# Patient Record
Sex: FEMALE | Race: WHITE | Employment: UNEMPLOYED | URBAN - METROPOLITAN AREA
[De-identification: names, ages, dates, MRNs, and addresses within clinical notes are randomized per-mention and may not be internally consistent; named-entity substitution may affect disease eponyms.]

---

## 2017-01-03 ENCOUNTER — OFFICE VISIT (OUTPATIENT)
Dept: INTERNAL MEDICINE CLINIC | Facility: CLINIC | Age: 57
End: 2017-01-03

## 2017-01-03 VITALS
DIASTOLIC BLOOD PRESSURE: 78 MMHG | SYSTOLIC BLOOD PRESSURE: 158 MMHG | BODY MASS INDEX: 29.84 KG/M2 | HEART RATE: 88 BPM | HEIGHT: 60 IN | WEIGHT: 152 LBS | RESPIRATION RATE: 22 BRPM | TEMPERATURE: 99 F

## 2017-01-03 DIAGNOSIS — R53.82 CHRONIC FATIGUE: ICD-10-CM

## 2017-01-03 DIAGNOSIS — G47.10 HYPERSOMNIA: ICD-10-CM

## 2017-01-03 DIAGNOSIS — J44.9 COPD, MILD (HCC): ICD-10-CM

## 2017-01-03 DIAGNOSIS — J06.9 UPPER RESPIRATORY TRACT INFECTION, UNSPECIFIED TYPE: Primary | ICD-10-CM

## 2017-01-03 DIAGNOSIS — G47.33 OSA (OBSTRUCTIVE SLEEP APNEA): ICD-10-CM

## 2017-01-03 DIAGNOSIS — R06.83 SNORING: ICD-10-CM

## 2017-01-03 DIAGNOSIS — R06.02 SHORTNESS OF BREATH: ICD-10-CM

## 2017-01-03 PROCEDURE — 99214 OFFICE O/P EST MOD 30 MIN: CPT | Performed by: INTERNAL MEDICINE

## 2017-01-03 RX ORDER — ALBUTEROL SULFATE 90 UG/1
2 AEROSOL, METERED RESPIRATORY (INHALATION) EVERY 4 HOURS PRN
Qty: 1 INHALER | Refills: 6 | Status: SHIPPED | OUTPATIENT
Start: 2017-01-03 | End: 2018-05-24

## 2017-01-03 NOTE — PROGRESS NOTES
Patient presents with: Other: Residual URI; chronic fatigue and hypersomnia; SOB and COPD      HPI: The pt presents today for eval of residual URI, chronic fatigue and hypersomnia, as well as SOB and COPD.     1. URI - Was treated recently with antibiotics Status: Never Used                        Comment: 1 1/2 per day    Alcohol Use: Yes           0.0 oz/week       0 Standard drinks or equivalent per week       Comment: social      PE:  /78 mmHg  Pulse 88  Temp(Src) 98.5 °F (36.9 °C) (Oral)  Resp 22 (CPT=15314)

## 2017-01-04 RX ORDER — MELOXICAM 15 MG/1
TABLET ORAL
Qty: 30 TABLET | Refills: 0 | Status: SHIPPED | OUTPATIENT
Start: 2017-01-04 | End: 2017-02-02

## 2017-01-04 NOTE — TELEPHONE ENCOUNTER
Future Appointments  Date Time Provider Lilly Purcell   9/0/6253 4:32 PM Lily Her MD [facility] Edwige Aguiar

## 2017-01-10 RX ORDER — RANITIDINE 150 MG/1
TABLET ORAL
Qty: 60 TABLET | Refills: 0 | Status: SHIPPED | OUTPATIENT
Start: 2017-01-10 | End: 2017-02-08

## 2017-01-10 NOTE — TELEPHONE ENCOUNTER
Medication: Sumatriptan 50mg     Date of last refill: 11/4/16  [place]     Last office visit: 11/10/16  Due back to clinic per last office note:  2-3 months  Date next office visit scheduled:  2-16-17    Last OV note rec

## 2017-01-11 RX ORDER — SUMATRIPTAN 50 MG/1
TABLET, FILM COATED ORAL
Qty: 9 TABLET | Refills: 0 | Status: SHIPPED | OUTPATIENT
Start: 2017-01-11 | End: 2017-02-08

## 2017-02-02 RX ORDER — MELOXICAM 15 MG/1
TABLET ORAL
Qty: 30 TABLET | Refills: 0 | Status: SHIPPED | OUTPATIENT
Start: 2017-02-02 | End: 2017-03-16

## 2017-02-02 NOTE — TELEPHONE ENCOUNTER
LOV 11/3/16  Future Appointments  Date Time Provider Lilly Purcell   4/8/9840 2:66 PM Martínez Arellano MD [facility] Adele Mathur

## 2017-02-08 DIAGNOSIS — G43.009 MIGRAINE WITHOUT AURA AND WITHOUT STATUS MIGRAINOSUS, NOT INTRACTABLE: Primary | ICD-10-CM

## 2017-02-09 RX ORDER — RANITIDINE 150 MG/1
TABLET ORAL
Qty: 60 TABLET | Refills: 0 | Status: SHIPPED | OUTPATIENT
Start: 2017-02-09 | End: 2017-03-10

## 2017-02-09 RX ORDER — SUMATRIPTAN 50 MG/1
TABLET, FILM COATED ORAL
Qty: 9 TABLET | Refills: 0 | Status: SHIPPED | OUTPATIENT
Start: 2017-02-09 | End: 2017-03-09

## 2017-02-09 NOTE — TELEPHONE ENCOUNTER
Medication: Sumatriptan    Date of last refill: 01/11/17  [place]    Last office visit: 02/23/17  Due back to clinic per last office note:  2-3 months  Date next office visit scheduled:  02/23/17    Last OV note hola

## 2017-02-10 ENCOUNTER — TELEPHONE (OUTPATIENT)
Dept: INTERNAL MEDICINE CLINIC | Facility: CLINIC | Age: 57
End: 2017-02-10

## 2017-02-10 NOTE — TELEPHONE ENCOUNTER
Yes.  Are you going to contact the company? Temo Randle. Kavin Ramos MD  Diplomate, American Board of Internal Medicine  [facility]  [address]  [facility],4Th Floor, [address], [facility], [address] [place] [address]  T: [phone]; F: [address]

## 2017-02-11 ENCOUNTER — OFFICE VISIT (OUTPATIENT)
Dept: SLEEP CENTER | Facility: HOSPITAL | Age: 57
End: 2017-02-11
Attending: INTERNAL MEDICINE
Payer: MEDICAID

## 2017-02-11 PROCEDURE — 95810 POLYSOM 6/> YRS 4/> PARAM: CPT

## 2017-02-13 ENCOUNTER — HOSPITAL ENCOUNTER (OUTPATIENT)
Dept: CV DIAGNOSTICS | Age: 57
Discharge: HOME | End: 2017-02-13
Attending: INTERNAL MEDICINE
Payer: MEDICAID

## 2017-02-13 DIAGNOSIS — J44.9 COPD, MILD (HCC): ICD-10-CM

## 2017-02-13 DIAGNOSIS — R06.02 SHORTNESS OF BREATH: ICD-10-CM

## 2017-02-13 PROCEDURE — 93306 TTE W/DOPPLER COMPLETE: CPT | Performed by: INTERNAL MEDICINE

## 2017-02-13 PROCEDURE — 93306 TTE W/DOPPLER COMPLETE: CPT

## 2017-02-14 NOTE — TELEPHONE ENCOUNTER
Pt is request Pristiq 100 mg, Had to renew with [employer] paperwork was faxed on 2-13-17 pt aware a awaiting a response.

## 2017-02-17 ENCOUNTER — TELEPHONE (OUTPATIENT)
Dept: INTERNAL MEDICINE CLINIC | Facility: CLINIC | Age: 57
End: 2017-02-17

## 2017-02-17 NOTE — TELEPHONE ENCOUNTER
Pt called to inform has enough tabs for a few more days for Pritstiq. Pt verbalized will call back on Monday w info of which pharmacy to sent a 7 day refill.

## 2017-02-20 ENCOUNTER — TELEPHONE (OUTPATIENT)
Dept: INTERNAL MEDICINE CLINIC | Facility: CLINIC | Age: 57
End: 2017-02-20

## 2017-02-20 DIAGNOSIS — G47.33 OSA (OBSTRUCTIVE SLEEP APNEA): Primary | ICD-10-CM

## 2017-02-20 NOTE — TELEPHONE ENCOUNTER
Tell her sleep study shows obstructive sleep apnea. She has been recommended for formal CPAP titration study. Test ordered. Ricardo Boggs. Jcarlos Tripathi MD  Diplomate, American Board of Internal Medicine  [facility]  [address] [facility], [address], [address] [facility]

## 2017-02-22 RX ORDER — DESVENLAFAXINE 100 MG/1
100 TABLET, EXTENDED RELEASE ORAL DAILY
Qty: 7 TABLET | Refills: 0 | Status: SHIPPED | OUTPATIENT
Start: 2017-02-22 | End: 2017-09-28

## 2017-02-22 NOTE — TELEPHONE ENCOUNTER
Pt requesting refill today on Pristiq 100 mg for 7 days to [facility] until approved for assistance through [employer].

## 2017-03-02 ENCOUNTER — TELEPHONE (OUTPATIENT)
Dept: INTERNAL MEDICINE CLINIC | Facility: CLINIC | Age: 57
End: 2017-03-02

## 2017-03-07 ENCOUNTER — TELEPHONE (OUTPATIENT)
Dept: INTERNAL MEDICINE CLINIC | Facility: CLINIC | Age: 57
End: 2017-03-07

## 2017-03-07 DIAGNOSIS — R53.82 CHRONIC FATIGUE: Primary | ICD-10-CM

## 2017-03-07 NOTE — TELEPHONE ENCOUNTER
Tell her labs ordered and I added a few things as well. Jony Escalona. Bibiana Starks MD  Diplomate, American Board of Internal Medicine  [facility]  [address]  [address] [facility],4Th Floor, [address], [address], [address] [place] [address]  T: [phone]; F: [address]

## 2017-03-09 DIAGNOSIS — G43.009 MIGRAINE WITHOUT AURA AND WITHOUT STATUS MIGRAINOSUS, NOT INTRACTABLE: Primary | ICD-10-CM

## 2017-03-09 RX ORDER — SUMATRIPTAN 50 MG/1
TABLET, FILM COATED ORAL
Qty: 9 TABLET | Refills: 0 | Status: SHIPPED | OUTPATIENT
Start: 2017-03-09 | End: 2017-09-28

## 2017-03-09 NOTE — TELEPHONE ENCOUNTER
Pt due in clinic for f/u appt in Feb.  Appts had been scheduled and canceled. Called patient who states that she has been very sick, and had to cancel those appts. She is willing to schedule. Pt scheduled for 3/30/17.   Informed we will refill this Rx wi

## 2017-03-10 RX ORDER — PRAVASTATIN SODIUM 20 MG
TABLET ORAL
Qty: 90 TABLET | Refills: 0 | Status: SHIPPED | OUTPATIENT
Start: 2017-03-10 | End: 2017-06-23

## 2017-03-10 RX ORDER — IBUPROFEN 800 MG/1
TABLET ORAL
Qty: 90 TABLET | Refills: 0 | Status: SHIPPED | OUTPATIENT
Start: 2017-03-10 | End: 2017-07-11

## 2017-03-13 RX ORDER — RANITIDINE 150 MG/1
TABLET ORAL
Qty: 60 TABLET | Refills: 0 | Status: SHIPPED | OUTPATIENT
Start: 2017-03-13 | End: 2017-09-28

## 2017-03-14 ENCOUNTER — LAB ENCOUNTER (OUTPATIENT)
Dept: LAB | Age: 57
End: 2017-03-14
Attending: INTERNAL MEDICINE
Payer: MEDICAID

## 2017-03-14 DIAGNOSIS — R53.82 CHRONIC FATIGUE: ICD-10-CM

## 2017-03-14 LAB
25-HYDROXYVITAMIN D (TOTAL): 13.2 NG/ML (ref 30–100)
ALBUMIN SERPL-MCNC: 3.9 G/DL (ref 3.5–4.8)
ALP LIVER SERPL-CCNC: 79 U/L (ref 41–108)
ALT SERPL-CCNC: 31 U/L (ref 14–54)
AST SERPL-CCNC: 17 U/L (ref 15–41)
BASOPHILS # BLD AUTO: 0.06 X10(3) UL (ref 0–0.1)
BASOPHILS NFR BLD AUTO: 0.5 %
BILIRUB SERPL-MCNC: 0.4 MG/DL (ref 0.1–2)
BUN BLD-MCNC: 13 MG/DL (ref 8–20)
CALCIUM BLD-MCNC: 9.4 MG/DL (ref 8.3–10.3)
CHLORIDE: 104 MMOL/L (ref 101–111)
CO2: 27 MMOL/L (ref 22–32)
CREAT BLD-MCNC: 0.78 MG/DL (ref 0.55–1.02)
DEPRECATED HBV CORE AB SER IA-ACNC: 97.6 NG/ML (ref 10–291)
EOSINOPHIL # BLD AUTO: 0.22 X10(3) UL (ref 0–0.3)
EOSINOPHIL NFR BLD AUTO: 2 %
ERYTHROCYTE [DISTWIDTH] IN BLOOD BY AUTOMATED COUNT: 12.5 % (ref 11.5–16)
EST. AVERAGE GLUCOSE BLD GHB EST-MCNC: 123 MG/DL (ref 68–126)
FOLATE (FOLIC ACID), SERUM: 45.4 NG/ML (ref 8.7–24)
GLUCOSE BLD-MCNC: 141 MG/DL (ref 70–99)
HAV AB SERPL IA-ACNC: 1671 PG/ML (ref 193–986)
HBA1C MFR BLD HPLC: 5.9 % (ref ?–5.7)
HCT VFR BLD AUTO: 40.7 % (ref 34–50)
HGB BLD-MCNC: 13.9 G/DL (ref 12–16)
IMMATURE GRANULOCYTE COUNT: 0.06 X10(3) UL (ref 0–1)
IMMATURE GRANULOCYTE RATIO %: 0.5 %
IRON: 115 UG/DL (ref 28–170)
LYMPHOCYTES # BLD AUTO: 3.69 X10(3) UL (ref 0.9–4)
LYMPHOCYTES NFR BLD AUTO: 33.5 %
M PROTEIN MFR SERPL ELPH: 7.4 G/DL (ref 6.1–8.3)
MCH RBC QN AUTO: 33.1 PG (ref 27–33.2)
MCHC RBC AUTO-ENTMCNC: 34.2 G/DL (ref 31–37)
MCV RBC AUTO: 96.9 FL (ref 81–100)
MONOCYTES # BLD AUTO: 0.75 X10(3) UL (ref 0.1–0.6)
MONOCYTES NFR BLD AUTO: 6.8 %
NEUTROPHIL ABS PRELIM: 6.23 X10 (3) UL (ref 1.3–6.7)
NEUTROPHILS # BLD AUTO: 6.23 X10(3) UL (ref 1.3–6.7)
NEUTROPHILS NFR BLD AUTO: 56.7 %
PLATELET # BLD AUTO: 288 10(3)UL (ref 150–450)
POTASSIUM SERPL-SCNC: 3.8 MMOL/L (ref 3.6–5.1)
RBC # BLD AUTO: 4.2 X10(6)UL (ref 3.8–5.1)
RED CELL DISTRIBUTION WIDTH-SD: 44 FL (ref 35.1–46.3)
SODIUM SERPL-SCNC: 139 MMOL/L (ref 136–144)
TSI SER-ACNC: 2.18 MIU/ML (ref 0.35–5.5)
WBC # BLD AUTO: 11 X10(3) UL (ref 4–13)

## 2017-03-14 PROCEDURE — 82607 VITAMIN B-12: CPT

## 2017-03-14 PROCEDURE — 80053 COMPREHEN METABOLIC PANEL: CPT

## 2017-03-14 PROCEDURE — 83036 HEMOGLOBIN GLYCOSYLATED A1C: CPT

## 2017-03-14 PROCEDURE — 82746 ASSAY OF FOLIC ACID SERUM: CPT

## 2017-03-14 PROCEDURE — 36415 COLL VENOUS BLD VENIPUNCTURE: CPT

## 2017-03-14 PROCEDURE — 83540 ASSAY OF IRON: CPT

## 2017-03-14 PROCEDURE — 82306 VITAMIN D 25 HYDROXY: CPT

## 2017-03-14 PROCEDURE — 82728 ASSAY OF FERRITIN: CPT

## 2017-03-14 PROCEDURE — 85025 COMPLETE CBC W/AUTO DIFF WBC: CPT

## 2017-03-14 PROCEDURE — 84443 ASSAY THYROID STIM HORMONE: CPT

## 2017-03-15 DIAGNOSIS — E55.9 VITAMIN D DEFICIENCY: Primary | ICD-10-CM

## 2017-03-15 RX ORDER — ERGOCALCIFEROL 1.25 MG/1
50000 CAPSULE ORAL
Qty: 12 CAPSULE | Refills: 0 | Status: SHIPPED | OUTPATIENT
Start: 2017-03-15 | End: 2017-06-23 | Stop reason: ALTCHOICE

## 2017-03-15 NOTE — PROGRESS NOTES
Script for Vitamin D sent to her pharmacy today. Sandoval Michel. Ed Justice MD  Diplomate, American Board of Internal Medicine  [facility]  [address]  [address] [facility],4Th Floor, [address], [facility], [address] [place] [address]  T: [phone]; F: [address]

## 2017-03-16 ENCOUNTER — TELEPHONE (OUTPATIENT)
Dept: RHEUMATOLOGY | Facility: CLINIC | Age: 57
End: 2017-03-16

## 2017-03-16 RX ORDER — MELOXICAM 15 MG/1
15 TABLET ORAL DAILY
Qty: 30 TABLET | Refills: 0 | Status: SHIPPED | OUTPATIENT
Start: 2017-03-16 | End: 2017-04-08

## 2017-03-16 NOTE — TELEPHONE ENCOUNTER
LOV 11/30/16  Future Appointments  Date Time Provider Lilly Purcell                 3/57/9396 2:16 PM Karen Levine MD [facility] Ting Flores

## 2017-03-30 ENCOUNTER — OFFICE VISIT (OUTPATIENT)
Dept: NEUROLOGY | Facility: CLINIC | Age: 57
End: 2017-03-30

## 2017-03-30 VITALS
DIASTOLIC BLOOD PRESSURE: 98 MMHG | WEIGHT: 159 LBS | BODY MASS INDEX: 31.22 KG/M2 | HEIGHT: 60 IN | TEMPERATURE: 99 F | HEART RATE: 94 BPM | OXYGEN SATURATION: 95 % | SYSTOLIC BLOOD PRESSURE: 152 MMHG | RESPIRATION RATE: 23 BRPM

## 2017-03-30 DIAGNOSIS — G43.709 CHRONIC MIGRAINE WITHOUT AURA WITHOUT STATUS MIGRAINOSUS, NOT INTRACTABLE: Primary | ICD-10-CM

## 2017-03-30 PROCEDURE — 99213 OFFICE O/P EST LOW 20 MIN: CPT | Performed by: OTHER

## 2017-03-31 ENCOUNTER — TELEPHONE (OUTPATIENT)
Dept: NEUROLOGY | Facility: CLINIC | Age: 57
End: 2017-03-31

## 2017-03-31 DIAGNOSIS — G43.009 MIGRAINE WITHOUT AURA AND WITHOUT STATUS MIGRAINOSUS, NOT INTRACTABLE: Primary | ICD-10-CM

## 2017-04-03 NOTE — PROGRESS NOTES
HPI:    Patient ID: Cherry Sender is a 49year old female. Migraine   Associated symptoms include photophobia.        Alycia Ortega is a 49year old female who presented for follow up for migraines and chronic daily headache She states that she continue to hav photophobia. Musculoskeletal: Positive for arthralgias. Neurological: Positive for headaches. All other systems reviewed and are negative.              Current Outpatient Prescriptions:  ergocalciferol 73419 units Oral Cap Take 1 capsule (50,000 Units Release Take 1 capsule (40 mg total) by mouth 2 (two) times daily.  Disp: 60 capsule Rfl: 3   ZOLPIDEM TARTRATE 10 MG Oral Tab TAKE ONE TABLET BY MOUTH NIGHTLY AS NEEDED FOR SLEEP Disp: 30 tablet Rfl: 5   SEROQUEL  MG Oral Tablet 24 Hr Take 1 tablet ( Cambia which I would not advised as she is on Ibuprofen and Meloxicam for OA (increase risk for PUD and GI bleeding). Patient understand the risk but insist I give her prescription to try. Continue Depakote 500 mg at bedtime.  Advised reduce cheese int

## 2017-04-04 ENCOUNTER — OFFICE VISIT (OUTPATIENT)
Dept: INTERNAL MEDICINE CLINIC | Facility: CLINIC | Age: 57
End: 2017-04-04

## 2017-04-04 VITALS
BODY MASS INDEX: 31.12 KG/M2 | OXYGEN SATURATION: 98 % | HEART RATE: 80 BPM | DIASTOLIC BLOOD PRESSURE: 92 MMHG | HEIGHT: 60 IN | TEMPERATURE: 98 F | WEIGHT: 158.5 LBS | SYSTOLIC BLOOD PRESSURE: 152 MMHG | RESPIRATION RATE: 20 BRPM

## 2017-04-04 DIAGNOSIS — E78.00 HYPERCHOLESTEROLEMIA: ICD-10-CM

## 2017-04-04 DIAGNOSIS — R53.82 CHRONIC FATIGUE: ICD-10-CM

## 2017-04-04 DIAGNOSIS — E55.9 VITAMIN D DEFICIENCY: ICD-10-CM

## 2017-04-04 DIAGNOSIS — E66.9 OBESITY (BMI 30-39.9): Primary | ICD-10-CM

## 2017-04-04 PROCEDURE — 99214 OFFICE O/P EST MOD 30 MIN: CPT | Performed by: INTERNAL MEDICINE

## 2017-04-04 RX ORDER — ATORVASTATIN CALCIUM 20 MG/1
20 TABLET, FILM COATED ORAL NIGHTLY
Qty: 90 TABLET | Refills: 1 | Status: SHIPPED | OUTPATIENT
Start: 2017-04-04 | End: 2017-09-28

## 2017-04-04 NOTE — PROGRESS NOTES
Patient presents with: Other: obesity and weight concerns; ongoing fatigue; vit d def; lipids      HPI: The pt presents today for eval of multiple medical complaints:    1. Obesity and elevated BMI - She's gained 18 pounds w/o intent over the last 1 year. Delayed Release, Take 1 capsule (40 mg total) by mouth 2 (two) times daily. , Disp: 60 capsule, Rfl: 3  •  divalproex Sodium  MG Oral Tablet 24 Hr, Take 2 tablets (500 mg total) by mouth nightly., Disp: 120 tablet, Rfl: 2  •  ZOLPIDEM TARTRATE 10 MG O 158 lb 8 oz  03/30/17 : 159 lb  01/03/17 : 152 lb  12/20/16 : 146 lb  11/10/16 : 146 lb  11/03/16 : 148 lb  Gen - A&Ox3, NAD, obese  HEENT - PERRL, EOMI, OP is clear  Neck - supple, no JVD, no thyromegaly  Lungs - CTAB  CV - RRR, nl s1, s2  Abd - soft, NAB

## 2017-04-05 NOTE — TELEPHONE ENCOUNTER
Rec'd incoming fax from [address] [place], [place] [address]way [facility] stating that due to patient's reported allergies to Naproxen, ASA and NSAIDs, they would not fill unless they had documentation that the patient had tried Cambia in the past without any adverse reactions.

## 2017-04-10 RX ORDER — MELOXICAM 15 MG/1
TABLET ORAL
Qty: 30 TABLET | Refills: 0 | Status: SHIPPED | OUTPATIENT
Start: 2017-04-10 | End: 2017-04-25

## 2017-04-10 RX ORDER — OMEPRAZOLE 40 MG/1
CAPSULE, DELAYED RELEASE ORAL
Qty: 60 CAPSULE | Refills: 2 | Status: SHIPPED | OUTPATIENT
Start: 2017-04-10 | End: 2017-07-10

## 2017-04-22 ENCOUNTER — OFFICE VISIT (OUTPATIENT)
Dept: SLEEP CENTER | Facility: HOSPITAL | Age: 57
End: 2017-04-22
Attending: INTERNAL MEDICINE
Payer: MEDICAID

## 2017-04-22 PROCEDURE — 95811 POLYSOM 6/>YRS CPAP 4/> PARM: CPT

## 2017-04-25 ENCOUNTER — OFFICE VISIT (OUTPATIENT)
Dept: RHEUMATOLOGY | Facility: CLINIC | Age: 57
End: 2017-04-25

## 2017-04-25 VITALS
HEART RATE: 84 BPM | WEIGHT: 154 LBS | DIASTOLIC BLOOD PRESSURE: 92 MMHG | BODY MASS INDEX: 30 KG/M2 | SYSTOLIC BLOOD PRESSURE: 146 MMHG | RESPIRATION RATE: 16 BRPM

## 2017-04-25 DIAGNOSIS — M15.9 PRIMARY OSTEOARTHRITIS INVOLVING MULTIPLE JOINTS: ICD-10-CM

## 2017-04-25 DIAGNOSIS — M18.11 PRIMARY OSTEOARTHRITIS OF FIRST CARPOMETACARPAL JOINT OF RIGHT HAND: Primary | ICD-10-CM

## 2017-04-25 PROCEDURE — 20600 DRAIN/INJ JOINT/BURSA W/O US: CPT | Performed by: INTERNAL MEDICINE

## 2017-04-25 RX ORDER — MELOXICAM 15 MG/1
TABLET ORAL
Qty: 90 TABLET | Refills: 1 | Status: SHIPPED | OUTPATIENT
Start: 2017-04-25 | End: 2017-07-28

## 2017-04-25 RX ORDER — METHYLPREDNISOLONE ACETATE 40 MG/ML
10 INJECTION, SUSPENSION INTRA-ARTICULAR; INTRALESIONAL; INTRAMUSCULAR; SOFT TISSUE ONCE
Status: COMPLETED | OUTPATIENT
Start: 2017-04-25 | End: 2017-04-25

## 2017-04-25 RX ADMIN — METHYLPREDNISOLONE ACETATE 10 MG: 40 INJECTION, SUSPENSION INTRA-ARTICULAR; INTRALESIONAL; INTRAMUSCULAR; SOFT TISSUE at 18:31:00

## 2017-04-25 NOTE — PROCEDURES
After obtaining consent from the patient, the right thumb was cleansed with a combination of Betadine and alcohol wipes.   In the right thumb was injected at the first carpometacarpal joint with 10 mg of methylprednisolone and a fourth of the cc of 1% lidoc

## 2017-04-25 NOTE — PROGRESS NOTES
[facility]  Dr. Kary Fan Progress Note     Subjective:   Anita Scott is a(n) 49year old female. Current complaints: Patient presents with:  Osteoarthritis: right thumb. LOV 11/3/16.  Pt requesting right thumb injection  Pain in right thumb at the

## 2017-04-25 NOTE — PROCEDURES
[address]       Accredited by the [facility] ([facility])    PATIENT'S NAME:        Deborah Ortiz  ATTENDING PHYSICIAN:   Lissett Gomez M.D. REFERRING PHYSICIAN:   JUNIOR Nance minutes. There is an additional channel for measurement of CPAP flow for the titration of positive airway pressure.     SLEEP ARCHITECTURE:  Total sleep period 362 minutes, total sleep time 346 minutes, with sleep efficiency 89% and sleep latency 27 minutes possible is beneficial for patients prescribed CPAP. 6.   The patient will follow with Dr. Estelita Barragan as per orders. Dictated By Payal Whitt M.D.  d: 04/25/2017 15:09:38  t: 04/25/2017 16:07:16  Job [id number]  OS/    cc: CIRO Machado

## 2017-04-25 NOTE — PATIENT INSTRUCTIONS
Today right thumb injected at the first metacarpal joint with 10 mg of methylprednisolone and a fourth of a cc of 1% lidocaine. Patient tolerated the injection well. Patient is to use meloxicam 15 mg at night. For breakthrough pain taken ibuprofen.   Juan Manuel

## 2017-05-04 ENCOUNTER — TELEPHONE (OUTPATIENT)
Dept: INTERNAL MEDICINE CLINIC | Facility: CLINIC | Age: 57
End: 2017-05-04

## 2017-05-04 NOTE — TELEPHONE ENCOUNTER
Pt received a call yesterday from someone telling her she needed to follow with a sleep specialist.  Pt does not know who called her or remember the name of the sleep specialist.  Did Dr Jcarlos Tripathi refer her?    Also pt received a letter stating Seroquel goes ge

## 2017-05-04 NOTE — TELEPHONE ENCOUNTER
No need to see a sleep specialist.  Just needs to make sure she has appropriate CPAP equipment. Lakia Paul. Zuleika Lynch MD  Diplomate, American Board of Internal Medicine  [facility]  [address]  [address] [facility],4Th Floor, [address], [facility], [address] [place] [address]  T: [phone]

## 2017-05-09 ENCOUNTER — TELEPHONE (OUTPATIENT)
Dept: INTERNAL MEDICINE CLINIC | Facility: CLINIC | Age: 57
End: 2017-05-09

## 2017-05-10 ENCOUNTER — TELEPHONE (OUTPATIENT)
Dept: INTERNAL MEDICINE CLINIC | Facility: CLINIC | Age: 57
End: 2017-05-10

## 2017-05-10 RX ORDER — RANITIDINE 150 MG/1
CAPSULE ORAL
Qty: 60 CAPSULE | Refills: 2 | Status: SHIPPED | OUTPATIENT
Start: 2017-05-10 | End: 2017-07-27

## 2017-05-10 RX ORDER — DESVENLAFAXINE 100 MG/1
100 TABLET, EXTENDED RELEASE ORAL DAILY
Refills: 0 | COMMUNITY
Start: 2017-05-10 | End: 2017-08-21

## 2017-05-10 NOTE — TELEPHONE ENCOUNTER
Pt requesting refill on Pristiq  mg daily through [facility]. Spoke with Karan Tate at [address] [place] [address]. Pt ID # [id number]  Medication reordered. Order # [id number] 7-10 business days. Pt informed.

## 2017-05-11 ENCOUNTER — TELEPHONE (OUTPATIENT)
Dept: INTERNAL MEDICINE CLINIC | Facility: CLINIC | Age: 57
End: 2017-05-11

## 2017-05-11 NOTE — TELEPHONE ENCOUNTER
Seroquel medication is ready for . Pt was informed and medication will be placed in the triage room.

## 2017-05-22 NOTE — TELEPHONE ENCOUNTER
Medication: Divalproex 250mg     Date of last refill: 11/10/16  [place]     Last office visit: 3/30/17  Due back to clinic per last office note:  2-3 months  Date next office visit scheduled:  6/29/17    Last OV note rec

## 2017-05-23 RX ORDER — DIVALPROEX SODIUM 250 MG/1
TABLET, EXTENDED RELEASE ORAL
Qty: 60 TABLET | Refills: 0 | Status: SHIPPED | OUTPATIENT
Start: 2017-05-23 | End: 2017-06-17

## 2017-06-01 ENCOUNTER — TELEPHONE (OUTPATIENT)
Dept: INTERNAL MEDICINE CLINIC | Facility: CLINIC | Age: 57
End: 2017-06-01

## 2017-06-01 NOTE — TELEPHONE ENCOUNTER
Brand Seroquel XR no longer available through [employer]. Pt needs Rx for generic XR to [facility] for processing to see if insurance will cover it. Letter from [employer] in Dr Ileana Ortega in basket for review.

## 2017-06-04 RX ORDER — QUETIAPINE 200 MG/1
200 TABLET, FILM COATED, EXTENDED RELEASE ORAL NIGHTLY
Qty: 30 TABLET | Refills: 5 | Status: SHIPPED | OUTPATIENT
Start: 2017-06-04 | End: 2017-09-28

## 2017-06-04 NOTE — TELEPHONE ENCOUNTER
Tell her that generic XR version (called Quetiapine Fumarate) only comes in 200, 300, and 400 mg. There is no 150 mg version. Therefore, I sent a script for the 200 mg version to her pharmacy. Kathleen Eisenberg.  Jolly Hinoojsa MD  Diplomate, [address] [place] [address] Board of Internal Clarisa CAy

## 2017-06-06 ENCOUNTER — TELEPHONE (OUTPATIENT)
Dept: INTERNAL MEDICINE CLINIC | Facility: CLINIC | Age: 57
End: 2017-06-06

## 2017-06-09 NOTE — TELEPHONE ENCOUNTER
Quetiapine fumarate 200mg extended release approved 30/30 approved for 12 months tracking ID [id number]

## 2017-06-19 NOTE — TELEPHONE ENCOUNTER
Medication: Divalproex    Date of last refill: 05/23/17  [place]    Last office visit: 03/30/17  Due back to clinic per last office note:  2-3 months  Date next office visit scheduled:  06/29/17    Last OV note recom

## 2017-06-20 RX ORDER — DIVALPROEX SODIUM 250 MG/1
TABLET, EXTENDED RELEASE ORAL
Qty: 60 TABLET | Refills: 1 | Status: SHIPPED | OUTPATIENT
Start: 2017-06-20 | End: 2017-08-25

## 2017-06-23 ENCOUNTER — OFFICE VISIT (OUTPATIENT)
Dept: INTERNAL MEDICINE CLINIC | Facility: CLINIC | Age: 57
End: 2017-06-23

## 2017-06-23 VITALS
BODY MASS INDEX: 31.41 KG/M2 | OXYGEN SATURATION: 97 % | SYSTOLIC BLOOD PRESSURE: 126 MMHG | DIASTOLIC BLOOD PRESSURE: 76 MMHG | TEMPERATURE: 99 F | HEIGHT: 60 IN | WEIGHT: 160 LBS | HEART RATE: 102 BPM | RESPIRATION RATE: 28 BRPM

## 2017-06-23 DIAGNOSIS — L98.9 SKIN LESION: ICD-10-CM

## 2017-06-23 DIAGNOSIS — R21 RASH: Primary | ICD-10-CM

## 2017-06-23 PROCEDURE — 99213 OFFICE O/P EST LOW 20 MIN: CPT | Performed by: PHYSICIAN ASSISTANT

## 2017-06-23 NOTE — PATIENT INSTRUCTIONS
Rash:  - start triamcinolone cream (thin layer twice a day x 7-10 days)  - use fragrance and dye free soaps, detergents, lotions  - follow up with dermatology for a full skin check

## 2017-06-23 NOTE — PROGRESS NOTES
Shadi Palomo is a 56year old female. HPI:   Patient presents for eval of a rash on her chest which she first noticed about two weeks ago. Has spread just slightly.   Has been using OTC topical acne cream, benadryl cream, neosporin, and taking PO be distress  SKIN: multiple pink to red papular lesions on chest.  There is 2x2 mm slightly raised light brown nevus. ASSESSMENT AND PLAN:   # Rash: start triamcinolone cream.  # Skin lesion: see derm for full skin check.     The patient indicates [place]

## 2017-06-24 RX ORDER — ZOLPIDEM TARTRATE 10 MG/1
TABLET ORAL
Qty: 30 TABLET | Refills: 0 | Status: SHIPPED | OUTPATIENT
Start: 2017-06-24 | End: 2017-07-27

## 2017-07-10 RX ORDER — OMEPRAZOLE 40 MG/1
CAPSULE, DELAYED RELEASE ORAL
Qty: 60 CAPSULE | Refills: 5 | Status: SHIPPED | OUTPATIENT
Start: 2017-07-10 | End: 2017-09-28

## 2017-07-13 RX ORDER — IBUPROFEN 800 MG/1
TABLET ORAL
Qty: 90 TABLET | Refills: 0 | Status: SHIPPED | OUTPATIENT
Start: 2017-07-13 | End: 2017-09-28

## 2017-07-26 RX ORDER — FLUTICASONE PROPIONATE 50 MCG
SPRAY, SUSPENSION (ML) NASAL
Qty: 16 G | Refills: 0 | Status: SHIPPED | OUTPATIENT
Start: 2017-07-26 | End: 2017-09-28

## 2017-07-27 ENCOUNTER — OFFICE VISIT (OUTPATIENT)
Dept: INTERNAL MEDICINE CLINIC | Facility: CLINIC | Age: 57
End: 2017-07-27

## 2017-07-27 VITALS
HEART RATE: 89 BPM | RESPIRATION RATE: 14 BRPM | SYSTOLIC BLOOD PRESSURE: 122 MMHG | WEIGHT: 159.75 LBS | DIASTOLIC BLOOD PRESSURE: 80 MMHG | BODY MASS INDEX: 31.36 KG/M2 | TEMPERATURE: 98 F | HEIGHT: 60 IN

## 2017-07-27 DIAGNOSIS — F51.01 PRIMARY INSOMNIA: ICD-10-CM

## 2017-07-27 DIAGNOSIS — K22.70 BARRETT'S ESOPHAGUS WITHOUT DYSPLASIA: ICD-10-CM

## 2017-07-27 DIAGNOSIS — G47.33 OSA ON CPAP: Primary | ICD-10-CM

## 2017-07-27 DIAGNOSIS — Z99.89 OSA ON CPAP: Primary | ICD-10-CM

## 2017-07-27 PROCEDURE — 99214 OFFICE O/P EST MOD 30 MIN: CPT | Performed by: INTERNAL MEDICINE

## 2017-07-27 RX ORDER — QUETIAPINE 200 MG/1
TABLET, FILM COATED ORAL
COMMUNITY
Start: 2017-06-12 | End: 2017-09-28

## 2017-07-27 RX ORDER — RANITIDINE 150 MG/1
CAPSULE ORAL
Qty: 60 CAPSULE | Refills: 5 | Status: SHIPPED | OUTPATIENT
Start: 2017-07-27 | End: 2017-09-28

## 2017-07-27 RX ORDER — ZOLPIDEM TARTRATE 10 MG/1
TABLET ORAL
Qty: 30 TABLET | Refills: 5 | Status: SHIPPED | OUTPATIENT
Start: 2017-07-27 | End: 2017-09-28

## 2017-07-27 RX ORDER — BUTALBITAL, ACETAMINOPHEN AND CAFFEINE 50; 325; 40 MG/1; MG/1; MG/1
TABLET ORAL
Qty: 90 TABLET | Refills: 0 | Status: SHIPPED | OUTPATIENT
Start: 2017-07-27 | End: 2017-09-28

## 2017-07-27 NOTE — PROGRESS NOTES
Patient presents with: Follow - Up: SUMMER on CPAP; Jamison's esophagus; Insomnia      HPI: The pt presents today for f/u 3 chronic medical issues:  1. SUMMER on CPAP - She is compliant w/ device.   She has less snoring and less witnessed apneas, however, she st MOUTH TWICE DAILY, Disp: 60 capsule, Rfl: 5  •  triamcinolone acetonide 0.1 % External Cream, Apply a thin layer to affected area twice a day x 7-10 days, Disp: 30 g, Rfl: 0  •  DIVALPROEX SODIUM  MG Oral Tablet 24 Hr, TAKE 2 TABLETS BY MOUTH NIGHTLY Cigarettes  Smokeless tobacco: Never Used                      Comment: 1.5-2 ppd  Alcohol use:  Yes              Comment: social      PE:  /80 (BP Location: Left arm, Patient Position: Sitting, Cuff Size: adult)   Pulse 89   Temp 98.1 °F (36.7 °C) (O

## 2017-07-28 ENCOUNTER — OFFICE VISIT (OUTPATIENT)
Dept: RHEUMATOLOGY | Facility: CLINIC | Age: 57
End: 2017-07-28

## 2017-07-28 VITALS
BODY MASS INDEX: 31 KG/M2 | DIASTOLIC BLOOD PRESSURE: 84 MMHG | RESPIRATION RATE: 16 BRPM | SYSTOLIC BLOOD PRESSURE: 132 MMHG | WEIGHT: 159 LBS | HEART RATE: 76 BPM

## 2017-07-28 DIAGNOSIS — M18.11 PRIMARY OSTEOARTHRITIS OF FIRST CARPOMETACARPAL JOINT OF RIGHT HAND: ICD-10-CM

## 2017-07-28 DIAGNOSIS — M15.9 PRIMARY OSTEOARTHRITIS INVOLVING MULTIPLE JOINTS: Primary | ICD-10-CM

## 2017-07-28 DIAGNOSIS — K22.70 BARRETT'S ESOPHAGUS WITHOUT DYSPLASIA: ICD-10-CM

## 2017-07-28 PROCEDURE — 20610 DRAIN/INJ JOINT/BURSA W/O US: CPT | Performed by: INTERNAL MEDICINE

## 2017-07-28 PROCEDURE — 20600 DRAIN/INJ JOINT/BURSA W/O US: CPT | Performed by: INTERNAL MEDICINE

## 2017-07-28 RX ORDER — MELOXICAM 15 MG/1
TABLET ORAL
Qty: 90 TABLET | Refills: 1 | Status: SHIPPED | OUTPATIENT
Start: 2017-07-28 | End: 2018-02-06

## 2017-07-28 RX ORDER — METHYLPREDNISOLONE ACETATE 40 MG/ML
10 INJECTION, SUSPENSION INTRA-ARTICULAR; INTRALESIONAL; INTRAMUSCULAR; SOFT TISSUE ONCE
Status: COMPLETED | OUTPATIENT
Start: 2017-07-28 | End: 2017-07-28

## 2017-07-28 RX ORDER — METHYLPREDNISOLONE ACETATE 40 MG/ML
40 INJECTION, SUSPENSION INTRA-ARTICULAR; INTRALESIONAL; INTRAMUSCULAR; SOFT TISSUE ONCE
Status: COMPLETED | OUTPATIENT
Start: 2017-07-28 | End: 2017-07-28

## 2017-07-28 RX ADMIN — METHYLPREDNISOLONE ACETATE 40 MG: 40 INJECTION, SUSPENSION INTRA-ARTICULAR; INTRALESIONAL; INTRAMUSCULAR; SOFT TISSUE at 15:29:00

## 2017-07-28 RX ADMIN — METHYLPREDNISOLONE ACETATE 40 MG: 40 INJECTION, SUSPENSION INTRA-ARTICULAR; INTRALESIONAL; INTRAMUSCULAR; SOFT TISSUE at 15:30:00

## 2017-07-28 RX ADMIN — METHYLPREDNISOLONE ACETATE 10 MG: 40 INJECTION, SUSPENSION INTRA-ARTICULAR; INTRALESIONAL; INTRAMUSCULAR; SOFT TISSUE at 15:29:00

## 2017-07-28 NOTE — PATIENT INSTRUCTIONS
Today is to inject the right thumb at the carpometacarpal joint with 10 mg of methylprednisolone and a fourth of a cc of 1% lidocaine. Also both knees will be injected with cortisone.   Arthritis pain will be dealt with by taking ibuprofen 800 mg in the mo

## 2017-07-28 NOTE — PROCEDURES
After obtaining consent from the patient, the right thumb, right knee, and left knee were cleansed with a combination of Betadine and alcohol wipes.   Then the right thumb at the carpometacarpal joint was injected with 10 mg of methylprednisolone and a half

## 2017-07-28 NOTE — PROGRESS NOTES
[facility]  Dr. Bird Whaley Progress Note     Subjective:   Desiree Cogan is a(n) 56year old female. Current complaints: Patient presents with:  Osteoarthritis: 3 month f/u. Pt states 'knees are hurting more, having back pain.  Right thumb is painful

## 2017-07-31 RX ORDER — BUTALBITAL, ACETAMINOPHEN AND CAFFEINE 50; 325; 40 MG/1; MG/1; MG/1
TABLET ORAL
Qty: 90 TABLET | Refills: 0 | OUTPATIENT
Start: 2017-07-31

## 2017-08-19 RX ORDER — DESVENLAFAXINE 100 MG/1
100 TABLET, EXTENDED RELEASE ORAL DAILY
Qty: 7 TABLET | Refills: 0 | Status: CANCELLED
Start: 2017-08-19

## 2017-08-21 ENCOUNTER — PATIENT MESSAGE (OUTPATIENT)
Dept: INTERNAL MEDICINE CLINIC | Facility: CLINIC | Age: 57
End: 2017-08-21

## 2017-08-21 ENCOUNTER — TELEPHONE (OUTPATIENT)
Dept: INTERNAL MEDICINE CLINIC | Facility: CLINIC | Age: 57
End: 2017-08-21

## 2017-08-21 RX ORDER — DESVENLAFAXINE 100 MG/1
100 TABLET, EXTENDED RELEASE ORAL DAILY
Refills: 0 | COMMUNITY
Start: 2017-08-21 | End: 2017-11-06

## 2017-08-21 NOTE — TELEPHONE ENCOUNTER
Please take care of patient's request thru [facility]. Hilario Daniels. Renny Venegas MD  Diplomate, American Board of Internal Medicine  [facility]  [address]  [address] [facility],4Th Floor, [address], [facility], [address]  T: [phone]; F: [address]

## 2017-08-21 NOTE — TELEPHONE ENCOUNTER
Lencho Pierson [employer] contacted at [phone] with pt ID # of [id number]. Pristiq 100 mg ER tablets ordered. Order # [id number] will take 7-10 business days.

## 2017-08-21 NOTE — TELEPHONE ENCOUNTER
From: Angelic Guerrero  Sent: 8/19/2017 12:43 PM CDT  Subject: Medication Renewal Request    Angelic Guerrero would like a refill of the following medications:  Desvenlafaxine Succinate ER (PRISTIQ) 100 MG Oral Tablet 24 Hr Maritza Tello MD]    Preferred

## 2017-08-25 DIAGNOSIS — G43.009 MIGRAINE WITHOUT AURA AND WITHOUT STATUS MIGRAINOSUS, NOT INTRACTABLE: Primary | ICD-10-CM

## 2017-08-25 RX ORDER — DIVALPROEX SODIUM 250 MG/1
TABLET, EXTENDED RELEASE ORAL
Qty: 60 TABLET | Refills: 1 | Status: SHIPPED | OUTPATIENT
Start: 2017-08-25 | End: 2017-09-14

## 2017-08-25 NOTE — TELEPHONE ENCOUNTER
Medication: Divalproex Sodium  mg     Date of last refill: 6/20/17  [place]    Last office visit: 3/30/2017  Due back to clinic per last office note: 2-3 months   Date next office visit scheduled:  Future Appoin

## 2017-08-28 ENCOUNTER — TELEPHONE (OUTPATIENT)
Dept: INTERNAL MEDICINE CLINIC | Facility: CLINIC | Age: 57
End: 2017-08-28

## 2017-08-28 DIAGNOSIS — G43.009 MIGRAINE WITHOUT AURA AND WITHOUT STATUS MIGRAINOSUS, NOT INTRACTABLE: Primary | ICD-10-CM

## 2017-08-28 NOTE — TELEPHONE ENCOUNTER
Spoke to Wagner and inform him that during the last ov Dr. Poli Lind advised patient that she would not refill cambia as she is on Ibuprofen and Meloxicam for OA .      Medication: Cambia    Date of last refill: historical  [place]

## 2017-08-30 NOTE — TELEPHONE ENCOUNTER
Patient calling to request refills for Gabriel. Relayed below. Patient states Roanna Pretty is the only medication that helps relieve her headaches at this time     Patient takes Ibuprofen 800 mg twice a week for Arthritis and also takes Meloxicam daily.      P

## 2017-08-30 NOTE — TELEPHONE ENCOUNTER
Patient states she is allergic to Naproxen, and sensitive to Aspirin. Patient noted Aspirin is more of an irritation than allergy.      Patient reports she has had no allergic reactions to [facility] and is well aware of the risks of taking Ibuprofen and  Melox

## 2017-08-30 NOTE — TELEPHONE ENCOUNTER
Patient is requesting cambia and is well aware of the risk given Ibuprofen and Meloxicam on board. States that is the only medications that's helps her headache and she will be taking this for headaches on as needed basis.     She is reportedly allergic to

## 2017-09-05 ENCOUNTER — TELEPHONE (OUTPATIENT)
Dept: INTERNAL MEDICINE CLINIC | Facility: CLINIC | Age: 57
End: 2017-09-05

## 2017-09-05 DIAGNOSIS — E55.9 VITAMIN D DEFICIENCY: Primary | ICD-10-CM

## 2017-09-05 DIAGNOSIS — J44.9 COPD, MILD (HCC): ICD-10-CM

## 2017-09-05 DIAGNOSIS — R53.82 CHRONIC FATIGUE: ICD-10-CM

## 2017-09-05 DIAGNOSIS — E78.00 HYPERCHOLESTEROLEMIA: ICD-10-CM

## 2017-09-05 DIAGNOSIS — R73.03 PREDIABETES: ICD-10-CM

## 2017-09-05 NOTE — TELEPHONE ENCOUNTER
Pt requesting order for blood work including hormone and iron levels prior to her visit with Dr Renny Venegas on  9/28. Pt [employer] and new insurance will not be active until 2/2018.

## 2017-09-08 NOTE — TELEPHONE ENCOUNTER
Labs ordered, notify pt. Sunny Rushing. Jose Luis Cabrera MD  Diplomate, American Board of Internal Medicine  [address] [address] [address]  [facility],4Th Floor, [address], [facility], [address]  T: [phone]; F: [address]

## 2017-09-14 ENCOUNTER — OFFICE VISIT (OUTPATIENT)
Dept: NEUROLOGY | Facility: CLINIC | Age: 57
End: 2017-09-14

## 2017-09-14 VITALS
TEMPERATURE: 98 F | DIASTOLIC BLOOD PRESSURE: 80 MMHG | SYSTOLIC BLOOD PRESSURE: 130 MMHG | HEIGHT: 60 IN | BODY MASS INDEX: 30.43 KG/M2 | WEIGHT: 155 LBS | HEART RATE: 104 BPM | RESPIRATION RATE: 20 BRPM | OXYGEN SATURATION: 95 %

## 2017-09-14 DIAGNOSIS — G43.009 MIGRAINE WITHOUT AURA AND WITHOUT STATUS MIGRAINOSUS, NOT INTRACTABLE: Primary | ICD-10-CM

## 2017-09-14 PROCEDURE — 99213 OFFICE O/P EST LOW 20 MIN: CPT | Performed by: OTHER

## 2017-09-14 RX ORDER — DIVALPROEX SODIUM 250 MG/1
500 TABLET, EXTENDED RELEASE ORAL NIGHTLY
Qty: 60 TABLET | Refills: 5 | Status: SHIPPED | OUTPATIENT
Start: 2017-09-14 | End: 2018-03-19

## 2017-09-14 RX ORDER — DICLOFENAC POTASSIUM 50 MG/1
50 POWDER, FOR SOLUTION ORAL AS NEEDED
Qty: 9 PACKET | Refills: 2 | Status: SHIPPED | OUTPATIENT
Start: 2017-09-14 | End: 2017-09-28

## 2017-09-14 NOTE — PROGRESS NOTES
HPI:    Patient ID: Chito Bell is a 56year old female. Migraine          Patient is a  56year old female who presented for follow up for migraines and chronic daily headache. She is getting less headaches since we started Depakote.  She states it Systems   Constitutional: Negative. HENT: Negative. Eyes: Negative. Respiratory: Negative. Musculoskeletal: Positive for arthralgias. Neurological: Positive for headaches. All other systems reviewed and are negative.              Current Out MIGRAINE. USE AT ONSET, REPEAT ONCE AFTER 2 HOURS. ONLY 2 TABLETS IN 24 HOURS MAX Disp: 9 tablet Rfl: 0   Desvenlafaxine Succinate ER (PRISTIQ) 100 MG Oral Tablet 24 Hr Take 1 tablet (100 mg total) by mouth daily.  Disp: 7 tablet Rfl: 0   Albuterol Sulfate (primary encounter diagnosis)  Chronic daily headache    Continue Depakote at same dose. Again advised  To reduce cheese intake and minimizing use of analgesic to reduce any medication overuse/rebound headache component.      She is on Cambia for acute reli

## 2017-09-16 ENCOUNTER — LAB ENCOUNTER (OUTPATIENT)
Dept: LAB | Age: 57
End: 2017-09-16
Attending: INTERNAL MEDICINE
Payer: COMMERCIAL

## 2017-09-16 DIAGNOSIS — R73.03 PREDIABETES: ICD-10-CM

## 2017-09-16 DIAGNOSIS — R53.82 CHRONIC FATIGUE: ICD-10-CM

## 2017-09-16 DIAGNOSIS — J44.9 COPD, MILD (HCC): ICD-10-CM

## 2017-09-16 DIAGNOSIS — E55.9 VITAMIN D DEFICIENCY: ICD-10-CM

## 2017-09-16 DIAGNOSIS — E78.00 HYPERCHOLESTEROLEMIA: ICD-10-CM

## 2017-09-16 LAB
25-HYDROXYVITAMIN D (TOTAL): 40.9 NG/ML (ref 30–100)
ALBUMIN SERPL-MCNC: 3.7 G/DL (ref 3.5–4.8)
ALP LIVER SERPL-CCNC: 82 U/L (ref 41–108)
ALT SERPL-CCNC: 35 U/L (ref 14–54)
AST SERPL-CCNC: 19 U/L (ref 15–41)
BASOPHILS # BLD AUTO: 0.05 X10(3) UL (ref 0–0.1)
BASOPHILS NFR BLD AUTO: 0.5 %
BILIRUB SERPL-MCNC: 0.4 MG/DL (ref 0.1–2)
BUN BLD-MCNC: 11 MG/DL (ref 8–20)
CALCIUM BLD-MCNC: 9 MG/DL (ref 8.3–10.3)
CHLORIDE: 106 MMOL/L (ref 101–111)
CHOLEST SMN-MCNC: 218 MG/DL (ref ?–200)
CO2: 24 MMOL/L (ref 22–32)
CREAT BLD-MCNC: 0.68 MG/DL (ref 0.55–1.02)
DEPRECATED HBV CORE AB SER IA-ACNC: 87.2 NG/ML (ref 10–291)
EOSINOPHIL # BLD AUTO: 0.17 X10(3) UL (ref 0–0.3)
EOSINOPHIL NFR BLD AUTO: 1.7 %
ERYTHROCYTE [DISTWIDTH] IN BLOOD BY AUTOMATED COUNT: 12.8 % (ref 11.5–16)
EST. AVERAGE GLUCOSE BLD GHB EST-MCNC: 146 MG/DL (ref 68–126)
FOLATE (FOLIC ACID), SERUM: 40.5 NG/ML (ref 8.7–24)
FSH: 65.3 MIU/ML
GLUCOSE BLD-MCNC: 132 MG/DL (ref 70–99)
HAV AB SERPL IA-ACNC: 1409 PG/ML (ref 193–986)
HBA1C MFR BLD HPLC: 6.7 % (ref ?–5.7)
HCT VFR BLD AUTO: 40.2 % (ref 34–50)
HDLC SERPL-MCNC: 36 MG/DL (ref 45–?)
HDLC SERPL: 6.06 {RATIO} (ref ?–4.44)
HGB BLD-MCNC: 13.5 G/DL (ref 12–16)
IMMATURE GRANULOCYTE COUNT: 0.04 X10(3) UL (ref 0–1)
IMMATURE GRANULOCYTE RATIO %: 0.4 %
IRON SATURATION: 26 % (ref 13–45)
IRON: 99 UG/DL (ref 28–170)
LDLC SERPL CALC-MCNC: 118 MG/DL (ref ?–130)
LDLC SERPL-MCNC: 64 MG/DL (ref 5–40)
LH: 12.8 MIU/ML
LYMPHOCYTES # BLD AUTO: 2.88 X10(3) UL (ref 0.9–4)
LYMPHOCYTES NFR BLD AUTO: 29 %
M PROTEIN MFR SERPL ELPH: 7.4 G/DL (ref 6.1–8.3)
MCH RBC QN AUTO: 33.2 PG (ref 27–33.2)
MCHC RBC AUTO-ENTMCNC: 33.6 G/DL (ref 31–37)
MCV RBC AUTO: 98.8 FL (ref 81–100)
MONOCYTES # BLD AUTO: 0.74 X10(3) UL (ref 0.1–0.6)
MONOCYTES NFR BLD AUTO: 7.5 %
NEUTROPHIL ABS PRELIM: 6.04 X10 (3) UL (ref 1.3–6.7)
NEUTROPHILS # BLD AUTO: 6.04 X10(3) UL (ref 1.3–6.7)
NEUTROPHILS NFR BLD AUTO: 60.9 %
NONHDLC SERPL-MCNC: 182 MG/DL (ref ?–130)
PLATELET # BLD AUTO: 267 10(3)UL (ref 150–450)
POTASSIUM SERPL-SCNC: 4 MMOL/L (ref 3.6–5.1)
RBC # BLD AUTO: 4.07 X10(6)UL (ref 3.8–5.1)
RED CELL DISTRIBUTION WIDTH-SD: 46.1 FL (ref 35.1–46.3)
SODIUM SERPL-SCNC: 142 MMOL/L (ref 136–144)
TOTAL IRON BINDING CAPACITY: 374 UG/DL (ref 298–536)
TRANSFERRIN: 251 MG/DL (ref 200–360)
TRIGLYCERIDES: 318 MG/DL (ref ?–150)
TSI SER-ACNC: 0.9 MIU/ML (ref 0.35–5.5)
WBC # BLD AUTO: 9.9 X10(3) UL (ref 4–13)

## 2017-09-16 PROCEDURE — 85025 COMPLETE CBC W/AUTO DIFF WBC: CPT

## 2017-09-16 PROCEDURE — 82746 ASSAY OF FOLIC ACID SERUM: CPT

## 2017-09-16 PROCEDURE — 36415 COLL VENOUS BLD VENIPUNCTURE: CPT

## 2017-09-16 PROCEDURE — 82728 ASSAY OF FERRITIN: CPT

## 2017-09-16 PROCEDURE — 82607 VITAMIN B-12: CPT

## 2017-09-16 PROCEDURE — 83550 IRON BINDING TEST: CPT

## 2017-09-16 PROCEDURE — 80053 COMPREHEN METABOLIC PANEL: CPT

## 2017-09-16 PROCEDURE — 83001 ASSAY OF GONADOTROPIN (FSH): CPT

## 2017-09-16 PROCEDURE — 80061 LIPID PANEL: CPT

## 2017-09-16 PROCEDURE — 83540 ASSAY OF IRON: CPT

## 2017-09-16 PROCEDURE — 82306 VITAMIN D 25 HYDROXY: CPT

## 2017-09-16 PROCEDURE — 84443 ASSAY THYROID STIM HORMONE: CPT

## 2017-09-16 PROCEDURE — 83036 HEMOGLOBIN GLYCOSYLATED A1C: CPT

## 2017-09-16 PROCEDURE — 83002 ASSAY OF GONADOTROPIN (LH): CPT

## 2017-09-19 ENCOUNTER — TELEPHONE (OUTPATIENT)
Dept: RHEUMATOLOGY | Facility: CLINIC | Age: 57
End: 2017-09-19

## 2017-09-19 NOTE — TELEPHONE ENCOUNTER
Spoke with patient this am and pt is waiting for her open enrollment and will be switching to [facility]- by Feb 1, 2018

## 2017-09-26 ENCOUNTER — TELEPHONE (OUTPATIENT)
Dept: RHEUMATOLOGY | Facility: CLINIC | Age: 57
End: 2017-09-26

## 2017-09-28 ENCOUNTER — OFFICE VISIT (OUTPATIENT)
Dept: INTERNAL MEDICINE CLINIC | Facility: CLINIC | Age: 57
End: 2017-09-28

## 2017-09-28 VITALS
OXYGEN SATURATION: 99 % | RESPIRATION RATE: 16 BRPM | SYSTOLIC BLOOD PRESSURE: 127 MMHG | HEIGHT: 60 IN | DIASTOLIC BLOOD PRESSURE: 76 MMHG | HEART RATE: 108 BPM | WEIGHT: 156 LBS | BODY MASS INDEX: 30.63 KG/M2

## 2017-09-28 DIAGNOSIS — E11.9 CONTROLLED TYPE 2 DIABETES MELLITUS WITHOUT COMPLICATION, WITHOUT LONG-TERM CURRENT USE OF INSULIN (HCC): Primary | ICD-10-CM

## 2017-09-28 PROCEDURE — 99214 OFFICE O/P EST MOD 30 MIN: CPT | Performed by: INTERNAL MEDICINE

## 2017-09-28 RX ORDER — RANITIDINE 150 MG/1
CAPSULE ORAL
Qty: 60 CAPSULE | Refills: 5 | Status: SHIPPED | OUTPATIENT
Start: 2017-09-28 | End: 2018-06-19 | Stop reason: ALTCHOICE

## 2017-09-28 RX ORDER — FLUTICASONE PROPIONATE 50 MCG
SPRAY, SUSPENSION (ML) NASAL
Qty: 16 G | Refills: 5 | Status: SHIPPED | OUTPATIENT
Start: 2017-09-28 | End: 2019-05-24

## 2017-09-28 RX ORDER — ATORVASTATIN CALCIUM 20 MG/1
20 TABLET, FILM COATED ORAL NIGHTLY
Qty: 90 TABLET | Refills: 1 | Status: SHIPPED | OUTPATIENT
Start: 2017-09-28 | End: 2018-04-03

## 2017-09-28 RX ORDER — IBUPROFEN 800 MG/1
TABLET ORAL
Qty: 90 TABLET | Refills: 5 | Status: ON HOLD | OUTPATIENT
Start: 2017-09-28 | End: 2018-04-16

## 2017-09-28 RX ORDER — OMEPRAZOLE 40 MG/1
40 CAPSULE, DELAYED RELEASE ORAL 2 TIMES DAILY
Qty: 60 CAPSULE | Refills: 5 | Status: SHIPPED | OUTPATIENT
Start: 2017-09-28 | End: 2018-05-15

## 2017-09-28 RX ORDER — DICLOFENAC POTASSIUM 50 MG/1
50 POWDER, FOR SOLUTION ORAL AS NEEDED
Qty: 9 PACKET | Refills: 5 | Status: SHIPPED | OUTPATIENT
Start: 2017-09-28 | End: 2019-02-20

## 2017-09-28 RX ORDER — ZOLPIDEM TARTRATE 10 MG/1
TABLET ORAL
Qty: 30 TABLET | Refills: 5 | Status: SHIPPED | OUTPATIENT
Start: 2017-09-28 | End: 2018-04-24

## 2017-09-28 RX ORDER — BUTALBITAL, ACETAMINOPHEN AND CAFFEINE 50; 325; 40 MG/1; MG/1; MG/1
TABLET ORAL
Qty: 90 TABLET | Refills: 1 | Status: SHIPPED | OUTPATIENT
Start: 2017-09-28 | End: 2018-06-15

## 2017-09-28 RX ORDER — QUETIAPINE 200 MG/1
200 TABLET, FILM COATED, EXTENDED RELEASE ORAL NIGHTLY
Qty: 30 TABLET | Refills: 5 | Status: SHIPPED | OUTPATIENT
Start: 2017-09-28 | End: 2018-02-20

## 2017-09-28 NOTE — PROGRESS NOTES
Patient presents with:  Lab Results: 9/16/17      HPI: The patient presents today for abnormal labs. Done in the fasting state b/c of sub-optimal vitality. Significant finding shows an A1c into the new-onset diabetes range.   She had been in the prediabet EACH NOSTRIL ONCE DAILY, Disp: 16 g, Rfl: 5  •  ibuprofen 800 MG Oral Tab, TAKE ONE TABLET BY MOUTH EVERY 8 HOURS AS NEEDED FOR PAIN, Disp: 90 tablet, Rfl: 5  •  Omeprazole 40 MG Oral Capsule Delayed Release, Take 1 capsule (40 mg total) by mouth 2 (two) t Encounters:  09/28/17 : 156 lb  09/14/17 : 155 lb  07/28/17 : 159 lb  07/27/17 : 159 lb 12 oz  06/23/17 : 160 lb  04/25/17 : 154 lb  Gen - A&Ox3, NAD, obese  HEENT - PERRL, EOMI, OP is clear  Lungs - CTAB  CV - RRR, nl s1, s2  Abd - soft, NABS, NT, ND  Ext Butalbital-APAP-Caffeine -40 MG Oral Tab 90 tablet 1      Sig: TAKE ONE TABLET BY MOUTH EVERY 6 HOURS AS NEEDED FOR MIGRAINE      CAMBIA 50 MG Oral Powd Pack 9 packet 5      Sig: Take 50 mg by mouth as needed.       Fluticasone Propionate 50 MCG/AC

## 2017-09-28 NOTE — PATIENT INSTRUCTIONS
Soumya,  1. See diabetes education center. 2. Get Pneumovax-23 vaccine at your pharmacy because of the new-onset diabetes. 3. See your eye doctor as soon as possible for a \"diabetic eye examination\" and have your eye doctor send me a consult report.   4.

## 2017-09-29 ENCOUNTER — NURSE ONLY (OUTPATIENT)
Dept: ENDOCRINOLOGY CLINIC | Facility: CLINIC | Age: 57
End: 2017-09-29

## 2017-09-29 VITALS
WEIGHT: 157 LBS | DIASTOLIC BLOOD PRESSURE: 76 MMHG | SYSTOLIC BLOOD PRESSURE: 128 MMHG | BODY MASS INDEX: 29.64 KG/M2 | HEART RATE: 88 BPM | HEIGHT: 61 IN

## 2017-09-29 DIAGNOSIS — E11.9 CONTROLLED TYPE 2 DIABETES MELLITUS WITHOUT COMPLICATION, WITHOUT LONG-TERM CURRENT USE OF INSULIN (HCC): Primary | ICD-10-CM

## 2017-09-29 PROCEDURE — G0108 DIAB MANAGE TRN  PER INDIV: HCPCS

## 2017-09-29 RX ORDER — CALCIUM CITRATE/VITAMIN D3 200MG-6.25
1 TABLET ORAL 3 TIMES DAILY
Qty: 150 STRIP | Refills: 3 | Status: SHIPPED | OUTPATIENT
Start: 2017-09-29 | End: 2018-04-24

## 2017-09-29 RX ORDER — LANCETS 26 GAUGE
1 EACH MISCELLANEOUS 3 TIMES DAILY
Qty: 2 BOX | Refills: 3 | Status: SHIPPED | OUTPATIENT
Start: 2017-09-29 | End: 2018-04-24

## 2017-09-29 NOTE — PROGRESS NOTES
Vicky Ling  : 6/3/1960 attended Step 1 Diabetic Education:    Date: 2017       /76   Pulse 88   Ht 61\"   Wt 157 lb   BMI 29.66 kg/m²       A1C (%)   Date Value   2006 5.6   ----------  HGBA1C (%)   Date Value   2012 5.8 (H)

## 2017-10-18 ENCOUNTER — TELEPHONE (OUTPATIENT)
Dept: INTERNAL MEDICINE CLINIC | Facility: CLINIC | Age: 57
End: 2017-10-18

## 2017-10-18 NOTE — TELEPHONE ENCOUNTER
Pt with [employer] insurance and unable to come in for visit. Pt requesting ABT and cough syrup for bronchitis. C/o cough, and head congestion x 2 weeks.

## 2017-10-19 RX ORDER — AMOXICILLIN 500 MG/1
500 CAPSULE ORAL 2 TIMES DAILY
Qty: 14 CAPSULE | Refills: 0 | Status: SHIPPED | OUTPATIENT
Start: 2017-10-19 | End: 2018-01-11 | Stop reason: ALTCHOICE

## 2017-10-19 RX ORDER — DEXTROMETHORPHAN HYDROBROMIDE AND PROMETHAZINE HYDROCHLORIDE 15; 6.25 MG/5ML; MG/5ML
5 SYRUP ORAL 4 TIMES DAILY PRN
Qty: 180 ML | Refills: 0 | Status: SHIPPED | OUTPATIENT
Start: 2017-10-19 | End: 2018-01-11

## 2017-10-19 NOTE — TELEPHONE ENCOUNTER
Amoxicillin and Promethazine-DM sent to the pharmacy. Notify pt. Jesse Kelley. Merary Portillo MD  Diplomate, American Board of Internal Medicine  [facility]  [address]  [facility],4Th Floor, [address], [address], [address] [place] [address]  T: [phone]; F: [address]

## 2017-11-06 ENCOUNTER — TELEPHONE (OUTPATIENT)
Dept: INTERNAL MEDICINE CLINIC | Facility: CLINIC | Age: 57
End: 2017-11-06

## 2017-11-06 RX ORDER — DESVENLAFAXINE 100 MG/1
100 TABLET, EXTENDED RELEASE ORAL DAILY
Refills: 0 | COMMUNITY
Start: 2017-11-06 | End: 2018-02-23

## 2017-11-06 NOTE — TELEPHONE ENCOUNTER
Pt requesting Pristiq 100 mg ER from [employer] at [phone]. ID # [id number]. Musa Dangelo contacted order # [id number] will take 7 - 10 business days. Pt informed.

## 2017-12-04 RX ORDER — BUTALBITAL, ACETAMINOPHEN AND CAFFEINE 50; 325; 40 MG/1; MG/1; MG/1
TABLET ORAL
Qty: 90 TABLET | Refills: 0 | OUTPATIENT
Start: 2017-12-04

## 2018-01-05 RX ORDER — OMEPRAZOLE 40 MG/1
CAPSULE, DELAYED RELEASE ORAL
Qty: 60 CAPSULE | Refills: 5 | OUTPATIENT
Start: 2018-01-05

## 2018-01-10 ENCOUNTER — TELEPHONE (OUTPATIENT)
Dept: INTERNAL MEDICINE CLINIC | Facility: CLINIC | Age: 58
End: 2018-01-10

## 2018-01-10 NOTE — TELEPHONE ENCOUNTER
Pt c/o chest congestion, productive cough with yellow sputum, ST and low grade fever x 1.5 weeks. Denies SOB/wheezing/body aches/chills/N/vomiting/diarrhea. Looking for ABX to be called into pharmacy.  Informed that she will need to be seen for eval

## 2018-01-11 ENCOUNTER — OFFICE VISIT (OUTPATIENT)
Dept: INTERNAL MEDICINE CLINIC | Facility: CLINIC | Age: 58
End: 2018-01-11

## 2018-01-11 VITALS
OXYGEN SATURATION: 97 % | HEART RATE: 99 BPM | RESPIRATION RATE: 22 BRPM | DIASTOLIC BLOOD PRESSURE: 84 MMHG | BODY MASS INDEX: 30.54 KG/M2 | SYSTOLIC BLOOD PRESSURE: 130 MMHG | WEIGHT: 161.75 LBS | TEMPERATURE: 98 F | HEIGHT: 61 IN

## 2018-01-11 DIAGNOSIS — J06.9 ACUTE URI: Primary | ICD-10-CM

## 2018-01-11 PROCEDURE — 99213 OFFICE O/P EST LOW 20 MIN: CPT | Performed by: INTERNAL MEDICINE

## 2018-01-11 RX ORDER — DEXTROMETHORPHAN HYDROBROMIDE AND PROMETHAZINE HYDROCHLORIDE 15; 6.25 MG/5ML; MG/5ML
5 SYRUP ORAL 4 TIMES DAILY PRN
Qty: 180 ML | Refills: 0 | Status: SHIPPED | OUTPATIENT
Start: 2018-01-11 | End: 2018-01-18

## 2018-01-11 RX ORDER — AMOXICILLIN AND CLAVULANATE POTASSIUM 875; 125 MG/1; MG/1
1 TABLET, FILM COATED ORAL 2 TIMES DAILY
Qty: 14 TABLET | Refills: 0 | Status: SHIPPED | OUTPATIENT
Start: 2018-01-11 | End: 2018-01-18

## 2018-01-11 NOTE — PROGRESS NOTES
Patient presents with:  Cough  Chest Congestion: stuffy nose      HPI: The pt presents today for > 1 week of cough, chest congestion, stuffy nose. Has tried various OTC remedies w/o significant relief.  + sick contact exposure.     Review of Systems   Cons mg total) by mouth nightly. Disp: 90 tablet Rfl: 1   Butalbital-APAP-Caffeine -40 MG Oral Tab TAKE ONE TABLET BY MOUTH EVERY 6 HOURS AS NEEDED FOR MIGRAINE Disp: 90 tablet Rfl: 1   CAMBIA 50 MG Oral Powd Pack Take 50 mg by mouth as needed.  Disp: 9 pa 161 lb 12 oz   SpO2 97%   BMI 30.56 kg/m²   Gen - A&Ox3, NAD  HEENT - PERRL, EOMI, OP is clear; TMs clear B; B nasal turbinates congested  Neck - supple, no JVD, no thyromegaly  Lymph - no palp LAD  Lungs - CTAB      A/P:  Acute uri  (primary encounter justine

## 2018-01-17 NOTE — PROGRESS NOTES
Call placed to [employer] and spoke with [employer]. List of approved meds will be faxed to triage fax machine for Dr Ed Justice to review.

## 2018-01-18 ENCOUNTER — TELEPHONE (OUTPATIENT)
Dept: INTERNAL MEDICINE CLINIC | Facility: CLINIC | Age: 58
End: 2018-01-18

## 2018-01-18 DIAGNOSIS — J06.9 ACUTE URI: ICD-10-CM

## 2018-01-18 RX ORDER — AMOXICILLIN AND CLAVULANATE POTASSIUM 875; 125 MG/1; MG/1
1 TABLET, FILM COATED ORAL 2 TIMES DAILY
Qty: 14 TABLET | Refills: 0 | Status: SHIPPED | OUTPATIENT
Start: 2018-01-18 | End: 2018-02-20

## 2018-01-18 RX ORDER — BENZONATATE 200 MG/1
200 CAPSULE ORAL 3 TIMES DAILY PRN
Qty: 15 CAPSULE | Refills: 0 | Status: SHIPPED | OUTPATIENT
Start: 2018-01-18 | End: 2018-01-19

## 2018-01-18 NOTE — TELEPHONE ENCOUNTER
Patient calling in stated she needed a refill on her antibiotic, Amoxicillin. Also stated she did not receive her cough medicine.   Pt stated the one prescribed, it was not covered through her insurance, & no one has reached out to her for an alternative

## 2018-01-18 NOTE — TELEPHONE ENCOUNTER
1. One more round of antibiotic sent to pharmacy. 2. Benzonatate capsules sent to pharmacy for cough. Temo Randle. Kavin Ramos MD  Diplomate, American Board of Internal Medicine  [facility]  [address], [address], [facility], [address]  T: [phone]

## 2018-01-18 NOTE — TELEPHONE ENCOUNTER
Pt remains with sinus congestion, cough and sore throat. Requesting another round of Augmentin. No c/o fever. Needs alternative cough med as Promethazine was not covered by insurance.

## 2018-01-19 ENCOUNTER — TELEPHONE (OUTPATIENT)
Dept: INTERNAL MEDICINE CLINIC | Facility: CLINIC | Age: 58
End: 2018-01-19

## 2018-01-19 RX ORDER — PROMETHAZINE HYDROCHLORIDE AND CODEINE PHOSPHATE 6.25; 1 MG/5ML; MG/5ML
2.5 SYRUP ORAL EVERY 6 HOURS PRN
Qty: 118 ML | Refills: 0 | Status: SHIPPED | OUTPATIENT
Start: 2018-01-19 | End: 2018-02-20

## 2018-01-19 NOTE — TELEPHONE ENCOUNTER
Pt stated insurance will not cover the benzonatate cough medication. Explained to pt that the benzonatate should be able to be purchased inexpensively - confirmed through [employer] that it could be done for less than $20.     Pt indicated per her insura

## 2018-01-19 NOTE — TELEPHONE ENCOUNTER
Prescription faxed to [employer] per pt's request.   Pt aware of new script. No other questions at this time.

## 2018-02-06 ENCOUNTER — TELEPHONE (OUTPATIENT)
Dept: RHEUMATOLOGY | Facility: CLINIC | Age: 58
End: 2018-02-06

## 2018-02-06 RX ORDER — MELOXICAM 15 MG/1
TABLET ORAL
Qty: 30 TABLET | Refills: 0 | Status: SHIPPED | OUTPATIENT
Start: 2018-02-06 | End: 2018-04-03

## 2018-02-06 NOTE — TELEPHONE ENCOUNTER
LOV 7/28/17  Future Appointments  Date Time Provider Lilly Purcell   4/15/7159 2:22 PM Pako Vences MD [facility] Luis Agrawal

## 2018-02-15 ENCOUNTER — TELEPHONE (OUTPATIENT)
Dept: INTERNAL MEDICINE CLINIC | Facility: CLINIC | Age: 58
End: 2018-02-15

## 2018-02-15 NOTE — TELEPHONE ENCOUNTER
[address] [place] [address] contacted for Pristiq refill at [phone]  ID# [id number]. Refill cannot be processed as pt's assistance  on 2017. Pt informed and will call Lencho Pierson for re-enrollment.

## 2018-02-16 NOTE — TELEPHONE ENCOUNTER
In my outbox. Yu Cohen. Lay Guthrie MD  Diplomate, American Board of Internal Medicine  [address] [address] [address]  [facility],4Th Floor, [address], [facility], [address]  T: [phone]; F: [address]

## 2018-02-19 NOTE — TELEPHONE ENCOUNTER
Seroquil is not covered by insurance. Please review substitution list on your desk and send to pharmacy.

## 2018-02-20 NOTE — TELEPHONE ENCOUNTER
We were originally doing this medication for major depression disorder as an adjunct to antidepressants. Only the extended release version of Quetiapine (Seroquel XR) is indicated for such use.   The immediate release Quetiapine is NOT indicated as an adju

## 2018-02-23 PROBLEM — F33.1 MDD (MAJOR DEPRESSIVE DISORDER), RECURRENT EPISODE, MODERATE (HCC): Status: ACTIVE | Noted: 2018-02-23

## 2018-02-23 NOTE — PROGRESS NOTES
Patient presents with:  Medication Follow-Up: Est Pt. medication follow up - Depression   Other: obesity and elevated BMI, would like to restart Phentermine  Other: due for mammogram      HPI: The patient presents today for the following medical issues: esophagus  Naprosyn [Naproxen]     Rash    Comment:TABS Anaphylaxis    Medications:  Reviewed and per the scanned EHR      Smoking status: Current Every Day Smoker                                                   Packs/day: 1.50      Years: 35.00        T ordered. 4. RTC 3 months for CPX. Total face to face time was 25, more than 50% of the time was spent in counseling and/or coordination of care related to care plan. Patient verbally agrees to and understands the plan as outlined above.   Patient was als

## 2018-03-08 ENCOUNTER — TELEPHONE (OUTPATIENT)
Dept: INTERNAL MEDICINE CLINIC | Facility: CLINIC | Age: 58
End: 2018-03-08

## 2018-03-08 NOTE — TELEPHONE ENCOUNTER
Patient calling in seeking a substitute for med QUEtiapine Fumarate  MG Oral Tablet 24 Hr    Patient stated it's not being covered through her insurance. Please call patient to discuss alternatives.

## 2018-03-12 NOTE — TELEPHONE ENCOUNTER
Call patient. Would she like to try the Immediate Release generic Seroquel? It may not be as effective as the Extended Release, but at least it's something.   Also, note that the Immediate Release version is not indicated for depression (only the Extended

## 2018-03-14 NOTE — TELEPHONE ENCOUNTER
Pt declines immediate release generic Seroquel as she has tried it in the past and it makes her tired. She contacted her insurance and requesting our office contact [address] at  [phone] ref # [id number] for coverage determination.   Pt also star

## 2018-03-19 ENCOUNTER — TELEPHONE (OUTPATIENT)
Dept: INTERNAL MEDICINE CLINIC | Facility: CLINIC | Age: 58
End: 2018-03-19

## 2018-03-19 DIAGNOSIS — G43.009 MIGRAINE WITHOUT AURA AND WITHOUT STATUS MIGRAINOSUS, NOT INTRACTABLE: ICD-10-CM

## 2018-03-19 RX ORDER — VENLAFAXINE HYDROCHLORIDE 150 MG/1
150 CAPSULE, EXTENDED RELEASE ORAL DAILY
Qty: 30 CAPSULE | Refills: 5 | Status: SHIPPED | OUTPATIENT
Start: 2018-03-19 | End: 2018-04-24 | Stop reason: ALTCHOICE

## 2018-03-19 NOTE — TELEPHONE ENCOUNTER
[facility] in [facility] called in request for Venlafaxine HCl  MG Oral Capsule SR 24 Hr

## 2018-03-19 NOTE — TELEPHONE ENCOUNTER
Spoke with patient, agreeable to making a follow up appt. Accepted 4/12/18 appt. [facility]        Medication: Divalproex    Date of last refill: 9/14/17 for #60/5 additional refills  [place]

## 2018-03-20 RX ORDER — DIVALPROEX SODIUM 250 MG/1
500 TABLET, EXTENDED RELEASE ORAL NIGHTLY
Qty: 60 TABLET | Refills: 0 | Status: SHIPPED | OUTPATIENT
Start: 2018-03-20 | End: 2018-04-21

## 2018-03-22 ENCOUNTER — TELEPHONE (OUTPATIENT)
Dept: RHEUMATOLOGY | Facility: CLINIC | Age: 58
End: 2018-03-22

## 2018-03-22 NOTE — TELEPHONE ENCOUNTER
Patient stated her pharmacy is faxing a reorder for Meloxicam. Patient cancelled LOV. Has scheduled for first available appointment 7/2/2018.

## 2018-03-26 NOTE — TELEPHONE ENCOUNTER
Quetiapine Fumarate  mg approved effective 3-24-18 for as long as you remain enrolled in the current benefit plan.       Request PO#[id number]

## 2018-03-27 RX ORDER — QUETIAPINE 200 MG/1
200 TABLET, FILM COATED, EXTENDED RELEASE ORAL NIGHTLY
Qty: 30 TABLET | Refills: 0 | Status: SHIPPED | OUTPATIENT
Start: 2018-03-27 | End: 2018-05-16

## 2018-03-27 NOTE — TELEPHONE ENCOUNTER
Quetiapine er 200 mg 1 tab daily filled 9-28-17 30 with 5 refills     LOV 2-23-18     MDD, moderate and recurrent - Her meds are Pristiq currently at the 100 mg dose and Seroquel  mg q HS dose.      RTC 3 months for CPX

## 2018-03-29 ENCOUNTER — TELEPHONE (OUTPATIENT)
Dept: INTERNAL MEDICINE CLINIC | Facility: CLINIC | Age: 58
End: 2018-03-29

## 2018-03-29 NOTE — TELEPHONE ENCOUNTER
Call placed to pt and she stated Dr Jayden Arboleda last filled Meloxicam on 2/6 and will not fill again until pt is seen. Pt cancelled last scheduled  appointment with him d/t illness. Pt declines to contact Dr Saul Lazo for refill.   Pt requesting Dr Iván Jackson refill

## 2018-03-29 NOTE — TELEPHONE ENCOUNTER
[facility] pharmacy in [facility] called for refill for Meloxicam 15 MG Oral Tab for this patient

## 2018-03-30 ENCOUNTER — HOSPITAL ENCOUNTER (OUTPATIENT)
Dept: MAMMOGRAPHY | Age: 58
Discharge: HOME | End: 2018-03-30
Attending: INTERNAL MEDICINE
Payer: MEDICAID

## 2018-03-30 DIAGNOSIS — Z12.31 SCREENING MAMMOGRAM, ENCOUNTER FOR: ICD-10-CM

## 2018-03-30 PROCEDURE — 77063 BREAST TOMOSYNTHESIS BI: CPT | Performed by: INTERNAL MEDICINE

## 2018-03-30 PROCEDURE — 77067 SCR MAMMO BI INCL CAD: CPT | Performed by: INTERNAL MEDICINE

## 2018-04-03 ENCOUNTER — TELEPHONE (OUTPATIENT)
Dept: INTERNAL MEDICINE CLINIC | Facility: CLINIC | Age: 58
End: 2018-04-03

## 2018-04-03 RX ORDER — MELOXICAM 15 MG/1
TABLET ORAL
Qty: 30 TABLET | Refills: 5 | Status: ON HOLD | OUTPATIENT
Start: 2018-04-03 | End: 2018-04-16

## 2018-04-03 RX ORDER — ATORVASTATIN CALCIUM 20 MG/1
TABLET, FILM COATED ORAL
Qty: 90 TABLET | Refills: 1 | Status: SHIPPED | OUTPATIENT
Start: 2018-04-03 | End: 2018-10-04

## 2018-04-03 NOTE — TELEPHONE ENCOUNTER
[facility] pharmacy called to discuss new medication prescribed - Meloxicam 15 MG Oral Tab

## 2018-04-03 NOTE — TELEPHONE ENCOUNTER
OK to dispense. Not a real allergy and she's taken Meloxicam for a long time thru her Rheumatologist.    Landen Cabrera MD  Diplomate, American Board of Internal Medicine  [facility]  [address], [address], [facility], [address]  T: [phone]

## 2018-04-03 NOTE — TELEPHONE ENCOUNTER
Pt's pharmacy called to inform pt has an allergy noted for Naproxen. Ok to dispense meloxicam 15mg? Please advise.

## 2018-04-03 NOTE — TELEPHONE ENCOUNTER
Script for Meloxicam sent to pharmacy, notify pt. Julio Phillips. Rell Malone MD  Diplomate, American Board of Internal Medicine  [facility]  [address]  [facility],[address], [address], [facility], [address]  T: [phone]; F: [address]

## 2018-04-13 ENCOUNTER — MED REC SCAN ONLY (OUTPATIENT)
Dept: INTERNAL MEDICINE CLINIC | Facility: CLINIC | Age: 58
End: 2018-04-13

## 2018-04-14 ENCOUNTER — HOSPITAL ENCOUNTER (OUTPATIENT)
Facility: HOSPITAL | Age: 58
Setting detail: OBSERVATION
LOS: 2 days | Discharge: HOME | End: 2018-04-16
Attending: EMERGENCY MEDICINE | Admitting: HOSPITALIST
Payer: MEDICAID

## 2018-04-14 ENCOUNTER — APPOINTMENT (OUTPATIENT)
Dept: GENERAL RADIOLOGY | Age: 58
End: 2018-04-14
Attending: EMERGENCY MEDICINE
Payer: MEDICAID

## 2018-04-14 DIAGNOSIS — E66.9 OBESITY (BMI 30-39.9): ICD-10-CM

## 2018-04-14 DIAGNOSIS — E11.9 CONTROLLED TYPE 2 DIABETES MELLITUS WITHOUT COMPLICATION, WITHOUT LONG-TERM CURRENT USE OF INSULIN (HCC): ICD-10-CM

## 2018-04-14 DIAGNOSIS — E78.00 HYPERCHOLESTEROLEMIA: ICD-10-CM

## 2018-04-14 DIAGNOSIS — R07.9 ACUTE CHEST PAIN: Primary | ICD-10-CM

## 2018-04-14 DIAGNOSIS — R77.8 ELEVATED TROPONIN: ICD-10-CM

## 2018-04-14 PROBLEM — R79.89 ELEVATED TROPONIN: Status: ACTIVE | Noted: 2018-04-14

## 2018-04-14 PROCEDURE — 99219 INITIAL OBSERVATION CARE,LEVL II: CPT | Performed by: HOSPITALIST

## 2018-04-14 PROCEDURE — 71045 X-RAY EXAM CHEST 1 VIEW: CPT | Performed by: EMERGENCY MEDICINE

## 2018-04-14 RX ORDER — PANTOPRAZOLE SODIUM 20 MG/1
20 TABLET, DELAYED RELEASE ORAL
Status: DISCONTINUED | OUTPATIENT
Start: 2018-04-15 | End: 2018-04-14

## 2018-04-14 RX ORDER — NICOTINE 21 MG/24HR
1 PATCH, TRANSDERMAL 24 HOURS TRANSDERMAL DAILY
Status: DISCONTINUED | OUTPATIENT
Start: 2018-04-14 | End: 2018-04-16

## 2018-04-14 RX ORDER — ASPIRIN 325 MG
325 TABLET ORAL DAILY
Status: DISCONTINUED | OUTPATIENT
Start: 2018-04-14 | End: 2018-04-16

## 2018-04-14 RX ORDER — ASPIRIN 81 MG/1
162 TABLET, CHEWABLE ORAL ONCE
Status: COMPLETED | OUTPATIENT
Start: 2018-04-14 | End: 2018-04-14

## 2018-04-14 RX ORDER — ZOLPIDEM TARTRATE 10 MG/1
10 TABLET ORAL NIGHTLY PRN
Status: DISCONTINUED | OUTPATIENT
Start: 2018-04-14 | End: 2018-04-16

## 2018-04-14 RX ORDER — NITROGLYCERIN 0.4 MG/1
0.4 TABLET SUBLINGUAL EVERY 5 MIN PRN
Status: DISCONTINUED | OUTPATIENT
Start: 2018-04-14 | End: 2018-04-14

## 2018-04-14 RX ORDER — DEXTROSE MONOHYDRATE 25 G/50ML
50 INJECTION, SOLUTION INTRAVENOUS
Status: DISCONTINUED | OUTPATIENT
Start: 2018-04-14 | End: 2018-04-16

## 2018-04-14 RX ORDER — ENOXAPARIN SODIUM 100 MG/ML
40 INJECTION SUBCUTANEOUS DAILY
Status: DISCONTINUED | OUTPATIENT
Start: 2018-04-14 | End: 2018-04-16

## 2018-04-14 RX ORDER — DIVALPROEX SODIUM 500 MG/1
500 TABLET, EXTENDED RELEASE ORAL NIGHTLY
Status: DISCONTINUED | OUTPATIENT
Start: 2018-04-14 | End: 2018-04-16

## 2018-04-14 RX ORDER — ALPRAZOLAM 0.5 MG/1
0.5 TABLET ORAL 2 TIMES DAILY PRN
Status: DISCONTINUED | OUTPATIENT
Start: 2018-04-14 | End: 2018-04-16

## 2018-04-14 RX ORDER — DESVENLAFAXINE 100 MG/1
150 TABLET, EXTENDED RELEASE ORAL DAILY
COMMUNITY
End: 2018-04-30 | Stop reason: ALTCHOICE

## 2018-04-14 RX ORDER — ATORVASTATIN CALCIUM 20 MG/1
20 TABLET, FILM COATED ORAL NIGHTLY
Status: DISCONTINUED | OUTPATIENT
Start: 2018-04-14 | End: 2018-04-15

## 2018-04-14 RX ORDER — FAMOTIDINE 20 MG/1
20 TABLET ORAL 2 TIMES DAILY
Status: DISCONTINUED | OUTPATIENT
Start: 2018-04-14 | End: 2018-04-14

## 2018-04-14 RX ORDER — GAUZE BANDAGE 4" X 4"
BANDAGE TOPICAL DAILY
Status: DISCONTINUED | OUTPATIENT
Start: 2018-04-14 | End: 2018-04-14

## 2018-04-14 RX ORDER — NITROGLYCERIN 0.4 MG/1
0.4 TABLET SUBLINGUAL EVERY 5 MIN PRN
Status: DISCONTINUED | OUTPATIENT
Start: 2018-04-14 | End: 2018-04-16

## 2018-04-14 RX ORDER — QUETIAPINE 100 MG/1
100 TABLET, FILM COATED ORAL 2 TIMES DAILY
Status: DISCONTINUED | OUTPATIENT
Start: 2018-04-14 | End: 2018-04-16

## 2018-04-14 RX ORDER — ACETAMINOPHEN 325 MG/1
650 TABLET ORAL EVERY 6 HOURS PRN
Status: DISCONTINUED | OUTPATIENT
Start: 2018-04-14 | End: 2018-04-16

## 2018-04-14 RX ORDER — PANTOPRAZOLE SODIUM 20 MG/1
20 TABLET, DELAYED RELEASE ORAL
Status: DISCONTINUED | OUTPATIENT
Start: 2018-04-14 | End: 2018-04-14

## 2018-04-14 RX ORDER — PANTOPRAZOLE SODIUM 20 MG/1
20 TABLET, DELAYED RELEASE ORAL
Status: DISCONTINUED | OUTPATIENT
Start: 2018-04-15 | End: 2018-04-16

## 2018-04-14 NOTE — ED PROVIDER NOTES
Patient Seen in: [facility] Emergency Department In [place]    History   Patient presents with:  Chest Pain Angina (cardiovascular)    Stated Complaint: chest pain     HPI    Patient has remarkable medical history of high cholesterol, diabetes, and high b COLONOSCOPY  2007: LAPAROSCOPIC      Comment: sling operation for stress incontinence  2-2010: ANGEL BIOPSY STEREOTACTIC NODULE 2 SITE BILAT Left      Comment: BENIGN 2 SITES  2010: ANGEL STEREO-CLIP W/CALC 2 SITE LEFT  No date: TUBAL LIGATION        Smoking s All other components within normal limits   CBC W/ DIFFERENTIAL - Abnormal; Notable for the following:     Lymphocyte Absolute 4.89 (*)     All other components within normal limits   COMP METABOLIC PANEL (14) - Normal   CBC WITH DIFFERENTIAL WITH PLAT this point  I spoke with Dr. Kristan Macedo, hospitalist.  She will admit    Admission disposition: 4/14/2018  3:39 PM           Disposition and Plan     Clinical Impression:  Acute chest pain  (primary encounter diagnosis)  Controlled type 2 diabetes mellitus with

## 2018-04-14 NOTE — H&P
[facility]  History and Physical     Ji Griffin Patient Status:  Observation    6/3/1960 MRN [id number]   [facility] 8NE-A Attending Maggi Hernández MD;Bashir,*   Hosp Day # 0 PCP Kita Garcia MD     Chief Complaint: Chest pain history. She has never used smokeless tobacco. She reports that she drinks alcohol. She reports that she does not use drugs.     Family History:   Family History   Problem Relation Age of Onset   • Arthritis Mother    • AMI [OTHER] Mother    • diabetes erma PAIN Disp: 90 tablet Rfl: 5   Omeprazole 40 MG Oral Capsule Delayed Release Take 1 capsule (40 mg total) by mouth 2 (two) times daily.  Disp: 60 capsule Rfl: 5   RaNITidine HCl 150 MG Oral Cap TAKE ONE TABLET BY MOUTH TWICE DAILY Disp: 60 capsule Rfl: 5   Z CREATSERUM  0.69   GFRAA  113   GFRNAA  98   CA  8.7   ALB  3.9   NA  136   K  3.9   CL  102   CO2  24.0   ALKPHO  89   AST  31   ALT  54   BILT  0.3   TP  7.8       Estimated Creatinine Clearance: 69.5 mL/min (based on SCr of 0.69 mg/dL).     No results

## 2018-04-14 NOTE — ED INITIAL ASSESSMENT (HPI)
Pt c/o intermittent left sided chest pain that started 3 days ago. Worse with exertion. +SOB with pain. Pain radiates to back and left arm.

## 2018-04-15 ENCOUNTER — APPOINTMENT (OUTPATIENT)
Dept: CV DIAGNOSTICS | Facility: HOSPITAL | Age: 58
End: 2018-04-15
Attending: HOSPITALIST
Payer: MEDICAID

## 2018-04-15 PROCEDURE — 93306 TTE W/DOPPLER COMPLETE: CPT | Performed by: HOSPITALIST

## 2018-04-15 PROCEDURE — 99225 SUBSEQUENT OBSERVATION CARE: CPT | Performed by: HOSPITALIST

## 2018-04-15 RX ORDER — ATORVASTATIN CALCIUM 40 MG/1
40 TABLET, FILM COATED ORAL NIGHTLY
Status: DISCONTINUED | OUTPATIENT
Start: 2018-04-15 | End: 2018-04-16

## 2018-04-15 RX ORDER — SODIUM CHLORIDE 9 MG/ML
INJECTION, SOLUTION INTRAVENOUS CONTINUOUS
Status: ACTIVE | OUTPATIENT
Start: 2018-04-15 | End: 2018-04-15

## 2018-04-15 NOTE — PROGRESS NOTES
Pharmacy note: Duplicate Proton Pump Inhibitor with Histamine 2 blocker. Maki Saba is a 56year old female who has been prescribed both Famotidine and Protonix.   Pepcid was discontinued per P&T approved protocol for duplicate therapy in adult jeannine

## 2018-04-15 NOTE — PROGRESS NOTES
[facility] Cardiology Progress Note    Subjective:  Just feels tired. Would like to go home later today if possible. Objective:  /72 (BP Location: Left arm)   Pulse 90   Temp 98.2 °F (36.8 °C) (Oral)   Resp 17   Ht 154.9 cm (5' 1\")   Wt 156 lb (70.

## 2018-04-15 NOTE — PROGRESS NOTES
[facility]  Progress Note     Neelam Tan Patient Status:  Observation    6/3/1960 MRN [id number]   [facility] 8NE-A Attending Mk Castellanos MD   Hosp Day # 0 PCP Raciel Becker MD     Chief Complaint: cp    S: Patient feels better mg Subcutaneous Daily   • Insulin Aspart Pen  1-5 Units Subcutaneous TID CC and HS   • nicotine  1 patch Transdermal Daily   • Pantoprazole Sodium  20 mg Oral BID AC   • Desvenlafaxine Succinate ER  150 mg Oral Daily       ASSESSMENT / PLAN:     1. 55 yo m

## 2018-04-15 NOTE — PLAN OF CARE
Diabetes/Glucose Control    • Glucose maintained within prescribed range Progressing        Patient/Family Goals    • Patient/Family Long Term Goal Progressing    • Patient/Family Short Term Goal Progressing          Pt AOx4, anxious at times, PRN xanax gi

## 2018-04-15 NOTE — CONSULTS
[facility]  Report of Consultation    Kinga Curtis Patient Status:  Observation    6/3/1960 MRN [id number]   [facility] 8NE-A Attending Janie Solomon MD   Hosp Day # 0 PCP Tasha Camilo MD     Reason for Consultation:  Chest pain, elev condition of skin 05/17/2011     Past Surgical History:  No date: COLONOSCOPY  2007: LAPAROSCOPIC      Comment: sling operation for stress incontinence  2-2010: ANGEL BIOPSY STEREOTACTIC NODULE 2 SITE BILAT Left      Comment: BENIGN 2 SITES  2010: [facility] STEREO mg, Oral, BID PRN  •  nicotine (NICODERM CQ) 21 MG/24HR 1 patch, 1 patch, Transdermal, Daily  •  [START ON 4/15/2018] Pantoprazole Sodium (PROTONIX) EC tab 20 mg, 20 mg, Oral, BID AC  •  [START ON 4/15/2018] Desvenlafaxine Succinate ER (PRISTIQ) 24 hr tab intrathoracic process is seen. Dictated by: Wiliam Xiong MD on 4/14/2018 at 15:15     Approved by: Wiliam Xiong MD              Labs:     No results found for: PT, INR       Lab Results  Component Value Date   WBC 12.5 04/14/2018   HGB 14. 4

## 2018-04-16 ENCOUNTER — APPOINTMENT (OUTPATIENT)
Dept: CV DIAGNOSTICS | Facility: HOSPITAL | Age: 58
End: 2018-04-16
Attending: INTERNAL MEDICINE
Payer: MEDICAID

## 2018-04-16 VITALS
DIASTOLIC BLOOD PRESSURE: 82 MMHG | RESPIRATION RATE: 20 BRPM | HEIGHT: 61 IN | WEIGHT: 156 LBS | HEART RATE: 93 BPM | SYSTOLIC BLOOD PRESSURE: 134 MMHG | TEMPERATURE: 98 F | OXYGEN SATURATION: 96 % | BODY MASS INDEX: 29.45 KG/M2

## 2018-04-16 PROCEDURE — 93017 CV STRESS TEST TRACING ONLY: CPT | Performed by: INTERNAL MEDICINE

## 2018-04-16 PROCEDURE — 93018 CV STRESS TEST I&R ONLY: CPT | Performed by: INTERNAL MEDICINE

## 2018-04-16 PROCEDURE — 78452 HT MUSCLE IMAGE SPECT MULT: CPT | Performed by: INTERNAL MEDICINE

## 2018-04-16 PROCEDURE — 99217 OBSERVATION CARE DISCHARGE: CPT | Performed by: HOSPITALIST

## 2018-04-16 RX ORDER — METOPROLOL SUCCINATE 25 MG/1
25 TABLET, EXTENDED RELEASE ORAL DAILY
Qty: 30 TABLET | Refills: 5 | Status: SHIPPED | OUTPATIENT
Start: 2018-04-16 | End: 2018-04-24

## 2018-04-16 NOTE — PROGRESS NOTES
[facility]  Cardiology Progress Note    Augustina Lemus Patient Status:  Observation    6/3/1960 MRN [id number]   [facility] 8NE-A Attending Murphy Phelps MD   Hosp Day # 0 PCP Arsalan Velazquez MD     Subjective:  c/o heartburn.  Denies chest 150 mg Oral Daily         Assessment:  · Chest pain, small troponin elevation . 06 to normal this am. No acute changes on ECG  · COPD/tobacco  · SUMMER  · DM  · Dyslipidemia, , HDl 29,   · Barretts esophagus    Plan:   1.  Await nuclear stress ayaz

## 2018-04-16 NOTE — PLAN OF CARE
Pt. Given discharge instructions. Discussed new medication toprol XL with her. Discussed follow up. Need for smoking cessation and diabetic center for diabetic education.

## 2018-04-16 NOTE — PLAN OF CARE
NURSING DISCHARGE NOTE    Discharged Home via Wheelchair. Accompanied by Support staff  Belongings Taken by patient/family. Pt. Discharged with follow up instructions. Explained potential side effects on new medication metoprolol.

## 2018-04-16 NOTE — PROGRESS NOTES
Preliminary Exercise Nuclear Stress Test Note    No ECG changes noted with exercise. Pt c/o mild chest tightness at 7:00 minutes into exercise, it did not radiate and resolved 5 minutes into recovery. No ectopy.   Nuclear pending

## 2018-04-16 NOTE — PAYOR COMM NOTE
--------------  ADMISSION REVIEW     Payor: Valdo Joseph #:  [id number]  Authorization Number: N/A    Admit date: 4/14/18     Admitting Physician: Benito Myers MD  Attending Physician:  Yesi Rodriguez MD  Primary Ca History:   Diagnosis Date   • Abdominal pain, other specified site 09/21/2011    groin   • Acute bronchitis 09/21/2011   • Fitting and adjustment of dental prosthetic device 04/26/2011   • Other ill-defined conditions(799.89)     Jamison's   • Pain in join nontender in the epigastrium  Extremities: Unremarkable. Calves nonswollen, symmetric, nontender. No pedal edema. Neurologic:  Mental status as above.   Patient moves all extremities with good strength     ED Course     Labs Reviewed   TROPONIN I - Abno unremarkable  Troponin 0.061    chest x-ray  CONCLUSION:  No acute intrathoracic process is seen. Patient had complete relief for pain with one sublingual nitroglycerin. She was started on nitroglycerin paste.   Oral beta-blocker administered    I spoke down her left arm that lasts a few hours at the time and it does not improve with rest.  Patient takes NSAIDs for migraines and arthritis she also tried to take NSAIDs for this kind of chest discomfort.   She also has history of coronary artery disease in t total) by mouth nightly. Disp: 60 tablet Rfl: 0   Venlafaxine HCl  MG Oral Capsule SR 24 Hr Take 1 capsule (150 mg total) by mouth daily.  Disp: 30 capsule Rfl: 5   Phentermine HCl 37.5 MG Oral Tab Take 1 tablet (37.5 mg total) by mouth every morning 1\")   Wt 156 lb (70.8 kg)   SpO2 98%   BMI 29.48 kg/m²    General: No acute distress. Alert and oriented x 3. HEENT: Normocephalic atraumatic. Moist mucous membranes. EOM-I. PERRLA. Anicteric. Neck: No lymphadenopathy. No JVD. No carotid bruits.   Respir 4:57 PM       MEDICATIONS ADMINISTERED IN LAST 1 DAY:  acetaminophen (TYLENOL) tab 650 mg     Date Action Dose Route User    4/15/2018 1311 Given 650 mg Oral Jailyn Velazquez RN            aspirin tab 325 mg     Date Action Dose Route User    4/16/2018

## 2018-04-16 NOTE — DISCHARGE SUMMARY
[facility]  DISCHARGE SUMMARY     Mckenna Grewal Patient Status:  Observation    6/3/1960 MRN [id number]   [facility] 8NE-A Attending Nathanael Carrasco MD   Hosp Day # 0 PCP Eliot Will MD     Date of Admission: 2018  Date of Disch during hospitalization:   •     Incidental or significant findings and recommendations (brief descriptions):   •     Lab/Test results pending at Discharge:   ·     Consultants:  • cardiology    Discharge Medication List:     Discharge Medications      ANGELA tablet (200 mg total) by mouth nightly.    Quantity:  30 tablet  Refills:  0     RaNITidine HCl 150 MG Caps  Commonly known as:  ZANTAC      TAKE ONE TABLET BY MOUTH TWICE DAILY   Quantity:  60 capsule  Refills:  5     TRUE METRIX BLOOD GLUCOSE TEST Strp

## 2018-04-16 NOTE — PLAN OF CARE
Diabetes/Glucose Control    • Glucose maintained within prescribed range Progressing        Patient/Family Goals    • Patient/Family Long Term Goal Progressing    • Patient/Family Short Term Goal Progressing        Patient is alert and oriented x4.  NSR on

## 2018-04-16 NOTE — PLAN OF CARE
Pt. Is alert and oriented times four. Lungs clear on auscultation. Pt. Is sinus rhythm on monitor (no further SVT today). She is free of chest pain. . Scheduled for GXT today.

## 2018-04-16 NOTE — CM/SW NOTE
Patient failed inpatient criteria. Second level of review completed and supports observation. UR committee in agreement. Discussed with Dr. Elodia Canada  who approves observation status. Observation letter given to the patient and order written.

## 2018-04-17 ENCOUNTER — TELEPHONE (OUTPATIENT)
Dept: RHEUMATOLOGY | Facility: CLINIC | Age: 58
End: 2018-04-17

## 2018-04-17 NOTE — TELEPHONE ENCOUNTER
LOV 7/28/17  Future Appointments  Date Time Provider Lilly Jazzy   5/3/2018 10:40 AM Ariana Waters MD [facility]   5/17/2018 3:30 PM Elizabeth Ramirez MD [facility]   9/5/0288 23:23 AM Jeff Lemus MD [facility] Son[facility] Hermano

## 2018-04-17 NOTE — TELEPHONE ENCOUNTER
Pt called. Pt seen in the ER for chest pain, Pt states 'was told to stop ibuprofen and meloxicam. Would like to know what to do next? Does not want to be without medication for arthritis.  Does have an appointment for July.' mp    Future Appointments  Date

## 2018-04-17 NOTE — TELEPHONE ENCOUNTER
Dr. Xavi Martinez notified pt is allergic to Naprosyn and Aspirin, and verbally stated pt could apply the diclofenac gel.

## 2018-04-18 ENCOUNTER — TELEPHONE (OUTPATIENT)
Dept: RHEUMATOLOGY | Facility: CLINIC | Age: 58
End: 2018-04-18

## 2018-04-18 ENCOUNTER — PATIENT OUTREACH (OUTPATIENT)
Dept: CASE MANAGEMENT | Age: 58
End: 2018-04-18

## 2018-04-18 NOTE — TELEPHONE ENCOUNTER
PA initiated for diclofenac gel 1%, faxed to [address] [place] [address],[address] @ [phone] mp

## 2018-04-20 NOTE — TELEPHONE ENCOUNTER
PA denied, will cover up 200 grams per 30 days. Per MD, pt can receive 200 grams. Called [facility], script cx. Needs new script sent to reflect this. Per pharmacist, can go through as 2 grams BID. Tubes come in 100 grams. I'm unsire how to pend this.  mp

## 2018-04-21 DIAGNOSIS — G43.009 MIGRAINE WITHOUT AURA AND WITHOUT STATUS MIGRAINOSUS, NOT INTRACTABLE: ICD-10-CM

## 2018-04-23 ENCOUNTER — TELEPHONE (OUTPATIENT)
Dept: INTERNAL MEDICINE CLINIC | Facility: CLINIC | Age: 58
End: 2018-04-23

## 2018-04-23 RX ORDER — DIVALPROEX SODIUM 250 MG/1
TABLET, EXTENDED RELEASE ORAL
Qty: 60 TABLET | Refills: 0 | Status: SHIPPED | OUTPATIENT
Start: 2018-04-23 | End: 2018-05-24

## 2018-04-23 NOTE — TELEPHONE ENCOUNTER
Pt informed. Will see Dr Milly Mendez tomorrow at 4:30. Pt states she does not need to follow with Cardiology.

## 2018-04-23 NOTE — TELEPHONE ENCOUNTER
Medication: Divalproex 250mg     Date of last refill: 3/20/18  [place]     Last office visit: 9/14/17  Due back to clinic per last office note:  3-4 months  Date next office visit scheduled:  5/3/18    Last OV note recom

## 2018-04-23 NOTE — TELEPHONE ENCOUNTER
Clive Lynch MD  Diplomate, American Board of Internal Medicine  [facility]  [address] [facility],[address], [address], [facility], [address] [place] [address]  T: [phone]; F: Tona 5

## 2018-04-23 NOTE — TELEPHONE ENCOUNTER
1. OK to hold the Metoprolol tonight. 2. OK to see me tomorrow. 3. When is next appointment w/ Cardiology? Kaitlynn Noel. Malachi Griffiths MD  Diplomate, American Board of Internal Medicine  [address] [address] [address]  [address] [place] [address],[address], [address], [facility], [address]  T:

## 2018-04-23 NOTE — TELEPHONE ENCOUNTER
Pt admitted into [facility] on 4/14 for chest pain and discharged 4/16. Was Rx 'd Metoprolol Succinate 25 mg ER. Pt c/o chest heaviness, chest tightness and sob with exertion and thinks it may be the Metoprolol.    Stopped Metoprolol last night and t

## 2018-04-23 NOTE — TELEPHONE ENCOUNTER
Pt called office back asking if Dr Arnoldo Salgado can see her tomorrow. Only 15 min triage available.   Ok?

## 2018-04-24 ENCOUNTER — OFFICE VISIT (OUTPATIENT)
Dept: INTERNAL MEDICINE CLINIC | Facility: CLINIC | Age: 58
End: 2018-04-24

## 2018-04-24 ENCOUNTER — TELEPHONE (OUTPATIENT)
Dept: INTERNAL MEDICINE CLINIC | Facility: CLINIC | Age: 58
End: 2018-04-24

## 2018-04-24 VITALS
HEIGHT: 61 IN | WEIGHT: 162.25 LBS | DIASTOLIC BLOOD PRESSURE: 88 MMHG | RESPIRATION RATE: 24 BRPM | TEMPERATURE: 98 F | BODY MASS INDEX: 30.63 KG/M2 | SYSTOLIC BLOOD PRESSURE: 130 MMHG | HEART RATE: 92 BPM | OXYGEN SATURATION: 96 %

## 2018-04-24 DIAGNOSIS — R07.89 ATYPICAL CHEST PAIN: Primary | ICD-10-CM

## 2018-04-24 DIAGNOSIS — K22.70 BARRETT'S ESOPHAGUS WITHOUT DYSPLASIA: ICD-10-CM

## 2018-04-24 DIAGNOSIS — K21.00 GASTROESOPHAGEAL REFLUX DISEASE WITH ESOPHAGITIS: ICD-10-CM

## 2018-04-24 DIAGNOSIS — I10 ESSENTIAL HYPERTENSION: ICD-10-CM

## 2018-04-24 DIAGNOSIS — F41.1 GAD (GENERALIZED ANXIETY DISORDER): ICD-10-CM

## 2018-04-24 DIAGNOSIS — F17.200 TOBACCO USE DISORDER: ICD-10-CM

## 2018-04-24 DIAGNOSIS — E11.9 CONTROLLED TYPE 2 DIABETES MELLITUS WITHOUT COMPLICATION, WITHOUT LONG-TERM CURRENT USE OF INSULIN (HCC): ICD-10-CM

## 2018-04-24 PROBLEM — R79.89 ELEVATED TROPONIN: Status: RESOLVED | Noted: 2018-04-14 | Resolved: 2018-04-24

## 2018-04-24 PROBLEM — R07.9 ACUTE CHEST PAIN: Status: RESOLVED | Noted: 2018-04-14 | Resolved: 2018-04-24

## 2018-04-24 PROBLEM — R77.8 ELEVATED TROPONIN: Status: RESOLVED | Noted: 2018-04-14 | Resolved: 2018-04-24

## 2018-04-24 PROCEDURE — 1111F DSCHRG MED/CURRENT MED MERGE: CPT | Performed by: INTERNAL MEDICINE

## 2018-04-24 PROCEDURE — 99495 TRANSJ CARE MGMT MOD F2F 14D: CPT | Performed by: INTERNAL MEDICINE

## 2018-04-24 RX ORDER — METOPROLOL SUCCINATE 25 MG/1
12.5 TABLET, EXTENDED RELEASE ORAL DAILY
COMMUNITY
End: 2018-11-07

## 2018-04-24 RX ORDER — NICOTINE 21 MG/24HR
1 PATCH, TRANSDERMAL 24 HOURS TRANSDERMAL EVERY 24 HOURS
Qty: 28 PATCH | Refills: 0 | Status: SHIPPED | OUTPATIENT
Start: 2018-04-24 | End: 2018-07-26 | Stop reason: ALTCHOICE

## 2018-04-24 RX ORDER — ZOLPIDEM TARTRATE 10 MG/1
TABLET ORAL
Qty: 30 TABLET | Refills: 5 | Status: SHIPPED | OUTPATIENT
Start: 2018-04-24 | End: 2019-05-24

## 2018-04-24 RX ORDER — ALPRAZOLAM 0.25 MG/1
0.25 TABLET ORAL 2 TIMES DAILY PRN
Qty: 60 TABLET | Refills: 5 | Status: SHIPPED | OUTPATIENT
Start: 2018-04-24 | End: 2019-05-24

## 2018-04-24 RX ORDER — MELOXICAM 15 MG/1
15 TABLET ORAL DAILY
Refills: 0 | COMMUNITY
Start: 2018-04-03 | End: 2018-07-02

## 2018-04-26 NOTE — PROGRESS NOTES
HPI:    Isatu Lee is a 56year old female here today for hospital follow up.    She was discharged from Inpatient hospital, [facility] to Home   Admit Date: 4/14/18  Discharge Date: 4/16/18  Hospital Discharge Diagnosis: Atypical chest pain, HTN, 6. DM - Trying to push thru w/ lifestyle measures. Allergies:  She is allergic to aspirin and naprosyn [naproxen].     Current Meds:    Current Outpatient Prescriptions on File Prior to Visit:  DIVALPROEX SODIUM  MG Oral Tablet 24 Hr TAKE 2 TABLE joint, pelvic region and thigh (10/07/2011); Personal history of urinary (tract) infection (12/09/2011); Unspecified hypertrophic and atrophic condition of skin (04/26/2011); and Unspecified hypertrophic and atrophic condition of skin (05/17/2011).     She ears and throat are clear  EYES: PERRLA, EOMI, conjunctiva are clear  NECK: supple, no adenopathy, no bruits  LUNGS: clear to auscultation  CARDIO: RRR without murmur  GI: good BS's, no masses, HSM or tenderness  EXTREMITIES: no cyanosis, clubbing or edema as outlined above. Patient was also afforded the time and opportunity to ask questions, which were then answered to the best of my ability.      Meds & Refills for this Visit:  Signed Prescriptions Disp Refills    ALPRAZolam 0.25 MG Oral Tab 60 tablet 5

## 2018-04-26 NOTE — PROGRESS NOTES
Multiple attempts to reach the pt and messages left with no returned phone call. Pt went to [place] with PCP's office on 4/24/18, Closing encounter.

## 2018-04-30 ENCOUNTER — OFFICE VISIT (OUTPATIENT)
Dept: SURGERY | Facility: CLINIC | Age: 58
End: 2018-04-30

## 2018-04-30 VITALS
DIASTOLIC BLOOD PRESSURE: 93 MMHG | WEIGHT: 160 LBS | SYSTOLIC BLOOD PRESSURE: 159 MMHG | HEART RATE: 89 BPM | BODY MASS INDEX: 30.21 KG/M2 | HEIGHT: 61 IN

## 2018-04-30 DIAGNOSIS — K22.70 BARRETT'S ESOPHAGUS WITHOUT DYSPLASIA: ICD-10-CM

## 2018-04-30 DIAGNOSIS — K21.9 GASTROESOPHAGEAL REFLUX DISEASE, ESOPHAGITIS PRESENCE NOT SPECIFIED: Primary | ICD-10-CM

## 2018-04-30 DIAGNOSIS — Z99.89 OSA ON CPAP: ICD-10-CM

## 2018-04-30 DIAGNOSIS — E66.9 OBESITY (BMI 30-39.9): ICD-10-CM

## 2018-04-30 DIAGNOSIS — G47.33 OSA ON CPAP: ICD-10-CM

## 2018-04-30 DIAGNOSIS — E11.9 CONTROLLED TYPE 2 DIABETES MELLITUS WITHOUT COMPLICATION, WITHOUT LONG-TERM CURRENT USE OF INSULIN (HCC): ICD-10-CM

## 2018-04-30 PROCEDURE — 99213 OFFICE O/P EST LOW 20 MIN: CPT | Performed by: SURGERY

## 2018-04-30 NOTE — H&P
New Patient Visit Note       Active Problems      1. Gastroesophageal reflux disease, esophagitis presence not specified    2. Jamison's esophagus without dysplasia    3. Obesity (BMI 30-39.9)    4. SUMMER on CPAP    5.  Controlled type 2 diabetes mellitus wit LIGATION    The family history and social history have been reviewed by me today.     Family History   Problem Relation Age of Onset   • Arthritis Mother    • AMI [OTHER] Mother    • diabetes mellitus [OTHER] Mother      Social History    Marital status: Si MG Oral Tablet 24 Hr Take 1 tablet (200 mg total) by mouth nightly.  Disp: 30 tablet Rfl: 0   Butalbital-APAP-Caffeine -40 MG Oral Tab TAKE ONE TABLET BY MOUTH EVERY 6 HOURS AS NEEDED FOR MIGRAINE Disp: 90 tablet Rfl: 1   CAMBIA 50 MG Oral Powd Pack T syncope and weakness. Hematological: Negative for adenopathy. Does not bruise/bleed easily. Psychiatric/Behavioral: Negative for behavioral problems and sleep disturbance.        Physical Findings   /93   Pulse 89   Ht 61\"   Wt 160 lb   BMI 30.23

## 2018-05-01 PROBLEM — K21.9 GERD (GASTROESOPHAGEAL REFLUX DISEASE): Status: ACTIVE | Noted: 2018-05-01

## 2018-05-09 NOTE — TELEPHONE ENCOUNTER
Patient called to request authorization from Dr. Jose Luis Cabrera. Patient states Dr. Jose Luis Cabrera needs to call [facility] to give prior authorization for medication quantity Omeprazole 40 MG.

## 2018-05-11 NOTE — TELEPHONE ENCOUNTER
Prior auth started with [employer] phone number [id number] with jose and turn around time for the omperpazole 40 mg bid is 72 hours

## 2018-05-14 NOTE — TELEPHONE ENCOUNTER
Please callback to discuss status of  Omperpazole with patient; advised that it was denied and she would like to discuss further with nurse for doctor

## 2018-05-15 RX ORDER — OMEPRAZOLE 40 MG/1
40 CAPSULE, DELAYED RELEASE ORAL 2 TIMES DAILY
Qty: 60 CAPSULE | Refills: 5 | Status: SHIPPED | OUTPATIENT
Start: 2018-05-15 | End: 2018-06-05

## 2018-05-15 NOTE — TELEPHONE ENCOUNTER
Omeprazole 40 mg 1 cap bid filled 9-28-17 60 with 5 refills    LOV 4-24-18     GERD w/ Jamison's esophagus - She's overdue for EGD as the last one was in Spring 2016 w/ Dr. Jane Wells. She wonders if her GERD/Jamison's could be contributing to sxs.   Send to [facility]

## 2018-05-15 NOTE — TELEPHONE ENCOUNTER
Called [facility] we are working on a re-determination for the omeprazole everything faxed to [employer] mail.   Paperwork placed in the completed patients paperwork

## 2018-05-17 RX ORDER — QUETIAPINE 200 MG/1
TABLET, FILM COATED, EXTENDED RELEASE ORAL
Qty: 30 TABLET | Refills: 5 | Status: SHIPPED | OUTPATIENT
Start: 2018-05-17 | End: 2018-11-01

## 2018-05-17 NOTE — TELEPHONE ENCOUNTER
Medication(s) to Refill:   Pending Prescriptions Disp Refills    QUETIAPINE FUMARATE  MG Oral Tablet 24 Hr [Pharmacy Med Name: QUETIAPINE FUM ER 200MG TAB] 30 tablet 0     Sig: TAKE 1 TABLET BY MOUTH NIGHTLY         Reason for Medication Refill being sent to Provider / Reason Protocol Failed: non-protocol medication   from when PCP last addressed condition)    Future Appointments:  Date Time Provider Lilly Purcell   5/24/2018 3:20 PM Yulisa Valerio MD [facility]   0/6/3663 54:87 AM Kyle Ricks MD [facility]   7/5/2018 1:00 PM Lisha Guevara MD [facility]     Last Time Medication was Filled: 3/27/18 30 tablet 0R     Last Office Visit with PCP: 4/24/18   When Patient was Due Back to the Office:  1 month   Last Blood Pressures:  BP Readings from Last 2 Encounters:  04/30/18 : 159/93  04/24/18 : 130/88    Recent Labs:  n/a

## 2018-05-24 ENCOUNTER — OFFICE VISIT (OUTPATIENT)
Dept: NEUROLOGY | Facility: CLINIC | Age: 58
End: 2018-05-24

## 2018-05-24 VITALS
TEMPERATURE: 99 F | SYSTOLIC BLOOD PRESSURE: 130 MMHG | HEIGHT: 61 IN | HEART RATE: 104 BPM | RESPIRATION RATE: 22 BRPM | DIASTOLIC BLOOD PRESSURE: 80 MMHG | BODY MASS INDEX: 30.21 KG/M2 | WEIGHT: 160 LBS | OXYGEN SATURATION: 94 %

## 2018-05-24 DIAGNOSIS — J44.9 COPD, MILD (HCC): ICD-10-CM

## 2018-05-24 DIAGNOSIS — G43.709 CHRONIC MIGRAINE W/O AURA W/O STATUS MIGRAINOSUS, NOT INTRACTABLE: Primary | ICD-10-CM

## 2018-05-24 DIAGNOSIS — R51.9 CHRONIC DAILY HEADACHE: ICD-10-CM

## 2018-05-24 PROCEDURE — 99213 OFFICE O/P EST LOW 20 MIN: CPT | Performed by: OTHER

## 2018-05-24 RX ORDER — DIVALPROEX SODIUM 250 MG/1
TABLET, EXTENDED RELEASE ORAL
Qty: 60 TABLET | Refills: 2 | Status: SHIPPED | OUTPATIENT
Start: 2018-05-24 | End: 2018-05-24

## 2018-05-24 RX ORDER — DIVALPROEX SODIUM 250 MG/1
TABLET, EXTENDED RELEASE ORAL
Qty: 90 TABLET | Refills: 2 | Status: SHIPPED | OUTPATIENT
Start: 2018-05-24 | End: 2018-08-28

## 2018-05-25 ENCOUNTER — TELEPHONE (OUTPATIENT)
Dept: NEUROLOGY | Facility: CLINIC | Age: 58
End: 2018-05-25

## 2018-05-25 PROCEDURE — 88305 TISSUE EXAM BY PATHOLOGIST: CPT | Performed by: SURGERY

## 2018-05-25 RX ORDER — ALBUTEROL SULFATE 90 UG/1
2 AEROSOL, METERED RESPIRATORY (INHALATION) EVERY 4 HOURS PRN
Qty: 1 INHALER | Refills: 6 | Status: SHIPPED | OUTPATIENT
Start: 2018-05-25 | End: 2019-05-24

## 2018-05-25 NOTE — TELEPHONE ENCOUNTER
Refill requested: Albuterol Inh     Failed protocol - No AAP      Last refill: 1/3/17 1 inh 6R  Relevant Labs: NA   Last AAP- Not done  Last OV / RTC advised: 4/24/18 RTC 1 month   Appt Scheduled: yes  Your appointments     Date & Time Appointment Departme

## 2018-05-26 ENCOUNTER — LAB REQUISITION (OUTPATIENT)
Dept: LAB | Facility: HOSPITAL | Age: 58
End: 2018-05-26
Attending: SURGERY
Payer: MEDICAID

## 2018-05-26 DIAGNOSIS — K21.00 GASTRO-ESOPHAGEAL REFLUX DISEASE WITH ESOPHAGITIS: ICD-10-CM

## 2018-05-29 NOTE — PROGRESS NOTES
HPI:    Patient ID: Christina Goddard is a 56year old female. Migraine          Patient is a  56year old female who presented with complaints of worsening migraine. States Depakote was effective but not anymore.  She is getting frequent migraines and mary ellen All other systems reviewed and are negative.              Current Outpatient Prescriptions:  divalproex Sodium  MG Oral Tablet 24 Hr TAKE 1 TAB IN AM AND 2 TABLETS BY MOUTH NIGHTLY Disp: 90 tablet Rfl: 2   Albuterol Sulfate HFA (PROAIR HFA) 108 (90 Disp:  Rfl:    multiple vitamin Oral Chew Tab Chew 1 tablet by mouth daily.  Disp:  Rfl:      Allergies:  Aspirin                     Comment:TABS contraindicated because of saavedra's             esophagus  Naprosyn [Naproxen]     RASH    Comment:TABS Anap month    See orders and medications filed with this encounter. The patient indicates understanding of these issues and agrees with the plan. No orders of the defined types were placed in this encounter.       Meds This Visit:  Signed Chiquita Munoz

## 2018-06-04 ENCOUNTER — OFFICE VISIT (OUTPATIENT)
Dept: SURGERY | Facility: CLINIC | Age: 58
End: 2018-06-04

## 2018-06-04 VITALS — HEIGHT: 61 IN | BODY MASS INDEX: 30.02 KG/M2 | WEIGHT: 159 LBS

## 2018-06-04 DIAGNOSIS — K21.9 GASTROESOPHAGEAL REFLUX DISEASE, ESOPHAGITIS PRESENCE NOT SPECIFIED: Primary | ICD-10-CM

## 2018-06-04 DIAGNOSIS — E66.9 OBESITY (BMI 30-39.9): ICD-10-CM

## 2018-06-04 PROCEDURE — 99213 OFFICE O/P EST LOW 20 MIN: CPT | Performed by: SURGERY

## 2018-06-04 RX ORDER — LANSOPRAZOLE 30 MG/1
30 CAPSULE, DELAYED RELEASE ORAL
Qty: 30 CAPSULE | Refills: 0 | Status: SHIPPED | OUTPATIENT
Start: 2018-06-04 | End: 2018-06-05

## 2018-06-04 NOTE — PROGRESS NOTES
Follow Up Visit Note       Active Problems      1. Gastroesophageal reflux disease, esophagitis presence not specified    2. Obesity (BMI 30-39. 9)          Chief Complaint   Patient presents with:   Follow - Up: Est pt in for further care and treatment of E Social History Main Topics    Smoking status: Current Every Day Smoker                                                     Packs/day: 1.50      Years: 35.00          Types: Cigarettes    Smokeless tobacco: Never Used                        Comment: 1.5 1 Tube Rfl: 2   ATORVASTATIN 20 MG Oral Tab TAKE ONE TABLET BY MOUTH NIGHTLY Disp: 90 tablet Rfl: 1   Phentermine HCl 37.5 MG Oral Tab Take 1 tablet (37.5 mg total) by mouth every morning before breakfast. Disp: 30 tablet Rfl: 2   Butalbital-APAP-Caffeine distress. Cardiovascular: Normal rate and regular rhythm. Pulmonary/Chest: Effort normal. No respiratory distress. Skin: She is not diaphoretic. Nursing note and vitals reviewed.            Assessment   Gastroesophageal reflux disease, esophagitis

## 2018-06-07 ENCOUNTER — TELEPHONE (OUTPATIENT)
Dept: INTERNAL MEDICINE CLINIC | Facility: CLINIC | Age: 58
End: 2018-06-07

## 2018-06-07 NOTE — TELEPHONE ENCOUNTER
Patient calling in asking to speak with the Nurse regarding an issue that just occurred from her insurance company pertaining to her medications.

## 2018-06-07 NOTE — TELEPHONE ENCOUNTER
Pt received a letter from insurance that she needs prior auth done for Quetiapine Fumarate  mg daily and Ranitidine 150 twice daily dosing by the end of July.

## 2018-06-08 ENCOUNTER — TELEPHONE (OUTPATIENT)
Dept: SURGERY | Facility: CLINIC | Age: 58
End: 2018-06-08

## 2018-06-08 NOTE — TELEPHONE ENCOUNTER
PT CALLED REGARDING PRESCRIPTION FROM DR. CHEN. STATES INSURANCE WILL NOT COVER, INFORMATION SENT TO [place] [employer] [facility] FOR PRE CERTICICATION, AND FAXED TODAY.  PT NOTIFIED OF ABOVE,

## 2018-06-11 ENCOUNTER — TELEPHONE (OUTPATIENT)
Dept: SURGERY | Facility: CLINIC | Age: 58
End: 2018-06-11

## 2018-06-13 ENCOUNTER — HOSPITAL ENCOUNTER (OUTPATIENT)
Dept: GENERAL RADIOLOGY | Age: 58
Discharge: HOME | End: 2018-06-13
Attending: SURGERY
Payer: MEDICAID

## 2018-06-13 DIAGNOSIS — K21.9 GASTROESOPHAGEAL REFLUX DISEASE, ESOPHAGITIS PRESENCE NOT SPECIFIED: ICD-10-CM

## 2018-06-13 PROCEDURE — 74240 X-RAY XM UPR GI TRC 1CNTRST: CPT | Performed by: SURGERY

## 2018-06-14 ENCOUNTER — TELEPHONE (OUTPATIENT)
Dept: INTERNAL MEDICINE CLINIC | Facility: CLINIC | Age: 58
End: 2018-06-14

## 2018-06-14 NOTE — TELEPHONE ENCOUNTER
Patient is calling regarding 2 medications that we are trying to get approved through her insurance for her.  She stated they are to hard to spell

## 2018-06-15 NOTE — TELEPHONE ENCOUNTER
Called the insurance company and Quetiapine  mg once nightly is covered and they have a paid claim on the medication so patient should not have any problems trying to fill medication. Ranitidine 150 mg does need a prior auth and form is being faxed today. Pt was advised off above and verbalized understanding.

## 2018-06-18 RX ORDER — BUTALBITAL, ACETAMINOPHEN AND CAFFEINE 50; 325; 40 MG/1; MG/1; MG/1
1 TABLET ORAL EVERY 6 HOURS PRN
Qty: 90 TABLET | Refills: 0 | Status: SHIPPED | OUTPATIENT
Start: 2018-06-18 | End: 2019-02-20

## 2018-06-18 NOTE — TELEPHONE ENCOUNTER
Refill requested: Butalbital APAP Caffeine     Failed protocol      Last refill: 9/28/17 #90 1R Dr Koki Allan: NA   BP Readings from Last 3 Encounters:  05/24/18 : 130/80  04/30/18 : 159/93  04/24/18 : 130/88      Last OV / RTC advised: 4/24/18

## 2018-06-19 ENCOUNTER — TELEPHONE (OUTPATIENT)
Dept: SURGERY | Facility: CLINIC | Age: 58
End: 2018-06-19

## 2018-06-19 RX ORDER — OMEPRAZOLE 40 MG/1
40 CAPSULE, DELAYED RELEASE ORAL DAILY
Qty: 30 CAPSULE | Refills: 3 | Status: SHIPPED | OUTPATIENT
Start: 2018-06-19 | End: 2018-07-02

## 2018-06-19 NOTE — TELEPHONE ENCOUNTER
Pt started on pantaprozole and states this is not helping, she is waking up at night and taking tums. Would like to go back on omeprazole. Order placed.  Instructed pt that if her symptoms do not improve she should make an appt to see Dr. Pilar William for [facility]

## 2018-06-29 ENCOUNTER — TELEPHONE (OUTPATIENT)
Dept: SURGERY | Facility: CLINIC | Age: 58
End: 2018-06-29

## 2018-06-29 NOTE — TELEPHONE ENCOUNTER
madeline club calling states an rx for omeprazole 40mg once a day was sent  Pharm states pt states medication is taken 40mg BID  Is requesting clarification or new rx  Will speak with susie

## 2018-07-02 ENCOUNTER — OFFICE VISIT (OUTPATIENT)
Dept: SURGERY | Facility: CLINIC | Age: 58
End: 2018-07-02

## 2018-07-02 ENCOUNTER — OFFICE VISIT (OUTPATIENT)
Dept: RHEUMATOLOGY | Facility: CLINIC | Age: 58
End: 2018-07-02

## 2018-07-02 VITALS
SYSTOLIC BLOOD PRESSURE: 155 MMHG | BODY MASS INDEX: 31 KG/M2 | HEART RATE: 99 BPM | DIASTOLIC BLOOD PRESSURE: 95 MMHG | HEIGHT: 61 IN | WEIGHT: 164.19 LBS | TEMPERATURE: 99 F

## 2018-07-02 VITALS
BODY MASS INDEX: 30.96 KG/M2 | HEART RATE: 82 BPM | HEIGHT: 61 IN | WEIGHT: 164 LBS | DIASTOLIC BLOOD PRESSURE: 96 MMHG | RESPIRATION RATE: 18 BRPM | SYSTOLIC BLOOD PRESSURE: 160 MMHG

## 2018-07-02 DIAGNOSIS — G89.29 CHRONIC PAIN OF BOTH KNEES: ICD-10-CM

## 2018-07-02 DIAGNOSIS — K44.9 HIATAL HERNIA WITHOUT GANGRENE AND OBSTRUCTION: ICD-10-CM

## 2018-07-02 DIAGNOSIS — M25.561 CHRONIC PAIN OF BOTH KNEES: ICD-10-CM

## 2018-07-02 DIAGNOSIS — E66.9 OBESITY (BMI 30-39.9): ICD-10-CM

## 2018-07-02 DIAGNOSIS — K22.70 BARRETT'S ESOPHAGUS WITHOUT DYSPLASIA: ICD-10-CM

## 2018-07-02 DIAGNOSIS — M18.11 ARTHRITIS OF CARPOMETACARPAL (CMC) JOINT OF RIGHT THUMB: ICD-10-CM

## 2018-07-02 DIAGNOSIS — M15.9 PRIMARY OSTEOARTHRITIS INVOLVING MULTIPLE JOINTS: Primary | ICD-10-CM

## 2018-07-02 DIAGNOSIS — M54.50 CHRONIC MIDLINE LOW BACK PAIN WITHOUT SCIATICA: ICD-10-CM

## 2018-07-02 DIAGNOSIS — M25.562 CHRONIC PAIN OF BOTH KNEES: ICD-10-CM

## 2018-07-02 DIAGNOSIS — M17.0 PRIMARY OSTEOARTHRITIS OF BOTH KNEES: ICD-10-CM

## 2018-07-02 DIAGNOSIS — Z99.89 OSA ON CPAP: ICD-10-CM

## 2018-07-02 DIAGNOSIS — G47.33 OSA ON CPAP: ICD-10-CM

## 2018-07-02 DIAGNOSIS — K21.9 GASTROESOPHAGEAL REFLUX DISEASE, ESOPHAGITIS PRESENCE NOT SPECIFIED: Primary | ICD-10-CM

## 2018-07-02 DIAGNOSIS — G89.29 CHRONIC MIDLINE LOW BACK PAIN WITHOUT SCIATICA: ICD-10-CM

## 2018-07-02 PROCEDURE — 99213 OFFICE O/P EST LOW 20 MIN: CPT | Performed by: SURGERY

## 2018-07-02 PROCEDURE — 20600 DRAIN/INJ JOINT/BURSA W/O US: CPT | Performed by: INTERNAL MEDICINE

## 2018-07-02 PROCEDURE — 20610 DRAIN/INJ JOINT/BURSA W/O US: CPT | Performed by: INTERNAL MEDICINE

## 2018-07-02 RX ORDER — METHYLPREDNISOLONE ACETATE 40 MG/ML
20 INJECTION, SUSPENSION INTRA-ARTICULAR; INTRALESIONAL; INTRAMUSCULAR; SOFT TISSUE ONCE
Status: COMPLETED | OUTPATIENT
Start: 2018-07-02 | End: 2018-07-02

## 2018-07-02 RX ORDER — METHYLPREDNISOLONE ACETATE 40 MG/ML
40 INJECTION, SUSPENSION INTRA-ARTICULAR; INTRALESIONAL; INTRAMUSCULAR; SOFT TISSUE ONCE
Status: COMPLETED | OUTPATIENT
Start: 2018-07-02 | End: 2018-07-02

## 2018-07-02 RX ORDER — ACETAMINOPHEN 325 MG/1
325 TABLET ORAL EVERY 6 HOURS PRN
COMMUNITY
End: 2018-10-16

## 2018-07-02 RX ORDER — MELOXICAM 15 MG/1
15 TABLET ORAL DAILY
Qty: 90 TABLET | Refills: 3 | Status: SHIPPED | OUTPATIENT
Start: 2018-07-02 | End: 2019-01-29

## 2018-07-02 RX ORDER — OMEPRAZOLE 40 MG/1
40 CAPSULE, DELAYED RELEASE ORAL 2 TIMES DAILY
Qty: 60 CAPSULE | Refills: 1 | Status: SHIPPED | OUTPATIENT
Start: 2018-07-02 | End: 2019-01-29

## 2018-07-02 RX ADMIN — METHYLPREDNISOLONE ACETATE 40 MG: 40 INJECTION, SUSPENSION INTRA-ARTICULAR; INTRALESIONAL; INTRAMUSCULAR; SOFT TISSUE at 12:59:00

## 2018-07-02 RX ADMIN — METHYLPREDNISOLONE ACETATE 20 MG: 40 INJECTION, SUSPENSION INTRA-ARTICULAR; INTRALESIONAL; INTRAMUSCULAR; SOFT TISSUE at 12:59:00

## 2018-07-02 RX ADMIN — METHYLPREDNISOLONE ACETATE 40 MG: 40 INJECTION, SUSPENSION INTRA-ARTICULAR; INTRALESIONAL; INTRAMUSCULAR; SOFT TISSUE at 13:00:00

## 2018-07-02 NOTE — PATIENT INSTRUCTIONS
Use Meloxicam 15 per day for the arthritis treatment. Use Ibuprofen 800 mg occasionally also to help relieve pain. Today the right thumb will be injected at the carpometacarpal joint with cortisone. Today both knees will be injected with cortisone also.

## 2018-07-02 NOTE — PROGRESS NOTES
Follow Up Visit Note       Active Problems      1. Gastroesophageal reflux disease, esophagitis presence not specified    2. Hiatal hernia without gangrene and obstruction    3.  SUMMER on CPAP          Chief Complaint   Patient presents with:  Test Results: p name:                       Years of education:                 Number of children:               Social History Main Topics    Smoking status: Current Every Day Smoker                                                     Packs/day: 1.50      Years: 35.00 Transdermal Gel Apply 2 g topically 2 (two) times daily as needed.  Disp: 1 Tube Rfl: 2   ATORVASTATIN 20 MG Oral Tab TAKE ONE TABLET BY MOUTH NIGHTLY Disp: 90 tablet Rfl: 1   Phentermine HCl 37.5 MG Oral Tab Take 1 tablet (37.5 mg total) by mouth every mor is no tenderness. There is no rebound. Skin: She is not diaphoretic. Nursing note and vitals reviewed.            Assessment   Gastroesophageal reflux disease, esophagitis presence not specified  (primary encounter diagnosis)  Hiatal hernia without gang

## 2018-07-02 NOTE — PROGRESS NOTES
[facility]  Dr. Kaveh Banks Progress Note     Subjective:   Dana Mclean is a(n) 66year old female. Current complaints: Patient presents with:  Osteoarthritis: LOV 7/28/17-missed last appts due to insurance issues.  Pt takes meloxicam is helpful, did will call you with Xray results.      Edgar Sneed MD 7/8/0296 18:96 PM

## 2018-07-02 NOTE — PROCEDURES
After obtaining consent from the patient, both knees were cleansed with Betadine and alcohol wipes, and also the right thumb at the carpometacarpal joint was cleansed with Betadine and alcohol wipes.   In these areas were injected each knee was injected wit

## 2018-07-06 ENCOUNTER — TELEPHONE (OUTPATIENT)
Dept: INTERNAL MEDICINE CLINIC | Facility: CLINIC | Age: 58
End: 2018-07-06

## 2018-07-06 NOTE — TELEPHONE ENCOUNTER
Pt would like call back from nurse states that nurse would know about would like nurse to call her pharmaceutical

## 2018-07-10 NOTE — TELEPHONE ENCOUNTER
[facility] [employer] contacted at [phone] with pt ID # of [id number]. Pristiq 100 mg ER tablets ordered. Order # [id number] will take 7-10 business days.

## 2018-07-13 RX ORDER — RANITIDINE 150 MG/1
TABLET ORAL
Qty: 180 TABLET | Refills: 0 | Status: SHIPPED | OUTPATIENT
Start: 2018-07-13 | End: 2018-10-04

## 2018-07-17 NOTE — TELEPHONE ENCOUNTER
Patient called per her conversation with Romero Mckeon for [address] she is sending her brother in to pick it up because she does not feel well

## 2018-07-26 ENCOUNTER — OFFICE VISIT (OUTPATIENT)
Dept: NEUROLOGY | Facility: CLINIC | Age: 58
End: 2018-07-26
Payer: MEDICAID

## 2018-07-26 VITALS
HEART RATE: 99 BPM | WEIGHT: 160.5 LBS | HEIGHT: 61 IN | BODY MASS INDEX: 30.3 KG/M2 | OXYGEN SATURATION: 99 % | DIASTOLIC BLOOD PRESSURE: 92 MMHG | SYSTOLIC BLOOD PRESSURE: 168 MMHG | RESPIRATION RATE: 16 BRPM

## 2018-07-26 DIAGNOSIS — G43.709 CHRONIC MIGRAINE W/O AURA W/O STATUS MIGRAINOSUS, NOT INTRACTABLE: Primary | ICD-10-CM

## 2018-07-26 DIAGNOSIS — R51.9 CHRONIC DAILY HEADACHE: ICD-10-CM

## 2018-07-26 PROCEDURE — 99213 OFFICE O/P EST LOW 20 MIN: CPT | Performed by: OTHER

## 2018-07-26 RX ORDER — RIZATRIPTAN BENZOATE 10 MG/1
10 TABLET ORAL AS NEEDED
Qty: 12 TABLET | Refills: 0 | Status: SHIPPED | OUTPATIENT
Start: 2018-07-26 | End: 2018-09-04

## 2018-07-26 NOTE — PROGRESS NOTES
HPI:    Patient ID: Josefina Phillips is a 66year old female. Migraine      Headache          Patient is a  56year old female who presented with complaints of worsening migraine. States Depakote not working and she is taking it regularly as prescribed. Review of Systems   Constitutional: Negative. HENT: Negative. Eyes: Negative. Respiratory: Negative. Musculoskeletal: Positive for arthralgias. Neurological: Positive for headaches. All other systems reviewed and are negative. 2 (two) times daily as needed. Disp: 1 Tube Rfl: 2   ATORVASTATIN 20 MG Oral Tab TAKE ONE TABLET BY MOUTH NIGHTLY Disp: 90 tablet Rfl: 1   CAMBIA 50 MG Oral Powd Pack Take 50 mg by mouth as needed.  Disp: 9 packet Rfl: 5   Fluticasone Propionate 50 MCG/ACT Depakote given potential side effects. Again advised minimizing use of analgesic to reduce any medication overuse/rebound headache component. We recommend Maxalt as first line agent and reserve Fioricet for intractable migraine.      RTC in 4 weeks pen

## 2018-08-02 ENCOUNTER — TELEPHONE (OUTPATIENT)
Dept: NEUROLOGY | Facility: CLINIC | Age: 58
End: 2018-08-02

## 2018-08-02 DIAGNOSIS — G43.009 MIGRAINE WITHOUT AURA AND WITHOUT STATUS MIGRAINOSUS, NOT INTRACTABLE: Primary | ICD-10-CM

## 2018-08-02 NOTE — TELEPHONE ENCOUNTER
Dr. Marie Dhaliwal has requested a Botox authorization be started for patient. Pts insurance is [facility], however, and they have very strict requirements that must be met regarding preventative medications.   According to our records, pt does not

## 2018-08-03 NOTE — TELEPHONE ENCOUNTER
Rec'd signed consent form via fax for Ronald Garsia. Placed in nursing bin. Per kayleigh Joyce to prescribe Aimovig.

## 2018-08-08 NOTE — TELEPHONE ENCOUNTER
Patient to start [address] [place] [address] for migraines. Service Request Form was completed and signed by patient and doctor. Rx benefits information confirmed with patient. Referral initiated to start PA process.

## 2018-08-10 ENCOUNTER — TELEPHONE (OUTPATIENT)
Dept: NEUROLOGY | Facility: CLINIC | Age: 58
End: 2018-08-10

## 2018-08-10 DIAGNOSIS — G43.009 MIGRAINE WITHOUT AURA AND WITHOUT STATUS MIGRAINOSUS, NOT INTRACTABLE: ICD-10-CM

## 2018-08-10 NOTE — TELEPHONE ENCOUNTER
AIMOVIG medication Denied by [facility]. Denial Reason:  Must have tried and failed five drugs covered by your plan for your condition. You have failed four. You must try and fail one more drug for your condition.  You may not have to try an

## 2018-08-13 NOTE — TELEPHONE ENCOUNTER
Patient called, stated she has not tried Amitriptyline . States she is currently on Pristiq and Quetiapine and cannot be off of those medications. Will check with Dr Karthik Sousa if appropriate to try Amitriptyline along with Pristiq and Seroquel?

## 2018-08-17 ENCOUNTER — TELEPHONE (OUTPATIENT)
Dept: NEUROLOGY | Facility: CLINIC | Age: 58
End: 2018-08-17

## 2018-08-17 NOTE — TELEPHONE ENCOUNTER
Per TE dated 8/10/18, PA has been denied. Completed Service Request Form faxed to Rosalba English along with face sheet and insurance card. Fax confirmation received. Original paperwork kept in nursing drawer. Form sent to scanning.   Aimovig added to pa

## 2018-08-17 NOTE — TELEPHONE ENCOUNTER
Deng Tapia was supposed to be entered as a \"historical\" med, but was mistakenly sent to pharmacy. [address] [place] [address] and left message asking them to disregard this Rx request.    Contacted patient and explained above.   Apologized for the con

## 2018-08-17 NOTE — TELEPHONE ENCOUNTER
patient received call from pharmacy regarding a prescription for migraines.   Patient wanting to know why a script was sent to pharmacy when she is to get a 2 month trial supply first?  Please advise

## 2018-08-29 RX ORDER — DIVALPROEX SODIUM 250 MG/1
TABLET, EXTENDED RELEASE ORAL
Qty: 270 TABLET | Refills: 0 | Status: SHIPPED | OUTPATIENT
Start: 2018-08-29 | End: 2018-11-29

## 2018-08-29 NOTE — TELEPHONE ENCOUNTER
Medication: DIVALPROEX SODIUM  MG     Date of last refill:5/24/18  (#90/2)  [place]    Last office visit: 7/26/2018  Due back to clinic per last office note:  RTC in 4 weeks pending Botox approval  Date next off

## 2018-08-31 NOTE — TELEPHONE ENCOUNTER
Started a new PA via cover my meds with additional information on medication.     Pending key - [id number]

## 2018-09-04 NOTE — TELEPHONE ENCOUNTER
Received a fax from [employer] [facility] [address] regarding [facility] now is approved from 8/17/18-11/17/18

## 2018-09-04 NOTE — TELEPHONE ENCOUNTER
Medication: RIZATRIPTAN BENZOATE 10 MG OR TABS    Date of last refill: 7.26.18 (#12/0)  [place]    Last office visit: 7/26/2018  Due back to clinic per last office note:  4 weeks  Date next office visit scheduled:  F

## 2018-09-05 RX ORDER — RIZATRIPTAN BENZOATE 10 MG/1
TABLET ORAL
Qty: 12 TABLET | Refills: 0 | Status: SHIPPED | OUTPATIENT
Start: 2018-09-05 | End: 2018-11-01

## 2018-09-05 NOTE — TELEPHONE ENCOUNTER
Patient calling to check on status of below. She stated she has not received the Aimovig free trials yet. Given Aimovig numbers to call to check on status. Patient verbalized understanding.      Saurav Noble pending Dr Yvette Melgar approval. Patient stated

## 2018-09-06 NOTE — TELEPHONE ENCOUNTER
Patient calling to check on Dawne Opitz. Patient informed as soon as it is approved by Dr Mary Ann Blount it will go directly to [facility].

## 2018-09-10 NOTE — TELEPHONE ENCOUNTER
Aimovig sent to [facility]. Receipt confirmed by pharmacy (9/6/2018  5:11 PM CDT)    Patient calling to check if she needs to discontinue Depakote before trying Aimovig. Patient reported no side effects.      Per [employer]:   Drugs in this analys

## 2018-09-14 ENCOUNTER — HOSPITAL ENCOUNTER (OUTPATIENT)
Dept: GENERAL RADIOLOGY | Age: 58
Discharge: HOME | End: 2018-09-14
Attending: INTERNAL MEDICINE
Payer: MEDICAID

## 2018-09-14 DIAGNOSIS — M25.562 CHRONIC PAIN OF BOTH KNEES: ICD-10-CM

## 2018-09-14 DIAGNOSIS — G89.29 CHRONIC PAIN OF BOTH KNEES: ICD-10-CM

## 2018-09-14 DIAGNOSIS — M25.561 CHRONIC PAIN OF BOTH KNEES: ICD-10-CM

## 2018-09-14 DIAGNOSIS — M15.9 PRIMARY OSTEOARTHRITIS INVOLVING MULTIPLE JOINTS: ICD-10-CM

## 2018-09-14 DIAGNOSIS — M54.50 CHRONIC MIDLINE LOW BACK PAIN WITHOUT SCIATICA: ICD-10-CM

## 2018-09-14 DIAGNOSIS — G89.29 CHRONIC MIDLINE LOW BACK PAIN WITHOUT SCIATICA: ICD-10-CM

## 2018-09-14 PROCEDURE — 73562 X-RAY EXAM OF KNEE 3: CPT | Performed by: INTERNAL MEDICINE

## 2018-09-14 PROCEDURE — 72110 X-RAY EXAM L-2 SPINE 4/>VWS: CPT | Performed by: INTERNAL MEDICINE

## 2018-09-20 ENCOUNTER — TELEPHONE (OUTPATIENT)
Dept: RHEUMATOLOGY | Facility: CLINIC | Age: 58
End: 2018-09-20

## 2018-09-20 NOTE — TELEPHONE ENCOUNTER
Notified pt of mild to moderate arthritis in lumbar spine and mild arthritis of knees. Pt verbalized understanding.

## 2018-09-26 ENCOUNTER — TELEPHONE (OUTPATIENT)
Dept: SURGERY | Facility: CLINIC | Age: 58
End: 2018-09-26

## 2018-09-26 NOTE — TELEPHONE ENCOUNTER
MEDICAL RECORDS REQUEST RECEIVED FROM [place] [employer]  [employer] REQUESTING RECORDS FROM 3/1/17-PRESENT. REQUEST PROCESSED AND SENT TO [email or web address] ON 09/26/18

## 2018-10-01 ENCOUNTER — TELEPHONE (OUTPATIENT)
Dept: INTERNAL MEDICINE CLINIC | Facility: CLINIC | Age: 58
End: 2018-10-01

## 2018-10-01 ENCOUNTER — OFFICE VISIT (OUTPATIENT)
Dept: INTERNAL MEDICINE CLINIC | Facility: CLINIC | Age: 58
End: 2018-10-01
Payer: MEDICAID

## 2018-10-01 VITALS
DIASTOLIC BLOOD PRESSURE: 74 MMHG | SYSTOLIC BLOOD PRESSURE: 136 MMHG | BODY MASS INDEX: 30.78 KG/M2 | WEIGHT: 163 LBS | RESPIRATION RATE: 20 BRPM | HEART RATE: 100 BPM | HEIGHT: 61 IN | OXYGEN SATURATION: 97 % | TEMPERATURE: 98 F

## 2018-10-01 DIAGNOSIS — F17.200 TOBACCO USE DISORDER: ICD-10-CM

## 2018-10-01 DIAGNOSIS — R53.82 CHRONIC FATIGUE SYNDROME: ICD-10-CM

## 2018-10-01 DIAGNOSIS — Z00.00 ROUTINE GENERAL MEDICAL EXAMINATION AT A HEALTH CARE FACILITY: Primary | ICD-10-CM

## 2018-10-01 DIAGNOSIS — Z23 FLU VACCINE NEED: ICD-10-CM

## 2018-10-01 DIAGNOSIS — Z12.4 SCREENING FOR CERVICAL CANCER: ICD-10-CM

## 2018-10-01 DIAGNOSIS — Z12.2 ENCOUNTER FOR SCREENING FOR LUNG CANCER: ICD-10-CM

## 2018-10-01 PROBLEM — G93.32 CHRONIC FATIGUE SYNDROME: Status: ACTIVE | Noted: 2018-10-01

## 2018-10-01 PROCEDURE — 90686 IIV4 VACC NO PRSV 0.5 ML IM: CPT | Performed by: INTERNAL MEDICINE

## 2018-10-01 PROCEDURE — 88175 CYTOPATH C/V AUTO FLUID REDO: CPT | Performed by: INTERNAL MEDICINE

## 2018-10-01 PROCEDURE — 90471 IMMUNIZATION ADMIN: CPT | Performed by: INTERNAL MEDICINE

## 2018-10-01 PROCEDURE — 99396 PREV VISIT EST AGE 40-64: CPT | Performed by: INTERNAL MEDICINE

## 2018-10-01 RX ORDER — DEXTROAMPHETAMINE SACCHARATE, AMPHETAMINE ASPARTATE, DEXTROAMPHETAMINE SULFATE AND AMPHETAMINE SULFATE 2.5; 2.5; 2.5; 2.5 MG/1; MG/1; MG/1; MG/1
10 TABLET ORAL DAILY
Qty: 30 TABLET | Refills: 0 | Status: SHIPPED | OUTPATIENT
Start: 2018-10-01 | End: 2018-10-25

## 2018-10-01 NOTE — TELEPHONE ENCOUNTER
Lencho Vargas contacted at [phone] with pt ID # of [id number].  Pristiq 100 mg ER tablets ordered.  Will take 7-10 business days

## 2018-10-01 NOTE — PROGRESS NOTES
Patient presents with:  CPX      HPI: Alycia Shoulder presents today for WWE + pap. Due for wellness labs. Review of Systems   Constitutional: Positive for malaise/fatigue (Chronic). HENT: Positive for congestion (Trying OTC products).     Eyes:        Trying TABLET BY MOUTH AS NEEDED FOR  MIGRAINE USE  AT  ONSET  **MAY  REPEAT  ONCE  AFTER  2  HOURS  ONLY  2  TABLETS IN  24  HOUR MAX **, Disp: 12 tablet, Rfl: 0  •  DIVALPROEX SODIUM  MG Oral Tablet 24 Hr, TAKE 1 TABLET BY MOUTH IN THE MORNING AND TAKE 2 Tab, Chew 1 tablet by mouth daily. , Disp: , Rfl:       Social History    Tobacco Use      Smoking status: Current Every Day Smoker        Packs/day: 1.50        Years: 35.00        Pack years: 52.5        Types: Cigarettes      Smokeless tobacco: Never Use years of smoking and current smoking - Test ordered. 4. CFS - Start trial of Adderall 10 mg daily x 1 month. 5. RTC 1 month for CFS follow up. Soumya verbally agrees to and understands the plan as outlined above.   She was also afforded the time and oppor

## 2018-10-04 RX ORDER — RANITIDINE 150 MG/1
150 TABLET ORAL 2 TIMES DAILY
Qty: 180 TABLET | Refills: 1 | Status: SHIPPED | OUTPATIENT
Start: 2018-10-04 | End: 2018-11-29

## 2018-10-04 RX ORDER — ATORVASTATIN CALCIUM 20 MG/1
20 TABLET, FILM COATED ORAL NIGHTLY
Qty: 90 TABLET | Refills: 1 | Status: SHIPPED | OUTPATIENT
Start: 2018-10-04 | End: 2019-03-27

## 2018-10-04 NOTE — TELEPHONE ENCOUNTER
Refill requested:   Requested Prescriptions     Pending Prescriptions Disp Refills   • RaNITidine HCl 150 MG Oral Tab [Pharmacy Med Name: RANITIDINE 150MG    TAB] 180 tablet 1     Sig: Take 1 tablet (150 mg total) by mouth 2 (two) times daily.    • atorvast

## 2018-10-05 ENCOUNTER — TELEPHONE (OUTPATIENT)
Dept: NEUROLOGY | Facility: CLINIC | Age: 58
End: 2018-10-05

## 2018-10-05 NOTE — TELEPHONE ENCOUNTER
[facility] from 3/1/17 to present. Sent to scan stat and scanning on 10/5/18.

## 2018-10-08 ENCOUNTER — TELEPHONE (OUTPATIENT)
Dept: INTERNAL MEDICINE CLINIC | Facility: CLINIC | Age: 58
End: 2018-10-08

## 2018-10-08 NOTE — TELEPHONE ENCOUNTER
Called Pt to advise medication - Pristiq was delivered from drug [facility] and she can pick it up. She will come 10-9-18 to get it.

## 2018-10-08 NOTE — TELEPHONE ENCOUNTER
[facility] pharmacy called to follow up on prior authorization that was faxed for:  amphetamine-dextroamphetamine 10 MG Oral Tab    [facility] [address] [place] [address] Str. [phone], [phone]

## 2018-10-16 ENCOUNTER — OFFICE VISIT (OUTPATIENT)
Dept: FAMILY MEDICINE CLINIC | Facility: CLINIC | Age: 58
End: 2018-10-16
Payer: MEDICAID

## 2018-10-16 VITALS
HEART RATE: 107 BPM | RESPIRATION RATE: 18 BRPM | BODY MASS INDEX: 30.78 KG/M2 | WEIGHT: 163 LBS | HEIGHT: 61 IN | TEMPERATURE: 98 F | OXYGEN SATURATION: 97 % | SYSTOLIC BLOOD PRESSURE: 128 MMHG | DIASTOLIC BLOOD PRESSURE: 82 MMHG

## 2018-10-16 DIAGNOSIS — J20.9 ACUTE BRONCHITIS WITH SYMPTOMS > 10 DAYS: Primary | ICD-10-CM

## 2018-10-16 DIAGNOSIS — J98.01 BRONCHOSPASM: ICD-10-CM

## 2018-10-16 PROCEDURE — 99213 OFFICE O/P EST LOW 20 MIN: CPT | Performed by: NURSE PRACTITIONER

## 2018-10-16 PROCEDURE — 94640 AIRWAY INHALATION TREATMENT: CPT | Performed by: NURSE PRACTITIONER

## 2018-10-16 RX ORDER — AZITHROMYCIN 250 MG/1
TABLET, FILM COATED ORAL
Qty: 6 TABLET | Refills: 0 | Status: SHIPPED | OUTPATIENT
Start: 2018-10-16 | End: 2018-12-27 | Stop reason: ALTCHOICE

## 2018-10-16 RX ORDER — IPRATROPIUM BROMIDE AND ALBUTEROL SULFATE 2.5; .5 MG/3ML; MG/3ML
3 SOLUTION RESPIRATORY (INHALATION) ONCE
Status: COMPLETED | OUTPATIENT
Start: 2018-10-16 | End: 2018-10-16

## 2018-10-16 RX ORDER — ALBUTEROL SULFATE 2.5 MG/3ML
2.5 SOLUTION RESPIRATORY (INHALATION) EVERY 4 HOURS PRN
Qty: 1 BOX | Refills: 0 | Status: SHIPPED | OUTPATIENT
Start: 2018-10-16 | End: 2018-10-23

## 2018-10-16 RX ADMIN — IPRATROPIUM BROMIDE AND ALBUTEROL SULFATE 3 ML: 2.5; .5 SOLUTION RESPIRATORY (INHALATION) at 18:07:00

## 2018-10-16 NOTE — PATIENT INSTRUCTIONS
Start nebulizer treatments at home every 4-6 hours as needed. Use either nebulizer treatment OR inhaler. Humidifier in room  Sleep propped  Push fluids  Limit dairy  Mucinex as directed  Benadryl at night, as directed.    Start antibiotics  Follow up · Over-the-counter cough, cold, and sore-throat medicines will not shorten the length of the illness, but they may be helpful to reduce symptoms. (Note: Do not use decongestants if you have high blood pressure.)  · Finish all antibiotic medicine.  Do this e

## 2018-10-16 NOTE — PROGRESS NOTES
CHIEF COMPLAINT:   Patient presents with:  Chest Congestion: with cough, x2 weeks        HPI:   Kaylyn Chau is a 66year old female who presents for cough for  2  weeks. Cough started gradually and is described as wet, non productive.  Patient reports Meloxicam 15 MG Oral Tab Take 1 tablet (15 mg total) by mouth daily. Disp: 90 tablet Rfl: 3   Butalbital-APAP-Caffeine -40 MG Oral Tab Take 1 tablet by mouth every 6 (six) hours as needed for Headaches.  Disp: 90 tablet Rfl: 0   Albuterol Sulfate HFA • Unspecified hypertrophic and atrophic condition of skin 05/17/2011      Social History:  Social History    Tobacco Use      Smoking status: Current Every Day Smoker        Packs/day: 1.50        Years: 35.00        Pack years: 52.5        Types: Cigarett Meds & Refills for this Visit:  Requested Prescriptions     Signed Prescriptions Disp Refills   • albuterol sulfate (2.5 MG/3ML) 0.083% Inhalation Nebu Soln 1 Box 0     Sig: Take 3 mL (2.5 mg total) by nebulization every 4 (four) hours as needed for [employer] · You may use over-the-counter medicines to control fever or pain, unless another medicine was prescribed.  If you have chronic liver or kidney disease or have ever had a stomach ulcer or gastrointestinal bleeding, talk with your healthcare provider before · Trouble breathing, wheezing, or pain with breathing  Date Last Reviewed: 9/13/2015  © 0261-6498 [employer] [address] [facility] [place] [facility], [address] [place] [address]. All rights reserved.  This information is not intended as a substitute for professional m

## 2018-10-17 NOTE — TELEPHONE ENCOUNTER
Called [employer] to check PA status:    PA denied due to ADD/ADHD criteria - pt must have condition approved from this drug being Narcolepsy, ADD or ADHD - notes needed documenting at least 5 symptoms in patients chart.     Letter will be sent docu

## 2018-10-24 ENCOUNTER — TELEPHONE (OUTPATIENT)
Dept: INTERNAL MEDICINE CLINIC | Facility: CLINIC | Age: 58
End: 2018-10-24

## 2018-10-24 NOTE — TELEPHONE ENCOUNTER
Patient calling in requesting a refill for the Generic amphetamine-dextroamphetamine 10 MG Oral Tab    Please call pt with refill status.

## 2018-10-25 RX ORDER — DEXTROAMPHETAMINE SACCHARATE, AMPHETAMINE ASPARTATE, DEXTROAMPHETAMINE SULFATE AND AMPHETAMINE SULFATE 2.5; 2.5; 2.5; 2.5 MG/1; MG/1; MG/1; MG/1
10 TABLET ORAL DAILY
Qty: 30 TABLET | Refills: 0 | Status: SHIPPED | OUTPATIENT
Start: 2018-10-25 | End: 2018-10-25

## 2018-10-25 NOTE — TELEPHONE ENCOUNTER
Pt not looking for a refill on the Adderall, adderall was denied and was asking what alternative medication should she be on. Please advise.

## 2018-10-25 NOTE — TELEPHONE ENCOUNTER
No other agent available at this time in the form of a stimulant for the diagnosis of chronic fatigue syndrome other than the Adderall. Clinton Frias. Kasia Grijalva MD  Diplomate, American Board of Internal Medicine  [address], [address]

## 2018-11-01 ENCOUNTER — TELEPHONE (OUTPATIENT)
Dept: NEUROLOGY | Facility: CLINIC | Age: 58
End: 2018-11-01

## 2018-11-01 DIAGNOSIS — G43.009 MIGRAINE WITHOUT AURA AND WITHOUT STATUS MIGRAINOSUS, NOT INTRACTABLE: Primary | ICD-10-CM

## 2018-11-01 RX ORDER — QUETIAPINE 200 MG/1
TABLET, FILM COATED, EXTENDED RELEASE ORAL
Qty: 30 TABLET | Refills: 5 | Status: SHIPPED | OUTPATIENT
Start: 2018-11-01 | End: 2019-05-07

## 2018-11-01 NOTE — TELEPHONE ENCOUNTER
Quetiapine er 200 mg 1 tab nightly filled 5-17-18 30 with 5 refills     LOV 10-1-18      RTC 1 month for CFS follow up    Apt 11-27-18     Labs 9-16-17     Has not gone for updated labs

## 2018-11-02 RX ORDER — RIZATRIPTAN BENZOATE 10 MG/1
TABLET ORAL
Qty: 12 TABLET | Refills: 0 | Status: SHIPPED | OUTPATIENT
Start: 2018-11-02 | End: 2018-11-15

## 2018-11-02 NOTE — TELEPHONE ENCOUNTER
Called the patient and left a voicemail stating that the office received a refill request, however pertaining to the patient last office note the doctor wanted to see her back in august. The patient had an appointment in September but canceled her appointm

## 2018-11-06 ENCOUNTER — OFFICE VISIT (OUTPATIENT)
Dept: SURGERY | Facility: CLINIC | Age: 58
End: 2018-11-06
Payer: MEDICAID

## 2018-11-06 VITALS
DIASTOLIC BLOOD PRESSURE: 103 MMHG | WEIGHT: 163 LBS | SYSTOLIC BLOOD PRESSURE: 160 MMHG | BODY MASS INDEX: 30.78 KG/M2 | HEIGHT: 61 IN | HEART RATE: 113 BPM

## 2018-11-06 DIAGNOSIS — K21.9 GASTROESOPHAGEAL REFLUX DISEASE, ESOPHAGITIS PRESENCE NOT SPECIFIED: ICD-10-CM

## 2018-11-06 DIAGNOSIS — K44.9 HIATAL HERNIA WITHOUT GANGRENE AND OBSTRUCTION: ICD-10-CM

## 2018-11-06 DIAGNOSIS — K22.70 BARRETT'S ESOPHAGUS WITHOUT DYSPLASIA: Primary | ICD-10-CM

## 2018-11-06 PROCEDURE — 99213 OFFICE O/P EST LOW 20 MIN: CPT | Performed by: SURGERY

## 2018-11-06 NOTE — H&P
New Patient Visit Note       Active Problems      1. Jamison's esophagus without dysplasia    2. Gastroesophageal reflux disease, esophagitis presence not specified    3.  Hiatal hernia without gangrene and obstruction        Chief Complaint   Patient prese and adjustment of dental prosthetic device 04/26/2011   • Other ill-defined conditions(799.89)     Jamison's   • Pain in joint, forearm 04/26/2011    wrist   • Pain in joint, pelvic region and thigh 10/07/2011    hip   • Personal history of urinary (tract) Occupational Exposure: Not Asked        Hobby Hazards: Not Asked        Sleep Concern: Not Asked        Stress Concern: Yes        Weight Concern: Yes        Special Diet: No        Back Care: Not Asked        Exercise: No        Bike Helmet: Not Asked ONE TABLET BY MOUTH NIGHTLY AS NEEDED FOR SLEEP Disp: 30 tablet Rfl: 5   Diclofenac Sodium 1 % Transdermal Gel Apply 2 g topically 2 (two) times daily as needed. Disp: 1 Tube Rfl: 2   CAMBIA 50 MG Oral Powd Pack Take 50 mg by mouth as needed.  Disp: 9 packe reflexes. Skin: Skin is warm and dry.            Assessment   Jamison's esophagus without dysplasia  (primary encounter diagnosis)  Gastroesophageal reflux disease, esophagitis presence not specified  Hiatal hernia without gangrene and obstruction      Pl

## 2018-11-07 ENCOUNTER — TELEPHONE (OUTPATIENT)
Dept: INTERNAL MEDICINE CLINIC | Facility: CLINIC | Age: 58
End: 2018-11-07

## 2018-11-07 RX ORDER — METOPROLOL SUCCINATE 25 MG/1
12.5 TABLET, EXTENDED RELEASE ORAL DAILY
Qty: 90 TABLET | Refills: 0 | Status: SHIPPED | OUTPATIENT
Start: 2018-11-07 | End: 2018-11-09

## 2018-11-07 NOTE — TELEPHONE ENCOUNTER
Pharmacy calling in requesting a refill for Metoprolol Succinate ER 25 MG Oral Tablet 24 Hr    To be sent to:  [facility], [address] [phone], [phone]

## 2018-11-09 RX ORDER — METOPROLOL SUCCINATE 25 MG/1
25 TABLET, EXTENDED RELEASE ORAL DAILY
Qty: 90 TABLET | Refills: 1 | Status: SHIPPED | OUTPATIENT
Start: 2018-11-09 | End: 2019-05-09

## 2018-11-09 NOTE — TELEPHONE ENCOUNTER
New script sent, notify pt. Opal Taylor. Juan Miguel Woodward MD  Diplomate, American Board of Internal Medicine  [address] [facility]  [address], [facility], [address]  T: [id number]; F: Deriketi 5

## 2018-11-13 ENCOUNTER — HOSPITAL ENCOUNTER (OUTPATIENT)
Dept: CT IMAGING | Age: 58
Discharge: HOME | End: 2018-11-13
Attending: INTERNAL MEDICINE
Payer: MEDICAID

## 2018-11-13 DIAGNOSIS — Z12.2 ENCOUNTER FOR SCREENING FOR LUNG CANCER: ICD-10-CM

## 2018-11-13 DIAGNOSIS — F17.200 TOBACCO USE DISORDER: ICD-10-CM

## 2018-11-15 ENCOUNTER — TELEPHONE (OUTPATIENT)
Dept: NEUROLOGY | Facility: CLINIC | Age: 58
End: 2018-11-15

## 2018-11-15 ENCOUNTER — OFFICE VISIT (OUTPATIENT)
Dept: NEUROLOGY | Facility: CLINIC | Age: 58
End: 2018-11-15
Payer: MEDICAID

## 2018-11-15 VITALS
HEART RATE: 99 BPM | SYSTOLIC BLOOD PRESSURE: 132 MMHG | DIASTOLIC BLOOD PRESSURE: 86 MMHG | HEIGHT: 61 IN | TEMPERATURE: 98 F | OXYGEN SATURATION: 98 % | BODY MASS INDEX: 31.34 KG/M2 | WEIGHT: 166 LBS

## 2018-11-15 DIAGNOSIS — G43.009 MIGRAINE WITHOUT AURA AND WITHOUT STATUS MIGRAINOSUS, NOT INTRACTABLE: ICD-10-CM

## 2018-11-15 DIAGNOSIS — R51.9 CHRONIC DAILY HEADACHE: Primary | ICD-10-CM

## 2018-11-15 DIAGNOSIS — G43.009 MIGRAINE WITHOUT AURA AND WITHOUT STATUS MIGRAINOSUS, NOT INTRACTABLE: Primary | ICD-10-CM

## 2018-11-15 PROCEDURE — 99213 OFFICE O/P EST LOW 20 MIN: CPT | Performed by: OTHER

## 2018-11-15 RX ORDER — RIZATRIPTAN BENZOATE 10 MG/1
TABLET ORAL
Qty: 12 TABLET | Refills: 2 | Status: SHIPPED | OUTPATIENT
Start: 2018-11-15 | End: 2019-03-29

## 2018-11-15 NOTE — TELEPHONE ENCOUNTER
Patient seen today and has used the 3 moths of Aimovig that was approved. Dr Monica Russell requesting increase to 140 mg monthly. Reauthorization Questions:  1. Has the patient experienced a reduction of headache days per month since starting Aimovig?   yes

## 2018-11-15 NOTE — PROGRESS NOTES
HPI:    Patient ID: Cherelle Spencer is a 66year old female. Medication Follow-Up   Associated symptoms include headaches. Headache      Migraine          Patient presented for follow up for migraines and chronic daily headaches.  Last visit was in Jul Review of Systems   Constitutional: Negative. HENT: Negative. Eyes: Negative. Respiratory: Negative. Neurological: Positive for headaches. All other systems reviewed and are negative.              Current Outpatient Medications:  Ere Zolpidem Tartrate 10 MG Oral Tab TAKE ONE TABLET BY MOUTH NIGHTLY AS NEEDED FOR SLEEP Disp: 30 tablet Rfl: 5   Diclofenac Sodium 1 % Transdermal Gel Apply 2 g topically 2 (two) times daily as needed.  Disp: 1 Tube Rfl: 2   CAMBIA 50 MG Oral Powd Pack Take Tab          Responded to Aimovig but still having frequent headaches. We will increase dose of Aimovig to 140 mg monthly  Once effective we can wean her off Depakote as it is not working.  Continue Maxalt as needed  Counseled on analgesic overuse and stres

## 2018-11-29 DIAGNOSIS — G43.009 MIGRAINE WITHOUT AURA AND WITHOUT STATUS MIGRAINOSUS, NOT INTRACTABLE: Primary | ICD-10-CM

## 2018-11-30 RX ORDER — DIVALPROEX SODIUM 250 MG/1
TABLET, EXTENDED RELEASE ORAL
Qty: 270 TABLET | Refills: 1 | Status: SHIPPED | OUTPATIENT
Start: 2018-11-30 | End: 2018-12-05

## 2018-12-05 ENCOUNTER — TELEPHONE (OUTPATIENT)
Dept: NEUROLOGY | Facility: CLINIC | Age: 58
End: 2018-12-05

## 2018-12-05 DIAGNOSIS — G43.009 MIGRAINE WITHOUT AURA AND WITHOUT STATUS MIGRAINOSUS, NOT INTRACTABLE: ICD-10-CM

## 2018-12-05 RX ORDER — DIVALPROEX SODIUM 250 MG/1
500 TABLET, EXTENDED RELEASE ORAL NIGHTLY
Qty: 180 TABLET | Refills: 0 | Status: SHIPPED | OUTPATIENT
Start: 2018-12-05 | End: 2019-02-28

## 2018-12-05 NOTE — TELEPHONE ENCOUNTER
Contacted pharmacy to notify of clarifications, she request a new RX with current sig be sent. New RX pended and routed to provider for review.

## 2018-12-27 NOTE — PROGRESS NOTES
Patient presents with: Follow - Up: MDD  Other: sinus symptoms      HPI: Eleanor Ennis presents today for 3-month f/u MDD which is stable on prescription medication. No new issues and has plenty of medication.   She is also here with 1 week of sinus congestion an TABLETS IN  24  HOUR MAX ** Disp: 12 tablet Rfl: 2   Metoprolol Succinate ER 25 MG Oral Tablet 24 Hr Take 1 tablet (25 mg total) by mouth daily.  Disp: 90 tablet Rfl: 1   QUETIAPINE FUMARATE  MG Oral Tablet 24 Hr TAKE 1 TABLET BY MOUTH NIGHTLY Disp: 3 Size: adult)   Pulse 100   Temp 98.6 °F (37 °C) (Oral)   Resp 24   Ht 61\"   Wt 165 lb 8 oz   BMI 31.27 kg/m²    Wt Readings from Last 6 Encounters:  12/27/18 : 165 lb 8 oz  11/29/18 : 166 lb  11/15/18 : 166 lb  11/06/18 : 163 lb  10/16/18 : 163 lb  10/01/

## 2019-01-03 RX ORDER — MULTIVITAMIN
1 CAPSULE ORAL DAILY
Qty: 90 CAPSULE | Refills: 3 | Status: SHIPPED | OUTPATIENT
Start: 2019-01-03 | End: 2021-09-21 | Stop reason: ALTCHOICE

## 2019-01-03 RX ORDER — OMEGA-3 FATTY ACIDS/FISH OIL 300-1000MG
2 CAPSULE ORAL 2 TIMES DAILY
Qty: 360 CAPSULE | Refills: 3 | Status: SHIPPED | OUTPATIENT
Start: 2019-01-03 | End: 2019-02-02

## 2019-01-03 NOTE — TELEPHONE ENCOUNTER
Omega 3 1000 mg 2 cap bid filled     Multivitamin 1 cap daily filled     On 12-27-18 was sent to the pharmacy     [facility] pharmacist called and said one of the technicians deleted the order since meds are otc meds, she is going to try and find a manufacture t

## 2019-01-04 NOTE — TELEPHONE ENCOUNTER
Patient called requesting to speak with the nurse regarding Pristiq mediation.  States that she normally does not get through a pharmacy and has to speak with a nurse first

## 2019-01-09 ENCOUNTER — OFFICE VISIT (OUTPATIENT)
Dept: FAMILY MEDICINE CLINIC | Facility: CLINIC | Age: 59
End: 2019-01-09
Payer: MEDICAID

## 2019-01-09 VITALS
HEIGHT: 61 IN | DIASTOLIC BLOOD PRESSURE: 90 MMHG | RESPIRATION RATE: 20 BRPM | TEMPERATURE: 98 F | OXYGEN SATURATION: 98 % | WEIGHT: 165 LBS | HEART RATE: 111 BPM | BODY MASS INDEX: 31.15 KG/M2 | SYSTOLIC BLOOD PRESSURE: 132 MMHG

## 2019-01-09 DIAGNOSIS — J06.9 URI WITH COUGH AND CONGESTION: Primary | ICD-10-CM

## 2019-01-09 DIAGNOSIS — J02.9 SORE THROAT: ICD-10-CM

## 2019-01-09 DIAGNOSIS — R06.2 WHEEZE: ICD-10-CM

## 2019-01-09 LAB
CONTROL LINE PRESENT WITH A CLEAR BACKGROUND (YES/NO): YES YES/NO
KIT LOT #: NORMAL NUMERIC
STREP GRP A CUL-SCR: NEGATIVE

## 2019-01-09 PROCEDURE — 87880 STREP A ASSAY W/OPTIC: CPT | Performed by: NURSE PRACTITIONER

## 2019-01-09 PROCEDURE — 99213 OFFICE O/P EST LOW 20 MIN: CPT | Performed by: NURSE PRACTITIONER

## 2019-01-09 RX ORDER — ALBUTEROL SULFATE 90 UG/1
2 AEROSOL, METERED RESPIRATORY (INHALATION) EVERY 4 HOURS PRN
Qty: 1 INHALER | Refills: 0 | Status: SHIPPED | OUTPATIENT
Start: 2019-01-09 | End: 2019-01-23

## 2019-01-09 RX ORDER — BENZONATATE 200 MG/1
200 CAPSULE ORAL 3 TIMES DAILY PRN
Qty: 20 CAPSULE | Refills: 0 | Status: SHIPPED | OUTPATIENT
Start: 2019-01-09 | End: 2019-01-16

## 2019-01-09 NOTE — PROGRESS NOTES
CHIEF COMPLAINT:   Patient presents with:  Nasal Congestion: post nasal drip, sinus pressure and coughing x 4 days      HPI:   Kaylyn Chau is a 66year old female who presents for upper respiratory symptoms for  4 days.  Patient reports sore throat, con Reviewed PT/INR, Refilled Citalopram per need.      atorvastatin 20 MG Oral Tab Take 1 tablet (20 mg total) by mouth nightly. Disp: 90 tablet Rfl: 1   Desvenlafaxine Succinate (PRISTIQ OR) Take by mouth. Disp:  Rfl:    Meloxicam 15 MG Oral Tab Take 1 tablet (15 mg total) by mouth daily.  Disp: 90 tablet Rfl: sling operation for stress incontinence   • ANGEL BIOPSY STEREOTACTIC NODULE 2 SITE BILAT Left 2-2010    BENIGN 2 SITES   • ANGEL STEREO-CLIP W/CALC 2 SITE LEFT  2010   • TUBAL LIGATION           Social History    Tobacco Use      Smoking status: Current Ever Requested Prescriptions     Signed Prescriptions Disp Refills   • Albuterol Sulfate  (90 Base) MCG/ACT Inhalation Aero Soln 1 Inhaler 0     Sig: Inhale 2 puffs into the lungs every 4 (four) hours as needed for Wheezing or Shortness of Breath.    • be · You may use acetaminophen or ibuprofen to control pain and fever, unless another medicine was prescribed. If you have chronic liver or kidney disease, have ever had a stomach ulcer or gastrointestinal bleeding, or are taking blood-thinning medicines, kathleen You have a viral upper respiratory illness (URI), which is another term for the common cold. When the infection causes a lot of irritation, the air passages can go into spasm. This causes wheezing and shortness of breath.     This illness is contagious duri When to seek medical advice  Call your healthcare provider right away if any of these occur:  · Cough with lots of colored sputum (mucus)  · Severe headache; face, neck, or ear pain  · Difficulty swallowing due to throat pain  · Fever of 100.4ºF (38ºC) or

## 2019-01-09 NOTE — PATIENT INSTRUCTIONS
Humidifier in room  Sleep propped  Push fluids  Limit dairy  Mucinex as directed  Follow up in 3-5 days for worsening symptoms or no improvement    Viral Upper Respiratory Illness (Adult)  You have a viral upper respiratory illness (URI), which is anothe Follow-up care  Follow up with your healthcare provider, or as advised.   When to seek medical advice  Call your healthcare provider right away if any of these occur:  · Cough with lots of colored sputum (mucus)  · Severe headache; face, neck, or ear pain · You may use acetaminophen or ibuprofen to control pain and fever, unless another medicine was prescribed. If you have chronic liver or kidney disease, have ever had a stomach ulcer or gastrointestinal bleeding, or are taking blood-thinning medicines, kathleen

## 2019-01-10 ENCOUNTER — TELEPHONE (OUTPATIENT)
Dept: INTERNAL MEDICINE CLINIC | Facility: CLINIC | Age: 59
End: 2019-01-10

## 2019-01-10 NOTE — TELEPHONE ENCOUNTER
States that she will either be dropping off or faxing in forms for prestiq. Also, she is in need of a refill to be sent to [facility] until she can receive free medication from insurance company. Medication pending.      On another note, pt states that

## 2019-01-11 RX ORDER — DESVENLAFAXINE 100 MG/1
100 TABLET, EXTENDED RELEASE ORAL DAILY
Qty: 90 TABLET | Refills: 0 | Status: SHIPPED | OUTPATIENT
Start: 2019-01-11 | End: 2019-07-09

## 2019-01-11 NOTE — TELEPHONE ENCOUNTER
pristiq 100 mg 1 tab daily filled     LOV 12-27-18      RTC PRN or at next regularly scheduled OV.     No upcoming apt on file    Labs has not gone for updated labs

## 2019-01-17 ENCOUNTER — TELEPHONE (OUTPATIENT)
Dept: INTERNAL MEDICINE CLINIC | Facility: CLINIC | Age: 59
End: 2019-01-17

## 2019-01-17 RX ORDER — DEXTROMETHORPHAN HYDROBROMIDE AND PROMETHAZINE HYDROCHLORIDE 15; 6.25 MG/5ML; MG/5ML
5 SYRUP ORAL 4 TIMES DAILY PRN
Qty: 118 ML | Refills: 0 | Status: SHIPPED | OUTPATIENT
Start: 2019-01-17 | End: 2019-01-29

## 2019-01-17 NOTE — TELEPHONE ENCOUNTER
Prescription sent on 1/9/19 for:  Tessalon pearls. Pt informed not covered under insurance. Pt stated has tried Robitussin otc w/o any improvement. Also stated inhaler helping. However at night dry cough continues. Any other advise.

## 2019-01-17 NOTE — TELEPHONE ENCOUNTER
Patient calling in. Symptoms:cough, congested/chest congestion. Stated she went to the [facility], however, the RX that was sent for her, is not covered through pt's insurance.     Please call pt to assist.

## 2019-01-18 ENCOUNTER — TELEPHONE (OUTPATIENT)
Dept: INTERNAL MEDICINE CLINIC | Facility: CLINIC | Age: 59
End: 2019-01-18

## 2019-01-18 RX ORDER — PROMETHAZINE HYDROCHLORIDE AND CODEINE PHOSPHATE 6.25; 1 MG/5ML; MG/5ML
2.5 SYRUP ORAL EVERY 4 HOURS PRN
Qty: 118 ML | Refills: 0 | Status: SHIPPED | OUTPATIENT
Start: 2019-01-18 | End: 2019-01-29 | Stop reason: ALTCHOICE

## 2019-01-18 NOTE — TELEPHONE ENCOUNTER
Patient called requesting to speak with MA. Stated that cough medicine not covered by insurance. Seeking alternative.  Please advise

## 2019-01-18 NOTE — TELEPHONE ENCOUNTER
We will have to either:  1. Find out what public aid covers. 2. Patient can ask pharmacist for a different OTC recommendation. Rima eMdina. Kris Perez MD  Diplomate, American Board of Internal Medicine  [facility]  [address] [facility], [address] [facility], [place] [address]

## 2019-01-18 NOTE — TELEPHONE ENCOUNTER
Pt would like a call from doctors MA pt states that medication is supposed to send medication through [employer] with paper work

## 2019-01-18 NOTE — TELEPHONE ENCOUNTER
Tell her script for Promethazine-DM, which is an oral suspension was sent to her pharmacy. I am hopeful that this helps with her cough. Ricardo Boggs. Jcarlos Tripathi MD  Diplomate, American Board of Internal Medicine  [address] [facility]  [address] [facility],[address], [address]

## 2019-01-24 RX ORDER — IBUPROFEN 800 MG/1
TABLET ORAL
Qty: 90 TABLET | Refills: 5 | Status: SHIPPED | OUTPATIENT
Start: 2019-01-24 | End: 2019-02-20

## 2019-01-24 NOTE — TELEPHONE ENCOUNTER
Nancy Green from [employer] is calling in a new prescription for ibuprofen 800 MG. Needs to be sent to [facility], [address] [phone], [phone]. Please advise.

## 2019-01-24 NOTE — TELEPHONE ENCOUNTER
Ibuprofen 800 mg 1 tab q 8 prn filled 7-13-17 90 with 0 refills    LOV 12-27-18      RTC PRN or at next regularly scheduled OV.

## 2019-01-25 ENCOUNTER — TELEPHONE (OUTPATIENT)
Dept: INTERNAL MEDICINE CLINIC | Facility: CLINIC | Age: 59
End: 2019-01-25

## 2019-01-29 ENCOUNTER — TELEPHONE (OUTPATIENT)
Dept: RHEUMATOLOGY | Facility: CLINIC | Age: 59
End: 2019-01-29

## 2019-01-29 ENCOUNTER — OFFICE VISIT (OUTPATIENT)
Dept: RHEUMATOLOGY | Facility: CLINIC | Age: 59
End: 2019-01-29
Payer: MEDICAID

## 2019-01-29 VITALS
HEIGHT: 61 IN | RESPIRATION RATE: 16 BRPM | BODY MASS INDEX: 31.72 KG/M2 | SYSTOLIC BLOOD PRESSURE: 156 MMHG | WEIGHT: 168 LBS | DIASTOLIC BLOOD PRESSURE: 92 MMHG | HEART RATE: 102 BPM

## 2019-01-29 DIAGNOSIS — M17.12 PRIMARY OSTEOARTHRITIS OF LEFT KNEE: ICD-10-CM

## 2019-01-29 DIAGNOSIS — M15.9 PRIMARY OSTEOARTHRITIS INVOLVING MULTIPLE JOINTS: Primary | ICD-10-CM

## 2019-01-29 DIAGNOSIS — M18.11 PRIMARY OSTEOARTHRITIS OF FIRST CARPOMETACARPAL JOINT OF RIGHT HAND: ICD-10-CM

## 2019-01-29 DIAGNOSIS — M51.36 DEGENERATIVE DISC DISEASE, LUMBAR: ICD-10-CM

## 2019-01-29 DIAGNOSIS — M85.852 OSTEOPENIA OF LEFT HIP: ICD-10-CM

## 2019-01-29 PROCEDURE — 20600 DRAIN/INJ JOINT/BURSA W/O US: CPT | Performed by: INTERNAL MEDICINE

## 2019-01-29 PROCEDURE — 99213 OFFICE O/P EST LOW 20 MIN: CPT | Performed by: INTERNAL MEDICINE

## 2019-01-29 RX ORDER — MELOXICAM 15 MG/1
15 TABLET ORAL DAILY
Qty: 90 TABLET | Refills: 3 | Status: SHIPPED | OUTPATIENT
Start: 2019-01-29 | End: 2019-09-30 | Stop reason: ALTCHOICE

## 2019-01-29 RX ORDER — METHYLPREDNISOLONE ACETATE 40 MG/ML
10 INJECTION, SUSPENSION INTRA-ARTICULAR; INTRALESIONAL; INTRAMUSCULAR; SOFT TISSUE ONCE
Status: COMPLETED | OUTPATIENT
Start: 2019-01-29 | End: 2019-01-29

## 2019-01-29 RX ORDER — HYDROCODONE BITARTRATE AND ACETAMINOPHEN 5; 325 MG/1; MG/1
1 TABLET ORAL EVERY 6 HOURS PRN
Qty: 30 TABLET | Refills: 0 | Status: ON HOLD | OUTPATIENT
Start: 2019-01-29 | End: 2019-07-01

## 2019-01-29 RX ADMIN — METHYLPREDNISOLONE ACETATE 10 MG: 40 INJECTION, SUSPENSION INTRA-ARTICULAR; INTRALESIONAL; INTRAMUSCULAR; SOFT TISSUE at 15:48:00

## 2019-01-29 NOTE — TELEPHONE ENCOUNTER
[facility]'s pharmacy called. Pt is prescribed meloxicam by Dr. Kaveh Banks and yesterday was prescribed ibuprofen by Dr. Estrellita Quinonez. Pt is also taking omeprazole and ranitidine. Pharmacist would like clarification on this.  Is pt supposed to stop meloxicam? Pharmacist is c

## 2019-01-29 NOTE — PATIENT INSTRUCTIONS
Use Meloxicam 15 mg per day. Use arthritis strength tylenol 625 mg as needed 3-4 times per day. Apply voltaren gel 3 times per day. For severe Norco 5 mg take 1-2 as needed for pain.   Right thumb arthritis - cortisone injection given,  MRI Lumbar Spine

## 2019-01-29 NOTE — PROCEDURES
After obtaining consent from the patient, the right thumb was cleansed with a combination of Betadine and alcohol wipes. Then the right thumb was injected at the carpometacarpal joint with 10 mg of methylprednisolone and one fourth of a cc 1% lidocaine.

## 2019-01-29 NOTE — PROGRESS NOTES
[facility]  Dr. Joan Quintero Progress Note     Subjective:   Erica Zelaya is a(n) 66year old female. Current complaints: Patient presents with:  Osteoarthritis: LOV 7/2/18. Pt contiues with lower back/right wrist/bilateral knee pain.   Pt stated left injection given,  MRI Lumbar Spine to rule out L2-L3 herniated disc. Obtain MRI Left Knee to rule out torn cartilage. Low fat low calorie diet. Do Dexa bone density scan. Return to office 3 months.        Also see procedure note, right thumb injected wi

## 2019-02-14 ENCOUNTER — TELEPHONE (OUTPATIENT)
Dept: INTERNAL MEDICINE CLINIC | Facility: CLINIC | Age: 59
End: 2019-02-14

## 2019-02-14 NOTE — TELEPHONE ENCOUNTER
Soumya enrolled in the [facility] patient assistance program until December 31.2019. For Pristiq #90     Office will be responsible for placing reorders on behalf of the patient.      Call [phone]

## 2019-02-28 ENCOUNTER — TELEPHONE (OUTPATIENT)
Dept: RHEUMATOLOGY | Facility: CLINIC | Age: 59
End: 2019-02-28

## 2019-02-28 DIAGNOSIS — G43.009 MIGRAINE WITHOUT AURA AND WITHOUT STATUS MIGRAINOSUS, NOT INTRACTABLE: ICD-10-CM

## 2019-02-28 NOTE — TELEPHONE ENCOUNTER
Pt needs PA's for:  MRI LUMBAR SPINE W/O CPT 73478  MRI KNEE, LEFT W/O CPT 12927    PRE CERT NOT REQUIRED FOR DEXA SCAN. MRI'S SENT TO CLINICAL REVIEW.     CASE #[id number] MRI LUMBAR SPINE W/O  CASE # [id number] MRI LEFT KNEE W/O    CLINICAL NOTES NEEDE

## 2019-03-02 RX ORDER — DIVALPROEX SODIUM 250 MG/1
TABLET, EXTENDED RELEASE ORAL
Qty: 180 TABLET | Refills: 0 | Status: SHIPPED | OUTPATIENT
Start: 2019-03-02 | End: 2019-05-20

## 2019-03-07 ENCOUNTER — ANESTHESIA EVENT (OUTPATIENT)
Dept: ENDOSCOPY | Facility: HOSPITAL | Age: 59
End: 2019-03-07

## 2019-03-07 ENCOUNTER — TELEPHONE (OUTPATIENT)
Dept: RHEUMATOLOGY | Facility: CLINIC | Age: 59
End: 2019-03-07

## 2019-03-08 ENCOUNTER — ANESTHESIA (OUTPATIENT)
Dept: ENDOSCOPY | Facility: HOSPITAL | Age: 59
End: 2019-03-08

## 2019-03-08 ENCOUNTER — HOSPITAL ENCOUNTER (OUTPATIENT)
Facility: HOSPITAL | Age: 59
Setting detail: HOSPITAL OUTPATIENT SURGERY
Discharge: HOME | End: 2019-03-08
Attending: INTERNAL MEDICINE | Admitting: INTERNAL MEDICINE
Payer: MEDICAID

## 2019-03-08 VITALS
TEMPERATURE: 98 F | SYSTOLIC BLOOD PRESSURE: 128 MMHG | BODY MASS INDEX: 31.72 KG/M2 | HEIGHT: 61 IN | WEIGHT: 168 LBS | HEART RATE: 90 BPM | RESPIRATION RATE: 20 BRPM | OXYGEN SATURATION: 98 % | DIASTOLIC BLOOD PRESSURE: 91 MMHG

## 2019-03-08 DIAGNOSIS — K21.9 GASTROESOPHAGEAL REFLUX DISEASE WITHOUT ESOPHAGITIS: ICD-10-CM

## 2019-03-08 DIAGNOSIS — K44.9 HIATAL HERNIA: ICD-10-CM

## 2019-03-08 DIAGNOSIS — R10.13 EPIGASTRIC ABDOMINAL PAIN: ICD-10-CM

## 2019-03-08 LAB — GLUCOSE BLD-MCNC: 174 MG/DL (ref 70–99)

## 2019-03-08 PROCEDURE — 88305 TISSUE EXAM BY PATHOLOGIST: CPT | Performed by: INTERNAL MEDICINE

## 2019-03-08 PROCEDURE — 0DB68ZX EXCISION OF STOMACH, VIA NATURAL OR ARTIFICIAL OPENING ENDOSCOPIC, DIAGNOSTIC: ICD-10-PCS | Performed by: INTERNAL MEDICINE

## 2019-03-08 PROCEDURE — 0DB98ZX EXCISION OF DUODENUM, VIA NATURAL OR ARTIFICIAL OPENING ENDOSCOPIC, DIAGNOSTIC: ICD-10-PCS | Performed by: INTERNAL MEDICINE

## 2019-03-08 PROCEDURE — 82962 GLUCOSE BLOOD TEST: CPT

## 2019-03-08 PROCEDURE — 0DB38ZX EXCISION OF LOWER ESOPHAGUS, VIA NATURAL OR ARTIFICIAL OPENING ENDOSCOPIC, DIAGNOSTIC: ICD-10-PCS | Performed by: INTERNAL MEDICINE

## 2019-03-08 RX ORDER — DEXTROSE MONOHYDRATE 25 G/50ML
50 INJECTION, SOLUTION INTRAVENOUS
Status: DISCONTINUED | OUTPATIENT
Start: 2019-03-08 | End: 2019-03-08

## 2019-03-08 RX ORDER — SODIUM CHLORIDE, SODIUM LACTATE, POTASSIUM CHLORIDE, CALCIUM CHLORIDE 600; 310; 30; 20 MG/100ML; MG/100ML; MG/100ML; MG/100ML
INJECTION, SOLUTION INTRAVENOUS CONTINUOUS
Status: DISCONTINUED | OUTPATIENT
Start: 2019-03-08 | End: 2019-03-08

## 2019-03-08 RX ORDER — ONDANSETRON 2 MG/ML
4 INJECTION INTRAMUSCULAR; INTRAVENOUS AS NEEDED
Status: DISCONTINUED | OUTPATIENT
Start: 2019-03-08 | End: 2019-03-08

## 2019-03-08 RX ORDER — NALOXONE HYDROCHLORIDE 0.4 MG/ML
80 INJECTION, SOLUTION INTRAMUSCULAR; INTRAVENOUS; SUBCUTANEOUS AS NEEDED
Status: DISCONTINUED | OUTPATIENT
Start: 2019-03-08 | End: 2019-03-08

## 2019-03-08 NOTE — OPERATIVE REPORT
OPERATIVE REPORT   PATIENT NAME: Ruthy Calderon  MRN: [id number]  DATE OF OPERATION: 3/8/2019  PREOPERATIVE DIAGNOSIS: GERD: hiatal hernia; unable to stop PPI for pH study; pH study still performed to look for refractory acid reflux; epigastric abdominal p were taken for celiac sprue and gastric biopsies were taken for Helicobacter pylori. There were no immediate complications. FINDINGS   1. GERD with moderate sized hiatal hernia- Hill type II   2. Possible Jamison's.   RECOMMENDATIONS: will review pH study

## 2019-03-08 NOTE — ANESTHESIA PREPROCEDURE EVALUATION
PRE-OP EVALUATION    Patient Name: Veronica Form    Pre-op Diagnosis: Gastroesophageal reflux disease without esophagitis [K21.9]  Hiatal hernia [K44.9]  Epigastric abdominal pain [R10.13]    Procedure(s):  [address] [place] [address] Rfl: 1   QUETIAPINE FUMARATE  MG Oral Tablet 24 Hr TAKE 1 TABLET BY MOUTH NIGHTLY Disp: 30 tablet Rfl: 5   atorvastatin 20 MG Oral Tab Take 1 tablet (20 mg total) by mouth nightly.  Disp: 90 tablet Rfl: 1   Albuterol Sulfate HFA (PROAIR HFA) 108 (90 B Alcohol use: No      Frequency: Never      Drug use: No     Available pre-op labs reviewed.                Airway      Mallampati: III      Neck ROM: full Cardiovascular    Cardiovascular exam normal.  Rhythm: regular  Rate: normal     Dental    No notable

## 2019-03-08 NOTE — H&P
History & Physical Examination    Patient Name: Ji Griffin  MRN: [id number]  [facility]: [id number]  YOB: 1960    Diagnosis: Gastroesophageal reflux disease without esophagitis [K21.9]  Hiatal hernia [K44.9]  Epigastric abdominal pain [R10.13] 2010   • TUBAL LIGATION       Family History   Problem Relation Age of Onset   • Arthritis Mother    • Other (AMI) Mother    • Other (diabetes mellitus) Mother      Social History    Tobacco Use      Smoking status: Current Every Day Smoker        Packs/da

## 2019-03-08 NOTE — ANESTHESIA POSTPROCEDURE EVALUATION
Deuce Huizar 55 Patient Status:  Hospital Outpatient Surgery   Age/Gender 66year old female MRN [id number]   Location [address] [place] [address]. Attending Gianni Zamudio MD   Hosp Day # 0 PCP Makenna Cabral MD       Anesthesia Post-op N

## 2019-03-08 NOTE — TELEPHONE ENCOUNTER
Called [facility] approved    MRI LUMBAR SPINE W/O    #[id number]    MRI LEFT KNEE W/O     #[id number]

## 2019-03-11 ENCOUNTER — TELEPHONE (OUTPATIENT)
Dept: RHEUMATOLOGY | Facility: CLINIC | Age: 59
End: 2019-03-11

## 2019-03-11 NOTE — TELEPHONE ENCOUNTER
Prior auth approved for MRI of hip.knee. Effective date 3/3/19-4/17/19. Phoned pt, left voice message for pt to call central scheduling.

## 2019-03-14 ENCOUNTER — TELEPHONE (OUTPATIENT)
Dept: INTERNAL MEDICINE CLINIC | Facility: CLINIC | Age: 59
End: 2019-03-14

## 2019-03-14 DIAGNOSIS — Z12.39 BREAST CANCER SCREENING: Primary | ICD-10-CM

## 2019-03-14 NOTE — TELEPHONE ENCOUNTER
Patient calling in, requesting a mammogram order.  Pt is aware she will need to wait until 03/30, but would like the order placed now at least.    Pt would like to speak with the Nurse as well regarding her being held back from any imaging scans, due to [facility]

## 2019-03-19 NOTE — TELEPHONE ENCOUNTER
Order pended.   Pt checked with specialist d/t chip in esophagus and was instructed ok to proceed with mammogram.

## 2019-03-20 NOTE — TELEPHONE ENCOUNTER
Mammo order signed, notify pt. Moiz Hollingsworth. Essie Zendejas MD  Diplomate, American Board of Internal Medicine  [facility]  [address]  [facility] [facility],[address]Th Floor, [address], [facility], [address] [place] [address]  T: [phone]; F: Tona 5

## 2019-03-28 RX ORDER — ATORVASTATIN CALCIUM 20 MG/1
TABLET, FILM COATED ORAL
Qty: 90 TABLET | Refills: 0 | Status: SHIPPED | OUTPATIENT
Start: 2019-03-28 | End: 2019-05-24

## 2019-03-28 NOTE — TELEPHONE ENCOUNTER
Atorvastatin 20 mg 1 tab nightly filled 10/4/18 90 with 1 refill     LOV 10/1/18     RTC 1 month for CFS follow up.     Next apt 4/2/19    Labs 4/15/18    Cholesterol, Total <200 mg/dL 266 Abnormally high     Triglycerides <150 mg/dL 365 Abnormally high

## 2019-03-29 DIAGNOSIS — G43.009 MIGRAINE WITHOUT AURA AND WITHOUT STATUS MIGRAINOSUS, NOT INTRACTABLE: ICD-10-CM

## 2019-03-29 RX ORDER — RIZATRIPTAN BENZOATE 10 MG/1
TABLET ORAL
Qty: 12 TABLET | Refills: 2 | Status: SHIPPED | OUTPATIENT
Start: 2019-03-29 | End: 2019-08-20

## 2019-03-29 NOTE — TELEPHONE ENCOUNTER
Medication: RIZATRIPTAN 10 MG TAB    Date of last refill: 11/15/18 (#12/2)  [place]    Last office visit: 11/15/2018  Due back to clinic per last office note:  Around 02/15/19  Date next office visit scheduled:     Vince

## 2019-04-02 ENCOUNTER — OFFICE VISIT (OUTPATIENT)
Dept: INTERNAL MEDICINE CLINIC | Facility: CLINIC | Age: 59
End: 2019-04-02
Payer: MEDICAID

## 2019-04-02 ENCOUNTER — TELEPHONE (OUTPATIENT)
Dept: INTERNAL MEDICINE CLINIC | Facility: CLINIC | Age: 59
End: 2019-04-02

## 2019-04-02 VITALS
BODY MASS INDEX: 31.81 KG/M2 | HEART RATE: 101 BPM | TEMPERATURE: 99 F | SYSTOLIC BLOOD PRESSURE: 146 MMHG | HEIGHT: 61 IN | OXYGEN SATURATION: 98 % | WEIGHT: 168.5 LBS | RESPIRATION RATE: 16 BRPM | DIASTOLIC BLOOD PRESSURE: 86 MMHG

## 2019-04-02 DIAGNOSIS — E66.09 CLASS 1 OBESITY DUE TO EXCESS CALORIES WITH SERIOUS COMORBIDITY AND BODY MASS INDEX (BMI) OF 31.0 TO 31.9 IN ADULT: ICD-10-CM

## 2019-04-02 DIAGNOSIS — J32.4 CHRONIC PANSINUSITIS: Primary | ICD-10-CM

## 2019-04-02 DIAGNOSIS — G47.33 OSA ON CPAP: ICD-10-CM

## 2019-04-02 DIAGNOSIS — I10 ESSENTIAL HYPERTENSION: ICD-10-CM

## 2019-04-02 DIAGNOSIS — Z99.89 OSA ON CPAP: ICD-10-CM

## 2019-04-02 PROBLEM — E66.811 CLASS 1 OBESITY DUE TO EXCESS CALORIES WITH SERIOUS COMORBIDITY AND BODY MASS INDEX (BMI) OF 31.0 TO 31.9 IN ADULT: Status: ACTIVE | Noted: 2017-04-04

## 2019-04-02 PROCEDURE — 96127 BRIEF EMOTIONAL/BEHAV ASSMT: CPT | Performed by: INTERNAL MEDICINE

## 2019-04-02 PROCEDURE — 99214 OFFICE O/P EST MOD 30 MIN: CPT | Performed by: INTERNAL MEDICINE

## 2019-04-02 RX ORDER — METOPROLOL SUCCINATE 50 MG/1
50 TABLET, EXTENDED RELEASE ORAL DAILY
Qty: 90 TABLET | Refills: 1 | Status: SHIPPED | OUTPATIENT
Start: 2019-04-02 | End: 2019-09-28

## 2019-04-02 RX ORDER — OMEPRAZOLE 40 MG/1
1 CAPSULE, DELAYED RELEASE ORAL 2 TIMES DAILY
Refills: 1 | COMMUNITY
Start: 2019-03-30 | End: 2019-05-24

## 2019-04-02 RX ORDER — AMOXICILLIN AND CLAVULANATE POTASSIUM 875; 125 MG/1; MG/1
1 TABLET, FILM COATED ORAL 2 TIMES DAILY
Qty: 14 TABLET | Refills: 0 | Status: SHIPPED | OUTPATIENT
Start: 2019-04-02 | End: 2019-04-12

## 2019-04-02 RX ORDER — METHYLPREDNISOLONE 4 MG/1
TABLET ORAL
Qty: 1 KIT | Refills: 0 | Status: SHIPPED | OUTPATIENT
Start: 2019-04-02 | End: 2019-05-09 | Stop reason: ALTCHOICE

## 2019-04-02 NOTE — TELEPHONE ENCOUNTER
Patient came in for OV and stated that she needs her Pristiq refilled.  I attempted to call the phone number that is listed in the TE dated on 2/14/19 - the first 2 times it wanted me to take a survey and the 3rd time it stated that the number was outside o

## 2019-04-02 NOTE — PROGRESS NOTES
Patient presents with: Other: multiple complaints      HPI: Kayce Stone presents today for eval of multiple concerns as follows:    1. Chronic pansinusitis - Longstanding hx for multiple years. Looking for definitive options.   Has tried prescription medication ONSET  **MAY  REPEAT  ONCE  AFTER  2  HOURS  ONLY  2  TABLETS IN  24  HOUR MAX **, Disp: 12 tablet, Rfl: 2  •  ATORVASTATIN 20 MG Oral Tab, TAKE 1 TABLET BY MOUTH NIGHTLY, Disp: 90 tablet, Rfl: 0  •  DIVALPROEX SODIUM  MG Oral Tablet 24 Hr, TAKE 2 TA MCG/ACT Nasal Suspension, USE TWO SPRAY(S) IN EACH NOSTRIL ONCE DAILY, Disp: 16 g, Rfl: 5    Social History    Tobacco Use      Smoking status: Current Every Day Smoker        Packs/day: 1.50        Years: 41.00        Pack years: 61.5        Types: [employer] next steps in mgmt. Send to Dr. Sonam Ugarte chiropractic practice for [employer]. 5. RTC 6 weeks for f/u, especially w/ formal repeat BP check. Soumya verbally agrees to and understands the plan as outlined above.   She was also afforded the ti

## 2019-04-02 NOTE — TELEPHONE ENCOUNTER
Per Dr Josue Peñaloza pt to be on abx for 7 days, not 10 days. Therefore, dispense 14 tabs. Pharmacist informed.

## 2019-04-04 NOTE — TELEPHONE ENCOUNTER
Soumya enrolled in the [employer] patient assistance program until December 31.2019.     For Pristiq #90      Office will be responsible for placing reorders on behalf of the patient.      Phone # [phone] or [phone]     ID # [id number]    Med should be parker

## 2019-04-08 ENCOUNTER — TELEPHONE (OUTPATIENT)
Dept: INTERNAL MEDICINE CLINIC | Facility: CLINIC | Age: 59
End: 2019-04-08

## 2019-04-08 DIAGNOSIS — J32.4 CHRONIC PANSINUSITIS: Primary | ICD-10-CM

## 2019-04-08 NOTE — TELEPHONE ENCOUNTER
Have her obtain X-rays. Testing ordered. Clinton Frias. Kasia Grijalva MD  Diplomate, American Board of Internal Medicine  [facility]  [address]  [facility],4Th Floor, [address], [facility], [address]  T: [phone]; F: Tona 5

## 2019-04-08 NOTE — TELEPHONE ENCOUNTER
Patient calling to request a nurse callback to discuss that her insurance has denied a CT scan for sinuses.   Please call to discuss further

## 2019-04-08 NOTE — TELEPHONE ENCOUNTER
CT scan denied authorization by insurance. Pt's next step? Pt inquiring if Dr Reta Lam received denial letter yet?

## 2019-04-10 DIAGNOSIS — R51.9 CHRONIC DAILY HEADACHE: Primary | ICD-10-CM

## 2019-04-12 ENCOUNTER — LAB ENCOUNTER (OUTPATIENT)
Dept: LAB | Age: 59
End: 2019-04-12
Attending: INTERNAL MEDICINE
Payer: MEDICAID

## 2019-04-12 ENCOUNTER — HOSPITAL ENCOUNTER (OUTPATIENT)
Dept: MRI IMAGING | Age: 59
Discharge: HOME | End: 2019-04-12
Attending: INTERNAL MEDICINE
Payer: MEDICAID

## 2019-04-12 ENCOUNTER — TELEPHONE (OUTPATIENT)
Dept: RHEUMATOLOGY | Facility: CLINIC | Age: 59
End: 2019-04-12

## 2019-04-12 ENCOUNTER — HOSPITAL ENCOUNTER (OUTPATIENT)
Dept: BONE DENSITY | Age: 59
Discharge: HOME | End: 2019-04-12
Attending: INTERNAL MEDICINE
Payer: MEDICAID

## 2019-04-12 ENCOUNTER — APPOINTMENT (OUTPATIENT)
Dept: CT IMAGING | Age: 59
End: 2019-04-12
Attending: INTERNAL MEDICINE
Payer: MEDICAID

## 2019-04-12 DIAGNOSIS — R53.82 CHRONIC FATIGUE SYNDROME: ICD-10-CM

## 2019-04-12 DIAGNOSIS — N83.209 CYST OF OVARY, UNSPECIFIED LATERALITY: Primary | ICD-10-CM

## 2019-04-12 DIAGNOSIS — Z00.00 ROUTINE GENERAL MEDICAL EXAMINATION AT A HEALTH CARE FACILITY: ICD-10-CM

## 2019-04-12 DIAGNOSIS — M17.12 PRIMARY OSTEOARTHRITIS OF LEFT KNEE: ICD-10-CM

## 2019-04-12 DIAGNOSIS — M51.36 DEGENERATIVE DISC DISEASE, LUMBAR: ICD-10-CM

## 2019-04-12 DIAGNOSIS — M85.852 OSTEOPENIA OF LEFT HIP: ICD-10-CM

## 2019-04-12 PROCEDURE — 80061 LIPID PANEL: CPT

## 2019-04-12 PROCEDURE — 36415 COLL VENOUS BLD VENIPUNCTURE: CPT

## 2019-04-12 PROCEDURE — 84443 ASSAY THYROID STIM HORMONE: CPT

## 2019-04-12 PROCEDURE — 82570 ASSAY OF URINE CREATININE: CPT

## 2019-04-12 PROCEDURE — 72148 MRI LUMBAR SPINE W/O DYE: CPT | Performed by: INTERNAL MEDICINE

## 2019-04-12 PROCEDURE — 81001 URINALYSIS AUTO W/SCOPE: CPT

## 2019-04-12 PROCEDURE — 83550 IRON BINDING TEST: CPT

## 2019-04-12 PROCEDURE — 84591 ASSAY OF NOS VITAMIN: CPT

## 2019-04-12 PROCEDURE — 84207 ASSAY OF VITAMIN B-6: CPT

## 2019-04-12 PROCEDURE — 85025 COMPLETE CBC W/AUTO DIFF WBC: CPT

## 2019-04-12 PROCEDURE — 83036 HEMOGLOBIN GLYCOSYLATED A1C: CPT

## 2019-04-12 PROCEDURE — 82043 UR ALBUMIN QUANTITATIVE: CPT

## 2019-04-12 PROCEDURE — 83540 ASSAY OF IRON: CPT

## 2019-04-12 PROCEDURE — 82607 VITAMIN B-12: CPT

## 2019-04-12 PROCEDURE — 80053 COMPREHEN METABOLIC PANEL: CPT

## 2019-04-12 PROCEDURE — 82728 ASSAY OF FERRITIN: CPT

## 2019-04-12 PROCEDURE — 73721 MRI JNT OF LWR EXTRE W/O DYE: CPT | Performed by: INTERNAL MEDICINE

## 2019-04-12 PROCEDURE — 77080 DXA BONE DENSITY AXIAL: CPT | Performed by: INTERNAL MEDICINE

## 2019-04-12 NOTE — TELEPHONE ENCOUNTER
Patient called. MRI of knee does show a little fluid in the nothing serious. MRI lumbar spine shows degenerative arthritis in a couple of levels and neuroforaminal narrowing.   However MRI of the lumbar spine also shows a 10 x 18 x 15 cm ovarian cyst that

## 2019-05-01 ENCOUNTER — TELEPHONE (OUTPATIENT)
Dept: RHEUMATOLOGY | Facility: CLINIC | Age: 59
End: 2019-05-01

## 2019-05-01 NOTE — TELEPHONE ENCOUNTER
[facility] Verification that patient has an US of the Pelvis ordered and scheduled and a PA is needed.

## 2019-05-02 NOTE — TELEPHONE ENCOUNTER
Called Eusebio and received authorization for US of Pelvis for CPT code 55752, authorization number is #[id number]  Valid from 5/2/19 through 6/16/19. Pt notified.

## 2019-05-04 ENCOUNTER — HOSPITAL ENCOUNTER (OUTPATIENT)
Dept: GENERAL RADIOLOGY | Age: 59
Discharge: HOME | End: 2019-05-04
Attending: INTERNAL MEDICINE
Payer: MEDICAID

## 2019-05-04 ENCOUNTER — HOSPITAL ENCOUNTER (OUTPATIENT)
Dept: ULTRASOUND IMAGING | Age: 59
Discharge: HOME | End: 2019-05-04
Attending: INTERNAL MEDICINE
Payer: MEDICAID

## 2019-05-04 DIAGNOSIS — N83.209 CYST OF OVARY, UNSPECIFIED LATERALITY: ICD-10-CM

## 2019-05-04 DIAGNOSIS — J32.4 CHRONIC PANSINUSITIS: ICD-10-CM

## 2019-05-04 PROCEDURE — 76856 US EXAM PELVIC COMPLETE: CPT | Performed by: INTERNAL MEDICINE

## 2019-05-04 PROCEDURE — 93975 VASCULAR STUDY: CPT | Performed by: INTERNAL MEDICINE

## 2019-05-04 PROCEDURE — 76830 TRANSVAGINAL US NON-OB: CPT | Performed by: INTERNAL MEDICINE

## 2019-05-04 PROCEDURE — 70220 X-RAY EXAM OF SINUSES: CPT | Performed by: INTERNAL MEDICINE

## 2019-05-06 ENCOUNTER — TELEPHONE (OUTPATIENT)
Dept: RHEUMATOLOGY | Facility: CLINIC | Age: 59
End: 2019-05-06

## 2019-05-07 PROBLEM — N83.8 OVARIAN MASS, LEFT: Status: ACTIVE | Noted: 2019-05-07

## 2019-05-07 NOTE — TELEPHONE ENCOUNTER
Ciarra [place] called today. Ultrasound done of pelvis reveals a large cystic mass in the pelvis possibly an ovarian tumor. Results forwarded to her primary Dr. Oscra Fuchs. Gynecologic consultation recommended as soon as possible.   Suggest seeing Dr. Des Rueda

## 2019-05-08 ENCOUNTER — TELEPHONE (OUTPATIENT)
Dept: INTERNAL MEDICINE CLINIC | Facility: CLINIC | Age: 59
End: 2019-05-08

## 2019-05-08 DIAGNOSIS — R51.9 CHRONIC DAILY HEADACHE: ICD-10-CM

## 2019-05-08 NOTE — PROGRESS NOTES
Sophie Nuno,   I can certainly see her and evaluate the mass. I do think she will need to see Isa Canada, but I can make the referral and hopefully they will see her faster. Meanwhile, could you order a \" SARA \" test on her.  It will help me distinguish whe

## 2019-05-08 NOTE — PROGRESS NOTES
I spoke with Estrella Mcdonald this evening about her large pelvic mass. I did tell her that I received communication from Dr. Kimmie Lane about this concerning finding. She tells me that she's set to see Dr. Jeramie Stephens from [facility] on 5/28/19 for evaluation.   I did tell he

## 2019-05-08 NOTE — PROGRESS NOTES
Pt given all instructions and contact information for Dr Shasha Georges. Pt verbalizes understanding.

## 2019-05-08 NOTE — PROGRESS NOTES
Please tell patient that I spoke with Dr. Sunil Robledo from OB/GYN. He will see her at her upcoming appt date. He is requesting a simple blood test be done. This test is called SARA and stands for Risk of Ovarian Malignancy Algorithm.   It is intended to a

## 2019-05-08 NOTE — TELEPHONE ENCOUNTER
Pt called to inform Dr Estrellita Quinonez her appointment with Dr Elza Eid is scheduled for 5/17 and Dr Stephen Hamlin on 5/28. Pt instructed to proceed with blood testing.

## 2019-05-09 ENCOUNTER — APPOINTMENT (OUTPATIENT)
Dept: LAB | Age: 59
End: 2019-05-09
Attending: INTERNAL MEDICINE
Payer: MEDICAID

## 2019-05-09 ENCOUNTER — OFFICE VISIT (OUTPATIENT)
Dept: NEUROLOGY | Facility: CLINIC | Age: 59
End: 2019-05-09
Payer: MEDICAID

## 2019-05-09 VITALS
RESPIRATION RATE: 20 BRPM | WEIGHT: 166 LBS | BODY MASS INDEX: 31.34 KG/M2 | HEIGHT: 61 IN | HEART RATE: 101 BPM | DIASTOLIC BLOOD PRESSURE: 90 MMHG | TEMPERATURE: 98 F | SYSTOLIC BLOOD PRESSURE: 140 MMHG | OXYGEN SATURATION: 95 %

## 2019-05-09 DIAGNOSIS — N83.8 OVARIAN MASS, LEFT: ICD-10-CM

## 2019-05-09 DIAGNOSIS — G43.709 CHRONIC MIGRAINE W/O AURA W/O STATUS MIGRAINOSUS, NOT INTRACTABLE: Primary | ICD-10-CM

## 2019-05-09 PROCEDURE — 36415 COLL VENOUS BLD VENIPUNCTURE: CPT

## 2019-05-09 PROCEDURE — 86304 IMMUNOASSAY TUMOR CA 125: CPT

## 2019-05-09 PROCEDURE — 86305 HUMAN EPIDIDYMIS PROTEIN 4: CPT

## 2019-05-09 PROCEDURE — 99213 OFFICE O/P EST LOW 20 MIN: CPT | Performed by: OTHER

## 2019-05-09 NOTE — TELEPHONE ENCOUNTER
Agreed. Carolynn Lopez. Belen Pan MD  Diplomate, American Board of Internal Medicine  [address] [place] [address] [address]  [address] [facility],4Th Floor, [address], [facility], [address] [place] [address]  T: [phone]; F: Deriketi 5

## 2019-05-09 NOTE — PROGRESS NOTES
HPI:    Patient ID: Isatu Lee is a 66year old female. Patient presented for follow up for migraines and chronic daily headaches. She is on Aimovig and doing well. She continues to have lot of stress but handling it well.  On Seroquel and Pristiq Negative. Respiratory: Negative. Neurological: Positive for headaches. All other systems reviewed and are negative.              Current Outpatient Medications:  AIMOVIG 140 DOSE 70 MG/ML Subcutaneous ADMINISTER 2 ML(140MG) UNDER THE SKIN EVERY 30 D Disp: 1 Inhaler Rfl: 6   ALPRAZolam 0.25 MG Oral Tab Take 1 tablet (0.25 mg total) by mouth 2 (two) times daily as needed for Anxiety.  Disp: 60 tablet Rfl: 5   Zolpidem Tartrate 10 MG Oral Tab TAKE ONE TABLET BY MOUTH NIGHTLY AS NEEDED FOR SLEEP Disp: 30 t Depakote . Continue Maxalt as needed  Counseled on analgesic overuse and stress management  RTC in about 6 months        See orders and medications filed with this encounter. The patient indicates understanding of these issues and agrees with the plan.

## 2019-05-09 NOTE — TELEPHONE ENCOUNTER
Medication: AIMOVIG 140 DOSE 70 MG/ML Subcutaneous    Date of last refill: 04/10/19 (#2 pen/0)  [place]    Last office visit: 11/15/2018  Due back to clinic per last office note:  Around 02/15/19  Date next office visit scheduled:    Future Appointments   Date Time Provider Lilly Jazzy   5/9/2019  2:45 PM [facility] [place]   5/9/2019  3:00 PM Bert Goodman MD [facility] [place]   5/17/2019 11:00 AM Husam Starkey MD [address] [facility] Surg[facility]c   2/92/2668  7:22 PM Aryan Briggs MD [facility] [place]   5/24/2019  2:00 PM Vamshi Darling MD [facility] 8 [facility] [place]   5/28/2019  3:30 PM Christiano Waller MD [facility] P [facility] [address]       Last OV note recommendation: Per Dr. Bert Goodman MD:      Responded to Rory Huerta but still having frequent headaches. We will increase dose of Aimovig to 140 mg monthly  Once effective we can wean her off Depakote as it is not working.  Continue Maxalt as needed  Counseled on analgesic overuse and stress management  RTC in about 3 months

## 2019-05-16 NOTE — TELEPHONE ENCOUNTER
MLTCB. Fax received from [address] [place] [address] requesting refill for Depakote 250mg #60, 2 tabs q hs. Had used a different pharmacy for last refill. Per LOV 5/9/19, patient instructed to wean off Depakote but there were no specific weaning instructions in chart.

## 2019-05-17 ENCOUNTER — OFFICE VISIT (OUTPATIENT)
Dept: SURGERY | Facility: CLINIC | Age: 59
End: 2019-05-17
Payer: MEDICAID

## 2019-05-17 ENCOUNTER — TELEPHONE (OUTPATIENT)
Dept: RHEUMATOLOGY | Facility: CLINIC | Age: 59
End: 2019-05-17

## 2019-05-17 VITALS
HEART RATE: 101 BPM | RESPIRATION RATE: 18 BRPM | SYSTOLIC BLOOD PRESSURE: 153 MMHG | WEIGHT: 166.38 LBS | HEIGHT: 61 IN | BODY MASS INDEX: 31.41 KG/M2 | OXYGEN SATURATION: 96 % | DIASTOLIC BLOOD PRESSURE: 91 MMHG

## 2019-05-17 DIAGNOSIS — R19.00 PELVIC MASS: Primary | ICD-10-CM

## 2019-05-17 PROCEDURE — 99243 OFF/OP CNSLTJ NEW/EST LOW 30: CPT | Performed by: SURGERY

## 2019-05-17 RX ORDER — HYDROCODONE BITARTRATE AND ACETAMINOPHEN 5; 325 MG/1; MG/1
1 TABLET ORAL EVERY 6 HOURS PRN
Qty: 100 TABLET | Refills: 0 | Status: SHIPPED | OUTPATIENT
Start: 2019-05-17 | End: 2019-05-24

## 2019-05-17 NOTE — TELEPHONE ENCOUNTER
Patient returned call and  is now taking divalproex sodium 250mg (1 tab) q hs. Patient  does not need any refills at this time but would like all refills to be sent to [facility] in [place].

## 2019-05-20 DIAGNOSIS — G43.009 MIGRAINE WITHOUT AURA AND WITHOUT STATUS MIGRAINOSUS, NOT INTRACTABLE: ICD-10-CM

## 2019-05-20 NOTE — CONSULTS
[facility]    Patient Name:  [facility]   YOB: 1960   Gender:  Female   Appt Date:  5/17/2019   Provider:  Barrera Pearson MD   Insurance:  Jayro Prajapati This is a very pleasant and healthy 68-year-old female who is being evaluated today for the above reason. History of this present illness dates back to 4/12/2019 with the patient underwent an MRI of the lumbar region to evaluate for lower back pain.   That •  Rizatriptan Benzoate 10 MG Oral Tab, TAKE 1 TABLET BY MOUTH AS NEEDED FOR  MIGRAINE USE  AT  ONSET  **MAY  REPEAT  ONCE  AFTER  2  HOURS  ONLY  2  TABLETS IN  24  HOUR MAX **, Disp: 12 tablet, Rfl: 2  •  ATORVASTATIN 20 MG Oral Tab, TAKE 1 TABLET BY JOHNNY Comment:TABS contraindicated because of saavedra's             esophagus     History:  Reviewed:  Past Medical History:   Diagnosis Date   • Abdominal pain, other specified site 09/21/2011    groin   • Acute bronchitis 09/21/2011   • Depression    • Diabe Exercise: No        Seat Belt: Yes     Reviewed:  Family History   Problem Relation Age of Onset   • Arthritis Mother    • Other (AMI) Mother    • Other (diabetes mellitus) Mother         Review of Systems:  Review of Systems   Constitutional: Naila Palma INDICATIONS:  N83.209 Unspecified ovarian cyst, unspecified side     TECHNIQUE:  Ultrasound of the pelvis was performed with a transvaginal and transabdominal probe. Doppler evaluation of the ovaries was performed of the ovarian arteries and veins.   B-mod FLOW:  There is normal arterial and venous Doppler wave forms in the right ovary. Normal left ovarian tissue is not identified. There is normal arterial venous waveform flow seen to the periphery of the large cyst complex cystic mass in the pelvis.     Faiza Jurado

## 2019-05-20 NOTE — TELEPHONE ENCOUNTER
Medication: Divalproe Sodium ER 250mg    Date of last refill: 3/2/19 (#180/0)  [place]     Last office visit: 5/9/2019  Due back to clinic per last office note:  6 months  Date next office visit scheduled:    Future Appo

## 2019-05-21 RX ORDER — DIVALPROEX SODIUM 250 MG/1
TABLET, EXTENDED RELEASE ORAL
Qty: 60 TABLET | Refills: 3 | Status: ON HOLD | OUTPATIENT
Start: 2019-05-21 | End: 2019-06-28

## 2019-05-23 ENCOUNTER — OFFICE VISIT (OUTPATIENT)
Dept: RHEUMATOLOGY | Facility: CLINIC | Age: 59
End: 2019-05-23
Payer: MEDICAID

## 2019-05-23 VITALS
DIASTOLIC BLOOD PRESSURE: 76 MMHG | RESPIRATION RATE: 20 BRPM | HEART RATE: 84 BPM | SYSTOLIC BLOOD PRESSURE: 142 MMHG | WEIGHT: 165 LBS | HEIGHT: 61 IN | BODY MASS INDEX: 31.15 KG/M2

## 2019-05-23 DIAGNOSIS — M47.816 SPONDYLOSIS OF LUMBAR REGION WITHOUT MYELOPATHY OR RADICULOPATHY: ICD-10-CM

## 2019-05-23 DIAGNOSIS — E66.09 CLASS 1 OBESITY DUE TO EXCESS CALORIES WITH SERIOUS COMORBIDITY AND BODY MASS INDEX (BMI) OF 31.0 TO 31.9 IN ADULT: ICD-10-CM

## 2019-05-23 DIAGNOSIS — M15.9 PRIMARY OSTEOARTHRITIS INVOLVING MULTIPLE JOINTS: ICD-10-CM

## 2019-05-23 DIAGNOSIS — N83.8 OVARIAN MASS, LEFT: ICD-10-CM

## 2019-05-23 DIAGNOSIS — M18.11 ARTHRITIS OF CARPOMETACARPAL (CMC) JOINT OF RIGHT THUMB: Primary | ICD-10-CM

## 2019-05-23 PROCEDURE — 20600 DRAIN/INJ JOINT/BURSA W/O US: CPT | Performed by: INTERNAL MEDICINE

## 2019-05-23 RX ORDER — METHYLPREDNISOLONE ACETATE 40 MG/ML
10 INJECTION, SUSPENSION INTRA-ARTICULAR; INTRALESIONAL; INTRAMUSCULAR; SOFT TISSUE ONCE
Status: COMPLETED | OUTPATIENT
Start: 2019-05-23 | End: 2019-05-23

## 2019-05-23 RX ADMIN — METHYLPREDNISOLONE ACETATE 10 MG: 40 INJECTION, SUSPENSION INTRA-ARTICULAR; INTRALESIONAL; INTRAMUSCULAR; SOFT TISSUE at 14:07:00

## 2019-05-23 NOTE — PROCEDURES
After obtaining consent for the patient, the right thumb was cleansed with Betadine and alcohol wipes. In the right thumb was injected with 10 mg of methylprednisolone and a half a cc of 1% lidocaine at the carpometacarpal joint.   Patient tolerated the in

## 2019-05-23 NOTE — PATIENT INSTRUCTIONS
Continue Meloxicam 15 mg per day. Use arthritis strength Tylenol 625 mg 3-4 times per day and no more than 5. Use Norco for breakthrough pain 5 mg 1 or 2/day as needed. Use Voltaren gel  3-4 times a day to sore joints.   Right thumb arthritis at the carp
+chest wall pain, +lip pain, +head pain

## 2019-05-23 NOTE — PROGRESS NOTES
[facility]  Dr. Rizwana Royal Progress Note     Subjective:   Chayo Romo is a(n) 66year old female. Current complaints: Patient presents with:  Osteoarthritis: 4 month f/u. Dexascan done 4/12/19, MRI left knee/MRI lumbar spine done.  Pt c/o increased pelvic abnormality. Return to office 3 months.      Also see procedure note  Jack Yu MD 1/27/9756 1:46 PM

## 2019-05-24 ENCOUNTER — HOSPITAL ENCOUNTER (OUTPATIENT)
Dept: CT IMAGING | Age: 59
End: 2019-05-24
Attending: SURGERY
Payer: MEDICAID

## 2019-05-24 ENCOUNTER — OFFICE VISIT (OUTPATIENT)
Dept: INTERNAL MEDICINE CLINIC | Facility: CLINIC | Age: 59
End: 2019-05-24
Payer: MEDICAID

## 2019-05-24 ENCOUNTER — HOSPITAL ENCOUNTER (OUTPATIENT)
Dept: MAMMOGRAPHY | Age: 59
Discharge: HOME | End: 2019-05-24
Attending: INTERNAL MEDICINE
Payer: MEDICAID

## 2019-05-24 VITALS
HEART RATE: 78 BPM | RESPIRATION RATE: 16 BRPM | WEIGHT: 167.25 LBS | DIASTOLIC BLOOD PRESSURE: 84 MMHG | SYSTOLIC BLOOD PRESSURE: 154 MMHG | HEIGHT: 61 IN | TEMPERATURE: 98 F | BODY MASS INDEX: 31.58 KG/M2

## 2019-05-24 DIAGNOSIS — Z86.59 HISTORY OF RECENT STRESSFUL LIFE EVENT: ICD-10-CM

## 2019-05-24 DIAGNOSIS — Z12.39 BREAST CANCER SCREENING: ICD-10-CM

## 2019-05-24 DIAGNOSIS — I10 ESSENTIAL HYPERTENSION: Primary | ICD-10-CM

## 2019-05-24 PROCEDURE — 77063 BREAST TOMOSYNTHESIS BI: CPT | Performed by: INTERNAL MEDICINE

## 2019-05-24 PROCEDURE — 77067 SCR MAMMO BI INCL CAD: CPT | Performed by: INTERNAL MEDICINE

## 2019-05-24 PROCEDURE — 99214 OFFICE O/P EST MOD 30 MIN: CPT | Performed by: INTERNAL MEDICINE

## 2019-05-24 RX ORDER — ATORVASTATIN CALCIUM 20 MG/1
TABLET, FILM COATED ORAL
Qty: 30 TABLET | Refills: 5 | Status: ON HOLD | OUTPATIENT
Start: 2019-05-24 | End: 2019-06-28

## 2019-05-24 RX ORDER — ZOLPIDEM TARTRATE 10 MG/1
TABLET ORAL
Qty: 30 TABLET | Refills: 5 | Status: ON HOLD | OUTPATIENT
Start: 2019-05-24 | End: 2019-06-28

## 2019-05-24 RX ORDER — OMEPRAZOLE 40 MG/1
40 CAPSULE, DELAYED RELEASE ORAL 2 TIMES DAILY
Qty: 60 CAPSULE | Refills: 2 | Status: SHIPPED | OUTPATIENT
Start: 2019-05-24 | End: 2019-08-28

## 2019-05-24 RX ORDER — ALPRAZOLAM 0.25 MG/1
0.25 TABLET ORAL 2 TIMES DAILY PRN
Qty: 60 TABLET | Refills: 5 | Status: SHIPPED | OUTPATIENT
Start: 2019-05-24 | End: 2019-11-23

## 2019-05-24 RX ORDER — FLUTICASONE PROPIONATE 50 MCG
SPRAY, SUSPENSION (ML) NASAL
Qty: 16 G | Refills: 5 | Status: SHIPPED | OUTPATIENT
Start: 2019-05-24 | End: 2020-11-04

## 2019-05-24 RX ORDER — ALBUTEROL SULFATE 90 UG/1
2 AEROSOL, METERED RESPIRATORY (INHALATION) EVERY 4 HOURS PRN
Qty: 1 INHALER | Refills: 6 | Status: SHIPPED | OUTPATIENT
Start: 2019-05-24 | End: 2020-11-04

## 2019-05-24 NOTE — PATIENT INSTRUCTIONS
Soumya,    1. Calcium should be at 1200 mg per day. 2. Vitamin D3 should be at least 1000 IUs per day, but if you can push this to at least 2000 IUs per day.     Kind regards,  Dr. Gera Mott

## 2019-05-25 NOTE — PROGRESS NOTES
Patient presents with:  Hypertension      HPI: Anaya Brandt presents for HTN. She's had some elevated #s as of late. She's on prescription medication w/ Metoprolol and is compliant.   She's been hit with some recent potentially devastating news in that she has a Aspirin      Current Outpatient Medications:   •  Calcium Carb-Cholecalciferol (CALCIUM 500 + D) 500-200 MG-UNIT Oral Tab, Take by mouth., Disp: , Rfl:   •  Omeprazole 40 MG Oral Capsule Delayed Release, Take 1 capsule (40 mg total) by mouth 2 (two) times Disp: 90 tablet, Rfl: 3  •  HYDROcodone-acetaminophen (NORCO) 5-325 MG Oral Tab, Take 1 tablet by mouth every 6 (six) hours as needed for Pain., Disp: 30 tablet, Rfl: 0  •  Desvenlafaxine Succinate ER (PRISTIQ) 100 MG Oral Tablet 24 Hr, Take 1 tablet (100 opportunity to ask questions, which were then answered to the best of my ability. Jony Escalona. Bibiana Starks MD  Diplomate, American Board of Internal Medicine  [facility]  [address] [facility],4Th Floor, [address], [facility], [address] [place] [address]  T: [phone]; F: [phone].

## 2019-05-29 ENCOUNTER — TELEPHONE (OUTPATIENT)
Dept: HEMATOLOGY/ONCOLOGY | Facility: HOSPITAL | Age: 59
End: 2019-05-29

## 2019-05-29 DIAGNOSIS — R19.00 PELVIC MASS: Primary | ICD-10-CM

## 2019-06-04 ENCOUNTER — TELEPHONE (OUTPATIENT)
Dept: INTERNAL MEDICINE CLINIC | Facility: CLINIC | Age: 59
End: 2019-06-04

## 2019-06-04 NOTE — TELEPHONE ENCOUNTER
Devin Fernandez (Key: [id number])    Your information has been submitted to [employer]. [employer] is reviewing the PA request and you will receive an electronic response.  You may check for the updated outcome later by reopening this request. The standard f

## 2019-06-07 ENCOUNTER — HOSPITAL ENCOUNTER (OUTPATIENT)
Dept: CT IMAGING | Age: 59
Discharge: HOME | End: 2019-06-07
Attending: SURGERY
Payer: MEDICAID

## 2019-06-07 ENCOUNTER — TELEPHONE (OUTPATIENT)
Dept: SURGERY | Facility: CLINIC | Age: 59
End: 2019-06-07

## 2019-06-07 DIAGNOSIS — R19.00 PELVIC MASS: ICD-10-CM

## 2019-06-07 PROCEDURE — 74178 CT ABD&PLV WO CNTR FLWD CNTR: CPT | Performed by: SURGERY

## 2019-06-07 PROCEDURE — 82565 ASSAY OF CREATININE: CPT

## 2019-06-11 ENCOUNTER — OFFICE VISIT (OUTPATIENT)
Dept: SURGERY | Facility: CLINIC | Age: 59
End: 2019-06-11
Payer: MEDICAID

## 2019-06-11 VITALS
OXYGEN SATURATION: 97 % | DIASTOLIC BLOOD PRESSURE: 107 MMHG | HEART RATE: 87 BPM | TEMPERATURE: 98 F | BODY MASS INDEX: 30 KG/M2 | SYSTOLIC BLOOD PRESSURE: 166 MMHG | RESPIRATION RATE: 16 BRPM | WEIGHT: 161 LBS

## 2019-06-11 DIAGNOSIS — R19.00 PELVIC MASS: Primary | ICD-10-CM

## 2019-06-11 DIAGNOSIS — Z01.818 PREOP TESTING: ICD-10-CM

## 2019-06-11 PROCEDURE — 99213 OFFICE O/P EST LOW 20 MIN: CPT | Performed by: SURGERY

## 2019-06-11 RX ORDER — TRAMADOL HYDROCHLORIDE 50 MG/1
TABLET ORAL EVERY 4 HOURS PRN
Qty: 50 TABLET | Refills: 0 | Status: SHIPPED | OUTPATIENT
Start: 2019-06-11 | End: 2019-09-16 | Stop reason: ALTCHOICE

## 2019-06-11 RX ORDER — CYCLOBENZAPRINE HCL 10 MG
10 TABLET ORAL 3 TIMES DAILY PRN
Qty: 30 TABLET | Refills: 0 | Status: SHIPPED | OUTPATIENT
Start: 2019-06-11 | End: 2019-07-11

## 2019-06-11 NOTE — PROGRESS NOTES
Patient here today for follow up. Patient state that she is in lots of lower abdominal pain. Patient state that she is needing to take more norco due to the pain. Md notified.

## 2019-06-11 NOTE — PATIENT INSTRUCTIONS
Surgery:  Exploratory laparotomy, resection of pelvic mass    Date of Surgery:  6-28-19    Hosptial:  [facility] [place] [address], [address]  [phone]    · This is an inpatient procedure.   · Use the provided Chlorhexadine surgi JUNIOR Lambert nurse line:  [phone]  Monday through Friday 8:00am to 4:30pm.     For Dr. Rosalind Lambert office: [phone]/ Fax: [phone]  After hours you will reach the answering se

## 2019-06-12 ENCOUNTER — TELEPHONE (OUTPATIENT)
Dept: INTERNAL MEDICINE CLINIC | Facility: CLINIC | Age: 59
End: 2019-06-12

## 2019-06-12 NOTE — TELEPHONE ENCOUNTER
lmtcb with Allison Oliver pre-admission testing at [phone]. Dr Isma Curran ordered cbc and cmp.

## 2019-06-12 NOTE — TELEPHONE ENCOUNTER
Patient stated that she is scheduled to have surgery in two weeks. She was just seen by Dr. Josue Peñaloza on 05/24/19 and had blood work done. She wants to know if she still needs to come in to have a pre op done. Please advise.

## 2019-06-12 NOTE — TELEPHONE ENCOUNTER
Spoke with Haider Avalos in [facility] PAT. Pt in need of cbc,cmp (already ordered) ekg. H&P would be determined by Dr Perez/Dr Yina Toscano.

## 2019-06-13 ENCOUNTER — TELEPHONE (OUTPATIENT)
Dept: SURGERY | Facility: CLINIC | Age: 59
End: 2019-06-13

## 2019-06-13 DIAGNOSIS — R19.00 PELVIC MASS: Primary | ICD-10-CM

## 2019-06-13 NOTE — TELEPHONE ENCOUNTER
Samir Rousseau on vm to cb re:  medical clearance for surgery on 6-28-19. I retrieved it after clinic yesterday. I returned her call this morning; talked to [facility] who was referred to my note with patient instructions in EPIC.   He verbalized understanding, states

## 2019-06-13 NOTE — TELEPHONE ENCOUNTER
Nurse Kali Baldwin from Dr Yusra Daley office called back to inform pt needs H&P, also noted under office visits from 6/11/19. Notes printed. Pt recently seen by Dr Ashley Lazaro on 5/24/19, if provider does not have the need to see pt back in the office.  It is ok

## 2019-06-13 NOTE — PROGRESS NOTES
[facility]    Patient Name:  Josefina Phillips   YOB: 1960   Gender:  Female   Appt Date:  5/17/2019   Provider:  Derrek Reyes MD   Insurance:  Jayro Prajapati The CT is reviewed below. Essentially does not reveal any signs of peritoneal metastasis/other concerning findings for epithelial malignancy. She continues to endorse lower abdominal/pelvic pain. She is otherwise in good health. Vital Signs:  BP Richrd [place] ) •  raNITIdine HCl 300 MG Oral Tab, Take 1 tablet (300 mg total) by mouth nightly., Disp: 30 tablet, Rfl: 5  •  AIMOVIG 140 DOSE 70 MG/ML Subcutaneous, ADMINISTER 2 ML(140MG) UNDER THE SKIN EVERY 30 DAYS, Disp: 2 pen, Rfl: 0  •  QUEtiapine Fumarate  M • Pain in joint, forearm 04/26/2011    wrist   • Pain in joint, pelvic region and thigh 10/07/2011    hip   • Personal history of urinary (tract) infection 12/09/2011   • Unspecified hypertrophic and atrophic condition of skin 04/26/2011    skin tag   • Un Eyes: Negative for discharge and redness. Respiratory: Negative for cough, chest tightness and shortness of breath. Cardiovascular: Negative for chest pain and leg swelling.    Gastrointestinal: Negative for nausea, abdominal pain and abdominal distent performed. Transvaginal ultrasound was used to better evaluate adnexal and endometrial detail. PATIENT STATED HISTORY: (As transcribed by Technologist)  Patient complains of left lower quadrant pain . Patient also states this is a follow up for cystic 1. Large complex cystic pelvic mass highly suspicious for cystic ovarian neoplasm. The lesion extends into both sides of the pelvis. As described above, the lesion is more likely left ovarian in origin. Recommend gynecologic consultation.   2. Small uter respectively. It is low-attenuation but higher than water density measuring about 12 Hounsfield units, without evidence for layering of differential components, fat, calcifications, or gas.   Appears mostly homogeneous, although along its inferior aspect 64year old postmenopausal female who is presenting with a newly recognized large pelvic/cystic mass, likely eminating from the left ovary/adnexa . I explained to the patient that we will proceed with surgical excision.   I explained that intraoperatively

## 2019-06-14 ENCOUNTER — APPOINTMENT (OUTPATIENT)
Dept: CT IMAGING | Age: 59
End: 2019-06-14
Attending: SURGERY
Payer: MEDICAID

## 2019-06-14 ENCOUNTER — APPOINTMENT (OUTPATIENT)
Dept: LAB | Age: 59
End: 2019-06-14
Attending: SURGERY
Payer: MEDICAID

## 2019-06-14 ENCOUNTER — LABORATORY ENCOUNTER (OUTPATIENT)
Dept: LAB | Age: 59
End: 2019-06-14
Payer: MEDICAID

## 2019-06-14 ENCOUNTER — HOSPITAL ENCOUNTER (OUTPATIENT)
Dept: ULTRASOUND IMAGING | Age: 59
Discharge: HOME | End: 2019-06-14
Attending: INTERNAL MEDICINE
Payer: MEDICAID

## 2019-06-14 ENCOUNTER — APPOINTMENT (OUTPATIENT)
Dept: LAB | Age: 59
End: 2019-06-14
Payer: MEDICAID

## 2019-06-14 ENCOUNTER — HOSPITAL ENCOUNTER (OUTPATIENT)
Dept: MAMMOGRAPHY | Age: 59
Discharge: HOME | End: 2019-06-14
Attending: INTERNAL MEDICINE
Payer: MEDICAID

## 2019-06-14 DIAGNOSIS — R92.2 INCONCLUSIVE MAMMOGRAM: ICD-10-CM

## 2019-06-14 DIAGNOSIS — R19.00 PELVIC MASS: ICD-10-CM

## 2019-06-14 PROCEDURE — 86901 BLOOD TYPING SEROLOGIC RH(D): CPT

## 2019-06-14 PROCEDURE — 36415 COLL VENOUS BLD VENIPUNCTURE: CPT

## 2019-06-14 PROCEDURE — 77062 BREAST TOMOSYNTHESIS BI: CPT | Performed by: INTERNAL MEDICINE

## 2019-06-14 PROCEDURE — 86850 RBC ANTIBODY SCREEN: CPT

## 2019-06-14 PROCEDURE — 76642 ULTRASOUND BREAST LIMITED: CPT | Performed by: INTERNAL MEDICINE

## 2019-06-14 PROCEDURE — 86900 BLOOD TYPING SEROLOGIC ABO: CPT

## 2019-06-14 PROCEDURE — 85027 COMPLETE CBC AUTOMATED: CPT

## 2019-06-14 PROCEDURE — 77066 DX MAMMO INCL CAD BI: CPT | Performed by: INTERNAL MEDICINE

## 2019-06-14 PROCEDURE — 93005 ELECTROCARDIOGRAM TRACING: CPT

## 2019-06-14 PROCEDURE — 80048 BASIC METABOLIC PNL TOTAL CA: CPT

## 2019-06-14 PROCEDURE — 93010 ELECTROCARDIOGRAM REPORT: CPT | Performed by: INTERNAL MEDICINE

## 2019-06-14 NOTE — PROGRESS NOTES
ADDENDUM:    I have reviewed the interim/updated clinical history since her last visit with me on 5/24/19. I have nothing else to add at this time and from my opinion, Keo Garcia is deemed medically cleared to proceed directly to the OR w/ Dr. Barrera Pearson.

## 2019-06-14 NOTE — TELEPHONE ENCOUNTER
Addendum to my 5/24/19 note done. Jasiel Barrera. Iván Jackson MD  Diplomate, American Board of Internal Medicine  [facility]  [address]  [facility] [facility],[address], [address], [facility], [address] [place] [address]  T: [id number]; F: Deriketi 5

## 2019-06-21 NOTE — TELEPHONE ENCOUNTER
In my outbox. Kalpana Lopez. Kirk James MD  Diplomate, American Board of Internal Medicine  [address] [address] [address]  [facility],4Th Floor, [address], [facility], [address] [place] [address]  T: [id number]; F: Tona 5

## 2019-06-24 DIAGNOSIS — R19.00 PELVIC MASS: Primary | ICD-10-CM

## 2019-06-25 ENCOUNTER — TELEPHONE (OUTPATIENT)
Dept: NEUROLOGY | Facility: CLINIC | Age: 59
End: 2019-06-25

## 2019-06-25 ENCOUNTER — TELEPHONE (OUTPATIENT)
Dept: INTERNAL MEDICINE CLINIC | Facility: CLINIC | Age: 59
End: 2019-06-25

## 2019-06-25 NOTE — TELEPHONE ENCOUNTER
Received fax from [facility] for a formulary exception    Completed paperwork for Aimovig 140 mg. Faxed completed paperwork to [facility] at [phone]. Fax confirmation received.     Reference number [id number]

## 2019-06-26 ENCOUNTER — ANESTHESIA EVENT (OUTPATIENT)
Dept: SURGERY | Facility: HOSPITAL | Age: 59
DRG: 743 | End: 2019-06-26
Payer: MEDICAID

## 2019-06-28 ENCOUNTER — ANESTHESIA (OUTPATIENT)
Dept: SURGERY | Facility: HOSPITAL | Age: 59
DRG: 743 | End: 2019-06-28
Payer: MEDICAID

## 2019-06-28 ENCOUNTER — HOSPITAL ENCOUNTER (INPATIENT)
Facility: HOSPITAL | Age: 59
LOS: 3 days | Discharge: HOME | DRG: 743 | End: 2019-07-01
Attending: SURGERY | Admitting: SURGERY
Payer: MEDICAID

## 2019-06-28 DIAGNOSIS — R19.00 PELVIC MASS: Primary | ICD-10-CM

## 2019-06-28 DIAGNOSIS — R51.9 CHRONIC DAILY HEADACHE: ICD-10-CM

## 2019-06-28 PROBLEM — I10 HYPERTENSION: Status: ACTIVE | Noted: 2019-04-02

## 2019-06-28 PROBLEM — E11.9 TYPE 2 DIABETES MELLITUS WITHOUT COMPLICATION (HCC): Status: ACTIVE | Noted: 2017-09-28

## 2019-06-28 LAB
ALLENS TEST: POSITIVE
ARTERIAL BLD GAS O2 SATURATION: 93 % (ref 92–100)
ARTERIAL BLOOD GAS BASE EXCESS: -0.1 MMOL/L (ref ?–2)
ARTERIAL BLOOD GAS HCO3: 27.5 MEQ/L (ref 22–26)
ARTERIAL BLOOD GAS PCO2: 58 MM HG (ref 35–45)
ARTERIAL BLOOD GAS PH: 7.3 (ref 7.35–7.45)
ARTERIAL BLOOD GAS PO2: 118 MM HG (ref 80–105)
CALCULATED O2 SATURATION: 98 % (ref 92–100)
CARBOXYHEMOGLOBIN: 4.3 % SAT (ref 0–3)
EST. AVERAGE GLUCOSE BLD GHB EST-MCNC: 166 MG/DL (ref 68–126)
GLUCOSE BLD-MCNC: 123 MG/DL (ref 70–99)
GLUCOSE BLD-MCNC: 202 MG/DL (ref 70–99)
GLUCOSE BLD-MCNC: 248 MG/DL (ref 70–99)
HBA1C MFR BLD HPLC: 7.4 % (ref ?–5.7)
L/M: 10 L/MIN
METHEMOGLOBIN: 0.5 % SAT (ref 0.4–1.5)
PATIENT TEMPERATURE: 98.4 F
TOTAL HEMOGLOBIN: 13 G/DL (ref 11.7–16)

## 2019-06-28 PROCEDURE — 0UT60ZZ RESECTION OF LEFT FALLOPIAN TUBE, OPEN APPROACH: ICD-10-PCS | Performed by: SURGERY

## 2019-06-28 PROCEDURE — 99252 IP/OBS CONSLTJ NEW/EST SF 35: CPT | Performed by: HOSPITALIST

## 2019-06-28 PROCEDURE — 5A09357 ASSISTANCE WITH RESPIRATORY VENTILATION, LESS THAN 24 CONSECUTIVE HOURS, CONTINUOUS POSITIVE AIRWAY PRESSURE: ICD-10-PCS | Performed by: SURGERY

## 2019-06-28 PROCEDURE — 0UT10ZZ RESECTION OF LEFT OVARY, OPEN APPROACH: ICD-10-PCS | Performed by: SURGERY

## 2019-06-28 RX ORDER — DIVALPROEX SODIUM 250 MG/1
250 TABLET, EXTENDED RELEASE ORAL NIGHTLY
COMMUNITY
End: 2020-05-08

## 2019-06-28 RX ORDER — HYDROMORPHONE HYDROCHLORIDE 1 MG/ML
0.4 INJECTION, SOLUTION INTRAMUSCULAR; INTRAVENOUS; SUBCUTANEOUS EVERY 2 HOUR PRN
Status: DISCONTINUED | OUTPATIENT
Start: 2019-06-28 | End: 2019-07-01

## 2019-06-28 RX ORDER — QUETIAPINE 200 MG/1
200 TABLET, FILM COATED, EXTENDED RELEASE ORAL NIGHTLY
COMMUNITY
End: 2019-10-25

## 2019-06-28 RX ORDER — ONDANSETRON 2 MG/ML
4 INJECTION INTRAMUSCULAR; INTRAVENOUS AS NEEDED
Status: DISCONTINUED | OUTPATIENT
Start: 2019-06-28 | End: 2019-06-28 | Stop reason: HOSPADM

## 2019-06-28 RX ORDER — DIVALPROEX SODIUM 250 MG/1
250 TABLET, EXTENDED RELEASE ORAL NIGHTLY
Status: DISCONTINUED | OUTPATIENT
Start: 2019-06-28 | End: 2019-07-01

## 2019-06-28 RX ORDER — QUETIAPINE 100 MG/1
100 TABLET, FILM COATED ORAL 2 TIMES DAILY
Status: DISCONTINUED | OUTPATIENT
Start: 2019-06-29 | End: 2019-07-01

## 2019-06-28 RX ORDER — DEXTROSE MONOHYDRATE 25 G/50ML
50 INJECTION, SOLUTION INTRAVENOUS
Status: DISCONTINUED | OUTPATIENT
Start: 2019-06-28 | End: 2019-07-01

## 2019-06-28 RX ORDER — SODIUM CHLORIDE, SODIUM LACTATE, POTASSIUM CHLORIDE, CALCIUM CHLORIDE 600; 310; 30; 20 MG/100ML; MG/100ML; MG/100ML; MG/100ML
INJECTION, SOLUTION INTRAVENOUS CONTINUOUS
Status: DISCONTINUED | OUTPATIENT
Start: 2019-06-28 | End: 2019-06-28 | Stop reason: HOSPADM

## 2019-06-28 RX ORDER — HYDROMORPHONE HYDROCHLORIDE 1 MG/ML
INJECTION, SOLUTION INTRAMUSCULAR; INTRAVENOUS; SUBCUTANEOUS
Status: COMPLETED
Start: 2019-06-28 | End: 2019-06-28

## 2019-06-28 RX ORDER — SODIUM CHLORIDE, SODIUM LACTATE, POTASSIUM CHLORIDE, CALCIUM CHLORIDE 600; 310; 30; 20 MG/100ML; MG/100ML; MG/100ML; MG/100ML
INJECTION, SOLUTION INTRAVENOUS CONTINUOUS
Status: DISCONTINUED | OUTPATIENT
Start: 2019-06-28 | End: 2019-06-29

## 2019-06-28 RX ORDER — HYDROMORPHONE HYDROCHLORIDE 1 MG/ML
0.8 INJECTION, SOLUTION INTRAMUSCULAR; INTRAVENOUS; SUBCUTANEOUS EVERY 2 HOUR PRN
Status: DISCONTINUED | OUTPATIENT
Start: 2019-06-28 | End: 2019-07-01

## 2019-06-28 RX ORDER — METOPROLOL SUCCINATE 50 MG/1
50 TABLET, EXTENDED RELEASE ORAL
Status: DISCONTINUED | OUTPATIENT
Start: 2019-06-29 | End: 2019-07-01

## 2019-06-28 RX ORDER — NICOTINE 21 MG/24HR
1 PATCH, TRANSDERMAL 24 HOURS TRANSDERMAL DAILY
Status: DISCONTINUED | OUTPATIENT
Start: 2019-06-28 | End: 2019-07-01

## 2019-06-28 RX ORDER — OXYCODONE HYDROCHLORIDE 5 MG/1
5 TABLET ORAL EVERY 4 HOURS PRN
Status: DISCONTINUED | OUTPATIENT
Start: 2019-06-28 | End: 2019-06-30

## 2019-06-28 RX ORDER — NALOXONE HYDROCHLORIDE 0.4 MG/ML
80 INJECTION, SOLUTION INTRAMUSCULAR; INTRAVENOUS; SUBCUTANEOUS AS NEEDED
Status: DISCONTINUED | OUTPATIENT
Start: 2019-06-28 | End: 2019-06-28 | Stop reason: HOSPADM

## 2019-06-28 RX ORDER — HEPARIN SODIUM 5000 [USP'U]/ML
5000 INJECTION, SOLUTION INTRAVENOUS; SUBCUTANEOUS ONCE
Status: COMPLETED | OUTPATIENT
Start: 2019-06-28 | End: 2019-06-28

## 2019-06-28 RX ORDER — ALBUTEROL SULFATE 90 UG/1
2 AEROSOL, METERED RESPIRATORY (INHALATION) EVERY 4 HOURS PRN
Status: DISCONTINUED | OUTPATIENT
Start: 2019-06-28 | End: 2019-07-01

## 2019-06-28 RX ORDER — DEXTROSE MONOHYDRATE 25 G/50ML
50 INJECTION, SOLUTION INTRAVENOUS
Status: DISCONTINUED | OUTPATIENT
Start: 2019-06-28 | End: 2019-06-28 | Stop reason: HOSPADM

## 2019-06-28 RX ORDER — ONDANSETRON 2 MG/ML
4 INJECTION INTRAMUSCULAR; INTRAVENOUS EVERY 6 HOURS PRN
Status: DISCONTINUED | OUTPATIENT
Start: 2019-06-28 | End: 2019-07-01

## 2019-06-28 RX ORDER — ENOXAPARIN SODIUM 100 MG/ML
40 INJECTION SUBCUTANEOUS DAILY
Status: DISCONTINUED | OUTPATIENT
Start: 2019-06-29 | End: 2019-07-01

## 2019-06-28 RX ORDER — HYDROMORPHONE HYDROCHLORIDE 1 MG/ML
0.2 INJECTION, SOLUTION INTRAMUSCULAR; INTRAVENOUS; SUBCUTANEOUS EVERY 2 HOUR PRN
Status: DISCONTINUED | OUTPATIENT
Start: 2019-06-28 | End: 2019-07-01

## 2019-06-28 RX ORDER — HYDROMORPHONE HYDROCHLORIDE 1 MG/ML
0.4 INJECTION, SOLUTION INTRAMUSCULAR; INTRAVENOUS; SUBCUTANEOUS EVERY 5 MIN PRN
Status: DISCONTINUED | OUTPATIENT
Start: 2019-06-28 | End: 2019-06-28 | Stop reason: HOSPADM

## 2019-06-28 RX ORDER — ZOLPIDEM TARTRATE 10 MG/1
10 TABLET ORAL NIGHTLY PRN
Status: DISCONTINUED | OUTPATIENT
Start: 2019-06-28 | End: 2019-07-01

## 2019-06-28 RX ORDER — ATORVASTATIN CALCIUM 20 MG/1
20 TABLET, FILM COATED ORAL NIGHTLY
COMMUNITY
End: 2019-12-27

## 2019-06-28 RX ORDER — ZOLPIDEM TARTRATE 10 MG/1
10 TABLET ORAL NIGHTLY PRN
COMMUNITY
End: 2019-11-23

## 2019-06-28 RX ORDER — SCOLOPAMINE TRANSDERMAL SYSTEM 1 MG/1
PATCH, EXTENDED RELEASE TRANSDERMAL
Status: DISPENSED
Start: 2019-06-28 | End: 2019-06-28

## 2019-06-28 RX ORDER — QUETIAPINE 100 MG/1
100 TABLET, FILM COATED ORAL ONCE
Status: COMPLETED | OUTPATIENT
Start: 2019-06-28 | End: 2019-06-28

## 2019-06-28 RX ORDER — ATORVASTATIN CALCIUM 20 MG/1
20 TABLET, FILM COATED ORAL NIGHTLY
Status: DISCONTINUED | OUTPATIENT
Start: 2019-06-28 | End: 2019-07-01

## 2019-06-28 RX ORDER — ALPRAZOLAM 0.5 MG/1
0.5 TABLET ORAL 4 TIMES DAILY PRN
Status: DISCONTINUED | OUTPATIENT
Start: 2019-06-28 | End: 2019-07-01

## 2019-06-28 RX ORDER — CEFAZOLIN SODIUM/WATER 2 G/20 ML
2 SYRINGE (ML) INTRAVENOUS ONCE
Status: DISCONTINUED | OUTPATIENT
Start: 2019-06-28 | End: 2019-06-28 | Stop reason: HOSPADM

## 2019-06-28 RX ORDER — DESVENLAFAXINE 100 MG/1
100 TABLET, EXTENDED RELEASE ORAL DAILY
Status: DISCONTINUED | OUTPATIENT
Start: 2019-06-28 | End: 2019-07-01

## 2019-06-28 RX ORDER — SCOLOPAMINE TRANSDERMAL SYSTEM 1 MG/1
1 PATCH, EXTENDED RELEASE TRANSDERMAL ONCE
Status: COMPLETED | OUTPATIENT
Start: 2019-06-28 | End: 2019-07-01

## 2019-06-28 RX ORDER — PANTOPRAZOLE SODIUM 40 MG/1
40 TABLET, DELAYED RELEASE ORAL
Status: DISCONTINUED | OUTPATIENT
Start: 2019-06-29 | End: 2019-06-28

## 2019-06-28 RX ORDER — ACETAMINOPHEN 500 MG
1000 TABLET ORAL ONCE
Status: DISCONTINUED | OUTPATIENT
Start: 2019-06-28 | End: 2019-06-28

## 2019-06-28 RX ORDER — PANTOPRAZOLE SODIUM 40 MG/1
40 TABLET, DELAYED RELEASE ORAL
Status: DISCONTINUED | OUTPATIENT
Start: 2019-06-28 | End: 2019-07-01

## 2019-06-28 RX ORDER — ACETAMINOPHEN 500 MG
1000 TABLET ORAL EVERY 6 HOURS
Status: DISCONTINUED | OUTPATIENT
Start: 2019-06-28 | End: 2019-07-01

## 2019-06-28 NOTE — CONSULTS
ABAD HOSPITALIST  CONSULT     Christina Goddard Patient Status:  Inpatient    6/3/1960 MRN [id number]   [facility] 7NE-A Attending Juanjose Wilkes MD   Hosp Day # 0 PCP Rosaline Willard MD     Reason for consult: med mgt    Requested by: dr. lima smokeless tobacco. She reports that she drinks alcohol. She reports that she does not use drugs.     Family History:   Family History   Problem Relation Age of Onset   • Arthritis Mother    • Other (AMI) Mother    • Other (diabetes mellitus) Mother total) by mouth nightly. Disp: 30 tablet Rfl: 5   AIMOVIG 140 DOSE 70 MG/ML Subcutaneous ADMINISTER 2 ML(140MG) UNDER THE SKIN EVERY 30 DAYS Disp: 2 pen Rfl: 0   Metoprolol Succinate ER 50 MG Oral Tablet 24 Hr Take 1 tablet (50 mg total) by mouth daily.  Viviane Bates Neurologic: No focal neurological deficits. Musculoskeletal: Moves all extremities. Extremities: No edema or cyanosis. Integument: No rashes or lesions. Psychiatric: Appropriate mood and affect.       Diagnostic Data:      Labs:  No results for inpu

## 2019-06-28 NOTE — PAYOR COMM NOTE
--------------  ADMISSION REVIEW     Payor: Valdo Joseph #:  [id number]  Authorization Number: [id number]     Admit date: 6/28/19  Admit time: 80       Admitting Physician: Lit Vazquez MD  Attending Physician: showed a 17.5 cm x 18.6 cm x 12.3 cm complex cystic mass in the pelvis suspicious for neoplasm     Arthritis of carpometacarpal [place]) joint of right thumb     Spondylosis of lumbar region without myelopathy or radiculopathy       History of Present Illness atorvastatin 20 MG Oral Tab Take 20 mg by mouth nightly. Disp:  Rfl:    divalproex Sodium  MG Oral Tablet 24 Hr Take 250 mg by mouth nightly. Disp:  Rfl:    QUEtiapine Fumarate  MG Oral Tablet 24 Hr Take 200 mg by mouth nightly.  Disp:  Rfl: Sodium 1 % Transdermal Gel Apply 2 g topically 2 (two) times daily as needed.  Disp: 1 Tube Rfl: 2   Albuterol Sulfate HFA (PROAIR HFA) 108 (90 Base) MCG/ACT Inhalation Aero Soln Inhale 2 puffs into the lungs every 4 (four) hours as needed for Wheezing or S 2010   • Tubal ligation     • Upper gi endoscopy,exam        Reviewed Social History:  Social History    Tobacco Use      Smoking status: Current Every Day Smoker        Packs/day: 0.75        Years: 41.00        Pack years: 30.75        Types: Cigarettes Document Review:  Reviewed and discussed as above       Assessment / Plan:  There has been no significant change since the patient was seen as documented in EPIC. Surgery revisted. To proceed with surgical excision.   Intraoperatively, we will check

## 2019-06-28 NOTE — BRIEF OP NOTE
Pre-Operative Diagnosis: Pelvic mass [R19.00]     Post-Operative Diagnosis: Pelvic mass [R19.00]      Procedure Performed:   Procedure(s):  Open resection of pelvic mass, left salpingo-oophorectomy     Surgeon(s) and Role:     * Marena Litten, MD - [place]

## 2019-06-28 NOTE — ANESTHESIA POSTPROCEDURE EVALUATION
Deuce Huizar 55 Patient Status:  Surgery Admit - Inpt   Age/Gender 64year old female MRN [id number]   [facility] SURGERY Attending Lindsey Murphy MD   Hosp Day # 0 PCP Negar Coelho MD       Anesthesia Post-op Note    Proced

## 2019-06-28 NOTE — PLAN OF CARE
NURSING ADMISSION NOTE      Patient admitted via bed  Oriented to room. Safety precautions initiated. Bed in low position. Call light in reach.     Admission navigator completed with pt and sister  hospitalist notified of new consult  Pt tolerating f

## 2019-06-28 NOTE — ANESTHESIA PREPROCEDURE EVALUATION
PRE-OP EVALUATION    Patient Name: Chayo Romo    Pre-op Diagnosis: Pelvic mass [R19.00]    Procedure(s):  Open resection of pelvic mass, possible total hysterectomy with bilateral oophorectomy, omentectomy and pelvic lymphadenectomy    Surgeon(s) and 1 tablet (0.25 mg total) by mouth 2 (two) times daily as needed for Anxiety. Disp: 60 tablet Rfl: 5   BIOTIN OR Take 1 tablet by mouth daily. Disp:  Rfl:    raNITIdine HCl 300 MG Oral Tab Take 1 tablet (300 mg total) by mouth nightly.  Disp: 30 tablet Rfl Left 2-2010    BENIGN 2 SITES   • ANGEL STEREO-CLIP W/CALC 2 SITE LEFT  2010   • TUBAL LIGATION     • UPPER GI ENDOSCOPY,EXAM       Social History    Tobacco Use      Smoking status: Current Every Day Smoker        Packs/day: 0.75        Years: 41.00

## 2019-06-28 NOTE — PAYOR COMM NOTE
--------------  CONTINUED STAY REVIEW    Payor: Valdo Joseph #:  [id number]  Authorization Number: [id number]     Admit date: 6/28/19  Admit time: 80    Admitting Physician: Poonam Merchant MD  Attending Physician

## 2019-06-28 NOTE — H&P
THE [facility] Surgical Oncology    Patient Name:  Sheila Costello   YOB: 1960   Gender:  Female   Appt Date:  6/28/2019   Provider:  Dr. Idania Kelly:  Kimberlyn Christensen  Primary Care Provide show a 10.5 x 18.5 x 15 cm cystic structure within the pelvis likely representative of a benign versus malignant ovarian cyst.  Following that, the patient underwent an ultrasound of the pelvis which showedA large complex cystic pelvic mass highly suspicio Carb-Cholecalciferol (CALCIUM 500 + D) 500-200 MG-UNIT Oral Tab Take 1 tablet by mouth daily. Disp:  Rfl:    Omeprazole 40 MG Oral Capsule Delayed Release Take 1 capsule (40 mg total) by mouth 2 (two) times daily.  Disp: 60 capsule Rfl: 2   Fluticasone Pr Allergies Reviewed:    Naprosyn [Naproxen]     ANAPHYLAXIS    Comment:TABS Anaphylaxis  Aspirin                     Comment:TABS contraindicated because of saavedra's             esophagus     History:  Reviewed:  Past Medical History:   Diagnosis Date Systems:  Review of Systems:  Review of Systems   Constitutional: Negative for activity change, appetite change, chills, fatigue, fever and unexpected weight change. HENT: Negative for congestion and trouble swallowing.     Eyes: Negative for discharge an

## 2019-06-29 LAB
ALBUMIN SERPL-MCNC: 3 G/DL (ref 3.4–5)
ALBUMIN/GLOB SERPL: 0.9 {RATIO} (ref 1–2)
ALP LIVER SERPL-CCNC: 69 U/L (ref 46–118)
ALT SERPL-CCNC: 34 U/L (ref 13–56)
ANION GAP SERPL CALC-SCNC: 7 MMOL/L (ref 0–18)
AST SERPL-CCNC: 28 U/L (ref 15–37)
BILIRUB SERPL-MCNC: 0.4 MG/DL (ref 0.1–2)
BUN BLD-MCNC: 9 MG/DL (ref 7–18)
BUN/CREAT SERPL: 15.8 (ref 10–20)
CALCIUM BLD-MCNC: 8.6 MG/DL (ref 8.5–10.1)
CHLORIDE SERPL-SCNC: 101 MMOL/L (ref 98–112)
CO2 SERPL-SCNC: 29 MMOL/L (ref 21–32)
CREAT BLD-MCNC: 0.57 MG/DL (ref 0.55–1.02)
DEPRECATED RDW RBC AUTO: 43.2 FL (ref 35.1–46.3)
ERYTHROCYTE [DISTWIDTH] IN BLOOD BY AUTOMATED COUNT: 11.9 % (ref 11–15)
GLOBULIN PLAS-MCNC: 3.3 G/DL (ref 2.8–4.4)
GLUCOSE BLD-MCNC: 154 MG/DL (ref 70–99)
GLUCOSE BLD-MCNC: 160 MG/DL (ref 70–99)
GLUCOSE BLD-MCNC: 175 MG/DL (ref 70–99)
GLUCOSE BLD-MCNC: 179 MG/DL (ref 70–99)
GLUCOSE BLD-MCNC: 211 MG/DL (ref 70–99)
HAV IGM SER QL: 2.1 MG/DL (ref 1.6–2.6)
HCT VFR BLD AUTO: 35.9 % (ref 35–48)
HGB BLD-MCNC: 12.1 G/DL (ref 12–16)
M PROTEIN MFR SERPL ELPH: 6.3 G/DL (ref 6.4–8.2)
MCH RBC QN AUTO: 33 PG (ref 26–34)
MCHC RBC AUTO-ENTMCNC: 33.7 G/DL (ref 31–37)
MCV RBC AUTO: 97.8 FL (ref 80–100)
OSMOLALITY SERPL CALC.SUM OF ELEC: 286 MOSM/KG (ref 275–295)
PHOSPHATE SERPL-MCNC: 3.1 MG/DL (ref 2.5–4.9)
PLATELET # BLD AUTO: 252 10(3)UL (ref 150–450)
POTASSIUM SERPL-SCNC: 4.2 MMOL/L (ref 3.5–5.1)
RBC # BLD AUTO: 3.67 X10(6)UL (ref 3.8–5.3)
SODIUM SERPL-SCNC: 137 MMOL/L (ref 136–145)
WBC # BLD AUTO: 20.7 X10(3) UL (ref 4–11)

## 2019-06-29 PROCEDURE — 99232 SBSQ HOSP IP/OBS MODERATE 35: CPT | Performed by: HOSPITALIST

## 2019-06-29 RX ORDER — OXYCODONE HYDROCHLORIDE 5 MG/1
5 TABLET ORAL EVERY 6 HOURS PRN
Qty: 40 TABLET | Refills: 0 | Status: SHIPPED | OUTPATIENT
Start: 2019-06-29 | End: 2019-07-01

## 2019-06-29 RX ORDER — ACETAMINOPHEN 500 MG
1000 TABLET ORAL EVERY 6 HOURS
Qty: 40 TABLET | Refills: 0 | Status: SHIPPED | OUTPATIENT
Start: 2019-06-29 | End: 2019-07-09

## 2019-06-29 NOTE — PHYSICAL THERAPY NOTE
PHYSICAL THERAPY QUICK EVALUATION - INPATIENT    Room Number: 1856/0330-N  Evaluation Date: 6/29/2019  Presenting Problem: S/p exp lap 6/28 for resection of pelvic mass  Physician Order: PT Eval and Treat     History Related to Current Admission:Pt is s/ 14  Railing: Yes    Lives With: Alone(But siblings will be staying w/ her to assist)  Drives: Yes  Patient Owned Equipment: None       Prior Level of [place]: Pt typically is independent w/ adl's, gait w/o device and drives.     SUBJECTIVE  \"It's the Supervision  Comment : Flight of stairs one step at a time    Skilled Therapy Provided: Pt educated on log rolling technique w/ bracing for supine to sit - pt practiced w/ therapist cues both directions w/ modified independence.   Pt completed toilet transf and independently

## 2019-06-29 NOTE — RESPIRATORY THERAPY NOTE
SUMMER - Equipment Use Daily Summary:                  . Set Mode:                . Usage in hours:                . 90% Pressure (EPAP) level:                . 90% Insp. Pressure (IPAP): Prasad Rodriguez AHI:                .  Supplemental Oxygen:    LPM

## 2019-06-29 NOTE — PROGRESS NOTES
ABAD HOSPITALIST  Progress note     Ji Gaby Patient Status:  Inpatient    6/3/1960 MRN [id number]   [facility] 7NE-A Attending Poonam Merchant MD   Hosp Day # 1 PCP Kita Garcia MD     Reason for consult: med mgt    Requested by: and sophie Bradley MD

## 2019-06-29 NOTE — PLAN OF CARE
Assumed care at [address] [facility]. Pain managed with PRN dilaudid & oxy IR  Midline abd incision dry, intact. Small amount of old SS drainage noted. Abd distended, active bowel sounds. No bm's, no flatus. Gotti catheter in place.  Adequate urine output- clear, yel

## 2019-06-29 NOTE — PROGRESS NOTES
POD#1  Feels well; required IV dilaudid for pain controlx1    Blood pressure 138/69, pulse 95, temperature 98.2 °F (36.8 °C), temperature source Oral, resp.  rate 15, height 1.549 m (5' 1\"), weight 74.9 kg (165 lb 2 oz), SpO2 98 %, not currently breastfeed

## 2019-06-29 NOTE — PLAN OF CARE
Pt is alert and oriented x4, NSR on Ra. Pt will stay tonight for pain controlled and PT eval. IV diluadid given x1. PT recommending home. Pt will dc tomorrow. Will continue to monitor.        Problem: Diabetes/Glucose Control  Goal: Glucose maintained withi injury  Description  INTERVENTIONS:  - Assess pt frequently for physical needs  - Identify cognitive and physical deficits and behaviors that affect risk of falls.   - [place] fall precautions as indicated by assessment.  - Educate pt/family on patient sa

## 2019-06-29 NOTE — CM/SW NOTE
SW spoke w/RN regarding HH order. Pt admitted for exp lap. Rn stated the pt did not have any HH needs. PT is recommending home. SW to follow up if any needs arise.

## 2019-06-30 PROBLEM — R19.00 PELVIC MASS: Status: RESOLVED | Noted: 2019-06-28 | Resolved: 2019-06-30

## 2019-06-30 PROBLEM — D27.1: Status: ACTIVE | Noted: 2019-05-07

## 2019-06-30 LAB
ANION GAP SERPL CALC-SCNC: 7 MMOL/L (ref 0–18)
BASOPHILS # BLD AUTO: 0.03 X10(3) UL (ref 0–0.2)
BASOPHILS NFR BLD AUTO: 0.2 %
BUN BLD-MCNC: 9 MG/DL (ref 7–18)
BUN/CREAT SERPL: 16.4 (ref 10–20)
CALCIUM BLD-MCNC: 8.6 MG/DL (ref 8.5–10.1)
CHLORIDE SERPL-SCNC: 100 MMOL/L (ref 98–112)
CO2 SERPL-SCNC: 29 MMOL/L (ref 21–32)
CREAT BLD-MCNC: 0.55 MG/DL (ref 0.55–1.02)
DEPRECATED RDW RBC AUTO: 43.5 FL (ref 35.1–46.3)
EOSINOPHIL # BLD AUTO: 0.05 X10(3) UL (ref 0–0.7)
EOSINOPHIL NFR BLD AUTO: 0.3 %
ERYTHROCYTE [DISTWIDTH] IN BLOOD BY AUTOMATED COUNT: 12.1 % (ref 11–15)
GLUCOSE BLD-MCNC: 127 MG/DL (ref 70–99)
GLUCOSE BLD-MCNC: 137 MG/DL (ref 70–99)
GLUCOSE BLD-MCNC: 143 MG/DL (ref 70–99)
GLUCOSE BLD-MCNC: 146 MG/DL (ref 70–99)
GLUCOSE BLD-MCNC: 148 MG/DL (ref 70–99)
HCT VFR BLD AUTO: 37.7 % (ref 35–48)
HGB BLD-MCNC: 12.7 G/DL (ref 12–16)
IMM GRANULOCYTES # BLD AUTO: 0.08 X10(3) UL (ref 0–1)
IMM GRANULOCYTES NFR BLD: 0.5 %
LYMPHOCYTES # BLD AUTO: 4.26 X10(3) UL (ref 1–4)
LYMPHOCYTES NFR BLD AUTO: 25.3 %
MCH RBC QN AUTO: 32.9 PG (ref 26–34)
MCHC RBC AUTO-ENTMCNC: 33.7 G/DL (ref 31–37)
MCV RBC AUTO: 97.7 FL (ref 80–100)
MONOCYTES # BLD AUTO: 1.17 X10(3) UL (ref 0.1–1)
MONOCYTES NFR BLD AUTO: 6.9 %
NEUTROPHILS # BLD AUTO: 11.27 X10 (3) UL (ref 1.5–7.7)
NEUTROPHILS # BLD AUTO: 11.27 X10(3) UL (ref 1.5–7.7)
NEUTROPHILS NFR BLD AUTO: 66.8 %
OSMOLALITY SERPL CALC.SUM OF ELEC: 283 MOSM/KG (ref 275–295)
PHOSPHATE SERPL-MCNC: 2.3 MG/DL (ref 2.5–4.9)
PLATELET # BLD AUTO: 257 10(3)UL (ref 150–450)
POTASSIUM SERPL-SCNC: 3.3 MMOL/L (ref 3.5–5.1)
RBC # BLD AUTO: 3.86 X10(6)UL (ref 3.8–5.3)
SODIUM SERPL-SCNC: 136 MMOL/L (ref 136–145)
WBC # BLD AUTO: 16.9 X10(3) UL (ref 4–11)

## 2019-06-30 PROCEDURE — 99232 SBSQ HOSP IP/OBS MODERATE 35: CPT | Performed by: HOSPITALIST

## 2019-06-30 RX ORDER — TRAMADOL HYDROCHLORIDE 50 MG/1
50 TABLET ORAL EVERY 6 HOURS PRN
Status: DISCONTINUED | OUTPATIENT
Start: 2019-06-30 | End: 2019-07-01

## 2019-06-30 RX ORDER — BISACODYL 10 MG
10 SUPPOSITORY, RECTAL RECTAL
Status: DISCONTINUED | OUTPATIENT
Start: 2019-06-30 | End: 2019-07-01

## 2019-06-30 RX ORDER — POTASSIUM CHLORIDE 20 MEQ/1
20 TABLET, EXTENDED RELEASE ORAL ONCE
Status: COMPLETED | OUTPATIENT
Start: 2019-06-30 | End: 2019-06-30

## 2019-06-30 RX ORDER — POTASSIUM CHLORIDE 14.9 MG/ML
20 INJECTION INTRAVENOUS ONCE
Status: DISCONTINUED | OUTPATIENT
Start: 2019-06-30 | End: 2019-07-01

## 2019-06-30 RX ORDER — METOCLOPRAMIDE HYDROCHLORIDE 5 MG/ML
10 INJECTION INTRAMUSCULAR; INTRAVENOUS EVERY 6 HOURS PRN
Status: DISCONTINUED | OUTPATIENT
Start: 2019-06-30 | End: 2019-07-01

## 2019-06-30 NOTE — PROGRESS NOTES
POD#2  Feels well; has some nausea    Blood pressure 134/78, pulse 94, temperature 98.6 °F (37 °C), temperature source Oral, resp. rate 16, height 1.549 m (5' 1\"), weight 71.5 kg (157 lb 11.2 oz), SpO2 90 %, not currently breastfeeding.       Intake/Output

## 2019-06-30 NOTE — RESPIRATORY THERAPY NOTE
SUMMER - Equipment Use Daily Summary:                  . Set Mode:                . Usage in hours:                . 90% Pressure (EPAP) level:                . 90% Insp. Pressure (IPAP): Adrian Costa AHI:                .  Supplemental Oxygen:    LPM

## 2019-06-30 NOTE — PLAN OF CARE
A/O x4. Anxious and tearful. On RA, productive cough with brown tinged sputum. NSR per tele. C/o pain up to 10/10. Tramadol and dilaudid given w/ some relief. C/o nausea, zofran given w/ some relief. Nauseated with movement.    Potassium and phosphoru and/or relaxation techniques  - Monitor for opioid side effects  - Notify MD/LIP if interventions unsuccessful or patient reports new pain  - Anticipate increased pain with activity and pre-medicate as appropriate  Outcome: Progressing     Problem: SAFETY

## 2019-06-30 NOTE — PLAN OF CARE
Assumed care at 1900  AOx4, RA-1L at night  SOB with exertion  SR/ST with ambulation  Encouraged IS  Abdomen distended, not passing gas  Midline incision with small old SS drainage  Minimal appetite  Nausea overnight x2, zofran given with relief  Oxy given

## 2019-06-30 NOTE — PROGRESS NOTES
ABAD HOSPITALIST  Progress note     Osito Echols Patient Status:  Inpatient    6/3/1960 MRN [id number]   [facility] 7NE-A Attending Larissa Pat MD   Hosp Day # 2 PCP Pam Velasquez MD     Reason for consult: med mgt    Requested by: xanax  7. Hypokalemia/hypophosphatemia-replace prn  Doesn't quite look ready for d/c today; perhaps tomorrow?   Quality:  · DVT Prophylaxis: per surgery  · CODE status: full  · Gotti: yes    Plan of care discussed with patient and rn    Christine Spence MD

## 2019-07-01 VITALS
SYSTOLIC BLOOD PRESSURE: 115 MMHG | RESPIRATION RATE: 21 BRPM | HEART RATE: 101 BPM | OXYGEN SATURATION: 93 % | BODY MASS INDEX: 29.07 KG/M2 | HEIGHT: 61 IN | WEIGHT: 154 LBS | DIASTOLIC BLOOD PRESSURE: 65 MMHG | TEMPERATURE: 99 F

## 2019-07-01 LAB
GLUCOSE BLD-MCNC: 150 MG/DL (ref 70–99)
GLUCOSE BLD-MCNC: 165 MG/DL (ref 70–99)

## 2019-07-01 PROCEDURE — 99232 SBSQ HOSP IP/OBS MODERATE 35: CPT | Performed by: HOSPITALIST

## 2019-07-01 RX ORDER — TRAMADOL HYDROCHLORIDE 50 MG/1
50 TABLET ORAL EVERY 6 HOURS PRN
Qty: 40 TABLET | Refills: 0 | Status: SHIPPED | OUTPATIENT
Start: 2019-07-01 | End: 2019-09-16 | Stop reason: ALTCHOICE

## 2019-07-01 RX ORDER — ENOXAPARIN SODIUM 100 MG/ML
40 INJECTION SUBCUTANEOUS DAILY
Qty: 6 ML | Refills: 0 | Status: SHIPPED | OUTPATIENT
Start: 2019-07-02 | End: 2019-07-11 | Stop reason: ALTCHOICE

## 2019-07-01 NOTE — PROGRESS NOTES
POD#3    No acute events. Low grade temp, 99.8F, Patient reports feeling better this AM. Pain and nausea better controlled. Blood pressure 139/81, pulse 110, temperature 98.6 °F (37 °C), temperature source Oral, resp.  rate 23, height 1.549 m (5' 1\"), w

## 2019-07-01 NOTE — OPERATIVE REPORT
Date of operation 6/20/2019    Preoperative diagnosis  1. Pelvic mass, likely left ovarian [10.5 times 18.5 x 15 cm]    Operations performed:  1. Exploratory laparotomy  2. Left salpingo-oophorectomy    Postoperative diagnosis:  1.   Benign cystic ovaria The patient was brought to the operating room and laid supine on the operating table. General endotracheal anesthesia was induced with no complication. The patient was then repositioned into low lithotomy.  A Gotti's catheter was inserted under sterile co

## 2019-07-01 NOTE — PAYOR COMM NOTE
--------------  CONTINUED STAY REVIEW    Payor: Valdo Joseph #:  [id number]  Authorization Number: [id number]     Admit date: 6/28/19  Admit time: 80    Admitting Physician: Dunia Frost MD  Attending Physician

## 2019-07-01 NOTE — PLAN OF CARE
Assumed patient care 0700 per day shift. Patient alert/orientated x4. VSS during shift. Oxygen RA, patient tolerating. Some SOB with activity. Ambulated hallway with standby assist. Midline incision with staples, PHILIP, C/D/I.  Abdomen soft distended, Bs (+), Goal  Description  Patient's Short Term Goal: pain management    Interventions:   - See additional Care Plan goals for specific interventions   Outcome: Adequate for Discharge     Problem: PAIN - ADULT  Goal: Verbalizes/displays adequate comfort level or p wound care, etc)  - Arrange for interpreters to assist at discharge as needed  - Consider post-discharge preferences of patient/family/discharge partner  - Complete POLST form as appropriate  - Assess patient's ability to be responsible for managing their

## 2019-07-01 NOTE — PROGRESS NOTES
ABAD HOSPITALIST  Progress note     Tia Jean-Baptistea Patient Status:  Inpatient    6/3/1960 MRN [id number]   [facility] 7NE-A Attending Ale Baptiste MD   Hosp Day # 3 PCP Melvin Edmondson MD     Reason for consult: med mgt    Requested by: xanax  7.  Hypokalemia/hypophosphatemia-replace prn  Possible d/c today if ok with surgery  Quality:  · DVT Prophylaxis: per surgery  · CODE status: full    Plan of care discussed with patient and rn    Kalyan Hook MD

## 2019-07-01 NOTE — PLAN OF CARE
Assumed care at 1900  AOx4, RA-1L when sleeping  SOB with exertion  SR/ST via tele  Patient with minimal appetite, worried about being nauseous  Xanax given for anxiety  Abdomen still distended, +BS, +gas, no BM  Up with standby to void  Tylenol given for

## 2019-07-01 NOTE — PAYOR COMM NOTE
--------------  CONTINUED STAY REVIEW    Payor: Valdo Joseph #:  [id number]  Authorization Number: [id number]     Admit date: 6/28/19  Admit time: 4737  Requesting authorization until current. Thank you.     Please Left arm)   Pulse 95   Temp 98.2 °F (36.8 °C) (Oral)   Resp 15   Ht 5' 1\" (1.549 m)   Wt 165 lb 2 oz (74.9 kg)   SpO2 98%   BMI 31.20 kg/m²   General: No acute distress.  Alert and oriented   Respiratory: Clear to auscultation bilaterally anterior fields

## 2019-07-01 NOTE — PAYOR COMM NOTE
--------------  CONTINUED STAY REVIEW    Payor: Valdo Joseph #:  [id number]  Authorization Number: [id number]     Admit date: 6/28/19  Admit time: 80    Admitting Physician: Eleazar Pimentel MD  Attending Physician sugars-controlled  4. Depression-resume home meds  5. Tobacco use disorder-nicotine patch ordered  6. Anxiety-prn xanax  7.  Hypokalemia/hypophosphatemia-replace prn  Possible d/c today if ok with surgery  Quality:  · DVT Prophylaxis: per surgery  · CODE st XL) 24 hr tab 50 mg     Date Action Dose Route User    7/1/2019 0500 Given 50 mg Oral Martine Parsons, RYNE      nicotine (NICODERM CQ) 21 MG/24HR 1 patch     Date Action Dose Route User    7/1/2019 0853 Patch Applied 1 patch Transdermal (Right Upper Arm) [place]

## 2019-07-02 ENCOUNTER — TELEPHONE (OUTPATIENT)
Dept: SURGERY | Facility: CLINIC | Age: 59
End: 2019-07-02

## 2019-07-02 ENCOUNTER — PATIENT OUTREACH (OUTPATIENT)
Dept: CASE MANAGEMENT | Age: 59
End: 2019-07-02

## 2019-07-02 LAB — BLOOD TYPE BARCODE: 9500

## 2019-07-02 NOTE — PAYOR COMM NOTE
--------------  DISCHARGE REVIEW    Payor: Valdo Joseph #:  [id number]  Authorization Number: [id number]     Admit date: 6/28/19  Admit time:  2667  Discharge Date: 7/1/2019  6:01 PM  Please confirm auth 6/28-7/1.

## 2019-07-02 NOTE — TELEPHONE ENCOUNTER
Post op call made to pt. Pt states she is doing well. Denies fevers, N/V/D. States pain is managed with Tylenol and Tramadol. Pt is tolerating activity without complaints. Pt is hydrating and taking nutrition without difficulty.   Pt reports BM yesterd

## 2019-07-02 NOTE — TELEPHONE ENCOUNTER
Post op call made to pt; states she is sleeping right now. Asked her to call back when she's up as I have things to discuss with her. She agreed to this.

## 2019-07-02 NOTE — PROGRESS NOTES
Condition Update Post Discharge   Discharge Date: 7/1/19  Contact Date: 7/2/2019    Consent Verification:  Assessment Completed With: Patient  HIPAA Verified? Yes    General:   • How have you been since your discharge from the hospital/facility?  Pt stat you have any questions about your discharge instructions?  o No      Medications:     Current Outpatient Medications:  Enoxaparin Sodium 40 MG/0.4ML Subcutaneous Solution Inject 0.4 mL (40 mg total) into the skin daily for 14 days.  Disp: 6 mL Rfl: 0   traM mouth nightly. Disp: 30 tablet Rfl: 5   AIMOVIG 140 DOSE 70 MG/ML Subcutaneous ADMINISTER 2 ML(140MG) UNDER THE SKIN EVERY 30 DAYS Disp: 2 pen Rfl: 0   Metoprolol Succinate ER 50 MG Oral Tablet 24 Hr Take 1 tablet (50 mg total) by mouth daily.  Disp: 90 tab Carlie)        Jul 11, 2019  2:00 PM CDT Hospital/SNF F/U with Yakelin Dobbins MD [address] [facility]    Please bring your medications bottles or an update list of all medications.        Aug 23, 2019

## 2019-07-03 NOTE — DISCHARGE SUMMARY
[facility]  Discharge Summary    Veronica Form Patient Status:  Inpatient    6/3/1960 MRN [id number]   [facility] 7NE-A Attending No att. providers found   Hosp Day # 3 PCP Germani Eli MD     Date of Admission: 2019    Date of D representative of a benign versus malignant ovarian cyst.  Following that, the patient underwent an ultrasound of the pelvis which showedA large complex cystic pelvic mass highly suspicious for an ovarian neoplasm.  It was noted that the mass extends to th Sodium  MG Oral Tablet 24 Hr  Take 250 mg by mouth nightly., Historical    QUEtiapine Fumarate  MG Oral Tablet 24 Hr  Take 200 mg by mouth nightly., Historical    Zolpidem Tartrate 10 MG Oral Tab  Take 10 mg by mouth nightly as needed for Sleep Print Script, Disp-90 tablet, R-0Order placed thru [facility]. Order #[id number]    Multiple Vitamin (MULTIVITAMINS) Oral Cap  Take 1 capsule by mouth daily. , Normal, Disp-90 capsule, R-3With minerals    Diclofenac Sodium 1 % Transdermal Gel  Apply 2 g topicall

## 2019-07-08 ENCOUNTER — TELEPHONE (OUTPATIENT)
Dept: SURGERY | Facility: CLINIC | Age: 59
End: 2019-07-08

## 2019-07-08 NOTE — TELEPHONE ENCOUNTER
Pt called stating her incision/staple area is getting red. Denies fevers, warmth, drainageor new pain to area. Asked to send photo for review. Photo forwarded to Dr Malika Madrigal. After reviewing, it looks like irritation from staples.   Pt agrees to monitor

## 2019-07-09 ENCOUNTER — TELEPHONE (OUTPATIENT)
Dept: INTERNAL MEDICINE CLINIC | Facility: CLINIC | Age: 59
End: 2019-07-09

## 2019-07-09 DIAGNOSIS — R19.00 PELVIC MASS: ICD-10-CM

## 2019-07-09 RX ORDER — DESVENLAFAXINE 100 MG/1
100 TABLET, EXTENDED RELEASE ORAL DAILY
Qty: 90 TABLET | Refills: 0 | COMMUNITY
Start: 2019-07-09 | End: 2019-10-07

## 2019-07-09 RX ORDER — PSEUDOEPHED/ACETAMINOPH/DIPHEN 30MG-500MG
TABLET ORAL
Qty: 40 TABLET | Refills: 0 | Status: SHIPPED | OUTPATIENT
Start: 2019-07-09 | End: 2019-09-16 | Stop reason: ALTCHOICE

## 2019-07-09 NOTE — TELEPHONE ENCOUNTER
Len[facility] [employer] contacted at [phone]. Pt ID # [id number]. Order ID # [id number] and will be shipped 7-10 business days.

## 2019-07-09 NOTE — TELEPHONE ENCOUNTER
Patient calling in, requesting a refill for Desvenlafaxine Succinate ER (PRISTIQ) 24 hr tab 100 mg    Please call pt when ready for

## 2019-07-10 ENCOUNTER — OFFICE VISIT (OUTPATIENT)
Dept: SURGERY | Facility: CLINIC | Age: 59
End: 2019-07-10
Payer: MEDICAID

## 2019-07-10 VITALS
DIASTOLIC BLOOD PRESSURE: 84 MMHG | OXYGEN SATURATION: 97 % | SYSTOLIC BLOOD PRESSURE: 148 MMHG | RESPIRATION RATE: 16 BRPM | TEMPERATURE: 98 F | BODY MASS INDEX: 29 KG/M2 | HEART RATE: 97 BPM | WEIGHT: 155 LBS

## 2019-07-10 DIAGNOSIS — D27.1: Primary | ICD-10-CM

## 2019-07-10 PROCEDURE — 99024 POSTOP FOLLOW-UP VISIT: CPT | Performed by: SURGERY

## 2019-07-10 RX ORDER — TRAMADOL HYDROCHLORIDE 50 MG/1
TABLET ORAL EVERY 4 HOURS PRN
Qty: 30 TABLET | Refills: 0 | Status: SHIPPED | OUTPATIENT
Start: 2019-07-10 | End: 2020-01-13

## 2019-07-11 ENCOUNTER — OFFICE VISIT (OUTPATIENT)
Dept: INTERNAL MEDICINE CLINIC | Facility: CLINIC | Age: 59
End: 2019-07-11
Payer: MEDICAID

## 2019-07-11 VITALS
WEIGHT: 153.25 LBS | HEIGHT: 61 IN | TEMPERATURE: 99 F | SYSTOLIC BLOOD PRESSURE: 138 MMHG | BODY MASS INDEX: 28.93 KG/M2 | HEART RATE: 84 BPM | RESPIRATION RATE: 16 BRPM | OXYGEN SATURATION: 97 % | DIASTOLIC BLOOD PRESSURE: 96 MMHG

## 2019-07-11 DIAGNOSIS — D27.1 MUCINOUS CYSTADENOMA OF OVARY, LEFT: Primary | ICD-10-CM

## 2019-07-11 DIAGNOSIS — E11.65 UNCONTROLLED TYPE 2 DIABETES MELLITUS WITH HYPERGLYCEMIA, WITHOUT LONG-TERM CURRENT USE OF INSULIN (HCC): ICD-10-CM

## 2019-07-11 DIAGNOSIS — B35.1 ONYCHOMYCOSIS: ICD-10-CM

## 2019-07-11 PROCEDURE — 99214 OFFICE O/P EST MOD 30 MIN: CPT | Performed by: INTERNAL MEDICINE

## 2019-07-11 PROCEDURE — 1111F DSCHRG MED/CURRENT MED MERGE: CPT | Performed by: INTERNAL MEDICINE

## 2019-07-11 RX ORDER — TERBINAFINE HYDROCHLORIDE 250 MG/1
250 TABLET ORAL DAILY
Qty: 30 TABLET | Refills: 2 | Status: SHIPPED | OUTPATIENT
Start: 2019-07-11 | End: 2019-10-17

## 2019-07-11 NOTE — PROGRESS NOTES
[facility]    Patient Name:  Ignacia Duverney   YOB: 1960   Gender:  Female   Appt Date:  5/17/2019   Provider:  Alycia Aase, MD   Insurance:  [facility] Don Prajapati Serena Oliver returns to clinic today for follow-up. She is a patient who is known to me. Briefly, she had originally presented with a large cystic ovarian mass which was discovered incidentally.   She was taken to the operating room on 6/28/2019 for nielsen •  Zolpidem Tartrate 10 MG Oral Tab, Take 10 mg by mouth nightly as needed for Sleep., Disp: , Rfl:   •  traMADol HCl 50 MG Oral Tab, Take 1-2 tablets ( mg total) by mouth every 4 (four) hours as needed for Pain., Disp: 50 tablet, Rfl: 0  •  Cycloben •  Multiple Vitamin (MULTIVITAMINS) Oral Cap, Take 1 capsule by mouth daily. , Disp: 90 capsule, Rfl: 3  •  Diclofenac Sodium 1 % Transdermal Gel, Apply 2 g topically 2 (two) times daily as needed. , Disp: 1 Tube, Rfl: 2     Allergies Reviewed:    Naprosyn [ Spouse name: Not on file      Number of children: Not on file      Years of education: Not on file      Highest education level: Not on file    Tobacco Use      Smoking status: Current Every Day Smoker        Packs/day: 0.75        Years: 41.00 Eyes: Pupils are equal, round, and reactive to light. EOM are normal.   Neck: Normal range of motion. Cardiovascular: Normal rate and regular rhythm. No murmur heard. Pulmonary/Chest: Effort normal and breath sounds normal. No respiratory distress.  Tomy Shine A- Labeled with the patient's name and medical record number, left ovarian mass, received fresh for frozen section:  Specimen consists of an intact ovarian cyst weighing 2351 grams and measuring 18.0 x 18.0 x 12.0 cm in greatest dimension.   The external nielsen

## 2019-07-11 NOTE — PROGRESS NOTES
HPI:    Josefina Phillips is a 64year old female here today for hospital follow up.    She was discharged from Inpatient hospital, [facility] to Home   Admission Date: 6/28/19   Discharge Date: 7/1/19  Hospital Discharge Diagnoses (since 6/11/2019)   Mu Desvenlafaxine Succinate ER (PRISTIQ) 100 MG Oral Tablet 24 Hr Take 1 tablet (100 mg total) by mouth daily. traMADol HCl 50 MG Oral Tab Take 1 tablet (50 mg total) by mouth every 6 (six) hours as needed.    atorvastatin 20 MG Oral Tab Take 20 mg by mout % Transdermal Gel Apply 2 g topically 2 (two) times daily as needed. No current facility-administered medications on file prior to visit.        HISTORY: reconciled and reviewed with patient  She  has a past medical history of Abdominal pain, other spec depression or anxiety  PHYSICAL EXAM:   No LMP recorded. Patient is postmenopausal.  Estimated body mass index is 28.96 kg/m² as calculated from the following:    Height as of this encounter: 61\". Weight as of this encounter: 153 lb 4 oz.    BP (!) 138/ meals.   • Terbinafine HCl 250 MG Oral Tab 30 tablet 2     Sig: Take 1 tablet (250 mg total) by mouth daily.        Imaging & Consults:  None       Transitional Care Management Certification:  I certify that the following are true:  Communication with the p

## 2019-07-16 NOTE — TELEPHONE ENCOUNTER
Notified patient~ Pristiq 100mg (1 bottle) arrived at our office from [employer] . Patient states she will send someone to  her medication at our office.   Advised patient to call us ahead of time to inform us who is picking up her med and to bring their

## 2019-07-23 NOTE — TELEPHONE ENCOUNTER
Omeprazole delayed release has been approved from 5-8-18 through 5-8-19     Pt aware and refill request placed     Request ID [id number] Progress Note    Patient: Moises Leo MRN: [id number]  SSN: [id number]    YOB: 1936  Age: 82 y.o. Sex: female      Admit Date: 2019    1 Day Post-Op    Procedure:  Procedure(s):  LAPAROSCOPIC PARAESOPHAGEAL HERNIA REPAIR WITH MESH    Subjective:     Patient denies pain; sipping clear liquids. Objective:     Visit Vitals  /64 (BP 1 Location: Left arm, BP Patient Position: At rest)   Pulse (!) 56   Temp 98.2 °F (36.8 °C)   Resp 14   Ht 5' 1.75\" (1.568 m)   Wt 190 lb 2 oz (86.2 kg)   SpO2 93%   BMI 35.06 kg/m²       Temp (24hrs), Av.7 °F (36.5 °C), Min:96.8 °F (36 °C), Max:98.2 °F (36.8 °C)      Physical Exam:    LUNG: clear to auscultation bilaterally, HEART: regular rate and rhythm, S1, S2 normal, no murmur. ABDOMEN: Non-distended, soft. Wounds dry and intact. Data Review: VS, I/O's, Labs    Lab Review:   Recent Results (from the past 12 hour(s))   METABOLIC PANEL, COMPREHENSIVE    Collection Time: 19  3:36 AM   Result Value Ref Range    Sodium 138 136 - 145 mmol/L    Potassium 4.5 3.5 - 5.1 mmol/L    Chloride 105 97 - 108 mmol/L    CO2 28 21 - 32 mmol/L    Anion gap 5 5 - 15 mmol/L    Glucose 119 (H) 65 - 100 mg/dL    BUN 15 6 - 20 MG/DL    Creatinine 1.23 (H) 0.55 - 1.02 MG/DL    BUN/Creatinine ratio 12 12 - 20      GFR est AA 51 (L) >60 ml/min/1.73m2    GFR est non-AA 42 (L) >60 ml/min/1.73m2    Calcium 9.5 8.5 - 10.1 MG/DL    Bilirubin, total 0.4 0.2 - 1.0 MG/DL    ALT (SGPT) 29 12 - 78 U/L    AST (SGOT) 35 15 - 37 U/L    Alk.  phosphatase 62 45 - 117 U/L    Protein, total 6.8 6.4 - 8.2 g/dL    Albumin 3.1 (L) 3.5 - 5.0 g/dL    Globulin 3.7 2.0 - 4.0 g/dL    A-G Ratio 0.8 (L) 1.1 - 2.2     CBC WITH AUTOMATED DIFF    Collection Time: 19  3:36 AM   Result Value Ref Range    WBC 10.9 3.6 - 11.0 K/uL    RBC 3.85 3.80 - 5.20 M/uL    HGB 11.9 11.5 - 16.0 g/dL    HCT 35.7 35.0 - 47.0 %    MCV 92.7 80.0 - 99.0 FL    MCH 30.9 26.0 - 34.0 PG    MCHC 33.3 30.0 - 36.5 g/dL RDW 13.4 11.5 - 14.5 %    PLATELET 264 (L) 295 - 400 K/uL    MPV 11.4 8.9 - 12.9 FL    NRBC 0.0 0  WBC    ABSOLUTE NRBC 0.00 0.00 - 0.01 K/uL    NEUTROPHILS 88 (H) 32 - 75 %    LYMPHOCYTES 6 (L) 12 - 49 %    MONOCYTES 6 5 - 13 %    EOSINOPHILS 0 0 - 7 %    BASOPHILS 0 0 - 1 %    IMMATURE GRANULOCYTES 0 %    ABS. NEUTROPHILS 9.5 (H) 1.8 - 8.0 K/UL    ABS. LYMPHOCYTES 0.7 (L) 0.8 - 3.5 K/UL    ABS. MONOCYTES 0.7 0.0 - 1.0 K/UL    ABS. EOSINOPHILS 0.0 0.0 - 0.4 K/UL    ABS. BASOPHILS 0.0 0.0 - 0.1 K/UL    ABS. IMM. GRANS. 0.0 K/UL    DF MANUAL      RBC COMMENTS NORMOCYTIC, NORMOCHROMIC         Assessment:     Hospital Problems  Date Reviewed: 2019          Codes Class Noted POA    Paraesophageal hernia ICD-10-CM: K44.9  ICD-9-CM: 553.3  2019 Unknown              Plan/Recommendations/Medical Decision Makin. UGI this AM; if negative advance to full liquids. 2. PT consult. 3. Scheduled Tylenol. 4. Out of bed in chair.

## 2019-08-19 DIAGNOSIS — G43.009 MIGRAINE WITHOUT AURA AND WITHOUT STATUS MIGRAINOSUS, NOT INTRACTABLE: ICD-10-CM

## 2019-08-19 NOTE — TELEPHONE ENCOUNTER
Medication: RIZATRIPTAN BENZOATE 10 MG Oral Tab    Date of last refill: 03/29/19 (#12/2)  [place]    Last office visit: 05/09/19  Due back to clinic per last office note:  Around 11/09/19  Date next office visit sche

## 2019-08-20 RX ORDER — RIZATRIPTAN BENZOATE 10 MG/1
TABLET ORAL
Qty: 12 TABLET | Refills: 2 | Status: SHIPPED | OUTPATIENT
Start: 2019-08-20 | End: 2020-08-12

## 2019-08-22 ENCOUNTER — TELEPHONE (OUTPATIENT)
Dept: NEUROLOGY | Facility: CLINIC | Age: 59
End: 2019-08-22

## 2019-08-22 DIAGNOSIS — G43.009 MIGRAINE WITHOUT AURA AND WITHOUT STATUS MIGRAINOSUS, NOT INTRACTABLE: Primary | ICD-10-CM

## 2019-08-22 NOTE — TELEPHONE ENCOUNTER
Patient states pharmacy is telling her that [address] won't go through insurance. Armen Piper was approved through 11/15/19. Spoke with Anca James at [facility] who states when they try to put through medication it comes up as non-formulary.  Spoke with Nano Magdaleno at [facility]

## 2019-08-23 NOTE — TELEPHONE ENCOUNTER
Called [facility] to follow up on formulary exception that was faxed to [facility] on 6/25/2019. Spoke with Jyoti Borrero regarding  reference number [id number]. She states that GERSON needs to speak to [employer] and she transferred RN to fax machine.     Called [employer]

## 2019-08-23 NOTE — TELEPHONE ENCOUNTER
Called patient and left detailed message informingher that new PA has been started for the Aimovig 140 mg /month dose. Also, Informed patient that new PA was needed to to formulary change on her insurance. As Roosevelt Sharpe was covered until 11/2019.     Per [address]

## 2019-08-23 NOTE — TELEPHONE ENCOUNTER
Aimovig 140mg/mL solution auto-injector     Effective 5/23/19-8/23/20    Request VT#[id number]  Case# [id number]    Spoke with Mahogany Lance at pharmacy, informed of above. Verbalized understanding, was able to process.      Informed pt of above, verbalized Susie

## 2019-08-29 RX ORDER — OMEPRAZOLE 40 MG/1
40 CAPSULE, DELAYED RELEASE ORAL 2 TIMES DAILY
Qty: 60 CAPSULE | Refills: 5 | Status: SHIPPED | OUTPATIENT
Start: 2019-08-29 | End: 2020-02-18

## 2019-08-29 NOTE — TELEPHONE ENCOUNTER
Last OV:  7/11/19 with Dr. Karie Butler  Last refill date: 5/24/19     #/refills: #60, 2 refills  When pt was asked to return for OV: RTC 6 weeks  Upcoming appt: 9/16/19 with Dr. Mg Gonzales April-July 2019

## 2019-09-16 ENCOUNTER — OFFICE VISIT (OUTPATIENT)
Dept: INTERNAL MEDICINE CLINIC | Facility: CLINIC | Age: 59
End: 2019-09-16
Payer: MEDICAID

## 2019-09-16 VITALS
HEART RATE: 80 BPM | DIASTOLIC BLOOD PRESSURE: 86 MMHG | WEIGHT: 159.25 LBS | TEMPERATURE: 98 F | BODY MASS INDEX: 30.06 KG/M2 | SYSTOLIC BLOOD PRESSURE: 130 MMHG | RESPIRATION RATE: 16 BRPM | HEIGHT: 61 IN

## 2019-09-16 DIAGNOSIS — J32.9 CHRONIC SINUSITIS, UNSPECIFIED LOCATION: ICD-10-CM

## 2019-09-16 DIAGNOSIS — I10 ESSENTIAL HYPERTENSION: ICD-10-CM

## 2019-09-16 DIAGNOSIS — M22.42 CHONDROMALACIA PATELLAE OF LEFT KNEE: ICD-10-CM

## 2019-09-16 DIAGNOSIS — G89.29 CHRONIC PAIN OF LEFT KNEE: ICD-10-CM

## 2019-09-16 DIAGNOSIS — E11.9 CONTROLLED TYPE 2 DIABETES MELLITUS WITHOUT COMPLICATION, WITHOUT LONG-TERM CURRENT USE OF INSULIN (HCC): Primary | ICD-10-CM

## 2019-09-16 DIAGNOSIS — M25.562 CHRONIC PAIN OF LEFT KNEE: ICD-10-CM

## 2019-09-16 LAB
CARTRIDGE LOT#: 227 NUMERIC
HEMOGLOBIN A1C: 6.9 % (ref 4.3–5.6)

## 2019-09-16 PROCEDURE — 83036 HEMOGLOBIN GLYCOSYLATED A1C: CPT | Performed by: INTERNAL MEDICINE

## 2019-09-16 PROCEDURE — 99214 OFFICE O/P EST MOD 30 MIN: CPT | Performed by: INTERNAL MEDICINE

## 2019-09-16 RX ORDER — METHYLPREDNISOLONE 4 MG/1
TABLET ORAL
Qty: 1 KIT | Refills: 0 | Status: SHIPPED | OUTPATIENT
Start: 2019-09-16 | End: 2019-10-29 | Stop reason: ALTCHOICE

## 2019-09-16 NOTE — PROGRESS NOTES
Patient presents with:   Follow - Up: 6 week follow up   Knee Pain: L knee pain   Sinus Problem      HPI: Soumya presents today for eval of select chronic issues of DM2 and HTN as well as ongoing issues of L knee pain and chronic sinus infections/inflammatio myelopathy or radiculopathy     Onychomycosis      Naprosyn [Naproxen]; Aspirin      Current Outpatient Medications on File Prior to Visit:  OMEPRAZOLE 40 MG Oral Capsule Delayed Release Take 1 capsule (40 mg total) by mouth 2 (two) times daily.   Disp: 60 Metoprolol Succinate ER 50 MG Oral Tablet 24 Hr Take 1 tablet (50 mg total) by mouth daily. Disp: 90 tablet Rfl: 1   Meloxicam 15 MG Oral Tab Take 1 tablet (15 mg total) by mouth daily.  Disp: 90 tablet Rfl: 3   Multiple Vitamin (MULTIVITAMINS) Oral Cap T Normal extensor mechanism. Normal medial and lateral meniscus. Normal ACL, PCL, MCL lateral ligament complex. No osseous injury or osteochondral abnormality.   Normal popliteus tendon and popliteal fossa.     =====  CONCLUSION:  Small suprapatellar ef MD  Diplomate, American Board of Internal Medicine  [address] [facility]  [address] [facility],4[address] Floor, [address], [facility], [address]  T: [phone]; F: Tona 5       Orders Placed This Encounter      Hgb A1C      Meds & Refills for this Visit:  Requested P

## 2019-09-16 NOTE — PATIENT INSTRUCTIONS
Soumya,    1. Start taking your Flonase 2 sprays in each nostril once per day. 2. Start taking the steroid pack for the left knee. 3. Obtain an X-ray of the left knee. 4. A1c is 6.9% today. Excellent job! 5.  Continue with all other medication as direct

## 2019-09-24 ENCOUNTER — HOSPITAL ENCOUNTER (OUTPATIENT)
Dept: GENERAL RADIOLOGY | Age: 59
Discharge: HOME | End: 2019-09-24
Attending: INTERNAL MEDICINE
Payer: MEDICAID

## 2019-09-24 DIAGNOSIS — G89.29 CHRONIC PAIN OF LEFT KNEE: ICD-10-CM

## 2019-09-24 DIAGNOSIS — M25.562 CHRONIC PAIN OF LEFT KNEE: ICD-10-CM

## 2019-09-24 DIAGNOSIS — M22.42 CHONDROMALACIA PATELLAE OF LEFT KNEE: ICD-10-CM

## 2019-09-24 PROCEDURE — 73560 X-RAY EXAM OF KNEE 1 OR 2: CPT | Performed by: INTERNAL MEDICINE

## 2019-09-28 DIAGNOSIS — I10 ESSENTIAL HYPERTENSION: ICD-10-CM

## 2019-09-30 ENCOUNTER — OFFICE VISIT (OUTPATIENT)
Dept: RHEUMATOLOGY | Facility: CLINIC | Age: 59
End: 2019-09-30
Payer: MEDICAID

## 2019-09-30 VITALS
BODY MASS INDEX: 30.4 KG/M2 | WEIGHT: 161 LBS | HEART RATE: 80 BPM | SYSTOLIC BLOOD PRESSURE: 140 MMHG | RESPIRATION RATE: 18 BRPM | HEIGHT: 61 IN | DIASTOLIC BLOOD PRESSURE: 88 MMHG

## 2019-09-30 DIAGNOSIS — M25.462 EFFUSION OF LEFT KNEE: ICD-10-CM

## 2019-09-30 DIAGNOSIS — M15.9 PRIMARY OSTEOARTHRITIS INVOLVING MULTIPLE JOINTS: ICD-10-CM

## 2019-09-30 DIAGNOSIS — M17.12 PRIMARY OSTEOARTHRITIS OF LEFT KNEE: Primary | ICD-10-CM

## 2019-09-30 DIAGNOSIS — M18.11 ARTHRITIS OF CARPOMETACARPAL (CMC) JOINT OF RIGHT THUMB: ICD-10-CM

## 2019-09-30 PROCEDURE — 20610 DRAIN/INJ JOINT/BURSA W/O US: CPT | Performed by: INTERNAL MEDICINE

## 2019-09-30 PROCEDURE — 20600 DRAIN/INJ JOINT/BURSA W/O US: CPT | Performed by: INTERNAL MEDICINE

## 2019-09-30 RX ORDER — HYDROCODONE BITARTRATE AND ACETAMINOPHEN 7.5; 325 MG/1; MG/1
1 TABLET ORAL EVERY 6 HOURS PRN
Qty: 120 TABLET | Refills: 0 | Status: SHIPPED | OUTPATIENT
Start: 2019-09-30 | End: 2019-10-30

## 2019-09-30 RX ORDER — METOPROLOL SUCCINATE 50 MG/1
TABLET, EXTENDED RELEASE ORAL
Qty: 30 TABLET | Refills: 2 | Status: SHIPPED | OUTPATIENT
Start: 2019-09-30 | End: 2019-12-23

## 2019-09-30 RX ORDER — NABUMETONE 750 MG/1
750 TABLET, FILM COATED ORAL 2 TIMES DAILY
Qty: 60 TABLET | Refills: 3 | Status: SHIPPED | OUTPATIENT
Start: 2019-09-30 | End: 2020-01-13

## 2019-09-30 RX ORDER — METHYLPREDNISOLONE ACETATE 40 MG/ML
10 INJECTION, SUSPENSION INTRA-ARTICULAR; INTRALESIONAL; INTRAMUSCULAR; SOFT TISSUE ONCE
Status: COMPLETED | OUTPATIENT
Start: 2019-09-30 | End: 2019-09-30

## 2019-09-30 RX ORDER — ETODOLAC 400 MG/1
400 TABLET, FILM COATED ORAL 2 TIMES DAILY
Qty: 60 TABLET | Refills: 3 | Status: SHIPPED | OUTPATIENT
Start: 2019-09-30 | End: 2019-09-30

## 2019-09-30 RX ORDER — METHYLPREDNISOLONE ACETATE 40 MG/ML
40 INJECTION, SUSPENSION INTRA-ARTICULAR; INTRALESIONAL; INTRAMUSCULAR; SOFT TISSUE ONCE
Status: COMPLETED | OUTPATIENT
Start: 2019-09-30 | End: 2019-09-30

## 2019-09-30 RX ORDER — MELOXICAM 15 MG/1
15 TABLET ORAL DAILY
Qty: 90 TABLET | Refills: 3 | Status: CANCELLED | OUTPATIENT
Start: 2019-09-30

## 2019-09-30 RX ADMIN — METHYLPREDNISOLONE ACETATE 40 MG: 40 INJECTION, SUSPENSION INTRA-ARTICULAR; INTRALESIONAL; INTRAMUSCULAR; SOFT TISSUE at 11:37:00

## 2019-09-30 RX ADMIN — METHYLPREDNISOLONE ACETATE 10 MG: 40 INJECTION, SUSPENSION INTRA-ARTICULAR; INTRALESIONAL; INTRAMUSCULAR; SOFT TISSUE at 11:38:00

## 2019-09-30 NOTE — PATIENT INSTRUCTIONS
Current advice is to discontinue meloxicam and try a different anti-inflammatory pain reliever. Therefore E etodolac was ordered 400 mg twice a day in place of meloxicam.  Use one or the other they cannot be used together.   For pain relief you could try e

## 2019-09-30 NOTE — TELEPHONE ENCOUNTER
Metoprolol er 50 mg 1 tab daily filled 4-2-19 90 with 1 refill     LOV 9-16-19    RTC 4 months  No upcoming apt on file   Labs  6-30-19

## 2019-09-30 NOTE — PROCEDURES
Procedure note 2 injections were done right knee injection for osteoarthritis of the left knee primary. Injection of the right thumb at the carpometacarpal joint that has arthritis.   After obtaining consent from the patient the left knee was cleansed medi

## 2019-09-30 NOTE — PROGRESS NOTES
[facility]  Dr. Rizwana Royal Progress Note     Subjective:   Chayo Romo is a(n) 64year old female. Current complaints: Patient presents with:  Osteoarthritis: fu 3mo-Pt co left knee pain, painful to walk upstairs. Lower back discomfort.    Left Knee over-the-counter salve like Biofreeze or capsaicin or, or the Voltaren gel. Return to see Dr. Baldev Man in 3 months. Try to baby the left knee now since it was swelling.         Amauri Brooks MD 8/20/4152 11:29 AM

## 2019-10-04 ENCOUNTER — TELEPHONE (OUTPATIENT)
Dept: INTERNAL MEDICINE CLINIC | Facility: CLINIC | Age: 59
End: 2019-10-04

## 2019-10-04 NOTE — TELEPHONE ENCOUNTER
Pt due for Pristiq.   Please order from [facility] and notify pt when available in office for pickup     Desvenlafaxine Succinate ER (PRISTIQ) 24 hr tab 100 mg    [place] [address] [phone]    Pt ID# [id number]

## 2019-10-07 ENCOUNTER — TELEPHONE (OUTPATIENT)
Dept: SURGERY | Facility: CLINIC | Age: 59
End: 2019-10-07

## 2019-10-07 RX ORDER — DESVENLAFAXINE 100 MG/1
100 TABLET, EXTENDED RELEASE ORAL
Qty: 90 TABLET | Refills: 0 | COMMUNITY
Start: 2019-10-07 | End: 2020-01-27

## 2019-10-07 NOTE — TELEPHONE ENCOUNTER
Debbie Schuler contacted. Pristiq ordered. order #[id number] and will take 7-10 business days to arrive in our office.

## 2019-10-09 DIAGNOSIS — B35.1 ONYCHOMYCOSIS: ICD-10-CM

## 2019-10-10 RX ORDER — TERBINAFINE HYDROCHLORIDE 250 MG/1
250 TABLET ORAL DAILY
Qty: 30 TABLET | Refills: 1 | OUTPATIENT
Start: 2019-10-10

## 2019-10-10 NOTE — TELEPHONE ENCOUNTER
How long should she continue treatment for? Failed protocol     Last refill:  7/11/2019 Terbinafine 250 mg #30 2R    LOV:   9/16/2019 Dr Arnoldo Salgado RTC 4 months     7/11/2019 Dr Arnoldo Salgado 3. Onychomycosis - Start Terbinafine 250 mg daily.       No FOV scheduled

## 2019-10-11 NOTE — TELEPHONE ENCOUNTER
Is her toenail fungus gone? Kathleen Eisenberg. Jolly Hinojosa MD  Diplomate, American Board of Internal Medicine  Member, [employer] of [address] [facility]  [address]  [facility],4Th Floor, [address], [facility], [address] [place] [address]  T: [phone]; F: Deriketi 5

## 2019-10-14 ENCOUNTER — TELEPHONE (OUTPATIENT)
Dept: RHEUMATOLOGY | Facility: CLINIC | Age: 59
End: 2019-10-14

## 2019-10-15 NOTE — TELEPHONE ENCOUNTER
[employer] office on patient's VM. Please inform patient 1 bottle (#90 tab) of PRISTIQ ER 100MG TAB ready for  at our office (located in triage).

## 2019-10-17 RX ORDER — TERBINAFINE HYDROCHLORIDE 250 MG/1
250 TABLET ORAL DAILY
Qty: 30 TABLET | Refills: 2 | Status: SHIPPED | OUTPATIENT
Start: 2019-10-17 | End: 2020-02-18

## 2019-10-26 RX ORDER — QUETIAPINE 200 MG/1
TABLET, FILM COATED, EXTENDED RELEASE ORAL
Qty: 90 TABLET | Refills: 0 | Status: SHIPPED | OUTPATIENT
Start: 2019-10-26 | End: 2020-01-23

## 2019-10-26 NOTE — TELEPHONE ENCOUNTER
Failed protocol     Last refill:  Entered historically Quetiapine  mg nightly    LOV:   9/16/2019 Dr Kirk James RTC 4 months     No FOV scheduled

## 2019-10-28 ENCOUNTER — TELEPHONE (OUTPATIENT)
Dept: INTERNAL MEDICINE CLINIC | Facility: CLINIC | Age: 59
End: 2019-10-28

## 2019-10-28 NOTE — TELEPHONE ENCOUNTER
Please order up Famotidine 20 mg, 1 tab PO BID. OK to give her #180 quant w/ #1 refill. This will cover her since we're stopping the Ranitidine. She should continue w/ the Omeprazole as written. Rima Medina.  Kris Perez MD  Diplomate, [address] [place] [address] Board of Inter

## 2019-10-28 NOTE — TELEPHONE ENCOUNTER
Patient called in and stated that she heard in the news that Ranitidine has been recalled.     Patient stated that she has been taking raNITIdine HCl 300 MG Oral Tab and would like recommendation from Dr. Ed Justice if there is a new RX she could take as a repla

## 2019-10-29 ENCOUNTER — OFFICE VISIT (OUTPATIENT)
Dept: INTERNAL MEDICINE CLINIC | Facility: CLINIC | Age: 59
End: 2019-10-29
Payer: MEDICAID

## 2019-10-29 ENCOUNTER — TELEPHONE (OUTPATIENT)
Dept: INTERNAL MEDICINE CLINIC | Facility: CLINIC | Age: 59
End: 2019-10-29

## 2019-10-29 VITALS
HEART RATE: 92 BPM | SYSTOLIC BLOOD PRESSURE: 140 MMHG | WEIGHT: 162 LBS | BODY MASS INDEX: 31 KG/M2 | DIASTOLIC BLOOD PRESSURE: 100 MMHG | TEMPERATURE: 98 F | OXYGEN SATURATION: 97 % | RESPIRATION RATE: 24 BRPM

## 2019-10-29 DIAGNOSIS — K21.9 GASTROESOPHAGEAL REFLUX DISEASE, ESOPHAGITIS PRESENCE NOT SPECIFIED: ICD-10-CM

## 2019-10-29 DIAGNOSIS — K22.70 BARRETT'S ESOPHAGUS WITHOUT DYSPLASIA: ICD-10-CM

## 2019-10-29 DIAGNOSIS — J01.90 ACUTE NON-RECURRENT SINUSITIS, UNSPECIFIED LOCATION: Primary | ICD-10-CM

## 2019-10-29 PROCEDURE — 99214 OFFICE O/P EST MOD 30 MIN: CPT | Performed by: NURSE PRACTITIONER

## 2019-10-29 RX ORDER — FAMOTIDINE 20 MG/1
20 TABLET ORAL 2 TIMES DAILY
Qty: 180 TABLET | Refills: 1 | Status: CANCELLED | OUTPATIENT
Start: 2019-10-29

## 2019-10-29 RX ORDER — DEXTROMETHORPHAN HYDROBROMIDE AND PROMETHAZINE HYDROCHLORIDE 15; 6.25 MG/5ML; MG/5ML
5 SYRUP ORAL 4 TIMES DAILY PRN
Qty: 180 ML | Refills: 0 | Status: SHIPPED | OUTPATIENT
Start: 2019-10-29 | End: 2020-02-18 | Stop reason: ALTCHOICE

## 2019-10-29 RX ORDER — AMOXICILLIN 875 MG/1
875 TABLET, COATED ORAL 2 TIMES DAILY
Qty: 14 TABLET | Refills: 0 | Status: SHIPPED | OUTPATIENT
Start: 2019-10-29 | End: 2019-11-05

## 2019-10-29 RX ORDER — FAMOTIDINE 20 MG/1
20 TABLET ORAL NIGHTLY PRN
Qty: 60 TABLET | Refills: 0 | Status: SHIPPED | OUTPATIENT
Start: 2019-10-29 | End: 2019-12-23

## 2019-10-29 NOTE — TELEPHONE ENCOUNTER
Patient calling in, stated the Pharmacy is charging her $18.50 for the RX Promethazine-DM 6.25-15 MG/5ML Oral Syrup. Pt stated she did not have the funds, so she did not  the medication.     Pt seeking an alternative prescription that will be cove

## 2019-10-29 NOTE — TELEPHONE ENCOUNTER
Pt notified of provider response. Pt stated she was in to see KR today and KR prescribed medication. Advised pt to contact office if not working. Pt verbalized understanding.

## 2019-10-29 NOTE — PROGRESS NOTES
CHIEF COMPLAINT:   Patient presents with:  Cough: pt c/o mucus, sore throat, headache, congestion x1 wk.    Other: pr would like to discuss Renitidine      HPI:   Chito Bell is a 64year old female who presents for upper respiratory symptoms for  10 da TABS/24HOURS**, Disp: 12 tablet, Rfl: 2  metFORMIN HCl 500 MG Oral Tab, Take 1 tablet (500 mg total) by mouth 2 (two) times daily with meals. , Disp: 60 tablet, Rfl: 5  atorvastatin 20 MG Oral Tab, Take 20 mg by mouth nightly., Disp: , Rfl:   divalproex Sod Diagnosis Date   • Abdominal pain, other specified site 09/21/2011    groin   • Acute bronchitis 09/21/2011   • Jamison esophagus    • Depression    • Diabetes [facility])    • Difficult intubation    • Esophageal reflux    • Fitting and adjustment of dental p palpitations   GI: denies V/C or abdominal pain, + nausea  NEURO: + headaches    EXAM:   BP (!) 140/100 (BP Location: Left arm, Patient Position: Sitting, Cuff Size: adult)   Pulse 92   Temp 98.3 °F (36.8 °C) (Oral)   Resp 24   Wt 162 lb (73.5 kg)   SpO2 9 fluids, rest, and vitamin C intake; May drink tea with honey for sore throat; I also instructed them to use a cool-mist vaporizer and prop themselves up at night to help with the coughing.  The patient indicates understanding of these issues and agrees to t

## 2019-10-30 ENCOUNTER — TELEPHONE (OUTPATIENT)
Dept: RHEUMATOLOGY | Facility: CLINIC | Age: 59
End: 2019-10-30

## 2019-10-30 RX ORDER — TRAMADOL HYDROCHLORIDE 50 MG/1
50 TABLET ORAL EVERY 6 HOURS PRN
Qty: 30 TABLET | Refills: 1 | Status: SHIPPED | OUTPATIENT
Start: 2019-10-30 | End: 2019-12-23

## 2019-10-30 NOTE — TELEPHONE ENCOUNTER
FW:   Received: Today   Message Contents   MARLENE Guevara  P [facility] Clinical Staff   Caller: Unspecified Mikki Cox,  3:13 PM)             Will need to use OTC cough medicine such as robitussin.  Can also try a tablespoon of honey and OTC cough lo

## 2019-10-30 NOTE — TELEPHONE ENCOUNTER
Patient can afford Norco to buy and public aid will not approve it. She is on Medicaid. We will try to see if they will approve tramadol for pain. Taking nabumetone but still having knee pain.   Knee was injected in September to early to inject knee agai

## 2019-10-30 NOTE — TELEPHONE ENCOUNTER
Spoke with patient to get more information. Patient stated the nausea has improved with Nabumetone, but her knee pain has gotten as bad as it was prior to her cortisone injection.

## 2019-11-07 ENCOUNTER — TELEPHONE (OUTPATIENT)
Dept: RHEUMATOLOGY | Facility: CLINIC | Age: 59
End: 2019-11-07

## 2019-11-08 ENCOUNTER — TELEPHONE (OUTPATIENT)
Dept: INTERNAL MEDICINE CLINIC | Facility: CLINIC | Age: 59
End: 2019-11-08

## 2019-11-08 RX ORDER — CEFDINIR 300 MG/1
300 CAPSULE ORAL 2 TIMES DAILY
Qty: 20 CAPSULE | Refills: 0 | Status: SHIPPED | OUTPATIENT
Start: 2019-11-08 | End: 2019-11-18

## 2019-11-08 NOTE — TELEPHONE ENCOUNTER
Patient called stating that she was in for OV on 10/29 for sinus infection. Patient finished her antibiotic, however is not feeling any better.  Please advise

## 2019-11-08 NOTE — TELEPHONE ENCOUNTER
Pt feels worse ,cough is worse prod yellow ,St is worse hurts to swallow feels like something stuck in throat. occas wheezing but that has improved. Asking for a stronger abx

## 2019-11-23 DIAGNOSIS — G43.009 MIGRAINE WITHOUT AURA AND WITHOUT STATUS MIGRAINOSUS, NOT INTRACTABLE: Primary | ICD-10-CM

## 2019-11-25 RX ORDER — ALPRAZOLAM 0.25 MG/1
TABLET ORAL
Qty: 60 TABLET | Refills: 4 | Status: SHIPPED | OUTPATIENT
Start: 2019-11-25 | End: 2020-02-18 | Stop reason: DRUGHIGH

## 2019-11-25 RX ORDER — ZOLPIDEM TARTRATE 10 MG/1
TABLET ORAL
Qty: 30 TABLET | Refills: 4 | Status: SHIPPED | OUTPATIENT
Start: 2019-11-25 | End: 2020-04-09

## 2019-11-25 NOTE — TELEPHONE ENCOUNTER
Alprazolam 0.25 mg 1 tab bid prn filled 5-24-19 60 with 5 refill   Zolpidem 10 mg 1 tab nightly filled 5-24-19 30 with 5 refills     LOV 9-16-19   RTC 4 months.     Next apt 12-9-19   Labs 4-12-19

## 2019-11-27 ENCOUNTER — TELEPHONE (OUTPATIENT)
Dept: NEUROLOGY | Facility: CLINIC | Age: 59
End: 2019-11-27

## 2019-11-27 DIAGNOSIS — G43.009 MIGRAINE WITHOUT AURA AND WITHOUT STATUS MIGRAINOSUS, NOT INTRACTABLE: Primary | ICD-10-CM

## 2019-11-27 NOTE — TELEPHONE ENCOUNTER
Received fax from pharmacy stating JUANA Higuera 16 has been started on CMM with Key : [id number]      Per Epic review, current PA valid until 08/2020, spoke with Robert Flores at pharmacy who did try to put medication through, she states the response she

## 2019-11-29 NOTE — TELEPHONE ENCOUNTER
Received approval for Aimovi19-2020. [place] pharmacy notified. MLTCB on VM (ok per HIPAA consent) to verify pharmacy Rx to be sent to.

## 2019-12-11 ENCOUNTER — TELEPHONE (OUTPATIENT)
Dept: RHEUMATOLOGY | Facility: CLINIC | Age: 59
End: 2019-12-11

## 2019-12-11 NOTE — TELEPHONE ENCOUNTER
Pt phoned office, states she continues to co left knee pain. Would like to see if Dr. Bird Whaley can call her or prescribe a muscle relaxer for her pain.    Future Appointments   Date Time Provider Lilly Purcell   7/3/8221 22:74 AM Meghann Amaro MD [facility]

## 2019-12-12 RX ORDER — CHLORZOXAZONE 500 MG/1
500 TABLET ORAL 3 TIMES DAILY PRN
Qty: 30 TABLET | Refills: 1 | Status: SHIPPED | OUTPATIENT
Start: 2019-12-12 | End: 2020-01-13

## 2019-12-13 NOTE — TELEPHONE ENCOUNTER
Parafon Forte a known as chlorzoxazone generically ordered for muscles pain. 500 mg use 1 up to 3 times a day as needed. #30 given. Dr. Haile Landon.

## 2019-12-22 DIAGNOSIS — K21.9 GASTROESOPHAGEAL REFLUX DISEASE, ESOPHAGITIS PRESENCE NOT SPECIFIED: ICD-10-CM

## 2019-12-22 DIAGNOSIS — K22.70 BARRETT'S ESOPHAGUS WITHOUT DYSPLASIA: ICD-10-CM

## 2019-12-22 DIAGNOSIS — I10 ESSENTIAL HYPERTENSION: ICD-10-CM

## 2019-12-23 DIAGNOSIS — K21.9 GASTROESOPHAGEAL REFLUX DISEASE, ESOPHAGITIS PRESENCE NOT SPECIFIED: ICD-10-CM

## 2019-12-23 DIAGNOSIS — K22.70 BARRETT'S ESOPHAGUS WITHOUT DYSPLASIA: ICD-10-CM

## 2019-12-23 DIAGNOSIS — I10 ESSENTIAL HYPERTENSION: ICD-10-CM

## 2019-12-23 RX ORDER — FAMOTIDINE 20 MG/1
TABLET ORAL
Qty: 60 TABLET | Refills: 0 | Status: SHIPPED | OUTPATIENT
Start: 2019-12-23 | End: 2020-01-24 | Stop reason: RX

## 2019-12-23 RX ORDER — METOPROLOL SUCCINATE 50 MG/1
TABLET, EXTENDED RELEASE ORAL
Qty: 30 TABLET | Refills: 0 | Status: SHIPPED | OUTPATIENT
Start: 2019-12-23 | End: 2020-01-22

## 2019-12-23 RX ORDER — TRAMADOL HYDROCHLORIDE 50 MG/1
TABLET ORAL
Qty: 30 TABLET | Refills: 0 | Status: SHIPPED | OUTPATIENT
Start: 2019-12-23 | End: 2020-01-13 | Stop reason: ALTCHOICE

## 2019-12-23 RX ORDER — ATORVASTATIN CALCIUM 20 MG/1
TABLET, FILM COATED ORAL
Qty: 30 TABLET | Refills: 0 | OUTPATIENT
Start: 2019-12-23

## 2019-12-23 NOTE — TELEPHONE ENCOUNTER
Passed protocol    Requesting famoTIDine 20 MG Oral Tab  LOV: 10/29/19  RTC: PRN  Last Relevant Labs: 4/12/19  Filled: 10/29/19 #60 with 0 refills    Future Appointments   Date Time Provider Lilly Purcell   8/1/8267 75:18 AM Tyrone Morrow MD [facility]

## 2019-12-23 NOTE — TELEPHONE ENCOUNTER
Patient called in because she received [employer] message that her RX atorvastatin (LIPITOR) tab 20 has been denied due to multiple request. Patient stated that she has not made multiple requests and still has enough medication to get her through the holiday.

## 2019-12-23 NOTE — TELEPHONE ENCOUNTER
Passed protocol    Requesting METOPROLOL SUCCINATE ER 50 MG Oral Tablet 24 Hr  LOV: 9/16/19  RTC: 4 months  Last Relevant Labs: 4/12/19  Filled: 9/30/19 #30 with 2 refills    Requesting atorvastatin (LIPITOR) tab 20 mg Refused - prescribed by another provi

## 2019-12-24 RX ORDER — METOPROLOL SUCCINATE 50 MG/1
TABLET, EXTENDED RELEASE ORAL
Qty: 30 TABLET | Refills: 0 | OUTPATIENT
Start: 2019-12-24

## 2019-12-26 ENCOUNTER — TELEPHONE (OUTPATIENT)
Dept: INTERNAL MEDICINE CLINIC | Facility: CLINIC | Age: 59
End: 2019-12-26

## 2019-12-26 RX ORDER — FAMOTIDINE 20 MG/1
TABLET ORAL
Qty: 60 TABLET | Refills: 0 | OUTPATIENT
Start: 2019-12-26

## 2019-12-26 NOTE — TELEPHONE ENCOUNTER
Pt just got medication filled on 12-23-19 qty 60.      Famotidine 20 MG  Last OV relevant to medication: 10-29-19  Last refill date: 12-23-19 #/refills: 0  When pt was asked to return for OV: 1 wk BP check  Upcoming appt/reason: 1-16-20  Recent labs: none

## 2019-12-26 NOTE — TELEPHONE ENCOUNTER
Patient called requesting refill for: atorvastatin 20 MG Oral Tab    Patient was advised to contact Dr. Dinah Prado for refill request, but patient states that she needs to get refill from her primary.      [facility] [phone]

## 2019-12-27 RX ORDER — ATORVASTATIN CALCIUM 20 MG/1
20 TABLET, FILM COATED ORAL NIGHTLY
Qty: 90 TABLET | Refills: 0 | Status: SHIPPED | OUTPATIENT
Start: 2019-12-27 | End: 2020-02-18 | Stop reason: DRUGHIGH

## 2020-01-09 ENCOUNTER — TELEPHONE (OUTPATIENT)
Dept: INTERNAL MEDICINE CLINIC | Facility: CLINIC | Age: 60
End: 2020-01-09

## 2020-01-09 NOTE — TELEPHONE ENCOUNTER
Patient calling in, stated she will be dropping off forms to be filled out by Dr Paty Erickson, on Monday, 01/13/2020. Title of form: [employer]- patient assistant program   RX: Bibiana     Pt stated she would like Dr Paty Erickson to add to the paperwork that \" to the best of my knowledge Soumya is not employed and has no income\".

## 2020-01-13 ENCOUNTER — OFFICE VISIT (OUTPATIENT)
Dept: RHEUMATOLOGY | Facility: CLINIC | Age: 60
End: 2020-01-13
Payer: MEDICAID

## 2020-01-13 ENCOUNTER — TELEPHONE (OUTPATIENT)
Dept: INTERNAL MEDICINE CLINIC | Facility: CLINIC | Age: 60
End: 2020-01-13

## 2020-01-13 VITALS
BODY MASS INDEX: 30.21 KG/M2 | HEART RATE: 76 BPM | HEIGHT: 61 IN | SYSTOLIC BLOOD PRESSURE: 168 MMHG | WEIGHT: 160 LBS | RESPIRATION RATE: 18 BRPM | DIASTOLIC BLOOD PRESSURE: 88 MMHG

## 2020-01-13 DIAGNOSIS — M17.12 PRIMARY OSTEOARTHRITIS OF LEFT KNEE: Primary | ICD-10-CM

## 2020-01-13 PROCEDURE — 20610 DRAIN/INJ JOINT/BURSA W/O US: CPT | Performed by: INTERNAL MEDICINE

## 2020-01-13 RX ORDER — CHLORZOXAZONE 500 MG/1
500 TABLET ORAL 3 TIMES DAILY PRN
Qty: 50 TABLET | Refills: 1 | Status: SHIPPED | OUTPATIENT
Start: 2020-01-13 | End: 2020-03-23

## 2020-01-13 RX ORDER — ETODOLAC 400 MG/1
400 TABLET, FILM COATED ORAL 2 TIMES DAILY
Qty: 60 TABLET | Refills: 3 | Status: CANCELLED | OUTPATIENT
Start: 2020-01-13

## 2020-01-13 RX ORDER — METHYLPREDNISOLONE ACETATE 40 MG/ML
40 INJECTION, SUSPENSION INTRA-ARTICULAR; INTRALESIONAL; INTRAMUSCULAR; SOFT TISSUE ONCE
Status: COMPLETED | OUTPATIENT
Start: 2020-01-13 | End: 2020-01-13

## 2020-01-13 RX ORDER — TRAMADOL HYDROCHLORIDE 50 MG/1
TABLET ORAL
Qty: 30 TABLET | Refills: 0 | Status: CANCELLED | OUTPATIENT
Start: 2020-01-22

## 2020-01-13 RX ORDER — HYDROCODONE BITARTRATE AND ACETAMINOPHEN 7.5; 325 MG/1; MG/1
1 TABLET ORAL EVERY 6 HOURS PRN
Qty: 120 TABLET | Refills: 0 | Status: CANCELLED | OUTPATIENT
Start: 2020-01-13

## 2020-01-13 RX ORDER — HYDROCODONE BITARTRATE AND ACETAMINOPHEN 7.5; 325 MG/1; MG/1
1 TABLET ORAL EVERY 6 HOURS PRN
Qty: 120 TABLET | Refills: 0 | Status: SHIPPED | OUTPATIENT
Start: 2020-02-13 | End: 2020-03-14

## 2020-01-13 RX ORDER — HYDROCODONE BITARTRATE AND ACETAMINOPHEN 7.5; 325 MG/1; MG/1
1 TABLET ORAL EVERY 6 HOURS PRN
Qty: 120 TABLET | Refills: 0 | Status: SHIPPED | OUTPATIENT
Start: 2020-03-15 | End: 2020-02-18 | Stop reason: ALTCHOICE

## 2020-01-13 RX ORDER — HYDROCODONE BITARTRATE AND ACETAMINOPHEN 7.5; 325 MG/1; MG/1
1 TABLET ORAL EVERY 6 HOURS PRN
Qty: 120 TABLET | Refills: 0 | Status: SHIPPED | OUTPATIENT
Start: 2020-01-13 | End: 2020-02-12

## 2020-01-13 RX ORDER — NABUMETONE 750 MG/1
750 TABLET, FILM COATED ORAL 2 TIMES DAILY
Qty: 60 TABLET | Refills: 3 | Status: SHIPPED | OUTPATIENT
Start: 2020-01-13 | End: 2020-05-19

## 2020-01-13 RX ADMIN — METHYLPREDNISOLONE ACETATE 40 MG: 40 INJECTION, SUSPENSION INTRA-ARTICULAR; INTRALESIONAL; INTRAMUSCULAR; SOFT TISSUE at 14:39:00

## 2020-01-13 NOTE — PROGRESS NOTES
[facility]  Dr. Kathe Levine Progress Note     Subjective:   Rupal Man is a(n) 64year old female.    Current complaints: Patient presents with:  Osteoarthritis: 3 month f/u, left knee has been very painful,  no relief from heat or cold,   Left knee i

## 2020-01-13 NOTE — PATIENT INSTRUCTIONS
Continue to use nabumetone 750 mg twice a day as anti-inflammatory pain reliever. Use Norco 7.5 mg 1 tablet every 6 hours up to 4 a day as a pain reliever. Norco does contain Tylenol.   You still could take an extra strength Tylenol twice a day with the N

## 2020-01-13 NOTE — TELEPHONE ENCOUNTER
Patient dropped of form off for her medication and was put in fax folder to go back to Dr Juan Miguel Woodward

## 2020-01-13 NOTE — PROCEDURES
Procedure  Left knee injection. Diagnosis left knee osteoarthritis. After obtaining consent from the patient, the left knee was cleansed with Betadine alcohol wipes. Then the left knee was aspirated no fluid obtained.   Left knee was then injected with 4

## 2020-01-15 ENCOUNTER — TELEPHONE (OUTPATIENT)
Dept: RHEUMATOLOGY | Facility: CLINIC | Age: 60
End: 2020-01-15

## 2020-01-15 NOTE — TELEPHONE ENCOUNTER
In my Outbox. Yue Burks. Paty Erickson MD  Diplomate, American Board of Internal Medicine  Member, American College of [address] [facility]  [address]  [address] [facility],4[address] Floor, [address], [address]  T: [phone]; F: Tona 5

## 2020-01-15 NOTE — TELEPHONE ENCOUNTER
Prior auth for hydrocodone acetaminophen 7.5 approved, effective 1/1/20-7/15/20. [place] notified.

## 2020-01-16 ENCOUNTER — TELEPHONE (OUTPATIENT)
Dept: RHEUMATOLOGY | Facility: CLINIC | Age: 60
End: 2020-01-16

## 2020-01-16 NOTE — TELEPHONE ENCOUNTER
Please write letter and I'll sign it. Janak Velarde. Griffin Fisher MD  Diplomate, American Board of Internal Medicine  Member, [employer] of [address]  [address], [address], [facility], [address]  T: [phone]; F: [phone]. [phone]

## 2020-01-16 NOTE — TELEPHONE ENCOUNTER
Patient would like a letter sent along with the Pristiq application saying     To the best of your knowledge that I don't work or have income.

## 2020-01-16 NOTE — TELEPHONE ENCOUNTER
Phoned pt to notify, MRI pending review. Office visit notes faxed-will notify pt when approved. Pt verbalizes understanding.

## 2020-01-17 ENCOUNTER — TELEPHONE (OUTPATIENT)
Dept: RHEUMATOLOGY | Facility: CLINIC | Age: 60
End: 2020-01-17

## 2020-01-17 NOTE — TELEPHONE ENCOUNTER
MRI left knee authorized, [id number]. effective 1/17/20-3/2/20. Phoned pt to notify, she may call central scheduling to schedule. Pt verbalizes understanding.

## 2020-01-20 NOTE — TELEPHONE ENCOUNTER
I sent it to her MyChart, but we can always print it if needed. Temo Randle. Kavin Ramos MD  Diplomate, American Board of Internal Medicine  Member, [employer] College of [address] [facility]  [address]  [address] [place] [address],[address] Floor, [address], [facility], [address] [place] [address]

## 2020-01-20 NOTE — TELEPHONE ENCOUNTER
Pt requesting letter be faxed to [facility] at [phone]. Must have ID # [id number] on face sheet and letter. Letter faxed. Confirmation received.

## 2020-01-21 DIAGNOSIS — E11.65 UNCONTROLLED TYPE 2 DIABETES MELLITUS WITH HYPERGLYCEMIA, WITHOUT LONG-TERM CURRENT USE OF INSULIN (HCC): ICD-10-CM

## 2020-01-21 DIAGNOSIS — I10 ESSENTIAL HYPERTENSION: ICD-10-CM

## 2020-01-22 RX ORDER — METOPROLOL SUCCINATE 50 MG/1
TABLET, EXTENDED RELEASE ORAL
Qty: 30 TABLET | Refills: 2 | Status: SHIPPED | OUTPATIENT
Start: 2020-01-22 | End: 2020-04-20

## 2020-01-22 NOTE — TELEPHONE ENCOUNTER
Quetiapine er 200 mg 1 tab daily filled 10-26-19 90 with 0 refills   Metformin 500 mg 1 tab bid filed 7-11-19 60 with 5 refills   Metoprolol er 50 mg 1 tab daily filled 12-23-19 30 with 0 refills     LOV 9-16-19   RTC 4 months  Next apt 2-18-20   Labs 9-16

## 2020-01-23 ENCOUNTER — HOSPITAL ENCOUNTER (OUTPATIENT)
Dept: MRI IMAGING | Age: 60
Discharge: HOME | End: 2020-01-23
Attending: INTERNAL MEDICINE
Payer: MEDICAID

## 2020-01-23 DIAGNOSIS — M17.12 PRIMARY OSTEOARTHRITIS OF LEFT KNEE: ICD-10-CM

## 2020-01-23 PROCEDURE — 73721 MRI JNT OF LWR EXTRE W/O DYE: CPT | Performed by: INTERNAL MEDICINE

## 2020-01-23 RX ORDER — QUETIAPINE 200 MG/1
TABLET, FILM COATED, EXTENDED RELEASE ORAL
Qty: 90 TABLET | Refills: 0 | Status: SHIPPED | OUTPATIENT
Start: 2020-01-23 | End: 2020-04-20

## 2020-01-27 ENCOUNTER — TELEPHONE (OUTPATIENT)
Dept: INTERNAL MEDICINE CLINIC | Facility: CLINIC | Age: 60
End: 2020-01-27

## 2020-01-27 RX ORDER — DESVENLAFAXINE 100 MG/1
100 TABLET, EXTENDED RELEASE ORAL DAILY
Qty: 90 TABLET | Refills: 0 | COMMUNITY
Start: 2020-01-27 | End: 2020-07-16

## 2020-01-27 NOTE — TELEPHONE ENCOUNTER
[employer] contacted and Pristiq 100 mg #90 placed via automated system. Order to be shipped in March 30th. Order # [id number]. Pt contacted and stated she is out of medication. Lydia contacted and spoke with Sarah Comes to inquire why shipment date in March.   We

## 2020-01-27 NOTE — TELEPHONE ENCOUNTER
Patient stated that she is on patient assistance for her prescriptions. Patient needs a refill on her prescription for Desvenlafaxine Succinate ER (PRISTIQ) 100 MG Oral Tablet 24 Hr. Patient stated that it needs to be sent to [facility].

## 2020-02-06 ENCOUNTER — TELEPHONE (OUTPATIENT)
Dept: INTERNAL MEDICINE CLINIC | Facility: CLINIC | Age: 60
End: 2020-02-06

## 2020-02-06 ENCOUNTER — PATIENT MESSAGE (OUTPATIENT)
Dept: INTERNAL MEDICINE CLINIC | Facility: CLINIC | Age: 60
End: 2020-02-06

## 2020-02-06 DIAGNOSIS — N60.02 BILATERAL BREAST CYSTS: Primary | ICD-10-CM

## 2020-02-06 DIAGNOSIS — N60.01 BILATERAL BREAST CYSTS: Primary | ICD-10-CM

## 2020-02-06 NOTE — TELEPHONE ENCOUNTER
Test ordered. Clinton Frias. Kasia Grijalva MD  Diplomate, American Board of Internal Medicine  Member, American College of [address] [facility]  [address]  [facility],4Th Floor, [address], [facility], [address] [place] [address]  T: [id number]; F: Tona 5

## 2020-02-06 NOTE — TELEPHONE ENCOUNTER
Per last mammo completed on 5/28/19:    \"CONCLUSION:     DIAGNOSTIC CATEGORY 3--PROBABLY BENIGN FINDING. RECOMMENDATIONS:    SHORT TERM FOLLOW-UP ULTRASOUND BILATERAL BREASTS IN 6 MONTHS. \"

## 2020-02-06 NOTE — TELEPHONE ENCOUNTER
From: Tyra Beltre  To: Marielos Delatorre MD  Sent: 2/6/2020 12:26 PM CST  Subject: Other    I had to cancel my ultrasound mammogram and I am going to need for you to send new order since my insurance company says it expires on the 6th.  I'm really wanting to

## 2020-02-15 ENCOUNTER — LAB ENCOUNTER (OUTPATIENT)
Dept: LAB | Age: 60
End: 2020-02-15
Attending: INTERNAL MEDICINE
Payer: MEDICAID

## 2020-02-15 DIAGNOSIS — E55.9 VITAMIN D DEFICIENCY: ICD-10-CM

## 2020-02-15 DIAGNOSIS — E11.65 UNCONTROLLED TYPE 2 DIABETES MELLITUS WITH HYPERGLYCEMIA, WITHOUT LONG-TERM CURRENT USE OF INSULIN (HCC): ICD-10-CM

## 2020-02-15 LAB
ALBUMIN SERPL-MCNC: 3.8 G/DL (ref 3.4–5)
ALBUMIN/GLOB SERPL: 1.1 {RATIO} (ref 1–2)
ALP LIVER SERPL-CCNC: 77 U/L (ref 46–118)
ALT SERPL-CCNC: 83 U/L (ref 13–56)
ANION GAP SERPL CALC-SCNC: 11 MMOL/L (ref 0–18)
AST SERPL-CCNC: 35 U/L (ref 15–37)
BASOPHILS # BLD AUTO: 0.04 X10(3) UL (ref 0–0.2)
BASOPHILS NFR BLD AUTO: 0.5 %
BILIRUB SERPL-MCNC: 0.5 MG/DL (ref 0.1–2)
BUN BLD-MCNC: 10 MG/DL (ref 7–18)
BUN/CREAT SERPL: 15.2 (ref 10–20)
CALCIUM BLD-MCNC: 8.9 MG/DL (ref 8.5–10.1)
CHLORIDE SERPL-SCNC: 104 MMOL/L (ref 98–112)
CHOLEST SMN-MCNC: 254 MG/DL (ref ?–200)
CO2 SERPL-SCNC: 27 MMOL/L (ref 21–32)
CREAT BLD-MCNC: 0.66 MG/DL (ref 0.55–1.02)
CREAT UR-SCNC: 100 MG/DL
DEPRECATED RDW RBC AUTO: 43 FL (ref 35.1–46.3)
EOSINOPHIL # BLD AUTO: 0.16 X10(3) UL (ref 0–0.7)
EOSINOPHIL NFR BLD AUTO: 2 %
ERYTHROCYTE [DISTWIDTH] IN BLOOD BY AUTOMATED COUNT: 11.9 % (ref 11–15)
EST. AVERAGE GLUCOSE BLD GHB EST-MCNC: 192 MG/DL (ref 68–126)
GLOBULIN PLAS-MCNC: 3.6 G/DL (ref 2.8–4.4)
GLUCOSE BLD-MCNC: 163 MG/DL (ref 70–99)
HBA1C MFR BLD HPLC: 8.3 % (ref ?–5.7)
HCT VFR BLD AUTO: 39.8 % (ref 35–48)
HDLC SERPL-MCNC: 36 MG/DL (ref 40–59)
HGB BLD-MCNC: 13.2 G/DL (ref 12–16)
IMM GRANULOCYTES # BLD AUTO: 0.03 X10(3) UL (ref 0–1)
IMM GRANULOCYTES NFR BLD: 0.4 %
LDLC SERPL CALC-MCNC: 164 MG/DL (ref ?–100)
LYMPHOCYTES # BLD AUTO: 3.62 X10(3) UL (ref 1–4)
LYMPHOCYTES NFR BLD AUTO: 45.2 %
M PROTEIN MFR SERPL ELPH: 7.4 G/DL (ref 6.4–8.2)
MCH RBC QN AUTO: 32.4 PG (ref 26–34)
MCHC RBC AUTO-ENTMCNC: 33.2 G/DL (ref 31–37)
MCV RBC AUTO: 97.8 FL (ref 80–100)
MICROALBUMIN UR-MCNC: 25.6 MG/DL
MICROALBUMIN/CREAT 24H UR-RTO: 256 UG/MG (ref ?–30)
MONOCYTES # BLD AUTO: 0.5 X10(3) UL (ref 0.1–1)
MONOCYTES NFR BLD AUTO: 6.2 %
NEUTROPHILS # BLD AUTO: 3.66 X10 (3) UL (ref 1.5–7.7)
NEUTROPHILS # BLD AUTO: 3.66 X10(3) UL (ref 1.5–7.7)
NEUTROPHILS NFR BLD AUTO: 45.7 %
NONHDLC SERPL-MCNC: 218 MG/DL (ref ?–130)
OSMOLALITY SERPL CALC.SUM OF ELEC: 297 MOSM/KG (ref 275–295)
PATIENT FASTING Y/N/NP: YES
PATIENT FASTING Y/N/NP: YES
PLATELET # BLD AUTO: 311 10(3)UL (ref 150–450)
POTASSIUM SERPL-SCNC: 3.7 MMOL/L (ref 3.5–5.1)
RBC # BLD AUTO: 4.07 X10(6)UL (ref 3.8–5.3)
SODIUM SERPL-SCNC: 142 MMOL/L (ref 136–145)
TRIGL SERPL-MCNC: 272 MG/DL (ref 30–149)
VIT D+METAB SERPL-MCNC: 32.8 NG/ML (ref 30–100)
VLDLC SERPL CALC-MCNC: 54 MG/DL (ref 0–30)
WBC # BLD AUTO: 8 X10(3) UL (ref 4–11)

## 2020-02-15 PROCEDURE — 82043 UR ALBUMIN QUANTITATIVE: CPT

## 2020-02-15 PROCEDURE — 83036 HEMOGLOBIN GLYCOSYLATED A1C: CPT

## 2020-02-15 PROCEDURE — 82306 VITAMIN D 25 HYDROXY: CPT

## 2020-02-15 PROCEDURE — 82570 ASSAY OF URINE CREATININE: CPT

## 2020-02-15 PROCEDURE — 80053 COMPREHEN METABOLIC PANEL: CPT

## 2020-02-15 PROCEDURE — 85025 COMPLETE CBC W/AUTO DIFF WBC: CPT

## 2020-02-15 PROCEDURE — 36415 COLL VENOUS BLD VENIPUNCTURE: CPT

## 2020-02-15 PROCEDURE — 80061 LIPID PANEL: CPT

## 2020-02-18 ENCOUNTER — OFFICE VISIT (OUTPATIENT)
Dept: INTERNAL MEDICINE CLINIC | Facility: CLINIC | Age: 60
End: 2020-02-18
Payer: MEDICAID

## 2020-02-18 VITALS
SYSTOLIC BLOOD PRESSURE: 140 MMHG | HEART RATE: 100 BPM | RESPIRATION RATE: 16 BRPM | WEIGHT: 163.75 LBS | HEIGHT: 61 IN | TEMPERATURE: 98 F | BODY MASS INDEX: 30.92 KG/M2 | DIASTOLIC BLOOD PRESSURE: 96 MMHG

## 2020-02-18 DIAGNOSIS — I10 ESSENTIAL HYPERTENSION: ICD-10-CM

## 2020-02-18 DIAGNOSIS — E11.65 UNCONTROLLED TYPE 2 DIABETES MELLITUS WITH HYPERGLYCEMIA, WITHOUT LONG-TERM CURRENT USE OF INSULIN (HCC): Primary | ICD-10-CM

## 2020-02-18 DIAGNOSIS — F41.1 GAD (GENERALIZED ANXIETY DISORDER): ICD-10-CM

## 2020-02-18 DIAGNOSIS — K21.9 GASTROESOPHAGEAL REFLUX DISEASE, ESOPHAGITIS PRESENCE NOT SPECIFIED: ICD-10-CM

## 2020-02-18 DIAGNOSIS — E78.00 HYPERCHOLESTEROLEMIA: ICD-10-CM

## 2020-02-18 PROCEDURE — 99214 OFFICE O/P EST MOD 30 MIN: CPT | Performed by: INTERNAL MEDICINE

## 2020-02-18 RX ORDER — OMEPRAZOLE 40 MG/1
40 CAPSULE, DELAYED RELEASE ORAL 2 TIMES DAILY
Qty: 60 CAPSULE | Refills: 5 | Status: SHIPPED | OUTPATIENT
Start: 2020-02-18 | End: 2020-08-12

## 2020-02-18 RX ORDER — ALPRAZOLAM 0.5 MG/1
0.5 TABLET ORAL 2 TIMES DAILY PRN
Qty: 60 TABLET | Refills: 2 | Status: SHIPPED | OUTPATIENT
Start: 2020-02-18 | End: 2020-05-19

## 2020-02-18 RX ORDER — EMPAGLIFLOZIN 10 MG/1
10 TABLET, FILM COATED ORAL DAILY
Qty: 90 TABLET | Refills: 1 | Status: SHIPPED | OUTPATIENT
Start: 2020-02-18 | End: 2020-03-19

## 2020-02-18 RX ORDER — ATORVASTATIN CALCIUM 40 MG/1
40 TABLET, FILM COATED ORAL NIGHTLY
Qty: 90 TABLET | Refills: 1 | Status: SHIPPED | OUTPATIENT
Start: 2020-02-18 | End: 2020-08-12

## 2020-02-18 NOTE — PROGRESS NOTES
Patient presents with: Follow - Up      HPI: Keo Garcia presents today for 3-month f/u select chronic medical conditions as follows:    1. Uncontrolled DM2 w/ hyperglycemia - A1c is worse than previous testing (see labs below).   2. HTN - Stable on prescription Alcohol use: Yes      Comment: HOLIDAYS    Drug use: No      PE:  BP (!) 140/96 (BP Location: Left arm, Patient Position: Sitting, Cuff Size: adult)   Pulse 100   Temp 98.2 °F (36.8 °C) (Oral)   Resp 16   Ht 61\"   Wt 163 lb 12 oz (74.3 kg)   BMI 30.94 kg mmol/L  27.0   ANION GAP      0 - 18 mmol/L  11   BUN      7 - 18 mg/dL  10   CREATININE      0.55 - 1.02 mg/dL  0.66   BUN/CREAT Ratio      10.0 - 20.0  15.2   CALCIUM      8.5 - 10.1 mg/dL  8.9   CALCULATED OSMOLALITY      275 - 295 mOsm/kg  297 (H)   eG above.  She was also afforded the time and opportunity to ask questions, which were then answered to the best of my ability. Sanford Hernandez.  Gera Mott MD  Diplomate, [address] [place] [address] Board of Internal Medicine  Member, [address] [place] [address]

## 2020-02-20 PROBLEM — E66.09 CLASS 1 OBESITY DUE TO EXCESS CALORIES WITH SERIOUS COMORBIDITY AND BODY MASS INDEX (BMI) OF 30.0 TO 30.9 IN ADULT: Status: ACTIVE | Noted: 2017-04-04

## 2020-02-20 PROBLEM — F41.1 GAD (GENERALIZED ANXIETY DISORDER): Status: ACTIVE | Noted: 2020-02-20

## 2020-02-20 PROBLEM — E66.811 CLASS 1 OBESITY DUE TO EXCESS CALORIES WITH SERIOUS COMORBIDITY AND BODY MASS INDEX (BMI) OF 30.0 TO 30.9 IN ADULT: Status: ACTIVE | Noted: 2017-04-04

## 2020-02-27 DIAGNOSIS — G43.009 MIGRAINE WITHOUT AURA AND WITHOUT STATUS MIGRAINOSUS, NOT INTRACTABLE: ICD-10-CM

## 2020-02-27 RX ORDER — ERENUMAB-AOOE 140 MG/ML
INJECTION, SOLUTION SUBCUTANEOUS
Qty: 1 ML | Refills: 2 | Status: SHIPPED | OUTPATIENT
Start: 2020-02-27 | End: 2020-06-02

## 2020-02-27 NOTE — TELEPHONE ENCOUNTER
Medication: AIMOVIG 140 MG/ML Subcutaneous Solution Auto-injector    Date of last refill: 11/25/19 (#1 pen/2)  [place]    Last office visit: 05/09/19  Due back to clinic per last office note:  Around 11/09/19  Date n

## 2020-03-06 ENCOUNTER — HOSPITAL ENCOUNTER (OUTPATIENT)
Dept: ULTRASOUND IMAGING | Age: 60
Discharge: HOME | End: 2020-03-06
Attending: INTERNAL MEDICINE
Payer: MEDICAID

## 2020-03-06 DIAGNOSIS — N60.01 BILATERAL BREAST CYSTS: ICD-10-CM

## 2020-03-06 DIAGNOSIS — N60.02 BILATERAL BREAST CYSTS: ICD-10-CM

## 2020-03-06 PROCEDURE — 76641 ULTRASOUND BREAST COMPLETE: CPT | Performed by: INTERNAL MEDICINE

## 2020-03-06 PROCEDURE — 76642 ULTRASOUND BREAST LIMITED: CPT | Performed by: INTERNAL MEDICINE

## 2020-03-17 ENCOUNTER — PATIENT MESSAGE (OUTPATIENT)
Dept: INTERNAL MEDICINE CLINIC | Facility: CLINIC | Age: 60
End: 2020-03-17

## 2020-03-17 NOTE — TELEPHONE ENCOUNTER
From: Chayo Romo  To: Oracio Taylor MD  Sent: 3/17/2020 10:47 AM CDT  Subject: Other    I've been having difficulties falling asleep at night even when I take my sleeping pills. I stay up all night and than what to sleep on all.  And now it's gotten wors

## 2020-03-23 RX ORDER — CHLORZOXAZONE 500 MG/1
500 TABLET ORAL 3 TIMES DAILY PRN
Qty: 50 TABLET | Refills: 1 | Status: SHIPPED | OUTPATIENT
Start: 2020-03-23 | End: 2020-11-04

## 2020-03-24 NOTE — TELEPHONE ENCOUNTER
Virtual/Telephone Check-In    Sheila Costello verbally consents to a Virtual/Telephone Check-In service on 03/24/20. Patient understands and accepts financial responsibility for any deductible, co-insurance and/or co-pays associated with this service.

## 2020-04-09 ENCOUNTER — PATIENT MESSAGE (OUTPATIENT)
Dept: INTERNAL MEDICINE CLINIC | Facility: CLINIC | Age: 60
End: 2020-04-09

## 2020-04-09 RX ORDER — ZOLPIDEM TARTRATE 12.5 MG/1
12.5 TABLET, FILM COATED, EXTENDED RELEASE ORAL NIGHTLY PRN
Qty: 20 TABLET | Refills: 0 | Status: SHIPPED | OUTPATIENT
Start: 2020-04-09 | End: 2020-05-19

## 2020-04-09 NOTE — TELEPHONE ENCOUNTER
From: Rupal Man  To: MARLENE Sevilla  Sent: 4/9/2020 12:14 PM CDT  Subject: Prescription Question    Inocente Ayala, I spoke to you over the phone a few weeks ago about my insomnia and I had decided to try and still take my regular medication and j

## 2020-04-10 ENCOUNTER — VIRTUAL PHONE E/M (OUTPATIENT)
Dept: RHEUMATOLOGY | Facility: CLINIC | Age: 60
End: 2020-04-10
Payer: MEDICAID

## 2020-04-10 DIAGNOSIS — M18.11 ARTHRITIS OF CARPOMETACARPAL (CMC) JOINT OF RIGHT THUMB: ICD-10-CM

## 2020-04-10 DIAGNOSIS — M47.816 SPONDYLOSIS OF LUMBAR REGION WITHOUT MYELOPATHY OR RADICULOPATHY: ICD-10-CM

## 2020-04-10 DIAGNOSIS — M17.12 PRIMARY OSTEOARTHRITIS OF LEFT KNEE: Primary | ICD-10-CM

## 2020-04-10 DIAGNOSIS — M15.9 PRIMARY OSTEOARTHRITIS INVOLVING MULTIPLE JOINTS: ICD-10-CM

## 2020-04-10 PROCEDURE — 99213 OFFICE O/P EST LOW 20 MIN: CPT | Performed by: INTERNAL MEDICINE

## 2020-04-10 RX ORDER — HYDROCODONE BITARTRATE AND ACETAMINOPHEN 10; 325 MG/1; MG/1
1 TABLET ORAL EVERY 6 HOURS PRN
Qty: 120 TABLET | Refills: 0 | Status: SHIPPED | OUTPATIENT
Start: 2020-05-14 | End: 2020-06-10

## 2020-04-10 RX ORDER — HYDROCODONE BITARTRATE AND ACETAMINOPHEN 10; 325 MG/1; MG/1
1 TABLET ORAL EVERY 6 HOURS PRN
Qty: 120 TABLET | Refills: 0 | Status: SHIPPED | OUTPATIENT
Start: 2020-04-14 | End: 2020-06-10

## 2020-04-10 NOTE — TELEPHONE ENCOUNTER
From: Shadi Palomo  To: MARLENE Giang  Sent: 4/9/2020 8:18 PM CDT  Subject: Prescription Question    Inocente Ayala, the Pharmacy called me and said that my insurance won't pay for that prescription you ordered.  They're sending you a request to get

## 2020-04-10 NOTE — TELEPHONE ENCOUNTER
Judit Harrison is not covered as well. I will try and do an PA on the ER [facility]. PA initiated , awaiting fax from [address], unable to complete over the phone due to medication class.

## 2020-04-10 NOTE — TELEPHONE ENCOUNTER
See note below from patient. Do you want to do PA or use alternative such as 2 of the Zolpidem 10mg?

## 2020-04-10 NOTE — TELEPHONE ENCOUNTER
She needs ER or an alternative, can we see what else insurance would cover? Eszopiclone (lunesta)? If so can send 1 mg q HS PRN insomnia #15 tabs to start.

## 2020-04-10 NOTE — PROGRESS NOTES
Virtual Telephone Check-In    Serena Oliver verbally consents to a Virtual/Telephone Check-In visit on 04/10/20. Patient understands and accepts financial responsibility for any deductible, co-insurance and/or co-pays associated with this service.

## 2020-04-13 ENCOUNTER — TELEPHONE (OUTPATIENT)
Dept: INTERNAL MEDICINE CLINIC | Facility: CLINIC | Age: 60
End: 2020-04-13

## 2020-04-13 NOTE — TELEPHONE ENCOUNTER
Spoke to patient to let her know. She is aware of PA that was started today and that we are waiting on response.

## 2020-04-13 NOTE — TELEPHONE ENCOUNTER
Received fax from insurance to start PA. Faxed over form filled out and faxed it over. Confirmation and paper work is in [employer] file on "[employer] [facility] (FP side).

## 2020-04-14 ENCOUNTER — PATIENT MESSAGE (OUTPATIENT)
Dept: INTERNAL MEDICINE CLINIC | Facility: CLINIC | Age: 60
End: 2020-04-14

## 2020-04-14 NOTE — TELEPHONE ENCOUNTER
Spoke to [place] from [place].   She re-ran medication and it did go through. Called pt. Let her know PA was approved and pharmacy ran medication to verify that medication is covered.  Let pt know that Issa Swain is asking for her to call her to call Issa Swain in 2-

## 2020-04-14 NOTE — TELEPHONE ENCOUNTER
PA  Approved for Zolpidem ER 12.5 mg   Effective from 1/13/2020 through 5/13/2020. Medication can only be filled for 30 days at a time and MUST be filled at 's.

## 2020-04-15 NOTE — TELEPHONE ENCOUNTER
From: Mckenna Grewal  To: Eliot Will MD  Sent: 4/14/2020 7:48 PM CDT  Subject: Prescription Question    Hi hope all is well with you krzysztof's. My Pristiq needs to be called into the drug company as usual. You should have all of the information in my file.  P

## 2020-04-19 DIAGNOSIS — G43.009 MIGRAINE WITHOUT AURA AND WITHOUT STATUS MIGRAINOSUS, NOT INTRACTABLE: Primary | ICD-10-CM

## 2020-04-19 DIAGNOSIS — E11.65 UNCONTROLLED TYPE 2 DIABETES MELLITUS WITH HYPERGLYCEMIA, WITHOUT LONG-TERM CURRENT USE OF INSULIN (HCC): ICD-10-CM

## 2020-04-19 DIAGNOSIS — I10 ESSENTIAL HYPERTENSION: ICD-10-CM

## 2020-04-20 RX ORDER — METOPROLOL SUCCINATE 50 MG/1
TABLET, EXTENDED RELEASE ORAL
Qty: 30 TABLET | Refills: 0 | Status: SHIPPED | OUTPATIENT
Start: 2020-04-20 | End: 2020-05-19

## 2020-04-20 RX ORDER — QUETIAPINE 200 MG/1
TABLET, FILM COATED, EXTENDED RELEASE ORAL
Qty: 90 TABLET | Refills: 0 | Status: SHIPPED | OUTPATIENT
Start: 2020-04-20 | End: 2020-05-19

## 2020-04-20 NOTE — TELEPHONE ENCOUNTER
Per LOV note, patient was to wean off depakote. Additionally, she is overdue for a f/u appt. Sent patient a [employer] message to contact office to schedule a virtual visit.     Medication: DIVALPROEX SODIUM  MG Oral Tablet 24 Hr    Date of last refill

## 2020-04-21 NOTE — TELEPHONE ENCOUNTER
Pt called office. States she would like to do phone visit. Accepted phone visit 4/23/2020 at 3:40PM.    States she did not wean off medication as planned. Does have enough to last until appt so can address at time of televisit.

## 2020-04-23 ENCOUNTER — VIRTUAL PHONE E/M (OUTPATIENT)
Dept: NEUROLOGY | Facility: CLINIC | Age: 60
End: 2020-04-23
Payer: MEDICAID

## 2020-04-23 DIAGNOSIS — G43.709 CHRONIC MIGRAINE W/O AURA W/O STATUS MIGRAINOSUS, NOT INTRACTABLE: Primary | ICD-10-CM

## 2020-04-23 PROCEDURE — 99212 OFFICE O/P EST SF 10 MIN: CPT | Performed by: OTHER

## 2020-04-23 NOTE — PROGRESS NOTES
Virtual Telephone Check-In    Ruthy Calderon verbally consents a Virtual/Telephone Check-In visit on 04/23/20. Patient understands and accepts financial responsibility for any deductible, co-insurance and/or co-pays associated with this service.

## 2020-05-08 RX ORDER — DIVALPROEX SODIUM 250 MG/1
TABLET, EXTENDED RELEASE ORAL
Qty: 30 TABLET | Refills: 5 | Status: SHIPPED | OUTPATIENT
Start: 2020-05-08 | End: 2020-11-04

## 2020-05-18 DIAGNOSIS — I10 ESSENTIAL HYPERTENSION: ICD-10-CM

## 2020-05-18 DIAGNOSIS — E11.65 UNCONTROLLED TYPE 2 DIABETES MELLITUS WITH HYPERGLYCEMIA, WITHOUT LONG-TERM CURRENT USE OF INSULIN (HCC): ICD-10-CM

## 2020-05-19 ENCOUNTER — PATIENT MESSAGE (OUTPATIENT)
Dept: INTERNAL MEDICINE CLINIC | Facility: CLINIC | Age: 60
End: 2020-05-19

## 2020-05-19 RX ORDER — ZOLPIDEM TARTRATE 12.5 MG/1
TABLET, FILM COATED, EXTENDED RELEASE ORAL
Qty: 20 TABLET | Refills: 0 | Status: SHIPPED | OUTPATIENT
Start: 2020-05-19 | End: 2020-06-16

## 2020-05-19 RX ORDER — QUETIAPINE 200 MG/1
TABLET, FILM COATED, EXTENDED RELEASE ORAL
Qty: 90 TABLET | Refills: 1 | Status: SHIPPED | OUTPATIENT
Start: 2020-05-19 | End: 2020-11-04

## 2020-05-19 RX ORDER — METOPROLOL SUCCINATE 50 MG/1
TABLET, EXTENDED RELEASE ORAL
Qty: 90 TABLET | Refills: 1 | Status: SHIPPED | OUTPATIENT
Start: 2020-05-19 | End: 2020-11-11

## 2020-05-19 RX ORDER — NABUMETONE 750 MG/1
750 TABLET, FILM COATED ORAL 2 TIMES DAILY
Qty: 60 TABLET | Refills: 0 | Status: SHIPPED | OUTPATIENT
Start: 2020-05-19 | End: 2020-06-15

## 2020-05-19 RX ORDER — ALPRAZOLAM 0.5 MG/1
TABLET ORAL
Qty: 60 TABLET | Refills: 0 | Status: SHIPPED | OUTPATIENT
Start: 2020-05-19 | End: 2020-06-16

## 2020-05-19 NOTE — TELEPHONE ENCOUNTER
From: Patti Grossman  To: Gemma Guallpa MD  Sent: 5/19/2020 8:28 AM CDT  Subject: Other    Hi Dr. Malachi Griffiths hope all is well with you. I keep getting a reminder that my physical is way overdue.  I'm just wondering when you'd like for me to schedule that appointm

## 2020-05-19 NOTE — TELEPHONE ENCOUNTER
Zolpidem Tartrate Er 12.5 Mg Tab Lupi    Patient message from 4.9.2020  Filled: 4/9/2020 #20, 0 refills

## 2020-05-19 NOTE — TELEPHONE ENCOUNTER
From: [facility] Mariah  To: MARLENE Castro  Sent: 5/19/2020 2:41 PM CDT  Subject: Prescription Question    Hello hope everything is well with you. I've been taking those new sleeping pills and they are a lot better than my old ones.  The Pharmacy sent

## 2020-05-19 NOTE — TELEPHONE ENCOUNTER
OK to change her June 11th appt to a physical from my end. Lakia Paul. Zuleika Lynch MD  Diplomate, American Board of Internal Medicine  Member, American College of [address] [facility]  [address]  [address] [place] [address],[address] Floor, [address], [facility], [address] [place] [address]  T: 6

## 2020-05-20 NOTE — TELEPHONE ENCOUNTER
Appointment changed    Future Appointments   Date Time Provider Lilly Purcell   6/22/2020  2:30 PM Rock Nogueira MD [facility] [place]

## 2020-05-21 NOTE — TELEPHONE ENCOUNTER
Per Khai Lord for Zolpidem Tartrate ER 12.5 MG   Faxed to [employer] confirmation received   Placed in accordion

## 2020-05-25 ENCOUNTER — PATIENT MESSAGE (OUTPATIENT)
Dept: INTERNAL MEDICINE CLINIC | Facility: CLINIC | Age: 60
End: 2020-05-25

## 2020-05-26 NOTE — TELEPHONE ENCOUNTER
Will need to do a video visit. If I don't have any openings then OK to schedule w/ [facility]. Jesse Kelley. Merary Portillo MD  Diplomate, American Board of Internal Medicine  Member, American College of [facility]  [address] [facility], [address]

## 2020-05-27 ENCOUNTER — TELEMEDICINE (OUTPATIENT)
Dept: INTERNAL MEDICINE CLINIC | Facility: CLINIC | Age: 60
End: 2020-05-27

## 2020-05-27 DIAGNOSIS — R50.9 FEVER, UNSPECIFIED FEVER CAUSE: Primary | ICD-10-CM

## 2020-05-27 DIAGNOSIS — R53.83 FATIGUE, UNSPECIFIED TYPE: ICD-10-CM

## 2020-05-27 DIAGNOSIS — M79.10 MYALGIA: ICD-10-CM

## 2020-05-27 PROCEDURE — 99214 OFFICE O/P EST MOD 30 MIN: CPT | Performed by: PHYSICIAN ASSISTANT

## 2020-05-27 RX ORDER — EMPAGLIFLOZIN 10 MG/1
10 TABLET, FILM COATED ORAL DAILY
COMMUNITY
Start: 2020-05-18 | End: 2020-08-12

## 2020-05-27 NOTE — PROGRESS NOTES
Virtual Video Check-In     This visit is conducted using Telemedicine with live, interactive video and audio. Tia Mueller, who has verified his/her identification by name and , verbally consents to a Virtual/Telephone Check-In visit on 20. tablet by mouth every 6 (six) hours as needed for Pain. 120 tablet 0   • HYDROcodone-acetaminophen (NORCO)  MG Oral Tab Take 1 tablet by mouth every 6 (six) hours as needed for Pain.  120 tablet 0   • CHLORZOXAZONE 500 MG Oral Tab Take 1 tablet (500 m Diabetes [facility])    • Difficult intubation    • Esophageal reflux    • Fitting and adjustment of dental prosthetic device 04/26/2011   • High blood pressure    • Migraines    • Osteoarthritis    • Other ill-defined conditions(269.89)     Jamison's   • Pain i atraumatic, conjunctiva clear  LUNGS: regular breathing rate and effort with no audible respiratory distress  NEURO: A&Ox3  PSYCH: pleasant mood and affect, appropriate judgement and insight    ASSESSMENT/PLAN:  # Fever, myalgias, fatigue: pt does not driv decision-making. Appropriate medical decision-making and tests are ordered as detailed in the plan of care above. Coding/billing information is submitted for this visit based on complexity of care and/or time spent for the visit.     Zeb Gomez PA-C

## 2020-05-27 NOTE — PATIENT INSTRUCTIONS
Symptom relief:  - you may take Tylenol (acetaminophen) 1,000 mg every 8 hours for fever, body aches, sore throat, headache  - avoid anti-inflammatory medications (advil, motrin, ibuprofen, aleve, naproxen) UNLESS you are unable to keep fever down with Tyl

## 2020-05-30 DIAGNOSIS — G43.009 MIGRAINE WITHOUT AURA AND WITHOUT STATUS MIGRAINOSUS, NOT INTRACTABLE: ICD-10-CM

## 2020-06-01 ENCOUNTER — TELEPHONE (OUTPATIENT)
Dept: INTERNAL MEDICINE CLINIC | Facility: CLINIC | Age: 60
End: 2020-06-01

## 2020-06-01 NOTE — TELEPHONE ENCOUNTER
Spoke with patient re: follow up of recent sick symptoms (possible COVID but did not test as pt does not have a car). Fever improved - 98.9 yesterday but she has been taking tylenol regularly still for body aches. Mild cough and SOB but improving.     R

## 2020-06-02 NOTE — TELEPHONE ENCOUNTER
Medication: AIMOVIG 140 MG/ML Subcutaneous Solution Auto-injector    Date of last refill: 2/27/20 (#1/2)  [place]    Last office visit: 4/23/20  Due back to clinic per last office note:  6 months  Date next office vis

## 2020-06-03 NOTE — TELEPHONE ENCOUNTER
Spoke with patient re: follow up of recent sick symptoms (possible COVID but did not test as pt does not have a car). Symptoms began 5/22/20. Pt states she's feeling much better. Denies fever, cough, SOB.   Did have a few days of diarrhea but this has no

## 2020-06-10 ENCOUNTER — OFFICE VISIT (OUTPATIENT)
Dept: RHEUMATOLOGY | Facility: CLINIC | Age: 60
End: 2020-06-10
Payer: MEDICAID

## 2020-06-10 VITALS
BODY MASS INDEX: 31.15 KG/M2 | HEIGHT: 61 IN | RESPIRATION RATE: 18 BRPM | TEMPERATURE: 98 F | HEART RATE: 84 BPM | DIASTOLIC BLOOD PRESSURE: 80 MMHG | SYSTOLIC BLOOD PRESSURE: 138 MMHG | WEIGHT: 165 LBS

## 2020-06-10 DIAGNOSIS — M47.816 SPONDYLOSIS OF LUMBAR REGION WITHOUT MYELOPATHY OR RADICULOPATHY: Primary | ICD-10-CM

## 2020-06-10 DIAGNOSIS — M19.031 CMC DJD(CARPOMETACARPAL DEGENERATIVE JOINT DISEASE), LOCALIZED PRIMARY, RIGHT: ICD-10-CM

## 2020-06-10 DIAGNOSIS — M15.9 PRIMARY OSTEOARTHRITIS INVOLVING MULTIPLE JOINTS: ICD-10-CM

## 2020-06-10 DIAGNOSIS — M18.11 ARTHRITIS OF CARPOMETACARPAL (CMC) JOINT OF RIGHT THUMB: ICD-10-CM

## 2020-06-10 DIAGNOSIS — M17.12 PRIMARY OSTEOARTHRITIS OF LEFT KNEE: ICD-10-CM

## 2020-06-10 PROCEDURE — 20610 DRAIN/INJ JOINT/BURSA W/O US: CPT | Performed by: INTERNAL MEDICINE

## 2020-06-10 PROCEDURE — 20600 DRAIN/INJ JOINT/BURSA W/O US: CPT | Performed by: INTERNAL MEDICINE

## 2020-06-10 RX ORDER — HYDROCODONE BITARTRATE AND ACETAMINOPHEN 10; 325 MG/1; MG/1
1 TABLET ORAL EVERY 6 HOURS PRN
Qty: 120 TABLET | Refills: 0 | Status: SHIPPED | OUTPATIENT
Start: 2020-07-13 | End: 2020-12-07

## 2020-06-10 RX ORDER — HYDROCODONE BITARTRATE AND ACETAMINOPHEN 10; 325 MG/1; MG/1
1 TABLET ORAL EVERY 6 HOURS PRN
Qty: 120 TABLET | Refills: 0 | Status: SHIPPED | OUTPATIENT
Start: 2020-06-13 | End: 2020-12-07

## 2020-06-10 RX ORDER — METHYLPREDNISOLONE ACETATE 40 MG/ML
10 INJECTION, SUSPENSION INTRA-ARTICULAR; INTRALESIONAL; INTRAMUSCULAR; SOFT TISSUE ONCE
Status: COMPLETED | OUTPATIENT
Start: 2020-06-10 | End: 2020-06-10

## 2020-06-10 RX ORDER — HYDROCODONE BITARTRATE AND ACETAMINOPHEN 10; 325 MG/1; MG/1
1 TABLET ORAL EVERY 6 HOURS PRN
Qty: 120 TABLET | Refills: 0 | Status: SHIPPED | OUTPATIENT
Start: 2020-08-12 | End: 2021-03-03

## 2020-06-10 RX ORDER — METHYLPREDNISOLONE ACETATE 40 MG/ML
40 INJECTION, SUSPENSION INTRA-ARTICULAR; INTRALESIONAL; INTRAMUSCULAR; SOFT TISSUE ONCE
Status: COMPLETED | OUTPATIENT
Start: 2020-06-10 | End: 2020-06-10

## 2020-06-10 RX ADMIN — METHYLPREDNISOLONE ACETATE 40 MG: 40 INJECTION, SUSPENSION INTRA-ARTICULAR; INTRALESIONAL; INTRAMUSCULAR; SOFT TISSUE at 12:47:00

## 2020-06-10 RX ADMIN — METHYLPREDNISOLONE ACETATE 10 MG: 40 INJECTION, SUSPENSION INTRA-ARTICULAR; INTRALESIONAL; INTRAMUSCULAR; SOFT TISSUE at 12:47:00

## 2020-06-10 NOTE — PATIENT INSTRUCTIONS
Today the right thumb at the carpometacarpal joint was injected with cortisone. Also today the left knee was injected with cortisone. Go home and rest take it easy. Apply ice if needed. Use Norco for pain 1 tablet every 6 hours 10 mg.   Use Norco only i

## 2020-06-10 NOTE — PROGRESS NOTES
[facility]  Dr. Pollo Hyde Progress Note     Subjective:   Mohini Quick is a(n) 64year old female.    Current complaints: Patient presents with:  Osteoarthritis: 3 month f/u, Pain agreement renewed, having pain to hand, knee ans back, Might want a zarate

## 2020-06-10 NOTE — PROCEDURES
After obtaining consent from the patient, both the right thumb at the carpometacarpal joint and left knee were cleansed with Betadine alcohol wipes. Then the right thumb at the carpometacarpal joint was injected with 10 mg of methylprednisolone.   Followin

## 2020-06-15 ENCOUNTER — PATIENT MESSAGE (OUTPATIENT)
Dept: INTERNAL MEDICINE CLINIC | Facility: CLINIC | Age: 60
End: 2020-06-15

## 2020-06-15 RX ORDER — NABUMETONE 750 MG/1
750 TABLET, FILM COATED ORAL 2 TIMES DAILY
Qty: 60 TABLET | Refills: 0 | Status: SHIPPED | OUTPATIENT
Start: 2020-06-15 | End: 2020-07-13

## 2020-06-15 NOTE — TELEPHONE ENCOUNTER
From: Soumya Bond  To: Joceline Londono MD  Sent: 6/15/2020 3:23 PM CDT  Subject: Prescription Question    I sent you a message about my Omeprazole and didn't hear back from you. I'm just making sure you got the message since it is time sensitive.    Thank y

## 2020-06-15 NOTE — TELEPHONE ENCOUNTER
Zolpidem 12.5 mg 1 tab prn filled 5/19/20 20 with 0 refills     LOV 2/18/20  RTC 3 months.     Next apt 6/22/20  Labs 2/15/20

## 2020-06-15 NOTE — TELEPHONE ENCOUNTER
LOV: 5/27/2020 with Gordy Ball PA-C  RTC: July 2020  Last Relevant Labs: 2/15/2020  Filled: 5/19/2020  #60 with 0 refills    Future Appointments   Date Time Provider Lilly Tripletti   6/22/2020  2:30 PM Beti Rocha MD [facility] [place]   9/9/2020 1

## 2020-06-16 RX ORDER — ZOLPIDEM TARTRATE 12.5 MG/1
TABLET, FILM COATED, EXTENDED RELEASE ORAL
Qty: 30 TABLET | Refills: 2 | Status: SHIPPED | OUTPATIENT
Start: 2020-06-16 | End: 2020-09-14

## 2020-06-16 RX ORDER — ALPRAZOLAM 0.5 MG/1
0.5 TABLET ORAL 2 TIMES DAILY PRN
Qty: 60 TABLET | Refills: 2 | Status: SHIPPED | OUTPATIENT
Start: 2020-06-16 | End: 2020-06-22

## 2020-06-16 RX ORDER — ALPRAZOLAM 0.5 MG/1
TABLET ORAL
Qty: 60 TABLET | Refills: 0 | Status: SHIPPED | OUTPATIENT
Start: 2020-06-16 | End: 2020-09-14

## 2020-06-16 RX ORDER — ZOLPIDEM TARTRATE 12.5 MG/1
TABLET, FILM COATED, EXTENDED RELEASE ORAL
Qty: 20 TABLET | Refills: 0 | OUTPATIENT
Start: 2020-06-16

## 2020-06-16 NOTE — TELEPHONE ENCOUNTER
n Question              I was just checking to see if I'm going to be able to get my Zolpidem and Alprazolam?   Thank you

## 2020-06-16 NOTE — TELEPHONE ENCOUNTER
Key: [id number]    Your information has been submitted to [employer]. [employer] is reviewing the PA request and you will receive an electronic response.  You may check for the updated outcome later by reopening this request. The standard fax determination

## 2020-06-22 ENCOUNTER — OFFICE VISIT (OUTPATIENT)
Dept: INTERNAL MEDICINE CLINIC | Facility: CLINIC | Age: 60
End: 2020-06-22
Payer: MEDICAID

## 2020-06-22 VITALS
HEART RATE: 82 BPM | TEMPERATURE: 98 F | DIASTOLIC BLOOD PRESSURE: 77 MMHG | SYSTOLIC BLOOD PRESSURE: 132 MMHG | WEIGHT: 155.5 LBS | HEIGHT: 60.75 IN | BODY MASS INDEX: 29.74 KG/M2

## 2020-06-22 DIAGNOSIS — Z20.822 ENCOUNTER FOR SCREENING LABORATORY TESTING FOR COVID-19 VIRUS: ICD-10-CM

## 2020-06-22 DIAGNOSIS — F33.1 MDD (MAJOR DEPRESSIVE DISORDER), RECURRENT EPISODE, MODERATE (HCC): ICD-10-CM

## 2020-06-22 DIAGNOSIS — E11.65 UNCONTROLLED TYPE 2 DIABETES MELLITUS WITH HYPERGLYCEMIA, WITHOUT LONG-TERM CURRENT USE OF INSULIN (HCC): ICD-10-CM

## 2020-06-22 DIAGNOSIS — I10 ESSENTIAL HYPERTENSION: ICD-10-CM

## 2020-06-22 DIAGNOSIS — Z12.31 ENCOUNTER FOR SCREENING MAMMOGRAM FOR MALIGNANT NEOPLASM OF BREAST: ICD-10-CM

## 2020-06-22 DIAGNOSIS — J44.9 COPD, MILD (HCC): ICD-10-CM

## 2020-06-22 DIAGNOSIS — Z00.00 ROUTINE GENERAL MEDICAL EXAMINATION AT A HEALTH CARE FACILITY: Primary | ICD-10-CM

## 2020-06-22 PROCEDURE — 99396 PREV VISIT EST AGE 40-64: CPT | Performed by: INTERNAL MEDICINE

## 2020-06-22 RX ORDER — CIMETIDINE 800 MG
800 TABLET ORAL NIGHTLY PRN
COMMUNITY
Start: 2020-06-13 | End: 2021-06-10

## 2020-06-22 NOTE — PROGRESS NOTES
Patient presents with:  CPX      HPI: Charly Krishnan presents for annual CPX. Stable health. Due for wellness labs. Health goals continue to center around longevity, vitality, and QOL. Due for mammo. Desires COVID antibody testing.  Compliant w/ prescription medicat STEREO-CLIP W/CALC 2 SITE LEFT  2010   • OOPHORECTOMY Left 06/28/2019   • TUBAL LIGATION     • UPPER GI ENDOSCOPY,EXAM         Naprosyn [Naproxen];  Aspirin      Current Outpatient Medications:   •  cimetidine 800 MG Oral Tab, , Disp: , Rfl:   •  ZOLPIDEM T 90 tablet, Rfl: 0  •  Rizatriptan Benzoate 10 MG Oral Tab, TAKE 1 TABLET BY MOUTH DAILY AS NEEDED FOR MIGRAINE TAKE AT ONSET MAY REPEAT IN 2 HOURS **MAX2 TABS/24HOURS**, Disp: 12 tablet, Rfl: 2  •  Calcium Carb-Cholecalciferol (CALCIUM 500 + D) 500-200 MG- Temp 98.3 °F (36.8 °C) (Oral)   Ht 60.75\"   Wt 155 lb 8 oz (70.5 kg)   BMI 29.62 kg/m²    Wt Readings from Last 6 Encounters:  06/22/20 : 155 lb 8 oz (70.5 kg)  06/10/20 : 165 lb (74.8 kg)  02/18/20 : 163 lb 12 oz (74.3 kg)  01/13/20 : 160 lb (72.6 kg) then answered to the best of my ability. Jony Escalona. Bibiana Starks MD  Diplomate, American Board of Internal Medicine  Member, American College of [address] [facility]  [address]  [address] [facility],4Th Floor, [address], [address], [address] [place] [address]  T: [id number]; F: 6

## 2020-06-24 PROBLEM — E66.09 CLASS 1 OBESITY DUE TO EXCESS CALORIES WITH SERIOUS COMORBIDITY AND BODY MASS INDEX (BMI) OF 30.0 TO 30.9 IN ADULT: Status: RESOLVED | Noted: 2017-04-04 | Resolved: 2020-06-24

## 2020-06-24 PROBLEM — E66.811 CLASS 1 OBESITY DUE TO EXCESS CALORIES WITH SERIOUS COMORBIDITY AND BODY MASS INDEX (BMI) OF 30.0 TO 30.9 IN ADULT: Status: RESOLVED | Noted: 2017-04-04 | Resolved: 2020-06-24

## 2020-06-29 ENCOUNTER — TELEPHONE (OUTPATIENT)
Dept: INTERNAL MEDICINE CLINIC | Facility: CLINIC | Age: 60
End: 2020-06-29

## 2020-07-06 ENCOUNTER — PATIENT MESSAGE (OUTPATIENT)
Dept: INTERNAL MEDICINE CLINIC | Facility: CLINIC | Age: 60
End: 2020-07-06

## 2020-07-06 NOTE — TELEPHONE ENCOUNTER
From: Maki Saba  To: Clemmie Runner, MD  Sent: 7/6/2020 8:37 AM CDT  Subject: Other    I'm going for my bloodwork on Wednesday morning and just need to know if I have to fast or anything prior to my tests?   Thank you   Maki Saba

## 2020-07-08 ENCOUNTER — LAB ENCOUNTER (OUTPATIENT)
Dept: LAB | Age: 60
End: 2020-07-08
Attending: INTERNAL MEDICINE
Payer: MEDICAID

## 2020-07-08 DIAGNOSIS — Z20.822 ENCOUNTER FOR LABORATORY TESTING FOR COVID-19 VIRUS: Primary | ICD-10-CM

## 2020-07-08 DIAGNOSIS — Z00.00 ROUTINE GENERAL MEDICAL EXAMINATION AT A HEALTH CARE FACILITY: ICD-10-CM

## 2020-07-08 DIAGNOSIS — E11.65 UNCONTROLLED TYPE 2 DIABETES MELLITUS WITH HYPERGLYCEMIA, WITHOUT LONG-TERM CURRENT USE OF INSULIN (HCC): ICD-10-CM

## 2020-07-08 LAB
ALBUMIN SERPL-MCNC: 4 G/DL (ref 3.4–5)
ALBUMIN/GLOB SERPL: 1.2 {RATIO} (ref 1–2)
ALP LIVER SERPL-CCNC: 80 U/L (ref 46–118)
ALT SERPL-CCNC: 59 U/L (ref 13–56)
ANION GAP SERPL CALC-SCNC: 7 MMOL/L (ref 0–18)
AST SERPL-CCNC: 30 U/L (ref 15–37)
BASOPHILS # BLD AUTO: 0.07 X10(3) UL (ref 0–0.2)
BASOPHILS NFR BLD AUTO: 0.6 %
BILIRUB SERPL-MCNC: 0.4 MG/DL (ref 0.1–2)
BUN BLD-MCNC: 17 MG/DL (ref 7–18)
BUN/CREAT SERPL: 22.7 (ref 10–20)
CALCIUM BLD-MCNC: 9.2 MG/DL (ref 8.5–10.1)
CHLORIDE SERPL-SCNC: 101 MMOL/L (ref 98–112)
CHOLEST SMN-MCNC: 231 MG/DL (ref ?–200)
CO2 SERPL-SCNC: 28 MMOL/L (ref 21–32)
CREAT BLD-MCNC: 0.75 MG/DL (ref 0.55–1.02)
CREAT UR-SCNC: 44.4 MG/DL
DEPRECATED RDW RBC AUTO: 44 FL (ref 35.1–46.3)
EOSINOPHIL # BLD AUTO: 0.23 X10(3) UL (ref 0–0.7)
EOSINOPHIL NFR BLD AUTO: 2.1 %
ERYTHROCYTE [DISTWIDTH] IN BLOOD BY AUTOMATED COUNT: 12.6 % (ref 11–15)
EST. AVERAGE GLUCOSE BLD GHB EST-MCNC: 177 MG/DL (ref 68–126)
GLOBULIN PLAS-MCNC: 3.3 G/DL (ref 2.8–4.4)
GLUCOSE BLD-MCNC: 131 MG/DL (ref 70–99)
HBA1C MFR BLD HPLC: 7.8 % (ref ?–5.7)
HCT VFR BLD AUTO: 41.4 % (ref 35–48)
HDLC SERPL-MCNC: 36 MG/DL (ref 40–59)
HGB BLD-MCNC: 13.7 G/DL (ref 12–16)
IMM GRANULOCYTES # BLD AUTO: 0.05 X10(3) UL (ref 0–1)
IMM GRANULOCYTES NFR BLD: 0.4 %
LDLC SERPL CALC-MCNC: 151 MG/DL (ref ?–100)
LYMPHOCYTES # BLD AUTO: 4.91 X10(3) UL (ref 1–4)
LYMPHOCYTES NFR BLD AUTO: 44.1 %
M PROTEIN MFR SERPL ELPH: 7.3 G/DL (ref 6.4–8.2)
MCH RBC QN AUTO: 31.4 PG (ref 26–34)
MCHC RBC AUTO-ENTMCNC: 33.1 G/DL (ref 31–37)
MCV RBC AUTO: 95 FL (ref 80–100)
MICROALBUMIN UR-MCNC: 3.47 MG/DL
MICROALBUMIN/CREAT 24H UR-RTO: 78.2 UG/MG (ref ?–30)
MONOCYTES # BLD AUTO: 0.7 X10(3) UL (ref 0.1–1)
MONOCYTES NFR BLD AUTO: 6.3 %
NEUTROPHILS # BLD AUTO: 5.17 X10 (3) UL (ref 1.5–7.7)
NEUTROPHILS # BLD AUTO: 5.17 X10(3) UL (ref 1.5–7.7)
NEUTROPHILS NFR BLD AUTO: 46.5 %
NONHDLC SERPL-MCNC: 195 MG/DL (ref ?–130)
OSMOLALITY SERPL CALC.SUM OF ELEC: 285 MOSM/KG (ref 275–295)
PATIENT FASTING Y/N/NP: YES
PATIENT FASTING Y/N/NP: YES
PLATELET # BLD AUTO: 273 10(3)UL (ref 150–450)
POTASSIUM SERPL-SCNC: 3.5 MMOL/L (ref 3.5–5.1)
RBC # BLD AUTO: 4.36 X10(6)UL (ref 3.8–5.3)
SARS-COV-2 IGG SERPLBLD QL IA.RAPID: NEGATIVE
SODIUM SERPL-SCNC: 136 MMOL/L (ref 136–145)
TRIGL SERPL-MCNC: 218 MG/DL (ref 30–149)
TSI SER-ACNC: 3.67 MIU/ML (ref 0.36–3.74)
VLDLC SERPL CALC-MCNC: 44 MG/DL (ref 0–30)
WBC # BLD AUTO: 11.1 X10(3) UL (ref 4–11)

## 2020-07-08 PROCEDURE — 86769 SARS-COV-2 COVID-19 ANTIBODY: CPT

## 2020-07-08 PROCEDURE — 82570 ASSAY OF URINE CREATININE: CPT

## 2020-07-08 PROCEDURE — 85025 COMPLETE CBC W/AUTO DIFF WBC: CPT

## 2020-07-08 PROCEDURE — 80053 COMPREHEN METABOLIC PANEL: CPT

## 2020-07-08 PROCEDURE — 84443 ASSAY THYROID STIM HORMONE: CPT

## 2020-07-08 PROCEDURE — 83036 HEMOGLOBIN GLYCOSYLATED A1C: CPT

## 2020-07-08 PROCEDURE — 36415 COLL VENOUS BLD VENIPUNCTURE: CPT

## 2020-07-08 PROCEDURE — 80061 LIPID PANEL: CPT

## 2020-07-08 PROCEDURE — 82043 UR ALBUMIN QUANTITATIVE: CPT

## 2020-07-11 ENCOUNTER — PATIENT MESSAGE (OUTPATIENT)
Dept: INTERNAL MEDICINE CLINIC | Facility: CLINIC | Age: 60
End: 2020-07-11

## 2020-07-11 DIAGNOSIS — E78.00 HYPERCHOLESTEROLEMIA: Primary | ICD-10-CM

## 2020-07-13 RX ORDER — EZETIMIBE 10 MG/1
10 TABLET ORAL NIGHTLY
Qty: 90 TABLET | Refills: 3 | Status: SHIPPED | OUTPATIENT
Start: 2020-07-13 | End: 2020-11-04

## 2020-07-13 RX ORDER — NABUMETONE 750 MG/1
750 TABLET, FILM COATED ORAL 2 TIMES DAILY
Qty: 60 TABLET | Refills: 0 | Status: SHIPPED | OUTPATIENT
Start: 2020-07-13 | End: 2020-08-12

## 2020-07-13 NOTE — TELEPHONE ENCOUNTER
1. Script for generic Zetia sent. 2. I see no documentation on Shingrix #1. Did pharmacy administer this? Regarding pneumonia vaccination, the date of 9/30/17 is on file for administration. She will not need another pneumonia vaccine until she turns 74.

## 2020-07-13 NOTE — TELEPHONE ENCOUNTER
From: Angelic Guerrero  To:  Hakeem Hodges MD  Sent: 7/11/2020 11:41 AM CDT  Subject: Prescription Question    The added drug you want me to take for my cholesterol is fine as long as the insurance covers it and it doesn't conflict with any of my other medicat

## 2020-07-15 ENCOUNTER — PATIENT MESSAGE (OUTPATIENT)
Dept: INTERNAL MEDICINE CLINIC | Facility: CLINIC | Age: 60
End: 2020-07-15

## 2020-07-15 NOTE — TELEPHONE ENCOUNTER
From: Mohini Quick  To: Tayla Espinoza MD  Sent: 7/15/2020 4:15 PM CDT  Subject: Prescription Question    Hello hope all is well with you krzysztof's. I need my Pristiq called into the Pharmaceutical company please.  If you have any questions please call me at

## 2020-07-16 RX ORDER — DESVENLAFAXINE 100 MG/1
100 TABLET, EXTENDED RELEASE ORAL DAILY
Qty: 90 TABLET | Refills: 0 | OUTPATIENT
Start: 2020-07-16 | End: 2020-09-22

## 2020-07-16 NOTE — TELEPHONE ENCOUNTER
Sarah Santacruz/Lissette Lucio for [employer]. Pt is enrolled through 12/31/2020. Confirmation# for refill [id number]. Will take approx 7-10 business days to arrive in our office. #90 day supply.    1 refill remaining on or after 9/22/2020

## 2020-07-28 NOTE — TELEPHONE ENCOUNTER
Called pt and informed her that we received her pristiq   Placed in brown paperbag in cabinet in nurses office

## 2020-08-06 ENCOUNTER — HOSPITAL ENCOUNTER (OUTPATIENT)
Dept: MAMMOGRAPHY | Age: 60
Discharge: HOME | End: 2020-08-06
Attending: INTERNAL MEDICINE
Payer: MEDICAID

## 2020-08-06 DIAGNOSIS — Z12.31 ENCOUNTER FOR SCREENING MAMMOGRAM FOR MALIGNANT NEOPLASM OF BREAST: ICD-10-CM

## 2020-08-06 PROCEDURE — 77067 SCR MAMMO BI INCL CAD: CPT | Performed by: INTERNAL MEDICINE

## 2020-08-06 PROCEDURE — 77063 BREAST TOMOSYNTHESIS BI: CPT | Performed by: INTERNAL MEDICINE

## 2020-08-11 DIAGNOSIS — G43.009 MIGRAINE WITHOUT AURA AND WITHOUT STATUS MIGRAINOSUS, NOT INTRACTABLE: ICD-10-CM

## 2020-08-11 DIAGNOSIS — E78.00 HYPERCHOLESTEROLEMIA: ICD-10-CM

## 2020-08-12 RX ORDER — EMPAGLIFLOZIN 10 MG/1
TABLET, FILM COATED ORAL
Qty: 90 TABLET | Refills: 1 | Status: SHIPPED | OUTPATIENT
Start: 2020-08-12 | End: 2021-02-23

## 2020-08-12 RX ORDER — NABUMETONE 750 MG/1
TABLET, FILM COATED ORAL
Qty: 60 TABLET | Refills: 0 | Status: SHIPPED | OUTPATIENT
Start: 2020-08-12 | End: 2020-09-14

## 2020-08-12 RX ORDER — RIZATRIPTAN BENZOATE 10 MG/1
TABLET ORAL
Qty: 12 TABLET | Refills: 0 | Status: SHIPPED | OUTPATIENT
Start: 2020-08-12 | End: 2020-11-04

## 2020-08-12 RX ORDER — OMEPRAZOLE 40 MG/1
CAPSULE, DELAYED RELEASE ORAL
Qty: 60 CAPSULE | Refills: 1 | Status: SHIPPED | OUTPATIENT
Start: 2020-08-12 | End: 2020-10-14

## 2020-08-12 RX ORDER — ATORVASTATIN CALCIUM 40 MG/1
40 TABLET, FILM COATED ORAL NIGHTLY
Qty: 90 TABLET | Refills: 1 | Status: SHIPPED | OUTPATIENT
Start: 2020-08-12 | End: 2020-11-04

## 2020-08-12 NOTE — TELEPHONE ENCOUNTER
Medication: RIZATRIPTAN BENZOATE 10 MG Oral Tab    Date of last refill: 08/20/19 (#12/2)  [place]    Last office visit: 04/23/2020  Due back to clinic per last office note:  Not Stated  Date next office visit schedul

## 2020-08-12 NOTE — TELEPHONE ENCOUNTER
Passed protocol - Atorvastatin   Failed protocol - Omeprazole and Jardiance    Last refill:  2/18/2020 Jardiance 10 mg - #90 1R  2/18/2020 Omeprazole 40 mg #60 5R  2/18/2020 Atorvastatin 40 mg #90 1R    Last lipid 7/8/2020    LOV:   6/22/2020 Dr Merary Portillo RTC

## 2020-09-14 ENCOUNTER — PATIENT MESSAGE (OUTPATIENT)
Dept: INTERNAL MEDICINE CLINIC | Facility: CLINIC | Age: 60
End: 2020-09-14

## 2020-09-14 RX ORDER — ALPRAZOLAM 0.5 MG/1
TABLET ORAL
Qty: 60 TABLET | Refills: 2 | Status: SHIPPED | OUTPATIENT
Start: 2020-09-14 | End: 2020-11-17

## 2020-09-14 RX ORDER — ZOLPIDEM TARTRATE 12.5 MG/1
TABLET, FILM COATED, EXTENDED RELEASE ORAL
Qty: 30 TABLET | Refills: 2 | Status: SHIPPED | OUTPATIENT
Start: 2020-09-14 | End: 2020-12-08

## 2020-09-14 RX ORDER — NABUMETONE 750 MG/1
TABLET, FILM COATED ORAL
Qty: 60 TABLET | Refills: 0 | Status: SHIPPED | OUTPATIENT
Start: 2020-09-14 | End: 2020-09-17

## 2020-09-14 NOTE — TELEPHONE ENCOUNTER
Alprazolam 0.5 mg 1 tab bid prn filled 6-16-20 60 with 0 refills   Zolpidem er 12.5 mg 1 tab nightly prn filled 6-16-20 30 with 2 refills    LOV 6-22-20   RTC 6 months  No upcoming apt on file   Labs 7-8-20

## 2020-09-15 NOTE — TELEPHONE ENCOUNTER
From: Ignacia Duverney  To: Tomy Raygoza MD  Sent: 9/14/2020 6:48 PM CDT  Subject: Prescription Question    Did you krzysztof's receive a request for my rx for Alprazolam and my sleeping pills? I'm out of both.   Thanks

## 2020-09-17 ENCOUNTER — TELEMEDICINE (OUTPATIENT)
Dept: RHEUMATOLOGY | Facility: CLINIC | Age: 60
End: 2020-09-17

## 2020-09-17 DIAGNOSIS — M15.9 PRIMARY OSTEOARTHRITIS INVOLVING MULTIPLE JOINTS: Primary | ICD-10-CM

## 2020-09-17 DIAGNOSIS — M47.816 SPONDYLOSIS OF LUMBAR REGION WITHOUT MYELOPATHY OR RADICULOPATHY: ICD-10-CM

## 2020-09-17 PROCEDURE — 99213 OFFICE O/P EST LOW 20 MIN: CPT | Performed by: INTERNAL MEDICINE

## 2020-09-17 RX ORDER — HYDROCODONE BITARTRATE AND ACETAMINOPHEN 10; 325 MG/1; MG/1
1 TABLET ORAL EVERY 6 HOURS PRN
Qty: 100 TABLET | Refills: 0 | Status: SHIPPED | OUTPATIENT
Start: 2020-09-17 | End: 2020-12-18

## 2020-09-17 RX ORDER — HYDROCODONE BITARTRATE AND ACETAMINOPHEN 10; 325 MG/1; MG/1
1 TABLET ORAL EVERY 6 HOURS PRN
Qty: 100 TABLET | Refills: 0 | Status: SHIPPED | OUTPATIENT
Start: 2020-10-17 | End: 2020-12-18

## 2020-09-17 RX ORDER — HYDROCODONE BITARTRATE AND ACETAMINOPHEN 10; 325 MG/1; MG/1
1 TABLET ORAL EVERY 6 HOURS PRN
Qty: 100 TABLET | Refills: 0 | Status: SHIPPED | OUTPATIENT
Start: 2020-11-17 | End: 2020-12-18

## 2020-09-17 RX ORDER — NABUMETONE 750 MG/1
750 TABLET, FILM COATED ORAL 2 TIMES DAILY
Qty: 60 TABLET | Refills: 3 | Status: SHIPPED | OUTPATIENT
Start: 2020-09-17 | End: 2020-12-18

## 2020-09-17 NOTE — PROGRESS NOTES
This visit is conducted using Telemedicine with live, interactive video and audio. Patient has been referred to the [facility] website at [email or web address] to review the yearly Consent to Treat document.     Patient understands and accepts financial res

## 2020-09-17 NOTE — PATIENT INSTRUCTIONS
Continue nabumetone 750 mg twice a day for joint pain. Apply Voltaren cream 3-4 times a day to sore joints as needed. For severe pain take Norco 10 mg 1 every 6 hours up to 3 to 4-day. Exercise as tolerated. Follow coronavirus guidelines.   If this cold

## 2020-09-18 ENCOUNTER — TELEPHONE (OUTPATIENT)
Dept: RHEUMATOLOGY | Facility: CLINIC | Age: 60
End: 2020-09-18

## 2020-09-22 ENCOUNTER — PATIENT MESSAGE (OUTPATIENT)
Dept: INTERNAL MEDICINE CLINIC | Facility: CLINIC | Age: 60
End: 2020-09-22

## 2020-09-22 RX ORDER — DESVENLAFAXINE 100 MG/1
100 TABLET, EXTENDED RELEASE ORAL DAILY
Qty: 90 TABLET | Refills: 0 | OUTPATIENT
Start: 2020-09-22

## 2020-09-22 NOTE — TELEPHONE ENCOUNTER
From: Erica Zelaya  To: Julia Curran MD  Sent: 9/22/2020 7:28 AM CDT  Subject: Prescription Question    Hello hope all is well with everyone. Could you please call in [facility].  Call me with any questions please [phone]

## 2020-09-26 DIAGNOSIS — G43.009 MIGRAINE WITHOUT AURA AND WITHOUT STATUS MIGRAINOSUS, NOT INTRACTABLE: ICD-10-CM

## 2020-09-28 RX ORDER — ERENUMAB-AOOE 140 MG/ML
INJECTION, SOLUTION SUBCUTANEOUS
Qty: 1 ML | Refills: 2 | Status: SHIPPED | OUTPATIENT
Start: 2020-09-28 | End: 2021-01-08

## 2020-09-28 NOTE — TELEPHONE ENCOUNTER
Medication: AIMOVIG 140 MG/ML Subcutaneous Solution Auto-injector    Date of last refill: 06/02/2020 (#1 mL/2)-discon  [place]    Last office visit: 04/23/2020  Due back to clinic per last office note:  3-6 months  D

## 2020-09-29 ENCOUNTER — TELEPHONE (OUTPATIENT)
Dept: RHEUMATOLOGY | Facility: CLINIC | Age: 60
End: 2020-09-29

## 2020-09-29 NOTE — TELEPHONE ENCOUNTER
Pharmacy states PA required for [address] started on cover my meds, came back as NA--phoned pt insurance, states pharmacy needs to call for medication over ride. Phoned pharmacy and notified of above.

## 2020-09-29 NOTE — TELEPHONE ENCOUNTER
Provider services line rep phoned our office, states that PA is needed for pt Norco, due to concurrent therapy with alprazolam recently ordered. PA form filled out and faxed back for determination.

## 2020-09-30 ENCOUNTER — TELEPHONE (OUTPATIENT)
Dept: RHEUMATOLOGY | Facility: CLINIC | Age: 60
End: 2020-09-30

## 2020-10-08 ENCOUNTER — PATIENT MESSAGE (OUTPATIENT)
Dept: INTERNAL MEDICINE CLINIC | Facility: CLINIC | Age: 60
End: 2020-10-08

## 2020-10-09 NOTE — TELEPHONE ENCOUNTER
From: Augustina Lemus  To: Arsalan Velazquez MD  Sent: 10/8/2020 1:17 AM CDT  Subject: Prescription Question    I was just wondering if you have received my Pristiq yet?   Thanks   [facility]

## 2020-10-14 RX ORDER — OMEPRAZOLE 40 MG/1
CAPSULE, DELAYED RELEASE ORAL
Qty: 60 CAPSULE | Refills: 2 | Status: SHIPPED | OUTPATIENT
Start: 2020-10-14 | End: 2021-01-15

## 2020-10-14 NOTE — TELEPHONE ENCOUNTER
LOV: 6/22/2020 with Dr. Severo Obey  RTC: 6 months  Last Relevant Labs: 7/8/2020   Filled: 8/12/2020  #60 with 1 refill    Future Appointments   Date Time Provider Lilly Purcell   29/99/9560 06:11 PM Vamsi Arevalo MD [facility] Rogelio Villagomez

## 2020-10-14 NOTE — TELEPHONE ENCOUNTER
At 4/24 visit  Metoprolol was decreased to 1/2 tablet 12.5 mg due to side effects. Pt stated she increased back to 25 mg about 3-4 months ago d/t elevated b/p readings which Dr Bibiana Starks was aware of. Ok to change script to 25 mg daily? Subjective:     Patient ID:  Eliceo Rubio is a 37 y.o. male.    Pal    Chief Complaint: 3 month po fu      DATE OF PROCEDURE: 07/16/2020     PREOPERATIVE DIAGNOSES:  1. Lumbar degenerative disc disease L5-S1  2. Left foraminal stenosis with radiculopathy at L5-S1     POSTOPERATIVE DIAGNOSES:  1. Lumbar degenerative disc disease L5-S1  2. Left foraminal stenosis with radiculopathy at L5-S1     NAME OF PROCEDURE:     1.  Anterior interbody arthrodesis at L5-S1 using Kinex and Trifecta allograft  2. Placement of a globus [place] MIS cage at L5-S1   3. Posterior segmental bilateral instrumentation from L5 to S1 using Globus Creo MIS Screw System   4. Fluoroscopy  5. Neuromonitoring     ASSISTANT SURGEON:  Darren Olivares MD (RES)    HPI    Eliceo Rubio is a 37 y.o. male who presents for follow up.  Overall patient is doing better.  He has mild pain in the morning when getting out of bed.  Throughout the day he is ok but if he sits too long he get back pain.  He had one episode of left posterior leg pain but overall the leg pain is gone.  He has been going to PT which has been helping.  He has 11 sessions left.    He takes robaxin PRN.      Patient denies any recent accidents or trauma, no saddle anesthesias, and no bowel or bladder incontinence.      Review of Systems:  Constitution: Negative for chills, fever, night sweats and weight loss.   Musculoskeletal: Negative for falls.   Gastrointestinal: Negative for bowel incontinence, nausea and vomiting.   Genitourinary: Negative for bladder incontinence.   Neurological: Negative for disturbances in coordination and loss of balance.      Objective:      Vitals:    10/14/20 0824   BP: (!) 146/91   Pulse: 79   PainSc:   3         Physical Exam:      General:  Eliceo Rubio is well-developed, well-nourished, appears stated age, in no acute distress, alert and oriented to person, place, and time.    Pulmonary/Chest:  Respiratory effort  normal  Abdominal: Exhibits no distension  Psychiatric:  Normal mood and affect.  Behavior is normal.  Judgement and thought content normal      Musculoskeletal:    Patient is able to walk on heels and toes without difficulty.    Lumbar ROM:   Pain in lumbar flexion, extension, left lateral bending, and right lateral bending.    Lumbar Spine Inspection:  Healed surgical scars with no visible rashes.    Lumbar Spine Palpation:  No tenderness to low back palpation.    SI Joint Palpation:  No tenderness to SI Joint palpation.    Straight Leg Raise:  Negative right and left SLR.      Neurological:  Alert and oriented to person, place, and time    Muscle strength against resistance:     Right Left   Hip flexion  5 / 5 4 / 5   Hip extension 5 / 5 5 / 5   Hip abduction 5 / 5 5 / 5   Hip adduction  5 / 5 5 / 5   Knee extension  5 / 5 5 / 5   Knee flexion 5 / 5 5 / 5   Dorsiflexion  5 / 5 5 / 5   EHL  5 / 5 5 / 5   Plantar flexion  5 / 5 5 / 5   Inversion of the feet 5 / 5 5 / 5   Eversion of the feet  5 / 5 5 / 5       Reflexes:     Right Left   Patellar 2+ 2+   Achilles 2+ 2+     Clonus:  Negative bilaterally    On gross examination of the bilateral upper extremities, patient has full painfree ROM with no signs of clubbing, cyanosis, edema, or weakness.       XRAY Interpretation:     Lumbar spine xrays were personally reviewed today.  No fractures.  Hardware intact.      Assessment:          1. S/P lumbar spinal fusion            Plan:             Patient 3 months s/p L5-S1 instrumented fusion    -Continue PT  -Continue current work restrictions Dr. Aguillon gave.  Once done with PT he will message us back about his work situation  -Take celebrex and robaxin PRN  -FU in three months with xrays for the 6 month po fu visit with Dr. Aguillon      Follow-Up:  Follow up in about 3 months (around 1/14/2021). If there are any questions prior to this, the patient was instructed to contact the office.       Sophia Jaeger, [facility],  ROSA  Neurosurgery  [facility]

## 2020-10-20 ENCOUNTER — TELEPHONE (OUTPATIENT)
Dept: INTERNAL MEDICINE CLINIC | Facility: CLINIC | Age: 60
End: 2020-10-20

## 2020-10-20 NOTE — TELEPHONE ENCOUNTER
Patient dropped off form to be completed: [address] [facility], [address]147 in Dr. Camilo Ken folder for completion

## 2020-10-25 NOTE — TELEPHONE ENCOUNTER
In my Outbox. Julio Phillips. Rell Malone MD  Diplomate, American Board of Internal Medicine  Member, 95 [facility] 112 ([email or web address])  Affiliate Physician, [employer] ([email or web address])

## 2020-10-28 NOTE — TELEPHONE ENCOUNTER
Called Soumya unable to leave message we need her a photocopy of documentation that shows hers annual income to fax along with the form.

## 2020-11-03 ENCOUNTER — PATIENT MESSAGE (OUTPATIENT)
Dept: INTERNAL MEDICINE CLINIC | Facility: CLINIC | Age: 60
End: 2020-11-03

## 2020-11-03 DIAGNOSIS — G43.009 MIGRAINE WITHOUT AURA AND WITHOUT STATUS MIGRAINOSUS, NOT INTRACTABLE: ICD-10-CM

## 2020-11-03 DIAGNOSIS — E78.00 HYPERCHOLESTEROLEMIA: ICD-10-CM

## 2020-11-03 DIAGNOSIS — R51.9 CHRONIC DAILY HEADACHE: ICD-10-CM

## 2020-11-03 DIAGNOSIS — E11.65 UNCONTROLLED TYPE 2 DIABETES MELLITUS WITH HYPERGLYCEMIA, WITHOUT LONG-TERM CURRENT USE OF INSULIN (HCC): ICD-10-CM

## 2020-11-03 NOTE — TELEPHONE ENCOUNTER
From: Erica Zelaya  To: Julia Curran MD  Sent: 11/3/2020 1:59 PM CST  Subject: Prescription Question    Hi Dr. Sarai Santos hope all is well with you and your family. I was wondering if you could send over refills on all of my rxs before you leave.  I don't want

## 2020-11-04 RX ORDER — ERENUMAB-AOOE 70 MG/ML
70 INJECTION SUBCUTANEOUS ONCE
Qty: 2 PEN | Refills: 0 | Status: SHIPPED | OUTPATIENT
Start: 2020-11-04 | End: 2020-12-07

## 2020-11-04 RX ORDER — EZETIMIBE 10 MG/1
10 TABLET ORAL NIGHTLY
Qty: 90 TABLET | Refills: 0 | Status: SHIPPED | OUTPATIENT
Start: 2020-11-04 | End: 2021-03-26

## 2020-11-04 RX ORDER — RIZATRIPTAN BENZOATE 10 MG/1
10 TABLET ORAL DAILY PRN
Qty: 12 TABLET | Refills: 0 | Status: SHIPPED | OUTPATIENT
Start: 2020-11-04

## 2020-11-04 RX ORDER — DIVALPROEX SODIUM 250 MG/1
250 TABLET, EXTENDED RELEASE ORAL NIGHTLY
Qty: 30 TABLET | Refills: 0 | Status: SHIPPED | OUTPATIENT
Start: 2020-11-04 | End: 2020-12-03

## 2020-11-04 RX ORDER — ATORVASTATIN CALCIUM 40 MG/1
40 TABLET, FILM COATED ORAL NIGHTLY
Qty: 90 TABLET | Refills: 0 | Status: SHIPPED | OUTPATIENT
Start: 2020-11-04 | End: 2021-02-05

## 2020-11-04 RX ORDER — ALBUTEROL SULFATE 90 UG/1
2 AEROSOL, METERED RESPIRATORY (INHALATION) EVERY 4 HOURS PRN
Qty: 1 INHALER | Refills: 0 | Status: SHIPPED | OUTPATIENT
Start: 2020-11-04 | End: 2021-08-25

## 2020-11-04 RX ORDER — CHLORZOXAZONE 500 MG/1
500 TABLET ORAL 3 TIMES DAILY PRN
Qty: 50 TABLET | Refills: 0 | Status: SHIPPED | OUTPATIENT
Start: 2020-11-04 | End: 2020-12-18

## 2020-11-04 RX ORDER — QUETIAPINE 200 MG/1
200 TABLET, FILM COATED, EXTENDED RELEASE ORAL EVERY EVENING
Qty: 90 TABLET | Refills: 0 | Status: SHIPPED | OUTPATIENT
Start: 2020-11-04 | End: 2020-12-08

## 2020-11-04 RX ORDER — FLUTICASONE PROPIONATE 50 MCG
SPRAY, SUSPENSION (ML) NASAL
Qty: 16 G | Refills: 0 | Status: SHIPPED | OUTPATIENT
Start: 2020-11-04 | End: 2021-08-25

## 2020-11-04 NOTE — TELEPHONE ENCOUNTER
Refill for 90 day scripts  LOV 9/16/19    Reviewed medication list and compared it to OV with Dr. Rell Malone on 9/16/19

## 2020-11-04 NOTE — TELEPHONE ENCOUNTER
Soumya called back - states [employer] told her they just need a letter from our office stating that to the best of our knowledge pt is not working. Letter was previously written in January in letters section. Are you ok with writing letter?

## 2020-11-04 NOTE — TELEPHONE ENCOUNTER
Please pend 90-day supply. No refills. Niki Lazaro MD  Diplomate, American Board of Internal Medicine  Member, 95 [facility] ([email or web address])  Affiliate Physician, [employer] lee

## 2020-11-04 NOTE — TELEPHONE ENCOUNTER
Received fax stating that [employer] needs income proof to be sent to them. Contacted terrance and states that she does not have proof. I advised she contact [employer] and provider her with their phone #.    Refaxed application to [employer] as form was missing dr Begum Minus

## 2020-11-07 ENCOUNTER — TELEMEDICINE (OUTPATIENT)
Dept: INTERNAL MEDICINE CLINIC | Facility: CLINIC | Age: 60
End: 2020-11-07
Payer: MEDICAID

## 2020-11-07 DIAGNOSIS — J02.9 SORE THROAT: Primary | ICD-10-CM

## 2020-11-07 PROCEDURE — 99213 OFFICE O/P EST LOW 20 MIN: CPT | Performed by: INTERNAL MEDICINE

## 2020-11-07 NOTE — PROGRESS NOTES
Virtual Telephone Check-In    This visit is conducted using Telemedicine with live, interactive video and audio.  [place] in [place]   Patient understands and accepts financial responsibility for any deductible, co-insurance and/or co-pays associat calling about sore throat, sinus congestion and a headache. She states she always has these and had scheduled an ultrasound, but her appointment was cancelled due to symptoms. Symptoms have been going on for about 1 week and has not worsened.  Does not know

## 2020-11-10 DIAGNOSIS — I10 ESSENTIAL HYPERTENSION: ICD-10-CM

## 2020-11-11 RX ORDER — METOPROLOL SUCCINATE 50 MG/1
TABLET, EXTENDED RELEASE ORAL
Qty: 90 TABLET | Refills: 0 | Status: SHIPPED | OUTPATIENT
Start: 2020-11-11 | End: 2021-02-05

## 2020-11-11 NOTE — TELEPHONE ENCOUNTER
Passed protocol     Last refill:  5/19/2020 #90 1R - 50 mg 1 x daily     LOV:   6/22/2020 Dr Lay Guthrie RTC 6 months  4. HTN - Stable on prescription medication + lifestyle measures.  No new issues  No FOV scheduled

## 2020-11-17 NOTE — PROGRESS NOTES
This is a telemedicine visit with live, interactive video and audio. Patient understands and accepts financial responsibility for any deductible, co-insurance and/or co-pays associated with this service. Patient presents with:   Insomnia     SUBJECTI Osteoarthritis    • Other ill-defined conditions(799.89)     Jamison's   • Pain in joint, forearm 04/26/2011    wrist   • Pain in joint, pelvic region and thigh 10/07/2011    hip   • Pelvic mass 6/28/2019   • Personal history of urinary (tract) infection 1 Normal work of breathing and age appropriate, normal, logical connections and person, place and time/date    ASSESSMENT & PLAN  Diagnoses and all orders for this visit:    Primary insomnia  -     ALPRAZolam 1 MG Oral Tab;  Take 1 tablet (1 mg total) by zee

## 2020-11-18 ENCOUNTER — TELEPHONE (OUTPATIENT)
Dept: INTERNAL MEDICINE CLINIC | Facility: CLINIC | Age: 60
End: 2020-11-18

## 2020-11-19 ENCOUNTER — APPOINTMENT (OUTPATIENT)
Dept: LAB | Age: 60
End: 2020-11-19
Attending: INTERNAL MEDICINE
Payer: MEDICAID

## 2020-11-19 DIAGNOSIS — J02.9 SORE THROAT: ICD-10-CM

## 2020-11-22 ENCOUNTER — PATIENT MESSAGE (OUTPATIENT)
Dept: INTERNAL MEDICINE CLINIC | Facility: CLINIC | Age: 60
End: 2020-11-22

## 2020-11-22 DIAGNOSIS — F51.01 PRIMARY INSOMNIA: ICD-10-CM

## 2020-11-23 ENCOUNTER — TELEPHONE (OUTPATIENT)
Dept: INTERNAL MEDICINE CLINIC | Facility: CLINIC | Age: 60
End: 2020-11-23

## 2020-11-23 RX ORDER — ALPRAZOLAM 1 MG/1
1 TABLET ORAL 2 TIMES DAILY PRN
Qty: 60 TABLET | Refills: 0 | Status: SHIPPED | OUTPATIENT
Start: 2020-11-23 | End: 2020-12-08

## 2020-11-23 NOTE — TELEPHONE ENCOUNTER
PC to [facility] for PA on alprazolam 1 mg , one tb two times daily, #60 + 0R    Spoke with Elizabeth STANFORD , initiated   [phone]

## 2020-11-23 NOTE — TELEPHONE ENCOUNTER
From: Reina Urban  To: Jennifer Muniz MD  Sent: 11/22/2020 2:17 PM CST  Subject: Prescription Question    I'm trying to find out if you were able to contact my insurance company about getting a pre authorization for my new alprazolam? Please let me know.

## 2020-11-23 NOTE — TELEPHONE ENCOUNTER
PA approved for Alprazolam 1 mg   Must be processed through [facility]  30 day supply only    Approval from 8/23/20-12/23/20     Case # [id number]    Script e scribed by Dr. Rockne Najjar to [facility] to [place] at [phone] to discontinue script     P

## 2020-11-23 NOTE — TELEPHONE ENCOUNTER
Pt called asking for status on prior auth. She says it is inexpensive and she can pay for it out of pocket this month, but was wondering if she could use the prior auth for next month's rx.    Also, pt asked why only 1 month supply was sent to pharmacy and

## 2020-11-23 NOTE — TELEPHONE ENCOUNTER
PC back to pt  Informed her that the PA is in process, she can either pay for her medication out of pocket or wait for PA to be completed.      However, PA is both date and script specific    Pt verbalized understanding of above

## 2020-11-23 NOTE — TELEPHONE ENCOUNTER
Script sent. Carolynn Lopez. Belen Pan MD  Diplomate, American Board of Internal Medicine  Member, 95 [facility] ([email or web address])  Affiliate Physician, [employer] ([email or web address])

## 2020-11-23 NOTE — TELEPHONE ENCOUNTER
PC to [place] and spoke with Karen Eden , [facility]    Alprazolam filled an waiting for pt      Called pt to let her know  Pt states she still needs PA with increase in medication dosing, pt will call pharmacy to send us the PA forms

## 2020-12-02 DIAGNOSIS — G43.009 MIGRAINE WITHOUT AURA AND WITHOUT STATUS MIGRAINOSUS, NOT INTRACTABLE: ICD-10-CM

## 2020-12-03 DIAGNOSIS — R51.9 CHRONIC DAILY HEADACHE: ICD-10-CM

## 2020-12-03 RX ORDER — DIVALPROEX SODIUM 250 MG/1
TABLET, EXTENDED RELEASE ORAL
Qty: 30 TABLET | Refills: 0 | Status: SHIPPED | OUTPATIENT
Start: 2020-12-03 | End: 2020-12-21

## 2020-12-03 NOTE — TELEPHONE ENCOUNTER
If patient is not establishing with Dr. Iván Jackson, she will need to follow up with Psychiatry or another provider/clinic for her psychiatric medications (alprazolam, quetiapine, divalproex, desvenlafaxine, zolpidem).   Will refill divalproex for now as a courte

## 2020-12-03 NOTE — TELEPHONE ENCOUNTER
No protocol     Last refill:  11/4/2020 Divalproex  mg #30 NR    Last telemdicine   11/17/2020 Dr Malachi Griffiths RTC  11/7/2020 Dr Keely Haq RTC    No FOV scheduled

## 2020-12-04 ENCOUNTER — TELEPHONE (OUTPATIENT)
Dept: NEUROLOGY | Facility: CLINIC | Age: 60
End: 2020-12-04

## 2020-12-07 ENCOUNTER — PATIENT MESSAGE (OUTPATIENT)
Dept: INTERNAL MEDICINE CLINIC | Facility: CLINIC | Age: 60
End: 2020-12-07

## 2020-12-07 RX ORDER — ERENUMAB-AOOE 70 MG/ML
INJECTION SUBCUTANEOUS
Qty: 1 ML | Refills: 0 | Status: SHIPPED | OUTPATIENT
Start: 2020-12-07 | End: 2020-12-18

## 2020-12-07 NOTE — PROGRESS NOTES
Virtual Telephone Check-In    This visit is conducted using Telemedicine with live, interactive video and audio.  [place] in [place]   Patient understands and accepts financial responsibility for any deductible, co-insurance and/or co-pays associat history of anxiety/depression who is calling to have someone take over all of her medications. She has been taking these for a while and recently had xanax increased because she was having difficulty sleeping.  She takes zolpidem, divalproex, pristiq, queti

## 2020-12-08 DIAGNOSIS — F51.01 PRIMARY INSOMNIA: ICD-10-CM

## 2020-12-08 RX ORDER — QUETIAPINE 200 MG/1
200 TABLET, FILM COATED, EXTENDED RELEASE ORAL EVERY EVENING
Qty: 30 TABLET | Refills: 0 | Status: SHIPPED | OUTPATIENT
Start: 2020-12-08

## 2020-12-08 RX ORDER — ALPRAZOLAM 1 MG/1
1 TABLET ORAL 2 TIMES DAILY PRN
Qty: 60 TABLET | Refills: 0 | Status: SHIPPED | OUTPATIENT
Start: 2020-12-08 | End: 2020-12-11

## 2020-12-08 RX ORDER — ZOLPIDEM TARTRATE 12.5 MG/1
12.5 TABLET, FILM COATED, EXTENDED RELEASE ORAL NIGHTLY PRN
Qty: 30 TABLET | Refills: 0 | Status: SHIPPED | OUTPATIENT
Start: 2020-12-08 | End: 2021-01-08

## 2020-12-10 ENCOUNTER — PATIENT MESSAGE (OUTPATIENT)
Dept: INTERNAL MEDICINE CLINIC | Facility: CLINIC | Age: 60
End: 2020-12-10

## 2020-12-10 DIAGNOSIS — F51.01 PRIMARY INSOMNIA: ICD-10-CM

## 2020-12-11 RX ORDER — ALPRAZOLAM 1 MG/1
1 TABLET ORAL 2 TIMES DAILY PRN
Qty: 60 TABLET | Refills: 0 | Status: SHIPPED | OUTPATIENT
Start: 2020-12-11 | End: 2021-01-15

## 2020-12-17 NOTE — PROGRESS NOTES
Pt requesting replacement with extended release med. List from pharmacy in your in basket. Discharged

## 2020-12-18 ENCOUNTER — TELEPHONE (OUTPATIENT)
Dept: NEUROLOGY | Facility: CLINIC | Age: 60
End: 2020-12-18

## 2020-12-18 ENCOUNTER — TELEMEDICINE (OUTPATIENT)
Dept: RHEUMATOLOGY | Facility: CLINIC | Age: 60
End: 2020-12-18
Payer: MEDICAID

## 2020-12-18 VITALS — WEIGHT: 165 LBS | BODY MASS INDEX: 31 KG/M2

## 2020-12-18 DIAGNOSIS — G43.009 MIGRAINE WITHOUT AURA AND WITHOUT STATUS MIGRAINOSUS, NOT INTRACTABLE: ICD-10-CM

## 2020-12-18 DIAGNOSIS — M18.11 ARTHRITIS OF CARPOMETACARPAL (CMC) JOINT OF RIGHT THUMB: ICD-10-CM

## 2020-12-18 DIAGNOSIS — M47.816 SPONDYLOSIS OF LUMBAR REGION WITHOUT MYELOPATHY OR RADICULOPATHY: ICD-10-CM

## 2020-12-18 DIAGNOSIS — M15.9 PRIMARY OSTEOARTHRITIS INVOLVING MULTIPLE JOINTS: Primary | ICD-10-CM

## 2020-12-18 PROCEDURE — 99213 OFFICE O/P EST LOW 20 MIN: CPT | Performed by: INTERNAL MEDICINE

## 2020-12-18 RX ORDER — CHLORZOXAZONE 500 MG/1
500 TABLET ORAL 3 TIMES DAILY PRN
Qty: 90 TABLET | Refills: 2 | Status: SHIPPED | OUTPATIENT
Start: 2020-12-18 | End: 2021-07-06

## 2020-12-18 RX ORDER — NABUMETONE 750 MG/1
750 TABLET, FILM COATED ORAL 2 TIMES DAILY
Qty: 180 TABLET | Refills: 3 | Status: SHIPPED | OUTPATIENT
Start: 2020-12-18 | End: 2021-03-23

## 2020-12-18 RX ORDER — HYDROCODONE BITARTRATE AND ACETAMINOPHEN 10; 325 MG/1; MG/1
1 TABLET ORAL EVERY 6 HOURS PRN
Qty: 120 TABLET | Refills: 0 | Status: SHIPPED | OUTPATIENT
Start: 2021-01-22 | End: 2021-02-21

## 2020-12-18 RX ORDER — HYDROCODONE BITARTRATE AND ACETAMINOPHEN 10; 325 MG/1; MG/1
1 TABLET ORAL EVERY 6 HOURS PRN
Qty: 120 TABLET | Refills: 0 | Status: SHIPPED | OUTPATIENT
Start: 2021-02-21 | End: 2021-03-23

## 2020-12-18 RX ORDER — HYDROCODONE BITARTRATE AND ACETAMINOPHEN 10; 325 MG/1; MG/1
1 TABLET ORAL EVERY 6 HOURS PRN
Qty: 120 TABLET | Refills: 0 | Status: SHIPPED | OUTPATIENT
Start: 2020-12-23 | End: 2021-01-22

## 2020-12-18 NOTE — PROGRESS NOTES
This visit is conducted using Telemedicine with live, interactive video and audio. Patient has been referred to the [facility] website at [email or web address] to review the yearly Consent to Treat document.     Patient understands and accepts financial res

## 2020-12-18 NOTE — TELEPHONE ENCOUNTER
Per PSR: Dr. Jim Waters originally prescribed her divalproex, her pcp Dr Rachel Marquez would automatically refill for her, but he's gone now, so Dr. Milton Ramirez made the last refill, but won't going forward until she sees a psychologist (it was lumped in with other medications) Is it possible to have Dr Mirza Postal refill it? Please call pharmacy, or call pt to advise    Medication: DIVALPROEX SODIUM  MG Oral Tablet 24 Hr    Date of last refill: 12/3/2020 (#30/0)  [place]    Last office visit: 4/23/2020  Due back to clinic per last office note:  3-6 months  Date next office visit scheduled:    Future Appointments   Date Time Provider Lilly Purcell   1/7/2021  3:00 PM PF [facility] [place]   2/15/2021  2:30 PM Christoph Navarro MD [facility] [address]   8/86/1197 72:75 PM Van Fraire MD [facility] Jalen Yanez       Last OV note recommendation: Per Dr. Faina Poe MD:    You can go back to Depakote  mg at night and hopefully headache should improved. If you need prescription let us know.

## 2020-12-18 NOTE — PATIENT INSTRUCTIONS
Continue to use chlorzoxazone 500 mg once or twice a day as a muscle relaxant  Use Norco 10 mg once every 6 hours as needed for breakthrough pain. Use nabumetone 750 mg twice a day for arthritis pain. Exercise regularly.   Take your Depakote to try to pre

## 2020-12-21 RX ORDER — DIVALPROEX SODIUM 250 MG/1
250 TABLET, EXTENDED RELEASE ORAL NIGHTLY
Qty: 30 TABLET | Refills: 0 | Status: SHIPPED | OUTPATIENT
Start: 2020-12-21 | End: 2021-01-21

## 2021-01-04 ENCOUNTER — PATIENT MESSAGE (OUTPATIENT)
Dept: INTERNAL MEDICINE CLINIC | Facility: CLINIC | Age: 61
End: 2021-01-04

## 2021-01-05 NOTE — TELEPHONE ENCOUNTER
From: Shadi Palomo  To:  Rika Robledo DO  Sent: 1/4/2021 2:15 PM CST  Subject: Prescription Question    I need someone to call me regarding my sleeping pills and also regarding records to be sent to my psychiatrist's office and also questions abo

## 2021-01-05 NOTE — TELEPHONE ENCOUNTER
Spoke with pt. States she established with Psych Dr Ellen Griffin at [facility]. Dr Ellen Griffin is requesting medical records from our office. Pt states she signed a medical release form at psych visit and it was faxed to our office last Saturday. No form found.  LM on case

## 2021-01-06 NOTE — TELEPHONE ENCOUNTER
I am assuming they may want patient's records first.    To minimize calls/confusion, MA please follow up on whether release form was received. If not, please contact Dr. Cox office for the followin.  Refax release (maybe to the triage fax line this

## 2021-01-06 NOTE — TELEPHONE ENCOUNTER
If she is established with psych why do they not fill the [place]?  We can continue the pristiq for now but need to communicate this with psych as well

## 2021-01-07 ENCOUNTER — PATIENT MESSAGE (OUTPATIENT)
Dept: INTERNAL MEDICINE CLINIC | Facility: CLINIC | Age: 61
End: 2021-01-07

## 2021-01-07 DIAGNOSIS — G43.009 MIGRAINE WITHOUT AURA AND WITHOUT STATUS MIGRAINOSUS, NOT INTRACTABLE: ICD-10-CM

## 2021-01-07 NOTE — TELEPHONE ENCOUNTER
Called Dr. Johana Ardon office and spoke with her nurse Fredy Philip. She stated that they do not have a copy of her signed medical release form. She requested to be transferred to medical records dept to obtain records that way.    Dr. Johana Ardon is unable to provide a

## 2021-01-08 RX ORDER — ERENUMAB-AOOE 140 MG/ML
INJECTION, SOLUTION SUBCUTANEOUS
Qty: 1 ML | Refills: 2 | Status: SHIPPED | OUTPATIENT
Start: 2021-01-08 | End: 2021-04-12

## 2021-01-08 RX ORDER — ZOLPIDEM TARTRATE 12.5 MG/1
12.5 TABLET, FILM COATED, EXTENDED RELEASE ORAL NIGHTLY PRN
Qty: 14 TABLET | Refills: 0 | Status: SHIPPED | OUTPATIENT
Start: 2021-01-08 | End: 2021-02-11

## 2021-01-08 NOTE — TELEPHONE ENCOUNTER
Sent the patient a [employer] message asking the patient to make an appt for further medication refills.      Medication: AIMOVIG 140 MG/ML Subcutaneous Solution Auto-injector    Date of last refill: 09/28/2020 (#1mL/2)  [place]

## 2021-01-08 NOTE — TELEPHONE ENCOUNTER
As per my previous message please call in patients pristiq. Let me know if I need to do anything further or place the order. Also please let pt know we will be doing so.

## 2021-01-08 NOTE — TELEPHONE ENCOUNTER
Pt would like a call back from nurse says her message is being misunderstood says that medication has to be called into [facility] she can not call her self

## 2021-01-08 NOTE — TELEPHONE ENCOUNTER
Rachel Robertson have to call pharmaceutical company to place order. What is the quantity and refills you want to give to patient? [address]    Phone # [phone]   Patient ID # [id number]

## 2021-01-08 NOTE — TELEPHONE ENCOUNTER
Called [employer], states medication has already been ordered. Passed over to rep. They verified that it was ordered and was shipped on the 6th, takes 7-10 business days. Next refill March 15.     Let the lady who I spoke to know that the Ordering doctor Jamaica Engel

## 2021-01-12 ENCOUNTER — PATIENT MESSAGE (OUTPATIENT)
Dept: INTERNAL MEDICINE CLINIC | Facility: CLINIC | Age: 61
End: 2021-01-12

## 2021-01-12 NOTE — TELEPHONE ENCOUNTER
Called pt and informed her to sign another ROR at the time of her visit on 1/14 with Dr. Lindsay Moeller she can reach out to medical records department to request her records be sent there   Pt verbalizes understanding

## 2021-01-14 NOTE — TELEPHONE ENCOUNTER
Pt's Pristiq arrived   Pt informed that it is here for her to    Pt stated she will  either today or tomorrow     Placed in nurses cabinet

## 2021-01-15 RX ORDER — OMEPRAZOLE 40 MG/1
CAPSULE, DELAYED RELEASE ORAL
Qty: 60 CAPSULE | Refills: 0 | Status: SHIPPED | OUTPATIENT
Start: 2021-01-15 | End: 2021-02-16

## 2021-01-15 NOTE — TELEPHONE ENCOUNTER
Medication(s) to Refill:   Requested Prescriptions     Pending Prescriptions Disp Refills   • OMEPRAZOLE 40 MG Oral Capsule Delayed Release [Pharmacy Med Name: Omeprazole Dr 40 Mg Cap Nort] 60 capsule 0     Sig: TAKE ONE CAPSULE BY MOUTH TWICE PER DAY

## 2021-01-20 NOTE — TELEPHONE ENCOUNTER
Forms have not been received yet. Confirmed with Kamran Brown - she has not seen anything come across her desk.  - see [employer] message 1/14/2021

## 2021-01-27 NOTE — TELEPHONE ENCOUNTER
Pt provided a completed authorization release form for medical records. Form sent via  to Medical record dept to process.

## 2021-02-04 DIAGNOSIS — I10 ESSENTIAL HYPERTENSION: ICD-10-CM

## 2021-02-04 DIAGNOSIS — E78.00 HYPERCHOLESTEROLEMIA: ICD-10-CM

## 2021-02-04 DIAGNOSIS — E11.65 UNCONTROLLED TYPE 2 DIABETES MELLITUS WITH HYPERGLYCEMIA, WITHOUT LONG-TERM CURRENT USE OF INSULIN (HCC): ICD-10-CM

## 2021-02-05 RX ORDER — METOPROLOL SUCCINATE 50 MG/1
TABLET, EXTENDED RELEASE ORAL
Qty: 90 TABLET | Refills: 0 | OUTPATIENT
Start: 2021-02-05

## 2021-02-05 RX ORDER — METOPROLOL SUCCINATE 50 MG/1
50 TABLET, EXTENDED RELEASE ORAL DAILY
Qty: 90 TABLET | Refills: 0 | Status: SHIPPED | OUTPATIENT
Start: 2021-02-05 | End: 2021-05-05

## 2021-02-05 RX ORDER — ATORVASTATIN CALCIUM 40 MG/1
TABLET, FILM COATED ORAL
Qty: 90 TABLET | Refills: 0 | Status: SHIPPED | OUTPATIENT
Start: 2021-02-05 | End: 2021-05-05

## 2021-02-05 NOTE — TELEPHONE ENCOUNTER
Failed protocol     Last refill:  11/11/2020 Metoprolol ER 50 mg #90 NR    LOV:   12/7/2020 Dr Arnaldo Ireland   No FOV scheduled

## 2021-02-05 NOTE — TELEPHONE ENCOUNTER
Failed protocol - atorvastatin   Passed protocol - metformin     Last refill:  11/4/2020 Atorvastatin 40 mg #90 NR  11/4/2020 Metformin 500 mg #180 NR    Last virtual appt 12/7/2020 Dr Ward Saldivar   No FOV scheduled

## 2021-02-10 ENCOUNTER — TELEPHONE (OUTPATIENT)
Dept: INTERNAL MEDICINE CLINIC | Facility: CLINIC | Age: 61
End: 2021-02-10

## 2021-02-10 DIAGNOSIS — F51.01 PRIMARY INSOMNIA: ICD-10-CM

## 2021-02-10 NOTE — TELEPHONE ENCOUNTER
Pt states that she will be due for refill on alprazolam and zolpidem but does not have appointment scheduled with psych until 3/4/21. Pt wondering if she can receive refills on these meds until then?

## 2021-02-10 NOTE — TELEPHONE ENCOUNTER
Has not been in office in 6 months but pt has f/u with psych scheduled, ok for VV instead of in office?

## 2021-02-11 RX ORDER — ZOLPIDEM TARTRATE 12.5 MG/1
12.5 TABLET, FILM COATED, EXTENDED RELEASE ORAL NIGHTLY PRN
Qty: 14 TABLET | Refills: 0 | Status: SHIPPED | OUTPATIENT
Start: 2021-02-11 | End: 2021-02-12

## 2021-02-11 RX ORDER — ALPRAZOLAM 1 MG/1
1 TABLET ORAL 2 TIMES DAILY PRN
Qty: 60 TABLET | Refills: 0 | Status: SHIPPED | OUTPATIENT
Start: 2021-02-11

## 2021-02-11 NOTE — TELEPHONE ENCOUNTER
Krysten Chris and Clinical staff - if patient has appt with psychiatry it is ok to provide a 30 day refill now.

## 2021-02-15 ENCOUNTER — OFFICE VISIT (OUTPATIENT)
Dept: OBGYN CLINIC | Facility: CLINIC | Age: 61
End: 2021-02-15
Payer: MEDICAID

## 2021-02-15 VITALS
TEMPERATURE: 98 F | HEIGHT: 60.75 IN | HEART RATE: 78 BPM | BODY MASS INDEX: 30.6 KG/M2 | RESPIRATION RATE: 16 BRPM | DIASTOLIC BLOOD PRESSURE: 90 MMHG | WEIGHT: 160 LBS | SYSTOLIC BLOOD PRESSURE: 140 MMHG

## 2021-02-15 DIAGNOSIS — L29.2 VULVAR ITCHING: ICD-10-CM

## 2021-02-15 DIAGNOSIS — Z12.31 ENCOUNTER FOR SCREENING MAMMOGRAM FOR BREAST CANCER: ICD-10-CM

## 2021-02-15 DIAGNOSIS — N39.44 NOCTURNAL ENURESIS: ICD-10-CM

## 2021-02-15 DIAGNOSIS — Z96.0 HISTORY OF PUBOVAGINAL SLING: ICD-10-CM

## 2021-02-15 DIAGNOSIS — Z12.4 ENCOUNTER FOR SCREENING FOR CERVICAL CANCER: ICD-10-CM

## 2021-02-15 DIAGNOSIS — Z01.419 WELL WOMAN EXAM WITH ROUTINE GYNECOLOGICAL EXAM: Primary | ICD-10-CM

## 2021-02-15 PROCEDURE — 99386 PREV VISIT NEW AGE 40-64: CPT | Performed by: OBSTETRICS & GYNECOLOGY

## 2021-02-15 PROCEDURE — 88175 CYTOPATH C/V AUTO FLUID REDO: CPT | Performed by: OBSTETRICS & GYNECOLOGY

## 2021-02-15 PROCEDURE — 87480 CANDIDA DNA DIR PROBE: CPT | Performed by: OBSTETRICS & GYNECOLOGY

## 2021-02-15 PROCEDURE — 3008F BODY MASS INDEX DOCD: CPT | Performed by: OBSTETRICS & GYNECOLOGY

## 2021-02-15 PROCEDURE — 87624 HPV HI-RISK TYP POOLED RSLT: CPT | Performed by: OBSTETRICS & GYNECOLOGY

## 2021-02-15 PROCEDURE — 3077F SYST BP >= 140 MM HG: CPT | Performed by: OBSTETRICS & GYNECOLOGY

## 2021-02-15 PROCEDURE — 3080F DIAST BP >= 90 MM HG: CPT | Performed by: OBSTETRICS & GYNECOLOGY

## 2021-02-15 PROCEDURE — 87510 GARDNER VAG DNA DIR PROBE: CPT | Performed by: OBSTETRICS & GYNECOLOGY

## 2021-02-15 PROCEDURE — 87660 TRICHOMONAS VAGIN DIR PROBE: CPT | Performed by: OBSTETRICS & GYNECOLOGY

## 2021-02-15 RX ORDER — NYSTATIN AND TRIAMCINOLONE ACETONIDE 100000; 1 [USP'U]/G; MG/G
1 OINTMENT TOPICAL 2 TIMES DAILY
Qty: 30 G | Refills: 0 | Status: SHIPPED | OUTPATIENT
Start: 2021-02-15

## 2021-02-15 NOTE — PROGRESS NOTES
[facility]  Obstetrics and Gynecology  History & Physical    CC: Patient is a new patient and here for a well woman exam     Subjective:     HPI: Mckenna Grewal is a 64year old [place] female here for a well women exam. Patient reports hx of LS Capsule Delayed Release, TAKE ONE CAPSULE BY MOUTH TWICE PER DAY , Disp: 60 capsule, Rfl: 0    •  AIMOVIG 140 MG/ML Subcutaneous Solution Auto-injector, INJECT 1ML INTO THE SKIN ONCE EVERY 30 DAYS, Disp: 1 mL, Rfl: 2    •  Nabumetone 750 MG Oral Tab, Take HYDROcodone-acetaminophen (NORCO)  MG Oral Tab, Take 1 tablet by mouth every 6 (six) hours as needed for Pain., Disp: 120 tablet, Rfl: 0    •  Calcium Carb-Cholecalciferol (CALCIUM 500 + D) 500-200 MG-UNIT Oral Tab, Take 1 tablet by mouth daily.   , D Im                          09/22/2015 09/24/2016      Influenza             12/09/2011  10/11/2012  09/22/2015                            09/18/2017  10/20/2020      Pneumovax 23          09/30/2017      TDAP                  07/02/2013      PSH:  Melany S phone: Not on file        Gets together: Not on file        Attends Zoroastrianism service: Not on file        Active member of club or organization: Not on file        Attends meetings of clubs or organizations: Not on file        Relationship status: Not on f 30.48 kg/m².     General: AAO.NAD.   CVS exam: normal peripheral perfusion  Chest: non-labored breathing, no tachypnea   Breast: symmetric, no dominant or suspicious mass, no skin or nipple changes and no axillary adenopathy  Abdominal exam: soft, nontender patient about ASCCP guidelines  - repeat pap smear TODAY   Health maintenance  - encouraged to maintain weight at healthy BMI  - discussed importance of exercise and healthy eating  - self breast exam instructions provided  - bilateral screening mammogram

## 2021-02-16 RX ORDER — OMEPRAZOLE 40 MG/1
CAPSULE, DELAYED RELEASE ORAL
Qty: 60 CAPSULE | Refills: 0 | Status: SHIPPED | OUTPATIENT
Start: 2021-02-16 | End: 2021-03-19

## 2021-02-16 NOTE — TELEPHONE ENCOUNTER
LOV: 12/7/2020 with Dr. Melvina George  RTC: no follow-up on file  Last Relevant Labs: 7/8/2020   Filled: 1/15/2021   #60 with 0 refills    Future Appointments   Date Time Provider Lilly Purcell   2/27/0470 70:17 PM Governor Lanes, MD [facility] Azalia Husbands

## 2021-02-17 RX ORDER — OMEPRAZOLE 40 MG/1
CAPSULE, DELAYED RELEASE ORAL
Qty: 60 CAPSULE | Refills: 0 | OUTPATIENT
Start: 2021-02-17

## 2021-02-18 LAB — HPV I/H RISK 1 DNA SPEC QL NAA+PROBE: NEGATIVE

## 2021-02-19 LAB
LAST PAP RESULT: NORMAL
PAP HISTORY (OTHER THAN LAST PAP): NORMAL

## 2021-02-22 NOTE — TELEPHONE ENCOUNTER
Sent my chart message to pt. Stating she is due for an appointment over 6 months since last seen in office. Pt due for lab work as well. Please advise, not sure if you want to fill.  Dr. Juan Torres pt.    Jardiance 10 mg failed protocol due to  Diabetic Medicati

## 2021-02-23 ENCOUNTER — PATIENT MESSAGE (OUTPATIENT)
Dept: INTERNAL MEDICINE CLINIC | Facility: CLINIC | Age: 61
End: 2021-02-23

## 2021-02-23 DIAGNOSIS — E78.00 HYPERCHOLESTEROLEMIA: ICD-10-CM

## 2021-02-23 DIAGNOSIS — I10 ESSENTIAL HYPERTENSION: Primary | ICD-10-CM

## 2021-02-23 DIAGNOSIS — E11.65 UNCONTROLLED TYPE 2 DIABETES MELLITUS WITH HYPERGLYCEMIA, WITHOUT LONG-TERM CURRENT USE OF INSULIN (HCC): ICD-10-CM

## 2021-02-23 RX ORDER — EMPAGLIFLOZIN 10 MG/1
TABLET, FILM COATED ORAL
Qty: 90 TABLET | Refills: 0 | Status: SHIPPED | OUTPATIENT
Start: 2021-02-23 | End: 2021-05-21

## 2021-02-23 NOTE — TELEPHONE ENCOUNTER
From: Kristian Castanon  To: Elsa Godwin MD  Sent: 2/23/2021 3:50 PM CST  Subject: Non-Urgent Medical Question    I had sent a message asking about my bloodwork. Can you please let me know if I can have this done prior to my visit next Wednesday?   Thank

## 2021-02-23 NOTE — TELEPHONE ENCOUNTER
Patient has an appointment on 3/3/2021. She is asking if she can have blood work done prior to the appointment? Please advise on which orders to place. Thank you.

## 2021-02-26 ENCOUNTER — LAB ENCOUNTER (OUTPATIENT)
Dept: LAB | Age: 61
End: 2021-02-26
Attending: INTERNAL MEDICINE
Payer: MEDICAID

## 2021-02-26 DIAGNOSIS — I10 ESSENTIAL HYPERTENSION: ICD-10-CM

## 2021-02-26 DIAGNOSIS — E11.65 UNCONTROLLED TYPE 2 DIABETES MELLITUS WITH HYPERGLYCEMIA, WITHOUT LONG-TERM CURRENT USE OF INSULIN (HCC): ICD-10-CM

## 2021-02-26 LAB
ALBUMIN SERPL-MCNC: 4.2 G/DL (ref 3.4–5)
ALBUMIN/GLOB SERPL: 1.2 {RATIO} (ref 1–2)
ALP LIVER SERPL-CCNC: 97 U/L
ALT SERPL-CCNC: 53 U/L
ANION GAP SERPL CALC-SCNC: 11 MMOL/L (ref 0–18)
AST SERPL-CCNC: 26 U/L (ref 15–37)
BILIRUB SERPL-MCNC: 0.5 MG/DL (ref 0.1–2)
BUN BLD-MCNC: 11 MG/DL (ref 7–18)
BUN/CREAT SERPL: 14.5 (ref 10–20)
CALCIUM BLD-MCNC: 9.6 MG/DL (ref 8.5–10.1)
CHLORIDE SERPL-SCNC: 100 MMOL/L (ref 98–112)
CHOLEST SMN-MCNC: 240 MG/DL (ref ?–200)
CO2 SERPL-SCNC: 26 MMOL/L (ref 21–32)
CREAT BLD-MCNC: 0.76 MG/DL
CREAT UR-SCNC: 40.2 MG/DL
EST. AVERAGE GLUCOSE BLD GHB EST-MCNC: 220 MG/DL (ref 68–126)
GLOBULIN PLAS-MCNC: 3.5 G/DL (ref 2.8–4.4)
GLUCOSE BLD-MCNC: 233 MG/DL (ref 70–99)
HBA1C MFR BLD HPLC: 9.3 % (ref ?–5.7)
HDLC SERPL-MCNC: 41 MG/DL (ref 40–59)
LDLC SERPL DIRECT ASSAY-MCNC: 139 MG/DL (ref ?–100)
M PROTEIN MFR SERPL ELPH: 7.7 G/DL (ref 6.4–8.2)
MICROALBUMIN UR-MCNC: 3.77 MG/DL
MICROALBUMIN/CREAT 24H UR-RTO: 93.8 UG/MG (ref ?–30)
NONHDLC SERPL-MCNC: 199 MG/DL (ref ?–130)
OSMOLALITY SERPL CALC.SUM OF ELEC: 291 MOSM/KG (ref 275–295)
PATIENT FASTING Y/N/NP: NO
PATIENT FASTING Y/N/NP: NO
POTASSIUM SERPL-SCNC: 3.8 MMOL/L (ref 3.5–5.1)
SODIUM SERPL-SCNC: 137 MMOL/L (ref 136–145)
TRIGL SERPL-MCNC: 457 MG/DL (ref 30–149)

## 2021-02-26 PROCEDURE — 82570 ASSAY OF URINE CREATININE: CPT

## 2021-02-26 PROCEDURE — 80061 LIPID PANEL: CPT

## 2021-02-26 PROCEDURE — 83721 ASSAY OF BLOOD LIPOPROTEIN: CPT

## 2021-02-26 PROCEDURE — 3061F NEG MICROALBUMINURIA REV: CPT | Performed by: INTERNAL MEDICINE

## 2021-02-26 PROCEDURE — 36415 COLL VENOUS BLD VENIPUNCTURE: CPT

## 2021-02-26 PROCEDURE — 3060F POS MICROALBUMINURIA REV: CPT | Performed by: INTERNAL MEDICINE

## 2021-02-26 PROCEDURE — 82043 UR ALBUMIN QUANTITATIVE: CPT

## 2021-02-26 PROCEDURE — 80053 COMPREHEN METABOLIC PANEL: CPT

## 2021-02-26 PROCEDURE — 83036 HEMOGLOBIN GLYCOSYLATED A1C: CPT

## 2021-03-01 ENCOUNTER — MED REC SCAN ONLY (OUTPATIENT)
Dept: INTERNAL MEDICINE CLINIC | Facility: CLINIC | Age: 61
End: 2021-03-01

## 2021-03-03 ENCOUNTER — OFFICE VISIT (OUTPATIENT)
Dept: INTERNAL MEDICINE CLINIC | Facility: CLINIC | Age: 61
End: 2021-03-03
Payer: MEDICAID

## 2021-03-03 VITALS
BODY MASS INDEX: 30.41 KG/M2 | TEMPERATURE: 98 F | OXYGEN SATURATION: 97 % | DIASTOLIC BLOOD PRESSURE: 80 MMHG | WEIGHT: 159 LBS | HEART RATE: 114 BPM | HEIGHT: 60.75 IN | SYSTOLIC BLOOD PRESSURE: 130 MMHG | RESPIRATION RATE: 20 BRPM

## 2021-03-03 DIAGNOSIS — E11.65 UNCONTROLLED TYPE 2 DIABETES MELLITUS WITH HYPERGLYCEMIA, WITHOUT LONG-TERM CURRENT USE OF INSULIN (HCC): ICD-10-CM

## 2021-03-03 DIAGNOSIS — F33.1 MDD (MAJOR DEPRESSIVE DISORDER), RECURRENT EPISODE, MODERATE (HCC): ICD-10-CM

## 2021-03-03 DIAGNOSIS — I10 ESSENTIAL HYPERTENSION: ICD-10-CM

## 2021-03-03 DIAGNOSIS — E78.00 HYPERCHOLESTEROLEMIA: Primary | ICD-10-CM

## 2021-03-03 DIAGNOSIS — F41.1 GAD (GENERALIZED ANXIETY DISORDER): ICD-10-CM

## 2021-03-03 PROCEDURE — 3079F DIAST BP 80-89 MM HG: CPT | Performed by: INTERNAL MEDICINE

## 2021-03-03 PROCEDURE — 99214 OFFICE O/P EST MOD 30 MIN: CPT | Performed by: INTERNAL MEDICINE

## 2021-03-03 PROCEDURE — 3008F BODY MASS INDEX DOCD: CPT | Performed by: INTERNAL MEDICINE

## 2021-03-03 PROCEDURE — 3075F SYST BP GE 130 - 139MM HG: CPT | Performed by: INTERNAL MEDICINE

## 2021-03-03 NOTE — PROGRESS NOTES
Kaylyn Chau is a 64year old female. HPI:   Patient presents with: Follow - Up    Patient presents for follow up on chronic medical issues. DM II - eating more sweets, sugar. Hyperlipidemia - as above not the best diet.    Hypertension - at goal b ATORVASTATIN 40 MG Oral Tab, TAKE ONE TABLET BY MOUTH ONE TIME NIGHTLY , Disp: 90 tablet, Rfl: 0  •  Metoprolol Succinate ER 50 MG Oral Tablet 24 Hr, Take 1 tablet (50 mg total) by mouth daily. , Disp: 90 tablet, Rfl: 0  •  divalproex Sodium  MG Oral Rfl:   •  Calcium Carb-Cholecalciferol (CALCIUM 500 + D) 500-200 MG-UNIT Oral Tab, Take 1 tablet by mouth daily. , Disp: , Rfl:   •  Multiple Vitamin (MULTIVITAMINS) Oral Cap, Take 1 capsule by mouth daily. , Disp: 90 capsule, Rfl: 3  •  Diclofenac Sodium /80 (BP Location: Left arm, Patient Position: Sitting, Cuff Size: adult)   Pulse 114   Temp 97.9 °F (36.6 °C) (Oral)   Resp 20   Ht 5' 0.75\" (1.543 m)   Wt 159 lb (72.1 kg)   SpO2 97%   BMI 30.29 kg/m²   GENERAL: Alert and oriented, well developed Medicine)  Gerardo White MD as Consulting Physician ([address] [place] [address])  Rom Fitzgerald MD as Consulting Physician ([address] [place] [address])  Elmer Sy MD as Consulting Physician ([place] [address])  The patient indicates understanding of these issues and a

## 2021-03-03 NOTE — PATIENT INSTRUCTIONS
- Blood sugar and cholesterol levels are higher than before  - Focus on healthy diet and regular exercise for these issues  - Continue current medications  - If cholesterol and blood sugar remain high we may need to increase your metformin and atorvastatin

## 2021-03-17 DIAGNOSIS — Z23 NEED FOR VACCINATION: ICD-10-CM

## 2021-03-19 RX ORDER — OMEPRAZOLE 40 MG/1
CAPSULE, DELAYED RELEASE ORAL
Qty: 60 CAPSULE | Refills: 2 | Status: SHIPPED | OUTPATIENT
Start: 2021-03-19 | End: 2021-06-11

## 2021-03-19 NOTE — TELEPHONE ENCOUNTER
LOV: 3/3/2021 with Dr. Phi Gupta  RTC: 3 months  Last Relevant Labs: 2/26/2021   Filled: 2/16/2021  #60 with 0 refills    Future Appointments   Date Time Provider Lilly Purcell   3/02/3352  4:55 PM Rose Lopez MD [facility] Lilian Phi

## 2021-03-22 ENCOUNTER — TELEPHONE (OUTPATIENT)
Dept: RHEUMATOLOGY | Facility: CLINIC | Age: 61
End: 2021-03-22

## 2021-03-22 NOTE — TELEPHONE ENCOUNTER
Returned call to pt; offered several different appts, declined all. Pt is aware no narcotics will be refilled without an in office appt. She verbalized understanding.

## 2021-03-23 ENCOUNTER — OFFICE VISIT (OUTPATIENT)
Dept: RHEUMATOLOGY | Facility: CLINIC | Age: 61
End: 2021-03-23
Payer: MEDICAID

## 2021-03-23 VITALS
DIASTOLIC BLOOD PRESSURE: 85 MMHG | HEART RATE: 108 BPM | TEMPERATURE: 97 F | SYSTOLIC BLOOD PRESSURE: 135 MMHG | HEIGHT: 61 IN | BODY MASS INDEX: 30.02 KG/M2 | WEIGHT: 159 LBS

## 2021-03-23 DIAGNOSIS — E11.65 UNCONTROLLED TYPE 2 DIABETES MELLITUS WITH HYPERGLYCEMIA, WITHOUT LONG-TERM CURRENT USE OF INSULIN (HCC): ICD-10-CM

## 2021-03-23 DIAGNOSIS — M15.9 PRIMARY OSTEOARTHRITIS INVOLVING MULTIPLE JOINTS: ICD-10-CM

## 2021-03-23 DIAGNOSIS — M47.816 SPONDYLOSIS OF LUMBAR REGION WITHOUT MYELOPATHY OR RADICULOPATHY: Primary | ICD-10-CM

## 2021-03-23 PROCEDURE — 99213 OFFICE O/P EST LOW 20 MIN: CPT | Performed by: INTERNAL MEDICINE

## 2021-03-23 PROCEDURE — 3008F BODY MASS INDEX DOCD: CPT | Performed by: INTERNAL MEDICINE

## 2021-03-23 PROCEDURE — 3079F DIAST BP 80-89 MM HG: CPT | Performed by: INTERNAL MEDICINE

## 2021-03-23 PROCEDURE — 3075F SYST BP GE 130 - 139MM HG: CPT | Performed by: INTERNAL MEDICINE

## 2021-03-23 RX ORDER — HYDROCODONE BITARTRATE AND ACETAMINOPHEN 10; 325 MG/1; MG/1
1 TABLET ORAL EVERY 6 HOURS PRN
Qty: 120 TABLET | Refills: 0 | Status: SHIPPED | OUTPATIENT
Start: 2021-05-22 | End: 2021-06-21

## 2021-03-23 RX ORDER — HYDROCODONE BITARTRATE AND ACETAMINOPHEN 10; 325 MG/1; MG/1
1 TABLET ORAL EVERY 6 HOURS PRN
Qty: 120 TABLET | Refills: 0 | Status: SHIPPED | OUTPATIENT
Start: 2021-04-22 | End: 2021-05-22

## 2021-03-23 RX ORDER — NABUMETONE 750 MG/1
750 TABLET, FILM COATED ORAL 2 TIMES DAILY
Qty: 180 TABLET | Refills: 3 | Status: SHIPPED | OUTPATIENT
Start: 2021-03-23 | End: 2021-07-06

## 2021-03-23 RX ORDER — HYDROCODONE BITARTRATE AND ACETAMINOPHEN 10; 325 MG/1; MG/1
1 TABLET ORAL EVERY 6 HOURS PRN
Qty: 120 TABLET | Refills: 0 | Status: SHIPPED | OUTPATIENT
Start: 2021-03-23 | End: 2021-04-22

## 2021-03-23 NOTE — PROGRESS NOTES
[facility]  Dr. Apollo Alex Progress Note     Subjective:   Christina Goddard is a(n) 64year old female. Current complaints: Patient presents with: Follow - Up: LOV 06/10/2020, has been feeling so so. LT knee and LT side of back has been bothering her.

## 2021-03-23 NOTE — PATIENT INSTRUCTIONS
Labs to be checked at [facility] for RA. Aspen for pain 10 mg every 6 hours as needed. Nabumetone 750 mg twice a day for arthritis. Omeprazole for GERD. Chlorzaasone prn muscle relaxant. Low calorie diet. Walk for exercise. Return to office 3 months.

## 2021-03-25 DIAGNOSIS — E78.00 HYPERCHOLESTEROLEMIA: ICD-10-CM

## 2021-03-26 RX ORDER — EZETIMIBE 10 MG/1
TABLET ORAL
Qty: 90 TABLET | Refills: 0 | Status: SHIPPED | OUTPATIENT
Start: 2021-03-26 | End: 2021-06-11

## 2021-03-26 NOTE — TELEPHONE ENCOUNTER
Passed protocol     Last refill:  11/4/2020 Zetia 10 mg #90 NR    LOV:   3/3/2021 Dr Amy Fried RTC 3 months with Dr Javier Summers   1. Hypercholesterolemia  Lipids above goal.  She has not been following the best diet. Focus on lifestyle modification.    Continue at

## 2021-03-29 ENCOUNTER — PATIENT MESSAGE (OUTPATIENT)
Dept: INTERNAL MEDICINE CLINIC | Facility: CLINIC | Age: 61
End: 2021-03-29

## 2021-03-30 DIAGNOSIS — R05.9 COUGH: Primary | ICD-10-CM

## 2021-03-30 NOTE — TELEPHONE ENCOUNTER
Called pt to follow-up on my chart message sent. Pt reporting cough for 3 days, centralized chest pain/tightness that does not radiate, shortness of breath that comes and goes. Coughing up clear phlegm.     Pt also states she is having rib pain possibly

## 2021-03-30 NOTE — TELEPHONE ENCOUNTER
From: Augustina Lemus  To: [facility],   Sent: 3/29/2021 7:22 PM CDT  Subject: Other    I can't seem to get rid of this cough and my chest is bothering me. It feels like bronchitis.  Do I need to come see you or do you want to send in an order fo

## 2021-03-31 ENCOUNTER — LAB ENCOUNTER (OUTPATIENT)
Dept: LAB | Age: 61
End: 2021-03-31
Attending: INTERNAL MEDICINE
Payer: MEDICAID

## 2021-03-31 DIAGNOSIS — R05.9 COUGH: ICD-10-CM

## 2021-04-01 NOTE — TELEPHONE ENCOUNTER
Future Appointments   Date Time Provider Lilly Purcell   4/2/2021  8:45 AM Felicia Camps, DO [facility] [place]

## 2021-04-02 ENCOUNTER — TELEMEDICINE (OUTPATIENT)
Dept: INTERNAL MEDICINE CLINIC | Facility: CLINIC | Age: 61
End: 2021-04-02

## 2021-04-02 DIAGNOSIS — J44.1 COPD WITH ACUTE EXACERBATION (HCC): Primary | ICD-10-CM

## 2021-04-02 DIAGNOSIS — I10 ESSENTIAL HYPERTENSION: ICD-10-CM

## 2021-04-02 DIAGNOSIS — E11.65 UNCONTROLLED TYPE 2 DIABETES MELLITUS WITH HYPERGLYCEMIA, WITHOUT LONG-TERM CURRENT USE OF INSULIN (HCC): ICD-10-CM

## 2021-04-02 PROCEDURE — 99214 OFFICE O/P EST MOD 30 MIN: CPT | Performed by: INTERNAL MEDICINE

## 2021-04-02 RX ORDER — PROMETHAZINE HYDROCHLORIDE AND CODEINE PHOSPHATE 6.25; 1 MG/5ML; MG/5ML
2.5 SOLUTION ORAL EVERY 6 HOURS PRN
Qty: 118 ML | Refills: 0 | Status: SHIPPED | OUTPATIENT
Start: 2021-04-02 | End: 2021-04-16

## 2021-04-02 RX ORDER — PREDNISONE 20 MG/1
40 TABLET ORAL DAILY
Qty: 10 TABLET | Refills: 0 | Status: SHIPPED | OUTPATIENT
Start: 2021-04-02 | End: 2021-04-07

## 2021-04-02 RX ORDER — AZITHROMYCIN 250 MG/1
TABLET, FILM COATED ORAL
Qty: 6 TABLET | Refills: 0 | Status: SHIPPED | OUTPATIENT
Start: 2021-04-02 | End: 2021-04-07

## 2021-04-02 NOTE — PROGRESS NOTES
Virtual Telephone Check-In    This visit is conducted using Telemedicine with live, interactive video and audio.  Patient resides in [place]   Patient understands and accepts financial responsibility for any deductible, co-insurance and/or co-pays associat a cough  1. Cough: present for about 1 week. COVID test was negative. Has an increase in sputum production/volume and has mild dyspnea. She continues to smoke marijuana and some cigarettes. No fevers or chills.  Has had previous exacerbations in the past. T

## 2021-04-11 DIAGNOSIS — G43.009 MIGRAINE WITHOUT AURA AND WITHOUT STATUS MIGRAINOSUS, NOT INTRACTABLE: ICD-10-CM

## 2021-04-12 RX ORDER — ERENUMAB-AOOE 140 MG/ML
INJECTION, SOLUTION SUBCUTANEOUS
Qty: 1 ML | Refills: 0 | Status: SHIPPED | OUTPATIENT
Start: 2021-04-12 | End: 2021-05-12

## 2021-04-12 NOTE — TELEPHONE ENCOUNTER
Medication: AIMOVIG 140 MG/ML Subcutaneous Solution Auto-injector    Date of last refill: 1/8/21 (#1/2)  [place]    Last office visit: 1/21/21  Due back to clinic per last office note:  6 months  Date next office visi

## 2021-04-15 ENCOUNTER — LAB ENCOUNTER (OUTPATIENT)
Dept: LAB | Age: 61
End: 2021-04-15
Attending: INTERNAL MEDICINE
Payer: MEDICAID

## 2021-04-15 DIAGNOSIS — M15.9 PRIMARY OSTEOARTHRITIS INVOLVING MULTIPLE JOINTS: ICD-10-CM

## 2021-04-15 DIAGNOSIS — M47.816 SPONDYLOSIS OF LUMBAR REGION WITHOUT MYELOPATHY OR RADICULOPATHY: ICD-10-CM

## 2021-04-15 PROCEDURE — 86200 CCP ANTIBODY: CPT

## 2021-04-15 PROCEDURE — 86431 RHEUMATOID FACTOR QUANT: CPT

## 2021-04-15 PROCEDURE — 36415 COLL VENOUS BLD VENIPUNCTURE: CPT

## 2021-04-15 PROCEDURE — 86140 C-REACTIVE PROTEIN: CPT

## 2021-04-15 PROCEDURE — 86038 ANTINUCLEAR ANTIBODIES: CPT

## 2021-04-15 PROCEDURE — 85652 RBC SED RATE AUTOMATED: CPT

## 2021-05-02 ENCOUNTER — PATIENT MESSAGE (OUTPATIENT)
Dept: INTERNAL MEDICINE CLINIC | Facility: CLINIC | Age: 61
End: 2021-05-02

## 2021-05-03 NOTE — TELEPHONE ENCOUNTER
From: Tia Mueller  To: MARLENE Morris  Sent: 5/2/2021 10:51 PM CDT  Subject: Non-Urgent Medical Question    I was just wondering if you have any idea when they're going to start doing covid vaccines again in [place]?  They're not showing any da

## 2021-05-04 DIAGNOSIS — E11.65 UNCONTROLLED TYPE 2 DIABETES MELLITUS WITH HYPERGLYCEMIA, WITHOUT LONG-TERM CURRENT USE OF INSULIN (HCC): ICD-10-CM

## 2021-05-05 DIAGNOSIS — E78.00 HYPERCHOLESTEROLEMIA: ICD-10-CM

## 2021-05-05 DIAGNOSIS — I10 ESSENTIAL HYPERTENSION: ICD-10-CM

## 2021-05-05 RX ORDER — ATORVASTATIN CALCIUM 40 MG/1
TABLET, FILM COATED ORAL
Qty: 90 TABLET | Refills: 0 | Status: SHIPPED | OUTPATIENT
Start: 2021-05-05 | End: 2021-08-09

## 2021-05-05 RX ORDER — METOPROLOL SUCCINATE 50 MG/1
TABLET, EXTENDED RELEASE ORAL
Qty: 90 TABLET | Refills: 0 | Status: SHIPPED | OUTPATIENT
Start: 2021-05-05 | End: 2021-06-11

## 2021-05-05 NOTE — TELEPHONE ENCOUNTER
Failed protocol     Last refill:  2/5/2021 Metformin 500 mg #180 NR    Last telemedicine 4/2/21 Dr Elinor Raman RTC 1 week   (E11.65) Uncontrolled type 2 diabetes mellitus with hyperglycemia, without long-term current use of insulin ([facility] [address]  Plan: continue shelly

## 2021-05-07 ENCOUNTER — IMMUNIZATION (OUTPATIENT)
Dept: LAB | Facility: HOSPITAL | Age: 61
End: 2021-05-07
Attending: EMERGENCY MEDICINE
Payer: MEDICAID

## 2021-05-07 DIAGNOSIS — Z23 NEED FOR VACCINATION: Primary | ICD-10-CM

## 2021-05-07 PROCEDURE — 0001A SARSCOV2 VAC 30MCG/0.3ML IM: CPT

## 2021-05-10 ENCOUNTER — TELEPHONE (OUTPATIENT)
Dept: INTERNAL MEDICINE CLINIC | Facility: CLINIC | Age: 61
End: 2021-05-10

## 2021-05-10 DIAGNOSIS — E11.65 UNCONTROLLED TYPE 2 DIABETES MELLITUS WITH HYPERGLYCEMIA, WITHOUT LONG-TERM CURRENT USE OF INSULIN (HCC): Primary | ICD-10-CM

## 2021-05-10 NOTE — TELEPHONE ENCOUNTER
Regarding: Other  Contact: [phone]  ----- Message from Karlo Otoole RN sent at 5/10/2021  9:25 AM CDT -----       ----- Message from Alejandra Miranda to Ya Boyer DO sent at 5/7/2021  4:17 PM -----   I was told that you wanted me to get

## 2021-05-10 NOTE — TELEPHONE ENCOUNTER
Okay to order it. She should have it end of may as that will be 3 months.  Thank you   Escobar Johnson DO

## 2021-05-12 DIAGNOSIS — G43.009 MIGRAINE WITHOUT AURA AND WITHOUT STATUS MIGRAINOSUS, NOT INTRACTABLE: ICD-10-CM

## 2021-05-12 RX ORDER — ERENUMAB-AOOE 140 MG/ML
INJECTION, SOLUTION SUBCUTANEOUS
Qty: 1 ML | Refills: 0 | Status: SHIPPED | OUTPATIENT
Start: 2021-05-12 | End: 2021-06-10

## 2021-05-12 NOTE — TELEPHONE ENCOUNTER
Spoke with patient who states will call back for david't.     Medication: AIMOVIG 140 MG/ML    Date of last refill: 4/12/21 (#1/0)  [place]     Last office visit: 4/23/2020  Due back to clinic per last office note:  3-6 mon

## 2021-05-20 ENCOUNTER — TELEPHONE (OUTPATIENT)
Dept: INTERNAL MEDICINE CLINIC | Facility: CLINIC | Age: 61
End: 2021-05-20

## 2021-05-20 NOTE — TELEPHONE ENCOUNTER
Form completed by provider SV for incontinence supplies - supply company is requiring office/clinical notes in order to process the request per insurance.    No recent notes available - please advise (forms placed back in provider in box)

## 2021-05-20 NOTE — TELEPHONE ENCOUNTER
Failed protocol     Last refill:  2/23/2021 Jardiance 10 mg #90 NR    Last telemedicine:   4/2/2021 Dr Cristino Mosher RTC 1 week  (E11.65) Uncontrolled type 2 diabetes mellitus with hyperglycemia, without long-term current use of insulin (HCC)Plan: continue [place]

## 2021-05-21 RX ORDER — EMPAGLIFLOZIN 10 MG/1
TABLET, FILM COATED ORAL
Qty: 90 TABLET | Refills: 0 | Status: SHIPPED | OUTPATIENT
Start: 2021-05-21 | End: 2021-06-11

## 2021-05-21 NOTE — TELEPHONE ENCOUNTER
Can we schedule a follow up appointment with the patient. I have to follow up on her diabetes anyways.     [facility] DO

## 2021-05-21 NOTE — TELEPHONE ENCOUNTER
Called patient and she stated she will schedule to f/u after she completes her A1C lab so she can go over the results with Dr Rudy Rm.

## 2021-05-24 ENCOUNTER — HOSPITAL ENCOUNTER (OUTPATIENT)
Dept: ULTRASOUND IMAGING | Age: 61
Discharge: HOME | End: 2021-05-24
Attending: INTERNAL MEDICINE
Payer: MEDICAID

## 2021-05-24 DIAGNOSIS — N60.01 BILATERAL BREAST CYSTS: ICD-10-CM

## 2021-05-24 DIAGNOSIS — N60.02 BILATERAL BREAST CYSTS: ICD-10-CM

## 2021-05-24 PROCEDURE — 76642 ULTRASOUND BREAST LIMITED: CPT | Performed by: INTERNAL MEDICINE

## 2021-05-27 ENCOUNTER — LAB ENCOUNTER (OUTPATIENT)
Dept: LAB | Age: 61
End: 2021-05-27
Attending: INTERNAL MEDICINE
Payer: MEDICAID

## 2021-05-27 DIAGNOSIS — E11.65 UNCONTROLLED TYPE 2 DIABETES MELLITUS WITH HYPERGLYCEMIA, WITHOUT LONG-TERM CURRENT USE OF INSULIN (HCC): ICD-10-CM

## 2021-05-27 PROCEDURE — 83036 HEMOGLOBIN GLYCOSYLATED A1C: CPT

## 2021-05-27 PROCEDURE — 36415 COLL VENOUS BLD VENIPUNCTURE: CPT

## 2021-05-27 PROCEDURE — 3046F HEMOGLOBIN A1C LEVEL >9.0%: CPT | Performed by: INTERNAL MEDICINE

## 2021-05-28 ENCOUNTER — IMMUNIZATION (OUTPATIENT)
Dept: LAB | Facility: HOSPITAL | Age: 61
End: 2021-05-28
Attending: EMERGENCY MEDICINE
Payer: MEDICAID

## 2021-05-28 DIAGNOSIS — Z23 NEED FOR VACCINATION: Primary | ICD-10-CM

## 2021-05-28 PROCEDURE — 0002A SARSCOV2 VAC 30MCG/0.3ML IM: CPT

## 2021-06-10 ENCOUNTER — OFFICE VISIT (OUTPATIENT)
Dept: NEUROLOGY | Facility: CLINIC | Age: 61
End: 2021-06-10
Payer: MEDICAID

## 2021-06-10 ENCOUNTER — PATIENT MESSAGE (OUTPATIENT)
Dept: NEUROLOGY | Facility: CLINIC | Age: 61
End: 2021-06-10

## 2021-06-10 VITALS
BODY MASS INDEX: 30 KG/M2 | DIASTOLIC BLOOD PRESSURE: 70 MMHG | SYSTOLIC BLOOD PRESSURE: 120 MMHG | WEIGHT: 158 LBS | HEART RATE: 80 BPM | RESPIRATION RATE: 14 BRPM

## 2021-06-10 DIAGNOSIS — G43.709 CHRONIC MIGRAINE W/O AURA W/O STATUS MIGRAINOSUS, NOT INTRACTABLE: Primary | ICD-10-CM

## 2021-06-10 PROCEDURE — 99213 OFFICE O/P EST LOW 20 MIN: CPT | Performed by: OTHER

## 2021-06-10 PROCEDURE — 3074F SYST BP LT 130 MM HG: CPT | Performed by: OTHER

## 2021-06-10 PROCEDURE — 3078F DIAST BP <80 MM HG: CPT | Performed by: OTHER

## 2021-06-10 RX ORDER — DESVENLAFAXINE 100 MG/1
1 TABLET, EXTENDED RELEASE ORAL DAILY
COMMUNITY
Start: 2021-05-17 | End: 2021-06-10

## 2021-06-10 RX ORDER — ERENUMAB-AOOE 140 MG/ML
140 INJECTION, SOLUTION SUBCUTANEOUS
Qty: 1 ML | Refills: 5 | Status: SHIPPED | OUTPATIENT
Start: 2021-06-10 | End: 2021-12-13

## 2021-06-10 RX ORDER — CIMETIDINE 800 MG
800 TABLET ORAL NIGHTLY
COMMUNITY
End: 2021-06-11

## 2021-06-10 NOTE — PATIENT INSTRUCTIONS
After your visit at the [place]  today, please direct any follow up questions or medication needs to the staff in our [place] office so that your concerns may be promptly addressed.   We are available through [email or web address] or at the numbers below: be picked up in office. • Please allow the office 2-3 business days to fill the prescription. • Patient must present photo ID at time of . PLEASE NOTE: PRESCRIPTIONS MUST BE PICKED UP PRIOR TO 3:00PM MONDAY-FRIDAY    Scheduling Tests:     If your submitting forms to office staff. • Form completion may require an additional fee. • A signed Release of Information (MUMTAZ) must be on file before forms may be submitted. When dropping off forms, please ask the  for this paper.    • Failure

## 2021-06-11 ENCOUNTER — OFFICE VISIT (OUTPATIENT)
Dept: INTERNAL MEDICINE CLINIC | Facility: CLINIC | Age: 61
End: 2021-06-11
Payer: MEDICAID

## 2021-06-11 VITALS
DIASTOLIC BLOOD PRESSURE: 84 MMHG | RESPIRATION RATE: 18 BRPM | HEART RATE: 103 BPM | TEMPERATURE: 99 F | OXYGEN SATURATION: 97 % | WEIGHT: 155.19 LBS | HEIGHT: 61 IN | BODY MASS INDEX: 29.3 KG/M2 | SYSTOLIC BLOOD PRESSURE: 126 MMHG

## 2021-06-11 DIAGNOSIS — E11.65 UNCONTROLLED TYPE 2 DIABETES MELLITUS WITH HYPERGLYCEMIA, WITHOUT LONG-TERM CURRENT USE OF INSULIN (HCC): Primary | ICD-10-CM

## 2021-06-11 DIAGNOSIS — N39.498 OTHER URINARY INCONTINENCE: ICD-10-CM

## 2021-06-11 DIAGNOSIS — E78.00 HYPERCHOLESTEROLEMIA: ICD-10-CM

## 2021-06-11 DIAGNOSIS — I10 ESSENTIAL HYPERTENSION: ICD-10-CM

## 2021-06-11 DIAGNOSIS — K21.9 GASTROESOPHAGEAL REFLUX DISEASE WITHOUT ESOPHAGITIS: ICD-10-CM

## 2021-06-11 PROCEDURE — 3079F DIAST BP 80-89 MM HG: CPT | Performed by: INTERNAL MEDICINE

## 2021-06-11 PROCEDURE — 3008F BODY MASS INDEX DOCD: CPT | Performed by: INTERNAL MEDICINE

## 2021-06-11 PROCEDURE — 99214 OFFICE O/P EST MOD 30 MIN: CPT | Performed by: INTERNAL MEDICINE

## 2021-06-11 PROCEDURE — 3074F SYST BP LT 130 MM HG: CPT | Performed by: INTERNAL MEDICINE

## 2021-06-11 RX ORDER — CIMETIDINE 800 MG
800 TABLET ORAL NIGHTLY
Qty: 30 TABLET | Refills: 0 | Status: SHIPPED | OUTPATIENT
Start: 2021-06-11 | End: 2022-01-11

## 2021-06-11 RX ORDER — METOPROLOL SUCCINATE 50 MG/1
50 TABLET, EXTENDED RELEASE ORAL DAILY
Qty: 90 TABLET | Refills: 0 | Status: SHIPPED | OUTPATIENT
Start: 2021-06-11 | End: 2021-08-09

## 2021-06-11 RX ORDER — EZETIMIBE 10 MG/1
10 TABLET ORAL NIGHTLY
Qty: 90 TABLET | Refills: 0 | Status: SHIPPED | OUTPATIENT
Start: 2021-06-11 | End: 2021-09-10

## 2021-06-11 RX ORDER — ORAL SEMAGLUTIDE 3 MG/1
3 TABLET ORAL DAILY
Qty: 30 TABLET | Refills: 0 | Status: SHIPPED | OUTPATIENT
Start: 2021-06-11 | End: 2021-06-22

## 2021-06-11 RX ORDER — OMEPRAZOLE 40 MG/1
40 CAPSULE, DELAYED RELEASE ORAL 2 TIMES DAILY
Qty: 60 CAPSULE | Refills: 2 | Status: SHIPPED | OUTPATIENT
Start: 2021-06-11 | End: 2021-09-10

## 2021-06-11 NOTE — PROGRESS NOTES
HPI:    Patient ID: Ji Griffin is a 63year old female. HPI   Patient presented for follow-up for migraine headaches. She reports overall her migraine headaches have improved and Marielena Berman has been working very well.   She states the past 1 year she Pack years: 4.1        Types: Cigarettes      Smokeless tobacco: Never Used      Tobacco comment: 3-4 cigs a day    Vaping Use      Vaping Use: Every day        Substances: Nicotine, Flavoring        Devices: Disposable, Pre-filled pod    Alcohol use:  Yes nightly as needed for Sleep (this medication will need to be titrated down in the future). AT BEDTIME 30 tablet 0   • ALPRAZolam 1 MG Oral Tab Take 1 tablet (1 mg total) by mouth 2 (two) times daily as needed for Sleep or Anxiety.  60 tablet 0   • divalproe membrane  Neck: Normal range of motion. Neck supple. Cardiovascular: Normal rate, regular rhythm and normal heart sounds. Pulmonary/Chest: Effort normal and breath sounds normal.   Abdominal: Soft.  Bowel sounds are normal.     Mental Status Exam: Patie

## 2021-06-11 NOTE — TELEPHONE ENCOUNTER
From: Rupal Man  To: Daisy Diallo MD  Sent: 6/10/2021 8:46 PM CDT  Subject: Visit Follow-up Question    I was just wondering what my blood pressure was? I couldn't find a after visit summary.    Thank you   Soumya

## 2021-06-11 NOTE — PROGRESS NOTES
Patient Office Visit    ASSESSMENT AND PLAN:   (E11.65) Uncontrolled type 2 diabetes mellitus with hyperglycemia, without long-term current use of insulin ([facility] [address]  (primary encounter diagnosis)  Plan: Semaglutide (RYBELSUS) 3 MG Oral Tab,         HEMOGLOBIN disease)     Hiatal hernia without gangrene and obstruction     Chronic fatigue syndrome     Hypertension     Chronic pansinusitis     Mucinous cystadenoma of ovary, left     Arthritis of carpometacarpal (CMC) joint of right thumb     Spondylosis of lumbar pads. Has incontinence due to diabetes    Past Medical History:   Diagnosis Date   • Abdominal pain, other specified site 09/21/2011    groin   • Acute bronchitis 09/21/2011   • [place]ison esophagus    • Depression    • Diabetes [facility])    • Difficult intubatio Anaphylaxis  Aspirin                     Comment:TABS contraindicated because of saavedra's             esophagus  Current Meds:  Erenumab-aooe (AIMOVIG) 140 MG/ML Subcutaneous Solution Auto-injector, Inject 1 mL (140 mg total) into the skin every 30 (thirt Disp: 1 Inhaler, Rfl: 0  Fluticasone Propionate 50 MCG/ACT Nasal Suspension, USE TWO SPRAY(S) IN EACH NOSTRIL ONCE DAILY, Disp: 16 g, Rfl: 0  Desvenlafaxine Succinate ER (PRISTIQ) 100 MG Oral Tablet 24 Hr, Take 1 tablet (100 mg total) by mouth daily.  [employer] undressed   Psychiatric:normal mood

## 2021-06-14 ENCOUNTER — TELEPHONE (OUTPATIENT)
Dept: INTERNAL MEDICINE CLINIC | Facility: CLINIC | Age: 61
End: 2021-06-14

## 2021-06-14 PROBLEM — R32 ABSENCE OF BLADDER CONTINENCE: Status: ACTIVE | Noted: 2021-06-14

## 2021-06-14 NOTE — TELEPHONE ENCOUNTER
Incontinence order form completed by physician. Order form along with most recent office notes faxed to [facility]. Original sent to scan. Copy and confirmation in blue accordion.

## 2021-06-14 NOTE — TELEPHONE ENCOUNTER
PA initiated 6/14/21    Your information has been submitted to [employer]. [employer] is reviewing the PA request and you will receive an electronic response.  You may check for the updated outcome later by reopening this request. The standard fax determ

## 2021-06-14 NOTE — TELEPHONE ENCOUNTER
Incoming fax from [facility] for PA regarding Rybelsus 3mg tab. Dr. Rudy Rm did you want pt to take one tab 3 times a day for quantity of 30 or 90?

## 2021-06-15 ENCOUNTER — TELEPHONE (OUTPATIENT)
Dept: NEUROLOGY | Facility: CLINIC | Age: 61
End: 2021-06-15

## 2021-06-15 DIAGNOSIS — G43.709 CHRONIC MIGRAINE W/O AURA W/O STATUS MIGRAINOSUS, NOT INTRACTABLE: Primary | ICD-10-CM

## 2021-06-15 RX ORDER — ERENUMAB-AOOE 140 MG/ML
INJECTION, SOLUTION SUBCUTANEOUS
Qty: 1 ML | Refills: 0 | OUTPATIENT
Start: 2021-06-15

## 2021-06-15 NOTE — TELEPHONE ENCOUNTER
Can we send an appeal letter? Is there any other medications her insurance recommends, ozempic?      32 \[facility]\"" DO

## 2021-06-15 NOTE — TELEPHONE ENCOUNTER
Spoke with Swathi Robertson at [address] [place] [address] who states they never received the 6/10/21 Rx Aimovig 140mg #1 with 5 refills. Verbal order given with read back confirmation received.

## 2021-06-16 NOTE — TELEPHONE ENCOUNTER
Resubmitted appeal:    Your information has been submitted to [employer]. [employer] is reviewing the PA request and you will receive an electronic response.  You may check for the updated outcome later by reopening this request. The standard fax determi

## 2021-06-18 ENCOUNTER — PATIENT MESSAGE (OUTPATIENT)
Dept: INTERNAL MEDICINE CLINIC | Facility: CLINIC | Age: 61
End: 2021-06-18

## 2021-06-18 NOTE — TELEPHONE ENCOUNTER
From: Desiree Cogan  To: 32 [facility]  Sent: 6/18/2021 3:09 PM CDT  Subject: Prescription Question    I was just checking in to see how the pre authorization process was coming on my Rybelsus?    Thanks   [employer]

## 2021-06-21 NOTE — TELEPHONE ENCOUNTER
Pt requesting to speak with a nurse regarding other medication options besides rybelsus.      Confirmed [phone] as best contact for pt

## 2021-06-22 ENCOUNTER — TELEPHONE (OUTPATIENT)
Dept: INTERNAL MEDICINE CLINIC | Facility: CLINIC | Age: 61
End: 2021-06-22

## 2021-06-22 DIAGNOSIS — E11.65 UNCONTROLLED TYPE 2 DIABETES MELLITUS WITH HYPERGLYCEMIA, WITHOUT LONG-TERM CURRENT USE OF INSULIN (HCC): Primary | ICD-10-CM

## 2021-06-22 RX ORDER — EMPAGLIFLOZIN 25 MG/1
25 TABLET, FILM COATED ORAL DAILY
Qty: 90 TABLET | Refills: 0 | Status: SHIPPED | OUTPATIENT
Start: 2021-06-22 | End: 2021-09-27

## 2021-06-22 NOTE — TELEPHONE ENCOUNTER
Patient notified of info below per Dr. Aparna Atkins and verbalized understanding. Pt stated she has viewed the letter, she stated the letter needs to specifically state why she cannot take the other two injectable medications.  She also states she did research

## 2021-06-22 NOTE — TELEPHONE ENCOUNTER
Received call (see [employer] messages from today 6/22)    Patient stated she has about 2 months of jardiance left from last rx sent in May. Pt would like to know if she can continue with whats left of this medication.  I informed her that dosage was increased

## 2021-06-22 NOTE — TELEPHONE ENCOUNTER
I have written the letter. I am not sure if this can be split. She may have to call pharmacy to see. If pharmacy says it is okay to split then she can.  If not, then she can take 2 tablets a day until she finishes the course and then  the new jardian

## 2021-06-23 ENCOUNTER — PATIENT MESSAGE (OUTPATIENT)
Dept: INTERNAL MEDICINE CLINIC | Facility: CLINIC | Age: 61
End: 2021-06-23

## 2021-06-23 NOTE — TELEPHONE ENCOUNTER
From: 32 [facility], DO  To: Shadi Palomo  Sent: 6/23/2021 10:34 AM CDT  Subject: message    Hi Meghan Pelayo is still in the market, but goes by the name saxenda now. Ozempic is once a week injection. Are you scared of needles?  If you are, ma

## 2021-07-06 ENCOUNTER — OFFICE VISIT (OUTPATIENT)
Dept: RHEUMATOLOGY | Facility: CLINIC | Age: 61
End: 2021-07-06
Payer: MEDICAID

## 2021-07-06 VITALS
OXYGEN SATURATION: 95 % | DIASTOLIC BLOOD PRESSURE: 90 MMHG | WEIGHT: 151.81 LBS | BODY MASS INDEX: 28.66 KG/M2 | TEMPERATURE: 98 F | HEIGHT: 61 IN | SYSTOLIC BLOOD PRESSURE: 158 MMHG | HEART RATE: 97 BPM

## 2021-07-06 DIAGNOSIS — M47.816 SPONDYLOSIS OF LUMBAR REGION WITHOUT MYELOPATHY OR RADICULOPATHY: ICD-10-CM

## 2021-07-06 DIAGNOSIS — G89.29 CHRONIC MIDLINE LOW BACK PAIN WITHOUT SCIATICA: ICD-10-CM

## 2021-07-06 DIAGNOSIS — M15.9 PRIMARY OSTEOARTHRITIS INVOLVING MULTIPLE JOINTS: Primary | ICD-10-CM

## 2021-07-06 DIAGNOSIS — M54.50 CHRONIC MIDLINE LOW BACK PAIN WITHOUT SCIATICA: ICD-10-CM

## 2021-07-06 DIAGNOSIS — E11.65 UNCONTROLLED TYPE 2 DIABETES MELLITUS WITH HYPERGLYCEMIA, WITHOUT LONG-TERM CURRENT USE OF INSULIN (HCC): ICD-10-CM

## 2021-07-06 PROCEDURE — 3077F SYST BP >= 140 MM HG: CPT | Performed by: INTERNAL MEDICINE

## 2021-07-06 PROCEDURE — 3008F BODY MASS INDEX DOCD: CPT | Performed by: INTERNAL MEDICINE

## 2021-07-06 PROCEDURE — 3080F DIAST BP >= 90 MM HG: CPT | Performed by: INTERNAL MEDICINE

## 2021-07-06 PROCEDURE — 99213 OFFICE O/P EST LOW 20 MIN: CPT | Performed by: INTERNAL MEDICINE

## 2021-07-06 RX ORDER — HYDROCODONE BITARTRATE AND ACETAMINOPHEN 10; 325 MG/1; MG/1
1 TABLET ORAL EVERY 6 HOURS PRN
Qty: 120 TABLET | Refills: 0 | Status: SHIPPED | OUTPATIENT
Start: 2021-08-05 | End: 2021-08-26

## 2021-07-06 RX ORDER — HYDROCODONE BITARTRATE AND ACETAMINOPHEN 10; 325 MG/1; MG/1
1 TABLET ORAL EVERY 6 HOURS PRN
Qty: 120 TABLET | Refills: 0 | Status: SHIPPED | OUTPATIENT
Start: 2021-07-06 | End: 2021-08-05

## 2021-07-06 RX ORDER — CHLORZOXAZONE 500 MG/1
500 TABLET ORAL 3 TIMES DAILY PRN
Qty: 90 TABLET | Refills: 2 | Status: SHIPPED | OUTPATIENT
Start: 2021-07-06

## 2021-07-06 RX ORDER — NABUMETONE 750 MG/1
750 TABLET, FILM COATED ORAL 2 TIMES DAILY
Qty: 180 TABLET | Refills: 3 | Status: SHIPPED | OUTPATIENT
Start: 2021-07-06 | End: 2021-12-28

## 2021-07-06 RX ORDER — HYDROCODONE BITARTRATE AND ACETAMINOPHEN 10; 325 MG/1; MG/1
1 TABLET ORAL EVERY 6 HOURS PRN
Qty: 120 TABLET | Refills: 0 | Status: SHIPPED | OUTPATIENT
Start: 2021-09-04 | End: 2021-08-26

## 2021-07-06 RX ORDER — GARLIC EXTRACT 500 MG
1 CAPSULE ORAL DAILY
COMMUNITY
End: 2022-01-11

## 2021-07-06 NOTE — PATIENT INSTRUCTIONS
For back pain and muscle pain use chlorzoxazone 500 mg 1 tablet 3 times a day as needed. For joint pain, back pain arthritis pain, use Norco 10 mg 1 every 6 hours up to 4-day. Also for arthritis pain take nabumetone 750 mg 3 times per day.   Watch your [facility]

## 2021-07-06 NOTE — PROGRESS NOTES
[facility]  Dr. Alicia Jiménez Progress Note     Subjective:   Ignacia Duverney is a(n) 63year old female. Current complaints: Patient presents with:   Follow - Up: LOV 3-23-21; in the process of moving and is hurting all over; shahnaz bad is left low back and

## 2021-07-14 ENCOUNTER — PATIENT MESSAGE (OUTPATIENT)
Dept: INTERNAL MEDICINE CLINIC | Facility: CLINIC | Age: 61
End: 2021-07-14

## 2021-07-15 NOTE — TELEPHONE ENCOUNTER
From: Veronica Form  To: 32 [facility], DO  Sent: 7/14/2021 5:04 PM CDT  Subject: Prescription Question    The [employer] insurance company needs for you to send them a request for prior authorization for my Omeprazole! And I'm almost out.  They do this

## 2021-07-15 NOTE — TELEPHONE ENCOUNTER
Key: [id number]      Your information has been submitted to [facility]. [employer] is reviewing the PA request and you will receive an electronic response.  You may check for the updated outcome later by reopening this request. The standard fax determinati

## 2021-07-26 NOTE — TELEPHONE ENCOUNTER
SSKI Counseling:  I discussed with the patient the risks of SSKI including but not limited to thyroid abnormalities, metallic taste, GI upset, fever, headache, acne, arthralgias, paraesthesias, lymphadenopathy, easy bleeding, arrhythmias, and allergic reaction. See additional telephone encounter for today. Tetracycline Counseling: Patient counseled regarding possible photosensitivity and increased risk for sunburn.  Patient instructed to avoid sunlight, if possible.  When exposed to sunlight, patients should wear protective clothing, sunglasses, and sunscreen.  The patient was instructed to call the office immediately if the following severe adverse effects occur:  hearing changes, easy bruising/bleeding, severe headache, or vision changes.  The patient verbalized understanding of the proper use and possible adverse effects of tetracycline.  All of the patient's questions and concerns were addressed. Patient understands to avoid pregnancy while on therapy due to potential birth defects. Cellcept Counseling:  I discussed with the patient the risks of mycophenolate mofetil including but not limited to infection/immunosuppression, GI upset, hypokalemia, hypercholesterolemia, bone marrow suppression, lymphoproliferative disorders, malignancy, GI ulceration/bleed/perforation, colitis, interstitial lung disease, kidney failure, progressive multifocal leukoencephalopathy, and birth defects.  The patient understands that monitoring is required including a baseline creatinine and regular CBC testing. In addition, patient must alert us immediately if symptoms of infection or other concerning signs are noted. Solaraze Pregnancy And Lactation Text: This medication is Pregnancy Category B and is considered safe. There is some data to suggest avoiding during the third trimester. It is unknown if this medication is excreted in breast milk. Glycopyrrolate Counseling:  I discussed with the patient the risks of glycopyrrolate including but not limited to skin rash, drowsiness, dry mouth, difficulty urinating, and blurred vision. Azathioprine Counseling:  I discussed with the patient the risks of azathioprine including but not limited to myelosuppression, immunosuppression, hepatotoxicity, lymphoma, and infections.  The patient understands that monitoring is required including baseline LFTs, Creatinine, possible TPMP genotyping and weekly CBCs for the first month and then every 2 weeks thereafter.  The patient verbalized understanding of the proper use and possible adverse effects of azathioprine.  All of the patient's questions and concerns were addressed. Griseofulvin Counseling:  I discussed with the patient the risks of griseofulvin including but not limited to photosensitivity, cytopenia, liver damage, nausea/vomiting and severe allergy.  The patient understands that this medication is best absorbed when taken with a fatty meal (e.g., ice cream or french fries). Thalidomide Counseling: I discussed with the patient the risks of thalidomide including but not limited to birth defects, anxiety, weakness, chest pain, dizziness, cough and severe allergy. Bactrim Pregnancy And Lactation Text: This medication is Pregnancy Category D and is known to cause fetal risk.  It is also excreted in breast milk. Oxybutynin Counseling:  I discussed with the patient the risks of oxybutynin including but not limited to skin rash, drowsiness, dry mouth, difficulty urinating, and blurred vision. Prednisone Pregnancy And Lactation Text: This medication is Pregnancy Category C and it isn't know if it is safe during pregnancy. This medication is excreted in breast milk. Zyclara Pregnancy And Lactation Text: This medication is Pregnancy Category C. It is unknown if this medication is excreted in breast milk. Simponi Counseling:  I discussed with the patient the risks of golimumab including but not limited to myelosuppression, immunosuppression, autoimmune hepatitis, demyelinating diseases, lymphoma, and serious infections.  The patient understands that monitoring is required including a PPD at baseline and must alert us or the primary physician if symptoms of infection or other concerning signs are noted. 5-Fu Counseling: 5-Fluorouracil Counseling:  I discussed with the patient the risks of 5-fluorouracil including but not limited to erythema, scaling, itching, weeping, crusting, and pain. Cimzia Counseling:  I discussed with the patient the risks of Cimzia including but not limited to immunosuppression, allergic reactions and infections.  The patient understands that monitoring is required including a PPD at baseline and must alert us or the primary physician if symptoms of infection or other concerning signs are noted. Ketoconazole Counseling:   Patient counseled regarding improving absorption with orange juice.  Adverse effects include but are not limited to breast enlargement, headache, diarrhea, nausea, upset stomach, liver function test abnormalities, taste disturbance, and stomach pain.  There is a rare possibility of liver failure that can occur when taking ketoconazole. The patient understands that monitoring of LFTs may be required, especially at baseline. The patient verbalized understanding of the proper use and possible adverse effects of ketoconazole.  All of the patient's questions and concerns were addressed. Hydroquinone Pregnancy And Lactation Text: This medication has not been assigned a Pregnancy Risk Category but animal studies failed to show danger with the topical medication. It is unknown if the medication is excreted in breast milk. Rifampin Pregnancy And Lactation Text: This medication is Pregnancy Category C and it isn't know if it is safe during pregnancy. It is also excreted in breast milk and should not be used if you are breast feeding. Stelara Pregnancy And Lactation Text: This medication is Pregnancy Category B and is considered safe during pregnancy. It is unknown if this medication is excreted in breast milk. Picato Counseling:  I discussed with the patient the risks of Picato including but not limited to erythema, scaling, itching, weeping, crusting, and pain. Use Enhanced Medication Counseling?: No Skyrizi Counseling: I discussed with the patient the risks of risankizumab-rzaa including but not limited to immunosuppression, and serious infections.  The patient understands that monitoring is required including a PPD at baseline and must alert us or the primary physician if symptoms of infection or other concerning signs are noted. Carac Pregnancy And Lactation Text: This medication is Pregnancy Category X and contraindicated in pregnancy and in women who may become pregnant. It is unknown if this medication is excreted in breast milk. Stelara Counseling:  I discussed with the patient the risks of ustekinumab including but not limited to immunosuppression, malignancy, posterior leukoencephalopathy syndrome, and serious infections.  The patient understands that monitoring is required including a PPD at baseline and must alert us or the primary physician if symptoms of infection or other concerning signs are noted. Cosentyx Counseling:  I discussed with the patient the risks of Cosentyx including but not limited to worsening of Crohn's disease, immunosuppression, allergic reactions and infections.  The patient understands that monitoring is required including a PPD at baseline and must alert us or the primary physician if symptoms of infection or other concerning signs are noted. Itraconazole Pregnancy And Lactation Text: This medication is Pregnancy Category C and it isn't know if it is safe during pregnancy. It is also excreted in breast milk. Thalidomide Pregnancy And Lactation Text: This medication is Pregnancy Category X and is absolutely contraindicated during pregnancy. It is unknown if it is excreted in breast milk. Tetracycline Pregnancy And Lactation Text: This medication is Pregnancy Category D and not consider safe during pregnancy. It is also excreted in breast milk. Griseofulvin Pregnancy And Lactation Text: This medication is Pregnancy Category X and is known to cause serious birth defects. It is unknown if this medication is excreted in breast milk but breast feeding should be avoided. Infliximab Counseling:  I discussed with the patient the risks of infliximab including but not limited to myelosuppression, immunosuppression, autoimmune hepatitis, demyelinating diseases, lymphoma, and serious infections.  The patient understands that monitoring is required including a PPD at baseline and must alert us or the primary physician if symptoms of infection or other concerning signs are noted. Ivermectin Counseling:  Patient instructed to take medication on an empty stomach with a full glass of water.  Patient informed of potential adverse effects including but not limited to nausea, diarrhea, dizziness, itching, and swelling of the extremities or lymph nodes.  The patient verbalized understanding of the proper use and possible adverse effects of ivermectin.  All of the patient's questions and concerns were addressed. Clindamycin Pregnancy And Lactation Text: This medication can be used in pregnancy if certain situations. Clindamycin is also present in breast milk. Ilumya Pregnancy And Lactation Text: The risk during pregnancy and breastfeeding is uncertain with this medication. Prednisone Counseling:  I discussed with the patient the risks of prolonged use of prednisone including but not limited to weight gain, insomnia, osteoporosis, mood changes, diabetes, susceptibility to infection, glaucoma and high blood pressure.  In cases where prednisone use is prolonged, patients should be monitored with blood pressure checks, serum glucose levels and an eye exam.  Additionally, the patient may need to be placed on GI prophylaxis, PCP prophylaxis, and calcium and vitamin D supplementation and/or a bisphosphonate.  The patient verbalized understanding of the proper use and the possible adverse effects of prednisone.  All of the patient's questions and concerns were addressed. Enbrel Counseling:  I discussed with the patient the risks of etanercept including but not limited to myelosuppression, immunosuppression, autoimmune hepatitis, demyelinating diseases, lymphoma, and infections.  The patient understands that monitoring is required including a PPD at baseline and must alert us or the primary physician if symptoms of infection or other concerning signs are noted. Detail Level: Detailed Oxybutynin Pregnancy And Lactation Text: This medication is Pregnancy Category B and is considered safe during pregnancy. It is unknown if it is excreted in breast milk. Colchicine Counseling:  Patient counseled regarding adverse effects including but not limited to stomach upset (nausea, vomiting, stomach pain, or diarrhea).  Patient instructed to limit alcohol consumption while taking this medication.  Colchicine may reduce blood counts especially with prolonged use.  The patient understands that monitoring of kidney function and blood counts may be required, especially at baseline. The patient verbalized understanding of the proper use and possible adverse effects of colchicine.  All of the patient's questions and concerns were addressed. Valtrex Counseling: I discussed with the patient the risks of valacyclovir including but not limited to kidney damage, nausea, vomiting and severe allergy.  The patient understands that if the infection seems to be worsening or is not improving, they are to call. High Dose Vitamin A Counseling: Side effects reviewed, pt to contact office should one occur. Rifampin Counseling: I discussed with the patient the risks of rifampin including but not limited to liver damage, kidney damage, red-orange body fluids, nausea/vomiting and severe allergy. Cellcept Pregnancy And Lactation Text: This medication is Pregnancy Category D and isn't considered safe during pregnancy. It is unknown if this medication is excreted in breast milk. Protopic Pregnancy And Lactation Text: This medication is Pregnancy Category C. It is unknown if this medication is excreted in breast milk when applied topically. Rituxan Pregnancy And Lactation Text: This medication is Pregnancy Category C and it isn't know if it is safe during pregnancy. It is unknown if this medication is excreted in breast milk but similar antibodies are known to be excreted. Humira Counseling:  I discussed with the patient the risks of adalimumab including but not limited to myelosuppression, immunosuppression, autoimmune hepatitis, demyelinating diseases, lymphoma, and serious infections.  The patient understands that monitoring is required including a PPD at baseline and must alert us or the primary physician if symptoms of infection or other concerning signs are noted. Drysol Pregnancy And Lactation Text: This medication is considered safe during pregnancy and breast feeding. Clofazimine Pregnancy And Lactation Text: This medication is Pregnancy Category C and isn't considered safe during pregnancy. It is excreted in breast milk. Hydroxychloroquine Pregnancy And Lactation Text: This medication has been shown to cause fetal harm but it isn't assigned a Pregnancy Risk Category. There are small amounts excreted in breast milk. Valtrex Pregnancy And Lactation Text: this medication is Pregnancy Category B and is considered safe during pregnancy. This medication is not directly found in breast milk but it's metabolite acyclovir is present. Cimetidine Counseling:  I discussed with the patient the risks of Cimetidine including but not limited to gynecomastia, headache, diarrhea, nausea, drowsiness, arrhythmias, pancreatitis, skin rashes, psychosis, bone marrow suppression and kidney toxicity. Otezla Counseling: The side effects of Otezla were discussed with the patient, including but not limited to worsening or new depression, weight loss, diarrhea, nausea, upper respiratory tract infection, and headache. Patient instructed to call the office should any adverse effect occur.  The patient verbalized understanding of the proper use and possible adverse effects of Otezla.  All the patient's questions and concerns were addressed. Cyclophosphamide Pregnancy And Lactation Text: This medication is Pregnancy Category D and it isn't considered safe during pregnancy. This medication is excreted in breast milk. Quinolones Counseling:  I discussed with the patient the risks of fluoroquinolones including but not limited to GI upset, allergic reaction, drug rash, diarrhea, dizziness, photosensitivity, yeast infections, liver function test abnormalities, tendonitis/tendon rupture. Nsaids Pregnancy And Lactation Text: These medications are considered safe up to 30 weeks gestation. It is excreted in breast milk. Sski Pregnancy And Lactation Text: This medication is Pregnancy Category D and isn't considered safe during pregnancy. It is excreted in breast milk. Doxycycline Pregnancy And Lactation Text: This medication is Pregnancy Category D and not consider safe during pregnancy. It is also excreted in breast milk but is considered safe for shorter treatment courses. Xolair Counseling:  Patient informed of potential adverse effects including but not limited to fever, muscle aches, rash and allergic reactions.  The patient verbalized understanding of the proper use and possible adverse effects of Xolair.  All of the patient's questions and concerns were addressed. Clindamycin Counseling: I counseled the patient regarding use of clindamycin as an antibiotic for prophylactic and/or therapeutic purposes. Clindamycin is active against numerous classes of bacteria, including skin bacteria. Side effects may include nausea, diarrhea, gastrointestinal upset, rash, hives, yeast infections, and in rare cases, colitis. Acitretin Counseling:  I discussed with the patient the risks of acitretin including but not limited to hair loss, dry lips/skin/eyes, liver damage, hyperlipidemia, depression/suicidal ideation, photosensitivity.  Serious rare side effects can include but are not limited to pancreatitis, pseudotumor cerebri, bony changes, clot formation/stroke/heart attack.  Patient understands that alcohol is contraindicated since it can result in liver toxicity and significantly prolong the elimination of the drug by many years. Eucrisa Counseling: Patient may experience a mild burning sensation during topical application. Eucrisa is not approved in children less than 2 years of age. Hydroxychloroquine Counseling:  I discussed with the patient that a baseline ophthalmologic exam is needed at the start of therapy and every year thereafter while on therapy. A CBC may also be warranted for monitoring.  The side effects of this medication were discussed with the patient, including but not limited to agranulocytosis, aplastic anemia, seizures, rashes, retinopathy, and liver toxicity. Patient instructed to call the office should any adverse effect occur.  The patient verbalized understanding of the proper use and possible adverse effects of Plaquenil.  All the patient's questions and concerns were addressed. Taltz Counseling: I discussed with the patient the risks of ixekizumab including but not limited to immunosuppression, serious infections, worsening of inflammatory bowel disease and drug reactions.  The patient understands that monitoring is required including a PPD at baseline and must alert us or the primary physician if symptoms of infection or other concerning signs are noted. Azithromycin Counseling:  I discussed with the patient the risks of azithromycin including but not limited to GI upset, allergic reaction, drug rash, diarrhea, and yeast infections. Terbinafine Counseling: Patient counseling regarding adverse effects of terbinafine including but not limited to headache, diarrhea, rash, upset stomach, liver function test abnormalities, itching, taste/smell disturbance, nausea, abdominal pain, and flatulence.  There is a rare possibility of liver failure that can occur when taking terbinafine.  The patient understands that a baseline LFT and kidney function test may be required. The patient verbalized understanding of the proper use and possible adverse effects of terbinafine.  All of the patient's questions and concerns were addressed. Hydroxyzine Counseling: Patient advised that the medication is sedating and not to drive a car after taking this medication.  Patient informed of potential adverse effects including but not limited to dry mouth, urinary retention, and blurry vision.  The patient verbalized understanding of the proper use and possible adverse effects of hydroxyzine.  All of the patient's questions and concerns were addressed. Solaraze Counseling:  I discussed with the patient the risks of Solaraze including but not limited to erythema, scaling, itching, weeping, crusting, and pain. Protopic Counseling: Patient may experience a mild burning sensation during topical application. Protopic is not approved in children less than 2 years of age. There have been case reports of hematologic and skin malignancies in patients using topical calcineurin inhibitors although causality is questionable. Drysol Counseling:  I discussed with the patient the risks of drysol/aluminum chloride including but not limited to skin rash, itching, irritation, burning. Cyclosporine Counseling:  I discussed with the patient the risks of cyclosporine including but not limited to hypertension, gingival hyperplasia,myelosuppression, immunosuppression, liver damage, kidney damage, neurotoxicity, lymphoma, and serious infections. The patient understands that monitoring is required including baseline blood pressure, CBC, CMP, lipid panel and uric acid, and then 1-2 times monthly CMP and blood pressure. Minocycline Counseling: Patient advised regarding possible photosensitivity and discoloration of the teeth, skin, lips, tongue and gums.  Patient instructed to avoid sunlight, if possible.  When exposed to sunlight, patients should wear protective clothing, sunglasses, and sunscreen.  The patient was instructed to call the office immediately if the following severe adverse effects occur:  hearing changes, easy bruising/bleeding, severe headache, or vision changes.  The patient verbalized understanding of the proper use and possible adverse effects of minocycline.  All of the patient's questions and concerns were addressed. Topical Sulfur Applications Pregnancy And Lactation Text: This medication is Pregnancy Category C and has an unknown safety profile during pregnancy. It is unknown if this topical medication is excreted in breast milk. Albendazole Pregnancy And Lactation Text: This medication is Pregnancy Category C and it isn't known if it is safe during pregnancy. It is also excreted in breast milk. Albendazole Counseling:  I discussed with the patient the risks of albendazole including but not limited to cytopenia, kidney damage, nausea/vomiting and severe allergy.  The patient understands that this medication is being used in an off-label manner. Erivedge Counseling- I discussed with the patient the risks of Erivedge including but not limited to nausea, vomiting, diarrhea, constipation, weight loss, changes in the sense of taste, decreased appetite, muscle spasms, and hair loss.  The patient verbalized understanding of the proper use and possible adverse effects of Erivedge.  All of the patient's questions and concerns were addressed. Ilumya Counseling: I discussed with the patient the risks of tildrakizumab including but not limited to immunosuppression, malignancy, posterior leukoencephalopathy syndrome, and serious infections.  The patient understands that monitoring is required including a PPD at baseline and must alert us or the primary physician if symptoms of infection or other concerning signs are noted. Dupixent Counseling: I discussed with the patient the risks of dupilumab including but not limited to eye infection and irritation, cold sores, injection site reactions, worsening of asthma, allergic reactions and increased risk of parasitic infection.  Live vaccines should be avoided while taking dupilumab. Dupilumab will also interact with certain medications such as warfarin and cyclosporine. The patient understands that monitoring is required and they must alert us or the primary physician if symptoms of infection or other concerning signs are noted. Hydroquinone Counseling:  Patient advised that medication may result in skin irritation, lightening (hypopigmentation), dryness, and burning.  In the event of skin irritation, the patient was advised to reduce the amount of the drug applied or use it less frequently.  Rarely, spots that are treated with hydroquinone can become darker (pseudoochronosis).  Should this occur, patient instructed to stop medication and call the office. The patient verbalized understanding of the proper use and possible adverse effects of hydroquinone.  All of the patient's questions and concerns were addressed. Itraconazole Counseling:  I discussed with the patient the risks of itraconazole including but not limited to liver damage, nausea/vomiting, neuropathy, and severe allergy.  The patient understands that this medication is best absorbed when taken with acidic beverages such as non-diet cola or ginger ale.  The patient understands that monitoring is required including baseline LFTs and repeat LFTs at intervals.  The patient understands that they are to contact us or the primary physician if concerning signs are noted. Benzoyl Peroxide Pregnancy And Lactation Text: This medication is Pregnancy Category C. It is unknown if benzoyl peroxide is excreted in breast milk. Imiquimod Counseling:  I discussed with the patient the risks of imiquimod including but not limited to erythema, scaling, itching, weeping, crusting, and pain.  Patient understands that the inflammatory response to imiquimod is variable from person to person and was educated regarded proper titration schedule.  If flu-like symptoms develop, patient knows to discontinue the medication and contact us. Terbinafine Pregnancy And Lactation Text: This medication is Pregnancy Category B and is considered safe during pregnancy. It is also excreted in breast milk and breast feeding isn't recommended. Bexarotene Pregnancy And Lactation Text: This medication is Pregnancy Category X and should not be given to women who are pregnant or may become pregnant. This medication should not be used if you are breast feeding. Isotretinoin Counseling: Patient should get monthly blood tests, not donate blood, not drive at night if vision affected, not share medication, and not undergo elective surgery for 6 months after tx completed. Side effects reviewed, pt to contact office should one occur. Fluconazole Counseling:  Patient counseled regarding adverse effects of fluconazole including but not limited to headache, diarrhea, nausea, upset stomach, liver function test abnormalities, taste disturbance, and stomach pain.  There is a rare possibility of liver failure that can occur when taking fluconazole.  The patient understands that monitoring of LFTs and kidney function test may be required, especially at baseline. The patient verbalized understanding of the proper use and possible adverse effects of fluconazole.  All of the patient's questions and concerns were addressed. Xeljavierz Pregnancy And Lactation Text: This medication is Pregnancy Category D and is not considered safe during pregnancy.  The risk during breast feeding is also uncertain. Erythromycin Pregnancy And Lactation Text: This medication is Pregnancy Category B and is considered safe during pregnancy. It is also excreted in breast milk. Clofazimine Counseling:  I discussed with the patient the risks of clofazimine including but not limited to skin and eye pigmentation, liver damage, nausea/vomiting, gastrointestinal bleeding and allergy. Cyclophosphamide Counseling:  I discussed with the patient the risks of cyclophosphamide including but not limited to hair loss, hormonal abnormalities, decreased fertility, abdominal pain, diarrhea, nausea and vomiting, bone marrow suppression and infection. The patient understands that monitoring is required while taking this medication. Elidel Counseling: Patient may experience a mild burning sensation during topical application. Elidel is not approved in children less than 2 years of age. There have been case reports of hematologic and skin malignancies in patients using topical calcineurin inhibitors although causality is questionable. Birth Control Pills Counseling: Birth Control Pill Counseling: I discussed with the patient the potential side effects of OCPs including but not limited to increased risk of stroke, heart attack, thrombophlebitis, deep venous thrombosis, hepatic adenomas, breast changes, GI upset, headaches, and depression.  The patient verbalized understanding of the proper use and possible adverse effects of OCPs. All of the patient's questions and concerns were addressed. Bactrim Counseling:  I discussed with the patient the risks of sulfa antibiotics including but not limited to GI upset, allergic reaction, drug rash, diarrhea, dizziness, photosensitivity, and yeast infections.  Rarely, more serious reactions can occur including but not limited to aplastic anemia, agranulocytosis, methemoglobinemia, blood dyscrasias, liver or kidney failure, lung infiltrates or desquamative/blistering drug rashes. Otezla Pregnancy And Lactation Text: This medication is Pregnancy Category C and it isn't known if it is safe during pregnancy. It is unknown if it is excreted in breast milk. Methotrexate Pregnancy And Lactation Text: This medication is Pregnancy Category X and is known to cause fetal harm. This medication is excreted in breast milk. Odomzo Counseling- I discussed with the patient the risks of Odomzo including but not limited to nausea, vomiting, diarrhea, constipation, weight loss, changes in the sense of taste, decreased appetite, muscle spasms, and hair loss.  The patient verbalized understanding of the proper use and possible adverse effects of Odomzo.  All of the patient's questions and concerns were addressed. Ketoconazole Pregnancy And Lactation Text: This medication is Pregnancy Category C and it isn't know if it is safe during pregnancy. It is also excreted in breast milk and breast feeding isn't recommended. Birth Control Pills Pregnancy And Lactation Text: This medication should be avoided if pregnant and for the first 30 days post-partum. Xeljanz Counseling: I discussed with the patient the risks of Xeljanz therapy including increased risk of infection, liver issues, headache, diarrhea, or cold symptoms. Live vaccines should be avoided. They were instructed to call if they have any problems. Glycopyrrolate Pregnancy And Lactation Text: This medication is Pregnancy Category B and is considered safe during pregnancy. It is unknown if it is excreted breast milk. Dapsone Counseling: I discussed with the patient the risks of dapsone including but not limited to hemolytic anemia, agranulocytosis, rashes, methemoglobinemia, kidney failure, peripheral neuropathy, headaches, GI upset, and liver toxicity.  Patients who start dapsone require monitoring including baseline LFTs and weekly CBCs for the first month, then every month thereafter.  The patient verbalized understanding of the proper use and possible adverse effects of dapsone.  All of the patient's questions and concerns were addressed. Metronidazole Counseling:  I discussed with the patient the risks of metronidazole including but not limited to seizures, nausea/vomiting, a metallic taste in the mouth, nausea/vomiting and severe allergy. Tazorac Pregnancy And Lactation Text: This medication is not safe during pregnancy. It is unknown if this medication is excreted in breast milk. Tazorac Counseling:  Patient advised that medication is irritating and drying.  Patient may need to apply sparingly and wash off after an hour before eventually leaving it on overnight.  The patient verbalized understanding of the proper use and possible adverse effects of tazorac.  All of the patient's questions and concerns were addressed. Acitretin Pregnancy And Lactation Text: This medication is Pregnancy Category X and should not be given to women who are pregnant or may become pregnant in the future. This medication is excreted in breast milk. Spironolactone Counseling: Patient advised regarding risks of diarrhea, abdominal pain, hyperkalemia, birth defects (for female patients), liver toxicity and renal toxicity. The patient may need blood work to monitor liver and kidney function and potassium levels while on therapy. The patient verbalized understanding of the proper use and possible adverse effects of spironolactone.  All of the patient's questions and concerns were addressed. Carac Counseling:  I discussed with the patient the risks of Carac including but not limited to erythema, scaling, itching, weeping, crusting, and pain. Gabapentin Counseling: I discussed with the patient the risks of gabapentin including but not limited to dizziness, somnolence, fatigue and ataxia. Erythromycin Counseling:  I discussed with the patient the risks of erythromycin including but not limited to GI upset, allergic reaction, drug rash, diarrhea, increase in liver enzymes, and yeast infections. Xolair Pregnancy And Lactation Text: This medication is Pregnancy Category B and is considered safe during pregnancy. This medication is excreted in breast milk. Topical Retinoid counseling:  Patient advised to apply a pea-sized amount only at bedtime and wait 30 minutes after washing their face before applying.  If too drying, patient may add a non-comedogenic moisturizer. The patient verbalized understanding of the proper use and possible adverse effects of retinoids.  All of the patient's questions and concerns were addressed. Topical Sulfur Applications Counseling: Topical Sulfur Counseling: Patient counseled that this medication may cause skin irritation or allergic reactions.  In the event of skin irritation, the patient was advised to reduce the amount of the drug applied or use it less frequently.   The patient verbalized understanding of the proper use and possible adverse effects of topical sulfur application.  All of the patient's questions and concerns were addressed. Hydroxyzine Pregnancy And Lactation Text: This medication is not safe during pregnancy and should not be taken. It is also excreted in breast milk and breast feeding isn't recommended. Cephalexin Counseling: I counseled the patient regarding use of cephalexin as an antibiotic for prophylactic and/or therapeutic purposes. Cephalexin (commonly prescribed under brand name Keflex) is a cephalosporin antibiotic which is active against numerous classes of bacteria, including most skin bacteria. Side effects may include nausea, diarrhea, gastrointestinal upset, rash, hives, yeast infections, and in rare cases, hepatitis, kidney disease, seizures, fever, confusion, neurologic symptoms, and others. Patients with severe allergies to penicillin medications are cautioned that there is about a 10% incidence of cross-reactivity with cephalosporins. When possible, patients with penicillin allergies should use alternatives to cephalosporins for antibiotic therapy. Cimzia Pregnancy And Lactation Text: This medication crosses the placenta but can be considered safe in certain situations. Cimzia may be excreted in breast milk. Nsaids Counseling: NSAID Counseling: I discussed with the patient that NSAIDs should be taken with food. Prolonged use of NSAIDs can result in the development of stomach ulcers.  Patient advised to stop taking NSAIDs if abdominal pain occurs.  The patient verbalized understanding of the proper use and possible adverse effects of NSAIDs.  All of the patient's questions and concerns were addressed. Zyclara Counseling:  I discussed with the patient the risks of imiquimod including but not limited to erythema, scaling, itching, weeping, crusting, and pain.  Patient understands that the inflammatory response to imiquimod is variable from person to person and was educated regarded proper titration schedule.  If flu-like symptoms develop, patient knows to discontinue the medication and contact us. Benzoyl Peroxide Counseling: Patient counseled that medicine may cause skin irritation and bleach clothing.  In the event of skin irritation, the patient was advised to reduce the amount of the drug applied or use it less frequently.   The patient verbalized understanding of the proper use and possible adverse effects of benzoyl peroxide.  All of the patient's questions and concerns were addressed. Azithromycin Pregnancy And Lactation Text: This medication is considered safe during pregnancy and is also secreted in breast milk. Doxepin Counseling:  Patient advised that the medication is sedating and not to drive a car after taking this medication. Patient informed of potential adverse effects including but not limited to dry mouth, urinary retention, and blurry vision.  The patient verbalized understanding of the proper use and possible adverse effects of doxepin.  All of the patient's questions and concerns were addressed. Dupixent Pregnancy And Lactation Text: This medication likely crosses the placenta but the risk for the fetus is uncertain. This medication is excreted in breast milk. Methotrexate Counseling:  Patient counseled regarding adverse effects of methotrexate including but not limited to nausea, vomiting, abnormalities in liver function tests. Patients may develop mouth sores, rash, diarrhea, and abnormalities in blood counts. The patient understands that monitoring is required including LFT's and blood counts.  There is a rare possibility of scarring of the liver and lung problems that can occur when taking methotrexate. Persistent nausea, loss of appetite, pale stools, dark urine, cough, and shortness of breath should be reported immediately. Patient advised to discontinue methotrexate treatment at least three months before attempting to become pregnant.  I discussed the need for folate supplements while taking methotrexate.  These supplements can decrease side effects during methotrexate treatment. The patient verbalized understanding of the proper use and possible adverse effects of methotrexate.  All of the patient's questions and concerns were addressed. Minoxidil Counseling: Minoxidil is a topical medication which can increase blood flow where it is applied. It is uncertain how this medication increases hair growth. Side effects are uncommon and include stinging and allergic reactions. Metronidazole Pregnancy And Lactation Text: This medication is Pregnancy Category B and considered safe during pregnancy.  It is also excreted in breast milk. Arava Counseling:  Patient counseled regarding adverse effects of Arava including but not limited to nausea, vomiting, abnormalities in liver function tests. Patients may develop mouth sores, rash, diarrhea, and abnormalities in blood counts. The patient understands that monitoring is required including LFTs and blood counts.  There is a rare possibility of scarring of the liver and lung problems that can occur when taking methotrexate. Persistent nausea, loss of appetite, pale stools, dark urine, cough, and shortness of breath should be reported immediately. Patient advised to discontinue Arava treatment and consult with a physician prior to attempting conception. The patient will have to undergo a treatment to eliminate Arava from the body prior to conception. Dapsone Pregnancy And Lactation Text: This medication is Pregnancy Category C and is not considered safe during pregnancy or breast feeding. Bexarotene Counseling:  I discussed with the patient the risks of bexarotene including but not limited to hair loss, dry lips/skin/eyes, liver abnormalities, hyperlipidemia, pancreatitis, depression/suicidal ideation, photosensitivity, drug rash/allergic reactions, hypothyroidism, anemia, leukopenia, infection, cataracts, and teratogenicity.  Patient understands that they will need regular blood tests to check lipid profile, liver function tests, white blood cell count, thyroid function tests and pregnancy test if applicable. High Dose Vitamin A Pregnancy And Lactation Text: High dose vitamin A therapy is contraindicated during pregnancy and breast feeding. Cephalexin Pregnancy And Lactation Text: This medication is Pregnancy Category B and considered safe during pregnancy.  It is also excreted in breast milk but can be used safely for shorter doses. Siliq Counseling:  I discussed with the patient the risks of Siliq including but not limited to new or worsening depression, suicidal thoughts and behavior, immunosuppression, malignancy, posterior leukoencephalopathy syndrome, and serious infections.  The patient understands that monitoring is required including a PPD at baseline and must alert us or the primary physician if symptoms of infection or other concerning signs are noted. There is also a special program designed to monitor depression which is required with Siliq. Doxycycline Counseling:  Patient counseled regarding possible photosensitivity and increased risk for sunburn.  Patient instructed to avoid sunlight, if possible.  When exposed to sunlight, patients should wear protective clothing, sunglasses, and sunscreen.  The patient was instructed to call the office immediately if the following severe adverse effects occur:  hearing changes, easy bruising/bleeding, severe headache, or vision changes.  The patient verbalized understanding of the proper use and possible adverse effects of doxycycline.  All of the patient's questions and concerns were addressed. Spironolactone Pregnancy And Lactation Text: This medication can cause feminization of the male fetus and should be avoided during pregnancy. The active metabolite is also found in breast milk. Rituxan Counseling:  I discussed with the patient the risks of Rituxan infusions. Side effects can include infusion reactions, severe drug rashes including mucocutaneous reactions, reactivation of latent hepatitis and other infections and rarely progressive multifocal leukoencephalopathy.  All of the patient's questions and concerns were addressed. Isotretinoin Pregnancy And Lactation Text: This medication is Pregnancy Category X and is considered extremely dangerous during pregnancy. It is unknown if it is excreted in breast milk. Topical Clindamycin Counseling: Patient counseled that this medication may cause skin irritation or allergic reactions.  In the event of skin irritation, the patient was advised to reduce the amount of the drug applied or use it less frequently.   The patient verbalized understanding of the proper use and possible adverse effects of clindamycin.  All of the patient's questions and concerns were addressed. Doxepin Pregnancy And Lactation Text: This medication is Pregnancy Category C and it isn't known if it is safe during pregnancy. It is also excreted in breast milk and breast feeding isn't recommended. Sarecycline Counseling: Patient advised regarding possible photosensitivity and discoloration of the teeth, skin, lips, tongue and gums.  Patient instructed to avoid sunlight, if possible.  When exposed to sunlight, patients should wear protective clothing, sunglasses, and sunscreen.  The patient was instructed to call the office immediately if the following severe adverse effects occur:  hearing changes, easy bruising/bleeding, severe headache, or vision changes.  The patient verbalized understanding of the proper use and possible adverse effects of sarecycline.  All of the patient's questions and concerns were addressed. Tremfya Counseling: I discussed with the patient the risks of guselkumab including but not limited to immunosuppression, serious infections, and drug reactions.  The patient understands that monitoring is required including a PPD at baseline and must alert us or the primary physician if symptoms of infection or other concerning signs are noted.

## 2021-08-06 DIAGNOSIS — I10 ESSENTIAL HYPERTENSION: ICD-10-CM

## 2021-08-06 DIAGNOSIS — E78.00 HYPERCHOLESTEROLEMIA: ICD-10-CM

## 2021-08-06 DIAGNOSIS — E11.65 UNCONTROLLED TYPE 2 DIABETES MELLITUS WITH HYPERGLYCEMIA, WITHOUT LONG-TERM CURRENT USE OF INSULIN (HCC): ICD-10-CM

## 2021-08-09 DIAGNOSIS — Z00.00 ROUTINE PHYSICAL EXAMINATION: Primary | ICD-10-CM

## 2021-08-09 RX ORDER — ATORVASTATIN CALCIUM 40 MG/1
TABLET, FILM COATED ORAL
Qty: 90 TABLET | Refills: 0 | Status: SHIPPED | OUTPATIENT
Start: 2021-08-09 | End: 2021-11-10

## 2021-08-09 RX ORDER — METOPROLOL SUCCINATE 50 MG/1
TABLET, EXTENDED RELEASE ORAL
Qty: 90 TABLET | Refills: 0 | Status: SHIPPED | OUTPATIENT
Start: 2021-08-09 | End: 2021-10-29

## 2021-08-09 NOTE — TELEPHONE ENCOUNTER
LOV: 6/11/2021 with Dr. Elva Walden   RTC: 3 months  Last Relevant Labs: 5/27/2021 (A1C)  Filled: 5/5/2021 (Atorvastatin 40 mg) and  6/11/2021 (Metoprolol Succinate ER 50 mg)    #90 with 0 refills    Future Appointments   Date Time Provider Lilly Rhoades

## 2021-08-15 ENCOUNTER — PATIENT MESSAGE (OUTPATIENT)
Dept: INTERNAL MEDICINE CLINIC | Facility: CLINIC | Age: 61
End: 2021-08-15

## 2021-08-15 DIAGNOSIS — E11.65 UNCONTROLLED TYPE 2 DIABETES MELLITUS WITH HYPERGLYCEMIA, WITHOUT LONG-TERM CURRENT USE OF INSULIN (HCC): Primary | ICD-10-CM

## 2021-08-16 RX ORDER — LANCETS
1 EACH MISCELLANEOUS 3 TIMES DAILY
Qty: 300 EACH | Refills: 0 | Status: SHIPPED | OUTPATIENT
Start: 2021-08-16 | End: 2021-08-17

## 2021-08-16 RX ORDER — BLOOD-GLUCOSE METER
3 EACH MISCELLANEOUS DAILY
Qty: 1 KIT | Refills: 0 | Status: SHIPPED | OUTPATIENT
Start: 2021-08-16 | End: 2021-08-17

## 2021-08-16 RX ORDER — BLOOD SUGAR DIAGNOSTIC
STRIP MISCELLANEOUS
Qty: 300 EACH | Refills: 0 | Status: SHIPPED | OUTPATIENT
Start: 2021-08-16 | End: 2021-08-17

## 2021-08-16 NOTE — TELEPHONE ENCOUNTER
From: Ji Griffin  To:  32 [facility]  Sent: 8/15/2021 6:02 PM CDT  Subject: Prescription Question    I've been using my old Glucose monitor and I had to get a new one way back in 2018 because the insurance company wouldn't cover the needles a

## 2021-08-17 ENCOUNTER — PATIENT MESSAGE (OUTPATIENT)
Dept: INTERNAL MEDICINE CLINIC | Facility: CLINIC | Age: 61
End: 2021-08-17

## 2021-08-17 NOTE — TELEPHONE ENCOUNTER
Called patient regarding recent [employer] messages sent regarding diabetic supplies. Patient notified diabetic supplies was sent yesterday. She stated her insurance will only cover one touch brand supplies, contour next not covered.      Please advise

## 2021-08-17 NOTE — TELEPHONE ENCOUNTER
From: Kinga Curtis  To: 32 [facility],   Sent: 8/17/2021 12:36 PM CDT  Subject: Prescription Question    Ok I called the insurance company and I need you to send over a rx for the One Touch monitor, lancet's and test strips.  That's the only ones

## 2021-08-18 ENCOUNTER — LAB ENCOUNTER (OUTPATIENT)
Dept: LAB | Age: 61
End: 2021-08-18
Attending: INTERNAL MEDICINE
Payer: MEDICAID

## 2021-08-18 DIAGNOSIS — E11.65 UNCONTROLLED TYPE 2 DIABETES MELLITUS WITH HYPERGLYCEMIA, WITHOUT LONG-TERM CURRENT USE OF INSULIN (HCC): ICD-10-CM

## 2021-08-18 DIAGNOSIS — R30.0 DYSURIA: ICD-10-CM

## 2021-08-18 DIAGNOSIS — I10 ESSENTIAL HYPERTENSION: ICD-10-CM

## 2021-08-18 DIAGNOSIS — Z00.00 ROUTINE PHYSICAL EXAMINATION: ICD-10-CM

## 2021-08-18 DIAGNOSIS — E78.00 HYPERCHOLESTEROLEMIA: ICD-10-CM

## 2021-08-18 LAB
BILIRUB UR QL STRIP.AUTO: NEGATIVE
COLOR UR AUTO: YELLOW
GLUCOSE UR STRIP.AUTO-MCNC: >=500 MG/DL
KETONES UR STRIP.AUTO-MCNC: NEGATIVE MG/DL
NITRITE UR QL STRIP.AUTO: POSITIVE
PH UR STRIP.AUTO: 5 [PH] (ref 5–8)
PROT UR STRIP.AUTO-MCNC: NEGATIVE MG/DL
SP GR UR STRIP.AUTO: 1.02 (ref 1–1.03)
UROBILINOGEN UR STRIP.AUTO-MCNC: <2 MG/DL
WBC #/AREA URNS AUTO: >50 /HPF

## 2021-08-18 PROCEDURE — 87086 URINE CULTURE/COLONY COUNT: CPT

## 2021-08-18 PROCEDURE — 81001 URINALYSIS AUTO W/SCOPE: CPT

## 2021-08-18 PROCEDURE — 87077 CULTURE AEROBIC IDENTIFY: CPT

## 2021-08-18 PROCEDURE — 87186 SC STD MICRODIL/AGAR DIL: CPT

## 2021-08-18 RX ORDER — BLOOD-GLUCOSE METER
1 EACH MISCELLANEOUS 3 TIMES DAILY
Qty: 1 KIT | Refills: 0 | Status: SHIPPED | OUTPATIENT
Start: 2021-08-18 | End: 2022-08-18

## 2021-08-18 RX ORDER — LANCETS
EACH MISCELLANEOUS
Qty: 300 EACH | Refills: 1 | Status: SHIPPED | OUTPATIENT
Start: 2021-08-18

## 2021-08-18 RX ORDER — BLOOD SUGAR DIAGNOSTIC
STRIP MISCELLANEOUS
Qty: 300 EACH | Refills: 1 | Status: SHIPPED | OUTPATIENT
Start: 2021-08-18

## 2021-08-20 DIAGNOSIS — N39.0 COMPLICATED UTI (URINARY TRACT INFECTION): Primary | ICD-10-CM

## 2021-08-20 RX ORDER — CIPROFLOXACIN 500 MG/1
500 TABLET, FILM COATED ORAL 2 TIMES DAILY
Qty: 10 TABLET | Refills: 0 | Status: SHIPPED | OUTPATIENT
Start: 2021-08-20 | End: 2021-08-25

## 2021-08-24 ENCOUNTER — TELEPHONE (OUTPATIENT)
Dept: RHEUMATOLOGY | Facility: CLINIC | Age: 61
End: 2021-08-24

## 2021-08-24 NOTE — TELEPHONE ENCOUNTER
----- Message from Jose Lau RN sent at 8/24/2021  4:37 PM CDT -----  Regarding: FW: Referral Request  Contact: [phone]    ----- Message -----  From: Idalia Billingsley: 8/24/2021   4:25 PM CDT  To: [facility] Clinical Staff  Subject:

## 2021-08-24 NOTE — TELEPHONE ENCOUNTER
Patient  called having flare of pain in left knee. Will see an office in 2 days on Thursday most likely needs cortisone shot. In meantime told to rest, use her Norco.  Use her nabumetone twice a day. Ice the knee.

## 2021-08-25 ENCOUNTER — OFFICE VISIT (OUTPATIENT)
Dept: INTERNAL MEDICINE CLINIC | Facility: CLINIC | Age: 61
End: 2021-08-25
Payer: MEDICAID

## 2021-08-25 VITALS
HEART RATE: 87 BPM | WEIGHT: 142 LBS | DIASTOLIC BLOOD PRESSURE: 88 MMHG | BODY MASS INDEX: 27 KG/M2 | OXYGEN SATURATION: 98 % | SYSTOLIC BLOOD PRESSURE: 138 MMHG | RESPIRATION RATE: 18 BRPM

## 2021-08-25 DIAGNOSIS — I10 PRIMARY HYPERTENSION: ICD-10-CM

## 2021-08-25 DIAGNOSIS — R10.32 LEFT LOWER QUADRANT ABDOMINAL PAIN: Primary | ICD-10-CM

## 2021-08-25 DIAGNOSIS — R63.4 WEIGHT LOSS: ICD-10-CM

## 2021-08-25 DIAGNOSIS — E11.65 UNCONTROLLED TYPE 2 DIABETES MELLITUS WITH HYPERGLYCEMIA, WITHOUT LONG-TERM CURRENT USE OF INSULIN (HCC): ICD-10-CM

## 2021-08-25 LAB
APPEARANCE: CLEAR
BILIRUBIN: NEGATIVE
GLUCOSE (URINE DIPSTICK): 500 MG/DL
KETONES (URINE DIPSTICK): NEGATIVE MG/DL
LEUKOCYTES: NEGATIVE
MULTISTIX LOT#: 5077 NUMERIC
NITRITE, URINE: NEGATIVE
OCCULT BLOOD: NEGATIVE
PH, URINE: 6 (ref 4.5–8)
PROTEIN (URINE DIPSTICK): NEGATIVE MG/DL
SPECIFIC GRAVITY: 1 (ref 1–1.03)
URINE-COLOR: YELLOW
UROBILINOGEN,SEMI-QN: 0.2 MG/DL (ref 0–1.9)

## 2021-08-25 PROCEDURE — 3079F DIAST BP 80-89 MM HG: CPT | Performed by: INTERNAL MEDICINE

## 2021-08-25 PROCEDURE — 81003 URINALYSIS AUTO W/O SCOPE: CPT | Performed by: INTERNAL MEDICINE

## 2021-08-25 PROCEDURE — 99214 OFFICE O/P EST MOD 30 MIN: CPT | Performed by: INTERNAL MEDICINE

## 2021-08-25 PROCEDURE — 3075F SYST BP GE 130 - 139MM HG: CPT | Performed by: INTERNAL MEDICINE

## 2021-08-25 NOTE — PROGRESS NOTES
Patient Office Visit    ASSESSMENT AND PLAN:   (R10.32) Left lower quadrant abdominal pain  (primary encounter diagnosis)  Plan: URINALYSIS, AUTO, W/O SCOPE, CT         ABDOMEN+PELVIS(CPT=74176)  Note: UA shows that UTI has resolved.  Could be diverticuliti syndrome     Hypertension     Chronic pansinusitis     Mucinous cystadenoma of ovary, left     Arthritis of carpometacarpal (CMC) joint of right thumb     Spondylosis of lumbar region without myelopathy or radiculopathy     Onychomycosis     Effusion of le intubation    • Esophageal reflux    • Fitting and adjustment of dental prosthetic device 04/26/2011   • High blood pressure    • Migraines    • Osteoarthritis    • Other ill-defined conditions(799.89)     Jamison's   • Pain in joint, forearm 04/26/2011 Glucose Monitoring Suppl (ONETOUCH ULTRA 2) w/Device Does not apply Kit, 1 Device by Other route in the morning, at noon, and at bedtime. , Disp: 1 kit, Rfl: 0  Glucose Blood (ONETOUCH ULTRA) In Vitro Strip, Tests 3 x daily, Disp: 300 each, Rfl: 1  OneTouch 5  nystatin-triamcinolone 694199-0.1 UNIT/GM-% External Ointment, Apply 1 Application topically 2 (two) times daily. , Disp: 30 g, Rfl: 0  Zolpidem Tartrate ER 12.5 MG Oral Tab CR, Take 1 tablet (12.5 mg total) by mouth nightly as needed for Sleep (this med visit.        REVIEW OF SYSTEMS   Constitutional: no fatigue normal energy no weight changes   HENT: normal sinuses and no mouth issues   Eyes: . normal vision no eye pain   Respiratory: normal respirations no cough   Cardiovascular: no CP, or palpitations

## 2021-08-25 NOTE — PATIENT INSTRUCTIONS
Please schedule your CT scan and have blood tests done  Let me know if you are having difficulty scheduling the appointment  If symptoms become very severe then please go to the ER

## 2021-08-26 ENCOUNTER — OFFICE VISIT (OUTPATIENT)
Dept: RHEUMATOLOGY | Facility: CLINIC | Age: 61
End: 2021-08-26
Payer: MEDICAID

## 2021-08-26 ENCOUNTER — LAB ENCOUNTER (OUTPATIENT)
Dept: LAB | Age: 61
End: 2021-08-26
Attending: INTERNAL MEDICINE
Payer: MEDICAID

## 2021-08-26 VITALS
DIASTOLIC BLOOD PRESSURE: 80 MMHG | TEMPERATURE: 98 F | WEIGHT: 142 LBS | BODY MASS INDEX: 27.88 KG/M2 | HEIGHT: 60 IN | SYSTOLIC BLOOD PRESSURE: 140 MMHG

## 2021-08-26 DIAGNOSIS — I10 ESSENTIAL HYPERTENSION: ICD-10-CM

## 2021-08-26 DIAGNOSIS — M17.12 PRIMARY OSTEOARTHRITIS OF LEFT KNEE: Primary | ICD-10-CM

## 2021-08-26 DIAGNOSIS — E11.65 UNCONTROLLED TYPE 2 DIABETES MELLITUS WITH HYPERGLYCEMIA, WITHOUT LONG-TERM CURRENT USE OF INSULIN (HCC): ICD-10-CM

## 2021-08-26 DIAGNOSIS — Z00.00 ROUTINE PHYSICAL EXAMINATION: ICD-10-CM

## 2021-08-26 DIAGNOSIS — E78.00 HYPERCHOLESTEROLEMIA: ICD-10-CM

## 2021-08-26 DIAGNOSIS — M25.462 EFFUSION OF LEFT KNEE: ICD-10-CM

## 2021-08-26 DIAGNOSIS — R30.0 DYSURIA: ICD-10-CM

## 2021-08-26 LAB
ALBUMIN SERPL-MCNC: 4 G/DL (ref 3.4–5)
ALBUMIN/GLOB SERPL: 1.1 {RATIO} (ref 1–2)
ALP LIVER SERPL-CCNC: 86 U/L
ALT SERPL-CCNC: 34 U/L
ANION GAP SERPL CALC-SCNC: 5 MMOL/L (ref 0–18)
AST SERPL-CCNC: 19 U/L (ref 15–37)
BASOPHILS # BLD AUTO: 0.05 X10(3) UL (ref 0–0.2)
BASOPHILS NFR BLD AUTO: 0.5 %
BILIRUB SERPL-MCNC: 0.5 MG/DL (ref 0.1–2)
BUN BLD-MCNC: 14 MG/DL (ref 7–18)
CALCIUM BLD-MCNC: 10 MG/DL (ref 8.5–10.1)
CHLORIDE SERPL-SCNC: 104 MMOL/L (ref 98–112)
CHOLEST SMN-MCNC: 176 MG/DL (ref ?–200)
CO2 SERPL-SCNC: 30 MMOL/L (ref 21–32)
CREAT BLD-MCNC: 0.78 MG/DL
DEPRECATED HBV CORE AB SER IA-ACNC: 45.1 NG/ML
EOSINOPHIL # BLD AUTO: 0.23 X10(3) UL (ref 0–0.7)
EOSINOPHIL NFR BLD AUTO: 2.2 %
ERYTHROCYTE [DISTWIDTH] IN BLOOD BY AUTOMATED COUNT: 12.3 %
EST. AVERAGE GLUCOSE BLD GHB EST-MCNC: 186 MG/DL (ref 68–126)
GLOBULIN PLAS-MCNC: 3.8 G/DL (ref 2.8–4.4)
GLUCOSE BLD-MCNC: 164 MG/DL (ref 70–99)
HBA1C MFR BLD HPLC: 8.1 % (ref ?–5.7)
HCT VFR BLD AUTO: 48.6 %
HDLC SERPL-MCNC: 37 MG/DL (ref 40–59)
HGB BLD-MCNC: 15.5 G/DL
IMM GRANULOCYTES # BLD AUTO: 0.06 X10(3) UL (ref 0–1)
IMM GRANULOCYTES NFR BLD: 0.6 %
LDLC SERPL CALC-MCNC: 97 MG/DL (ref ?–100)
LYMPHOCYTES # BLD AUTO: 3.48 X10(3) UL (ref 1–4)
LYMPHOCYTES NFR BLD AUTO: 33.4 %
M PROTEIN MFR SERPL ELPH: 7.8 G/DL (ref 6.4–8.2)
MCH RBC QN AUTO: 31.3 PG (ref 26–34)
MCHC RBC AUTO-ENTMCNC: 31.9 G/DL (ref 31–37)
MCV RBC AUTO: 98 FL
MONOCYTES # BLD AUTO: 0.8 X10(3) UL (ref 0.1–1)
MONOCYTES NFR BLD AUTO: 7.7 %
NEUTROPHILS # BLD AUTO: 5.8 X10 (3) UL (ref 1.5–7.7)
NEUTROPHILS # BLD AUTO: 5.8 X10(3) UL (ref 1.5–7.7)
NEUTROPHILS NFR BLD AUTO: 55.6 %
NONHDLC SERPL-MCNC: 139 MG/DL (ref ?–130)
OSMOLALITY SERPL CALC.SUM OF ELEC: 292 MOSM/KG (ref 275–295)
PLATELET # BLD AUTO: 322 10(3)UL (ref 150–450)
POTASSIUM SERPL-SCNC: 4.3 MMOL/L (ref 3.5–5.1)
RBC # BLD AUTO: 4.96 X10(6)UL
SODIUM SERPL-SCNC: 139 MMOL/L (ref 136–145)
TRIGL SERPL-MCNC: 246 MG/DL (ref 30–149)
TSI SER-ACNC: 2.18 MIU/ML (ref 0.36–3.74)
VIT B12 SERPL-MCNC: >2000 PG/ML (ref 193–986)
VLDLC SERPL CALC-MCNC: 41 MG/DL (ref 0–30)
WBC # BLD AUTO: 10.4 X10(3) UL (ref 4–11)

## 2021-08-26 PROCEDURE — 80061 LIPID PANEL: CPT

## 2021-08-26 PROCEDURE — 82728 ASSAY OF FERRITIN: CPT

## 2021-08-26 PROCEDURE — 83036 HEMOGLOBIN GLYCOSYLATED A1C: CPT

## 2021-08-26 PROCEDURE — 3052F HG A1C>EQUAL 8.0%<EQUAL 9.0%: CPT | Performed by: INTERNAL MEDICINE

## 2021-08-26 PROCEDURE — 36415 COLL VENOUS BLD VENIPUNCTURE: CPT

## 2021-08-26 PROCEDURE — 80053 COMPREHEN METABOLIC PANEL: CPT

## 2021-08-26 PROCEDURE — 85025 COMPLETE CBC W/AUTO DIFF WBC: CPT

## 2021-08-26 PROCEDURE — 3008F BODY MASS INDEX DOCD: CPT | Performed by: INTERNAL MEDICINE

## 2021-08-26 PROCEDURE — 20610 DRAIN/INJ JOINT/BURSA W/O US: CPT | Performed by: INTERNAL MEDICINE

## 2021-08-26 PROCEDURE — 82607 VITAMIN B-12: CPT

## 2021-08-26 PROCEDURE — 3079F DIAST BP 80-89 MM HG: CPT | Performed by: INTERNAL MEDICINE

## 2021-08-26 PROCEDURE — 84443 ASSAY THYROID STIM HORMONE: CPT

## 2021-08-26 PROCEDURE — 3077F SYST BP >= 140 MM HG: CPT | Performed by: INTERNAL MEDICINE

## 2021-08-26 RX ORDER — HYDROCODONE BITARTRATE AND ACETAMINOPHEN 10; 325 MG/1; MG/1
1 TABLET ORAL EVERY 6 HOURS PRN
Qty: 120 TABLET | Refills: 0 | Status: SHIPPED | OUTPATIENT
Start: 2021-08-26 | End: 2021-09-25

## 2021-08-26 RX ORDER — METHYLPREDNISOLONE ACETATE 40 MG/ML
40 INJECTION, SUSPENSION INTRA-ARTICULAR; INTRALESIONAL; INTRAMUSCULAR; SOFT TISSUE ONCE
Status: COMPLETED | OUTPATIENT
Start: 2021-08-26 | End: 2021-08-26

## 2021-08-26 RX ADMIN — METHYLPREDNISOLONE ACETATE 40 MG: 40 INJECTION, SUSPENSION INTRA-ARTICULAR; INTRALESIONAL; INTRAMUSCULAR; SOFT TISSUE at 17:59:00

## 2021-08-26 NOTE — PATIENT INSTRUCTIONS
Continue nabumetone 750 mg twice a day for relief of osteoarthritis of the knee. For breakthrough pain take Norco 10 mg as needed 1 every 6 hours but generally just once a day.   Today the left knee was aspirated 4 cc of clear yellow thick fluid was remove

## 2021-08-26 NOTE — PROGRESS NOTES
[facility] Progress Note     Subjective:   Shaneka Moody is a(n) 63year old female. Current complaints: Patient presents with: Injection  C/o left knee pain. Pas 2 months increased pain left knee.    Had to move a lot of stuff an

## 2021-08-26 NOTE — PROCEDURES
56415  Left knee aspiration and injection. Diagnosis primary osteoarthritis left knee. Left knee effusion. After obtaining consent from the patient the left knee was cleansed in the medial aspect with Betadine and alcohol wipes.   Then the left knee was

## 2021-08-27 ENCOUNTER — PATIENT MESSAGE (OUTPATIENT)
Dept: INTERNAL MEDICINE CLINIC | Facility: CLINIC | Age: 61
End: 2021-08-27

## 2021-08-27 DIAGNOSIS — R63.4 WEIGHT LOSS: ICD-10-CM

## 2021-08-27 DIAGNOSIS — R10.32 LEFT LOWER QUADRANT ABDOMINAL PAIN: ICD-10-CM

## 2021-08-27 DIAGNOSIS — E11.65 UNCONTROLLED TYPE 2 DIABETES MELLITUS WITH HYPERGLYCEMIA, WITHOUT LONG-TERM CURRENT USE OF INSULIN (HCC): ICD-10-CM

## 2021-08-27 DIAGNOSIS — I10 PRIMARY HYPERTENSION: Primary | ICD-10-CM

## 2021-08-30 NOTE — TELEPHONE ENCOUNTER
From: Veronica Form  To: 32 [facility], DO  Sent: 8/27/2021 3:51 PM CDT  Subject: Referral Request    You sent a referral to [address] [place] [address] and unfortunately they don't take my insurance. Can you think of anyone else who would be in network?   Thanks

## 2021-08-31 ENCOUNTER — PATIENT MESSAGE (OUTPATIENT)
Dept: INTERNAL MEDICINE CLINIC | Facility: CLINIC | Age: 61
End: 2021-08-31

## 2021-08-31 ENCOUNTER — TELEPHONE (OUTPATIENT)
Dept: RHEUMATOLOGY | Facility: CLINIC | Age: 61
End: 2021-08-31

## 2021-08-31 NOTE — TELEPHONE ENCOUNTER
From: Shadi Palomo  To: 32 [facility],   Sent: 8/31/2021 10:46 AM CDT  Subject: Other    They're not showing at the CT scan area that it has been approved by the insurance company. They said your supposed to get the authorization.  I need to know

## 2021-09-03 NOTE — TELEPHONE ENCOUNTER
Spoke with patient regarding [employer]. Reiterated that patient contacts referral department at this time for further information regarding CT authorization. Patient verbalized understanding at this time.

## 2021-09-03 NOTE — TELEPHONE ENCOUNTER
Patient walked into the office checking on the status of her CT scan being authorized by her insurance. Patient would like a call back from the nurse with an update. Please advise.

## 2021-09-03 NOTE — TELEPHONE ENCOUNTER
[facility] sent to patient. CT auth still pending review. Patient to contact referral dept for any additional questions.

## 2021-09-07 ENCOUNTER — TELEPHONE (OUTPATIENT)
Dept: INTERNAL MEDICINE CLINIC | Facility: CLINIC | Age: 61
End: 2021-09-07

## 2021-09-07 DIAGNOSIS — R10.32 LEFT LOWER QUADRANT ABDOMINAL PAIN: Primary | ICD-10-CM

## 2021-09-07 NOTE — TELEPHONE ENCOUNTER
We can do it with the dye. Can you change the order?   Thank you   32 [facility]\"" DO

## 2021-09-07 NOTE — TELEPHONE ENCOUNTER
[facility] Clinical Staff  Good Morning,     Per [facility] this test did not meet medical necessity and has been denied.  Per their guidelines:     Picture studies taken before or after (not both) using a dye should show your doctor all

## 2021-09-07 NOTE — TELEPHONE ENCOUNTER
Order pending with CPT code mentioned below pending for your review and sign off if appropriate,    Unsure if oral or IV contrast is needed. Please advise.

## 2021-09-08 ENCOUNTER — TELEPHONE (OUTPATIENT)
Dept: ORTHOPEDICS CLINIC | Facility: CLINIC | Age: 61
End: 2021-09-08

## 2021-09-08 DIAGNOSIS — M25.562 LEFT KNEE PAIN, UNSPECIFIED CHRONICITY: Primary | ICD-10-CM

## 2021-09-08 NOTE — TELEPHONE ENCOUNTER
Pt has an appt for left knee pain w/Dr Camelia Mccloud. No imaging on file. Please advice,thanks.   Future Appointments   Date Time Provider Lilly Purcell   9/14/2021  3:00 PM PF ANGEL RM1 PF MAMMO [place]   9/15/2021  3:30 PM BBK CT RM1 BBK CT [place] [id number]

## 2021-09-08 NOTE — TELEPHONE ENCOUNTER
• Reviewed patients chart, xray orders are required. Order placed for left knee xrays  • Please contact patient advise to arrive 30 mins prior to patients appt to complete x-ray order.    • Please schedule patients xray appt-Thank you

## 2021-09-09 DIAGNOSIS — K21.9 GASTROESOPHAGEAL REFLUX DISEASE WITHOUT ESOPHAGITIS: ICD-10-CM

## 2021-09-09 DIAGNOSIS — E78.00 HYPERCHOLESTEROLEMIA: ICD-10-CM

## 2021-09-10 RX ORDER — EZETIMIBE 10 MG/1
TABLET ORAL
Qty: 90 TABLET | Refills: 0 | Status: SHIPPED | OUTPATIENT
Start: 2021-09-10 | End: 2021-12-13

## 2021-09-10 RX ORDER — OMEPRAZOLE 40 MG/1
CAPSULE, DELAYED RELEASE ORAL
Qty: 60 CAPSULE | Refills: 0 | Status: SHIPPED | OUTPATIENT
Start: 2021-09-10 | End: 2021-10-11

## 2021-09-10 NOTE — TELEPHONE ENCOUNTER
Omeprazole 40 mg  Filled 6-11-21  Qty 60  2 refills  Upcoming appt. 9-17-21  LOV 8-25-21      Ezetimibe 10 mg  Filled 6-11-21  Qty 90  0 refills  Upcoming appt.  9-17-21  LOV 8-25-21

## 2021-09-13 ENCOUNTER — LAB ENCOUNTER (OUTPATIENT)
Dept: LAB | Age: 61
End: 2021-09-13
Attending: INTERNAL MEDICINE
Payer: MEDICAID

## 2021-09-13 DIAGNOSIS — Z11.52 ENCOUNTER FOR SCREENING FOR COVID-19: ICD-10-CM

## 2021-09-15 ENCOUNTER — PATIENT MESSAGE (OUTPATIENT)
Dept: INTERNAL MEDICINE CLINIC | Facility: CLINIC | Age: 61
End: 2021-09-15

## 2021-09-15 NOTE — TELEPHONE ENCOUNTER
From: Josefina Phillips  To: 32 [facility]   Sent: 9/15/2021 10:53 AM CDT  Subject: Covid test     I had a covid test done on Monday and still haven't gotten my results.  Can you please tell me what it came back as?

## 2021-09-16 LAB — SARS-COV-2 RNA RESP QL NAA+PROBE: NOT DETECTED

## 2021-09-16 NOTE — TELEPHONE ENCOUNTER
Patient is calling back to check on the status of her COVID19 test results. Patient wants to know if the machines are still down and if they sent her results to another facility. She would like to speak with a nurse ASAP in the morning.

## 2021-09-18 ENCOUNTER — HOSPITAL ENCOUNTER (OUTPATIENT)
Dept: CT IMAGING | Age: 61
Discharge: HOME | End: 2021-09-18
Attending: INTERNAL MEDICINE
Payer: MEDICAID

## 2021-09-18 ENCOUNTER — HOSPITAL ENCOUNTER (OUTPATIENT)
Dept: MAMMOGRAPHY | Age: 61
Discharge: HOME | End: 2021-09-18
Attending: INTERNAL MEDICINE
Payer: MEDICAID

## 2021-09-18 DIAGNOSIS — E11.65 UNCONTROLLED TYPE 2 DIABETES MELLITUS WITH HYPERGLYCEMIA, WITHOUT LONG-TERM CURRENT USE OF INSULIN (HCC): ICD-10-CM

## 2021-09-18 DIAGNOSIS — R10.32 LEFT LOWER QUADRANT ABDOMINAL PAIN: ICD-10-CM

## 2021-09-18 DIAGNOSIS — Z12.31 ENCOUNTER FOR SCREENING MAMMOGRAM FOR MALIGNANT NEOPLASM OF BREAST: ICD-10-CM

## 2021-09-18 DIAGNOSIS — I10 PRIMARY HYPERTENSION: ICD-10-CM

## 2021-09-18 DIAGNOSIS — R63.4 WEIGHT LOSS: ICD-10-CM

## 2021-09-18 PROCEDURE — 77067 SCR MAMMO BI INCL CAD: CPT | Performed by: INTERNAL MEDICINE

## 2021-09-18 PROCEDURE — 74177 CT ABD & PELVIS W/CONTRAST: CPT | Performed by: INTERNAL MEDICINE

## 2021-09-18 PROCEDURE — 77063 BREAST TOMOSYNTHESIS BI: CPT | Performed by: INTERNAL MEDICINE

## 2021-09-21 ENCOUNTER — OFFICE VISIT (OUTPATIENT)
Dept: INTERNAL MEDICINE CLINIC | Facility: CLINIC | Age: 61
End: 2021-09-21
Payer: MEDICAID

## 2021-09-21 VITALS
BODY MASS INDEX: 27.88 KG/M2 | RESPIRATION RATE: 18 BRPM | WEIGHT: 142 LBS | SYSTOLIC BLOOD PRESSURE: 134 MMHG | TEMPERATURE: 98 F | DIASTOLIC BLOOD PRESSURE: 86 MMHG | OXYGEN SATURATION: 98 % | HEIGHT: 60 IN | HEART RATE: 89 BPM

## 2021-09-21 DIAGNOSIS — R10.32 LEFT GROIN PAIN: ICD-10-CM

## 2021-09-21 DIAGNOSIS — I10 PRIMARY HYPERTENSION: ICD-10-CM

## 2021-09-21 DIAGNOSIS — Z72.0 TOBACCO USE: ICD-10-CM

## 2021-09-21 DIAGNOSIS — E78.00 HYPERCHOLESTEROLEMIA: ICD-10-CM

## 2021-09-21 DIAGNOSIS — E11.65 UNCONTROLLED TYPE 2 DIABETES MELLITUS WITH HYPERGLYCEMIA, WITHOUT LONG-TERM CURRENT USE OF INSULIN (HCC): Primary | ICD-10-CM

## 2021-09-21 PROCEDURE — 90471 IMMUNIZATION ADMIN: CPT | Performed by: INTERNAL MEDICINE

## 2021-09-21 PROCEDURE — 90686 IIV4 VACC NO PRSV 0.5 ML IM: CPT | Performed by: INTERNAL MEDICINE

## 2021-09-21 PROCEDURE — 3079F DIAST BP 80-89 MM HG: CPT | Performed by: INTERNAL MEDICINE

## 2021-09-21 PROCEDURE — 99214 OFFICE O/P EST MOD 30 MIN: CPT | Performed by: INTERNAL MEDICINE

## 2021-09-21 PROCEDURE — 3008F BODY MASS INDEX DOCD: CPT | Performed by: INTERNAL MEDICINE

## 2021-09-21 PROCEDURE — 3075F SYST BP GE 130 - 139MM HG: CPT | Performed by: INTERNAL MEDICINE

## 2021-09-21 NOTE — PATIENT INSTRUCTIONS
See me in 6 months however have blood tests done in 3 months  Good luck with your job search and cut down on the smoking

## 2021-09-21 NOTE — PROGRESS NOTES
Patient Office Visit    ASSESSMENT AND PLAN:   (E11.65) Uncontrolled type 2 diabetes mellitus with hyperglycemia, without long-term current use of insulin ([facility] [address]  (primary encounter diagnosis)  Plan: HEMOGLOBIN A1C  Note: improved since making diet and life fatigue syndrome     Hypertension     Chronic pansinusitis     Mucinous cystadenoma of ovary, left     Arthritis of carpometacarpal (CMC) joint of right thumb     Spondylosis of lumbar region without myelopathy or radiculopathy     Onychomycosis     Effusi Osteoarthritis    • Other ill-defined conditions(799.89)     Jamison's   • Pain in joint, forearm 04/26/2011    wrist   • Pain in joint, pelvic region and thigh 10/07/2011    hip   • Pelvic mass 6/28/2019   • Personal history of urinary (tract) infection 1 (six) hours as needed for Pain., Disp: 120 tablet, Rfl: 0  Blood Glucose Monitoring Suppl (ONETOUCH ULTRA 2) w/Device Does not apply Kit, 1 Device by Other route in the morning, at noon, and at bedtime. , Disp: 1 kit, Rfl: 0  Glucose Blood (ONETOUCH ULTRA) tablet, Rfl: 0  divalproex Sodium  MG Oral Tablet 24 Hr, Take 1 tablet (250 mg total) by mouth nightly., Disp: 90 tablet, Rfl: 3  QUEtiapine Fumarate  MG Oral Tablet 24 Hr, Take 1 tablet (200 mg total) by mouth every evening., Disp: 30 tablet, with no wheezes  Extremities: no pedal edema   Neurological:  no weakness in UE and LE, reflexes are normal  Skin: no rashes or bruises on visualized skin, not undressed   Psychiatric:normal mood

## 2021-09-24 ENCOUNTER — TELEPHONE (OUTPATIENT)
Dept: RHEUMATOLOGY | Facility: CLINIC | Age: 61
End: 2021-09-24

## 2021-09-24 NOTE — TELEPHONE ENCOUNTER
Pt phoned office, states that she just saw Dr. Ina Yu for knee injection. Feels she should cancel her upcoming appt. Pt does take Norco for pain, with no future refills. Will clarify with Dr. Ina Yu.

## 2021-09-26 DIAGNOSIS — E11.65 UNCONTROLLED TYPE 2 DIABETES MELLITUS WITH HYPERGLYCEMIA, WITHOUT LONG-TERM CURRENT USE OF INSULIN (HCC): ICD-10-CM

## 2021-09-27 RX ORDER — EMPAGLIFLOZIN 25 MG/1
TABLET, FILM COATED ORAL
Qty: 90 TABLET | Refills: 0 | Status: SHIPPED | OUTPATIENT
Start: 2021-09-27 | End: 2022-01-03

## 2021-09-27 RX ORDER — HYDROCODONE BITARTRATE AND ACETAMINOPHEN 5; 325 MG/1; MG/1
1 TABLET ORAL EVERY 6 HOURS PRN
Qty: 100 TABLET | Refills: 0 | Status: SHIPPED | OUTPATIENT
Start: 2021-09-27

## 2021-09-27 NOTE — TELEPHONE ENCOUNTER
Jardiance 25 mg failed protocol due to  Diabetic Medication Protocol Failed 09/26/2021 04:37 PM   Protocol Details  Last HgBA1C < 7.5   Filled 6-22-21  Qty 90  0 refills  No upcoming appt.    LOV 9-21-21  RTC 6 months

## 2021-09-30 ENCOUNTER — HOSPITAL ENCOUNTER (OUTPATIENT)
Dept: GENERAL RADIOLOGY | Age: 61
Discharge: HOME | End: 2021-09-30
Attending: ORTHOPAEDIC SURGERY
Payer: MEDICAID

## 2021-09-30 ENCOUNTER — OFFICE VISIT (OUTPATIENT)
Dept: OBGYN CLINIC | Facility: CLINIC | Age: 61
End: 2021-09-30
Payer: MEDICAID

## 2021-09-30 VITALS
HEIGHT: 60 IN | DIASTOLIC BLOOD PRESSURE: 84 MMHG | HEART RATE: 104 BPM | SYSTOLIC BLOOD PRESSURE: 136 MMHG | WEIGHT: 141 LBS | BODY MASS INDEX: 27.68 KG/M2

## 2021-09-30 DIAGNOSIS — N89.8 VAGINAL DRYNESS: ICD-10-CM

## 2021-09-30 DIAGNOSIS — Z12.31 ENCOUNTER FOR SCREENING MAMMOGRAM FOR BREAST CANCER: ICD-10-CM

## 2021-09-30 DIAGNOSIS — Z78.0 POST-MENOPAUSAL: ICD-10-CM

## 2021-09-30 DIAGNOSIS — R23.2 VASOMOTOR FLUSHING: Primary | ICD-10-CM

## 2021-09-30 DIAGNOSIS — R92.2 DENSE BREAST TISSUE ON MAMMOGRAM: ICD-10-CM

## 2021-09-30 DIAGNOSIS — M25.562 LEFT KNEE PAIN, UNSPECIFIED CHRONICITY: ICD-10-CM

## 2021-09-30 PROCEDURE — 3075F SYST BP GE 130 - 139MM HG: CPT | Performed by: OBSTETRICS & GYNECOLOGY

## 2021-09-30 PROCEDURE — 3008F BODY MASS INDEX DOCD: CPT | Performed by: OBSTETRICS & GYNECOLOGY

## 2021-09-30 PROCEDURE — 73564 X-RAY EXAM KNEE 4 OR MORE: CPT | Performed by: ORTHOPAEDIC SURGERY

## 2021-09-30 PROCEDURE — 99215 OFFICE O/P EST HI 40 MIN: CPT | Performed by: OBSTETRICS & GYNECOLOGY

## 2021-09-30 PROCEDURE — 3079F DIAST BP 80-89 MM HG: CPT | Performed by: OBSTETRICS & GYNECOLOGY

## 2021-09-30 RX ORDER — ESTRADIOL 0.1 MG/G
1 CREAM VAGINAL DAILY
Qty: 42.5 G | Refills: 1 | Status: SHIPPED | OUTPATIENT
Start: 2021-09-30 | End: 2021-10-01

## 2021-09-30 NOTE — PROGRESS NOTES
[facility]  Obstetrics and Gynecology  Follow Up Progress Note    Subjective:     Josefina Phillips is a 63year old [place] female who was last seen in office 02/15/21 for well woman exam and presents with c/o hot flushes and vaginal dryness.  The Vitro Strip, Tests 3 x daily, Disp: 300 each, Rfl: 1  OneTouch UltraSoft Lancets Does not apply Misc, Tests 3 x daily, Disp: 300 each, Rfl: 1  ATORVASTATIN 40 MG Oral Tab, TAKE 1 TABLET BY MOUTH NIGHTLY , Disp: 90 tablet, Rfl: 0  METOPROLOL SUCCINATE 50 MG for Migraine. MAY REPEAT IN 2 HOURS **MAX2 TABS/24HOURS**, Disp: 12 tablet, Rfl: 0  Desvenlafaxine Succinate ER (PRISTIQ) 100 MG Oral Tablet 24 Hr, Take 1 tablet (100 mg total) by mouth daily. [employer] contacted and order was placed on 9/22/20. Conf# [id number]. Left 06/28/2019   • TUBAL LIGATION     • UPPER GI ENDOSCOPY,EXAM           Objective:      09/30/21  1209   BP: 136/84   Pulse: 104   Weight: 141 lb (64 kg)   Height: 60\"         Body mass index is 27.54 kg/m².     General: AAO.NAD.   CVS exam: normal kirk pain        Plan:     Vasomotor symptoms  - w/ hot flushes   - d/w patient her CVD risk score is 21.0% which is considered high risk over 10 years   - pts with CVD risk scores above 10% should avoid initiation of systemic HRT   - d/w patient SSRIKaren answered to the best of my ability at this time. Total patient time was 43 minutes in evaluation, consultation, and coordination of care. Greater than 50% of this time was spent in face to face discussion with the patient.  The patient had many question

## 2021-09-30 NOTE — PATIENT INSTRUCTIONS
Please return in 02/2022 for your annual gyne visit or sooner if having abnormal vaginal bleeding or severe pelvic pain    Revaree - vaginal dryness suppository   Halogyn -  vaginal dryness suppository     Relizen - hot flushes     Radha - personal heidi

## 2021-10-01 ENCOUNTER — TELEPHONE (OUTPATIENT)
Dept: OBGYN CLINIC | Facility: CLINIC | Age: 61
End: 2021-10-01

## 2021-10-01 DIAGNOSIS — N89.8 VAGINAL DRYNESS: ICD-10-CM

## 2021-10-01 RX ORDER — ESTRADIOL 0.1 MG/G
1 CREAM VAGINAL DAILY
Qty: 42.5 G | Refills: 1 | Status: SHIPPED | OUTPATIENT
Start: 2021-10-01

## 2021-10-01 NOTE — TELEPHONE ENCOUNTER
Pt. Stated the [facility] in [place] never received her prescription from Dr. Manish Gutierrez. please sent again.    Thanks

## 2021-10-02 ENCOUNTER — PATIENT MESSAGE (OUTPATIENT)
Dept: INTERNAL MEDICINE CLINIC | Facility: CLINIC | Age: 61
End: 2021-10-02

## 2021-10-02 DIAGNOSIS — Z12.2 SCREENING FOR LUNG CANCER: Primary | ICD-10-CM

## 2021-10-08 ENCOUNTER — OFFICE VISIT (OUTPATIENT)
Dept: ORTHOPEDICS CLINIC | Facility: CLINIC | Age: 61
End: 2021-10-08
Payer: MEDICAID

## 2021-10-08 VITALS — HEIGHT: 62 IN | HEART RATE: 89 BPM | BODY MASS INDEX: 25.95 KG/M2 | WEIGHT: 141 LBS | OXYGEN SATURATION: 98 %

## 2021-10-08 DIAGNOSIS — M17.12 PRIMARY OSTEOARTHRITIS OF LEFT KNEE: Primary | ICD-10-CM

## 2021-10-08 PROCEDURE — 99203 OFFICE O/P NEW LOW 30 MIN: CPT | Performed by: ORTHOPAEDIC SURGERY

## 2021-10-08 PROCEDURE — 3008F BODY MASS INDEX DOCD: CPT | Performed by: ORTHOPAEDIC SURGERY

## 2021-10-08 NOTE — H&P
[facility] New Patient Note    CC: Patient presents with:  Knee Pain: left knee pain       HPI: This 63year old female presents today with complaints of left knee pain. Patient reports this is been going on for years but worsening.   She does follow This is a recent snapshot of the patient's [facility] medical record.  For current drug dose and complete information and questions, call [phone] or In Basket pool, [facility] home infusion ([id number])  CSN Number:  [id number]     Surgical History:   Procedure Laterality Date   • COLONOSCOPY     • LAPAROSCOPIC  2007    sling operation for stress incontinence   • LAPAROSCOPY,DIAGNOSTIC     • ANGEL BIOPSY STEREO NODULE 1 SITE LEFT (CPT=19081)  02/2010    benign   • ANGEL BIOPSY STEREO NOD by mouth 2 (two) times daily. 180 tablet 3   • cimetidine 800 MG Oral Tab Take 1 tablet (800 mg total) by mouth nightly.  30 tablet 0   • Erenumab-aooe (AIMOVIG) 140 MG/ML Subcutaneous Solution Auto-injector Inject 1 mL (140 mg total) into the skin every 30 of Onset   • Arthritis Mother    • Other (AMI) Mother    • Other (diabetes mellitus) Mother      Social History    Occupational History      Not on file    Tobacco Use      Smoking status: Current Some Day Smoker        Packs/day: 0.10        Years: 41.00 with symptomatic radiographically mild left knee medial compartment osteoarthritis    Plan: I discussed the etiology, natural history, and management options for symptomatic knee osteoarthritis.   I discussed nonsurgical and surgical treatments, with nonsur [address] [place] [address] Orthopedic Surgery  Phone [phone]  Fax [phone]

## 2021-10-10 DIAGNOSIS — K21.9 GASTROESOPHAGEAL REFLUX DISEASE WITHOUT ESOPHAGITIS: ICD-10-CM

## 2021-10-11 ENCOUNTER — TELEPHONE (OUTPATIENT)
Dept: OBGYN CLINIC | Facility: CLINIC | Age: 61
End: 2021-10-11

## 2021-10-11 RX ORDER — OMEPRAZOLE 40 MG/1
CAPSULE, DELAYED RELEASE ORAL
Qty: 60 CAPSULE | Refills: 0 | Status: SHIPPED | OUTPATIENT
Start: 2021-10-11 | End: 2021-11-16

## 2021-10-11 NOTE — TELEPHONE ENCOUNTER
Requested submitted to [employer] via online portal    Provided CPT 72576 with Dx R92.2    PA approved.  Valid from 10/11/2021 to 4/9/2022  Approval # [id number]

## 2021-10-12 ENCOUNTER — PATIENT MESSAGE (OUTPATIENT)
Dept: INTERNAL MEDICINE CLINIC | Facility: CLINIC | Age: 61
End: 2021-10-12

## 2021-10-12 ENCOUNTER — PATIENT MESSAGE (OUTPATIENT)
Dept: ORTHOPEDICS CLINIC | Facility: CLINIC | Age: 61
End: 2021-10-12

## 2021-10-12 DIAGNOSIS — K58.9 IRRITABLE BOWEL SYNDROME WITHOUT DIARRHEA: Primary | ICD-10-CM

## 2021-10-13 ENCOUNTER — PATIENT MESSAGE (OUTPATIENT)
Dept: ORTHOPEDICS CLINIC | Facility: CLINIC | Age: 61
End: 2021-10-13

## 2021-10-13 RX ORDER — DICYCLOMINE HYDROCHLORIDE 10 MG/1
10 CAPSULE ORAL
Qty: 30 CAPSULE | Refills: 0 | Status: SHIPPED | OUTPATIENT
Start: 2021-10-13 | End: 2021-10-22

## 2021-10-13 NOTE — TELEPHONE ENCOUNTER
From: Petey Patella: 10/13/2021 12:17 PM CDT  To: [facility] Clinical Pool  Subject: Referral     So it's actually Dr Wil Maldonado who will do the injection?  I spoke with my insurance company and they said they haven't received anything from you r

## 2021-10-13 NOTE — TELEPHONE ENCOUNTER
From: Shaneka Moody  To: Janell Atkins MD  Sent: 10/12/2021 4:48 PM CDT  Subject: Referral     Where you going to refer me to a specialist to do this injection?    Thank you   Soumya

## 2021-10-13 NOTE — TELEPHONE ENCOUNTER
From: Christina Goddard  To: 32 [facility]  Sent: 10/12/2021 4:46 PM CDT  Subject: Pain in abdomen     I'm still having this pain in my abdomen area and I just don't know what to do.  I know that the scan didn't show anything but can it possibly be

## 2021-10-21 ENCOUNTER — PATIENT MESSAGE (OUTPATIENT)
Dept: INTERNAL MEDICINE CLINIC | Facility: CLINIC | Age: 61
End: 2021-10-21

## 2021-10-21 DIAGNOSIS — K58.9 IRRITABLE BOWEL SYNDROME WITHOUT DIARRHEA: ICD-10-CM

## 2021-10-22 RX ORDER — DICYCLOMINE HYDROCHLORIDE 10 MG/1
10 CAPSULE ORAL 3 TIMES DAILY
Qty: 90 CAPSULE | Refills: 0 | Status: SHIPPED | OUTPATIENT
Start: 2021-10-22

## 2021-10-28 DIAGNOSIS — I10 ESSENTIAL HYPERTENSION: ICD-10-CM

## 2021-10-29 RX ORDER — METOPROLOL SUCCINATE 50 MG/1
TABLET, EXTENDED RELEASE ORAL
Qty: 90 TABLET | Refills: 0 | Status: SHIPPED | OUTPATIENT
Start: 2021-10-29 | End: 2022-01-25

## 2021-10-29 NOTE — TELEPHONE ENCOUNTER
Name from pharmacy: Metoprolol Succinate Er 24hr 50 Mg Tab Nort         Will file in chart as: METOPROLOL SUCCINATE 50 MG Oral Tablet 24 Hr    Sig: TAKE ONE TABLET BY MOUTH ONE TIME DAILY      Original sig: TAKE ONE TABLET BY MOUTH ONE TIME DAILY    Disp:

## 2021-11-05 ENCOUNTER — HOSPITAL ENCOUNTER (OUTPATIENT)
Dept: CT IMAGING | Age: 61
Discharge: HOME | End: 2021-11-05
Attending: INTERNAL MEDICINE
Payer: MEDICAID

## 2021-11-05 ENCOUNTER — HOSPITAL ENCOUNTER (OUTPATIENT)
Dept: ULTRASOUND IMAGING | Age: 61
Discharge: HOME | End: 2021-11-05
Attending: OBSTETRICS & GYNECOLOGY
Payer: MEDICAID

## 2021-11-05 DIAGNOSIS — Z12.2 SCREENING FOR LUNG CANCER: ICD-10-CM

## 2021-11-05 DIAGNOSIS — R92.2 DENSE BREAST TISSUE ON MAMMOGRAM: ICD-10-CM

## 2021-11-05 DIAGNOSIS — Z12.31 ENCOUNTER FOR SCREENING MAMMOGRAM FOR BREAST CANCER: ICD-10-CM

## 2021-11-05 PROCEDURE — 76641 ULTRASOUND BREAST COMPLETE: CPT | Performed by: OBSTETRICS & GYNECOLOGY

## 2021-11-05 PROCEDURE — 71271 CT THORAX LUNG CANCER SCR C-: CPT | Performed by: INTERNAL MEDICINE

## 2021-11-09 DIAGNOSIS — E11.65 UNCONTROLLED TYPE 2 DIABETES MELLITUS WITH HYPERGLYCEMIA, WITHOUT LONG-TERM CURRENT USE OF INSULIN (HCC): ICD-10-CM

## 2021-11-09 DIAGNOSIS — E78.00 HYPERCHOLESTEROLEMIA: ICD-10-CM

## 2021-11-09 NOTE — TELEPHONE ENCOUNTER
Protocol Cholesterol Medication Protocol Passed     Requesting:    Name from pharmacy: Atorvastatin Calcium 40 Mg Tab Nort         Will file in chart as: ATORVASTATIN 40 MG Oral Tab    Sig: TAKE 1 TABLET BY MOUTH NIGHTLY     Original sig: TAKE 1 TABLET BY

## 2021-11-10 ENCOUNTER — PATIENT MESSAGE (OUTPATIENT)
Dept: ORTHOPEDICS CLINIC | Facility: CLINIC | Age: 61
End: 2021-11-10

## 2021-11-10 RX ORDER — ATORVASTATIN CALCIUM 40 MG/1
TABLET, FILM COATED ORAL
Qty: 90 TABLET | Refills: 0 | Status: SHIPPED | OUTPATIENT
Start: 2021-11-10 | End: 2022-02-07

## 2021-11-11 NOTE — TELEPHONE ENCOUNTER
From: Tyra Beltre  To: Svetlana Best MD  Sent: 11/10/2021 5:06 PM CST  Subject: Injections     I know you said it might take a few days to get the injections in but it's been a long time now and I'm in a lot of pain and Dr. Wil Maldonado really wants me to

## 2021-11-12 ENCOUNTER — PATIENT MESSAGE (OUTPATIENT)
Dept: ORTHOPEDICS CLINIC | Facility: CLINIC | Age: 61
End: 2021-11-12

## 2021-11-12 NOTE — TELEPHONE ENCOUNTER
Injection only appt  Future Appointments   Date Time Provider Lilly Purcell   11/19/2021  7:00 AM Maggi Morales MD [facility] [id number]   11/24/2021 10:00 AM Maggi Morales MD [facility]   11/30/2021  2:00 PM Shabbir Brunner DO

## 2021-11-12 NOTE — TELEPHONE ENCOUNTER
From: Chito Bell  Sent: 11/12/2021 1:27 PM CST  To: [facility] Clinical Pool  Subject: Injections     I guess I'm confused about why the appointment has to go through the referral department?   Thanks   [employer]

## 2021-11-15 DIAGNOSIS — K21.9 GASTROESOPHAGEAL REFLUX DISEASE WITHOUT ESOPHAGITIS: ICD-10-CM

## 2021-11-15 NOTE — TELEPHONE ENCOUNTER
Protocol  None    Requesting:   from pharmacy: Omeprazole  40 [address]          Will file in chart as: OMEPRAZOLE 40 MG Oral Capsule Delayed Release    Sig: TAKE ONE CAPSULE BY MOUTH TWICE PER DAY    Disp:  60 capsule    Refills:  0    Start: 11/15/20

## 2021-11-16 RX ORDER — OMEPRAZOLE 40 MG/1
CAPSULE, DELAYED RELEASE ORAL
Qty: 60 CAPSULE | Refills: 0 | Status: SHIPPED | OUTPATIENT
Start: 2021-11-16 | End: 2021-12-16

## 2021-11-24 ENCOUNTER — OFFICE VISIT (OUTPATIENT)
Dept: ORTHOPEDICS CLINIC | Facility: CLINIC | Age: 61
End: 2021-11-24
Payer: MEDICAID

## 2021-11-24 VITALS — DIASTOLIC BLOOD PRESSURE: 94 MMHG | OXYGEN SATURATION: 98 % | SYSTOLIC BLOOD PRESSURE: 164 MMHG | HEART RATE: 98 BPM

## 2021-11-24 DIAGNOSIS — M17.12 PRIMARY OSTEOARTHRITIS OF LEFT KNEE: Primary | ICD-10-CM

## 2021-11-24 PROCEDURE — 20610 DRAIN/INJ JOINT/BURSA W/O US: CPT | Performed by: ORTHOPAEDIC SURGERY

## 2021-11-24 PROCEDURE — 3080F DIAST BP >= 90 MM HG: CPT | Performed by: ORTHOPAEDIC SURGERY

## 2021-11-24 PROCEDURE — 3077F SYST BP >= 140 MM HG: CPT | Performed by: ORTHOPAEDIC SURGERY

## 2021-11-24 NOTE — PROCEDURES
After informed consent, the patient's left knee was marked, locally anesthetized with skin refrigerant, prepped with topical antiseptic, and injected with 6mL of 48mg/6mL Synvisc One through the inferolateral portal.  A band-aid was applied.   The patient t

## 2021-11-30 ENCOUNTER — OFFICE VISIT (OUTPATIENT)
Dept: UROLOGY | Facility: HOSPITAL | Age: 61
End: 2021-11-30
Attending: OBSTETRICS & GYNECOLOGY
Payer: MEDICAID

## 2021-11-30 DIAGNOSIS — N39.41 URGE INCONTINENCE: ICD-10-CM

## 2021-11-30 DIAGNOSIS — N81.84 PELVIC MUSCLE WASTING: ICD-10-CM

## 2021-11-30 DIAGNOSIS — N95.2 POSTMENOPAUSAL ATROPHIC VAGINITIS: ICD-10-CM

## 2021-11-30 DIAGNOSIS — N39.3 FEMALE STRESS INCONTINENCE: Primary | ICD-10-CM

## 2021-11-30 PROCEDURE — 81002 URINALYSIS NONAUTO W/O SCOPE: CPT | Performed by: OBSTETRICS & GYNECOLOGY

## 2021-11-30 PROCEDURE — 81001 URINALYSIS AUTO W/SCOPE: CPT | Performed by: OBSTETRICS & GYNECOLOGY

## 2021-11-30 PROCEDURE — 87086 URINE CULTURE/COLONY COUNT: CPT | Performed by: OBSTETRICS & GYNECOLOGY

## 2021-11-30 PROCEDURE — 99212 OFFICE O/P EST SF 10 MIN: CPT

## 2021-11-30 NOTE — PROGRESS NOTES
Amanda Hodges DO  11/30/2021     Referred by Dr. Charley Mendoza  Pt here with self    Patient presents with:   Incontinence: referred by Dr. Charley Mendoza for NAN , UUI      HPI:  No NAN  No UUI  Nocturia x2  Denies prolapse  some dyspareunia  Reg bowels    PRIOR TREAT Types: Cigarettes      Smokeless tobacco: Never Used      Tobacco comment: 3-4 cigs a day    Vaping Use      Vaping Use: Every day        Substances: Nicotine, Flavoring        Devices: Disposable, Pre-filled pod    Alcohol use: Yes      Comment: rare Lancets Does not apply Misc, Tests 3 x daily, Disp: 300 each, Rfl: 1  Acidophilus/Pectin Oral Cap, Take 1 capsule by mouth daily. , Disp: , Rfl:   Multiple Vitamins-Minerals (COMPLETE ENERGY) Oral Tab, Take by mouth daily. , Disp: , Rfl:   chlorzoxazone 500 tablet by mouth daily. , Disp: , Rfl:   Diclofenac Sodium 1 % Transdermal Gel, Apply 2 g topically 2 (two) times daily as needed. , Disp: 1 Tube, Rfl: 2    No facility-administered medications prior to visit.       Urogynecology Summary:  Urogynecology Summ potential improvements in symptoms with weight loss.     Diagnostic Items:  Urodynamics  urine testing    Medications Discussed:  Estrace Cream  OAB meds    Treatment Plan, Non-surgical:   RN teaching/pt education done  Estrace / Premarin cream    Treatment

## 2021-12-09 ENCOUNTER — OFFICE VISIT (OUTPATIENT)
Dept: UROLOGY | Facility: HOSPITAL | Age: 61
End: 2021-12-09
Attending: OBSTETRICS & GYNECOLOGY
Payer: MEDICAID

## 2021-12-09 VITALS — WEIGHT: 141 LBS | RESPIRATION RATE: 16 BRPM | BODY MASS INDEX: 26 KG/M2 | TEMPERATURE: 98 F

## 2021-12-09 DIAGNOSIS — N39.3 FEMALE STRESS INCONTINENCE: Primary | ICD-10-CM

## 2021-12-09 PROCEDURE — 51741 ELECTRO-UROFLOWMETRY FIRST: CPT

## 2021-12-09 PROCEDURE — 81002 URINALYSIS NONAUTO W/O SCOPE: CPT

## 2021-12-09 PROCEDURE — 51784 ANAL/URINARY MUSCLE STUDY: CPT

## 2021-12-09 PROCEDURE — 51797 INTRAABDOMINAL PRESSURE TEST: CPT

## 2021-12-09 PROCEDURE — 51729 CYSTOMETROGRAM W/VP&UP: CPT

## 2021-12-09 NOTE — PATIENT INSTRUCTIONS
[facility]luis [facility]  FOR PELVIC MEDICINE  [address] [place] [address] [id number]  [place] [id number]  [facility]: [phone]  FAX: [phone]       Urodynamic Testing Discharge Instructions: There are NO dietary or activity restrictions.   You may resum

## 2021-12-09 NOTE — PROCEDURES
Patient here for urodynamic testing. Procedure explained and confirmed by patient. See evaluation form for results. Both verbal and written discharge instructions were given.   Patient tolerated procedure well and will follow up with Dr. Aury Roach as needed for Sleep (this medication will need to be titrated down in the future). AT BEDTIME, Disp: 30 tablet, Rfl: 0  ALPRAZolam 1 MG Oral Tab, Take 1 tablet (1 mg total) by mouth 2 (two) times daily as needed for Sleep or Anxiety. , Disp: 60 tablet, Rfl: sensation:    81mL  First desire to void:    190mL  Strong desire to void:  277 mL  Maximum cystometric capacity:    400mL  Detrusor Activity:  []  Unstable   [x]  Stable  Urge leakage?     []  Yes [x]  No  Volume at 1st unhibited detrusor cont:    n/amL  D

## 2021-12-10 ENCOUNTER — PATIENT MESSAGE (OUTPATIENT)
Dept: ORTHOPEDICS CLINIC | Facility: CLINIC | Age: 61
End: 2021-12-10

## 2021-12-10 DIAGNOSIS — E78.00 HYPERCHOLESTEROLEMIA: ICD-10-CM

## 2021-12-10 DIAGNOSIS — G43.709 CHRONIC MIGRAINE W/O AURA W/O STATUS MIGRAINOSUS, NOT INTRACTABLE: Primary | ICD-10-CM

## 2021-12-13 ENCOUNTER — PATIENT MESSAGE (OUTPATIENT)
Dept: RHEUMATOLOGY | Facility: CLINIC | Age: 61
End: 2021-12-13

## 2021-12-13 RX ORDER — ERENUMAB-AOOE 140 MG/ML
INJECTION, SOLUTION SUBCUTANEOUS
Qty: 1 ML | Refills: 4 | Status: SHIPPED | OUTPATIENT
Start: 2021-12-13

## 2021-12-13 RX ORDER — EZETIMIBE 10 MG/1
TABLET ORAL
Qty: 90 TABLET | Refills: 0 | Status: SHIPPED | OUTPATIENT
Start: 2021-12-13

## 2021-12-13 NOTE — TELEPHONE ENCOUNTER
Synvisc One shot received 11/24/21   Patient can received steroid injection  Patient informed via [employer] message

## 2021-12-13 NOTE — TELEPHONE ENCOUNTER
Medication(s) to Refill:   Requested Prescriptions     Pending Prescriptions Disp Refills   • EZETIMIBE 10 MG Oral Tab [Pharmacy Med Name: Ezetimibe 10 Mg Tab Nort] 90 tablet 0     Sig: TAKE ONE TABLET BY MOUTH NIGHTLY       LOV:  9-    RTC:  None n

## 2021-12-13 NOTE — TELEPHONE ENCOUNTER
From: Shaneka Moody  To: Janell Atkins MD  Sent: 12/10/2021 8:01 PM CST  Subject: Injection     I was looking on line about the gel injection I received and one source said that I should get one once a week for 3 weeks.  I just wanted to know if I should

## 2021-12-13 NOTE — TELEPHONE ENCOUNTER
Medication: AIMOVIG 140 MG/ML     Date of last refill: 6/10/21 (#1/5)  [place]      Last office visit: 6/10/2021  Due back to clinic per last office note:  9-12 months  Date next office visit scheduled:    Future Appoint

## 2021-12-14 ENCOUNTER — TELEPHONE (OUTPATIENT)
Dept: NEUROLOGY | Facility: CLINIC | Age: 61
End: 2021-12-14

## 2021-12-14 ENCOUNTER — PATIENT MESSAGE (OUTPATIENT)
Dept: NEUROLOGY | Facility: CLINIC | Age: 61
End: 2021-12-14

## 2021-12-14 DIAGNOSIS — G43.709 CHRONIC MIGRAINE W/O AURA W/O STATUS MIGRAINOSUS, NOT INTRACTABLE: Primary | ICD-10-CM

## 2021-12-14 NOTE — TELEPHONE ENCOUNTER
From: Mckenna Grewal  To: Peterson Mckeon MD  Sent: 12/14/2021 11:54 AM CST  Subject: Aimovig injection     My insurance company needs a prior authorization for my injection and my pharmacy sent it over to you.  If you could please do it right away I'd r

## 2021-12-14 NOTE — TELEPHONE ENCOUNTER
Received call from patient stating she needs PA for Aimovig 140mg. [employer] PA form completed and faxed to [employer] with fax confirmation received.

## 2021-12-16 DIAGNOSIS — K21.9 GASTROESOPHAGEAL REFLUX DISEASE WITHOUT ESOPHAGITIS: ICD-10-CM

## 2021-12-16 NOTE — TELEPHONE ENCOUNTER
No protocol     Last refill:    OMEPRAZOLE 40 MG Oral Capsule Delayed Release 60 capsule 0 11/16/2021    Sig:   TAKE ONE CAPSULE BY MOUTH TWICE PER DAY       LOV:   9/21/2021 Dr Candie Coon RTC 6 months  No FOV scheduled

## 2021-12-17 RX ORDER — OMEPRAZOLE 40 MG/1
CAPSULE, DELAYED RELEASE ORAL
Qty: 180 CAPSULE | Refills: 0 | Status: SHIPPED | OUTPATIENT
Start: 2021-12-17

## 2021-12-28 ENCOUNTER — OFFICE VISIT (OUTPATIENT)
Dept: RHEUMATOLOGY | Facility: CLINIC | Age: 61
End: 2021-12-28
Payer: MEDICAID

## 2021-12-28 VITALS
HEIGHT: 61 IN | BODY MASS INDEX: 25.52 KG/M2 | OXYGEN SATURATION: 96 % | WEIGHT: 135.19 LBS | TEMPERATURE: 98 F | HEART RATE: 100 BPM | SYSTOLIC BLOOD PRESSURE: 122 MMHG | DIASTOLIC BLOOD PRESSURE: 66 MMHG

## 2021-12-28 DIAGNOSIS — M18.12 ARTHRITIS OF CARPOMETACARPAL (CMC) JOINT OF LEFT THUMB: ICD-10-CM

## 2021-12-28 DIAGNOSIS — M15.9 PRIMARY OSTEOARTHRITIS INVOLVING MULTIPLE JOINTS: ICD-10-CM

## 2021-12-28 DIAGNOSIS — M18.11 PRIMARY OSTEOARTHRITIS OF FIRST CARPOMETACARPAL JOINT OF RIGHT HAND: ICD-10-CM

## 2021-12-28 DIAGNOSIS — M17.12 PRIMARY OSTEOARTHRITIS OF LEFT KNEE: Primary | ICD-10-CM

## 2021-12-28 DIAGNOSIS — M18.11 ARTHRITIS OF CARPOMETACARPAL (CMC) JOINT OF RIGHT THUMB: ICD-10-CM

## 2021-12-28 PROCEDURE — 3074F SYST BP LT 130 MM HG: CPT | Performed by: INTERNAL MEDICINE

## 2021-12-28 PROCEDURE — 3008F BODY MASS INDEX DOCD: CPT | Performed by: INTERNAL MEDICINE

## 2021-12-28 PROCEDURE — 20610 DRAIN/INJ JOINT/BURSA W/O US: CPT | Performed by: INTERNAL MEDICINE

## 2021-12-28 PROCEDURE — 3078F DIAST BP <80 MM HG: CPT | Performed by: INTERNAL MEDICINE

## 2021-12-28 PROCEDURE — 20600 DRAIN/INJ JOINT/BURSA W/O US: CPT | Performed by: INTERNAL MEDICINE

## 2021-12-28 RX ORDER — METHYLPREDNISOLONE ACETATE 40 MG/ML
40 INJECTION, SUSPENSION INTRA-ARTICULAR; INTRALESIONAL; INTRAMUSCULAR; SOFT TISSUE ONCE
Status: COMPLETED | OUTPATIENT
Start: 2021-12-28 | End: 2021-12-28

## 2021-12-28 RX ORDER — NABUMETONE 750 MG/1
750 TABLET, FILM COATED ORAL 2 TIMES DAILY
Qty: 180 TABLET | Refills: 3 | Status: SHIPPED | OUTPATIENT
Start: 2021-12-28

## 2021-12-28 RX ORDER — METHYLPREDNISOLONE ACETATE 40 MG/ML
10 INJECTION, SUSPENSION INTRA-ARTICULAR; INTRALESIONAL; INTRAMUSCULAR; SOFT TISSUE ONCE
Status: COMPLETED | OUTPATIENT
Start: 2021-12-28 | End: 2021-12-28

## 2021-12-28 RX ORDER — IBUPROFEN, ACETAMINOPHEN 125; 250 MG/1; MG/1
TABLET, FILM COATED ORAL
COMMUNITY

## 2021-12-28 RX ORDER — CHLORZOXAZONE 500 MG/1
500 TABLET ORAL 3 TIMES DAILY PRN
Qty: 90 TABLET | Refills: 2 | Status: CANCELLED | OUTPATIENT
Start: 2021-12-28

## 2021-12-28 RX ADMIN — METHYLPREDNISOLONE ACETATE 10 MG: 40 INJECTION, SUSPENSION INTRA-ARTICULAR; INTRALESIONAL; INTRAMUSCULAR; SOFT TISSUE at 16:08:00

## 2021-12-28 RX ADMIN — METHYLPREDNISOLONE ACETATE 10 MG: 40 INJECTION, SUSPENSION INTRA-ARTICULAR; INTRALESIONAL; INTRAMUSCULAR; SOFT TISSUE at 16:10:00

## 2021-12-28 RX ADMIN — METHYLPREDNISOLONE ACETATE 40 MG: 40 INJECTION, SUSPENSION INTRA-ARTICULAR; INTRALESIONAL; INTRAMUSCULAR; SOFT TISSUE at 15:51:00

## 2021-12-28 NOTE — PATIENT INSTRUCTIONS
Continue to take nabumetone 750 mg twice a day for arthritis pain relief. Today the left knee was injected with cortisone go home and rest.  Ice a day if needed. Take extra strength Tylenol 1 or 2 tablets later today if needed.   Also the left thumb carpo

## 2021-12-28 NOTE — PROGRESS NOTES
"Subjective:       Patient ID: Sweetie Tatum is a 64 y.o. female.    Chief Complaint: Establish Care (would like to discuss a possible hernia on right side of abdomen. )    65 yo F pt, new to me, with PMH significant for depression and asthma. She presents to establish care; last evaluated by a PCP 4/2016.  Acutely concerned about a "bulge" to the right mid-abdomen that has been present since 2/2020.  The bulge becomes more prominent if she overeats or if she is constipated. It's also exacerbated with valsalva such as cough/laugh. Reports that she is able to push bulge in. Feels increased support of the abdomen/bulge with wearing a belt or tighter shirt.  Denies redness, swelling, or hardening to abdomen. Occasionally has right upper quadrant pain post-prandially with associated loose stools, diarrhea, and nausea. Pt states stools had been pale on occasion. Symptoms have improved with 20 lb intentional weight loss over the past 9 months. No fevers. Intermittent chills. No vomiting. Pt expresses concern for a hernia. Also concerned she could have a problem with the gallbladder.      Review of Systems   Constitutional: Negative for activity change and unexpected weight change.   HENT: Negative for hearing loss, rhinorrhea and trouble swallowing.    Eyes: Negative for discharge and visual disturbance.   Respiratory: Negative for chest tightness and wheezing.    Cardiovascular: Negative for chest pain and palpitations.   Gastrointestinal: Positive for abdominal distention. Negative for blood in stool, constipation, diarrhea and vomiting.   Endocrine: Negative for polydipsia and polyuria.   Genitourinary: Negative for difficulty urinating, dysuria, hematuria and menstrual problem.   Musculoskeletal: Negative for neck pain.   Neurological: Negative for weakness and headaches.   Psychiatric/Behavioral: Negative for confusion and dysphoric mood.         Past Medical History:   Diagnosis Date    Asthma     Depression  " [facility]  Dr. Victorino Hunt Progress Note     Subjective:   Veronica Rojas is a(n) 63year old female. Current complaints: Patient presents with:   Follow - Up: LOV 8-26-21; Left injection today and c/o Right hand pain x 1 month, would like injection; p "      Patient Active Problem List   Diagnosis    Uncomplicated asthma    BMI 29.0-29.9,adult       Past Surgical History:   Procedure Laterality Date    CYST REMOVAL      neck    OVARIAN CYST REMOVAL Right        Family History   Problem Relation Age of Onset    Breast cancer Neg Hx     Colon cancer Neg Hx     Ovarian cancer Neg Hx     Stroke Father 50    Hypertension Father     Hypertension Mother     Rheum arthritis Mother     No Known Problems Sister        Social History     Tobacco Use   Smoking Status Never Smoker   Smokeless Tobacco Never Used           Medications have been reviewed and reconciled.     Review of patient's allergies indicates:   Allergen Reactions    Codeine     Erythromycin     Lactose     Pollen extracts         Objective:        Vitals:    11/03/20 0901   BP: 138/82   Pulse: 98   SpO2: 99%   Weight: 74.9 kg (165 lb 2 oz)   Height: 5' 3" (1.6 m)       Physical Exam  Constitutional:       General: She is not in acute distress.     Appearance: She is well-developed.   HENT:      Head: Normocephalic and atraumatic.      Right Ear: Tympanic membrane, ear canal and external ear normal.      Left Ear: Tympanic membrane, ear canal and external ear normal.      Nose: Nose normal.      Mouth/Throat:      Pharynx: No oropharyngeal exudate.   Eyes:      General: No scleral icterus.  Neck:      Musculoskeletal: Normal range of motion and neck supple.   Cardiovascular:      Rate and Rhythm: Normal rate and regular rhythm.      Heart sounds: Normal heart sounds. No murmur. No friction rub. No gallop.    Pulmonary:      Effort: Pulmonary effort is normal.      Breath sounds: Wheezing (end-expiratory wheezing throughout) present. No rales.   Abdominal:      General: Abdomen is flat. Bowel sounds are normal. There is no distension.      Palpations: Abdomen is soft. There is no mass.      Tenderness: There is abdominal tenderness.          Comments: + mild bulge on gross inspection. No " definitive abdominal wall defect palpated on exam. Neg Alamo's sign   Musculoskeletal: Normal range of motion.   Lymphadenopathy:      Cervical: No cervical adenopathy.   Skin:     General: Skin is warm and dry.      Findings: No erythema or rash.   Neurological:      Mental Status: She is alert and oriented to person, place, and time.      Cranial Nerves: No cranial nerve deficit.      Sensory: No sensory deficit.      Deep Tendon Reflexes: Reflexes normal.   Psychiatric:         Behavior: Behavior normal.         Assessment:       1. Abdominal pain, unspecified abdominal location    2. Uncomplicated asthma, unspecified asthma severity, unspecified whether persistent    3. BMI 29.0-29.9,adult    4. Screening for HIV (human immunodeficiency virus)    5. Encounter for hepatitis C screening test for low risk patient        Plan:       Sweetie was seen today for [facility] care.    Diagnoses and all orders for this visit:    Abdominal pain, unspecified abdominal location  -     US Soft Tissue Abdomen; Future  -     CBC Auto Differential; Future  -     Comprehensive Metabolic Panel; Future    Uncomplicated asthma, unspecified asthma severity, unspecified whether persistent    BMI 29.0-29.9,adult  -     Hemoglobin A1C; Future  -     Lipid Panel; Future    Screening for HIV (human immunodeficiency virus)  -     HIV 1/2 Ag/Ab (4th Gen); Future    Encounter for hepatitis C screening test for low risk patient  -     Hepatitis C Antibody; Future    Other orders  -     Influenza - Quadrivalent (PF)    Abdominal Pain   Concerning for ventral wall hernia  --r/o hernia with abdominal US soft tissue   --Gallbladder/liver pathology lower on differential. Obtain CBC and CMP    Asthma  Pt wheezing on exam   Declines albuterol/singulair. States that she does not want medication     BMI 29  --discuss diet/lifestyle modifications over subsequent visit  --Obtain A1c and fasting lipid panel today     HM   Hep C and HIV screens with  fasting labs  Flu vaccine today 11/3/2020.     F/U plan pending labs and imaging. Plan for well-woman exam on f/u visit

## 2021-12-30 ENCOUNTER — LAB ENCOUNTER (OUTPATIENT)
Dept: LAB | Age: 61
End: 2021-12-30
Attending: INTERNAL MEDICINE
Payer: MEDICAID

## 2021-12-30 DIAGNOSIS — E11.65 UNCONTROLLED TYPE 2 DIABETES MELLITUS WITH HYPERGLYCEMIA, WITHOUT LONG-TERM CURRENT USE OF INSULIN (HCC): ICD-10-CM

## 2021-12-30 DIAGNOSIS — E78.00 HYPERCHOLESTEROLEMIA: ICD-10-CM

## 2021-12-30 LAB
CHOLEST SERPL-MCNC: 181 MG/DL (ref ?–200)
EST. AVERAGE GLUCOSE BLD GHB EST-MCNC: 134 MG/DL (ref 68–126)
FASTING PATIENT LIPID ANSWER: YES
HBA1C MFR BLD: 6.3 % (ref ?–5.7)
HDLC SERPL-MCNC: 50 MG/DL (ref 40–59)
LDLC SERPL CALC-MCNC: 100 MG/DL (ref ?–100)
NONHDLC SERPL-MCNC: 131 MG/DL (ref ?–130)
TRIGL SERPL-MCNC: 177 MG/DL (ref 30–149)
VLDLC SERPL CALC-MCNC: 30 MG/DL (ref 0–30)

## 2021-12-30 PROCEDURE — 80061 LIPID PANEL: CPT

## 2021-12-30 PROCEDURE — 36415 COLL VENOUS BLD VENIPUNCTURE: CPT

## 2021-12-30 PROCEDURE — 83036 HEMOGLOBIN GLYCOSYLATED A1C: CPT

## 2022-01-03 RX ORDER — EMPAGLIFLOZIN 25 MG/1
TABLET, FILM COATED ORAL
Qty: 90 TABLET | Refills: 0 | Status: SHIPPED | OUTPATIENT
Start: 2022-01-03

## 2022-01-03 NOTE — TELEPHONE ENCOUNTER
Name from pharmacy: [address] [place] [address] 25 Mg Tab [facility]         Will file in chart as: JARDIANCE 25 MG Oral Tab    Sig: Take  1 tablet by mouth daily    Disp:  90 tablet    Refills:  0    Start: 12/30/2021    Class: Normal    Non-formulary For: Uncontrolled type 2 d

## 2022-01-04 ENCOUNTER — VIRTUAL PHONE E/M (OUTPATIENT)
Dept: UROLOGY | Facility: HOSPITAL | Age: 62
End: 2022-01-04
Attending: OBSTETRICS & GYNECOLOGY
Payer: MEDICAID

## 2022-01-04 VITALS — HEIGHT: 61 IN | BODY MASS INDEX: 25.49 KG/M2 | WEIGHT: 135 LBS

## 2022-01-04 DIAGNOSIS — N39.3 FEMALE STRESS INCONTINENCE: ICD-10-CM

## 2022-01-04 DIAGNOSIS — N39.41 URGE INCONTINENCE: Primary | ICD-10-CM

## 2022-01-04 RX ORDER — OXYBUTYNIN CHLORIDE 5 MG/1
5 TABLET, EXTENDED RELEASE ORAL DAILY
Qty: 90 TABLET | Refills: 3 | Status: SHIPPED | OUTPATIENT
Start: 2022-01-04

## 2022-01-04 NOTE — PROGRESS NOTES
Pt presents w/ initial c/o UI  Urodynamic testing undergone without complication. Results reviewed with patient via telephone  Given circumstances surrounding COVID-19 this visit is being conducted as a televisit with pt's consent.     413/50cc & 369/80cc

## 2022-01-04 NOTE — PATIENT INSTRUCTIONS
[facility]    TIMED VOIDING    For women experiencing urinary incontinence from overactive bladder or pelvic weakness, retraining the bladder is of great importance.   The bladder responds very well to this te suck on a hard candy or take small sips of water if you become thirsty instead of drinking large amounts. Diuretic medications such as high blood pressure medicine should be taken during the daytime if possible.     When you are able to hold your urine wit

## 2022-01-11 ENCOUNTER — OFFICE VISIT (OUTPATIENT)
Dept: INTERNAL MEDICINE CLINIC | Facility: CLINIC | Age: 62
End: 2022-01-11
Payer: MEDICAID

## 2022-01-11 VITALS
DIASTOLIC BLOOD PRESSURE: 80 MMHG | OXYGEN SATURATION: 96 % | BODY MASS INDEX: 25.3 KG/M2 | HEIGHT: 61 IN | TEMPERATURE: 98 F | RESPIRATION RATE: 14 BRPM | SYSTOLIC BLOOD PRESSURE: 126 MMHG | HEART RATE: 82 BPM | WEIGHT: 134 LBS

## 2022-01-11 DIAGNOSIS — E11.9 TYPE 2 DIABETES MELLITUS WITHOUT COMPLICATION, WITHOUT LONG-TERM CURRENT USE OF INSULIN (HCC): ICD-10-CM

## 2022-01-11 DIAGNOSIS — I10 PRIMARY HYPERTENSION: ICD-10-CM

## 2022-01-11 DIAGNOSIS — E78.00 HYPERCHOLESTEROLEMIA: ICD-10-CM

## 2022-01-11 DIAGNOSIS — B35.1 ONYCHOMYCOSIS: Primary | ICD-10-CM

## 2022-01-11 PROBLEM — E11.65 UNCONTROLLED TYPE 2 DIABETES MELLITUS WITH HYPERGLYCEMIA, WITHOUT LONG-TERM CURRENT USE OF INSULIN (HCC): Status: RESOLVED | Noted: 2017-09-28 | Resolved: 2022-01-11

## 2022-01-11 PROCEDURE — 3074F SYST BP LT 130 MM HG: CPT | Performed by: INTERNAL MEDICINE

## 2022-01-11 PROCEDURE — 3079F DIAST BP 80-89 MM HG: CPT | Performed by: INTERNAL MEDICINE

## 2022-01-11 PROCEDURE — 99214 OFFICE O/P EST MOD 30 MIN: CPT | Performed by: INTERNAL MEDICINE

## 2022-01-11 PROCEDURE — 3008F BODY MASS INDEX DOCD: CPT | Performed by: INTERNAL MEDICINE

## 2022-01-11 RX ORDER — TERBINAFINE HYDROCHLORIDE 250 MG/1
250 TABLET ORAL DAILY
Qty: 42 TABLET | Refills: 1 | Status: SHIPPED | OUTPATIENT
Start: 2022-01-11

## 2022-01-11 NOTE — PATIENT INSTRUCTIONS
Please start terbinafine.  After 6 weeks have your liver test done, then take the medication for another 6 weeks    You can apply vicks vaporub or even lotrimin cream around the toenails twice a day

## 2022-01-11 NOTE — PROGRESS NOTES
Patient Office Visit    ASSESSMENT AND PLAN:   (B35.1) Onychomycosis  (primary encounter diagnosis)  Plan: terbinafine 250 MG Oral Tab, ALT (SGPT)  Note: will start medication above for 12 weeks. Will repeat an ALT in 6 weeks.  Start vicks vaporub or over t itching     History of pubovaginal sling     Nocturnal enuresis     Absence of bladder continence     Chronic low back pain     Post-menopausal     Vasomotor flushing     Vaginal dryness     Primary osteoarthritis of left knee     Arthritis of carpometacar • ANGEL BIOPSY STEREO NODULE 1 SITE LEFT (CPT=19081)  02/2010    benign   • ANGEL BIOPSY STEREO NODULE 2 SITE BILAT (CPT=19081/39712) Left 2-2010    BENIGN 2 SITES   • ANGEL STEREO-CLIP W/CALC 2 SITE LEFT  2010   • OOPHORECTOMY Left 06/28/2019   • TUBAL LIGAT TABLET BY MOUTH ONE TIME DAILY, Disp: 90 tablet, Rfl: 0  dicyclomine (BENTYL) 10 MG Oral Cap, Take 1 capsule (10 mg total) by mouth in the morning, at noon, and at bedtime. , Disp: 90 capsule, Rfl: 0  BONAFIDE RELIZEN, EEH AMB,, Take 2 tablets by mouth anshu Oral Tab, Take 1 tablet (10 mg total) by mouth daily as needed for Migraine. MAY REPEAT IN 2 HOURS **MAX2 TABS/24HOURS**, Disp: 12 tablet, Rfl: 0  Desvenlafaxine Succinate ER (PRISTIQ) 100 MG Oral Tablet 24 Hr, Take 1 tablet (100 mg total) by mouth daily. toenails bilaterally   Psychiatric:normal mood

## 2022-01-25 DIAGNOSIS — G43.009 MIGRAINE WITHOUT AURA AND WITHOUT STATUS MIGRAINOSUS, NOT INTRACTABLE: ICD-10-CM

## 2022-01-25 DIAGNOSIS — I10 ESSENTIAL HYPERTENSION: ICD-10-CM

## 2022-01-25 RX ORDER — DIVALPROEX SODIUM 250 MG/1
TABLET, EXTENDED RELEASE ORAL
Qty: 90 TABLET | Refills: 3 | Status: SHIPPED | OUTPATIENT
Start: 2022-01-25

## 2022-01-25 RX ORDER — METOPROLOL SUCCINATE 50 MG/1
TABLET, EXTENDED RELEASE ORAL
Qty: 90 TABLET | Refills: 0 | Status: SHIPPED | OUTPATIENT
Start: 2022-01-25

## 2022-01-25 NOTE — TELEPHONE ENCOUNTER
Medication: DIVALPROEX 250 MG Oral Tablet 24 Hr     Date of last refill: 1/21/21 (#90/3)  [place]     Last office visit: 6/10/21    Due back to clinic per last office note:  Return in about 1 year (around 6/10/2022).

## 2022-01-25 NOTE — TELEPHONE ENCOUNTER
Requesting:    Name from pharmacy: Metoprolol Succinate Er 24hr 48 Mg Tab Nort         Will file in chart as: METOPROLOL SUCCINATE 50 MG Oral Tablet 24 Hr    Sig: TAKE ONE TABLET BY MOUTH ONE TIME DAILY    Disp:  90 tablet    Refills:  0    Start: 1/25/202

## 2022-02-07 ENCOUNTER — PATIENT MESSAGE (OUTPATIENT)
Dept: INTERNAL MEDICINE CLINIC | Facility: CLINIC | Age: 62
End: 2022-02-07

## 2022-02-07 RX ORDER — ATORVASTATIN CALCIUM 40 MG/1
TABLET, FILM COATED ORAL
Qty: 90 TABLET | Refills: 0 | Status: SHIPPED | OUTPATIENT
Start: 2022-02-07

## 2022-02-07 NOTE — TELEPHONE ENCOUNTER
See office note from 1/11/22, \"Please start terbinafine. After 6 weeks have your liver test done, then take the medication for another 6 weeks\" Pt notified via [employer] App message.

## 2022-02-07 NOTE — TELEPHONE ENCOUNTER
From: Dar Lujan  To: [facility] [place],   Sent: 2/7/2022 3:14 PM CST  Subject: Liver test and other questions     Hi Dr. Esparza Route,   I can't remember if you said you already have an order in to do my liver test and when am I supposed to do it? Also I thought you were putting me on that medication for 3 months but I only have enough for 2 months. Also, I'm due for my physical this month. Should I just wait until after I have my test done to see you for both?    Thank you   Soumya

## 2022-02-15 ENCOUNTER — VIRTUAL PHONE E/M (OUTPATIENT)
Dept: UROLOGY | Facility: CLINIC | Age: 62
End: 2022-02-15
Attending: STUDENT IN AN ORGANIZED HEALTH CARE EDUCATION/TRAINING PROGRAM
Payer: MEDICAID

## 2022-02-15 ENCOUNTER — LAB ENCOUNTER (OUTPATIENT)
Dept: LAB | Age: 62
End: 2022-02-15
Attending: INTERNAL MEDICINE
Payer: MEDICAID

## 2022-02-15 VITALS — BODY MASS INDEX: 25.3 KG/M2 | HEIGHT: 61 IN | WEIGHT: 134 LBS

## 2022-02-15 DIAGNOSIS — N95.2 POSTMENOPAUSAL ATROPHIC VAGINITIS: ICD-10-CM

## 2022-02-15 DIAGNOSIS — E11.9 TYPE 2 DIABETES MELLITUS WITHOUT COMPLICATION, WITHOUT LONG-TERM CURRENT USE OF INSULIN (HCC): ICD-10-CM

## 2022-02-15 DIAGNOSIS — N81.84 PELVIC MUSCLE WASTING: ICD-10-CM

## 2022-02-15 DIAGNOSIS — K90.9 DIARRHEA DUE TO MALABSORPTION: ICD-10-CM

## 2022-02-15 DIAGNOSIS — R30.0 DYSURIA: ICD-10-CM

## 2022-02-15 DIAGNOSIS — R63.4 WEIGHT LOSS: ICD-10-CM

## 2022-02-15 DIAGNOSIS — N39.41 URGE INCONTINENCE: Primary | ICD-10-CM

## 2022-02-15 DIAGNOSIS — R11.0 NAUSEA: ICD-10-CM

## 2022-02-15 DIAGNOSIS — R35.1 NOCTURIA: ICD-10-CM

## 2022-02-15 DIAGNOSIS — R19.7 DIARRHEA DUE TO MALABSORPTION: ICD-10-CM

## 2022-02-15 DIAGNOSIS — N39.3 FEMALE STRESS INCONTINENCE: ICD-10-CM

## 2022-02-15 LAB
ALT SERPL-CCNC: 23 U/L
BUN BLD-MCNC: 17 MG/DL (ref 7–18)
CREAT BLD-MCNC: 0.92 MG/DL
IGA SERPL-MCNC: 143 MG/DL (ref 70–312)

## 2022-02-15 PROCEDURE — 82784 ASSAY IGA/IGD/IGG/IGM EACH: CPT

## 2022-02-15 PROCEDURE — 87086 URINE CULTURE/COLONY COUNT: CPT

## 2022-02-15 PROCEDURE — 36415 COLL VENOUS BLD VENIPUNCTURE: CPT | Performed by: INTERNAL MEDICINE

## 2022-02-15 PROCEDURE — 82565 ASSAY OF CREATININE: CPT

## 2022-02-15 PROCEDURE — 84520 ASSAY OF UREA NITROGEN: CPT

## 2022-02-15 PROCEDURE — 86364 TISS TRNSGLTMNASE EA IG CLAS: CPT

## 2022-02-15 PROCEDURE — 84460 ALANINE AMINO (ALT) (SGPT): CPT | Performed by: INTERNAL MEDICINE

## 2022-02-15 PROCEDURE — 87186 SC STD MICRODIL/AGAR DIL: CPT

## 2022-02-15 PROCEDURE — 87077 CULTURE AEROBIC IDENTIFY: CPT

## 2022-02-15 RX ORDER — OXYBUTYNIN CHLORIDE 10 MG/1
10 TABLET, EXTENDED RELEASE ORAL DAILY
Qty: 30 TABLET | Refills: 3 | Status: SHIPPED | OUTPATIENT
Start: 2022-02-15 | End: 2022-02-17

## 2022-02-15 NOTE — PROGRESS NOTES
Patient to follow up urgency  Given circumstances surrounding COVID-19 this visit is being conducted as a televisit with pt's consent. She is currently using Oxybutynin 5mg ER daily    She reports + improvement  Denies dry mouth  Bowels regular  Nocturia x 1 (improvement from x2)  Less urgency during day   Denies NAN sxs  Some intermittent dysuria, worried about UTI     Impression/Plan:  (N39.41) Urge incontinence  (primary encounter diagnosis)  Plan: oxybutynin 10 MG Oral Tablet 24 Hr    (R35.1) Nocturia  Plan: oxybutynin 10 MG Oral Tablet 24 Hr    (N95.2) Postmenopausal atrophic vaginitis    (N81.84) Pelvic muscle wasting    (N39.3) Female stress incontinence    (R30.0) Dysuria  Plan: URINE CULTURE, ROUTINE    Treatment Plan, Non-surgical:   Trial Oxybutynin 10mg ER daily   Encouraged use of estrogen cream twice weekly   Will order urine culture, follow results, tx if +   Bladder diet/drill  Call w/ s/sx of UTI, other questions or concerns  All questions answered  She understands and agrees to plan    Return in about 6 weeks (around 3/29/2022) for medication follow up, sooner prn .     Taco Torres PA-C    Total telephone time: 15 minutes

## 2022-02-17 ENCOUNTER — TELEPHONE (OUTPATIENT)
Dept: UROLOGY | Facility: CLINIC | Age: 62
End: 2022-02-17

## 2022-02-17 RX ORDER — NITROFURANTOIN 25; 75 MG/1; MG/1
100 CAPSULE ORAL 2 TIMES DAILY
Qty: 14 CAPSULE | Refills: 0 | Status: SHIPPED | OUTPATIENT
Start: 2022-02-17 | End: 2022-03-29 | Stop reason: ALTCHOICE

## 2022-02-17 RX ORDER — NITROFURANTOIN 25; 75 MG/1; MG/1
100 CAPSULE ORAL 2 TIMES DAILY
Qty: 14 CAPSULE | Refills: 0 | Status: SHIPPED | OUTPATIENT
Start: 2022-02-17 | End: 2022-02-17

## 2022-02-17 RX ORDER — OXYBUTYNIN CHLORIDE 10 MG/1
10 TABLET, EXTENDED RELEASE ORAL DAILY
Qty: 30 TABLET | Refills: 3 | Status: SHIPPED | OUTPATIENT
Start: 2022-02-17

## 2022-02-17 NOTE — TELEPHONE ENCOUNTER
Patient called insurance does not have [facility] on provider list. Dayday cruz  Needs medicine today. Reordered TORB Dr Jesus Liner 100 mg BID for 7 days and Oxybutynin 10mg ER dispense #30 with 3 refills.

## 2022-02-17 NOTE — TELEPHONE ENCOUNTER
Pt phoned w/ ucx results. AMADOU STANFORD-macrobid 100mg po BID x 7d. Pt verbalized an understanding and agrees w/ plan. All questions answered.

## 2022-02-18 LAB — TTG IGA SER-ACNC: 0.2 U/ML (ref ?–7)

## 2022-02-22 ENCOUNTER — LAB ENCOUNTER (OUTPATIENT)
Dept: LAB | Age: 62
End: 2022-02-22
Attending: INTERNAL MEDICINE
Payer: MEDICAID

## 2022-02-22 DIAGNOSIS — R19.7 DIARRHEA DUE TO MALABSORPTION: ICD-10-CM

## 2022-02-22 DIAGNOSIS — E11.9 TYPE 2 DIABETES MELLITUS WITHOUT COMPLICATION, WITHOUT LONG-TERM CURRENT USE OF INSULIN (HCC): ICD-10-CM

## 2022-02-22 DIAGNOSIS — K90.9 DIARRHEA DUE TO MALABSORPTION: ICD-10-CM

## 2022-02-22 DIAGNOSIS — R63.4 WEIGHT LOSS: ICD-10-CM

## 2022-02-22 DIAGNOSIS — R11.0 NAUSEA: ICD-10-CM

## 2022-02-22 PROCEDURE — 87045 FECES CULTURE AEROBIC BACT: CPT

## 2022-02-22 PROCEDURE — 87329 GIARDIA AG IA: CPT

## 2022-02-22 PROCEDURE — 83993 ASSAY FOR CALPROTECTIN FECAL: CPT

## 2022-02-22 PROCEDURE — 82656 EL-1 FECAL QUAL/SEMIQ: CPT

## 2022-02-22 PROCEDURE — 87272 CRYPTOSPORIDIUM AG IF: CPT

## 2022-02-22 PROCEDURE — 87046 STOOL CULTR AEROBIC BACT EA: CPT

## 2022-02-22 PROCEDURE — 87493 C DIFF AMPLIFIED PROBE: CPT

## 2022-02-23 LAB
CRYPTOSP AG STL QL IA: NEGATIVE
G LAMBLIA AG STL QL IA: NEGATIVE

## 2022-02-26 LAB
CALPROTECTIN, FECAL: 91 UG/G
PANCREATIC ELASTASE , FECAL: 755 UG/G

## 2022-03-02 ENCOUNTER — HOSPITAL ENCOUNTER (OUTPATIENT)
Dept: CT IMAGING | Age: 62
Discharge: HOME | End: 2022-03-02
Attending: INTERNAL MEDICINE
Payer: MEDICAID

## 2022-03-02 DIAGNOSIS — K90.9 DIARRHEA DUE TO MALABSORPTION: ICD-10-CM

## 2022-03-02 DIAGNOSIS — E11.9 TYPE 2 DIABETES MELLITUS WITHOUT COMPLICATION, WITHOUT LONG-TERM CURRENT USE OF INSULIN (HCC): ICD-10-CM

## 2022-03-02 DIAGNOSIS — R11.0 NAUSEA: ICD-10-CM

## 2022-03-02 DIAGNOSIS — R19.7 DIARRHEA DUE TO MALABSORPTION: ICD-10-CM

## 2022-03-02 DIAGNOSIS — R63.4 WEIGHT LOSS: ICD-10-CM

## 2022-03-02 PROCEDURE — 74170 CT ABD WO CNTRST FLWD CNTRST: CPT | Performed by: INTERNAL MEDICINE

## 2022-03-02 RX ORDER — IOHEXOL 350 MG/ML
100 INJECTION, SOLUTION INTRAVENOUS
Status: COMPLETED | OUTPATIENT
Start: 2022-03-02 | End: 2022-03-02

## 2022-03-02 RX ADMIN — IOHEXOL 100 ML: 350 INJECTION, SOLUTION INTRAVENOUS at 16:04:00

## 2022-03-09 RX ORDER — EZETIMIBE 10 MG/1
TABLET ORAL
Qty: 90 TABLET | Refills: 0 | Status: SHIPPED | OUTPATIENT
Start: 2022-03-09

## 2022-03-21 RX ORDER — OMEPRAZOLE 40 MG/1
CAPSULE, DELAYED RELEASE ORAL
Qty: 180 CAPSULE | Refills: 0 | Status: SHIPPED | OUTPATIENT
Start: 2022-03-21

## 2022-03-29 ENCOUNTER — OFFICE VISIT (OUTPATIENT)
Dept: RHEUMATOLOGY | Facility: CLINIC | Age: 62
End: 2022-03-29
Payer: MEDICAID

## 2022-03-29 VITALS
BODY MASS INDEX: 24.55 KG/M2 | SYSTOLIC BLOOD PRESSURE: 112 MMHG | HEIGHT: 61 IN | HEART RATE: 83 BPM | TEMPERATURE: 98 F | WEIGHT: 130 LBS | OXYGEN SATURATION: 95 % | DIASTOLIC BLOOD PRESSURE: 68 MMHG

## 2022-03-29 DIAGNOSIS — M47.816 SPONDYLOSIS OF LUMBAR REGION WITHOUT MYELOPATHY OR RADICULOPATHY: ICD-10-CM

## 2022-03-29 DIAGNOSIS — M17.12 PRIMARY OSTEOARTHRITIS OF LEFT KNEE: Primary | ICD-10-CM

## 2022-03-29 PROCEDURE — 20610 DRAIN/INJ JOINT/BURSA W/O US: CPT | Performed by: INTERNAL MEDICINE

## 2022-03-29 PROCEDURE — 3074F SYST BP LT 130 MM HG: CPT | Performed by: INTERNAL MEDICINE

## 2022-03-29 PROCEDURE — 3078F DIAST BP <80 MM HG: CPT | Performed by: INTERNAL MEDICINE

## 2022-03-29 PROCEDURE — 3008F BODY MASS INDEX DOCD: CPT | Performed by: INTERNAL MEDICINE

## 2022-03-29 RX ORDER — DICLOFENAC SODIUM 75 MG/1
75 TABLET, DELAYED RELEASE ORAL 2 TIMES DAILY
Qty: 60 TABLET | Refills: 5 | Status: SHIPPED | OUTPATIENT
Start: 2022-03-29

## 2022-03-29 RX ORDER — METHYLPREDNISOLONE ACETATE 40 MG/ML
40 INJECTION, SUSPENSION INTRA-ARTICULAR; INTRALESIONAL; INTRAMUSCULAR; SOFT TISSUE ONCE
Status: COMPLETED | OUTPATIENT
Start: 2022-03-29 | End: 2022-03-29

## 2022-03-29 RX ADMIN — METHYLPREDNISOLONE ACETATE 40 MG: 40 INJECTION, SUSPENSION INTRA-ARTICULAR; INTRALESIONAL; INTRAMUSCULAR; SOFT TISSUE at 16:18:00

## 2022-03-29 NOTE — PATIENT INSTRUCTIONS
Today left knee was injected with cortisone. Go home rest.  Take it easy for 24 hours. Apply ice to the knee if necessary. For mild breakthrough pain take extra strength Tylenol 500 mg 1 or 2 at a time twice a day. Discontinue nabumetone as this is not seems to be helping any longer. Trial of' diclofenac 75 mg twice a day as anti-inflammatory pain reliever. Also it is okay to use over-the-counter salves like Voltaren gel, IcyHot gel, or BenGay and apply that to the sore knee. Return to office for recheck 3 months.

## 2022-03-29 NOTE — PROCEDURES
Procedure Note   79798  Left knee injection. Primary osteoarthritis of left knee. After obtaining consent from the patient, the left knee was cleansed with Betadine and alcohol wipes on the medial aspect. Then the left knee was injected from the medial aspect with 40 mg of methylprednisolone and 1 cc 1% lidocaine.   Patient tolerated the left knee injection without incident

## 2022-04-02 RX ORDER — EMPAGLIFLOZIN 25 MG/1
TABLET, FILM COATED ORAL
Qty: 90 TABLET | Refills: 0 | Status: SHIPPED | OUTPATIENT
Start: 2022-04-02

## 2022-04-07 ENCOUNTER — PATIENT MESSAGE (OUTPATIENT)
Dept: RHEUMATOLOGY | Facility: CLINIC | Age: 62
End: 2022-04-07

## 2022-04-11 ENCOUNTER — TELEPHONE (OUTPATIENT)
Dept: RHEUMATOLOGY | Facility: CLINIC | Age: 62
End: 2022-04-11

## 2022-04-11 RX ORDER — TRAMADOL HYDROCHLORIDE 50 MG/1
50 TABLET ORAL 2 TIMES DAILY PRN
Qty: 60 TABLET | Refills: 0 | Status: SHIPPED | OUTPATIENT
Start: 2022-04-11

## 2022-04-18 ENCOUNTER — PATIENT MESSAGE (OUTPATIENT)
Dept: INTERNAL MEDICINE CLINIC | Facility: CLINIC | Age: 62
End: 2022-04-18

## 2022-04-18 NOTE — TELEPHONE ENCOUNTER
From: Antoni Lujan  To: [facility],   Sent: 4/18/2022 8:58 AM CDT  Subject: Appointment     Should I have the doctor order a covid or flu test before doing the virtual appointment o Wed?

## 2022-04-18 NOTE — TELEPHONE ENCOUNTER
Patient asking if a covid test/influenza test should be ordered for her prior to appointment on 4/20 - virtual.  She has a sore throat and reports that her chest hurts. Symptoms started about 2 weeks ago. She will do a home covid test today. Any further recommendations?

## 2022-04-19 ENCOUNTER — PATIENT MESSAGE (OUTPATIENT)
Dept: INTERNAL MEDICINE CLINIC | Facility: CLINIC | Age: 62
End: 2022-04-19

## 2022-04-19 NOTE — TELEPHONE ENCOUNTER
Pt requesting to switch visit to in person    Please advise    Future Appointments   Date Time Provider Lilly Purcell   4/20/2022  1:30 PM Smith Bedoya DO [facility] [place]   5/13/2022 10:30 AM Anika Douglas MD [facility] P [facility] [address]   6/72/5140 66:13 AM Danita Lazaro MD [facility]

## 2022-04-19 NOTE — TELEPHONE ENCOUNTER
From: Jose Carlos Otoole  To: Britney Kumar,   Sent: 4/19/2022 12:46 PM CDT  Subject: Covid test     I did a in home covid test today and it came up negative. I wasn't planning on doing a test at the immediate care. Do you still want to do a virtual appointments tomorrow? I'm pretty sure this is bronchitis since I usually get it very easily. Please let me know what you want me to do.    Thanks   [employer]

## 2022-04-20 ENCOUNTER — TELEMEDICINE (OUTPATIENT)
Dept: INTERNAL MEDICINE CLINIC | Facility: CLINIC | Age: 62
End: 2022-04-20

## 2022-04-20 DIAGNOSIS — R05.9 COUGH: Primary | ICD-10-CM

## 2022-04-20 PROCEDURE — 99213 OFFICE O/P EST LOW 20 MIN: CPT | Performed by: INTERNAL MEDICINE

## 2022-04-20 RX ORDER — PROMETHAZINE HYDROCHLORIDE AND CODEINE PHOSPHATE 6.25; 1 MG/5ML; MG/5ML
2.5 SYRUP ORAL EVERY 6 HOURS PRN
Qty: 118 ML | Refills: 0 | Status: SHIPPED | OUTPATIENT
Start: 2022-04-20

## 2022-04-20 RX ORDER — AMOXICILLIN 500 MG/1
500 CAPSULE ORAL 2 TIMES DAILY
Qty: 14 CAPSULE | Refills: 0 | Status: SHIPPED | OUTPATIENT
Start: 2022-04-20 | End: 2022-04-27

## 2022-04-20 NOTE — PROGRESS NOTES
Virtual Telephone Check-In    This visit is conducted using Telemedicine with live, interactive video and audio. Patient [place] in [place]   Patient understands and accepts financial responsibility for any deductible, co-insurance and/or co-pays associated with this service. Telehealth outside of [employer] Verbal Consent   I conducted a telehealth visit with Valencia Coello on 4/20/2022   which was completed using two-way, real-time interactive audio and video  communication. This has been done in good eugenia to provide continuity of care in the best interest of the provider-patient relationship, due to the COVID -19 public health crisis/national emergency where restrictions of face-to-face office visits are ongoing. Every conscious effort was taken to allow for sufficient and adequate time to complete the visit. The patient was made aware of the limitations of the telehealth visit, including treatment limitations as no physical exam could be performed. The patient was advised to call 911 or to go to the ER in case there was an emergency. The patient was also advised of the potential privacy & security concerns related to the telehealth platform. The patient was made aware of where to find [facility] notice of privacy practices, telehealth consent form and other related consent forms and documents. which are located on the [facility] website. The patient verbally agreed to telehealth consent form, related consents and the risks discussed. Lastly, the patient confirmed that they were in [place]. Included in this visit, time may have been spent reviewing labs, medications, radiology tests and decision making. Appropriate medical decision-making and tests are ordered as detailed in the plan of care above. Coding/billing information is submitted for this visit based on complexity of care and/or time spent for the visit.   Time spent: 10 minutes  HPI: Antoni Lujan is a 63year old female who is calling in regards to a cough. Present for 2-3 weeks. Has a sore throat. The cough is constant and productive. Having subjective fevers. covid test was negative. Has had bronchitis in the past   ROS:  General: Feels well overall  Skin: Denies any unusual skin lesions  Eyes: Denies blurred vision or double vision  All systems negative, except for above  Physical:  GENERAL: Alert and oriented, well developed, well nourished,in no apparent distress  SKIN: no rashes,no suspicious lesions on face  LUNGS: no audible wheezing  PSYCH: pleasant, appropriate mood and affect    Assessment and Plan  (R05.9) Cough  (primary encounter diagnosis)  Plan: amoxicillin 500 MG Oral Cap, XR CHEST PA + LAT         CHEST (CPT=71046), promethazine-codeine 6.25-10        MG/5ML Oral Syrup  Note: given symptoms going on for more than 2 weeks will start antibiotics. Cough syrup provided.  If no improvement with antibiotics in 48 hours, patient will have a chest x ray done  Follow up in 3 to 5 days or sooner if symptoms get worse   [address] [place] [address]

## 2022-04-25 ENCOUNTER — TELEPHONE (OUTPATIENT)
Dept: UROLOGY | Facility: CLINIC | Age: 62
End: 2022-04-25

## 2022-04-25 RX ORDER — METOPROLOL SUCCINATE 50 MG/1
TABLET, EXTENDED RELEASE ORAL
Qty: 90 TABLET | Refills: 0 | Status: SHIPPED | OUTPATIENT
Start: 2022-04-25

## 2022-04-25 NOTE — TELEPHONE ENCOUNTER
LOV: 4/20/2022 with Dr. Rudy Crump   RTC: July 2022  Last Relevant Labs: February 2022  Filled: 1/25/2022    #90 with 0 refills    Future Appointments   Date Time Provider Lilly Purcell   5/13/2022 10:30 AM Anika Douglas MD [facility] P [facility] [address]   6/65/9375 40:27 AM Danita Lazaro MD [facility]

## 2022-04-25 NOTE — TELEPHONE ENCOUNTER
Patient called stating getting up 3 times a night with urgency but not much comes out. Patient not using estradiol cream and taking  oxybutynin 10 mg at night. Plan to restart estradiol cream and take 10 mg oxybutynin in the am will call with an update.

## 2022-05-02 ENCOUNTER — TELEPHONE (OUTPATIENT)
Dept: INTERNAL MEDICINE CLINIC | Facility: CLINIC | Age: 62
End: 2022-05-02

## 2022-05-02 NOTE — TELEPHONE ENCOUNTER
Incontinence Supplies Order from [address] [place] [address] placed in Dr. Reyes De La Fuente in-box.

## 2022-05-04 NOTE — TELEPHONE ENCOUNTER
Patient has appointment with Dr. Fabby Cooper 5/6/2022. Will fax office visit [facility] 5[facility]; [facility] requiring recent ov note along with order.

## 2022-05-05 ENCOUNTER — TELEPHONE (OUTPATIENT)
Dept: OBGYN CLINIC | Facility: CLINIC | Age: 62
End: 2022-05-05

## 2022-05-05 NOTE — TELEPHONE ENCOUNTER
LOV: 2022 with Dr. Treva Caraballo  RTC: \"3 to 5 days or sooner if symptoms get worse\"  Last Relevant Labs: 2021/2022  Filled: 2022   #90 with 0 refills    Future Appointments   Date Time Provider Lilly Tripletti   2022  1:00 PM Selinda Apgar, DO [facility] [place]   2022 10:30 AM Slade Ramos MD [facility] OB[facility] P [facility] [address]   2022  1:15 PM HIEN Yap ENINAPER2 [facility] [place]   2022  9:20 AM Judi Garrido MD [facility] ORTHO 75 [facility]    30:61 AM Cherrie Gosselin, MD [facility]

## 2022-05-05 NOTE — TELEPHONE ENCOUNTER
New order placed. Per review of chart, bilateral breast ultrasound done 11/2021 and needs to be repeated in 6 mos. PA will be required also. PA request submitted to [employer] via online portal.    Provided CPT 46149 with Dx R92.2 and N63.0    PA approved.  Valid from 5/5/2022 to 11/1/2022  Approval # [id number]

## 2022-05-05 NOTE — TELEPHONE ENCOUNTER
Kallie from [facility] [address] called. She said she needs a new order for the breast US. The one in Epic isn't correct says same day? Plus, she said the order needs a different diagnosis.   Cannot be screening mammogram.

## 2022-05-06 ENCOUNTER — PATIENT MESSAGE (OUTPATIENT)
Dept: OBGYN CLINIC | Facility: CLINIC | Age: 62
End: 2022-05-06

## 2022-05-06 ENCOUNTER — TELEPHONE (OUTPATIENT)
Dept: INTERNAL MEDICINE CLINIC | Facility: CLINIC | Age: 62
End: 2022-05-06

## 2022-05-06 ENCOUNTER — OFFICE VISIT (OUTPATIENT)
Dept: INTERNAL MEDICINE CLINIC | Facility: CLINIC | Age: 62
End: 2022-05-06
Payer: MEDICAID

## 2022-05-06 VITALS
TEMPERATURE: 98 F | WEIGHT: 133 LBS | RESPIRATION RATE: 16 BRPM | HEIGHT: 61 IN | OXYGEN SATURATION: 97 % | BODY MASS INDEX: 25.11 KG/M2 | HEART RATE: 82 BPM | DIASTOLIC BLOOD PRESSURE: 80 MMHG | SYSTOLIC BLOOD PRESSURE: 130 MMHG

## 2022-05-06 DIAGNOSIS — M54.50 CHRONIC MIDLINE LOW BACK PAIN WITHOUT SCIATICA: ICD-10-CM

## 2022-05-06 DIAGNOSIS — D27.1 MUCINOUS CYSTADENOMA OF OVARY, LEFT: ICD-10-CM

## 2022-05-06 DIAGNOSIS — E78.00 HYPERCHOLESTEROLEMIA: ICD-10-CM

## 2022-05-06 DIAGNOSIS — K44.9 HIATAL HERNIA WITHOUT GANGRENE AND OBSTRUCTION: ICD-10-CM

## 2022-05-06 DIAGNOSIS — Z99.89 OSA ON CPAP: ICD-10-CM

## 2022-05-06 DIAGNOSIS — F41.1 GAD (GENERALIZED ANXIETY DISORDER): ICD-10-CM

## 2022-05-06 DIAGNOSIS — N39.498 OTHER URINARY INCONTINENCE: ICD-10-CM

## 2022-05-06 DIAGNOSIS — G47.33 OSA ON CPAP: ICD-10-CM

## 2022-05-06 DIAGNOSIS — I10 PRIMARY HYPERTENSION: ICD-10-CM

## 2022-05-06 DIAGNOSIS — F33.1 MDD (MAJOR DEPRESSIVE DISORDER), RECURRENT EPISODE, MODERATE (HCC): ICD-10-CM

## 2022-05-06 DIAGNOSIS — K22.70 BARRETT'S ESOPHAGUS WITHOUT DYSPLASIA: ICD-10-CM

## 2022-05-06 DIAGNOSIS — J44.9 COPD, MILD (HCC): ICD-10-CM

## 2022-05-06 DIAGNOSIS — F51.01 PRIMARY INSOMNIA: ICD-10-CM

## 2022-05-06 DIAGNOSIS — G89.29 CHRONIC MIDLINE LOW BACK PAIN WITHOUT SCIATICA: ICD-10-CM

## 2022-05-06 DIAGNOSIS — J32.4 CHRONIC PANSINUSITIS: ICD-10-CM

## 2022-05-06 DIAGNOSIS — E11.9 TYPE 2 DIABETES MELLITUS WITHOUT COMPLICATION, WITHOUT LONG-TERM CURRENT USE OF INSULIN (HCC): ICD-10-CM

## 2022-05-06 DIAGNOSIS — Z78.0 POST-MENOPAUSAL: ICD-10-CM

## 2022-05-06 DIAGNOSIS — K21.9 GASTROESOPHAGEAL REFLUX DISEASE, UNSPECIFIED WHETHER ESOPHAGITIS PRESENT: ICD-10-CM

## 2022-05-06 DIAGNOSIS — Z00.00 ROUTINE PHYSICAL EXAMINATION: Primary | ICD-10-CM

## 2022-05-06 DIAGNOSIS — G43.009 MIGRAINE WITHOUT AURA AND WITHOUT STATUS MIGRAINOSUS, NOT INTRACTABLE: ICD-10-CM

## 2022-05-06 PROBLEM — B35.1 ONYCHOMYCOSIS: Status: RESOLVED | Noted: 2019-07-11 | Resolved: 2022-05-06

## 2022-05-06 PROBLEM — M25.462 EFFUSION OF LEFT KNEE: Status: RESOLVED | Noted: 2019-09-30 | Resolved: 2022-05-06

## 2022-05-06 PROBLEM — R32 ABSENCE OF BLADDER CONTINENCE: Status: RESOLVED | Noted: 2021-06-14 | Resolved: 2022-05-06

## 2022-05-06 PROBLEM — R53.82 CHRONIC FATIGUE SYNDROME: Status: RESOLVED | Noted: 2018-10-01 | Resolved: 2022-05-06

## 2022-05-06 PROBLEM — G93.32 CHRONIC FATIGUE SYNDROME: Status: RESOLVED | Noted: 2018-10-01 | Resolved: 2022-05-06

## 2022-05-06 PROCEDURE — 3075F SYST BP GE 130 - 139MM HG: CPT | Performed by: INTERNAL MEDICINE

## 2022-05-06 PROCEDURE — 3008F BODY MASS INDEX DOCD: CPT | Performed by: INTERNAL MEDICINE

## 2022-05-06 PROCEDURE — 3079F DIAST BP 80-89 MM HG: CPT | Performed by: INTERNAL MEDICINE

## 2022-05-06 PROCEDURE — 99396 PREV VISIT EST AGE 40-64: CPT | Performed by: INTERNAL MEDICINE

## 2022-05-06 RX ORDER — DESVENLAFAXINE 100 MG/1
1 TABLET, EXTENDED RELEASE ORAL EVERY MORNING
COMMUNITY
Start: 2022-04-22 | End: 2022-05-06

## 2022-05-06 RX ORDER — COVID-19 ANTIGEN TEST
KIT MISCELLANEOUS
COMMUNITY
Start: 2022-04-12

## 2022-05-06 RX ORDER — MULTIVIT-MINERALS/FA/LYCOPENE 400-370MCG
1 TABLET ORAL DAILY
Refills: 0 | COMMUNITY
Start: 2022-05-06

## 2022-05-06 RX ORDER — ATORVASTATIN CALCIUM 40 MG/1
TABLET, FILM COATED ORAL
Qty: 90 TABLET | Refills: 0 | Status: SHIPPED | OUTPATIENT
Start: 2022-05-06

## 2022-05-06 NOTE — PATIENT INSTRUCTIONS
Continue to exercise at least 150 minutes a week and Eat a plant based diet     Please take 2000 IU of vitamin D daily    I have ordered blood tests and you are due in June    Good luck with job finding    You are due for a shingles vaccine. Please have it done 2 weeks after the covid vaccine.  It is a two dose series and it helps to protect you from shingles    Please follow up for the CPAP machine and eye doctor

## 2022-05-06 NOTE — TELEPHONE ENCOUNTER
Pt called with questions regarding her med list. Pt states there are medications that she is unsure why they are there and some concerns. Pt did not disclose specific info. , pt requesting to speak with a nurse.

## 2022-05-06 NOTE — TELEPHONE ENCOUNTER
Pt stated on her AVS from today's physical - medication list noted \"Energy Tabs\" - pt does not know what this is -  Pt takes One a day Multivitamin for women    Also- pt no longer using nyastatin-triamcinolone - will remove from list    Medication list updated - removed \"energy tabs\" and nysatatin  Added \"One a day women's multivitamin\"

## 2022-05-07 ENCOUNTER — TELEPHONE (OUTPATIENT)
Dept: INTERNAL MEDICINE CLINIC | Facility: CLINIC | Age: 62
End: 2022-05-07

## 2022-05-08 RX ORDER — NITROFURANTOIN 25; 75 MG/1; MG/1
100 CAPSULE ORAL 2 TIMES DAILY
Qty: 14 CAPSULE | Refills: 0 | Status: SHIPPED | OUTPATIENT
Start: 2022-05-08 | End: 2022-05-15

## 2022-05-09 NOTE — TELEPHONE ENCOUNTER
From: Augusto Nieves  To: Ailyn Shah MD  Sent: 5/6/2022 2:43 PM CDT  Subject: Infection     I've been feeling the urge to pee a lot and I've noticed that my urine is cloudy could I have an infection?

## 2022-05-09 NOTE — TELEPHONE ENCOUNTER
Patient on oxybutynin for DI. Now having dysuria. Was treated for Klebsiella UTI in February with Tigre Sheppard, which worked well.   Rx sent for 7 days Macrobid BID

## 2022-05-13 ENCOUNTER — OFFICE VISIT (OUTPATIENT)
Dept: OBGYN CLINIC | Facility: CLINIC | Age: 62
End: 2022-05-13
Payer: MEDICAID

## 2022-05-13 ENCOUNTER — OFFICE VISIT (OUTPATIENT)
Dept: SURGERY | Facility: CLINIC | Age: 62
End: 2022-05-13
Payer: MEDICAID

## 2022-05-13 VITALS
HEIGHT: 61 IN | WEIGHT: 131 LBS | DIASTOLIC BLOOD PRESSURE: 80 MMHG | SYSTOLIC BLOOD PRESSURE: 124 MMHG | BODY MASS INDEX: 24.73 KG/M2

## 2022-05-13 VITALS
HEIGHT: 61 IN | DIASTOLIC BLOOD PRESSURE: 70 MMHG | SYSTOLIC BLOOD PRESSURE: 130 MMHG | WEIGHT: 133 LBS | BODY MASS INDEX: 25.11 KG/M2

## 2022-05-13 DIAGNOSIS — Z12.4 ENCOUNTER FOR SCREENING FOR CERVICAL CANCER: ICD-10-CM

## 2022-05-13 DIAGNOSIS — M54.50 CHRONIC BILATERAL LOW BACK PAIN WITHOUT SCIATICA: Primary | ICD-10-CM

## 2022-05-13 DIAGNOSIS — Z01.419 WELL WOMAN EXAM WITH ROUTINE GYNECOLOGICAL EXAM: Primary | ICD-10-CM

## 2022-05-13 DIAGNOSIS — G89.29 CHRONIC BILATERAL LOW BACK PAIN WITHOUT SCIATICA: Primary | ICD-10-CM

## 2022-05-13 DIAGNOSIS — Z12.31 ENCOUNTER FOR SCREENING MAMMOGRAM FOR BREAST CANCER: ICD-10-CM

## 2022-05-13 DIAGNOSIS — N89.8 VAGINAL DRYNESS: ICD-10-CM

## 2022-05-13 DIAGNOSIS — R10.2 PELVIC PAIN: ICD-10-CM

## 2022-05-13 DIAGNOSIS — N95.2 VAGINAL ATROPHY: ICD-10-CM

## 2022-05-13 DIAGNOSIS — M89.8X8 MASS OF SPINE: ICD-10-CM

## 2022-05-13 PROCEDURE — 3079F DIAST BP 80-89 MM HG: CPT | Performed by: OBSTETRICS & GYNECOLOGY

## 2022-05-13 PROCEDURE — 87491 CHLMYD TRACH DNA AMP PROBE: CPT | Performed by: OBSTETRICS & GYNECOLOGY

## 2022-05-13 PROCEDURE — 87624 HPV HI-RISK TYP POOLED RSLT: CPT | Performed by: OBSTETRICS & GYNECOLOGY

## 2022-05-13 PROCEDURE — 3074F SYST BP LT 130 MM HG: CPT | Performed by: OBSTETRICS & GYNECOLOGY

## 2022-05-13 PROCEDURE — 87510 GARDNER VAG DNA DIR PROBE: CPT | Performed by: OBSTETRICS & GYNECOLOGY

## 2022-05-13 PROCEDURE — 87480 CANDIDA DNA DIR PROBE: CPT | Performed by: OBSTETRICS & GYNECOLOGY

## 2022-05-13 PROCEDURE — 87660 TRICHOMONAS VAGIN DIR PROBE: CPT | Performed by: OBSTETRICS & GYNECOLOGY

## 2022-05-13 PROCEDURE — 99396 PREV VISIT EST AGE 40-64: CPT | Performed by: OBSTETRICS & GYNECOLOGY

## 2022-05-13 PROCEDURE — 87591 N.GONORRHOEAE DNA AMP PROB: CPT | Performed by: OBSTETRICS & GYNECOLOGY

## 2022-05-13 PROCEDURE — 3008F BODY MASS INDEX DOCD: CPT | Performed by: PHYSICIAN ASSISTANT

## 2022-05-13 PROCEDURE — 3008F BODY MASS INDEX DOCD: CPT | Performed by: OBSTETRICS & GYNECOLOGY

## 2022-05-13 PROCEDURE — 99214 OFFICE O/P EST MOD 30 MIN: CPT | Performed by: PHYSICIAN ASSISTANT

## 2022-05-13 PROCEDURE — 88175 CYTOPATH C/V AUTO FLUID REDO: CPT | Performed by: OBSTETRICS & GYNECOLOGY

## 2022-05-13 PROCEDURE — 3075F SYST BP GE 130 - 139MM HG: CPT | Performed by: PHYSICIAN ASSISTANT

## 2022-05-13 PROCEDURE — 3078F DIAST BP <80 MM HG: CPT | Performed by: PHYSICIAN ASSISTANT

## 2022-05-13 RX ORDER — SODIUM CHLORIDE, SODIUM LACTATE, POTASSIUM CHLORIDE, CALCIUM CHLORIDE 600; 310; 30; 20 MG/100ML; MG/100ML; MG/100ML; MG/100ML
INJECTION, SOLUTION INTRAVENOUS CONTINUOUS
Status: CANCELLED | OUTPATIENT
Start: 2022-05-13

## 2022-05-13 RX ORDER — ESTRADIOL 0.1 MG/G
1 CREAM VAGINAL
Qty: 42.5 G | Refills: 1 | Status: SHIPPED | OUTPATIENT
Start: 2022-05-16

## 2022-05-13 NOTE — PATIENT INSTRUCTIONS
Luvena or Replens - vaginal moisturizer, apply daily or 2 times per week     Revaree/Halogyn - twice a week vaginal moisturizer (hyaluronic acid), insert capsule 2 times per week     Uberlube - personal lubricant as needed

## 2022-05-13 NOTE — PROGRESS NOTES
States she having IDALMIS lower back pain more on the left then right. States she can feel a knot. Some times she will have n/t in the left leg.           No pasted PT  No injections  No back sx

## 2022-05-16 ENCOUNTER — LAB ENCOUNTER (OUTPATIENT)
Dept: LAB | Age: 62
End: 2022-05-16
Attending: INTERNAL MEDICINE
Payer: MEDICAID

## 2022-05-16 DIAGNOSIS — Z20.822 ENCOUNTER FOR PREPROCEDURE SCREENING LABORATORY TESTING FOR COVID-19: ICD-10-CM

## 2022-05-16 DIAGNOSIS — Z01.812 ENCOUNTER FOR PREPROCEDURE SCREENING LABORATORY TESTING FOR COVID-19: ICD-10-CM

## 2022-05-16 PROBLEM — Z86.16 HISTORY OF 2019 NOVEL CORONAVIRUS DISEASE (COVID-19): Status: ACTIVE | Noted: 2020-09-01

## 2022-05-16 LAB
C TRACH DNA SPEC QL NAA+PROBE: NEGATIVE
N GONORRHOEA DNA SPEC QL NAA+PROBE: NEGATIVE

## 2022-05-17 LAB — SARS-COV-2 RNA RESP QL NAA+PROBE: NOT DETECTED

## 2022-05-18 ENCOUNTER — HOSPITAL ENCOUNTER (OUTPATIENT)
Facility: HOSPITAL | Age: 62
Setting detail: HOSPITAL OUTPATIENT SURGERY
Discharge: HOME | End: 2022-05-18
Attending: INTERNAL MEDICINE | Admitting: INTERNAL MEDICINE
Payer: MEDICAID

## 2022-05-18 ENCOUNTER — ANESTHESIA EVENT (OUTPATIENT)
Dept: ENDOSCOPY | Facility: HOSPITAL | Age: 62
End: 2022-05-18
Payer: MEDICAID

## 2022-05-18 ENCOUNTER — ANESTHESIA (OUTPATIENT)
Dept: ENDOSCOPY | Facility: HOSPITAL | Age: 62
End: 2022-05-18
Payer: MEDICAID

## 2022-05-18 VITALS
OXYGEN SATURATION: 98 % | RESPIRATION RATE: 16 BRPM | WEIGHT: 131 LBS | DIASTOLIC BLOOD PRESSURE: 66 MMHG | SYSTOLIC BLOOD PRESSURE: 117 MMHG | TEMPERATURE: 98 F | HEIGHT: 60 IN | BODY MASS INDEX: 25.72 KG/M2 | HEART RATE: 69 BPM

## 2022-05-18 DIAGNOSIS — E11.9 CONTROLLED TYPE 2 DIABETES MELLITUS WITHOUT COMPLICATION, WITH LONG-TERM CURRENT USE OF INSULIN (HCC): ICD-10-CM

## 2022-05-18 DIAGNOSIS — Z01.812 ENCOUNTER FOR PREPROCEDURE SCREENING LABORATORY TESTING FOR COVID-19: Primary | ICD-10-CM

## 2022-05-18 DIAGNOSIS — R63.4 WEIGHT LOSS: ICD-10-CM

## 2022-05-18 DIAGNOSIS — R11.0 NAUSEA: ICD-10-CM

## 2022-05-18 DIAGNOSIS — K90.9 DIARRHEA DUE TO MALABSORPTION: ICD-10-CM

## 2022-05-18 DIAGNOSIS — R19.7 DIARRHEA DUE TO MALABSORPTION: ICD-10-CM

## 2022-05-18 DIAGNOSIS — Z20.822 ENCOUNTER FOR PREPROCEDURE SCREENING LABORATORY TESTING FOR COVID-19: Primary | ICD-10-CM

## 2022-05-18 DIAGNOSIS — Z79.4 CONTROLLED TYPE 2 DIABETES MELLITUS WITHOUT COMPLICATION, WITH LONG-TERM CURRENT USE OF INSULIN (HCC): ICD-10-CM

## 2022-05-18 LAB
GLUCOSE BLD-MCNC: 118 MG/DL (ref 70–99)
HPV I/H RISK 1 DNA SPEC QL NAA+PROBE: NEGATIVE

## 2022-05-18 PROCEDURE — 88305 TISSUE EXAM BY PATHOLOGIST: CPT | Performed by: INTERNAL MEDICINE

## 2022-05-18 PROCEDURE — 88342 IMHCHEM/IMCYTCHM 1ST ANTB: CPT | Performed by: INTERNAL MEDICINE

## 2022-05-18 PROCEDURE — 0DB48ZX EXCISION OF ESOPHAGOGASTRIC JUNCTION, VIA NATURAL OR ARTIFICIAL OPENING ENDOSCOPIC, DIAGNOSTIC: ICD-10-PCS | Performed by: INTERNAL MEDICINE

## 2022-05-18 PROCEDURE — 0DBE8ZX EXCISION OF LARGE INTESTINE, VIA NATURAL OR ARTIFICIAL OPENING ENDOSCOPIC, DIAGNOSTIC: ICD-10-PCS | Performed by: INTERNAL MEDICINE

## 2022-05-18 PROCEDURE — 0DB98ZX EXCISION OF DUODENUM, VIA NATURAL OR ARTIFICIAL OPENING ENDOSCOPIC, DIAGNOSTIC: ICD-10-PCS | Performed by: INTERNAL MEDICINE

## 2022-05-18 PROCEDURE — 0DB78ZX EXCISION OF STOMACH, PYLORUS, VIA NATURAL OR ARTIFICIAL OPENING ENDOSCOPIC, DIAGNOSTIC: ICD-10-PCS | Performed by: INTERNAL MEDICINE

## 2022-05-18 PROCEDURE — 82962 GLUCOSE BLOOD TEST: CPT

## 2022-05-18 RX ORDER — METOPROLOL TARTRATE 5 MG/5ML
2.5 INJECTION INTRAVENOUS ONCE
Status: DISCONTINUED | OUTPATIENT
Start: 2022-05-18 | End: 2022-05-18

## 2022-05-18 RX ORDER — SODIUM CHLORIDE, SODIUM LACTATE, POTASSIUM CHLORIDE, CALCIUM CHLORIDE 600; 310; 30; 20 MG/100ML; MG/100ML; MG/100ML; MG/100ML
INJECTION, SOLUTION INTRAVENOUS CONTINUOUS
Status: DISCONTINUED | OUTPATIENT
Start: 2022-05-18 | End: 2022-05-18

## 2022-05-18 RX ORDER — DEXTROSE MONOHYDRATE 25 G/50ML
50 INJECTION, SOLUTION INTRAVENOUS
Status: DISCONTINUED | OUTPATIENT
Start: 2022-05-18 | End: 2022-05-18

## 2022-05-18 RX ORDER — NALOXONE HYDROCHLORIDE 0.4 MG/ML
80 INJECTION, SOLUTION INTRAMUSCULAR; INTRAVENOUS; SUBCUTANEOUS AS NEEDED
Status: DISCONTINUED | OUTPATIENT
Start: 2022-05-18 | End: 2022-05-18

## 2022-05-18 RX ORDER — LIDOCAINE HYDROCHLORIDE 10 MG/ML
INJECTION, SOLUTION EPIDURAL; INFILTRATION; INTRACAUDAL; PERINEURAL AS NEEDED
Status: DISCONTINUED | OUTPATIENT
Start: 2022-05-18 | End: 2022-05-18 | Stop reason: SURG

## 2022-05-18 RX ORDER — HYDROMORPHONE HYDROCHLORIDE 1 MG/ML
0.4 INJECTION, SOLUTION INTRAMUSCULAR; INTRAVENOUS; SUBCUTANEOUS EVERY 5 MIN PRN
Status: DISCONTINUED | OUTPATIENT
Start: 2022-05-18 | End: 2022-05-18

## 2022-05-18 RX ORDER — NICOTINE POLACRILEX 4 MG
15 LOZENGE BUCCAL
Status: DISCONTINUED | OUTPATIENT
Start: 2022-05-18 | End: 2022-05-18

## 2022-05-18 RX ORDER — HYDROMORPHONE HYDROCHLORIDE 1 MG/ML
0.6 INJECTION, SOLUTION INTRAMUSCULAR; INTRAVENOUS; SUBCUTANEOUS EVERY 5 MIN PRN
Status: DISCONTINUED | OUTPATIENT
Start: 2022-05-18 | End: 2022-05-18

## 2022-05-18 RX ORDER — HYDROMORPHONE HYDROCHLORIDE 1 MG/ML
0.2 INJECTION, SOLUTION INTRAMUSCULAR; INTRAVENOUS; SUBCUTANEOUS EVERY 5 MIN PRN
Status: DISCONTINUED | OUTPATIENT
Start: 2022-05-18 | End: 2022-05-18

## 2022-05-18 RX ORDER — NICOTINE POLACRILEX 4 MG
30 LOZENGE BUCCAL
Status: DISCONTINUED | OUTPATIENT
Start: 2022-05-18 | End: 2022-05-18

## 2022-05-18 RX ADMIN — LIDOCAINE HYDROCHLORIDE 25 MG: 10 INJECTION, SOLUTION EPIDURAL; INFILTRATION; INTRACAUDAL; PERINEURAL at 09:10:00

## 2022-05-18 RX ADMIN — SODIUM CHLORIDE, SODIUM LACTATE, POTASSIUM CHLORIDE, CALCIUM CHLORIDE: 600; 310; 30; 20 INJECTION, SOLUTION INTRAVENOUS at 09:11:00

## 2022-05-18 RX ADMIN — SODIUM CHLORIDE, SODIUM LACTATE, POTASSIUM CHLORIDE, CALCIUM CHLORIDE: 600; 310; 30; 20 INJECTION, SOLUTION INTRAVENOUS at 09:30:00

## 2022-05-18 NOTE — OPERATIVE REPORT
OPERATIVE REPORT   PATIENT NAME: Antoni Lujan  MRN: [id number]  DATE OF OPERATION: 5/18/2022  PREOPERATIVE DIAGNOSIS: Diarrhea, weight loss, history of diabetes mellitus, nausea, dysphagia  POSTOPERATIVE DIAGNOSES   1. EGD-irregular Z-line suspicious for Jamison's esophagus, gastritis  2. Colonoscopy-normal  PROCEDURE PERFORMED:   Esophagogastroduodenoscopy    Colonoscopy to cecum  SCOPE UTILIZED: Pediatric Olympus Colonoscope and upper endoscope  WITHDRAWAL TIME= 6 mins  SEDATION MEDICATIONS: MAC; an anesthesiologist was present to provide deep anesthesia  PREPROCEDURE ASSESSMENT: The indication for this procedure is to assess for ulcers and polyps. The patient was identified by myself and nursing staff in the exam room. Informed consent was obtained. The patient was seen in clinic and a full H&P was obtained. On brief physical examination, airway is patent. Chest is clear. Heart has regular rate and rhythm. Abdomen is soft, nontender with good bowel sounds. A medication list was taken by nursing today and reviewed by myself. The patient is an ASA grade 2. Due to the technical nature of the procedure, pathology of the anal area could be missed. UPPER ENDOSCOPY PROCEDURE NOTE:   The procedure was completed without difficulty. The patient tolerated the procedure well. The endoscope was inserted through the mouth and advanced to the level of the duodenum, 3rd portion. Esophagus appeared normal without stricture or esophagitis. No hiatal hernia or Jamison's esophagus was noted. Irregular Z-line was biopsied for Jamison's esophagus. Stomach was normal in the antrum and the body except for mild gastritis. Duodenum was normal in the bulb and 2nd duodenum. Retroflexed view in the stomach revealed normal fundus and cardia. Small bowel biopsies were taken for celiac sprue and gastric biopsies were taken for Helicobacter pylori. There were no immediate complications.    COLONOSCOPY PROCEDURE NOTE:   The procedure was completed without difficulty. The patient tolerated the procedure well. The prep was good. The colonoscope was inserted through the anus and advanced to the level of the cecum with visualization and photo documentation of the appendix. A slow withdrawal of the colonoscope was performed as well as retroflexion in the rectum. Terminal ileum was intubated and visualized for 10cms and appeared normal.  Random colon biopsies were taken for microscopic colitis. No polyps, masses or lesions were found throughout the colon. Small internal hemorrhoids were noted. There were no immediate complications. FINDINGS   1. No etiology of patient's symptoms found here. Patient has had a negative CT scan  RECOMMENDATIONS: Repeat colonoscopy in 10 years. Office follow-up if patient symptoms continue  DISCHARGE: The patient was given an after visit summary detailing the procedure, findings, recommendations, f/u plan and an updated medication list.   PREP Quality indicators:  Excellent: Colon without any residual stool other than clear liquid requiring minimal suction  Very good: Colon with minimal residual stool requiring minimal suction  Good: Colon with very thin layer of stool or small particulate matter that could easily be washed off and suctioned. This may require earlier return for colonoscopy within 3-7 yrs depending on the colonoscopy findings and family history. Fair: Colon with thick layer of stool or particulate matter that impaired proper visualization of the mucosa. This would require earlier return for colonoscopy within 3 yrs. Poor: Colon with solid particulate matter that impaired proper visualization of the mucosa. This would require an earlier return for colonoscopy within 1 yr. Thank you very much for the consultation. I really appreciate it.     Chava Sharma MD

## 2022-05-21 DIAGNOSIS — G43.709 CHRONIC MIGRAINE W/O AURA W/O STATUS MIGRAINOSUS, NOT INTRACTABLE: ICD-10-CM

## 2022-05-23 ENCOUNTER — PATIENT MESSAGE (OUTPATIENT)
Dept: NEUROLOGY | Facility: CLINIC | Age: 62
End: 2022-05-23

## 2022-05-23 DIAGNOSIS — E78.00 HYPERCHOLESTEROLEMIA: ICD-10-CM

## 2022-05-23 RX ORDER — ERENUMAB-AOOE 140 MG/ML
INJECTION, SOLUTION SUBCUTANEOUS
Qty: 1 ML | Refills: 2 | Status: SHIPPED | OUTPATIENT
Start: 2022-05-23

## 2022-05-23 NOTE — TELEPHONE ENCOUNTER
From: Raquel Govea  To: Melissa Anne MD  Sent: 5/23/2022 1:56 PM CDT  Subject: Medicine     Are you going to send over an rx for Topamax for me now and not after I see you?  Also, can we do a video appointment instead of me coming in?

## 2022-05-24 ENCOUNTER — PATIENT MESSAGE (OUTPATIENT)
Dept: INTERNAL MEDICINE CLINIC | Facility: CLINIC | Age: 62
End: 2022-05-24

## 2022-05-24 RX ORDER — EZETIMIBE 10 MG/1
TABLET ORAL
Qty: 90 TABLET | Refills: 1 | Status: SHIPPED | OUTPATIENT
Start: 2022-05-24

## 2022-05-24 NOTE — TELEPHONE ENCOUNTER
LOV: 5/6/2022 with Dr. Keith Robles  RTC: 6 months  Last Relevant Labs: February 2022  Last lipid 12/30/2021  Filled: 3/9/2022    #90 with 0 refills    Future Appointments   Date Time Provider Lilly Tripletti   5/25/2022  8:15 PM PF MRI RM1 (1.5T) PF MRI [place]   5/27/2022  9:00 AM PF US RM2 PF US [place]   5/27/2022 10:30 AM PF XR RM1 PF XRAY [place]   6/13/2022  9:20 AM Panda Sahu MD [facility] ORTHO 75 [facility]   6/23/2022 11:00 AM Ariana Dove MD ENIBOLINGBRK [facility] [place]   0/46/9409 39:48 AM Hemanth Rivas MD [facility]

## 2022-05-24 NOTE — TELEPHONE ENCOUNTER
From: Carlie Trevino  To: [facility],   Sent: 5/24/2022 3:25 PM CDT  Subject: Medicine     I've been meaning to ask you is it ok that I take my Metformin and Jardiance at the same time in the morning?   Thanks   [employer]

## 2022-05-24 NOTE — TELEPHONE ENCOUNTER
Asked Adriana Monreal (RN) if it would be okay to do so, instructed me to tell pt to eat as it would drop sugar levels.

## 2022-05-25 ENCOUNTER — HOSPITAL ENCOUNTER (OUTPATIENT)
Dept: MRI IMAGING | Age: 62
Discharge: HOME | End: 2022-05-25
Attending: PHYSICIAN ASSISTANT
Payer: MEDICAID

## 2022-05-25 DIAGNOSIS — M89.8X8 MASS OF SPINE: ICD-10-CM

## 2022-05-25 LAB — CREAT BLD-MCNC: 0.8 MG/DL

## 2022-05-25 PROCEDURE — 82565 ASSAY OF CREATININE: CPT

## 2022-05-25 PROCEDURE — A9575 INJ GADOTERATE MEGLUMI 0.1ML: HCPCS | Performed by: PHYSICIAN ASSISTANT

## 2022-05-25 PROCEDURE — 72158 MRI LUMBAR SPINE W/O & W/DYE: CPT | Performed by: PHYSICIAN ASSISTANT

## 2022-05-27 ENCOUNTER — HOSPITAL ENCOUNTER (OUTPATIENT)
Dept: GENERAL RADIOLOGY | Age: 62
Discharge: HOME | End: 2022-05-27
Attending: PHYSICIAN ASSISTANT
Payer: MEDICAID

## 2022-05-27 ENCOUNTER — HOSPITAL ENCOUNTER (OUTPATIENT)
Dept: ULTRASOUND IMAGING | Age: 62
Discharge: HOME | End: 2022-05-27
Attending: OBSTETRICS & GYNECOLOGY
Payer: MEDICAID

## 2022-05-27 DIAGNOSIS — G89.29 CHRONIC BILATERAL LOW BACK PAIN WITHOUT SCIATICA: ICD-10-CM

## 2022-05-27 DIAGNOSIS — M54.50 CHRONIC BILATERAL LOW BACK PAIN WITHOUT SCIATICA: ICD-10-CM

## 2022-05-27 DIAGNOSIS — R92.2 INCONCLUSIVE MAMMOGRAM DUE TO DENSE BREASTS: ICD-10-CM

## 2022-05-27 DIAGNOSIS — N60.09 CYST OF BREAST, UNSPECIFIED LATERALITY: ICD-10-CM

## 2022-05-27 DIAGNOSIS — N63.0 BREAST NODULE: ICD-10-CM

## 2022-05-27 PROCEDURE — 72114 X-RAY EXAM L-S SPINE BENDING: CPT | Performed by: PHYSICIAN ASSISTANT

## 2022-05-27 PROCEDURE — 76642 ULTRASOUND BREAST LIMITED: CPT | Performed by: OBSTETRICS & GYNECOLOGY

## 2022-05-31 NOTE — TELEPHONE ENCOUNTER
Spoke to the patient regarding the headache medications. She is currently on Aimovig and Depakote and feels that the headaches have returned back on Depakote this does not seem to be working as well as before. She would like to go back to Topamax.   Patient has an appointment with me on Thursday but I will start her on Topamax 50 mg daily and when I see her in the clinic or will adjust the dose if needed  Okay to discontinue Depakote 250 mg - no need to wean off as patient feels comfortable stopping it given low dose

## 2022-05-31 NOTE — TELEPHONE ENCOUNTER
From: Cordell Hyman  Sent: 5/29/2022 3:52 PM CDT  To: Walter Burn Nurse  Subject: Medicine     Has Dr John Christie ever decided on what mg she wants me to be on the Topamax? It's been quite a while now since we last spoke.

## 2022-06-02 ENCOUNTER — OFFICE VISIT (OUTPATIENT)
Dept: NEUROLOGY | Facility: CLINIC | Age: 62
End: 2022-06-02
Payer: MEDICAID

## 2022-06-02 VITALS
BODY MASS INDEX: 26 KG/M2 | SYSTOLIC BLOOD PRESSURE: 136 MMHG | WEIGHT: 133.63 LBS | RESPIRATION RATE: 16 BRPM | DIASTOLIC BLOOD PRESSURE: 70 MMHG | HEART RATE: 76 BPM

## 2022-06-02 DIAGNOSIS — R92.8 ABNORMAL MAMMOGRAM OF BOTH BREASTS: Primary | ICD-10-CM

## 2022-06-02 DIAGNOSIS — Z12.31 ENCOUNTER FOR SCREENING MAMMOGRAM FOR BREAST CANCER: ICD-10-CM

## 2022-06-02 DIAGNOSIS — G43.709 CHRONIC MIGRAINE W/O AURA W/O STATUS MIGRAINOSUS, NOT INTRACTABLE: Primary | ICD-10-CM

## 2022-06-02 PROCEDURE — 3078F DIAST BP <80 MM HG: CPT | Performed by: OTHER

## 2022-06-02 PROCEDURE — 99213 OFFICE O/P EST LOW 20 MIN: CPT | Performed by: OTHER

## 2022-06-02 PROCEDURE — 3075F SYST BP GE 130 - 139MM HG: CPT | Performed by: OTHER

## 2022-06-02 RX ORDER — METOCLOPRAMIDE HYDROCHLORIDE 5 MG/5ML
5 SOLUTION ORAL 3 TIMES DAILY
COMMUNITY
Start: 2022-05-18

## 2022-06-02 RX ORDER — FLUTICASONE PROPIONATE 50 MCG
2 SPRAY, SUSPENSION (ML) NASAL DAILY
COMMUNITY
Start: 2022-05-24

## 2022-06-02 RX ORDER — RIZATRIPTAN BENZOATE 10 MG/1
10 TABLET ORAL AS NEEDED
Qty: 10 TABLET | Refills: 2 | Status: SHIPPED | OUTPATIENT
Start: 2022-06-02

## 2022-06-02 RX ORDER — ALBUTEROL SULFATE 90 UG/1
2 AEROSOL, METERED RESPIRATORY (INHALATION) EVERY 4 HOURS PRN
COMMUNITY
Start: 2022-05-24

## 2022-06-02 NOTE — PROGRESS NOTES
Patient reports 4 migraines in the last month but has been getting a lot of headaches in the last 8 months which may be stress related.

## 2022-06-13 ENCOUNTER — OFFICE VISIT (OUTPATIENT)
Dept: ORTHOPEDICS CLINIC | Facility: CLINIC | Age: 62
End: 2022-06-13
Payer: MEDICAID

## 2022-06-13 ENCOUNTER — HOSPITAL ENCOUNTER (OUTPATIENT)
Dept: GENERAL RADIOLOGY | Age: 62
Discharge: HOME | End: 2022-06-13
Attending: ORTHOPAEDIC SURGERY
Payer: MEDICAID

## 2022-06-13 VITALS — BODY MASS INDEX: 24.92 KG/M2 | WEIGHT: 132 LBS | HEIGHT: 61 IN

## 2022-06-13 DIAGNOSIS — M17.12 PRIMARY OSTEOARTHRITIS OF LEFT KNEE: ICD-10-CM

## 2022-06-13 DIAGNOSIS — M17.12 PRIMARY OSTEOARTHRITIS OF LEFT KNEE: Primary | ICD-10-CM

## 2022-06-13 PROCEDURE — 3008F BODY MASS INDEX DOCD: CPT | Performed by: ORTHOPAEDIC SURGERY

## 2022-06-13 PROCEDURE — 73564 X-RAY EXAM KNEE 4 OR MORE: CPT | Performed by: ORTHOPAEDIC SURGERY

## 2022-06-13 PROCEDURE — 99213 OFFICE O/P EST LOW 20 MIN: CPT | Performed by: ORTHOPAEDIC SURGERY

## 2022-06-13 NOTE — PROGRESS NOTES
[facility] Progress Note    Subjective: Patient returns for left knee pain. She states that the gel injection we performed last year did not provide any relief. Steroid injection is hit or miss. She has been taking NSAIDs as well as tramadol. She continues to have pain along the medial aspect of the knee joint, worse with weightbearing activities. Objective: Patient is awake alert no distress. There is no effusion about the left knee. There is tenderness about the medial aspect of the knee joint. Imaging: Updated x-rays personally viewed, independently interpreted and radiology report read. Demonstrates stable imaging compared to last year, at worst Kellgren-Jean-Paul grade 1-2 osteoarthritis of the medial compartment with possible joint space narrowing, no large osteophytes. Assessment/Plan: 63-year-old female with symptomatic left knee pain, possibly mild osteoarthritis. We discussed treatment options going forward. Given the mild degree of osteoarthritis, knee replacement would not be recommended. We did discuss continued nonsurgical treatments, of which steroid injections appear to be providing the most relief; or further diagnostic work-up with MRI to determine whether there is any sort of internal derangement or soft tissue issue that can be addressed through alternative treatments or potentially surgery. Patient states she is set up for an injection at the end of this month, she will undergo that and will contact us if she would like to proceed with the MRI in the future.     Danita Blanton MD, [address] Orthopedic Surgery  Phone [phone]  Fax [phone]

## 2022-06-15 ENCOUNTER — PATIENT MESSAGE (OUTPATIENT)
Dept: SURGERY | Facility: CLINIC | Age: 62
End: 2022-06-15

## 2022-06-15 NOTE — TELEPHONE ENCOUNTER
From: Malina Zhou  To: HIEN Poe  Sent: 6/15/2022 12:31 PM CDT  Subject: Question regarding MRI Scan Spine    So that spot where you would be removing whatever it is, is on the right side of my lower back?   Thanks   [employer]

## 2022-06-16 ENCOUNTER — TELEPHONE (OUTPATIENT)
Dept: RHEUMATOLOGY | Facility: CLINIC | Age: 62
End: 2022-06-16

## 2022-06-16 RX ORDER — TRAMADOL HYDROCHLORIDE 50 MG/1
50 TABLET ORAL 2 TIMES DAILY PRN
Qty: 60 TABLET | Refills: 1 | Status: SHIPPED | OUTPATIENT
Start: 2022-06-16

## 2022-06-16 NOTE — TELEPHONE ENCOUNTER
----- Message from Carson Matamoros RN sent at 6/16/2022  1:21 PM CDT -----  Regarding: FW: Medicine     ----- Message -----  From: Valerie Mcpherson: 6/15/2022  12:35 PM CDT  To: [facility] Clinical [facility]  Subject: Medicine                                         I was wondering since I'm having to pay for my tramadol can you send over a 30 day supply? It's a lot cheaper than paying almost 6.00 for 14 pills. They seem to be helping a lot more than the hydrocodone did and I think the new anti-inflammatory are also helping too. I'll be seeing you in a couple weeks and will get my cortisone shot and will go from there and discuss what Dr Lazara Rosa had to say.    Thank you   Soumya

## 2022-06-17 DIAGNOSIS — K21.9 GASTROESOPHAGEAL REFLUX DISEASE WITHOUT ESOPHAGITIS: ICD-10-CM

## 2022-06-18 RX ORDER — OMEPRAZOLE 40 MG/1
CAPSULE, DELAYED RELEASE ORAL
Qty: 180 CAPSULE | Refills: 0 | Status: SHIPPED | OUTPATIENT
Start: 2022-06-18

## 2022-06-18 NOTE — TELEPHONE ENCOUNTER
LOV: 5/6/2022 with Dr. Isra Genao  RTC: 6 months  Last Relevant Labs: 12/30/2021  Filled: 3/21/2022    #180 with 0 refills    Future Appointments   Date Time Provider Lilly Purcell   8/20/1651 10:55 AM Paramjit Veliz MD [facility]   7/12/2022 10:45 AM HIEN Anderson ENMARILEE [facility] [place]   12/1/2022  1:00 PM Carol Quinones MD ENTRUDIGBJUANAK [facility]

## 2022-06-22 DIAGNOSIS — N39.41 URGE INCONTINENCE: ICD-10-CM

## 2022-06-23 RX ORDER — OXYBUTYNIN CHLORIDE 10 MG/1
TABLET, EXTENDED RELEASE ORAL
Qty: 90 TABLET | Refills: 3 | Status: SHIPPED | OUTPATIENT
Start: 2022-06-23

## 2022-06-28 ENCOUNTER — PATIENT MESSAGE (OUTPATIENT)
Dept: INTERNAL MEDICINE CLINIC | Facility: CLINIC | Age: 62
End: 2022-06-28

## 2022-06-28 DIAGNOSIS — R05.9 COUGH: Primary | ICD-10-CM

## 2022-06-28 DIAGNOSIS — R50.9 FEVER, UNSPECIFIED FEVER CAUSE: Primary | ICD-10-CM

## 2022-06-28 NOTE — TELEPHONE ENCOUNTER
SV- Any further recommendations? Spoke with patient via phone. Patient reports not feeling well since Friday (6/24). Had fever (Sunday 102.1, Monday 101.7, Today \"normal\"). Has had productive cough with \"brownish\" phlegm. Denies any shortness of breath, wheezing, chest pain, lightheadedness, or headache. Has been taking OTC \"Nyquil/Dayquil\" since Friday, and is feeling a little better today. Has not needed to use her rescue inhaler- didn't feel she needed it. Has been taking fluids without difficulty. Denies nausea or vomiting. At home covid test today was negative.

## 2022-06-28 NOTE — TELEPHONE ENCOUNTER
From: Yohana Guillen  To: 32 [facility]  Sent: 6/28/2022 8:22 AM CDT  Subject: Flu like symptoms     I've been really sick since Friday. But Sunday was the worst I had a fever of 102.1 and it went down last night to 101.7, I did a in home covid test and it was negative. My fever is going down but my chest still hurts and I've been coughing up a lot of phelm that's dark. What do you suggest? I've been taking cold/flu medication and been resting.    Thank you   Yohana Guillen

## 2022-06-29 ENCOUNTER — PATIENT MESSAGE (OUTPATIENT)
Dept: INTERNAL MEDICINE CLINIC | Facility: CLINIC | Age: 62
End: 2022-06-29

## 2022-06-29 ENCOUNTER — LAB ENCOUNTER (OUTPATIENT)
Dept: LAB | Age: 62
End: 2022-06-29
Attending: INTERNAL MEDICINE
Payer: MEDICAID

## 2022-06-29 ENCOUNTER — OFFICE VISIT (OUTPATIENT)
Dept: RHEUMATOLOGY | Facility: CLINIC | Age: 62
End: 2022-06-29
Payer: MEDICAID

## 2022-06-29 ENCOUNTER — HOSPITAL ENCOUNTER (OUTPATIENT)
Dept: GENERAL RADIOLOGY | Age: 62
Discharge: HOME | End: 2022-06-29
Attending: INTERNAL MEDICINE
Payer: MEDICAID

## 2022-06-29 VITALS
SYSTOLIC BLOOD PRESSURE: 122 MMHG | OXYGEN SATURATION: 96 % | TEMPERATURE: 98 F | DIASTOLIC BLOOD PRESSURE: 78 MMHG | HEIGHT: 60 IN | BODY MASS INDEX: 25.32 KG/M2 | WEIGHT: 129 LBS | HEART RATE: 102 BPM

## 2022-06-29 DIAGNOSIS — R05.9 COUGH: ICD-10-CM

## 2022-06-29 DIAGNOSIS — R50.9 FEVER, UNSPECIFIED FEVER CAUSE: ICD-10-CM

## 2022-06-29 DIAGNOSIS — M17.12 PRIMARY OSTEOARTHRITIS OF LEFT KNEE: Primary | ICD-10-CM

## 2022-06-29 PROCEDURE — 3008F BODY MASS INDEX DOCD: CPT | Performed by: INTERNAL MEDICINE

## 2022-06-29 PROCEDURE — 3074F SYST BP LT 130 MM HG: CPT | Performed by: INTERNAL MEDICINE

## 2022-06-29 PROCEDURE — 3078F DIAST BP <80 MM HG: CPT | Performed by: INTERNAL MEDICINE

## 2022-06-29 PROCEDURE — 71046 X-RAY EXAM CHEST 2 VIEWS: CPT | Performed by: INTERNAL MEDICINE

## 2022-06-29 PROCEDURE — 20610 DRAIN/INJ JOINT/BURSA W/O US: CPT | Performed by: INTERNAL MEDICINE

## 2022-06-29 RX ORDER — TRAMADOL HYDROCHLORIDE 50 MG/1
50 TABLET ORAL 2 TIMES DAILY PRN
Qty: 60 TABLET | Refills: 1 | Status: SHIPPED | OUTPATIENT
Start: 2022-06-29

## 2022-06-29 RX ORDER — METHYLPREDNISOLONE ACETATE 40 MG/ML
40 INJECTION, SUSPENSION INTRA-ARTICULAR; INTRALESIONAL; INTRAMUSCULAR; SOFT TISSUE ONCE
Status: COMPLETED | OUTPATIENT
Start: 2022-06-29 | End: 2022-06-29

## 2022-06-29 RX ADMIN — METHYLPREDNISOLONE ACETATE 40 MG: 40 INJECTION, SUSPENSION INTRA-ARTICULAR; INTRALESIONAL; INTRAMUSCULAR; SOFT TISSUE at 11:59:00

## 2022-06-29 NOTE — PATIENT INSTRUCTIONS
The left knee was injected with cortisone. Go home and rest.  Ice the knee if necessary for 10 to 15 minutes for pain. You do have tramadol at home for mild pain take 50 milligrams as needed once or twice a day. For milder pain use extra strength Tylenol. Continue diclofenac 75 mg twice a day for your arthritis pain. Okay to use over-the-counter salves such as Aspercreme or diclofenac gel also you can apply those 2-3 times a day. Return to office for recheck 3 months.

## 2022-06-30 ENCOUNTER — PATIENT MESSAGE (OUTPATIENT)
Dept: INTERNAL MEDICINE CLINIC | Facility: CLINIC | Age: 62
End: 2022-06-30

## 2022-06-30 ENCOUNTER — TELEPHONE (OUTPATIENT)
Dept: UROLOGY | Facility: CLINIC | Age: 62
End: 2022-06-30

## 2022-06-30 ENCOUNTER — HOSPITAL ENCOUNTER (OUTPATIENT)
Age: 62
Discharge: HOME | End: 2022-06-30
Payer: MEDICAID

## 2022-06-30 VITALS
OXYGEN SATURATION: 93 % | RESPIRATION RATE: 18 BRPM | SYSTOLIC BLOOD PRESSURE: 147 MMHG | TEMPERATURE: 98 F | HEART RATE: 82 BPM | DIASTOLIC BLOOD PRESSURE: 82 MMHG

## 2022-06-30 DIAGNOSIS — J32.9 SINOBRONCHITIS: Primary | ICD-10-CM

## 2022-06-30 DIAGNOSIS — J40 SINOBRONCHITIS: Primary | ICD-10-CM

## 2022-06-30 LAB — SARS-COV-2 RNA RESP QL NAA+PROBE: NOT DETECTED

## 2022-06-30 PROCEDURE — 99213 OFFICE O/P EST LOW 20 MIN: CPT | Performed by: NURSE PRACTITIONER

## 2022-06-30 RX ORDER — AMOXICILLIN AND CLAVULANATE POTASSIUM 875; 125 MG/1; MG/1
1 TABLET, FILM COATED ORAL 2 TIMES DAILY
Qty: 14 TABLET | Refills: 0 | Status: SHIPPED | OUTPATIENT
Start: 2022-06-30 | End: 2022-06-30

## 2022-06-30 RX ORDER — AMOXICILLIN AND CLAVULANATE POTASSIUM 875; 125 MG/1; MG/1
1 TABLET, FILM COATED ORAL 2 TIMES DAILY
Qty: 14 TABLET | Refills: 0 | Status: SHIPPED | OUTPATIENT
Start: 2022-06-30 | End: 2022-07-07

## 2022-06-30 RX ORDER — PREDNISONE 20 MG/1
40 TABLET ORAL DAILY
Qty: 10 TABLET | Refills: 0 | Status: SHIPPED | OUTPATIENT
Start: 2022-06-30 | End: 2022-07-05

## 2022-06-30 RX ORDER — PREDNISONE 20 MG/1
40 TABLET ORAL DAILY
Qty: 10 TABLET | Refills: 0 | Status: SHIPPED | OUTPATIENT
Start: 2022-06-30 | End: 2022-06-30

## 2022-06-30 NOTE — ED INITIAL ASSESSMENT (HPI)
Pt c/o 1 week of cough and chest pain. Pt states she's had a fever. Pt states she had a cxr and covid test yesterday. Pt states both were negative.

## 2022-06-30 NOTE — TELEPHONE ENCOUNTER
Pt called to say the oxybutynin 10mg she is taking isn't working. Has nocturia 3-4 x. Pt asking for another medication. Pt had been told at Feb visit to f/u in 6wks. Plan for make televisit w/ Elaine. Pt is sick currently w/ URI, so she doesn't want to come to an appt.

## 2022-06-30 NOTE — TELEPHONE ENCOUNTER
From: Irma Lundberg  To: Rika Grullon DO  Sent: 6/30/2022 9:03 AM CDT  Subject: Possible office visit    Don't know if you saw my question that I sent along with the follow-up question on the chest xray but I don't know if I should come in to see you or do you want to just call in antibiotics for me? I really need something for this chest pain and sore throat. This could be bronchitis or maybe a viral infection.    Thank you   Soumya

## 2022-06-30 NOTE — TELEPHONE ENCOUNTER
From: Jacy Hilton  To: 32 [facility],   Sent: 6/29/2022 6:34 PM CDT  Subject: Chest Xray     I already sent a question about the results on the chest xray but I'm wondering if I could I shouldn't make an appointment to come in and see you?   Thanks   [facility]

## 2022-06-30 NOTE — TELEPHONE ENCOUNTER
Future Appointments   Date Time Provider Lilly Purcell   7/5/2022  1:00 PM Anette Peters DO EMG 8 [facility] [place]   7/12/2022 10:45 AM HIEN Cordero [facility] [place]   4/53/4555  4:15 PM Hemanth Rivas MD EMGR[facility]UM[facility]BSN [facility]   12/1/2022  1:00 PM Ariana Dove MD ENIBOLINGBRK [facility] [place]     VV scheduled for now. Ok to change to OV if covid test is negative. Otherwise appointment needs to stay VV. Pt stated she is in a lot of pain and would like to get something prescribed as soon as possible Please advise.

## 2022-07-01 NOTE — TELEPHONE ENCOUNTER
From: Jacy Hilton  To: 32 [facility],   Sent: 6/30/2022 6:31 PM CDT  Subject: Medicine     You don't have to worry about getting me any medication I ended up going to the immediate care in [place]. I'm sure you'll know because they said they would send you the information.    Thanks   Lukkin

## 2022-07-02 DIAGNOSIS — E11.65 UNCONTROLLED TYPE 2 DIABETES MELLITUS WITH HYPERGLYCEMIA, WITHOUT LONG-TERM CURRENT USE OF INSULIN (HCC): ICD-10-CM

## 2022-07-04 ENCOUNTER — PATIENT MESSAGE (OUTPATIENT)
Dept: INTERNAL MEDICINE CLINIC | Facility: CLINIC | Age: 62
End: 2022-07-04

## 2022-07-05 RX ORDER — EMPAGLIFLOZIN 25 MG/1
TABLET, FILM COATED ORAL
Qty: 90 TABLET | Refills: 0 | Status: SHIPPED | OUTPATIENT
Start: 2022-07-05

## 2022-07-07 NOTE — TELEPHONE ENCOUNTER
Alprazolam 0.25 mg tab approved   Effective 3-22-19 through 8-22-19 Total Joint Week 1 Survey    Flowsheet Row Responses   [facility] patient discharged from? [place]   Does the patient have one of the following disease processes/diagnoses(primary or secondary)? Total Joint Replacement   Joint surgery performed? Hip   Week 1 attempt successful? Yes   Call start time 1146   Call end time 1155   Has the patient been back in either the hospital or Emergency Department since discharge? No   Discharge diagnosis Right total hip arthroplasty   Does the patient have all medications related to this admission filled (includes all antibiotics, pain medications, etc.) No   What is keeping the patient from filling the prescriptions? --  [Son is picking up meds today at pharmacy. ]   Nursing Interventions No intervention needed, Nurse provided patient education   Is the patient taking all medications as directed (includes completed medication regime)? Yes   Is the patient able to teach back alternate methods of pain control? Ice, Reposition, Correct alignment, Short, frequent activity   Does the patient have a follow up appointment with their surgeon? Yes   What is the Home health agency?  [facility]   Has home health visited the patient within 72 hours of discharge? No   Psychosocial issues? No   Has the patient began therapy sessions (either in the home or as an out patient)? No   If the patient has started attending therapy, what post op day did they begin to attend (either in home or as an out patient)?   Patient reports PT has not started yet.    Does the patient have a wound vac in place? No   Has the patient fallen since discharge? No   Did the patient receive a copy of their discharge instructions? Yes   Nursing interventions Reviewed instructions with patient   What is the patient's perception of their functional status since discharge? Improving   Is the patient able to teach back signs and symptoms of infection? Increased swelling or redness around incision (not associated  with surgical edema)   Is the patient able to teach back how to prevent infection? Check incision daily, Shower only as directed by surgeon, No tub baths, hot tub or swimming   Is the patient able to teach back signs and symptoms of DVT? Redness in calf, Swelling in calf, Severe pain in calf, Area hot to touch, Shortness of breath or chest pain   Is the patient able to teach back home safety measures? Ability to shower   Did the patient implement home safety suggestions from pre-surgery classes if attended? Yes   If the patient is a current smoker, are they able to teach back resources for cessation? Smoking cessation medications   Is the patient/caregiver able to teach back the hierarchy of who to call/visit for symptoms/problems? PCP, Specialist, Home health nurse, Urgent Care, ED, 911 Yes   Week 1 call completed? Yes          FLORIN WILLAMS - Registered Nurse

## 2022-07-10 ENCOUNTER — PATIENT MESSAGE (OUTPATIENT)
Dept: INTERNAL MEDICINE CLINIC | Facility: CLINIC | Age: 62
End: 2022-07-10

## 2022-07-11 ENCOUNTER — TELEPHONE (OUTPATIENT)
Dept: SURGERY | Facility: CLINIC | Age: 62
End: 2022-07-11

## 2022-07-11 RX ORDER — ALBUTEROL SULFATE 2.5 MG/3ML
2.5 SOLUTION RESPIRATORY (INHALATION) EVERY 6 HOURS PRN
Qty: 3 ML | Refills: 3 | Status: SHIPPED | OUTPATIENT
Start: 2022-07-11 | End: 2022-07-25

## 2022-07-11 NOTE — TELEPHONE ENCOUNTER
From: Yohana Guillen  To: Rajesh Miranda DO  Sent: 7/10/2022 10:06 PM CDT  Subject: Sarah Pittman, I was wondering if you could call in a prescription for my nebulizer. I'm having problems breathing and I think it'll really help. I'm using a different pharmacy right now. [facility] [address], [place].    Thank you   Soumya

## 2022-07-11 NOTE — TELEPHONE ENCOUNTER
I would like the patient to meet Dr. Kevon Menezes tomorrow. Her appt is at 10:45 with me and Dr. Kevon Menezes will not be available that morning. I would like her to come in at 2:30 instead so I can introduce Dr. Kevon Menezes.

## 2022-07-11 NOTE — TELEPHONE ENCOUNTER
SV-patient requesting order for \"nebulizer solution\" please advise    Seen at [facility] (6/30/22), diagnoses with \"sinobronchitis\", prescribed prednisone (5 days), and amoxicillin/clavulanate (7 days). Patient reports once medication stopped symptoms of \"bronchitis\" started again. Reports mild \"chest tightness\" that is temporarily relieved with rescue inhaler. Patient wants to try using nebulizer machine again because in the past this has helped, but does not have any solution.

## 2022-07-12 ENCOUNTER — OFFICE VISIT (OUTPATIENT)
Dept: SURGERY | Facility: CLINIC | Age: 62
End: 2022-07-12
Payer: MEDICAID

## 2022-07-12 ENCOUNTER — PATIENT MESSAGE (OUTPATIENT)
Dept: INTERNAL MEDICINE CLINIC | Facility: CLINIC | Age: 62
End: 2022-07-12

## 2022-07-12 ENCOUNTER — TELEPHONE (OUTPATIENT)
Dept: SURGERY | Facility: CLINIC | Age: 62
End: 2022-07-12

## 2022-07-12 VITALS — DIASTOLIC BLOOD PRESSURE: 86 MMHG | SYSTOLIC BLOOD PRESSURE: 128 MMHG | HEART RATE: 73 BPM

## 2022-07-12 DIAGNOSIS — G89.29 CHRONIC BILATERAL LOW BACK PAIN WITHOUT SCIATICA: ICD-10-CM

## 2022-07-12 DIAGNOSIS — L72.3 SEBACEOUS CYST: Primary | ICD-10-CM

## 2022-07-12 DIAGNOSIS — M89.8X8 MASS OF SPINE: Primary | ICD-10-CM

## 2022-07-12 DIAGNOSIS — M54.50 CHRONIC BILATERAL LOW BACK PAIN WITHOUT SCIATICA: ICD-10-CM

## 2022-07-12 DIAGNOSIS — G62.9 NEUROPATHY: Primary | ICD-10-CM

## 2022-07-12 PROCEDURE — 3074F SYST BP LT 130 MM HG: CPT | Performed by: PHYSICIAN ASSISTANT

## 2022-07-12 PROCEDURE — 99212 OFFICE O/P EST SF 10 MIN: CPT | Performed by: PHYSICIAN ASSISTANT

## 2022-07-12 PROCEDURE — 3079F DIAST BP 80-89 MM HG: CPT | Performed by: PHYSICIAN ASSISTANT

## 2022-07-12 NOTE — TELEPHONE ENCOUNTER
From: Cordell Hyman  To: 32 [facility],   Sent: 7/12/2022 4:13 PM CDT  Subject: Dr. Myriam Deshpande     I was wondering why are you wanting me to see her instead of just going to [address] [place] [address] Drive and getting an eye exam with them? They do a diabetic exam also.    Thank you   Soumya

## 2022-07-13 ENCOUNTER — PATIENT MESSAGE (OUTPATIENT)
Dept: INTERNAL MEDICINE CLINIC | Facility: CLINIC | Age: 62
End: 2022-07-13

## 2022-07-13 ENCOUNTER — TELEPHONE (OUTPATIENT)
Dept: INTERNAL MEDICINE CLINIC | Facility: CLINIC | Age: 62
End: 2022-07-13

## 2022-07-13 RX ORDER — GABAPENTIN 300 MG/1
300 CAPSULE ORAL NIGHTLY
Qty: 90 CAPSULE | Refills: 0 | Status: SHIPPED | OUTPATIENT
Start: 2022-07-13

## 2022-07-13 NOTE — TELEPHONE ENCOUNTER
Pt called back stating she had some detailed questions and would like to speak with a nurse. Pt would not go into detail of what questions she had. hair removal not indicated

## 2022-07-13 NOTE — TELEPHONE ENCOUNTER
Call returned to pt. Pt stated Dr Valerie Beverly is a 2-3 hour visit. [employer] does some type of imaging for diabetics but not a dilated exam.  Ok to proceed with [employer]? Pt reports tingling in her bilateral feet/toes especially when feet are cold. Tingling has been ongoing for the past few months. Asking for Dr Edison Vaca recommendation/next step.

## 2022-07-14 ENCOUNTER — TELEPHONE (OUTPATIENT)
Dept: UROLOGY | Facility: CLINIC | Age: 62
End: 2022-07-14

## 2022-07-14 ENCOUNTER — VIRTUAL PHONE E/M (OUTPATIENT)
Dept: UROLOGY | Facility: CLINIC | Age: 62
End: 2022-07-14
Attending: STUDENT IN AN ORGANIZED HEALTH CARE EDUCATION/TRAINING PROGRAM
Payer: MEDICAID

## 2022-07-14 DIAGNOSIS — N95.2 POSTMENOPAUSAL ATROPHIC VAGINITIS: ICD-10-CM

## 2022-07-14 DIAGNOSIS — N81.84 PELVIC MUSCLE WASTING: ICD-10-CM

## 2022-07-14 DIAGNOSIS — N39.41 URGE INCONTINENCE: Primary | ICD-10-CM

## 2022-07-14 DIAGNOSIS — R35.1 NOCTURIA: ICD-10-CM

## 2022-07-14 RX ORDER — SOLIFENACIN SUCCINATE 5 MG/1
5 TABLET, FILM COATED ORAL DAILY
Qty: 30 TABLET | Refills: 11 | Status: SHIPPED | OUTPATIENT
Start: 2022-07-14

## 2022-07-14 RX ORDER — SOLIFENACIN SUCCINATE 5 MG/1
5 TABLET, FILM COATED ORAL DAILY
Qty: 30 TABLET | Refills: 11 | Status: SHIPPED | OUTPATIENT
Start: 2022-07-14 | End: 2022-07-14

## 2022-07-14 NOTE — TELEPHONE ENCOUNTER
TC from pt requesting that her Vesicare be re-ordered by [facility] as her insurance will not cover the cost if ordered by [facility]. TORB Vesicare 5mg PO every day x 30d.  Sent to [facility] in [place] per pt request.

## 2022-07-14 NOTE — PROGRESS NOTES
Patient to follow up OAB/UUI  Given circumstances surrounding COVID-19 this visit is being conducted as a televisit with pt's consent. Pt in safe, private location prior to beginning visit. Provider located in office setting. She is currently using Oxybutynin XR 10mg daily  She reports minimal improvement   Some tolerable dry mouth  Bowels regular  Denies other side effects   Wants to try new med for UUI   Not bothered by NAN symptoms   Using vaginal estrogen cream twice weekly     Impression/Plan:  (N39.41) Urge incontinence  (primary encounter diagnosis)  Plan: Solifenacin Succinate (VESICARE) 5 MG Oral Tab    (R35.1) Nocturia  Plan: Solifenacin Succinate (VESICARE) 5 MG Oral Tab    (N95.2) Postmenopausal atrophic vaginitis    (N81.84) Pelvic muscle wasting      Discussion Items:   Discussed dietary and behavioral modifications for mgmt of urinary symptoms  Discussed weight management and benefits of weight loss on urinary symptoms  Reviewed AUA/SUFU guidelines on mgmt of non-neurogenic OAB  Discussed pharmacologic and nonpharmacologic mgmt options of urinary symptoms - reviewed risks, benefits, alternatives, and goals of treatment  Discussed specific risks related to OAB meds including, but not limited to dry mouth, constipation, blurry vision, cognitive changes, and BP elevation. Treatment Plan, Non-surgical:   Will trial Vesicare 5 mg daily   Bladder diet/drill   Continue vaginal estrogen cream twice weekly   Daily pelvic exercises  Call w/ s/sx of UTI  All questions answered  She understands and agrees to plan    Return in about 4 weeks (around 8/11/2022) for medication f/u, sooner prn .     Lynette Betts PA-C    Approximately 20 minutes of total time spent for patient encounter including but not limited to time spent chart reviewing, review of laboratory results and prior imaging studies, review of previous treatment plans, and patient education

## 2022-07-19 ENCOUNTER — PATIENT MESSAGE (OUTPATIENT)
Dept: NEUROLOGY | Facility: CLINIC | Age: 62
End: 2022-07-19

## 2022-07-19 NOTE — TELEPHONE ENCOUNTER
From: Be Fairbanks  To: Lucas Sun MD  Sent: 7/19/2022 3:51 PM CDT  Subject: TD     I really want to be tested for TD. My sister says I'm constantly moving my eyebrows and lips and this has been going on for a long time. I also move my toes a lot too. Please advise me.    Thanks   [employer]

## 2022-07-22 ENCOUNTER — PATIENT MESSAGE (OUTPATIENT)
Dept: INTERNAL MEDICINE CLINIC | Facility: CLINIC | Age: 62
End: 2022-07-22

## 2022-07-22 NOTE — TELEPHONE ENCOUNTER
Please advise. Thank you! Provided patient with home care instructions. She is requesting a topical antibiotic for sunburn. Reports white bumps all over legs, has had \"sun poisoning\" before.

## 2022-07-22 NOTE — TELEPHONE ENCOUNTER
From: Vikas Mcdonald  To: 32 [facility]  Sent: 7/22/2022 12:13 PM CDT  Subject: Phoebe Geneva poisoning     Added note to my other message. I know it's sun poisoning because I've gotten it many many times over the last 30 plus years.

## 2022-07-22 NOTE — TELEPHONE ENCOUNTER
Called pt to schedule sx. No answer. Left message informing that nursing will try to reach out to her again today to schedule.

## 2022-07-22 NOTE — TELEPHONE ENCOUNTER
From: Augusto Nieves  Sent: 7/22/2022 1:25 PM CDT  To: [facility] Clinical Staff  Subject: Miroslava Rodriguez poisoning     How do I get a topical antibiotic? I actually have white bumps all over my legs. That's what happens when I get sun poisoning.

## 2022-07-24 ENCOUNTER — PATIENT MESSAGE (OUTPATIENT)
Dept: INTERNAL MEDICINE CLINIC | Facility: CLINIC | Age: 62
End: 2022-07-24

## 2022-07-24 DIAGNOSIS — I10 ESSENTIAL HYPERTENSION: ICD-10-CM

## 2022-07-25 RX ORDER — METOPROLOL SUCCINATE 50 MG/1
50 TABLET, EXTENDED RELEASE ORAL DAILY
Qty: 90 TABLET | Refills: 0 | Status: SHIPPED | OUTPATIENT
Start: 2022-07-25

## 2022-07-25 NOTE — TELEPHONE ENCOUNTER
Protocol passed     Requesting: metoprolol 50mg     LOV: 5/6/22   RTC: 6 months   Filled: 4/25/22 #90 0 refills   Recent Labs: 11/30/21     Upcoming OV: none scheduled

## 2022-07-25 NOTE — TELEPHONE ENCOUNTER
From: Mary Quiles  To: 32 [facility],   Sent: 7/24/2022 11:32 AM CDT  Subject: Metoprolol    I'm out of refills for my Metoprolol and would like for you to please send my refills to [facility] in [place] please. If you have any questions please call me.    Thank you   Soumya

## 2022-07-28 ENCOUNTER — PATIENT MESSAGE (OUTPATIENT)
Dept: INTERNAL MEDICINE CLINIC | Facility: CLINIC | Age: 62
End: 2022-07-28

## 2022-07-28 DIAGNOSIS — E78.00 HYPERCHOLESTEROLEMIA: ICD-10-CM

## 2022-07-28 DIAGNOSIS — I10 ESSENTIAL HYPERTENSION: ICD-10-CM

## 2022-07-28 RX ORDER — METOPROLOL SUCCINATE 50 MG/1
50 TABLET, EXTENDED RELEASE ORAL DAILY
Qty: 90 TABLET | Refills: 0 | Status: CANCELLED
Start: 2022-07-28

## 2022-07-28 NOTE — TELEPHONE ENCOUNTER
From: Sha Delcid  To: 32 [facility],   Sent: 7/28/2022 12:22 PM CDT  Subject: Metformin and Atorvastatin     I'm slowly moving my rxs over to [facility] on [address] in [place] and am out of refills on both of the above mentioned medications could you please send over new rxs with refills to [facility] for me please.    Thank you

## 2022-07-29 RX ORDER — ATORVASTATIN CALCIUM 40 MG/1
40 TABLET, FILM COATED ORAL NIGHTLY
Qty: 90 TABLET | Refills: 0 | Status: SHIPPED | OUTPATIENT
Start: 2022-07-29

## 2022-08-01 ENCOUNTER — PATIENT MESSAGE (OUTPATIENT)
Dept: INTERNAL MEDICINE CLINIC | Facility: CLINIC | Age: 62
End: 2022-08-01

## 2022-08-01 DIAGNOSIS — E11.65 UNCONTROLLED TYPE 2 DIABETES MELLITUS WITH HYPERGLYCEMIA, WITHOUT LONG-TERM CURRENT USE OF INSULIN (HCC): ICD-10-CM

## 2022-08-01 NOTE — TELEPHONE ENCOUNTER
From: Amada Cruz  To: Bryce Kelly DO  Sent: 8/1/2022 12:07 PM CDT  Subject: Metformin     I sent you a message regarding 2 of my medications that I needed refills on and you sent 1 of them but not the other. I still need Metformin sent to [facility] [address] in [place]. The only reason I'm not having them request it is because they've never filled it before and it would just take longer trying to switch it over from [place] to them.    Thank you   Soumya

## 2022-08-04 ENCOUNTER — PATIENT MESSAGE (OUTPATIENT)
Dept: INTERNAL MEDICINE CLINIC | Facility: CLINIC | Age: 62
End: 2022-08-04

## 2022-08-04 DIAGNOSIS — J01.01 ACUTE RECURRENT MAXILLARY SINUSITIS: Primary | ICD-10-CM

## 2022-08-04 RX ORDER — PREDNISONE 20 MG/1
40 TABLET ORAL DAILY
Qty: 10 TABLET | Refills: 0 | Status: SHIPPED | OUTPATIENT
Start: 2022-08-04 | End: 2022-08-09

## 2022-08-04 RX ORDER — AMOXICILLIN AND CLAVULANATE POTASSIUM 875; 125 MG/1; MG/1
1 TABLET, FILM COATED ORAL 2 TIMES DAILY
Qty: 14 TABLET | Refills: 0 | Status: SHIPPED | OUTPATIENT
Start: 2022-08-04 | End: 2022-08-11

## 2022-08-04 NOTE — TELEPHONE ENCOUNTER
From: Malina Zhou  To: 32 [facility],   Sent: 8/4/2022 3:00 PM CDT  Subject: Sick again     Hi Dr Ricki Jain, I know your not in on Thursdays but I wanted to message you so you would get this first thing in the morning. I am sick again. I took a home covid test and it's negative which I knew it would be. This started as a cold and I feel like it's becoming bronchitis again [facility]! The last time this happened it took forever for your office to figure out what to do and then I ended up going to immediate care. I don't want to do that this time. Please message me back ASAP.    Thank you   Soumya

## 2022-08-04 NOTE — TELEPHONE ENCOUNTER
[address] [place] [address] [phone] and spoke with Dionisio. Call reference #[id number].  Time on call 23:31    Prior authorization request completed for: right sacral subcutaneous cyst excision  Authorization #NO AUTHORIZATION REQUIRED  Authorization dates: 8/31//2022  CPT codes approved: 20083  Number of visits/dates of service approved: 0 outpatient  Physician: Dr Jerry Andre  Location: [facility]

## 2022-08-11 ENCOUNTER — VIRTUAL PHONE E/M (OUTPATIENT)
Dept: UROLOGY | Facility: CLINIC | Age: 62
End: 2022-08-11
Attending: STUDENT IN AN ORGANIZED HEALTH CARE EDUCATION/TRAINING PROGRAM
Payer: MEDICAID

## 2022-08-11 DIAGNOSIS — R35.1 NOCTURIA: ICD-10-CM

## 2022-08-11 DIAGNOSIS — N95.2 POSTMENOPAUSAL ATROPHIC VAGINITIS: ICD-10-CM

## 2022-08-11 DIAGNOSIS — N39.41 URGE INCONTINENCE: Primary | ICD-10-CM

## 2022-08-11 DIAGNOSIS — R35.0 URINARY FREQUENCY: ICD-10-CM

## 2022-08-11 NOTE — PROGRESS NOTES
Patient to follow up UUI/nocturia  Given circumstances surrounding COVID-19 this visit is being conducted as a televisit with pt's consent. Pt in safe, private location prior to beginning visit. Provider located in office setting. She is currently using vesicare 5 mg daily  She reports 50% improvement  Tolerable dry mouth, denies constipation  Denies other significant side effects   UUI improved, biggest bother is urinary frequency  Nocturia x 3, does not limit PM fluids  Not consistent with estrogen cream   Bowels regular   Denies current UTI s/sx    Wants to stay on current tx plan      Impression/Plan:  (N39.41) Urge incontinence  (primary encounter diagnosis)    (R35.0) Urinary frequency    (R35.1) Nocturia    (N95.2) Postmenopausal atrophic vaginitis      Discussion Items:   Discussed dietary and behavioral modifications for mgmt of urinary symptoms  Discussed weight management and benefits of weight loss on urinary symptoms  Reviewed AUA/SUFU guidelines on mgmt of non-neurogenic OAB  Discussed pharmacologic and nonpharmacologic mgmt options of urinary symptoms - reviewed risks, benefits, alternatives, and goals of treatment  Discussed specific risks related to OAB meds including, but not limited to dry mouth, constipation, blurry vision, cognitive changes, and BP elevation. Discussed nocturia management-recommend elevating legs and working to limit PM fluids    Treatment Plan, Non-surgical:   Continue vesicare 5 mg daily   If symptoms persist consider increasing to 10 mg daily   Bladder diet/drill  Nocturia management as discussed  Vaginal estrogen cream twice weekly  Call w/ s/sx of UTI   All questions answered  She understands and agrees to plan    Return in about 3 months (around 11/11/2022) for nocturia follow up, sooner prn .     Bettina Espinoza PA-C    Approximately 20 minutes of total time spent for patient encounter including but not limited to time spent chart reviewing, review of laboratory results and prior imaging studies, review of previous treatment plans,  and patient education

## 2022-08-15 ENCOUNTER — TELEPHONE (OUTPATIENT)
Dept: INTERNAL MEDICINE CLINIC | Facility: CLINIC | Age: 62
End: 2022-08-15

## 2022-08-15 ENCOUNTER — OFFICE VISIT (OUTPATIENT)
Dept: INTERNAL MEDICINE CLINIC | Facility: CLINIC | Age: 62
End: 2022-08-15
Payer: MEDICAID

## 2022-08-15 VITALS
BODY MASS INDEX: 26.82 KG/M2 | SYSTOLIC BLOOD PRESSURE: 124 MMHG | HEART RATE: 86 BPM | HEIGHT: 60 IN | OXYGEN SATURATION: 97 % | RESPIRATION RATE: 16 BRPM | TEMPERATURE: 99 F | DIASTOLIC BLOOD PRESSURE: 78 MMHG | WEIGHT: 136.63 LBS

## 2022-08-15 DIAGNOSIS — Z01.818 PREOP EXAMINATION: Primary | ICD-10-CM

## 2022-08-15 DIAGNOSIS — G89.29 CHRONIC BILATERAL LOW BACK PAIN WITHOUT SCIATICA: ICD-10-CM

## 2022-08-15 DIAGNOSIS — F33.1 MDD (MAJOR DEPRESSIVE DISORDER), RECURRENT EPISODE, MODERATE (HCC): ICD-10-CM

## 2022-08-15 DIAGNOSIS — G24.01 TARDIVE DYSKINESIA: ICD-10-CM

## 2022-08-15 DIAGNOSIS — N39.498 OTHER URINARY INCONTINENCE: ICD-10-CM

## 2022-08-15 DIAGNOSIS — G62.9 NEUROPATHY: ICD-10-CM

## 2022-08-15 DIAGNOSIS — E11.9 TYPE 2 DIABETES MELLITUS WITHOUT COMPLICATION, WITHOUT LONG-TERM CURRENT USE OF INSULIN (HCC): ICD-10-CM

## 2022-08-15 DIAGNOSIS — K21.9 GASTROESOPHAGEAL REFLUX DISEASE WITHOUT ESOPHAGITIS: ICD-10-CM

## 2022-08-15 DIAGNOSIS — G43.009 MIGRAINE WITHOUT AURA AND WITHOUT STATUS MIGRAINOSUS, NOT INTRACTABLE: ICD-10-CM

## 2022-08-15 DIAGNOSIS — F51.01 PRIMARY INSOMNIA: ICD-10-CM

## 2022-08-15 DIAGNOSIS — Z99.89 OSA ON CPAP: ICD-10-CM

## 2022-08-15 DIAGNOSIS — E11.40 TYPE 2 DIABETES MELLITUS WITH DIABETIC NEUROPATHY, WITHOUT LONG-TERM CURRENT USE OF INSULIN (HCC): ICD-10-CM

## 2022-08-15 DIAGNOSIS — G47.33 OSA ON CPAP: ICD-10-CM

## 2022-08-15 DIAGNOSIS — I10 ESSENTIAL HYPERTENSION: ICD-10-CM

## 2022-08-15 DIAGNOSIS — K22.70 BARRETT'S ESOPHAGUS WITHOUT DYSPLASIA: ICD-10-CM

## 2022-08-15 DIAGNOSIS — E78.00 HYPERCHOLESTEROLEMIA: ICD-10-CM

## 2022-08-15 DIAGNOSIS — M54.50 CHRONIC BILATERAL LOW BACK PAIN WITHOUT SCIATICA: ICD-10-CM

## 2022-08-15 DIAGNOSIS — F41.1 GAD (GENERALIZED ANXIETY DISORDER): ICD-10-CM

## 2022-08-15 DIAGNOSIS — E11.65 UNCONTROLLED TYPE 2 DIABETES MELLITUS WITH HYPERGLYCEMIA, WITHOUT LONG-TERM CURRENT USE OF INSULIN (HCC): ICD-10-CM

## 2022-08-15 DIAGNOSIS — J44.9 COPD, MILD (HCC): ICD-10-CM

## 2022-08-15 PROBLEM — M18.11 ARTHRITIS OF CARPOMETACARPAL (CMC) JOINT OF RIGHT THUMB: Status: RESOLVED | Noted: 2019-05-23 | Resolved: 2022-08-15

## 2022-08-15 PROBLEM — Z86.16 HISTORY OF 2019 NOVEL CORONAVIRUS DISEASE (COVID-19): Status: RESOLVED | Noted: 2020-09-01 | Resolved: 2022-08-15

## 2022-08-15 PROBLEM — Z78.0 POST-MENOPAUSAL: Status: RESOLVED | Noted: 2021-09-30 | Resolved: 2022-08-15

## 2022-08-15 PROBLEM — N89.8 VAGINAL DRYNESS: Status: RESOLVED | Noted: 2021-09-30 | Resolved: 2022-08-15

## 2022-08-15 PROBLEM — M17.12 PRIMARY OSTEOARTHRITIS OF LEFT KNEE: Status: RESOLVED | Noted: 2021-12-28 | Resolved: 2022-08-15

## 2022-08-15 PROBLEM — M47.816 SPONDYLOSIS OF LUMBAR REGION WITHOUT MYELOPATHY OR RADICULOPATHY: Status: RESOLVED | Noted: 2019-05-23 | Resolved: 2022-08-15

## 2022-08-15 PROBLEM — M18.12 ARTHRITIS OF CARPOMETACARPAL (CMC) JOINT OF LEFT THUMB: Status: RESOLVED | Noted: 2021-12-28 | Resolved: 2022-08-15

## 2022-08-15 PROBLEM — N95.2 VAGINAL ATROPHY: Status: RESOLVED | Noted: 2022-05-13 | Resolved: 2022-08-15

## 2022-08-15 RX ORDER — ATORVASTATIN CALCIUM 40 MG/1
40 TABLET, FILM COATED ORAL NIGHTLY
Qty: 90 TABLET | Refills: 0 | Status: SHIPPED | OUTPATIENT
Start: 2022-08-15

## 2022-08-15 RX ORDER — EZETIMIBE 10 MG/1
10 TABLET ORAL NIGHTLY
Qty: 90 TABLET | Refills: 1 | Status: SHIPPED | OUTPATIENT
Start: 2022-08-15

## 2022-08-15 RX ORDER — SOLIFENACIN SUCCINATE 5 MG/1
5 TABLET, FILM COATED ORAL DAILY
Qty: 30 TABLET | Refills: 11 | Status: SHIPPED | OUTPATIENT
Start: 2022-08-15

## 2022-08-15 RX ORDER — FLUTICASONE PROPIONATE 50 MCG
2 SPRAY, SUSPENSION (ML) NASAL DAILY
Qty: 16 G | Refills: 3 | Status: SHIPPED | OUTPATIENT
Start: 2022-08-15

## 2022-08-15 RX ORDER — GABAPENTIN 300 MG/1
300 CAPSULE ORAL NIGHTLY
Qty: 90 CAPSULE | Refills: 1 | Status: SHIPPED | OUTPATIENT
Start: 2022-08-15

## 2022-08-15 RX ORDER — METOPROLOL SUCCINATE 50 MG/1
50 TABLET, EXTENDED RELEASE ORAL DAILY
Qty: 90 TABLET | Refills: 1 | Status: SHIPPED | OUTPATIENT
Start: 2022-08-15

## 2022-08-15 RX ORDER — CLONAZEPAM 0.5 MG/1
0.5 TABLET ORAL 2 TIMES DAILY
COMMUNITY
Start: 2022-08-07

## 2022-08-15 RX ORDER — EMPAGLIFLOZIN 25 MG/1
1 TABLET, FILM COATED ORAL DAILY
Qty: 90 TABLET | Refills: 1 | Status: SHIPPED | OUTPATIENT
Start: 2022-08-15

## 2022-08-15 RX ORDER — OMEPRAZOLE 40 MG/1
40 CAPSULE, DELAYED RELEASE ORAL 2 TIMES DAILY
Qty: 180 CAPSULE | Refills: 1 | Status: SHIPPED | OUTPATIENT
Start: 2022-08-15

## 2022-08-15 NOTE — TELEPHONE ENCOUNTER
Rec'd faxed preop ov notes from Dr Tyrone Merchant. (ov notes are in Epic)Endorsed to nurse for provider review.

## 2022-08-15 NOTE — TELEPHONE ENCOUNTER
Faxed EKG report and pre-op progress notes to Pre- Admission dept at [phone]    Received confirmation. EKG report copy sent to scan. Ppw placed in accordion folder in POD#2.

## 2022-08-15 NOTE — TELEPHONE ENCOUNTER
Per Dr Noemi Velasco Bad is considered a low risk patient undergoing a moderate risk procedure, and is ok to proceed with surgery. \"    Placed in nursing bin.

## 2022-08-17 ENCOUNTER — PATIENT MESSAGE (OUTPATIENT)
Dept: INTERNAL MEDICINE CLINIC | Facility: CLINIC | Age: 62
End: 2022-08-17

## 2022-08-17 ENCOUNTER — TELEMEDICINE (OUTPATIENT)
Dept: INTERNAL MEDICINE CLINIC | Facility: CLINIC | Age: 62
End: 2022-08-17

## 2022-08-17 DIAGNOSIS — L30.9 DERMATITIS: Primary | ICD-10-CM

## 2022-08-17 PROCEDURE — 99213 OFFICE O/P EST LOW 20 MIN: CPT | Performed by: INTERNAL MEDICINE

## 2022-08-17 RX ORDER — TRIAMCINOLONE ACETONIDE 1 MG/G
CREAM TOPICAL
Qty: 60 G | Refills: 3 | Status: SHIPPED | OUTPATIENT
Start: 2022-08-17

## 2022-08-17 RX ORDER — LEVOCETIRIZINE DIHYDROCHLORIDE 5 MG/1
5 TABLET, FILM COATED ORAL EVERY EVENING
Qty: 30 TABLET | Refills: 0 | Status: SHIPPED | OUTPATIENT
Start: 2022-08-17

## 2022-08-17 NOTE — TELEPHONE ENCOUNTER
Regarding: Rash  ----- Message from Betina Parsons RN sent at 8/17/2022  9:03 AM CDT -----       ----- Message from Edwina Granados to Lorene Carson DO sent at 8/17/2022  8:43 AM -----   Remember I was telling you that I have this rash on the inside of my thighs and calves well now it's spread to me whole thighs and calves. I'm not sure what to do. The benadryl pills and cream isn't working it's just getting worse. I'm not getting it anywhere else on my body so I doubt it's a allergic reaction to anything.  Please advise me on what I should do   Thank you   Soumya

## 2022-08-17 NOTE — PROGRESS NOTES
Virtual Telephone Check-In    This visit is conducted using Telemedicine with live, interactive video and audio. Patient resides in [place]   Patient understands and accepts financial responsibility for any deductible, co-insurance and/or co-pays associated with this service. Telehealth outside of [address] Verbal Consent   I conducted a telehealth visit with Jim Roberson on 8/17/2022   which was completed using two-way, real-time interactive audio and video  communication. This has been done in good [place] to provide continuity of care in the best interest of the provider-patient relationship, due to the COVID -19 public health crisis/national emergency where restrictions of face-to-face office visits are ongoing. Every conscious effort was taken to allow for sufficient and adequate time to complete the visit. The patient was made aware of the limitations of the telehealth visit, including treatment limitations as no physical exam could be performed. The patient was advised to call 911 or to go to the ER in case there was an emergency. The patient was also advised of the potential privacy & security concerns related to the telehealth platform. The patient was made aware of where to find [facility] notice of privacy practices, telehealth consent form and other related consent forms and documents. which are located on the [facility] website. The patient verbally agreed to telehealth consent form, related consents and the risks discussed. Lastly, the patient confirmed that they were in [place]. Included in this visit, time may have been spent reviewing labs, medications, radiology tests and decision making. Appropriate medical decision-making and tests are ordered as detailed in the plan of care above. Coding/billing information is submitted for this visit based on complexity of care and/or time spent for the visit.   Time spent: 10 minutes   HPI: Lore Pro is a 63year old female who is calling for a rash. She had small bumps in the thighs, but now she is having in her entire leg. It is slightly itchy. No drainage. Has not noticed it anywhere else. Has been staying at a hotel, but is not using their linens. Also got lots of soaps, but it is scented. Has tried benadryl and over the counter hydrocortisone cream with no relief   ROS:  General: Feels well overall  Skin: Denies any unusual skin lesions  Eyes: Denies blurred vision or double vision  All systems negative, except for above  Physical:  GENERAL: Alert and oriented, well developed, well nourished,in no apparent distress  SKIN: unable to view on exam  LUNGS: no audible wheezing  PSYCH: pleasant, appropriate mood and affect    Assessment and Plan  (L30.9) Dermatitis  (primary encounter diagnosis)  Plan: levocetirizine 5 MG Oral Tab, triamcinolone 0.1        % External Cream  Note: could be an allergic reaction. Recommended to avoid all scented products and use cetaphil or cera ve. Start treatment above.    RTC In 2 weeks  [address] [place] [address]

## 2022-08-17 NOTE — TELEPHONE ENCOUNTER
Future Appointments   Date Time Provider [facility] Jazzy   8/17/2022  4:00 PM Andrey Valera DO [facility] 8 [facility] [place]   9/12/2022 12:45 PM HIEN Talley ENINAPER2 [facility] [place]   4/46/6079  5:50 PM Twin Bailey MD EMGRHEUMHBSN [facility] [place]   12/1/2022  1:00 PM Muqtadar, Karel Severance, MD ENIBOLINGBRK [facility] [place] 64.4

## 2022-08-17 NOTE — TELEPHONE ENCOUNTER
From: Jacy Hilton  To: 32 [facility],   Sent: 8/17/2022 8:43 AM CDT  Subject: Rash    Remember I was telling you that I have this rash on the inside of my thighs and calves well now it's spread to me whole thighs and calves. I'm not sure what to do. The benadryl pills and cream isn't working it's just getting worse. I'm not getting it anywhere else on my body so I doubt it's a allergic reaction to anything.  Please advise me on what I should do   Thank you   Soumya

## 2022-08-19 ENCOUNTER — PATIENT MESSAGE (OUTPATIENT)
Dept: INTERNAL MEDICINE CLINIC | Facility: CLINIC | Age: 62
End: 2022-08-19

## 2022-08-19 DIAGNOSIS — Z01.818 PRE-OP TESTING: Primary | ICD-10-CM

## 2022-08-19 NOTE — TELEPHONE ENCOUNTER
From: Kwaku Méndez  To: 32 [facility], DO  Sent: 8/19/2022 10:07 AM CDT  Subject: Covid test     Hi Dr Fredy Ren, I need you to send an order for my pre op covid test to be done. You sent everything else but that order. I need to be able to schedule it 3 days prior to my surgery.    Thank you   Soumya

## 2022-08-22 ENCOUNTER — LAB ENCOUNTER (OUTPATIENT)
Dept: LAB | Age: 62
End: 2022-08-22
Attending: INTERNAL MEDICINE
Payer: MEDICAID

## 2022-08-22 ENCOUNTER — HOSPITAL ENCOUNTER (OUTPATIENT)
Dept: GENERAL RADIOLOGY | Age: 62
Discharge: HOME | End: 2022-08-22
Attending: INTERNAL MEDICINE
Payer: MEDICAID

## 2022-08-22 DIAGNOSIS — E11.9 TYPE 2 DIABETES MELLITUS WITHOUT COMPLICATION, WITHOUT LONG-TERM CURRENT USE OF INSULIN (HCC): ICD-10-CM

## 2022-08-22 DIAGNOSIS — Z01.818 PREOP EXAMINATION: ICD-10-CM

## 2022-08-22 DIAGNOSIS — Z01.818 PREOPERATIVE EXAMINATION, UNSPECIFIED: Primary | ICD-10-CM

## 2022-08-22 LAB
ALBUMIN SERPL-MCNC: 3.5 G/DL (ref 3.4–5)
ALBUMIN/GLOB SERPL: 1.2 {RATIO} (ref 1–2)
ALP LIVER SERPL-CCNC: 67 U/L
ALT SERPL-CCNC: 35 U/L
ANION GAP SERPL CALC-SCNC: 7 MMOL/L (ref 0–18)
APTT PPP: 30.6 SECONDS (ref 23.3–35.6)
AST SERPL-CCNC: 15 U/L (ref 15–37)
BASOPHILS # BLD AUTO: 0.06 X10(3) UL (ref 0–0.2)
BASOPHILS NFR BLD AUTO: 0.7 %
BILIRUB SERPL-MCNC: 0.3 MG/DL (ref 0.1–2)
BUN BLD-MCNC: 20 MG/DL (ref 7–18)
CALCIUM BLD-MCNC: 8.8 MG/DL (ref 8.5–10.1)
CHLORIDE SERPL-SCNC: 110 MMOL/L (ref 98–112)
CO2 SERPL-SCNC: 23 MMOL/L (ref 21–32)
CREAT BLD-MCNC: 0.92 MG/DL
CREAT UR-SCNC: 46 MG/DL
EOSINOPHIL # BLD AUTO: 0.45 X10(3) UL (ref 0–0.7)
EOSINOPHIL NFR BLD AUTO: 5.1 %
ERYTHROCYTE [DISTWIDTH] IN BLOOD BY AUTOMATED COUNT: 13.2 %
EST. AVERAGE GLUCOSE BLD GHB EST-MCNC: 146 MG/DL (ref 68–126)
FASTING STATUS PATIENT QL REPORTED: YES
GFR SERPLBLD BASED ON 1.73 SQ M-ARVRAT: 71 ML/MIN/1.73M2 (ref 60–?)
GLOBULIN PLAS-MCNC: 3 G/DL (ref 2.8–4.4)
GLUCOSE BLD-MCNC: 157 MG/DL (ref 70–99)
HBA1C MFR BLD: 6.7 % (ref ?–5.7)
HCT VFR BLD AUTO: 39.4 %
HGB BLD-MCNC: 12.3 G/DL
IMM GRANULOCYTES # BLD AUTO: 0.04 X10(3) UL (ref 0–1)
IMM GRANULOCYTES NFR BLD: 0.5 %
INR BLD: 0.97 (ref 0.85–1.16)
LYMPHOCYTES # BLD AUTO: 3.75 X10(3) UL (ref 1–4)
LYMPHOCYTES NFR BLD AUTO: 42.5 %
MCH RBC QN AUTO: 30.9 PG (ref 26–34)
MCHC RBC AUTO-ENTMCNC: 31.2 G/DL (ref 31–37)
MCV RBC AUTO: 99 FL
MICROALBUMIN UR-MCNC: <0.5 MG/DL
MONOCYTES # BLD AUTO: 0.59 X10(3) UL (ref 0.1–1)
MONOCYTES NFR BLD AUTO: 6.7 %
NEUTROPHILS # BLD AUTO: 3.94 X10 (3) UL (ref 1.5–7.7)
NEUTROPHILS # BLD AUTO: 3.94 X10(3) UL (ref 1.5–7.7)
NEUTROPHILS NFR BLD AUTO: 44.5 %
OSMOLALITY SERPL CALC.SUM OF ELEC: 296 MOSM/KG (ref 275–295)
PLATELET # BLD AUTO: 301 10(3)UL (ref 150–450)
POTASSIUM SERPL-SCNC: 3.9 MMOL/L (ref 3.5–5.1)
PROT SERPL-MCNC: 6.5 G/DL (ref 6.4–8.2)
PROTHROMBIN TIME: 12.9 SECONDS (ref 11.6–14.8)
RBC # BLD AUTO: 3.98 X10(6)UL
SODIUM SERPL-SCNC: 140 MMOL/L (ref 136–145)
WBC # BLD AUTO: 8.8 X10(3) UL (ref 4–11)

## 2022-08-22 PROCEDURE — 83036 HEMOGLOBIN GLYCOSYLATED A1C: CPT

## 2022-08-22 PROCEDURE — 82043 UR ALBUMIN QUANTITATIVE: CPT

## 2022-08-22 PROCEDURE — 82570 ASSAY OF URINE CREATININE: CPT

## 2022-08-22 PROCEDURE — 85610 PROTHROMBIN TIME: CPT

## 2022-08-22 PROCEDURE — 36415 COLL VENOUS BLD VENIPUNCTURE: CPT

## 2022-08-22 PROCEDURE — 85025 COMPLETE CBC W/AUTO DIFF WBC: CPT

## 2022-08-22 PROCEDURE — 87081 CULTURE SCREEN ONLY: CPT

## 2022-08-22 PROCEDURE — 85730 THROMBOPLASTIN TIME PARTIAL: CPT

## 2022-08-22 PROCEDURE — 71046 X-RAY EXAM CHEST 2 VIEWS: CPT | Performed by: INTERNAL MEDICINE

## 2022-08-22 PROCEDURE — 80053 COMPREHEN METABOLIC PANEL: CPT

## 2022-08-29 ENCOUNTER — LAB ENCOUNTER (OUTPATIENT)
Dept: LAB | Age: 62
End: 2022-08-29
Attending: INTERNAL MEDICINE
Payer: MEDICAID

## 2022-08-29 DIAGNOSIS — Z01.818 PRE-OP TESTING: ICD-10-CM

## 2022-08-30 LAB — SARS-COV-2 RNA RESP QL NAA+PROBE: NOT DETECTED

## 2022-08-31 ENCOUNTER — HOSPITAL ENCOUNTER (OUTPATIENT)
Facility: HOSPITAL | Age: 62
Setting detail: HOSPITAL OUTPATIENT SURGERY
Discharge: HOME | End: 2022-08-31
Attending: NEUROLOGICAL SURGERY | Admitting: NEUROLOGICAL SURGERY
Payer: MEDICAID

## 2022-08-31 ENCOUNTER — ANESTHESIA (OUTPATIENT)
Dept: SURGERY | Facility: HOSPITAL | Age: 62
End: 2022-08-31
Payer: MEDICAID

## 2022-08-31 ENCOUNTER — ANESTHESIA EVENT (OUTPATIENT)
Dept: SURGERY | Facility: HOSPITAL | Age: 62
End: 2022-08-31
Payer: MEDICAID

## 2022-08-31 VITALS
RESPIRATION RATE: 16 BRPM | HEART RATE: 75 BPM | OXYGEN SATURATION: 98 % | BODY MASS INDEX: 26.11 KG/M2 | TEMPERATURE: 97 F | DIASTOLIC BLOOD PRESSURE: 86 MMHG | SYSTOLIC BLOOD PRESSURE: 127 MMHG | HEIGHT: 60 IN | WEIGHT: 133 LBS

## 2022-08-31 DIAGNOSIS — L72.3 SEBACEOUS CYST: ICD-10-CM

## 2022-08-31 LAB
GLUCOSE BLD-MCNC: 128 MG/DL (ref 70–99)
GLUCOSE BLD-MCNC: 173 MG/DL (ref 70–99)

## 2022-08-31 PROCEDURE — 87176 TISSUE HOMOGENIZATION CULTR: CPT | Performed by: NEUROLOGICAL SURGERY

## 2022-08-31 PROCEDURE — 82962 GLUCOSE BLOOD TEST: CPT

## 2022-08-31 PROCEDURE — 87070 CULTURE OTHR SPECIMN AEROBIC: CPT | Performed by: NEUROLOGICAL SURGERY

## 2022-08-31 PROCEDURE — 0HB6XZZ EXCISION OF BACK SKIN, EXTERNAL APPROACH: ICD-10-PCS | Performed by: NEUROLOGICAL SURGERY

## 2022-08-31 PROCEDURE — 87075 CULTR BACTERIA EXCEPT BLOOD: CPT | Performed by: NEUROLOGICAL SURGERY

## 2022-08-31 PROCEDURE — 87205 SMEAR GRAM STAIN: CPT | Performed by: NEUROLOGICAL SURGERY

## 2022-08-31 PROCEDURE — 88304 TISSUE EXAM BY PATHOLOGIST: CPT | Performed by: NEUROLOGICAL SURGERY

## 2022-08-31 RX ORDER — LABETALOL HYDROCHLORIDE 5 MG/ML
5 INJECTION, SOLUTION INTRAVENOUS EVERY 5 MIN PRN
Status: DISCONTINUED | OUTPATIENT
Start: 2022-08-31 | End: 2022-08-31

## 2022-08-31 RX ORDER — CEPHALEXIN 500 MG/1
500 CAPSULE ORAL 4 TIMES DAILY
Qty: 12 CAPSULE | Refills: 0 | Status: SHIPPED | OUTPATIENT
Start: 2022-08-31

## 2022-08-31 RX ORDER — SODIUM CHLORIDE, SODIUM LACTATE, POTASSIUM CHLORIDE, CALCIUM CHLORIDE 600; 310; 30; 20 MG/100ML; MG/100ML; MG/100ML; MG/100ML
INJECTION, SOLUTION INTRAVENOUS CONTINUOUS
Status: DISCONTINUED | OUTPATIENT
Start: 2022-08-31 | End: 2022-08-31

## 2022-08-31 RX ORDER — SCOLOPAMINE TRANSDERMAL SYSTEM 1 MG/1
1 PATCH, EXTENDED RELEASE TRANSDERMAL ONCE
Status: DISCONTINUED | OUTPATIENT
Start: 2022-08-31 | End: 2022-08-31

## 2022-08-31 RX ORDER — NICOTINE POLACRILEX 4 MG
15 LOZENGE BUCCAL
Status: DISCONTINUED | OUTPATIENT
Start: 2022-08-31 | End: 2022-08-31 | Stop reason: HOSPADM

## 2022-08-31 RX ORDER — MEPERIDINE HYDROCHLORIDE 25 MG/ML
12.5 INJECTION INTRAMUSCULAR; INTRAVENOUS; SUBCUTANEOUS AS NEEDED
Status: DISCONTINUED | OUTPATIENT
Start: 2022-08-31 | End: 2022-08-31

## 2022-08-31 RX ORDER — METOPROLOL TARTRATE 5 MG/5ML
2.5 INJECTION INTRAVENOUS ONCE
Status: DISCONTINUED | OUTPATIENT
Start: 2022-08-31 | End: 2022-08-31

## 2022-08-31 RX ORDER — SODIUM CHLORIDE 9 MG/ML
INJECTION, SOLUTION INTRAVENOUS CONTINUOUS
Status: DISCONTINUED | OUTPATIENT
Start: 2022-08-31 | End: 2022-08-31

## 2022-08-31 RX ORDER — PROCHLORPERAZINE EDISYLATE 5 MG/ML
5 INJECTION INTRAMUSCULAR; INTRAVENOUS EVERY 8 HOURS PRN
Status: DISCONTINUED | OUTPATIENT
Start: 2022-08-31 | End: 2022-08-31

## 2022-08-31 RX ORDER — HYDROMORPHONE HYDROCHLORIDE 1 MG/ML
0.4 INJECTION, SOLUTION INTRAMUSCULAR; INTRAVENOUS; SUBCUTANEOUS EVERY 5 MIN PRN
Status: DISCONTINUED | OUTPATIENT
Start: 2022-08-31 | End: 2022-08-31

## 2022-08-31 RX ORDER — CEFAZOLIN SODIUM/WATER 2 G/20 ML
2 SYRINGE (ML) INTRAVENOUS ONCE
Status: COMPLETED | OUTPATIENT
Start: 2022-08-31 | End: 2022-08-31

## 2022-08-31 RX ORDER — LIDOCAINE HYDROCHLORIDE 10 MG/ML
INJECTION, SOLUTION EPIDURAL; INFILTRATION; INTRACAUDAL; PERINEURAL AS NEEDED
Status: DISCONTINUED | OUTPATIENT
Start: 2022-08-31 | End: 2022-08-31 | Stop reason: SURG

## 2022-08-31 RX ORDER — HYDROCODONE BITARTRATE AND ACETAMINOPHEN 5; 325 MG/1; MG/1
1 TABLET ORAL EVERY 6 HOURS PRN
Qty: 20 TABLET | Refills: 0 | Status: SHIPPED | OUTPATIENT
Start: 2022-08-31

## 2022-08-31 RX ORDER — HYDROMORPHONE HYDROCHLORIDE 1 MG/ML
0.6 INJECTION, SOLUTION INTRAMUSCULAR; INTRAVENOUS; SUBCUTANEOUS EVERY 5 MIN PRN
Status: DISCONTINUED | OUTPATIENT
Start: 2022-08-31 | End: 2022-08-31

## 2022-08-31 RX ORDER — ACETAMINOPHEN 500 MG
1000 TABLET ORAL ONCE
Status: DISCONTINUED | OUTPATIENT
Start: 2022-08-31 | End: 2022-08-31 | Stop reason: HOSPADM

## 2022-08-31 RX ORDER — HYDROMORPHONE HYDROCHLORIDE 1 MG/ML
0.2 INJECTION, SOLUTION INTRAMUSCULAR; INTRAVENOUS; SUBCUTANEOUS EVERY 5 MIN PRN
Status: DISCONTINUED | OUTPATIENT
Start: 2022-08-31 | End: 2022-08-31

## 2022-08-31 RX ORDER — NALOXONE HYDROCHLORIDE 0.4 MG/ML
80 INJECTION, SOLUTION INTRAMUSCULAR; INTRAVENOUS; SUBCUTANEOUS AS NEEDED
Status: DISCONTINUED | OUTPATIENT
Start: 2022-08-31 | End: 2022-08-31

## 2022-08-31 RX ORDER — BUPIVACAINE HYDROCHLORIDE AND EPINEPHRINE 2.5; 5 MG/ML; UG/ML
INJECTION, SOLUTION EPIDURAL; INFILTRATION; INTRACAUDAL; PERINEURAL AS NEEDED
Status: DISCONTINUED | OUTPATIENT
Start: 2022-08-31 | End: 2022-08-31 | Stop reason: HOSPADM

## 2022-08-31 RX ORDER — ACETAMINOPHEN 500 MG
1000 TABLET ORAL EVERY 6 HOURS PRN
COMMUNITY

## 2022-08-31 RX ORDER — MIDAZOLAM HYDROCHLORIDE 1 MG/ML
INJECTION INTRAMUSCULAR; INTRAVENOUS AS NEEDED
Status: DISCONTINUED | OUTPATIENT
Start: 2022-08-31 | End: 2022-08-31 | Stop reason: SURG

## 2022-08-31 RX ORDER — MIDAZOLAM HYDROCHLORIDE 1 MG/ML
1 INJECTION INTRAMUSCULAR; INTRAVENOUS EVERY 5 MIN PRN
Status: DISCONTINUED | OUTPATIENT
Start: 2022-08-31 | End: 2022-08-31

## 2022-08-31 RX ORDER — DEXTROSE MONOHYDRATE 25 G/50ML
50 INJECTION, SOLUTION INTRAVENOUS
Status: DISCONTINUED | OUTPATIENT
Start: 2022-08-31 | End: 2022-08-31 | Stop reason: HOSPADM

## 2022-08-31 RX ORDER — ACETAMINOPHEN 500 MG
1000 TABLET ORAL ONCE AS NEEDED
Status: COMPLETED | OUTPATIENT
Start: 2022-08-31 | End: 2022-08-31

## 2022-08-31 RX ORDER — HYDROCODONE BITARTRATE AND ACETAMINOPHEN 5; 325 MG/1; MG/1
1 TABLET ORAL ONCE AS NEEDED
Status: COMPLETED | OUTPATIENT
Start: 2022-08-31 | End: 2022-08-31

## 2022-08-31 RX ORDER — HYDROCODONE BITARTRATE AND ACETAMINOPHEN 5; 325 MG/1; MG/1
2 TABLET ORAL ONCE AS NEEDED
Status: COMPLETED | OUTPATIENT
Start: 2022-08-31 | End: 2022-08-31

## 2022-08-31 RX ORDER — NICOTINE POLACRILEX 4 MG
30 LOZENGE BUCCAL
Status: DISCONTINUED | OUTPATIENT
Start: 2022-08-31 | End: 2022-08-31 | Stop reason: HOSPADM

## 2022-08-31 RX ORDER — ONDANSETRON 2 MG/ML
4 INJECTION INTRAMUSCULAR; INTRAVENOUS EVERY 6 HOURS PRN
Status: DISCONTINUED | OUTPATIENT
Start: 2022-08-31 | End: 2022-08-31

## 2022-08-31 RX ADMIN — SODIUM CHLORIDE, SODIUM LACTATE, POTASSIUM CHLORIDE, CALCIUM CHLORIDE: 600; 310; 30; 20 INJECTION, SOLUTION INTRAVENOUS at 10:38:00

## 2022-08-31 RX ADMIN — LIDOCAINE HYDROCHLORIDE 50 MG: 10 INJECTION, SOLUTION EPIDURAL; INFILTRATION; INTRACAUDAL; PERINEURAL at 10:17:00

## 2022-08-31 RX ADMIN — MIDAZOLAM HYDROCHLORIDE 2 MG: 1 INJECTION INTRAMUSCULAR; INTRAVENOUS at 10:10:00

## 2022-08-31 RX ADMIN — CEFAZOLIN SODIUM/WATER 2 G: 2 G/20 ML SYRINGE (ML) INTRAVENOUS at 10:19:00

## 2022-08-31 RX ADMIN — SODIUM CHLORIDE, SODIUM LACTATE, POTASSIUM CHLORIDE, CALCIUM CHLORIDE: 600; 310; 30; 20 INJECTION, SOLUTION INTRAVENOUS at 10:05:00

## 2022-08-31 NOTE — BRIEF OP NOTE
Pre-Operative Diagnosis: Sebaceous cyst [L72.3]     Post-Operative Diagnosis: Sebaceous cyst [L72.3]      Procedure Performed:   RIGHT SACRAL SUBCUTANEOUS CYST EXCISION    Surgeon(s) and Role:     * Brodie Reardon MD - Primary    Assistant(s):  PA: HIEN Choudhary     Surgical Findings: see dictation     Specimen: Cyst fluid, cyst wall     Estimated Blood Loss: Blood Output: 2 mL (8/31/2022 10:30 AM)          William Melgar MD  8/31/2022  10:49 AM

## 2022-08-31 NOTE — OPERATIVE REPORT
Operative Note    Patient Name: Sophia Bedoya    Preoperative Diagnosis: Sebaceous cyst [L72.3]    Postoperative Diagnosis: same    Primary Surgeon: Ayah Johnson MD    Assistant: Candace Taylor PA-C, who was essential to the complexity clinic (I closing and retraction    Procedures: Excision of subcutaneous cyst    Surgical Findings: See dictation    Anesthesia: MAC    Complications: none    Implants: None    Specimen: Cyst fluid, cyst wall    Drains: None    Condition: Stable    Estimated Blood Loss: 2 mL    Indication for Procedure: 73-year-old female who presented to clinic with a small subcutaneous cyst.  This was causing her immense amount of pain. Patient was to have it removed. Patient had imaging. Patient was then plan for surgery. Procedure: Patient properly identified and brought back to the OR 16. Patient placed on the OR table, position and all pressure points padded. Patient was sterilely prepped and draped in usual fashion. Incision was made dissection down the cyst.  Of note, when the incision was made we got a small amount of cyst fluid. This was aspirated. They continued dissection from the cyst wall. This was removed en bloc. Once this was done there was no further palpable mass in the area. 10 mils of quarter Marcaine and was given as local anesthetic in the area. The wound was then closed in layers. Patient was taken off the OR table awakened by anesthesia.     Dictated but not proofread

## 2022-08-31 NOTE — ANESTHESIA POSTPROCEDURE EVALUATION
Deuce Huizar 55 Patient Status:  Hospital Outpatient Surgery   Age/Gender 63year old female MRN [id number]   [facility] SURGERY Attending Hilma Lesches, MD   Hosp Day # 0 PCP 32 [facility]       Anesthesia Post-op Note    RIGHT SACRAL SUBCUTANEOUS CYST EXCISION    Procedure Summary     Date: 08/31/22 Room / Location: [address] [place] [address] OR 16 / 1404 [facility] OR    Anesthesia Start: 1004 Anesthesia Stop: 2357    Procedure: RIGHT SACRAL SUBCUTANEOUS CYST EXCISION (Right Back) Diagnosis:       Sebaceous cyst      (Sebaceous cyst [L72.3])    Surgeons: Hilma Lesches, MD Anesthesiologist: Sofia Marshall MD    Anesthesia Type: MAC ASA Status: 2          Anesthesia Type: MAC    Vitals Value Taken Time   /65 08/31/22 1059   Temp  08/31/22 1059   Pulse 86 08/31/22 1059   Resp 16 08/31/22 1059   SpO2 99% 08/31/22 1059       Patient Location: Same Day Surgery    Anesthesia Type: MAC    Airway Patency: patent    Postop Pain Control: adequate    Mental Status: mildly sedated but able to meaningfully participate in the post-anesthesia evaluation    Nausea/Vomiting: none    Cardiopulmonary/Hydration status: stable euvolemic    Complications: no apparent anesthesia related complications    Postop vital signs: stable    Dental Exam: Unchanged from Preop    Patient to be discharged home when criteria met.

## 2022-09-02 RX ORDER — TOPIRAMATE 50 MG/1
50 TABLET, FILM COATED ORAL 2 TIMES DAILY
Qty: 60 TABLET | Refills: 2 | Status: SHIPPED | OUTPATIENT
Start: 2022-09-02

## 2022-09-02 NOTE — TELEPHONE ENCOUNTER
pt is calling to request a refill on the topiramate 50 MG Oral Tab, to be sent to [address], [address] [place] [address], [phone], [phone]

## 2022-09-06 ENCOUNTER — PATIENT MESSAGE (OUTPATIENT)
Dept: INTERNAL MEDICINE CLINIC | Facility: CLINIC | Age: 62
End: 2022-09-06

## 2022-09-06 NOTE — TELEPHONE ENCOUNTER
From: Luisito Iqbal  To: [address] [place] [address],   Sent: 9/6/2022 12:48 PM CDT  Subject: Cough     Hi Dr Marilyn Duarte had this cough which is producing phelm every since my surgery last Wed. I thought it was maybe my body trying to get rid of the Anesthesia from my body but I still have it. Could it be bronchitis? I just want some relief. Please let me know what to do.  Thank you   Soumya

## 2022-09-07 ENCOUNTER — PATIENT MESSAGE (OUTPATIENT)
Dept: INTERNAL MEDICINE CLINIC | Facility: CLINIC | Age: 62
End: 2022-09-07

## 2022-09-08 NOTE — TELEPHONE ENCOUNTER
From: Charles Oliva  To: 32 [facility],   Sent: 9/7/2022 10:49 AM CDT  Subject: Eye exam     I wanted you to be aware that I had my eye exam done with the retinal test done. I had it done on August 29th. So if you could document that in my chart I'd appreciate it.    Thank you   Soumya

## 2022-09-10 ENCOUNTER — PATIENT MESSAGE (OUTPATIENT)
Dept: INTERNAL MEDICINE CLINIC | Facility: CLINIC | Age: 62
End: 2022-09-10

## 2022-09-10 DIAGNOSIS — K21.9 GASTROESOPHAGEAL REFLUX DISEASE WITHOUT ESOPHAGITIS: ICD-10-CM

## 2022-09-12 DIAGNOSIS — L30.9 DERMATITIS: ICD-10-CM

## 2022-09-12 RX ORDER — LEVOCETIRIZINE DIHYDROCHLORIDE 5 MG/1
5 TABLET, FILM COATED ORAL EVERY EVENING
Qty: 30 TABLET | Refills: 0 | Status: SHIPPED | OUTPATIENT
Start: 2022-09-12

## 2022-09-12 RX ORDER — OMEPRAZOLE 40 MG/1
40 CAPSULE, DELAYED RELEASE ORAL 2 TIMES DAILY
Qty: 180 CAPSULE | Refills: 1 | Status: SHIPPED | OUTPATIENT
Start: 2022-09-12

## 2022-09-12 NOTE — TELEPHONE ENCOUNTER
From: Charles Oliva  To: 32 [facility]",   Sent: 9/10/2022 12:16 PM CDT  Subject: Omeprazole     I need a new rx for omeprazole called in to [facility] on [address] in [facility] please. I'm almost out.    Thank you   Soumya

## 2022-09-12 NOTE — TELEPHONE ENCOUNTER
LOV: 8/17/2022 with Dr. Isra Geano  RTC: 2 weeks  Last Relevant Labs: August 2022  Filled: 8/17/2022     #30 with 0 refills    Future Appointments   Date Time Provider Lilly Jazzy   9/14/2022  2:00 PM Krysten Duke.MARLENE [facility] [place]   1/36/2248  6:44 PM Paramjit Veliz MD EMGRHEUMHKITTY EM[facility]   12/1/2022  1:00 PM Gregorio Joy MD ENIBOLINGBRK [facility] [place]

## 2022-09-14 ENCOUNTER — TELEPHONE (OUTPATIENT)
Dept: SURGERY | Facility: CLINIC | Age: 62
End: 2022-09-14

## 2022-09-14 ENCOUNTER — OFFICE VISIT (OUTPATIENT)
Dept: SURGERY | Facility: CLINIC | Age: 62
End: 2022-09-14
Payer: MEDICAID

## 2022-09-14 ENCOUNTER — PATIENT MESSAGE (OUTPATIENT)
Dept: INTERNAL MEDICINE CLINIC | Facility: CLINIC | Age: 62
End: 2022-09-14

## 2022-09-14 VITALS
WEIGHT: 135 LBS | SYSTOLIC BLOOD PRESSURE: 126 MMHG | DIASTOLIC BLOOD PRESSURE: 80 MMHG | OXYGEN SATURATION: 98 % | HEART RATE: 99 BPM | BODY MASS INDEX: 26.5 KG/M2 | HEIGHT: 60 IN

## 2022-09-14 DIAGNOSIS — L72.3 SEBACEOUS CYST: ICD-10-CM

## 2022-09-14 DIAGNOSIS — Z09 SURGICAL FOLLOWUP: Primary | ICD-10-CM

## 2022-09-14 PROCEDURE — 3008F BODY MASS INDEX DOCD: CPT

## 2022-09-14 PROCEDURE — 3074F SYST BP LT 130 MM HG: CPT

## 2022-09-14 PROCEDURE — 99024 POSTOP FOLLOW-UP VISIT: CPT

## 2022-09-14 PROCEDURE — 3079F DIAST BP 80-89 MM HG: CPT

## 2022-09-14 RX ORDER — HYDROCODONE BITARTRATE AND ACETAMINOPHEN 10; 325 MG/1; MG/1
1 TABLET ORAL EVERY 6 HOURS PRN
Qty: 20 TABLET | Refills: 0 | Status: SHIPPED | OUTPATIENT
Start: 2022-09-14

## 2022-09-14 NOTE — TELEPHONE ENCOUNTER
From: Sha Delcid  To: 32 [facility], DO  Sent: 9/14/2022 11:51 AM CDT  Subject: Omeprazole     I sent a message a few days ago regarding my omeprazole and haven't heard anything. I'm almost out of my omeprazole and I needed a new rx sent to [facility] on [address] in [address] [place] [address]. I'm switching pharmacy's and they won't call you for a new rx. That's why I need you to send a new one with refills to them asap please. You sent over another one I needed already I just don't understand why you haven't sent this one.   Thank you   Soumya

## 2022-09-14 NOTE — TELEPHONE ENCOUNTER
From: Charles Oliva  Sent: 9/14/2022 12:48 PM CDT  To: Emg 08 Clinical Staff  Subject: Omeprazole     Stupid insurance company needs a prior authorization because I'm only supposed to be on it for so many days a year. I go through this periodically. Please get in touch with my insurance company and find out what needs to be done Please.

## 2022-09-14 NOTE — TELEPHONE ENCOUNTER
Pt had appt with Shaina Dobbs today and forgot to ask if she has any weight restrictions. Please call to discuss.

## 2022-09-17 ENCOUNTER — PATIENT MESSAGE (OUTPATIENT)
Dept: INTERNAL MEDICINE CLINIC | Facility: CLINIC | Age: 62
End: 2022-09-17

## 2022-09-18 ENCOUNTER — PATIENT MESSAGE (OUTPATIENT)
Dept: INTERNAL MEDICINE CLINIC | Facility: CLINIC | Age: 62
End: 2022-09-18

## 2022-09-19 ENCOUNTER — PATIENT MESSAGE (OUTPATIENT)
Dept: INTERNAL MEDICINE CLINIC | Facility: CLINIC | Age: 62
End: 2022-09-19

## 2022-09-19 DIAGNOSIS — G43.709 CHRONIC MIGRAINE W/O AURA W/O STATUS MIGRAINOSUS, NOT INTRACTABLE: ICD-10-CM

## 2022-09-19 DIAGNOSIS — R05.1 ACUTE COUGH: Primary | ICD-10-CM

## 2022-09-19 RX ORDER — ERENUMAB-AOOE 140 MG/ML
INJECTION, SOLUTION SUBCUTANEOUS
Qty: 1 ML | Refills: 2 | Status: SHIPPED | OUTPATIENT
Start: 2022-09-19

## 2022-09-19 RX ORDER — AZITHROMYCIN 250 MG/1
TABLET, FILM COATED ORAL
Qty: 6 TABLET | Refills: 0 | Status: SHIPPED | OUTPATIENT
Start: 2022-09-19 | End: 2022-09-24

## 2022-09-19 RX ORDER — PREDNISONE 20 MG/1
40 TABLET ORAL DAILY
Qty: 10 TABLET | Refills: 0 | Status: SHIPPED | OUTPATIENT
Start: 2022-09-19 | End: 2022-09-24

## 2022-09-19 NOTE — TELEPHONE ENCOUNTER
From: Irma Lundberg  To: 32 [facility],   Sent: 9/19/2022 12:55 PM CDT  Subject: Omeprazole     Did anyone tell you that I need a pre authorization for my omeprazole? [employer] does it periodically! I'm out so I had to buy OTC and am going to have to take 2 pills at a time since they're only 20 mgs!

## 2022-09-19 NOTE — TELEPHONE ENCOUNTER
From: Brendan Justin  To: Sree Kang DO  Sent: 9/18/2022 7:27 PM CDT  Subject: Sick    I've been coughing up phlegm a lot and have been running a slight fever. I've taken a home covid test twice and both came up negative. I'm really tired also. I was wondering if it could be bronchitis. I've been taken delsym and over the counter flu and cold medicine.

## 2022-09-19 NOTE — TELEPHONE ENCOUNTER
From: Catherine Almonte  To: Dorys Sommers DO  Sent: 9/17/2022 9:47 PM CDT  Subject: Covid booster     I was wondering what you think about me getting the 2nd covid booster shot?

## 2022-09-19 NOTE — TELEPHONE ENCOUNTER
From: Jose Carlos Otoole  To: 32 hospitals, DO  Sent: 9/19/2022 11:41 AM CDT  Subject: Antibiotics and prednisone     So can I get both the antibiotics and prednisone please. I just went for Orientation, and they sent me home. I need to be feeling better in a week so that I can start this job please.

## 2022-09-20 NOTE — TELEPHONE ENCOUNTER
Prior authorization for OMEPRAZOLE 40 MG is APPROVED from 6/22/2022 through 9/20/2023. Confirmation placed in Dr. Reyes De La Fuente in-box. Patient aware. [address] aware.

## 2022-09-29 ENCOUNTER — HOSPITAL ENCOUNTER (OUTPATIENT)
Dept: MAMMOGRAPHY | Facility: HOSPITAL | Age: 62
Discharge: HOME | End: 2022-09-29
Attending: OBSTETRICS & GYNECOLOGY
Payer: MEDICAID

## 2022-09-29 ENCOUNTER — OFFICE VISIT (OUTPATIENT)
Dept: RHEUMATOLOGY | Facility: CLINIC | Age: 62
End: 2022-09-29

## 2022-09-29 VITALS
OXYGEN SATURATION: 98 % | DIASTOLIC BLOOD PRESSURE: 70 MMHG | SYSTOLIC BLOOD PRESSURE: 112 MMHG | HEIGHT: 60 IN | HEART RATE: 93 BPM | TEMPERATURE: 98 F | BODY MASS INDEX: 26.5 KG/M2 | WEIGHT: 135 LBS

## 2022-09-29 DIAGNOSIS — M17.12 PRIMARY OSTEOARTHRITIS OF LEFT KNEE: Primary | ICD-10-CM

## 2022-09-29 DIAGNOSIS — Z12.31 ENCOUNTER FOR SCREENING MAMMOGRAM FOR BREAST CANCER: ICD-10-CM

## 2022-09-29 DIAGNOSIS — M15.9 PRIMARY OSTEOARTHRITIS INVOLVING MULTIPLE JOINTS: ICD-10-CM

## 2022-09-29 DIAGNOSIS — R92.8 ABNORMAL MAMMOGRAM OF BOTH BREASTS: ICD-10-CM

## 2022-09-29 PROCEDURE — 3008F BODY MASS INDEX DOCD: CPT | Performed by: INTERNAL MEDICINE

## 2022-09-29 PROCEDURE — 77062 BREAST TOMOSYNTHESIS BI: CPT | Performed by: OBSTETRICS & GYNECOLOGY

## 2022-09-29 PROCEDURE — 3074F SYST BP LT 130 MM HG: CPT | Performed by: INTERNAL MEDICINE

## 2022-09-29 PROCEDURE — 20610 DRAIN/INJ JOINT/BURSA W/O US: CPT | Performed by: INTERNAL MEDICINE

## 2022-09-29 PROCEDURE — 77066 DX MAMMO INCL CAD BI: CPT | Performed by: OBSTETRICS & GYNECOLOGY

## 2022-09-29 PROCEDURE — 76642 ULTRASOUND BREAST LIMITED: CPT | Performed by: OBSTETRICS & GYNECOLOGY

## 2022-09-29 PROCEDURE — 3078F DIAST BP <80 MM HG: CPT | Performed by: INTERNAL MEDICINE

## 2022-09-29 RX ORDER — METHYLPREDNISOLONE ACETATE 40 MG/ML
40 INJECTION, SUSPENSION INTRA-ARTICULAR; INTRALESIONAL; INTRAMUSCULAR; SOFT TISSUE ONCE
Status: COMPLETED | OUTPATIENT
Start: 2022-09-29 | End: 2022-09-29

## 2022-09-29 RX ORDER — DICLOFENAC SODIUM 75 MG/1
75 TABLET, DELAYED RELEASE ORAL 2 TIMES DAILY
COMMUNITY
Start: 2022-09-23

## 2022-09-29 RX ORDER — TRAMADOL HYDROCHLORIDE 50 MG/1
50 TABLET ORAL EVERY 6 HOURS PRN
Qty: 100 TABLET | Refills: 1 | Status: SHIPPED | OUTPATIENT
Start: 2022-09-29

## 2022-09-29 RX ORDER — CHLORZOXAZONE 500 MG/1
500 TABLET ORAL 3 TIMES DAILY PRN
Qty: 90 TABLET | Refills: 2 | Status: CANCELLED
Start: 2022-09-29

## 2022-09-29 RX ADMIN — METHYLPREDNISOLONE ACETATE 40 MG: 40 INJECTION, SUSPENSION INTRA-ARTICULAR; INTRALESIONAL; INTRAMUSCULAR; SOFT TISSUE at 13:57:00

## 2022-09-29 NOTE — PROCEDURES
[address]  Left knee injection. Diagnosis primary osteoarthritis left knee. After obtaining consent from the patient the left knee was cleansed with Betadine and Hibiclens. Then the left knee was injected medial approach with 40 mg of methylprednisolone and 1 cc 1% lidocaine. Patient tolerated the left knee injection without incident.

## 2022-09-29 NOTE — PATIENT INSTRUCTIONS
Left knee injection done with cortisone for osteoarthritis of th left knee. Ice the left knee if needed. Diclofenac 75 mg twice a day. Return to office 3 months.

## 2022-09-30 DIAGNOSIS — R92.2 DENSE BREASTS: Primary | ICD-10-CM

## 2022-09-30 NOTE — PROGRESS NOTES
Contacted patient results and recommendations given. Order placed. Patient verbalized understanding and willingness to comply. No further questions.

## 2022-09-30 NOTE — PROGRESS NOTES
Sorry. The patient already had breast US bilateral. Therefore, she just needs 6 months follow up order.

## 2022-09-30 NOTE — PROGRESS NOTES
Patient need short term follow up. Please provide order. Also d/w patient recommended bilateral breast US now to supplement due mammogram due to breast density.  If desires, then please provide order for breast US bilateral.

## 2022-10-08 ENCOUNTER — PATIENT MESSAGE (OUTPATIENT)
Dept: INTERNAL MEDICINE CLINIC | Facility: CLINIC | Age: 62
End: 2022-10-08

## 2022-10-08 DIAGNOSIS — E11.65 UNCONTROLLED TYPE 2 DIABETES MELLITUS WITH HYPERGLYCEMIA, WITHOUT LONG-TERM CURRENT USE OF INSULIN (HCC): ICD-10-CM

## 2022-10-10 DIAGNOSIS — L30.9 DERMATITIS: ICD-10-CM

## 2022-10-10 RX ORDER — LEVOCETIRIZINE DIHYDROCHLORIDE 5 MG/1
5 TABLET, FILM COATED ORAL EVERY EVENING
Qty: 30 TABLET | Refills: 0 | Status: SHIPPED | OUTPATIENT
Start: 2022-10-10

## 2022-10-10 RX ORDER — EMPAGLIFLOZIN 25 MG/1
1 TABLET, FILM COATED ORAL DAILY
Qty: 90 TABLET | Refills: 1 | Status: SHIPPED | OUTPATIENT
Start: 2022-10-10

## 2022-10-10 NOTE — TELEPHONE ENCOUNTER
LOV: 8/17/2022 with Dr. Karis Trujillo  RTC: 2 weeks  Last Relevant Labs: August 2022  Filled: 9/12/2022   #30 with 0 refills    Future Appointments   Date Time Provider [facility] Jazzy   12/1/2022  1:00 PM Enrique Moreau MD ENTRUDIGBRK [facility] [place]   15/00/1398 81:82 PM Anastacio Mccain MD EMGRHEUMHBSN EMG [place]

## 2022-10-10 NOTE — TELEPHONE ENCOUNTER
From: Luisito Iqbal  To: 32 [facility] [place],   Sent: 10/8/2022 12:06 PM CDT  Subject: Joe Sheets     I need a new rx for my Jardiance and it needs to be sent to [facility] on [facility] in [place].    Thank you

## 2022-10-12 ENCOUNTER — TELEPHONE (OUTPATIENT)
Dept: SURGERY | Facility: CLINIC | Age: 62
End: 2022-10-12

## 2022-10-12 NOTE — TELEPHONE ENCOUNTER
Rec'd Intermittent Leave of absence forms from [facility]. Endorsed to nurse's bin for provider review and completion.

## 2022-10-13 ENCOUNTER — PATIENT MESSAGE (OUTPATIENT)
Dept: NEUROLOGY | Facility: CLINIC | Age: 62
End: 2022-10-13

## 2022-10-13 DIAGNOSIS — G43.709 CHRONIC MIGRAINE W/O AURA W/O STATUS MIGRAINOSUS, NOT INTRACTABLE: Primary | ICD-10-CM

## 2022-10-13 NOTE — TELEPHONE ENCOUNTER
Paper work completed and placed in [employer] for review and signature. Will need [employer] and $25.00 paper work fee ([employer] message sent to patient with MUM[employer]Z attached.

## 2022-10-14 NOTE — TELEPHONE ENCOUNTER
From: Addison Schafer  [facility]  Sent: 10/13/2022 9:31 PM CDT  Subject: Rizatriptan    I need a new prescription for my Rizatriptan sent to [facility] on [facility] in [place].    Thank you   Soumya

## 2022-10-16 RX ORDER — RIZATRIPTAN BENZOATE 10 MG/1
10 TABLET ORAL AS NEEDED
Qty: 10 TABLET | Refills: 2 | Status: SHIPPED | OUTPATIENT
Start: 2022-10-16

## 2022-10-17 ENCOUNTER — TELEPHONE (OUTPATIENT)
Dept: UROLOGY | Facility: CLINIC | Age: 62
End: 2022-10-17

## 2022-10-17 DIAGNOSIS — R35.0 URINE FREQUENCY: Primary | ICD-10-CM

## 2022-10-17 DIAGNOSIS — R35.0 URINARY FREQUENCY: ICD-10-CM

## 2022-10-17 NOTE — TELEPHONE ENCOUNTER
Returning TC to pt   Feels bladder symptoms are worse recently  Increased nocturia  C/o large volume loss of urine   Will order ucx to rule out UTI   If ucx negative, plan to increase Vesicare to 10mg daily   Denies empiric tx   Will follow urine culture results   All questions answered   She understands and agrees to plan

## 2022-10-19 ENCOUNTER — TELEPHONE (OUTPATIENT)
Dept: NEUROLOGY | Facility: CLINIC | Age: 62
End: 2022-10-19

## 2022-10-19 ENCOUNTER — LAB ENCOUNTER (OUTPATIENT)
Dept: LAB | Age: 62
End: 2022-10-19
Attending: OBSTETRICS & GYNECOLOGY
Payer: MEDICAID

## 2022-10-19 DIAGNOSIS — R35.0 URINARY FREQUENCY: ICD-10-CM

## 2022-10-19 PROCEDURE — 87186 SC STD MICRODIL/AGAR DIL: CPT

## 2022-10-19 PROCEDURE — 87077 CULTURE AEROBIC IDENTIFY: CPT

## 2022-10-19 PROCEDURE — 87086 URINE CULTURE/COLONY COUNT: CPT

## 2022-10-19 RX ORDER — DICLOFENAC SODIUM 75 MG/1
TABLET, DELAYED RELEASE ORAL
Qty: 60 TABLET | Refills: 3 | Status: SHIPPED | OUTPATIENT
Start: 2022-10-19

## 2022-10-19 NOTE — TELEPHONE ENCOUNTER
Future Appointments   Date Time Provider Lilly Jazzy   12/1/2022  1:00 PM Fannie Fernandez MD ENIBOLINGBRK EM[facility]   13/95/1880 37:95 PM Britt Hernandez MD EMGRHEUMHBSN EM[facility] [place]     Last office visit 9/29/2022  Last labs taken 8/22/2022

## 2022-10-20 ENCOUNTER — TELEPHONE (OUTPATIENT)
Dept: INTERNAL MEDICINE CLINIC | Facility: CLINIC | Age: 62
End: 2022-10-20

## 2022-10-21 ENCOUNTER — TELEPHONE (OUTPATIENT)
Dept: UROLOGY | Facility: CLINIC | Age: 62
End: 2022-10-21

## 2022-10-21 RX ORDER — NITROFURANTOIN 25; 75 MG/1; MG/1
100 CAPSULE ORAL 2 TIMES DAILY
Qty: 14 CAPSULE | Refills: 0 | Status: SHIPPED | OUTPATIENT
Start: 2022-10-21 | End: 2022-10-21

## 2022-10-21 RX ORDER — NITROFURANTOIN 25; 75 MG/1; MG/1
100 CAPSULE ORAL 2 TIMES DAILY
Qty: 14 CAPSULE | Refills: 0 | Status: SHIPPED | OUTPATIENT
Start: 2022-10-21

## 2022-10-21 NOTE — TELEPHONE ENCOUNTER
LM on pt VM w/ ucx results. HIEN Rosas, Macrobid 100mg po BID x 7d. Pt verbalized an understanding and agrees w/ plan. All questions answered. TC from pt saying we cannot call in scripts from [facility]. Must be under Dr Jared Henriquez name. Insurance doesn't recognize a PA scripts.

## 2022-11-01 ENCOUNTER — PATIENT MESSAGE (OUTPATIENT)
Dept: INTERNAL MEDICINE CLINIC | Facility: CLINIC | Age: 62
End: 2022-11-01

## 2022-11-01 DIAGNOSIS — R09.82 POSTNASAL DRIP: Primary | ICD-10-CM

## 2022-11-01 NOTE — TELEPHONE ENCOUNTER
From: Matty Munson: 11/1/2022 10:11 AM CDT  To: Emg 08 Clinical Staff  Subject: Mucinex     Oh and I've had shortness of breath for a long time due to my being overweight and out of shape and a little bit from smoking.

## 2022-11-01 NOTE — TELEPHONE ENCOUNTER
From: Jacy Hilton  To: 32 [facility],   Sent: 11/1/2022 9:36 AM CDT  Subject: Mucinex     I've been taking mucinex for a while now not realizing that I wasn't supposed to take it for long periods. I still have a lot of phelm that I cough up daily. What do you suggest if I can't take mucinex every day?   Thanks [employer]

## 2022-11-02 RX ORDER — ALBUTEROL SULFATE 90 UG/1
2 AEROSOL, METERED RESPIRATORY (INHALATION) EVERY 4 HOURS PRN
Qty: 18 G | Refills: 2 | Status: SHIPPED | OUTPATIENT
Start: 2022-11-02

## 2022-11-03 ENCOUNTER — PATIENT MESSAGE (OUTPATIENT)
Dept: INTERNAL MEDICINE CLINIC | Facility: CLINIC | Age: 62
End: 2022-11-03

## 2022-11-03 DIAGNOSIS — J30.2 SEASONAL ALLERGIES: Primary | ICD-10-CM

## 2022-11-03 RX ORDER — AZELASTINE 1 MG/ML
1 SPRAY, METERED NASAL 2 TIMES DAILY
Qty: 30 ML | Refills: 0 | Status: SHIPPED | OUTPATIENT
Start: 2022-11-03

## 2022-11-03 NOTE — TELEPHONE ENCOUNTER
From: Yohana Guillen  To: 32 [facility]   Sent: 11/3/2022 11:28 AM CDT  Subject: Nasal spray     That nasal spray you called in is a over the counter medicine and the insurance doesn't cover it and it's $20.00. I can't afford that. Is there a different spray that you can prescribe? Something that the insurance will cover?

## 2022-11-04 ENCOUNTER — TELEPHONE (OUTPATIENT)
Dept: UROLOGY | Facility: CLINIC | Age: 62
End: 2022-11-04

## 2022-11-04 DIAGNOSIS — L30.9 DERMATITIS: ICD-10-CM

## 2022-11-04 RX ORDER — LEVOCETIRIZINE DIHYDROCHLORIDE 5 MG/1
5 TABLET, FILM COATED ORAL EVERY EVENING
Qty: 30 TABLET | Refills: 0 | Status: SHIPPED | OUTPATIENT
Start: 2022-11-04

## 2022-11-04 NOTE — TELEPHONE ENCOUNTER
LOV: 8/17/2022 with Dr. Treva Caraballo  RTC: 2 weeks  Last Relevant Labs: August 2022  Filled: 10/10/2022    #30 with 0 refills    Future Appointments   Date Time Provider Lilly Jazzy   12/1/2022  1:00 PM MD CHAUNCEY ShortGBRK [facility] [place]   16/68/2935 58:48 PM Cherrie Gosselin, MD EMGRHEUMHBSN EMG Elsa [facility]on

## 2022-11-04 NOTE — TELEPHONE ENCOUNTER
Pt calls requesting to increase Vesicare from 5 mg daily to 10 mg daily as discussed with HIEN Henry in a prior phone encounter. She completed Macrobid as directed and leakage/nocturia  remains bothersome.   This RN to provide condition update for [place].

## 2022-11-07 RX ORDER — SOLIFENACIN SUCCINATE 10 MG/1
10 TABLET, FILM COATED ORAL DAILY
Qty: 30 TABLET | Refills: 2 | Status: SHIPPED | OUTPATIENT
Start: 2022-11-07

## 2022-11-07 NOTE — TELEPHONE ENCOUNTER
HIEN Henry provided condition update. TORB increase Vesicare to 10mg every day, schedule 4 week med f/u w/ PA schedule. Called pt and notified of change, need for follow up appt. She verbalizes understanding and agreeable to plan. Sent to appt desk for follow up.

## 2022-11-28 RX ORDER — TOPIRAMATE 50 MG/1
TABLET, FILM COATED ORAL
Qty: 60 TABLET | Refills: 2 | Status: SHIPPED | OUTPATIENT
Start: 2022-11-28

## 2022-12-01 ENCOUNTER — OFFICE VISIT (OUTPATIENT)
Dept: NEUROLOGY | Facility: CLINIC | Age: 62
End: 2022-12-01
Payer: MEDICAID

## 2022-12-01 VITALS
SYSTOLIC BLOOD PRESSURE: 130 MMHG | RESPIRATION RATE: 16 BRPM | WEIGHT: 139 LBS | DIASTOLIC BLOOD PRESSURE: 74 MMHG | BODY MASS INDEX: 27 KG/M2 | HEART RATE: 96 BPM

## 2022-12-01 DIAGNOSIS — G43.709 CHRONIC MIGRAINE W/O AURA W/O STATUS MIGRAINOSUS, NOT INTRACTABLE: ICD-10-CM

## 2022-12-01 DIAGNOSIS — R51.9 WORSENING HEADACHES: ICD-10-CM

## 2022-12-01 RX ORDER — TOPIRAMATE 50 MG/1
TABLET, FILM COATED ORAL
Qty: 90 TABLET | Refills: 5 | Status: SHIPPED | OUTPATIENT
Start: 2022-12-01

## 2022-12-01 RX ORDER — CHLORHEXIDINE GLUCONATE 0.12 MG/ML
1 RINSE ORAL AS DIRECTED
COMMUNITY
Start: 2022-10-28

## 2022-12-06 ENCOUNTER — APPOINTMENT (OUTPATIENT)
Dept: GENERAL RADIOLOGY | Age: 62
End: 2022-12-06
Attending: NURSE PRACTITIONER
Payer: MEDICAID

## 2022-12-06 ENCOUNTER — HOSPITAL ENCOUNTER (OUTPATIENT)
Age: 62
Discharge: HOME | End: 2022-12-06
Payer: MEDICAID

## 2022-12-06 VITALS
BODY MASS INDEX: 26.5 KG/M2 | WEIGHT: 135 LBS | DIASTOLIC BLOOD PRESSURE: 81 MMHG | SYSTOLIC BLOOD PRESSURE: 115 MMHG | OXYGEN SATURATION: 97 % | HEART RATE: 91 BPM | TEMPERATURE: 98 F | HEIGHT: 60 IN | RESPIRATION RATE: 22 BRPM

## 2022-12-06 DIAGNOSIS — Z72.0 TOBACCO USE: ICD-10-CM

## 2022-12-06 DIAGNOSIS — B34.9 VIRAL SYNDROME: Primary | ICD-10-CM

## 2022-12-06 DIAGNOSIS — J30.2 SEASONAL ALLERGIES: ICD-10-CM

## 2022-12-06 LAB
POCT INFLUENZA A: NEGATIVE
POCT INFLUENZA B: NEGATIVE
SARS-COV-2 RNA RESP QL NAA+PROBE: NOT DETECTED

## 2022-12-06 PROCEDURE — 87502 INFLUENZA DNA AMP PROBE: CPT | Performed by: NURSE PRACTITIONER

## 2022-12-06 PROCEDURE — 96372 THER/PROPH/DIAG INJ SC/IM: CPT | Performed by: NURSE PRACTITIONER

## 2022-12-06 PROCEDURE — U0002 COVID-19 LAB TEST NON-CDC: HCPCS | Performed by: NURSE PRACTITIONER

## 2022-12-06 PROCEDURE — 99213 OFFICE O/P EST LOW 20 MIN: CPT | Performed by: NURSE PRACTITIONER

## 2022-12-06 PROCEDURE — 94640 AIRWAY INHALATION TREATMENT: CPT | Performed by: NURSE PRACTITIONER

## 2022-12-06 PROCEDURE — 71046 X-RAY EXAM CHEST 2 VIEWS: CPT | Performed by: NURSE PRACTITIONER

## 2022-12-06 RX ORDER — PREDNISONE 20 MG/1
40 TABLET ORAL DAILY
Qty: 10 TABLET | Refills: 0 | Status: SHIPPED | OUTPATIENT
Start: 2022-12-06 | End: 2022-12-11

## 2022-12-06 RX ORDER — KETOROLAC TROMETHAMINE 30 MG/ML
30 INJECTION, SOLUTION INTRAMUSCULAR; INTRAVENOUS ONCE
Status: COMPLETED | OUTPATIENT
Start: 2022-12-06 | End: 2022-12-06

## 2022-12-06 RX ORDER — AZELASTINE 1 MG/ML
SPRAY, METERED NASAL
Qty: 30 ML | Refills: 2 | Status: SHIPPED | OUTPATIENT
Start: 2022-12-06

## 2022-12-06 RX ORDER — ALBUTEROL SULFATE 90 UG/1
2 AEROSOL, METERED RESPIRATORY (INHALATION) EVERY 4 HOURS PRN
Qty: 1 EACH | Refills: 0 | Status: SHIPPED | OUTPATIENT
Start: 2022-12-06 | End: 2023-01-05

## 2022-12-06 RX ORDER — BENZONATATE 100 MG/1
100 CAPSULE ORAL 3 TIMES DAILY PRN
Qty: 30 CAPSULE | Refills: 0 | Status: SHIPPED | OUTPATIENT
Start: 2022-12-06 | End: 2023-01-05

## 2022-12-06 RX ORDER — IPRATROPIUM BROMIDE AND ALBUTEROL SULFATE 2.5; .5 MG/3ML; MG/3ML
3 SOLUTION RESPIRATORY (INHALATION) ONCE
Status: COMPLETED | OUTPATIENT
Start: 2022-12-06 | End: 2022-12-06

## 2022-12-06 NOTE — TELEPHONE ENCOUNTER
LOV: 8/17/2022 with Dr. Moncho Leon  RTC: 2 weeks  Last Relevant Labs: August 2022  Filled: 11/3/2022    #30 mL with 0 refills    Future Appointments   Date Time Provider Lilly Tripletti   30/27/2458 51:94 PM Lela Ferreira MD EMGEUBSN EMG Brijesh Weaver

## 2022-12-06 NOTE — DISCHARGE INSTRUCTIONS
Start the steroids tomorrow and take as directed. Use albuterol as needed. Use Mucinex as needed for congestion and cough. Follow-up closely with your primary care doctor.

## 2022-12-08 ENCOUNTER — LAB ENCOUNTER (OUTPATIENT)
Dept: LAB | Facility: HOSPITAL | Age: 62
End: 2022-12-08
Attending: PHYSICIAN ASSISTANT
Payer: MEDICAID

## 2022-12-08 ENCOUNTER — TELEPHONE (OUTPATIENT)
Dept: UROLOGY | Facility: CLINIC | Age: 62
End: 2022-12-08

## 2022-12-08 DIAGNOSIS — R30.0 DYSURIA: Primary | ICD-10-CM

## 2022-12-08 DIAGNOSIS — R30.0 DYSURIA: ICD-10-CM

## 2022-12-08 PROCEDURE — 87086 URINE CULTURE/COLONY COUNT: CPT

## 2022-12-08 RX ORDER — NITROFURANTOIN 25; 75 MG/1; MG/1
100 CAPSULE ORAL 2 TIMES DAILY
Qty: 14 CAPSULE | Refills: 0 | Status: SHIPPED | OUTPATIENT
Start: 2022-12-08

## 2022-12-08 NOTE — TELEPHONE ENCOUNTER
TC from pt to report dysuria, states \"it feels like infection I had in [facility]". Ucx ordered at [facility] lab and pt to submit sample today. Bradford Peters 2920 PA Macrobid 100mg po bid x 7d. E-rx'd to pt's preferred pharmacy. Pt understands she also needs to schedule 4 wk OAB med f/u appt and states she will call tomorrow to do that.

## 2022-12-12 DIAGNOSIS — L30.9 DERMATITIS: ICD-10-CM

## 2022-12-13 RX ORDER — LEVOCETIRIZINE DIHYDROCHLORIDE 5 MG/1
5 TABLET, FILM COATED ORAL EVERY EVENING
Qty: 30 TABLET | Refills: 5 | Status: SHIPPED | OUTPATIENT
Start: 2022-12-13

## 2022-12-13 NOTE — TELEPHONE ENCOUNTER
LOV: 8/17/2022 with Dr. Cecilia Yin   RTC: \"In 2 weeks\"  Last Relevant Labs: August 2022  Filled: 11/4/2022    #30 with 0 refills    Future Appointments   Date Time Provider Lilly Purcell   96/06/7238 07:71 PM Colt Kraus MD EMGRHEUMHBSN EMG [facility]   12/27/2022 12:15 PM [facility] MR RM4 (3T WIDE) [address] [place] [address]

## 2022-12-14 DIAGNOSIS — G43.709 CHRONIC MIGRAINE W/O AURA W/O STATUS MIGRAINOSUS, NOT INTRACTABLE: ICD-10-CM

## 2022-12-14 RX ORDER — ERENUMAB-AOOE 140 MG/ML
INJECTION, SOLUTION SUBCUTANEOUS
Qty: 1 ML | Refills: 2 | Status: SHIPPED | OUTPATIENT
Start: 2022-12-14

## 2022-12-19 ENCOUNTER — TELEMEDICINE (OUTPATIENT)
Dept: INTERNAL MEDICINE CLINIC | Facility: CLINIC | Age: 62
End: 2022-12-19
Payer: MEDICAID

## 2022-12-19 ENCOUNTER — TELEPHONE (OUTPATIENT)
Dept: NEUROLOGY | Facility: CLINIC | Age: 62
End: 2022-12-19

## 2022-12-19 DIAGNOSIS — R05.2 SUBACUTE COUGH: Primary | ICD-10-CM

## 2022-12-19 RX ORDER — AMOXICILLIN AND CLAVULANATE POTASSIUM 875; 125 MG/1; MG/1
1 TABLET, FILM COATED ORAL 2 TIMES DAILY
Qty: 14 TABLET | Refills: 0 | Status: SHIPPED | OUTPATIENT
Start: 2022-12-19 | End: 2022-12-26

## 2022-12-19 NOTE — PROGRESS NOTES
Virtual Telephone Check-In    This visit is conducted using Telemedicine with live, interactive video and audio. [place] in [place]   Patient understands and accepts financial responsibility for any deductible, co-insurance and/or co-pays associated with this service. Telehealth outside of [employer] Verbal Consent   I conducted a telehealth visit with Dylan Bermudez on 12/19/2022   which was completed using two-way, real-time interactive audio and video  communication. This has been done in good [place] to provide continuity of care in the best interest of the provider-patient relationship, due to the COVID -19 public health crisis/national emergency where restrictions of face-to-face office visits are ongoing. Every conscious effort was taken to allow for sufficient and adequate time to complete the visit. The patient was made aware of the limitations of the telehealth visit, including treatment limitations as no physical exam could be performed. The patient was advised to call 911 or to go to the ER in case there was an emergency. The patient was also advised of the potential privacy & security concerns related to the telehealth platform. The patient was made aware of where to find [facility] notice of privacy practices, telehealth consent form and other related consent forms and documents. which are located on the [facility] website. The patient verbally agreed to telehealth consent form, related consents and the risks discussed. Lastly, the patient confirmed that they were in [place]. Included in this visit, time may have been spent reviewing labs, medications, radiology tests and decision making. Appropriate medical decision-making and tests are ordered as detailed in the plan of care above. Coding/billing information is submitted for this visit based on complexity of care and/or time spent for the visit.   Time spent: 8 minutes   HPI: Sophia Bedoya is a 63year old female who is calling for cough. Has been going on for 3 weeks and is worsening. Fevers are going down, but the cough is not improving. Was negative for covid and the flu   ROS:  General: Feels well overall  Skin: Denies any unusual skin lesions  Eyes: Denies blurred vision or double vision  All systems negative, except for above  Physical:  GENERAL: Alert and oriented, well developed, well nourished,in no apparent distress  SKIN: no rashes,no suspicious lesions on face  LUNGS: no audible wheezing  PSYCH: pleasant, appropriate mood and affect    Assessment and Plan  (R05.2) Subacute cough  (primary encounter diagnosis)  Plan: amoxicillin clavulanate 875-125 MG Oral Tab  Note: continue supportive treatment and start antibiotics as above.  Has been on prednisone already    RTC in 2 weeks or sooner   [address] [place] [address]

## 2022-12-19 NOTE — TELEPHONE ENCOUNTER
Incoming fax from [employer] asking for PA on AImovig 140mg    Last PA done 12/2021    Was approved through 12/14/22      Submitted through [employer]

## 2022-12-21 NOTE — TELEPHONE ENCOUNTER
Rec'd incoming fax from [address] on behalf of [address] dated 12/20/22 with APPROVAL for Aimovig 140 mg auto-injector. Effective from 9/21/22 through 12/20/23.     Reference #: [id number]

## 2022-12-27 ENCOUNTER — HOSPITAL ENCOUNTER (OUTPATIENT)
Dept: MRI IMAGING | Facility: HOSPITAL | Age: 62
Discharge: HOME | End: 2022-12-27
Attending: Other
Payer: MEDICAID

## 2022-12-27 DIAGNOSIS — R51.9 WORSENING HEADACHES: ICD-10-CM

## 2022-12-27 PROCEDURE — 70551 MRI BRAIN STEM W/O DYE: CPT | Performed by: OTHER

## 2023-01-03 ENCOUNTER — HOSPITAL ENCOUNTER (OUTPATIENT)
Age: 63
Discharge: HOME | End: 2023-01-03
Payer: MEDICAID

## 2023-01-03 VITALS
HEART RATE: 113 BPM | SYSTOLIC BLOOD PRESSURE: 106 MMHG | RESPIRATION RATE: 22 BRPM | HEIGHT: 60 IN | BODY MASS INDEX: 26.5 KG/M2 | OXYGEN SATURATION: 95 % | DIASTOLIC BLOOD PRESSURE: 70 MMHG | WEIGHT: 135 LBS | TEMPERATURE: 99 F

## 2023-01-03 DIAGNOSIS — U07.1 COVID-19: Primary | ICD-10-CM

## 2023-01-03 PROCEDURE — 99214 OFFICE O/P EST MOD 30 MIN: CPT | Performed by: NURSE PRACTITIONER

## 2023-01-03 RX ORDER — BENZONATATE 200 MG/1
200 CAPSULE ORAL 3 TIMES DAILY PRN
Qty: 30 CAPSULE | Refills: 0 | Status: SHIPPED | OUTPATIENT
Start: 2023-01-03 | End: 2023-02-02

## 2023-01-03 NOTE — DISCHARGE INSTRUCTIONS
Rest and drink plenty of fluids. This will help to thin the mucous in the back of your throat. Take Tylenol and/or ibuprofen as needed for pain or fever. Use Zyrtec, Claritin, or Allegra to help with nasal drainage. You can also take Sudafed to help with sinus pressure or pain. Get the medication behind the pharmacy counter. Take Tessalon Perles as needed for cough. Use your albuterol inhaler 2 to 8 puffs every 4 hours as needed to help with shortness of breath and cough. Follow up with your PCP in 7 days. Your symptoms should improve in the next 7-10 days; however, the cough can linger for much longer. Thank you for choosing [place] [address] for your care.

## 2023-01-03 NOTE — ED INITIAL ASSESSMENT (HPI)
Patient states tested positive for Covid today at home.     Non productive cough with some exertional SOB  History of COPD

## 2023-01-09 ENCOUNTER — PATIENT MESSAGE (OUTPATIENT)
Dept: INTERNAL MEDICINE CLINIC | Facility: CLINIC | Age: 63
End: 2023-01-09

## 2023-01-10 NOTE — TELEPHONE ENCOUNTER
Appointment scheduled.   Future Appointments   Date Time Provider Lilly Purcell   1/11/2023 11:45 AM Regino Rodriguez DO EMG 8 [facility] [place]   0/56/8712  0:95 PM Sasha Portillo MD [facility]BSN [facility]

## 2023-01-10 NOTE — TELEPHONE ENCOUNTER
From: Jose Carlos Otoole  To: 32 [facility],   Sent: 1/9/2023 9:36 PM CST  Subject: Covid     I went to the immediate care last Tuesday because I tested positive for covid on my home test and they said they didn't want to give me the pill because of my antidepressants and only gave me cough pills. They told me to do my inhaler and drink plenty of water and rest. Well I'm still coughing a lot and feeling really crappie. Also I've had a few nights that I've had to go to the bathroom and have lost my footing and actually fell and one night passed out on the bathroom floor. I've actually have peed myself several nights which is something I need to discuss more with Dr. Taj Hemphill. How long should this cough take to go away?

## 2023-01-11 ENCOUNTER — TELEMEDICINE (OUTPATIENT)
Dept: INTERNAL MEDICINE CLINIC | Facility: CLINIC | Age: 63
End: 2023-01-11
Payer: MEDICAID

## 2023-01-11 DIAGNOSIS — I10 PRIMARY HYPERTENSION: ICD-10-CM

## 2023-01-11 DIAGNOSIS — R00.2 PALPITATIONS: Primary | ICD-10-CM

## 2023-01-11 DIAGNOSIS — R05.8 POST-VIRAL COUGH SYNDROME: Primary | ICD-10-CM

## 2023-01-11 PROCEDURE — 99213 OFFICE O/P EST LOW 20 MIN: CPT | Performed by: INTERNAL MEDICINE

## 2023-01-11 RX ORDER — CODEINE PHOSPHATE AND GUAIFENESIN 10; 100 MG/5ML; MG/5ML
5 SOLUTION ORAL NIGHTLY PRN
Qty: 118 ML | Refills: 0 | Status: SHIPPED | OUTPATIENT
Start: 2023-01-11

## 2023-01-11 NOTE — PROGRESS NOTES
Virtual Telephone Check-In    This visit is conducted using Telemedicine with live, interactive video and audio. Patient resides in [place]   Patient understands and accepts financial responsibility for any deductible, co-insurance and/or co-pays associated with this service. Telehealth outside of [employer] Verbal Consent   I conducted a telehealth visit with Celina Mccurdy on 1/11/2023   which was completed using two-way, real-time interactive audio and video  communication. This has been done in good [place] to provide continuity of care in the best interest of the provider-patient relationship, due to the COVID -19 public health crisis/national emergency where restrictions of face-to-face office visits are ongoing. Every conscious effort was taken to allow for sufficient and adequate time to complete the visit. The patient was made aware of the limitations of the telehealth visit, including treatment limitations as no physical exam could be performed. The patient was advised to call 911 or to go to the ER in case there was an emergency. The patient was also advised of the potential privacy & security concerns related to the telehealth platform. The patient was made aware of where to find [facility] notice of privacy practices, telehealth consent form and other related consent forms and documents. which are located on the [facility] website. The patient verbally agreed to telehealth consent form, related consents and the risks discussed. Lastly, the patient confirmed that they were in [place]. Included in this visit, time may have been spent reviewing labs, medications, radiology tests and decision making. Appropriate medical decision-making and tests are ordered as detailed in the plan of care above. Coding/billing information is submitted for this visit based on complexity of care and/or time spent for the visit.   Time spent: 6 minutes   HPI: Cordell Hyman is a 63year old female who is calling for a cough. Was diagnosed with covid recently, but paxlovid was not given given her antidepressants. She is coughing a lot. It is worse during the night time. While coughing so much she fell and could not get up. She woke up in the morning and was on the floor. She denies any nausea/vomiting, no changes in vision, has chronic headaches, but that has not worsened. She is walking without difficulty. She is coughing so much that she is urinating on herself   ROS:  General: Feels well overall  Skin: Denies any unusual skin lesions  Eyes: Denies blurred vision or double vision  All systems negative, except for above  Physical:  GENERAL: Alert and oriented, well developed, well nourished,in no apparent distress  SKIN: no rashes,no suspicious lesions on face  LUNGS: no audible wheezing  PSYCH: pleasant, appropriate mood and affect    Assessment and Plan  (R05.8) Post-viral cough syndrome  (primary encounter diagnosis)  Plan: XR CHEST PA + LAT CHEST (CPT=71046),         guaiFENesin-codeine (CHERATUSSIN AC) 100-10         MG/5ML Oral Solution  Note: will start medication above. Chest x ray ordered as well. Discussed the red flags that will prompt an ER visit.  Sounds stable currently  Follow up in 7 to 10 days or sooner if symptoms do not improve or worsen   Rika Hinkle DO

## 2023-01-16 ENCOUNTER — PATIENT MESSAGE (OUTPATIENT)
Dept: INTERNAL MEDICINE CLINIC | Facility: CLINIC | Age: 63
End: 2023-01-16

## 2023-01-16 ENCOUNTER — HOSPITAL ENCOUNTER (OUTPATIENT)
Dept: GENERAL RADIOLOGY | Age: 63
Discharge: HOME | End: 2023-01-16
Attending: INTERNAL MEDICINE
Payer: MEDICAID

## 2023-01-16 DIAGNOSIS — Z11.52 ENCOUNTER FOR SCREENING FOR COVID-19: Primary | ICD-10-CM

## 2023-01-16 DIAGNOSIS — R05.8 POST-VIRAL COUGH SYNDROME: ICD-10-CM

## 2023-01-16 PROCEDURE — 71046 X-RAY EXAM CHEST 2 VIEWS: CPT | Performed by: INTERNAL MEDICINE

## 2023-01-16 NOTE — TELEPHONE ENCOUNTER
From: Bryan Hines  Sent: 1/16/2023 2:21 PM CST  To: Emg 08 Clinical Staff  Subject: Question regarding XR CHEST PA & LAT CHEST (QRH=01895)    Well I need to make sure so that I can go to work

## 2023-01-16 NOTE — TELEPHONE ENCOUNTER
From: Carlie Trevino  To:  32 [facility],   Sent: 1/16/2023 1:16 PM CST  Subject: Question regarding XR CHEST PA & LAT CHEST (CPT=71020)    Should I just keep taking my cough medicine and inhaler and drink fluids and get rest?
Please advise-CXR w/o signs of PNA.
sudden onset/constant

## 2023-01-16 NOTE — TELEPHONE ENCOUNTER
From: Raquel Govea  To:  32 [facility],   Sent: 1/16/2023 1:16 PM CST  Subject: Question regarding XR CHEST PA & LAT CHEST (CPT=71020)    Should I just keep taking my cough medicine and inhaler and drink fluids and get rest?

## 2023-01-16 NOTE — TELEPHONE ENCOUNTER
Covid PCR pended-please review and sign if appropriate. See [facility]-requested script for testing to pharmacy.

## 2023-01-17 ENCOUNTER — VIRTUAL PHONE E/M (OUTPATIENT)
Dept: UROLOGY | Facility: CLINIC | Age: 63
End: 2023-01-17
Attending: OBSTETRICS & GYNECOLOGY
Payer: MEDICAID

## 2023-01-17 DIAGNOSIS — R35.1 NOCTURIA: ICD-10-CM

## 2023-01-17 DIAGNOSIS — R35.0 URINARY FREQUENCY: ICD-10-CM

## 2023-01-17 DIAGNOSIS — N39.41 URGE INCONTINENCE: Primary | ICD-10-CM

## 2023-01-17 DIAGNOSIS — N81.84 PELVIC MUSCLE WASTING: ICD-10-CM

## 2023-01-17 DIAGNOSIS — N95.2 POSTMENOPAUSAL ATROPHIC VAGINITIS: ICD-10-CM

## 2023-01-17 RX ORDER — TROSPIUM CHLORIDE ER 60 MG/1
60 CAPSULE ORAL DAILY
Qty: 30 CAPSULE | Refills: 11 | Status: SHIPPED | OUTPATIENT
Start: 2023-01-17

## 2023-01-17 RX ORDER — ESTRADIOL 0.1 MG/G
0.5 CREAM VAGINAL
Qty: 42.5 G | Refills: 1 | Status: SHIPPED | OUTPATIENT
Start: 2023-01-19

## 2023-01-17 NOTE — PROGRESS NOTES
Patient to follow up UUI/nocturia  Given circumstances surrounding COVID-19 this visit is being conducted as a televisit with pt's consent. Pt in safe, private location prior to beginning visit. Provider located in office setting. She is currently using Vesicare 10 mg daily    She reports some improvement and reports +dry mouth  Currently leakage with cough, recently diagnosed with COVID  Bowels regular  Denies other significant side effects  Not regular with estrogen cream usage    +Ucx:  10/19/22 > 100k enterococcus tx Macrobid    Has tried in the past:  Vesicare 5 mg - 50% improvement  Oxybutynin XR 10 mg - minimal improvement, dry mouth    Urogynecology Summary:  Urogynecology Summary  Prolapse: No  NAN: Yes  Urge Incontinence: Yes  Nocturia Frequency: 3 (maybe 4)  Frequency: 1 - 2 hours (every hour due to covid)  Incomplete emptying: Yes  Constipation: No  Wears pad day?: 1 (light)  Wears Pad Night?: 1 (heavy)  Activities are limited by UI/POP?: No  Currently Sexually Active: No  Avoids sexual activity due to: Other    Impression/Plan:  (N39.41) Urge incontinence  (primary encounter diagnosis)    (R35.1) Nocturia    (N95.2) Postmenopausal atrophic vaginitis    (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     Treatment Plan, Non-surgical:   Continue Vesicare 10 mg daily, stop once able to  Tropsium  Prior authorization sent for Trospium XR 60 mg daily  Recommend estrogen cream vaginally twice a week  Bowel regimen reviewed  Bladder diet/drill  Call with s/sx of UTI  All questions answered  She understands and agrees to plan    Return in about 6 weeks (around 2/28/2023) for UUI medication check, sooner prn.     Jae Maldonado PA-C

## 2023-01-19 ENCOUNTER — TELEPHONE (OUTPATIENT)
Dept: UROLOGY | Facility: CLINIC | Age: 63
End: 2023-01-19

## 2023-01-19 DIAGNOSIS — E11.65 UNCONTROLLED TYPE 2 DIABETES MELLITUS WITH HYPERGLYCEMIA, WITHOUT LONG-TERM CURRENT USE OF INSULIN (HCC): ICD-10-CM

## 2023-01-19 DIAGNOSIS — E78.00 HYPERCHOLESTEROLEMIA: ICD-10-CM

## 2023-01-19 RX ORDER — ATORVASTATIN CALCIUM 40 MG/1
40 TABLET, FILM COATED ORAL NIGHTLY
Qty: 90 TABLET | Refills: 1 | Status: SHIPPED | OUTPATIENT
Start: 2023-01-19

## 2023-01-19 NOTE — TELEPHONE ENCOUNTER
LOV: 1/11/2023 with Dr. Keith Robles  RTC: \"7 to 10 days or sooner if symptoms do not improve or worsen\"  Last Relevant Labs: August 2022  Filled: 8/15/2022    #90 with 0 refills    Future Appointments   Date Time Provider Lilly Purcell   5/45/7108  3:12 PM Hemanth Rivas MD [facility] [place]   3/13/2023  2:00 PM LUCERO Dennison

## 2023-01-30 ENCOUNTER — OFFICE VISIT (OUTPATIENT)
Dept: RHEUMATOLOGY | Facility: CLINIC | Age: 63
End: 2023-01-30
Payer: MEDICAID

## 2023-01-30 VITALS
DIASTOLIC BLOOD PRESSURE: 82 MMHG | BODY MASS INDEX: 26 KG/M2 | SYSTOLIC BLOOD PRESSURE: 122 MMHG | WEIGHT: 134 LBS | OXYGEN SATURATION: 98 % | HEART RATE: 101 BPM

## 2023-01-30 DIAGNOSIS — M17.12 PRIMARY OSTEOARTHRITIS OF LEFT KNEE: Primary | ICD-10-CM

## 2023-01-30 DIAGNOSIS — G62.9 NEUROPATHY: ICD-10-CM

## 2023-01-30 RX ORDER — LANCETS
EACH MISCELLANEOUS
Qty: 300 EACH | Refills: 1 | Status: SHIPPED | OUTPATIENT
Start: 2023-01-30

## 2023-01-30 RX ORDER — DICLOFENAC SODIUM 75 MG/1
75 TABLET, DELAYED RELEASE ORAL 2 TIMES DAILY
Qty: 180 TABLET | Refills: 3 | Status: SHIPPED | OUTPATIENT
Start: 2023-01-30

## 2023-01-30 RX ORDER — METHYLPREDNISOLONE ACETATE 40 MG/ML
40 INJECTION, SUSPENSION INTRA-ARTICULAR; INTRALESIONAL; INTRAMUSCULAR; SOFT TISSUE ONCE
Status: COMPLETED | OUTPATIENT
Start: 2023-01-30 | End: 2023-01-30

## 2023-01-30 RX ORDER — GABAPENTIN 300 MG/1
300 CAPSULE ORAL NIGHTLY
Qty: 90 CAPSULE | Refills: 1 | Status: SHIPPED | OUTPATIENT
Start: 2023-01-30

## 2023-01-30 RX ORDER — BLOOD SUGAR DIAGNOSTIC
STRIP MISCELLANEOUS
Qty: 300 EACH | Refills: 1 | Status: SHIPPED | OUTPATIENT
Start: 2023-01-30

## 2023-01-30 RX ORDER — SOLIFENACIN SUCCINATE 10 MG/1
TABLET, FILM COATED ORAL
Qty: 30 TABLET | Refills: 2 | OUTPATIENT
Start: 2023-01-30

## 2023-01-30 RX ADMIN — METHYLPREDNISOLONE ACETATE 40 MG: 40 INJECTION, SUSPENSION INTRA-ARTICULAR; INTRALESIONAL; INTRAMUSCULAR; SOFT TISSUE at 16:24:00

## 2023-01-30 NOTE — PROCEDURES
Obtaining consent from the patient, the left knee was cleansed with a combination of Hibiclens and alcohol wipes. Then the left knee was injected with 40 mg of methylprednisolone and 1 cc of 1% lidocaine. Patient tolerated the left knee injection without incident.

## 2023-01-30 NOTE — PATIENT INSTRUCTIONS
Diclofenac 75 mg twice a day. Left knee injection done today-cortisone injected. Go home and rest.  Ice the knee if needed. Voltaren gel apply 3-4 times a day to the sore left knee. Use a dime size amount of medicine. Gabapentin 300 mg at night. Return to office for recheck 3 months.

## 2023-01-30 NOTE — TELEPHONE ENCOUNTER
TC to pt asking if Trospium was approved from [place]. Pt reports she picked it up last wk and has already started on it.  Vesicare d/c's

## 2023-02-01 ENCOUNTER — TELEPHONE (OUTPATIENT)
Dept: RHEUMATOLOGY | Facility: CLINIC | Age: 63
End: 2023-02-01

## 2023-02-01 RX ORDER — LIDOCAINE 50 MG/G
1 OINTMENT TOPICAL 3 TIMES DAILY PRN
Qty: 240 G | Refills: 1 | Status: SHIPPED | OUTPATIENT
Start: 2023-02-01

## 2023-02-01 NOTE — TELEPHONE ENCOUNTER
Lidocaine cream 5% ordered apply 3 times a day to sore joints instead of diclofenac cream.  Dr. Dalia Davis

## 2023-02-02 ENCOUNTER — TELEPHONE (OUTPATIENT)
Dept: RHEUMATOLOGY | Facility: CLINIC | Age: 63
End: 2023-02-02

## 2023-02-08 ENCOUNTER — PATIENT MESSAGE (OUTPATIENT)
Dept: INTERNAL MEDICINE CLINIC | Facility: CLINIC | Age: 63
End: 2023-02-08

## 2023-02-08 RX ORDER — LANCETS 33 GAUGE
1 EACH MISCELLANEOUS 3 TIMES DAILY
Qty: 300 EACH | Refills: 3 | Status: CANCELLED | OUTPATIENT
Start: 2023-02-08 | End: 2024-02-08

## 2023-02-08 NOTE — TELEPHONE ENCOUNTER
From: Carlie Trevino  To: Osmel Lo DO  Sent: 2/8/2023 3:14 PM CST  Subject: Diabetic supplies     You called in the incorrect needles for my diabetic supplies. My needles I need are One Touch Delica Plus Lancet 32X. The ones you called in were for my old meter that I can't use any more. So I need you to call in the correct ones please.    Thanks [employer]

## 2023-02-09 RX ORDER — LANCETS 33 GAUGE
1 EACH MISCELLANEOUS 3 TIMES DAILY
Qty: 300 EACH | Refills: 1 | Status: SHIPPED | OUTPATIENT
Start: 2023-02-09

## 2023-03-13 ENCOUNTER — OFFICE VISIT (OUTPATIENT)
Dept: UROLOGY | Facility: CLINIC | Age: 63
End: 2023-03-13
Attending: OBSTETRICS & GYNECOLOGY
Payer: MEDICAID

## 2023-03-13 VITALS — TEMPERATURE: 98 F | HEIGHT: 60 IN | BODY MASS INDEX: 26.31 KG/M2 | WEIGHT: 134 LBS

## 2023-03-13 DIAGNOSIS — R30.0 DYSURIA: ICD-10-CM

## 2023-03-13 DIAGNOSIS — N39.41 URGE INCONTINENCE: Primary | ICD-10-CM

## 2023-03-13 DIAGNOSIS — N39.3 FEMALE STRESS INCONTINENCE: ICD-10-CM

## 2023-03-13 DIAGNOSIS — R35.1 NOCTURIA: ICD-10-CM

## 2023-03-13 DIAGNOSIS — N95.2 POSTMENOPAUSAL ATROPHIC VAGINITIS: ICD-10-CM

## 2023-03-13 DIAGNOSIS — N81.84 PELVIC MUSCLE WASTING: ICD-10-CM

## 2023-03-13 PROCEDURE — 51701 INSERT BLADDER CATHETER: CPT

## 2023-03-13 PROCEDURE — 99212 OFFICE O/P EST SF 10 MIN: CPT

## 2023-03-16 ENCOUNTER — TELEPHONE (OUTPATIENT)
Dept: UROLOGY | Facility: CLINIC | Age: 63
End: 2023-03-16

## 2023-03-16 RX ORDER — NITROFURANTOIN 25; 75 MG/1; MG/1
100 CAPSULE ORAL 2 TIMES DAILY
Qty: 14 CAPSULE | Refills: 0 | Status: SHIPPED | OUTPATIENT
Start: 2023-03-16

## 2023-03-16 NOTE — TELEPHONE ENCOUNTER
TC to pt to inform her of urine culture results showing an infection. Pt still w/dysuria. Bradford Peters 1459 [place] Macrobid 100mg po bid x 7d. Verified preferred pharmacy w/pt. Encouraged pt to use estrace cream 2x/wk and to call us with any worsening symptoms or questions.

## 2023-03-20 DIAGNOSIS — G43.709 CHRONIC MIGRAINE W/O AURA W/O STATUS MIGRAINOSUS, NOT INTRACTABLE: ICD-10-CM

## 2023-03-21 RX ORDER — ERENUMAB-AOOE 140 MG/ML
INJECTION, SOLUTION SUBCUTANEOUS
Qty: 1 ML | Refills: 2 | Status: SHIPPED | OUTPATIENT
Start: 2023-03-21

## 2023-04-04 ENCOUNTER — TELEPHONE (OUTPATIENT)
Dept: UROLOGY | Facility: CLINIC | Age: 63
End: 2023-04-04

## 2023-04-04 ENCOUNTER — LAB ENCOUNTER (OUTPATIENT)
Dept: LAB | Age: 63
End: 2023-04-04
Attending: PHYSICIAN ASSISTANT
Payer: MEDICAID

## 2023-04-04 DIAGNOSIS — R30.0 DYSURIA: ICD-10-CM

## 2023-04-04 DIAGNOSIS — R30.0 DYSURIA: Primary | ICD-10-CM

## 2023-04-04 PROCEDURE — 87186 SC STD MICRODIL/AGAR DIL: CPT

## 2023-04-04 PROCEDURE — 87077 CULTURE AEROBIC IDENTIFY: CPT

## 2023-04-04 PROCEDURE — 87086 URINE CULTURE/COLONY COUNT: CPT

## 2023-04-04 RX ORDER — SULFAMETHOXAZOLE AND TRIMETHOPRIM 800; 160 MG/1; MG/1
1 TABLET ORAL 2 TIMES DAILY
Qty: 14 TABLET | Refills: 0 | Status: SHIPPED | OUTPATIENT
Start: 2023-04-04 | End: 2023-04-04

## 2023-04-04 RX ORDER — SULFAMETHOXAZOLE AND TRIMETHOPRIM 800; 160 MG/1; MG/1
1 TABLET ORAL 2 TIMES DAILY
Qty: 14 TABLET | Refills: 0 | Status: SHIPPED | OUTPATIENT
Start: 2023-04-04

## 2023-04-04 NOTE — TELEPHONE ENCOUNTER
Pt calls with dysuria x 3 days, requesting urine culture. Orders placed for culture. Pt plans to submit today. Requesting empiric antibiotics. Provided condition update to HIEN Coffey, NIMA Bactrim DS PO BID x 7 days, see orders. Called pt, notified to submit urine culture first then begin empiric Bactrim. Pt verbalizes understanding and agreeable to plan. PEND for final culture.

## 2023-04-14 DIAGNOSIS — I10 ESSENTIAL HYPERTENSION: ICD-10-CM

## 2023-04-14 RX ORDER — METOPROLOL SUCCINATE 50 MG/1
50 TABLET, EXTENDED RELEASE ORAL DAILY
Qty: 90 TABLET | Refills: 1 | Status: SHIPPED | OUTPATIENT
Start: 2023-04-14

## 2023-04-19 ENCOUNTER — OFFICE VISIT (OUTPATIENT)
Dept: INTERNAL MEDICINE CLINIC | Facility: CLINIC | Age: 63
End: 2023-04-19
Payer: MEDICAID

## 2023-04-19 VITALS
DIASTOLIC BLOOD PRESSURE: 82 MMHG | OXYGEN SATURATION: 99 % | HEART RATE: 95 BPM | SYSTOLIC BLOOD PRESSURE: 124 MMHG | WEIGHT: 137 LBS | TEMPERATURE: 98 F | RESPIRATION RATE: 16 BRPM | BODY MASS INDEX: 26.9 KG/M2 | HEIGHT: 60 IN

## 2023-04-19 DIAGNOSIS — E11.40 TYPE 2 DIABETES MELLITUS WITH DIABETIC NEUROPATHY, WITHOUT LONG-TERM CURRENT USE OF INSULIN (HCC): ICD-10-CM

## 2023-04-19 DIAGNOSIS — J44.9 COPD, MILD (HCC): ICD-10-CM

## 2023-04-19 DIAGNOSIS — F33.1 MDD (MAJOR DEPRESSIVE DISORDER), RECURRENT EPISODE, MODERATE (HCC): ICD-10-CM

## 2023-04-19 DIAGNOSIS — G43.009 MIGRAINE WITHOUT AURA AND WITHOUT STATUS MIGRAINOSUS, NOT INTRACTABLE: ICD-10-CM

## 2023-04-19 DIAGNOSIS — K22.70 BARRETT'S ESOPHAGUS WITHOUT DYSPLASIA: ICD-10-CM

## 2023-04-19 DIAGNOSIS — I10 PRIMARY HYPERTENSION: ICD-10-CM

## 2023-04-19 DIAGNOSIS — F51.01 PRIMARY INSOMNIA: ICD-10-CM

## 2023-04-19 DIAGNOSIS — Z72.0 TOBACCO USE: ICD-10-CM

## 2023-04-19 DIAGNOSIS — F41.1 GAD (GENERALIZED ANXIETY DISORDER): ICD-10-CM

## 2023-04-19 DIAGNOSIS — E11.9 TYPE 2 DIABETES MELLITUS WITHOUT COMPLICATION, WITHOUT LONG-TERM CURRENT USE OF INSULIN (HCC): Primary | ICD-10-CM

## 2023-04-19 DIAGNOSIS — N39.498 OTHER URINARY INCONTINENCE: ICD-10-CM

## 2023-04-19 DIAGNOSIS — E78.00 HYPERCHOLESTEROLEMIA: ICD-10-CM

## 2023-04-19 PROBLEM — G24.01 TARDIVE DYSKINESIA: Status: RESOLVED | Noted: 2022-08-15 | Resolved: 2023-04-19

## 2023-04-19 PROCEDURE — 3008F BODY MASS INDEX DOCD: CPT | Performed by: INTERNAL MEDICINE

## 2023-04-19 PROCEDURE — 3079F DIAST BP 80-89 MM HG: CPT | Performed by: INTERNAL MEDICINE

## 2023-04-19 PROCEDURE — 99214 OFFICE O/P EST MOD 30 MIN: CPT | Performed by: INTERNAL MEDICINE

## 2023-04-19 PROCEDURE — 3074F SYST BP LT 130 MM HG: CPT | Performed by: INTERNAL MEDICINE

## 2023-04-19 RX ORDER — DULAGLUTIDE 0.75 MG/.5ML
0.75 INJECTION, SOLUTION SUBCUTANEOUS WEEKLY
Qty: 3 ML | Refills: 0 | Status: SHIPPED | OUTPATIENT
Start: 2023-04-19

## 2023-04-24 ENCOUNTER — TELEPHONE (OUTPATIENT)
Dept: INTERNAL MEDICINE CLINIC | Facility: CLINIC | Age: 63
End: 2023-04-24

## 2023-04-24 NOTE — TELEPHONE ENCOUNTER
[facility] requesting additional clinical information to determine medical necessity for CT Chest (to screen for lung cancer)--- placed in Dr. Sumaya Loaiza in-box.

## 2023-04-26 NOTE — TELEPHONE ENCOUNTER
Last CT lung from 11/5/21 faxed to [facility] with receipt of confirmation   Sent to scan and copy placed in accordion

## 2023-04-29 ENCOUNTER — HOSPITAL ENCOUNTER (OUTPATIENT)
Dept: CT IMAGING | Facility: HOSPITAL | Age: 63
Discharge: HOME | End: 2023-04-29
Attending: INTERNAL MEDICINE
Payer: MEDICAID

## 2023-04-29 ENCOUNTER — PATIENT MESSAGE (OUTPATIENT)
Dept: INTERNAL MEDICINE CLINIC | Facility: CLINIC | Age: 63
End: 2023-04-29

## 2023-04-29 ENCOUNTER — LAB ENCOUNTER (OUTPATIENT)
Dept: LAB | Facility: HOSPITAL | Age: 63
End: 2023-04-29
Attending: INTERNAL MEDICINE
Payer: MEDICAID

## 2023-04-29 DIAGNOSIS — I10 PRIMARY HYPERTENSION: ICD-10-CM

## 2023-04-29 DIAGNOSIS — F41.1 GAD (GENERALIZED ANXIETY DISORDER): ICD-10-CM

## 2023-04-29 DIAGNOSIS — E11.9 TYPE 2 DIABETES MELLITUS WITHOUT COMPLICATION, WITHOUT LONG-TERM CURRENT USE OF INSULIN (HCC): ICD-10-CM

## 2023-04-29 DIAGNOSIS — E78.00 HYPERCHOLESTEROLEMIA: ICD-10-CM

## 2023-04-29 DIAGNOSIS — Z72.0 TOBACCO USE: ICD-10-CM

## 2023-04-29 DIAGNOSIS — K22.70 BARRETT'S ESOPHAGUS WITHOUT DYSPLASIA: ICD-10-CM

## 2023-04-29 LAB
ALT SERPL-CCNC: 45 U/L
ANION GAP SERPL CALC-SCNC: 4 MMOL/L (ref 0–18)
AST SERPL-CCNC: 23 U/L (ref 15–37)
BASOPHILS # BLD AUTO: 0.07 X10(3) UL (ref 0–0.2)
BASOPHILS NFR BLD AUTO: 0.8 %
BUN BLD-MCNC: 14 MG/DL (ref 7–18)
CALCIUM BLD-MCNC: 9.1 MG/DL (ref 8.5–10.1)
CHLORIDE SERPL-SCNC: 110 MMOL/L (ref 98–112)
CHOLEST SERPL-MCNC: 164 MG/DL (ref ?–200)
CO2 SERPL-SCNC: 26 MMOL/L (ref 21–32)
CREAT BLD-MCNC: 0.9 MG/DL
CREAT UR-SCNC: 113 MG/DL
EOSINOPHIL # BLD AUTO: 0.61 X10(3) UL (ref 0–0.7)
EOSINOPHIL NFR BLD AUTO: 6.9 %
ERYTHROCYTE [DISTWIDTH] IN BLOOD BY AUTOMATED COUNT: 13.4 %
EST. AVERAGE GLUCOSE BLD GHB EST-MCNC: 140 MG/DL (ref 68–126)
FASTING PATIENT LIPID ANSWER: YES
FASTING STATUS PATIENT QL REPORTED: YES
GFR SERPLBLD BASED ON 1.73 SQ M-ARVRAT: 73 ML/MIN/1.73M2 (ref 60–?)
GLUCOSE BLD-MCNC: 141 MG/DL (ref 70–99)
HBA1C MFR BLD: 6.5 % (ref ?–5.7)
HCT VFR BLD AUTO: 41.2 %
HDLC SERPL-MCNC: 42 MG/DL (ref 40–59)
HGB BLD-MCNC: 13.1 G/DL
IMM GRANULOCYTES # BLD AUTO: 0.03 X10(3) UL (ref 0–1)
IMM GRANULOCYTES NFR BLD: 0.3 %
LDLC SERPL CALC-MCNC: 89 MG/DL (ref ?–100)
LYMPHOCYTES # BLD AUTO: 2.45 X10(3) UL (ref 1–4)
LYMPHOCYTES NFR BLD AUTO: 27.7 %
MCH RBC QN AUTO: 30.8 PG (ref 26–34)
MCHC RBC AUTO-ENTMCNC: 31.8 G/DL (ref 31–37)
MCV RBC AUTO: 96.7 FL
MICROALBUMIN UR-MCNC: 0.82 MG/DL
MICROALBUMIN/CREAT 24H UR-RTO: 7.3 UG/MG (ref ?–30)
MONOCYTES # BLD AUTO: 0.57 X10(3) UL (ref 0.1–1)
MONOCYTES NFR BLD AUTO: 6.5 %
NEUTROPHILS # BLD AUTO: 5.1 X10 (3) UL (ref 1.5–7.7)
NEUTROPHILS # BLD AUTO: 5.1 X10(3) UL (ref 1.5–7.7)
NEUTROPHILS NFR BLD AUTO: 57.8 %
NONHDLC SERPL-MCNC: 122 MG/DL (ref ?–130)
OSMOLALITY SERPL CALC.SUM OF ELEC: 293 MOSM/KG (ref 275–295)
PLATELET # BLD AUTO: 336 10(3)UL (ref 150–450)
POTASSIUM SERPL-SCNC: 4.4 MMOL/L (ref 3.5–5.1)
RBC # BLD AUTO: 4.26 X10(6)UL
SODIUM SERPL-SCNC: 140 MMOL/L (ref 136–145)
TRIGL SERPL-MCNC: 195 MG/DL (ref 30–149)
TSI SER-ACNC: 1.89 MIU/ML (ref 0.36–3.74)
VLDLC SERPL CALC-MCNC: 32 MG/DL (ref 0–30)
WBC # BLD AUTO: 8.8 X10(3) UL (ref 4–11)

## 2023-04-29 PROCEDURE — 84460 ALANINE AMINO (ALT) (SGPT): CPT

## 2023-04-29 PROCEDURE — 80061 LIPID PANEL: CPT

## 2023-04-29 PROCEDURE — 71271 CT THORAX LUNG CANCER SCR C-: CPT | Performed by: INTERNAL MEDICINE

## 2023-04-29 PROCEDURE — 82570 ASSAY OF URINE CREATININE: CPT

## 2023-04-29 PROCEDURE — 80048 BASIC METABOLIC PNL TOTAL CA: CPT

## 2023-04-29 PROCEDURE — 83036 HEMOGLOBIN GLYCOSYLATED A1C: CPT

## 2023-04-29 PROCEDURE — 82043 UR ALBUMIN QUANTITATIVE: CPT

## 2023-04-29 PROCEDURE — 84450 TRANSFERASE (AST) (SGOT): CPT

## 2023-04-29 PROCEDURE — 84443 ASSAY THYROID STIM HORMONE: CPT

## 2023-04-29 PROCEDURE — 36415 COLL VENOUS BLD VENIPUNCTURE: CPT

## 2023-04-29 PROCEDURE — 85025 COMPLETE CBC W/AUTO DIFF WBC: CPT

## 2023-05-01 DIAGNOSIS — R91.1 PULMONARY NODULE: Primary | ICD-10-CM

## 2023-05-01 NOTE — TELEPHONE ENCOUNTER
From: Russel Gilbert  To: [facility],   Sent: 4/29/2023 5:36 PM CDT  Subject: Test results     I'm assuming your going to explain what all of those test results mean?

## 2023-05-02 ENCOUNTER — OFFICE VISIT (OUTPATIENT)
Dept: RHEUMATOLOGY | Facility: CLINIC | Age: 63
End: 2023-05-02
Payer: MEDICAID

## 2023-05-02 ENCOUNTER — TELEPHONE (OUTPATIENT)
Dept: UROLOGY | Facility: CLINIC | Age: 63
End: 2023-05-02

## 2023-05-02 ENCOUNTER — VIRTUAL PHONE E/M (OUTPATIENT)
Dept: UROLOGY | Facility: CLINIC | Age: 63
End: 2023-05-02
Attending: OBSTETRICS & GYNECOLOGY
Payer: MEDICAID

## 2023-05-02 VITALS
DIASTOLIC BLOOD PRESSURE: 86 MMHG | HEIGHT: 60 IN | HEART RATE: 81 BPM | BODY MASS INDEX: 27.55 KG/M2 | SYSTOLIC BLOOD PRESSURE: 120 MMHG | RESPIRATION RATE: 16 BRPM | WEIGHT: 140.31 LBS | OXYGEN SATURATION: 96 %

## 2023-05-02 DIAGNOSIS — N95.2 POSTMENOPAUSAL ATROPHIC VAGINITIS: ICD-10-CM

## 2023-05-02 DIAGNOSIS — M17.12 PRIMARY OSTEOARTHRITIS OF LEFT KNEE: ICD-10-CM

## 2023-05-02 DIAGNOSIS — N39.41 URGE INCONTINENCE: Primary | ICD-10-CM

## 2023-05-02 DIAGNOSIS — M54.50 CHRONIC BILATERAL LOW BACK PAIN WITHOUT SCIATICA: Primary | ICD-10-CM

## 2023-05-02 DIAGNOSIS — G89.29 CHRONIC BILATERAL LOW BACK PAIN WITHOUT SCIATICA: Primary | ICD-10-CM

## 2023-05-02 DIAGNOSIS — R35.1 NOCTURIA: ICD-10-CM

## 2023-05-02 DIAGNOSIS — N81.84 PELVIC MUSCLE WASTING: ICD-10-CM

## 2023-05-02 DIAGNOSIS — M15.9 PRIMARY OSTEOARTHRITIS INVOLVING MULTIPLE JOINTS: ICD-10-CM

## 2023-05-02 DIAGNOSIS — G62.9 NEUROPATHY: ICD-10-CM

## 2023-05-02 DIAGNOSIS — R35.0 URINE FREQUENCY: ICD-10-CM

## 2023-05-02 PROCEDURE — 99214 OFFICE O/P EST MOD 30 MIN: CPT | Performed by: INTERNAL MEDICINE

## 2023-05-02 PROCEDURE — 3074F SYST BP LT 130 MM HG: CPT | Performed by: INTERNAL MEDICINE

## 2023-05-02 PROCEDURE — 3079F DIAST BP 80-89 MM HG: CPT | Performed by: INTERNAL MEDICINE

## 2023-05-02 PROCEDURE — 3008F BODY MASS INDEX DOCD: CPT | Performed by: INTERNAL MEDICINE

## 2023-05-02 RX ORDER — HYDROCODONE BITARTRATE AND ACETAMINOPHEN 10; 325 MG/1; MG/1
1 TABLET ORAL EVERY 6 HOURS PRN
Qty: 100 TABLET | Refills: 0 | Status: SHIPPED | OUTPATIENT
Start: 2023-05-02

## 2023-05-02 RX ORDER — GABAPENTIN 300 MG/1
300 CAPSULE ORAL NIGHTLY
Qty: 90 CAPSULE | Refills: 1 | Status: SHIPPED | OUTPATIENT
Start: 2023-05-02

## 2023-05-02 RX ORDER — MIRABEGRON 50 MG/1
50 TABLET, FILM COATED, EXTENDED RELEASE ORAL DAILY
Qty: 30 TABLET | Refills: 11 | Status: SHIPPED | OUTPATIENT
Start: 2023-05-02 | End: 2023-06-01

## 2023-05-02 RX ORDER — DICLOFENAC SODIUM 75 MG/1
75 TABLET, DELAYED RELEASE ORAL 2 TIMES DAILY
Qty: 180 TABLET | Refills: 3 | Status: SHIPPED | OUTPATIENT
Start: 2023-05-02

## 2023-05-02 NOTE — PATIENT INSTRUCTIONS
Diclofenac 75 mg twice a day. Gabapentin 300 mg at night to kill nerve pain. Diclofenac gel/cream apply 3 ties per day to joints. Exercise by walking. No need for knee injection today. Careful lifting items. RTO 3 months.

## 2023-05-03 ENCOUNTER — TELEPHONE (OUTPATIENT)
Dept: UROLOGY | Facility: CLINIC | Age: 63
End: 2023-05-03

## 2023-05-03 ENCOUNTER — PATIENT MESSAGE (OUTPATIENT)
Dept: RHEUMATOLOGY | Facility: CLINIC | Age: 63
End: 2023-05-03

## 2023-05-03 RX ORDER — CHLORZOXAZONE 500 MG/1
500 TABLET ORAL 4 TIMES DAILY PRN
Qty: 90 TABLET | Refills: 0 | Status: SHIPPED | OUTPATIENT
Start: 2023-05-03 | End: 2023-06-02

## 2023-05-03 NOTE — TELEPHONE ENCOUNTER
TC from pt w/some follow up questions from telephone visit yesterday. Pt bought Replense for vaginal moisturizer, but states it will be too expensive going forward. Wants to know what else she can use. She also asked of she can use KY jelly for lubrication. Discussed w/Debbie STANFORD and okay to use plain KY jelly, make sure no stimulants. Okay to use coconut oil as a moisturizer/lubricant as well as price will be more manageable. Pt grateful for suggestions.

## 2023-05-03 NOTE — TELEPHONE ENCOUNTER
From: Amada Cruz  To: Pinky Perkins MD  Sent: 2/3/4849 12:45 PM CDT  Subject: Medicine     I sent you a message yesterday about the mix up I made in my medicine and I'm not going to  the hydrocodone I'll just get the rx back from them in case I need it for later but still need the other one.  Thanks

## 2023-05-04 ENCOUNTER — TELEPHONE (OUTPATIENT)
Dept: UROLOGY | Facility: CLINIC | Age: 63
End: 2023-05-04

## 2023-05-04 NOTE — TELEPHONE ENCOUNTER
Patient LM on RN line stating her Myrbetriq was going to cost $562. Spoke with patient, states Medicaid will not pay for Myrbetriq medication and it is too expensive. I informed patient of the [address] [place] [address] Patient Support Program [phone] and gave her the number to call to see if she was eligible for the program.     Patient called the support program and is having paperwork emailed to her. Patient states that there is a portion for the provider to fill out and will bring it in when she gets the information. Patient thankful for the information on the Support Program and appreciates the help.

## 2023-05-10 ENCOUNTER — TELEMEDICINE (OUTPATIENT)
Dept: INTERNAL MEDICINE CLINIC | Facility: CLINIC | Age: 63
End: 2023-05-10
Payer: MEDICAID

## 2023-05-10 DIAGNOSIS — R05.2 SUBACUTE COUGH: Primary | ICD-10-CM

## 2023-05-10 PROCEDURE — 99213 OFFICE O/P EST LOW 20 MIN: CPT | Performed by: INTERNAL MEDICINE

## 2023-05-10 RX ORDER — CODEINE PHOSPHATE AND GUAIFENESIN 10; 100 MG/5ML; MG/5ML
5 SOLUTION ORAL NIGHTLY PRN
Qty: 118 ML | Refills: 0 | Status: SHIPPED | OUTPATIENT
Start: 2023-05-10 | End: 2023-05-15

## 2023-05-10 RX ORDER — AZITHROMYCIN 250 MG/1
TABLET, FILM COATED ORAL
Qty: 6 TABLET | Refills: 0 | Status: SHIPPED | OUTPATIENT
Start: 2023-05-10 | End: 2023-05-15

## 2023-05-10 NOTE — PROGRESS NOTES
Virtual Telephone Check-In    This visit is conducted using Telemedicine with live, interactive video and audio. [place] resides in [place]   Patient understands and accepts financial responsibility for any deductible, co-insurance and/or co-pays associated with this service. Telehealth outside of [employer] Verbal Consent   I conducted a telehealth visit with Dylan Bermudez on 5/10/2023   which was completed using two-way, real-time interactive audio and video communication. This has been done in good eugenia to provide continuity of care in the best interest of the provider-patient relationship, due to the COVID -19 public health crisis/national emergency where restrictions of face-to-face office visits are ongoing. Every conscious effort was taken to allow for sufficient and adequate time to complete the visit. The patient was made aware of the limitations of the telehealth visit, including treatment limitations as no physical exam could be performed. The patient was advised to call 911 or to go to the ER in case there was an emergency. The patient was also advised of the potential privacy & security concerns related to the telehealth platform. The patient was made aware of where to find [facility] notice of privacy practices, telehealth consent form and other related consent forms and documents. which are located on the [facility] website. The patient verbally agreed to telehealth consent form, related consents and the risks discussed. Lastly, the patient confirmed that they were in [place]. Included in this visit, time may have been spent reviewing labs, medications, radiology tests and decision making. Appropriate medical decision-making and tests are ordered as detailed in the plan of care above. Coding/billing information is submitted for this visit based on complexity of care and/or time spent for the visit.   Time spent: 6 minutes   HPI: Sophia Bedoya is a 63year old female who is calling about a cough. Has been going on for over a week. She was delirious one of the days, but has gotten better. Coughing so much that she is leaking urine. She is out of her cough syrup. Requesting a stronger medication   ROS:  General: Feels well overall  Skin: Denies any unusual skin lesions  Eyes: Denies blurred vision or double vision  All systems negative, except for above  Physical:  GENERAL: Alert and oriented, well developed, well nourished,in no apparent distress  SKIN: no rashes,no suspicious lesions on face  LUNGS: no audible wheezing  PSYCH: pleasant, appropriate mood and affect    Assessment and Plan  (R05.2) Subacute cough  (primary encounter diagnosis)  Plan: azithromycin 250 MG Oral Tab,         guaiFENesin-codeine (CHERATUSSIN AC) 100-10         MG/5ML Oral Solution  Note: will start treatment above.  Follow up in 7 to 10 days or sooner if symptoms do not improve or worsen   Rika Manrique DO

## 2023-05-13 DIAGNOSIS — E11.40 TYPE 2 DIABETES MELLITUS WITH DIABETIC NEUROPATHY, WITHOUT LONG-TERM CURRENT USE OF INSULIN (HCC): ICD-10-CM

## 2023-05-13 NOTE — TELEPHONE ENCOUNTER
LOV: 5/10/2023 with Dr. Mayur Kelly  RTC: \"7 to 10 days or sooner if symptoms do not improve or worsen\"  Last Relevant Labs: 4/29/2023  Filled: 4/19/2023    #3 mL with 0 refills    Future Appointments   Date Time Provider Lilly Jazzy   5/16/2023  9:00 AM PF [address]   7/1/5687 94:26 AM Franne Angelucci, MD [facility] [place]   8/24/2023 10:40 AM Chasidy Zamora MD [facility]K [facility] [place]

## 2023-05-14 ENCOUNTER — PATIENT MESSAGE (OUTPATIENT)
Dept: INTERNAL MEDICINE CLINIC | Facility: CLINIC | Age: 63
End: 2023-05-14

## 2023-05-14 DIAGNOSIS — R05.2 SUBACUTE COUGH: ICD-10-CM

## 2023-05-15 ENCOUNTER — PATIENT MESSAGE (OUTPATIENT)
Dept: INTERNAL MEDICINE CLINIC | Facility: CLINIC | Age: 63
End: 2023-05-15

## 2023-05-15 ENCOUNTER — TELEPHONE (OUTPATIENT)
Dept: INTERNAL MEDICINE CLINIC | Facility: CLINIC | Age: 63
End: 2023-05-15

## 2023-05-15 DIAGNOSIS — R05.2 SUBACUTE COUGH: Primary | ICD-10-CM

## 2023-05-15 RX ORDER — DULAGLUTIDE 0.75 MG/.5ML
0.75 INJECTION, SOLUTION SUBCUTANEOUS WEEKLY
Qty: 2 ML | Refills: 2 | Status: SHIPPED | OUTPATIENT
Start: 2023-05-15

## 2023-05-15 RX ORDER — PREDNISONE 20 MG/1
40 TABLET ORAL DAILY
Qty: 10 TABLET | Refills: 0 | Status: SHIPPED | OUTPATIENT
Start: 2023-05-15 | End: 2023-05-20

## 2023-05-15 RX ORDER — CODEINE PHOSPHATE AND GUAIFENESIN 10; 100 MG/5ML; MG/5ML
5 SOLUTION ORAL NIGHTLY PRN
Qty: 118 ML | Refills: 0 | Status: SHIPPED | OUTPATIENT
Start: 2023-05-15

## 2023-05-15 NOTE — TELEPHONE ENCOUNTER
Patient is requesting a call back regarding medication. Patient states she is a bit confused with directions on taking medication. Pt states that SV has advised her to take as needed and now only states to take at night. Patient is completely out medication and does not want to  refills until someone is able to explain to her a bit better. Please advise.

## 2023-05-15 NOTE — TELEPHONE ENCOUNTER
From: Dar Lujan  To: Rika Rosas See, DO  Sent: 5/14/2023 5:31 PM CDT  Subject: Bronchitis     I'm still coughing and was wondering if you could please give me another refill on my cough medicine and either the antibiotics or maybe prednisone to see if it would it help with my chest. I've been trying to do my inhaler but that just makes me cough even worse.    Thanks   [employer]

## 2023-05-15 NOTE — TELEPHONE ENCOUNTER
Please advise. Should she be seen in office? TY! VV 5/10/2023:    Assessment and Plan  (R05.2) Subacute cough  (primary encounter diagnosis)  Plan: azithromycin 250 MG Oral Tab,         guaiFENesin-codeine (CHERATUSSIN AC) 100-10         MG/5ML Oral Solution  Note: will start treatment above.  Follow up in 7 to 10 days or sooner if symptoms do not improve or worsen   Rika Curran DO

## 2023-05-16 ENCOUNTER — HOSPITAL ENCOUNTER (OUTPATIENT)
Dept: ULTRASOUND IMAGING | Age: 63
Discharge: HOME | End: 2023-05-16
Attending: OBSTETRICS & GYNECOLOGY
Payer: MEDICAID

## 2023-05-16 DIAGNOSIS — R92.2 DENSE BREASTS: ICD-10-CM

## 2023-05-16 PROCEDURE — 76642 ULTRASOUND BREAST LIMITED: CPT | Performed by: OBSTETRICS & GYNECOLOGY

## 2023-05-16 RX ORDER — CODEINE PHOSPHATE AND GUAIFENESIN 10; 100 MG/5ML; MG/5ML
5 SOLUTION ORAL EVERY 6 HOURS PRN
Qty: 118 ML | Refills: 0 | Status: SHIPPED | OUTPATIENT
Start: 2023-05-16

## 2023-05-16 NOTE — TELEPHONE ENCOUNTER
Spoke with patient, stating Wanda Michele is unable to refill cough syrup until 5/27/23. They advised her Rx needs to be changed to reflect she can take it every 5-6 hours as needed and not just nightly. States OTC cough syrup does not help her s/s. New Rx pended-please review and sign if appropriate. TY!

## 2023-05-16 NOTE — TELEPHONE ENCOUNTER
From: Sha Delcid  To: [facility], DO  Sent: 5/15/2023 7:41 PM CDT  Subject: Cough medicine     I called earlier today, I didn't realize that you had my other cough medicine as only at night. I was taking it as needed and I was out so when you sent the new one over they said I couldn't get it until May 27th! I need cough medicine before then so I don't know what you can do.

## 2023-05-19 DIAGNOSIS — N60.09 CYST OF BREAST, UNSPECIFIED LATERALITY: Primary | ICD-10-CM

## 2023-05-19 NOTE — PROGRESS NOTES
Patient notified of breast imaging results and recommended follow-up. Patient verbalized understanding. Imaging orders placed as directed. Patient states she will call to schedule.

## 2023-05-25 DIAGNOSIS — G43.709 CHRONIC MIGRAINE W/O AURA W/O STATUS MIGRAINOSUS, NOT INTRACTABLE: ICD-10-CM

## 2023-05-26 RX ORDER — RIZATRIPTAN BENZOATE 10 MG/1
10 TABLET ORAL AS NEEDED
Qty: 10 TABLET | Refills: 2 | Status: SHIPPED | OUTPATIENT
Start: 2023-05-26

## 2023-06-07 DIAGNOSIS — G43.709 CHRONIC MIGRAINE W/O AURA W/O STATUS MIGRAINOSUS, NOT INTRACTABLE: Primary | ICD-10-CM

## 2023-06-08 DIAGNOSIS — L30.9 DERMATITIS: ICD-10-CM

## 2023-06-08 RX ORDER — TOPIRAMATE 50 MG/1
TABLET, FILM COATED ORAL
Qty: 270 TABLET | Refills: 0 | Status: SHIPPED | OUTPATIENT
Start: 2023-06-08

## 2023-06-08 RX ORDER — LEVOCETIRIZINE DIHYDROCHLORIDE 5 MG/1
5 TABLET, FILM COATED ORAL EVERY EVENING
Qty: 30 TABLET | Refills: 5 | Status: SHIPPED | OUTPATIENT
Start: 2023-06-08

## 2023-06-08 NOTE — TELEPHONE ENCOUNTER
Date next office visit scheduled:    Future Appointments   Date Time Provider Lilly Tripletti   0/6/1635 28:98 AM Omar Heck MD [facility] [place]   9/21/2023 10:20 AM Dexter Joy MD [facility] [place]

## 2023-06-08 NOTE — TELEPHONE ENCOUNTER
Pt scheduled f/u visit for next available. She canceled because she assumed she no longer needed the apt due to discussing everything at previous visit. Pt asking to have medication refilled ASAP.

## 2023-06-08 NOTE — TELEPHONE ENCOUNTER
LOV: 5/10/2023 with Dr. Dada Jim  RTC: \"in 7 to 10 days or sooner if symptoms do not improve or worsen\"  Last Relevant Labs: 4/29/2023   Filled: 12/13/2022    #30 with 5 refills    Future Appointments   Date Time Provider Lilly Purcell   0/7/5028 29:08 AM Liz Penny MD [facility] [place]

## 2023-06-12 ENCOUNTER — TELEPHONE (OUTPATIENT)
Dept: UROLOGY | Facility: CLINIC | Age: 63
End: 2023-06-12

## 2023-06-12 NOTE — TELEPHONE ENCOUNTER
Current process for patient to receive Myrbetriq 50 mg daily # 90 from the [address] [place] [address] - There is one additional document needed which is \" [facility] \" letter -  [facility]  If denied, will process the denial to \" Maxime Terrell" FAX [phone]

## 2023-06-17 DIAGNOSIS — G43.709 CHRONIC MIGRAINE W/O AURA W/O STATUS MIGRAINOSUS, NOT INTRACTABLE: ICD-10-CM

## 2023-06-19 RX ORDER — ERENUMAB-AOOE 140 MG/ML
INJECTION, SOLUTION SUBCUTANEOUS
Qty: 1 ML | Refills: 2 | Status: SHIPPED | OUTPATIENT
Start: 2023-06-19

## 2023-06-22 ENCOUNTER — TELEPHONE (OUTPATIENT)
Dept: UROLOGY | Facility: CLINIC | Age: 63
End: 2023-06-22

## 2023-06-22 RX ORDER — MIRABEGRON 50 MG/1
50 TABLET, FILM COATED, EXTENDED RELEASE ORAL DAILY
Qty: 30 TABLET | Refills: 11 | Status: SHIPPED | OUTPATIENT
Start: 2023-06-22

## 2023-06-22 NOTE — TELEPHONE ENCOUNTER
Pt calls requesting Myrbetriq be called to her [facility] in [place]. States she received a letter from [facility] that they will cover this med for her. Orders placed per HIEN Diop visit note on 5-2-23 for Myrbetriq 50mg PO every day. Pt reports she is happy with Coconut Oil as vaginal moisturizer since Replens was too costly. Using Estrace as directed.  +constipation, reviewed bowel regimen. Pt needs 6 wk med check follow up, sent to appt desk. General

## 2023-07-13 DIAGNOSIS — E78.00 HYPERCHOLESTEROLEMIA: ICD-10-CM

## 2023-07-13 RX ORDER — ATORVASTATIN CALCIUM 40 MG/1
40 TABLET, FILM COATED ORAL NIGHTLY
Qty: 90 TABLET | Refills: 1 | Status: SHIPPED | OUTPATIENT
Start: 2023-07-13

## 2023-07-13 NOTE — TELEPHONE ENCOUNTER
LOV: 5/10/2023 with Dr. Clover Wilks  RTC: \"in 7 to 10 days or sooner if symptoms do not improve or worsen\"    Last Relevant Labs: 4/29/2023   Filled: 1/19/2023    #90 with 1 refill    Future Appointments   Date Time Provider Lilly Purcell   2/9/9075 08:46 AM Priscila Pastor MD [facility]   8/29/2023 11:40 AM DO PERRY Bhatt None   9/21/2023 10:20 AM Leonel Joy MD [facility] [place]

## 2023-07-17 ENCOUNTER — TELEPHONE (OUTPATIENT)
Dept: UROLOGY | Facility: CLINIC | Age: 63
End: 2023-07-17

## 2023-07-17 RX ORDER — ESTRADIOL 0.1 MG/G
CREAM VAGINAL
Qty: 42.5 G | Refills: 3 | Status: SHIPPED | OUTPATIENT
Start: 2023-07-17

## 2023-08-02 ENCOUNTER — OFFICE VISIT (OUTPATIENT)
Dept: RHEUMATOLOGY | Facility: CLINIC | Age: 63
End: 2023-08-02
Payer: MEDICAID

## 2023-08-02 VITALS
BODY MASS INDEX: 27.88 KG/M2 | WEIGHT: 142 LBS | OXYGEN SATURATION: 97 % | RESPIRATION RATE: 16 BRPM | TEMPERATURE: 98 F | SYSTOLIC BLOOD PRESSURE: 108 MMHG | DIASTOLIC BLOOD PRESSURE: 66 MMHG | HEIGHT: 60 IN | HEART RATE: 93 BPM

## 2023-08-02 DIAGNOSIS — M17.12 PRIMARY OSTEOARTHRITIS OF LEFT KNEE: Primary | ICD-10-CM

## 2023-08-02 DIAGNOSIS — M54.2 NECK PAIN: ICD-10-CM

## 2023-08-02 DIAGNOSIS — M51.36 DEGENERATIVE DISC DISEASE, LUMBAR: ICD-10-CM

## 2023-08-02 DIAGNOSIS — G62.9 NEUROPATHY: ICD-10-CM

## 2023-08-02 DIAGNOSIS — G89.29 CHRONIC BILATERAL LOW BACK PAIN WITHOUT SCIATICA: ICD-10-CM

## 2023-08-02 DIAGNOSIS — M54.50 CHRONIC BILATERAL LOW BACK PAIN WITHOUT SCIATICA: ICD-10-CM

## 2023-08-02 PROBLEM — M51.369 DEGENERATIVE DISC DISEASE, LUMBAR: Status: ACTIVE | Noted: 2023-08-02

## 2023-08-02 PROCEDURE — 3078F DIAST BP <80 MM HG: CPT | Performed by: INTERNAL MEDICINE

## 2023-08-02 PROCEDURE — 3074F SYST BP LT 130 MM HG: CPT | Performed by: INTERNAL MEDICINE

## 2023-08-02 PROCEDURE — 3008F BODY MASS INDEX DOCD: CPT | Performed by: INTERNAL MEDICINE

## 2023-08-02 PROCEDURE — 99214 OFFICE O/P EST MOD 30 MIN: CPT | Performed by: INTERNAL MEDICINE

## 2023-08-02 RX ORDER — TRAMADOL HYDROCHLORIDE 50 MG/1
TABLET ORAL EVERY 6 HOURS PRN
Qty: 120 TABLET | Refills: 2 | Status: SHIPPED | OUTPATIENT
Start: 2023-08-02

## 2023-08-02 RX ORDER — GABAPENTIN 300 MG/1
300 CAPSULE ORAL NIGHTLY
Qty: 90 CAPSULE | Refills: 1 | Status: SHIPPED | OUTPATIENT
Start: 2023-08-02

## 2023-08-02 RX ORDER — CYCLOBENZAPRINE HCL 10 MG
10 TABLET ORAL EVERY 12 HOURS PRN
Qty: 30 TABLET | Refills: 1 | Status: SHIPPED | OUTPATIENT
Start: 2023-08-02 | End: 2023-08-22

## 2023-08-02 RX ORDER — CLONAZEPAM 0.5 MG/1
0.5 TABLET ORAL 2 TIMES DAILY
Refills: 0 | OUTPATIENT
Start: 2023-08-02

## 2023-08-02 RX ORDER — DICLOFENAC SODIUM 75 MG/1
75 TABLET, DELAYED RELEASE ORAL 2 TIMES DAILY
Qty: 180 TABLET | Refills: 3 | Status: SHIPPED | OUTPATIENT
Start: 2023-08-02

## 2023-08-02 NOTE — TELEPHONE ENCOUNTER
clonazePAM 0.5 MG Oral Tab         Sig: Take 1 tablet (0.5 mg total) by mouth 2 (two) times daily.     Disp: Not specified    Refills: 0    Start: 8/2/2023    Class: Normal    Last ordered: 11 months ago by Reported External/Patient        To be filled at: [address], [phone], [phone]

## 2023-08-02 NOTE — PATIENT INSTRUCTIONS
Try a new muscle relaxant -cyclobenzaprine 10 mg as needed one or twice a day,  Tramadol for pain 50 mg, 1-2 at a time up to 4  per day. Diclofenac 75 mg twice a day,  Gabapentin 300 mg at night. Exercise as tolerated. RTO November - 3 months.

## 2023-08-07 DIAGNOSIS — E11.40 TYPE 2 DIABETES MELLITUS WITH DIABETIC NEUROPATHY, WITHOUT LONG-TERM CURRENT USE OF INSULIN (HCC): ICD-10-CM

## 2023-08-07 RX ORDER — DULAGLUTIDE 0.75 MG/.5ML
0.75 INJECTION, SOLUTION SUBCUTANEOUS WEEKLY
Qty: 2 ML | Refills: 2 | Status: SHIPPED | OUTPATIENT
Start: 2023-08-07

## 2023-08-07 NOTE — TELEPHONE ENCOUNTER
Dulaglutide (TRULICITY) 0.22 TF/3.9WZ Subcutaneous Solution Pen-injector         Sig: Inject 0.75 mg into the skin once a week.     Disp: 2 mL    Refills: 2    Start: 8/7/2023    Class: Normal    For: Type 2 diabetes mellitus with diabetic neuropathy, without long-term current use of insulin ([facility] [address]    Last ordered: 2 months ago by Renetta Weir DO     Diabetic Medication Protocol [id number]/07/2023 09:55 AM   Protocol Details HgBA1C procedure resulted in past 6 months    Last HgBA1C < 7.5    Microalbumin procedure in past 12 months or taking ACE/ARB    Appointment in past 6 or next 3 months      LOV 4/19/23  RTC none noted  Filled 5/15/23 2 refills   Last A1c 4/29/2023  Future Appointments   Date Time Provider Lilly Purcell   8/29/2023 11:40 AM DO PERRY Galindo None   9/21/2023 10:20 AM Carol Quinones MD ENLILY [facility] [place]   35/0/8001 16:76 AM Paramjit Veliz MD EMGRAMONEUREA [facility] [place]

## 2023-08-09 DIAGNOSIS — G43.709 CHRONIC MIGRAINE W/O AURA W/O STATUS MIGRAINOSUS, NOT INTRACTABLE: ICD-10-CM

## 2023-08-10 RX ORDER — TOPIRAMATE 50 MG/1
TABLET, FILM COATED ORAL
Qty: 270 TABLET | Refills: 0 | Status: SHIPPED | OUTPATIENT
Start: 2023-08-10

## 2023-08-10 NOTE — TELEPHONE ENCOUNTER
Medication: TOPIRAMATE 50 MG Oral Tab      Date of last refill: 06/08/2023 (#270/0)  [place]     Last office visit: 12/01/2022  Due back to clinic per last office note:  Around 03/01/2023  Date next office visit scheduled:    Future Appointments   Date Time Provider Lilly Jazzy   8/29/2023 11:40 AM Ahmet Presley,  LISURO None   9/21/2023 10:20 AM Barb Triana MD [facility] [place]   75/3/7602 64:30 AM Ashwin Morrow MD [facility] [place]           Last OV note recommendation:    ASSESSMENT/PLAN:      (R51.9) Worsening headaches  Plan: MRI BRAIN (CPT=70551)        (G43.709) Chronic migraine w/o aura w/o status migrainosus, not intractable     Increase in the frequency of headaches, suspecting stress related but on going for last 3 months  Will get mRI brain to r/o intracranial abnormality     Change Topamax to 50 mg AM And 100 mg PM for better migraine control  Continue Aimovig 140 mg monthly injections  Use CPAP machine regularly     Will call her with MRI results        RTC in about 3-6 months 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 Unfortunately, this means we cannot offer you desensitization.     This rash is a reaction that you can treat through with antihistamines and steroids, so this may be an option.     The other option is to change medications and see if an equally efficacious alternative medication has fewer side effects.

## 2023-08-14 RX ORDER — BLOOD SUGAR DIAGNOSTIC
STRIP MISCELLANEOUS
Qty: 300 EACH | Refills: 1 | Status: SHIPPED | OUTPATIENT
Start: 2023-08-14

## 2023-08-14 NOTE — TELEPHONE ENCOUNTER
Protocol PASSED    Requesting: Glucose Blood (ONETOUCH ULTRA) In Vitro Strip     LOV: 4/19/23  RTC: 3 months   Filled: 1/30/23 300 each 1 refill   Recent Labs:   4/29/23  Upcoming OV : NONE

## 2023-08-16 RX ORDER — ALBUTEROL SULFATE 90 UG/1
2 AEROSOL, METERED RESPIRATORY (INHALATION) EVERY 4 HOURS PRN
Qty: 18 G | Refills: 2 | Status: SHIPPED | OUTPATIENT
Start: 2023-08-16

## 2023-08-18 DIAGNOSIS — E11.40 TYPE 2 DIABETES MELLITUS WITH DIABETIC NEUROPATHY, WITHOUT LONG-TERM CURRENT USE OF INSULIN (HCC): Primary | ICD-10-CM

## 2023-08-21 RX ORDER — LANCETS 33 GAUGE
1 EACH MISCELLANEOUS 3 TIMES DAILY
Qty: 300 EACH | Refills: 1 | Status: SHIPPED | OUTPATIENT
Start: 2023-08-21

## 2023-08-28 ENCOUNTER — VIRTUAL PHONE E/M (OUTPATIENT)
Dept: UROLOGY | Facility: CLINIC | Age: 63
End: 2023-08-28
Attending: OBSTETRICS & GYNECOLOGY
Payer: MEDICAID

## 2023-08-28 ENCOUNTER — TELEPHONE (OUTPATIENT)
Dept: UROLOGY | Facility: CLINIC | Age: 63
End: 2023-08-28

## 2023-08-28 DIAGNOSIS — N39.41 URGE INCONTINENCE: Primary | ICD-10-CM

## 2023-08-28 DIAGNOSIS — R35.0 URINE FREQUENCY: ICD-10-CM

## 2023-08-28 DIAGNOSIS — R35.0 URINARY FREQUENCY: ICD-10-CM

## 2023-08-28 DIAGNOSIS — N95.2 POSTMENOPAUSAL ATROPHIC VAGINITIS: ICD-10-CM

## 2023-08-28 NOTE — PROGRESS NOTES
Patient to follow up urinary urgency/frequency & UUI  Given circumstances surrounding COVID-19 this visit is being conducted as a televisit with pt's consent. Pt in safe, private environment for televisit, provider located in the office setting    She is currently using myrbetriq 50mg daily    She reports 85% improvement   Denies significant side effects     Using vag estrogen twice weekly & coconut oil w/ relief of vag sx    Bowels reg, improved  no UTIs    No sexual desire, limited interaction with partner    Impression/Plan:  (N39.41) Urge incontinence  (primary encounter diagnosis)    (R35.0) Urine frequency    (N95.2) Postmenopausal atrophic vaginitis    (R35.0) Urinary frequency      Discussion Items:   Discussed dietary and behavioral modifications for mgmt of urinary symptoms  Discussed weight management and benefits of weight loss on urinary symptoms  Reviewed AUA/SUFU guidelines on mgmt of non-neurogenic OAB  Discussed pharmacologic and nonpharmacologic mgmt options of urinary symptoms - reviewed risks, benefits, alternatives, and goals of treatment  Discussed specific risks related to OAB meds including, but not limited to dry mouth, constipation, blurry vision, cognitive changes, and BP elevation. Cont myrbetriq 50mg daily  Bladder diet/drill    Discussed mgmt of vulvovaginal atrophy with vaginal estrogen cream. Reviewed associated benefits, risks, alternatives, and goals. Recommend low dose twice weekly mgmt   Cont vag estrogen twice weekly  Cont coconut oil    Bowel management reviewed. Discussed using fiber daily w/ addition of miralax as needed    Call with s/sx of UTI    Discussed intercourse sx & recommended increased coital frequency (unavailable to pt given geography)  May need intimacy therapy in the future  Cont vag moisturizers/lubrication    All questions answered  She understands and agrees to plan    Return in about 1 year (around 8/28/2024).     Rebeca Garcia, DO, FACOG, FACS

## 2023-08-29 ENCOUNTER — TELEPHONE (OUTPATIENT)
Dept: INTERNAL MEDICINE CLINIC | Facility: CLINIC | Age: 63
End: 2023-08-29

## 2023-09-11 DIAGNOSIS — K21.9 GASTROESOPHAGEAL REFLUX DISEASE WITHOUT ESOPHAGITIS: ICD-10-CM

## 2023-09-11 RX ORDER — OMEPRAZOLE 40 MG/1
40 CAPSULE, DELAYED RELEASE ORAL 2 TIMES DAILY
Qty: 180 CAPSULE | Refills: 1 | Status: SHIPPED | OUTPATIENT
Start: 2023-09-11

## 2023-09-11 NOTE — TELEPHONE ENCOUNTER
Protocol NONE    Requesting: Omeprazole 40 MG Oral Capsule Delayed Release     LOV: 4/19/23  RTC: 3 months  Filled: 9/12/2022 180 capsule 1 refill  Recent Labs: 4/29/23    Upcoming OV : NONE

## 2023-09-19 ENCOUNTER — TELEPHONE (OUTPATIENT)
Dept: UROLOGY | Facility: CLINIC | Age: 63
End: 2023-09-19

## 2023-09-19 DIAGNOSIS — G43.709 CHRONIC MIGRAINE W/O AURA W/O STATUS MIGRAINOSUS, NOT INTRACTABLE: ICD-10-CM

## 2023-09-19 RX ORDER — ERENUMAB-AOOE 140 MG/ML
INJECTION, SOLUTION SUBCUTANEOUS
Qty: 1 ML | Refills: 0 | Status: SHIPPED | OUTPATIENT
Start: 2023-09-19 | End: 2023-09-21

## 2023-09-19 NOTE — TELEPHONE ENCOUNTER
Medication: AIMOVIG 140 MG/ML      Date of last refill: 6/19/23 (#1/2)  [place]      Last office visit: 12/1/2022  Due back to clinic per last office note:  3-6 months  Date next office visit scheduled:    Future Appointments   Date Time Provider Lilly Jazzy   9/21/2023 10:20 AM MD ANJALI Dent [facility] [place]   9/26/2023  2:10 PM Katheen Mcardle, PA-C L[facility] None   07/9/1428 47:44 AM Ilya Wooten MD [facility] [place]   1/16/2024 10:00 AM DO PERRY Girard None           Last OV note recommendation:    Increase in the frequency of headaches, suspecting stress related but on going for last 3 months  Will get mRI brain to r/o intracranial abnormality     Change Topamax to 50 mg AM And 100 mg PM for better migraine control  Continue Aimovig 140 mg monthly injections  Use CPAP machine regularly     Will call her with MRI results        RTC in about 3-6 months

## 2023-09-19 NOTE — TELEPHONE ENCOUNTER
Patient called requesting sooner date than current 1/16/24. Patient was on waitlist and requested if possible to see Phys Assistant. Per Hyun Zuniga, patient ok to be seen by Phys Assistant. Patient requests to keep current 1/16/24 appointment with Physician \"just in case\" and was advised to mention this on newly scheduled 9/26/23 appointment. Patient expressed understanding and confirmed appointment.

## 2023-09-21 ENCOUNTER — OFFICE VISIT (OUTPATIENT)
Dept: NEUROLOGY | Facility: CLINIC | Age: 63
End: 2023-09-21
Payer: MEDICAID

## 2023-09-21 VITALS
RESPIRATION RATE: 20 BRPM | WEIGHT: 133.38 LBS | SYSTOLIC BLOOD PRESSURE: 118 MMHG | BODY MASS INDEX: 26 KG/M2 | HEART RATE: 106 BPM | DIASTOLIC BLOOD PRESSURE: 70 MMHG

## 2023-09-21 DIAGNOSIS — Z86.73 OLD LACUNAR STROKE WITHOUT LATE EFFECT: ICD-10-CM

## 2023-09-21 DIAGNOSIS — G43.709 CHRONIC MIGRAINE W/O AURA W/O STATUS MIGRAINOSUS, NOT INTRACTABLE: Primary | ICD-10-CM

## 2023-09-21 PROCEDURE — 99213 OFFICE O/P EST LOW 20 MIN: CPT | Performed by: OTHER

## 2023-09-21 PROCEDURE — 3074F SYST BP LT 130 MM HG: CPT | Performed by: OTHER

## 2023-09-21 PROCEDURE — 3078F DIAST BP <80 MM HG: CPT | Performed by: OTHER

## 2023-09-21 RX ORDER — CYCLOBENZAPRINE HCL 10 MG
10 TABLET ORAL EVERY 12 HOURS PRN
COMMUNITY
Start: 2023-09-07

## 2023-09-21 RX ORDER — ERENUMAB-AOOE 140 MG/ML
140 INJECTION, SOLUTION SUBCUTANEOUS
Qty: 1 ML | Refills: 5 | Status: SHIPPED | OUTPATIENT
Start: 2023-09-21

## 2023-09-21 RX ORDER — RIZATRIPTAN BENZOATE 10 MG/1
10 TABLET ORAL AS NEEDED
Qty: 10 TABLET | Refills: 5 | Status: SHIPPED | OUTPATIENT
Start: 2023-09-21

## 2023-09-21 NOTE — PROGRESS NOTES
HPI:    Patient ID: Achilles Pea is a 66year old female. Migraine      Patient is a 66year old female who presented for follow-up for migraine headaches. States start getting headaches again, was stable for a while but then for last couple months it has increased more frequent. Still going through stress. MRI brain obtained was negative for acute abnormality but shows old lacunar infarction. HISTORY:  Past Medical History:   Diagnosis Date    Abdominal pain, other specified site 09/21/2011    groin    Acute bronchitis 09/21/2011    Back problem     Jamison esophagus     Chronic low back pain without sciatica     COPD (chronic obstructive pulmonary disease) ([facility])     Depression     Diabetes ([facility] [place] [address]     Difficult intubation     difficult to intubate with bladder surgery,instructed by anesthesia to communicate to future anesthesiologist. Small airway    Esophageal reflux     Fitting and adjustment of dental prosthetic device 04/26/2011    High blood pressure     History of 2019 novel coronavirus disease (COVID-19) 09/2020    No hospitalization,symptoms;  Fever,fatigue ,body aches,headache no loss of taste or smell    Hx of motion sickness     Mass of spine     Migraines     barometric pressure    Osteoarthritis     Other ill-defined conditions(799.89)     Jamison's    Pain in joint, forearm 04/26/2011    wrist    Pain in joint, pelvic region and thigh 10/07/2011    hip    Pelvic mass     Personal history of urinary (tract) infection 12/09/2011      Past Surgical History:   Procedure Laterality Date    COLONOSCOPY      COLONOSCOPY N/A 5/18/2022    Procedure: COLONOSCOPY AND ESOPHAGOGASTRODUODENOSCOPY;  Surgeon: Mj Clements MD;  Location: [facility] ENDOSCOPY    LAPAROSCOPIC  2007    sling operation for stress incontinence    LAPAROSCOPY,DIAGNOSTIC      ANGEL BIOPSY STEREO NODULE 1 SITE LEFT (CPT=19081)  02/2010    benign    ANGEL BIOPSY STEREO NODULE 2 SITE BILAT (CPT=19081/96218) Left 2-2010    BENIGN 2 SITES ANGEL STEREO-CLIP W/CALC 2 SITE LEFT  2010    OOPHORECTOMY Left 06/28/2019    OTHER  08/31/2022    RIGHT SACRAL SUBCUTANEOUS CYST EXCISION    TUBAL LIGATION      UPPER GI ENDOSCOPY,EXAM        Family History   Problem Relation Age of Onset    Arthritis Mother     Other (AMI) Mother     Other (diabetes mellitus) Mother       Social History     Socioeconomic History    Marital status: Single   Tobacco Use    Smoking status: Some Days     Packs/day: 0.10     Years: 41.00     Additional pack years: 0.00     Total pack years: 4.10     Types: Cigarettes    Smokeless tobacco: Never    Tobacco comments:     3-4 cigs a day   Vaping Use    Vaping Use: Every day    Substances: Nicotine, Flavoring    Devices: Disposable, Pre-filled pod   Substance and Sexual Activity    Alcohol use: Not Currently     Comment: rare    Drug use: No    Sexual activity: Not Currently     Partners: Male   Other Topics Concern    Caffeine Concern Yes     Comment: 3 cups of coffee daily    Stress Concern Yes    Weight Concern Yes    Special Diet No    Exercise No    Seat Belt Yes        Review of Systems   Constitutional: Negative. HENT: Negative. Eyes: Negative. Respiratory: Negative. Cardiovascular: Negative. Gastrointestinal: Negative. Musculoskeletal: Negative. Allergic/Immunologic: Negative. Neurological:  Positive for headaches. Psychiatric/Behavioral: Negative. All other systems reviewed and are negative. Current Outpatient Medications   Medication Sig Dispense Refill    cyclobenzaprine 10 MG Oral Tab Take 1 tablet (10 mg total) by mouth every 12 (twelve) hours as needed for Muscle spasms. AIMOVIG 140 MG/ML Subcutaneous Solution Auto-injector ADMINISTER 1 ML UNDER THE SKIN EVERY 30 DAYS 1 mL 0    Omeprazole 40 MG Oral Capsule Delayed Release Take 1 capsule (40 mg total) by mouth 2 (two) times daily.  180 capsule 1    Lancets (ONETOUCH DELICA PLUS UMINHI35B) Does not apply Misc 1 lancet  by Finger stick route 3 (three) times daily. 300 each 1    albuterol 108 (90 Base) MCG/ACT Inhalation Aero Soln Inhale 2 puffs into the lungs every 4 (four) hours as needed. 18 g 2    Glucose Blood (ONETOUCH ULTRA) In Vitro Strip Tests 3 x daily 300 each 1    topiramate 50 MG Oral Tab TAKE ONE TABLET BY MOUTH EVERY MORNING AND TWO TABLETS EVERY EVENING 270 tablet 0    Dulaglutide (TRULICITY) 9.44 VX/6.9NJ Subcutaneous Solution Pen-injector Inject 0.75 mg into the skin once a week. 2 mL 2    diclofenac 75 MG Oral Tab EC Take 1 tablet (75 mg total) by mouth 2 (two) times daily. 180 tablet 3    gabapentin 300 MG Oral Cap Take 1 capsule (300 mg total) by mouth nightly. 90 capsule 1    traMADol 50 MG Oral Tab Take 1-2 tablets ( mg total) by mouth every 6 (six) hours as needed for Pain. 120 tablet 2    estradiol (ESTRACE) 0.1 MG/GM Vaginal Cream Apply 1/2 gram vaginally 2-3 times per week. 42.5 g 3    atorvastatin 40 MG Oral Tab Take 1 tablet (40 mg total) by mouth nightly. 90 tablet 1    Mirabegron ER (MYRBETRIQ) 50 MG Oral Tablet 24 Hr Take 1 tablet (50 mg total) by mouth daily. 30 tablet 11    levocetirizine 5 MG Oral Tab Take 1 tablet (5 mg total) by mouth every evening. 30 tablet 5    Rizatriptan Benzoate 10 MG Oral Tab Take 1 tablet (10 mg total) by mouth as needed for Migraine. 10 tablet 2    metoprolol succinate ER 50 MG Oral Tablet 24 Hr Take 1 tablet (50 mg total) by mouth daily. 90 tablet 1    lidocaine 5 % External Ointment Apply 1 Application. topically 3 (three) times daily as needed. 240 g 1    NON FORMULARY Superfood MD Nature's Fruits and nature's Veggies      diclofenac (VOLTAREN) 1 % External Gel Apply 4 g topically 4 (four) times daily.  300 g 3    METFORMIN 500 MG Oral Tab TAKE 1 TABLET(500 MG) BY MOUTH TWICE DAILY 180 tablet 1    AZELASTINE 0.1 % Nasal Solution USE 1 SPRAY IN EACH NOSTRIL EVERY NIGHT IN THE MORNING AND BEFORE BEDTIME 30 mL 2    clonazePAM 0.5 MG Oral Tab Take 1 tablet (0.5 mg total) by mouth 2 (two) times daily. Multiple Vitamins-Minerals (ONE-A-DAY WOMENS 50+) Oral Tab Take 1 tablet by mouth daily.  0    QUEtiapine Fumarate  MG Oral Tablet 24 Hr Take 1 tablet (200 mg total) by mouth every evening. 30 tablet 0    Desvenlafaxine Succinate ER (PRISTIQ) 100 MG Oral Tablet 24 Hr Take 1 tablet (100 mg total) by mouth daily. [employer] contacted and order was placed on 9/22/20. Conf# [id number]. Patients ID #[id number] 90 tablet 0    Calcium Carb-Cholecalciferol 500-200 MG-UNIT Oral Tab Take 1 tablet by mouth daily. Allergies:  Naprosyn [Naproxen]     ANAPHYLAXIS    Comment:TABS Anaphylaxis  Aspirin                     Comment:TABS contraindicated because of saavedra's             esophagus  PHYSICAL EXAM:   Physical Exam  Blood pressure 118/70, pulse 106, resp. rate 20, weight 133 lb 6.4 oz (60.5 kg), not currently breastfeeding. General Appearance: well nourished, well developed, no apparent distress. HEENT: Normocephalic and atraumatic. Cardiovascular: Normal rate, regular rhythm and normal heart sounds. Pulmonary/Chest: Effort normal and breath sounds normal.   Abdominal: Soft. Bowel sounds are normal.     Neurological: Patient is awake, alert and oriented to person, place and time   Normal speech and language.     Cranial Nerves:     II: Visual fields: normal  III: Pupils: equal, round, reactive to light  III,IV,VI: Extra Ocular Movements: intact  V: Facial sensation: intact  VII: Facial strength: intact  VIII: Hearing: intact  IX: Palate: intact  XI: Shoulder shrug: intact  XII: Tongue movement: normal    Motor Exam:  Normal strength is normal    Sensory: Sensory examination is normal to light touch and pinprick     Coordination: intact    Gait: normal casual gait       ASSESSMENT/PLAN:   (G43.709) Chronic migraine w/o aura w/o status migrainosus, not intractable  (primary encounter diagnosis)  Plan: Erenumab-aooe (AIMOVIG) 140 MG/ML Subcutaneous         Solution Auto-injector, Rizatriptan Benzoate 10        MG Oral Tab          (Z86.73) Old lacunar stroke without late effect      Migraines stable. Still gets some stress related headache    Change Topamax to 50 mg AM And 100 mg PM for better migraine control  Continue Aimovig 140 mg monthly injections  Use CPAP machine regularly        RTC in about 12 months    See orders and medications filed with this encounter. The patient indicates understanding of these issues and agrees with the plan.           Román Oleary MD  [facility] This Visit:  Requested Prescriptions      No prescriptions requested or ordered in this encounter       Imaging & Referrals:  None     [place]#[id number]

## 2023-09-26 ENCOUNTER — OFFICE VISIT (OUTPATIENT)
Dept: UROLOGY | Facility: CLINIC | Age: 63
End: 2023-09-26
Attending: OBSTETRICS & GYNECOLOGY
Payer: MEDICAID

## 2023-09-26 VITALS
SYSTOLIC BLOOD PRESSURE: 130 MMHG | BODY MASS INDEX: 26.11 KG/M2 | WEIGHT: 133 LBS | HEIGHT: 60 IN | DIASTOLIC BLOOD PRESSURE: 80 MMHG

## 2023-09-26 DIAGNOSIS — N81.84 PELVIC MUSCLE WASTING: ICD-10-CM

## 2023-09-26 DIAGNOSIS — N95.2 POSTMENOPAUSAL ATROPHIC VAGINITIS: ICD-10-CM

## 2023-09-26 DIAGNOSIS — R82.90 CLOUDY URINE: ICD-10-CM

## 2023-09-26 DIAGNOSIS — N39.41 URGE INCONTINENCE: ICD-10-CM

## 2023-09-26 DIAGNOSIS — N94.10 DYSPAREUNIA IN FEMALE: Primary | ICD-10-CM

## 2023-09-26 PROCEDURE — 87086 URINE CULTURE/COLONY COUNT: CPT | Performed by: PHYSICIAN ASSISTANT

## 2023-09-26 PROCEDURE — 99212 OFFICE O/P EST SF 10 MIN: CPT

## 2023-09-26 PROCEDURE — 51701 INSERT BLADDER CATHETER: CPT | Performed by: PHYSICIAN ASSISTANT

## 2023-09-26 RX ORDER — SULFAMETHOXAZOLE AND TRIMETHOPRIM 800; 160 MG/1; MG/1
1 TABLET ORAL 2 TIMES DAILY
Qty: 14 TABLET | Refills: 0 | Status: SHIPPED | OUTPATIENT
Start: 2023-09-26 | End: 2023-10-03

## 2023-09-28 ENCOUNTER — TELEPHONE (OUTPATIENT)
Dept: UROLOGY | Facility: CLINIC | Age: 63
End: 2023-09-28

## 2023-09-28 NOTE — TELEPHONE ENCOUNTER
LEIGHA Roberts, PA, reviewed urine culture result, <10,000 CFU/ML Gram Negative Armand. Pt to complete empiric Bactrim as prescribed. Called pt and notified of culture result and recommendations to complete Bactrim as directed. Pt reports some resolution of symptoms and agreeable to plan. She will call if sx persist or worsen.

## 2023-10-11 DIAGNOSIS — I10 ESSENTIAL HYPERTENSION: ICD-10-CM

## 2023-10-11 RX ORDER — METOPROLOL SUCCINATE 50 MG/1
50 TABLET, EXTENDED RELEASE ORAL DAILY
Qty: 90 TABLET | Refills: 1 | Status: SHIPPED | OUTPATIENT
Start: 2023-10-11

## 2023-10-11 NOTE — TELEPHONE ENCOUNTER
Protocol PASSED    Requesting: metoprolol succinate ER 50 MG Oral Tablet 24 Hr     LOV: 4/19/23  RTC: 3 months  Filled: 4/14/23 90 tablet 1 refill  Recent Labs: 4/29/23    Upcoming OV NONE

## 2023-10-17 ENCOUNTER — TELEPHONE (OUTPATIENT)
Dept: INTERNAL MEDICINE CLINIC | Facility: CLINIC | Age: 63
End: 2023-10-17

## 2023-10-17 NOTE — TELEPHONE ENCOUNTER
Omeprazole 40 MG     [id number] - Lucius Herrera, [address] [place] [address], [phone], [phone]

## 2023-10-18 NOTE — TELEPHONE ENCOUNTER
Received response back from [facility] that the medication Omeprazole 40 mg has been approved effective 7/19/23-10/17/24. Placed in [address] in basket for review.

## 2023-10-22 ENCOUNTER — PATIENT MESSAGE (OUTPATIENT)
Dept: INTERNAL MEDICINE CLINIC | Facility: CLINIC | Age: 63
End: 2023-10-22

## 2023-10-22 DIAGNOSIS — K21.9 GASTROESOPHAGEAL REFLUX DISEASE WITHOUT ESOPHAGITIS: ICD-10-CM

## 2023-10-23 RX ORDER — OMEPRAZOLE 40 MG/1
40 CAPSULE, DELAYED RELEASE ORAL 2 TIMES DAILY
Qty: 180 CAPSULE | Refills: 1 | Status: SHIPPED | OUTPATIENT
Start: 2023-10-23

## 2023-10-23 NOTE — TELEPHONE ENCOUNTER
From: Jose Carlos Otoole  To: Rika Dawna Watkins  Sent: 10/22/2023 12:48 PM CDT  Subject: Omeprazole     Have we found out anything about my omeprazole? The pharmacy sent you a request for pre authorization because my insurance is doing the same thing they do every year saying I'm only supposed to take it for so many days per year. And then you have to tell them why I take it all year long. I'm out of it and really need it. Please let me know what's going on.    Thanks DeepDyve

## 2023-10-23 NOTE — TELEPHONE ENCOUNTER
Please advise. Thank you! TE 10/17/23    Emma Yung, [address] [place]     [place]    10/18/23  8:56 AM  Note  Received response back from [facility] that the medication Omeprazole 40 mg has been approved effective 7/19/23-10/17/24. Placed in [address] in basket for review.

## 2023-10-31 ENCOUNTER — TELEPHONE (OUTPATIENT)
Dept: UROLOGY | Facility: CLINIC | Age: 63
End: 2023-10-31

## 2023-10-31 DIAGNOSIS — R82.90 CLOUDY URINE: Primary | ICD-10-CM

## 2023-10-31 RX ORDER — SULFAMETHOXAZOLE AND TRIMETHOPRIM 800; 160 MG/1; MG/1
1 TABLET ORAL 2 TIMES DAILY
Qty: 14 TABLET | Refills: 0 | Status: SHIPPED | OUTPATIENT
Start: 2023-10-31 | End: 2023-11-07

## 2023-11-01 ENCOUNTER — LAB ENCOUNTER (OUTPATIENT)
Dept: LAB | Age: 63
End: 2023-11-01
Attending: OBSTETRICS & GYNECOLOGY
Payer: MEDICAID

## 2023-11-01 ENCOUNTER — HOSPITAL ENCOUNTER (OUTPATIENT)
Dept: CT IMAGING | Age: 63
Discharge: HOME | End: 2023-11-01
Attending: INTERNAL MEDICINE
Payer: MEDICAID

## 2023-11-01 ENCOUNTER — OFFICE VISIT (OUTPATIENT)
Dept: RHEUMATOLOGY | Facility: CLINIC | Age: 63
End: 2023-11-01
Payer: MEDICAID

## 2023-11-01 VITALS
TEMPERATURE: 98 F | HEART RATE: 103 BPM | DIASTOLIC BLOOD PRESSURE: 76 MMHG | OXYGEN SATURATION: 95 % | HEIGHT: 61 IN | RESPIRATION RATE: 22 BRPM | BODY MASS INDEX: 25.86 KG/M2 | WEIGHT: 137 LBS | SYSTOLIC BLOOD PRESSURE: 124 MMHG

## 2023-11-01 DIAGNOSIS — M18.11 ARTHRITIS OF CARPOMETACARPAL (CMC) JOINT OF RIGHT THUMB: ICD-10-CM

## 2023-11-01 DIAGNOSIS — R91.1 PULMONARY NODULE: ICD-10-CM

## 2023-11-01 DIAGNOSIS — M17.12 PRIMARY OSTEOARTHRITIS OF LEFT KNEE: Primary | ICD-10-CM

## 2023-11-01 PROCEDURE — 87086 URINE CULTURE/COLONY COUNT: CPT

## 2023-11-01 PROCEDURE — 71250 CT THORAX DX C-: CPT | Performed by: INTERNAL MEDICINE

## 2023-11-01 RX ORDER — DICLOFENAC SODIUM 75 MG/1
75 TABLET, DELAYED RELEASE ORAL 2 TIMES DAILY
Qty: 180 TABLET | Refills: 3 | Status: SHIPPED | OUTPATIENT
Start: 2023-11-01

## 2023-11-01 RX ORDER — CYCLOBENZAPRINE HCL 10 MG
10 TABLET ORAL EVERY 12 HOURS PRN
Qty: 60 TABLET | Refills: 1 | Status: SHIPPED | OUTPATIENT
Start: 2023-11-01

## 2023-11-01 RX ORDER — METHYLPREDNISOLONE ACETATE 40 MG/ML
40 INJECTION, SUSPENSION INTRA-ARTICULAR; INTRALESIONAL; INTRAMUSCULAR; SOFT TISSUE ONCE
Status: COMPLETED | OUTPATIENT
Start: 2023-11-01 | End: 2023-11-01

## 2023-11-01 RX ADMIN — METHYLPREDNISOLONE ACETATE 40 MG: 40 INJECTION, SUSPENSION INTRA-ARTICULAR; INTRALESIONAL; INTRAMUSCULAR; SOFT TISSUE at 11:38:00

## 2023-11-01 RX ADMIN — METHYLPREDNISOLONE ACETATE 20 MG: 40 INJECTION, SUSPENSION INTRA-ARTICULAR; INTRALESIONAL; INTRAMUSCULAR; SOFT TISSUE at 11:53:00

## 2023-11-01 NOTE — PATIENT INSTRUCTIONS
Continue diclofenac 75 mg twice a day. Tramadol for pain 50 mg as needed every 6 hours. Inject the left knee with cortisone. Today inject the right thumb with cortisone. Go home and rest.  Cyclobnezaprine 10 mg as needed muscle relaxant. Gabapentin 300 mg at night   Return to office 3 months.

## 2023-11-02 ENCOUNTER — TELEPHONE (OUTPATIENT)
Dept: INTERNAL MEDICINE CLINIC | Facility: CLINIC | Age: 63
End: 2023-11-02

## 2023-11-02 DIAGNOSIS — R91.8 PULMONARY NODULES: Primary | ICD-10-CM

## 2023-11-02 NOTE — PROGRESS NOTES
Dear RN, please let the patient know   New spots in the lungs called nodules are noted in the right side. Was she sick recently? The left side has improved.  If sick recently I will send some antibiotics and I would also like her to see the pulmonologist (I have referred her to Dr. Jessy Hutton group) and she can see anyone who is available the soonest  [address] [place] [address] DO

## 2023-11-02 NOTE — TELEPHONE ENCOUNTER
Called and spoke to patient and result note. Referral information provided. She is Trying to get over a productive cough, phlegm is yellow. She has Chest congestion and a sore throat. She states it isn't anything serious    Patient currently on abx for UTI sulfamethoxazole-trimethoprim DS (BACTRIM DS) 800-160 MG Oral Tab per tablet     Please advise. Thank you!
Clement with [facility] called stating there were findings on CT of chest.    Please advise.
For now we will hold off on antibiotics especially since she is on one for a UTI.  If her productive cough worsens she can follow up, but needs to follow up with the pulmonologist  [address] [place] [address]
I have sent a result note.  Please ask about URI symptoms and referral to the pulmonologist provided  [address] [place] [address]
Please see results and advise.
Spoke to pt, gave below rec from Dr. Sharath Ramos. Pt v/u - will follow up with Dr. Ruy Villegas.
nonacceptance/anxiety/acuteness of illness

## 2023-11-06 ENCOUNTER — PATIENT MESSAGE (OUTPATIENT)
Dept: INTERNAL MEDICINE CLINIC | Facility: CLINIC | Age: 63
End: 2023-11-06

## 2023-11-06 DIAGNOSIS — K22.70 BARRETT'S ESOPHAGUS WITHOUT DYSPLASIA: Primary | ICD-10-CM

## 2023-11-07 RX ORDER — METOCLOPRAMIDE HYDROCHLORIDE 5 MG/5ML
5 SOLUTION ORAL
Qty: 473 ML | Refills: 0 | Status: SHIPPED | OUTPATIENT
Start: 2023-11-07

## 2023-11-07 NOTE — TELEPHONE ENCOUNTER
From: Jacy Hilton  To: Rika Red  Sent: 11/6/2023 3:16 PM CST  Subject: Gastrologist    I need to see if you can recommend a Gastrologist that takes [employer] since Dr. Yue Carter doesn't take it anymore. My stomach has been bothering me and he had given me in the past a liquid called Metoclopramide HCI 5 MG/5ML and that seems to help. But I also think I should see someone at some point. I don't know if you could call that medication in at [facility] in [place] on [facility] for me in the meantime?   Thank you   Soumya

## 2023-11-13 ENCOUNTER — HOSPITAL ENCOUNTER (OUTPATIENT)
Dept: MAMMOGRAPHY | Age: 63
Discharge: HOME | End: 2023-11-13
Attending: OBSTETRICS & GYNECOLOGY
Payer: MEDICAID

## 2023-11-13 ENCOUNTER — HOSPITAL ENCOUNTER (OUTPATIENT)
Dept: ULTRASOUND IMAGING | Age: 63
Discharge: HOME | End: 2023-11-13
Attending: OBSTETRICS & GYNECOLOGY
Payer: MEDICAID

## 2023-11-13 DIAGNOSIS — N60.09 CYST OF BREAST, UNSPECIFIED LATERALITY: ICD-10-CM

## 2023-11-13 PROCEDURE — 77062 BREAST TOMOSYNTHESIS BI: CPT | Performed by: OBSTETRICS & GYNECOLOGY

## 2023-11-13 PROCEDURE — 77066 DX MAMMO INCL CAD BI: CPT | Performed by: OBSTETRICS & GYNECOLOGY

## 2023-11-13 PROCEDURE — 76642 ULTRASOUND BREAST LIMITED: CPT | Performed by: OBSTETRICS & GYNECOLOGY

## 2023-11-30 ENCOUNTER — OFFICE VISIT (OUTPATIENT)
Facility: CLINIC | Age: 63
End: 2023-11-30
Payer: MEDICAID

## 2023-11-30 VITALS
OXYGEN SATURATION: 98 % | DIASTOLIC BLOOD PRESSURE: 70 MMHG | WEIGHT: 136 LBS | BODY MASS INDEX: 25.68 KG/M2 | SYSTOLIC BLOOD PRESSURE: 102 MMHG | HEIGHT: 61 IN | HEART RATE: 78 BPM | RESPIRATION RATE: 16 BRPM

## 2023-11-30 DIAGNOSIS — L30.9 DERMATITIS: ICD-10-CM

## 2023-11-30 DIAGNOSIS — R91.8 LUNG NODULES: Primary | ICD-10-CM

## 2023-11-30 DIAGNOSIS — G43.709 CHRONIC MIGRAINE W/O AURA W/O STATUS MIGRAINOSUS, NOT INTRACTABLE: ICD-10-CM

## 2023-11-30 DIAGNOSIS — Z72.0 TOBACCO USE: ICD-10-CM

## 2023-11-30 DIAGNOSIS — E11.40 TYPE 2 DIABETES MELLITUS WITH DIABETIC NEUROPATHY, WITHOUT LONG-TERM CURRENT USE OF INSULIN (HCC): ICD-10-CM

## 2023-11-30 DIAGNOSIS — R05.2 SUBACUTE COUGH: ICD-10-CM

## 2023-11-30 DIAGNOSIS — J43.2 CENTRILOBULAR EMPHYSEMA (HCC): ICD-10-CM

## 2023-11-30 PROCEDURE — 3078F DIAST BP <80 MM HG: CPT | Performed by: INTERNAL MEDICINE

## 2023-11-30 PROCEDURE — 99204 OFFICE O/P NEW MOD 45 MIN: CPT | Performed by: INTERNAL MEDICINE

## 2023-11-30 PROCEDURE — 3008F BODY MASS INDEX DOCD: CPT | Performed by: INTERNAL MEDICINE

## 2023-11-30 PROCEDURE — 3074F SYST BP LT 130 MM HG: CPT | Performed by: INTERNAL MEDICINE

## 2023-11-30 RX ORDER — DESVENLAFAXINE 100 MG/1
TABLET, EXTENDED RELEASE ORAL
COMMUNITY
Start: 2010-11-10

## 2023-11-30 RX ORDER — LEVOCETIRIZINE DIHYDROCHLORIDE 5 MG/1
5 TABLET, FILM COATED ORAL EVERY EVENING
Qty: 30 TABLET | Refills: 5 | Status: SHIPPED | OUTPATIENT
Start: 2023-11-30 | End: 2024-01-16

## 2023-11-30 RX ORDER — UMECLIDINIUM 62.5 UG/1
1 AEROSOL, POWDER ORAL DAILY
Qty: 1 EACH | Refills: 1 | Status: SHIPPED | OUTPATIENT
Start: 2023-11-30

## 2023-11-30 RX ORDER — DULAGLUTIDE 0.75 MG/.5ML
0.75 INJECTION, SOLUTION SUBCUTANEOUS WEEKLY
Qty: 1 ML | Refills: 0 | Status: SHIPPED | OUTPATIENT
Start: 2023-11-30 | End: 2024-02-06

## 2023-11-30 RX ORDER — BUSPIRONE HYDROCHLORIDE 10 MG/1
10 TABLET ORAL DAILY
COMMUNITY
Start: 2023-11-19

## 2023-11-30 NOTE — PATIENT INSTRUCTIONS
Send a sputum test to check the phlegm  Work on stopping any smoking or vaping - see below  Obtain a breathing test to check your lungs - Call central scheduling at [phone] to schedule the test  Hold incruse 5 days before the pulmonary function test  Hold albuterol 24 hours before the pulmonary function test    After the breathing test then start incruse inhaler - one puff once a day  Obtain a repeat CT chest scan in February 2024  See me in February 2024 or earlier if needed  Call/message with questions/concerns        [place] [address]  [facility]    If you are a current smoker and are interested in quitting, we are here to help you. We offer 1:1 counseling with a Nurse Practitioner (includes individualized prescriptive medications as appropriate). Visits are conducted at one of our [facility] access hospital located in [facility]. Please call [phone] (CARE) and ask for a Smoking Cessation appointment with:      Elidia Melchro RN, APN-BC, AOYRN ([place])    Deidra Iyer RN, APN-BC ([place])    To help you get started, visit  [email or web address]  https://www.lung.org/quit-smoking/w-xrko-by-quit  Health Benefits of Quitting Smoking Over Time (cancer. org)

## 2023-11-30 NOTE — TELEPHONE ENCOUNTER
Protocol FAIL    Requesting: Dulaglutide (TRULICITY) 0.75 MG/0.5ML Subcutaneous Solution Pen-injector 11/6/23 2mL 0 refill    Protocol: NONE  Requesting: levocetirizine 5 MG Oral Tab 6/8/23 30 tablet 5 refill    LOV: 4/19/23  RTC: 3 months  Recent Labs: 4/29/23    Upcoming OV NONE  Future Appointments   Date Time Provider Department Center   12/28/2023 12:00 PM Ban Willis MD [facility] [place]   1/16/2024 10:00 AM Florecita Quintero DO [facility] None   2/1/2024 12:00 PM Xiang Torres MD [facility] [place]   3/5/2024 11:30 AM Greyson Main MD  [facility] [place]

## 2023-12-01 RX ORDER — TOPIRAMATE 50 MG/1
TABLET, FILM COATED ORAL
Qty: 270 TABLET | Refills: 0 | Status: SHIPPED | OUTPATIENT
Start: 2023-12-01

## 2023-12-01 NOTE — TELEPHONE ENCOUNTER
Medication: topiramate 50 MG Oral Tab      Date of last refill: 08/10/2023 (#270/0)  [place]     Last office visit: 09/21/2023  Due back to clinic per last office note:  12 months  Date next office visit scheduled:    Future Appointments   Date Time Provider Lilly Purcell   12/28/2023 12:00 PM Moon Sofia MD [facility] [place]   1/2/2024 12:30 PM Matthew Arellano,  [facility] [place]   1/16/2024 10:00 AM Arden Anne, DO [facility] None   2/6/9763 24:92 PM Rachel Royal MD [facility] [place]   3/5/2024 11:30 AM Joe Koch MD  [facility] [place]           Last OV note recommendation:    ASSESSMENT/PLAN:   (G43.709) Chronic migraine w/o aura w/o status migrainosus, not intractable  (primary encounter diagnosis)  Plan: Erenumab-aooe (AIMOVIG) 140 MG/ML Subcutaneous         Solution Auto-injector, Rizatriptan Benzoate 10        MG Oral Tab           (Z86.73) Old lacunar stroke without late effect        Migraines stable. Still gets some stress related headache     Change Topamax to 50 mg AM And 100 mg PM for better migraine control  Continue Aimovig 140 mg monthly injections  Use CPAP machine regularly           RTC in about 12 months     See orders and medications filed with this encounter. The patient indicates understanding of these issues and agrees with the plan.

## 2023-12-09 DIAGNOSIS — L30.9 DERMATITIS: ICD-10-CM

## 2023-12-11 RX ORDER — LEVOCETIRIZINE DIHYDROCHLORIDE 5 MG/1
5 TABLET, FILM COATED ORAL EVERY EVENING
Qty: 30 TABLET | Refills: 5 | OUTPATIENT
Start: 2023-12-11

## 2023-12-13 ENCOUNTER — PATIENT MESSAGE (OUTPATIENT)
Dept: INTERNAL MEDICINE CLINIC | Facility: CLINIC | Age: 63
End: 2023-12-13

## 2023-12-13 DIAGNOSIS — R05.2 SUBACUTE COUGH: Primary | ICD-10-CM

## 2023-12-14 ENCOUNTER — APPOINTMENT (OUTPATIENT)
Dept: GENERAL RADIOLOGY | Age: 63
End: 2023-12-14
Attending: PHYSICIAN ASSISTANT
Payer: MEDICAID

## 2023-12-14 ENCOUNTER — HOSPITAL ENCOUNTER (OUTPATIENT)
Age: 63
Discharge: HOME | End: 2023-12-14
Payer: MEDICAID

## 2023-12-14 VITALS
SYSTOLIC BLOOD PRESSURE: 111 MMHG | HEART RATE: 100 BPM | TEMPERATURE: 97 F | OXYGEN SATURATION: 96 % | RESPIRATION RATE: 20 BRPM | DIASTOLIC BLOOD PRESSURE: 78 MMHG

## 2023-12-14 DIAGNOSIS — J40 BRONCHITIS: ICD-10-CM

## 2023-12-14 DIAGNOSIS — R05.1 ACUTE COUGH: Primary | ICD-10-CM

## 2023-12-14 PROCEDURE — 71046 X-RAY EXAM CHEST 2 VIEWS: CPT | Performed by: PHYSICIAN ASSISTANT

## 2023-12-14 RX ORDER — BENZONATATE 100 MG/1
100 CAPSULE ORAL 3 TIMES DAILY PRN
Qty: 30 CAPSULE | Refills: 0 | Status: SHIPPED | OUTPATIENT
Start: 2023-12-14 | End: 2024-01-13

## 2023-12-14 RX ORDER — PREDNISONE 20 MG/1
40 TABLET ORAL DAILY
Qty: 10 TABLET | Refills: 0 | Status: SHIPPED | OUTPATIENT
Start: 2023-12-14 | End: 2023-12-19

## 2023-12-14 RX ORDER — PSEUDOEPHEDRINE HCL 30 MG
30 TABLET ORAL EVERY 4 HOURS PRN
Qty: 36 TABLET | Refills: 0 | Status: SHIPPED | OUTPATIENT
Start: 2023-12-14 | End: 2024-01-13

## 2023-12-14 RX ORDER — IPRATROPIUM BROMIDE AND ALBUTEROL SULFATE 2.5; .5 MG/3ML; MG/3ML
3 SOLUTION RESPIRATORY (INHALATION) ONCE
Status: COMPLETED | OUTPATIENT
Start: 2023-12-14 | End: 2023-12-14

## 2023-12-14 RX ORDER — ALBUTEROL SULFATE 90 UG/1
2 AEROSOL, METERED RESPIRATORY (INHALATION) EVERY 4 HOURS PRN
Qty: 1 EACH | Refills: 0 | Status: SHIPPED | OUTPATIENT
Start: 2023-12-14 | End: 2024-01-13

## 2023-12-14 RX ORDER — OXYMETAZOLINE HYDROCHLORIDE 0.05 G/100ML
1 SPRAY NASAL EVERY 4 HOURS PRN
Qty: 15 ML | Refills: 0 | Status: SHIPPED | OUTPATIENT
Start: 2023-12-14 | End: 2024-01-13

## 2023-12-14 NOTE — TELEPHONE ENCOUNTER
Patient called and a little upset because  did not prescribe an abx. It was explained to patient that she has bronchitis which is more so inflammation and not a bacteria. Advised unless it turns into something more an abx wouldn't work. All test were negative as well as cxr. Advised patient to start with recommended treatment from . To call us next week if symptoms aren't improving. She was also advised to proceed to ER if symptoms are worsening. Patient made aware SV is gone for the day and office closes early tomorrow, she may or may not get a call back until Monday. Patient v/u to all. Will you send abx or agreeable?

## 2023-12-14 NOTE — ED INITIAL ASSESSMENT (HPI)
Pt c/o cough and runny nose starting on Sunday. Pt also c/o fatigue. Pt c/o chest tightness when coughing.

## 2023-12-14 NOTE — TELEPHONE ENCOUNTER
From: Malina Zhou  To: Rika Elle Jonesluz marina  Sent: 12/13/2023 1:54 PM CST  Subject: Coughing and phelm     Dr. Ricki Jain I have been coughing a lot and have a lot of phelm coming up. I was hoping you could prescribe me some antibiotics as I feel this is bronchitis.  I'm having a hard time coming in as I've been working crazy hours and I did call in a few days and have been sleeping a lot and trying to get some rest.   Thank you Soumya

## 2023-12-15 RX ORDER — AZITHROMYCIN 250 MG/1
TABLET, FILM COATED ORAL
Qty: 6 TABLET | Refills: 0 | Status: SHIPPED | OUTPATIENT
Start: 2023-12-15 | End: 2023-12-19

## 2023-12-21 ENCOUNTER — APPOINTMENT (OUTPATIENT)
Dept: CT IMAGING | Facility: HOSPITAL | Age: 63
DRG: 234 | End: 2023-12-21
Attending: EMERGENCY MEDICINE
Payer: MEDICAID

## 2023-12-21 ENCOUNTER — HOSPITAL ENCOUNTER (OUTPATIENT)
Age: 63
Discharge: ANOTHER HOSPITAL | DRG: 234 | End: 2023-12-21
Payer: MEDICAID

## 2023-12-21 ENCOUNTER — APPOINTMENT (OUTPATIENT)
Dept: GENERAL RADIOLOGY | Facility: HOSPITAL | Age: 63
DRG: 234 | End: 2023-12-21
Payer: MEDICAID

## 2023-12-21 ENCOUNTER — HOSPITAL ENCOUNTER (INPATIENT)
Facility: HOSPITAL | Age: 63
LOS: 13 days | Discharge: HOME | DRG: 234 | End: 2024-01-03
Attending: EMERGENCY MEDICINE | Admitting: HOSPITALIST
Payer: MEDICAID

## 2023-12-21 VITALS
RESPIRATION RATE: 20 BRPM | TEMPERATURE: 98 F | DIASTOLIC BLOOD PRESSURE: 90 MMHG | HEART RATE: 100 BPM | SYSTOLIC BLOOD PRESSURE: 130 MMHG | OXYGEN SATURATION: 97 %

## 2023-12-21 DIAGNOSIS — I21.4 NSTEMI (NON-ST ELEVATED MYOCARDIAL INFARCTION) (HCC): Primary | ICD-10-CM

## 2023-12-21 DIAGNOSIS — R07.9 CHEST PAIN OF UNCERTAIN ETIOLOGY: Primary | ICD-10-CM

## 2023-12-21 DIAGNOSIS — J90 PLEURAL EFFUSION: ICD-10-CM

## 2023-12-21 DIAGNOSIS — E78.00 HYPERCHOLESTEROLEMIA: ICD-10-CM

## 2023-12-21 PROBLEM — E78.5 HYPERLIPIDEMIA: Status: ACTIVE | Noted: 2017-04-04

## 2023-12-21 LAB
ALBUMIN SERPL-MCNC: 3.4 G/DL (ref 3.4–5)
ALBUMIN/GLOB SERPL: 0.9 {RATIO} (ref 1–2)
ALP LIVER SERPL-CCNC: 94 U/L
ALT SERPL-CCNC: 52 U/L
ANION GAP SERPL CALC-SCNC: 5 MMOL/L (ref 0–18)
APTT PPP: 30.3 SECONDS (ref 23.3–35.6)
AST SERPL-CCNC: 33 U/L (ref 15–37)
ATRIAL RATE: 103 BPM
ATRIAL RATE: 98 BPM
BASOPHILS # BLD AUTO: 0.07 X10(3) UL (ref 0–0.2)
BASOPHILS NFR BLD AUTO: 0.4 %
BILIRUB SERPL-MCNC: 0.2 MG/DL (ref 0.1–2)
BUN BLD-MCNC: 17 MG/DL (ref 9–23)
CALCIUM BLD-MCNC: 8.7 MG/DL (ref 8.5–10.1)
CHLORIDE SERPL-SCNC: 105 MMOL/L (ref 98–112)
CHOLEST SERPL-MCNC: 177 MG/DL (ref ?–200)
CO2 SERPL-SCNC: 27 MMOL/L (ref 21–32)
CREAT BLD-MCNC: 0.86 MG/DL
D DIMER PPP FEU-MCNC: 1.43 UG/ML FEU (ref ?–0.61)
EGFRCR SERPLBLD CKD-EPI 2021: 77 ML/MIN/1.73M2 (ref 60–?)
EOSINOPHIL # BLD AUTO: 0.4 X10(3) UL (ref 0–0.7)
EOSINOPHIL NFR BLD AUTO: 2 %
ERYTHROCYTE [DISTWIDTH] IN BLOOD BY AUTOMATED COUNT: 14 %
GLOBULIN PLAS-MCNC: 3.7 G/DL (ref 2.8–4.4)
GLUCOSE BLD-MCNC: 136 MG/DL (ref 70–99)
HCT VFR BLD AUTO: 34.8 %
HDLC SERPL-MCNC: 37 MG/DL (ref 40–59)
HGB BLD-MCNC: 10.9 G/DL
IMM GRANULOCYTES # BLD AUTO: 0.24 X10(3) UL (ref 0–1)
IMM GRANULOCYTES NFR BLD: 1.2 %
LDLC SERPL CALC-MCNC: 79 MG/DL (ref ?–100)
LYMPHOCYTES # BLD AUTO: 3.62 X10(3) UL (ref 1–4)
LYMPHOCYTES NFR BLD AUTO: 18.4 %
MCH RBC QN AUTO: 30 PG (ref 26–34)
MCHC RBC AUTO-ENTMCNC: 31.3 G/DL (ref 31–37)
MCV RBC AUTO: 95.9 FL
MONOCYTES # BLD AUTO: 1.28 X10(3) UL (ref 0.1–1)
MONOCYTES NFR BLD AUTO: 6.5 %
NEUTROPHILS # BLD AUTO: 14.07 X10 (3) UL (ref 1.5–7.7)
NEUTROPHILS # BLD AUTO: 14.07 X10(3) UL (ref 1.5–7.7)
NEUTROPHILS NFR BLD AUTO: 71.5 %
NONHDLC SERPL-MCNC: 140 MG/DL (ref ?–130)
OSMOLALITY SERPL CALC.SUM OF ELEC: 288 MOSM/KG (ref 275–295)
P AXIS: 62 DEGREES
P AXIS: 64 DEGREES
P-R INTERVAL: 140 MS
P-R INTERVAL: 142 MS
PLATELET # BLD AUTO: 453 10(3)UL (ref 150–450)
POTASSIUM SERPL-SCNC: 4.3 MMOL/L (ref 3.5–5.1)
PROT SERPL-MCNC: 7.1 G/DL (ref 6.4–8.2)
Q-T INTERVAL: 322 MS
Q-T INTERVAL: 322 MS
QRS DURATION: 62 MS
QRS DURATION: 68 MS
QTC CALCULATION (BEZET): 411 MS
QTC CALCULATION (BEZET): 421 MS
R AXIS: 21 DEGREES
R AXIS: 48 DEGREES
RBC # BLD AUTO: 3.63 X10(6)UL
SODIUM SERPL-SCNC: 137 MMOL/L (ref 136–145)
T AXIS: 53 DEGREES
T AXIS: 55 DEGREES
TRIGL SERPL-MCNC: 379 MG/DL (ref 30–149)
TROPONIN I SERPL HS-MCNC: 1183 NG/L
TROPONIN I SERPL HS-MCNC: 180 NG/L
VENTRICULAR RATE: 103 BPM
VENTRICULAR RATE: 98 BPM
VLDLC SERPL CALC-MCNC: 60 MG/DL (ref 0–30)
WBC # BLD AUTO: 19.7 X10(3) UL (ref 4–11)

## 2023-12-21 PROCEDURE — 99223 1ST HOSP IP/OBS HIGH 75: CPT | Performed by: HOSPITALIST

## 2023-12-21 PROCEDURE — 71045 X-RAY EXAM CHEST 1 VIEW: CPT | Performed by: EMERGENCY MEDICINE

## 2023-12-21 PROCEDURE — 93000 ELECTROCARDIOGRAM COMPLETE: CPT | Performed by: NURSE PRACTITIONER

## 2023-12-21 PROCEDURE — 71260 CT THORAX DX C+: CPT | Performed by: EMERGENCY MEDICINE

## 2023-12-21 PROCEDURE — 99214 OFFICE O/P EST MOD 30 MIN: CPT | Performed by: NURSE PRACTITIONER

## 2023-12-21 RX ORDER — NITROGLYCERIN 0.4 MG/1
0.4 TABLET SUBLINGUAL EVERY 5 MIN PRN
Status: DISCONTINUED | OUTPATIENT
Start: 2023-12-21 | End: 2023-12-27

## 2023-12-21 RX ORDER — TOPIRAMATE 25 MG/1
50 TABLET ORAL 2 TIMES DAILY
Status: DISCONTINUED | OUTPATIENT
Start: 2023-12-21 | End: 2023-12-27

## 2023-12-21 RX ORDER — PANTOPRAZOLE SODIUM 40 MG/1
40 TABLET, DELAYED RELEASE ORAL
Status: DISCONTINUED | OUTPATIENT
Start: 2023-12-22 | End: 2023-12-27

## 2023-12-21 RX ORDER — NICOTINE POLACRILEX 4 MG
15 LOZENGE BUCCAL
Status: DISCONTINUED | OUTPATIENT
Start: 2023-12-21 | End: 2023-12-27

## 2023-12-21 RX ORDER — AZELASTINE 1 MG/ML
1 SPRAY, METERED NASAL 2 TIMES DAILY
Status: DISCONTINUED | OUTPATIENT
Start: 2023-12-21 | End: 2024-01-03

## 2023-12-21 RX ORDER — PROCHLORPERAZINE EDISYLATE 5 MG/ML
5 INJECTION INTRAMUSCULAR; INTRAVENOUS EVERY 8 HOURS PRN
Status: DISCONTINUED | OUTPATIENT
Start: 2023-12-21 | End: 2023-12-27

## 2023-12-21 RX ORDER — BENZONATATE 200 MG/1
200 CAPSULE ORAL 3 TIMES DAILY PRN
Status: DISCONTINUED | OUTPATIENT
Start: 2023-12-21 | End: 2024-01-03

## 2023-12-21 RX ORDER — HEPARIN SODIUM AND DEXTROSE 10000; 5 [USP'U]/100ML; G/100ML
12 INJECTION INTRAVENOUS ONCE
Status: COMPLETED | OUTPATIENT
Start: 2023-12-21 | End: 2023-12-22

## 2023-12-21 RX ORDER — NICOTINE 21 MG/24HR
1 PATCH, TRANSDERMAL 24 HOURS TRANSDERMAL DAILY
Status: DISCONTINUED | OUTPATIENT
Start: 2023-12-21 | End: 2023-12-27

## 2023-12-21 RX ORDER — METOPROLOL SUCCINATE 50 MG/1
50 TABLET, EXTENDED RELEASE ORAL
Status: DISCONTINUED | OUTPATIENT
Start: 2023-12-22 | End: 2023-12-23

## 2023-12-21 RX ORDER — ENEMA 19; 7 G/133ML; G/133ML
1 ENEMA RECTAL ONCE AS NEEDED
Status: DISCONTINUED | OUTPATIENT
Start: 2023-12-21 | End: 2023-12-27

## 2023-12-21 RX ORDER — ASPIRIN 325 MG
325 TABLET, DELAYED RELEASE (ENTERIC COATED) ORAL DAILY
Status: DISCONTINUED | OUTPATIENT
Start: 2023-12-21 | End: 2023-12-27

## 2023-12-21 RX ORDER — NICOTINE POLACRILEX 4 MG
30 LOZENGE BUCCAL
Status: DISCONTINUED | OUTPATIENT
Start: 2023-12-21 | End: 2023-12-27

## 2023-12-21 RX ORDER — HEPARIN SODIUM 1000 [USP'U]/ML
60 INJECTION, SOLUTION INTRAVENOUS; SUBCUTANEOUS ONCE
Status: COMPLETED | OUTPATIENT
Start: 2023-12-21 | End: 2023-12-21

## 2023-12-21 RX ORDER — DESVENLAFAXINE 100 MG/1
100 TABLET, EXTENDED RELEASE ORAL DAILY
Status: DISCONTINUED | OUTPATIENT
Start: 2023-12-21 | End: 2024-01-03

## 2023-12-21 RX ORDER — BENZONATATE 100 MG/1
100 CAPSULE ORAL 3 TIMES DAILY PRN
Status: DISCONTINUED | OUTPATIENT
Start: 2023-12-21 | End: 2023-12-28

## 2023-12-21 RX ORDER — BUSPIRONE HYDROCHLORIDE 5 MG/1
10 TABLET ORAL DAILY
Status: DISCONTINUED | OUTPATIENT
Start: 2023-12-21 | End: 2024-01-03

## 2023-12-21 RX ORDER — ALBUTEROL SULFATE 90 UG/1
2 AEROSOL, METERED RESPIRATORY (INHALATION) EVERY 4 HOURS PRN
Status: DISCONTINUED | OUTPATIENT
Start: 2023-12-21 | End: 2024-01-03

## 2023-12-21 RX ORDER — ACETAMINOPHEN 500 MG
1000 TABLET ORAL EVERY 4 HOURS PRN
Status: DISCONTINUED | OUTPATIENT
Start: 2023-12-21 | End: 2023-12-24

## 2023-12-21 RX ORDER — MELATONIN
3 NIGHTLY PRN
Status: DISCONTINUED | OUTPATIENT
Start: 2023-12-21 | End: 2023-12-27

## 2023-12-21 RX ORDER — BISACODYL 10 MG
10 SUPPOSITORY, RECTAL RECTAL
Status: DISCONTINUED | OUTPATIENT
Start: 2023-12-21 | End: 2023-12-27

## 2023-12-21 RX ORDER — POLYETHYLENE GLYCOL 3350 17 G/17G
17 POWDER, FOR SOLUTION ORAL DAILY PRN
Status: DISCONTINUED | OUTPATIENT
Start: 2023-12-21 | End: 2023-12-27

## 2023-12-21 RX ORDER — CLONAZEPAM 0.5 MG/1
0.5 TABLET ORAL 2 TIMES DAILY
Status: DISCONTINUED | OUTPATIENT
Start: 2023-12-21 | End: 2024-01-03

## 2023-12-21 RX ORDER — HEPARIN SODIUM 1000 [USP'U]/ML
60 INJECTION, SOLUTION INTRAVENOUS; SUBCUTANEOUS ONCE
Status: DISCONTINUED | OUTPATIENT
Start: 2023-12-21 | End: 2023-12-21

## 2023-12-21 RX ORDER — SENNOSIDES 8.6 MG
17.2 TABLET ORAL NIGHTLY PRN
Status: DISCONTINUED | OUTPATIENT
Start: 2023-12-21 | End: 2023-12-27

## 2023-12-21 RX ORDER — TRAMADOL HYDROCHLORIDE 50 MG/1
TABLET ORAL EVERY 6 HOURS PRN
Status: DISCONTINUED | OUTPATIENT
Start: 2023-12-21 | End: 2023-12-27

## 2023-12-21 RX ORDER — CYCLOBENZAPRINE HCL 10 MG
10 TABLET ORAL EVERY 12 HOURS PRN
Status: DISCONTINUED | OUTPATIENT
Start: 2023-12-21 | End: 2023-12-27

## 2023-12-21 RX ORDER — GABAPENTIN 300 MG/1
300 CAPSULE ORAL NIGHTLY
Status: DISCONTINUED | OUTPATIENT
Start: 2023-12-21 | End: 2024-01-03

## 2023-12-21 RX ORDER — DEXTROSE MONOHYDRATE 25 G/50ML
50 INJECTION, SOLUTION INTRAVENOUS
Status: DISCONTINUED | OUTPATIENT
Start: 2023-12-21 | End: 2023-12-27

## 2023-12-21 RX ORDER — CETIRIZINE HYDROCHLORIDE 10 MG/1
10 TABLET ORAL DAILY
Status: DISCONTINUED | OUTPATIENT
Start: 2023-12-21 | End: 2023-12-28

## 2023-12-21 RX ORDER — ONDANSETRON 2 MG/ML
4 INJECTION INTRAMUSCULAR; INTRAVENOUS EVERY 6 HOURS PRN
Status: DISCONTINUED | OUTPATIENT
Start: 2023-12-21 | End: 2023-12-27

## 2023-12-21 RX ORDER — HEPARIN SODIUM AND DEXTROSE 10000; 5 [USP'U]/100ML; G/100ML
INJECTION INTRAVENOUS CONTINUOUS
Status: DISCONTINUED | OUTPATIENT
Start: 2023-12-22 | End: 2023-12-27

## 2023-12-21 RX ORDER — HEPARIN SODIUM AND DEXTROSE 10000; 5 [USP'U]/100ML; G/100ML
12 INJECTION INTRAVENOUS ONCE
Status: DISCONTINUED | OUTPATIENT
Start: 2023-12-21 | End: 2023-12-21

## 2023-12-21 RX ORDER — ATORVASTATIN CALCIUM 40 MG/1
40 TABLET, FILM COATED ORAL NIGHTLY
Status: DISCONTINUED | OUTPATIENT
Start: 2023-12-21 | End: 2023-12-28

## 2023-12-21 RX ORDER — QUETIAPINE 200 MG/1
200 TABLET, FILM COATED, EXTENDED RELEASE ORAL EVERY EVENING
Status: DISCONTINUED | OUTPATIENT
Start: 2023-12-21 | End: 2023-12-22 | Stop reason: RX

## 2023-12-21 NOTE — DISCHARGE INSTRUCTIONS
Take yourself directly to [facility] emergency department in [place].  If at any point you feel worse, pull over to the side of the road and immediately call 911.

## 2023-12-21 NOTE — ED INITIAL ASSESSMENT (HPI)
Pt to ER with Intermittent chest pain since 1300  (stnading at work) radiating to rt shoudler/ arm. States currently sharp 4/10 but was 10/10. SOB, dizzy. States took Tramadol at 1030. States recently had bronchitis

## 2023-12-21 NOTE — ED PROVIDER NOTES
Patient Seen in: [facility] Emergency Department      History     Chief Complaint   Patient presents with    Chest Pain Angina     Intermittent chest pain since 1300 radiating to rt shoudler/ arm. States currently sharp 4/10 but was 10/10. SOB, dizzy.      Stated Complaint: chest pain since 1300 radiating to rt shoudler/ arm. States currently 4/10 but *    Subjective:   HPI    63-year-old female with a history of COPD diabetes hypertension, occasional smoker presents with chest pain.  Started about an hour prior to arrival at immediate care.  Roughly around 1:00.  Has had some upper back pain for couple days.  Sharp.  Took some tramadol which did not help much.  Initially went to immediate care and was sent to the ER for evaluation.  Currently chest pain-free.  No other complaints.  Objective:   Past Medical History:   Diagnosis Date    Abdominal pain, other specified site 09/21/2011    groin    Acute bronchitis 09/21/2011    Back problem     Jamison esophagus     Chronic low back pain without sciatica     COPD (chronic obstructive pulmonary disease) (HCC)     Depression     Diabetes (HCC)     Difficult intubation     difficult to intubate with bladder surgery,instructed by anesthesia to communicate to future anesthesiologist. Small airway    Esophageal reflux     Fitting and adjustment of dental prosthetic device 04/26/2011    High blood pressure     History of 2019 novel coronavirus disease (COVID-19) 09/2020    No hospitalization,symptoms; Fever,fatigue ,body aches,headache no loss of taste or smell    Hx of motion sickness     Mass of spine     Migraines     barometric pressure    Osteoarthritis     Other ill-defined conditions(799.89)     Jamison's    Pain in joint, forearm 04/26/2011    wrist    Pain in joint, pelvic region and thigh 10/07/2011    hip    Pelvic mass     Personal history of urinary (tract) infection 12/09/2011              Past Surgical History:   Procedure Laterality Date    COLONOSCOPY       COLONOSCOPY N/A 05/18/2022    Procedure: COLONOSCOPY AND ESOPHAGOGASTRODUODENOSCOPY;  Surgeon: Miguel Camargo MD;  Location:  ENDOSCOPY    LAPAROSCOPIC  2007    sling operation for stress incontinence    LAPAROSCOPY,DIAGNOSTIC      ANGEL BIOPSY STEREO NODULE 2 SITE BILAT (CPT=19081/27565) Left 02/2010    BENIGN 2 SITES    ANGEL BIOPSY STEREO W/CALC 2 SITE LEFT (CPT=19081/80076)  02/2010    benign x 2    ANGEL STEREO-CLIP W/CALC 2 SITE LEFT  2010    OOPHORECTOMY Left 06/28/2019    OTHER  08/31/2022    RIGHT SACRAL SUBCUTANEOUS CYST EXCISION    TUBAL LIGATION      UPPER GI ENDOSCOPY,EXAM                  Social History     Socioeconomic History    Marital status: Single   Tobacco Use    Smoking status: Some Days     Packs/day: 0.10     Years: 41.00     Additional pack years: 0.00     Total pack years: 4.10     Types: Cigarettes    Smokeless tobacco: Never    Tobacco comments:     1-2 cigs a day   Vaping Use    Vaping Use: Every day    Substances: Nicotine, Flavoring    Devices: Disposable, Pre-filled pod   Substance and Sexual Activity    Alcohol use: Not Currently     Comment: rare    Drug use: No    Sexual activity: Not Currently     Partners: Male   Other Topics Concern    Caffeine Concern Yes     Comment: 3 cups of coffee daily    Stress Concern Yes    Weight Concern Yes    Special Diet No    Exercise No    Seat Belt Yes     Social Determinants of Health     Food Insecurity: No Food Insecurity (12/21/2023)    Food Insecurity     Food Insecurity: Never true   Transportation Needs: No Transportation Needs (12/21/2023)    Transportation Needs     Lack of Transportation: No   Housing Stability: Low Risk  (12/21/2023)    Housing Stability     Housing Instability: No              Review of Systems    Positive for stated complaint: chest pain since 1300 radiating to rt shoudler/ arm. States currently 4/10 but *  Other systems are as noted in HPI.  Constitutional and vital signs reviewed.      All other systems  reviewed and negative except as noted above.    Physical Exam     ED Triage Vitals   BP 12/21/23 1452 139/83   Pulse 12/21/23 1452 108   Resp 12/21/23 1452 18   Temp 12/21/23 1452 97.7 °F (36.5 °C)   Temp src 12/21/23 1452 Temporal   SpO2 12/21/23 1452 95 %   O2 Device 12/21/23 1615 None (Room air)       Current:/75 (BP Location: Left arm)   Pulse 90   Temp 97.8 °F (36.6 °C) (Oral)   Resp 18   Ht 154.9 cm (5' 1\")   Wt 60.5 kg   SpO2 98%   BMI 25.19 kg/m²         Physical Exam  Vitals and nursing note reviewed.   Constitutional:       General: She is not in acute distress.     Appearance: She is well-developed. She is not toxic-appearing.   HENT:      Head: Normocephalic and atraumatic.   Eyes:      General: No scleral icterus.     Conjunctiva/sclera: Conjunctivae normal.   Cardiovascular:      Rate and Rhythm: Normal rate.      Heart sounds: Normal heart sounds. No murmur heard.  Pulmonary:      Effort: Pulmonary effort is normal. No respiratory distress.      Breath sounds: Normal breath sounds.   Abdominal:      General: There is no distension.   Musculoskeletal:         General: No tenderness. Normal range of motion.      Cervical back: Normal range of motion and neck supple.      Right lower leg: No tenderness. No edema.      Left lower leg: No tenderness. No edema.   Skin:     General: Skin is warm and dry.      Findings: No rash.   Neurological:      General: No focal deficit present.      Mental Status: She is alert and oriented to person, place, and time.      Motor: No abnormal muscle tone.      Coordination: Coordination normal.   Psychiatric:         Mood and Affect: Mood normal.         Behavior: Behavior normal.              ED Course     Labs Reviewed   COMP METABOLIC PANEL (14) - Abnormal; Notable for the following components:       Result Value    Glucose 136 (*)     A/G Ratio 0.9 (*)     All other components within normal limits   TROPONIN I HIGH SENSITIVITY - Abnormal; Notable for the  following components:    Troponin I (High Sensitivity) 180 (*)     All other components within normal limits   LIPID PANEL - Abnormal; Notable for the following components:    HDL Cholesterol 37 (*)     Triglycerides 379 (*)     VLDL 60 (*)     Non HDL Chol 140 (*)     All other components within normal limits   D-DIMER - Abnormal; Notable for the following components:    D-Dimer 1.43 (*)     All other components within normal limits   TROPONIN I HIGH SENSITIVITY - Abnormal; Notable for the following components:    Troponin I (High Sensitivity) 1,183 (*)     All other components within normal limits   TROPONIN I HIGH SENSITIVITY - Abnormal; Notable for the following components:    Troponin I (High Sensitivity) 2,718 (*)     All other components within normal limits   CBC W/ DIFFERENTIAL - Abnormal; Notable for the following components:    WBC 19.7 (*)     RBC 3.63 (*)     HGB 10.9 (*)     HCT 34.8 (*)     .0 (*)     Neutrophil Absolute Prelim 14.07 (*)     Neutrophil Absolute 14.07 (*)     Monocyte Absolute 1.28 (*)     All other components within normal limits   PTT, ACTIVATED - Normal   CBC WITH DIFFERENTIAL WITH PLATELET    Narrative:     The following orders were created for panel order CBC With Differential With Platelet.  Procedure                               Abnormality         Status                     ---------                               -----------         ------                     CBC W/ DIFFERENTIAL[[id number]]          Abnormal            Final result                 Please view results for these tests on the individual orders.   PTT, ACTIVATED   HEMOGLOBIN A1C   TROPONIN I HIGH SENSITIVITY   CBC WITH DIFFERENTIAL WITH PLATELET   BASIC METABOLIC PANEL (8)   MAGNESIUM   PLATELET COUNT   PTT, ACTIVATED   RAINBOW DRAW BLUE     EKG    Rate, intervals and axes as noted on EKG Report.  Rate: 97  Rhythm: Sinus Rhythm  Reading: Normal sinus rhythm.  No acute ST-T wave changes                            MDM        -Tracing on cardiac monitor and pulse oximetry was reviewed by myself.   -The cardiac monitor revealed normal sinus rhythm as interpreted by me. The cardiac monitor was ordered to monitor the patient for dysrhythmia  -Pulse oximetry was interpreted by me and was normal.  Pulse oximeter was ordered to monitor patient for hypoxia.           -Comorbidities did add complexity to the management are mentioned in the HPI above        -I personally reviewed the prior external notes and the medical record to obtain additional history -I did review patient's immediate care visit note prior to arrival.  Reviewed EKG did not show any STEMI or acute ischemic change        -DDX: Includes but not limited to -chest wall pain, and PE, acute coronary syndrome        -I personally reviewed the radiographs findings and they show-no acute disease  Please refer to radiology report for official interpretation  CT CHEST PE AORTA (IV ONLY) (CPT=71260)   Final Result   PROCEDURE:  CT CHEST PE AORTA (IV ONLY) (CPT=71260)       COMPARISON:  [facility], CT, CT CHEST (CPT=71250), 11/01/2023, 9:55 PM.       INDICATIONS:  chest pain since 1300 radiating to rt shoudler/ arm. States    currently 4/10 but was 10/10       TECHNIQUE:  CT images were obtained with non-ionic intravenous contrast    material. Dose reduction techniques were used. Dose information is    transmitted to the ACR (American College of Radiology) NRDR (National    Radiology Data Registry) which includes the    Dose Index Registry.       PATIENT STATED HISTORY:(As transcribed by Technologist)  Pt to ER with    Intermittent chest pain since 1300  (standing at work) radiating to rt    shoulder/ arm. States currently sharp 4/10 but was 10/10. SOB, dizzy.    States took Tramadol at 1030. States    recently had bronchitis        CONTRAST USED:  100cc of Isovue 370       FINDINGS:     LUNGS:  Extensive reticular nodular densities throughout the right    hemithorax.   Similar process is not appreciated involving the left lung.     There has been interval decrease in size and number of the nodules when    compared to 11/1/2023 CT.  There is a new    right upper lobe 6 mm nodule.  Right middle lobe nodule measures 4.7    versus 6.8 mm.  Previously measured 1.2 x 0.9 cm pleural based right    middle lobe nodule adjacent to the fissure now measures 0.8 x 0.3 cm.     Mild paraseptal emphysema.   VASCULATURE:  No visible pulmonary arterial thrombus or attenuation.     ANKUSH:  No mass or adenopathy.     MEDIASTINUM:  Scattered nodes are without significant change.     CARDIAC:  No enlargement or pericardial thickening.  Moderate coronary    artery calcifications.   PLEURA:  No mass or effusion.     THORACIC AORTA:  Atherosclerosis.  No aneurysm.     CHEST WALL:  Stable.  No mass or axillary adenopathy.     LIMITED ABDOMEN:  Limited images of the upper abdomen are unremarkable.     BONES:  Stable including mild degenerative change.  No fracture.                           =====   CONCLUSION:     1. No CT evidence for pulmonary embolism.   2. When compared to most recent CT of the chest performed 11/1/2023,    reticular nodular densities involving the right lung have overall    decreased in size and number.  Findings are most likely related to an    inflammatory/infectious process, correlate    clinically.             LOCATION:  [facility]           Dictated by (CST): Britney Mendes MD on 12/21/2023 at 6:16 PM        Finalized by (CST): Britney Mendes MD on 12/21/2023 at 6:26 PM         XR CHEST AP PORTABLE  (CPT=71045)   Final Result                     =====   PROCEDURE:  XR CHEST AP PORTABLE  (CPT=71045)       TECHNIQUE:  AP chest radiograph was obtained.       COMPARISON:  [place], XR, XR CHEST PA + LAT CHEST (CPT=71046),    12/14/2023, 2:09 PM.       INDICATIONS:  chest pain since 1300 radiating to rt shoudler/ arm. States    currently 4/10 but was 10/10       PATIENT STATED HISTORY: (As  transcribed by Technologist)  Patient had    difficulty breathing and chest pain that was radiating to her left arm    earlier this afternoon.        FINDINGS:   Cardiac silhouette and pulmonary vasculature are unremarkable.   No consolidation, pleural effusion or pneumothorax.   IMPRESSION:   Unremarkable portable chest radiograph.           LOCATION:  [place] [address]                       Dictated by (CST): Gregory Vernon MD on 12/21/2023 at 4:30 PM        Finalized by (CST): Gregory Vernon MD on 12/21/2023 at 4:31 PM               Labs Reviewed   COMP METABOLIC PANEL (14) - Abnormal; Notable for the following components:       Result Value    Glucose 136 (*)     A/G Ratio 0.9 (*)     All other components within normal limits   TROPONIN I HIGH SENSITIVITY - Abnormal; Notable for the following components:    Troponin I (High Sensitivity) 180 (*)     All other components within normal limits   LIPID PANEL - Abnormal; Notable for the following components:    HDL Cholesterol 37 (*)     Triglycerides 379 (*)     VLDL 60 (*)     Non HDL Chol 140 (*)     All other components within normal limits   D-DIMER - Abnormal; Notable for the following components:    D-Dimer 1.43 (*)     All other components within normal limits   TROPONIN I HIGH SENSITIVITY - Abnormal; Notable for the following components:    Troponin I (High Sensitivity) 1,183 (*)     All other components within normal limits   TROPONIN I HIGH SENSITIVITY - Abnormal; Notable for the following components:    Troponin I (High Sensitivity) 2,718 (*)     All other components within normal limits   CBC W/ DIFFERENTIAL - Abnormal; Notable for the following components:    WBC 19.7 (*)     RBC 3.63 (*)     HGB 10.9 (*)     HCT 34.8 (*)     .0 (*)     Neutrophil Absolute Prelim 14.07 (*)     Neutrophil Absolute 14.07 (*)     Monocyte Absolute 1.28 (*)     All other components within normal limits   PTT, ACTIVATED - Normal   CBC WITH DIFFERENTIAL WITH PLATELET     Narrative:     The following orders were created for panel order CBC With Differential With Platelet.  Procedure                               Abnormality         Status                     ---------                               -----------         ------                     CBC W/ DIFFERENTIAL[[id number]]          Abnormal            Final result                 Please view results for these tests on the individual orders.   PTT, ACTIVATED   HEMOGLOBIN A1C   TROPONIN I HIGH SENSITIVITY   CBC WITH DIFFERENTIAL WITH PLATELET   BASIC METABOLIC PANEL (8)   MAGNESIUM   PLATELET COUNT   PTT, ACTIVATED   RAINBOW DRAW BLUE     Laboratory workup reviewed.  White count is 19,000.  Troponin was elevated at 180.  Glucose 136.  D-dimer pending at the time of this dictation.    Admission disposition: 12/21/2023  6:42 PM             Labs Reviewed   COMP METABOLIC PANEL (14) - Abnormal; Notable for the following components:       Result Value    Glucose 136 (*)     A/G Ratio 0.9 (*)     All other components within normal limits   TROPONIN I HIGH SENSITIVITY - Abnormal; Notable for the following components:    Troponin I (High Sensitivity) 180 (*)     All other components within normal limits   LIPID PANEL - Abnormal; Notable for the following components:    HDL Cholesterol 37 (*)     Triglycerides 379 (*)     VLDL 60 (*)     Non HDL Chol 140 (*)     All other components within normal limits   D-DIMER - Abnormal; Notable for the following components:    D-Dimer 1.43 (*)     All other components within normal limits   TROPONIN I HIGH SENSITIVITY - Abnormal; Notable for the following components:    Troponin I (High Sensitivity) 1,183 (*)     All other components within normal limits   TROPONIN I HIGH SENSITIVITY - Abnormal; Notable for the following components:    Troponin I (High Sensitivity) 2,718 (*)     All other components within normal limits   CBC W/ DIFFERENTIAL - Abnormal; Notable for the following components:    WBC 19.7  (*)     RBC 3.63 (*)     HGB 10.9 (*)     HCT 34.8 (*)     .0 (*)     Neutrophil Absolute Prelim 14.07 (*)     Neutrophil Absolute 14.07 (*)     Monocyte Absolute 1.28 (*)     All other components within normal limits   PTT, ACTIVATED - Normal   CBC WITH DIFFERENTIAL WITH PLATELET    Narrative:     The following orders were created for panel order CBC With Differential With Platelet.  Procedure                               Abnormality         Status                     ---------                               -----------         ------                     CBC W/ DIFFERENTIAL[[id number]]          Abnormal            Final result                 Please view results for these tests on the individual orders.   PTT, ACTIVATED   HEMOGLOBIN A1C   TROPONIN I HIGH SENSITIVITY   CBC WITH DIFFERENTIAL WITH PLATELET   BASIC METABOLIC PANEL (8)   MAGNESIUM   PLATELET COUNT   PTT, ACTIVATED   RAINBOW DRAW BLUE     Laboratory reviewed.  White count 19,000.  Troponin elevated at 180 and increased to 1183.  Patient was heparinized.  No evidence of PE on CT of the chest.  I discussed with cardiology and hospital service patient will be admitted for further care.  Patient remains pain-free throughout ED course.  Patient stable for the floor.                           Medical Decision Making      Disposition and Plan     Clinical Impression:  1. NSTEMI (non-ST elevated myocardial infarction) ([facility])         Disposition:  Admit  12/21/2023  6:42 pm    Follow-up:  No follow-up provider specified.        Medications Prescribed:  Current Discharge Medication List                            Hospital Problems       Present on Admission  Date Reviewed: 12/21/2023            ICD-10-CM Noted POA    * (Principal) NSTEMI (non-ST elevated myocardial infarction) ([facility]) I21.4 12/21/2023 Unknown    Hyperlipidemia E78.5 4/4/2017 Yes

## 2023-12-21 NOTE — ED INITIAL ASSESSMENT (HPI)
Pt c/o sudden onset of mid sternal chest pain. Pt also c/o shortness of breath. Pt denies nausea.

## 2023-12-21 NOTE — ED PROVIDER NOTES
Patient Seen in: Immediate Care [facility]      History     Chief Complaint   Patient presents with    Chest Pain Angina    Difficulty Breathing     Stated Complaint: Chest pain difficulty breathing    Subjective:   63-year-old female, who presents with sudden onset of midsternal chest pain while at work about an hour or so ago.  States she has also been complain of pain to the upper back the last couple days.  No conversational dyspnea.  Denies cough.            Objective:   Past Medical History:   Diagnosis Date    Abdominal pain, other specified site 09/21/2011    groin    Acute bronchitis 09/21/2011    Back problem     Jamison esophagus     Chronic low back pain without sciatica     COPD (chronic obstructive pulmonary disease) ([facility])     Depression     Diabetes ([facility])     Difficult intubation     difficult to intubate with bladder surgery,instructed by anesthesia to communicate to future anesthesiologist. Small airway    Esophageal reflux     Fitting and adjustment of dental prosthetic device 04/26/2011    High blood pressure     History of 2019 novel coronavirus disease (COVID-19) 09/2020    No hospitalization,symptoms; Fever,fatigue ,body aches,headache no loss of taste or smell    Hx of motion sickness     Mass of spine     Migraines     barometric pressure    Osteoarthritis     Other ill-defined conditions(799.89)     Jamison's    Pain in joint, forearm 04/26/2011    wrist    Pain in joint, pelvic region and thigh 10/07/2011    hip    Pelvic mass     Personal history of urinary (tract) infection 12/09/2011              No pertinent past surgical history.              No pertinent social history.            Review of Systems   Constitutional: Negative.    Respiratory:  Positive for shortness of breath.    Cardiovascular:  Positive for chest pain.   All other systems reviewed and are negative.      Positive for stated complaint: Chest pain difficulty breathing  Other systems are as noted in  HPI.  Constitutional and vital signs reviewed.      All other systems reviewed and negative except as noted above.    Physical Exam     ED Triage Vitals [12/21/23 1359]   /90   Pulse 100   Resp 20   Temp 98 °F (36.7 °C)   Temp src Temporal   SpO2 97 %   O2 Device None (Room air)       Current:/90   Pulse 100   Temp 98 °F (36.7 °C) (Temporal)   Resp 20   SpO2 97%         Physical Exam  Vitals and nursing note reviewed.   Constitutional:       General: She is not in acute distress.     Appearance: She is well-developed. She is not ill-appearing, toxic-appearing or diaphoretic.   Cardiovascular:      Rate and Rhythm: Normal rate and regular rhythm.   Pulmonary:      Effort: Pulmonary effort is normal.      Breath sounds: Normal breath sounds.   Skin:     General: Skin is warm and dry.      Coloration: Skin is not cyanotic or pale.   Neurological:      Mental Status: She is alert.               ED Course   Labs Reviewed - No data to display  EKG    Rate, intervals and axes as noted on EKG Report.  Rate: 98  Rhythm: Sinus Rhythm  Reading: Sinus rhythm with occasional PVCs.                            MDM                                         Medical Decision Making  Nontoxic 63-year-old female with sudden onset of midsternal chest pain.  Slightly raised heart rate.  Not hypoxic.  No conversational dyspnea.  Given her age, comorbidities, I felt she needed a higher level care in the ER for full evaluation of her sudden onset of chest pain.  Discussed the case with my supervising physician, Jayson.  He breathes with the transfer.  I did recommend she take an ambulance, which she politely declined.  States she will drive herself directly to [facility] emergency department in [place].      Speech recognition software was used during this dictation.  There may be minor errors in transcription.      Amount and/or Complexity of Data Reviewed  ECG/medicine tests: ordered. Decision-making details  documented in ED Course.     Details: EKG        Disposition and Plan     Clinical Impression:  1. Chest pain of uncertain etiology         Disposition:  Ic to ed  12/21/2023  2:08 pm    Follow-up:  No follow-up provider specified.        Medications Prescribed:  Current Discharge Medication List

## 2023-12-21 NOTE — ED QUICK NOTES
Orders for admission, patient is aware of plan and ready to go upstairs. Any questions, please call ED RYNE Loyd at extension [phone].     Patient Covid vaccination status: Fully vaccinated     COVID Test Ordered in ED: None    COVID Suspicion at Admission: N/A    Running Infusions:  None    Mental Status/LOC at time of transport: X4    Other pertinent information: Ambulatory  CIWA score: N/A   NIH score:  N/A

## 2023-12-22 ENCOUNTER — APPOINTMENT (OUTPATIENT)
Dept: INTERVENTIONAL RADIOLOGY/VASCULAR | Facility: HOSPITAL | Age: 63
DRG: 234 | End: 2023-12-22
Attending: NURSE PRACTITIONER
Payer: MEDICAID

## 2023-12-22 ENCOUNTER — APPOINTMENT (OUTPATIENT)
Dept: CV DIAGNOSTICS | Facility: HOSPITAL | Age: 63
DRG: 234 | End: 2023-12-22
Attending: NURSE PRACTITIONER
Payer: MEDICAID

## 2023-12-22 PROBLEM — E11.9 TYPE 2 DIABETES MELLITUS WITHOUT COMPLICATION (HCC): Status: ACTIVE | Noted: 2022-01-11

## 2023-12-22 LAB
ANION GAP SERPL CALC-SCNC: 0 MMOL/L (ref 0–18)
APTT PPP: 37.1 SECONDS (ref 23.3–35.6)
APTT PPP: 40.5 SECONDS (ref 23.3–35.6)
APTT PPP: 44.4 SECONDS (ref 23.3–35.6)
BASOPHILS # BLD AUTO: 0.05 X10(3) UL (ref 0–0.2)
BASOPHILS NFR BLD AUTO: 0.4 %
BUN BLD-MCNC: 13 MG/DL (ref 9–23)
CALCIUM BLD-MCNC: 8.4 MG/DL (ref 8.5–10.1)
CHLORIDE SERPL-SCNC: 110 MMOL/L (ref 98–112)
CO2 SERPL-SCNC: 31 MMOL/L (ref 21–32)
CREAT BLD-MCNC: 0.82 MG/DL
EGFRCR SERPLBLD CKD-EPI 2021: 81 ML/MIN/1.73M2 (ref 60–?)
EOSINOPHIL # BLD AUTO: 0.37 X10(3) UL (ref 0–0.7)
EOSINOPHIL NFR BLD AUTO: 2.8 %
ERYTHROCYTE [DISTWIDTH] IN BLOOD BY AUTOMATED COUNT: 14.1 %
EST. AVERAGE GLUCOSE BLD GHB EST-MCNC: 137 MG/DL (ref 68–126)
GLUCOSE BLD-MCNC: 122 MG/DL (ref 70–99)
GLUCOSE BLD-MCNC: 123 MG/DL (ref 70–99)
GLUCOSE BLD-MCNC: 125 MG/DL (ref 70–99)
GLUCOSE BLD-MCNC: 133 MG/DL (ref 70–99)
GLUCOSE BLD-MCNC: 140 MG/DL (ref 70–99)
GLUCOSE BLD-MCNC: 157 MG/DL (ref 70–99)
GLUCOSE BLD-MCNC: 160 MG/DL (ref 70–99)
GLUCOSE BLD-MCNC: 174 MG/DL (ref 70–99)
HBA1C MFR BLD: 6.4 % (ref ?–5.7)
HCT VFR BLD AUTO: 34 %
HGB BLD-MCNC: 10.7 G/DL
IMM GRANULOCYTES # BLD AUTO: 0.09 X10(3) UL (ref 0–1)
IMM GRANULOCYTES NFR BLD: 0.7 %
LYMPHOCYTES # BLD AUTO: 4.59 X10(3) UL (ref 1–4)
LYMPHOCYTES NFR BLD AUTO: 34.2 %
MAGNESIUM SERPL-MCNC: 2 MG/DL (ref 1.6–2.6)
MCH RBC QN AUTO: 29.9 PG (ref 26–34)
MCHC RBC AUTO-ENTMCNC: 31.5 G/DL (ref 31–37)
MCV RBC AUTO: 95 FL
MONOCYTES # BLD AUTO: 0.78 X10(3) UL (ref 0.1–1)
MONOCYTES NFR BLD AUTO: 5.8 %
NEUTROPHILS # BLD AUTO: 7.56 X10 (3) UL (ref 1.5–7.7)
NEUTROPHILS # BLD AUTO: 7.56 X10(3) UL (ref 1.5–7.7)
NEUTROPHILS NFR BLD AUTO: 56.1 %
OSMOLALITY SERPL CALC.SUM OF ELEC: 293 MOSM/KG (ref 275–295)
PLATELET # BLD AUTO: 452 10(3)UL (ref 150–450)
PLATELET # BLD AUTO: 452 10(3)UL (ref 150–450)
POTASSIUM SERPL-SCNC: 3.7 MMOL/L (ref 3.5–5.1)
RBC # BLD AUTO: 3.58 X10(6)UL
SODIUM SERPL-SCNC: 141 MMOL/L (ref 136–145)
TROPONIN I SERPL HS-MCNC: 2440 NG/L
TROPONIN I SERPL HS-MCNC: 2718 NG/L
WBC # BLD AUTO: 13.4 X10(3) UL (ref 4–11)

## 2023-12-22 PROCEDURE — 93306 TTE W/DOPPLER COMPLETE: CPT | Performed by: NURSE PRACTITIONER

## 2023-12-22 PROCEDURE — 99231 SBSQ HOSP IP/OBS SF/LOW 25: CPT | Performed by: HOSPITALIST

## 2023-12-22 PROCEDURE — B2151ZZ FLUOROSCOPY OF LEFT HEART USING LOW OSMOLAR CONTRAST: ICD-10-PCS | Performed by: INTERNAL MEDICINE

## 2023-12-22 PROCEDURE — 4A023N7 MEASUREMENT OF CARDIAC SAMPLING AND PRESSURE, LEFT HEART, PERCUTANEOUS APPROACH: ICD-10-PCS | Performed by: INTERNAL MEDICINE

## 2023-12-22 PROCEDURE — B2111ZZ FLUOROSCOPY OF MULTIPLE CORONARY ARTERIES USING LOW OSMOLAR CONTRAST: ICD-10-PCS | Performed by: INTERNAL MEDICINE

## 2023-12-22 RX ORDER — QUETIAPINE FUMARATE 50 MG/1
50 TABLET, EXTENDED RELEASE ORAL NIGHTLY
Status: DISCONTINUED | OUTPATIENT
Start: 2023-12-22 | End: 2024-01-03

## 2023-12-22 RX ORDER — NITROGLYCERIN 20 MG/100ML
INJECTION INTRAVENOUS
Status: DISCONTINUED
Start: 2023-12-22 | End: 2023-12-22 | Stop reason: WASHOUT

## 2023-12-22 RX ORDER — HEPARIN SODIUM 1000 [USP'U]/ML
30 INJECTION, SOLUTION INTRAVENOUS; SUBCUTANEOUS ONCE
Status: COMPLETED | OUTPATIENT
Start: 2023-12-22 | End: 2023-12-22

## 2023-12-22 RX ORDER — LIDOCAINE HYDROCHLORIDE 10 MG/ML
INJECTION, SOLUTION EPIDURAL; INFILTRATION; INTRACAUDAL; PERINEURAL
Status: COMPLETED
Start: 2023-12-22 | End: 2023-12-22

## 2023-12-22 RX ORDER — QUETIAPINE 150 MG/1
150 TABLET, FILM COATED, EXTENDED RELEASE ORAL NIGHTLY
Status: DISCONTINUED | OUTPATIENT
Start: 2023-12-22 | End: 2024-01-03

## 2023-12-22 RX ORDER — HEPARIN SODIUM 5000 [USP'U]/ML
INJECTION, SOLUTION INTRAVENOUS; SUBCUTANEOUS
Status: COMPLETED
Start: 2023-12-22 | End: 2023-12-22

## 2023-12-22 RX ORDER — MIDAZOLAM HYDROCHLORIDE 1 MG/ML
INJECTION INTRAMUSCULAR; INTRAVENOUS
Status: COMPLETED
Start: 2023-12-22 | End: 2023-12-22

## 2023-12-22 RX ORDER — SODIUM CHLORIDE 9 MG/ML
INJECTION, SOLUTION INTRAVENOUS
Status: DISCONTINUED | OUTPATIENT
Start: 2023-12-23 | End: 2023-12-22 | Stop reason: HOSPADM

## 2023-12-22 RX ORDER — VERAPAMIL HYDROCHLORIDE 2.5 MG/ML
INJECTION, SOLUTION INTRAVENOUS
Status: COMPLETED
Start: 2023-12-22 | End: 2023-12-22

## 2023-12-22 NOTE — PLAN OF CARE
NURSING ADMISSION NOTE      Patient admitted via Cart  Oriented to room.  Safety precautions initiated.  Bed in low position.  Call light in reach.    Pt alert and oriented x4. Pt on tele in NSR, asymptomatic. Pt on room air. Pt continent of bowel and bladder, Pt denies any c/o of chest pain and SOB. Pt denies any c/o pain. Pt heparin gtt running per orders. Pt updated on plan of care and verbalizes understanding. Bed in lowest position and call light within reach.     0155: Called MARLENE Akins about pt increased troponin, no new orders.     Plan of care: Cardiology to see in am   NPO since midnight for potential cath lab          Problem: Diabetes/Glucose Control  Goal: Glucose maintained within prescribed range  Description: INTERVENTIONS:  - Monitor Blood Glucose as ordered  - Assess for signs and symptoms of hyperglycemia and hypoglycemia  - Administer ordered medications to maintain glucose within target range  - Assess barriers to adequate nutritional intake and initiate nutrition consult as needed  - Instruct patient on self management of diabetes  Outcome: Progressing     Problem: Patient/Family Goals  Goal: Patient/Family Long Term Goal  Description: Patient's Long Term Goal: stay out of hospital    Interventions:  - Medication compliance  - smoking cessation   - follow up with primary care physician   - See additional Care Plan goals for specific interventions  Outcome: Progressing  Goal: Patient/Family Short Term Goal  Description: Patient's Short Term Goal: go home    Interventions:   - medication education   - smoking cessation education   - cardiology consult   - follow up with care team   - See additional Care Plan goals for specific interventions  Outcome: Progressing     Problem: CARDIOVASCULAR - ADULT  Goal: Maintains optimal cardiac output and hemodynamic stability  Description: INTERVENTIONS:  - Monitor vital signs, rhythm, and trends  - Monitor for bleeding, hypotension and signs of  decreased cardiac output  - Evaluate effectiveness of vasoactive medications to optimize hemodynamic stability  - Monitor arterial and/or venous puncture sites for bleeding and/or hematoma  - Assess quality of pulses, skin color and temperature  - Assess for signs of decreased coronary artery perfusion - ex. Angina  - Evaluate fluid balance, assess for edema, trend weights  Outcome: Progressing  Goal: Absence of cardiac arrhythmias or at baseline  Description: INTERVENTIONS:  - Continuous cardiac monitoring, monitor vital signs, obtain 12 lead EKG if indicated  - Evaluate effectiveness of antiarrhythmic and heart rate control medications as ordered  - Initiate emergency measures for life threatening arrhythmias  - Monitor electrolytes and administer replacement therapy as ordered  Outcome: Progressing

## 2023-12-22 NOTE — CONSULTS
[facility]  Consultation    Soumya King Patient Status:  Inpatient    6/3/1960 MRN [id number]   Location [facility] 8NE-A Attending Jace Flores MD   Hosp Day # 1 PCP Rika Vinson,        Reason for consultation;  CP     HPI:  This is a 63 year old female patient with PMH of HTN, DLP, DM2, smoking who presented with sudden squeezing CP that lasted 2 hr yesterday. The pain is better when lying in bed, and she is asymptomatic this morning.    MDM / Diagnostics reviewed & interpreted:  ECG shows NSSR  Trp 2700 > 2400  TTE w/ LVEF 45%, apical and anteroseptal RWMA    Assessment:    NSTEMI  HTN  DLP  DM2  Smoking    Plan:  -NSTEMI. High risk ACS  -ASA, heparin  -LHC. R/B/A D/W patient. Consent obtained    Discussed with patient.     Please call with questions. Thank you for your consult.    Level of care: C5      Marina Guy MD  Interventional Cardiology  [facility]    --------------------------------------------------------------------------------------------------------------------------------  ROS 10 systems reviewed, pertinent findings above.    History:  Past Medical History:   Diagnosis Date    Abdominal pain, other specified site 2011    groin    Acute bronchitis 2011    Back problem     Jamison esophagus     Chronic low back pain without sciatica     COPD (chronic obstructive pulmonary disease) (HCC)     Depression     Diabetes (HCC)     Difficult intubation     difficult to intubate with bladder surgery,instructed by anesthesia to communicate to future anesthesiologist. Small airway    Esophageal reflux     Fitting and adjustment of dental prosthetic device 2011    High blood pressure     History of 2019 novel coronavirus disease (COVID-19) 2020    No hospitalization,symptoms; Fever,fatigue ,body aches,headache no loss of taste or smell    Hx of motion sickness     Mass of spine     Migraines     barometric pressure    Osteoarthritis      Other ill-defined conditions(799.89)     Jamison's    Pain in joint, forearm 04/26/2011    wrist    Pain in joint, pelvic region and thigh 10/07/2011    hip    Pelvic mass     Personal history of urinary (tract) infection 12/09/2011     Past Surgical History:   Procedure Laterality Date    COLONOSCOPY      COLONOSCOPY N/A 05/18/2022    Procedure: COLONOSCOPY AND ESOPHAGOGASTRODUODENOSCOPY;  Surgeon: Miguel Camargo MD;  Location:  ENDOSCOPY    LAPAROSCOPIC  2007    sling operation for stress incontinence    LAPAROSCOPY,DIAGNOSTIC      ANGEL BIOPSY STEREO NODULE 2 SITE BILAT (CPT=19081/23019) Left 02/2010    BENIGN 2 SITES    ANGEL BIOPSY STEREO W/CALC 2 SITE LEFT (CPT=19081/69069)  02/2010    benign x 2    ANGEL STEREO-CLIP W/CALC 2 SITE LEFT  2010    OOPHORECTOMY Left 06/28/2019    OTHER  08/31/2022    RIGHT SACRAL SUBCUTANEOUS CYST EXCISION    TUBAL LIGATION      UPPER GI ENDOSCOPY,EXAM       Family History   Problem Relation Age of Onset    Arthritis Mother     Other (AMI) Mother     Other (diabetes mellitus) Mother       reports that she has been smoking cigarettes. She has a 4.10 pack-year smoking history. She has never used smokeless tobacco. She reports that she does not currently use alcohol. She reports that she does not use drugs.    Objective:   Temp: 97.9 °F (36.6 °C)  Pulse: 82  Resp: 18  BP: 92/55    Intake/Output:     Intake/Output Summary (Last 24 hours) at 12/22/2023 1143  Last data filed at 12/22/2023 1100  Gross per 24 hour   Intake 240 ml   Output 400 ml   Net -160 ml       Physical Exam:     General: NAD. Conversant.   HEENT: Normocephalic, atraumatic.  Neck: Supple. No JVD.  Cardiac: RRR. S1, S2 normal. No murmur.  Lungs: GAEB, no wheezing.  GI: Soft, no visible organomegaly.  Extremities: Radial pulses 2+ bilaterally. No edema.  Skin: Warm and dry.      Marina Guy MD  12/22/2023  11:43 AM

## 2023-12-22 NOTE — PROGRESS NOTES
[facility]     Hospitalist Progress Note     Soumya King Patient Status:  Inpatient    6/3/1960 MRN [id number]   [facility] 8NE-A Attending Jace Flores MD   Hosp Day # 1 PCP Rika Vinson DO     Chief Complaint:   Chief Complaint   Patient presents with    Chest Pain Angina     Intermittent chest pain since 1300 radiating to rt shoudler/ arm. States currently sharp 4/10 but was 10/10. SOB, dizzy.        Subjective:     Patient getting echo right now; says she is very tired and falls asleep while I am asking her questions    Objective:    Review of Systems:   Denies pain; just fatigued    Vital signs:  Temp:  [97.7 °F (36.5 °C)-98.1 °F (36.7 °C)] 97.9 °F (36.6 °C)  Pulse:  [] 82  Resp:  [17-24] 18  BP: ()/(55-90) 92/55  SpO2:  [91 %-99 %] 93 %    Physical Exam:    General: No acute distress.   Respiratory: no increased work of breathing  Cardiovascular: rates 80s on tele      Diagnostic Data:    Labs:  Recent Labs   Lab 23  1504 23  0437   WBC 19.7* 13.4*   HGB 10.9* 10.7*   MCV 95.9 95.0   .0* 452.0*  452.0*       Recent Labs   Lab 23  1504 23  0437   * 123*   BUN 17 13   CREATSERUM 0.86 0.82   CA 8.7 8.4*   ALB 3.4  --     141   K 4.3 3.7    110   CO2 27.0 31.0   ALKPHO 94  --    AST 33  --    ALT 52  --    BILT 0.2  --    TP 7.1  --        Estimated Creatinine Clearance: 54.4 mL/min (based on SCr of 0.82 mg/dL).    No results for input(s): \"PTP\", \"INR\" in the last 168 hours.         COVID-19 Lab Results    COVID-19  Lab Results   Component Value Date    COVID19 Not Detected 2023    COVID19 Not Detected 2022    COVID19 Not Detected 2022       Pro-Calcitonin  No results for input(s): \"PCT\" in the last 168 hours.    Cardiac  No results for input(s): \"TROP\", \"PBNP\" in the last 168 hours.    Creatinine Kinase  No results for input(s): \"CK\" in the last 168 hours.    Inflammatory Markers  Recent Labs   Lab  12/21/23  1504   DDIMER 1.43*       Recent Labs   Lab 12/21/23  1707 12/21/23  2229 12/22/23  0437   TROPHS 1,183* 2,718* 2,440*       Imaging: Imaging data reviewed in Epic.    Medications:    QUEtiapine ER  150 mg Oral Nightly    And    QUEtiapine ER  50 mg Oral Nightly    [START ON 12/23/2023] sodium chloride   Intravenous On Call    atorvastatin  40 mg Oral Nightly    azelastine  1 spray Each Nare BID    busPIRone  10 mg Oral Daily    clonazePAM  0.5 mg Oral BID    desvenlafaxine ER  100 mg Oral Daily    gabapentin  300 mg Oral Nightly    cetirizine  10 mg Oral Daily    metoprolol succinate ER  50 mg Oral Daily Beta Blocker    [Held by provider] Mirabegron ER  50 mg Oral Daily    pantoprazole  40 mg Oral BID AC    topiramate  50 mg Oral BID    umeclidinium bromide  1 puff Inhalation Daily    insulin aspart  2-10 Units Subcutaneous TID CC and HS    nicotine  1 patch Transdermal Daily    aspirin  325 mg Oral Daily       Assessment & Plan:      # NSTEMI  -Heparin drip, aspirin, statin; hx difficulty swallowing asa due to barretts. Not a true allergy  -angiogram today     #Hypertension  #DM2-monitor on insulin  #Hyperlipidemia  #Smoker  -Nicotine patch    Jace Maurer MD    Supplementary Documentation:     Quality:  DVT Prophylaxis: heparin drip  CODE status: full  [place]: no  Central line: no      Estimated date of discharge: tbd  At this point Ms. King is expected to be discharge to: home

## 2023-12-22 NOTE — DISCHARGE INSTRUCTIONS
To access the Dr. Saunders discharge instructions video on your home computer:   [email or web address]  Remove steri strips from all incisions on 1/10    Sometimes managing your health at home requires assistance.  The [facility] team has recognized your preference to use [facility].  They can be reached by phone at [phone].  The fax number for your reference is [phone].  A representative from the home health agency will contact you or your family to schedule your first visit.       Therapy Providers in-network with [employer]     [facility]  [address] [place] [address]  [phone]    [facility]   [address] [address] [phone]    [facility]  [address], [place] [id number] [phone]    [facility]  [address] [place] [address] [place], [place] [address] [phone]    Psychiatrist In-Network with Medicaid (Accepting New Patients)    Breana Reagan MD  [facility]   [address], [place] [address]  [phone]    Patrizia Chapman MD  [facility]  [address] [place] [address] [place], [place] [address] [phone]    Carolina Kumar MD  [facility]  [address], [place], [place] [id number] [phone]    MD Ricki Figueredo III, MD  [facility]   [address] [place] [address]  [phone]    Jossue Joyner MD  [facility]  [address], [place], [place] [address] [phone]    Tonya Reid MD  [facility]  [address], [place] [id number] [phone]

## 2023-12-22 NOTE — PROGRESS NOTES
Psych Liaison placed referrals for psychotherapy/psychiatry in discharge summary in-network with patient's insurance.

## 2023-12-22 NOTE — PLAN OF CARE
Soumya King Patient Status:  Inpatient    6/3/1960 MRN [id number]   Location [facility] 8NE-A Attending Arjun Miller MD   Hosp Day # 0 PCP Rika Vinson DO     Cardiology Nocturnal APN Plan of Care     Page Received: Bedside RN 1854    Patient consult on days, note pending.  Per primary care, NSTEMI, plan for cath in AM.  Denies CP.       Assessment/Plan:   - Patient remains on heparin gtt  - NPO after MN    Addendum 0155  Troponin 180 -> 1183 -> 2718.  Chest pain free.   - No changes in current management, continue heparin gtt          MARLENE Marcos  [facility]  2023  11:46 PM

## 2023-12-22 NOTE — PROCEDURES
[facility]    Cardiac Catheterization Note    Primary Proceduralist: Marina Guy MD  Procedure Performed: [facility]  Date of Procedure: 12/22/2023   Indication: NSTEMI  Pre Operative Diagnosis: CAD  Post Operative Diagnosis: CAD  Estimated blood loss: 10cc  Specimens: None    Consent obtained from the patient and documented in the paper chart, unless verbally obtained in an emergency setting.  The benefits and risk of the procedure, including but not limited to infection, bleeding, myocardial infarction, stroke, vascular injury, emergency surgery, renal failure requiring dialysis and death were discussed with the patient. These complications occur in approximately 1-2% of elective procedures, but the risk may be significantly elevated in the setting of acute coronary syndrome, electrical or hemodynamic instability, multivessel disease, reduced LVEF or . The patient consented to any additional procedures performed emergently in order to address a complication or prevent medical deterioration. Viable alternatives were explained to the patient, including continuing medical therapy, with the risks incurred along that course.      Procedure/Technique:    Lidocaine 1% was administered locally. Access of right radial artery obtained using Seldinger technique. Ultrasound was not used.     6 Fr Sheath was inserted. J-wire advanced into the aorta under fluoroscopy.    Left coronary system engaged using 6 Fr TIG. Selective angiogram performed.     Right coronary system engaged using 6 Fr TIG.  Selective angiogram performed. 6 Fr pigtail Catheter advanced into the LV. Hemodynamics were obtained.    Coronary Angiogram Findings:  LM: Large caliber artery giving rise to LAD & LCX. No significant stenosis.  LAD: Large caliber artery giving rise to diagonal and septal branches. Moderate diffuse disease. 80% mid LAD stenosis.  LCX: Medium to large caliber artery giving rise to OM branches. Luminal  irregularities  RCA: Large caliber artery giving rise to acute marginal branches, and bifurcates into RPDA & RPL. Mid RCA . Left to right collaterals    Hemodynamics:  /8, LVEDP 18  /82, mean 101  No significant gradient upon Ao-LV pullback  LVEF 35-40%, apical akinesis    Intervention:  None    Monitored sedation administered by the cath lab RN, and supervised throughout the procedure by myself. Total time 9 minutes.     Closure: Radial band.    No immediate complications.    A/P:  MV-CAD including 80% mid LAD, 100% mid RCA.  Right dominant circulation  LVEF 35-40%, apical akinesis  Restart heparin 2 hr after sheath pull  CTS consult for CABG      Marina Guy MD  Interventional Cardiology  [facility]

## 2023-12-22 NOTE — PLAN OF CARE
Pt A&Ox4. Denies pain at this time. NSR on tele. Lungs clear on RA. Heparin gtt at 9ml/hr per acs protocol. Pt NPO for angiogram today. Consent in chart. All questions answered at this time. Call light and belongings within reach.        Problem: Diabetes/Glucose Control  Goal: Glucose maintained within prescribed range  Description: INTERVENTIONS:  - Monitor Blood Glucose as ordered  - Assess for signs and symptoms of hyperglycemia and hypoglycemia  - Administer ordered medications to maintain glucose within target range  - Assess barriers to adequate nutritional intake and initiate nutrition consult as needed  - Instruct patient on self management of diabetes  Outcome: Progressing     Problem: Patient/Family Goals  Goal: Patient/Family Long Term Goal  Description: Patient's Long Term Goal: stay out of hospital    Interventions:  - Medication compliance  - smoking cessation   - follow up with primary care physician   - See additional Care Plan goals for specific interventions  Outcome: Progressing  Goal: Patient/Family Short Term Goal  Description: Patient's Short Term Goal: go home    Interventions:   - medication education   - smoking cessation education   - cardiology consult   - follow up with care team   - See additional Care Plan goals for specific interventions  Outcome: Progressing     Problem: CARDIOVASCULAR - ADULT  Goal: Maintains optimal cardiac output and hemodynamic stability  Description: INTERVENTIONS:  - Monitor vital signs, rhythm, and trends  - Monitor for bleeding, hypotension and signs of decreased cardiac output  - Evaluate effectiveness of vasoactive medications to optimize hemodynamic stability  - Monitor arterial and/or venous puncture sites for bleeding and/or hematoma  - Assess quality of pulses, skin color and temperature  - Assess for signs of decreased coronary artery perfusion - ex. Angina  - Evaluate fluid balance, assess for edema, trend weights  Outcome: Progressing  Goal: Absence  of cardiac arrhythmias or at baseline  Description: INTERVENTIONS:  - Continuous cardiac monitoring, monitor vital signs, obtain 12 lead EKG if indicated  - Evaluate effectiveness of antiarrhythmic and heart rate control medications as ordered  - Initiate emergency measures for life threatening arrhythmias  - Monitor electrolytes and administer replacement therapy as ordered  Outcome: Progressing

## 2023-12-22 NOTE — H&P
[facility]     Soumya King Patient Status:  Inpatient    6/3/1960 MRN [id number]   Location [facility] 8NE-A Attending Arjun Miller MD   Hosp Day # 0 PCP Rika Vinson DO     Chief Complaint: CP    Subjective:    History of Present Illness:     Soumya King is a 63 year old female with PMH COPD, DM 2, hypertension, hyperlipidemia, smoker who presents with chest pain.  Substernal.  Started today.  Never had before and no history of cardiac disease.  Persistent.  Exertional and radiates to the upper back.  Denies shortness of breath.    History/Other:    Past Medical History:  Past Medical History:   Diagnosis Date    Abdominal pain, other specified site 2011    groin    Acute bronchitis 2011    Back problem     Jamison esophagus     Chronic low back pain without sciatica     COPD (chronic obstructive pulmonary disease) (HCC)     Depression     Diabetes (HCC)     Difficult intubation     difficult to intubate with bladder surgery,instructed by anesthesia to communicate to future anesthesiologist. Small airway    Esophageal reflux     Fitting and adjustment of dental prosthetic device 2011    High blood pressure     History of 2019 novel coronavirus disease (COVID-19) 2020    No hospitalization,symptoms; Fever,fatigue ,body aches,headache no loss of taste or smell    Hx of motion sickness     Mass of spine     Migraines     barometric pressure    Osteoarthritis     Other ill-defined conditions(799.89)     Jamison's    Pain in joint, forearm 2011    wrist    Pain in joint, pelvic region and thigh 10/07/2011    hip    Pelvic mass     Personal history of urinary (tract) infection 2011     Past Surgical History:   Past Surgical History:   Procedure Laterality Date    COLONOSCOPY      COLONOSCOPY N/A 2022    Procedure: COLONOSCOPY AND ESOPHAGOGASTRODUODENOSCOPY;  Surgeon: Miguel Camargo MD;  Location:  ENDOSCOPY     LAPAROSCOPIC  2007    sling operation for stress incontinence    LAPAROSCOPY,DIAGNOSTIC      ANGEL BIOPSY STEREO NODULE 2 SITE BILAT (CPT=19081/88370) Left 02/2010    BENIGN 2 SITES    ANGEL BIOPSY STEREO W/CALC 2 SITE LEFT (CPT=19081/11458)  02/2010    benign x 2    ANGEL STEREO-CLIP W/CALC 2 SITE LEFT  2010    OOPHORECTOMY Left 06/28/2019    OTHER  08/31/2022    RIGHT SACRAL SUBCUTANEOUS CYST EXCISION    TUBAL LIGATION      UPPER GI ENDOSCOPY,EXAM        Family History:   Family History   Problem Relation Age of Onset    Arthritis Mother     Other (AMI) Mother     Other (diabetes mellitus) Mother      Social History:    reports that she has been smoking cigarettes. She has a 4.10 pack-year smoking history. She has never used smokeless tobacco. She reports that she does not currently use alcohol. She reports that she does not use drugs.     Allergies:   Allergies   Allergen Reactions    Naprosyn [Naproxen] ANAPHYLAXIS     TABS Anaphylaxis    Aspirin      TABS contraindicated because of saavedra's esophagus       Medications:    Current Facility-Administered Medications on File Prior to Encounter   Medication Dose Route Frequency Provider Last Rate Last Admin    [COMPLETED] ipratropium-albuterol (Duoneb) 0.5-2.5 (3) MG/3ML inhalation solution 3 mL  3 mL Nebulization Once Scott Mar PA-C   3 mL at 12/14/23 1504     Current Outpatient Medications on File Prior to Encounter   Medication Sig Dispense Refill    benzonatate 100 MG Oral Cap Take 1 capsule (100 mg total) by mouth 3 (three) times daily as needed for cough. 30 capsule 0    pseudoephedrine 30 MG Oral Tab Take 1 tablet (30 mg total) by mouth every 4 (four) hours as needed for congestion. 36 tablet 0    topiramate 50 MG Oral Tab TAKE ONE TABLET BY MOUTH EVERY MORNING AND TWO TABLETS EVERY EVENING 270 tablet 0    levocetirizine 5 MG Oral Tab Take 1 tablet (5 mg total) by mouth every evening. 30 tablet 5    Dulaglutide (TRULICITY) 0.75 MG/0.5ML Subcutaneous Solution  Pen-injector Inject 0.75 mg into the skin once a week. 1 mL 0    busPIRone 10 MG Oral Tab Take 1 tablet (10 mg total) by mouth daily.      desvenlafaxine ER (PRISTIQ) 100 MG Oral Tablet 24 Hr 1 tablet (100 mg total) daily.      Metoclopramide HCl 5 MG/5ML Oral Solution Take 5 mL (5 mg total) by mouth 3 (three) times daily before meals. 473 mL 0    cyclobenzaprine 10 MG Oral Tab Take 1 tablet (10 mg total) by mouth every 12 (twelve) hours as needed for Muscle spasms. 60 tablet 1    diclofenac 75 MG Oral Tab EC Take 1 tablet (75 mg total) by mouth 2 (two) times daily. 180 tablet 3    Omeprazole 40 MG Oral Capsule Delayed Release Take 1 capsule (40 mg total) by mouth 2 (two) times daily. 180 capsule 1    metoprolol succinate ER 50 MG Oral Tablet 24 Hr Take 1 tablet (50 mg total) by mouth daily. 90 tablet 1    Erenumab-aooe (AIMOVIG) 140 MG/ML Subcutaneous Solution Auto-injector Inject 1 mL (140 mg total) into the skin every 30 (thirty) days. 1 mL 5    Rizatriptan Benzoate 10 MG Oral Tab Take 1 tablet (10 mg total) by mouth as needed for Migraine. 10 tablet 5    Lancets (ONETOUCH DELICA PLUS [id number]) Does not apply Misc 1 lancet  by Finger stick route 3 (three) times daily. 300 each 1    albuterol 108 (90 Base) MCG/ACT Inhalation Aero Soln Inhale 2 puffs into the lungs every 4 (four) hours as needed. 18 g 2    Glucose Blood (ONETOUCH ULTRA) In Vitro Strip Tests 3 x daily 300 each 1    gabapentin 300 MG Oral Cap Take 1 capsule (300 mg total) by mouth nightly. 90 capsule 1    traMADol 50 MG Oral Tab Take 1-2 tablets ( mg total) by mouth every 6 (six) hours as needed for Pain. 120 tablet 2    estradiol (ESTRACE) 0.1 MG/GM Vaginal Cream Apply 1/2 gram vaginally 2-3 times per week. 42.5 g 3    atorvastatin 40 MG Oral Tab Take 1 tablet (40 mg total) by mouth nightly. 90 tablet 1    Mirabegron ER (MYRBETRIQ) 50 MG Oral Tablet 24 Hr Take 1 tablet (50 mg total) by mouth daily. 30 tablet 11    METFORMIN 500 MG Oral Tab  TAKE 1 TABLET(500 MG) BY MOUTH TWICE DAILY 180 tablet 1    clonazePAM 0.5 MG Oral Tab Take 1 tablet (0.5 mg total) by mouth 2 (two) times daily.      Multiple Vitamins-Minerals (ONE-A-DAY WOMENS 50+) Oral Tab Take 1 tablet by mouth daily.  0    QUEtiapine Fumarate  MG Oral Tablet 24 Hr Take 1 tablet (200 mg total) by mouth every evening. 30 tablet 0    Calcium Carb-Cholecalciferol 500-200 MG-UNIT Oral Tab Take 1 tablet by mouth daily.        [] azithromycin 250 MG Oral Tab Take 2 tablets (500 mg total) by mouth daily for 1 day, THEN 1 tablet (250 mg total) daily for 4 days. 6 tablet 0    [] predniSONE 20 MG Oral Tab Take 2 tablets (40 mg total) by mouth daily for 5 days. 10 tablet 0    oxymetazoline 0.05 % Nasal Solution 1 spray by Nasal route every 4 (four) hours as needed for congestion. 15 mL 0    albuterol 108 (90 Base) MCG/ACT Inhalation Aero Soln Inhale 2 puffs into the lungs every 4 (four) hours as needed for Wheezing. 1 each 0    umeclidinium bromide (INCRUSE ELLIPTA) 62.5 MCG/ACT Inhalation Aerosol Powder, Breath Activated Inhale 1 puff into the lungs daily. 1 each 1    [] sulfamethoxazole-trimethoprim DS (BACTRIM DS) 800-160 MG Oral Tab per tablet Take 1 tablet by mouth 2 (two) times daily for 7 days. 14 tablet 0    [] sulfamethoxazole-trimethoprim DS (BACTRIM DS) 800-160 MG Oral Tab per tablet Take 1 tablet by mouth 2 (two) times daily for 7 days. 14 tablet 0    lidocaine 5 % External Ointment Apply 1 Application. topically 3 (three) times daily as needed. 240 g 1    diclofenac (VOLTAREN) 1 % External Gel Apply 4 g topically 4 (four) times daily. 300 g 3    AZELASTINE 0.1 % Nasal Solution USE 1 SPRAY IN EACH NOSTRIL EVERY NIGHT IN THE MORNING AND BEFORE BEDTIME 30 mL 2    Desvenlafaxine Succinate ER (PRISTIQ) 100 MG Oral Tablet 24 Hr Take 1 tablet (100 mg total) by mouth daily. [employer] contacted and order was placed on 20. Conf# [id number].  Patients ID #[id number] 90 tablet  0       Review of Systems:   A comprehensive review of systems was completed.    Pertinent positives and negatives noted in the HPI.    Objective:   Physical Exam:    /75 (BP Location: Left arm)   Pulse 90   Temp 97.8 °F (36.6 °C) (Oral)   Resp 18   Ht 5' 1\" (1.549 m)   Wt 133 lb 4.8 oz (60.5 kg)   SpO2 98%   BMI 25.19 kg/m²   General: No acute distress, Alert  Respiratory: No rhonchi, no wheezes  Cardiovascular: S1, S2. Regular rate and rhythm  Abdomen: Soft, Non-tender, non-distended, positive bowel sounds  Neuro: No new focal deficits  Extremities: No edema      Results:    Labs:      Labs Last 24 Hours:    Recent Labs   Lab 12/21/23  1504   RBC 3.63*   HGB 10.9*   HCT 34.8*   MCV 95.9   MCH 30.0   MCHC 31.3   RDW 14.0   NEPRELIM 14.07*   WBC 19.7*   .0*       Recent Labs   Lab 12/21/23  1504   *   BUN 17   CREATSERUM 0.86   EGFRCR 77   CA 8.7   ALB 3.4      K 4.3      CO2 27.0   ALKPHO 94   AST 33   ALT 52   BILT 0.2   TP 7.1       Lab Results   Component Value Date    INR 0.97 08/22/2022       Recent Labs   Lab 12/21/23  1504 12/21/23  1707   TROPHS 180* 1,183*       No results for input(s): \"TROP\", \"PBNP\" in the last 168 hours.    No results for input(s): \"PCT\" in the last 168 hours.    Imaging: Imaging data reviewed in Epic.    Assessment & Plan:      # NSTEMI  -Heparin drip  -Nitro as needed  -Cards  -N.p.o. after midnight for cath tomorrow  -Aspirin, statin. 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 Not a true allergy    #Hypertension  #DM2  #Hyperlipidemia  #Smoker  -Nicotine patch        Plan of care discussed with patient, ED physician    Sixto Madrid MD    Supplementary Documentation:     The 21st Century Cures Act makes medical notes like these available to patients in the interest of transparency. Please be advised this is a medical document. Medical documents are intended to carry relevant information, facts as evident, and the clinical  opinion of the practitioner. The medical note is intended as peer to peer communication and may appear blunt or direct. It is written in medical language and may contain abbreviations or verbiage that are unfamiliar.

## 2023-12-22 NOTE — PAYOR COMM NOTE
--------------  ADMISSION REVIEW     Payor: [facility]  Subscriber #:  [id number]  Authorization Number: [id number]    Admit date: 12/21/23  Admit time:  8:15 PM       REVIEW DOCUMENTATION:     ED Provider Notes        Stated Complaint: chest pain since 1300 radiating to rt shoudler/ arm. States currently 4/10 but *    Subjective:   HPI    63-year-old female with a history of COPD diabetes hypertension, occasional smoker presents with chest pain.  Started about an hour prior to arrival at immediate care.  Roughly around 1:00.  Has had some upper back pain for couple days.  Sharp.  Took some tramadol which did not help much.  Initially went to immediate care and was sent to the ER for evaluation.  Currently chest pain-free.  No other complaints.      Physical Exam     ED Triage Vitals   BP 12/21/23 1452 139/83   Pulse 12/21/23 1452 108   Resp 12/21/23 1452 18   Temp 12/21/23 1452 97.7 °F (36.5 °C)   Temp src 12/21/23 1452 Temporal   SpO2 12/21/23 1452 95 %   O2 Device 12/21/23 1615 None (Room air)       Current:/75 (BP Location: Left arm)   Pulse 90   Temp 97.8 °F (36.6 °C) (Oral)   Resp 18   Ht 154.9 cm (5' 1\")   Wt 60.5 kg   SpO2 98%   BMI 25.19 kg/m²         Physical Exam  Vitals and nursing note reviewed.   Constitutional:       General: She is not in acute distress.     Appearance: She is well-developed. She is not toxic-appearing.   HENT:      Head: Normocephalic and atraumatic.   Eyes:      General: No scleral icterus.     Conjunctiva/sclera: Conjunctivae normal.   Cardiovascular:      Rate and Rhythm: Normal rate.      Heart sounds: Normal heart sounds. No murmur heard.  Pulmonary:      Effort: Pulmonary effort is normal. No respiratory distress.      Breath sounds: Normal breath sounds.   Abdominal:      General: There is no distension.   Musculoskeletal:         General: No tenderness. Normal range of motion.      Cervical back: Normal range of motion and neck  supple.      Right lower leg: No tenderness. No edema.      Left lower leg: No tenderness. No edema.   Skin:     General: Skin is warm and dry.      Findings: No rash.   Neurological:      General: No focal deficit present.      Mental Status: She is alert and oriented to person, place, and time.      Motor: No abnormal muscle tone.      Coordination: Coordination normal.   Psychiatric:         Mood and Affect: Mood normal.         Behavior: Behavior normal.    ED Course     Labs Reviewed   COMP METABOLIC PANEL (14) - Abnormal; Notable for the following components:       Result Value    Glucose 136 (*)     A/G Ratio 0.9 (*)     All other components within normal limits   TROPONIN I HIGH SENSITIVITY - Abnormal; Notable for the following components:    Troponin I (High Sensitivity) 180 (*)     All other components within normal limits   LIPID PANEL - Abnormal; Notable for the following components:    HDL Cholesterol 37 (*)     Triglycerides 379 (*)     VLDL 60 (*)     Non HDL Chol 140 (*)     All other components within normal limits   D-DIMER - Abnormal; Notable for the following components:    D-Dimer 1.43 (*)     All other components within normal limits   TROPONIN I HIGH SENSITIVITY - Abnormal; Notable for the following components:    Troponin I (High Sensitivity) 1,183 (*)     All other components within normal limits   TROPONIN I HIGH SENSITIVITY - Abnormal; Notable for the following components:    Troponin I (High Sensitivity) 2,718 (*)     All other components within normal limits   CBC W/ DIFFERENTIAL - Abnormal; Notable for the following components:    WBC 19.7 (*)     RBC 3.63 (*)     HGB 10.9 (*)     HCT 34.8 (*)     .0 (*)     Neutrophil Absolute Prelim 14.07 (*)     Neutrophil Absolute 14.07 (*)     Monocyte Absolute 1.28 (*)     All other components within normal limits   PTT, ACTIVATED - Normal   EKG    Rate, intervals and axes as noted on EKG Report.  Rate: 97  Rhythm: Sinus Rhythm  Reading:  Normal sinus rhythm.  No acute ST-T wave changes    MDM        -Tracing on cardiac monitor and pulse oximetry was reviewed by myself.   -The cardiac monitor revealed normal sinus rhythm as interpreted by me. The cardiac monitor was ordered to monitor the patient for dysrhythmia  -Pulse oximetry was interpreted by me and was normal.  Pulse oximeter was ordered to monitor patient for hypoxia.        =====   CONCLUSION:     1. No CT evidence for pulmonary embolism.   2. When compared to most recent CT of the chest performed 11/1/2023,    reticular nodular densities involving the right lung have overall    decreased in size and number.  Findings are most likely related to an    inflammatory/infectious process, correlate    clinically.             LOCATION:  [facility]           Dictated by (CST): Britney Mendes MD on 12/21/2023 at 6:16 PM        Finalized by (CST): Britney Mendes MD on 12/21/2023 at 6:26 PM         XR CHEST AP PORTABLE  (CPT=71045)   Final Result                     =====   PROCEDURE:  XR CHEST AP PORTABLE  (CPT=71045)        FINDINGS:   Cardiac silhouette and pulmonary vasculature are unremarkable.   No consolidation, pleural effusion or pneumothorax.   IMPRESSION:   Unremarkable portable chest radiograph.         Labs Reviewed   COMP METABOLIC PANEL (14) - Abnormal; Notable for the following components:       Result Value    Glucose 136 (*)     A/G Ratio 0.9 (*)     All other components within normal limits   TROPONIN I HIGH SENSITIVITY - Abnormal; Notable for the following components:    Troponin I (High Sensitivity) 180 (*)     All other components within normal limits   LIPID PANEL - Abnormal; Notable for the following components:    HDL Cholesterol 37 (*)     Triglycerides 379 (*)     VLDL 60 (*)     Non HDL Chol 140 (*)     All other components within normal limits   D-DIMER - Abnormal; Notable for the following components:    D-Dimer 1.43 (*)     All other components within normal limits   TROPONIN I HIGH  SENSITIVITY - Abnormal; Notable for the following components:    Troponin I (High Sensitivity) 1,183 (*)     All other components within normal limits   TROPONIN I HIGH SENSITIVITY - Abnormal; Notable for the following components:    Troponin I (High Sensitivity) 2,718 (*)     All other components within normal limits   CBC W/ DIFFERENTIAL - Abnormal; Notable for the following components:    WBC 19.7 (*)     RBC 3.63 (*)     HGB 10.9 (*)     HCT 34.8 (*)     .0 (*)     Neutrophil Absolute Prelim 14.07 (*)     Neutrophil Absolute 14.07 (*)     Monocyte Absolute 1.28 (*)     All other components within normal limits     Laboratory workup reviewed.  White count is 19,000.  Troponin was elevated at 180.  Glucose 136.  D-dimer pending at the time of this dictation.    Admission disposition: 12/21/2023  6:42 PM      Labs Reviewed   COMP METABOLIC PANEL (14) - Abnormal; Notable for the following components:       Result Value    Glucose 136 (*)     A/G Ratio 0.9 (*)     All other components within normal limits   TROPONIN I HIGH SENSITIVITY - Abnormal; Notable for the following components:    Troponin I (High Sensitivity) 180 (*)     All other components within normal limits   LIPID PANEL - Abnormal; Notable for the following components:    HDL Cholesterol 37 (*)     Triglycerides 379 (*)     VLDL 60 (*)     Non HDL Chol 140 (*)     All other components within normal limits   D-DIMER - Abnormal; Notable for the following components:    D-Dimer 1.43 (*)     All other components within normal limits   TROPONIN I HIGH SENSITIVITY - Abnormal; Notable for the following components:    Troponin I (High Sensitivity) 1,183 (*)     All other components within normal limits   TROPONIN I HIGH SENSITIVITY - Abnormal; Notable for the following components:    Troponin I (High Sensitivity) 2,718 (*)     All other components within normal limits   CBC W/ DIFFERENTIAL - Abnormal; Notable for the following components:    WBC 19.7 (*)      RBC 3.63 (*)     HGB 10.9 (*)     HCT 34.8 (*)     .0 (*)     Neutrophil Absolute Prelim 14.07 (*)     Neutrophil Absolute 14.07 (*)     Monocyte Absolute 1.28 (*)     All other components within normal limits   PTT, ACTIVATED - Normal   CBC WITH DIFFERENTIAL WITH PLATELET    Narrative:      Disposition and Plan     Clinical Impression:  1. NSTEMI (non-ST elevated myocardial infarction) (HCC)         Disposition:  Admit  2023  6:42 pm       H&P - H[facility]P Note            [facility]     Soumya King Patient Status:  Inpatient    6/3/1960 MRN [id number]   Location [facility] 8NE-A Attending Arjun Miller MD   Hosp Day # 0 PCP Rika Vinson DO     Chief Complaint: CP    Subjective:    History of Present Illness:     Soumya King is a 63 year old female with PMH COPD, DM 2, hypertension, hyperlipidemia, smoker who presents with chest pain.  Substernal.  Started today.  Never had before and no history of cardiac disease.  Persistent.  Exertional and radiates to the upper back.  Denies shortness of breath.      Objective:   Physical Exam:    /75 (BP Location: Left arm)   Pulse 90   Temp 97.8 °F (36.6 °C) (Oral)   Resp 18   Ht 5' 1\" (1.549 m)   Wt 133 lb 4.8 oz (60.5 kg)   SpO2 98%   BMI 25.19 kg/m²   General: No acute distress, Alert  Respiratory: No rhonchi, no wheezes  Cardiovascular: S1, S2. Regular rate and rhythm  Abdomen: Soft, Non-tender, non-distended, positive bowel sounds  Neuro: No new focal deficits  Extremities: No edema      Labs Last 24 Hours:    Recent Labs   Lab 23  1504   RBC 3.63*   HGB 10.9*   HCT 34.8*   MCV 95.9   MCH 30.0   MCHC 31.3   RDW 14.0   NEPRELIM 14.07*   WBC 19.7*   .0*       Recent Labs   Lab 23  1504   *   BUN 17   CREATSERUM 0.86   EGFRCR 77   CA 8.7   ALB 3.4      K 4.3      CO2 27.0   ALKPHO 94   AST 33   ALT 52   BILT 0.2   TP 7.1       Lab Results   Component Value Date     INR 0.97 08/22/2022       Recent Labs   Lab 12/21/23  1504 12/21/23  1707   TROPHS 180* 1,183*       Assessment & Plan:      # NSTEMI  -Heparin drip  -Nitro as needed  -Cards  -N.p.o. after midnight for cath tomorrow  -Aspirin, statin. 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 Not a true allergy    #Hypertension  #DM2  #Hyperlipidemia  #Smoker  -Nicotine patch        Plan of care discussed with patient, ED physician    Sixto Madrid MD      12/21 CARDIOLOGY:    Cardiology Nocturnal APN Plan of Care      Page Received: Bedside RN 9621     Patient consult on days, note pending.  Per primary care, NSTEMI, plan for cath in AM.  Denies CP.         Assessment/Plan:   - Patient remains on heparin gtt  - NPO after MN     Addendum 0155  Troponin 180 -> 1183 -> 2718.  Chest pain free.   - No changes in current management, continue heparin gtt              MARLENE Marcos  [facility]  12/21/2023  11:46 PM         12/22 CARDIOLOGY:    Reason for consultation;  CP      HPI:  This is a 63 year old female patient with PMH of HTN, DLP, DM2, smoking who presented with sudden squeezing CP that lasted 2 hr yesterday. The pain is better when lying in bed, and she is asymptomatic this morning.     MDM / Diagnostics reviewed & interpreted:  ECG shows NSSR  Trp 2700 > 2400  TTE w/ LVEF 45%, apical and anteroseptal RWMA     Assessment:    NSTEMI  HTN  DLP  DM2  Smoking     Plan:  -NSTEMI. High risk ACS  -ASA, heparin  -[facility]. R/B/A D/W patient. Consent obtained     Discussed with patient.      Please call with questions. Thank you for your consult.     Level of care: C5        Marina Guy MD  Interventional Cardiology  [facility]         12/22 CARDIOLOGY:    Coronary Angiogram Findings:  LM: Large caliber artery giving rise to LAD & LCX. No significant stenosis.  LAD: Large caliber artery giving rise to diagonal and septal branches. Moderate diffuse disease. 80% mid  LAD stenosis.  LCX: Medium to large caliber artery giving rise to OM branches. Luminal irregularities  RCA: Large caliber artery giving rise to acute marginal branches, and bifurcates into RPDA & RPL. Mid RCA . Left to right collaterals     Hemodynamics:  /8, LVEDP 18  /82, mean 101  No significant gradient upon Ao-LV pullback  LVEF 35-40%, apical akinesis     Intervention:  None     Monitored sedation administered by the cath lab RN, and supervised throughout the procedure by myself. Total time 9 minutes.      Closure: Radial band.     No immediate complications.    A/P:  MV-CAD including 80% mid LAD, 100% mid RCA.  Right dominant circulation  LVEF 35-40%, apical akinesis  Restart heparin 2 hr after sheath pull  CTS consult for CABG        Marina Guy MD  Interventional Cardiology  [facility]    MEDICATIONS ADMINISTERED IN LAST 1 DAY:  aspirin DR tab 325 mg       Date Action Dose Route User    12/22/2023 0937 Given 325 mg Oral Catalina Hidalgo RN          atorvastatin (Lipitor) tab 40 mg       Date Action Dose Route User    12/21/2023 2317 Given 40 mg Oral Teagan Cleaning RN          heparin (Porcine) 1000 UNIT/ML injection - BOLUS IV 3,600 Units       Date Action Dose Route User    12/21/2023 1825 Given 3,600 Units Intravenous Rach Rao RN          busPIRone (Buspar) tab 10 mg       Date Action Dose Route User    12/22/2023 0937 Given 10 mg Oral Catalina Hidalgo RN    12/21/2023 2317 Given 10 mg Oral Teagan Cleaning RN          cetirizine (ZyrTEC) tab 10 mg       Date Action Dose Route User    12/22/2023 0937 Given 10 mg Oral Catalina Hidalgo RN    12/21/2023 2316 Given 10 mg Oral Teagan Cleaning RN          clonazePAM (KlonoPIN) tab 0.5 mg       Date Action Dose Route User    12/22/2023 0937 Given 0.5 mg Oral Catalina Hidalgo RN    12/21/2023 2317 Given 0.5 mg Oral Teagan Cleaning RN          heparin (Porcine) 65629 units/250mL infusion CONTINUOUS       Date Action Dose Route User     12/22/2023 1051 Hi-Risk Rate/Dose Change 900 Units/hr Intravenous Catalina Hidalgo RN    12/22/2023 0322 Hi-Risk Rate/Dose Change 800 Units/hr Intravenous Teagan Cleaning RN          desvenlafaxine ER (Pristiq) 24 hr tab 100 mg       Date Action Dose Route User    12/22/2023 0937 Given 100 mg Oral Catalina Hidalgo RN          heparin (Porcine) 26376 units/250mL infusion ED INITIAL DOSE       Date Action Dose Route User    12/21/2023 1826 New Bag 12 Units/kg/hr × 60.8 kg Intravenous Rach Rao RN          gabapentin (Neurontin) cap 300 mg       Date Action Dose Route User    12/21/2023 2317 Given 300 mg Oral Teagan Cleaning RN          heparin (Porcine) 1000 UNIT/ML injection 1,820 Units       Date Action Dose Route User    12/22/2023 1047 Given 1,820 Units Intravenous Catalina Hidalgo RN          insulin aspart (NovoLOG) 100 Units/mL FlexPen 2-10 Units       Date Action Dose Route User    12/22/2023 0942 Given 2 Units Subcutaneous (Right Upper Arm) Catalina Hidalgo RN          iopamidol 76% (ISOVUE-370) injection for power injector       Date Action Dose Route User    12/21/2023 1810 Given 100 mL Intravenous Matias Lee          metoprolol succinate ER (Toprol XL) 24 hr tab 50 mg       Date Action Dose Route User    12/22/2023 0540 Given 50 mg Oral Teagan Cleaning RN          nicotine (Nicoderm CQ) 14 MG/24HR patch 1 patch       Date Action Dose Route User    12/22/2023 0940 Patch Applied 1 patch Transdermal (Right Upper Arm) Catalina Hidalgo RN    12/21/2023 2319 Patch Applied 1 patch Transdermal (Left Upper Arm) Teagan Cleaning RN          pantoprazole (Protonix) DR tab 40 mg       Date Action Dose Route User    12/22/2023 0541 Given 40 mg Oral Teagan Cleaning RN          Perflutren Lipid Microsphere (DEFINITY) 6.52 MG/ML injection 1.5 mL       Date Action Dose Route User    12/22/2023 0901 Given 1.5 mL Intravenous Reyes, Alexis          QUEtiapine ER (SEROquel XR) 24 hr tab 150 mg       Date Action Dose Route User     12/22/2023 0118 Given 150 mg Oral Teagan Cleaning RN          QUEtiapine ER (SEROquel XR) 24 hr tab 50 mg       Date Action Dose Route User    12/22/2023 0118 Given 50 mg Oral Teagan Cleaning RN          topiramate (TopaMAX) tab 50 mg       Date Action Dose Route User    12/22/2023 0937 Given 50 mg Oral Catalina Hidalgo RN    12/21/2023 2317 Given 50 mg Oral Teagan Cleaning RN            Vitals (last day)       Date/Time Temp Pulse Resp BP SpO2 Weight O2 Device O2 Flow Rate (L/min) [facility]    12/22/23 0748 97.9 °F (36.6 °C) 82 18 92/55 93 % -- None (Room air) --     12/22/23 0417 98.1 °F (36.7 °C) 89 18 117/71 91 % -- -- -- PM    12/21/23 2300 98 °F (36.7 °C) 88 18 116/67 96 % -- -- -- PM    12/21/23 2030 97.8 °F (36.6 °C) 90 18 121/75 98 % 133 lb 4.8 oz None (Room air) -- BG    12/21/23 2027 -- 87 -- 126/75 -- -- -- -- BG    12/21/23 2025 -- 83 18 124/79 -- -- -- -- BG    12/21/23 2000 -- 84 18 -- 98 % -- -- -- ET    12/21/23 1900 -- 88 21 133/83 99 % -- -- -- ET    12/21/23 1806 -- -- -- -- -- 134 lb 0.6 oz -- -- ET    12/21/23 1700 -- 104 24 120/66 97 % -- None (Room air) -- ET    12/21/23 1631 -- 97 17 129/87 97 % -- None (Room air) -- ET    12/21/23 1615 -- 106 21 133/85 99 % -- None (Room air) -- ET    12/21/23 1452 97.7 °F (36.5 °C) 108 18 139/83 95 % 129 lb -- -- [facility]

## 2023-12-23 PROBLEM — I25.10 CAD, MULTIPLE VESSEL: Status: ACTIVE | Noted: 2023-12-23

## 2023-12-23 LAB
ANION GAP SERPL CALC-SCNC: 5 MMOL/L (ref 0–18)
APTT PPP: 49.7 SECONDS (ref 23.3–35.6)
APTT PPP: 63.2 SECONDS (ref 23.3–35.6)
BASOPHILS # BLD AUTO: 0.03 X10(3) UL (ref 0–0.2)
BASOPHILS NFR BLD AUTO: 0.2 %
BUN BLD-MCNC: 10 MG/DL (ref 9–23)
CALCIUM BLD-MCNC: 8.9 MG/DL (ref 8.5–10.1)
CHLORIDE SERPL-SCNC: 112 MMOL/L (ref 98–112)
CO2 SERPL-SCNC: 22 MMOL/L (ref 21–32)
CREAT BLD-MCNC: 0.65 MG/DL
EGFRCR SERPLBLD CKD-EPI 2021: 100 ML/MIN/1.73M2 (ref 60–?)
EOSINOPHIL # BLD AUTO: 0.17 X10(3) UL (ref 0–0.7)
EOSINOPHIL NFR BLD AUTO: 1.4 %
ERYTHROCYTE [DISTWIDTH] IN BLOOD BY AUTOMATED COUNT: 14.2 %
ERYTHROCYTE [DISTWIDTH] IN BLOOD BY AUTOMATED COUNT: 14.2 %
GLUCOSE BLD-MCNC: 113 MG/DL (ref 70–99)
GLUCOSE BLD-MCNC: 117 MG/DL (ref 70–99)
GLUCOSE BLD-MCNC: 127 MG/DL (ref 70–99)
GLUCOSE BLD-MCNC: 165 MG/DL (ref 70–99)
GLUCOSE BLD-MCNC: 190 MG/DL (ref 70–99)
HCT VFR BLD AUTO: 34.1 %
HCT VFR BLD AUTO: 34.1 %
HGB BLD-MCNC: 10.7 G/DL
HGB BLD-MCNC: 10.7 G/DL
IMM GRANULOCYTES # BLD AUTO: 0.08 X10(3) UL (ref 0–1)
IMM GRANULOCYTES NFR BLD: 0.7 %
LYMPHOCYTES # BLD AUTO: 2.69 X10(3) UL (ref 1–4)
LYMPHOCYTES NFR BLD AUTO: 22.3 %
MAGNESIUM SERPL-MCNC: 1.8 MG/DL (ref 1.6–2.6)
MCH RBC QN AUTO: 29.6 PG (ref 26–34)
MCH RBC QN AUTO: 29.6 PG (ref 26–34)
MCHC RBC AUTO-ENTMCNC: 31.4 G/DL (ref 31–37)
MCHC RBC AUTO-ENTMCNC: 31.4 G/DL (ref 31–37)
MCV RBC AUTO: 94.2 FL
MCV RBC AUTO: 94.2 FL
MONOCYTES # BLD AUTO: 0.87 X10(3) UL (ref 0.1–1)
MONOCYTES NFR BLD AUTO: 7.2 %
NEUTROPHILS # BLD AUTO: 8.21 X10 (3) UL (ref 1.5–7.7)
NEUTROPHILS # BLD AUTO: 8.21 X10(3) UL (ref 1.5–7.7)
NEUTROPHILS NFR BLD AUTO: 68.2 %
OSMOLALITY SERPL CALC.SUM OF ELEC: 291 MOSM/KG (ref 275–295)
PLATELET # BLD AUTO: 422 10(3)UL (ref 150–450)
PLATELET # BLD AUTO: 422 10(3)UL (ref 150–450)
POTASSIUM SERPL-SCNC: 3.8 MMOL/L (ref 3.5–5.1)
RBC # BLD AUTO: 3.62 X10(6)UL
RBC # BLD AUTO: 3.62 X10(6)UL
SODIUM SERPL-SCNC: 139 MMOL/L (ref 136–145)
WBC # BLD AUTO: 12.1 X10(3) UL (ref 4–11)
WBC # BLD AUTO: 12.1 X10(3) UL (ref 4–11)

## 2023-12-23 PROCEDURE — 99232 SBSQ HOSP IP/OBS MODERATE 35: CPT | Performed by: STUDENT IN AN ORGANIZED HEALTH CARE EDUCATION/TRAINING PROGRAM

## 2023-12-23 RX ORDER — METOPROLOL TARTRATE 50 MG/1
50 TABLET, FILM COATED ORAL
Status: DISCONTINUED | OUTPATIENT
Start: 2023-12-23 | End: 2023-12-27

## 2023-12-23 RX ORDER — OXYBUTYNIN CHLORIDE 5 MG/1
10 TABLET, EXTENDED RELEASE ORAL DAILY
Status: DISCONTINUED | OUTPATIENT
Start: 2023-12-23 | End: 2024-01-03

## 2023-12-23 RX ORDER — METOPROLOL TARTRATE 1 MG/ML
5 INJECTION, SOLUTION INTRAVENOUS ONCE
Status: COMPLETED | OUTPATIENT
Start: 2023-12-23 | End: 2023-12-23

## 2023-12-23 RX ORDER — POTASSIUM CHLORIDE 20 MEQ/1
40 TABLET, EXTENDED RELEASE ORAL ONCE
Status: COMPLETED | OUTPATIENT
Start: 2023-12-23 | End: 2023-12-23

## 2023-12-23 RX ORDER — METOPROLOL TARTRATE 50 MG/1
50 TABLET, FILM COATED ORAL
Status: DISCONTINUED | OUTPATIENT
Start: 2023-12-23 | End: 2023-12-23

## 2023-12-23 RX ORDER — MAGNESIUM OXIDE 400 MG/1
400 TABLET ORAL ONCE
Status: COMPLETED | OUTPATIENT
Start: 2023-12-23 | End: 2023-12-23

## 2023-12-23 NOTE — PROGRESS NOTES
[facility]     Hospitalist Progress Note     Soumya Knig Patient Status:  Inpatient    6/3/1960 MRN [id number]   [facility] 8NE-A Attending Jace Flores MD   Hosp Day # 2 PCP Rika Vinson DO     Chief Complaint:   Chief Complaint   Patient presents with    Chest Pain Angina     Intermittent chest pain since 1300 radiating to rt shoudler/ arm. States currently sharp 4/10 but was 10/10. SOB, dizzy.        Subjective:     Patient continues to feel tired    Objective:    Review of Systems:   Denies pain; just fatigued    Vital signs:  Temp:  [98.1 °F (36.7 °C)-98.5 °F (36.9 °C)] 98.3 °F (36.8 °C)  Pulse:  [] 112  Resp:  [18] 18  BP: ()/(58-95) 127/77  SpO2:  [94 %-99 %] 96 %    Physical Exam:    General: No acute distress.   Respiratory: no increased work of breathing  Cardiovascular: rates 80s on tele      Diagnostic Data:    Labs:  Recent Labs   Lab 23  1504 23  0437 23  1052   WBC 19.7* 13.4* 12.1*  12.1*   HGB 10.9* 10.7* 10.7*  10.7*   MCV 95.9 95.0 94.2  94.2   .0* 452.0*  452.0* 422.0  422.0       Recent Labs   Lab 23  1504 23  0437 23  1052   * 123* 165*   BUN 17 13 10   CREATSERUM 0.86 0.82 0.65   CA 8.7 8.4* 8.9   ALB 3.4  --   --     141 139   K 4.3 3.7 3.8    110 112   CO2 27.0 31.0 22.0   ALKPHO 94  --   --    AST 33  --   --    ALT 52  --   --    BILT 0.2  --   --    TP 7.1  --   --        Estimated Creatinine Clearance: 68.6 mL/min (based on SCr of 0.65 mg/dL).    No results for input(s): \"PTP\", \"INR\" in the last 168 hours.         COVID-19 Lab Results    COVID-19  Lab Results   Component Value Date    COVID19 Not Detected 2023    COVID19 Not Detected 2022    COVID19 Not Detected 2022       Pro-Calcitonin  No results for input(s): \"PCT\" in the last 168 hours.    Cardiac  No results for input(s): \"TROP\", \"PBNP\" in the last 168 hours.    Creatinine Kinase  No results for  input(s): \"CK\" in the last 168 hours.    Inflammatory Markers  Recent Labs   Lab 12/21/23  1504   DDIMER 1.43*       Recent Labs   Lab 12/21/23  1707 12/21/23  2229 12/22/23  0437   TROPHS 1,183* 2,718* 2,440*       Imaging: Imaging data reviewed in Epic.    Medications:    metoprolol tartrate  50 mg Oral 2x Daily(Beta Blocker)    oxybutynin ER  10 mg Oral Daily    magnesium oxide  400 mg Oral Once    potassium chloride  40 mEq Oral Once    QUEtiapine ER  150 mg Oral Nightly    And    QUEtiapine ER  50 mg Oral Nightly    atorvastatin  40 mg Oral Nightly    azelastine  1 spray Each Nare BID    busPIRone  10 mg Oral Daily    clonazePAM  0.5 mg Oral BID    desvenlafaxine ER  100 mg Oral Daily    gabapentin  300 mg Oral Nightly    cetirizine  10 mg Oral Daily    pantoprazole  40 mg Oral BID AC    topiramate  50 mg Oral BID    umeclidinium bromide  1 puff Inhalation Daily    insulin aspart  2-10 Units Subcutaneous TID CC and HS    nicotine  1 patch Transdermal Daily    aspirin  325 mg Oral Daily       Assessment & Plan:      # NSTEMI  -Heparin drip, aspirin, metoprolol, statin; hx difficulty swallowing asa due to barretts. Not a true allergy  -Cardiac cath on 12/22 showed multivessel disease  -Cardiothoracic surgery consulted     #Hypertension  #DM2-monitor on insulin  #Hyperlipidemia  #Smoker  -Nicotine patch    Caterina Brian DO      Supplementary Documentation:     Quality:  DVT Prophylaxis: heparin drip  CODE status: full  [place]: no  Central line: no      Estimated date of discharge: tbd  At this point Ms. King is expected to be discharge to: home

## 2023-12-23 NOTE — PROGRESS NOTES
Progress Note  Soumya King Patient Status:  Inpatient    6/3/1960 MRN [id number]   [facility] 8NE-A Attending Caterina Brian DO   Hosp Day # 2 PCP Rika Vinson DO     Subjective:  Mrs. King feels well. She denies chest pain or dyspnea.    Objective:  Physical Exam:   /77   Pulse 112   Temp 98.3 °F (36.8 °C) (Oral)   Resp 18   Ht 5' 1\" (1.549 m)   Wt 123 lb 3.8 oz (55.9 kg)   SpO2 96%   BMI 23.29 kg/m²   Temp (24hrs), Av.3 °F (36.8 °C), Min:98.1 °F (36.7 °C), Max:98.5 °F (36.9 °C)       Intake/Output Summary (Last 24 hours) at 2023 0956  Last data filed at 2023 1759  Gross per 24 hour   Intake 820 ml   Output 400 ml   Net 420 ml     Wt Readings from Last 3 Encounters:   23 123 lb 3.8 oz (55.9 kg)   23 136 lb (61.7 kg)   23 137 lb (62.1 kg)     Telemetry: NSR 80's  General: Alert and oriented in no apparent distress lying comfortably in bed.  HEENT: No focal deficits.  Neck: No JVD, carotids 2+ no bruits.  Cardiac: Regular rate and rhythm, S1, S2 normal, no murmur, rub or gallop.  Lungs: Clear without wheezes, rales, rhonchi or dullness.  Normal excursions and effort on room air  Abdomen: Soft, non-tender.   Extremities: Without clubbing, cyanosis or edema.  Peripheral pulses are 2+.  Neurologic: Alert and oriented, normal affect.  Skin: Warm and dry.        Intake/Output:    Intake/Output Summary (Last 24 hours) at 2023 0913  Last data filed at 2023 1759  Gross per 24 hour   Intake 820 ml   Output 400 ml   Net 420 ml       Last 3 Weights   23 0431 123 lb 3.8 oz (55.9 kg)   23 133 lb 4.8 oz (60.5 kg)   23 1806 134 lb 0.6 oz (60.8 kg)   23 1452 129 lb (58.5 kg)   23 1043 136 lb (61.7 kg)   23 1116 137 lb (62.1 kg)       Labs:  Recent Labs   Lab 23  1504 23  0437   * 123*   BUN 17 13   CREATSERUM 0.86 0.82   EGFRCR 77 81   CA 8.7 8.4*    141   K 4.3 3.7    110    CO2 27.0 31.0     Recent Labs   Lab 12/21/23  1504 12/22/23  0437   RBC 3.63* 3.58*   HGB 10.9* 10.7*   HCT 34.8* 34.0*   MCV 95.9 95.0   MCH 30.0 29.9   MCHC 31.3 31.5   RDW 14.0 14.1   NEPRELIM 14.07* 7.56   WBC 19.7* 13.4*   .0* 452.0*  452.0*         Recent Labs   Lab 12/21/23  1707 12/21/23  2229 12/22/23  0437   TROPHS 1,183* 2,718* 2,440*       Diagnostics:   CARDIAC CATHETERIZATION 12/22  A/P:  MV-CAD including 80% mid LAD, 100% mid RCA.  Right dominant circulation  LVEF 35-40%, apical akinesis  Restart heparin 2 hr after sheath pull  CTS consult for CABG    ECHOCARDIOGRAM 12/22/2023:  1. Left ventricle: The cavity size was normal. Wall thickness was normal.      Systolic function was reduced. The estimated ejection fraction was      40-45%, by visual assessment. Severe hypokinesis of the      mid-apicalanteroseptal and apical walls. Doppler parameters are      consistent with abnormal left ventricular relaxation - grade 1 diastolic      dysfunction.   2. Left atrium: The left atrial volume was normal.   Impressions:  This study is compared with previous dated 04/15/2024: New RWMA concerning for CAD in left anterior descending coronary distribution versus stress CM.         Medications:   QUEtiapine ER  150 mg Oral Nightly    And    QUEtiapine ER  50 mg Oral Nightly    atorvastatin  40 mg Oral Nightly    azelastine  1 spray Each Nare BID    busPIRone  10 mg Oral Daily    clonazePAM  0.5 mg Oral BID    desvenlafaxine ER  100 mg Oral Daily    gabapentin  300 mg Oral Nightly    cetirizine  10 mg Oral Daily    metoprolol succinate ER  50 mg Oral Daily Beta Blocker    [Held by provider] Mirabegron ER  50 mg Oral Daily    pantoprazole  40 mg Oral BID AC    topiramate  50 mg Oral BID    umeclidinium bromide  1 puff Inhalation Daily    insulin aspart  2-10 Units Subcutaneous TID CC and HS    nicotine  1 patch Transdermal Daily    aspirin  325 mg Oral Daily      continuous dose heparin 1,000 Units/hr  (12/23/23 0722)       Assessment/Plan:  NSTEMI with multivessel CAD on cardiac cath 12/22  Remains on IV heparin, ASA, BB, statin  Chest pain free and comfortable on room air  LVEF 45% apical and anteroseptal RWMA  CT surgery consultation pending  Sinus tachycardia  Adjust BB therapy to BID with increase in dosage  Otherwise asymptomatic  HTN  BP at goal 127/77 today  HLD  Continue statin therapy  DM2  We advise DM control with a goal Hgb A1C <7% for improved CV health  Tobacco abuse  We highly advise tobacco cessation to improve CV health    PLAN  Continue ASA, statin, and heparin  Adjust to metoprolol tartrate BID dosing. Increase to 50 mg BID for improved HR control.  Follow CT surgery evaluation    Pablo Ochoa PA-C  12/23/2023  9:13 AM   ____________________________  Pt s data reviewed and discussed with the APN.        I have personally performed the medical decision making in its entirety. My additions include:  Plan  - MV disease. Low EF-35%.  - Await CT surgery consult for CABG evaluation.    R3    ____________________________  Melita Kim MD, FACC, FHRS  Cardiac Electrophysiololgy  [facility]  12/23/2023

## 2023-12-23 NOTE — PLAN OF CARE
Soumyaaries King Patient Status:  Inpatient    6/3/1960 MRN [id number]   Location [facility] 8NE-A Attending Caterina Brian,    Hosp Day # 2 PCP Rika Vinson DO     Cardiology Nocturnal APN Plan of Care     Page Received: Bedside RN 1611    Patient with NDTEMI s/p cath w/o intervention pending CTSx eval for CABG.  Now with persistent tachycardia, asymptomatic,  BP stable.        Assessment/Plan:   - EKG ST  - RN gave scheduled BB early  - 5 mg IVP lopressor x 1 now  - May benefit from BID BB         MARLENE Marcos  [facility]  2023  4:34 AM

## 2023-12-23 NOTE — PLAN OF CARE
Pt alert and oriented x4. Pt on tele in NSR, occasional ST, asymptomatic Pt on room air. Pt continent of bowel and bladder. Pt denies any c/o of pain. Surgical site without complication, no hematoma. Pt  updated on plan of care, pt verbalizes understanding. Bed in lowest position and call light within reach.     0400: Paged APRN Miranda Dijospeh due to pt sustaining ST.     Plan of Care: Dr. Saunders consulted       Problem: Diabetes/Glucose Control  Goal: Glucose maintained within prescribed range  Description: INTERVENTIONS:  - Monitor Blood Glucose as ordered  - Assess for signs and symptoms of hyperglycemia and hypoglycemia  - Administer ordered medications to maintain glucose within target range  - Assess barriers to adequate nutritional intake and initiate nutrition consult as needed  - Instruct patient on self management of diabetes  Outcome: Progressing     Problem: Patient/Family Goals  Goal: Patient/Family Long Term Goal  Description: Patient's Long Term Goal: stay out of hospital    Interventions:  - Medication compliance  - smoking cessation   - follow up with primary care physician   - See additional Care Plan goals for specific interventions  Outcome: Progressing  Goal: Patient/Family Short Term Goal  Description: Patient's Short Term Goal: go home    Interventions:   - medication education   - smoking cessation education   - cardiology consult   - follow up with care team   - See additional Care Plan goals for specific interventions  Outcome: Progressing     Problem: CARDIOVASCULAR - ADULT  Goal: Maintains optimal cardiac output and hemodynamic stability  Description: INTERVENTIONS:  - Monitor vital signs, rhythm, and trends  - Monitor for bleeding, hypotension and signs of decreased cardiac output  - Evaluate effectiveness of vasoactive medications to optimize hemodynamic stability  - Monitor arterial and/or venous puncture sites for bleeding and/or hematoma  - Assess quality of pulses, skin color and  temperature  - Assess for signs of decreased coronary artery perfusion - ex. Angina  - Evaluate fluid balance, assess for edema, trend weights  Outcome: Progressing  Goal: Absence of cardiac arrhythmias or at baseline  Description: INTERVENTIONS:  - Continuous cardiac monitoring, monitor vital signs, obtain 12 lead EKG if indicated  - Evaluate effectiveness of antiarrhythmic and heart rate control medications as ordered  - Initiate emergency measures for life threatening arrhythmias  - Monitor electrolytes and administer replacement therapy as ordered  Outcome: Progressing

## 2023-12-23 NOTE — PLAN OF CARE
Patient alert and oriented x 4. On RA. Up with SBA. NSR on tele. Continent of bowel/bladder. No complaints of pain, shortness of breath, chest pain/discomfort. POC: heparin gtt, CABG?. Call light within reach. Fall precautions in place.     Problem: Diabetes/Glucose Control  Goal: Glucose maintained within prescribed range  Description: INTERVENTIONS:  - Monitor Blood Glucose as ordered  - Assess for signs and symptoms of hyperglycemia and hypoglycemia  - Administer ordered medications to maintain glucose within target range  - Assess barriers to adequate nutritional intake and initiate nutrition consult as needed  - Instruct patient on self management of diabetes  Outcome: Progressing     Problem: Patient/Family Goals  Goal: Patient/Family Long Term Goal  Description: Patient's Long Term Goal: stay out of hospital    Interventions:  - Medication compliance  - smoking cessation   - follow up with primary care physician   - See additional Care Plan goals for specific interventions  Outcome: Progressing  Goal: Patient/Family Short Term Goal  Description: Patient's Short Term Goal: go home    Interventions:   - medication education   - smoking cessation education   - cardiology consult   - follow up with care team   - See additional Care Plan goals for specific interventions  Outcome: Progressing     Problem: CARDIOVASCULAR - ADULT  Goal: Maintains optimal cardiac output and hemodynamic stability  Description: INTERVENTIONS:  - Monitor vital signs, rhythm, and trends  - Monitor for bleeding, hypotension and signs of decreased cardiac output  - Evaluate effectiveness of vasoactive medications to optimize hemodynamic stability  - Monitor arterial and/or venous puncture sites for bleeding and/or hematoma  - Assess quality of pulses, skin color and temperature  - Assess for signs of decreased coronary artery perfusion - ex. Angina  - Evaluate fluid balance, assess for edema, trend weights  Outcome: Progressing  Goal:  Absence of cardiac arrhythmias or at baseline  Description: INTERVENTIONS:  - Continuous cardiac monitoring, monitor vital signs, obtain 12 lead EKG if indicated  - Evaluate effectiveness of antiarrhythmic and heart rate control medications as ordered  - Initiate emergency measures for life threatening arrhythmias  - Monitor electrolytes and administer replacement therapy as ordered  Outcome: Progressing

## 2023-12-24 LAB
ANION GAP SERPL CALC-SCNC: 3 MMOL/L (ref 0–18)
APTT PPP: 51.2 SECONDS (ref 23.3–35.6)
BUN BLD-MCNC: 11 MG/DL (ref 9–23)
CALCIUM BLD-MCNC: 9.1 MG/DL (ref 8.5–10.1)
CHLORIDE SERPL-SCNC: 112 MMOL/L (ref 98–112)
CO2 SERPL-SCNC: 23 MMOL/L (ref 21–32)
CREAT BLD-MCNC: 0.72 MG/DL
EGFRCR SERPLBLD CKD-EPI 2021: 95 ML/MIN/1.73M2 (ref 60–?)
ERYTHROCYTE [DISTWIDTH] IN BLOOD BY AUTOMATED COUNT: 14.3 %
GLUCOSE BLD-MCNC: 121 MG/DL (ref 70–99)
GLUCOSE BLD-MCNC: 128 MG/DL (ref 70–99)
GLUCOSE BLD-MCNC: 139 MG/DL (ref 70–99)
GLUCOSE BLD-MCNC: 152 MG/DL (ref 70–99)
GLUCOSE BLD-MCNC: 164 MG/DL (ref 70–99)
HCT VFR BLD AUTO: 37.8 %
HGB BLD-MCNC: 11.9 G/DL
MAGNESIUM SERPL-MCNC: 1.8 MG/DL (ref 1.6–2.6)
MCH RBC QN AUTO: 30.2 PG (ref 26–34)
MCHC RBC AUTO-ENTMCNC: 31.5 G/DL (ref 31–37)
MCV RBC AUTO: 95.9 FL
OSMOLALITY SERPL CALC.SUM OF ELEC: 289 MOSM/KG (ref 275–295)
PLATELET # BLD AUTO: 420 10(3)UL (ref 150–450)
POTASSIUM SERPL-SCNC: 3.8 MMOL/L (ref 3.5–5.1)
POTASSIUM SERPL-SCNC: 3.8 MMOL/L (ref 3.5–5.1)
RBC # BLD AUTO: 3.94 X10(6)UL
SODIUM SERPL-SCNC: 138 MMOL/L (ref 136–145)
WBC # BLD AUTO: 11.7 X10(3) UL (ref 4–11)

## 2023-12-24 PROCEDURE — 99232 SBSQ HOSP IP/OBS MODERATE 35: CPT | Performed by: STUDENT IN AN ORGANIZED HEALTH CARE EDUCATION/TRAINING PROGRAM

## 2023-12-24 RX ORDER — ACETAMINOPHEN 500 MG
1000 TABLET ORAL EVERY 4 HOURS PRN
Status: DISCONTINUED | OUTPATIENT
Start: 2023-12-24 | End: 2023-12-27

## 2023-12-24 RX ORDER — IBUPROFEN 400 MG/1
400 TABLET ORAL EVERY 6 HOURS PRN
Status: DISCONTINUED | OUTPATIENT
Start: 2023-12-24 | End: 2023-12-27

## 2023-12-24 RX ORDER — POTASSIUM CHLORIDE 20 MEQ/1
40 TABLET, EXTENDED RELEASE ORAL ONCE
Status: COMPLETED | OUTPATIENT
Start: 2023-12-24 | End: 2023-12-24

## 2023-12-24 RX ORDER — MAGNESIUM OXIDE 400 MG/1
400 TABLET ORAL ONCE
Status: COMPLETED | OUTPATIENT
Start: 2023-12-24 | End: 2023-12-24

## 2023-12-24 NOTE — PROGRESS NOTES
Progress Note  Soumya King Patient Status:  Inpatient    6/3/1960 MRN [id number]   [facility] 8NE-A Attending Caterina Brian DO   Hosp Day # 3 PCP Rika Vinson DO     Subjective:  Mrs. King feels well today. She denies chest pain, palpitations, dyspnea, or edema.  No cardiac events overnight  Denies bleeding or symptoms of such  Objective:  Physical Exam:   BP 99/61   Pulse 101   Temp 98.2 °F (36.8 °C) (Oral)   Resp 16   Ht 5' 1\" (1.549 m)   Wt 123 lb 3.8 oz (55.9 kg)   SpO2 98%   BMI 23.29 kg/m²   Temp (24hrs), Av.2 °F (36.8 °C), Min:97.9 °F (36.6 °C), Max:98.5 °F (36.9 °C)       Intake/Output Summary (Last 24 hours) at 2023 0529  Last data filed at 2023 1100  Gross per 24 hour   Intake 120 ml   Output --   Net 120 ml     Wt Readings from Last 3 Encounters:   23 123 lb 3.8 oz (55.9 kg)   23 136 lb (61.7 kg)   23 137 lb (62.1 kg)     Telemetry: NSR 90's  General: Alert and oriented in no apparent distress lying comfortably in bed.  HEENT: No focal deficits.  Neck: No JVD, carotids 2+ no bruits.  Cardiac: Regular rate and rhythm, S1, S2 normal, no murmur, rub or gallop.  Lungs: Clear without wheezes, rales, rhonchi or dullness.  Normal excursions and effort on room air  Abdomen: Soft, non-tender.   Extremities: Without clubbing, cyanosis or edema.  Peripheral pulses are 2+.  Neurologic: Alert and oriented, normal affect.  Skin: Warm and dry.        Intake/Output:    Intake/Output Summary (Last 24 hours) at 2023 0529  Last data filed at 2023 1100  Gross per 24 hour   Intake 120 ml   Output --   Net 120 ml       Last 3 Weights   23 0431 123 lb 3.8 oz (55.9 kg)   12/21/23 2030 133 lb 4.8 oz (60.5 kg)   23 1806 134 lb 0.6 oz (60.8 kg)   23 1452 129 lb (58.5 kg)   23 1043 136 lb (61.7 kg)   23 1116 137 lb (62.1 kg)       Labs:  Recent Labs   Lab 23  1504 23  0437 23  1052   * 123*  165*   BUN 17 13 10   CREATSERUM 0.86 0.82 0.65   EGFRCR 77 81 100   CA 8.7 8.4* 8.9    141 139   K 4.3 3.7 3.8    110 112   CO2 27.0 31.0 22.0     Recent Labs   Lab 12/21/23  1504 12/22/23  0437 12/23/23  1052   RBC 3.63* 3.58* 3.62*  3.62*   HGB 10.9* 10.7* 10.7*  10.7*   HCT 34.8* 34.0* 34.1*  34.1*   MCV 95.9 95.0 94.2  94.2   MCH 30.0 29.9 29.6  29.6   MCHC 31.3 31.5 31.4  31.4   RDW 14.0 14.1 14.2  14.2   NEPRELIM 14.07* 7.56 8.21*   WBC 19.7* 13.4* 12.1*  12.1*   .0* 452.0*  452.0* 422.0  422.0         Recent Labs   Lab 12/21/23  1707 12/21/23  2229 12/22/23  0437   TROPHS 1,183* 2,718* 2,440*       Diagnostics:   CARDIAC CATHETERIZATION 12/22  A/P:  MV-CAD including 80% mid LAD, 100% mid RCA.  Right dominant circulation  LVEF 35-40%, apical akinesis  Restart heparin 2 hr after sheath pull  CTS consult for CABG    ECHOCARDIOGRAM 12/22/2023:  1. Left ventricle: The cavity size was normal. Wall thickness was normal.      Systolic function was reduced. The estimated ejection fraction was      40-45%, by visual assessment. Severe hypokinesis of the      mid-apicalanteroseptal and apical walls. Doppler parameters are      consistent with abnormal left ventricular relaxation - grade 1 diastolic      dysfunction.   2. Left atrium: The left atrial volume was normal.   Impressions:  This study is compared with previous dated 04/15/2024: New RWMA concerning for CAD in left anterior descending coronary distribution versus stress CM.         Medications:   metoprolol tartrate  50 mg Oral 2x Daily(Beta Blocker)    oxybutynin ER  10 mg Oral Daily    QUEtiapine ER  150 mg Oral Nightly    And    QUEtiapine ER  50 mg Oral Nightly    atorvastatin  40 mg Oral Nightly    azelastine  1 spray Each Nare BID    busPIRone  10 mg Oral Daily    clonazePAM  0.5 mg Oral BID    desvenlafaxine ER  100 mg Oral Daily    gabapentin  300 mg Oral Nightly    cetirizine  10 mg Oral Daily    pantoprazole  40 mg Oral  BID AC    topiramate  50 mg Oral BID    umeclidinium bromide  1 puff Inhalation Daily    insulin aspart  2-10 Units Subcutaneous TID CC and HS    nicotine  1 patch Transdermal Daily    aspirin  325 mg Oral Daily      continuous dose heparin 1,000 Units/hr (12/23/23 0722)       Assessment/Plan:  NSTEMI with multivessel CAD on cardiac cath 12/22  Remains on IV heparin, ASA, BB, statin  Chest pain free and comfortable on room air  LVEF 40- 45% apical and anteroseptal RWMA on echo 12/22, 30-35% on LV gram 12/22  CT surgery consultation pending  Sinus tachycardia now with HR in the 90's  Adjusted BB therapy to BID with increase in dosage on 12/23  Asymptomatic  HTN  BP at goal 127/77 today  HLD  Continue statin therapy  DM2  We advise DM control with a goal Hgb A1C <7% for improved CV health  Tobacco abuse  We highly advise tobacco cessation to improve CV health    PLAN  Continue ASA, statin, and heparin  Continue metoprolol 50 mg BID  Follow CT surgery evaluation    Pablo Ochoa PA-C  12/24/2023  05:29 AM

## 2023-12-24 NOTE — PLAN OF CARE
PT A&OX4, RA. NSR on tele. Bilateral breath south diminished. Denied CP, Denied SOB. BM reported today. Heparin gtt running 1000 unit   Update POC to pt, all needs meet at this time. Bed alarm on, call light within reach     POC:   Heparin gtt ---ACS/Afib protocol.  CABG plan on Tuesday.    Problem: Diabetes/Glucose Control  Goal: Glucose maintained within prescribed range  Description: INTERVENTIONS:  - Monitor Blood Glucose as ordered  - Assess for signs and symptoms of hyperglycemia and hypoglycemia  - Administer ordered medications to maintain glucose within target range  - Assess barriers to adequate nutritional intake and initiate nutrition consult as needed  - Instruct patient on self management of diabetes  Outcome: Progressing     Problem: Patient/Family Goals  Goal: Patient/Family Long Term Goal  Description: Patient's Long Term Goal: stay out of hospital    Interventions:  - Medication compliance  - smoking cessation   - follow up with primary care physician   - See additional Care Plan goals for specific interventions  Outcome: Progressing  Goal: Patient/Family Short Term Goal  Description: Patient's Short Term Goal: go home    Interventions:   - medication education   - smoking cessation education   - cardiology consult   - follow up with care team   - See additional Care Plan goals for specific interventions  Outcome: Progressing     Problem: CARDIOVASCULAR - ADULT  Goal: Maintains optimal cardiac output and hemodynamic stability  Description: INTERVENTIONS:  - Monitor vital signs, rhythm, and trends  - Monitor for bleeding, hypotension and signs of decreased cardiac output  - Evaluate effectiveness of vasoactive medications to optimize hemodynamic stability  - Monitor arterial and/or venous puncture sites for bleeding and/or hematoma  - Assess quality of pulses, skin color and temperature  - Assess for signs of decreased coronary artery perfusion - ex. Angina  - Evaluate fluid balance, assess for  edema, trend weights  Outcome: Progressing  Goal: Absence of cardiac arrhythmias or at baseline  Description: INTERVENTIONS:  - Continuous cardiac monitoring, monitor vital signs, obtain 12 lead EKG if indicated  - Evaluate effectiveness of antiarrhythmic and heart rate control medications as ordered  - Initiate emergency measures for life threatening arrhythmias  - Monitor electrolytes and administer replacement therapy as ordered  Outcome: Progressing

## 2023-12-24 NOTE — PROGRESS NOTES
[facility]     Hospitalist Progress Note     Soumya King Patient Status:  Inpatient    6/3/1960 MRN [id number]   [facility] 8NE-A Attending Jace Flores MD   Hosp Day # 3 PCP Rika Vinson DO     Chief Complaint:   Chief Complaint   Patient presents with    Chest Pain Angina     Intermittent chest pain since 1300 radiating to rt shoudler/ arm. States currently sharp 4/10 but was 10/10. SOB, dizzy.        Subjective:     Patient feels slightly better this morning.  Family at bedside    Objective:    Review of Systems:   Denies pain; just fatigued    Vital signs:  Temp:  [97.9 °F (36.6 °C)-98.5 °F (36.9 °C)] 98.3 °F (36.8 °C)  Pulse:  [] 93  Resp:  [1-22] 22  BP: ()/(58-70) 110/70  SpO2:  [92 %-98 %] 92 %    Physical Exam:    General: No acute distress.   Respiratory: CTA bilaterally  Cardiovascular: RRR, S1-S2 present  Abdomen: Soft, nontender, nondistended  Extremities: No edema      Diagnostic Data:    Labs:  Recent Labs   Lab 23  1504 23  0437 23  1052 23  0540   WBC 19.7* 13.4* 12.1*  12.1* 11.7*   HGB 10.9* 10.7* 10.7*  10.7* 11.9*   MCV 95.9 95.0 94.2  94.2 95.9   .0* 452.0*  452.0* 422.0  422.0 420.0       Recent Labs   Lab 23  1504 23  0437 23  1052 23  0540   * 123* 165* 164*   BUN 17 13 10 11   CREATSERUM 0.86 0.82 0.65 0.72   CA 8.7 8.4* 8.9 9.1   ALB 3.4  --   --   --     141 139 138   K 4.3 3.7 3.8 3.8  3.8    110 112 112   CO2 27.0 31.0 22.0 23.0   ALKPHO 94  --   --   --    AST 33  --   --   --    ALT 52  --   --   --    BILT 0.2  --   --   --    TP 7.1  --   --   --        Estimated Creatinine Clearance: 61.9 mL/min (based on SCr of 0.72 mg/dL).    No results for input(s): \"PTP\", \"INR\" in the last 168 hours.         COVID-19 Lab Results    COVID-19  Lab Results   Component Value Date    COVID19 Not Detected 2023    COVID19 Not Detected 2022    COVID19 Not Detected  08/29/2022       Pro-Calcitonin  No results for input(s): \"PCT\" in the last 168 hours.    Cardiac  No results for input(s): \"TROP\", \"PBNP\" in the last 168 hours.    Creatinine Kinase  No results for input(s): \"CK\" in the last 168 hours.    Inflammatory Markers  Recent Labs   Lab 12/21/23  1504   DDIMER 1.43*       Recent Labs   Lab 12/21/23  1707 12/21/23  2229 12/22/23  0437   TROPHS 1,183* 2,718* 2,440*       Imaging: Imaging data reviewed in Epic.    Medications:    metoprolol tartrate  50 mg Oral 2x Daily(Beta Blocker)    oxybutynin ER  10 mg Oral Daily    QUEtiapine ER  150 mg Oral Nightly    And    QUEtiapine ER  50 mg Oral Nightly    atorvastatin  40 mg Oral Nightly    azelastine  1 spray Each Nare BID    busPIRone  10 mg Oral Daily    clonazePAM  0.5 mg Oral BID    desvenlafaxine ER  100 mg Oral Daily    gabapentin  300 mg Oral Nightly    cetirizine  10 mg Oral Daily    pantoprazole  40 mg Oral BID AC    topiramate  50 mg Oral BID    umeclidinium bromide  1 puff Inhalation Daily    insulin aspart  2-10 Units Subcutaneous TID CC and HS    nicotine  1 patch Transdermal Daily    aspirin  325 mg Oral Daily       Assessment & Plan:      # NSTEMI  -Heparin drip, aspirin, metoprolol, statin; hx difficulty swallowing asa due to barretts. Not a true allergy  -Cardiac cath on 12/22 showed multivessel disease  -Plan for CABG on Wednesday  -Cardiothoracic surgery following  -Cardiology following     #Hypertension  #DM2-monitor on insulin  #Hyperlipidemia  #Smoker  -Nicotine patch    Caterina Brian,       Supplementary Documentation:     Quality:  DVT Prophylaxis: heparin drip  CODE status: full  [place]: no  Central line: no      Estimated date of discharge: tbd  At this point Ms. King is expected to be discharge to: home

## 2023-12-24 NOTE — PLAN OF CARE
Assumed patient care, patient is alert and oriented x4. Patient on room air, no shortness of breath. Sinus rhythm on tele, no complains of chest pain. Abdomen is soft, non-tendered, bowel sounds are active in all four quadrants. Bed in lower position, call light within reach.     POC: heparin drip per Afib protocol. CABG tentative on coming Wednesday.       Problem: Diabetes/Glucose Control  Goal: Glucose maintained within prescribed range  Description: INTERVENTIONS:  - Monitor Blood Glucose as ordered  - Assess for signs and symptoms of hyperglycemia and hypoglycemia  - Administer ordered medications to maintain glucose within target range  - Assess barriers to adequate nutritional intake and initiate nutrition consult as needed  - Instruct patient on self management of diabetes  Outcome: Progressing

## 2023-12-25 LAB
ANION GAP SERPL CALC-SCNC: 8 MMOL/L (ref 0–18)
APTT PPP: 65 SECONDS (ref 23.3–35.6)
BUN BLD-MCNC: 14 MG/DL (ref 9–23)
CALCIUM BLD-MCNC: 9 MG/DL (ref 8.5–10.1)
CHLORIDE SERPL-SCNC: 111 MMOL/L (ref 98–112)
CO2 SERPL-SCNC: 21 MMOL/L (ref 21–32)
CREAT BLD-MCNC: 0.74 MG/DL
EGFRCR SERPLBLD CKD-EPI 2021: 92 ML/MIN/1.73M2 (ref 60–?)
ERYTHROCYTE [DISTWIDTH] IN BLOOD BY AUTOMATED COUNT: 14.2 %
GLUCOSE BLD-MCNC: 106 MG/DL (ref 70–99)
GLUCOSE BLD-MCNC: 129 MG/DL (ref 70–99)
GLUCOSE BLD-MCNC: 140 MG/DL (ref 70–99)
GLUCOSE BLD-MCNC: 149 MG/DL (ref 70–99)
GLUCOSE BLD-MCNC: 151 MG/DL (ref 70–99)
HCT VFR BLD AUTO: 35.3 %
HGB BLD-MCNC: 11.3 G/DL
MAGNESIUM SERPL-MCNC: 1.7 MG/DL (ref 1.6–2.6)
MCH RBC QN AUTO: 30 PG (ref 26–34)
MCHC RBC AUTO-ENTMCNC: 32 G/DL (ref 31–37)
MCV RBC AUTO: 93.6 FL
OSMOLALITY SERPL CALC.SUM OF ELEC: 293 MOSM/KG (ref 275–295)
PLATELET # BLD AUTO: 431 10(3)UL (ref 150–450)
POTASSIUM SERPL-SCNC: 4 MMOL/L (ref 3.5–5.1)
POTASSIUM SERPL-SCNC: 4 MMOL/L (ref 3.5–5.1)
RBC # BLD AUTO: 3.77 X10(6)UL
SODIUM SERPL-SCNC: 140 MMOL/L (ref 136–145)
WBC # BLD AUTO: 11.2 X10(3) UL (ref 4–11)

## 2023-12-25 PROCEDURE — 99232 SBSQ HOSP IP/OBS MODERATE 35: CPT | Performed by: HOSPITALIST

## 2023-12-25 RX ORDER — MAGNESIUM OXIDE 400 MG/1
400 TABLET ORAL ONCE
Status: COMPLETED | OUTPATIENT
Start: 2023-12-25 | End: 2023-12-25

## 2023-12-25 NOTE — PLAN OF CARE
PT A&OX4, RA. NSR on tele. Bilateral breath south diminished. Denied CP, Denied SOB. BM reported today. Heparin gtt running 1000 unit   Update POC to pt, all needs meet at this time. Bed alarm on, call light within reach      POC:   Heparin gtt ---ACS/Afib protocol.  CABG plan on Wednesday    Problem: Diabetes/Glucose Control  Goal: Glucose maintained within prescribed range  Description: INTERVENTIONS:  - Monitor Blood Glucose as ordered  - Assess for signs and symptoms of hyperglycemia and hypoglycemia  - Administer ordered medications to maintain glucose within target range  - Assess barriers to adequate nutritional intake and initiate nutrition consult as needed  - Instruct patient on self management of diabetes  Outcome: Progressing     Problem: Patient/Family Goals  Goal: Patient/Family Long Term Goal  Description: Patient's Long Term Goal: stay out of hospital    Interventions:  - Medication compliance  - smoking cessation   - follow up with primary care physician   - See additional Care Plan goals for specific interventions  Outcome: Progressing  Goal: Patient/Family Short Term Goal  Description: Patient's Short Term Goal: go home    Interventions:   - medication education   - smoking cessation education   - cardiology consult   - follow up with care team   - See additional Care Plan goals for specific interventions  Outcome: Progressing     Problem: CARDIOVASCULAR - ADULT  Goal: Maintains optimal cardiac output and hemodynamic stability  Description: INTERVENTIONS:  - Monitor vital signs, rhythm, and trends  - Monitor for bleeding, hypotension and signs of decreased cardiac output  - Evaluate effectiveness of vasoactive medications to optimize hemodynamic stability  - Monitor arterial and/or venous puncture sites for bleeding and/or hematoma  - Assess quality of pulses, skin color and temperature  - Assess for signs of decreased coronary artery perfusion - ex. Angina  - Evaluate fluid balance, assess for  edema, trend weights  Outcome: Progressing  Goal: Absence of cardiac arrhythmias or at baseline  Description: INTERVENTIONS:  - Continuous cardiac monitoring, monitor vital signs, obtain 12 lead EKG if indicated  - Evaluate effectiveness of antiarrhythmic and heart rate control medications as ordered  - Initiate emergency measures for life threatening arrhythmias  - Monitor electrolytes and administer replacement therapy as ordered  Outcome: Progressing

## 2023-12-25 NOTE — PLAN OF CARE
Received pt at 0700.  Pt is A&Ox4, complaints of headache & on/off chest discomfort. Pt is on room air, lungs are diminished, no coughing. Pt is in NSR. Continent of B&B, active bowel sounds, abdomen non-tender, last BM 12-24. Heparin infusing. Pt updated with plan of care.     POC  - heparin gtt  - surgery Wednesday- second case      Problem: Diabetes/Glucose Control  Goal: Glucose maintained within prescribed range  Description: INTERVENTIONS:  - Monitor Blood Glucose as ordered  - Assess for signs and symptoms of hyperglycemia and hypoglycemia  - Administer ordered medications to maintain glucose within target range  - Assess barriers to adequate nutritional intake and initiate nutrition consult as needed  - Instruct patient on self management of diabetes  Outcome: Progressing     Problem: Patient/Family Goals  Goal: Patient/Family Long Term Goal  Description: Patient's Long Term Goal: stay out of hospital 12-25    Interventions:  - Medication compliance  - smoking cessation   - follow up with primary care physician     - See additional Care Plan goals for specific interventions  Outcome: Progressing  Goal: Patient/Family Short Term Goal  Description: Patient's Short Term Goal: go home  No pain 12-25    Interventions:   - medication education   - smoking cessation education   - cardiology consult   - follow up with care team  - PRN tylenol, hot/cold packs, tell nurse if in pain     - See additional Care Plan goals for specific interventions  Outcome: Progressing     Problem: CARDIOVASCULAR - ADULT  Goal: Maintains optimal cardiac output and hemodynamic stability  Description: INTERVENTIONS:  - Monitor vital signs, rhythm, and trends  - Monitor for bleeding, hypotension and signs of decreased cardiac output  - Evaluate effectiveness of vasoactive medications to optimize hemodynamic stability  - Monitor arterial and/or venous puncture sites for bleeding and/or hematoma  - Assess quality of pulses, skin color and  temperature  - Assess for signs of decreased coronary artery perfusion - ex. Angina  - Evaluate fluid balance, assess for edema, trend weights  Outcome: Progressing  Goal: Absence of cardiac arrhythmias or at baseline  Description: INTERVENTIONS:  - Continuous cardiac monitoring, monitor vital signs, obtain 12 lead EKG if indicated  - Evaluate effectiveness of antiarrhythmic and heart rate control medications as ordered  - Initiate emergency measures for life threatening arrhythmias  - Monitor electrolytes and administer replacement therapy as ordered  Outcome: Progressing

## 2023-12-25 NOTE — PROGRESS NOTES
Progress Note  Soumya King Patient Status:  Inpatient    6/3/1960 MRN [id number]   Location [facility] 8NE-A Attending Caterina Brian DO   Hosp Day # 4 PCP Rika Vinson DO     Subjective:  Patient seen and examined. Experienced about 10 min of 2/10 chest pressure this morning. No further pain. No other acute overnight events.     Objective:  Physical Exam:   /70 (BP Location: Right arm)   Pulse 81   Temp 97.7 °F (36.5 °C) (Oral)   Resp 18   Ht 5' 1\" (1.549 m)   Wt 123 lb 3.8 oz (55.9 kg)   SpO2 97%   BMI 23.29 kg/m²   Temp (24hrs), Av.2 °F (36.8 °C), Min:97.7 °F (36.5 °C), Max:98.5 °F (36.9 °C)       Intake/Output Summary (Last 24 hours) at 2023 1001  Last data filed at 2023 0952  Gross per 24 hour   Intake 340 ml   Output --   Net 340 ml     Wt Readings from Last 3 Encounters:   23 123 lb 3.8 oz (55.9 kg)   23 136 lb (61.7 kg)   23 137 lb (62.1 kg)     Telemetry: NSR 90's  General: Alert and oriented in no apparent distress lying comfortably in bed.  HEENT: No focal deficits.  Neck: No JVD  Cardiac: Regular rate and rhythm, S1, S2 normal, no murmur, rub or gallop.  Lungs: Clear without wheezes, rales, rhonchi or dullness.  Normal excursions and effort on room air  Abdomen: Soft, non-tender.   Extremities: Without clubbing, cyanosis or edema.   Neurologic: Alert and oriented, normal affect.  Skin: Warm and dry.        Intake/Output:    Intake/Output Summary (Last 24 hours) at 2023 1001  Last data filed at 2023 0952  Gross per 24 hour   Intake 340 ml   Output --   Net 340 ml       Last 3 Weights   23 043 123 lb 3.8 oz (55.9 kg)   23 133 lb 4.8 oz (60.5 kg)   23 1806 134 lb 0.6 oz (60.8 kg)   23 1452 129 lb (58.5 kg)   23 1043 136 lb (61.7 kg)   23 1116 137 lb (62.1 kg)       Labs:  Recent Labs   Lab 23  1052 23  0540 23  0620   * 164* 151*   BUN 10 11 14   CREATSERUM 0.65  0.72 0.74   EGFRCR 100 95 92   CA 8.9 9.1 9.0    138 140   K 3.8 3.8  3.8 4.0  4.0    112 111   CO2 22.0 23.0 21.0     Recent Labs   Lab 12/21/23  1504 12/22/23  0437 12/23/23  1052 12/24/23  0540 12/25/23  0620   RBC 3.63* 3.58* 3.62*  3.62* 3.94 3.77*   HGB 10.9* 10.7* 10.7*  10.7* 11.9* 11.3*   HCT 34.8* 34.0* 34.1*  34.1* 37.8 35.3   MCV 95.9 95.0 94.2  94.2 95.9 93.6   MCH 30.0 29.9 29.6  29.6 30.2 30.0   MCHC 31.3 31.5 31.4  31.4 31.5 32.0   RDW 14.0 14.1 14.2  14.2 14.3 14.2   NEPRELIM 14.07* 7.56 8.21*  --   --    WBC 19.7* 13.4* 12.1*  12.1* 11.7* 11.2*   .0* 452.0*  452.0* 422.0  422.0 420.0 431.0         Recent Labs   Lab 12/21/23  1707 12/21/23  2229 12/22/23  0437   TROPHS 1,183* 2,718* 2,440*       Diagnostics:   CARDIAC CATHETERIZATION 12/22  A/P:  MV-CAD including 80% mid LAD, 100% mid RCA.  Right dominant circulation  LVEF 35-40%, apical akinesis  Restart heparin 2 hr after sheath pull  CTS consult for CABG    ECHOCARDIOGRAM 12/22/2023:  1. Left ventricle: The cavity size was normal. Wall thickness was normal.      Systolic function was reduced. The estimated ejection fraction was      40-45%, by visual assessment. Severe hypokinesis of the      mid-apicalanteroseptal and apical walls. Doppler parameters are      consistent with abnormal left ventricular relaxation - grade 1 diastolic      dysfunction.   2. Left atrium: The left atrial volume was normal.   Impressions:  This study is compared with previous dated 04/15/2024: New RWMA concerning for CAD in left anterior descending coronary distribution versus stress CM.         Medications:   metoprolol tartrate  50 mg Oral 2x Daily(Beta Blocker)    oxybutynin ER  10 mg Oral Daily    QUEtiapine ER  150 mg Oral Nightly    And    QUEtiapine ER  50 mg Oral Nightly    atorvastatin  40 mg Oral Nightly    azelastine  1 spray Each Nare BID    busPIRone  10 mg Oral Daily    clonazePAM  0.5 mg Oral BID    desvenlafaxine ER  100  mg Oral Daily    gabapentin  300 mg Oral Nightly    cetirizine  10 mg Oral Daily    pantoprazole  40 mg Oral BID AC    topiramate  50 mg Oral BID    umeclidinium bromide  1 puff Inhalation Daily    insulin aspart  2-10 Units Subcutaneous TID CC and HS    nicotine  1 patch Transdermal Daily    aspirin  325 mg Oral Daily      continuous dose heparin 1,000 Units/hr (12/25/23 0936)       Assessment/Plan:  NSTEMI with multivessel CAD on cardiac cath 12/22  Remains on IV heparin, ASA, BB, statin  LVEF 40- 45% apical and anteroseptal RWMA on echo 12/22, 30-35% on LV gram 12/22  CT surgery following   Sinus tachycardia now with HR in the 90's  Adjusted BB therapy to BID with increase in dosage on 12/23  Asymptomatic  HTN  HLD  Continue statin therapy  DM2  We advise DM control with a goal Hgb A1C <7% for improved CV health  Tobacco abuse  We highly advise tobacco cessation to improve CV health    PLAN  Obtain EKG if further episodes of CP.   Continue ASA, statin, and heparin gtt  Continue metoprolol 50 mg BID  Follow CT surgery evaluation -- tentatively planned for surgery on 12/27      South Brian DO  Cardiologist  [facility]  12/25/2023 10:02 AM      Note to the patient: The 21st Century Cures Act makes medical notes like these available to patients in the interest of transparency. However, be advised that this is a medical document. It is intended as peer to peer communication. It is written in medical language and may contain abbreviations or verbiage that are unfamiliar. It may appear blunt or direct. Medical documents are intended to carry relevant information, facts as evident, and clinical opinion of the practitioner.     Disclaimer: Components of this note were documented using voice recognition system and are subject to errors not corrected at proofreading. Contact the author of this note for any clarifications.

## 2023-12-25 NOTE — PROGRESS NOTES
[facility]     Hospitalist Progress Note     Soumya King Patient Status:  Inpatient    6/3/1960 MRN [id number]   Location [facility] 8NE-A Attending Jace Flores MD   Hosp Day # 4 PCP Rika Vinson DO     Chief Complaint:   Chief Complaint   Patient presents with    Chest Pain Angina     Intermittent chest pain since 1300 radiating to rt shoudler/ arm. States currently sharp 4/10 but was 10/10. SOB, dizzy.        Subjective:     Patient feels fine today    Objective:    Review of Systems:   4 point ros negative except per subjective    Vital signs:  Temp:  [98 °F (36.7 °C)-98.5 °F (36.9 °C)] 98.2 °F (36.8 °C)  Pulse:  [] 86  Resp:  [19-26] 19  BP: ()/(58-71) 95/59  SpO2:  [92 %-97 %] 97 %    Physical Exam:    General: No acute distress.   Respiratory: CTA bilaterally  Cardiovascular: RRR, S1-S2 present  Abdomen: Soft, nontender, nondistended  Extremities: No edema      Diagnostic Data:    Labs:  Recent Labs   Lab 23  1504 23  0437 23  1052 23  0540 23  0620   WBC 19.7* 13.4* 12.1*  12.1* 11.7* 11.2*   HGB 10.9* 10.7* 10.7*  10.7* 11.9* 11.3*   MCV 95.9 95.0 94.2  94.2 95.9 93.6   .0* 452.0*  452.0* 422.0  422.0 420.0 431.0       Recent Labs   Lab 23  1504 23  0437 23  1052 23  0540 23  0620   *   < > 165* 164* 151*   BUN 17   < > 10 11 14   CREATSERUM 0.86   < > 0.65 0.72 0.74   CA 8.7   < > 8.9 9.1 9.0   ALB 3.4  --   --   --   --       < > 139 138 140   K 4.3   < > 3.8 3.8  3.8 4.0  4.0      < > 112 112 111   CO2 27.0   < > 22.0 23.0 21.0   ALKPHO 94  --   --   --   --    AST 33  --   --   --   --    ALT 52  --   --   --   --    BILT 0.2  --   --   --   --    TP 7.1  --   --   --   --     < > = values in this interval not displayed.       Estimated Creatinine Clearance: 60.2 mL/min (based on SCr of 0.74 mg/dL).    No results for input(s): \"PTP\", \"INR\" in the last 168 hours.          COVID-19 Lab Results    COVID-19  Lab Results   Component Value Date    COVID19 Not Detected 12/14/2023    COVID19 Not Detected 12/06/2022    COVID19 Not Detected 08/29/2022       Pro-Calcitonin  No results for input(s): \"PCT\" in the last 168 hours.    Cardiac  No results for input(s): \"TROP\", \"PBNP\" in the last 168 hours.    Creatinine Kinase  No results for input(s): \"CK\" in the last 168 hours.    Inflammatory Markers  Recent Labs   Lab 12/21/23  1504   DDIMER 1.43*       Recent Labs   Lab 12/21/23  1707 12/21/23  2229 12/22/23  0437   TROPHS 1,183* 2,718* 2,440*       Imaging: Imaging data reviewed in Epic.    Medications:    metoprolol tartrate  50 mg Oral 2x Daily(Beta Blocker)    oxybutynin ER  10 mg Oral Daily    QUEtiapine ER  150 mg Oral Nightly    And    QUEtiapine ER  50 mg Oral Nightly    atorvastatin  40 mg Oral Nightly    azelastine  1 spray Each Nare BID    busPIRone  10 mg Oral Daily    clonazePAM  0.5 mg Oral BID    desvenlafaxine ER  100 mg Oral Daily    gabapentin  300 mg Oral Nightly    cetirizine  10 mg Oral Daily    pantoprazole  40 mg Oral BID AC    topiramate  50 mg Oral BID    umeclidinium bromide  1 puff Inhalation Daily    insulin aspart  2-10 Units Subcutaneous TID CC and HS    nicotine  1 patch Transdermal Daily    aspirin  325 mg Oral Daily       Assessment & Plan:      # NSTEMI  -Heparin drip, aspirin, metoprolol, statin; hx difficulty swallowing asa due to barretts. Not a true allergy  -Cardiac cath on 12/22 showed multivessel disease  -Plan for CABG on Wednesday?    #Hypertension  #DM2-monitor on insulin; controlled  #Hyperlipidemia  #Smoker  -Nicotine patch    Jace Flores MD  [facility]  Internal Medicine Hospitalist      Supplementary Documentation:     Quality:  DVT Prophylaxis: heparin drip  CODE status: full  [place]: no  Central line: no      Estimated date of discharge: tbd  At this point Ms. King is expected to be discharge to: home

## 2023-12-26 ENCOUNTER — ANESTHESIA EVENT (OUTPATIENT)
Dept: CARDIAC SURGERY | Facility: HOSPITAL | Age: 63
DRG: 234 | End: 2023-12-26
Payer: MEDICAID

## 2023-12-26 LAB
ANTIBODY SCREEN: NEGATIVE
APTT PPP: 50.7 SECONDS (ref 23.3–35.6)
APTT PPP: 77.8 SECONDS (ref 23.3–35.6)
APTT PPP: 78.2 SECONDS (ref 23.3–35.6)
ATRIAL RATE: 113 BPM
GLUCOSE BLD-MCNC: 104 MG/DL (ref 70–99)
GLUCOSE BLD-MCNC: 106 MG/DL (ref 70–99)
GLUCOSE BLD-MCNC: 138 MG/DL (ref 70–99)
GLUCOSE BLD-MCNC: 161 MG/DL (ref 70–99)
INR BLD: 1.07 (ref 0.8–1.2)
MAGNESIUM SERPL-MCNC: 2.1 MG/DL (ref 1.6–2.6)
P AXIS: 53 DEGREES
P-R INTERVAL: 132 MS
PROTHROMBIN TIME: 13.9 SECONDS (ref 11.6–14.8)
Q-T INTERVAL: 348 MS
QRS DURATION: 74 MS
QTC CALCULATION (BEZET): 477 MS
R AXIS: 42 DEGREES
RH BLOOD TYPE: NEGATIVE
T AXIS: 84 DEGREES
VENTRICULAR RATE: 113 BPM

## 2023-12-26 PROCEDURE — 99232 SBSQ HOSP IP/OBS MODERATE 35: CPT | Performed by: HOSPITALIST

## 2023-12-26 RX ORDER — SODIUM CHLORIDE 9 MG/ML
INJECTION, SOLUTION INTRAVENOUS
Status: DISCONTINUED | OUTPATIENT
Start: 2023-12-27 | End: 2023-12-27

## 2023-12-26 NOTE — PLAN OF CARE
Received patient at 0730. Patient alert and oriented x4. Tele Rhythm NSR. O2 sats on RA.  Lungs clear. Bed is locked and low position. Call light & personal belongings within reach. Denies pain at this time. Patient voiding WNL. Patient tolerating ambulation well independently.  Skin dry and intact. Reviewed plan of care with patient and verbalized understanding.     POC: CV and Cards following- heparin drip, CABG Wednesday .     Problem: Diabetes/Glucose Control  Goal: Glucose maintained within prescribed range  Description: INTERVENTIONS:  - Monitor Blood Glucose as ordered  - Assess for signs and symptoms of hyperglycemia and hypoglycemia  - Administer ordered medications to maintain glucose within target range  - Assess barriers to adequate nutritional intake and initiate nutrition consult as needed  - Instruct patient on self management of diabetes  Outcome: Progressing     Problem: Patient/Family Goals  Goal: Patient/Family Long Term Goal  Description: Patient's Long Term Goal: stay out of hospital 12-25    Interventions:  - Medication compliance  - smoking cessation   - follow up with primary care physician     - See additional Care Plan goals for specific interventions  Outcome: Progressing  Goal: Patient/Family Short Term Goal  Description: Patient's Short Term Goal: go home  No pain 12-25    Interventions:   - medication education   - smoking cessation education   - cardiology consult   - follow up with care team  - PRN tylenol, hot/cold packs, tell nurse if in pain     - See additional Care Plan goals for specific interventions  Outcome: Progressing     Problem: CARDIOVASCULAR - ADULT  Goal: Maintains optimal cardiac output and hemodynamic stability  Description: INTERVENTIONS:  - Monitor vital signs, rhythm, and trends  - Monitor for bleeding, hypotension and signs of decreased cardiac output  - Evaluate effectiveness of vasoactive medications to optimize hemodynamic stability  - Monitor arterial and/or  venous puncture sites for bleeding and/or hematoma  - Assess quality of pulses, skin color and temperature  - Assess for signs of decreased coronary artery perfusion - ex. Angina  - Evaluate fluid balance, assess for edema, trend weights  Outcome: Progressing  Goal: Absence of cardiac arrhythmias or at baseline  Description: INTERVENTIONS:  - Continuous cardiac monitoring, monitor vital signs, obtain 12 lead EKG if indicated  - Evaluate effectiveness of antiarrhythmic and heart rate control medications as ordered  - Initiate emergency measures for life threatening arrhythmias  - Monitor electrolytes and administer replacement therapy as ordered  Outcome: Progressing

## 2023-12-26 NOTE — PROGRESS NOTES
Progress Note  Soumya King Patient Status:  Inpatient    6/3/1960 MRN [id number]   Location [facility] 8NE-A Attending Caterina Brian DO   Hosp Day # 5 PCP Rika Vinson DO     Subjective:  Patient seen and examined this AM. No further episodes of chest pain. No complaints.     Objective:  Physical Exam:   BP 95/52 (BP Location: Left arm)   Pulse 68   Temp 98.4 °F (36.9 °C) (Oral)   Resp 18   Ht 5' 1\" (1.549 m)   Wt 126 lb 1.7 oz (57.2 kg)   SpO2 93%   BMI 23.83 kg/m²   Temp (24hrs), Av.3 °F (36.8 °C), Min:98.1 °F (36.7 °C), Max:98.8 °F (37.1 °C)       Intake/Output Summary (Last 24 hours) at 2023 1121  Last data filed at 2023 1737  Gross per 24 hour   Intake 370 ml   Output --   Net 370 ml     Wt Readings from Last 3 Encounters:   23 126 lb 1.7 oz (57.2 kg)   23 136 lb (61.7 kg)   23 137 lb (62.1 kg)     Telemetry: NSR 90's  General: Alert and oriented in no apparent distress  HEENT: No focal deficits.  Neck: No JVD  Cardiac: Regular rate and rhythm, S1, S2 normal, no murmur, rub or gallop.  Lungs: Clear without wheezes, rales, rhonchi or dullness.  Normal excursions and effort on room air  Abdomen: Soft, non-tender.   Extremities: Without clubbing, cyanosis or edema.   Neurologic: Alert and oriented, normal affect.  Skin: Warm and dry.        Intake/Output:    Intake/Output Summary (Last 24 hours) at 2023 1121  Last data filed at 2023 1737  Gross per 24 hour   Intake 370 ml   Output --   Net 370 ml       Last 3 Weights   23 0535 126 lb 1.7 oz (57.2 kg)   23 0431 123 lb 3.8 oz (55.9 kg)   23 133 lb 4.8 oz (60.5 kg)   23 1806 134 lb 0.6 oz (60.8 kg)   23 1452 129 lb (58.5 kg)   23 1043 136 lb (61.7 kg)   23 1116 137 lb (62.1 kg)       Labs:  Recent Labs   Lab 23  1052 23  0540 23  0620   * 164* 151*   BUN 10 11 14   CREATSERUM 0.65 0.72 0.74   EGFRCR 100 95 92   CA 8.9 9.1  9.0    138 140   K 3.8 3.8  3.8 4.0  4.0    112 111   CO2 22.0 23.0 21.0     Recent Labs   Lab 12/21/23  1504 12/22/23  0437 12/23/23  1052 12/24/23  0540 12/25/23  0620   RBC 3.63* 3.58* 3.62*  3.62* 3.94 3.77*   HGB 10.9* 10.7* 10.7*  10.7* 11.9* 11.3*   HCT 34.8* 34.0* 34.1*  34.1* 37.8 35.3   MCV 95.9 95.0 94.2  94.2 95.9 93.6   MCH 30.0 29.9 29.6  29.6 30.2 30.0   MCHC 31.3 31.5 31.4  31.4 31.5 32.0   RDW 14.0 14.1 14.2  14.2 14.3 14.2   NEPRELIM 14.07* 7.56 8.21*  --   --    WBC 19.7* 13.4* 12.1*  12.1* 11.7* 11.2*   .0* 452.0*  452.0* 422.0  422.0 420.0 431.0         Recent Labs   Lab 12/21/23  1707 12/21/23  2229 12/22/23  0437   TROPHS 1,183* 2,718* 2,440*       Diagnostics:   CARDIAC CATHETERIZATION 12/22  A/P:  MV-CAD including 80% mid LAD, 100% mid RCA.  Right dominant circulation  LVEF 35-40%, apical akinesis  Restart heparin 2 hr after sheath pull  CTS consult for CABG    ECHOCARDIOGRAM 12/22/2023:  1. Left ventricle: The cavity size was normal. Wall thickness was normal.      Systolic function was reduced. The estimated ejection fraction was      40-45%, by visual assessment. Severe hypokinesis of the      mid-apicalanteroseptal and apical walls. Doppler parameters are      consistent with abnormal left ventricular relaxation - grade 1 diastolic      dysfunction.   2. Left atrium: The left atrial volume was normal.   Impressions:  This study is compared with previous dated 04/15/2024: New RWMA concerning for CAD in left anterior descending coronary distribution versus stress CM.         Medications:   metoprolol tartrate  50 mg Oral 2x Daily(Beta Blocker)    oxybutynin ER  10 mg Oral Daily    QUEtiapine ER  150 mg Oral Nightly    And    QUEtiapine ER  50 mg Oral Nightly    atorvastatin  40 mg Oral Nightly    azelastine  1 spray Each Nare BID    busPIRone  10 mg Oral Daily    clonazePAM  0.5 mg Oral BID    desvenlafaxine ER  100 mg Oral Daily    gabapentin  300 mg Oral  Nightly    cetirizine  10 mg Oral Daily    pantoprazole  40 mg Oral BID AC    topiramate  50 mg Oral BID    umeclidinium bromide  1 puff Inhalation Daily    insulin aspart  2-10 Units Subcutaneous TID CC and HS    nicotine  1 patch Transdermal Daily    aspirin  325 mg Oral Daily      continuous dose heparin 900 Units/hr (12/26/23 0635)       Assessment/Plan:  NSTEMI with multivessel CAD on cardiac cath 12/22  Remains on IV heparin, ASA, BB, statin  LVEF 40- 45% apical and anteroseptal RWMA on echo 12/22, 30-35% on LV gram 12/22  CT surgery following   Sinus tachycardia now with HR in the 90's  Adjusted BB therapy to BID with increase in dosage on 12/23  Asymptomatic  HTN  HLD  Continue statin therapy  DM2  We advise DM control with a goal Hgb A1C <7% for improved CV health  Tobacco abuse  We highly advise tobacco cessation to improve CV health    PLAN  Continue ASA, statin, and heparin gtt  Continue metoprolol 50 mg BID  CT surgery following -- tentatively planned for surgery on 12/27  Continue to monitor symptomatic status   Further GDMT optimization pending      [place] Brian DO  Cardiologist  [facility]  12/25/2023 10:02 AM      Note to the patient: The 21st Century Cures Act makes medical notes like these available to patients in the interest of transparency. However, be advised that this is a medical document. It is intended as peer to peer communication. It is written in medical language and may contain abbreviations or verbiage that are unfamiliar. It may appear blunt or direct. Medical documents are intended to carry relevant information, facts as evident, and clinical opinion of the practitioner.     Disclaimer: Components of this note were documented using voice recognition system and are subject to errors not corrected at proofreading. Contact the author of this note for any clarifications.

## 2023-12-26 NOTE — PAYOR COMM NOTE
--------------  CONTINUED STAY REVIEW-----REQUESTING ADDITIONAL DAY 12/26      Payor: [facility]  Subscriber #:  [id number]  Authorization Number: [id number]    Admit date: 12/21/23  Admit time:  8:15 PM    Admitting Physician: Arjun Miller MD  Attending Physician:  Jace Flores MD  Primary Care Physician: Rika Vinson DO    Chief Complaint:        Chief Complaint   Patient presents with    Chest Pain Angina       Intermittent chest pain since 1300 radiating to rt shoudler/ arm. States currently sharp 4/10 but was 10/10. SOB, dizzy.          Subjective:      Patient feels fine today, just very tired and sleepy     Objective:    Review of Systems:   4 point ros negative except per subjective     Vital signs:  Temp:  [98.1 °F (36.7 °C)-98.8 °F (37.1 °C)] 98.1 °F (36.7 °C)  Pulse:  [80-99] 96  Resp:  [14-18] 18  BP: (102-127)/(64-72) 111/68  SpO2:  [97 %] 97 %     Physical Exam:    General: No acute distress.   Respiratory: CTA bilaterally  Cardiovascular: RRR, S1-S2 present  Abdomen: Soft, nontender, nondistended  Extremities: No edema        Diagnostic Data:    Labs:          Recent Labs   Lab 12/21/23  1504 12/22/23  0437 12/23/23  1052 12/24/23  0540 12/25/23  0620   WBC 19.7* 13.4* 12.1*  12.1* 11.7* 11.2*   HGB 10.9* 10.7* 10.7*  10.7* 11.9* 11.3*   MCV 95.9 95.0 94.2  94.2 95.9 93.6   .0* 452.0*  452.0* 422.0  422.0 420.0 431.0                 Recent Labs   Lab 12/21/23  1504 12/22/23  0437 12/23/23  1052 12/24/23  0540 12/25/23  0620   *   < > 165* 164* 151*   BUN 17   < > 10 11 14   CREATSERUM 0.86   < > 0.65 0.72 0.74   CA 8.7   < > 8.9 9.1 9.0   ALB 3.4  --   --   --   --       < > 139 138 140   K 4.3   < > 3.8 3.8  3.8 4.0  4.0      < > 112 112 111   CO2 27.0   < > 22.0 23.0 21.0   ALKPHO 94  --   --   --   --    AST 33  --   --   --   --    ALT 52  --   --   --   --    BILT 0.2  --   --   --   --    TP 7.1  --   --   --   --      < > = values in this interval not displayed.         Estimated Creatinine Clearance: 60.2 mL/min (based on SCr of 0.74 mg/dL).     No results for input(s): \"PTP\", \"INR\" in the last 168 hours.        COVID-19 Lab Results     COVID-19        Lab Results   Component Value Date     COVID19 Not Detected 12/14/2023     COVID19 Not Detected 12/06/2022     COVID19 Not Detected 08/29/2022         Pro-Calcitonin  No results for input(s): \"PCT\" in the last 168 hours.     Cardiac  No results for input(s): \"TROP\", \"PBNP\" in the last 168 hours.     Creatinine Kinase  No results for input(s): \"CK\" in the last 168 hours.     Inflammatory Markers      Recent Labs   Lab 12/21/23  1504   DDIMER 1.43*               Recent Labs   Lab 12/21/23  1707 12/21/23  2229 12/22/23  0437   TROPHS 1,183* 2,718* 2,440*         Imaging: Imaging data reviewed in Epic.     Medications:    metoprolol tartrate  50 mg Oral 2x Daily(Beta Blocker)    oxybutynin ER  10 mg Oral Daily    QUEtiapine ER  150 mg Oral Nightly     And    QUEtiapine ER  50 mg Oral Nightly    atorvastatin  40 mg Oral Nightly    azelastine  1 spray Each Nare BID    busPIRone  10 mg Oral Daily    clonazePAM  0.5 mg Oral BID    desvenlafaxine ER  100 mg Oral Daily    gabapentin  300 mg Oral Nightly    cetirizine  10 mg Oral Daily    pantoprazole  40 mg Oral BID AC    topiramate  50 mg Oral BID    umeclidinium bromide  1 puff Inhalation Daily    insulin aspart  2-10 Units Subcutaneous TID CC and HS    nicotine  1 patch Transdermal Daily    aspirin  325 mg Oral Daily         Assessment & Plan:       # NSTEMI  -Heparin drip, aspirin, metoprolol, statin; hx difficulty swallowing asa due to barretts. Not a true allergy  -Cardiac cath on 12/22 showed multivessel disease  -Plan for CABG on Wednesday 12/27     #Hypertension  #DM2-monitor on insulin; controlled  #Hyperlipidemia  #Smoker  -Nicotine patch     Jace Flores MD  [facility]  Internal Medicine Hospitalist      MEDICATIONS  ADMINISTERED IN LAST 1 DAY:  acetaminophen (Tylenol Extra Strength) tab 1,000 mg       Date Action Dose Route User    12/26/2023 0545 Given 1,000 mg Oral Charis Paz RN    12/25/2023 2119 Given 1,000 mg Oral Charis Paz RN          albuterol (Ventolin HFA) 108 (90 Base) MCG/ACT inhaler 2 puff       Date Action Dose Route User    12/25/2023 2130 Given 2 puff Inhalation Meño Pineda RCP          aspirin DR tab 325 mg       Date Action Dose Route User    12/26/2023 0840 Given 325 mg Oral Lety Modi RN          atorvastatin (Lipitor) tab 40 mg       Date Action Dose Route User    12/25/2023 2115 Given 40 mg Oral Charis Paz RN          busPIRone (Buspar) tab 10 mg       Date Action Dose Route User    12/26/2023 0841 Given 10 mg Oral Lety Modi RN          cetirizine (ZyrTEC) tab 10 mg       Date Action Dose Route User    12/26/2023 0840 Given 10 mg Oral Lety Modi RN          clonazePAM (KlonoPIN) tab 0.5 mg       Date Action Dose Route User    12/26/2023 0840 Given 0.5 mg Oral Lety Modi RN    12/25/2023 2115 Given 0.5 mg Oral Charis Paz RN          heparin (Porcine) 78709 units/250mL infusion CONTINUOUS       Date Action Dose Route User    12/26/2023 1346 Hi-Risk Rate/Dose Change 800 Units/hr Intravenous Lety Modi RN    12/26/2023 1220 New Bag 900 Units/hr Intravenous Rama Donahue RN    12/26/2023 0635 Hi-Risk Rate/Dose Change 900 Units/hr Intravenous Charis Paz RN          desvenlafaxine ER (Pristiq) 24 hr tab 100 mg       Date Action Dose Route User    12/26/2023 1038 Given 100 mg Oral Lety Modi RN          gabapentin (Neurontin) cap 300 mg       Date Action Dose Route User    12/25/2023 2115 Given 300 mg Oral Charis Paz RN          insulin aspart (NovoLOG) 100 Units/mL FlexPen 2-10 Units       Date Action Dose Route User    12/26/2023 0540 Given 2 Units Subcutaneous (Left Upper Arm) Paz, Charis, RN          metoprolol tartrate (Lopressor) tab 50 mg        Date Action Dose Route User    12/26/2023 0539 Given 50 mg Oral Charis Paz RN    12/25/2023 1733 Given 50 mg Oral Aster Fam RN          nicotine (Nicoderm CQ) 14 MG/24HR patch 1 patch       Date Action Dose Route User    12/26/2023 0841 Patch Applied 1 patch Transdermal (Left Upper Abdomen) Lety Modi RN          oxybutynin ER (Ditropan-XL) 24 hr tab 10 mg       Date Action Dose Route User    12/26/2023 0841 Given 10 mg Oral Lety Modi RN          pantoprazole (Protonix) DR tab 40 mg       Date Action Dose Route User    12/26/2023 0539 Given 40 mg Oral Charis Paz RN    12/25/2023 1733 Given 40 mg Oral Aster Fam RN          QUEtiapine ER (SEROquel XR) 24 hr tab 150 mg       Date Action Dose Route User    12/25/2023 2116 Given 150 mg Oral Charis Paz RN          QUEtiapine ER (SEROquel XR) 24 hr tab 50 mg       Date Action Dose Route User    12/25/2023 2116 Given 50 mg Oral Charis Paz RN          topiramate (TopaMAX) tab 50 mg       Date Action Dose Route User    12/26/2023 0840 Given 50 mg Oral Lety Modi RN    12/25/2023 2115 Given 50 mg Oral Charis Paz RN            Vitals (last day)       Date/Time Temp Pulse Resp BP SpO2 Weight O2 Device O2 Flow Rate (L/min) Who    12/26/23 1620 -- 97 -- -- 100 % -- -- -- ML    12/26/23 1211 97.9 °F (36.6 °C) 91 18 114/97 98 % -- None (Room air) -- ML    12/26/23 0836 98.4 °F (36.9 °C) 68 18 95/52 93 % -- -- -- EM    12/26/23 0535 98.1 °F (36.7 °C) 96 18 111/68 -- 126 lb 1.7 oz -- -- AB    12/26/23 0020 98.2 °F (36.8 °C) 80 14 127/72 -- -- None (Room air) -- NF    12/25/23 2100 -- 88 -- 122/68 -- -- -- -- AB    12/25/23 1635 98.8 °F (37.1 °C) 99 18 122/72 97 % -- None (Room air) -- ML    12/25/23 1155 98.2 °F (36.8 °C) 99 16 102/64 97 % -- None (Room air) -- SILVIA    12/25/23 0730 97.7 °F (36.5 °C) 81 18 116/70 -- -- None (Room air) -- CY    12/25/23 0515 98.2 °F (36.8 °C) 86 19 95/59 -- -- None (Room air) -- TN

## 2023-12-26 NOTE — PLAN OF CARE
Assumed care of pt at 1930 on 12/25.   A/Ox4, up w/ standby. Steady gait.   Room air. Pt did endorse minimal SOB. O2 sats were maintaining above 90%. Pt received inhaler.   NSR on tele. No complaints of chest pain.   Continent of bladder and bowel.   Heparin gtt running per protocol.   Plan for CABG 12/27.   Fall precautions in place. Call light in reach. Pt updated on plan of care.     Problem: Diabetes/Glucose Control  Goal: Glucose maintained within prescribed range  Description: INTERVENTIONS:  - Monitor Blood Glucose as ordered  - Assess for signs and symptoms of hyperglycemia and hypoglycemia  - Administer ordered medications to maintain glucose within target range  - Assess barriers to adequate nutritional intake and initiate nutrition consult as needed  - Instruct patient on self management of diabetes  Outcome: Progressing     Problem: Patient/Family Goals  Goal: Patient/Family Long Term Goal  Description: Patient's Long Term Goal: stay out of hospital 12-25    Interventions:  - Medication compliance  - smoking cessation   - follow up with primary care physician     - See additional Care Plan goals for specific interventions  Outcome: Progressing  Goal: Patient/Family Short Term Goal  Description: Patient's Short Term Goal: go home  No pain 12-25    Interventions:   - medication education   - smoking cessation education   - cardiology consult   - follow up with care team  - PRN tylenol, hot/cold packs, tell nurse if in pain     - See additional Care Plan goals for specific interventions  Outcome: Progressing     Problem: CARDIOVASCULAR - ADULT  Goal: Maintains optimal cardiac output and hemodynamic stability  Description: INTERVENTIONS:  - Monitor vital signs, rhythm, and trends  - Monitor for bleeding, hypotension and signs of decreased cardiac output  - Evaluate effectiveness of vasoactive medications to optimize hemodynamic stability  - Monitor arterial and/or venous puncture sites for bleeding and/or  hematoma  - Assess quality of pulses, skin color and temperature  - Assess for signs of decreased coronary artery perfusion - ex. Angina  - Evaluate fluid balance, assess for edema, trend weights  Outcome: Progressing  Goal: Absence of cardiac arrhythmias or at baseline  Description: INTERVENTIONS:  - Continuous cardiac monitoring, monitor vital signs, obtain 12 lead EKG if indicated  - Evaluate effectiveness of antiarrhythmic and heart rate control medications as ordered  - Initiate emergency measures for life threatening arrhythmias  - Monitor electrolytes and administer replacement therapy as ordered  Outcome: Progressing

## 2023-12-26 NOTE — PROGRESS NOTES
[facility]     Hospitalist Progress Note     Soumya King Patient Status:  Inpatient    6/3/1960 MRN [id number]   Location [facility] 8NE-A Attending Jace Flores MD   Hosp Day # 5 PCP Rika Vinson DO     Chief Complaint:   Chief Complaint   Patient presents with    Chest Pain Angina     Intermittent chest pain since 1300 radiating to rt shoudler/ arm. States currently sharp 4/10 but was 10/10. SOB, dizzy.        Subjective:     Patient feels fine today, just very tired and sleepy    Objective:    Review of Systems:   4 point ros negative except per subjective    Vital signs:  Temp:  [98.1 °F (36.7 °C)-98.8 °F (37.1 °C)] 98.1 °F (36.7 °C)  Pulse:  [80-99] 96  Resp:  [14-18] 18  BP: (102-127)/(64-72) 111/68  SpO2:  [97 %] 97 %    Physical Exam:    General: No acute distress.   Respiratory: CTA bilaterally  Cardiovascular: RRR, S1-S2 present  Abdomen: Soft, nontender, nondistended  Extremities: No edema      Diagnostic Data:    Labs:  Recent Labs   Lab 23  1504 23  0437 23  1052 23  0540 23  0620   WBC 19.7* 13.4* 12.1*  12.1* 11.7* 11.2*   HGB 10.9* 10.7* 10.7*  10.7* 11.9* 11.3*   MCV 95.9 95.0 94.2  94.2 95.9 93.6   .0* 452.0*  452.0* 422.0  422.0 420.0 431.0       Recent Labs   Lab 23  1504 23  0437 23  1052 23  0540 23  0620   *   < > 165* 164* 151*   BUN 17   < > 10 11 14   CREATSERUM 0.86   < > 0.65 0.72 0.74   CA 8.7   < > 8.9 9.1 9.0   ALB 3.4  --   --   --   --       < > 139 138 140   K 4.3   < > 3.8 3.8  3.8 4.0  4.0      < > 112 112 111   CO2 27.0   < > 22.0 23.0 21.0   ALKPHO 94  --   --   --   --    AST 33  --   --   --   --    ALT 52  --   --   --   --    BILT 0.2  --   --   --   --    TP 7.1  --   --   --   --     < > = values in this interval not displayed.       Estimated Creatinine Clearance: 60.2 mL/min (based on SCr of 0.74 mg/dL).    No results for input(s): \"PTP\", \"INR\" in the  last 168 hours.         COVID-19 Lab Results    COVID-19  Lab Results   Component Value Date    COVID19 Not Detected 12/14/2023    COVID19 Not Detected 12/06/2022    COVID19 Not Detected 08/29/2022       Pro-Calcitonin  No results for input(s): \"PCT\" in the last 168 hours.    Cardiac  No results for input(s): \"TROP\", \"PBNP\" in the last 168 hours.    Creatinine Kinase  No results for input(s): \"CK\" in the last 168 hours.    Inflammatory Markers  Recent Labs   Lab 12/21/23  1504   DDIMER 1.43*       Recent Labs   Lab 12/21/23  1707 12/21/23  2229 12/22/23  0437   TROPHS 1,183* 2,718* 2,440*       Imaging: Imaging data reviewed in Epic.    Medications:    metoprolol tartrate  50 mg Oral 2x Daily(Beta Blocker)    oxybutynin ER  10 mg Oral Daily    QUEtiapine ER  150 mg Oral Nightly    And    QUEtiapine ER  50 mg Oral Nightly    atorvastatin  40 mg Oral Nightly    azelastine  1 spray Each Nare BID    busPIRone  10 mg Oral Daily    clonazePAM  0.5 mg Oral BID    desvenlafaxine ER  100 mg Oral Daily    gabapentin  300 mg Oral Nightly    cetirizine  10 mg Oral Daily    pantoprazole  40 mg Oral BID AC    topiramate  50 mg Oral BID    umeclidinium bromide  1 puff Inhalation Daily    insulin aspart  2-10 Units Subcutaneous TID CC and HS    nicotine  1 patch Transdermal Daily    aspirin  325 mg Oral Daily       Assessment & Plan:      # NSTEMI  -Heparin drip, aspirin, metoprolol, statin; hx difficulty swallowing asa due to barretts. Not a true allergy  -Cardiac cath on 12/22 showed multivessel disease  -Plan for CABG on Wednesday 12/27    #Hypertension  #DM2-monitor on insulin; controlled  #Hyperlipidemia  #Smoker  -Nicotine patch    Jace Flores MD  [facility]  Internal Medicine Hospitalist      Supplementary Documentation:     Quality:  DVT Prophylaxis: heparin drip  CODE status: full  Gotti: no  Central line: no      Estimated date of discharge: tbd  At this point Ms. King is expected to be discharge to:  home

## 2023-12-27 ENCOUNTER — ANESTHESIA (OUTPATIENT)
Dept: CARDIAC SURGERY | Facility: HOSPITAL | Age: 63
DRG: 234 | End: 2023-12-27
Payer: MEDICAID

## 2023-12-27 ENCOUNTER — APPOINTMENT (OUTPATIENT)
Dept: GENERAL RADIOLOGY | Facility: HOSPITAL | Age: 63
DRG: 234 | End: 2023-12-27
Attending: PHYSICIAN ASSISTANT
Payer: MEDICAID

## 2023-12-27 LAB
APTT PPP: 28.4 SECONDS (ref 23.3–35.6)
APTT PPP: 57.7 SECONDS (ref 23.3–35.6)
ARTERIAL PATENCY WRIST A: POSITIVE
BASE EXCESS BLD CALC-SCNC: -2 MMOL/L
BASE EXCESS BLD CALC-SCNC: -8 MMOL/L
BASE EXCESS BLDA CALC-SCNC: -1.7 MMOL/L (ref ?–2)
BODY TEMPERATURE: 98.6 F
BUN BLD-MCNC: 12 MG/DL (ref 9–23)
CA-I BLD-SCNC: 1.15 MMOL/L (ref 0.95–1.32)
CA-I BLD-SCNC: 1.17 MMOL/L (ref 1.12–1.32)
CA-I BLD-SCNC: 1.19 MMOL/L (ref 1.12–1.32)
CALCIUM BLD-MCNC: 7.6 MG/DL (ref 8.5–10.1)
CHLORIDE SERPL-SCNC: 118 MMOL/L (ref 98–112)
CO2 BLD-SCNC: 20 MMOL/L (ref 22–32)
CO2 BLD-SCNC: 24 MMOL/L (ref 22–32)
CO2 SERPL-SCNC: 24 MMOL/L (ref 21–32)
COHGB MFR BLD: 0.7 % SAT (ref 0–3)
CREAT BLD-MCNC: 0.91 MG/DL
EGFRCR SERPLBLD CKD-EPI 2021: 72 ML/MIN/1.73M2 (ref 60–?)
ERYTHROCYTE [DISTWIDTH] IN BLOOD BY AUTOMATED COUNT: 14.8 %
FIBRINOGEN PPP-MCNC: 288 MG/DL (ref 180–480)
FIO2: 40 %
GLUCOSE BLD-MCNC: 121 MG/DL (ref 70–99)
GLUCOSE BLD-MCNC: 130 MG/DL (ref 70–99)
GLUCOSE BLD-MCNC: 142 MG/DL (ref 70–99)
GLUCOSE BLD-MCNC: 151 MG/DL (ref 70–99)
GLUCOSE BLD-MCNC: 154 MG/DL (ref 70–99)
GLUCOSE BLD-MCNC: 158 MG/DL (ref 70–99)
GLUCOSE BLD-MCNC: 162 MG/DL (ref 70–99)
GLUCOSE BLD-MCNC: 172 MG/DL (ref 70–99)
GLUCOSE BLD-MCNC: 174 MG/DL (ref 70–99)
GLUCOSE BLD-MCNC: 179 MG/DL (ref 70–99)
GLUCOSE BLD-MCNC: 181 MG/DL (ref 70–99)
GLUCOSE BLD-MCNC: 185 MG/DL (ref 70–99)
GLUCOSE BLD-MCNC: 189 MG/DL (ref 70–99)
HCO3 BLD-SCNC: 19.1 MEQ/L
HCO3 BLD-SCNC: 23.2 MEQ/L
HCO3 BLDA-SCNC: 23.6 MEQ/L (ref 21–27)
HCT VFR BLD AUTO: 34.9 %
HCT VFR BLD CALC: 28 %
HCT VFR BLD CALC: 33 %
HGB BLD-MCNC: 11.8 G/DL
HGB BLD-MCNC: 12.1 G/DL
INR BLD: 1.28 (ref 0.8–1.2)
ISTAT ACTIVATED CLOTTING TIME: 147 SECONDS (ref 74–137)
LACTATE BLD-SCNC: 2.9 MMOL/L (ref 0.5–2)
MAGNESIUM SERPL-MCNC: 2.4 MG/DL (ref 1.6–2.6)
MCH RBC QN AUTO: 30.1 PG (ref 26–34)
MCHC RBC AUTO-ENTMCNC: 33.8 G/DL (ref 31–37)
MCV RBC AUTO: 89 FL
METHGB MFR BLD: 0.4 % SAT (ref 0.4–1.5)
MRSA DNA SPEC QL NAA+PROBE: NEGATIVE
OXYHGB MFR BLDA: 97.7 % (ref 92–100)
PCO2 BLD: 40.6 MMHG
PCO2 BLD: 41.1 MMHG
PCO2 BLDA: 42 MM HG (ref 35–45)
PEEP: 5 CM H2O
PH BLD: 7.27 [PH]
PH BLD: 7.36 [PH]
PH BLDA: 7.36 [PH] (ref 7.35–7.45)
PLATELET # BLD AUTO: 268 10(3)UL (ref 150–450)
PO2 BLD: 183 MMHG
PO2 BLD: 364 MMHG
PO2 BLDA: 170 MM HG (ref 80–100)
POTASSIUM BLD-SCNC: 2.6 MMOL/L (ref 3.6–5.1)
POTASSIUM BLD-SCNC: 3.7 MMOL/L (ref 3.6–5.1)
POTASSIUM BLD-SCNC: 3.8 MMOL/L (ref 3.6–5.1)
POTASSIUM SERPL-SCNC: 2.6 MMOL/L (ref 3.5–5.1)
PRESSURE SUPPORT: 5 CM H2O
PROTHROMBIN TIME: 16 SECONDS (ref 11.6–14.8)
RBC # BLD AUTO: 3.92 X10(6)UL
SAO2 % BLD: 100 %
SAO2 % BLD: 100 %
SODIUM BLD-SCNC: 141 MMOL/L (ref 136–145)
SODIUM BLD-SCNC: 145 MMOL/L (ref 135–145)
SODIUM BLD-SCNC: 149 MMOL/L (ref 136–145)
SODIUM SERPL-SCNC: 151 MMOL/L (ref 136–145)
WBC # BLD AUTO: 34.7 X10(3) UL (ref 4–11)

## 2023-12-27 PROCEDURE — 5A1221Z PERFORMANCE OF CARDIAC OUTPUT, CONTINUOUS: ICD-10-PCS | Performed by: SURGERY

## 2023-12-27 PROCEDURE — 02100Z9 BYPASS CORONARY ARTERY, ONE ARTERY FROM LEFT INTERNAL MAMMARY, OPEN APPROACH: ICD-10-PCS | Performed by: SURGERY

## 2023-12-27 PROCEDURE — 30233N1 TRANSFUSION OF NONAUTOLOGOUS RED BLOOD CELLS INTO PERIPHERAL VEIN, PERCUTANEOUS APPROACH: ICD-10-PCS | Performed by: STUDENT IN AN ORGANIZED HEALTH CARE EDUCATION/TRAINING PROGRAM

## 2023-12-27 PROCEDURE — 36430 TRANSFUSION BLD/BLD COMPNT: CPT | Performed by: ANESTHESIOLOGY

## 2023-12-27 PROCEDURE — 71045 X-RAY EXAM CHEST 1 VIEW: CPT | Performed by: PHYSICIAN ASSISTANT

## 2023-12-27 PROCEDURE — 06BQ4ZZ EXCISION OF LEFT SAPHENOUS VEIN, PERCUTANEOUS ENDOSCOPIC APPROACH: ICD-10-PCS | Performed by: SURGERY

## 2023-12-27 PROCEDURE — 021009W BYPASS CORONARY ARTERY, ONE ARTERY FROM AORTA WITH AUTOLOGOUS VENOUS TISSUE, OPEN APPROACH: ICD-10-PCS | Performed by: SURGERY

## 2023-12-27 PROCEDURE — 93312 ECHO TRANSESOPHAGEAL: CPT | Performed by: ANESTHESIOLOGY

## 2023-12-27 PROCEDURE — B246ZZ4 ULTRASONOGRAPHY OF RIGHT AND LEFT HEART, TRANSESOPHAGEAL: ICD-10-PCS | Performed by: SURGERY

## 2023-12-27 PROCEDURE — 99232 SBSQ HOSP IP/OBS MODERATE 35: CPT | Performed by: HOSPITALIST

## 2023-12-27 RX ORDER — ALBUMIN, HUMAN INJ 5% 5 %
12.5 SOLUTION INTRAVENOUS ONCE
Status: COMPLETED | OUTPATIENT
Start: 2023-12-27 | End: 2023-12-27

## 2023-12-27 RX ORDER — ONDANSETRON 2 MG/ML
4 INJECTION INTRAMUSCULAR; INTRAVENOUS EVERY 6 HOURS PRN
Status: DISCONTINUED | OUTPATIENT
Start: 2023-12-27 | End: 2023-12-27

## 2023-12-27 RX ORDER — ALBUMIN, HUMAN INJ 5% 5 %
12.5 SOLUTION INTRAVENOUS ONCE AS NEEDED
Status: COMPLETED | OUTPATIENT
Start: 2023-12-27 | End: 2023-12-28

## 2023-12-27 RX ORDER — MORPHINE SULFATE 4 MG/ML
4 INJECTION, SOLUTION INTRAMUSCULAR; INTRAVENOUS EVERY 2 HOUR PRN
Status: DISCONTINUED | OUTPATIENT
Start: 2023-12-27 | End: 2024-01-03

## 2023-12-27 RX ORDER — DEXMEDETOMIDINE HYDROCHLORIDE 4 UG/ML
INJECTION, SOLUTION INTRAVENOUS CONTINUOUS PRN
Status: DISCONTINUED | OUTPATIENT
Start: 2023-12-27 | End: 2023-12-27 | Stop reason: SURG

## 2023-12-27 RX ORDER — NICOTINE 21 MG/24HR
1 PATCH, TRANSDERMAL 24 HOURS TRANSDERMAL DAILY
Status: DISCONTINUED | OUTPATIENT
Start: 2023-12-27 | End: 2024-01-03

## 2023-12-27 RX ORDER — MORPHINE SULFATE 2 MG/ML
2 INJECTION, SOLUTION INTRAMUSCULAR; INTRAVENOUS EVERY 2 HOUR PRN
Status: DISCONTINUED | OUTPATIENT
Start: 2023-12-27 | End: 2024-01-03

## 2023-12-27 RX ORDER — CEFAZOLIN SODIUM/WATER 2 G/20 ML
2 SYRINGE (ML) INTRAVENOUS EVERY 8 HOURS
Qty: 100 ML | Refills: 0 | Status: COMPLETED | OUTPATIENT
Start: 2023-12-27 | End: 2023-12-29

## 2023-12-27 RX ORDER — MAGNESIUM SULFATE HEPTAHYDRATE 40 MG/ML
2 INJECTION, SOLUTION INTRAVENOUS AS NEEDED
Status: DISCONTINUED | OUTPATIENT
Start: 2023-12-27 | End: 2024-01-03

## 2023-12-27 RX ORDER — DOBUTAMINE HYDROCHLORIDE 100 MG/100ML
INJECTION INTRAVENOUS CONTINUOUS PRN
Status: DISCONTINUED | OUTPATIENT
Start: 2023-12-27 | End: 2023-12-27 | Stop reason: SURG

## 2023-12-27 RX ORDER — NALOXONE HYDROCHLORIDE 0.4 MG/ML
0.08 INJECTION, SOLUTION INTRAMUSCULAR; INTRAVENOUS; SUBCUTANEOUS
Status: DISCONTINUED | OUTPATIENT
Start: 2023-12-27 | End: 2023-12-29

## 2023-12-27 RX ORDER — ASPIRIN 325 MG
325 TABLET, DELAYED RELEASE (ENTERIC COATED) ORAL DAILY
Status: DISCONTINUED | OUTPATIENT
Start: 2023-12-28 | End: 2024-01-03

## 2023-12-27 RX ORDER — DIPHENHYDRAMINE HYDROCHLORIDE 50 MG/ML
12.5 INJECTION INTRAMUSCULAR; INTRAVENOUS EVERY 4 HOURS PRN
Status: DISCONTINUED | OUTPATIENT
Start: 2023-12-27 | End: 2023-12-29

## 2023-12-27 RX ORDER — MORPHINE SULFATE 4 MG/ML
6 INJECTION, SOLUTION INTRAMUSCULAR; INTRAVENOUS EVERY 2 HOUR PRN
Status: DISCONTINUED | OUTPATIENT
Start: 2023-12-27 | End: 2024-01-03

## 2023-12-27 RX ORDER — CHLORHEXIDINE GLUCONATE ORAL RINSE 1.2 MG/ML
15 SOLUTION DENTAL
Status: DISCONTINUED | OUTPATIENT
Start: 2023-12-27 | End: 2023-12-28

## 2023-12-27 RX ORDER — DEXMEDETOMIDINE HYDROCHLORIDE 4 UG/ML
INJECTION, SOLUTION INTRAVENOUS CONTINUOUS
Status: DISCONTINUED | OUTPATIENT
Start: 2023-12-27 | End: 2023-12-28

## 2023-12-27 RX ORDER — POLYETHYLENE GLYCOL 3350 17 G/17G
17 POWDER, FOR SOLUTION ORAL DAILY PRN
Status: DISCONTINUED | OUTPATIENT
Start: 2023-12-27 | End: 2024-01-03

## 2023-12-27 RX ORDER — POTASSIUM CHLORIDE 29.8 MG/ML
40 INJECTION INTRAVENOUS AS NEEDED
Status: DISCONTINUED | OUTPATIENT
Start: 2023-12-27 | End: 2024-01-03

## 2023-12-27 RX ORDER — ONDANSETRON 2 MG/ML
4 INJECTION INTRAMUSCULAR; INTRAVENOUS EVERY 6 HOURS PRN
Status: DISCONTINUED | OUTPATIENT
Start: 2023-12-27 | End: 2023-12-29

## 2023-12-27 RX ORDER — MAGNESIUM SULFATE 1 G/100ML
1 INJECTION INTRAVENOUS AS NEEDED
Status: DISCONTINUED | OUTPATIENT
Start: 2023-12-27 | End: 2024-01-03

## 2023-12-27 RX ORDER — BISACODYL 10 MG
10 SUPPOSITORY, RECTAL RECTAL
Status: DISCONTINUED | OUTPATIENT
Start: 2023-12-27 | End: 2024-01-03

## 2023-12-27 RX ORDER — POTASSIUM CHLORIDE 14.9 MG/ML
20 INJECTION INTRAVENOUS AS NEEDED
Status: DISCONTINUED | OUTPATIENT
Start: 2023-12-27 | End: 2024-01-03

## 2023-12-27 RX ORDER — ACETAMINOPHEN 10 MG/ML
1000 INJECTION, SOLUTION INTRAVENOUS EVERY 6 HOURS SCHEDULED
Qty: 800 ML | Refills: 0 | Status: COMPLETED | OUTPATIENT
Start: 2023-12-27 | End: 2023-12-28

## 2023-12-27 RX ORDER — TOPIRAMATE 25 MG/1
50 TABLET ORAL 2 TIMES DAILY
Status: DISCONTINUED | OUTPATIENT
Start: 2023-12-28 | End: 2024-01-03

## 2023-12-27 RX ORDER — CEFAZOLIN SODIUM/WATER 2 G/20 ML
SYRINGE (ML) INTRAVENOUS AS NEEDED
Status: DISCONTINUED | OUTPATIENT
Start: 2023-12-27 | End: 2023-12-27 | Stop reason: SURG

## 2023-12-27 RX ORDER — SODIUM CHLORIDE 9 MG/ML
INJECTION, SOLUTION INTRAVENOUS CONTINUOUS
Status: DISCONTINUED | OUTPATIENT
Start: 2023-12-27 | End: 2024-01-03

## 2023-12-27 RX ORDER — PROTAMINE SULFATE 10 MG/ML
INJECTION, SOLUTION INTRAVENOUS AS NEEDED
Status: DISCONTINUED | OUTPATIENT
Start: 2023-12-27 | End: 2023-12-27 | Stop reason: SURG

## 2023-12-27 RX ORDER — SODIUM CHLORIDE 9 MG/ML
INJECTION, SOLUTION INTRAVENOUS CONTINUOUS
Status: DISCONTINUED | OUTPATIENT
Start: 2023-12-27 | End: 2023-12-29

## 2023-12-27 RX ORDER — DEXTROSE AND SODIUM CHLORIDE 5; .45 G/100ML; G/100ML
INJECTION, SOLUTION INTRAVENOUS CONTINUOUS
Status: DISCONTINUED | OUTPATIENT
Start: 2023-12-27 | End: 2024-01-03

## 2023-12-27 RX ORDER — DOBUTAMINE HYDROCHLORIDE 200 MG/100ML
INJECTION INTRAVENOUS CONTINUOUS PRN
Status: DISCONTINUED | OUTPATIENT
Start: 2023-12-27 | End: 2024-01-03

## 2023-12-27 RX ORDER — PHENYLEPHRINE HCL 10 MG/ML
VIAL (ML) INJECTION AS NEEDED
Status: DISCONTINUED | OUTPATIENT
Start: 2023-12-27 | End: 2023-12-27 | Stop reason: SURG

## 2023-12-27 RX ORDER — ROCURONIUM BROMIDE 10 MG/ML
INJECTION, SOLUTION INTRAVENOUS AS NEEDED
Status: DISCONTINUED | OUTPATIENT
Start: 2023-12-27 | End: 2023-12-27 | Stop reason: SURG

## 2023-12-27 RX ORDER — NITROGLYCERIN 20 MG/100ML
INJECTION INTRAVENOUS CONTINUOUS PRN
Status: DISCONTINUED | OUTPATIENT
Start: 2023-12-27 | End: 2024-01-03

## 2023-12-27 RX ORDER — MIDAZOLAM HYDROCHLORIDE 1 MG/ML
1 INJECTION INTRAMUSCULAR; INTRAVENOUS EVERY 30 MIN PRN
Status: DISCONTINUED | OUTPATIENT
Start: 2023-12-27 | End: 2023-12-28

## 2023-12-27 RX ORDER — PANTOPRAZOLE SODIUM 40 MG/1
40 TABLET, DELAYED RELEASE ORAL
Status: DISCONTINUED | OUTPATIENT
Start: 2023-12-28 | End: 2023-12-27

## 2023-12-27 RX ORDER — VANCOMYCIN HYDROCHLORIDE 1 G/20ML
INJECTION, POWDER, LYOPHILIZED, FOR SOLUTION INTRAVENOUS AS NEEDED
Status: DISCONTINUED | OUTPATIENT
Start: 2023-12-27 | End: 2023-12-27 | Stop reason: SURG

## 2023-12-27 RX ORDER — SODIUM CHLORIDE 9 MG/ML
INJECTION, SOLUTION INTRAVENOUS CONTINUOUS PRN
Status: DISCONTINUED | OUTPATIENT
Start: 2023-12-27 | End: 2023-12-27 | Stop reason: SURG

## 2023-12-27 RX ORDER — HEPARIN SODIUM 1000 [USP'U]/ML
INJECTION, SOLUTION INTRAVENOUS; SUBCUTANEOUS AS NEEDED
Status: DISCONTINUED | OUTPATIENT
Start: 2023-12-27 | End: 2023-12-27 | Stop reason: SURG

## 2023-12-27 RX ORDER — LIDOCAINE HYDROCHLORIDE 10 MG/ML
INJECTION, SOLUTION EPIDURAL; INFILTRATION; INTRACAUDAL; PERINEURAL AS NEEDED
Status: DISCONTINUED | OUTPATIENT
Start: 2023-12-27 | End: 2023-12-27 | Stop reason: SURG

## 2023-12-27 RX ORDER — PANTOPRAZOLE SODIUM 40 MG/1
40 TABLET, DELAYED RELEASE ORAL
Status: DISCONTINUED | OUTPATIENT
Start: 2023-12-27 | End: 2024-01-03

## 2023-12-27 RX ORDER — SENNOSIDES 8.6 MG
17.2 TABLET ORAL NIGHTLY PRN
Status: DISCONTINUED | OUTPATIENT
Start: 2023-12-27 | End: 2024-01-03

## 2023-12-27 RX ORDER — METHYLPREDNISOLONE SODIUM SUCCINATE 500 MG/8ML
INJECTION INTRAMUSCULAR; INTRAVENOUS AS NEEDED
Status: DISCONTINUED | OUTPATIENT
Start: 2023-12-27 | End: 2023-12-27 | Stop reason: SURG

## 2023-12-27 RX ORDER — MIDAZOLAM HYDROCHLORIDE 1 MG/ML
INJECTION INTRAMUSCULAR; INTRAVENOUS AS NEEDED
Status: DISCONTINUED | OUTPATIENT
Start: 2023-12-27 | End: 2023-12-27 | Stop reason: SURG

## 2023-12-27 RX ORDER — MELATONIN
3 NIGHTLY PRN
Status: DISCONTINUED | OUTPATIENT
Start: 2023-12-27 | End: 2024-01-03

## 2023-12-27 RX ORDER — ENEMA 19; 7 G/133ML; G/133ML
1 ENEMA RECTAL ONCE AS NEEDED
Status: DISCONTINUED | OUTPATIENT
Start: 2023-12-27 | End: 2024-01-03

## 2023-12-27 RX ADMIN — ROCURONIUM BROMIDE 10 MG: 10 INJECTION, SOLUTION INTRAVENOUS at 12:24:00

## 2023-12-27 RX ADMIN — PHENYLEPHRINE HCL 100 MCG: 10 MG/ML VIAL (ML) INJECTION at 10:48:00

## 2023-12-27 RX ADMIN — PHENYLEPHRINE HCL 100 MCG: 10 MG/ML VIAL (ML) INJECTION at 10:21:00

## 2023-12-27 RX ADMIN — HEPARIN SODIUM 5000 UNITS: 1000 INJECTION, SOLUTION INTRAVENOUS; SUBCUTANEOUS at 12:00:00

## 2023-12-27 RX ADMIN — PROTAMINE SULFATE 150 MG: 10 INJECTION, SOLUTION INTRAVENOUS at 13:46:00

## 2023-12-27 RX ADMIN — Medication 25 ML: at 11:54:00

## 2023-12-27 RX ADMIN — DOBUTAMINE HYDROCHLORIDE 2 MCG/KG/MIN: 100 INJECTION INTRAVENOUS at 13:50:00

## 2023-12-27 RX ADMIN — MIDAZOLAM HYDROCHLORIDE 3 MG: 1 INJECTION INTRAMUSCULAR; INTRAVENOUS at 11:00:00

## 2023-12-27 RX ADMIN — SODIUM CHLORIDE: 9 INJECTION, SOLUTION INTRAVENOUS at 09:59:00

## 2023-12-27 RX ADMIN — PHENYLEPHRINE HCL 100 MCG: 10 MG/ML VIAL (ML) INJECTION at 13:49:00

## 2023-12-27 RX ADMIN — VANCOMYCIN HYDROCHLORIDE 1000 MG: 1 INJECTION, POWDER, LYOPHILIZED, FOR SOLUTION INTRAVENOUS at 10:47:00

## 2023-12-27 RX ADMIN — PHENYLEPHRINE HCL 100 MCG: 10 MG/ML VIAL (ML) INJECTION at 10:05:00

## 2023-12-27 RX ADMIN — PHENYLEPHRINE HCL 100 MCG: 10 MG/ML VIAL (ML) INJECTION at 12:22:00

## 2023-12-27 RX ADMIN — ROCURONIUM BROMIDE 10 MG: 10 INJECTION, SOLUTION INTRAVENOUS at 11:02:00

## 2023-12-27 RX ADMIN — Medication 25 ML: at 10:56:00

## 2023-12-27 RX ADMIN — MIDAZOLAM HYDROCHLORIDE 5 MG: 1 INJECTION INTRAMUSCULAR; INTRAVENOUS at 12:24:00

## 2023-12-27 RX ADMIN — SODIUM CHLORIDE: 9 INJECTION, SOLUTION INTRAVENOUS at 10:45:00

## 2023-12-27 RX ADMIN — CEFAZOLIN SODIUM/WATER 2 G: 2 G/20 ML SYRINGE (ML) INTRAVENOUS at 13:57:00

## 2023-12-27 RX ADMIN — DOBUTAMINE HYDROCHLORIDE 3 MCG/KG/MIN: 100 INJECTION INTRAVENOUS at 13:33:00

## 2023-12-27 RX ADMIN — PHENYLEPHRINE HCL 50 MCG: 10 MG/ML VIAL (ML) INJECTION at 12:04:00

## 2023-12-27 RX ADMIN — PHENYLEPHRINE HCL 100 MCG: 10 MG/ML VIAL (ML) INJECTION at 12:20:00

## 2023-12-27 RX ADMIN — PHENYLEPHRINE HCL 100 MCG: 10 MG/ML VIAL (ML) INJECTION at 10:16:00

## 2023-12-27 RX ADMIN — ROCURONIUM BROMIDE 50 MG: 10 INJECTION, SOLUTION INTRAVENOUS at 10:02:00

## 2023-12-27 RX ADMIN — PHENYLEPHRINE HCL 100 MCG: 10 MG/ML VIAL (ML) INJECTION at 10:32:00

## 2023-12-27 RX ADMIN — PROTAMINE SULFATE 10 MG: 10 INJECTION, SOLUTION INTRAVENOUS at 13:44:00

## 2023-12-27 RX ADMIN — CEFAZOLIN SODIUM/WATER 2 G: 2 G/20 ML SYRINGE (ML) INTRAVENOUS at 10:10:00

## 2023-12-27 RX ADMIN — DEXMEDETOMIDINE HYDROCHLORIDE 0.5 MCG/KG/HR: 4 INJECTION, SOLUTION INTRAVENOUS at 11:03:00

## 2023-12-27 RX ADMIN — METHYLPREDNISOLONE SODIUM SUCCINATE 250 MG: 500 INJECTION INTRAMUSCULAR; INTRAVENOUS at 11:00:00

## 2023-12-27 RX ADMIN — PROTAMINE SULFATE 100 MG: 10 INJECTION, SOLUTION INTRAVENOUS at 13:58:00

## 2023-12-27 RX ADMIN — ROCURONIUM BROMIDE 10 MG: 10 INJECTION, SOLUTION INTRAVENOUS at 13:22:00

## 2023-12-27 RX ADMIN — HEPARIN SODIUM 12000 UNITS: 1000 INJECTION, SOLUTION INTRAVENOUS; SUBCUTANEOUS at 12:11:00

## 2023-12-27 RX ADMIN — LIDOCAINE HYDROCHLORIDE 50 MG: 10 INJECTION, SOLUTION EPIDURAL; INFILTRATION; INTRACAUDAL; PERINEURAL at 10:02:00

## 2023-12-27 RX ADMIN — MIDAZOLAM HYDROCHLORIDE 2 MG: 1 INJECTION INTRAMUSCULAR; INTRAVENOUS at 10:00:00

## 2023-12-27 RX ADMIN — SODIUM CHLORIDE: 9 INJECTION, SOLUTION INTRAVENOUS at 13:43:00

## 2023-12-27 NOTE — PROGRESS NOTES
[facility]     Hospitalist Progress Note     Soumya King Patient Status:  Inpatient    6/3/1960 MRN [id number]   [facility] 8NE-A Attending Jace Flores MD   Hosp Day # 6 PCP Rika Vinson DO     Chief Complaint:   Chief Complaint   Patient presents with    Chest Pain Angina     Intermittent chest pain since 1300 radiating to rt shoudler/ arm. States currently sharp 4/10 but was 10/10. SOB, dizzy.        Subjective:     Patient feels fine today, just very tired and sleepy    Objective:    Review of Systems:   4 point ros negative except per subjective    Vital signs:  Temp:  [97.8 °F (36.6 °C)-98.4 °F (36.9 °C)] 98.2 °F (36.8 °C)  Pulse:  [73-98] 93  Resp:  [16-18] 16  BP: ()/(59-72) 89/61  SpO2:  [96 %-100 %] 100 %  AO: ()/(47-68) 95/47  FiO2 (%):  [40 %-50 %] 40 %    Physical Exam:    General: No acute distress.   Respiratory: CTA bilaterally  Cardiovascular: RRR, S1-S2 present  Abdomen: Soft, nontender, nondistended  Extremities: No edema      Diagnostic Data:    Labs:  Recent Labs   Lab 23  0437 23  1052 23  0540 23  0620 23  0522 23  1511   WBC 13.4* 12.1*  12.1* 11.7* 11.2*  --  34.7*   HGB 10.7* 10.7*  10.7* 11.9* 11.3*  --  11.8*   MCV 95.0 94.2  94.2 95.9 93.6  --  89.0   .0*  452.0* 422.0  422.0 420.0 431.0  --  268.0   INR  --   --   --   --  1.07 1.28*       Recent Labs   Lab 23  1504 23  0437 23  0540 23  0620 23  1511   *   < > 164* 151* 158*   BUN 17   < > 11 14 12   CREATSERUM 0.86   < > 0.72 0.74 0.91   CA 8.7   < > 9.1 9.0 7.6*   ALB 3.4  --   --   --   --       < > 138 140 151*   K 4.3   < > 3.8  3.8 4.0  4.0 2.6*      < > 112 111 118*   CO2 27.0   < > 23.0 21.0 24.0   ALKPHO 94  --   --   --   --    AST 33  --   --   --   --    ALT 52  --   --   --   --    BILT 0.2  --   --   --   --    TP 7.1  --   --   --   --     < > = values in this interval not  displayed.       Estimated Creatinine Clearance: 49 mL/min (based on SCr of 0.91 mg/dL).    Recent Labs   Lab 12/26/23  0522 12/27/23  1511   PTP 13.9 16.0*   INR 1.07 1.28*            COVID-19 Lab Results    COVID-19  Lab Results   Component Value Date    COVID19 Not Detected 12/14/2023    COVID19 Not Detected 12/06/2022    COVID19 Not Detected 08/29/2022       Pro-Calcitonin  No results for input(s): \"PCT\" in the last 168 hours.    Cardiac  No results for input(s): \"TROP\", \"PBNP\" in the last 168 hours.    Creatinine Kinase  No results for input(s): \"CK\" in the last 168 hours.    Inflammatory Markers  Recent Labs   Lab 12/21/23  1504   DDIMER 1.43*       Recent Labs   Lab 12/21/23  1707 12/21/23  2229 12/22/23  0437   TROPHS 1,183* 2,718* 2,440*       Imaging: Imaging data reviewed in Epic.    Medications:    [START ON 12/28/2023] metoprolol tartrate  25 mg Oral 2x Daily(Beta Blocker)    vancomycin  15 mg/kg Intravenous Q12H    mupirocin  1 Application Nasal BID    [Held by provider] aspirin  325 mg Oral Daily    ceFAZolin  2 g Intravenous Q8H    acetaminophen  1,000 mg Intravenous 4 times per day    [START ON 12/28/2023] topiramate  50 mg Oral BID    pantoprazole  40 mg Intravenous BID AC    Or    pantoprazole  40 mg Oral BID AC    oxybutynin ER  10 mg Oral Daily    QUEtiapine ER  150 mg Oral Nightly    And    QUEtiapine ER  50 mg Oral Nightly    atorvastatin  40 mg Oral Nightly    azelastine  1 spray Each Nare BID    busPIRone  10 mg Oral Daily    clonazePAM  0.5 mg Oral BID    desvenlafaxine ER  100 mg Oral Daily    gabapentin  300 mg Oral Nightly    cetirizine  10 mg Oral Daily    umeclidinium bromide  1 puff Inhalation Daily       Assessment & Plan:      # NSTEMI  -Heparin drip, aspirin, metoprolol, statin; hx difficulty swallowing asa due to barretts. Not a true allergy  -Cardiac cath on 12/22 showed multivessel disease  -s/p CABG today    #Hypertension  #DM2-monitor on insulin;  controlled  #Hyperlipidemia  #Smoker  -Nicotine patch        Supplementary Documentation:     Quality:  DVT Prophylaxis: heparin drip  CODE status: full  Gotti: no  Central line: no      Estimated date of discharge: tbd  At this point Ms. King is expected to be discharge to: home

## 2023-12-27 NOTE — CM/SW NOTE
Chart reviewed. Pt going for CABG procedure today. Per protocol, SW sent HH referrals in [facility]. Order entered. Await options to provide choice list.     Cori Pineda, MSW, LSW  Discharge Planner  [id number]

## 2023-12-27 NOTE — OPERATIVE REPORT
OPERATIVE REPORT    DATE OF PROCEDURE: 12/27/2023    PREOPERATIVE DIAGNOSIS:  Severe multivessel coronary artery disease  NSTEMI  Jamison esophagus  GERD  COPD  Diabetes  Hypertension  Hx of bronchitis    POSTOPERATIVE DIAGNOSIS:  Same     PROCEDURES:    1.) Transesophageal echocardiogram  2.) Coronary artery bypass grafting x2 (LIMA to LAD, reverse SVG to RCA)  3.) Left leg endovein harvest    SURGEON: Renny Wood MD  ASSISTANT: Frankie Saunders MD  PA: Dionne Almanza PA-C    The PA helped with vein harvest, exposure, following sutures and closing the chest.    ANESTHESIA: General endotracheal anesthesia  ESTIMATED BLOOD LOSS: On cardiopulmonary bypass and cell saver  IV FLUIDS: see anesthesia record    DRAINS:  36 Mozambican anterior mediastinal and left pleural  36 Mozambican anterior mediastinal and right pleural    SPECIMEN: None  CARDIOPULMONARY BYPASS: 77 minutes  CROSS-CLAMP: 61 minutes    FINDINGS: Small PDA. Bypassed to thickened RCA still only 1mm in size.     INDICATION: Soumya King is a 63 year old female with history of hypertension, COPD and DM2 who presented with NSTEMI and found to have severe, multivessel coronary artery disease with a left ventricular ejection fraction of 40-45%.  The patient was counseled on and understand the potential risks, alternatives, and benefits associated with coronary artery bypass graft and and elected to proceed with surgery.    OPERATION IN DETAIL: A timeout procedure was performed confirming the correct patient, surgical site, and procedure. The patient underwent uneventful general endotracheal anesthesia and invasive hemodynamic monitoring lines were placed. The neck, chest, abdomen, groins, and legs were prepped and draped in the usual sterile fashion and appropriate perioperative antibiotics were administered.    A median sternotomy incision was performed and the left internal mammary artery was harvested in a pedicled fashion while the ;eft greater saphenous vein was  harvested endoscopically. After administering 5000 units of heparin, the distal end of the left internal mammary artery was divided and demonstrated pulsatile flow. A clip was placed on the end and the conduit soaked in a ray noel saturated with papaverine solution.    A sternal retractor was inserted and a pericardial well was created. Two pledgeted 3-0 ethibond suture were placed in the distal ascending aorta in a pursestring fashion. A full 300 units per kilogram of heparin was administered. Patient was cannulated for cardiopulmonary bypass using the high ascending aorta via Seldinger technique and venous drainage was accomplished via a two-stage cannula in the right atrial appendage. An antegrade cardioplegia catheter was placed in the proximal ascending aorta to provide antegrade cardioplegia and root ventilation. When a satisfactory activating clotting time greater than 400 was confirmed, cardiopulmonary bypass was initiated, and the patient was cooled to 32 degrees. The aortic cross-clamp was applied with the heart being actively fibrillated and cardioplegic arrest was initiated with antegrade cold blood Buckberg cardioplegia while ice was placed on the heart, yielding satisfactory diastolic arrest. The cardioplegia was regularly re-dosed throughout the procedure.    The PDA coronary artery was then identified and noted to be too small to bypass. The RCA was then identified and incised. The vessel was approximately 1 millimeters in size and of adequate quality although it was diffusely thickened with scattered visible and palpable plaques. A reverse saphenous vein graft was anastomosed in a running fashion with 7-0 Prolene suture then probed proximally and distally prior to being secured. The graft was gently distended and found to be widely patent and hemostatic. Antegrade cardioplegia was given and the heart filled. The appropriate length for the graft was measured and the vein trimmed and spatulated.      Next a slit in the pericardium was made with electrocautery taking care not to injure the left phrenic nerve and the LIMA was brought into the pericardial well. Its length was confirmed and a suitable site on the LAD identified. The LAD was opened then the arteriotomy was extended with Aguilar scissors and was approximately 1.5 millimeters in size and of good quality. The LIMA was then trimmed to the appropriate length, a bulldog placed at the base, and the end spatulated. We then performed the distal LIMA anastomosis using a running 8-0 Prolene in an end-to-side fashion. We then tested the anastomosis by temporarily removing the bulldog and confirmed hemostasis. A partial 6-0 Prolene suture was used to tack the mammary to the epicardial surface.    We then performed the proximal anastomoses.  The heart was drained and an aortotomy was made with an #11 blade scalpel and aortic punch. The RCA graft anastomosis was performed with a running #6-0 Prolene.     A hot shot was then given, the patient rewarmed, and the bulldog removed from the LIMA. Ventricular pacing wires were then placed along the diaphragmatic surface of the heart and tunneled through and secured to the skin. A 36 Malawian straight chest tube was placed in the anterior mediastinum and right pleural space while a 36 Malawian right angle chest tube was placed in the anterior mediastinum and left pleural space with each being tunneled through and secured to the skin. Transesophageal echocardiography was then used to confirm adequate de-airing and good ventricular function. The antegrade cannula was then removed and its pursestring was secured. After all operative hemostasis was ensured, the patient was weaned from cardiopulmonary bypass. The venous cannula was removed and its pursestring was secured. Protamine was administered and the pump suckers immediately removed prior to protamine reaching the patient. The remaining volume from the pump reservoir was  bagged to be given back to the patient via cell saver. The arterial cannula was removed and its pursestring was secured. The field was then re-inspected for hemostasis, and the pericardial well was taken down. The pericardium was closed.    The chest was closed with stainless steel wires. After ensuring hemostasis, the sternum was then re-approximated, and the sternal wires were tightened, secured, and turned down.  The wound was then irrigated with antibiotic saline solution. The pre-sternal fascia was re-approximated with a running #0 Vicryl suture. The deep dermis was re-approximated with a running #2-0 Vicryl suture. The skin was re-approximated with a #3-0 Vicryl suture in a running, subcuticular fashion. The wound was cleaned and sealed with steri strips and a sterile dressing.    All counts were correct. The patient was subsequently transferred to the cardiothoracic surgery intensive care unit still intubated and sedated with stable hemodynamics and fluid resuscitation requirements.       Renny Wood MD  [facility]

## 2023-12-27 NOTE — ANESTHESIA PROCEDURE NOTES
Arterial Line    Date/Time: 12/27/2023 10:10 AM    Performed by: Marianne Schmitz MD  Authorized by: Marianne Schmitz MD    General Information and Staff    Procedure Start:  12/27/2023 10:10 AM  Procedure End:  12/27/2023 10:11 AM  Anesthesiologist:  Marianne Schmizt MD  Performed By:  Anesthesiologist  Patient Location:  OR  Indication: continuous blood pressure monitoring and blood sampling needed    Site Identification: real time ultrasound guided    Preanesthetic Checklist: 2 patient identifiers, IV checked, risks and benefits discussed, monitors and equipment checked, pre-op evaluation, timeout performed, anesthesia consent and sterile technique used    Procedure Details    Catheter Size:  20 G  Catheter Length:  1 and 3/4 inch  Catheter Type:  Arrow  Seldinger Technique?: Yes    Laterality:  Left  Site:  Radial artery  Site Prep: chlorhexidine    Line Secured:  Wrist Brace, tape and Tegaderm    Assessment    Events: patient tolerated procedure well with no complications      Medications  12/27/2023 10:10 AM      Additional Comments

## 2023-12-27 NOTE — PLAN OF CARE
Assumed care of pt at 1930 on 12/26  A/Ox4, up standby.   Room air w/ no complaints of shortness of breath.   NSR on tele. Denies chest pain.   Continent of bladder and bowel.   Plan for CABG 12/27.   Heparin gtt running as ordered.   Fall precautions in place. Call light in reach. Pt updated on plan of care.     Problem: Diabetes/Glucose Control  Goal: Glucose maintained within prescribed range  Description: INTERVENTIONS:  - Monitor Blood Glucose as ordered  - Assess for signs and symptoms of hyperglycemia and hypoglycemia  - Administer ordered medications to maintain glucose within target range  - Assess barriers to adequate nutritional intake and initiate nutrition consult as needed  - Instruct patient on self management of diabetes  Outcome: Progressing     Problem: Patient/Family Goals  Goal: Patient/Family Long Term Goal  Description: Patient's Long Term Goal: stay out of hospital 12-25    Interventions:  - Medication compliance  - smoking cessation   - follow up with primary care physician     - See additional Care Plan goals for specific interventions  Outcome: Progressing  Goal: Patient/Family Short Term Goal  Description: Patient's Short Term Goal: go home  No pain 12-25    Interventions:   - medication education   - smoking cessation education   - cardiology consult   - follow up with care team  - PRN tylenol, hot/cold packs, tell nurse if in pain     - See additional Care Plan goals for specific interventions  Outcome: Progressing     Problem: CARDIOVASCULAR - ADULT  Goal: Maintains optimal cardiac output and hemodynamic stability  Description: INTERVENTIONS:  - Monitor vital signs, rhythm, and trends  - Monitor for bleeding, hypotension and signs of decreased cardiac output  - Evaluate effectiveness of vasoactive medications to optimize hemodynamic stability  - Monitor arterial and/or venous puncture sites for bleeding and/or hematoma  - Assess quality of pulses, skin color and temperature  - Assess  for signs of decreased coronary artery perfusion - ex. Angina  - Evaluate fluid balance, assess for edema, trend weights  Outcome: Progressing  Goal: Absence of cardiac arrhythmias or at baseline  Description: INTERVENTIONS:  - Continuous cardiac monitoring, monitor vital signs, obtain 12 lead EKG if indicated  - Evaluate effectiveness of antiarrhythmic and heart rate control medications as ordered  - Initiate emergency measures for life threatening arrhythmias  - Monitor electrolytes and administer replacement therapy as ordered  Outcome: Progressing

## 2023-12-27 NOTE — ANESTHESIA PROCEDURE NOTES
Procedure Performed: LIANET       Start Time:  12/27/2023 10:44 AM       End Time:      Preanesthesia Checklist:  Patient identified, IV assessed, risks and benefits discussed, monitors and equipment assessed, procedure being performed at surgeon's request and anesthesia consent obtained. General Procedure Information  Diagnostic Indications for Echo:  assessment of ascending aorta  Physician Requesting Echo: Debbie Meredith MD  Location performed:  OR  Intubated  Bite block placed  Heart visualized  Probe Insertion:  Easy  Probe Type:  Multiplane  Modalities:  2D only, color flow mapping, pulse wave Doppler and continuous wave Doppler    Echocardiographic and Doppler Measurements    Ventricles    Right Ventricle:  Cavity size normal.  Hypertrophy not present. Thrombus not present. Global function normal.    Left Ventricle:  Cavity size normal.  Hypertrophy not present. Thrombus not present. Global Function normal.  Ejection Fraction 60%. Ventricular Regional Function:  1- Basal Anteroseptal:  normal  2- Basal Anterior:  normal  3- Basal Anterolateral:  normal  4- Basal Inferolateral:  normal  5- Basal Inferior:  normal  6- Basal Inferoseptal:  normal  7- Mid Anteroseptal:  normal  8- Mid Anterior:  normal  9- Mid Anterolateral:  normal  10- Mid Inferolateral:  normal  11- Mid Inferior:  normal  12- Mid Inferoseptal:  normal  13- Apical Anterior:  normal  14- Apical Lateral:  normal  15- Apical Inferior:  normal  16- Apical Septal:  normal  17- Levering:  normal      Valves    Aortic Valve: Annulus normal.  Stenosis not present. Regurgitation absent. Leaflets normal.  Leaflet motions normal.      Mitral Valve: Annulus normal.  Stenosis not present. Regurgitation absent. Leaflets normal.  Leaflet motions normal.      Tricuspid Valve: Annulus normal.  Stenosis not present. Regurgitation absent. Leaflets normal.  Leaflet motions normal.    Pulmonic Valve:   Annulus normal.        Aorta    Ascending Aorta: Size normal.  Diameter 2.12 cm. Dissection not present. Plaque thickness less than 3 mm. Mobile plaque not present. Aortic Arch:  Size normal.  Dissection not present. Plaque thickness less than 3 mm. Mobile plaque not present. Descending Aorta:  Size normal.  Diameter 1.79 cm. Dissection not present. Plaque thickness less than 3 mm. Mobile plaque not present. Atria    Right Atrium:  Size normal.  Spontaneous echo contrast not present. Thrombus not present. Tumor not present. Device not present. Left Atrium:  Size normal.  Spontaneous echo contrast not present. Thrombus not present. Tumor not present. Device not present.     Left atrial appendage normal.      Septa    Atrial Septum:  Intra-atrial septal morphology normal.              Other Findings  Pericardium:  pericardial effusion  Pleural Effusion:  none  Pulmonary Arteries:  normal  Pulmonary Venous Flow:  normal    Anesthesia Information  Performed Personally  Anesthesiologist:  Charli Honeycutt MD      Post    Ventricular FXN:  Global FXN: Unchanged   Regional FXN: Unchanged  Valve FXN:  Native Valve:No change              Summary: Aortic cannulation site evaluation, no dissection seen  Complications:None

## 2023-12-27 NOTE — ANESTHESIA PROCEDURE NOTES
Central Line    Date/Time: 12/27/2023 10:18 AM    Performed by: Eli Crump MD  Authorized by: Eli Crump MD    General Information and Staff    Procedure Start:  12/27/2023 10:18 AM  Procedure End:  12/27/2023 10:29 AM  Anesthesiologist:  Eli Crump MD  Performed by:   Anesthesiologist  Patient Location:  OR  Indication: central venous access and CVP monitoring    Site Identification: real time ultrasound guided and image stored and retrievable    Preanesthetic Checklist: 2 patient identifiers, IV checked, risks and benefits discussed, monitors and equipment checked, pre-op evaluation, timeout performed, anesthesia consent and sterile technique used    Procedure Detail    Patient Position:  Trendelenburg  Laterality:  Right  Site:  Internal jugular  Prep:  Chloraprep  Catheter Size:  9 Fr  Catheter Type:  MAC introducer  Number of Lumens:  Double lumen  Procedure Detail: target vein identified, needle advanced into vein and blood aspirated and guidewire advanced into vein    Seldinger Technique?: Yes    Intravenous Verification: verified by ultrasound and venous blood return    Post Insertion: all ports aspirated, all ports flushed easily, guidewire was removed intact, line was sutured in place and dressing was applied      Assessment    Events: patient tolerated procedure well with no complications      PA Catheter Placement    PA Catheter Placed?: Yes    PA Catheter Type:  Oximetric  PA Catheter Size:  8  Laterality:  Right  Site:  Internal jugular  Placement Confirmation: pressure tracing changes and verified by LIANET    Events: patient tolerated procedure well with no complications      Additional Comments

## 2023-12-27 NOTE — PLAN OF CARE
Patient AXOX4.On room air.NSR on tele.No c/o pain. Heparin drip infusing.Plan for CABG today.Consent signed,skin prep done ,NPO status maintained.Call light within reach.Plan of care updated.  Problem: Diabetes/Glucose Control  Goal: Glucose maintained within prescribed range  Description: INTERVENTIONS:  - Monitor Blood Glucose as ordered  - Assess for signs and symptoms of hyperglycemia and hypoglycemia  - Administer ordered medications to maintain glucose within target range  - Assess barriers to adequate nutritional intake and initiate nutrition consult as needed  - Instruct patient on self management of diabetes  Outcome: Progressing     Problem: Patient/Family Goals  Goal: Patient/Family Long Term Goal  Description: Patient's Long Term Goal: stay out of hospital 12-25    Interventions:  - Medication compliance  - smoking cessation   - follow up with primary care physician     - See additional Care Plan goals for specific interventions  Outcome: Progressing  Goal: Patient/Family Short Term Goal  Description: Patient's Short Term Goal: go home  No pain 12-25    Interventions:   - medication education   - smoking cessation education   - cardiology consult   - follow up with care team  - PRN tylenol, hot/cold packs, tell nurse if in pain     - See additional Care Plan goals for specific interventions  Outcome: Progressing     Problem: CARDIOVASCULAR - ADULT  Goal: Maintains optimal cardiac output and hemodynamic stability  Description: INTERVENTIONS:  - Monitor vital signs, rhythm, and trends  - Monitor for bleeding, hypotension and signs of decreased cardiac output  - Evaluate effectiveness of vasoactive medications to optimize hemodynamic stability  - Monitor arterial and/or venous puncture sites for bleeding and/or hematoma  - Assess quality of pulses, skin color and temperature  - Assess for signs of decreased coronary artery perfusion - ex. Angina  - Evaluate fluid balance, assess for edema, trend  weights  Outcome: Progressing  Goal: Absence of cardiac arrhythmias or at baseline  Description: INTERVENTIONS:  - Continuous cardiac monitoring, monitor vital signs, obtain 12 lead EKG if indicated  - Evaluate effectiveness of antiarrhythmic and heart rate control medications as ordered  - Initiate emergency measures for life threatening arrhythmias  - Monitor electrolytes and administer replacement therapy as ordered  Outcome: Progressing

## 2023-12-27 NOTE — PROGRESS NOTES
[facility]     Cardiology Progress Note        Soumya King Patient Status:  Inpatient    6/3/1960 MRN [id number]   Location [facility] 6NE-A Attending Karla Camejo MD   Hosp Day # 6 PCP Rika Vinson DO       Subjective/ROS:  Intubated, sedated     Objective:  BP 96/59 (BP Location: Left arm)   Pulse 98   Temp 98.2 °F (36.8 °C) (Temporal)   Resp 16   Ht 5' 1\" (1.549 m)   Wt 126 lb 1.7 oz (57.2 kg)   SpO2 99%   BMI 23.83 kg/m²     Temp (24hrs), Av.1 °F (36.7 °C), Min:97.8 °F (36.6 °C), Max:98.4 °F (36.9 °C)      Tele  sr    Intake/Output:    Intake/Output Summary (Last 24 hours) at 2023 1542  Last data filed at 2023 1508  Gross per 24 hour   Intake 2727.08 ml   Output 1550 ml   Net 1177.08 ml       Patient Weight for the past 72 hrs:   Weight   23 0535 126 lb 1.7 oz (57.2 kg)        Physical Exam:    Gen:intubated, sedated   Heent: ETT in place  Neck: Right IJ  Cardiac: regular rate and rhythm, normal S1,S2 +rub  Lungs: clear anteriorly on vent   Abd: soft, flat  Ext: no edema. Palpable distal pulses. Extremities warm   Skin: Warm, dry  Neuro: sedated    Labs:  Lab Results   Component Value Date    PTT 57.7 2023       CXR: pending     Medications:     [START ON 2023] metoprolol tartrate  25 mg Oral 2x Daily(Beta Blocker)    vancomycin  15 mg/kg Intravenous Q12H    mupirocin  1 Application Nasal BID    [Held by provider] aspirin  325 mg Oral Daily    ceFAZolin  2 g Intravenous Q8H    acetaminophen  1,000 mg Intravenous 4 times per day    [START ON 2023] topiramate  50 mg Oral BID    pantoprazole  40 mg Intravenous BID AC    Or    pantoprazole  40 mg Oral BID AC    oxybutynin ER  10 mg Oral Daily    QUEtiapine ER  150 mg Oral Nightly    And    QUEtiapine ER  50 mg Oral Nightly    atorvastatin  40 mg Oral Nightly    azelastine  1 spray Each Nare BID    busPIRone  10 mg Oral Daily    clonazePAM  0.5 mg Oral BID    desvenlafaxine ER  100 mg Oral  Daily    gabapentin  300 mg Oral Nightly    cetirizine  10 mg Oral Daily    umeclidinium bromide  1 puff Inhalation Daily      sodium chloride 10 mL/hr at 12/27/23 1536    DOBUTamine 2 mcg/kg/min (12/27/23 1531)    nitroGLYCERIN in dextrose 5%      norepinephrine      NitroPRUSSide (Nipride) 50 mg in dextrose 5% 250 mL infusion      dextrose 5%-sodium chloride 0.45% 70 mL/hr (12/27/23 1530)    sodium chloride 10 mL/hr at 12/27/23 1532    HYDROmorphone in sodium chloride 0.9%      insulin regular 3 Units/hr (12/27/23 1534)    propofol      dexmedetomidine         Assessment:    Cad presenting w/nstemi found to have mv cad now s/p cabg x 2 (lima to lad, svg to pda)- POD 0- ef 40-45%  Hemodynamically stable on low dose dobutamine. CO/CI  4.0/2.6. PAP 23/14  svo2 72%  EKG sr, rate 89 bpm with nonspecific st changes but without acute injury pattern  Good uop. Scant ct op- hgb 11.8, plts 268  Htn  Hl- on statin   Dm2  Tobacco use- cessation has been advised. Will continue to reinforce post op    Plan:     Routine post operative recovery  Wean dobs as able  Cxr, labs pending- will review as they become available  Plan to wean later this evening pending clinical course    Discussed plan of care with nursing.       Celia Elise NP  [phone]      Patient seen and examined independently.  Note reviewed and labs reviewed. Agree with above assessment and plan. 63 year old female who presented with NSTEMI and found to have severe MV CAD - s/p CABG x2 (LIMA-LAD, SVG-PDA) today and seen in the CCU thereafter. Off of levo. Wean doubtamine as tolerated. EKG - native sinus rhythm. CXR for swan positioning. Cont standard post-CABG management. GDMT optimization as able. Ultimately, would benefit from plavix given presentation as NSTEMI. Will cont to follow.        [place] Brian DO  Cardiologist  [facility]  12/27/2023 4:13 PM      Note to the patient: The 21st Century Cures Act makes medical notes like these  available to patients in the interest of transparency. However, be advised that this is a medical document. It is intended as peer to peer communication. It is written in medical language and may contain abbreviations or verbiage that are unfamiliar. It may appear blunt or direct. Medical documents are intended to carry relevant information, facts as evident, and clinical opinion of the practitioner.     Disclaimer: Components of this note were documented using voice recognition system and are subject to errors not corrected at proofreading. Contact the author of this note for any clarifications.

## 2023-12-27 NOTE — DIETARY NOTE
Clinical Nutrition    Dietitian consult received per cardiac rehab standing order. Pt to be educated by cardiac rehab staff and encouraged to attend outpatient classes taught by RD. RD available PRN.    Jyothi Gray MS, RD, LDN  Clinical Dietitian  Pager #: [phone]

## 2023-12-27 NOTE — ANESTHESIA POSTPROCEDURE EVALUATION
Deuce Huizar 55 Patient Status:  Inpatient   Age/Gender 66year old female MRN [id number]   [facility] 6NE-A Attending Rigoberto Roberts MD   Hosp Day # 6 PCP Rika Mayer DO       Anesthesia Post-op Note    CORONARY ARTERY BYPASS GRAFT x2 , takedown of left internal mammary artery, endovascular harvest of left saphenous vein, intraoperative transesophageal echocardiogram    Procedure Summary       Date: 12/27/23 Room / Location: [address] [place] [address] 01 / [facility]    Anesthesia Start: 0956 Anesthesia Stop: 0959    Procedure: CORONARY ARTERY BYPASS GRAFT x2 , takedown of left internal mammary artery, endovascular harvest of left saphenous vein, intraoperative transesophageal echocardiogram Diagnosis: (Coronary artery disease)    Surgeons: Laine Ramirez MD Anesthesiologist: Radha Soria MD    Anesthesia Type: general ASA Status: 4            Anesthesia Type: general    Vitals Value Taken Time   /50 12/27/23 1509   Temp 96.5 12/27/23 1509   Pulse 82 12/27/23 1508   Resp 17 12/27/23 1508   SpO2 100 % 12/27/23 1508   Vitals shown include unfiled device data. Patient Location: ICU    Anesthesia Type: general    Airway Patency: intubated    Postop Pain Control: sedated until time of extubation    Mental Status: sedated until time of extubation    Nausea/Vomiting: none    Cardiopulmonary/Hydration status: stable euvolemic    Complications: no apparent anesthesia related complications    Postop vital signs: stable    Comments: Report to CNICU RN    Dental Exam: Unchanged from Preop    Patient to be transferred to ICU.

## 2023-12-28 ENCOUNTER — APPOINTMENT (OUTPATIENT)
Dept: GENERAL RADIOLOGY | Facility: HOSPITAL | Age: 63
DRG: 234 | End: 2023-12-28
Attending: THORACIC SURGERY (CARDIOTHORACIC VASCULAR SURGERY)
Payer: MEDICAID

## 2023-12-28 ENCOUNTER — APPOINTMENT (OUTPATIENT)
Dept: GENERAL RADIOLOGY | Facility: HOSPITAL | Age: 63
DRG: 234 | End: 2023-12-28
Attending: PHYSICIAN ASSISTANT
Payer: MEDICAID

## 2023-12-28 LAB
ATRIAL RATE: 120 BPM
ATRIAL RATE: 89 BPM
BASE EXCESS BLDA CALC-SCNC: -2 MMOL/L (ref ?–30)
BASE EXCESS BLDA CALC-SCNC: -9 MMOL/L (ref ?–30)
BASE EXCESS BLDA CALC-SCNC: 0 MMOL/L (ref ?–30)
BASE EXCESS BLDA CALC-SCNC: 0 MMOL/L (ref ?–30)
BASOPHILS # BLD AUTO: 0.04 X10(3) UL (ref 0–0.2)
BASOPHILS NFR BLD AUTO: 0.1 %
BLOOD TYPE BARCODE: 9500
BUN BLD-MCNC: 11 MG/DL (ref 9–23)
CA-I BLDA-SCNC: 0.95 MMOL/L (ref 1.12–1.32)
CA-I BLDA-SCNC: 0.96 MMOL/L (ref 1.12–1.32)
CA-I BLDA-SCNC: 1.14 MMOL/L (ref 1.12–1.32)
CA-I BLDA-SCNC: 1.2 MMOL/L (ref 1.12–1.32)
CALCIUM BLD-MCNC: 7 MG/DL (ref 8.5–10.1)
CHLORIDE SERPL-SCNC: 114 MMOL/L (ref 98–112)
CO2 BLDA-SCNC: 19 MMOL/L (ref 22–32)
CO2 BLDA-SCNC: 25 MMOL/L (ref 22–32)
CO2 BLDA-SCNC: 25 MMOL/L (ref 22–32)
CO2 BLDA-SCNC: 27 MMOL/L (ref 22–32)
CO2 SERPL-SCNC: 25 MMOL/L (ref 21–32)
CREAT BLD-MCNC: 0.68 MG/DL
EGFRCR SERPLBLD CKD-EPI 2021: 99 ML/MIN/1.73M2 (ref 60–?)
EOSINOPHIL # BLD AUTO: 0 X10(3) UL (ref 0–0.7)
EOSINOPHIL NFR BLD AUTO: 0 %
ERYTHROCYTE [DISTWIDTH] IN BLOOD BY AUTOMATED COUNT: 15.6 %
GLUCOSE BLD-MCNC: 100 MG/DL (ref 70–99)
GLUCOSE BLD-MCNC: 111 MG/DL (ref 70–99)
GLUCOSE BLD-MCNC: 115 MG/DL (ref 70–99)
GLUCOSE BLD-MCNC: 119 MG/DL (ref 70–99)
GLUCOSE BLD-MCNC: 126 MG/DL (ref 70–99)
GLUCOSE BLD-MCNC: 127 MG/DL (ref 70–99)
GLUCOSE BLD-MCNC: 132 MG/DL (ref 70–99)
GLUCOSE BLD-MCNC: 150 MG/DL (ref 70–99)
GLUCOSE BLD-MCNC: 184 MG/DL (ref 70–99)
GLUCOSE BLD-MCNC: 205 MG/DL (ref 70–99)
GLUCOSE BLD-MCNC: 241 MG/DL (ref 70–99)
GLUCOSE BLD-MCNC: 250 MG/DL (ref 70–99)
GLUCOSE BLD-MCNC: 276 MG/DL (ref 70–99)
GLUCOSE BLD-MCNC: 279 MG/DL (ref 70–99)
GLUCOSE BLD-MCNC: 95 MG/DL (ref 70–99)
GLUCOSE BLDA-MCNC: 132 MG/DL (ref 70–99)
GLUCOSE BLDA-MCNC: 157 MG/DL (ref 70–99)
GLUCOSE BLDA-MCNC: 183 MG/DL (ref 70–99)
GLUCOSE BLDA-MCNC: 186 MG/DL (ref 70–99)
HCO3 BLDA-SCNC: 18 MEQ/L (ref 22–26)
HCO3 BLDA-SCNC: 23.4 MEQ/L (ref 22–26)
HCO3 BLDA-SCNC: 24 MEQ/L (ref 22–26)
HCO3 BLDA-SCNC: 25.7 MEQ/L (ref 22–26)
HCT VFR BLD AUTO: 30.3 %
HCT VFR BLDA CALC: 19 %
HCT VFR BLDA CALC: 22 %
HCT VFR BLDA CALC: 25 %
HCT VFR BLDA CALC: 27 %
HGB BLD-MCNC: 10.1 G/DL
IMM GRANULOCYTES # BLD AUTO: 0.23 X10(3) UL (ref 0–1)
IMM GRANULOCYTES NFR BLD: 0.8 %
ISTAT ACTIVATED CLOTTING TIME: 131 SECONDS (ref 74–137)
ISTAT ACTIVATED CLOTTING TIME: 396 SECONDS (ref 74–137)
ISTAT ACTIVATED CLOTTING TIME: 542 SECONDS (ref 74–137)
ISTAT ACTIVATED CLOTTING TIME: 552 SECONDS (ref 74–137)
ISTAT PATIENT TEMPERATURE: 32 DEGREE
ISTAT PATIENT TEMPERATURE: 33 DEGREE
ISTAT PATIENT TEMPERATURE: 37 DEGREE
ISTAT PATIENT TEMPERATURE: 37 DEGREE
LYMPHOCYTES # BLD AUTO: 0.96 X10(3) UL (ref 1–4)
LYMPHOCYTES NFR BLD AUTO: 3.5 %
MAGNESIUM SERPL-MCNC: 1.9 MG/DL (ref 1.6–2.6)
MCH RBC QN AUTO: 30.1 PG (ref 26–34)
MCHC RBC AUTO-ENTMCNC: 33.3 G/DL (ref 31–37)
MCV RBC AUTO: 90.2 FL
MONOCYTES # BLD AUTO: 1.94 X10(3) UL (ref 0.1–1)
MONOCYTES NFR BLD AUTO: 7 %
NEUTROPHILS # BLD AUTO: 24.63 X10 (3) UL (ref 1.5–7.7)
NEUTROPHILS # BLD AUTO: 24.63 X10(3) UL (ref 1.5–7.7)
NEUTROPHILS NFR BLD AUTO: 88.6 %
P AXIS: 46 DEGREES
P AXIS: 72 DEGREES
P-R INTERVAL: 122 MS
P-R INTERVAL: 136 MS
PCO2 BLDA: 28.8 MMHG (ref 35–45)
PCO2 BLDA: 30.9 MMHG (ref 35–45)
PCO2 BLDA: 41.7 MMHG (ref 35–45)
PCO2 BLDA: 46.1 MMHG (ref 35–45)
PH BLDA: 7.24 [PH] (ref 7.35–7.45)
PH BLDA: 7.35 [PH] (ref 7.35–7.45)
PH BLDA: 7.47 [PH] (ref 7.35–7.45)
PH BLDA: 7.51 [PH] (ref 7.35–7.45)
PLATELET # BLD AUTO: 209 10(3)UL (ref 150–450)
PO2 BLDA: 106 MMHG (ref 80–105)
PO2 BLDA: 309 MMHG (ref 80–105)
PO2 BLDA: >400 MMHG (ref 80–105)
PO2 BLDA: >400 MMHG (ref 80–105)
POTASSIUM SERPL-SCNC: 3.7 MMOL/L (ref 3.5–5.1)
Q-T INTERVAL: 328 MS
Q-T INTERVAL: 420 MS
QRS DURATION: 72 MS
QRS DURATION: 92 MS
QTC CALCULATION (BEZET): 463 MS
QTC CALCULATION (BEZET): 511 MS
R AXIS: 3 DEGREES
R AXIS: 40 DEGREES
RBC # BLD AUTO: 3.36 X10(6)UL
SAO2 % BLDA: 100 % (ref 92–100)
SAO2 % BLDA: 98 % (ref 92–100)
SODIUM BLDA-SCNC: 140 MMOL/L (ref 136–145)
SODIUM BLDA-SCNC: 141 MMOL/L (ref 136–145)
SODIUM BLDA-SCNC: 143 MMOL/L (ref 136–145)
SODIUM BLDA-SCNC: 146 MMOL/L (ref 136–145)
SODIUM BLDA-SCNC: 3.4 MMOL/L (ref 3.6–5.1)
SODIUM BLDA-SCNC: 3.8 MMOL/L (ref 3.6–5.1)
SODIUM BLDA-SCNC: 3.9 MMOL/L (ref 3.6–5.1)
SODIUM BLDA-SCNC: 4.1 MMOL/L (ref 3.6–5.1)
SODIUM SERPL-SCNC: 142 MMOL/L (ref 136–145)
T AXIS: 47 DEGREES
T AXIS: 61 DEGREES
UNIT VOLUME: 350 ML
VENTRICULAR RATE: 120 BPM
VENTRICULAR RATE: 89 BPM
WBC # BLD AUTO: 27.8 X10(3) UL (ref 4–11)

## 2023-12-28 PROCEDURE — 71045 X-RAY EXAM CHEST 1 VIEW: CPT | Performed by: THORACIC SURGERY (CARDIOTHORACIC VASCULAR SURGERY)

## 2023-12-28 PROCEDURE — 71045 X-RAY EXAM CHEST 1 VIEW: CPT | Performed by: PHYSICIAN ASSISTANT

## 2023-12-28 RX ORDER — NICOTINE POLACRILEX 4 MG
15 LOZENGE BUCCAL
Status: DISCONTINUED | OUTPATIENT
Start: 2023-12-28 | End: 2024-01-03

## 2023-12-28 RX ORDER — NICOTINE POLACRILEX 4 MG
30 LOZENGE BUCCAL
Status: DISCONTINUED | OUTPATIENT
Start: 2023-12-28 | End: 2024-01-03

## 2023-12-28 RX ORDER — POTASSIUM CHLORIDE 14.9 MG/ML
20 INJECTION INTRAVENOUS ONCE
Status: COMPLETED | OUTPATIENT
Start: 2023-12-28 | End: 2023-12-28

## 2023-12-28 RX ORDER — METOPROLOL TARTRATE 1 MG/ML
5 INJECTION, SOLUTION INTRAVENOUS ONCE
Status: COMPLETED | OUTPATIENT
Start: 2023-12-28 | End: 2023-12-28

## 2023-12-28 RX ORDER — ATORVASTATIN CALCIUM 80 MG/1
80 TABLET, FILM COATED ORAL NIGHTLY
Status: DISCONTINUED | OUTPATIENT
Start: 2023-12-28 | End: 2024-01-03

## 2023-12-28 RX ORDER — DEXTROSE MONOHYDRATE 25 G/50ML
50 INJECTION, SOLUTION INTRAVENOUS
Status: DISCONTINUED | OUTPATIENT
Start: 2023-12-28 | End: 2024-01-03

## 2023-12-28 RX ORDER — KETOROLAC TROMETHAMINE 15 MG/ML
15 INJECTION, SOLUTION INTRAMUSCULAR; INTRAVENOUS EVERY 6 HOURS PRN
Status: DISPENSED | OUTPATIENT
Start: 2023-12-28 | End: 2023-12-30

## 2023-12-28 NOTE — PHYSICAL THERAPY NOTE
PHYSICAL THERAPY EVALUATION - INPATIENT     Room Number: 6605/6605-A  Evaluation Date: 12/28/2023  Type of Evaluation: Initial  Physician Order: PT Eval and Treat    Presenting Problem: chest pain, NSTEMI, s/p CABG x2 12/27  Co-Morbidities : COPD, DM2, HTN, hyperlipidemia, smoker  Reason for Therapy: Mobility Dysfunction and Discharge Planning    History related to current admission: Patient is a 63 year old female admitted on 12/21/2023 from home for chest pain.  Pt diagnosed with NSTEMI, s/p CABG x2 12/27.        ASSESSMENT   In this PT evaluation, the patient presents with the following impairments decreased functional mobility, trunk control, endurance, balance.  These impairments and comorbidities manifest themselves as functional limitations in independent bed mobility, transfers, and gait.  The patient is below baseline and would benefit from skilled inpatient PT to address the above deficits to assist patient in returning to prior to level of function.   Functional outcome measures completed include AMPAC.  The AM-PAC '6-Clicks' Inpatient Basic Mobility Short Form was completed and this patient is demonstrating a Approx Degree of Impairment: 46.58%  degree of impairment in mobility. Research supports that patients with this level of impairment may benefit from HHPT.    DISCHARGE RECOMMENDATIONS  PT Discharge Recommendations: Home    PLAN  PT Treatment Plan: Bed mobility;Body mechanics;Endurance;Energy conservation;Gait training;Balance training;Transfer training;Strengthening;Patient education  Rehab Potential : Good  Frequency (Obs): 3x/week  Number of Visits to Meet Established Goals: 3      CURRENT GOALS    Goal #1 Patient is able to demonstrate supine - sit EOB @ level: modified independent     Goal #2 Patient is able to demonstrate transfers EOB to/from Chair/Wheelchair at assistance level: modified independent     Goal #3 Patient is able to ambulate 150 feet with assist device:  LRAD  at  assistance level: supervision     Goal #4 Pt is able to ascend and descend stairs with supervision and rail   Goal #5    Goal #6    Goal Comments: Goals established on 12/28/2023    HOME SITUATION  Type of Home: Condo   Home Layout: One level                Lives With: Alone  Drives: Yes  Patient Owned Equipment: None  Patient Regularly Uses: Reading glasses    Prior Level of Orange: Pt typically indep with ADLs and mobility. Most recently working as a  at [employer].     SUBJECTIVE  \"The parfaits are the best\"       OBJECTIVE  Precautions: Cardiac;Sternal (SBP >100)  Fall Risk: Standard fall risk    WEIGHT BEARING RESTRICTION  Weight Bearing Restriction: None                PAIN ASSESSMENT  Rating: Unable to rate  Location: sternum  Management Techniques: Body mechanics    COGNITION  Overall Cognitive Status:  WFL - within functional limits    RANGE OF MOTION AND STRENGTH ASSESSMENT  See OT note for UE assessment    Lower extremity ROM is within functional limits     Lower extremity strength is within functional limits       BALANCE  Static Sitting: Fair  Dynamic Sitting: Fair  Static Standing: Poor +  Dynamic Standing: Poor +    ADDITIONAL TESTS                                    ACTIVITY TOLERANCE                         O2 WALK  Oxygen Therapy  SPO2% on Oxygen at Rest: 2  Ambulation oxygen flow (liters per minute): 2    NEUROLOGICAL FINDINGS                        AM-PAC '6-Clicks' INPATIENT SHORT FORM - BASIC MOBILITY  How much difficulty does the patient currently have...  Patient Difficulty: Turning over in bed (including adjusting bedclothes, sheets and blankets)?: A Little   Patient Difficulty: Sitting down on and standing up from a chair with arms (e.g., wheelchair, bedside commode, etc.): A Little   Patient Difficulty: Moving from lying on back to sitting on the side of the bed?: A Little   How much help from another person does the patient currently need...   Help from Another: Moving to and  from a bed to a chair (including a wheelchair)?: A Little   Help from Another: Need to walk in hospital room?: A Little   Help from Another: Climbing 3-5 steps with a railing?: A Little       AM-PAC Score:  Raw Score: 18   Approx Degree of Impairment: 46.58%   Standardized Score (AM-PAC Scale): 43.63   CMS Modifier (G-Code): CK    FUNCTIONAL ABILITY STATUS  Gait Assessment   Functional Mobility/Gait Assessment  Gait Assistance: Contact guard assist;Supervision  Distance (ft): 100  Assistive Device: Rolling walker  Pattern:  (short step/stride length)    Skilled Therapy Provided     Gait Training:   Pt cued on upright standing posture to improve alignment with UEs  Pt cued on gait sequencing with RW - \"push like a shopping cart\"   Pt cued on proper UE placement on RW handles  Pt educated on rest breaks as needed for energy conservation and ensuring no dizziness, lightheadedness, etc    Therapeutic Activity:   Pt cued on placement of UE and LEs for optimal force generation and safe STS transfer.   Pt cued on controlled descent into sitting  Educated on sternal precautions - no lifting, pushing, pulling greater than 10lbs        Bed Mobility:  Rolling: NT  Supine to sit: NT   Sit to supine: NT     Transfer Mobility:  Sit to stand: supervision   Stand to sit: supervision  Gait = CGA-supervision    Therapist's Comments: RN cleared for session. Pt agreeable for therapy, received supine. Instructed to call for nursing staff for any needs and OOB mobility.     Exercise/Education Provided:  Bed mobility  Crutch training  Energy conservation  Functional activity tolerated  Gait training  Posture  Strengthening  Transfer training    Patient End of Session: Up in chair;Needs met;Call light within reach;RN aware of session/findings;All patient questions and concerns addressed;With  staff      Patient Evaluation Complexity Level:  History High - 3 or more personal factors and/or co-morbidities   Examination of body systems  Moderate - addressing a total of 3 or more elements   Clinical Presentation Moderate - Evolving   Clinical Decision Making Moderate - Evolving       PT Session Time: 30 minutes  Gait Trainin minutes  Therapeutic Activity: 5 minutes

## 2023-12-28 NOTE — PROGRESS NOTES
Progress Note     Soumya King Patient Status:  Inpatient    6/3/1960 MRN [id number]   Location [facility] 6NE-A Attending Karla Camejo MD   Hosp Day # 7 PCP Rika Vinson DO     Chief Complaint: cp    Subjective:   S: Patient denies cp sob    Review of Systems:   10 point ROS completed and was negative, except for pertinent positive and negatives stated in subjective.    Objective:   Vital signs:  Temp:  [97.9 °F (36.6 °C)-98.6 °F (37 °C)] 98.6 °F (37 °C)  Pulse:  [] 92  Resp:  [7-34] 17  BP: ()/() 94/59  SpO2:  [99 %-100 %] 100 %  AO: ()/(47-83) 77/62  FiO2 (%):  [40 %-50 %] 40 %    Wt Readings from Last 3 Encounters:   23 128 lb 1.4 oz (58.1 kg)   23 136 lb (61.7 kg)   23 137 lb (62.1 kg)       Intake/Output:    Intake/Output Summary (Last 24 hours) at 2023 1123  Last data filed at 2023 1016  Gross per 24 hour   Intake 4768.08 ml   Output 3490 ml   Net 1278.08 ml         Physical Exam:    General: No acute distress. Alert , Extubated.       Respiratory: Clear to auscultation bilaterally. No wheezes. No rhonchi.    Cardiovascular: S1, S2. Regular rate and rhythm. No murmurs, rubs or gallops.  Abdomen: Soft, nontender, nondistended.  Positive bowel sounds. No rebound or guarding.  Neurologic: No focal neurological deficits.  Musculoskeletal: Moves all extremities.  Extremities: No edema.    Results:   Diagnostic Data:      Labs:    Selected labs - last 24 hours:  Endo  Lytes  Renal   Glu 279  Na 142 Ca 7.0  BUN 11   POC Gluc  100  K 3.7 PO4 -  Cr 0.68   A1c -  Cl 114 Mg 1.9  eGFR 99   TSH -  CO2 25.0         LFT  CBC  Other   AST -  WBC 27.8  PTT 28.4 Procal -   ALT -  Hb 10.1  INR 1.28 CRP -   APk -  Hct 30.3  Trop - D dim -   T yoni -  .0  pBNP -  BNP -  - Ferritin  -   Prot -    CK  - Lactate  -   Alb -    LDL  - COVID  -     Imaging: Imaging data reviewed in Epic.    Medications:    insulin aspart  1-5 Units Subcutaneous TID AC and HS     atorvastatin  80 mg Oral Nightly    metoprolol tartrate  25 mg Oral 2x Daily(Beta Blocker)    vancomycin  15 mg/kg Intravenous Q12H    mupirocin  1 Application Nasal BID    aspirin  325 mg Oral Daily    ceFAZolin  2 g Intravenous Q8H    acetaminophen  1,000 mg Intravenous 4 times per day    topiramate  50 mg Oral BID    pantoprazole  40 mg Intravenous BID AC    Or    pantoprazole  40 mg Oral BID AC    nicotine  1 patch Transdermal Daily    oxybutynin ER  10 mg Oral Daily    QUEtiapine ER  150 mg Oral Nightly    And    QUEtiapine ER  50 mg Oral Nightly    azelastine  1 spray Each Nare BID    busPIRone  10 mg Oral Daily    clonazePAM  0.5 mg Oral BID    desvenlafaxine ER  100 mg Oral Daily    gabapentin  300 mg Oral Nightly    umeclidinium bromide  1 puff Inhalation Daily       Assessment & Plan:   ASSESSMENT / PLAN:     # NSTEMI  -aspirin, metoprolol, statin; hx difficulty swallowing asa due to barretts. Not a true allergy  -Cardiac cath on 12/22 showed multivessel disease  -s/p CABG POD#1     #Hypertension  #DM2-monitor on insulin; controlled  #Hyperlipidemia  #Smoker  -Nicotine patch  #leukocytosis and anemia  -reactive postop  -no fever  -no cough sob no dysuria mary jane Camejo MD    Supplementary Documentation:

## 2023-12-28 NOTE — PROGRESS NOTES
[facility]     Cardiology Progress Note        Soumya King Patient Status:  Inpatient    6/3/1960 MRN [id number]   Location [facility] 6NE-A Attending Karla Camejo MD   Hosp Day # 7 PCP Rika Vinson,        Subjective/ROS:  Up in chair. Complains of back and surgical pain despite pca. No sob.     Objective:  BP 91/60   Pulse 87   Temp 98.6 °F (37 °C) (Pulmonary Artery)   Resp 17   Ht 5' 1\" (1.549 m)   Wt 128 lb 1.4 oz (58.1 kg)   SpO2 100%   BMI 24.20 kg/m²     Temp (24hrs), Av.1 °F (36.7 °C), Min:97.9 °F (36.6 °C), Max:98.6 °F (37 °C)      Tele  Sinus rhythm/sinus tach, ventricular bigeminy    Intake/Output:    Intake/Output Summary (Last 24 hours) at 2023 0906  Last data filed at 2023 0727  Gross per 24 hour   Intake 4788.08 ml   Output 3445 ml   Net 1343.08 ml       Patient Weight for the past 72 hrs:   Weight   23 0535 126 lb 1.7 oz (57.2 kg)   23 0600 128 lb 1.4 oz (58.1 kg)        Physical Exam:    Gen: alert, oriented x 3. Up in chair  Heent: pupils equal, reactive. Mucous membranes moist.   Neck: no jvd. R IJ  Cardiac: regular rate and rhythm, normal S1,S2 + rub  Lungs: diminished with shallow inspiratory effort. Chest tubes in place  Abd: soft, NT/ND +bs  Ext: no sign edema  Skin: Warm, dry  Neuro: No focal deficits    Labs:  Lab Results   Component Value Date    WBC 27.8 2023    HGB 10.1 2023    HCT 30.3 2023    .0 2023    CREATSERUM 0.68 2023    BUN 11 2023     2023    K 3.7 2023     2023    CO2 25.0 2023     2023    CA 7.0 2023    PTT 28.4 2023    INR 1.28 2023    MG 1.9 2023       CXR: Reviewed. Shallow inspiration.  Patchy infiltrate versus atelectasis retrocardiac left lung base with blunting of the left costophrenic angle.  Slight atelectasis right lung base.      [place] catheter and chest tubes unchanged.  No  measurable pneumothorax.      Stable heart size and pulmonary vascularity.     Medications:     insulin aspart  1-5 Units Subcutaneous TID AC and HS    metoprolol tartrate  25 mg Oral 2x Daily(Beta Blocker)    vancomycin  15 mg/kg Intravenous Q12H    mupirocin  1 Application Nasal BID    aspirin  325 mg Oral Daily    ceFAZolin  2 g Intravenous Q8H    acetaminophen  1,000 mg Intravenous 4 times per day    topiramate  50 mg Oral BID    pantoprazole  40 mg Intravenous BID AC    Or    pantoprazole  40 mg Oral BID AC    nicotine  1 patch Transdermal Daily    oxybutynin ER  10 mg Oral Daily    QUEtiapine ER  150 mg Oral Nightly    And    QUEtiapine ER  50 mg Oral Nightly    atorvastatin  40 mg Oral Nightly    azelastine  1 spray Each Nare BID    busPIRone  10 mg Oral Daily    clonazePAM  0.5 mg Oral BID    desvenlafaxine ER  100 mg Oral Daily    gabapentin  300 mg Oral Nightly    umeclidinium bromide  1 puff Inhalation Daily      sodium chloride 10 mL/hr at 12/27/23 1536    DOBUTamine 2 mcg/kg/min (12/27/23 6352)    nitroGLYCERIN in dextrose 5%      norepinephrine      NitroPRUSSide (Nipride) 50 mg in dextrose 5% 250 mL infusion      dextrose 5%-sodium chloride 0.45% 70 mL/hr (12/27/23 1530)    sodium chloride 10 mL/hr at 12/27/23 1532    HYDROmorphone in sodium chloride 0.9%      propofol Stopped (12/27/23 1705)       Assessment:    Cad, presented with nstemi s/p cabg x 2 (lima to lad, svg to pda)- pod 1- EF 40-45%  Hemodynamically stable on low dose dobutamine  CO/CI 3.4/2.1  PAP 27/17   Svo2 72  Leukocytosis- suspect reactive. afebrile  Expected anemia- hgb 10.1, follow trends  Htn- bp on softer side, remains on low dose dobutamine.  Was IV lopressor (for sinus tach) and po lopressor overnight despite being on dobutamine   Required albumin overnight for low bp  Hl- ldl 79 on home statin (lipitor 40 mg daily)  Dm2  Tobacco use- will continue to reinforce smoking cessation    Plan:     Hold on giving bb until off  dobutamine.   Increase liptor to 80 mg daily  De line as day progresses  Eventual diuresis will probably be needed  Will work to initiate appropriate gdmt as bp allows  Consider dapt at dc given nstemi on presentation      Discussed plan of care with patient, cvs and nursing.       Celia Elise NP  [phone]      Patient seen and examined independently.  Note reviewed and labs reviewed.  Agree with above assessment and plan.  Doing well POD#2 for 2 vessel CABG.  Hold BB for now.  Deline today.  Incentive spirometry.      STEPHANY Borges MD

## 2023-12-28 NOTE — PROGRESS NOTES
Notified by RN pt's HR in the 140s. Per RN, pt is in sinus tachycardia. Lopressor 5 mg IVP x 1 ordered.

## 2023-12-28 NOTE — PROGRESS NOTES
12/27/23 1815   Spontaneous Parameters   Spontaneous RR Rate 20   Spontaneous Minute Volume 5.9   Average Spontaneous Tidal Volume 362   $ Spontaneous Vital Capacity 520   Negative Inspiratory Force -27   Total RSBI 60   Weaning Trials   Patient self-extubated? No   Compassionate wean? No   Spontaneous Breathing Trial Time Initiated 1815   Spontaneous Breathing Trial Duration 45 mins   Spontaneous Breathing Trial Method ps   Spontaneous Breathing Trial Settings 5/5/40%   Pre Trial    Pre Trial RR 20   Pre Trial SpO2 96 %   Pre Trial /65   Pre Trial Vt 352   Post Trial    Post Trial RR 23   Post Trial SpO2 98 %   Post Trial /70   Post Trial Vt 324     Received patient on full support. Patient extubated today at 1924 with above readings and onto 4 L nasal cannula. Patient tolerating well     Laury Banks, Lizet, RRT

## 2023-12-28 NOTE — DIETARY NOTE
[facility]   CLINICAL NUTRITION    Soumya King     Admitting diagnosis:  NSTEMI (non-ST elevated myocardial infarction) ([facility]) [I21.4]    Ht: 154.9 cm (5' 1\")  Wt: 58.1 kg (128 lb 1.4 oz).   Body mass index is 24.2 kg/m².  IBW: 47.7 kg    Wt Readings from Last 6 Encounters:   12/28/23 58.1 kg (128 lb 1.4 oz)   11/30/23 61.7 kg (136 lb)   11/01/23 62.1 kg (137 lb)   09/26/23 60.3 kg (133 lb)   09/21/23 60.5 kg (133 lb 6.4 oz)   08/02/23 64.4 kg (142 lb)        Labs/Meds reviewed    Diet:       Procedures    Cardiac diet Cardiac; Calorie Restriction/Carb Controlled: 1800 kcal/60 grams; Is Patient on Accuchecks? Yes     Percent Meals Eaten (last 3 days)       Date/Time Percent Meals Eaten (%)    12/25/23 1130 100 %    12/25/23 1512 100 %    12/26/23 1620 100 %            Pt chart reviewed d/t LOS.  Patient reports good appetite at this time.  Nursing notes reports Percent Meals Eaten (%): 100 % intake for last meal.  Tolerating po diet without diarrhea, emesis, or constipation.   No significant weight changes noted.     PMH includes HTN, DM, GERD, COPD.  Pt p/w NSTEMI, POD #1 for CABG x2. Previously tolerating 100% of meals. No n/v/d reported presently. Wt changes noted. RD will monitor PO intake and support as appropriate    Patient is at low nutrition risk at this time.    Please consult if patient status changes or nutrition issues arise.    Nat Heller RD, LDN, [facility]  Clinical Dietitian  Phone [phone]

## 2023-12-28 NOTE — PLAN OF CARE
Assume pt care approx 1500 from COVR. Pt with usual lines in place, intubated and sedated with propofol and precedex. Insulin and dobutamine gtts infusing. Insulin titrated per post op open heart protocol.     SBP 80's post op, updated Renny Wood MD, titrated dobutamine gtt and 250cc albumin given as ordered. Good results seen.    CI>2, CO 3.4-4, SVO2 70's.    45cc out from chest tubes. 650cc urine.    Extubated to nasal cannula this evening.

## 2023-12-28 NOTE — PROGRESS NOTES
[facility]     CV Surgery Progress Note    Soumya King Patient Status:  Inpatient    6/3/1960 MRN [id number]   Location [facility] 6NE-A Attending Karla Camejo MD   Hosp Day # 7 PCP Rika Vinson DO     Subjective:  Patient reports feeling tired. Pain is moderate at times. Denies any nausea.     Tele: SR/ST, ventricular bigeminy     Objective:  BP 91/60   Pulse 87   Temp 98.6 °F (37 °C) (Pulmonary Artery)   Resp 17   Ht 5' 1\" (1.549 m)   Wt 128 lb 1.4 oz (58.1 kg)   SpO2 100%   BMI 24.20 kg/m²     Intake/Output:    Intake/Output Summary (Last 24 hours) at 2023 1003  Last data filed at 2023 0727  Gross per 24 hour   Intake 4788.08 ml   Output 3445 ml   Net 1343.08 ml       Labs:  Lab Results   Component Value Date    WBC 27.8 2023    RBC 3.36 2023    HGB 10.1 2023    HCT 30.3 2023    MCV 90.2 2023    MCH 30.1 2023    MCHC 33.3 2023    RDW 15.6 2023    .0 2023     Lab Results   Component Value Date     2023    K 3.7 2023     2023    CO2 25.0 2023    BUN 11 2023    CREATSERUM 0.68 2023     2023    CA 7.0 2023     Lab Results   Component Value Date    INR 1.28 (H) 2023    INR 1.07 2023    INR 0.97 2022       Studies:  CXR 23:  Small left pleural effusion      Physical Exam:  General: VSS, A&Ox3, In NAD  Neck: No JVD. Bryan/Cordis in RIJ  Lungs: clear anteriorly   Heart: S1,S2 RRR ; Sternum Stable   Abdomen: Soft, non-tender, +bs  Extremities: Warm, dry, edema  Skin: sternotomy incision C/D/I, LE with ACE wrap   Neuro: no focal deficits     Assessment/Plan:   CAD s/p CABGx2 with LIMA-LAD, SVG-RCA POD#1  -HD compensated on low dose dobutamine, wean keeping SBP >100, hopefully de-line as day progresses   -Leukocytosis, likely reactive, afebrile- monitor   -Acute post op blood loss anemia, expected- monitor   -Mild vol OL, likely  eventual diuresis   -Renal function intact, good UOP  -Bowel regimen   -Pain management, dilaudid pca  -Discontinue chest tubes  -Keep PW for now   -GI PPX: protonix  -DVT PPX: TEDs/SCDs  -Encourage IS/ambulation   -PT/OT/Cardiac rehab   -CCU monitoring     -D/W Dr. Saunders/Israel Hernandez PA-HANNAH  Cardiothoracic Surgery   12/28/2023  10:03 AM

## 2023-12-28 NOTE — PROGRESS NOTES
12/27/23 3728   BiPAP/CPAP Monitored Parameters   Toleration Refused  (patient uses cpap but refusing to use while inpatient because of issues with home cpap)     Lizet Blackwell, RRT

## 2023-12-28 NOTE — OCCUPATIONAL THERAPY NOTE
OCCUPATIONAL THERAPY EVALUATION - INPATIENT     Room Number: 6605/6605-A  Evaluation Date: 12/28/2023  Type of Evaluation: Initial  Presenting Problem: NSTEMI with multivessel CAD on cardiac cath 12/22; S/p CABG 12/27    Physician Order: IP Consult to Occupational Therapy  Reason for Therapy: ADL/IADL Dysfunction and Discharge Planning    History: Patient is a 63 year old female admitted on 12/21/2023 from home with chest pain. Pt diagnosed with NSTEMI with multivessel CAD on cardiac cath 12/22. Pt is s/p CABG 12/27.     Co-Morbidities : COPD, DM 2, hypertension, hyperlipidemia, smoker    ASSESSMENT   Patient presents with the following performance deficits: post-op pain, decreased functional reach, endurance, balance, diminished overall cardiopulmonary activity tolerance, lack of knowledge re: sternal/cardiac precautions and integration into ADLs and mobility as well as adaptive techniques and equipment. . These deficits impact the patient’s ability to participate in ADL, transfers, instrumental activities of daily living, rest and sleep, leisure and social participation.     The patient is functioning below her previous functional level and would benefit from skilled inpatient OT to address the above deficits, maximizing patient’s ability to return safely to her prior level of function.    OT Discharge Recommendations: Home  OT Device Recommendations: TBD    WEIGHT BEARING RESTRICTION  Weight Bearing Restriction: None                Recommendations for nursing staff:   Transfers: SBA with RW  Toileting location: toilet     EVALUATION SESSION:  Patient Start of Session: seated in chair   FUNCTIONAL TRANSFER ASSESSMENT  Sit to Stand: Chair  Chair: Stand-by Assist    BED MOBILITY     BALANCE ASSESSMENT  Static Sitting: Independent  Sitting Bilateral: Stand-by Assist  Static Standing: Stand-by Assist  Standing Bilateral: Contact Guard Assist    FUNCTIONAL ADL ASSESSMENT  Eating: Independent  Grooming Seated: Stand-by  Assist  LB Dressing Seated: Moderate Assist  LB Dressing Standing: Contact Guard Assist      ACTIVITY TOLERANCE: Pt tolerates standing and ambulation with RW ~5mins with no c/o SOB. Pt rec'd on 2L, difficult to obtaib accurate/consistent pleth during activity, however no signs of SOB.                          O2 SATURATIONS       COGNITION  Arousal/Alertness:  appropriate responses to stimuli  Orientation Level:  oriented x4  Following Commands:  follows all commands and directions without difficulty  Safety Judgement:  good awareness of safety precautions    Upper Extremity   ROM: within functional limits  Strength: not formally tested d/t precautions but appears within functional limits   Coordination  Gross motor: wfl  Fine motor: wfl  Sensation: Light touch:  intact    EDUCATION PROVIDED  Patient : Role of Occupational Therapy; Plan of Care; Discharge Recommendations; Adaptive Equipment Recommendations; DME Recommendations; Functional Transfer Techniques; Fall Prevention; Surgical Precautions; Posture/Positioning; Energy Conservation; Compensatory ADL Techniques; Proper Body Mechanics  Patient's Response to Education: Verbalized Understanding; Requires Further Education; Returned Demonstration  Role/purpose of therapy, plan of care, sternal/cardiac precautions, log rolling, use of pillow for bracing, compensatory strategies for ADLs, AE/DME recommendations, activity recommendations, home safety, and DC planning.   Equipment used: RW  Demonstrates functional use, Would benefit from additional trial      Therapist comments: Pt is pleasant and agreeable to therapy. Initiated education per CV binder as outlined above. Encouraged Pt to get up to chair for all meals, walk to bathroom and ambulate 3x/day with nursing staff to prevent deconditioning while hospitalized.    Patient End of Session: Up in chair;Needs met;With  staff;Call light within reach;RN aware of session/findings;All patient questions and concerns  addressed;SCDs in place    OCCUPATIONAL PROFILE    HOME SITUATION  Type of Home: Condo  Home Layout: One level  Lives With: Alone    Toilet and Equipment: Standard height toilet;Grab bar  Shower/Tub and Equipment: Tub-shower combo  Other Equipment: None    Occupation/Status: [employer]      Drives: Yes  Patient Regularly Uses: Reading glasses    Prior Level of Function: Pt is typically independent with all aspects of mobility and self-cares without device. Planning to stay at her sister's home for at least a few days, where her bed/bath would be upstairs.     SUBJECTIVE   \"I'm trying to be a good girl and stay up in the chair a little longer.\"    PAIN ASSESSMENT  Rating:  (did not formally rate)  Location: sternal  Management Techniques: Body mechanics;Activity promotion;Breathing techniques;Relaxation;Repositioning    OBJECTIVE  Precautions: Sternal;Cardiac  Fall Risk: Standard fall risk      ASSESSMENTS    AM-PAC ‘6-Clicks’ Inpatient Daily Activity Short Form  -   Putting on and taking off regular lower body clothing?: A Lot  -   Bathing (including washing, rinsing, drying)?: A Little  -   Toileting, which includes using toilet, bedpan or urinal? : A Little  -   Putting on and taking off regular upper body clothing?: A Little  -   Taking care of personal grooming such as brushing teeth?: None  -   Eating meals?: None    AM-PAC Score:  Score: 19  Approx Degree of Impairment: 42.8%  Standardized Score (AM-PAC Scale): 40.22    ADDITIONAL TESTS     NEUROLOGICAL FINDINGS      COGNITION ASSESSMENTS       PLAN  OT Treatment Plan: Balance activities;Energy conservation/work simplification techniques;ADL training;UE strengthening/ROM;Endurance training;Functional transfer training;Patient/Family education;Patient/Family training;Equipment eval/education;Compensatory technique education  Rehab Potential : Good  Frequency: 3-5x/week  Number of Visits to Meet Established Goals: 2    ADL Goals   Patient will perform  grooming: with modified independent and while standing at sink  Patient will perform upper body dressing:  with setup  Patient will perform lower body dressing:  with stand by assist  Patient will perform toileting: with stand by assist    Functional Transfer Goals  Patient will transfer from sit to supine:  with stand by assist  Patient will transfer from supine to sit:  with stand by assist  Patient will transfer from sit to stand:  with stand by assist  Patient will transfer to toilet:  with supervision    UE Exercise Program Goal  Patient will be supervision with bilateral AROM HEP per CV binder (home exercise program).    Additional Goals  Pt will tolerate standing for functional task x10 mins with supervision  Pt will verbalize 2 energy conservation strategies to be implemented into home routine    Patient Evaluation Complexity Level:   Occupational Profile/Medical History LOW - Brief history including review of medical or therapy records    Specific performance deficits impacting engagement in ADL/IADL MODERATE  3 - 5 performance deficits   Client Assessment/Performance Deficits MODERATE - Comorbidities and min to mod modifications of tasks    Clinical Decision Making LOW - Analysis of occupational profile, problem-focused assessments, limited treatment options    Overall Complexity LOW     OT Session Time: 25 minutes  Therapeutic Activity: 10 minutes

## 2023-12-29 LAB
ANION GAP SERPL CALC-SCNC: 1 MMOL/L (ref 0–18)
ATRIAL RATE: 124 BPM
BUN BLD-MCNC: 12 MG/DL (ref 9–23)
CALCIUM BLD-MCNC: 8.1 MG/DL (ref 8.5–10.1)
CHLORIDE SERPL-SCNC: 112 MMOL/L (ref 98–112)
CO2 SERPL-SCNC: 30 MMOL/L (ref 21–32)
CREAT BLD-MCNC: 0.79 MG/DL
EGFRCR SERPLBLD CKD-EPI 2021: 85 ML/MIN/1.73M2 (ref 60–?)
ERYTHROCYTE [DISTWIDTH] IN BLOOD BY AUTOMATED COUNT: 15.9 %
GLUCOSE BLD-MCNC: 138 MG/DL (ref 70–99)
GLUCOSE BLD-MCNC: 186 MG/DL (ref 70–99)
GLUCOSE BLD-MCNC: 190 MG/DL (ref 70–99)
GLUCOSE BLD-MCNC: 212 MG/DL (ref 70–99)
GLUCOSE BLD-MCNC: 238 MG/DL (ref 70–99)
HCT VFR BLD AUTO: 31.4 %
HGB BLD-MCNC: 10.2 G/DL
MCH RBC QN AUTO: 29.7 PG (ref 26–34)
MCHC RBC AUTO-ENTMCNC: 32.5 G/DL (ref 31–37)
MCV RBC AUTO: 91.5 FL
OSMOLALITY SERPL CALC.SUM OF ELEC: 301 MOSM/KG (ref 275–295)
P AXIS: 53 DEGREES
P-R INTERVAL: 116 MS
PLATELET # BLD AUTO: 203 10(3)UL (ref 150–450)
POTASSIUM SERPL-SCNC: 4.2 MMOL/L (ref 3.5–5.1)
POTASSIUM SERPL-SCNC: 4.2 MMOL/L (ref 3.5–5.1)
Q-T INTERVAL: 314 MS
QRS DURATION: 76 MS
QTC CALCULATION (BEZET): 451 MS
R AXIS: 43 DEGREES
RBC # BLD AUTO: 3.43 X10(6)UL
SODIUM SERPL-SCNC: 143 MMOL/L (ref 136–145)
T AXIS: 56 DEGREES
VENTRICULAR RATE: 124 BPM
WBC # BLD AUTO: 23.1 X10(3) UL (ref 4–11)

## 2023-12-29 PROCEDURE — 99232 SBSQ HOSP IP/OBS MODERATE 35: CPT | Performed by: HOSPITALIST

## 2023-12-29 RX ORDER — FUROSEMIDE 10 MG/ML
40 INJECTION INTRAMUSCULAR; INTRAVENOUS ONCE
Qty: 4 ML | Refills: 0 | Status: COMPLETED | OUTPATIENT
Start: 2023-12-29 | End: 2023-12-29

## 2023-12-29 RX ORDER — HYDROCODONE BITARTRATE AND ACETAMINOPHEN 5; 325 MG/1; MG/1
1 TABLET ORAL EVERY 4 HOURS PRN
Status: DISCONTINUED | OUTPATIENT
Start: 2023-12-29 | End: 2024-01-03

## 2023-12-29 RX ORDER — TRAMADOL HYDROCHLORIDE 50 MG/1
50 TABLET ORAL EVERY 6 HOURS PRN
Status: DISCONTINUED | OUTPATIENT
Start: 2023-12-29 | End: 2024-01-03

## 2023-12-29 RX ORDER — TRAMADOL HYDROCHLORIDE 50 MG/1
100 TABLET ORAL EVERY 6 HOURS PRN
Status: DISCONTINUED | OUTPATIENT
Start: 2023-12-29 | End: 2024-01-03

## 2023-12-29 RX ORDER — HYDROCODONE BITARTRATE AND ACETAMINOPHEN 5; 325 MG/1; MG/1
2 TABLET ORAL EVERY 4 HOURS PRN
Status: DISCONTINUED | OUTPATIENT
Start: 2023-12-29 | End: 2024-01-03

## 2023-12-29 NOTE — PROGRESS NOTES
Progress Note  Soumya King Patient Status:  Inpatient    6/3/1960 MRN [id number]   [facility] 6NE-A Attending Karla Camejo MD   Hosp Day # 8 PCP Rika Vinson DO     Subjective:  POD # 2 CABG x 2. Sitting up in chair. Remains ST.     Objective:  /71   Pulse (!) 124   Temp 98.4 °F (36.9 °C) (Temporal)   Resp (!) 34   Ht 5' 1\" (1.549 m)   Wt 139 lb 15.9 oz (63.5 kg)   SpO2 99%   BMI 26.45 kg/m²     Telemetry:  BPM.      Intake/Output:    Intake/Output Summary (Last 24 hours) at 2023 1117  Last data filed at 2023 1021  Gross per 24 hour   Intake 1921.4 ml   Output 1300 ml   Net 621.4 ml       Last 3 Weights   23 0100 139 lb 15.9 oz (63.5 kg)   23 0600 128 lb 1.4 oz (58.1 kg)   23 0535 126 lb 1.7 oz (57.2 kg)   23 0431 123 lb 3.8 oz (55.9 kg)   23 2030 133 lb 4.8 oz (60.5 kg)   23 1806 134 lb 0.6 oz (60.8 kg)   23 1452 129 lb (58.5 kg)   23 1043 136 lb (61.7 kg)   23 1116 137 lb (62.1 kg)       Labs:  Recent Labs   Lab 23  1511 23  0409 23  0407   * 279* 186*   BUN 12 11 12   CREATSERUM 0.91 0.68 0.79   EGFRCR 72 99 85   CA 7.6* 7.0* 8.1*   * 142 143   K 2.6* 3.7 4.2  4.2   * 114* 112   CO2 24.0 25.0 30.0     Recent Labs   Lab 23  1052 23  0540 23  1511 23  0409 23  0407   RBC 3.62*  3.62*   < > 3.92 3.36* 3.43*   HGB 10.7*  10.7*   < > 11.8* 10.1* 10.2*   HCT 34.1*  34.1*   < > 34.9* 30.3* 31.4*   MCV 94.2  94.2   < > 89.0 90.2 91.5   MCH 29.6  29.6   < > 30.1 30.1 29.7   MCHC 31.4  31.4   < > 33.8 33.3 32.5   RDW 14.2  14.2   < > 14.8 15.6 15.9   NEPRELIM 8.21*  --   --  24.63*  --    WBC 12.1*  12.1*   < > 34.7* 27.8* 23.1*   .0  422.0   < > 268.0 209.0 203.0    < > = values in this interval not displayed.         No results for input(s): \"TROP\", \"TROPHS\", \"CK\" in the last 168 hours.  EK23;   BPM, QRS;  76  ms, QTc: 451 ms, MI: 116 ms.   Echo:  12/22/23:  1. Left ventricle: The cavity size was normal. Wall thickness was normal.      Systolic function was reduced. The estimated ejection fraction was      40-45%, by visual assessment. Severe hypokinesis of the      mid-apicalanteroseptal and apical walls. Doppler parameters are      consistent with abnormal left ventricular relaxation - grade 1 diastolic      dysfunction.   2. Left atrium: The left atrial volume was normal.   Impressions:  This study is compared with previous dated 04/15/2024: New   RWMA concerning for CAD in left anterior descending coronary distribution   versus stress CM.   *     Review of Systems:   Constitutional: No fevers, chills, fatigue or night sweats.  ENT: No mouth pain, neck pain, running nose, headaches or swollen glands.  Skin: No rashes, pruritus or skin changes,  Respiratory: Denies cough, wheezing or shortness of breath.  CV: Denies chest pain, palpitations, orthopnea, PND or dizziness.  Musculoskeletal: No joint pain, stiffness or swelling.  GI: No nausea, vomiting or diarrhea. No blood in stools.  Neurologic: No seizures, tremors, weakness or numbness.     Physical Exam:  Gen: alert, oriented x 3, NAD  Heent: pupils equal, reactive. Mucous membranes moist.   Neck: no jvd  Cardiac: regular rate and rhythm, normal S1,S2, no murmur, gallop or rub. MS incision clear.   Lungs: Clear upper, bibasilar crackles.   Abd: soft, NT/ND +bs  Ext: no edema  Skin: Warm, dry  Neuro: No focal deficits    Medications:     insulin aspart  1-5 Units Subcutaneous TID AC and HS    atorvastatin  80 mg Oral Nightly    metoprolol tartrate  25 mg Oral 2x Daily(Beta Blocker)    mupirocin  1 Application Nasal BID    aspirin  325 mg Oral Daily    topiramate  50 mg Oral BID    pantoprazole  40 mg Intravenous BID AC    Or    pantoprazole  40 mg Oral BID AC    nicotine  1 patch Transdermal Daily    oxybutynin ER  10 mg Oral Daily    QUEtiapine ER  150 mg Oral Nightly     And    QUEtiapine ER  50 mg Oral Nightly    azelastine  1 spray Each Nare BID    busPIRone  10 mg Oral Daily    clonazePAM  0.5 mg Oral BID    desvenlafaxine ER  100 mg Oral Daily    gabapentin  300 mg Oral Nightly    umeclidinium bromide  1 puff Inhalation Daily      sodium chloride 10 mL/hr at 12/27/23 1536    DOBUTamine Stopped (12/28/23 1354)    nitroGLYCERIN in dextrose 5%      norepinephrine      NitroPRUSSide (Nipride) 50 mg in dextrose 5% 250 mL infusion      dextrose 5%-sodium chloride 0.45% 70 mL/hr (12/27/23 1530)     Assessment:  CAD:   NSTEMI.   12/27: POD # 2 CABG x 2:  LIMA- LAD, SV-PDA.   EF:  40-45 %.   Volume up- 3.4 liters. IV Lasix 40 mg this am.   ASA, BBB, statin.   Sinus Tachycardia:   BPM.   Metoprolol tartrate 25 mg Bid.   On RA - Sat 98 %.   HTN: Controlled.   Dyslipidemia: Atorvastatin 80 mg. HDL: 37  LDL: 79.   Type 2 DM: A1C:  6.4.   Leukocytosis:  WBC:  23.1- trending down. Afebrile.   Anemia:  Hgb: 10.2 plates: 203.  Former smoker: Total smoking cessation.     Plan:  POD # 2 CABG x 2.   Remains 's. Continue BB. Consideration to PPS.   Leukocytosis- afebrile. Repeat CXR.   Volume up- IV Lasix this am.     Plan of care discussed with patient, RN.    MARLENE Steve  12/29/2023  11:17 AM  [phone]  [facility] [phone]       Patient seen and examined independently.  Note reviewed and labs reviewed.  Agree with above assessment and plan.  White count trending down, with no friction rub on exam or ill appearing.  CXR tomorrow and limited echo if persistently tachycardic.    STEPHANY Borges MD

## 2023-12-29 NOTE — PLAN OF CARE
Assumed care of patient at 1930.  Patient complains of incisional pain which is managed well via dilaudid PCA.      During the shift VSS and PPPx4.  Patient still has pacer wires in, otherwise patient has been de-lined, chest tubes, bui and A-line out.    Good urine output during the shift, patient frequently misses the hat placed in toilet. Post void bladder scan was 67mL.    Patient in NSR, no ectopy noted.  Patient tachycardic during rest and during ambulation patient HR as high as 135.      2L O2 at night    See flowsheets for more details.

## 2023-12-29 NOTE — PROGRESS NOTES
Progress Note     Soumya King Patient Status:  Inpatient    6/3/1960 MRN [id number]   [facility] 6NE-A Attending Karla Camejo MD   Hosp Day # 8 PCP Rika Vinson DO     Chief Complaint: cp    Subjective:   S: Patient denies cp sob    Review of Systems:   10 point ROS completed and was negative, except for pertinent positive and negatives stated in subjective.    Objective:   Vital signs:  Temp:  [97.8 °F (36.6 °C)-98.4 °F (36.9 °C)] 98.4 °F (36.9 °C)  Pulse:  [] 124  Resp:  [0-34] 34  BP: ()/() 112/71  SpO2:  [90 %-100 %] 99 %    Wt Readings from Last 3 Encounters:   23 139 lb 15.9 oz (63.5 kg)   23 136 lb (61.7 kg)   23 137 lb (62.1 kg)       Intake/Output:    Intake/Output Summary (Last 24 hours) at 2023 1220  Last data filed at 2023 1128  Gross per 24 hour   Intake 1921.4 ml   Output 1900 ml   Net 21.4 ml         Physical Exam:    General: No acute distress. Alert ,        Respiratory: Clear to auscultation bilaterally. No wheezes. No rhonchi.    Cardiovascular: S1, S2. Regular rate and rhythm. No murmurs, rubs or gallops.  Abdomen: Soft, nontender, nondistended.  Positive bowel sounds. No rebound or guarding.  Neurologic: No focal neurological deficits.  Musculoskeletal: Moves all extremities.  Extremities: No edema.    Results:   Diagnostic Data:      Labs:    Selected labs - last 24 hours:  Endo  Lytes  Renal   Glu 186  Na 143 Ca 8.1  BUN 12   POC Gluc  238  K 4.2; 4.2 PO4 -  Cr 0.79   A1c -  Cl 112 Mg -  eGFR 85   TSH -  CO2 30.0         LFT  CBC  Other   AST -  WBC 23.1  PTT - Procal -   ALT -  Hb 10.2  INR - CRP -   APk -  Hct 31.4  Trop - D dim -   T yoni -  .0  pBNP -  BNP -  - Ferritin  -   Prot -    CK  - Lactate  -   Alb -    LDL  - COVID  -     Imaging: Imaging data reviewed in Epic.    Medications:    insulin aspart  1-5 Units Subcutaneous TID AC and HS    atorvastatin  80 mg Oral Nightly    metoprolol tartrate  25 mg  Oral 2x Daily(Beta Blocker)    mupirocin  1 Application Nasal BID    aspirin  325 mg Oral Daily    topiramate  50 mg Oral BID    pantoprazole  40 mg Intravenous BID AC    Or    pantoprazole  40 mg Oral BID AC    nicotine  1 patch Transdermal Daily    oxybutynin ER  10 mg Oral Daily    QUEtiapine ER  150 mg Oral Nightly    And    QUEtiapine ER  50 mg Oral Nightly    azelastine  1 spray Each Nare BID    busPIRone  10 mg Oral Daily    clonazePAM  0.5 mg Oral BID    desvenlafaxine ER  100 mg Oral Daily    gabapentin  300 mg Oral Nightly    umeclidinium bromide  1 puff Inhalation Daily       Assessment & Plan:   ASSESSMENT / PLAN:     # NSTEMI  -aspirin, metoprolol, statin; hx difficulty swallowing asa due to barretts. Not a true allergy  -Cardiac cath on 12/22 showed multivessel disease  -s/p CABG POD#2     #Hypertension  stable  #DM2-monitor on insulin; controlled  #Hyperlipidemia  #Smoker  -Nicotine patch  #leukocytosis and anemia  -reactive postop  -no fever  -no cough sob no dysuria mary jane Camejo MD    Supplementary Documentation:

## 2023-12-29 NOTE — PLAN OF CARE
Assumed care of patient this morning. Patient a+ox4. Reagan. Follows commands. Ambulating in muniz. Steady gait noted. Pacer wires discontinued per orders. Dilaudid pca dc'd. Norco/ultram prn pain. Ctu orders. Vss. See assessment for complete details. Will continue to monitor.

## 2023-12-29 NOTE — PROGRESS NOTES
[facility]     CV Surgery Progress Note    Soumya King Patient Status:  Inpatient    6/3/1960 MRN [id number]   Location [facility] 6NE-A Attending Karla Camejo MD   Hosp Day # 8 PCP Rika Vinson DO     Subjective:  Patient reports feeling tired. Pain is controlled. Ambulating well.     Tele: SR/ST    Objective:  /73   Pulse 96   Temp 98.4 °F (36.9 °C) (Temporal)   Resp 23   Ht 5' 1\" (1.549 m)   Wt 139 lb 15.9 oz (63.5 kg)   SpO2 93%   BMI 26.45 kg/m²     Intake/Output:    Intake/Output Summary (Last 24 hours) at 2023 0936  Last data filed at 2023 0800  Gross per 24 hour   Intake 1921.4 ml   Output 1345 ml   Net 576.4 ml       Labs:  Lab Results   Component Value Date    WBC 23.1 2023    RBC 3.43 2023    HGB 10.2 2023    HCT 31.4 2023    MCV 91.5 2023    MCH 29.7 2023    MCHC 32.5 2023    RDW 15.9 2023    .0 2023     Lab Results   Component Value Date     2023    K 4.2 2023    K 4.2 2023     2023    CO2 30.0 2023    BUN 12 2023    CREATSERUM 0.79 2023     2023    CA 8.1 2023     Lab Results   Component Value Date    INR 1.28 (H) 2023    INR 1.07 2023    INR 0.97 2022       Physical Exam:  General: VSS, A&Ox3, In NAD  Neck: No JVD.  Lungs: clear anteriorly   Heart: S1,S2 RRR ; Sternum Stable   Abdomen: Soft, non-tender, +bs  Extremities: Warm, dry, mild BLE edema  Skin: sternotomy incision C/D/I, LE incision C/D/I  Neuro: no focal deficits     Assessment/Plan:   CAD s/p CABGx2 with LIMA-LAD, SVG-RCA POD#2  -HD stable, off support   -Leukocytosis, likely reactive, down trending, afebrile- monitor   -Acute post op blood loss anemia, expected, stable- monitor   -Mild vol OL, lasix today   -Renal function intact, good UOP  -Bowel regimen   -Pain management, prn norco   -Chest tubes removed  -Discontinue PW  -GI PPX:  protonix  -DVT PPX: TEDs/SCDs  -Encourage IS/ambulation   -PT/OT/Cardiac rehab   -Ok to transfer to CTU     -D/W Dr. Saunders/Israel Hernandez PA-C   Cardiothoracic Surgery   12/29/2023  9:40 AM

## 2023-12-30 ENCOUNTER — APPOINTMENT (OUTPATIENT)
Dept: GENERAL RADIOLOGY | Facility: HOSPITAL | Age: 63
DRG: 234 | End: 2023-12-30
Attending: INTERNAL MEDICINE
Payer: MEDICAID

## 2023-12-30 ENCOUNTER — APPOINTMENT (OUTPATIENT)
Dept: CV DIAGNOSTICS | Facility: HOSPITAL | Age: 63
DRG: 234 | End: 2023-12-30
Attending: INTERNAL MEDICINE
Payer: MEDICAID

## 2023-12-30 LAB
ANION GAP SERPL CALC-SCNC: 6 MMOL/L (ref 0–18)
ATRIAL RATE: 123 BPM
BLOOD TYPE BARCODE: 9500
BUN BLD-MCNC: 14 MG/DL (ref 9–23)
CALCIUM BLD-MCNC: 8.1 MG/DL (ref 8.5–10.1)
CHLORIDE SERPL-SCNC: 107 MMOL/L (ref 98–112)
CO2 SERPL-SCNC: 25 MMOL/L (ref 21–32)
CREAT BLD-MCNC: 0.68 MG/DL
EGFRCR SERPLBLD CKD-EPI 2021: 99 ML/MIN/1.73M2 (ref 60–?)
ERYTHROCYTE [DISTWIDTH] IN BLOOD BY AUTOMATED COUNT: 15.3 %
GLUCOSE BLD-MCNC: 136 MG/DL (ref 70–99)
GLUCOSE BLD-MCNC: 172 MG/DL (ref 70–99)
GLUCOSE BLD-MCNC: 187 MG/DL (ref 70–99)
GLUCOSE BLD-MCNC: 217 MG/DL (ref 70–99)
GLUCOSE BLD-MCNC: 236 MG/DL (ref 70–99)
GLUCOSE BLD-MCNC: 288 MG/DL (ref 70–99)
HCT VFR BLD AUTO: 29 %
HGB BLD-MCNC: 9.2 G/DL
MCH RBC QN AUTO: 29.7 PG (ref 26–34)
MCHC RBC AUTO-ENTMCNC: 31.7 G/DL (ref 31–37)
MCV RBC AUTO: 93.5 FL
OSMOLALITY SERPL CALC.SUM OF ELEC: 291 MOSM/KG (ref 275–295)
P AXIS: 44 DEGREES
P-R INTERVAL: 122 MS
PLATELET # BLD AUTO: 208 10(3)UL (ref 150–450)
POTASSIUM SERPL-SCNC: 3.7 MMOL/L (ref 3.5–5.1)
Q-T INTERVAL: 326 MS
QRS DURATION: 72 MS
QTC CALCULATION (BEZET): 466 MS
R AXIS: 35 DEGREES
RBC # BLD AUTO: 3.1 X10(6)UL
SODIUM SERPL-SCNC: 138 MMOL/L (ref 136–145)
T AXIS: 43 DEGREES
UNIT VOLUME: 350 ML
VENTRICULAR RATE: 123 BPM
WBC # BLD AUTO: 17 X10(3) UL (ref 4–11)

## 2023-12-30 PROCEDURE — 93308 TTE F-UP OR LMTD: CPT | Performed by: INTERNAL MEDICINE

## 2023-12-30 PROCEDURE — 71045 X-RAY EXAM CHEST 1 VIEW: CPT | Performed by: INTERNAL MEDICINE

## 2023-12-30 PROCEDURE — 99232 SBSQ HOSP IP/OBS MODERATE 35: CPT | Performed by: HOSPITALIST

## 2023-12-30 RX ORDER — POTASSIUM CHLORIDE 20 MEQ/1
40 TABLET, EXTENDED RELEASE ORAL ONCE
Status: COMPLETED | OUTPATIENT
Start: 2023-12-30 | End: 2023-12-30

## 2023-12-30 NOTE — PROGRESS NOTES
Received patient from CNICU. Post CABG day 3. Patient A&Ox4, on RA- SOB with exertion. Sinus tachy on tele with rates in 120's- EKG done to verify rhythm, XIN Paged regarding elevated HR. Sternal incision and LLE incision. Ambulates with 1x walker. Call light within reach.   POC: Echo, Chest Xray

## 2023-12-30 NOTE — PLAN OF CARE
Assumed care @ 1930. Patient is A&O x4. Follows command. VSS. On tele-NSR,ST. On RA, [place]. Normotensive. Sternal incison and LLE incision-CDI and painted with betadine. CT dressing-CDI. Pedal pulses palpable. Up to bathroom. C/o Sternal and back pain, norco prn given per MAR. Accuchecks. Plan for CXR in AM.     Pt transferring to [id number], RN to RN handoff given. Pt taken by transport via wheelchair with all belongings at approx 2340.

## 2023-12-30 NOTE — PROGRESS NOTES
Progress Note  Soumya King Patient Status:  Inpatient    6/3/1960 MRN [id number]   Location [facility] 6NE-A Attending Karla Camejo MD   Hosp Day # 9 PCP Rika Vinson DO     Subjective:  POD # 2 CABG x 3. States she had some nausea today.     Objective:  /60 (BP Location: Right arm)   Pulse 104   Temp 99.3 °F (37.4 °C) (Oral)   Resp 18   Ht 5' 1\" (1.549 m)   Wt 135 lb 8 oz (61.5 kg)   SpO2 97%   BMI 25.60 kg/m²     Telemetry: SR 99 BPM.      Intake/Output:    Intake/Output Summary (Last 24 hours) at 2023 1243  Last data filed at 2023 1201  Gross per 24 hour   Intake 862 ml   Output 0 ml   Net 862 ml       Last 3 Weights   23 0518 135 lb 8 oz (61.5 kg)   23 0100 139 lb 15.9 oz (63.5 kg)   23 0600 128 lb 1.4 oz (58.1 kg)   23 0535 126 lb 1.7 oz (57.2 kg)   23 0431 123 lb 3.8 oz (55.9 kg)   23 2030 133 lb 4.8 oz (60.5 kg)   23 1806 134 lb 0.6 oz (60.8 kg)   23 1452 129 lb (58.5 kg)   23 1043 136 lb (61.7 kg)   23 1116 137 lb (62.1 kg)       Labs:  Recent Labs   Lab 23  0409 23  0407 23  0539   * 186* 187*   BUN 11 12 14   CREATSERUM 0.68 0.79 0.68   EGFRCR 99 85 99   CA 7.0* 8.1* 8.1*    143 138   K 3.7 4.2  4.2 3.7   * 112 107   CO2 25.0 30.0 25.0     Recent Labs   Lab 23  0409 23  0407 23  0539   RBC 3.36* 3.43* 3.10*   HGB 10.1* 10.2* 9.2*   HCT 30.3* 31.4* 29.0*   MCV 90.2 91.5 93.5   MCH 30.1 29.7 29.7   MCHC 33.3 32.5 31.7   RDW 15.6 15.9 15.3   NEPRELIM 24.63*  --   --    WBC 27.8* 23.1* 17.0*   .0 203.0 208.0         No results for input(s): \"TROP\", \"TROPHS\", \"CK\" in the last 168 hours.  EK23;   BPM, QRS;  76 ms, QTc: 451 ms, MD: 116 ms.   Echo:  23:  1. Left ventricle: The cavity size was normal. Wall thickness was normal.      Systolic function was reduced. The estimated ejection fraction was      40-45%, by visual  assessment. Severe hypokinesis of the      mid-apicalanteroseptal and apical walls. Doppler parameters are      consistent with abnormal left ventricular relaxation - grade 1 diastolic      dysfunction.   2. Left atrium: The left atrial volume was normal.   Impressions:  This study is compared with previous dated 04/15/2024: New   RWMA concerning for CAD in left anterior descending coronary distribution   versus stress CM.   *     Review of Systems:   Constitutional: No fevers, chills, fatigue or night sweats.  ENT: No mouth pain, neck pain, running nose, headaches or swollen glands.  Skin: No rashes, pruritus or skin changes,  Respiratory: Denies cough, wheezing or shortness of breath.  CV: Denies chest pain, palpitations, orthopnea, PND or dizziness.  Musculoskeletal: No joint pain, stiffness or swelling.  GI: No nausea, vomiting or diarrhea. No blood in stools.  Neurologic: No seizures, tremors, weakness or numbness.     Physical Exam:  Gen: alert, oriented x 3, NAD  Heent: pupils equal, reactive. Mucous membranes moist.   Neck: no jvd  Cardiac: regular rate and rhythm, normal S1,S2, no murmur, gallop or rub. MS incision clear.   Lungs: Clear upper, dim bases b/l   Abd: soft, NT/ND +bs  Ext: no edema  Skin: Warm, dry  Neuro: No focal deficits    Medications:     insulin aspart  1-5 Units Subcutaneous TID AC and HS    atorvastatin  80 mg Oral Nightly    metoprolol tartrate  25 mg Oral 2x Daily(Beta Blocker)    mupirocin  1 Application Nasal BID    aspirin  325 mg Oral Daily    topiramate  50 mg Oral BID    pantoprazole  40 mg Intravenous BID AC    Or    pantoprazole  40 mg Oral BID AC    nicotine  1 patch Transdermal Daily    oxybutynin ER  10 mg Oral Daily    QUEtiapine ER  150 mg Oral Nightly    And    QUEtiapine ER  50 mg Oral Nightly    azelastine  1 spray Each Nare BID    busPIRone  10 mg Oral Daily    clonazePAM  0.5 mg Oral BID    desvenlafaxine ER  100 mg Oral Daily    gabapentin  300 mg Oral Nightly     umeclidinium bromide  1 puff Inhalation Daily      sodium chloride 10 mL/hr at 12/27/23 1536    DOBUTamine Stopped (12/28/23 1354)    nitroGLYCERIN in dextrose 5%      norepinephrine      NitroPRUSSide (Nipride) 50 mg in dextrose 5% 250 mL infusion      dextrose 5%-sodium chloride 0.45% 70 mL/hr (12/27/23 1530)     Assessment:  CAD:   NSTEMI.   12/27: POD # 2 CABG x 2:  LIMA- LAD, SV-PDA.   EF:  40-45 %.   Volume up- 3.4 liters. IV Lasix 40 mg this am.   ASA, BBB, statin.   Sinus Tachycardia:   BPM.   Metoprolol tartrate 25 mg Bid.   On RA - Sat 98 %.   HTN: Controlled.   Dyslipidemia: Atorvastatin 80 mg. HDL: 37  LDL: 79.   Type 2 DM: A1C:  6.4.   Leukocytosis:  WBC:  23.1- trending down. Afebrile.   Anemia:  Hgb: 10.2 plates: 203.  Former smoker: Total smoking cessation.     Plan:  POD # 3 CABG x 2.   Cont to have ST - improved after BB. Increase to 37.5 mg bid. Limited echo pending. CXR notes b/l worsening atelectasis. Small b/l pleural effusions.   Leukocytosis- improving.     Volume up- s/p lasix IV x1 yesterday     Plan of care discussed with patient, RN.      [place] Brian DO  Cardiologist  [facility]  12/30/2023 12:44 PM      Note to the patient: The 21st Century Cures Act makes medical notes like these available to patients in the interest of transparency. However, be advised that this is a medical document. It is intended as peer to peer communication. It is written in medical language and may contain abbreviations or verbiage that are unfamiliar. It may appear blunt or direct. Medical documents are intended to carry relevant information, facts as evident, and clinical opinion of the practitioner.     Disclaimer: Components of this note were documented using voice recognition system and are subject to errors not corrected at proofreading. Contact the author of this note for any clarifications.

## 2023-12-30 NOTE — PLAN OF CARE
Pt alert able to make needs known.   C/o pain to incision site and back pain , medication given as ordered.   Pt declined Miralax for constipation states she wan to do it \"the old fashion way\".   Sternal incision site painted with betadine, slight swelling noted to the top of the wound Cv nurse aware .   Temp of 99.3 also reported.   Ambulation and IS encouraged    Plan of care discussed with pt , verbalized understanding. Call light within reach .     Problem: Diabetes/Glucose Control  Goal: Glucose maintained within prescribed range  Description: INTERVENTIONS:  - Monitor Blood Glucose as ordered  - Assess for signs and symptoms of hyperglycemia and hypoglycemia  - Administer ordered medications to maintain glucose within target range  - Assess barriers to adequate nutritional intake and initiate nutrition consult as needed  - Instruct patient on self management of diabetes  Outcome: Progressing     Problem: Patient/Family Goals  Goal: Patient/Family Long Term Goal  Description: Patient's Long Term Goal: stay out of hospital 12-25    Interventions:  - Medication compliance  - smoking cessation   - follow up with primary care physician     - See additional Care Plan goals for specific interventions  Outcome: Progressing  Goal: Patient/Family Short Term Goal  Description: Patient's Short Term Goal: go home  No pain 12-25    Interventions:   - medication education   - smoking cessation education   - cardiology consult   - follow up with care team  - PRN tylenol, hot/cold packs, tell nurse if in pain     - See additional Care Plan goals for specific interventions  Outcome: Progressing     Problem: CARDIOVASCULAR - ADULT  Goal: Maintains optimal cardiac output and hemodynamic stability  Description: INTERVENTIONS:  - Monitor vital signs, rhythm, and trends  - Monitor for bleeding, hypotension and signs of decreased cardiac output  - Evaluate effectiveness of vasoactive medications to optimize hemodynamic stability  -  Monitor arterial and/or venous puncture sites for bleeding and/or hematoma  - Assess quality of pulses, skin color and temperature  - Assess for signs of decreased coronary artery perfusion - ex. Angina  - Evaluate fluid balance, assess for edema, trend weights  Outcome: Progressing  Goal: Absence of cardiac arrhythmias or at baseline  Description: INTERVENTIONS:  - Continuous cardiac monitoring, monitor vital signs, obtain 12 lead EKG if indicated  - Evaluate effectiveness of antiarrhythmic and heart rate control medications as ordered  - Initiate emergency measures for life threatening arrhythmias  - Monitor electrolytes and administer replacement therapy as ordered  Outcome: Progressing

## 2023-12-30 NOTE — PROGRESS NOTES
Progress Note     Soumya King Patient Status:  Inpatient    6/3/1960 MRN [id number]   Location [facility] 6NE-A Attending Karla Camejo MD   Hosp Day # 9 PCP Rika Vinson DO     Chief Complaint: cp    Subjective:   S: Patient denies cp sob    Review of Systems:   10 point ROS completed and was negative, except for pertinent positive and negatives stated in subjective.    Objective:   Vital signs:  Temp:  [96.8 °F (36 °C)-99.3 °F (37.4 °C)] 99.2 °F (37.3 °C)  Pulse:  [] 107  Resp:  [10-28] 18  BP: ()/(54-80) 132/78  SpO2:  [87 %-100 %] 100 %    Wt Readings from Last 3 Encounters:   23 135 lb 8 oz (61.5 kg)   23 136 lb (61.7 kg)   23 137 lb (62.1 kg)       Intake/Output:    Intake/Output Summary (Last 24 hours) at 2023 1604  Last data filed at 2023 1252  Gross per 24 hour   Intake 862 ml   Output 0 ml   Net 862 ml         Physical Exam:    General: No acute distress. Alert ,        Respiratory: Clear to auscultation bilaterally. No wheezes. No rhonchi.    Cardiovascular: S1, S2. Regular rate and rhythm. No murmurs, rubs or gallops.  Abdomen: Soft, nontender, nondistended.  Positive bowel sounds. No rebound or guarding.  Neurologic: No focal neurological deficits.  Musculoskeletal: Moves all extremities.  Extremities: No edema.    Results:   Diagnostic Data:      Labs:    Selected labs - last 24 hours:  Endo  Lytes  Renal   Glu 187  Na 138 Ca 8.1  BUN 14   POC Gluc  136  K 3.7 PO4 -  Cr 0.68   A1c -  Cl 107 Mg -  eGFR 99   TSH -  CO2 25.0         LFT  CBC  Other   AST -  WBC 17.0  PTT - Procal -   ALT -  Hb 9.2  INR - CRP -   APk -  Hct 29.0  Trop - D dim -   T yoni -  .0  pBNP -  BNP -  - Ferritin  -   Prot -    CK  - Lactate  -   Alb -    LDL  - COVID  -     Imaging: Imaging data reviewed in Epic.    Medications:    metoprolol tartrate  37.5 mg Oral 2x Daily(Beta Blocker)    insulin aspart  1-5 Units Subcutaneous TID AC and HS    atorvastatin  80 mg  Oral Nightly    mupirocin  1 Application Nasal BID    aspirin  325 mg Oral Daily    topiramate  50 mg Oral BID    pantoprazole  40 mg Intravenous BID AC    Or    pantoprazole  40 mg Oral BID AC    nicotine  1 patch Transdermal Daily    oxybutynin ER  10 mg Oral Daily    QUEtiapine ER  150 mg Oral Nightly    And    QUEtiapine ER  50 mg Oral Nightly    azelastine  1 spray Each Nare BID    busPIRone  10 mg Oral Daily    clonazePAM  0.5 mg Oral BID    desvenlafaxine ER  100 mg Oral Daily    gabapentin  300 mg Oral Nightly    umeclidinium bromide  1 puff Inhalation Daily       Assessment & Plan:   ASSESSMENT / PLAN:     # NSTEMI  -aspirin, metoprolol, statin; hx difficulty swallowing asa due to barretts. Not a true allergy  -Cardiac cath on 12/22 showed multivessel disease  -s/p CABG POD#2     #Hypertension  stable  #DM2-monitor on insulin; controlled  #Hyperlipidemia  #Smoker  -Nicotine patch  #leukocytosis and anemia  -reactive postop  -no fever  -no cough sob no dysuria justinerhhasmukh Camejo MD    Supplementary Documentation:

## 2023-12-30 NOTE — PROGRESS NOTES
[facility]   [facility] Progress Note    Soumya King Patient Status:  Inpatient    6/3/1960 MRN [id number]   Location [facility] 8NE-A Attending Karla Camejo MD   Hosp Day # 9 PCP Rika Vinson,      Subjective:  Sitting up in chair. Went for walk in hallway. Surgical pain . Feels tired . No sob resting . No nausea .     Objective:  /55 (BP Location: Left arm)   Pulse (!) 125   Temp 97.9 °F (36.6 °C) (Oral)   Resp 20   Ht 154.9 cm (5' 1\")   Wt 61.5 kg (135 lb 8 oz)   SpO2 94%   BMI 25.60 kg/m²          Intake/Output:    Intake/Output Summary (Last 24 hours) at 2023 0923  Last data filed at 2023 0300  Gross per 24 hour   Intake 1342 ml   Output 875 ml   Net 467 ml           Last 3 Weights   23 0518 61.5 kg (135 lb 8 oz)   23 0100 63.5 kg (139 lb 15.9 oz)   23 0600 58.1 kg (128 lb 1.4 oz)   23 0535 57.2 kg (126 lb 1.7 oz)   23 0431 55.9 kg (123 lb 3.8 oz)   23 2030 60.5 kg (133 lb 4.8 oz)   23 1806 60.8 kg (134 lb 0.6 oz)   23 1452 58.5 kg (129 lb)   23 1043 61.7 kg (136 lb)   23 1116 62.1 kg (137 lb)           Allergies:  Allergies   Allergen Reactions    Naprosyn [Naproxen] ANAPHYLAXIS     Has tolerated ibuprofen and IV toradol     Aspirin OTHER (SEE COMMENTS)     Difficulty swallowing due to  saavedra's esophagus       Current Facility-Administered Medications   Medication Dose Route Frequency    HYDROcodone-acetaminophen (Norco) 5-325 MG per tab 1 tablet  1 tablet Oral Q4H PRN    Or    HYDROcodone-acetaminophen (Norco) 5-325 MG per tab 2 tablet  2 tablet Oral Q4H PRN    traMADol (Ultram) tab 50 mg  50 mg Oral Q6H PRN    Or    traMADol (Ultram) tab 100 mg  100 mg Oral Q6H PRN    glucose (Dex4) 15 GM/59ML oral liquid 15 g  15 g Oral Q15 Min PRN    Or    glucose (Glutose) 40% oral gel 15 g  15 g Oral Q15 Min PRN    Or    glucose-vitamin C (Dex-4) chewable tab 4 tablet  4 tablet Oral Q15 Min PRN    Or    dextrose 50%  injection 50 mL  50 mL Intravenous Q15 Min PRN    Or    glucose (Dex4) 15 GM/59ML oral liquid 30 g  30 g Oral Q15 Min PRN    Or    glucose (Glutose) 40% oral gel 30 g  30 g Oral Q15 Min PRN    Or    glucose-vitamin C (Dex-4) chewable tab 8 tablet  8 tablet Oral Q15 Min PRN    insulin aspart (NovoLOG) 100 Units/mL FlexPen 1-5 Units  1-5 Units Subcutaneous TID AC and HS    atorvastatin (Lipitor) tab 80 mg  80 mg Oral Nightly    metoprolol tartrate (Lopressor) tab 25 mg  25 mg Oral 2x Daily(Beta Blocker)    sodium chloride 0.9% infusion   Intravenous Continuous    morphINE PF 2 MG/ML injection 2 mg  2 mg Intravenous Q2H PRN    Or    morphINE PF 4 MG/ML injection 4 mg  4 mg Intravenous Q2H PRN    Or    morphINE PF 4 MG/ML injection 6 mg  6 mg Intravenous Q2H PRN    melatonin tab 3 mg  3 mg Oral Nightly PRN    polyethylene glycol (PEG 3350) (Miralax) 17 g oral packet 17 g  17 g Oral Daily PRN    sennosides (Senokot) tab 17.2 mg  17.2 mg Oral Nightly PRN    bisacodyl (Dulcolax) 10 MG rectal suppository 10 mg  10 mg Rectal Daily PRN    fleet enema (Fleet) 7-19 GM/118ML rectal enema 133 mL  1 enema Rectal Once PRN    DOBUTamine in dextrose 5% (Dobutrex) 500 mg/250mL infusion premix  2.5-20 mcg/kg/min (Dosing Weight) Intravenous Continuous PRN    nitroGLYCERIN in dextrose 5% 50 mg/250mL infusion premix  5-300 mcg/min Intravenous Continuous PRN    norepinephrine (Levophed) 4 mg/250mL infusion premix  0.5-30 mcg/min Intravenous Continuous PRN    NitroPRUSSide (Nipride) 50 mg in dextrose 5% 250 mL infusion  0.1-4 mcg/kg/min (Dosing Weight) Intravenous Continuous PRN    potassium chloride 20 mEq/100mL IVPB premix 20 mEq  20 mEq Intravenous PRN    Or    potassium chloride 40 mEq/100mL IVPB premix (central line) 40 mEq  40 mEq Intravenous PRN    calcium gluconate 3 g in sodium chloride 0.9% 100 mL IVPB  3 g Intravenous PRN    magnesium sulfate in dextrose 5% 1 g/100mL infusion premix 1 g  1 g Intravenous PRN    magnesium sulfate  in sterile water for injection 2 g/50mL IVPB premix 2 g  2 g Intravenous PRN    mupirocin (Bactroban) 2% nasal ointment 1 Application  1 Application Nasal BID    dextrose 5%-sodium chloride 0.45% infusion   mL/hr Intravenous Continuous    aspirin DR tab 325 mg  325 mg Oral Daily    topiramate (TopaMAX) tab 50 mg  50 mg Oral BID    pantoprazole (Protonix) 40 mg in sodium chloride 0.9% PF 10 mL IV push  40 mg Intravenous BID AC    Or    pantoprazole (Protonix) DR tab 40 mg  40 mg Oral BID AC    nicotine (Nicoderm CQ) 14 MG/24HR patch 1 patch  1 patch Transdermal Daily    oxybutynin ER (Ditropan-XL) 24 hr tab 10 mg  10 mg Oral Daily    QUEtiapine ER (SEROquel XR) 24 hr tab 150 mg  150 mg Oral Nightly    And    QUEtiapine ER (SEROquel XR) 24 hr tab 50 mg  50 mg Oral Nightly    influenza vaccine split quad (Fluzone QIV) 0.5 mL IM injection (ages 6 months to 64 years) 0.5 mL  0.5 mL Intramuscular Prior to discharge    albuterol (Ventolin HFA) 108 (90 Base) MCG/ACT inhaler 2 puff  2 puff Inhalation Q4H PRN    azelastine (Astelin) 0.1 % nasal solution 1 spray  1 spray Each Nare BID    busPIRone (Buspar) tab 10 mg  10 mg Oral Daily    clonazePAM (KlonoPIN) tab 0.5 mg  0.5 mg Oral BID    desvenlafaxine ER (Pristiq) 24 hr tab 100 mg  100 mg Oral Daily    gabapentin (Neurontin) cap 300 mg  300 mg Oral Nightly    umeclidinium bromide (Incruse Ellipta) 62.5 MCG/ACT inhaler 1 puff  1 puff Inhalation Daily    benzonatate (Tessalon) cap 200 mg  200 mg Oral TID PRN       Labs:  Lab Results   Component Value Date    WBC 17.0 12/30/2023    HGB 9.2 12/30/2023    HCT 29.0 12/30/2023    .0 12/30/2023    CREATSERUM 0.68 12/30/2023    BUN 14 12/30/2023     12/30/2023    K 3.7 12/30/2023     12/30/2023    CO2 25.0 12/30/2023     12/30/2023    CA 8.1 12/30/2023     Physical Exam:    Neuro: NAD intact A/O X4   Lungs: Clear diminished comfortable on RA   Heart: RRR S1 S2 Sternum stable   Abdomen: Soft non tender  BS present no nausea no BM   Extremities: Warm and dry trace edema BLE   Pulses: Palpable   Incisions: Sternotomy PHILIP C/D/I . LE incision C/D/I        Assessment/Plan:  Patient Active Problem List   Diagnosis    Vitamin D deficiency    Jamison's esophagus    Tobacco use    Insomnia    COPD, mild (HCC) - Dx'd via PFTs done in 3/2015    Primary osteoarthritis involving multiple joints    Migraine without aura and without status migrainosus, not intractable    Hyperlipidemia    SUMMER on CPAP    MDD (major depressive disorder), recurrent episode, moderate (HCC)    GERD (gastroesophageal reflux disease)    Hiatal hernia without gangrene and obstruction    Hypertension    Chronic pansinusitis    Mucinous cystadenoma of ovary, left    Arthritis of carpometacarpal (CMC) joint of right thumb    KIRK (generalized anxiety disorder)    Vulvar itching    History of pubovaginal sling    Nocturnal enuresis    Absence of bladder continence    Vasomotor flushing    Primary osteoarthritis of left knee    Type 2 diabetes mellitus without complication (HCC)    Mass of spine    Hx of motion sickness    Fitting and adjustment of dental prosthetic device    Difficult intubation    Chronic low back pain without sciatica    Type 2 diabetes mellitus with diabetic neuropathy (HCC)    Pulmonary nodule    Neck pain    Degenerative disc disease, lumbar    NSTEMI (non-ST elevated myocardial infarction) (HCC)    CAD, multiple vessel       POD# 3 CABG X2 with LIMA- LAD , SVG - RCA     - HD stable   - Leukocytosis , trending down afebrile monitor   - Post op acute blood loss anemia expected follow   - Mild fluid OL lasix yesterday follow wt stable   - Renal function intact  - Bowel regimen   - Pain management tramadol   - PW out   - Hx of smoker nicotine patch   - GI PPX protonix   - DVT PPX INGRIS'S/ SCD'S   - Encourage IS/ Ambulation   - PT/OT/ Cardiac rehab  - SW discharge planning       D/W Dr.Foy Angela OWODARD, RN  12/30/2023  9:23 AM

## 2023-12-30 NOTE — CM/SW NOTE
CM provided patient with home health agency choice list for review.  SAMARIA/CANDI to follow up for choice to finalize services for discharge.       Kiara Cabrera RN Case Manager [phone]

## 2023-12-30 NOTE — CARDIAC REHAB
MI/CAD/CABG education completed with pt. Phase 2 CR offered, but informed pt that [facility] does not accept Medicaid for outpatient services.

## 2023-12-30 NOTE — PLAN OF CARE
Notified by Rn that pt with HR in 120's. Pt denies any complaints. Blood pressure of 110/54. EKG reviewed and noted to be sinus tachycardia.   Order placed for metoprolol tartrate 6.25mg x1 now

## 2023-12-31 LAB
ANION GAP SERPL CALC-SCNC: 3 MMOL/L (ref 0–18)
BUN BLD-MCNC: 13 MG/DL (ref 9–23)
CALCIUM BLD-MCNC: 8.1 MG/DL (ref 8.5–10.1)
CHLORIDE SERPL-SCNC: 110 MMOL/L (ref 98–112)
CO2 SERPL-SCNC: 26 MMOL/L (ref 21–32)
CREAT BLD-MCNC: 0.69 MG/DL
EGFRCR SERPLBLD CKD-EPI 2021: 99 ML/MIN/1.73M2 (ref 60–?)
ERYTHROCYTE [DISTWIDTH] IN BLOOD BY AUTOMATED COUNT: 15.1 %
GLUCOSE BLD-MCNC: 125 MG/DL (ref 70–99)
GLUCOSE BLD-MCNC: 160 MG/DL (ref 70–99)
GLUCOSE BLD-MCNC: 165 MG/DL (ref 70–99)
GLUCOSE BLD-MCNC: 181 MG/DL (ref 70–99)
GLUCOSE BLD-MCNC: 188 MG/DL (ref 70–99)
GLUCOSE BLD-MCNC: 246 MG/DL (ref 70–99)
HCT VFR BLD AUTO: 30.9 %
HGB BLD-MCNC: 10.1 G/DL
MCH RBC QN AUTO: 30.3 PG (ref 26–34)
MCHC RBC AUTO-ENTMCNC: 32.7 G/DL (ref 31–37)
MCV RBC AUTO: 92.8 FL
OSMOLALITY SERPL CALC.SUM OF ELEC: 293 MOSM/KG (ref 275–295)
PLATELET # BLD AUTO: 265 10(3)UL (ref 150–450)
POTASSIUM SERPL-SCNC: 3.9 MMOL/L (ref 3.5–5.1)
POTASSIUM SERPL-SCNC: 3.9 MMOL/L (ref 3.5–5.1)
RBC # BLD AUTO: 3.33 X10(6)UL
SODIUM SERPL-SCNC: 139 MMOL/L (ref 136–145)
WBC # BLD AUTO: 12.9 X10(3) UL (ref 4–11)

## 2023-12-31 PROCEDURE — 99232 SBSQ HOSP IP/OBS MODERATE 35: CPT | Performed by: STUDENT IN AN ORGANIZED HEALTH CARE EDUCATION/TRAINING PROGRAM

## 2023-12-31 RX ORDER — ATORVASTATIN CALCIUM 40 MG/1
80 TABLET, FILM COATED ORAL NIGHTLY
Qty: 90 TABLET | Refills: 1 | Status: SHIPPED | OUTPATIENT
Start: 2023-12-31

## 2023-12-31 RX ORDER — FUROSEMIDE 10 MG/ML
20 INJECTION INTRAMUSCULAR; INTRAVENOUS
Status: COMPLETED | OUTPATIENT
Start: 2023-12-31 | End: 2023-12-31

## 2023-12-31 RX ORDER — POTASSIUM CHLORIDE 20 MEQ/1
20 TABLET, EXTENDED RELEASE ORAL 2 TIMES DAILY
Status: COMPLETED | OUTPATIENT
Start: 2023-12-31 | End: 2023-12-31

## 2023-12-31 RX ORDER — ASPIRIN 325 MG
325 TABLET, DELAYED RELEASE (ENTERIC COATED) ORAL DAILY
Qty: 90 TABLET | Refills: 1 | Status: SHIPPED | OUTPATIENT
Start: 2024-01-01 | End: 2024-06-29

## 2023-12-31 NOTE — PLAN OF CARE
Pt alert able to make needs known.   C/o pain , medication given as ordered.   Walked the stairs with PT.   Up in the chair since morning.   Plan of care discussed with pt, verbalized understanding.   Call light within reach.     Problem: Diabetes/Glucose Control  Goal: Glucose maintained within prescribed range  Description: INTERVENTIONS:  - Monitor Blood Glucose as ordered  - Assess for signs and symptoms of hyperglycemia and hypoglycemia  - Administer ordered medications to maintain glucose within target range  - Assess barriers to adequate nutritional intake and initiate nutrition consult as needed  - Instruct patient on self management of diabetes  Outcome: Progressing     Problem: Patient/Family Goals  Goal: Patient/Family Long Term Goal  Description: Patient's Long Term Goal: stay out of hospital 12-25    Interventions:  - Medication compliance  - smoking cessation   - follow up with primary care physician     - See additional Care Plan goals for specific interventions  Outcome: Progressing  Goal: Patient/Family Short Term Goal  Description: Patient's Short Term Goal: go home  No pain 12-25    Interventions:   - medication education   - smoking cessation education   - cardiology consult   - follow up with care team  - PRN tylenol, hot/cold packs, tell nurse if in pain     - See additional Care Plan goals for specific interventions  Outcome: Progressing     Problem: CARDIOVASCULAR - ADULT  Goal: Maintains optimal cardiac output and hemodynamic stability  Description: INTERVENTIONS:  - Monitor vital signs, rhythm, and trends  - Monitor for bleeding, hypotension and signs of decreased cardiac output  - Evaluate effectiveness of vasoactive medications to optimize hemodynamic stability  - Monitor arterial and/or venous puncture sites for bleeding and/or hematoma  - Assess quality of pulses, skin color and temperature  - Assess for signs of decreased coronary artery perfusion - ex. Angina  - Evaluate fluid  balance, assess for edema, trend weights  Outcome: Progressing  Goal: Absence of cardiac arrhythmias or at baseline  Description: INTERVENTIONS:  - Continuous cardiac monitoring, monitor vital signs, obtain 12 lead EKG if indicated  - Evaluate effectiveness of antiarrhythmic and heart rate control medications as ordered  - Initiate emergency measures for life threatening arrhythmias  - Monitor electrolytes and administer replacement therapy as ordered  Outcome: Progressing

## 2023-12-31 NOTE — HOME CARE LIAISON
Received referral via [facility] for Home Health services. Spoke w/ patient who reported she plans to stay at her sister's house in [place] for a week or two. Patient wants to discuss HHC with her sister before agreeing and will reach out to me once they have spoken. Contact information placed on AVS. Notified SAMARIA Eaton

## 2023-12-31 NOTE — CM/SW NOTE
Per JOSE FRANCISCO, [facility]--requested liaison Lolita to meet with patient and confirm acceptance

## 2023-12-31 NOTE — PROGRESS NOTES
[facility]   [facility] Progress Note    Soumya King Patient Status:  Inpatient    6/3/1960 MRN [id number]   Location [facility] 8NE-A Attending Karla Camejo MD   Hosp Day # 10 PCP Rika Vinson DO     Subjective:  Sitting up in chair feels better today . No nausea . Pain controlled . Has been walking hallways.     Objective:  /67 (BP Location: Left arm)   Pulse 116   Temp 97.9 °F (36.6 °C) (Oral)   Resp 18   Ht 154.9 cm (5' 1\")   Wt 65 kg (143 lb 4.8 oz)   SpO2 98%   BMI 27.08 kg/m²          Intake/Output:    Intake/Output Summary (Last 24 hours) at 2023 0917  Last data filed at 2023 0445  Gross per 24 hour   Intake 540 ml   Output 1 ml   Net 539 ml           Last 3 Weights   23 0445 65 kg (143 lb 4.8 oz)   23 0518 61.5 kg (135 lb 8 oz)   23 0100 63.5 kg (139 lb 15.9 oz)   23 0600 58.1 kg (128 lb 1.4 oz)   23 0535 57.2 kg (126 lb 1.7 oz)   23 0431 55.9 kg (123 lb 3.8 oz)   23 2030 60.5 kg (133 lb 4.8 oz)   23 1806 60.8 kg (134 lb 0.6 oz)   23 1452 58.5 kg (129 lb)   23 1043 61.7 kg (136 lb)   23 1116 62.1 kg (137 lb)           Allergies:  Allergies   Allergen Reactions    Naprosyn [Naproxen] ANAPHYLAXIS     Has tolerated ibuprofen and IV toradol     Aspirin OTHER (SEE COMMENTS)     Difficulty swallowing due to  saavedra's esophagus       Current Facility-Administered Medications   Medication Dose Route Frequency    furosemide (Lasix) 10 mg/mL injection 20 mg  20 mg Intravenous BID (Diuretic)    potassium chloride (K-Dur) tab 20 mEq  20 mEq Oral BID    metoprolol tartrate (Lopressor) partial tab 37.5 mg  37.5 mg Oral 2x Daily(Beta Blocker)    HYDROcodone-acetaminophen (Norco) 5-325 MG per tab 1 tablet  1 tablet Oral Q4H PRN    Or    HYDROcodone-acetaminophen (Norco) 5-325 MG per tab 2 tablet  2 tablet Oral Q4H PRN    traMADol (Ultram) tab 50 mg  50 mg Oral Q6H PRN    Or    traMADol (Ultram) tab 100 mg  100 mg  Oral Q6H PRN    glucose (Dex4) 15 GM/59ML oral liquid 15 g  15 g Oral Q15 Min PRN    Or    glucose (Glutose) 40% oral gel 15 g  15 g Oral Q15 Min PRN    Or    glucose-vitamin C (Dex-4) chewable tab 4 tablet  4 tablet Oral Q15 Min PRN    Or    dextrose 50% injection 50 mL  50 mL Intravenous Q15 Min PRN    Or    glucose (Dex4) 15 GM/59ML oral liquid 30 g  30 g Oral Q15 Min PRN    Or    glucose (Glutose) 40% oral gel 30 g  30 g Oral Q15 Min PRN    Or    glucose-vitamin C (Dex-4) chewable tab 8 tablet  8 tablet Oral Q15 Min PRN    insulin aspart (NovoLOG) 100 Units/mL FlexPen 1-5 Units  1-5 Units Subcutaneous TID AC and HS    atorvastatin (Lipitor) tab 80 mg  80 mg Oral Nightly    sodium chloride 0.9% infusion   Intravenous Continuous    morphINE PF 2 MG/ML injection 2 mg  2 mg Intravenous Q2H PRN    Or    morphINE PF 4 MG/ML injection 4 mg  4 mg Intravenous Q2H PRN    Or    morphINE PF 4 MG/ML injection 6 mg  6 mg Intravenous Q2H PRN    melatonin tab 3 mg  3 mg Oral Nightly PRN    polyethylene glycol (PEG 3350) (Miralax) 17 g oral packet 17 g  17 g Oral Daily PRN    sennosides (Senokot) tab 17.2 mg  17.2 mg Oral Nightly PRN    bisacodyl (Dulcolax) 10 MG rectal suppository 10 mg  10 mg Rectal Daily PRN    fleet enema (Fleet) 7-19 GM/118ML rectal enema 133 mL  1 enema Rectal Once PRN    DOBUTamine in dextrose 5% (Dobutrex) 500 mg/250mL infusion premix  2.5-20 mcg/kg/min (Dosing Weight) Intravenous Continuous PRN    nitroGLYCERIN in dextrose 5% 50 mg/250mL infusion premix  5-300 mcg/min Intravenous Continuous PRN    norepinephrine (Levophed) 4 mg/250mL infusion premix  0.5-30 mcg/min Intravenous Continuous PRN    NitroPRUSSide (Nipride) 50 mg in dextrose 5% 250 mL infusion  0.1-4 mcg/kg/min (Dosing Weight) Intravenous Continuous PRN    potassium chloride 20 mEq/100mL IVPB premix 20 mEq  20 mEq Intravenous PRN    Or    potassium chloride 40 mEq/100mL IVPB premix (central line) 40 mEq  40 mEq Intravenous PRN    calcium  gluconate 3 g in sodium chloride 0.9% 100 mL IVPB  3 g Intravenous PRN    magnesium sulfate in dextrose 5% 1 g/100mL infusion premix 1 g  1 g Intravenous PRN    magnesium sulfate in sterile water for injection 2 g/50mL IVPB premix 2 g  2 g Intravenous PRN    mupirocin (Bactroban) 2% nasal ointment 1 Application  1 Application Nasal BID    dextrose 5%-sodium chloride 0.45% infusion   mL/hr Intravenous Continuous    aspirin DR tab 325 mg  325 mg Oral Daily    topiramate (TopaMAX) tab 50 mg  50 mg Oral BID    pantoprazole (Protonix) 40 mg in sodium chloride 0.9% PF 10 mL IV push  40 mg Intravenous BID AC    Or    pantoprazole (Protonix) DR tab 40 mg  40 mg Oral BID AC    nicotine (Nicoderm CQ) 14 MG/24HR patch 1 patch  1 patch Transdermal Daily    oxybutynin ER (Ditropan-XL) 24 hr tab 10 mg  10 mg Oral Daily    QUEtiapine ER (SEROquel XR) 24 hr tab 150 mg  150 mg Oral Nightly    And    QUEtiapine ER (SEROquel XR) 24 hr tab 50 mg  50 mg Oral Nightly    influenza vaccine split quad (Fluzone QIV) 0.5 mL IM injection (ages 6 months to 64 years) 0.5 mL  0.5 mL Intramuscular Prior to discharge    albuterol (Ventolin HFA) 108 (90 Base) MCG/ACT inhaler 2 puff  2 puff Inhalation Q4H PRN    azelastine (Astelin) 0.1 % nasal solution 1 spray  1 spray Each Nare BID    busPIRone (Buspar) tab 10 mg  10 mg Oral Daily    clonazePAM (KlonoPIN) tab 0.5 mg  0.5 mg Oral BID    desvenlafaxine ER (Pristiq) 24 hr tab 100 mg  100 mg Oral Daily    gabapentin (Neurontin) cap 300 mg  300 mg Oral Nightly    umeclidinium bromide (Incruse Ellipta) 62.5 MCG/ACT inhaler 1 puff  1 puff Inhalation Daily    benzonatate (Tessalon) cap 200 mg  200 mg Oral TID PRN       Labs:  Lab Results   Component Value Date    WBC 12.9 12/31/2023    HGB 10.1 12/31/2023    HCT 30.9 12/31/2023    .0 12/31/2023    CREATSERUM 0.69 12/31/2023    BUN 13 12/31/2023     12/31/2023    K 3.9 12/31/2023    K 3.9 12/31/2023     12/31/2023    CO2 26.0  12/31/2023     12/31/2023    CA 8.1 12/31/2023       Chest Xray:   Narrative   PROCEDURE:  XR CHEST AP PORTABLE  (CPT=71045)     TECHNIQUE:  AP chest radiograph was obtained.     COMPARISON:  EDKYRIE , XR, XR CHEST AP PORTABLE  (CPT=71045), 12/28/2023, 10:17 AM.     INDICATIONS:  tachycardia        FINDINGS:  There has been interval removal of a right internal jugular sheath.  The inspiratory effort is limited.  Worsening bibasilar airspace opacities are noted, left worse than right.  There are bilateral small pleural effusions, left greater than  right also which are similar to the previous exam.  There is no pneumothorax.  Heart size is enlarged.  There are sternotomy wires consistent with previous cardiac surgery.                   Impression   CONCLUSION:    1. Bibasilar airspace opacities have worsened.  Differential considerations include worsening atelectasis or pneumonia.  There are also small bilateral pleural effusions, left larger than right.  2. No pneumothorax.             Physical Exam:    Neuro: NAD intact A/O X4   Lungs: Clear diminished using IS   Heart: RRR S1 S2 sternum stable   Abdomen: Soft non tender BS present   Extremities:Warm and dry trace edema BLE   Pulses: palpable   Incisions: Sternotomy minor swelling top of incision examined  by Dr. Israel GIBBONS incision C/D/I STERI PHILIP       Assessment/Plan:  Patient Active Problem List   Diagnosis    Vitamin D deficiency    Jamison's esophagus    Tobacco use    Insomnia    COPD, mild (HCC) - Dx'd via PFTs done in 3/2015    Primary osteoarthritis involving multiple joints    Migraine without aura and without status migrainosus, not intractable    Hyperlipidemia    SUMMER on CPAP    MDD (major depressive disorder), recurrent episode, moderate (HCC)    GERD (gastroesophageal reflux disease)    Hiatal hernia without gangrene and obstruction    Hypertension    Chronic pansinusitis    Mucinous cystadenoma of ovary, left    Arthritis of carpometacarpal  (CMC) joint of right thumb    KIRK (generalized anxiety disorder)    Vulvar itching    History of pubovaginal sling    Nocturnal enuresis    Absence of bladder continence    Vasomotor flushing    Primary osteoarthritis of left knee    Type 2 diabetes mellitus without complication (HCC)    Mass of spine    Hx of motion sickness    Fitting and adjustment of dental prosthetic device    Difficult intubation    Chronic low back pain without sciatica    Type 2 diabetes mellitus with diabetic neuropathy (HCC)    Pulmonary nodule    Neck pain    Degenerative disc disease, lumbar    NSTEMI (non-ST elevated myocardial infarction) (HCC)    CAD, multiple vessel       POD# 4 CABG X2 with LIMA- LAD , SVG- RCA     - HD stable   - Cardiology following wt up chest xray 12/30 lasix per cards EF 40 -45%  - Leukocytosis , trending down afebrile monitor   - Renal function intact   - Bowel regimen   - Pain management tramadol  - PW out   - Hx of smoker nicotine patch   - GI PPX protonix   - DVT PPX INGRIS'S / SCD'S  - Encourage IS/ Ambulation   - PT/OT/ Cardiac rehab   - SW discharge planning   - OK to discharge home from CV standpoint when ok with consults .      D/W Dr.Goodwin Angela WOODARD, RN  12/31/2023  9:17 AM

## 2023-12-31 NOTE — PROGRESS NOTES
Progress Note     Soumya King Patient Status:  Inpatient    6/3/1960 MRN [id number]   Former[facility] 6NE-A Attending Karla Camejo MD   Hosp Day # 10 PCP Rika Vinson DO     Chief Complaint: cp    Subjective:   S: Patient feels exhausted after working with PT    Review of Systems:   10 point ROS completed and was negative, except for pertinent positive and negatives stated in subjective.    Objective:   Vital signs:  Temp:  [97.9 °F (36.6 °C)-99.5 °F (37.5 °C)] 99.5 °F (37.5 °C)  Pulse:  [] 108  Resp:  [16-18] 18  BP: (103-143)/(62-76) 103/70  SpO2:  [94 %-100 %] 95 %    Wt Readings from Last 3 Encounters:   23 143 lb 4.8 oz (65 kg)   23 136 lb (61.7 kg)   23 137 lb (62.1 kg)       Intake/Output:    Intake/Output Summary (Last 24 hours) at 2023 1344  Last data filed at 2023 1100  Gross per 24 hour   Intake 540 ml   Output 1 ml   Net 539 ml         Physical Exam:    General: No acute distress. Alert ,        Respiratory: Clear to auscultation bilaterally. No wheezes. No rhonchi.    Cardiovascular: S1, S2. Regular rate and rhythm. No murmurs, rubs or gallops.  Abdomen: Soft, nontender, nondistended.  Positive bowel sounds. No rebound or guarding.  Neurologic: No focal neurological deficits.  Musculoskeletal: Moves all extremities.  Extremities: No edema.    Results:   Diagnostic Data:      Labs:    Selected labs - last 24 hours:  Endo  Lytes  Renal   Glu 181  Na 139 Ca 8.1  BUN 13   POC Gluc  165  K 3.9; 3.9 PO4 -  Cr 0.69   A1c -  Cl 110 Mg -  eGFR 99   TSH -  CO2 26.0         LFT  CBC  Other   AST -  WBC 12.9  PTT - Procal -   ALT -  Hb 10.1  INR - CRP -   APk -  Hct 30.9  Trop - D dim -   T yoni -  .0  pBNP -  BNP -  - Ferritin  -   Prot -    CK  - Lactate  -   Alb -    LDL  - COVID  -     Imaging: Imaging data reviewed in Epic.    Medications:    furosemide  20 mg Intravenous BID (Diuretic)    potassium chloride  20 mEq Oral BID    metoprolol  tartrate  37.5 mg Oral 2x Daily(Beta Blocker)    insulin aspart  1-5 Units Subcutaneous TID AC and HS    atorvastatin  80 mg Oral Nightly    mupirocin  1 Application Nasal BID    aspirin  325 mg Oral Daily    topiramate  50 mg Oral BID    pantoprazole  40 mg Intravenous BID AC    Or    pantoprazole  40 mg Oral BID AC    nicotine  1 patch Transdermal Daily    oxybutynin ER  10 mg Oral Daily    QUEtiapine ER  150 mg Oral Nightly    And    QUEtiapine ER  50 mg Oral Nightly    azelastine  1 spray Each Nare BID    busPIRone  10 mg Oral Daily    clonazePAM  0.5 mg Oral BID    desvenlafaxine ER  100 mg Oral Daily    gabapentin  300 mg Oral Nightly    umeclidinium bromide  1 puff Inhalation Daily       Assessment & Plan:   ASSESSMENT / PLAN:     # NSTEMI  -aspirin, metoprolol, statin; hx difficulty swallowing asa due to barretts. Not a true allergy  -Cardiac cath on 12/22 showed multivessel disease  -s/p CABG POD#2     #Hypertension  stable  #DM2-monitor on insulin; controlled  #Hyperlipidemia  #Smoker  -Nicotine patch  #leukocytosis and anemia  -reactive postop  -no fever  -no cough sob no dysuria mary jane Delacruz MD    Supplementary Documentation:

## 2023-12-31 NOTE — PROGRESS NOTES
Progress Note  Soumya King Patient Status:  Inpatient    6/3/1960 MRN [id number]   Location [facility] 8NE-A Attending Karla Camejo MD   Hosp Day # 10 PCP Rika Vinson DO     Subjective:  POD # 4 CABG x 2.     Objective:  /67 (BP Location: Left arm)   Pulse 115   Temp 97.9 °F (36.6 °C) (Oral)   Resp 18   Ht 5' 1\" (1.549 m)   Wt 143 lb 4.8 oz (65 kg)   SpO2 98%   BMI 27.08 kg/m²     Telemetry:  BPM.      Intake/Output:    Intake/Output Summary (Last 24 hours) at 2023 0853  Last data filed at 2023 0445  Gross per 24 hour   Intake 780 ml   Output 1 ml   Net 779 ml       Last 3 Weights   23 0445 143 lb 4.8 oz (65 kg)   23 0518 135 lb 8 oz (61.5 kg)   23 0100 139 lb 15.9 oz (63.5 kg)   23 0600 128 lb 1.4 oz (58.1 kg)   23 0535 126 lb 1.7 oz (57.2 kg)   23 0431 123 lb 3.8 oz (55.9 kg)   23 2030 133 lb 4.8 oz (60.5 kg)   23 1806 134 lb 0.6 oz (60.8 kg)   23 1452 129 lb (58.5 kg)   23 1043 136 lb (61.7 kg)   23 1116 137 lb (62.1 kg)       Labs:  Recent Labs   Lab 23  0407 23  0539 23  0514   * 187* 181*   BUN 12 14 13   CREATSERUM 0.79 0.68 0.69   EGFRCR 85 99 99   CA 8.1* 8.1* 8.1*    138 139   K 4.2  4.2 3.7 3.9  3.9    107 110   CO2 30.0 25.0 26.0     Recent Labs   Lab 23  0409 23  0407 23  0539 23  0515   RBC 3.36* 3.43* 3.10* 3.33*   HGB 10.1* 10.2* 9.2* 10.1*   HCT 30.3* 31.4* 29.0* 30.9*   MCV 90.2 91.5 93.5 92.8   MCH 30.1 29.7 29.7 30.3   MCHC 33.3 32.5 31.7 32.7   RDW 15.6 15.9 15.3 15.1   NEPRELIM 24.63*  --   --   --    WBC 27.8* 23.1* 17.0* 12.9*   .0 203.0 208.0 265.0         No results for input(s): \"TROP\", \"TROPHS\", \"CK\" in the last 168 hours.  EK23;   BPM, QRS;  76 ms, QTc: 451 ms, TN: 116 ms.   Echo:  23:  1. Left ventricle: The cavity size was normal. Wall thickness was normal.      Systolic  function was reduced. The estimated ejection fraction was      40-45%, by visual assessment. Severe hypokinesis of the      mid-apicalanteroseptal and apical walls. Doppler parameters are      consistent with abnormal left ventricular relaxation - grade 1 diastolic      dysfunction.   2. Left atrium: The left atrial volume was normal.   Impressions:  This study is compared with previous dated 04/15/2024: New   RWMA concerning for CAD in left anterior descending coronary distribution   versus stress CM.   *    Limited Echo: 12/30/23:   1. Left ventricle: The cavity size was normal. Systolic function was normal.      The estimated ejection fraction was 60-65%, by visual assessment.   2. Left atrium: The left atrial volume was normal.   3. Pericardium, extracardiac: No significant pericardial effusion was      identified. There were bilateral pleural effusions present.   Impressions:  This study is compared with previous dated 12/22/2023:   *   Review of Systems:   Constitutional: No fevers, chills, fatigue or night sweats.  ENT: No mouth pain, neck pain, running nose, headaches or swollen glands.  Skin: No rashes, pruritus or skin changes,  Respiratory: Denies cough, wheezing or shortness of breath.  CV: Denies chest pain, palpitations, orthopnea, PND or dizziness.  Musculoskeletal: No joint pain, stiffness or swelling.  GI: No nausea, vomiting or diarrhea. No blood in stools.  Neurologic: No seizures, tremors, weakness or numbness.      Physical Exam:  Gen: alert, oriented x 3, NAD  Heent: pupils equal, reactive. Mucous membranes moist.   Neck: no jvd  Cardiac: regular rate and rhythm, normal S1,S2, no murmur, gallop or rub. MS incision clear.   Lungs: Clear upper, Dim left base.   Abd: soft, NT/ND +bs  Ext: no edema  Skin: Warm, dry  Neuro: No focal deficits     Medications:     metoprolol tartrate  37.5 mg Oral 2x Daily(Beta Blocker)    insulin aspart  1-5 Units Subcutaneous TID AC and HS    atorvastatin  80 mg  Oral Nightly    mupirocin  1 Application Nasal BID    aspirin  325 mg Oral Daily    topiramate  50 mg Oral BID    pantoprazole  40 mg Intravenous BID AC    Or    pantoprazole  40 mg Oral BID AC    nicotine  1 patch Transdermal Daily    oxybutynin ER  10 mg Oral Daily    QUEtiapine ER  150 mg Oral Nightly    And    QUEtiapine ER  50 mg Oral Nightly    azelastine  1 spray Each Nare BID    busPIRone  10 mg Oral Daily    clonazePAM  0.5 mg Oral BID    desvenlafaxine ER  100 mg Oral Daily    gabapentin  300 mg Oral Nightly    umeclidinium bromide  1 puff Inhalation Daily      sodium chloride 10 mL/hr at 12/27/23 1536    DOBUTamine Stopped (12/28/23 1354)    nitroGLYCERIN in dextrose 5%      norepinephrine      NitroPRUSSide (Nipride) 50 mg in dextrose 5% 250 mL infusion      dextrose 5%-sodium chloride 0.45% 70 mL/hr (12/27/23 1530)       Assessment:  CAD:   NSTEMI.   12/27: POD # 4 CABG x 2:  LIMA- LAD, SV-PDA.   EF:  40-45 %.   Wt up 4 Lbs. Volume up- 4.2 liters.   ASA, BBB, statin.   Sinus Tachycardia:  's.  Metoprolol tartrate 37.5 mg Bid.   12/30- Repeat limited echo: EF: 60-65 %.   12/30 CXR: Small bilateral effusions, L > R.   HTN: Controlled.   Dyslipidemia: Atorvastatin 80 mg. HDL: 37  LDL: 79.   Type 2 DM: A1C:  6.4.   Leukocytosis:  WBC:  12.9- trending down. Afebrile.   Anemia:  Hgb: 10.1 plates: 265.  Former smoker: Total smoking cessation.      Plan:  POD # 4 CABG x 2.   Remains ST rates 100's- - improved. Metoprolol 37.5 mg Bid.   Repeat limited echo NL.   12/30 CXR- small bilateral pleural effusion.   Volume up. Will order IV Lasix this am.     Plan of care discussed with patient, RN.    MARLENE Steve  12/31/2023  8:53 AM  [phone]  [facility] [phone]       Patient seen and examined independently.  Note reviewed and labs reviewed.  Agree with above assessment and plan.  Stable cardiac status.  No objection to discharge home.  Cardiac meds:   mg daily  Atorvastatin 80 mg  daily  Metoprolol tartrate 25 mg BID

## 2023-12-31 NOTE — PLAN OF CARE
Patient A&Ox4. On RA. Sinus Tach on tele with rates in low 100's- some SOB with exertion. Complaints of incision pain- PRN medication given. Ambulated the muniz at the start of night shift- tolerated well. SCD's are on. IS being done 10x an hour while awake- achieving 500, goal of 750. Continent of Bowel and Bladder. Patient updated on plan of care. Call light within reach.     0645: Patient ambulated in hallway-tolerated well, Now up in the chair for breakfast, INGRIS Hose on, SCD's on, Blinds are open.      Problem: Diabetes/Glucose Control  Goal: Glucose maintained within prescribed range  Description: INTERVENTIONS:  - Monitor Blood Glucose as ordered  - Assess for signs and symptoms of hyperglycemia and hypoglycemia  - Administer ordered medications to maintain glucose within target range  - Assess barriers to adequate nutritional intake and initiate nutrition consult as needed  - Instruct patient on self management of diabetes  Outcome: Progressing     Problem: Patient/Family Goals  Goal: Patient/Family Long Term Goal  Description: Patient's Long Term Goal: stay out of hospital 12-25    Interventions:  - Medication compliance  - smoking cessation   - follow up with primary care physician     - See additional Care Plan goals for specific interventions  Outcome: Progressing  Goal: Patient/Family Short Term Goal  Description: Patient's Short Term Goal: go home  No pain 12-25    Interventions:   - medication education   - smoking cessation education   - cardiology consult   - follow up with care team  - PRN tylenol, hot/cold packs, tell nurse if in pain     - See additional Care Plan goals for specific interventions  Outcome: Progressing     Problem: CARDIOVASCULAR - ADULT  Goal: Maintains optimal cardiac output and hemodynamic stability  Description: INTERVENTIONS:  - Monitor vital signs, rhythm, and trends  - Monitor for bleeding, hypotension and signs of decreased cardiac output  - Evaluate effectiveness of  vasoactive medications to optimize hemodynamic stability  - Monitor arterial and/or venous puncture sites for bleeding and/or hematoma  - Assess quality of pulses, skin color and temperature  - Assess for signs of decreased coronary artery perfusion - ex. Angina  - Evaluate fluid balance, assess for edema, trend weights  Outcome: Progressing  Goal: Absence of cardiac arrhythmias or at baseline  Description: INTERVENTIONS:  - Continuous cardiac monitoring, monitor vital signs, obtain 12 lead EKG if indicated  - Evaluate effectiveness of antiarrhythmic and heart rate control medications as ordered  - Initiate emergency measures for life threatening arrhythmias  - Monitor electrolytes and administer replacement therapy as ordered  Outcome: Progressing

## 2023-12-31 NOTE — PHYSICAL THERAPY NOTE
PHYSICAL THERAPY TREATMENT NOTE - INPATIENT    Room Number: 8612/8612-A     Session: 1     Number of Visits to Meet Established Goals: 3    Presenting Problem: chest pain, NSTEMI, s/p CABG x2 12/27  Co-Morbidities : COPD, DM2, HTN, hyperlipidemia, smoker    History related to current admission: Patient is a 63 year old female admitted on 12/21/2023 from home for chest pain.  Pt diagnosed with NSTEMI, s/p CABG x2 12/27.        ASSESSMENT     Patient has met all goals and currently does not meet criteria for skilled inpatient physical therapy services, however patient will continue to benefit from QID ambulation with nursing staff.  Pt is hereby discharged from IP PT Services.  RN aware to re-order if there is a decline in mobility status.       DISCHARGE RECOMMENDATIONS  PT Discharge Recommendations: Home     PLAN  PT Treatment Plan: Bed mobility;Body mechanics;Endurance;Energy conservation;Gait training;Balance training;Transfer training;Strengthening;Patient education  Rehab Potential : Good  Frequency (Obs): 3x/week    CURRENT GOALS   Goal #1 Patient is able to demonstrate supine - sit EOB @ level: modified independent     Goal #2 Patient is able to demonstrate transfers EOB to/from Chair/Wheelchair at assistance level: modified independent     Goal #3 Patient is able to ambulate 150 feet with assist device: LRAD at assistance level: supervision     Goal #4 Pt is able to ascend and descend stairs with supervision and rail   Goal #5    Goal #6    Goal Comments: Goals established on 12/28/2023 12/31/2023 all goals achieved       SUBJECTIVE  \"Just a little short of breath\"     OBJECTIVE  Precautions: Cardiac;Sternal (SBP >100)    WEIGHT BEARING RESTRICTION  Weight Bearing Restriction: None                PAIN ASSESSMENT   Rating: 10  Location: headache  Management Techniques: Activity promotion    BALANCE                                                                                                                        Static Sitting: Good  Dynamic Sitting: Fair +           Static Standing: Fair +  Dynamic Standing: Fair    ACTIVITY TOLERANCE  Pulse: 116  Heart Rate Source: Monitor                   O2 WALK  Oxygen Therapy  SPO2% on Room Air at Rest: 100      AM-PAC '6-Clicks' INPATIENT SHORT FORM - BASIC MOBILITY  How much difficulty does the patient currently have...  Patient Difficulty: Turning over in bed (including adjusting bedclothes, sheets and blankets)?: None   Patient Difficulty: Sitting down on and standing up from a chair with arms (e.g., wheelchair, bedside commode, etc.): None   Patient Difficulty: Moving from lying on back to sitting on the side of the bed?: None   How much help from another person does the patient currently need...   Help from Another: Moving to and from a bed to a chair (including a wheelchair)?: None   Help from Another: Need to walk in hospital room?: None   Help from Another: Climbing 3-5 steps with a railing?: None       AM-PAC Score:  Raw Score: 24   Approx Degree of Impairment: 0%   Standardized Score (AM-PAC Scale): 61.14   CMS Modifier (G-Code): CH    FUNCTIONAL ABILITY STATUS  Gait Assessment   Functional Mobility/Gait Assessment  Gait Assistance: Independent  Distance (ft): 200  Assistive Device: None  Pattern: Within Functional Limits (dec jonathon)  Stairs: Stairs  How Many Stairs: 11  Device: 1 Rail  Assist: Modified independent  Pattern: Ascend and Descend  Ascend and Descend :  (reciprocal ascend, step to descend)    Skilled Therapy Provided    Gait Training:   Trained on stairs - cued on usage of rail for improved balance  Educated on step to pattern as needed for improved energy conservation and balance  Educated on rest breaks as needed for energy conservation and symptom monitoring - pt with increased SOB following stairs    Therapeutic Activity:   Pt cued on placement of UE and LEs for optimal force generation and safe STS transfer.   Pt cued on controlled descent  into sitting  Reviewed car transfer - buttocks first   Reviewed sternal precautions      Bed Mobility:  Rolling: ind   Supine<>Sit: ind   Sit<>Supine: ind     Transfer Mobility:  Sit<>Stand: ind   Stand<>Sit: ind   Gait: ind    Therapist's Comments: RN cleared for session. Pt agreeable for therapy, received supine. Instructed to call for nursing staff for any needs and OOB mobility.       THERAPEUTIC EXERCISES  Lower Extremity Ankle pump  Heel raise  LAQ  Seated marching     Upper Extremity Fist open/close  Bicep curl  Sh raise  Scap raise   Chest flys   Scap squeeze  Elbow circles     Position Sitting     Repetitions   10   Sets   1     Patient End of Session: In bed;Needs met;Call light within reach;RN aware of session/findings;All patient questions and concerns addressed    PT Session Time: 25 minutes  Gait Training: 10 minutes  Therapeutic Activity: 4 minutes  Therapeutic Exercise: 10 minutes

## 2024-01-01 ENCOUNTER — APPOINTMENT (OUTPATIENT)
Dept: GENERAL RADIOLOGY | Facility: HOSPITAL | Age: 64
DRG: 234 | End: 2024-01-01
Attending: THORACIC SURGERY (CARDIOTHORACIC VASCULAR SURGERY)
Payer: MEDICAID

## 2024-01-01 PROBLEM — Z95.1 S/P CABG X 2: Status: ACTIVE | Noted: 2024-01-01

## 2024-01-01 LAB
ANION GAP SERPL CALC-SCNC: 5 MMOL/L (ref 0–18)
BASOPHILS # BLD AUTO: 0.05 X10(3) UL (ref 0–0.2)
BASOPHILS NFR BLD AUTO: 0.4 %
BUN BLD-MCNC: 16 MG/DL (ref 9–23)
CALCIUM BLD-MCNC: 8.7 MG/DL (ref 8.5–10.1)
CHLORIDE SERPL-SCNC: 105 MMOL/L (ref 98–112)
CO2 SERPL-SCNC: 25 MMOL/L (ref 21–32)
CREAT BLD-MCNC: 0.59 MG/DL
EGFRCR SERPLBLD CKD-EPI 2021: 102 ML/MIN/1.73M2 (ref 60–?)
EOSINOPHIL # BLD AUTO: 0.24 X10(3) UL (ref 0–0.7)
EOSINOPHIL NFR BLD AUTO: 1.9 %
ERYTHROCYTE [DISTWIDTH] IN BLOOD BY AUTOMATED COUNT: 14.6 %
GLUCOSE BLD-MCNC: 175 MG/DL (ref 70–99)
GLUCOSE BLD-MCNC: 189 MG/DL (ref 70–99)
GLUCOSE BLD-MCNC: 195 MG/DL (ref 70–99)
GLUCOSE BLD-MCNC: 206 MG/DL (ref 70–99)
GLUCOSE BLD-MCNC: 211 MG/DL (ref 70–99)
HCT VFR BLD AUTO: 31.8 %
HGB BLD-MCNC: 10.9 G/DL
IMM GRANULOCYTES # BLD AUTO: 0.09 X10(3) UL (ref 0–1)
IMM GRANULOCYTES NFR BLD: 0.7 %
LYMPHOCYTES # BLD AUTO: 2.37 X10(3) UL (ref 1–4)
LYMPHOCYTES NFR BLD AUTO: 19.3 %
MCH RBC QN AUTO: 30 PG (ref 26–34)
MCHC RBC AUTO-ENTMCNC: 34.3 G/DL (ref 31–37)
MCV RBC AUTO: 87.6 FL
MONOCYTES # BLD AUTO: 1.14 X10(3) UL (ref 0.1–1)
MONOCYTES NFR BLD AUTO: 9.3 %
NEUTROPHILS # BLD AUTO: 8.42 X10 (3) UL (ref 1.5–7.7)
NEUTROPHILS # BLD AUTO: 8.42 X10(3) UL (ref 1.5–7.7)
NEUTROPHILS NFR BLD AUTO: 68.4 %
OSMOLALITY SERPL CALC.SUM OF ELEC: 287 MOSM/KG (ref 275–295)
PLATELET # BLD AUTO: 319 10(3)UL (ref 150–450)
POTASSIUM SERPL-SCNC: 3.8 MMOL/L (ref 3.5–5.1)
PROCALCITONIN SERPL-MCNC: <0.05 NG/ML (ref ?–0.16)
RBC # BLD AUTO: 3.63 X10(6)UL
SODIUM SERPL-SCNC: 135 MMOL/L (ref 136–145)
WBC # BLD AUTO: 12.3 X10(3) UL (ref 4–11)

## 2024-01-01 PROCEDURE — 71045 X-RAY EXAM CHEST 1 VIEW: CPT | Performed by: THORACIC SURGERY (CARDIOTHORACIC VASCULAR SURGERY)

## 2024-01-01 PROCEDURE — 99233 SBSQ HOSP IP/OBS HIGH 50: CPT | Performed by: STUDENT IN AN ORGANIZED HEALTH CARE EDUCATION/TRAINING PROGRAM

## 2024-01-01 RX ORDER — CYCLOBENZAPRINE HCL 10 MG
10 TABLET ORAL 2 TIMES DAILY PRN
Status: DISCONTINUED | OUTPATIENT
Start: 2024-01-01 | End: 2024-01-03

## 2024-01-01 RX ORDER — HYDROCODONE BITARTRATE AND ACETAMINOPHEN 5; 325 MG/1; MG/1
1 TABLET ORAL EVERY 8 HOURS PRN
Qty: 12 TABLET | Refills: 0 | Status: SHIPPED | OUTPATIENT
Start: 2024-01-01

## 2024-01-01 RX ORDER — FUROSEMIDE 10 MG/ML
20 INJECTION INTRAMUSCULAR; INTRAVENOUS
Status: COMPLETED | OUTPATIENT
Start: 2024-01-01 | End: 2024-01-02

## 2024-01-01 RX ORDER — LEVOFLOXACIN 750 MG/1
750 TABLET, FILM COATED ORAL
Status: DISCONTINUED | OUTPATIENT
Start: 2024-01-01 | End: 2024-01-03

## 2024-01-01 RX ORDER — CLONAZEPAM 0.5 MG/1
0.5 TABLET ORAL 2 TIMES DAILY
Qty: 12 TABLET | Refills: 0 | Status: SHIPPED | OUTPATIENT
Start: 2024-01-01

## 2024-01-01 RX ORDER — POTASSIUM CHLORIDE 20 MEQ/1
40 TABLET, EXTENDED RELEASE ORAL ONCE
Status: COMPLETED | OUTPATIENT
Start: 2024-01-01 | End: 2024-01-01

## 2024-01-01 RX ORDER — LEVOFLOXACIN 750 MG/1
750 TABLET, FILM COATED ORAL
Status: DISCONTINUED | OUTPATIENT
Start: 2024-01-02 | End: 2024-01-01

## 2024-01-01 RX ORDER — METOPROLOL SUCCINATE 50 MG/1
50 TABLET, EXTENDED RELEASE ORAL
Status: DISCONTINUED | OUTPATIENT
Start: 2024-01-02 | End: 2024-01-03

## 2024-01-01 NOTE — PROGRESS NOTES
01/01/24 1715   Mobility   O2 walk? Yes   SPO2% on Room Air at Rest 97   SPO2% Ambulation on Room Air 87   SPO2% Ambulation on Oxygen   (Quickly recovered to 90% without O2)       Suggest re doing O2 ambulation tomorrow 1/2.  Recovered very quickly once she dropped down to 87% up to 90% without the use of O2.

## 2024-01-01 NOTE — PROGRESS NOTES
Progress Note  Soumya King Patient Status:  Inpatient    6/3/1960 MRN [id number]   Location [facility] 8NE-A Attending Karla Camejo MD   Hosp Day # 11 PCP Rika Vinson,      Subjective:  POD #5 two-vessel CABG (LIMA to LAD and SVG to RCA).  Up walking in muniz with nursing.  Dyspnea with exertion.  Reports muscular soreness of her neck and back otherwise denies symptoms.  Continue asymptomatic sinus tachycardia overnight. AM dose of metoprolol tartrate given early.  POD #5 CABG x2    Objective:  Physical Exam:   /65 (BP Location: Left arm)   Pulse 101   Temp 98.4 °F (36.9 °C) (Oral)   Resp 16   Ht 5' 1\" (1.549 m)   Wt 125 lb 12.8 oz (57.1 kg)   SpO2 99%   BMI 23.77 kg/m²   Temp (24hrs), Av.1 °F (37.3 °C), Min:98.4 °F (36.9 °C), Max:100 °F (37.8 °C)       Intake/Output Summary (Last 24 hours) at 2024 0717  Last data filed at 2024 0200  Gross per 24 hour   Intake 720 ml   Output 1 ml   Net 719 ml     Wt Readings from Last 3 Encounters:   24 125 lb 12.8 oz (57.1 kg)   23 136 lb (61.7 kg)   23 137 lb (62.1 kg)     Telemetry: Sinus tachycardia low 100's  General: Alert and oriented in no apparent distress sleeping comfortably when I enter the room, but arouses easily.  HEENT: No focal deficits.  Neck: No JVD, carotids 2+ no bruits.  Cardiac: Regular rate and rhythm, S1, S2 normal, no murmur, rub or gallop.  Lungs: Clear without wheezes, rales, rhonchi or dullness.  Normal excursions and effort.  Exertional dyspnea and conversational dyspnea, my exam.  Abdomen: Soft, non-tender.   Extremities: Without clubbing, cyanosis or edema.  Peripheral pulses are 2+.  Neurologic: Alert and oriented, normal affect.  Skin: Warm and dry.  Sternal incision clean, dry and intact.      Intake/Output:    Intake/Output Summary (Last 24 hours) at 2024 0716  Last data filed at 2024 0200  Gross per 24 hour   Intake 720 ml   Output 1 ml   Net 719 ml       Last 3 Weights    01/01/24 0638 125 lb 12.8 oz (57.1 kg)   12/31/23 0445 143 lb 4.8 oz (65 kg)   12/30/23 0518 135 lb 8 oz (61.5 kg)   12/29/23 0100 139 lb 15.9 oz (63.5 kg)   12/28/23 0600 128 lb 1.4 oz (58.1 kg)   12/26/23 0535 126 lb 1.7 oz (57.2 kg)   12/23/23 0431 123 lb 3.8 oz (55.9 kg)   12/21/23 2030 133 lb 4.8 oz (60.5 kg)   12/21/23 1806 134 lb 0.6 oz (60.8 kg)   12/21/23 1452 129 lb (58.5 kg)   11/30/23 1043 136 lb (61.7 kg)   11/01/23 1116 137 lb (62.1 kg)       Labs:  Recent Labs   Lab 12/30/23  0539 12/31/23  0514 01/01/24  0507   * 181* 195*   BUN 14 13 16   CREATSERUM 0.68 0.69 0.59   EGFRCR 99 99 102   CA 8.1* 8.1* 8.7    139 135*   K 3.7 3.9  3.9 3.8    110 105   CO2 25.0 26.0 25.0     Recent Labs   Lab 12/28/23  0409 12/29/23  0407 12/30/23  0539 12/31/23  0515 01/01/24  0507   RBC 3.36*   < > 3.10* 3.33* 3.63*   HGB 10.1*   < > 9.2* 10.1* 10.9*   HCT 30.3*   < > 29.0* 30.9* 31.8*   MCV 90.2   < > 93.5 92.8 87.6   MCH 30.1   < > 29.7 30.3 30.0   MCHC 33.3   < > 31.7 32.7 34.3   RDW 15.6   < > 15.3 15.1 14.6   NEPRELIM 24.63*  --   --   --  8.42*   WBC 27.8*   < > 17.0* 12.9* 12.3*   .0   < > 208.0 265.0 319.0    < > = values in this interval not displayed.         No results for input(s): \"TROP\", \"TROPHS\", \"CK\" in the last 168 hours.    Diagnostics:   Limited Echocardiogram 12/30/2023:  1. Left ventricle: The cavity size was normal. Systolic function was normal.      The estimated ejection fraction was 60-65%, by visual assessment.   2. Left atrium: The left atrial volume was normal.   3. Pericardium, extracardiac: No significant pericardial effusion was      identified. There were bilateral pleural effusions present.   Impressions:  This study is compared with previous dated 12/22/2023:       Medications:   potassium chloride  40 mEq Oral Once    metoprolol tartrate  37.5 mg Oral 2x Daily(Beta Blocker)    insulin aspart  1-5 Units Subcutaneous TID AC and HS    atorvastatin  80 mg Oral  Nightly    aspirin  325 mg Oral Daily    topiramate  50 mg Oral BID    pantoprazole  40 mg Intravenous BID AC    Or    pantoprazole  40 mg Oral BID AC    nicotine  1 patch Transdermal Daily    oxybutynin ER  10 mg Oral Daily    QUEtiapine ER  150 mg Oral Nightly    And    QUEtiapine ER  50 mg Oral Nightly    azelastine  1 spray Each Nare BID    busPIRone  10 mg Oral Daily    clonazePAM  0.5 mg Oral BID    desvenlafaxine ER  100 mg Oral Daily    gabapentin  300 mg Oral Nightly    umeclidinium bromide  1 puff Inhalation Daily      sodium chloride 10 mL/hr at 12/27/23 1536    DOBUTamine Stopped (12/28/23 1354)    nitroGLYCERIN in dextrose 5%      norepinephrine      NitroPRUSSide (Nipride) 50 mg in dextrose 5% 250 mL infusion      dextrose 5%-sodium chloride 0.45% 70 mL/hr (12/27/23 1530)       Assessment/Plan:    CAD  Presented NSTEMI  12/27 CABG x2 LIMA-LAD, SV-PDA POD #5  + 4L since admit  ASA, BB, and statin  Sinus tachycardia: 's  Metoprolol 37.5 mg BID  Repeat echocardiogram 12/30 LVEF 60-65%  CXR 12/30 small bilateral effusions L>R  Hypervolemia  Lasix provided yesterday   125 lbs today - admit weight 126 lbs. 12/31 reportedly 143 lbs unlikely to be accurate.  Breathing comfortably on room air  HTN  /65  HLD  Continue Atorvastatin 80 mg daily  DM II  Hgb A1C 6.4%  Leukocytosis   12.3 1/1 improving- afebrile T-max 37.8      PLAN  POD #5 CABG x2  Continue metoprolol tartrate for sinus tachycardia  Limited echo NL 12/30  Lasix provided yesterday- appears compensated today  Hesitant to go home            Pablo Ochoa PA-C  1/1/2024  7:16 AM     Seen and examined.  Agree with exam and assessment as amended.  Chest x-ray shows worsening bilateral interstitial airspace opacities and persistent left pleural effusion.  Given her dyspnea with ambulation, I do not think she is ready to go home.  She needs additional IV diuretic therapy.  Will reassess clinical status and renal function tomorrow.  Reviewed in  detail with patient and bedside nursing.  Cardiac MDM reviewed in detail with HIEN Ochoa.    ESTEFANÍA Herrera MD  3

## 2024-01-01 NOTE — PROGRESS NOTES
[facility]      [facility] Progress Note    Soumya King Patient Status:  Inpatient    6/3/1960 MRN [id number]   Location [facility] 8NE-A Attending Karla Camejo MD   Hosp Day # 11 PCP Rika Vinson DO     Subjective:  C/o pain to incision. \"Fells like I overdid it\". Denies sob    Objective:  /69 (BP Location: Left arm)   Pulse 101   Temp 99.6 °F (37.6 °C) (Oral)   Resp 17   Ht 154.9 cm (5' 1\")   Wt 57.1 kg (125 lb 12.8 oz)   SpO2 95%   BMI 23.77 kg/m²          Intake/Output:    Intake/Output Summary (Last 24 hours) at 2024 0815  Last data filed at 2024 0200  Gross per 24 hour   Intake 720 ml   Output 1 ml   Net 719 ml         Last 3 Weights   24 0638 57.1 kg (125 lb 12.8 oz)   23 0445 65 kg (143 lb 4.8 oz)   23 0518 61.5 kg (135 lb 8 oz)   23 0100 63.5 kg (139 lb 15.9 oz)   23 0600 58.1 kg (128 lb 1.4 oz)   23 0535 57.2 kg (126 lb 1.7 oz)   23 0431 55.9 kg (123 lb 3.8 oz)   23 2030 60.5 kg (133 lb 4.8 oz)   23 1806 60.8 kg (134 lb 0.6 oz)   23 1452 58.5 kg (129 lb)   23 1043 61.7 kg (136 lb)   23 1116 62.1 kg (137 lb)           Allergies:  Allergies   Allergen Reactions    Naprosyn [Naproxen] ANAPHYLAXIS     Has tolerated ibuprofen and IV toradol     Aspirin OTHER (SEE COMMENTS)     Difficulty swallowing due to  saavedra's esophagus       Current Facility-Administered Medications   Medication Dose Route Frequency    potassium chloride (K-Dur) tab 40 mEq  40 mEq Oral Once    metoprolol tartrate (Lopressor) partial tab 37.5 mg  37.5 mg Oral 2x Daily(Beta Blocker)    HYDROcodone-acetaminophen (Norco) 5-325 MG per tab 1 tablet  1 tablet Oral Q4H PRN    Or    HYDROcodone-acetaminophen (Norco) 5-325 MG per tab 2 tablet  2 tablet Oral Q4H PRN    traMADol (Ultram) tab 50 mg  50 mg Oral Q6H PRN    Or    traMADol (Ultram) tab 100 mg  100 mg Oral Q6H PRN    glucose (Dex4) 15 GM/59ML oral liquid 15 g  15 g Oral Q15 Min  PRN    Or    glucose (Glutose) 40% oral gel 15 g  15 g Oral Q15 Min PRN    Or    glucose-vitamin C (Dex-4) chewable tab 4 tablet  4 tablet Oral Q15 Min PRN    Or    dextrose 50% injection 50 mL  50 mL Intravenous Q15 Min PRN    Or    glucose (Dex4) 15 GM/59ML oral liquid 30 g  30 g Oral Q15 Min PRN    Or    glucose (Glutose) 40% oral gel 30 g  30 g Oral Q15 Min PRN    Or    glucose-vitamin C (Dex-4) chewable tab 8 tablet  8 tablet Oral Q15 Min PRN    insulin aspart (NovoLOG) 100 Units/mL FlexPen 1-5 Units  1-5 Units Subcutaneous TID AC and HS    atorvastatin (Lipitor) tab 80 mg  80 mg Oral Nightly    sodium chloride 0.9% infusion   Intravenous Continuous    morphINE PF 2 MG/ML injection 2 mg  2 mg Intravenous Q2H PRN    Or    morphINE PF 4 MG/ML injection 4 mg  4 mg Intravenous Q2H PRN    Or    morphINE PF 4 MG/ML injection 6 mg  6 mg Intravenous Q2H PRN    melatonin tab 3 mg  3 mg Oral Nightly PRN    polyethylene glycol (PEG 3350) (Miralax) 17 g oral packet 17 g  17 g Oral Daily PRN    sennosides (Senokot) tab 17.2 mg  17.2 mg Oral Nightly PRN    bisacodyl (Dulcolax) 10 MG rectal suppository 10 mg  10 mg Rectal Daily PRN    fleet enema (Fleet) 7-19 GM/118ML rectal enema 133 mL  1 enema Rectal Once PRN    DOBUTamine in dextrose 5% (Dobutrex) 500 mg/250mL infusion premix  2.5-20 mcg/kg/min (Dosing Weight) Intravenous Continuous PRN    nitroGLYCERIN in dextrose 5% 50 mg/250mL infusion premix  5-300 mcg/min Intravenous Continuous PRN    norepinephrine (Levophed) 4 mg/250mL infusion premix  0.5-30 mcg/min Intravenous Continuous PRN    NitroPRUSSide (Nipride) 50 mg in dextrose 5% 250 mL infusion  0.1-4 mcg/kg/min (Dosing Weight) Intravenous Continuous PRN    potassium chloride 20 mEq/100mL IVPB premix 20 mEq  20 mEq Intravenous PRN    Or    potassium chloride 40 mEq/100mL IVPB premix (central line) 40 mEq  40 mEq Intravenous PRN    calcium gluconate 3 g in sodium chloride 0.9% 100 mL IVPB  3 g Intravenous PRN     magnesium sulfate in dextrose 5% 1 g/100mL infusion premix 1 g  1 g Intravenous PRN    magnesium sulfate in sterile water for injection 2 g/50mL IVPB premix 2 g  2 g Intravenous PRN    dextrose 5%-sodium chloride 0.45% infusion   mL/hr Intravenous Continuous    aspirin DR tab 325 mg  325 mg Oral Daily    topiramate (TopaMAX) tab 50 mg  50 mg Oral BID    pantoprazole (Protonix) 40 mg in sodium chloride 0.9% PF 10 mL IV push  40 mg Intravenous BID AC    Or    pantoprazole (Protonix) DR tab 40 mg  40 mg Oral BID AC    nicotine (Nicoderm CQ) 14 MG/24HR patch 1 patch  1 patch Transdermal Daily    oxybutynin ER (Ditropan-XL) 24 hr tab 10 mg  10 mg Oral Daily    QUEtiapine ER (SEROquel XR) 24 hr tab 150 mg  150 mg Oral Nightly    And    QUEtiapine ER (SEROquel XR) 24 hr tab 50 mg  50 mg Oral Nightly    influenza vaccine split quad (Fluzone QIV) 0.5 mL IM injection (ages 6 months to 64 years) 0.5 mL  0.5 mL Intramuscular Prior to discharge    albuterol (Ventolin HFA) 108 (90 Base) MCG/ACT inhaler 2 puff  2 puff Inhalation Q4H PRN    azelastine (Astelin) 0.1 % nasal solution 1 spray  1 spray Each Nare BID    busPIRone (Buspar) tab 10 mg  10 mg Oral Daily    clonazePAM (KlonoPIN) tab 0.5 mg  0.5 mg Oral BID    desvenlafaxine ER (Pristiq) 24 hr tab 100 mg  100 mg Oral Daily    gabapentin (Neurontin) cap 300 mg  300 mg Oral Nightly    umeclidinium bromide (Incruse Ellipta) 62.5 MCG/ACT inhaler 1 puff  1 puff Inhalation Daily    benzonatate (Tessalon) cap 200 mg  200 mg Oral TID PRN       Labs:  Lab Results   Component Value Date    WBC 12.3 01/01/2024    HGB 10.9 01/01/2024    HCT 31.8 01/01/2024    .0 01/01/2024    CREATSERUM 0.59 01/01/2024    BUN 16 01/01/2024     01/01/2024    K 3.8 01/01/2024     01/01/2024    CO2 25.0 01/01/2024     01/01/2024    CA 8.7 01/01/2024       Chest Xray:   PROCEDURE:  XR CHEST AP PORTABLE  (CPT=71045)     TECHNIQUE:  AP chest radiograph was obtained.      COMPARISON:  [facility] , XR, XR CHEST AP PORTABLE  (CPT=71045), 12/30/2023, 5:09 AM.     INDICATIONS:  post op-CABG with infiltrates.     PATIENT STATED HISTORY: (As transcribed by Technologist)  Patient offered no additional history at this time.         FINDINGS:  The lung volumes are large.  There is airspace consolidation in the left lower lobe adjacent to a small left pleural effusion.  There may be some degree of atelectasis also at the left lung base.  There is linear airspace opacification at the  right lung base likely reflecting atelectasis.     Sternotomy wires and surgical clips noted consistent with previous CABG.  The heart size is mildly enlarged and stable.  There is no pneumothorax.                   Impression   CONCLUSION:  Bibasilar airspace opacities are again noted, left worse than right.  Compared to the exam from 12/30/2023 there is some improvement in the right basilar infiltrate or atelectasis.  The left basilar infiltrate/pleural effusion is not  significantly improved.  Findings are concerning for possible pneumonia.  Correlate clinically.               Physical Exam:    Neuro: a+ox4. Reagan. Follows commands. Intact.  Lungs: rhonchi bilaterally, dim left base  Heart: S1S2. NSR/ST. Stable sternum.  Abdomen: soft, non-distended; passing gas  Extremities: warm/dry/intact  Pulses: intact  Incisions: sternotomy c/d/I. Leg incision c/d/i       Assessment/Plan:  Patient Active Problem List   Diagnosis    Vitamin D deficiency    Jamison's esophagus    Tobacco use    Insomnia    COPD, mild (HCC) - Dx'd via PFTs done in 3/2015    Primary osteoarthritis involving multiple joints    Migraine without aura and without status migrainosus, not intractable    Hyperlipidemia    SUMMER on CPAP    MDD (major depressive disorder), recurrent episode, moderate (HCC)    GERD (gastroesophageal reflux disease)    Hiatal hernia without gangrene and obstruction    Hypertension    Chronic pansinusitis    Mucinous  cystadenoma of ovary, left    Arthritis of carpometacarpal (CMC) joint of right thumb    KIRK (generalized anxiety disorder)    Vulvar itching    History of pubovaginal sling    Nocturnal enuresis    Absence of bladder continence    Vasomotor flushing    Primary osteoarthritis of left knee    Type 2 diabetes mellitus without complication (HCC)    Mass of spine    Hx of motion sickness    Fitting and adjustment of dental prosthetic device    Difficult intubation    Chronic low back pain without sciatica    Type 2 diabetes mellitus with diabetic neuropathy (HCC)    Pulmonary nodule    Neck pain    Degenerative disc disease, lumbar    NSTEMI (non-ST elevated myocardial infarction) ([facility])    CAD, multiple vessel       POD# 5    CABG X2 with LIMA- LAD , SVG- RCA     - HD stable  - leukocytosis, down trending. Low grade temp-monitor  - renal function intact-adequate uop  - Hx smoker-nicotine patch  - bowel regimen  - GI PPX: protonix  - DVT PPX: TEDs/SCDs  - Encourage IS/ambulation  - PT/OT/Cardiac rehab  - Ok to discharge home for CV standpoint        D/W Dr. Joy YOUNG, RN  1/1/2024  8:15 AM

## 2024-01-01 NOTE — PLAN OF CARE
Denies c/o malaise or cardiac symptoms.  Feels \"Like I over did it yesterday with my arms\".  Using better body mechanics to ambulate and get out of bed today.  Hardly pushing or pulling her extremities and always waiting for her limbs to face in the same direction to avoid any twisting movements.  Aware to avoid bending over and putting too much weight on her sternum.  Pain in her back and chest improved with the use of Flexeril and Tramadol.  Aware that pain meds do make her drowsy.  Needs encouragement to ambulate and will do so when reminded.  Lungs clear and diminished on RA.  Remains in NSR-ST with HRs as high as 120s even while at rest.  Will perform an O2 walk shortly.  Concerns for pneumonia on xray.  Levaquin added and IV lasix.  Negative for edema.  Abdomen soft and non tender to touch with active bowel sounds in all four quadrants.  Tolerating all medications well with no adverse effects.  All needs met by staff.  Will continue to monitor.      Problem: Diabetes/Glucose Control  Goal: Glucose maintained within prescribed range  Description: INTERVENTIONS:  - Monitor Blood Glucose as ordered  - Assess for signs and symptoms of hyperglycemia and hypoglycemia  - Administer ordered medications to maintain glucose within target range  - Assess barriers to adequate nutritional intake and initiate nutrition consult as needed  - Instruct patient on self management of diabetes  Outcome: Progressing     Problem: Patient/Family Goals  Goal: Patient/Family Long Term Goal  Description: Patient's Long Term Goal: stay out of hospital 12-25    Interventions:  - Medication compliance  - smoking cessation   - follow up with primary care physician     - See additional Care Plan goals for specific interventions  Outcome: Progressing  Goal: Patient/Family Short Term Goal  Description: Patient's Short Term Goal: go home  No pain 12-25    Interventions:   - medication education   - smoking cessation education   - cardiology  consult   - follow up with care team  - PRN tylenol, hot/cold packs, tell nurse if in pain     - See additional Care Plan goals for specific interventions  Outcome: Progressing     Problem: CARDIOVASCULAR - ADULT  Goal: Maintains optimal cardiac output and hemodynamic stability  Description: INTERVENTIONS:  - Monitor vital signs, rhythm, and trends  - Monitor for bleeding, hypotension and signs of decreased cardiac output  - Evaluate effectiveness of vasoactive medications to optimize hemodynamic stability  - Monitor arterial and/or venous puncture sites for bleeding and/or hematoma  - Assess quality of pulses, skin color and temperature  - Assess for signs of decreased coronary artery perfusion - ex. Angina  - Evaluate fluid balance, assess for edema, trend weights  Outcome: Progressing  Goal: Absence of cardiac arrhythmias or at baseline  Description: INTERVENTIONS:  - Continuous cardiac monitoring, monitor vital signs, obtain 12 lead EKG if indicated  - Evaluate effectiveness of antiarrhythmic and heart rate control medications as ordered  - Initiate emergency measures for life threatening arrhythmias  - Monitor electrolytes and administer replacement therapy as ordered  Outcome: Progressing

## 2024-01-01 NOTE — PLAN OF CARE
Patient A&Ox4. On RA. Sinus Tach on tele- some SOB with exertion. Sternal Incision is C/D/I- Has some swelling at top of incision site, CV surg aware. painted with betadine, harvest site on left leg is C/D/I painted with betadine. Educated on importance of using IS 10x an hour while awake- achieves 500 goal 750. Ambulates with standby assist. Patient updated on plan of care. Call light within reach.     0400: HR sustaining was sustaining 120's came down to 118's. XIN diaz- Received phone call from on call BECKY lorenzana to give morning dose of metoprolol early.  0645: Declined morning walk due to pain at incision site- PRN pain medication given.   Problem: Diabetes/Glucose Control  Goal: Glucose maintained within prescribed range  Description: INTERVENTIONS:  - Monitor Blood Glucose as ordered  - Assess for signs and symptoms of hyperglycemia and hypoglycemia  - Administer ordered medications to maintain glucose within target range  - Assess barriers to adequate nutritional intake and initiate nutrition consult as needed  - Instruct patient on self management of diabetes  Outcome: Progressing     Problem: Patient/Family Goals  Goal: Patient/Family Long Term Goal  Description: Patient's Long Term Goal: stay out of hospital 12-25    Interventions:  - Medication compliance  - smoking cessation   - follow up with primary care physician     - See additional Care Plan goals for specific interventions  Outcome: Progressing  Goal: Patient/Family Short Term Goal  Description: Patient's Short Term Goal: go home  No pain 12-25    Interventions:   - medication education   - smoking cessation education   - cardiology consult   - follow up with care team  - PRN tylenol, hot/cold packs, tell nurse if in pain     - See additional Care Plan goals for specific interventions  Outcome: Progressing     Problem: CARDIOVASCULAR - ADULT  Goal: Maintains optimal cardiac output and hemodynamic stability  Description: INTERVENTIONS:  - Monitor  vital signs, rhythm, and trends  - Monitor for bleeding, hypotension and signs of decreased cardiac output  - Evaluate effectiveness of vasoactive medications to optimize hemodynamic stability  - Monitor arterial and/or venous puncture sites for bleeding and/or hematoma  - Assess quality of pulses, skin color and temperature  - Assess for signs of decreased coronary artery perfusion - ex. Angina  - Evaluate fluid balance, assess for edema, trend weights  Outcome: Progressing  Goal: Absence of cardiac arrhythmias or at baseline  Description: INTERVENTIONS:  - Continuous cardiac monitoring, monitor vital signs, obtain 12 lead EKG if indicated  - Evaluate effectiveness of antiarrhythmic and heart rate control medications as ordered  - Initiate emergency measures for life threatening arrhythmias  - Monitor electrolytes and administer replacement therapy as ordered  Outcome: Progressing

## 2024-01-01 NOTE — PLAN OF CARE
Notified by RN that pt's HR in 120-130's ST. Pt asymptomatic. Latest blood pressure of 131/74. Denies any complaints.   Pt had received her PM dose of metoprolol 30minutes ago.   Instructed RN t o monitor the heart rate, since pt had received oral metorprolol just 30 minutes ago, continue to monitor for next 30 minutes. If heart rate not improving RN to call back.     Addendum: 0429  Notified by RN that pt's HR in 110's-200's. Pt is asymptomatic. Blood pressure 105/70.   Plan:   Give metoprolol tartrate 37.5mg am dose now.

## 2024-01-01 NOTE — PROGRESS NOTES
Rec'd patient at ~1600. AO x 4, on RA. Up w/ SBA. C/O sternal pain, prn norco given. Sternal and donor sites CDI. Ambulating in halls, stairs completed today as well.    1850 - Pt's HR sustained in 120-130s, Cardiology notified. Awaiting call back.

## 2024-01-01 NOTE — PROGRESS NOTES
Progress Note     Soumya King Patient Status:  Inpatient    6/3/1960 MRN [id number]   [facility] 6NE-A Attending Karla Camejo MD   Hosp Day # 11 PCP Rika Vinson DO     Chief Complaint: cp    Subjective:   S: Patient sore in her back/ neck and arms . Reports she is SOB when she ambulates.     Review of Systems:   10 point ROS completed and was negative, except for pertinent positive and negatives stated in subjective.    Objective:   Vital signs:  Temp:  [98.4 °F (36.9 °C)-100 °F (37.8 °C)] 99.6 °F (37.6 °C)  Pulse:  [] 101  Resp:  [16-18] 17  BP: (100-131)/(57-78) 100/69  SpO2:  [92 %-100 %] 95 %    Wt Readings from Last 3 Encounters:   24 125 lb 12.8 oz (57.1 kg)   23 136 lb (61.7 kg)   23 137 lb (62.1 kg)       Intake/Output:    Intake/Output Summary (Last 24 hours) at 2024 1136  Last data filed at 2024 0200  Gross per 24 hour   Intake 720 ml   Output 1 ml   Net 719 ml         Physical Exam:    General: No acute distress. Alert ,        Respiratory: Clear to auscultation bilaterally. No wheezes. No rhonchi.    Cardiovascular: S1, S2. Regular rate and rhythm. No murmurs, rubs or gallops.  Abdomen: Soft, nontender, nondistended.  Positive bowel sounds. No rebound or guarding.  Neurologic: No focal neurological deficits.  Musculoskeletal: Moves all extremities.  Extremities: No edema.    Results:   Diagnostic Data:      Labs:    Selected labs - last 24 hours:  Endo  Lytes  Renal   Glu 195  Na 135 Ca 8.7  BUN 16   POC Gluc  175  K 3.8 PO4 -  Cr 0.59   A1c -  Cl 105 Mg -  eGFR 102   TSH -  CO2 25.0         LFT  CBC  Other   AST -  WBC 12.3  PTT - Procal -   ALT -  Hb 10.9  INR - CRP -   APk -  Hct 31.8  Trop - D dim -   T yoni -  .0  pBNP -  BNP -  - Ferritin  -   Prot -    CK  - Lactate  -   Alb -    LDL  - COVID  -     Imaging: Imaging data reviewed in Epic.    Medications:    metoprolol tartrate  37.5 mg Oral 2x Daily(Beta Blocker)    insulin aspart   1-5 Units Subcutaneous TID AC and HS    atorvastatin  80 mg Oral Nightly    aspirin  325 mg Oral Daily    topiramate  50 mg Oral BID    pantoprazole  40 mg Intravenous BID AC    Or    pantoprazole  40 mg Oral BID AC    nicotine  1 patch Transdermal Daily    oxybutynin ER  10 mg Oral Daily    QUEtiapine ER  150 mg Oral Nightly    And    QUEtiapine ER  50 mg Oral Nightly    azelastine  1 spray Each Nare BID    busPIRone  10 mg Oral Daily    clonazePAM  0.5 mg Oral BID    desvenlafaxine ER  100 mg Oral Daily    gabapentin  300 mg Oral Nightly    umeclidinium bromide  1 puff Inhalation Daily       Assessment & Plan:   ASSESSMENT / PLAN:     # NSTEMI  -aspirin, metoprolol, statin; hx difficulty swallowing asa due to barretts. Not a true allergy  -Cardiac cath on 12/22 showed multivessel disease  -s/p CABG POD#2     #Hypertension  stable  #DM2-monitor on insulin; controlled  #Hyperlipidemia  #Smoker  -Nicotine patch  #leukocytosis and anemia  #STOCKTON  CXR with ? PNA, will get PCT   Lasix per cards  Walking O2 eval       Heidi Delacruz MD    Supplementary Documentation:

## 2024-01-02 ENCOUNTER — TELEPHONE (OUTPATIENT)
Facility: CLINIC | Age: 64
End: 2024-01-02

## 2024-01-02 LAB
ANION GAP SERPL CALC-SCNC: 11 MMOL/L (ref 0–18)
ATRIAL RATE: 106 BPM
BUN BLD-MCNC: 17 MG/DL (ref 9–23)
CALCIUM BLD-MCNC: 9 MG/DL (ref 8.5–10.1)
CHLORIDE SERPL-SCNC: 102 MMOL/L (ref 98–112)
CO2 SERPL-SCNC: 21 MMOL/L (ref 21–32)
CREAT BLD-MCNC: 0.69 MG/DL
EGFRCR SERPLBLD CKD-EPI 2021: 99 ML/MIN/1.73M2 (ref 60–?)
ERYTHROCYTE [DISTWIDTH] IN BLOOD BY AUTOMATED COUNT: 14.2 %
GLUCOSE BLD-MCNC: 120 MG/DL (ref 70–99)
GLUCOSE BLD-MCNC: 155 MG/DL (ref 70–99)
GLUCOSE BLD-MCNC: 158 MG/DL (ref 70–99)
GLUCOSE BLD-MCNC: 182 MG/DL (ref 70–99)
GLUCOSE BLD-MCNC: 184 MG/DL (ref 70–99)
GLUCOSE BLD-MCNC: 208 MG/DL (ref 70–99)
HCT VFR BLD AUTO: 31.5 %
HGB BLD-MCNC: 10.6 G/DL
MCH RBC QN AUTO: 29.4 PG (ref 26–34)
MCHC RBC AUTO-ENTMCNC: 33.7 G/DL (ref 31–37)
MCV RBC AUTO: 87.3 FL
OSMOLALITY SERPL CALC.SUM OF ELEC: 284 MOSM/KG (ref 275–295)
P AXIS: 59 DEGREES
P-R INTERVAL: 118 MS
PLATELET # BLD AUTO: 363 10(3)UL (ref 150–450)
POTASSIUM SERPL-SCNC: 3.7 MMOL/L (ref 3.5–5.1)
Q-T INTERVAL: 354 MS
QRS DURATION: 82 MS
QTC CALCULATION (BEZET): 470 MS
R AXIS: 45 DEGREES
RBC # BLD AUTO: 3.61 X10(6)UL
SODIUM SERPL-SCNC: 134 MMOL/L (ref 136–145)
T AXIS: 109 DEGREES
VENTRICULAR RATE: 106 BPM
WBC # BLD AUTO: 13.7 X10(3) UL (ref 4–11)

## 2024-01-02 PROCEDURE — 99233 SBSQ HOSP IP/OBS HIGH 50: CPT | Performed by: STUDENT IN AN ORGANIZED HEALTH CARE EDUCATION/TRAINING PROGRAM

## 2024-01-02 RX ORDER — FUROSEMIDE 20 MG/1
20 TABLET ORAL DAILY
Qty: 30 TABLET | Refills: 0 | Status: SHIPPED | OUTPATIENT
Start: 2024-01-02 | End: 2024-01-02

## 2024-01-02 RX ORDER — FUROSEMIDE 20 MG/1
20 TABLET ORAL DAILY
Qty: 7 TABLET | Refills: 0 | Status: SHIPPED | OUTPATIENT
Start: 2024-01-02

## 2024-01-02 RX ORDER — LEVOFLOXACIN 750 MG/1
750 TABLET, FILM COATED ORAL DAILY
Qty: 3 TABLET | Refills: 0 | Status: SHIPPED | OUTPATIENT
Start: 2024-01-03 | End: 2024-01-03

## 2024-01-02 RX ORDER — POTASSIUM CHLORIDE 20 MEQ/1
40 TABLET, EXTENDED RELEASE ORAL ONCE
Status: COMPLETED | OUTPATIENT
Start: 2024-01-02 | End: 2024-01-02

## 2024-01-02 NOTE — PLAN OF CARE
Pt is Alert and oriented x4. Tele rhythm NSR O2 saturation 95% on room air Breath sounds clear/diminished. Bed is locked and in low position. Call light and personal items within reach. No C/O chest pain or shortness of breath. Pt voiding with no issue. Pt ambulated in hallway W/O issue. Skin dry/Intact. Reviewed plan of care and patient verbalizes understanding.    POC: post cabg requirements, PO abx, poss discharge 1/2  Post op day 6      Problem: Diabetes/Glucose Control  Goal: Glucose maintained within prescribed range  Description: INTERVENTIONS:  - Monitor Blood Glucose as ordered  - Assess for signs and symptoms of hyperglycemia and hypoglycemia  - Administer ordered medications to maintain glucose within target range  - Assess barriers to adequate nutritional intake and initiate nutrition consult as needed  - Instruct patient on self management of diabetes  Outcome: Progressing     Problem: Patient/Family Goals  Goal: Patient/Family Long Term Goal  Description: Patient's Long Term Goal: stay out of hospital 12-25    Interventions:  - Medication compliance  - smoking cessation   - follow up with primary care physician     - See additional Care Plan goals for specific interventions  Outcome: Progressing  Goal: Patient/Family Short Term Goal  Description: Patient's Short Term Goal: go home  No pain 12-25    Interventions:   - medication education   - smoking cessation education   - cardiology consult   - follow up with care team  - PRN tylenol, hot/cold packs, tell nurse if in pain     - See additional Care Plan goals for specific interventions  Outcome: Progressing

## 2024-01-02 NOTE — PROGRESS NOTES
Seen and examined by Dr. Saunders POD #6 s/p CABG  Pt appears ready for discharge,  reviewed cxr, labs, meds, may discharge with PO abx if hospitalist would like however it does not appear pt has PNA.  D/w Dr. Herrera, will discharge today, pt in agreement.  Cher Pizarro RN  Clinical Coordinator  CV Surgery

## 2024-01-02 NOTE — PROGRESS NOTES
Progress Note     Soumya King Patient Status:  Inpatient    6/3/1960 MRN [id number]   Location [facility] 6NE-A Attending Karla Camejo MD   Hosp Day # 12 PCP Rika Vinson DO     Chief Complaint: cp    Subjective:   S: Patient feels markedly better today. Afebrile.    Review of Systems:   10 point ROS completed and was negative, except for pertinent positive and negatives stated in subjective.    Objective:   Vital signs:  Temp:  [98.3 °F (36.8 °C)-98.8 °F (37.1 °C)] 98.3 °F (36.8 °C)  Pulse:  [] 102  Resp:  [18-20] 20  BP: (101-122)/(57-73) 108/61  SpO2:  [93 %-99 %] 94 %    Wt Readings from Last 3 Encounters:   24 125 lb 14.1 oz (57.1 kg)   23 136 lb (61.7 kg)   23 137 lb (62.1 kg)       Intake/Output:    Intake/Output Summary (Last 24 hours) at 2024 1526  Last data filed at 2024 1032  Gross per 24 hour   Intake 120 ml   Output 1850 ml   Net -1730 ml         Physical Exam:    General: No acute distress. Alert ,        Respiratory: Clear to auscultation bilaterally. No wheezes. No rhonchi.    Cardiovascular: S1, S2. Regular rate and rhythm. No murmurs, rubs or gallops.  Abdomen: Soft, nontender, nondistended.  Positive bowel sounds. No rebound or guarding.  Neurologic: No focal neurological deficits.  Musculoskeletal: Moves all extremities.  Extremities: No edema.    Results:   Diagnostic Data:      Labs:    Selected labs - last 24 hours:  Endo  Lytes  Renal   Glu 182  Na 134 Ca 9.0  BUN 17   POC Gluc  120  K 3.7 PO4 -  Cr 0.69   A1c -  Cl 102 Mg -  eGFR 99   TSH -  CO2 21.0         LFT  CBC  Other   AST -  WBC 13.7  PTT - Procal -   ALT -  Hb 10.6  INR - CRP -   APk -  Hct 31.5  Trop - D dim -   T yoni -  .0  pBNP -  BNP -  - Ferritin  -   Prot -    CK  - Lactate  -   Alb -    LDL  - COVID  -     Imaging: Imaging data reviewed in Epic.    Medications:    metoprolol succinate  50 mg Oral Daily Beta Blocker    levoFLOXacin  750 mg Oral Daily @ 0700    insulin  aspart  1-5 Units Subcutaneous TID AC and HS    atorvastatin  80 mg Oral Nightly    aspirin  325 mg Oral Daily    topiramate  50 mg Oral BID    pantoprazole  40 mg Intravenous BID AC    Or    pantoprazole  40 mg Oral BID AC    nicotine  1 patch Transdermal Daily    oxybutynin ER  10 mg Oral Daily    QUEtiapine ER  150 mg Oral Nightly    And    QUEtiapine ER  50 mg Oral Nightly    azelastine  1 spray Each Nare BID    busPIRone  10 mg Oral Daily    clonazePAM  0.5 mg Oral BID    desvenlafaxine ER  100 mg Oral Daily    gabapentin  300 mg Oral Nightly    umeclidinium bromide  1 puff Inhalation Daily       Assessment & Plan:   ASSESSMENT / PLAN:     # NSTEMI  -aspirin, metoprolol, statin; hx difficulty swallowing asa due to barretts. Not a true allergy  -Cardiac cath on 12/22 showed multivessel disease  -s/p CABG POD#2     #Hypertension  stable  #DM2-monitor on insulin; controlled  #Hyperlipidemia  #Smoker  -Nicotine patch  #leukocytosis and anemia  #STOCKTON  CXR with ? PNA, given pt productive sputum with green phlegm, STOCKTON, leucocytosis will treat with levaquin 1/1/24  Lasix per cards  Walking O2 rosyal       Heidi Delacruz MD    Supplementary Documentation:

## 2024-01-02 NOTE — PROGRESS NOTES
Progress Note  Soumya King Patient Status:  Inpatient    6/3/1960 MRN [id number]   Location [facility] 8NE-A Attending Karla Camejo MD   Hosp Day # 12 PCP Rika Vinson DO     Subjective:  POD #6 two-vessel CABG (LIMA to LAD and SVG to RCA)  Reports muscular soreness of her neck and back improved today.  Dyspnea improved following diuretics x 2  Continue asymptomatic sinus tachycardia overnight improved on BB therapy.      Objective:  Physical Exam:   /57 (BP Location: Right arm)   Pulse 112   Temp 98.8 °F (37.1 °C) (Oral)   Resp 20   Ht 5' 1\" (1.549 m)   Wt 125 lb 14.1 oz (57.1 kg)   SpO2 93%   BMI 23.79 kg/m²   Temp (24hrs), Av.7 °F (37.1 °C), Min:98.5 °F (36.9 °C), Max:99 °F (37.2 °C)       Intake/Output Summary (Last 24 hours) at 2024 1035  Last data filed at 2024 1032  Gross per 24 hour   Intake 120 ml   Output 1850 ml   Net -1730 ml     Wt Readings from Last 3 Encounters:   24 125 lb 14.1 oz (57.1 kg)   23 136 lb (61.7 kg)   23 137 lb (62.1 kg)     Telemetry: Sinus tachycardia in the low 100's  General: Alert and oriented in no apparent distress seated comfortably in her bedside chair  HEENT: No focal deficits.  Neck: No JVD, carotids 2+ no bruits.  Cardiac: Regular rate and rhythm, S1, S2 normal, no murmur, rub or gallop.  Lungs: Clear without wheezes, rales, rhonchi or dullness.  Normal excursions and effort on room air  Abdomen: Soft, non-tender.   Extremities: Without clubbing, cyanosis or edema.  Peripheral pulses are 2+.  Neurologic: Alert and oriented, normal affect.  Skin: Warm and dry.  Sternal incision remains clean, dry and intact.      Intake/Output:    Intake/Output Summary (Last 24 hours) at 2024 1035  Last data filed at 2024 1032  Gross per 24 hour   Intake 120 ml   Output 1850 ml   Net -1730 ml       Last 3 Weights   24 0813 125 lb 14.1 oz (57.1 kg)   24 0638 125 lb 12.8 oz (57.1 kg)   23 0445 143 lb 4.8 oz  (65 kg)   12/30/23 0518 135 lb 8 oz (61.5 kg)   12/29/23 0100 139 lb 15.9 oz (63.5 kg)   12/28/23 0600 128 lb 1.4 oz (58.1 kg)   12/26/23 0535 126 lb 1.7 oz (57.2 kg)   12/23/23 0431 123 lb 3.8 oz (55.9 kg)   12/21/23 2030 133 lb 4.8 oz (60.5 kg)   12/21/23 1806 134 lb 0.6 oz (60.8 kg)   12/21/23 1452 129 lb (58.5 kg)   11/30/23 1043 136 lb (61.7 kg)   11/01/23 1116 137 lb (62.1 kg)       Labs:  Recent Labs   Lab 12/31/23  0514 01/01/24  0507 01/02/24  0634   * 195* 182*   BUN 13 16 17   CREATSERUM 0.69 0.59 0.69   EGFRCR 99 102 99   CA 8.1* 8.7 9.0    135* 134*   K 3.9  3.9 3.8 3.7    105 102   CO2 26.0 25.0 21.0     Recent Labs   Lab 12/28/23  0409 12/29/23  0407 12/31/23  0515 01/01/24  0507 01/02/24  0634   RBC 3.36*   < > 3.33* 3.63* 3.61*   HGB 10.1*   < > 10.1* 10.9* 10.6*   HCT 30.3*   < > 30.9* 31.8* 31.5*   MCV 90.2   < > 92.8 87.6 87.3   MCH 30.1   < > 30.3 30.0 29.4   MCHC 33.3   < > 32.7 34.3 33.7   RDW 15.6   < > 15.1 14.6 14.2   NEPRELIM 24.63*  --   --  8.42*  --    WBC 27.8*   < > 12.9* 12.3* 13.7*   .0   < > 265.0 319.0 363.0    < > = values in this interval not displayed.         No results for input(s): \"TROP\", \"TROPHS\", \"CK\" in the last 168 hours.    Diagnostics:   Limited Echocardiogram 12/30/2023:  1. Left ventricle: The cavity size was normal. Systolic function was normal.      The estimated ejection fraction was 60-65%, by visual assessment.   2. Left atrium: The left atrial volume was normal.   3. Pericardium, extracardiac: No significant pericardial effusion was      identified. There were bilateral pleural effusions present.   Impressions:  This study is compared with previous dated 12/22/2023:       Medications:   metoprolol succinate  50 mg Oral Daily Beta Blocker    levoFLOXacin  750 mg Oral Daily @ 0700    insulin aspart  1-5 Units Subcutaneous TID AC and HS    atorvastatin  80 mg Oral Nightly    aspirin  325 mg Oral Daily    topiramate  50 mg Oral BID     pantoprazole  40 mg Intravenous BID AC    Or    pantoprazole  40 mg Oral BID AC    nicotine  1 patch Transdermal Daily    oxybutynin ER  10 mg Oral Daily    QUEtiapine ER  150 mg Oral Nightly    And    QUEtiapine ER  50 mg Oral Nightly    azelastine  1 spray Each Nare BID    busPIRone  10 mg Oral Daily    clonazePAM  0.5 mg Oral BID    desvenlafaxine ER  100 mg Oral Daily    gabapentin  300 mg Oral Nightly    umeclidinium bromide  1 puff Inhalation Daily      sodium chloride 10 mL/hr at 12/27/23 1536    DOBUTamine Stopped (12/28/23 1354)    nitroGLYCERIN in dextrose 5%      norepinephrine      NitroPRUSSide (Nipride) 50 mg in dextrose 5% 250 mL infusion      dextrose 5%-sodium chloride 0.45% 70 mL/hr (12/27/23 1530)       Assessment/Plan:    CAD  Presented NSTEMI  12/27 CABG x2 LIMA-LAD, SV-PDA POD #6  + 2 L since admit  ASA, BB, and statin  Sinus tachycardia: 's  Present since prior to surgery.  CTA 12/21 without evidence of PE  Metoprolol succinate 50 mg daily  Repeat echocardiogram 12/30 LVEF 60-65%  CXR 12/30 small bilateral effusions L>R  Breathing comfortably on room air  Pro calcitonin negative. Afebrile and asymptomatic  Hypervolemia  Lasix provided yesterday   125 lbs today - admit weight 126 lbs. 12/31 reportedly 143 lbs unlikely to be accurate.  Breathing comfortably on room air  HTN  /57  HLD  Continue Atorvastatin 80 mg daily  DM II  Hgb A1C 6.4%  Leukocytosis   13.7 today. Pro calcitonin negative. T-max 37.2      PLAN  POD #6 CABG x2  Continue metoprolol succinate  Continue  mg daily and Atorvastatin 80 mg daily  Limited echo NL 12/30  Lasix provided yesterday- appears compensated today  Will review with my attending physician            Pablo Ochoa PA-C  1/2/2024  10:41 AM    Seen and examined.  Agree with above.  Home today with short course of oral lasix.  Follow up with Dr Niño after discharge.  D/W Dr Saunders.  Cardiac MDM reviewed with HIEN Ochoa.     MACARENA Herrera MD  2

## 2024-01-02 NOTE — OCCUPATIONAL THERAPY NOTE
This writer met with the patient. She is independent with post-cabg exercises (she covered this with another therapist).  Patient is independent with ADL and mobility. No skilled OT needs at this time. OT will sign off.

## 2024-01-02 NOTE — PROGRESS NOTES
01/02/24 1114   Clinical Encounter Type   Visited With Patient not available   Routine Visit   (LOS)   Taxonomy   Intended Effects Demonstrate caring and concern   Methods Collaborate with care team member   Trigger for Consult   Trigger for Spiritual Care Consult No     Referred by/responded to: Scheduled Visitation-LOS    Visit summary: Pt not available, Left card.    Plan:  Spiritual Care support can be requested via an Epic consult.      Rev. Lona Vasquez  1/2/2024

## 2024-01-02 NOTE — PLAN OF CARE
Assumed care at 0730. Pt alert, oriented x4. Oxygen saturations adequate on room air. Lung sounds diminished bilaterally. Reaching 500 on IS. Tele: NSR, ST at times. Midsternal incision and LLE incision dressed with steri strips, painted with betadine, clean, dry, intact. SCDs on. Continent. Headache. Plan of care: possible discharge today pending clearance from all consults, QID, ambulate in halls, nicotine patch. Pt updated on plan of care. Questions answered.    PRN flexeril for headache, neck, and back pain.  1515 - Pt reporting increased back pain, 9/10, lightheadedness, \"just doesn't feel right\". PRN Norco given for back pain. Blood pressure 118/79, , Temp 98.2, SpO2 93% on room air. CV surgery and hospitalist notified, pt still okay to discharge per CV surgery. Blood glucose 155. STAT EKG per orders.     Problem: Diabetes/Glucose Control  Goal: Glucose maintained within prescribed range  Description: INTERVENTIONS:  - Monitor Blood Glucose as ordered  - Assess for signs and symptoms of hyperglycemia and hypoglycemia  - Administer ordered medications to maintain glucose within target range  - Assess barriers to adequate nutritional intake and initiate nutrition consult as needed  - Instruct patient on self management of diabetes  Outcome: Progressing     Problem: Patient/Family Goals  Goal: Patient/Family Long Term Goal  Description: Patient's Long Term Goal: stay out of hospital 12-25    Interventions:  - Medication compliance  - smoking cessation   - follow up with primary care physician     - See additional Care Plan goals for specific interventions  Outcome: Progressing  Goal: Patient/Family Short Term Goal  Description: Patient's Short Term Goal: go home  No pain 12-25    Interventions:   - medication education   - smoking cessation education   - cardiology consult   - follow up with care team  - PRN tylenol, hot/cold packs, tell nurse if in pain     - See additional Care Plan goals for specific  interventions  Outcome: Progressing     Problem: CARDIOVASCULAR - ADULT  Goal: Maintains optimal cardiac output and hemodynamic stability  Description: INTERVENTIONS:  - Monitor vital signs, rhythm, and trends  - Monitor for bleeding, hypotension and signs of decreased cardiac output  - Evaluate effectiveness of vasoactive medications to optimize hemodynamic stability  - Monitor arterial and/or venous puncture sites for bleeding and/or hematoma  - Assess quality of pulses, skin color and temperature  - Assess for signs of decreased coronary artery perfusion - ex. Angina  - Evaluate fluid balance, assess for edema, trend weights  Outcome: Progressing  Goal: Absence of cardiac arrhythmias or at baseline  Description: INTERVENTIONS:  - Continuous cardiac monitoring, monitor vital signs, obtain 12 lead EKG if indicated  - Evaluate effectiveness of antiarrhythmic and heart rate control medications as ordered  - Initiate emergency measures for life threatening arrhythmias  - Monitor electrolytes and administer replacement therapy as ordered  Outcome: Progressing

## 2024-01-03 VITALS
TEMPERATURE: 98 F | DIASTOLIC BLOOD PRESSURE: 67 MMHG | SYSTOLIC BLOOD PRESSURE: 109 MMHG | RESPIRATION RATE: 22 BRPM | HEART RATE: 90 BPM | BODY MASS INDEX: 23.6 KG/M2 | OXYGEN SATURATION: 95 % | WEIGHT: 125 LBS | HEIGHT: 61 IN

## 2024-01-03 LAB
BASOPHILS # BLD AUTO: 0.05 X10(3) UL (ref 0–0.2)
BASOPHILS NFR BLD AUTO: 0.3 %
EOSINOPHIL # BLD AUTO: 0.23 X10(3) UL (ref 0–0.7)
EOSINOPHIL NFR BLD AUTO: 1.6 %
ERYTHROCYTE [DISTWIDTH] IN BLOOD BY AUTOMATED COUNT: 14.2 %
GLUCOSE BLD-MCNC: 105 MG/DL (ref 70–99)
GLUCOSE BLD-MCNC: 152 MG/DL (ref 70–99)
GLUCOSE BLD-MCNC: 170 MG/DL (ref 70–99)
HCT VFR BLD AUTO: 30.9 %
HGB BLD-MCNC: 10.2 G/DL
IMM GRANULOCYTES # BLD AUTO: 0.24 X10(3) UL (ref 0–1)
IMM GRANULOCYTES NFR BLD: 1.7 %
LYMPHOCYTES # BLD AUTO: 1.95 X10(3) UL (ref 1–4)
LYMPHOCYTES NFR BLD AUTO: 13.4 %
MCH RBC QN AUTO: 29.3 PG (ref 26–34)
MCHC RBC AUTO-ENTMCNC: 33 G/DL (ref 31–37)
MCV RBC AUTO: 88.8 FL
MONOCYTES # BLD AUTO: 1.14 X10(3) UL (ref 0.1–1)
MONOCYTES NFR BLD AUTO: 7.8 %
NEUTROPHILS # BLD AUTO: 10.93 X10 (3) UL (ref 1.5–7.7)
NEUTROPHILS # BLD AUTO: 10.93 X10(3) UL (ref 1.5–7.7)
NEUTROPHILS NFR BLD AUTO: 75.2 %
PLATELET # BLD AUTO: 407 10(3)UL (ref 150–450)
POTASSIUM SERPL-SCNC: 3.7 MMOL/L (ref 3.5–5.1)
RBC # BLD AUTO: 3.48 X10(6)UL
WBC # BLD AUTO: 14.5 X10(3) UL (ref 4–11)

## 2024-01-03 PROCEDURE — 99239 HOSP IP/OBS DSCHRG MGMT >30: CPT | Performed by: STUDENT IN AN ORGANIZED HEALTH CARE EDUCATION/TRAINING PROGRAM

## 2024-01-03 RX ORDER — LEVOFLOXACIN 750 MG/1
750 TABLET, FILM COATED ORAL DAILY
Qty: 2 TABLET | Refills: 0 | Status: SHIPPED | OUTPATIENT
Start: 2024-01-04 | End: 2024-01-06

## 2024-01-03 NOTE — PLAN OF CARE
Pt is Alert and oriented x4. Tele rhythm NSR O2 saturation 95% on room air Breath sounds clear/diminished. Bed is locked and in low position. Call light and personal items within reach. No C/O chest pain or shortness of breath. Pt voiding with no issue. Pt ambulated in hallway W/O issue. Skin dry/Intact. Reviewed plan of care and patient verbalizes understanding.     POC: post cabg requirements, PO abx, discharge 1/3            Problem: Diabetes/Glucose Control  Goal: Glucose maintained within prescribed range  Description: INTERVENTIONS:  - Monitor Blood Glucose as ordered  - Assess for signs and symptoms of hyperglycemia and hypoglycemia  - Administer ordered medications to maintain glucose within target range  - Assess barriers to adequate nutritional intake and initiate nutrition consult as needed  - Instruct patient on self management of diabetes  Outcome: Progressing     Problem: Patient/Family Goals  Goal: Patient/Family Long Term Goal  Description: Patient's Long Term Goal: stay out of hospital 12-25    Interventions:  - Medication compliance  - smoking cessation   - follow up with primary care physician     - See additional Care Plan goals for specific interventions  Outcome: Progressing  Goal: Patient/Family Short Term Goal  Description: Patient's Short Term Goal: go home  No pain 12-25    Interventions:   - medication education   - smoking cessation education   - cardiology consult   - follow up with care team  - PRN tylenol, hot/cold packs, tell nurse if in pain     - See additional Care Plan goals for specific interventions  Outcome: Progressing

## 2024-01-03 NOTE — PLAN OF CARE
Assumed care at 0730. Pt is A&Ox4. Pt is on RA lung sounds clear. NSR;ST on tele, VSS. No complaints of cardiac symptoms. Continent of B&B. Severe pain for headache, given PRN pain med. Standby assist, refused morning walk. Plan of care reviewed with patient, verbalizes understanding, all needs addressed at this time. Bed in lowest position and locked. Call light at bedside.    POC:  DC today      Problem: Patient/Family Goals  Goal: Patient/Family Long Term Goal  Description: Patient's Long Term Goal: stay out of hospital 12-25    Interventions:  - Medication compliance  - smoking cessation   - follow up with primary care physician     - See additional Care Plan goals for specific interventions  1/3/2024 1040 by Marina Beckford RN  Outcome: Adequate for Discharge  1/3/2024 1040 by Marina Beckford RN  Outcome: Progressing  Goal: Patient/Family Short Term Goal  Description: Patient's Short Term Goal: go home  No pain 12-25    Interventions:   - medication education   - smoking cessation education   - cardiology consult   - follow up with care team  - PRN tylenol, hot/cold packs, tell nurse if in pain     - See additional Care Plan goals for specific interventions  1/3/2024 1040 by Marina Beckford RN  Outcome: Adequate for Discharge  1/3/2024 1040 by Marina Beckford RN  Outcome: Progressing     Problem: CARDIOVASCULAR - ADULT  Goal: Maintains optimal cardiac output and hemodynamic stability  Description: INTERVENTIONS:  - Monitor vital signs, rhythm, and trends  - Monitor for bleeding, hypotension and signs of decreased cardiac output  - Evaluate effectiveness of vasoactive medications to optimize hemodynamic stability  - Monitor arterial and/or venous puncture sites for bleeding and/or hematoma  - Assess quality of pulses, skin color and temperature  - Assess for signs of decreased coronary artery perfusion - ex. Angina  - Evaluate fluid balance, assess for edema, trend weights  1/3/2024 1040 by Analy  RYNE Gray  Outcome: Adequate for Discharge  1/3/2024 1040 by Marina Beckford RN  Outcome: Progressing  Goal: Absence of cardiac arrhythmias or at baseline  Description: INTERVENTIONS:  - Continuous cardiac monitoring, monitor vital signs, obtain 12 lead EKG if indicated  - Evaluate effectiveness of antiarrhythmic and heart rate control medications as ordered  - Initiate emergency measures for life threatening arrhythmias  - Monitor electrolytes and administer replacement therapy as ordered  1/3/2024 1040 by Marina Beckford RN  Outcome: Adequate for Discharge  1/3/2024 1040 by Marina Beckford, RN  Outcome: Progressing

## 2024-01-03 NOTE — DISCHARGE SUMMARY
[place] HOSPITALIST  DISCHARGE SUMMARY     Soumya King Patient Status:  Inpatient    6/3/1960 MRN [id number]   Location [facility] 8NE-A Attending Karla Camejo MD   Hosp Day # 13 PCP Rika Vinson DO     Date of Admission: 2023  Date of Discharge:   1/3/2024    Discharge Disposition: home    Discharge Diagnosis:  # NSTEMI  -aspirin, metoprolol, statin; hx difficulty swallowing asa due to barretts. Not a true allergy  -Cardiac cath on  showed multivessel disease  -s/p CABG     #Hypertension  stable  #DM2-monitor on insulin; controlled  #Hyperlipidemia  #Smoker  -Nicotine patch  #leukocytosis and anemia  #STOCKTON  CXR with ? PNA, given pt productive sputum with green phlegm, STOCKTON, leucocytosis will treat with levaquin 24  Lasix per keron  Walking O2 eval     History of Present Illness:   Soumya King is a 63 year old female with PMH COPD, DM 2, hypertension, hyperlipidemia, smoker who presents with chest pain.  Substernal.  Started today.  Never had before and no history of cardiac disease.  Persistent.  Exertional and radiates to the upper back.  Denies shortness of breath.       Brief Synopsis:   Pt admitted  with NSTEMI s/p CABG , post op pt received lasix. Concern for evolving PNA due to productive cough with green phlegm, STOCKTON- started on levaquin which she will continue as OP.    Lace+ Score: 67  59-90 High Risk  29-58 Medium Risk  0-28   Low Risk  Patient was referred to the [facility].    TCM Follow-Up Recommendation:  LACE > 58: High Risk of readmission after discharge from the hospital.      Procedures during hospitalization:   CABG    Incidental or significant findings and recommendations (brief descriptions):  no    Lab/Test results pending at Discharge:   no    Consultants:   Cards, [facility]     Discharge Medication List:     Discharge Medications        START taking these medications        Instructions Prescription details   aspirin 325 MG Tbec      Take 1  tablet (325 mg total) by mouth daily.   Quantity: 90 tablet  Refills: 1     furosemide 20 MG Tabs  Commonly known as: Lasix      Take 1 tablet (20 mg total) by mouth daily.   Quantity: 7 tablet  Refills: 0     HYDROcodone-acetaminophen 5-325 MG Tabs  Commonly known as: Norco      Take 1 tablet by mouth every 8 (eight) hours as needed.   Quantity: 12 tablet  Refills: 0     levoFLOXacin 750 MG Tabs  Commonly known as: Levaquin  Start taking on: January 4, 2024      Take 1 tablet (750 mg total) by mouth daily for 2 days.   Stop taking on: January 6, 2024  Quantity: 2 tablet  Refills: 0            CHANGE how you take these medications        Instructions Prescription details   atorvastatin 40 MG Tabs  Commonly known as: Lipitor  What changed: how much to take      Take 2 tablets (80 mg total) by mouth nightly.   Quantity: 90 tablet  Refills: 1            CONTINUE taking these medications        Instructions Prescription details   Aimovig 140 MG/ML Soaj  Generic drug: Erenumab-aooe  Notes to patient: Resume home schedule      Inject 1 mL (140 mg total) into the skin every 30 (thirty) days.   Quantity: 1 mL  Refills: 5     albuterol 108 (90 Base) MCG/ACT Aers  Commonly known as: Ventolin HFA      Inhale 2 puffs into the lungs every 4 (four) hours as needed.   Quantity: 18 g  Refills: 2     albuterol 108 (90 Base) MCG/ACT Aers  Commonly known as: Ventolin HFA      Inhale 2 puffs into the lungs every 4 (four) hours as needed for Wheezing.   Stop taking on: January 13, 2024  Quantity: 1 each  Refills: 0     azelastine 0.1 % Soln  Commonly known as: Astelin      USE 1 SPRAY IN EACH NOSTRIL EVERY NIGHT IN THE MORNING AND BEFORE BEDTIME   Quantity: 30 mL  Refills: 2     benzonatate 100 MG Caps  Commonly known as: Tessalon      Take 1 capsule (100 mg total) by mouth 3 (three) times daily as needed for cough.   Stop taking on: January 13, 2024  Quantity: 30 capsule  Refills: 0     busPIRone 10 MG Tabs  Commonly known as: Buspar       Take 1 tablet (10 mg total) by mouth daily.   Refills: 0     Calcium Carb-Cholecalciferol 500-200 MG-UNIT Tabs      Take 1 tablet by mouth daily.   Refills: 0     clonazePAM 0.5 MG Tabs  Commonly known as: KlonoPIN      Take 1 tablet (0.5 mg total) by mouth 2 (two) times daily.   Quantity: 12 tablet  Refills: 0     cyclobenzaprine 10 MG Tabs  Commonly known as: Flexeril      Take 1 tablet (10 mg total) by mouth every 12 (twelve) hours as needed for Muscle spasms.   Quantity: 60 tablet  Refills: 1     Pristiq 100 MG Tb24  Generic drug: desvenlafaxine ER      1 tablet (100 mg total) daily.   Refills: 0     desvenlafaxine  MG Tb24  Commonly known as: Pristiq      Take 1 tablet (100 mg total) by mouth daily. [employer] contacted and order was placed on 9/22/20. Conf# [id number].  Patients ID #[id number]   Quantity: 90 tablet  Refills: 0     diclofenac 1 % Gel  Commonly known as: Voltaren      Apply 4 g topically 4 (four) times daily.   Quantity: 300 g  Refills: 3     diclofenac 75 MG Tbec  Commonly known as: Voltaren      Take 1 tablet (75 mg total) by mouth 2 (two) times daily.   Quantity: 180 tablet  Refills: 3     estradiol 0.1 MG/GM Crea  Commonly known as: Estrace  Notes to patient: Resume home schedule      Apply 1/2 gram vaginally 2-3 times per week.   Quantity: 42.5 g  Refills: 3     gabapentin 300 MG Caps  Commonly known as: Neurontin      Take 1 capsule (300 mg total) by mouth nightly.   Quantity: 90 capsule  Refills: 1     Incruse Ellipta 62.5 MCG/ACT Aepb  Generic drug: umeclidinium bromide      Inhale 1 puff into the lungs daily.   Quantity: 1 each  Refills: 1     levocetirizine 5 MG Tabs  Commonly known as: Xyzal      Take 1 tablet (5 mg total) by mouth every evening.   Quantity: 30 tablet  Refills: 5     lidocaine 5 % Oint  Commonly known as: Xylocaine      Apply 1 Application. topically 3 (three) times daily as needed.   Quantity: 240 g  Refills: 1     metFORMIN 500 MG Tabs  Commonly known as: Glucophage       TAKE 1 TABLET(500 MG) BY MOUTH TWICE DAILY   Quantity: 180 tablet  Refills: 1     Metoclopramide HCl 5 MG/5ML Soln  Commonly known as: REGLAN      Take 5 mL (5 mg total) by mouth 3 (three) times daily before meals.   Quantity: 473 mL  Refills: 0     metoprolol succinate ER 50 MG Tb24  Commonly known as: Toprol XL      Take 1 tablet (50 mg total) by mouth daily.   Quantity: 90 tablet  Refills: 1     Myrbetriq 50 MG Tb24  Generic drug: Mirabegron ER      Take 1 tablet (50 mg total) by mouth daily.   Quantity: 30 tablet  Refills: 11     Omeprazole 40 MG Cpdr      Take 1 capsule (40 mg total) by mouth 2 (two) times daily.   Quantity: 180 capsule  Refills: 1     One-A-Day Womens 50+ Tabs      Take 1 tablet by mouth daily.   Refills: 0     OneTouch Delica Plus [id number] Misc      1 lancet  by Finger stick route 3 (three) times daily.   Quantity: 300 each  Refills: 1     OneTouch Ultra Strp  Generic drug: Glucose Blood      Tests 3 x daily   Quantity: 300 each  Refills: 1     oxymetazoline 0.05 % Soln  Commonly known as: Nasal Decongestant      1 spray by Nasal route every 4 (four) hours as needed for congestion.   Stop taking on: January 13, 2024  Quantity: 15 mL  Refills: 0     pseudoephedrine 30 MG Tabs  Commonly known as: Sudafed      Take 1 tablet (30 mg total) by mouth every 4 (four) hours as needed for congestion.   Stop taking on: January 13, 2024  Quantity: 36 tablet  Refills: 0     QUEtiapine  MG Tb24  Commonly known as: SEROquel XR      Take 1 tablet (200 mg total) by mouth every evening.   Quantity: 30 tablet  Refills: 0     Rizatriptan Benzoate 10 MG Tabs  Commonly known as: MAXALT      Take 1 tablet (10 mg total) by mouth as needed for Migraine.   Quantity: 10 tablet  Refills: 5     topiramate 50 MG Tabs  Commonly known as: TOPAMAX      TAKE ONE TABLET BY MOUTH EVERY MORNING AND TWO TABLETS EVERY EVENING   Quantity: 270 tablet  Refills: 0     traMADol 50 MG Tabs  Commonly known as: Ultram      Take  1-2 tablets ( mg total) by mouth every 6 (six) hours as needed for Pain.   Quantity: 120 tablet  Refills: 2     Trulicity 0.75 MG/0.5ML Sopn  Generic drug: Dulaglutide  Notes to patient: Resume home schedule      Inject 0.75 mg into the skin once a week.   Quantity: 1 mL  Refills: 0            STOP taking these medications      azithromycin 250 MG Tabs  Commonly known as: Zithromax        predniSONE 20 MG Tabs  Commonly known as: Deltasone        sulfamethoxazole-trimethoprim -160 MG Tabs per tablet  Commonly known as: Bactrim DS                  Where to Get Your Medications        These medications were sent to [id number] - [place], [place] - [address] [phone], [phone]  [address], [facility] [address]      Phone: [phone]   aspirin 325 MG Tbec  atorvastatin 40 MG Tabs  clonazePAM 0.5 MG Tabs  furosemide 20 MG Tabs  HYDROcodone-acetaminophen 5-325 MG Tabs  levoFLOXacin 750 MG Tabs         [place] reviewed: n/a     Follow-up appointment:   Marina Guy MD  [address] [place] [address] [id number] [phone]    Follow up on 1/15/2024  @ 10am will/Rika NP for Dr. Guy, cardiology follow up, 4th floor [address] [address]  Follow up on 1/15/2024  For a chest xray, central scheduling will call you to arrange the appointment.    Rika Vinson DO  [address]  [address]  [facility] [address]  [phone]    Schedule an appointment as soon as possible for a visit in 1 week(s)      Appointments for Next 30 Days 1/3/2024 - 2/2/2024        Date Arrival Time Visit Type Length Department Provider     1/16/2024 10:00 AM  URO UDS FU PRE/POST OP [[id number]] 20 min [facility] Florecita Quintero DO    Patient Instructions:         Location Instructions:     Masks are optional for all patients and visitors, unless otherwise indicated.                2024  1:30 PM  PHYSICAL - ESTABLISHED PATIENT [[id number]] 30 min [facility], [facility] AlissonRika DO    Patient Instructions:         Location Instructions:     Masks are optional for all patients and visitors, unless otherwise indicated.               2024 10:15 AM  POST OP [[id number]] 15 min [facility], Dennise Sams PA    Patient Instructions:                    2024 12:00 PM  EXAM - ESTABLISHED [[id number]] 20 min [facility], [facility] Florez [facility]    Patient Instructions:         Location Instructions:     Masks are optional for all patients and visitors, unless otherwise indicated.                      Vital signs:  Temp:  [97.7 °F (36.5 °C)-99.7 °F (37.6 °C)] 97.7 °F (36.5 °C)  Pulse:  [] 94  Resp:  [16-22] 22  BP: ()/(57-79) 119/71  SpO2:  [92 %-100 %] 93 %    Physical Exam:    General: No acute distress   Lungs: clear to auscultation  Cardiovascular: S1, S2  Abdomen: Soft      -----------------------------------------------------------------------------------------------  PATIENT DISCHARGE INSTRUCTIONS: See electronic chart    Heidi Delacruz MD    Total time spent on discharge plannin minutes     The  [employer] Cures Act makes medical notes like these available to patients in the interest of transparency. Please be advised this is a medical document. Medical documents are intended to carry relevant information, facts as evident, and the clinical opinion of the practitioner. The medical note is intended as peer to peer communication and may appear blunt or direct. It is written in medical language and may contain abbreviations or verbiage that are unfamiliar.

## 2024-01-03 NOTE — PAYOR COMM NOTE
--------------  CONTINUED STAY REVIEW    Payor: [facility]  Subscriber #:  [id number]  Authorization Number: [id number]    Admit date: 12/21/23  Admit time:  8:15 PM    1/2  Chest pain      # NSTEMI  -aspirin, metoprolol, statin; hx difficulty swallowing asa due to barretts. Not a true allergy  -Cardiac cath on 12/22 showed multivessel disease  -s/p CABG POD#2     #Hypertension  stable  #DM2-monitor on insulin; controlled  #Hyperlipidemia    #STOCKTON  CXR with ? PNA, given pt productive sputum with green phlegm, STOCKTON, leucocytosis will treat with levaquin 1/1/24  Lasix per cards  Walking O2 eval     CARDS  POD #6 two-vessel CABG (LIMA to LAD and SVG to RCA)  Reports muscular soreness of her neck and back improved today.  Dyspnea improved following diuretics x 2  Continue asymptomatic sinus tachycardia overnight improved on BB therapy.       MEDICATIONS ADMINISTERED IN LAST 1 DAY:         cyclobenzaprine (Flexeril) tab 10 mg       Date Action Dose Route User    1/2/2024 1023 Given 10 mg Oral Daya Lam RN          desvenlafaxine ER (Pristiq) 24 hr tab 100 mg       Date Action Dose Route User    1/2/2024 0843 Given 100 mg Oral Daya Lam RN          furosemide (Lasix) 10 mg/mL injection 20 mg       Date Action Dose Route User    1/2/2024 0844 Given 20 mg Intravenous Daya Lam RN          gabapentin (Neurontin) cap 300 mg       Date Action Dose Route User    1/2/2024 2122 Given 300 mg Oral Hamilton Baker RN          HYDROcodone-acetaminophen (Norco) 5-325 MG per tab 1 tablet       Date Action Dose Route User    1/2/2024 2129 Given 1 tablet Oral Hamilton Baker RN          HYDROcodone-acetaminophen (Norco) 5-325 MG per tab 2 tablet       Date Action Dose Route User    1/2/2024 1514 Given 2 tablet Oral Daya Lam RN          insulin aspart (NovoLOG) 100 Units/mL FlexPen 1-5 Units       Date Action Dose Route User    1/3/2024 0700 Given 1 Units Subcutaneous (Left Lower Arm) Samuel  RYNE Villasenor    1/2/2024 2126 Given 2 Units Subcutaneous (Left Lower Arm) Hamilton Baker RN    1/2/2024 1853 Given 1 Units Subcutaneous (Right Upper Arm) Daya Lam RN          levoFLOXacin (Levaquin) tab 750 mg       Date Action Dose Route User    1/3/2024 0544 Given 750 mg Oral Hamilton Baker RN          metoprolol succinate ER (Toprol XL) 24 hr tab 50 mg       Date Action Dose Route User    1/3/2024 0544 Given 50 mg Oral Hamilton Baker RN          pantoprazole (Protonix) DR tab 40 mg       Date Action Dose Route User    1/3/2024 0544 Given 40 mg Oral Hamilton Baker RN    1/2/2024 1659 Given 40 mg Oral Daya Lam RN          potassium chloride (K-Dur) tab 40 mEq       Date Action Dose Route User    1/2/2024 0844 Given 40 mEq Oral Daya Lam RN                Vitals (last day)       Date/Time Temp Pulse Resp BP SpO2 Weight O2 Device O2 Flow Rate (L/min) Who    01/03/24 0540 99.7 °F (37.6 °C) 113 19 122/68 95 % 125 lb -- 0 L/min MM    01/02/24 2324 98.2 °F (36.8 °C) 100 18 113/68 98 % -- None (Room air) 0 L/min BR    01/02/24 1942 -- 115 -- -- -- -- -- -- MM    01/02/24 1907 98 °F (36.7 °C) 102 18 108/73 100 % -- None (Room air) 0 L/min MM    01/02/24 1735 97.8 °F (36.6 °C) 94 22 99/59 92 % -- None (Room air) -- AA    01/02/24 1516 98.2 °F (36.8 °C) 106 20 118/79 93 % -- None (Room air) -- CM    01/02/24 1227 98.3 °F (36.8 °C) 102 20 108/61 94 % -- None (Room air) -- AA    01/02/24 0813 98.8 °F (37.1 °C) 112 20 101/57 93 % 125 lb 14.1 oz None (Room air) -- AA    01/02/24 0555 98.5 °F (36.9 °C) 130 18 113/61 99 % -- None (Room air) 0 L/min MM          CIWA Scores (since admission)       None          Blood Transfusion Record       Product Unit Status Volume Start End            Transfuse RBC       23  [id number]  [id number] Completed 12/27/23 1508 350 mL 12/27/23 1252 12/27/23 1302       23  [id number]  [id number] Completed 12/27/23 1508 350 mL 12/27/23 1252 12/27/23 1302                 Procedures:      Plan:

## 2024-01-03 NOTE — PROGRESS NOTES
Progress Note  Soumyaaries King Patient Status:  Inpatient    6/3/1960 MRN [id number]   Location [facility] 8NE-A Attending Karla Camejo MD   Hosp Day # 13 PCP Rika Vinson DO     Subjective:  POD#7 s/p CABG x 2 (LIMA-LAD, reverse SVG-RCA)  Ambulated in halls without difficulty    Objective:  BP 98/57 (BP Location: Right arm)   Pulse 100   Temp 99.4 °F (37.4 °C) (Oral)   Resp 16   Ht 5' 1\" (1.549 m)   Wt 125 lb (56.7 kg)   SpO2 93%   BMI 23.62 kg/m²     Telemetry: ST 90-110s    Intake/Output:    Intake/Output Summary (Last 24 hours) at 1/3/2024 1015  Last data filed at 2024 1942  Gross per 24 hour   Intake 240 ml   Output 750 ml   Net -510 ml     Last 3 Weights   24 0540 125 lb (56.7 kg)   24 0813 125 lb 14.1 oz (57.1 kg)   24 0638 125 lb 12.8 oz (57.1 kg)   23 0445 143 lb 4.8 oz (65 kg)   23 0518 135 lb 8 oz (61.5 kg)   23 0100 139 lb 15.9 oz (63.5 kg)   23 0600 128 lb 1.4 oz (58.1 kg)   23 0535 126 lb 1.7 oz (57.2 kg)   23 0431 123 lb 3.8 oz (55.9 kg)   23 2030 133 lb 4.8 oz (60.5 kg)   23 1806 134 lb 0.6 oz (60.8 kg)   23 1452 129 lb (58.5 kg)   23 1043 136 lb (61.7 kg)   23 1116 137 lb (62.1 kg)     Labs:  Recent Labs   Lab 23  0514 24  0507 24  0634 24  0451   * 195* 182*  --    BUN 13 16 17  --    CREATSERUM 0.69 0.59 0.69  --    EGFRCR 99 102 99  --    CA 8.1* 8.7 9.0  --     135* 134*  --    K 3.9  3.9 3.8 3.7 3.7    105 102  --    CO2 26.0 25.0 21.0  --      Recent Labs   Lab 23  0409 23  0407 24  0507 24  0634 24  0451   RBC 3.36*   < > 3.63* 3.61* 3.48*   HGB 10.1*   < > 10.9* 10.6* 10.2*   HCT 30.3*   < > 31.8* 31.5* 30.9*   MCV 90.2   < > 87.6 87.3 88.8   MCH 30.1   < > 30.0 29.4 29.3   MCHC 33.3   < > 34.3 33.7 33.0   RDW 15.6   < > 14.6 14.2 14.2   NEPRELIM 24.63*  --  8.42*  --  10.93*   WBC 27.8*   < > 12.3* 13.7*  14.5*   .0   < > 319.0 363.0 407.0    < > = values in this interval not displayed.     No results for input(s): \"TROP\", \"TROPHS\", \"CK\" in the last 168 hours.  Lab Results   Component Value Date/Time    HDL 37 (L) 12/21/2023 03:04 PM    LDL 79 12/21/2023 03:04 PM    TRIG 379 (H) 12/21/2023 03:04 PM     Lab Results   Component Value Date    DDIMER 1.43 (H) 12/21/2023     Lab Results   Component Value Date    TSH 1.890 04/29/2023     Review of Systems:   Constitutional: No fevers, chills, fatigue or night sweats.  ENT: No mouth pain, neck pain, running nose, headaches or swollen glands.  Skin: No rashes, pruritus or skin changes,  Respiratory: Denies cough, wheezing or shortness of breath.  CV: Denies chest pain, palpitations, orthopnea, PND or dizziness.  Musculoskeletal: No joint pain, stiffness or swelling.  GI: No nausea, vomiting or diarrhea. No blood in stools.  Neurologic: No seizures, tremors, weakness or numbness.     Physical Exam:  General: Alert, cooperative, no distress, appears stated age.  Neck: Supple, symmetrical, trachea midline, no adenopathy, thyroid: no enlargment/tenderness/nodules, no carotid bruit and no JVD.  Lungs: diminished bilaterally.  Chest wall: No tenderness or deformity. Chest dressing intact  Heart: Regular rate and rhythm, S1, S2 normal, no murmur, click, rub or gallop.  Abdomen: Soft, non-tender. Bowel sounds normal. No masses,  No organomegaly.  Extremities: Extremities normal, atraumatic, no cyanosis or edema.  Pulses: 2+ and symmetric all extremities.  Neurologic: Grossly intact.    Medications:   metoprolol succinate  50 mg Oral Daily Beta Blocker    levoFLOXacin  750 mg Oral Daily @ 0700    insulin aspart  1-5 Units Subcutaneous TID AC and HS    atorvastatin  80 mg Oral Nightly    aspirin  325 mg Oral Daily    topiramate  50 mg Oral BID    pantoprazole  40 mg Intravenous BID AC    Or    pantoprazole  40 mg Oral BID AC    nicotine  1 patch Transdermal Daily    oxybutynin  ER  10 mg Oral Daily    QUEtiapine ER  150 mg Oral Nightly    And    QUEtiapine ER  50 mg Oral Nightly    azelastine  1 spray Each Nare BID    busPIRone  10 mg Oral Daily    clonazePAM  0.5 mg Oral BID    desvenlafaxine ER  100 mg Oral Daily    gabapentin  300 mg Oral Nightly    umeclidinium bromide  1 puff Inhalation Daily      sodium chloride 10 mL/hr at 12/27/23 1536    DOBUTamine Stopped (12/28/23 1354)    nitroGLYCERIN in dextrose 5%      norepinephrine      NitroPRUSSide (Nipride) 50 mg in dextrose 5% 250 mL infusion      dextrose 5%-sodium chloride 0.45% 70 mL/hr (12/27/23 1530)     Assessment:    Multivessel CAD  S/p CABG x 2, presented as NSTEMI, found to have MVCAD on [facility]  -LVEF on POD0 40-45% (stable from pre-op echo)  -asa, high intensity statin, BB  -cardiac rehab as outpatient  -LDL 79    Sinus tachycardia  Persistent since CABG, rates low 100s on BB  -PO limited echo negative for effusion   -CXR on 1/1 noted left pleural effusion  -On room air  -pain seems not well controlled per documentation-c/o back and neck pain, headache, chest incision pain controlled  -PO fevers, Temp 99 overnight  -WBC trending up-14  today, on levaquin, PCT normal on 1/1  -likely r/t infection, pain, will keep BB at same dose for now    HTN  BP 90-120s  -BB-may increase dose     DM2  A1c 6.4    Current smoker-cessation recommended  -nicotine patch    Hypervolemia  Volume overloaded post-operatively, now below dry weight  -diuresed with lasix  -euvolemic on exam    Migraines-topamax  Feels a migraine coming on now    Leukocytosis  WBC 14.5, afebrile  -empiric abx for pneumonia  -encouraged aggressive IS use    Plan:  -Continue asa, statin, BB  -pain control as needed  -Abx per primary  -Migraine treatment per primary  -pulmonary hygiene-IS encouraged  -Stable from cardiology standpoint, we will sign off and arrange for outpatient follow up    Plan of care discussed with patient, RN.      Florecita Ross, APRN  1/3/2024  10:15  AM  [phone] [place]  [phone] [facility]

## 2024-01-03 NOTE — PROGRESS NOTES
[facility]     CV Surgery Progress Note    Soumya King Patient Status:  Inpatient    6/3/1960 MRN [id number]   Location [facility] 6NE-A Attending Karla Camejo MD   Hosp Day # 13 PCP Rika Vinson DO     Subjective:  Patient reports feeling better, pain improved- ready to go home. Denies any dyspnea. Ambulating well.     Tele: SR/ST    Objective:  /68 (BP Location: Right arm)   Pulse 113   Temp 99.7 °F (37.6 °C) (Oral)   Resp 19   Ht 5' 1\" (1.549 m)   Wt 125 lb (56.7 kg)   SpO2 95%   BMI 23.62 kg/m²     Intake/Output:    Intake/Output Summary (Last 24 hours) at 1/3/2024 0908  Last data filed at 2024  Gross per 24 hour   Intake 240 ml   Output 750 ml   Net -510 ml       Labs:  Lab Results   Component Value Date    WBC 14.5 2024    RBC 3.48 2024    HGB 10.2 2024    HCT 30.9 2024    MCV 88.8 2024    MCH 29.3 2024    MCHC 33.0 2024    RDW 14.2 2024    .0 2024     Lab Results   Component Value Date    K 3.7 2024     Lab Results   Component Value Date    INR 1.28 (H) 2023    INR 1.07 2023    INR 0.97 2022       Physical Exam:  General: VSS, A&Ox3, In NAD  Neck: No JVD.  Lungs: clear anteriorly   Heart: S1,S2 RRR ; Sternum Stable   Abdomen: Soft, non-tender, +bs  Extremities: Warm, dry, no edema  Skin: sternotomy incision C/D/I, LE incision C/D/I  Neuro: no focal deficits     Assessment/Plan:   CAD s/p CABGx2 with LIMA-LAD, SVG-RCA POD#7  -HD stable, off support   -Sinus tachycardia, CTA  negative for PE, echo  with EF 60-65%- cardiology following   -Leukocytosis, likely reactive, afebrile, procal negative- on empiric abx for PNA, monitor   -Acute post op blood loss anemia, expected, stable- monitor   -Volume status improving- short course po lasix   -Renal function intact, good UOP  -Bowel regimen   -Pain management, prn norco or tramadol   -Chest tubes and PW removed  -GI PPX:  protonix  -DVT PPX: TEDs/SCDs  -Encourage IS/ambulation   -PT/OT/Cardiac rehab   -Ok to discharge home today from surgical standpoint if ok with other services      -D/W Dr. Saunders/Israel Hernandez PA-C   Cardiothoracic Surgery   1/3/2024  9:09 AM

## 2024-01-04 ENCOUNTER — PATIENT OUTREACH (OUTPATIENT)
Dept: CASE MANAGEMENT | Age: 64
End: 2024-01-04

## 2024-01-04 ENCOUNTER — TELEPHONE (OUTPATIENT)
Dept: CARDIOLOGY UNIT | Facility: HOSPITAL | Age: 64
End: 2024-01-04

## 2024-01-04 NOTE — PROGRESS NOTES
Pt DC from floor to go home with sibing. Tele monitor removed, IV removed. Discussed discharge instructions with patient, discussed the importance of not missing any medications and going to follow-up appointments that were scheduled. Discussed when to assume care for any concerns that may arise after CABG procedure. Questions answered, no further concerns.

## 2024-01-04 NOTE — PROGRESS NOTES
NCM spoke with pt states she is sleeping at this time. Pt agreed to CB later today. NCM will try calling back this afternoon.

## 2024-01-04 NOTE — PAYOR COMM NOTE
--------------  DISCHARGE REVIEW    Payor: [facility]  Subscriber #:  [id number]  Authorization Number: [id number]    Admit date: 23  Admit time:   8:15 PM  Discharge Date: 1/3/2024  6:43 PM     Admitting Physician: Arjun Miller MD  Attending Physician:  No att. providers found  Primary Care Physician: Rika Vinson DO          Discharge Summary Notes        Discharge Summary signed by Heidi Delacruz MD at 1/3/2024  2:20 PM       Author: Heidi Delacruz MD Specialty: HOSPITALIST, Internal Medicine Author Type: Physician    Filed: 1/3/2024  2:20 PM Date of Service: 1/3/2024  2:18 PM Status: Signed    : Heidi Delacruz MD (Physician)           [facility]     Soumya King Patient Status:  Inpatient    6/3/1960 MRN [id number]   Location [address]-A Attending Karla Camejo MD   Hosp Day # 13 PCP Rika Vinson DO     Date of Admission: 2023  Date of Discharge:   1/3/2024    Discharge Disposition: home    Discharge Diagnosis:  # NSTEMI  -aspirin, metoprolol, statin; hx difficulty swallowing asa due to barretts. Not a true allergy  -Cardiac cath on  showed multivessel disease  -s/p CABG     #Hypertension  stable  #DM2-monitor on insulin; controlled  #Hyperlipidemia  #Smoker  -Nicotine patch  #leukocytosis and anemia  #STOCKTON  CXR with ? PNA, given pt productive sputum with green phlegm, STOCKTON, leucocytosis will treat with levaquin 24  Lasix per cards  Walking O2 eval     History of Present Illness:   Soumya King is a 63 year old female with PMH COPD, DM 2, hypertension, hyperlipidemia, smoker who presents with chest pain.  Substernal.  Started today.  Never had before and no history of cardiac disease.  Persistent.  Exertional and radiates to the upper back.  Denies shortness of breath.       Brief Synopsis:   Pt admitted  with NSTEMI s/p CABG , post op pt received lasix. Concern for evolving PNA due to  productive cough with green phlegm, STOCKTON- started on levaquin which she will continue as OP.    Lace+ Score: 67  59-90 High Risk  29-58 Medium Risk  0-28   Low Risk  Patient was referred to the [facility].    TCM Follow-Up Recommendation:  LACE > 58: High Risk of readmission after discharge from the hospital.      Procedures during hospitalization:   CABG    Incidental or significant findings and recommendations (brief descriptions):  no    Lab/Test results pending at Discharge:   no    Consultants:   Cards, [facility]     Discharge Medication List:     Discharge Medications        START taking these medications        Instructions Prescription details   aspirin 325 MG Tbec      Take 1 tablet (325 mg total) by mouth daily.   Quantity: 90 tablet  Refills: 1     furosemide 20 MG Tabs  Commonly known as: Lasix      Take 1 tablet (20 mg total) by mouth daily.   Quantity: 7 tablet  Refills: 0     HYDROcodone-acetaminophen 5-325 MG Tabs  Commonly known as: Norco      Take 1 tablet by mouth every 8 (eight) hours as needed.   Quantity: 12 tablet  Refills: 0     levoFLOXacin 750 MG Tabs  Commonly known as: Levaquin  Start taking on: January 4, 2024      Take 1 tablet (750 mg total) by mouth daily for 2 days.   Stop taking on: January 6, 2024  Quantity: 2 tablet  Refills: 0            CHANGE how you take these medications        Instructions Prescription details   atorvastatin 40 MG Tabs  Commonly known as: Lipitor  What changed: how much to take      Take 2 tablets (80 mg total) by mouth nightly.   Quantity: 90 tablet  Refills: 1            CONTINUE taking these medications        Instructions Prescription details   Aimovig 140 MG/ML Soaj  Generic drug: Erenumab-aooe  Notes to patient: Resume home schedule      Inject 1 mL (140 mg total) into the skin every 30 (thirty) days.   Quantity: 1 mL  Refills: 5     albuterol 108 (90 Base) MCG/ACT Aers  Commonly known as: Ventolin HFA      Inhale 2 puffs into the lungs  every 4 (four) hours as needed.   Quantity: 18 g  Refills: 2     albuterol 108 (90 Base) MCG/ACT Aers  Commonly known as: Ventolin HFA      Inhale 2 puffs into the lungs every 4 (four) hours as needed for Wheezing.   Stop taking on: January 13, 2024  Quantity: 1 each  Refills: 0     azelastine 0.1 % Soln  Commonly known as: Astelin      USE 1 SPRAY IN EACH NOSTRIL EVERY NIGHT IN THE MORNING AND BEFORE BEDTIME   Quantity: 30 mL  Refills: 2     benzonatate 100 MG Caps  Commonly known as: Tessalon      Take 1 capsule (100 mg total) by mouth 3 (three) times daily as needed for cough.   Stop taking on: January 13, 2024  Quantity: 30 capsule  Refills: 0     busPIRone 10 MG Tabs  Commonly known as: Buspar      Take 1 tablet (10 mg total) by mouth daily.   Refills: 0     Calcium Carb-Cholecalciferol 500-200 MG-UNIT Tabs      Take 1 tablet by mouth daily.   Refills: 0     clonazePAM 0.5 MG Tabs  Commonly known as: KlonoPIN      Take 1 tablet (0.5 mg total) by mouth 2 (two) times daily.   Quantity: 12 tablet  Refills: 0     cyclobenzaprine 10 MG Tabs  Commonly known as: Flexeril      Take 1 tablet (10 mg total) by mouth every 12 (twelve) hours as needed for Muscle spasms.   Quantity: 60 tablet  Refills: 1     Pristiq 100 MG Tb24  Generic drug: desvenlafaxine ER      1 tablet (100 mg total) daily.   Refills: 0     desvenlafaxine  MG Tb24  Commonly known as: Pristiq      Take 1 tablet (100 mg total) by mouth daily. [employer] contacted and order was placed on 9/22/20. Conf# [id number].  Patients ID #[id number]   Quantity: 90 tablet  Refills: 0     diclofenac 1 % Gel  Commonly known as: Voltaren      Apply 4 g topically 4 (four) times daily.   Quantity: 300 g  Refills: 3     diclofenac 75 MG Tbec  Commonly known as: Voltaren      Take 1 tablet (75 mg total) by mouth 2 (two) times daily.   Quantity: 180 tablet  Refills: 3     estradiol 0.1 MG/GM Crea  Commonly known as: Estrace  Notes to patient: Resume home schedule      Apply 1/2  gram vaginally 2-3 times per week.   Quantity: 42.5 g  Refills: 3     gabapentin 300 MG Caps  Commonly known as: Neurontin      Take 1 capsule (300 mg total) by mouth nightly.   Quantity: 90 capsule  Refills: 1     Incruse Ellipta 62.5 MCG/ACT Aepb  Generic drug: umeclidinium bromide      Inhale 1 puff into the lungs daily.   Quantity: 1 each  Refills: 1     levocetirizine 5 MG Tabs  Commonly known as: Xyzal      Take 1 tablet (5 mg total) by mouth every evening.   Quantity: 30 tablet  Refills: 5     lidocaine 5 % Oint  Commonly known as: Xylocaine      Apply 1 Application. topically 3 (three) times daily as needed.   Quantity: 240 g  Refills: 1     metFORMIN 500 MG Tabs  Commonly known as: Glucophage      TAKE 1 TABLET(500 MG) BY MOUTH TWICE DAILY   Quantity: 180 tablet  Refills: 1     Metoclopramide HCl 5 MG/5ML Soln  Commonly known as: REGLAN      Take 5 mL (5 mg total) by mouth 3 (three) times daily before meals.   Quantity: 473 mL  Refills: 0     metoprolol succinate ER 50 MG Tb24  Commonly known as: Toprol XL      Take 1 tablet (50 mg total) by mouth daily.   Quantity: 90 tablet  Refills: 1     Myrbetriq 50 MG Tb24  Generic drug: Mirabegron ER      Take 1 tablet (50 mg total) by mouth daily.   Quantity: 30 tablet  Refills: 11     Omeprazole 40 MG Cpdr      Take 1 capsule (40 mg total) by mouth 2 (two) times daily.   Quantity: 180 capsule  Refills: 1     One-A-Day Womens 50+ Tabs      Take 1 tablet by mouth daily.   Refills: 0     OneTouch Delica Plus [id number] Misc      1 lancet  by Finger stick route 3 (three) times daily.   Quantity: 300 each  Refills: 1     OneTouch Ultra Strp  Generic drug: Glucose Blood      Tests 3 x daily   Quantity: 300 each  Refills: 1     oxymetazoline 0.05 % Soln  Commonly known as: Nasal Decongestant      1 spray by Nasal route every 4 (four) hours as needed for congestion.   Stop taking on: January 13, 2024  Quantity: 15 mL  Refills: 0     pseudoephedrine 30 MG Tabs  Commonly  known as: Sudafed      Take 1 tablet (30 mg total) by mouth every 4 (four) hours as needed for congestion.   Stop taking on: January 13, 2024  Quantity: 36 tablet  Refills: 0     QUEtiapine  MG Tb24  Commonly known as: SEROquel XR      Take 1 tablet (200 mg total) by mouth every evening.   Quantity: 30 tablet  Refills: 0     Rizatriptan Benzoate 10 MG Tabs  Commonly known as: MAXALT      Take 1 tablet (10 mg total) by mouth as needed for Migraine.   Quantity: 10 tablet  Refills: 5     topiramate 50 MG Tabs  Commonly known as: TOPAMAX      TAKE ONE TABLET BY MOUTH EVERY MORNING AND TWO TABLETS EVERY EVENING   Quantity: 270 tablet  Refills: 0     traMADol 50 MG Tabs  Commonly known as: Ultram      Take 1-2 tablets ( mg total) by mouth every 6 (six) hours as needed for Pain.   Quantity: 120 tablet  Refills: 2     Trulicity 0.75 MG/0.5ML Sopn  Generic drug: Dulaglutide  Notes to patient: Resume home schedule      Inject 0.75 mg into the skin once a week.   Quantity: 1 mL  Refills: 0            STOP taking these medications      azithromycin 250 MG Tabs  Commonly known as: Zithromax        predniSONE 20 MG Tabs  Commonly known as: Deltasone        sulfamethoxazole-trimethoprim -160 MG Tabs per tablet  Commonly known as: Bactrim DS                  Where to Get Your Medications        [id number] - [place], [place] - [facility] [address] [phone], [phone]  [facility] [address]      Phone: [phone]   aspirin 325 MG Tbec  atorvastatin 40 MG Tabs  clonazePAM 0.5 MG Tabs  furosemide 20 MG Tabs  HYDROcodone-acetaminophen 5-325 MG Tabs  levoFLOXacin 750 MG Tabs         [place] n[place]     Follow-up appointment:   Marina Guy MD  [address] [place] [id number] [phone]    Follow up on 1/15/2024  @ 10am w/Rika NP for Dr. Guy, cardiology follow up, 4th floor [facility]  SERVICES  [address] [facility] [address]  Follow up on 1/15/2024  For a chest xray, central scheduling will call you to arrange the appointment.    Rika Vinson DO  [address] [facility] [address]  [facility] [address]  [phone]    Schedule an appointment as soon as possible for a visit in 1 week(s)      Appointments for Next 30 Days 1/3/2024 - 2/2/2024        Date Arrival Time Visit Type Length Department Provider     1/16/2024 10:00 AM  URO UDS FU PRE/POST OP [[id number]] 20 min [facility] Urogynecology Florecita Quintero DO    Patient Instructions:         Location Instructions:     Masks are optional for all patients and visitors, unless otherwise indicated.               1/16/2024  1:30 PM  PHYSICAL - ESTABLISHED PATIENT [[id number]] 30 min [facility] Rika Vinson DO    Patient Instructions:         Location Instructions:     Masks are optional for all patients and visitors, unless otherwise indicated.               1/24/2024 10:15 AM  POST OP [[id number]] 15 min [facility], Dennise Sams PA    Patient Instructions:                    2/1/2024 12:00 PM  EXAM - ESTABLISHED [[id number]] 20 min [facility] Xiang Torres MD    Patient Instructions:         Location Instructions:     Masks are optional for all patients and visitors, unless otherwise indicated.                      Vital signs:  Temp:  [97.7 °F (36.5 °C)-99.7 °F (37.6 °C)] 97.7 °F (36.5 °C)  Pulse:  [] 94  Resp:  [16-22] 22  BP: ()/(57-79) 119/71  SpO2:  [92 %-100 %] 93 %    Physical Exam:    General: No acute distress   Lungs: clear to auscultation  Cardiovascular: S1, S2  Abdomen: Soft      -----------------------------------------------------------------------------------------------  PATIENT DISCHARGE INSTRUCTIONS: See electronic chart    Heidi Delacruz MD    Total time spent on  discharge plannin minutes     The  [employer] Cures Act makes medical notes like these available to patients in the interest of transparency. Please be advised this is a medical document. Medical documents are intended to carry relevant information, facts as evident, and the clinical opinion of the practitioner. The medical note is intended as peer to peer communication and may appear blunt or direct. It is written in medical language and may contain abbreviations or verbiage that are unfamiliar.       Electronically signed by Heidi Delacruz MD on 1/3/2024  2:20 PM         REVIEWER COMMENTS

## 2024-01-04 NOTE — PROGRESS NOTES
ABELINO spoke with patient briefly states she is busy going over all her medications with her sister, she is unable to take call. ABELINO will try calling at a later time.

## 2024-01-04 NOTE — PROGRESS NOTES
Hospital follow up.    TCC request.    Declined, seeing specialists and PCP.  Confirmed with patient.    Closing encounter.

## 2024-01-09 ENCOUNTER — PATIENT MESSAGE (OUTPATIENT)
Dept: INTERNAL MEDICINE CLINIC | Facility: CLINIC | Age: 64
End: 2024-01-09

## 2024-01-09 ENCOUNTER — TELEPHONE (OUTPATIENT)
Dept: CARDIOLOGY UNIT | Facility: HOSPITAL | Age: 64
End: 2024-01-09

## 2024-01-09 DIAGNOSIS — E78.00 HYPERCHOLESTEROLEMIA: ICD-10-CM

## 2024-01-09 DIAGNOSIS — E11.65 UNCONTROLLED TYPE 2 DIABETES MELLITUS WITH HYPERGLYCEMIA, WITHOUT LONG-TERM CURRENT USE OF INSULIN (HCC): ICD-10-CM

## 2024-01-09 RX ORDER — ATORVASTATIN CALCIUM 40 MG/1
80 TABLET, FILM COATED ORAL NIGHTLY
Qty: 90 TABLET | Refills: 1 | OUTPATIENT
Start: 2024-01-09

## 2024-01-09 NOTE — PROGRESS NOTES
Follow Up Phone Call    1. How are you doing now that you are home? C/O Nausea and vomiting after  taking oral antibiotics. Patient states that she has been feeling dizzy.and unable to eat since Friday.     2. Have there been any changes in your wound/incision since going home? No    3. Is your pain manageable at home? Yes, medical reason:     4. Are you following the walking routine given to you in the hospital? No. She states that she has \"been too weak\" to walk or do anything. I stressed to her the importance of walking to prevent blood clots and other complications of surgery.     5. Are you continuing to use your incentive spirometer? Yes, medical reason:     6. Do you have your appointments for Chest Xray?  Yes, medical reason:                                                               Primary MD?  Yes, medical reason: Jan. 16                                                              Cardiologist?  Yes, medical reason: Bhargav 15                                                              Dr. Saunders? Yes, medical reason: Jan 24                                                              Cardiac Rehab?  No: Patient is on Medicaid and they will not pay for cardiac rehab    7. Do you have any other questions or concerns today? Yes, medical reason: Question was \"when can I drive?\" Explained to patient and family member that she would need to be cleared by CV surgery before driving.        Nicolasa FIORE RN  1/9/2024  12:46 PM

## 2024-01-09 NOTE — TELEPHONE ENCOUNTER
Protocol PASS    Requesting:   METFORMIN 500 MG Oral Tab 1/19/23 180 tablet 1 refill    LOV:   RTC:   Recent Labs:     Upcoming OV   Future Appointments   Date Time Provider Department Center   1/15/2024  9:00 AM EH XR RM5 GENERAL [facility]   1/16/2024 10:00 AM Florecita Quintero DO [facility] None   1/16/2024  1:30 PM Rika Vinson DO [facility] 8 [facility] [place]   1/24/2024 10:15 AM Dennise Hernandez PA [facility]   2/1/2024 12:00 PM Xiang Torres MD [facility] [place]   2/12/2024 11:15 AM Ban Willis MD [facility] OB/GYN N [facility] [place]   3/5/2024 11:30 AM Greyson Main MD  EEM[facility] Pulm [facility] [place]

## 2024-01-10 NOTE — TELEPHONE ENCOUNTER
From: Soumya King  To: Rika Vinson  Sent: 1/9/2024 6:35 PM CST  Subject: Atorvastatin     Why was my medicine denied? The hospital changed my dosage to 80 mg. If you have any questions please call me

## 2024-01-11 NOTE — TELEPHONE ENCOUNTER
Called pharmacy and spoke to Artur. Insurance will only cover one tablet asked if order can be switched to 80mg tablet.

## 2024-01-12 RX ORDER — ATORVASTATIN CALCIUM 80 MG/1
80 TABLET, FILM COATED ORAL NIGHTLY
Qty: 90 TABLET | Refills: 1 | Status: SHIPPED | OUTPATIENT
Start: 2024-01-12

## 2024-01-15 ENCOUNTER — TELEPHONE (OUTPATIENT)
Dept: INTERNAL MEDICINE CLINIC | Facility: CLINIC | Age: 64
End: 2024-01-15

## 2024-01-15 ENCOUNTER — LAB ENCOUNTER (OUTPATIENT)
Dept: LAB | Facility: HOSPITAL | Age: 64
End: 2024-01-15
Attending: THORACIC SURGERY (CARDIOTHORACIC VASCULAR SURGERY)
Payer: MEDICAID

## 2024-01-15 ENCOUNTER — HOSPITAL ENCOUNTER (OUTPATIENT)
Dept: GENERAL RADIOLOGY | Facility: HOSPITAL | Age: 64
Discharge: HOME | End: 2024-01-15
Attending: THORACIC SURGERY (CARDIOTHORACIC VASCULAR SURGERY)
Payer: MEDICAID

## 2024-01-15 DIAGNOSIS — I21.4 NSTEMI (NON-ST ELEVATED MYOCARDIAL INFARCTION) (HCC): ICD-10-CM

## 2024-01-15 DIAGNOSIS — E78.00 HYPERCHOLESTEROLEMIA: ICD-10-CM

## 2024-01-15 DIAGNOSIS — K21.9 GASTROESOPHAGEAL REFLUX DISEASE, UNSPECIFIED WHETHER ESOPHAGITIS PRESENT: ICD-10-CM

## 2024-01-15 DIAGNOSIS — E11.40 TYPE 2 DIABETES MELLITUS WITH DIABETIC NEUROPATHY, WITHOUT LONG-TERM CURRENT USE OF INSULIN (HCC): ICD-10-CM

## 2024-01-15 DIAGNOSIS — I10 PRIMARY HYPERTENSION: ICD-10-CM

## 2024-01-15 DIAGNOSIS — J90 PLEURAL EFFUSION: ICD-10-CM

## 2024-01-15 DIAGNOSIS — E11.9 TYPE 2 DIABETES MELLITUS WITHOUT COMPLICATION, WITHOUT LONG-TERM CURRENT USE OF INSULIN (HCC): Primary | ICD-10-CM

## 2024-01-15 LAB
ALT SERPL-CCNC: 36 U/L
ANION GAP SERPL CALC-SCNC: 5 MMOL/L (ref 0–18)
AST SERPL-CCNC: 29 U/L (ref 15–37)
BASOPHILS # BLD AUTO: 0.07 X10(3) UL (ref 0–0.2)
BASOPHILS NFR BLD AUTO: 0.5 %
BUN BLD-MCNC: 23 MG/DL (ref 9–23)
CALCIUM BLD-MCNC: 8.4 MG/DL (ref 8.5–10.1)
CHLORIDE SERPL-SCNC: 106 MMOL/L (ref 98–112)
CHOLEST SERPL-MCNC: 109 MG/DL (ref ?–200)
CO2 SERPL-SCNC: 27 MMOL/L (ref 21–32)
CREAT BLD-MCNC: 1.1 MG/DL
EGFRCR SERPLBLD CKD-EPI 2021: 57 ML/MIN/1.73M2 (ref 60–?)
EOSINOPHIL # BLD AUTO: 0.36 X10(3) UL (ref 0–0.7)
EOSINOPHIL NFR BLD AUTO: 2.8 %
ERYTHROCYTE [DISTWIDTH] IN BLOOD BY AUTOMATED COUNT: 14.3 %
EST. AVERAGE GLUCOSE BLD GHB EST-MCNC: 143 MG/DL (ref 68–126)
FASTING PATIENT LIPID ANSWER: YES
GLUCOSE BLD-MCNC: 136 MG/DL (ref 70–99)
HBA1C MFR BLD: 6.6 % (ref ?–5.7)
HCT VFR BLD AUTO: 33.7 %
HDLC SERPL-MCNC: 29 MG/DL (ref 40–59)
HGB BLD-MCNC: 10.8 G/DL
IMM GRANULOCYTES # BLD AUTO: 0.11 X10(3) UL (ref 0–1)
IMM GRANULOCYTES NFR BLD: 0.8 %
LDLC SERPL CALC-MCNC: 52 MG/DL (ref ?–100)
LYMPHOCYTES # BLD AUTO: 3.21 X10(3) UL (ref 1–4)
LYMPHOCYTES NFR BLD AUTO: 24.6 %
MCH RBC QN AUTO: 29.3 PG (ref 26–34)
MCHC RBC AUTO-ENTMCNC: 32 G/DL (ref 31–37)
MCV RBC AUTO: 91.6 FL
MONOCYTES # BLD AUTO: 0.86 X10(3) UL (ref 0.1–1)
MONOCYTES NFR BLD AUTO: 6.6 %
NEUTROPHILS # BLD AUTO: 8.42 X10 (3) UL (ref 1.5–7.7)
NEUTROPHILS # BLD AUTO: 8.42 X10(3) UL (ref 1.5–7.7)
NEUTROPHILS NFR BLD AUTO: 64.7 %
NONHDLC SERPL-MCNC: 80 MG/DL (ref ?–130)
OSMOLALITY SERPL CALC.SUM OF ELEC: 292 MOSM/KG (ref 275–295)
PLATELET # BLD AUTO: 510 10(3)UL (ref 150–450)
POTASSIUM SERPL-SCNC: 3.4 MMOL/L (ref 3.5–5.1)
RBC # BLD AUTO: 3.68 X10(6)UL
SODIUM SERPL-SCNC: 138 MMOL/L (ref 136–145)
TRIGL SERPL-MCNC: 161 MG/DL (ref 30–149)
TSI SER-ACNC: 1.82 MIU/ML (ref 0.36–3.74)
VLDLC SERPL CALC-MCNC: 23 MG/DL (ref 0–30)
WBC # BLD AUTO: 13 X10(3) UL (ref 4–11)

## 2024-01-15 PROCEDURE — 84460 ALANINE AMINO (ALT) (SGPT): CPT

## 2024-01-15 PROCEDURE — 80048 BASIC METABOLIC PNL TOTAL CA: CPT

## 2024-01-15 PROCEDURE — 80061 LIPID PANEL: CPT

## 2024-01-15 PROCEDURE — 71048 X-RAY EXAM CHEST 4+ VIEWS: CPT | Performed by: THORACIC SURGERY (CARDIOTHORACIC VASCULAR SURGERY)

## 2024-01-15 PROCEDURE — 84443 ASSAY THYROID STIM HORMONE: CPT

## 2024-01-15 PROCEDURE — 83036 HEMOGLOBIN GLYCOSYLATED A1C: CPT

## 2024-01-15 PROCEDURE — 84450 TRANSFERASE (AST) (SGOT): CPT

## 2024-01-15 PROCEDURE — 36415 COLL VENOUS BLD VENIPUNCTURE: CPT

## 2024-01-15 PROCEDURE — 85025 COMPLETE CBC W/AUTO DIFF WBC: CPT

## 2024-01-15 PROCEDURE — 3044F HG A1C LEVEL LT 7.0%: CPT | Performed by: INTERNAL MEDICINE

## 2024-01-15 NOTE — TELEPHONE ENCOUNTER
Due for eye exam   Order pended for approval if appropriate     Future Appointments   Date Time Provider Department Center   1/16/2024  1:30 PM Rika Vinson,  [facility] [place]

## 2024-01-16 ENCOUNTER — OFFICE VISIT (OUTPATIENT)
Dept: INTERNAL MEDICINE CLINIC | Facility: CLINIC | Age: 64
End: 2024-01-16
Payer: MEDICAID

## 2024-01-16 VITALS
OXYGEN SATURATION: 97 % | WEIGHT: 125.38 LBS | TEMPERATURE: 98 F | DIASTOLIC BLOOD PRESSURE: 76 MMHG | BODY MASS INDEX: 23.67 KG/M2 | HEIGHT: 61 IN | HEART RATE: 84 BPM | SYSTOLIC BLOOD PRESSURE: 112 MMHG

## 2024-01-16 DIAGNOSIS — F33.1 MDD (MAJOR DEPRESSIVE DISORDER), RECURRENT EPISODE, MODERATE (HCC): ICD-10-CM

## 2024-01-16 DIAGNOSIS — D64.9 ANEMIA, UNSPECIFIED TYPE: Primary | ICD-10-CM

## 2024-01-16 DIAGNOSIS — N17.9 AKI (ACUTE KIDNEY INJURY) (HCC): ICD-10-CM

## 2024-01-16 DIAGNOSIS — I25.810 CORONARY ARTERY DISEASE INVOLVING CORONARY BYPASS GRAFT OF NATIVE HEART WITHOUT ANGINA PECTORIS: ICD-10-CM

## 2024-01-16 DIAGNOSIS — M15.9 PRIMARY OSTEOARTHRITIS INVOLVING MULTIPLE JOINTS: ICD-10-CM

## 2024-01-16 DIAGNOSIS — G62.9 NEUROPATHY: ICD-10-CM

## 2024-01-16 DIAGNOSIS — R91.1 PULMONARY NODULE: ICD-10-CM

## 2024-01-16 DIAGNOSIS — J30.2 SEASONAL ALLERGIES: ICD-10-CM

## 2024-01-16 DIAGNOSIS — Z72.0 TOBACCO USE: ICD-10-CM

## 2024-01-16 DIAGNOSIS — E11.40 TYPE 2 DIABETES MELLITUS WITH DIABETIC NEUROPATHY, WITHOUT LONG-TERM CURRENT USE OF INSULIN (HCC): ICD-10-CM

## 2024-01-16 PROBLEM — E11.9 TYPE 2 DIABETES MELLITUS WITHOUT COMPLICATION (HCC): Status: RESOLVED | Noted: 2022-01-11 | Resolved: 2024-01-16

## 2024-01-16 PROBLEM — M18.11 ARTHRITIS OF CARPOMETACARPAL (CMC) JOINT OF RIGHT THUMB: Status: RESOLVED | Noted: 2019-05-23 | Resolved: 2024-01-16

## 2024-01-16 PROBLEM — M54.2 NECK PAIN: Status: RESOLVED | Noted: 2023-08-02 | Resolved: 2024-01-16

## 2024-01-16 PROBLEM — L29.2 VULVAR ITCHING: Status: RESOLVED | Noted: 2021-02-15 | Resolved: 2024-01-16

## 2024-01-16 PROBLEM — M17.12 PRIMARY OSTEOARTHRITIS OF LEFT KNEE: Status: RESOLVED | Noted: 2021-12-28 | Resolved: 2024-01-16

## 2024-01-16 PROBLEM — M51.369 DEGENERATIVE DISC DISEASE, LUMBAR: Status: RESOLVED | Noted: 2023-08-02 | Resolved: 2024-01-16

## 2024-01-16 PROBLEM — M51.36 DEGENERATIVE DISC DISEASE, LUMBAR: Status: RESOLVED | Noted: 2023-08-02 | Resolved: 2024-01-16

## 2024-01-16 PROBLEM — I21.4 NSTEMI (NON-ST ELEVATED MYOCARDIAL INFARCTION) (HCC): Status: RESOLVED | Noted: 2023-12-21 | Resolved: 2024-01-16

## 2024-01-16 PROCEDURE — 3008F BODY MASS INDEX DOCD: CPT | Performed by: INTERNAL MEDICINE

## 2024-01-16 PROCEDURE — 99215 OFFICE O/P EST HI 40 MIN: CPT | Performed by: INTERNAL MEDICINE

## 2024-01-16 PROCEDURE — 3078F DIAST BP <80 MM HG: CPT | Performed by: INTERNAL MEDICINE

## 2024-01-16 PROCEDURE — 3074F SYST BP LT 130 MM HG: CPT | Performed by: INTERNAL MEDICINE

## 2024-01-16 RX ORDER — ASPIRIN 81 MG/1
81 TABLET ORAL DAILY
COMMUNITY

## 2024-01-16 RX ORDER — GABAPENTIN 300 MG/1
600 CAPSULE ORAL NIGHTLY
Qty: 180 CAPSULE | Refills: 1 | Status: SHIPPED | OUTPATIENT
Start: 2024-01-16

## 2024-01-16 RX ORDER — CLOPIDOGREL BISULFATE 75 MG/1
75 TABLET ORAL DAILY
COMMUNITY
Start: 2024-01-15

## 2024-01-16 RX ORDER — AZELASTINE 1 MG/ML
1 SPRAY, METERED NASAL 2 TIMES DAILY
Qty: 30 ML | Refills: 2 | Status: SHIPPED | OUTPATIENT
Start: 2024-01-16

## 2024-01-16 NOTE — PROGRESS NOTES
Multiple attempts to reach pt and messages left with no return call.  Patient went in for HFU appt with PCP on 1/16/24.  Encounter closing.

## 2024-01-16 NOTE — PROGRESS NOTES
Patient Office Visit    ASSESSMENT AND PLAN:   1. Coronary artery disease involving coronary bypass graft of native heart without angina pectoris  Note: Status post NSTEMI and CABG.  Aspirin dose was decreased to 81 mg and recommended to continue clopidogrel.  Continue statin and has a follow-up appointment with her heart surgeon coming up.  Highly advised patient to stop smoking.     2. Anemia, unspecified type  Note: Likely due to surgery.  Will repeat labs in 6 weeks and recommended an iron supplement over-the-counter for 1 month  - CBC W Differential W Platelet [E]; Future    3. MARÍA ELENA (acute kidney injury) (HCC)  Note: Likely due to surgery.  Recommended to repeat labs in 1 month  - Basic Metabolic Panel (8) [E]; Future    4. Seasonal allergies  Note: Refill provided and recommended to stop Xyzal  - azelastine 0.1 % Nasal Solution; 1 spray by Nasal route 2 (two) times daily.  Dispense: 30 mL; Refill: 2    5. Neuropathy  Note: Increase gabapentin to 2 tablets and discussed the side effects  - gabapentin 300 MG Oral Cap; Take 2 capsules (600 mg total) by mouth nightly.  Dispense: 180 capsule; Refill: 1    6. MDD (major depressive disorder), recurrent episode, moderate (HCC)  Note: Continue Pristiq however recommended patient to ask her psychiatrist if she can switch from Pristiq to Wellbutrin as that may help with tobacco cessation as well    7. Type 2 diabetes mellitus with diabetic neuropathy, without long-term current use of insulin (HCC)  Note: Overall controlled controlled on current regimen.  Continue Trulicity and metformin    8. Pulmonary nodule  Note: Being monitored by pulmonology    9. Tobacco use  Note: Highly recommended to stop smoking.  Patient will try to quit on her own    10. Primary osteoarthritis involving multiple joints  Note: Recommended to decrease diclofenac and to use it sparingly      Return to clinic in 6 weeks    Patient/Caregiver Education: Patient/Caregiver Education: There are no  barriers to learning. Medical education done. Outcome: Patient verbalizes understanding. Patient is notified to call with any questions, complications, allergies, or worsening or changing symptoms.  Patient is to call with any side effects or complications from the treatments as a result of today.      Reviewed Past Medical History and   Patient Active Problem List   Diagnosis    Vitamin D deficiency    Jamison's esophagus    Tobacco use    Insomnia    COPD, mild (HCC) - Dx'd via PFTs done in 3/2015    Migraine without aura and without status migrainosus, not intractable    Hyperlipidemia    SUMMER on CPAP    MDD (major depressive disorder), recurrent episode, moderate (HCC)    GERD (gastroesophageal reflux disease)    Hiatal hernia without gangrene and obstruction    Hypertension    Chronic pansinusitis    Mucinous cystadenoma of ovary, left    KIRK (generalized anxiety disorder)    History of pubovaginal sling    Nocturnal enuresis    Absence of bladder continence    Vasomotor flushing    Mass of spine    Hx of motion sickness    Difficult intubation    Chronic low back pain without sciatica    Type 2 diabetes mellitus with diabetic neuropathy (HCC)    Pulmonary nodule    CAD, multiple vessel    S/P CABG x 2       Orders Placed This Encounter   Procedures    CBC W Differential W Platelet [E]     Standing Status:   Future     Standing Expiration Date:   2025     Order Specific Question:   Release to patient     Answer:   Immediate    Basic Metabolic Panel (8) [E]     Standing Status:   Future     Standing Expiration Date:   1/15/2025     Order Specific Question:   Release to patient     Answer:   Immediate     Requested Prescriptions     Signed Prescriptions Disp Refills    azelastine 0.1 % Nasal Solution 30 mL 2     Si spray by Nasal route 2 (two) times daily.    gabapentin 300 MG Oral Cap 180 capsule 1     Sig: Take 2 capsules (600 mg total) by mouth nightly.         Rika Vinson DO  CC:  Chief  Complaint   Patient presents with    Hospital F/U         HPI:   Soumya King is a 63 year old female who presents for a hospital follow up     CAD s/p CABG: was admitted for an NSTEMI and was found to have multivessel disease.  She is status post CABG and is recovering well.  She recently saw the cardiologist yesterday and her aspirin was decreased from 325 mg to 81 mg and Plavix was added.  She will be seen by the surgeon next week. She is overall feeling well. No shortness of breath with exertion. Denies any further chest pain   Diabetes: well controlled  Tobacco use: is trying to quit, but will try on her own  Allergies: wondering if she can stop some of the medications  Neuropathy: would like to increase her gabapentin because there are days when her tingling worsens  Joint pain: has been taking three tablets of diclofenac regularly for pain relief     Past Medical History:   Diagnosis Date    Abdominal pain, other specified site 09/21/2011    groin    Acute bronchitis 09/21/2011    Allergic rhinitis 2010    Anxiety 2009    Arthritis     Back problem     Jamison esophagus     Chronic low back pain without sciatica     COPD (chronic obstructive pulmonary disease) (HCC)     Depression     Diabetes ([facility])     Difficult intubation     difficult to intubate with bladder surgery,instructed by anesthesia to communicate to future anesthesiologist. Small airway    Esophageal reflux     Essential hypertension     Don't remember    Fitting and adjustment of dental prosthetic device 04/26/2011    High blood pressure     History of 2019 novel coronavirus disease (COVID-19) 09/2020    No hospitalization,symptoms; Fever,fatigue ,body aches,headache no loss of taste or smell    Hx of motion sickness     Hyperlipidemia 2012    Mass of spine     Migraines     barometric pressure    Myocardial infarction (HCC) 12/21/23    Osteoarthritis     Other ill-defined conditions(799.89)     Jamison's    Pain in joint, forearm 04/26/2011     wrist    Pain in joint, pelvic region and thigh 10/07/2011    hip    Pelvic mass     Personal history of urinary (tract) infection 12/09/2011    Sleep apnea     I don't date       Past Surgical History:   Procedure Laterality Date    CABG  12/27/23    COLONOSCOPY      COLONOSCOPY N/A 05/18/2022    Procedure: COLONOSCOPY AND ESOPHAGOGASTRODUODENOSCOPY;  Surgeon: Miguel Camargo MD;  Location:  ENDOSCOPY    LAPAROSCOPIC  2007    sling operation for stress incontinence    LAPAROSCOPY,DIAGNOSTIC      ANGEL BIOPSY STEREO NODULE 2 SITE BILAT (CPT=19081/27565) Left 02/2010    BENIGN 2 SITES    ANGEL BIOPSY STEREO W/CALC 2 SITE LEFT (CPT=19081/85638)  02/2010    benign x 2    ANGEL STEREO-CLIP W/CALC 2 SITE LEFT  2010    OOPHORECTOMY Left 06/28/2019    OTHER  08/31/2022    RIGHT SACRAL SUBCUTANEOUS CYST EXCISION    OTHER SURGICAL HISTORY  2000, 2019    Bladder Sling, Large mass removed from left abdomen and ovar    TONSILLECTOMY      Was a child, have no idea    TUBAL LIGATION      UPPER GI ENDOSCOPY,EXAM         Social History:  Social History     Socioeconomic History    Marital status: Single   Tobacco Use    Smoking status: Some Days     Packs/day: 0.50     Years: 35.00     Additional pack years: 0.00     Total pack years: 17.50     Types: Cigarettes    Smokeless tobacco: Never    Tobacco comments:     Smoke about 2 cigarettes per day. But am smoking JUUL  noiw   Vaping Use    Vaping Use: Every day    Substances: Nicotine, Flavoring    Devices: Disposable, Pre-filled pod   Substance and Sexual Activity    Alcohol use: Not Currently     Comment: On occasion    Drug use: No    Sexual activity: Not Currently     Partners: Male   Other Topics Concern    Caffeine Concern Yes    Stress Concern Yes    Weight Concern Yes    Special Diet No    Exercise No    Seat Belt Yes     Social Determinants of Health     Food Insecurity: No Food Insecurity (12/21/2023)    Food Insecurity     Food Insecurity: Never true   Transportation  Needs: No Transportation Needs (12/21/2023)    Transportation Needs     Lack of Transportation: No   Housing Stability: Low Risk  (12/21/2023)    Housing Stability     Housing Instability: No     Family History:  Family History   Problem Relation Age of Onset    Arthritis Mother     Other (AMI) Mother     Other (diabetes mellitus) Mother     Diabetes Mother      Allergies:  Allergies   Allergen Reactions    Naprosyn [Naproxen] ANAPHYLAXIS     Has tolerated ibuprofen and IV toradol     Aspirin OTHER (SEE COMMENTS)     Difficulty swallowing due to  saavedra's esophagus     Current Meds:  Current Outpatient Medications on File Prior to Visit   Medication Sig Dispense Refill    clopidogrel 75 MG Oral Tab Take 1 tablet (75 mg total) by mouth daily.      aspirin 81 MG Oral Tab EC Take 1 tablet (81 mg total) by mouth daily.      atorvastatin 80 MG Oral Tab Take 1 tablet (80 mg total) by mouth nightly. 90 tablet 1    metFORMIN 500 MG Oral Tab Take 1 tablet (500 mg total) by mouth 2 (two) times daily. 180 tablet 1    furosemide 20 MG Oral Tab Take 1 tablet (20 mg total) by mouth daily. 7 tablet 0    clonazePAM 0.5 MG Oral Tab Take 1 tablet (0.5 mg total) by mouth 2 (two) times daily. 12 tablet 0    topiramate 50 MG Oral Tab TAKE ONE TABLET BY MOUTH EVERY MORNING AND TWO TABLETS EVERY EVENING 270 tablet 0    Dulaglutide (TRULICITY) 0.75 MG/0.5ML Subcutaneous Solution Pen-injector Inject 0.75 mg into the skin once a week. 1 mL 0    busPIRone 10 MG Oral Tab Take 1 tablet (10 mg total) by mouth daily.      desvenlafaxine ER (PRISTIQ) 100 MG Oral Tablet 24 Hr 1 tablet (100 mg total) daily.      umeclidinium bromide (INCRUSE ELLIPTA) 62.5 MCG/ACT Inhalation Aerosol Powder, Breath Activated Inhale 1 puff into the lungs daily. 1 each 1    Metoclopramide HCl 5 MG/5ML Oral Solution Take 5 mL (5 mg total) by mouth 3 (three) times daily before meals. 473 mL 0    cyclobenzaprine 10 MG Oral Tab Take 1 tablet (10 mg total) by mouth every 12  (twelve) hours as needed for Muscle spasms. 60 tablet 1    diclofenac 75 MG Oral Tab EC Take 1 tablet (75 mg total) by mouth 2 (two) times daily. 180 tablet 3    Omeprazole 40 MG Oral Capsule Delayed Release Take 1 capsule (40 mg total) by mouth 2 (two) times daily. 180 capsule 1    metoprolol succinate ER 50 MG Oral Tablet 24 Hr Take 1 tablet (50 mg total) by mouth daily. 90 tablet 1    Erenumab-aooe (AIMOVIG) 140 MG/ML Subcutaneous Solution Auto-injector Inject 1 mL (140 mg total) into the skin every 30 (thirty) days. 1 mL 5    Rizatriptan Benzoate 10 MG Oral Tab Take 1 tablet (10 mg total) by mouth as needed for Migraine. 10 tablet 5    Lancets (ONETOUCH DELICA PLUS BYLOYW88F) Does not apply Misc 1 lancet  by Finger stick route 3 (three) times daily. 300 each 1    albuterol 108 (90 Base) MCG/ACT Inhalation Aero Soln Inhale 2 puffs into the lungs every 4 (four) hours as needed. 18 g 2    Glucose Blood (ONETOUCH ULTRA) In Vitro Strip Tests 3 x daily 300 each 1    traMADol 50 MG Oral Tab Take 1-2 tablets ( mg total) by mouth every 6 (six) hours as needed for Pain. 120 tablet 2    estradiol (ESTRACE) 0.1 MG/GM Vaginal Cream Apply 1/2 gram vaginally 2-3 times per week. 42.5 g 3    Mirabegron ER (MYRBETRIQ) 50 MG Oral Tablet 24 Hr Take 1 tablet (50 mg total) by mouth daily. 30 tablet 11    lidocaine 5 % External Ointment Apply 1 Application. topically 3 (three) times daily as needed. 240 g 1    diclofenac (VOLTAREN) 1 % External Gel Apply 4 g topically 4 (four) times daily. 300 g 3    Multiple Vitamins-Minerals (ONE-A-DAY WOMENS 50+) Oral Tab Take 1 tablet by mouth daily.  0    QUEtiapine Fumarate  MG Oral Tablet 24 Hr Take 1 tablet (200 mg total) by mouth every evening. 30 tablet 0    Calcium Carb-Cholecalciferol 500-200 MG-UNIT Oral Tab Take 1 tablet by mouth daily.         No current facility-administered medications on file prior to visit.         REVIEW OF SYSTEMS   Constitutional: no fatigue normal  energy no weight changes   HENT: normal sinuses and no mouth issues   Eyes: . normal vision no eye pain   Respiratory: normal respirations no cough   Cardiovascular: no CP, or palpitations   Gastrointestinal: normal bowels and no abd pains   Genitourinary:  normal urination no hematuria, no frequency   Musculoskeletal: no pains in arms/legs, normal range of motion   Skin: no rashes or skin lesions that are new   Neurological:  no weakness, no numbness, normal gait   Hematological:  no bruises or bleeding   Psychiatric/Behavioral: normal mood no anxiety normal behavior     /76 (BP Location: Right arm, Patient Position: Sitting, Cuff Size: adult)   Pulse 84   Temp 98.4 °F (36.9 °C) (Temporal)   Ht 5' 1\" (1.549 m)   Wt 125 lb 6.4 oz (56.9 kg)   SpO2 97%   BMI 23.69 kg/m²     PHYSICAL EXAM:   Constitutional: Vital signs reviewed as noted, well developed, in no acute distress.   HENT: NCAT  Eyes: pupils reactive bilaterally  Neck: No thyroidmegaly  Cardiovascular: nl s1 s2 no m/r/g  Pulmonary/Chest: CTA bilaterally with no wheezes  Extremities: no pedal edema   Neurological:  no weakness in UE and LE, reflexes are normal, Bilateral barefoot skin diabetic exam is normal, visualized feet and the appearance is normal.  Bilateral monofilament/sensation of both feet is normal.  Pulsation pedal pulse exam of both lower legs/feet is normal as well.  Skin: incision site on the chest is C/D/I  Psychiatric:normal mood

## 2024-01-16 NOTE — PATIENT INSTRUCTIONS
Please check with your rheumatologist if you can cut down on the diclofenac (class of medication called NSAIDs) as it can increase your risk for heart disease.     Stop the advil duo and okay to take just tylenol for pain relief/migraine    Stop the melatonin with herbs and only take melatonin    Start ferrous sulfate 65 mg (over the counter) every other day. May cause constipation    Gabapentin has been increased to 600 mg nightly     I have ordered blood work to be done in 6 weeks. See me back in 6 weeks

## 2024-01-24 ENCOUNTER — TELEPHONE (OUTPATIENT)
Dept: OBGYN CLINIC | Facility: CLINIC | Age: 64
End: 2024-01-24

## 2024-01-25 NOTE — TELEPHONE ENCOUNTER
LOV 11/30/23 with VALERIE GALLEGOS notes:#2. emphysema  40+ pack year smoker, active vaping  Strongly advised to stop smoking any substance and stop vaping any substance - she is not ready to quit  Previous 2015 PFTs with moderate obstruction, +air trapping and reduced DLCO  Only on albuterol  Obtain PFTs  Consider starting at least LAMA regimen - ordered incruse to price    Last ordered:  Medication Detail    Medication Quantity Refills Start End   umeclidinium bromide (INCRUSE ELLIPTA) 62.5 MCG/ACT Inhalation Aerosol Powder, Breath Activated 1 each 1 11/30/2023 --   Sig:   Inhale 1 puff into the lungs daily.       Med pending signature

## 2024-01-28 ENCOUNTER — PATIENT MESSAGE (OUTPATIENT)
Dept: NEUROLOGY | Facility: CLINIC | Age: 64
End: 2024-01-28

## 2024-01-29 ENCOUNTER — TELEPHONE (OUTPATIENT)
Facility: CLINIC | Age: 64
End: 2024-01-29

## 2024-01-29 RX ORDER — UMECLIDINIUM 62.5 UG/1
1 AEROSOL, POWDER ORAL DAILY
Qty: 3 EACH | Refills: 3 | Status: SHIPPED | OUTPATIENT
Start: 2024-01-29 | End: 2024-01-29

## 2024-01-29 RX ORDER — UMECLIDINIUM 62.5 UG/1
1 AEROSOL, POWDER ORAL DAILY
Qty: 3 EACH | Refills: 0 | Status: SHIPPED | OUTPATIENT
Start: 2024-01-29

## 2024-01-29 NOTE — TELEPHONE ENCOUNTER
Pt's pharmacy [facility] on [address] did not receive refill on Incruse ellipta powder pt asked to send prescription to them

## 2024-01-29 NOTE — TELEPHONE ENCOUNTER
From: Soumya King  To: Brynn Joy  Sent: 1/28/2024 10:43 AM CST  Subject: Aimovig injection     I was wondering if I can get a red needle park for my Aimovig injections? Please let me know.   Thank you   Soumya

## 2024-01-29 NOTE — TELEPHONE ENCOUNTER
Last OV with Dr. Main on 11-30-23.  Per OV notes:  After the breathing test then start incruse inhaler - one puff once a day.  Advised to follow up in February 2024.  Appt scheduled for 3-5-24.  Refill sent.

## 2024-02-01 ENCOUNTER — OFFICE VISIT (OUTPATIENT)
Dept: RHEUMATOLOGY | Facility: CLINIC | Age: 64
End: 2024-02-01
Payer: MEDICAID

## 2024-02-01 VITALS
OXYGEN SATURATION: 98 % | TEMPERATURE: 97 F | RESPIRATION RATE: 18 BRPM | WEIGHT: 122 LBS | DIASTOLIC BLOOD PRESSURE: 60 MMHG | BODY MASS INDEX: 23.03 KG/M2 | HEART RATE: 106 BPM | HEIGHT: 61 IN | SYSTOLIC BLOOD PRESSURE: 114 MMHG

## 2024-02-01 DIAGNOSIS — M17.12 PRIMARY OSTEOARTHRITIS OF LEFT KNEE: Primary | ICD-10-CM

## 2024-02-01 DIAGNOSIS — E11.69 TYPE 2 DIABETES MELLITUS WITH OTHER SPECIFIED COMPLICATION, WITHOUT LONG-TERM CURRENT USE OF INSULIN (HCC): ICD-10-CM

## 2024-02-01 DIAGNOSIS — Z95.1 S/P CABG X 2: ICD-10-CM

## 2024-02-01 DIAGNOSIS — I25.10 CAD, MULTIPLE VESSEL: ICD-10-CM

## 2024-02-01 DIAGNOSIS — M18.11 PRIMARY OSTEOARTHRITIS OF FIRST CARPOMETACARPAL JOINT OF RIGHT HAND: ICD-10-CM

## 2024-02-01 PROCEDURE — 99214 OFFICE O/P EST MOD 30 MIN: CPT | Performed by: INTERNAL MEDICINE

## 2024-02-01 RX ORDER — HYDROCODONE BITARTRATE AND ACETAMINOPHEN 5; 325 MG/1; MG/1
1 TABLET ORAL EVERY 8 HOURS PRN
COMMUNITY

## 2024-02-01 NOTE — PROGRESS NOTES
[facility]  Dr. Torres Progress Note     Subjective:   Soumya King is a(n) 63 year old female.   Current complaints:   Chief Complaint   Patient presents with    Follow - Up     3 month f/u. Had heart attack recently. No swelling. Joint pain is ok. It comes and goes.    History of osteoarthritis of left knee and also right thumb.  Recent heart attack-12/21/23. .   Was working at [employer], got severe chest pain.  Had double vessel bypass.  12/27/23.  100 % blockage in one vessel, 80% in another.   Left knee is feeling ok .  Not doing too much.  Medicaid does not pay for cardiac rehab.  Chest is feeling better, no liftng at present.   Still using a vape stick for nicotine,  past smoker.  Working now [employer] - .     Right thumb arthritis is ok today, no shot needed.    Told to use tylenol for pain if needed.    Using diclofenac 75 mg twice a day as needed.  On Omeprazole in the morning.   Off Traadol and Cyclobenzaprine.    Objective:   /60   Pulse 106   Temp 97.2 °F (36.2 °C)   Resp 18   Ht 5' 1\" (1.549 m)   Wt 122 lb (55.3 kg)   SpO2 98%   BMI 23.05 kg/m²   Lungs clear  Heart nsr  Midline scar in chest   Right thumb trace spurred at cmc joint .    Left knee non tender    No leg edema  1/15/24  BUN  23  Crt 1.1     Hemoglobin A1C  6.6  LDL  52  wbc 13.0   Hb  10.8   Plats   510,000  Assessment:     Encounter Diagnoses   Name Primary?    Primary osteoarthritis of left knee Yes    Primary osteoarthritis of first carpometacarpal joint of right hand     CAD, multiple vessel     S/P CABG x 2     Type 2 diabetes mellitus with other specified complication, without long-term current use of insulin ([facility])      Plan:     Patient Instructions   On metformin and Truliicty for diabetes.  Low fat, low sugar diet.  No smoking.  ASA  81 mg per day.  Diclofenac 75 mg for arthritis as needed twice a  day.  Hold off on Tramadol.   Return to office 3 months.        Xiang Torres MD 2/1/2024 12:33 PM

## 2024-02-01 NOTE — PATIENT INSTRUCTIONS
On metformin and Truliicty for diabetes.  Low fat, low sugar diet.  No smoking.  ASA  81 mg per day.  Diclofenac 75 mg for arthritis as needed twice a  day.  Hold off on Tramadol.   Return to office 3 months.

## 2024-02-03 ENCOUNTER — PATIENT MESSAGE (OUTPATIENT)
Dept: NEUROLOGY | Facility: CLINIC | Age: 64
End: 2024-02-03

## 2024-02-03 DIAGNOSIS — E11.40 TYPE 2 DIABETES MELLITUS WITH DIABETIC NEUROPATHY, WITHOUT LONG-TERM CURRENT USE OF INSULIN (HCC): ICD-10-CM

## 2024-02-03 DIAGNOSIS — G43.709 CHRONIC MIGRAINE W/O AURA W/O STATUS MIGRAINOSUS, NOT INTRACTABLE: ICD-10-CM

## 2024-02-05 ENCOUNTER — TELEPHONE (OUTPATIENT)
Dept: SURGERY | Facility: CLINIC | Age: 64
End: 2024-02-05

## 2024-02-05 ENCOUNTER — TELEPHONE (OUTPATIENT)
Dept: NEUROLOGY | Facility: CLINIC | Age: 64
End: 2024-02-05

## 2024-02-05 RX ORDER — ERENUMAB-AOOE 140 MG/ML
140 INJECTION, SOLUTION SUBCUTANEOUS
Qty: 1 ML | Refills: 4 | Status: SHIPPED | OUTPATIENT
Start: 2024-02-05

## 2024-02-05 NOTE — TELEPHONE ENCOUNTER
Received call from patient regarding lasix prescription. Patient realized called incorrect office and states she thought she was calling Dr. Herrera's office. Advised patient to call Dr. Herrera, cardiologist, for her lasix prescriptions questions. All questions answered, advised to call back for any future questions or concerns. Patient verbalized understanding.

## 2024-02-05 NOTE — TELEPHONE ENCOUNTER
From: Soumya King  To: Brynn Joy  Sent: 2/3/2024 4:40 PM CST  Subject: Aimovig     I sent a refill request for my Aimovig injection. I'm not sure if it's going to come to you or not   I just want you to be on the lookout for it as I have no refills and I'm going to a new pharmacy.   Thank you   Soumya

## 2024-02-05 NOTE — TELEPHONE ENCOUNTER
Medication: Aimovig 140mg     Date of last refill: 9/21/23 (#1/5)  [place]      Last office visit: 9/21/2023  Due back to clinic per last office note:  1 year  Date next office visit scheduled:    Future Appointments   Date Time Provider Department Center   2/12/2024 10:45 AM REF [facility] REF [facility] EDW Ref [facility]   2/12/2024 11:15 AM Ban Willis MD [facility] [place]   2/26/2024  5:30 PM Rika Vinson DO [facility] 8 [facility] [place]   3/5/2024 11:30 AM Greyson Main MD  [facility] [place]   5/1/2024 10:40 AM Xiang Torres MD [facility] [place]   5/14/2024  1:30 PM Florecita Quintero,  [facility] None           Last OV note recommendation:       Migraines stable. Still gets some stress related headache     Change Topamax to 50 mg AM And 100 mg PM for better migraine control  Continue Aimovig 140 mg monthly injections  Use CPAP machine regularly           RTC in about 12 months

## 2024-02-06 RX ORDER — DULAGLUTIDE 0.75 MG/.5ML
0.75 INJECTION, SOLUTION SUBCUTANEOUS WEEKLY
Qty: 2 ML | Refills: 0 | Status: SHIPPED | OUTPATIENT
Start: 2024-02-06

## 2024-02-06 NOTE — TELEPHONE ENCOUNTER
Name from pharmacy: Trulicity 0.75 Mg/0.5ml Inj Lill         Will file in chart as: TRULICITY 0.75 MG/0.5ML Subcutaneous Solution Pen-injector    Sig: Inject 0.75 mg into the skin once a week.    Disp: 2 mL    Refills: 0    Start: 2/3/2024    Class: Normal    For: Type 2 diabetes mellitus with diabetic neuropathy, without long-term current use of insulin (HCC)    Last ordered: 2 months ago (11/30/2023) by Rika Vinson DO    Last refill: 11/30/2023    Rx #: [id number]    Rx [facility] [id number]/06/2024 03:30 PM    CL RX HGBA1C WITHIN PAST 6 MONTHS    CL RX HGBA1C LESS THAN 7.5    CL RX PATIENT ON ACE/ARB OR MICROALBUMIN WITHIN 1 YEAR    CL RX APPOINTMENT IN PAST 6 OR NEXT 3 MONTHS      To be filled at: [id number] - [place], [place] - [address] [phone], [phone]

## 2024-02-08 ENCOUNTER — HOSPITAL ENCOUNTER (OUTPATIENT)
Age: 64
Discharge: HOME | End: 2024-02-08
Payer: MEDICAID

## 2024-02-08 VITALS
DIASTOLIC BLOOD PRESSURE: 68 MMHG | OXYGEN SATURATION: 97 % | TEMPERATURE: 97 F | BODY MASS INDEX: 23 KG/M2 | WEIGHT: 120 LBS | RESPIRATION RATE: 16 BRPM | SYSTOLIC BLOOD PRESSURE: 95 MMHG | HEART RATE: 86 BPM

## 2024-02-08 DIAGNOSIS — R55 VASO VAGAL EPISODE: ICD-10-CM

## 2024-02-08 DIAGNOSIS — K52.9 GASTROENTERITIS: Primary | ICD-10-CM

## 2024-02-08 DIAGNOSIS — R50.9 FEVER IN ADULT: ICD-10-CM

## 2024-02-08 LAB
#MXD IC: 0.9 X10ˆ3/UL (ref 0.1–1)
ATRIAL RATE: 93 BPM
BUN BLD-MCNC: 18 MG/DL (ref 7–18)
CHLORIDE BLD-SCNC: 105 MMOL/L (ref 98–112)
CO2 BLD-SCNC: 20 MMOL/L (ref 21–32)
CREAT BLD-MCNC: 0.8 MG/DL
EGFRCR SERPLBLD CKD-EPI 2021: 84 ML/MIN/1.73M2 (ref 60–?)
GLUCOSE BLD-MCNC: 89 MG/DL (ref 70–99)
HCT VFR BLD AUTO: 31.9 %
HCT VFR BLD CALC: 32 %
HGB BLD-MCNC: 10.2 G/DL
ISTAT IONIZED CALCIUM FOR CHEM 8: 1.13 MMOL/L (ref 1.12–1.32)
LYMPHOCYTES # BLD AUTO: 2.4 X10ˆ3/UL (ref 1–4)
LYMPHOCYTES NFR BLD AUTO: 16.6 %
MCH RBC QN AUTO: 29.1 PG (ref 26–34)
MCHC RBC AUTO-ENTMCNC: 32 G/DL (ref 31–37)
MCV RBC AUTO: 91.1 FL (ref 80–100)
MIXED CELL %: 6.2 %
NEUTROPHILS # BLD AUTO: 11.2 X10ˆ3/UL (ref 1.5–7.7)
NEUTROPHILS NFR BLD AUTO: 77.2 %
P AXIS: 56 DEGREES
P-R INTERVAL: 132 MS
PLATELET # BLD AUTO: 423 X10ˆ3/UL (ref 150–450)
POCT INFLUENZA A: NEGATIVE
POCT INFLUENZA B: NEGATIVE
POTASSIUM BLD-SCNC: 4.1 MMOL/L (ref 3.6–5.1)
Q-T INTERVAL: 362 MS
QRS DURATION: 76 MS
QTC CALCULATION (BEZET): 450 MS
R AXIS: 42 DEGREES
RBC # BLD AUTO: 3.5 X10ˆ6/UL
SARS-COV-2 RNA RESP QL NAA+PROBE: NOT DETECTED
SODIUM BLD-SCNC: 140 MMOL/L (ref 136–145)
T AXIS: 81 DEGREES
TROPONIN I BLD-MCNC: <0.02 NG/ML
VENTRICULAR RATE: 93 BPM
WBC # BLD AUTO: 14.5 X10ˆ3/UL (ref 4–11)

## 2024-02-08 PROCEDURE — 80047 BASIC METABLC PNL IONIZED CA: CPT | Performed by: NURSE PRACTITIONER

## 2024-02-08 PROCEDURE — 99214 OFFICE O/P EST MOD 30 MIN: CPT | Performed by: NURSE PRACTITIONER

## 2024-02-08 PROCEDURE — 87502 INFLUENZA DNA AMP PROBE: CPT | Performed by: NURSE PRACTITIONER

## 2024-02-08 PROCEDURE — 84484 ASSAY OF TROPONIN QUANT: CPT | Performed by: NURSE PRACTITIONER

## 2024-02-08 PROCEDURE — U0002 COVID-19 LAB TEST NON-CDC: HCPCS | Performed by: NURSE PRACTITIONER

## 2024-02-08 PROCEDURE — 85025 COMPLETE CBC W/AUTO DIFF WBC: CPT | Performed by: NURSE PRACTITIONER

## 2024-02-08 PROCEDURE — 96360 HYDRATION IV INFUSION INIT: CPT | Performed by: NURSE PRACTITIONER

## 2024-02-08 PROCEDURE — 93000 ELECTROCARDIOGRAM COMPLETE: CPT | Performed by: NURSE PRACTITIONER

## 2024-02-08 RX ORDER — SODIUM CHLORIDE 9 MG/ML
1000 INJECTION, SOLUTION INTRAVENOUS ONCE
Status: COMPLETED | OUTPATIENT
Start: 2024-02-08 | End: 2024-02-08

## 2024-02-08 RX ORDER — SACCHAROMYCES BOULARDII 250 MG
250 CAPSULE ORAL 2 TIMES DAILY
Qty: 14 CAPSULE | Refills: 0 | Status: SHIPPED | OUTPATIENT
Start: 2024-02-08 | End: 2024-02-15

## 2024-02-08 NOTE — ED INITIAL ASSESSMENT (HPI)
Pt c/o chest pain, dizziness ad 2 days of chest pain. Pt also c/o fever. Pt had bypass surgery 12/27/2023. Pt concerned about the flu.

## 2024-02-08 NOTE — DISCHARGE INSTRUCTIONS
Clear liquids at first.  Once you are hungry, you can advance to bananas, rice, apples, toast, or broth/soup. After this, you may advance to a normal diet.  Try to avoid dairy products, high sugar foods, and fatty foods, which take a while to digest.  Follow-up with your family physician, in 1 or 2 days if no better    Return for continued vomiting, fevers, abdominal pain, bloody stools, or any significant changes in your condition  Start taking daily Benefiber

## 2024-02-08 NOTE — ED PROVIDER NOTES
Patient Seen in: [facility]      History     Chief Complaint   Patient presents with    Chest Pain    Dizziness    Diarrhea    Fever     Stated Complaint: fever /dizzy x 1 day    Subjective:   63-year-old female presents to immediate care for 2 days of diarrhea.  Patient states that she started with diarrhea yesterday.  States she felt better this morning and gotten up and gone to work.  States shortly after arriving at work she had more episodes of diarrhea with some dizziness.  She denies any shortness of breath does report chest pain at this time but states it feels like she is just hungry because she has not been eating.            Objective:   Past Medical History:   Diagnosis Date    Abdominal pain, other specified site 09/21/2011    groin    Acute bronchitis 09/21/2011    Allergic rhinitis 2010    Anxiety 2009    Arthritis     Back problem     Jamison esophagus     Chronic low back pain without sciatica     COPD (chronic obstructive pulmonary disease) ([facility])     Depression     Diabetes ([facility])     Difficult intubation     difficult to intubate with bladder surgery,instructed by anesthesia to communicate to future anesthesiologist. Small airway    Esophageal reflux     Essential hypertension     Don't remember    Fitting and adjustment of dental prosthetic device 04/26/2011    High blood pressure     History of 2019 novel coronavirus disease (COVID-19) 09/2020    No hospitalization,symptoms; Fever,fatigue ,body aches,headache no loss of taste or smell    Hx of motion sickness     Hyperlipidemia 2012    Mass of spine     Migraines     barometric pressure    Myocardial infarction (HCC) 12/21/23    Osteoarthritis     Other ill-defined conditions(799.89)     Jamison's    Pain in joint, forearm 04/26/2011    wrist    Pain in joint, pelvic region and thigh 10/07/2011    hip    Pelvic mass     Personal history of urinary (tract) infection 12/09/2011    Sleep apnea     I don't date               Past Surgical History:   Procedure Laterality Date    CABG  12/27/23    COLONOSCOPY      COLONOSCOPY N/A 05/18/2022    Procedure: COLONOSCOPY AND ESOPHAGOGASTRODUODENOSCOPY;  Surgeon: Miguel Camargo MD;  Location:  ENDOSCOPY    HEART SURGERY  2024    double bypass    LAPAROSCOPIC  2007    sling operation for stress incontinence    LAPAROSCOPY,DIAGNOSTIC      ANGEL BIOPSY STEREO NODULE 2 SITE BILAT (CPT=19081/41823) Left 02/2010    BENIGN 2 SITES    ANGEL BIOPSY STEREO W/CALC 2 SITE LEFT (CPT=19081/25512)  02/2010    benign x 2    ANGEL STEREO-CLIP W/CALC 2 SITE LEFT  2010    OOPHORECTOMY Left 06/28/2019    OTHER  08/31/2022    RIGHT SACRAL SUBCUTANEOUS CYST EXCISION    OTHER SURGICAL HISTORY  2000, 2019    Bladder Sling, Large mass removed from left abdomen and ovar    TONSILLECTOMY      Was a child, have no idea    TUBAL LIGATION      UPPER GI ENDOSCOPY,EXAM                  Social History     Socioeconomic History    Marital status: Single   Tobacco Use    Smoking status: Some Days     Packs/day: 0.50     Years: 35.00     Additional pack years: 0.00     Total pack years: 17.50     Types: Cigarettes    Smokeless tobacco: Never    Tobacco comments:     Smoke about 2 cigarettes per day. But am smoking JUUL  noiw   Vaping Use    Vaping Use: Every day    Substances: Nicotine, Flavoring    Devices: Disposable, Pre-filled pod   Substance and Sexual Activity    Alcohol use: Not Currently     Comment: On occasion    Drug use: No    Sexual activity: Not Currently     Partners: Male   Other Topics Concern    Caffeine Concern Yes    Stress Concern Yes    Weight Concern Yes    Special Diet No    Exercise No    Seat Belt Yes     Social Determinants of Health     Food Insecurity: No Food Insecurity (12/21/2023)    Food Insecurity     Food Insecurity: Never true   Transportation Needs: No Transportation Needs (12/21/2023)    Transportation Needs     Lack of Transportation: No   Housing Stability: Low Risk  (12/21/2023)     Housing Stability     Housing Instability: No              Review of Systems   Constitutional: Negative.    Respiratory: Negative.     Cardiovascular: Negative.    Gastrointestinal:  Positive for diarrhea.   Skin: Negative.    Neurological:  Positive for dizziness.       Positive for stated complaint: fever /dizzy x 1 day  Other systems are as noted in HPI.  Constitutional and vital signs reviewed.      All other systems reviewed and negative except as noted above.    Physical Exam     ED Triage Vitals   BP 02/08/24 1248 102/69   Pulse 02/08/24 1248 96   Resp 02/08/24 1248 20   Temp 02/08/24 1248 97 °F (36.1 °C)   Temp src 02/08/24 1248 Temporal   SpO2 02/08/24 1505 97 %   O2 Device 02/08/24 1248 None (Room air)       Current:BP 95/68   Pulse 86   Temp 97 °F (36.1 °C) (Temporal)   Resp 16   Wt 54.4 kg   SpO2 97%   BMI 22.67 kg/m²         Physical Exam  Vitals and nursing note reviewed.   Constitutional:       General: She is not in acute distress.  HENT:      Head: Normocephalic.   Cardiovascular:      Rate and Rhythm: Normal rate and regular rhythm.   Pulmonary:      Effort: Pulmonary effort is normal.      Breath sounds: Normal breath sounds.   Abdominal:      General: There is no distension.      Palpations: Abdomen is soft.      Tenderness: There is no abdominal tenderness.   Musculoskeletal:         General: Normal range of motion.   Skin:     General: Skin is warm and dry.   Neurological:      General: No focal deficit present.      Mental Status: She is alert and oriented to person, place, and time.               ED Course     Labs Reviewed   POCT CBC - Abnormal; Notable for the following components:       Result Value    WBC IC 14.5 (*)     RBC IC 3.50 (*)     HGB IC 10.2 (*)     HCT IC 31.9 (*)     # Neutrophil 11.2 (*)     All other components within normal limits   POCT ISTAT CHEM8 CARTRIDGE - Abnormal; Notable for the following components:    ISTAT Hematocrit 32 (*)     ISTAT TCO2 20 (*)     All  other components within normal limits   ISTAT TROPONIN - Normal   POCT FLU TEST - Normal    Narrative:     This assay is a rapid molecular in vitro test utilizing nucleic acid amplification of influenza A and B viral RNA.   RAPID SARS-COV-2 BY PCR - Normal     EKG    Rate, intervals and axes as noted on EKG Report.  Rate: 93  Rhythm: Sinus Rhythm  Reading: WV interval 132, QRS duration 76, QT/QTc 362/450                          MDM      Medical Decision Making  Pertinent Labs & Imaging studies reviewed. (See chart for details).  Patient coming in with diarrhea, dizziness.   Differential diagnosis includes gastroenteritis, vertigo, vagal episode  Labs reviewed elevated white count, otherwise electrolytes within normal limits, troponin is negative.  Will treat for gastroenteritis with vagal episode.  Will discharge on Florastor, advised to increase fiber. Patient is comfortable with this plan.     Overall Pt looks good. Non-toxic, well-hydrated and in no respiratory distress. Vital signs are reassuring. Exam is reassuring. I do not believe pt requires and additional diagnostic studies or intervention. I believe pt can be discharged home to continue evaluation as an outpatient. Follow-up provider given. Discharge instructions given and reviewed. Return for any problems. All understand and agreewith the plan.     Please note that this report has been produced using speech recognition software and may contain errors related to that system including, but not limited to, errors in grammar, punctuation, and spelling, as well as words and phrases that possibly may have been recognized inappropriately. If there are any questions or concerns, contact the dictating provider for clarification.       Problems Addressed:  Fever in adult: acute illness or injury  Gastroenteritis: acute illness or injury    Amount and/or Complexity of Data Reviewed  Labs: ordered. Decision-making details documented in ED Course.  ECG/medicine  tests: ordered and independent interpretation performed. Decision-making details documented in ED Course.    Risk  OTC drugs.  Prescription drug management.        Disposition and Plan     Clinical Impression:  1. Gastroenteritis    2. Fever in adult    3. Vaso vagal episode         Disposition:  Discharge  2/8/2024  3:22 pm    Follow-up:  Rika Vinson DO  [address] [facility] [address]  [facility] [address]  [phone]                Medications Prescribed:  Discharge Medication List as of 2/8/2024  3:23 PM        START taking these medications    Details   saccharomyces boulardii (FLORASTOR) 250 MG Oral Cap Take 1 capsule (250 mg total) by mouth 2 (two) times daily for 7 days., Normal, Disp-14 capsule, R-0

## 2024-02-11 DIAGNOSIS — G43.709 CHRONIC MIGRAINE W/O AURA W/O STATUS MIGRAINOSUS, NOT INTRACTABLE: ICD-10-CM

## 2024-02-11 RX ORDER — ERENUMAB-AOOE 140 MG/ML
140 INJECTION, SOLUTION SUBCUTANEOUS
Qty: 1 ML | Refills: 4 | Status: CANCELLED | OUTPATIENT
Start: 2024-02-11

## 2024-02-12 ENCOUNTER — OFFICE VISIT (OUTPATIENT)
Dept: OBGYN CLINIC | Facility: CLINIC | Age: 64
End: 2024-02-12
Payer: MEDICAID

## 2024-02-12 VITALS
WEIGHT: 127.38 LBS | DIASTOLIC BLOOD PRESSURE: 74 MMHG | HEART RATE: 99 BPM | SYSTOLIC BLOOD PRESSURE: 110 MMHG | BODY MASS INDEX: 24 KG/M2

## 2024-02-12 DIAGNOSIS — Z12.31 ENCOUNTER FOR SCREENING MAMMOGRAM FOR MALIGNANT NEOPLASM OF BREAST: ICD-10-CM

## 2024-02-12 DIAGNOSIS — Z12.4 SCREENING FOR CERVICAL CANCER: ICD-10-CM

## 2024-02-12 DIAGNOSIS — Z01.419 WELL WOMAN EXAM WITH ROUTINE GYNECOLOGICAL EXAM: Primary | ICD-10-CM

## 2024-02-12 PROCEDURE — 99396 PREV VISIT EST AGE 40-64: CPT | Performed by: OBSTETRICS & GYNECOLOGY

## 2024-02-12 PROCEDURE — 87624 HPV HI-RISK TYP POOLED RSLT: CPT | Performed by: OBSTETRICS & GYNECOLOGY

## 2024-02-12 PROCEDURE — 88175 CYTOPATH C/V AUTO FLUID REDO: CPT | Performed by: OBSTETRICS & GYNECOLOGY

## 2024-02-12 NOTE — TELEPHONE ENCOUNTER
Medication: Erenumab-aooe (AIMOVIG) 140 MG/ML Subcutaneous Solution Auto-injector     Date of last refill: 02/05/2024 (#1 mL/4)  [place]     Last office visit: 09/21/2023  Due back to clinic per last office note:  12 months  Date next office visit scheduled:    Future Appointments   Date Time Provider Department Center   2/20/2024 10:15 AM REF M[facility] REF [facility] EDW Ref MOB   2/26/2024  5:30 PM Rika Vinson,  [facility] 8 [facility] [place]   3/5/2024 11:30 AM Greyson Main MD  [facility] Pulm [facility] [place]   5/1/2024 10:40 AM Xiang Torres MD [facility] [place]   5/14/2024  1:30 PM Florecita Quintero DO [facility] None           Last OV note recommendation:    ASSESSMENT/PLAN:   (G43.709) Chronic migraine w/o aura w/o status migrainosus, not intractable  (primary encounter diagnosis)  Plan: Erenumab-aooe (AIMOVIG) 140 MG/ML Subcutaneous         Solution Auto-injector, Rizatriptan Benzoate 10        MG Oral Tab           (Z86.73) Old lacunar stroke without late effect        Migraines stable. Still gets some stress related headache     Change Topamax to 50 mg AM And 100 mg PM for better migraine control  Continue Aimovig 140 mg monthly injections  Use CPAP machine regularly           RTC in about 12 months     See orders and medications filed with this encounter. The patient indicates understanding of these issues and agrees with the plan.

## 2024-02-12 NOTE — PROGRESS NOTES
[facility]  Obstetrics and Gynecology  History & Physical    CC: Patient presents for a well woman exam     Subjective:     HPI: Soumya King is a 63 year old  female here for a well women exam. Patient reports doing well. Recent double bypass surgery 2/2 heart attack. Patient in recovery and doing well.      OB History:  OB History    Para Term  AB Living   0 0 0 0 0 0   SAB IAB Ectopic Multiple Live Births   0 0 0 0 0       Gyne History:  Hx Prior Abnormal Pap: No  Pap Date: 22  Pap Result Notes: wnl  No LMP recorded. Patient is postmenopausal.    Menopause early mid-late 40s.   NILM, HPV neg 2022  denies history of STDs (non-HPV).   Sexual history: Active? yes    Meds:  Current Outpatient Medications on File Prior to Visit   Medication Sig Dispense Refill    Ferrous Sulfate Dried (HIGH POTENCY IRON) 65 MG Oral Tab       saccharomyces boulardii (FLORASTOR) 250 MG Oral Cap Take 1 capsule (250 mg total) by mouth 2 (two) times daily for 7 days. 14 capsule 0    Dulaglutide (TRULICITY) 0.75 MG/0.5ML Subcutaneous Solution Pen-injector Inject 0.75 mg into the skin once a week. 2 mL 0    Erenumab-aooe (AIMOVIG) 140 MG/ML Subcutaneous Solution Auto-injector Inject 1 mL (140 mg total) into the skin every 30 (thirty) days. 1 mL 4    HYDROcodone-acetaminophen 5-325 MG Oral Tab Take 1 tablet by mouth every 8 (eight) hours as needed for Pain.      umeclidinium bromide (INCRUSE ELLIPTA) 62.5 MCG/ACT Inhalation Aerosol Powder, Breath Activated Inhale 1 puff into the lungs daily. 3 each 0    clopidogrel 75 MG Oral Tab Take 1 tablet (75 mg total) by mouth daily.      aspirin 81 MG Oral Tab EC Take 1 tablet (81 mg total) by mouth daily.      gabapentin 300 MG Oral Cap Take 2 capsules (600 mg total) by mouth nightly. 180 capsule 1    atorvastatin 80 MG Oral Tab Take 1 tablet (80 mg total) by mouth nightly. 90 tablet 1    metFORMIN 500 MG Oral Tab Take 1 tablet (500 mg total) by mouth 2 (two)  times daily. 180 tablet 1    furosemide 20 MG Oral Tab Take 1 tablet (20 mg total) by mouth daily. 7 tablet 0    clonazePAM 0.5 MG Oral Tab Take 1 tablet (0.5 mg total) by mouth 2 (two) times daily. 12 tablet 0    topiramate 50 MG Oral Tab TAKE ONE TABLET BY MOUTH EVERY MORNING AND TWO TABLETS EVERY EVENING 270 tablet 0    busPIRone 10 MG Oral Tab Take 1 tablet (10 mg total) by mouth daily.      Metoclopramide HCl 5 MG/5ML Oral Solution Take 5 mL (5 mg total) by mouth 3 (three) times daily before meals. 473 mL 0    cyclobenzaprine 10 MG Oral Tab Take 1 tablet (10 mg total) by mouth every 12 (twelve) hours as needed for Muscle spasms. 60 tablet 1    diclofenac 75 MG Oral Tab EC Take 1 tablet (75 mg total) by mouth 2 (two) times daily. 180 tablet 3    Omeprazole 40 MG Oral Capsule Delayed Release Take 1 capsule (40 mg total) by mouth 2 (two) times daily. 180 capsule 1    metoprolol succinate ER 50 MG Oral Tablet 24 Hr Take 1 tablet (50 mg total) by mouth daily. 90 tablet 1    Rizatriptan Benzoate 10 MG Oral Tab Take 1 tablet (10 mg total) by mouth as needed for Migraine. 10 tablet 5    Lancets (ONETOUCH DELICA PLUS BFMBAP81U) Does not apply Misc 1 lancet  by Finger stick route 3 (three) times daily. 300 each 1    albuterol 108 (90 Base) MCG/ACT Inhalation Aero Soln Inhale 2 puffs into the lungs every 4 (four) hours as needed. 18 g 2    Glucose Blood (ONETOUCH ULTRA) In Vitro Strip Tests 3 x daily 300 each 1    traMADol 50 MG Oral Tab Take 1-2 tablets ( mg total) by mouth every 6 (six) hours as needed for Pain. 120 tablet 2    estradiol (ESTRACE) 0.1 MG/GM Vaginal Cream Apply 1/2 gram vaginally 2-3 times per week. 42.5 g 3    Mirabegron ER (MYRBETRIQ) 50 MG Oral Tablet 24 Hr Take 1 tablet (50 mg total) by mouth daily. 30 tablet 11    lidocaine 5 % External Ointment Apply 1 Application. topically 3 (three) times daily as needed. 240 g 1    Multiple Vitamins-Minerals (ONE-A-DAY WOMENS 50+) Oral Tab Take 1 tablet by  mouth daily.  0    QUEtiapine Fumarate  MG Oral Tablet 24 Hr Take 1 tablet (200 mg total) by mouth every evening. 30 tablet 0    Calcium Carb-Cholecalciferol 500-200 MG-UNIT Oral Tab Take 1 tablet by mouth daily.         No current facility-administered medications on file prior to visit.       All:  Allergies   Allergen Reactions    Naprosyn [Naproxen] ANAPHYLAXIS     Has tolerated ibuprofen and IV toradol     Aspirin OTHER (SEE COMMENTS)     Difficulty swallowing due to  saavedra's esophagus       PMH:  Past Medical History:   Diagnosis Date    Abdominal pain, other specified site 09/21/2011    groin    Acute bronchitis 09/21/2011    Allergic rhinitis 2010    Anxiety 2009    Arthritis     Back problem     Saavedra esophagus     Chronic low back pain without sciatica     COPD (chronic obstructive pulmonary disease) (HCC)     Depression     Diabetes (HCC)     Difficult intubation     difficult to intubate with bladder surgery,instructed by anesthesia to communicate to future anesthesiologist. Small airway    Esophageal reflux     Essential hypertension     Don't remember    Fitting and adjustment of dental prosthetic device 04/26/2011    High blood pressure     History of 2019 novel coronavirus disease (COVID-19) 09/2020    No hospitalization,symptoms; Fever,fatigue ,body aches,headache no loss of taste or smell    Hx of motion sickness     Hyperlipidemia 2012    Mass of spine     Migraines     barometric pressure    Myocardial infarction (HCC) 12/21/23    Osteoarthritis     Other ill-defined conditions(799.89)     Saavedra's    Pain in joint, forearm 04/26/2011    wrist    Pain in joint, pelvic region and thigh 10/07/2011    hip    Pelvic mass     Personal history of urinary (tract) infection 12/09/2011    Sleep apnea     I don't date       Immunization History:   Immunization History   Administered Date(s) Administered    Covid-19 Vaccine Pfizer 30 mcg/0.3 ml 05/07/2021, 05/28/2021    Covid-19 Vaccine Pfizer  Mian-Sucrose 30 mcg/0.3 ml 05/04/2022    FLULAVAL 6 months & older 0.5 ml Prefilled syringe (76694) 10/01/2018, 09/21/2021    FLUZONE 6 months and older PFS 0.5 ml (58523) 09/18/2017, 10/01/2018, 09/21/2021, 01/03/2024    Fluarix 6 Months And Older 0.5 ml prefilled syringe ([id number]) 10/03/2019    Flublok Quad Influenza Vaccine (67448) 10/20/2020    Fluvirin, 3 Years & >, Im 09/22/2015, 09/24/2016    Influenza 12/09/2011, 10/11/2012, 09/22/2015, 09/18/2017, 10/20/2020    Pneumovax 23 09/30/2017    TDAP 07/02/2013       PSH:  Past Surgical History:   Procedure Laterality Date    CABG  12/27/23    COLONOSCOPY      COLONOSCOPY N/A 05/18/2022    Procedure: COLONOSCOPY AND ESOPHAGOGASTRODUODENOSCOPY;  Surgeon: Miguel Camargo MD;  Location: [facility]    HEART SURGERY  2024    double bypass    LAPAROSCOPIC  2007    sling operation for stress incontinence    LAPAROSCOPY,DIAGNOSTIC      ANGEL BIOPSY STEREO NODULE 2 SITE BILAT (CPT=19081/53961) Left 02/2010    BENIGN 2 SITES    ANGEL BIOPSY STEREO W/CALC 2 SITE LEFT (CPT=19081/11598)  02/2010    benign x 2    ANGEL STEREO-CLIP W/CALC 2 SITE LEFT  2010    OOPHORECTOMY Left 06/28/2019    OTHER  08/31/2022    RIGHT SACRAL SUBCUTANEOUS CYST EXCISION    OTHER SURGICAL HISTORY  2000, 2019    Bladder Sling, Large mass removed from left abdomen and ovar    TONSILLECTOMY      Was a child, have no idea    TUBAL LIGATION      UPPER GI ENDOSCOPY,EXAM         Social History:  Social History     Socioeconomic History    Marital status: Single     Spouse name: Not on file    Number of children: Not on file    Years of education: Not on file    Highest education level: Not on file   Occupational History    Not on file   Tobacco Use    Smoking status: Some Days     Packs/day: 0.50     Years: 35.00     Additional pack years: 0.00     Total pack years: 17.50     Types: Cigarettes    Smokeless tobacco: Never    Tobacco comments:     Smoke about 2 cigarettes per day. But am smoking JUUL  noiw    Vaping Use    Vaping Use: Every day    Substances: Nicotine, Flavoring    Devices: Disposable, Pre-filled pod   Substance and Sexual Activity    Alcohol use: Not Currently     Comment: On occasion    Drug use: No    Sexual activity: Not Currently     Partners: Male   Other Topics Concern     Service Not Asked    Blood Transfusions Not Asked    Caffeine Concern Yes    Occupational Exposure Not Asked    Hobby Hazards Not Asked    Sleep Concern Not Asked    Stress Concern Yes    Weight Concern Yes    Special Diet No    Back Care Not Asked    Exercise No    Bike Helmet Not Asked    Seat Belt Yes    Self-Exams Not Asked   Social History Narrative    Not on file     Social Determinants of Health     Financial Resource Strain: Not on file   Food Insecurity: No Food Insecurity (12/21/2023)    Food Insecurity     Food Insecurity: Never true   Transportation Needs: No Transportation Needs (12/21/2023)    Transportation Needs     Lack of Transportation: No   Physical Activity: Not on file   Stress: Not on file   Social Connections: Not on file   Housing Stability: Low Risk  (12/21/2023)    Housing Stability     Housing Instability: No     Housing Instability Emergency: Not on file         Patient feels unsafe or threatened?: denies    Abuse: denies physical, sexual or mental.     Family History:  Family History   Problem Relation Age of Onset    Arthritis Mother     Other (AMI) Mother     Other (diabetes mellitus) Mother     Diabetes Mother        Health maintenance:  Mammogram (age 42 and q1-2yr): 11/2023  RECOMMENDATIONS:    ONE YEAR FOLLOW-UP ULTRASOUND BILATERAL BREASTS IN 12 MONTHS.   Colonoscopy (age 47 and q10yr): 2022    Review of Systems:  General: no complaints per category. See HPI for additional information.   Breast: no complaints per category. See HPI for additional information.   Respiratory: no complaints per category. See HPI for additional information.   Cardiovascular: no complaints per category.  See HPI for additional information.   GI: no complaints per category. See HPI for additional information.   : no complaints per category. See HPI for additional information.   Heme: no complaints per category. See HPI for additional information.       Objective:     Vitals:    24 1133   BP: 110/74   Pulse: 99   Weight: 127 lb 6 oz (57.8 kg)         Body mass index is 24.07 kg/m².    General: AAO.NAD.   CVS exam: normal peripheral perfusion  Chest: non-labored breathing, no tachypnea   Breast: symmetric, no dominant or suspicious mass, no skin or nipple changes and no axillary adenopathy  Abdominal exam: soft, nontender, nondistended  Pelvic exam:   VULVA: normal appearing vulva with no masses, tenderness or lesions  PERINEUM: normal appearing, no lesions   URETHRAL MEATUS:  normal appearing, no lesions   VAGINA: normal appearing vagina with normal color and discharge, no lesions  CERVIX: normal appearing cervix without discharge or lesions  UTERUS: uterus is normal size, shape, consistency and nontender  ADNEXA: normal adnexa in size, nontender and no masses  PERIRECTAL: normal appearing, no lesions   Ext: non-tender, no edema    Assessment:     Soumya King is a 63 year old  female here for a well women exam.       Plan:       Problem List Items Addressed This Visit    None  Visit Diagnoses       Well woman exam with routine gynecological exam    -  Primary    Relevant Medications    Ferrous Sulfate Dried (HIGH POTENCY IRON) 65 MG Oral Tab    Encounter for screening mammogram for malignant neoplasm of breast        Relevant Orders    [facility] BEN 2D+3D SCREENING BILAT (CPT=77067/55338)    Screening for cervical cancer        Relevant Medications    Ferrous Sulfate Dried (HIGH POTENCY IRON) 65 MG Oral Tab    Other Relevant Orders    ThinPrep PAP with HPV Reflex Request B            Cervical cancer screening  - discussion held with the patient about ASCCP guidelines  - repeat pap smear today   Health  maintenance  - encouraged to maintain weight at healthy BMI  - discussed importance of exercise and healthy eating  - self breast exam instructions provided   - bilateral screening mammogram recommendations discussed and order provided    - PMB precautions provided       Problem List Items Addressed This Visit    None  Visit Diagnoses       Well woman exam with routine gynecological exam    -  Primary    Relevant Medications    Ferrous Sulfate Dried (HIGH POTENCY IRON) 65 MG Oral Tab    Encounter for screening mammogram for malignant neoplasm of breast        Relevant Orders    [facility] BEN 2D+3D SCREENING BILAT (CPT=77067/68410)    Screening for cervical cancer        Relevant Medications    Ferrous Sulfate Dried (HIGH POTENCY IRON) 65 MG Oral Tab    Other Relevant Orders    ThinPrep PAP with HPV Reflex Request B                  All of the findings and plan were discussed with the patient.  She notes understanding and agrees with the plan of care.  All questions were answered to the best of my ability at this time.      RTC in 1 year for a well woman exam or sooner if needed     Ban Willis MD   [facility] - OBGYN      Discussed with patient that there will not be further notification of normal or benign results other than receiving results on [employer]. A [employer] message or telephone call will be placed by the physician and/or office staff if results are abnormal.       Note to patient and family   The 21st Century Cures Act makes medical notes available to patients in the interest of transparency.  However, please be advised that this is a medical document.  It is intended as [id number] communication.  It is written and medical language may contain abbreviations or verbiage that are technical and unfamiliar.  It may appear blunt or direct.  Medical documents are intended to carry relevant information, facts as evident, and the clinical opinion of the practitioner.      This note could include assistance by Dragon voice  recognition. Errors in content may be related to improper recognition by the system; efforts to review and correct have been done but errors may still exist.

## 2024-02-15 NOTE — TELEPHONE ENCOUNTER
Pt just spoke with [employer]. They told her no one has requested PA for her Amovig. Please advise, Pt's best call back number is [phone]. Endorsed to RN.

## 2024-02-16 ENCOUNTER — PATIENT MESSAGE (OUTPATIENT)
Dept: INTERNAL MEDICINE CLINIC | Facility: CLINIC | Age: 64
End: 2024-02-16

## 2024-02-16 DIAGNOSIS — J44.9 COPD, MILD (HCC): Primary | ICD-10-CM

## 2024-02-16 LAB
.: NORMAL
.: NORMAL

## 2024-02-16 NOTE — TELEPHONE ENCOUNTER
From: Soumya King  To: Rika Vinson  Sent: 2/16/2024 3:38 PM CST  Subject: Inhaler machine?    I'm wanting to use machine that I have at home which is for I'm not sure what you call it where you put the medicine in and inhale for a while. But I need the rx for the medicine. Can you call in a script for it please? I really think it'll help me with my breathing I'm still getting short of breath and I'm doing my Incruse inhaler every morning..  Thank you   Soumya

## 2024-02-19 LAB — HPV I/H RISK 1 DNA SPEC QL NAA+PROBE: NEGATIVE

## 2024-02-19 RX ORDER — ALBUTEROL SULFATE 2.5 MG/3ML
2.5 SOLUTION RESPIRATORY (INHALATION) EVERY 6 HOURS PRN
Qty: 50 ML | Refills: 0 | Status: SHIPPED | OUTPATIENT
Start: 2024-02-19 | End: 2024-03-04

## 2024-02-20 ENCOUNTER — LAB ENCOUNTER (OUTPATIENT)
Dept: LAB | Age: 64
End: 2024-02-20
Attending: INTERNAL MEDICINE
Payer: MEDICAID

## 2024-02-20 DIAGNOSIS — N17.9 AKI (ACUTE KIDNEY INJURY) (HCC): ICD-10-CM

## 2024-02-20 DIAGNOSIS — D64.9 ANEMIA, UNSPECIFIED TYPE: ICD-10-CM

## 2024-02-20 DIAGNOSIS — D64.9 ANEMIA, UNSPECIFIED TYPE: Primary | ICD-10-CM

## 2024-02-20 LAB
ANION GAP SERPL CALC-SCNC: 2 MMOL/L (ref 0–18)
BASOPHILS # BLD AUTO: 0.07 X10(3) UL (ref 0–0.2)
BASOPHILS NFR BLD AUTO: 0.4 %
BUN BLD-MCNC: 23 MG/DL (ref 9–23)
CALCIUM BLD-MCNC: 8.9 MG/DL (ref 8.5–10.1)
CHLORIDE SERPL-SCNC: 114 MMOL/L (ref 98–112)
CO2 SERPL-SCNC: 26 MMOL/L (ref 21–32)
CREAT BLD-MCNC: 0.89 MG/DL
DEPRECATED HBV CORE AB SER IA-ACNC: 63.6 NG/ML
EGFRCR SERPLBLD CKD-EPI 2021: 74 ML/MIN/1.73M2 (ref 60–?)
EOSINOPHIL # BLD AUTO: 0.5 X10(3) UL (ref 0–0.7)
EOSINOPHIL NFR BLD AUTO: 3 %
ERYTHROCYTE [DISTWIDTH] IN BLOOD BY AUTOMATED COUNT: 14.1 %
FASTING STATUS PATIENT QL REPORTED: YES
GLUCOSE BLD-MCNC: 167 MG/DL (ref 70–99)
HCT VFR BLD AUTO: 28.8 %
HGB BLD-MCNC: 9.5 G/DL
IMM GRANULOCYTES # BLD AUTO: 0.1 X10(3) UL (ref 0–1)
IMM GRANULOCYTES NFR BLD: 0.6 %
LYMPHOCYTES # BLD AUTO: 2.44 X10(3) UL (ref 1–4)
LYMPHOCYTES NFR BLD AUTO: 14.6 %
MCH RBC QN AUTO: 29.9 PG (ref 26–34)
MCHC RBC AUTO-ENTMCNC: 33 G/DL (ref 31–37)
MCV RBC AUTO: 90.6 FL
MONOCYTES # BLD AUTO: 1.04 X10(3) UL (ref 0.1–1)
MONOCYTES NFR BLD AUTO: 6.2 %
NEUTROPHILS # BLD AUTO: 12.51 X10 (3) UL (ref 1.5–7.7)
NEUTROPHILS # BLD AUTO: 12.51 X10(3) UL (ref 1.5–7.7)
NEUTROPHILS NFR BLD AUTO: 75.2 %
OSMOLALITY SERPL CALC.SUM OF ELEC: 301 MOSM/KG (ref 275–295)
PLATELET # BLD AUTO: 432 10(3)UL (ref 150–450)
POTASSIUM SERPL-SCNC: 3.7 MMOL/L (ref 3.5–5.1)
RBC # BLD AUTO: 3.18 X10(6)UL
SODIUM SERPL-SCNC: 142 MMOL/L (ref 136–145)
WBC # BLD AUTO: 16.7 X10(3) UL (ref 4–11)

## 2024-02-20 PROCEDURE — 36415 COLL VENOUS BLD VENIPUNCTURE: CPT

## 2024-02-20 PROCEDURE — 82728 ASSAY OF FERRITIN: CPT

## 2024-02-20 PROCEDURE — 85025 COMPLETE CBC W/AUTO DIFF WBC: CPT

## 2024-02-20 PROCEDURE — 80048 BASIC METABOLIC PNL TOTAL CA: CPT

## 2024-02-21 DIAGNOSIS — D64.9 ANEMIA, UNSPECIFIED TYPE: Primary | ICD-10-CM

## 2024-02-21 DIAGNOSIS — G62.9 NEUROPATHY: ICD-10-CM

## 2024-02-21 RX ORDER — GABAPENTIN 300 MG/1
600 CAPSULE ORAL NIGHTLY
Qty: 180 CAPSULE | Refills: 1 | Status: CANCELLED | OUTPATIENT
Start: 2024-02-21

## 2024-02-21 NOTE — PROGRESS NOTES
Dear RN, please let the patient know   1.  Kidney function is back to normal  2.  Her white blood cell count is still elevated and she is still noted to be anemic.  Has she noticed any blood in her stools or urine?  Her iron storage levels are in the normal range.  When she is sick recently or was she on any prednisone?  I would like her to repeat the test again before she sees me next week.  Rika Vinson DO

## 2024-02-22 NOTE — TELEPHONE ENCOUNTER
Pt requesting 600mg pills     Protocol PASS    Requesting: Gabapentin 600mg    LOV: 1/16/24  RTC: 6 weeks  Filled: gabapentin 300mg 1/16/24 180 capsule 1 refill  Recent Labs: 2/20/24    Upcoming OV   Future Appointments   Date Time Provider Department Center   2/23/2024  8:45 AM REF MOB REF [facility]OB [facility] Ref [facility]OB   2/26/2024  5:30 PM Rika Vinson DO [facility] 8 [facility] [place]   3/5/2024 11:30 AM Greyson Main MD  [facility] Pulm [facility] [place]   5/1/2024 10:40 AM Xiang Torres MD [facility] [place]   5/14/2024  1:30 PM Florecita Quintero DO [facility] None

## 2024-02-23 ENCOUNTER — LAB ENCOUNTER (OUTPATIENT)
Dept: LAB | Age: 64
End: 2024-02-23
Attending: INTERNAL MEDICINE
Payer: MEDICAID

## 2024-02-23 DIAGNOSIS — D72.829 LEUKOCYTOSIS, UNSPECIFIED TYPE: Primary | ICD-10-CM

## 2024-02-23 DIAGNOSIS — D64.9 ANEMIA, UNSPECIFIED TYPE: ICD-10-CM

## 2024-02-23 LAB
BASOPHILS # BLD AUTO: 0.08 X10(3) UL (ref 0–0.2)
BASOPHILS NFR BLD AUTO: 0.6 %
EOSINOPHIL # BLD AUTO: 0.58 X10(3) UL (ref 0–0.7)
EOSINOPHIL NFR BLD AUTO: 4.1 %
ERYTHROCYTE [DISTWIDTH] IN BLOOD BY AUTOMATED COUNT: 13.8 %
FOLATE SERPL-MCNC: 15.1 NG/ML (ref 8.7–?)
HCT VFR BLD AUTO: 28.6 %
HGB BLD-MCNC: 9.1 G/DL
IMM GRANULOCYTES # BLD AUTO: 0.07 X10(3) UL (ref 0–1)
IMM GRANULOCYTES NFR BLD: 0.5 %
LYMPHOCYTES # BLD AUTO: 2.19 X10(3) UL (ref 1–4)
LYMPHOCYTES NFR BLD AUTO: 15.4 %
MCH RBC QN AUTO: 28.6 PG (ref 26–34)
MCHC RBC AUTO-ENTMCNC: 31.8 G/DL (ref 31–37)
MCV RBC AUTO: 89.9 FL
MONOCYTES # BLD AUTO: 1.16 X10(3) UL (ref 0.1–1)
MONOCYTES NFR BLD AUTO: 8.2 %
NEUTROPHILS # BLD AUTO: 10.15 X10 (3) UL (ref 1.5–7.7)
NEUTROPHILS # BLD AUTO: 10.15 X10(3) UL (ref 1.5–7.7)
NEUTROPHILS NFR BLD AUTO: 71.2 %
PLATELET # BLD AUTO: 489 10(3)UL (ref 150–450)
RBC # BLD AUTO: 3.18 X10(6)UL
VIT B12 SERPL-MCNC: 808 PG/ML (ref 193–986)
WBC # BLD AUTO: 14.2 X10(3) UL (ref 4–11)

## 2024-02-23 PROCEDURE — 84165 PROTEIN E-PHORESIS SERUM: CPT

## 2024-02-23 PROCEDURE — 82746 ASSAY OF FOLIC ACID SERUM: CPT

## 2024-02-23 PROCEDURE — 85025 COMPLETE CBC W/AUTO DIFF WBC: CPT

## 2024-02-23 PROCEDURE — 36415 COLL VENOUS BLD VENIPUNCTURE: CPT

## 2024-02-23 PROCEDURE — 82607 VITAMIN B-12: CPT

## 2024-02-23 PROCEDURE — 83521 IG LIGHT CHAINS FREE EACH: CPT

## 2024-02-23 PROCEDURE — 86334 IMMUNOFIX E-PHORESIS SERUM: CPT

## 2024-02-25 ENCOUNTER — PATIENT MESSAGE (OUTPATIENT)
Dept: OBGYN CLINIC | Facility: CLINIC | Age: 64
End: 2024-02-25

## 2024-02-25 ENCOUNTER — PATIENT MESSAGE (OUTPATIENT)
Facility: CLINIC | Age: 64
End: 2024-02-25

## 2024-02-26 ENCOUNTER — OFFICE VISIT (OUTPATIENT)
Dept: INTERNAL MEDICINE CLINIC | Facility: CLINIC | Age: 64
End: 2024-02-26
Payer: MEDICAID

## 2024-02-26 VITALS
HEIGHT: 61 IN | DIASTOLIC BLOOD PRESSURE: 62 MMHG | HEART RATE: 86 BPM | OXYGEN SATURATION: 96 % | RESPIRATION RATE: 16 BRPM | SYSTOLIC BLOOD PRESSURE: 118 MMHG | WEIGHT: 128 LBS | BODY MASS INDEX: 24.17 KG/M2 | TEMPERATURE: 98 F

## 2024-02-26 DIAGNOSIS — E78.5 TYPE 2 DIABETES MELLITUS WITH HYPERLIPIDEMIA (HCC): ICD-10-CM

## 2024-02-26 DIAGNOSIS — J44.9 COPD, MILD (HCC): ICD-10-CM

## 2024-02-26 DIAGNOSIS — I10 PRIMARY HYPERTENSION: ICD-10-CM

## 2024-02-26 DIAGNOSIS — R91.1 PULMONARY NODULE: ICD-10-CM

## 2024-02-26 DIAGNOSIS — K22.70 BARRETT'S ESOPHAGUS WITHOUT DYSPLASIA: ICD-10-CM

## 2024-02-26 DIAGNOSIS — G47.33 OSA ON CPAP: ICD-10-CM

## 2024-02-26 DIAGNOSIS — Z00.00 ROUTINE PHYSICAL EXAMINATION: Primary | ICD-10-CM

## 2024-02-26 DIAGNOSIS — F41.1 GAD (GENERALIZED ANXIETY DISORDER): ICD-10-CM

## 2024-02-26 DIAGNOSIS — E11.69 TYPE 2 DIABETES MELLITUS WITH HYPERLIPIDEMIA (HCC): ICD-10-CM

## 2024-02-26 DIAGNOSIS — F33.1 MDD (MAJOR DEPRESSIVE DISORDER), RECURRENT EPISODE, MODERATE (HCC): ICD-10-CM

## 2024-02-26 DIAGNOSIS — I25.10 CAD, MULTIPLE VESSEL: ICD-10-CM

## 2024-02-26 DIAGNOSIS — G43.009 MIGRAINE WITHOUT AURA AND WITHOUT STATUS MIGRAINOSUS, NOT INTRACTABLE: ICD-10-CM

## 2024-02-26 DIAGNOSIS — D64.9 ANEMIA, UNSPECIFIED TYPE: ICD-10-CM

## 2024-02-26 DIAGNOSIS — E11.40 TYPE 2 DIABETES MELLITUS WITH DIABETIC NEUROPATHY, WITHOUT LONG-TERM CURRENT USE OF INSULIN (HCC): ICD-10-CM

## 2024-02-26 DIAGNOSIS — M54.50 CHRONIC BILATERAL LOW BACK PAIN WITHOUT SCIATICA: ICD-10-CM

## 2024-02-26 DIAGNOSIS — R06.09 DOE (DYSPNEA ON EXERTION): ICD-10-CM

## 2024-02-26 DIAGNOSIS — Z71.6 ENCOUNTER FOR TOBACCO USE CESSATION COUNSELING: ICD-10-CM

## 2024-02-26 DIAGNOSIS — G89.29 CHRONIC BILATERAL LOW BACK PAIN WITHOUT SCIATICA: ICD-10-CM

## 2024-02-26 PROBLEM — K21.9 GERD (GASTROESOPHAGEAL REFLUX DISEASE): Status: RESOLVED | Noted: 2018-05-01 | Resolved: 2024-02-26

## 2024-02-26 PROBLEM — N39.44 NOCTURNAL ENURESIS: Status: RESOLVED | Noted: 2021-02-15 | Resolved: 2024-02-26

## 2024-02-26 PROBLEM — K44.9 HIATAL HERNIA WITHOUT GANGRENE AND OBSTRUCTION: Status: RESOLVED | Noted: 2018-07-02 | Resolved: 2024-02-26

## 2024-02-26 PROBLEM — M17.12 PRIMARY OSTEOARTHRITIS OF LEFT KNEE: Status: RESOLVED | Noted: 2021-12-28 | Resolved: 2024-02-26

## 2024-02-26 PROCEDURE — 99396 PREV VISIT EST AGE 40-64: CPT | Performed by: INTERNAL MEDICINE

## 2024-02-26 RX ORDER — GABAPENTIN 600 MG/1
600 TABLET ORAL NIGHTLY
Qty: 180 TABLET | Refills: 0 | Status: SHIPPED | OUTPATIENT
Start: 2024-02-26

## 2024-02-26 RX ORDER — AZITHROMYCIN 250 MG/1
TABLET, FILM COATED ORAL
Qty: 6 TABLET | Refills: 0 | Status: SHIPPED | OUTPATIENT
Start: 2024-02-26 | End: 2024-03-01

## 2024-02-26 NOTE — TELEPHONE ENCOUNTER
Pt called back, message read to pt. She will schedule PFT and call once completed.    Pt cannot get PFT until 3/18

## 2024-02-26 NOTE — TELEPHONE ENCOUNTER
Pt called. States she's been experiencing dyspnea when walking and doing little things. Pt unsure if should have PFT done now. Pt advised to hold off getting PFT done now as MD would like pt's to wait 4-5 weeks after symptoms resolve. Pt hasn't been able to produce enough sputum for sputum culture. Pt states she had heart surgery in December and the dyspnea began after surgery. Pt has followed up with cardiology and surgery and was told her dyspnea is not related to her surgery. Pt advised to invest in a pulse oximeter device to monitor oxygen saturation when she's experiencing SOB. Pt also advised if dyspnea persists she should go to the emergency room. Pt verbalized understanding.

## 2024-02-26 NOTE — TELEPHONE ENCOUNTER
Pt called, unable to leave VM. MCM sent to pt notifying of APRN's advise to get PFT done now. Dr. Main's instructions on PFT were also included. Pt advised to notify our office when she gets them done so we can look out for results.

## 2024-02-26 NOTE — TELEPHONE ENCOUNTER
MCM from pt:  Is that supposed to be repeat CT scan in February of 2025?   Pt seen by Dr. Main on 11-30-23.  Per OV notes:  Obtain a repeat CT chest scan in February 2024   Pt notified that she should have the CT scan in February or at least before her appt with Dr. Main on 3-20-24 as February is almost over.  Pt verbalizes understanding.

## 2024-02-26 NOTE — TELEPHONE ENCOUNTER
From: Soumya King  To: Greyson Main  Sent: 2/25/2024 11:18 AM CST  Subject: Pulmonary Function Test     Your nurse had contacted me about the PFT test and sputum culture. I told her that I hadn't done them as I had a heart attack and a double bypass surgery. But I'm thinking about it and I've been having some problems breathing and would at least take the PFT test. What do you think? I haven't had a lot of phelm to do the culture unfortunately. Let me ASAP so that I can try and schedule before my appointment on 3/5/24  Thank you   Soumya

## 2024-02-27 ENCOUNTER — PATIENT MESSAGE (OUTPATIENT)
Dept: OBGYN CLINIC | Facility: CLINIC | Age: 64
End: 2024-02-27

## 2024-02-27 LAB
ALBUMIN SERPL ELPH-MCNC: 3.14 G/DL (ref 3.75–5.21)
ALBUMIN/GLOB SERPL: 0.91 {RATIO} (ref 1–2)
ALPHA1 GLOB SERPL ELPH-MCNC: 0.5 G/DL (ref 0.19–0.46)
ALPHA2 GLOB SERPL ELPH-MCNC: 1.14 G/DL (ref 0.48–1.05)
B-GLOBULIN SERPL ELPH-MCNC: 0.94 G/DL (ref 0.68–1.23)
GAMMA GLOB SERPL ELPH-MCNC: 0.87 G/DL (ref 0.62–1.7)
KAPPA LC FREE SER-MCNC: 5.62 MG/DL (ref 0.33–1.94)
KAPPA LC FREE/LAMBDA FREE SER NEPH: 1.83 {RATIO} (ref 0.26–1.65)
LAMBDA LC FREE SERPL-MCNC: 3.07 MG/DL (ref 0.57–2.63)
M PROTEIN 1 SERPL ELPH-MCNC: 0.17 G/DL (ref ?–0)
PROT SERPL-MCNC: 6.6 G/DL (ref 5.7–8.2)

## 2024-02-27 NOTE — TELEPHONE ENCOUNTER
From: Soumya King  To: Ban Willis  Sent: 2/25/2024 1:59 PM CST  Subject: Bladder/kidney infection     Hi Dr. Willis, I've been going to the bathroom a lot lately and it's not burning and no discharge but it hurts when I go. I had bloodwork done on Tuesday the 20th and they noticed my white blood cell count was high and so I had to redo the bloodwork on Friday. I see Dr. Vinson on Monday and will know the results. They were wondering if I was fighting off an infection. Could it be a bladder or kidney infection? Can you order a urine test for me if you think that's what it could be?  Thank you   Soumya

## 2024-02-28 NOTE — TELEPHONE ENCOUNTER
From: Soumya King  To: Ban Willis  Sent: 2/27/2024 4:31 PM CST  Subject: Reply to your message     Um yeah you said you haven't seen me since last May? I was just in there on February 12th. You need to check your records closer! ! I had my yearly exam done! And it's actually gotten better so never mind! But I'm glad you didn't even want to help me with it! And yes I'm upset! Not with the doctor but with Kamilah who responded! Check your information better before responding from now on!!!

## 2024-03-05 ENCOUNTER — OFFICE VISIT (OUTPATIENT)
Dept: HEMATOLOGY/ONCOLOGY | Facility: HOSPITAL | Age: 64
End: 2024-03-05
Attending: INTERNAL MEDICINE
Payer: MEDICAID

## 2024-03-05 VITALS
RESPIRATION RATE: 16 BRPM | OXYGEN SATURATION: 98 % | BODY MASS INDEX: 24 KG/M2 | DIASTOLIC BLOOD PRESSURE: 72 MMHG | SYSTOLIC BLOOD PRESSURE: 117 MMHG | HEART RATE: 68 BPM | TEMPERATURE: 98 F | WEIGHT: 125 LBS

## 2024-03-05 DIAGNOSIS — D64.9 ANEMIA, UNSPECIFIED TYPE: ICD-10-CM

## 2024-03-05 DIAGNOSIS — R91.1 PULMONARY NODULE: ICD-10-CM

## 2024-03-05 DIAGNOSIS — D47.2 MGUS (MONOCLONAL GAMMOPATHY OF UNKNOWN SIGNIFICANCE): ICD-10-CM

## 2024-03-05 DIAGNOSIS — D50.8 IRON DEFICIENCY ANEMIA SECONDARY TO INADEQUATE DIETARY IRON INTAKE: Primary | ICD-10-CM

## 2024-03-05 LAB
DEPRECATED HBV CORE AB SER IA-ACNC: 33 NG/ML
IGA SERPL-MCNC: 291 MG/DL (ref 70–312)
IGM SERPL-MCNC: 135 MG/DL (ref 43–279)
IMMUNOGLOBULIN PNL SER-MCNC: 979 MG/DL (ref 791–1643)
IRON SATN MFR SERPL: 11 %
IRON SERPL-MCNC: 35 UG/DL
TIBC SERPL-MCNC: 317 UG/DL (ref 240–450)
TRANSFERRIN SERPL-MCNC: 213 MG/DL (ref 200–360)

## 2024-03-05 PROCEDURE — 99205 OFFICE O/P NEW HI 60 MIN: CPT | Performed by: INTERNAL MEDICINE

## 2024-03-05 NOTE — PROGRESS NOTES
Education Record    Learner:  Patient    Disease / Diagnosis:    Barriers / Limitations:  None   Comments:    Method:  Discussion   Comments:    General Topics:  Medication, Side effects and symptom management, and Plan of care reviewed   Comments:    Outcome:  Shows understanding   Comments:    Here for new consult- anemia and leukocytosis. Feeling short of breath. Currently does not have any pain. Follows with pulm for breathing. Current every day vape user. No longer taking iron pill- took for 1 month. Her MD has noticed her labs changing since her double bypass surgery in 12/2023.

## 2024-03-05 NOTE — PROGRESS NOTES
Hematology/Oncology Initial Consultation Note    Patient Name: Soumya King  Medical Record Number: [id number]    YOB: 1960   Date of Consultation: 3/5/2024   PCP: Rika Vinson DO    Reason for Consultation:  Soumya King was seen today for the diagnosis of anemia    History of Present Illness:      64 y/o F PMH migraines, CAD s/p 2vCABG 12/2023 who presents for evaluation of anemia.    - 2/20/24 labs with elevated WBC of 14.2 with neutrophilic predominance, normocytic anemia of 9.1 g/dL, Plt 489k  - ferritin of 63.6  - monoclonal protein study with elevated kappa FLC 5.624mg/dL, K/L FLC ratio 1.83. M-spike of 0.17 IgM kappa monoclonal protein. No hypercalcemia, normal renal function  - reports that she chronically vapes, does not smoke cigarettes anymore, used to smoke since age of 16. Says she is thinking of quitting vaping but it is difficult because she uses it to deal with stressors in her life  - last EGD/colonoscopy was done 5/2022, no evidence of GI bleeding     Past Medical History:  Past Medical History:   Diagnosis Date    Abdominal pain, other specified site 09/21/2011    groin    Acute bronchitis 09/21/2011    Allergic rhinitis 2010    Anxiety 2009    Arthritis     Back problem     Jamison esophagus     Chronic low back pain without sciatica     COPD (chronic obstructive pulmonary disease) (HCC)     Depression     Diabetes (HCC)     Difficult intubation     difficult to intubate with bladder surgery,instructed by anesthesia to communicate to future anesthesiologist. Small airway    Esophageal reflux     Essential hypertension     Don't remember    Fitting and adjustment of dental prosthetic device 04/26/2011    High blood pressure     History of 2019 novel coronavirus disease (COVID-19) 09/2020    No hospitalization,symptoms; Fever,fatigue ,body aches,headache no loss of taste or smell    Hx of motion sickness     Hyperlipidemia 2012    Mass of spine     Migraines      barometric pressure    Myocardial infarction (HCC) 12/21/23    Osteoarthritis     Other ill-defined conditions(799.89)     Jamison's    Pain in joint, forearm 04/26/2011    wrist    Pain in joint, pelvic region and thigh 10/07/2011    hip    Pelvic mass     Personal history of urinary (tract) infection 12/09/2011    Sleep apnea     I don't date       Past Surgical History:   Procedure Laterality Date    CABG  12/27/23    COLONOSCOPY      COLONOSCOPY N/A 05/18/2022    Procedure: COLONOSCOPY AND ESOPHAGOGASTRODUODENOSCOPY;  Surgeon: Miguel Camargo MD;  Location:  ENDOSCOPY    HEART SURGERY  2024    double bypass    LAPAROSCOPIC  2007    sling operation for stress incontinence    LAPAROSCOPY,DIAGNOSTIC      ANGEL BIOPSY STEREO NODULE 2 SITE BILAT (CPT=19081/09906) Left 02/2010    BENIGN 2 SITES    ANGEL BIOPSY STEREO W/CALC 2 SITE LEFT (CPT=19081/15776)  02/2010    benign x 2    ANGEL STEREO-CLIP W/CALC 2 SITE LEFT  2010    OOPHORECTOMY Left 06/28/2019    OTHER  08/31/2022    RIGHT SACRAL SUBCUTANEOUS CYST EXCISION    OTHER SURGICAL HISTORY  2000, 2019    Bladder Sling, Large mass removed from left abdomen and ovar    TONSILLECTOMY      Was a child, have no idea    TUBAL LIGATION      UPPER GI ENDOSCOPY,EXAM         Home Medications:   gabapentin 600 MG Oral Tab Take 1 tablet (600 mg total) by mouth at bedtime. 180 tablet 0    Dulaglutide (TRULICITY) 0.75 MG/0.5ML Subcutaneous Solution Pen-injector Inject 0.75 mg into the skin once a week. 2 mL 0    Erenumab-aooe (AIMOVIG) 140 MG/ML Subcutaneous Solution Auto-injector Inject 1 mL (140 mg total) into the skin every 30 (thirty) days. 1 mL 4    HYDROcodone-acetaminophen 5-325 MG Oral Tab Take 1 tablet by mouth every 8 (eight) hours as needed for Pain.      umeclidinium bromide (INCRUSE ELLIPTA) 62.5 MCG/ACT Inhalation Aerosol Powder, Breath Activated Inhale 1 puff into the lungs daily. 3 each 0    clopidogrel 75 MG Oral Tab Take 1 tablet (75 mg total) by mouth daily.       aspirin 81 MG Oral Tab EC Take 1 tablet (81 mg total) by mouth daily.      atorvastatin 80 MG Oral Tab Take 1 tablet (80 mg total) by mouth nightly. 90 tablet 1    metFORMIN 500 MG Oral Tab Take 1 tablet (500 mg total) by mouth 2 (two) times daily. 180 tablet 1    furosemide 20 MG Oral Tab Take 1 tablet (20 mg total) by mouth daily. 7 tablet 0    clonazePAM 0.5 MG Oral Tab Take 1 tablet (0.5 mg total) by mouth 2 (two) times daily. 12 tablet 0    topiramate 50 MG Oral Tab TAKE ONE TABLET BY MOUTH EVERY MORNING AND TWO TABLETS EVERY EVENING 270 tablet 0    Metoclopramide HCl 5 MG/5ML Oral Solution Take 5 mL (5 mg total) by mouth 3 (three) times daily before meals. 473 mL 0    cyclobenzaprine 10 MG Oral Tab Take 1 tablet (10 mg total) by mouth every 12 (twelve) hours as needed for Muscle spasms. 60 tablet 1    diclofenac 75 MG Oral Tab EC Take 1 tablet (75 mg total) by mouth 2 (two) times daily. 180 tablet 3    Omeprazole 40 MG Oral Capsule Delayed Release Take 1 capsule (40 mg total) by mouth 2 (two) times daily. 180 capsule 1    metoprolol succinate ER 50 MG Oral Tablet 24 Hr Take 1 tablet (50 mg total) by mouth daily. 90 tablet 1    Rizatriptan Benzoate 10 MG Oral Tab Take 1 tablet (10 mg total) by mouth as needed for Migraine. 10 tablet 5    Lancets (ONETOUCH DELICA PLUS [id number]) Does not apply Misc 1 lancet  by Finger stick route 3 (three) times daily. 300 each 1    Glucose Blood (ONETOUCH ULTRA) In Vitro Strip Tests 3 x daily 300 each 1    estradiol (ESTRACE) 0.1 MG/GM Vaginal Cream Apply 1/2 gram vaginally 2-3 times per week. 42.5 g 3    Mirabegron ER (MYRBETRIQ) 50 MG Oral Tablet 24 Hr Take 1 tablet (50 mg total) by mouth daily. 30 tablet 11    lidocaine 5 % External Ointment Apply 1 Application. topically 3 (three) times daily as needed. 240 g 1    Multiple Vitamins-Minerals (ONE-A-DAY WOMENS 50+) Oral Tab Take 1 tablet by mouth daily.  0    QUEtiapine Fumarate  MG Oral Tablet 24 Hr Take 1 tablet  (200 mg total) by mouth every evening. 30 tablet 0    Calcium Carb-Cholecalciferol 500-200 MG-UNIT Oral Tab Take 1 tablet by mouth daily.         -------  Current Outpatient Medications on File Prior to Visit   Medication Sig Dispense Refill    gabapentin 600 MG Oral Tab Take 1 tablet (600 mg total) by mouth at bedtime. 180 tablet 0    Dulaglutide (TRULICITY) 0.75 MG/0.5ML Subcutaneous Solution Pen-injector Inject 0.75 mg into the skin once a week. 2 mL 0    Erenumab-aooe (AIMOVIG) 140 MG/ML Subcutaneous Solution Auto-injector Inject 1 mL (140 mg total) into the skin every 30 (thirty) days. 1 mL 4    HYDROcodone-acetaminophen 5-325 MG Oral Tab Take 1 tablet by mouth every 8 (eight) hours as needed for Pain.      umeclidinium bromide (INCRUSE ELLIPTA) 62.5 MCG/ACT Inhalation Aerosol Powder, Breath Activated Inhale 1 puff into the lungs daily. 3 each 0    clopidogrel 75 MG Oral Tab Take 1 tablet (75 mg total) by mouth daily.      aspirin 81 MG Oral Tab EC Take 1 tablet (81 mg total) by mouth daily.      atorvastatin 80 MG Oral Tab Take 1 tablet (80 mg total) by mouth nightly. 90 tablet 1    metFORMIN 500 MG Oral Tab Take 1 tablet (500 mg total) by mouth 2 (two) times daily. 180 tablet 1    furosemide 20 MG Oral Tab Take 1 tablet (20 mg total) by mouth daily. 7 tablet 0    clonazePAM 0.5 MG Oral Tab Take 1 tablet (0.5 mg total) by mouth 2 (two) times daily. 12 tablet 0    topiramate 50 MG Oral Tab TAKE ONE TABLET BY MOUTH EVERY MORNING AND TWO TABLETS EVERY EVENING 270 tablet 0    Metoclopramide HCl 5 MG/5ML Oral Solution Take 5 mL (5 mg total) by mouth 3 (three) times daily before meals. 473 mL 0    cyclobenzaprine 10 MG Oral Tab Take 1 tablet (10 mg total) by mouth every 12 (twelve) hours as needed for Muscle spasms. 60 tablet 1    diclofenac 75 MG Oral Tab EC Take 1 tablet (75 mg total) by mouth 2 (two) times daily. 180 tablet 3    Omeprazole 40 MG Oral Capsule Delayed Release Take 1 capsule (40 mg total) by mouth 2  (two) times daily. 180 capsule 1    metoprolol succinate ER 50 MG Oral Tablet 24 Hr Take 1 tablet (50 mg total) by mouth daily. 90 tablet 1    Rizatriptan Benzoate 10 MG Oral Tab Take 1 tablet (10 mg total) by mouth as needed for Migraine. 10 tablet 5    Lancets (ONETOUCH DELICA PLUS [id number]) Does not apply Misc 1 lancet  by Finger stick route 3 (three) times daily. 300 each 1    Glucose Blood (ONETOUCH ULTRA) In Vitro Strip Tests 3 x daily 300 each 1    estradiol (ESTRACE) 0.1 MG/GM Vaginal Cream Apply 1/2 gram vaginally 2-3 times per week. 42.5 g 3    Mirabegron ER (MYRBETRIQ) 50 MG Oral Tablet 24 Hr Take 1 tablet (50 mg total) by mouth daily. 30 tablet 11    lidocaine 5 % External Ointment Apply 1 Application. topically 3 (three) times daily as needed. 240 g 1    Multiple Vitamins-Minerals (ONE-A-DAY WOMENS 50+) Oral Tab Take 1 tablet by mouth daily.  0    QUEtiapine Fumarate  MG Oral Tablet 24 Hr Take 1 tablet (200 mg total) by mouth every evening. 30 tablet 0    Calcium Carb-Cholecalciferol 500-200 MG-UNIT Oral Tab Take 1 tablet by mouth daily.        busPIRone 10 MG Oral Tab Take 2 tablets (20 mg total) by mouth daily.       Current Facility-Administered Medications on File Prior to Visit   Medication Dose Route Frequency Provider Last Rate Last Admin    [COMPLETED] sodium chloride 0.9% infusion 1,000 mL  1,000 mL Intravenous Once Norma Carr APRN   Stopped at 02/08/24 1523       Allergies:   Allergies   Allergen Reactions    Naprosyn [Naproxen] ANAPHYLAXIS     Has tolerated ibuprofen and IV toradol     Aspirin OTHER (SEE COMMENTS)     Difficulty swallowing due to  saavedra's esophagus       Psychosocial History:  Social History     Social History Narrative    Not on file     Social History     Socioeconomic History    Marital status: Single   Tobacco Use    Smoking status: Former     Packs/day: 0.50     Years: 35.00     Additional pack years: 0.00     Total pack years: 17.50     Types:  Cigarettes    Smokeless tobacco: Never    Tobacco comments:     smoking JUUL  noiw   Vaping Use    Vaping Use: Every day    Substances: Nicotine, Flavoring    Devices: Disposable, Pre-filled pod   Substance and Sexual Activity    Alcohol use: Not Currently     Comment: On occasion    Drug use: No    Sexual activity: Not Currently     Partners: Male   Other Topics Concern    Caffeine Concern Yes    Stress Concern Yes    Weight Concern Yes    Special Diet No    Exercise No    Seat Belt Yes     Social Determinants of Health     Food Insecurity: No Food Insecurity (12/21/2023)    Food Insecurity     Food Insecurity: Never true   Transportation Needs: No Transportation Needs (12/21/2023)    Transportation Needs     Lack of Transportation: No   Housing Stability: Low Risk  (12/21/2023)    Housing Stability     Housing Instability: No       Family Medical History:  Family History   Problem Relation Age of Onset    Arthritis Mother     Other (AMI) Mother     Other (diabetes mellitus) Mother     Diabetes Mother        Review of Systems:  A 10-point ROS was done with pertinent positives and negative per the HPI    Vital Signs:  Height: --  Weight: 56.7 kg (125 lb) (03/05 1309)  BSA (Calculated - sq m): --  Pulse: 68 (03/05 1309)  BP: 117/72 (03/05 1309)  Temp: 97.9 °F (36.6 °C) (03/05 1309)  Do Not Use - Resp Rate: --  SpO2: 98 % (03/05 1309)    Wt Readings from Last 6 Encounters:   03/05/24 56.7 kg (125 lb)   02/26/24 58.1 kg (128 lb)   02/12/24 57.8 kg (127 lb 6 oz)   02/08/24 54.4 kg (120 lb)   02/01/24 55.3 kg (122 lb)   01/16/24 56.9 kg (125 lb 6.4 oz)       Physical Examination:  General: Patient is alert and oriented, not in acute distress  Psych: Mood and affect are appropriate  Eyes: EOMI, PERRL  ENT: Oropharynx is clear, no adenopathy  CV:  no LE edema  Respiratory: Non-labored respirations  GI/Abd: Soft, non-tender, no appreciable splenomegaly  Neurological: Grossly intact   Lymphatics: No palpable cervical,  supraclavicular, axillary, or inguinal lymphadenopathy  Skin: no rashes or petechiae    Laboratory:  Lab Results   Component Value Date    WBC 14.2 (H) 02/23/2024    WBC 16.7 (H) 02/20/2024    WBC 13.0 (H) 01/15/2024    HGB 9.1 (L) 02/23/2024    HGB 9.5 (L) 02/20/2024    HGB 10.8 (L) 01/15/2024    HCT 28.6 (L) 02/23/2024    MCV 89.9 02/23/2024    MCH 28.6 02/23/2024    MCHC 31.8 02/23/2024    RDW 13.8 02/23/2024    .0 (H) 02/23/2024    .0 02/20/2024    .0 (H) 01/15/2024     Lab Results   Component Value Date     (H) 02/20/2024    BUN 23 02/20/2024    BUNCREA 14.5 02/26/2021    CREATSERUM 0.89 02/20/2024    CREATSERUM 1.10 (H) 01/15/2024    CREATSERUM 0.69 01/02/2024    ANIONGAP 2 02/20/2024    GFR 99 09/16/2017    GFRNAA 81 05/25/2022    GFRAA 93 05/25/2022    CA 8.9 02/20/2024    OSMOCALC 301 (H) 02/20/2024    ALKPHO 94 12/21/2023    AST 29 01/15/2024    ALT 36 01/15/2024    BILT 0.2 12/21/2023    TP 6.6 02/23/2024    ALB 3.14 (L) 02/23/2024    GLOBULIN 3.7 12/21/2023    AGRATIO 1.5 01/23/2006     02/20/2024    K 3.7 02/20/2024     (H) 02/20/2024    CO2 26.0 02/20/2024     Lab Results   Component Value Date    PTT 28.4 12/27/2023    INR 1.28 (H) 12/27/2023         Impression & Plan:     Iron deficiency anemia  - iron studies consistent with iron deficiency anemia  - schedule 1000mg IV iron dextran, close monitoring in infusion for reaction  - repeat CBC/iron/tibc/ferritin in 3 months to reassess iron stores    Leukocytosis  - with neutrophilic and monocytic predominance  - review of labs dating back to 2006 shows the same pattern; secondary to chronic inflammation    IgM kappa MGUS  - anemia likely 2/2 iron deficiency, no hypercalcemia, normal renal function  - evaluate for bone lesions on CT whole body  - given low level MGUS, if CT whole body negative, discussed risk of progression to plasma cell dyscrasia 1% each year, annual surveillance at this point    Pulmonary  nodules  - recommended total cessation of vaping, given hx of CAD s/p CABG as well    F/u: 1 year    Scott Corona MD  Hematology/Medical Oncology  [facility]

## 2024-03-07 DIAGNOSIS — E11.40 TYPE 2 DIABETES MELLITUS WITH DIABETIC NEUROPATHY, WITHOUT LONG-TERM CURRENT USE OF INSULIN (HCC): ICD-10-CM

## 2024-03-07 NOTE — TELEPHONE ENCOUNTER
Protocol PASS    Requesting:Dulaglutide (TRULICITY) 0.75 MG/0.5ML Subcutaneous Solution Pen-injector      LOV: 2/26/24  RTC: after seeing specialist   Filled: 2/6/24 2mL 0 refill  Recent Labs: 2/23/24    Upcoming OV NONE  Future Appointments   Date Time Provider Department Center   3/9/2024 12:00 PM  CT MAIN RM1  CT [facility]   3/14/2024  1:00 PM EMH PFT ROOM EM RT [facility] [place]   3/15/2024 10:30 AM  TX RN1  CHEMO [facility]   3/20/2024 12:00 PM Greyson Main MD  [facility] Pulm EM[facility] [place]   3/25/2024  8:00 AM  CT MAIN RM3  CT [facility]   5/1/2024 10:40 AM Xiang Torres MD [facility] EMG [place]   5/14/2024  1:30 PM Florecita Quintero DO [facility] None   3/7/2025 11:45 AM Scott Corona MD  HEM ONC [facility]

## 2024-03-08 RX ORDER — DULAGLUTIDE 0.75 MG/.5ML
0.75 INJECTION, SOLUTION SUBCUTANEOUS WEEKLY
Qty: 2 ML | Refills: 0 | Status: SHIPPED | OUTPATIENT
Start: 2024-03-08

## 2024-03-10 DIAGNOSIS — Z95.1 S/P CABG X 2: ICD-10-CM

## 2024-03-10 DIAGNOSIS — G43.009 MIGRAINE WITHOUT AURA AND WITHOUT STATUS MIGRAINOSUS, NOT INTRACTABLE: Primary | ICD-10-CM

## 2024-03-10 DIAGNOSIS — G89.29 CHRONIC BILATERAL LOW BACK PAIN WITHOUT SCIATICA: ICD-10-CM

## 2024-03-10 DIAGNOSIS — M54.50 CHRONIC BILATERAL LOW BACK PAIN WITHOUT SCIATICA: ICD-10-CM

## 2024-03-11 ENCOUNTER — PATIENT MESSAGE (OUTPATIENT)
Dept: RHEUMATOLOGY | Facility: CLINIC | Age: 64
End: 2024-03-11

## 2024-03-11 DIAGNOSIS — G43.009 MIGRAINE WITHOUT AURA AND WITHOUT STATUS MIGRAINOSUS, NOT INTRACTABLE: Primary | ICD-10-CM

## 2024-03-11 DIAGNOSIS — G89.29 CHRONIC BILATERAL LOW BACK PAIN WITHOUT SCIATICA: ICD-10-CM

## 2024-03-11 DIAGNOSIS — M54.50 CHRONIC BILATERAL LOW BACK PAIN WITHOUT SCIATICA: ICD-10-CM

## 2024-03-11 RX ORDER — TRAMADOL HYDROCHLORIDE 50 MG/1
TABLET ORAL EVERY 6 HOURS PRN
Qty: 120 TABLET | Refills: 0 | Status: SHIPPED | OUTPATIENT
Start: 2024-03-11

## 2024-03-11 RX ORDER — HYDROCODONE BITARTRATE AND ACETAMINOPHEN 5; 325 MG/1; MG/1
1 TABLET ORAL EVERY 8 HOURS PRN
Qty: 30 TABLET | Refills: 0 | Status: SHIPPED | OUTPATIENT
Start: 2024-03-11

## 2024-03-11 NOTE — TELEPHONE ENCOUNTER
Future Appointments   Date Time Provider Department Center   3/13/2024  9:00 PM PF CT RM1 PF CT [place]   3/14/2024  1:00 PM EMH PFT ROOM EMH RT EM [place]   3/15/2024 10:30 AM  TX RN1 EH CHEMO [facility]   3/20/2024 12:00 PM Greyson Main MD  [facility] Pulm EM[facility] [place]   3/25/2024  8:00 AM  CT MAIN RM3 EH CT [facility]   5/1/2024 10:40 AM Xiang Torres MD [facility] EMG [place]   5/14/2024  1:30 PM Florecita Quintero DO [facility] None   3/7/2025 11:45 AM Scott Corona MD  HEM ONC [facility]     Last office visit: 2/1/2024    Last fill: 8/2/2023 120 tab, 2 refills

## 2024-03-11 NOTE — TELEPHONE ENCOUNTER
Future Appointments   Date Time Provider Department Center   3/13/2024  9:00 PM PF CT RM1 PF CT [place]   3/14/2024  1:00 PM EMH PFT ROOM EMH RT EM [place]   3/15/2024 10:30 AM  TX RN1 EH CHEMO [facility]   3/20/2024 12:00 PM Greyson Main MD  E[facility] Pulm EMG [place]   3/25/2024  8:00 AM  CT MAIN RM3 EH CT [facility]   5/1/2024 10:40 AM Xiang Torres MD [facility] EMG [place]   5/14/2024  1:30 PM Florecita Quintero DO [place] None   3/7/2025 11:45 AM Scott Corona MD  HEM ONC [facility]

## 2024-03-13 ENCOUNTER — HOSPITAL ENCOUNTER (OUTPATIENT)
Dept: CT IMAGING | Age: 64
Discharge: HOME | End: 2024-03-13
Attending: INTERNAL MEDICINE
Payer: MEDICAID

## 2024-03-13 DIAGNOSIS — R91.8 LUNG NODULES: ICD-10-CM

## 2024-03-13 DIAGNOSIS — R05.2 SUBACUTE COUGH: ICD-10-CM

## 2024-03-13 PROCEDURE — 71250 CT THORAX DX C-: CPT | Performed by: INTERNAL MEDICINE

## 2024-03-15 ENCOUNTER — OFFICE VISIT (OUTPATIENT)
Dept: HEMATOLOGY/ONCOLOGY | Facility: HOSPITAL | Age: 64
End: 2024-03-15
Attending: INTERNAL MEDICINE
Payer: MEDICAID

## 2024-03-15 VITALS
DIASTOLIC BLOOD PRESSURE: 70 MMHG | RESPIRATION RATE: 16 BRPM | OXYGEN SATURATION: 94 % | SYSTOLIC BLOOD PRESSURE: 104 MMHG | TEMPERATURE: 97 F | HEART RATE: 81 BPM

## 2024-03-15 DIAGNOSIS — D50.8 IRON DEFICIENCY ANEMIA SECONDARY TO INADEQUATE DIETARY IRON INTAKE: Primary | ICD-10-CM

## 2024-03-15 PROCEDURE — 96365 THER/PROPH/DIAG IV INF INIT: CPT

## 2024-03-15 PROCEDURE — 96376 TX/PRO/DX INJ SAME DRUG ADON: CPT

## 2024-03-15 NOTE — PROGRESS NOTES
Pt here for INFED . Pt denies any issues or concerns.      Ordering MD: Dr. Corona  Order Exp: one-time dose     Pt tolerated infusion without difficulty or complaint. Reviewed next apt date/time: Yes - patient informed that she will need to have follow up labs in 3 months, these are ordered in [facility]. Patient will have these drawn in the [facility] lab. Patient also informed to follow up with Dr. Corona in 1 year, but advised to call for issues/questions/concerns.      Education Record  Learner:  Patient  Disease / Diagnosis: Iron Deficiency Anemia  Barriers / Limitations:  None  Method:  Brief focused and Reinforcement  General Topics:  Medication, Side effects and symptom management, and Plan of care reviewed  Outcome:  Shows understanding  Tolerated iron infusion without any issues. Vital signs taken and recorded after a 5-minute flush. Patient discharged in good condition.

## 2024-03-17 DIAGNOSIS — G43.709 CHRONIC MIGRAINE W/O AURA W/O STATUS MIGRAINOSUS, NOT INTRACTABLE: ICD-10-CM

## 2024-03-18 DIAGNOSIS — G43.709 CHRONIC MIGRAINE W/O AURA W/O STATUS MIGRAINOSUS, NOT INTRACTABLE: ICD-10-CM

## 2024-03-18 NOTE — TELEPHONE ENCOUNTER
Medication: Levetiracetam     Date of last refill: 12/01/2023 (#270/0)  [place]      Last office visit: 09/21/2023  Due back to clinic per last office note:  12 months  Date next office visit scheduled:    Future Appointments   Date Time Provider Department Center   3/23/2024  8:00 AM PFT ROOM EH PFT [facility]   3/25/2024  8:00 AM  CT MAIN RM3 EH CT [facility]   4/9/2024  1:30 PM Greyson Main MD  EEMG Pulm EMG [place]   5/1/2024 10:40 AM Xiang Torres MD [facility] EMG [place]   5/14/2024  1:30 PM Florecita Quintero DO [place] None   3/7/2025 11:45 AM Scott Corona MD EH HEM ONC [facility]           Last OV note recommendation:         Migraines stable. Still gets some stress related headache     Change Topamax to 50 mg AM And 100 mg PM for better migraine control  Continue Aimovig 140 mg monthly injections  Use CPAP machine regularly           RTC in about 12 months

## 2024-03-19 ENCOUNTER — PATIENT MESSAGE (OUTPATIENT)
Dept: INTERNAL MEDICINE CLINIC | Facility: CLINIC | Age: 64
End: 2024-03-19

## 2024-03-19 RX ORDER — TOPIRAMATE 50 MG/1
TABLET, FILM COATED ORAL
Qty: 270 TABLET | Refills: 2 | Status: SHIPPED | OUTPATIENT
Start: 2024-03-19

## 2024-03-19 RX ORDER — BLOOD-GLUCOSE METER
1 EACH MISCELLANEOUS 3 TIMES DAILY
Qty: 1 KIT | Refills: 0 | COMMUNITY
Start: 2024-03-19 | End: 2024-03-20

## 2024-03-19 RX ORDER — TOPIRAMATE 50 MG/1
TABLET, FILM COATED ORAL
Qty: 270 TABLET | Refills: 1 | Status: SHIPPED | OUTPATIENT
Start: 2024-03-19

## 2024-03-19 NOTE — TELEPHONE ENCOUNTER
Medication:  topiramate 50 MG Oral Tab      Date of last refill: 12/01/2023 (#270/0)  [place]     Last office visit: 09/21/2023  Due back to clinic per last office note:  about 12 months   Date next office visit scheduled:    Future Appointments   Date Time Provider Department Center   3/23/2024  8:00 AM PFT ROOM [facility]   3/25/2024  8:00 AM EH CT MAIN RM3 [facility]   4/9/2024  1:30 PM Greyson Main MD  [facility]   5/1/2024 10:40 AM Xiang Torres MD [facility]   5/14/2024  1:30 PM Florecita Quintero DO [facility] None   3/7/2025 11:45 AM Scott Corona MD [facility] HEM [facility]           Last OV note recommendation:    ASSESSMENT/PLAN:   (G43.709) Chronic migraine w/o aura w/o status migrainosus, not intractable  (primary encounter diagnosis)  Plan: Erenumab-aooe (AIMOVIG) 140 MG/ML Subcutaneous         Solution Auto-injector, Rizatriptan Benzoate 10        MG Oral Tab           (Z86.73) Old lacunar stroke without late effect        Migraines stable. Still gets some stress related headache     Change Topamax to 50 mg AM And 100 mg PM for better migraine control  Continue Aimovig 140 mg monthly injections  Use CPAP machine regularly

## 2024-03-19 NOTE — TELEPHONE ENCOUNTER
Medication: Topiramate     Date of last refill: 12/01/2023 (#270/0)  [place]     Last office visit: 09/21/2023  Due back to clinic per last office note:  12 months  Date next office visit scheduled:    Future Appointments   Date Time Provider Department Center   3/23/2024  8:00 AM PFT ROOM [facility]   3/25/2024  8:00 AM  CT MAIN RM3 EH CT [facility]   4/9/2024  1:30 PM Greyson Main MD  [facility] [place]   5/1/2024 10:40 AM Xiang Torres MD [facility] [place]   5/14/2024  1:30 PM Florecita Quintero DO [facility] None   3/7/2025 11:45 AM Scott Corona MD [facility]           Last OV note recommendation:    Migraines stable. Still gets some stress related headache     Change Topamax to 50 mg AM And 100 mg PM for better migraine control  Continue Aimovig 140 mg monthly injections  Use CPAP machine regularly

## 2024-03-19 NOTE — TELEPHONE ENCOUNTER
From: Soumya King  To: Rika Sherley Vinson  Sent: 3/19/2024 11:06 AM CDT  Subject: COQ10 and diabetic meter    I was wondering if I could take Coq10? I've heard good things about it that it could really help me with a lot of my health issues. Also my One touch ultra 2 meter is working fine but the thing I prick my finger with is falling apart and am having a hard time drawing blood. I am assuming that you can't just order that part. I was wondering if you could please put in a order for a new system? I want the exact same one as I have many many suppliers for it and I don't want to change my system.   Thank you   Soumya

## 2024-03-20 RX ORDER — BLOOD-GLUCOSE METER
1 EACH MISCELLANEOUS 3 TIMES DAILY
Qty: 1 KIT | Refills: 0 | Status: SHIPPED | OUTPATIENT
Start: 2024-03-20 | End: 2025-03-20

## 2024-03-22 DIAGNOSIS — K21.9 GASTROESOPHAGEAL REFLUX DISEASE WITHOUT ESOPHAGITIS: ICD-10-CM

## 2024-03-25 RX ORDER — OMEPRAZOLE 40 MG/1
40 CAPSULE, DELAYED RELEASE ORAL 2 TIMES DAILY
Qty: 300 CAPSULE | Refills: 0 | Status: SHIPPED | OUTPATIENT
Start: 2024-03-25

## 2024-03-25 NOTE — TELEPHONE ENCOUNTER
Protocol PASS    Requesting: Omeprazole 40 MG Oral Capsule Delayed Release     LOV: 2/26/24  RTC: after seeing specialist   Filled: 10/23/23 180 capsule 1 refill  Recent Labs: 2/23/24    Upcoming OV NONE  Future Appointments   Date Time Provider Department Center   4/1/2024 11:00 AM PFT ROOM  [facility]   4/9/2024  1:30 PM Greyson Main MD  [facility]   4/11/2024  4:30 PM  CT MAIN RM3  CT [facility]   5/1/2024 10:40 AM Xinag Torres MD [facility] [place]   5/14/2024  1:30 PM Florecita Quintero DO [facility] None   3/7/2025 11:45 AM Scott Corona MD  [facility]

## 2024-03-27 ENCOUNTER — MED REC SCAN ONLY (OUTPATIENT)
Dept: INTERNAL MEDICINE CLINIC | Facility: CLINIC | Age: 64
End: 2024-03-27

## 2024-03-30 ENCOUNTER — PATIENT MESSAGE (OUTPATIENT)
Dept: INTERNAL MEDICINE CLINIC | Facility: CLINIC | Age: 64
End: 2024-03-30

## 2024-03-30 DIAGNOSIS — I10 ESSENTIAL HYPERTENSION: ICD-10-CM

## 2024-04-01 RX ORDER — METOPROLOL SUCCINATE 50 MG/1
50 TABLET, EXTENDED RELEASE ORAL DAILY
Qty: 90 TABLET | Refills: 0 | Status: SHIPPED | OUTPATIENT
Start: 2024-04-01

## 2024-04-01 NOTE — TELEPHONE ENCOUNTER
Requesting   metoprolol succinate ER 50 MG Oral Tablet 24 Hr          Sig: Take 1 tablet (50 mg total) by mouth daily.    Disp: 90 tablet    Refills: 1    Start: 4/1/2024    Class: N/A    For: Essential hypertension    Last ordered: 5 months ago (10/11/2023) by Rika Vinson DO    Hypertension Medications Protocol [id number]/01/2024 10:27 AM   Protocol Details CMP or BMP in past 12 months    Last BP reading less than 140/90    In person appointment or virtual visit in the past 12 mos or appointment in next 3 mos    EGFRCR or GFRNAA > 50        LOV: 2/26/2024  RTC: none noted   Last Relevant Labs: 2/20/2024  Filled: 10/11/2023 #90 with 1 refills    Future Appointments   Date Time Provider Department Center   4/11/2024  4:30 PM  CT MAIN RM3  CT [facility]   4/22/2024 11:00 AM PFT ROOM [facility]   5/1/2024 10:40 AM Xiang Torres MD [facility] [place]   5/8/2024  3:00 PM Greyson Main MD  [facility]   5/14/2024  1:30 PM Florecita Quintero DO [facility] None   3/7/2025 11:45 AM Scott Corona MD [facility]

## 2024-04-01 NOTE — TELEPHONE ENCOUNTER
From: Soumya King  To: Rika Vinson  Sent: 3/30/2024 2:05 PM CDT  Subject: Metoprolol     I need a new prescription for my Metoprolol but it needs to be sent to [facility] on [address] in [place] NOT [facility]!! I'm out of it.   Thanks   Soumya

## 2024-04-09 DIAGNOSIS — E11.40 TYPE 2 DIABETES MELLITUS WITH DIABETIC NEUROPATHY, WITHOUT LONG-TERM CURRENT USE OF INSULIN (HCC): ICD-10-CM

## 2024-04-11 ENCOUNTER — HOSPITAL ENCOUNTER (OUTPATIENT)
Dept: CT IMAGING | Facility: HOSPITAL | Age: 64
Discharge: HOME | End: 2024-04-11
Attending: INTERNAL MEDICINE
Payer: MEDICAID

## 2024-04-11 DIAGNOSIS — D47.2 MGUS (MONOCLONAL GAMMOPATHY OF UNKNOWN SIGNIFICANCE): ICD-10-CM

## 2024-04-11 PROCEDURE — 76497 UNLISTED CT PROCEDURE: CPT | Performed by: INTERNAL MEDICINE

## 2024-04-11 RX ORDER — DULAGLUTIDE 0.75 MG/.5ML
0.75 INJECTION, SOLUTION SUBCUTANEOUS WEEKLY
Qty: 2 ML | Refills: 0 | Status: SHIPPED | OUTPATIENT
Start: 2024-04-11

## 2024-04-11 NOTE — TELEPHONE ENCOUNTER
Protocol PASS    Requesting: Dulaglutide (TRULICITY) 0.75 MG/0.5ML Subcutaneous Solution Pen-injector     LOV: 2/26/24  RTC: after seeing specialist   Filled: 3/8/24 2mL 0 refill  Recent Labs: 2/23/24    Upcoming OV NONE  Future Appointments   Date Time Provider Department Center   4/11/2024  4:30 PM  CT MAIN RM3  CT [facility]   4/22/2024 11:00 AM PFT ROOM  [facility]   5/1/2024 10:40 AM Xiang Torres MD [facility] [place]   5/8/2024  3:00 PM Greyson Main MD  [facility]   5/14/2024  1:30 PM Florecita Quintero DO [facility] None   3/7/2025 11:45 AM Scott Corona MD  [facility]

## 2024-04-17 ENCOUNTER — PATIENT MESSAGE (OUTPATIENT)
Dept: OBGYN CLINIC | Facility: CLINIC | Age: 64
End: 2024-04-17

## 2024-04-17 DIAGNOSIS — R30.9 PAINFUL URINATION: Primary | ICD-10-CM

## 2024-04-17 DIAGNOSIS — E11.40 TYPE 2 DIABETES MELLITUS WITH DIABETIC NEUROPATHY, WITHOUT LONG-TERM CURRENT USE OF INSULIN (HCC): ICD-10-CM

## 2024-04-17 RX ORDER — DULAGLUTIDE 0.75 MG/.5ML
0.75 INJECTION, SOLUTION SUBCUTANEOUS WEEKLY
Qty: 2 ML | Refills: 0 | OUTPATIENT
Start: 2024-04-17

## 2024-04-18 NOTE — TELEPHONE ENCOUNTER
From: Soumya King  To: Ban Willis  Sent: 4/17/2024 1:47 PM CDT  Subject: Infection     I believe I have a bladder or UTI infection. I'm peeing a lot and it's painful when I go. Also it's very cloudy. Can you please give me a prescription for it? If I need to give a urine sample I'm going to the hospital area tomorrow and I can do it then.   Thank you   Soumya

## 2024-04-18 NOTE — TELEPHONE ENCOUNTER
Signed UA and urine culture. Recommend to treat per protocol while waiting for results. Thank you.

## 2024-04-22 ENCOUNTER — LAB ENCOUNTER (OUTPATIENT)
Dept: LAB | Facility: HOSPITAL | Age: 64
End: 2024-04-22
Attending: INTERNAL MEDICINE
Payer: MEDICAID

## 2024-04-22 ENCOUNTER — RT VISIT (OUTPATIENT)
Dept: RESPIRATORY THERAPY | Facility: HOSPITAL | Age: 64
End: 2024-04-22
Attending: INTERNAL MEDICINE
Payer: MEDICAID

## 2024-04-22 DIAGNOSIS — R30.9 PAINFUL URINATION: ICD-10-CM

## 2024-04-22 DIAGNOSIS — R05.2 SUBACUTE COUGH: ICD-10-CM

## 2024-04-22 LAB
BILIRUB UR QL STRIP.AUTO: NEGATIVE
COLOR UR AUTO: YELLOW
GLUCOSE UR STRIP.AUTO-MCNC: NORMAL MG/DL
KETONES UR STRIP.AUTO-MCNC: NEGATIVE MG/DL
LEUKOCYTE ESTERASE UR QL STRIP.AUTO: 500
NITRITE UR QL STRIP.AUTO: NEGATIVE
PH UR STRIP.AUTO: 5.5 [PH] (ref 5–8)
PROT UR STRIP.AUTO-MCNC: 50 MG/DL
RBC #/AREA URNS AUTO: >10 /HPF
SP GR UR STRIP.AUTO: 1.02 (ref 1–1.03)
UROBILINOGEN UR STRIP.AUTO-MCNC: NORMAL MG/DL
WBC #/AREA URNS AUTO: >50 /HPF
WBC CLUMPS UR QL AUTO: PRESENT /HPF

## 2024-04-22 PROCEDURE — 94729 DIFFUSING CAPACITY: CPT | Performed by: INTERNAL MEDICINE

## 2024-04-22 PROCEDURE — 94060 EVALUATION OF WHEEZING: CPT | Performed by: INTERNAL MEDICINE

## 2024-04-22 PROCEDURE — 94726 PLETHYSMOGRAPHY LUNG VOLUMES: CPT | Performed by: INTERNAL MEDICINE

## 2024-04-22 PROCEDURE — 81001 URINALYSIS AUTO W/SCOPE: CPT

## 2024-04-22 RX ORDER — SULFAMETHOXAZOLE AND TRIMETHOPRIM 800; 160 MG/1; MG/1
1 TABLET ORAL 2 TIMES DAILY
Qty: 20 TABLET | Refills: 0 | Status: SHIPPED | OUTPATIENT
Start: 2024-04-22 | End: 2024-05-02

## 2024-04-23 ENCOUNTER — TELEPHONE (OUTPATIENT)
Dept: OBGYN CLINIC | Facility: CLINIC | Age: 64
End: 2024-04-23

## 2024-04-24 ENCOUNTER — PATIENT MESSAGE (OUTPATIENT)
Dept: INTERNAL MEDICINE CLINIC | Facility: CLINIC | Age: 64
End: 2024-04-24

## 2024-04-24 DIAGNOSIS — J01.01 ACUTE RECURRENT MAXILLARY SINUSITIS: Primary | ICD-10-CM

## 2024-04-24 NOTE — PROCEDURES
Pulmonary Function Test:   Findings:  Spirometry: FEV1 is 1.58 L, 72% predicted. FVC is 2.12 L, 76% predicted and FEV1/ FVC ratio is 0.74.  There is a significant bronchodilator response after administration of albuterol.   The flow-volume loop demonstrates a normal pattern.   Lung Volumes:                                                                     The TLC is 4.23 L, 95% predicted.  The residual volume 1.96 L, 110% predicted.  Diffusion Capacity:  The diffusion capacity is 10.22 or 56% predicted with a Z-score of -3.39 and 72% predicted when corrected for alveolar volume.     Impression:  There is mixed mild airway obstructive and restrictive process  on spirometry  There is a significant bronchodilator response, suggesting bronchospastic process that can be seen in asthma as well as reactive airways.   Lung volumes appear within normal limits.    Diffusion capacity is moderately reduced ,  This may represent a normal finding but can be seen in emphysema, interstitial lung disease, pulmonary vascular disease (such as pulmonary hypertension), atelectasis and anemia. If not already performed, would suggest further evaluation as determined clinically.    Compared to the study performed on 3/7/2015 there is no significant change in FEV1,and FVC.  Lung volumes show decline  in TLC    DLCO diffusion capacity is also essentially unchanged      Disclaimer: This PFT has been interpreted in accordance to ATS/ERS interpretation guidelines 2022, with the use of upper and lower limits of normal as well as z-score references. Previous testing (before March 2024) was not performed at [facility] using z-scores and so comparison to previous testing should be taken with caution.    John Pacheco MD

## 2024-04-24 NOTE — TELEPHONE ENCOUNTER
Spoke to patient.  Patient just picked up her prescription yesterday and will complete the treatment and follow-up urine culture as directed.

## 2024-04-25 NOTE — TELEPHONE ENCOUNTER
Pt called in regards to her mcm     Pt c/o coughing with phlegm and phlegm coming out from left nostril only according to pt    C/o tight chest on and off anf runny nose    PT is going back to work orientation on 4/29/24 and would like recommendations as she only prefers for see SV    SV next 5/20/24    SV-please advise ty!    Pt has another appt at 11:50 today FYI for callback

## 2024-04-25 NOTE — TELEPHONE ENCOUNTER
SV - recommended [facility] or [facility] visit. FYI in case you have additional recommendations for this patient. Ty!    LOV with SV 2/26/24  MCM sent to pt.

## 2024-04-26 RX ORDER — AMOXICILLIN AND CLAVULANATE POTASSIUM 875; 125 MG/1; MG/1
1 TABLET, FILM COATED ORAL 2 TIMES DAILY
Qty: 14 TABLET | Refills: 0 | Status: SHIPPED | OUTPATIENT
Start: 2024-04-26 | End: 2024-05-03

## 2024-05-01 ENCOUNTER — OFFICE VISIT (OUTPATIENT)
Dept: RHEUMATOLOGY | Facility: CLINIC | Age: 64
End: 2024-05-01
Payer: MEDICAID

## 2024-05-01 VITALS
BODY MASS INDEX: 24 KG/M2 | RESPIRATION RATE: 24 BRPM | HEART RATE: 100 BPM | WEIGHT: 126 LBS | DIASTOLIC BLOOD PRESSURE: 64 MMHG | SYSTOLIC BLOOD PRESSURE: 104 MMHG | TEMPERATURE: 98 F | OXYGEN SATURATION: 97 %

## 2024-05-01 DIAGNOSIS — M17.12 PRIMARY OSTEOARTHRITIS OF LEFT KNEE: ICD-10-CM

## 2024-05-01 DIAGNOSIS — I25.10 CAD, MULTIPLE VESSEL: ICD-10-CM

## 2024-05-01 DIAGNOSIS — Z95.1 S/P CABG X 2: ICD-10-CM

## 2024-05-01 DIAGNOSIS — M54.50 CHRONIC BILATERAL LOW BACK PAIN WITHOUT SCIATICA: Primary | ICD-10-CM

## 2024-05-01 DIAGNOSIS — G89.29 CHRONIC BILATERAL LOW BACK PAIN WITHOUT SCIATICA: Primary | ICD-10-CM

## 2024-05-01 PROCEDURE — 99214 OFFICE O/P EST MOD 30 MIN: CPT | Performed by: INTERNAL MEDICINE

## 2024-05-01 RX ORDER — TRAMADOL HYDROCHLORIDE 50 MG/1
50 TABLET ORAL EVERY 6 HOURS PRN
Qty: 120 TABLET | Refills: 0 | Status: SHIPPED | OUTPATIENT
Start: 2024-05-01

## 2024-05-01 RX ORDER — UBIDECARENONE 200 MG
CAPSULE ORAL
COMMUNITY
Start: 2024-04-14

## 2024-05-01 RX ORDER — CYCLOBENZAPRINE HCL 10 MG
10 TABLET ORAL EVERY 12 HOURS PRN
Qty: 90 TABLET | Refills: 2 | Status: SHIPPED | OUTPATIENT
Start: 2024-05-01 | End: 2024-08-29

## 2024-05-01 RX ORDER — HYDROCODONE BITARTRATE AND ACETAMINOPHEN 5; 325 MG/1; MG/1
1 TABLET ORAL EVERY 8 HOURS PRN
Qty: 120 TABLET | Refills: 0 | Status: SHIPPED | OUTPATIENT
Start: 2024-05-01

## 2024-05-01 RX ORDER — LIDOCAINE 50 MG/G
1 OINTMENT TOPICAL 3 TIMES DAILY PRN
Qty: 240 G | Refills: 1 | Status: SHIPPED | OUTPATIENT
Start: 2024-05-01

## 2024-05-01 RX ORDER — DICLOFENAC SODIUM 75 MG/1
75 TABLET, DELAYED RELEASE ORAL 2 TIMES DAILY
Qty: 180 TABLET | Refills: 3 | Status: SHIPPED | OUTPATIENT
Start: 2024-05-01

## 2024-05-01 NOTE — PATIENT INSTRUCTIONS
Diclofenac 75 mg twice a day as needed for osteoarthritis pain.  Cyclobenzaprine 10 mg as needed one or two per day.  Tramadol 50 mg for pain relief.   Rare use of Norco for severe pain. 5 mg dose.  Lidocaine cream prn to painful areas .  Return to office 3 months.

## 2024-05-01 NOTE — PROGRESS NOTES
[facility]  Dr. Torres Progress Note     Subjective:   Soumya King is a(n) 63 year old female.   Current complaints:   Chief Complaint   Patient presents with    Follow - Up     3 month f/u. Pain has remained the same since LOV.    History of osteoarthritis of the left knee and right thumb.  History of coronary artery disease.  Status post double vessel bypass of the heart 12/27/2023.  Also history of diabetes mellitus.  Heart ok.  Left knee ok.  Just having back pain, upper and lower back.   Returning to work as a  for [employer].    Objective:   /64   Pulse 100   Temp 98 °F (36.7 °C)   Resp 24   Wt 126 lb (57.2 kg)   SpO2 97%   BMI 23.81 kg/m²   Lungs clear  Heart nsr  Lumbar spine trace tender.  No leg edema.    Assessment:     Encounter Diagnoses   Name Primary?    Chronic bilateral low back pain without sciatica Yes    S/P CABG x 2     CAD, multiple vessel      Plan:     Patient Instructions   Diclofenac 75 mg twice a day as needed for osteoarthritis pain.  Cyclobenzaprine 10 mg as needed one or two per day.  Tramadol 50 mg for pain relief.   Rare use of Norco for severe pain. 5 mg dose.  Lidocaine cream prn to painful areas .  Return to office 3 months.          Xiang Torres MD 5/1/2024 10:52 AM

## 2024-05-08 ENCOUNTER — OFFICE VISIT (OUTPATIENT)
Facility: CLINIC | Age: 64
End: 2024-05-08
Payer: MEDICAID

## 2024-05-08 VITALS
HEART RATE: 89 BPM | BODY MASS INDEX: 23.41 KG/M2 | DIASTOLIC BLOOD PRESSURE: 70 MMHG | OXYGEN SATURATION: 98 % | WEIGHT: 124 LBS | SYSTOLIC BLOOD PRESSURE: 106 MMHG | RESPIRATION RATE: 16 BRPM | HEIGHT: 61 IN

## 2024-05-08 DIAGNOSIS — Z72.0 TOBACCO USE: ICD-10-CM

## 2024-05-08 DIAGNOSIS — R05.2 SUBACUTE COUGH: ICD-10-CM

## 2024-05-08 DIAGNOSIS — J43.2 CENTRILOBULAR EMPHYSEMA (HCC): ICD-10-CM

## 2024-05-08 DIAGNOSIS — R91.8 LUNG NODULES: Primary | ICD-10-CM

## 2024-05-08 DIAGNOSIS — E11.40 TYPE 2 DIABETES MELLITUS WITH DIABETIC NEUROPATHY, WITHOUT LONG-TERM CURRENT USE OF INSULIN (HCC): ICD-10-CM

## 2024-05-08 PROCEDURE — 99214 OFFICE O/P EST MOD 30 MIN: CPT | Performed by: INTERNAL MEDICINE

## 2024-05-08 RX ORDER — DULAGLUTIDE 0.75 MG/.5ML
0.75 INJECTION, SOLUTION SUBCUTANEOUS WEEKLY
Qty: 2 ML | Refills: 0 | Status: SHIPPED | OUTPATIENT
Start: 2024-05-08

## 2024-05-08 NOTE — PATIENT INSTRUCTIONS
Continue incruse inhaler - one puff once a day  Continue to use albuterol 2 puffs every 6 hours as needed for your breathing or cough  Obtain a CT chest scan in early July 2024. Call central scheduling at [phone] to schedule the CT scan      DOT scan printed and placed in queue for Dr. Chambers.

## 2024-05-08 NOTE — TELEPHONE ENCOUNTER
Protocol FAIL    Requesting: Dulaglutide (TRULICITY) 0.75 MG/0.5ML Subcutaneous Solution Pen-injector     LOV: 2/26/24  RTC: after specialist   Filled: 4/11/24 2mL 0 refill  Recent Labs: 2/23/24    Upcoming OV   Future Appointments   Date Time Provider Department Center   5/14/2024  1:30 PM Florecita Quintero DO [facility] None   7/23/2024  3:00 PM Greyson Main MD  [facility] [place]   8/7/2024 11:00 AM Xiang Torres MD [facility] [place]   3/7/2025 11:45 AM Scott Corona MD [facility]

## 2024-05-08 NOTE — PROGRESS NOTES
[facility] General Pulmonary Progress Note    History of Present Illness:  Soumya King is a 63 year old female smoker (40+ pack years) with significant PMH of DM, COPD, HTN, GERD who presents today for follow up of lung nodules and dyspnea. Since last visit she was hospitalized in January 2024 with chest pain, found to have NSTEMI and underwent CABG.  She feels better since but still vaping, did quit smoking.   Thinks her breathing and coughing are improved on incruse daily.  Does continue to have daily cough with phlegm.   Thinks she was sick in late April 2024 with sinus infection, resolved post abx.     Nov 2023 previously   She reports having had dyspnea on exertion over the past several years. Did have some improvement after having abdominal surgery, had left ovarian mass (cystadenoma) removed in 2019. Over the past 1-2 years she thinks her breathing has steadily worsened. Denies needing to stop walking but admits she is not very active, does not exercise  +cough with white/yellow phlegm, no hemoptysis.   Has bronchitis nearly every year in winter time.  She had a f/u LDCT in April 2023 which found new nodules in the CHARI - thinks she was sick around the time of the scan. She had a f/u CT earlier this month, which showed the previous nodules have resolved, but had new large nodules in the right lung leading to her evaluation here.   She admits she was sick with chest congestion and sinus congestion in late October 2023.  Has been vaping, but did smoke.     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 Small airway    Esophageal reflux    Essential hypertension    Don't remember    Fitting and adjustment of dental prosthetic device    High blood pressure    History of 2019 novel coronavirus disease (COVID-19)    No hospitalization,symptoms; Fever,fatigue ,body aches,headache no loss of taste or smell    Hx of motion sickness    Hyperlipidemia    Mass of spine    Migraines    barometric pressure    Myocardial infarction (HCC)    Osteoarthritis    Other ill-defined conditions(799.89)    Jamison's    Pain in joint, forearm    wrist    Pain in joint, pelvic region and thigh    hip    Pelvic mass    Personal history of urinary (tract) infection    Sleep apnea    I don't date        Past Surgical History:   Past Surgical History:   Procedure Laterality Date    Cabg  12/27/23    Colonoscopy      Colonoscopy N/A 05/18/2022    Procedure: COLONOSCOPY AND ESOPHAGOGASTRODUODENOSCOPY;  Surgeon: Miguel Camargo MD;  Location:  ENDOSCOPY    Heart surgery  2024    double bypass    Laparoscopic  2007    sling operation for stress incontinence    Laparoscopy,diagnostic      Kay biopsy stereo nodule 2 site bilat (cpt=19081/54365) Left 02/2010    BENIGN 2 SITES    Kay biopsy stereo w/calc 2 site left (cpt=19081/09684)  02/2010    benign x 2    Kay stereo-clip w/calc 2 site left  2010    Oophorectomy Left 06/28/2019    Other  08/31/2022    RIGHT SACRAL SUBCUTANEOUS CYST EXCISION    Other surgical history  2000, 2019    Bladder Sling, Large mass removed from left abdomen and ovar    Tonsillectomy      Was a child, have no idea    Tubal ligation      Upper gi endoscopy,exam         Family Medical History:   Family History   Problem Relation Age of Onset    Arthritis Mother     Other (AMI) Mother     Other (diabetes mellitus) Mother     Diabetes Mother         Social History:   Social History     Socioeconomic History     Marital status: Single     Spouse name: Not on file    Number of children: Not on file    Years of education: Not on file    Highest education level: Not on file   Occupational History    Not on file   Tobacco Use    Smoking status: Former     Current packs/day: 0.00     Average packs/day: 1.5 packs/day for 48.0 years (72.0 ttl pk-yrs)     Types: Cigarettes     Start date:      Quit date:      Years since quittin.3    Smokeless tobacco: Never    Tobacco comments:     smoking JUUL from 2019 until current   Vaping Use    Vaping status: Every Day    Start date: 2019    Substances: Nicotine, Flavoring    Devices: Disposable, Pre-filled pod   Substance and Sexual Activity    Alcohol use: Not Currently     Comment: On occasion    Drug use: No    Sexual activity: Not Currently     Partners: Male   Other Topics Concern     Service Not Asked    Blood Transfusions Not Asked    Caffeine Concern Yes    Occupational Exposure Not Asked    Hobby Hazards Not Asked    Sleep Concern Not Asked    Stress Concern Yes    Weight Concern Yes    Special Diet No    Back Care Not Asked    Exercise No    Bike Helmet Not Asked    Seat Belt Yes    Self-Exams Not Asked   Social History Narrative    Not on file     Social Determinants of Health     Financial Resource Strain: Not on File (10/7/2022)    [facility] from [facility]     Financial Resource Strain     Financial Resource Strain: 0   Food Insecurity: No Food Insecurity (2023)    Food Insecurity     Food Insecurity: Never true   Transportation Needs: No Transportation Needs (2023)    Transportation Needs     Lack of Transportation: No   Physical Activity: Not on File (10/7/2022)    Received from [facility]     Physical Activity     Physical Activity: 0   Stress: Not on File (10/7/2022)    Received from [facility]     Stress     Stress: 0   Social Connections: Not on File (10/7/2022)    Received from [facility]     Social Connections     Social  Connections and Isolation: 0   Housing Stability: Low Risk  (12/21/2023)    Housing Stability     Housing Instability: No     Housing Instability Emergency: Not on file     Crib or Bassinette: Not on file        Medications:   Current Outpatient Medications   Medication Sig Dispense Refill    HYDROcodone-acetaminophen 5-325 MG Oral Tab Take 1 tablet by mouth every 8 (eight) hours as needed for Pain. 120 tablet 0    Coenzyme Q10 200 MG Oral Cap Co Q-10 200 mg capsule, [RxNorm: 557781]      lidocaine 5 % External Ointment Apply 1 Application  topically 3 (three) times daily as needed. 240 g 1    diclofenac 75 MG Oral Tab EC Take 1 tablet (75 mg total) by mouth 2 (two) times daily. 180 tablet 3    Dulaglutide (TRULICITY) 0.75 MG/0.5ML Subcutaneous Solution Pen-injector Inject 0.75 mg into the skin once a week. 2 mL 0    metoprolol succinate ER 50 MG Oral Tablet 24 Hr Take 1 tablet (50 mg total) by mouth daily. 90 tablet 0    Omeprazole 40 MG Oral Capsule Delayed Release Take 1 capsule (40 mg total) by mouth 2 (two) times daily. 300 capsule 0    Blood Glucose Monitoring Suppl (ONETOUCH ULTRA 2) w/Device Does not apply Kit 1 Device by Other route in the morning, at noon, and at bedtime. Use as directed. 1 kit 0    topiramate 50 MG Oral Tab TAKE ONE TABLET BY MOUTH EVERY MORNING AND TWO TABLETS EVERY EVENING (Patient taking differently: Taking one tablet in the morning and one tablet at night.) 270 tablet 2    TRAMADOL 50 MG Oral Tab TAKE 1 OR 2 TABLETS BY MOUTH EVERY 6 HOURS AS NEEDED FOR PAIN 120 tablet 0    gabapentin 600 MG Oral Tab Take 1 tablet (600 mg total) by mouth at bedtime. 180 tablet 0    Erenumab-aooe (AIMOVIG) 140 MG/ML Subcutaneous Solution Auto-injector Inject 1 mL (140 mg total) into the skin every 30 (thirty) days. 1 mL 4    umeclidinium bromide (INCRUSE ELLIPTA) 62.5 MCG/ACT Inhalation Aerosol Powder, Breath Activated Inhale 1 puff into the lungs daily. 3 each 0    clopidogrel 75 MG Oral Tab Take 1  tablet (75 mg total) by mouth daily.      aspirin 81 MG Oral Tab EC Take 1 tablet (81 mg total) by mouth daily.      atorvastatin 80 MG Oral Tab Take 1 tablet (80 mg total) by mouth nightly. 90 tablet 1    metFORMIN 500 MG Oral Tab Take 1 tablet (500 mg total) by mouth 2 (two) times daily. 180 tablet 1    furosemide 20 MG Oral Tab Take 1 tablet (20 mg total) by mouth daily. 7 tablet 0    clonazePAM 0.5 MG Oral Tab Take 1 tablet (0.5 mg total) by mouth 2 (two) times daily. 12 tablet 0    busPIRone 10 MG Oral Tab Take 2 tablets (20 mg total) by mouth daily.      Metoclopramide HCl 5 MG/5ML Oral Solution Take 5 mL (5 mg total) by mouth 3 (three) times daily before meals. 473 mL 0    cyclobenzaprine 10 MG Oral Tab Take 1 tablet (10 mg total) by mouth every 12 (twelve) hours as needed for Muscle spasms. 60 tablet 1    Rizatriptan Benzoate 10 MG Oral Tab Take 1 tablet (10 mg total) by mouth as needed for Migraine. 10 tablet 5    Lancets (ONETOUCH DELICA PLUS MEONLA23A) Does not apply Misc 1 lancet  by Finger stick route 3 (three) times daily. 300 each 1    Glucose Blood (ONETOUCH ULTRA) In Vitro Strip Tests 3 x daily 300 each 1    estradiol (ESTRACE) 0.1 MG/GM Vaginal Cream Apply 1/2 gram vaginally 2-3 times per week. 42.5 g 3    Mirabegron ER (MYRBETRIQ) 50 MG Oral Tablet 24 Hr Take 1 tablet (50 mg total) by mouth daily. 30 tablet 11    Multiple Vitamins-Minerals (ONE-A-DAY WOMENS 50+) Oral Tab Take 1 tablet by mouth daily.  0    QUEtiapine Fumarate  MG Oral Tablet 24 Hr Take 1 tablet (200 mg total) by mouth every evening. 30 tablet 0    Calcium Carb-Cholecalciferol 500-200 MG-UNIT Oral Tab Take 1 tablet by mouth daily.        cyclobenzaprine 10 MG Oral Tab Take 1 tablet (10 mg total) by mouth every 12 (twelve) hours as needed for Muscle spasms. (Patient not taking: Reported on 5/8/2024) 90 tablet 2    traMADol 50 MG Oral Tab Take 1 tablet (50 mg total) by mouth every 6 (six) hours as needed for Pain. (Patient not  taking: Reported on 5/8/2024) 120 tablet 0    HYDROcodone-acetaminophen 5-325 MG Oral Tab Take 1 tablet by mouth every 8 (eight) hours as needed for Pain. (Patient not taking: Reported on 5/8/2024) 30 tablet 0       Review of Systems: Review of Systems   Constitutional: Negative.    HENT: Negative.     Respiratory:  Positive for cough and shortness of breath. Negative for wheezing and stridor.    Cardiovascular: Negative.    All other systems reviewed and are negative.       Physical Exam:  /70 (BP Location: Left arm, Patient Position: Sitting, Cuff Size: adult)   Pulse 89   Resp 16   Ht 5' 1\" (1.549 m)   Wt 124 lb (56.2 kg)   SpO2 98%   BMI 23.43 kg/m²      Constitutional: alert, cooperative. No acute distress.  HEENT: Head NC/AT. Mask in place  Cardio: Regular rate and rhythm. Normal S1 and S2. No murmurs.   Respiratory: Thorax symmetrical with no labored breathing. Clear to ausculation bilaterally with symmetrical breath sounds. No wheezing, rhonchi, rales, or crackles.   GI: NABS. Abd soft, non-tender.  Extremities: No clubbing or cyanosis. No BLE edema.    Neurologic: A&Ox3. No gross motor deficits.  Skin: Warm, dry  Psych: Calm, cooperative. Pleasant affect.    Results:  Personally reviewed    WBC: 14.2, done on 2/23/2024.  HGB: 9.1, done on 2/23/2024.  PLT: 489, done on 2/23/2024.     Glucose: 167, done on 2/20/2024.  Cr: 0.89, done on 2/20/2024.  GFR(AA): 93, done on 5/25/2022.  GFR (non-AA): 81, done on 5/25/2022.  CA: 8.9, done on 2/20/2024.  Na: 142, done on 2/20/2024.  K: 3.7, done on 2/20/2024.  Cl: 114, done on 2/20/2024.  CO2: 26, done on 2/20/2024.  Last ALB was 3.14% done on 2/23/2024.     CT WHOLE BODY LOW DOSE MULTIPLE MYELOMA (CPT=76497)    Result Date: 4/11/2024  CONCLUSION: 1. With respect to evaluation for bone lesions this study is negative. 2. Significant incidental findings are noted in the lungs.  Centrilobular nodularity with ground-glass densities are suspected to be related  to smoking or \"vaping\" and could represent respiratory bronchiolitis associated interstitial lung disease.  There  is 1 larger nodule in the right lower lobe which has significantly increased in size and become more solid than prior screening chest CTs.  This measures up to 1.2 cm.  Consider referral to pulmonology.  Additional evaluation with PET scan or biopsy may  be necessary. 3. There has been previous coronary artery bypass surgery. 4. Moderate retained fecal debris is noted throughout the colon consistent with constipation.     LOCATION:        Dictated by (CST): Abad Chen MD on 4/11/2024 at 6:20 PM     Finalized by (CST): Abad Chen MD on 4/11/2024 at 6:34 PM       CT CHEST LD NO CAD(CPT=71250)    Result Date: 3/14/2024  CONCLUSION:  1. Overall improvement in diffuse reticular nodular opacities with small residual nodules remaining.  The largest nodule is a cavitary nodule within the right upper lobe measuring 6 mm.  This may correlate with a 4 mm nodule identified on prior exam but consider follow-up per Fleischner society criteria in consider six-month follow-up CT chest to re-evaluate.  Additional cavitary nodule identified on prior examination appears to have decreased in size and nearly completely resolved. 2. Stable mediastinal lymph nodes.   LOCATION:  [facility]   Dictated by (CST): Annette Ferreira MD on 3/14/2024 at 8:00 AM     Finalized by (CST): Annette Ferreira MD on 3/14/2024 at 8:10 AM         Assessment/Plan:  #1. Lung nodules  Noted first on LDCT  4/2023 LDCT with multiple nodular opacities in CHARI -- she reports being sick around time of scan  11/2023 CT chest with resolution of previous CHARI nodules but now with new nodules and GGO in RUL and RLL. +thoracic lymphadenopathy  3/2024 CT chest with improvement in multiple nodules, but stable RUL nodules. Stable LAD  4/2024 CT myeloma with new RLL nodule. Other nodules appear stable   We reviewed her imaging in detail including discussion  of differential diagnoses and management. We reviewed options at this time including considering PET/CT now vs repeat CT chest in 2-3 months vs biopsy.  We discussed the pros/cons of each. She wishes to proceed with CT chest in July 2024 (3months from previous)  Next steps dependent on results of CT    #2. emphysema  40+ pack year smoker, active vaping  Previous 2015 PFTs with moderate obstruction, +air trapping and reduced DLCO  4/2024 PFTs with no obstruction, normal lung volumes. Reduced DLCO  Again advised to stop vaping any substance - she is not ready to quit  Continue on incruse daily  Albuterol PRN    #3. Thoracic lymphadenopathy  As above    #4. Tobacco abuse  40+ pack year smoker, active vaping  advised to stop vaping any substance - she is not ready to quit  Lung Cancer Counseling and Shared Decision Making Session in an Asymptomatic Smoker/Former Smoker   Soumya King is a 63 year old female without current symptoms of lung cancer.  History   Smoking Status    Former    Types: Cigarettes   Smokeless Tobacco    Never        She received information on the importance of adherence to annual lung cancer Low Dose CT (LDCT) screening, the impact of her comorbidities and her ability or willingness to undergo diagnosis and treatment. she qualifies for low dose CT lung cancer screening, however due to ongoing evaluation for lung nodules above, low dose CT lung cancer screening should be postponed until she completes the CT imaging.  We counseled the importance of maintaining cigarette smoking abstinence if she is a former smoker and the importance of smoking cessation if she is a current smoker and we discussed and furnished information about tobacco cessation interventions.         Greyson Main MD  5/8/2024

## 2024-05-14 ENCOUNTER — OFFICE VISIT (OUTPATIENT)
Dept: UROLOGY | Facility: CLINIC | Age: 64
End: 2024-05-14
Attending: OBSTETRICS & GYNECOLOGY

## 2024-05-14 VITALS
TEMPERATURE: 98 F | SYSTOLIC BLOOD PRESSURE: 124 MMHG | DIASTOLIC BLOOD PRESSURE: 70 MMHG | BODY MASS INDEX: 24 KG/M2 | WEIGHT: 127 LBS

## 2024-05-14 DIAGNOSIS — N94.10 DYSPAREUNIA IN FEMALE: ICD-10-CM

## 2024-05-14 DIAGNOSIS — N81.84 PELVIC MUSCLE WASTING: ICD-10-CM

## 2024-05-14 DIAGNOSIS — R30.0 DYSURIA: ICD-10-CM

## 2024-05-14 DIAGNOSIS — N39.41 URGE INCONTINENCE: Primary | ICD-10-CM

## 2024-05-14 DIAGNOSIS — N95.2 POSTMENOPAUSAL ATROPHIC VAGINITIS: ICD-10-CM

## 2024-05-14 LAB
CONTROL RUN WITHIN 24 HOURS?: YES
NITRITE URINE: NEGATIVE

## 2024-05-14 PROCEDURE — 87086 URINE CULTURE/COLONY COUNT: CPT | Performed by: OBSTETRICS & GYNECOLOGY

## 2024-05-14 PROCEDURE — 87186 SC STD MICRODIL/AGAR DIL: CPT | Performed by: OBSTETRICS & GYNECOLOGY

## 2024-05-14 PROCEDURE — 87077 CULTURE AEROBIC IDENTIFY: CPT | Performed by: OBSTETRICS & GYNECOLOGY

## 2024-05-14 PROCEDURE — 81002 URINALYSIS NONAUTO W/O SCOPE: CPT | Performed by: OBSTETRICS & GYNECOLOGY

## 2024-05-14 PROCEDURE — 99212 OFFICE O/P EST SF 10 MIN: CPT

## 2024-05-14 RX ORDER — NITROFURANTOIN 25; 75 MG/1; MG/1
100 CAPSULE ORAL 2 TIMES DAILY
Qty: 14 CAPSULE | Refills: 0 | Status: SHIPPED | OUTPATIENT
Start: 2024-05-14

## 2024-05-14 RX ORDER — BLOOD SUGAR DIAGNOSTIC
STRIP MISCELLANEOUS
Qty: 300 EACH | Refills: 1 | Status: SHIPPED | OUTPATIENT
Start: 2024-05-14

## 2024-05-14 NOTE — PROGRESS NOTES
Patient presents to follow up UUI    She is currently using vag moisturizers    No NAN  No UUI  H/o dyspareunia  Not reg with vag estrogen    Myrbetriq 50mg daily, happy    Gyne eval with Dr Willis    Vague UTI sx today    /70   Temp 98.4 °F (36.9 °C)   Wt 127 lb (57.6 kg)   BMI 24.00 kg/m²     GEN: NAD  CV: RRR  Pulm: nl effort  Abd: soft    PELVIC EXAM:  Ext. Gen: +atrophy, no lesions  Urethra: some atrophy, nontender  Bladder:some fullness, nontender  Vagina: +atrophy  Cervix: no bleeding, no lesions, nontender  Uterus: +mobile  Adnexa:no masses, nontender  Perineum: nontender  Anus: wnl  Rectum: defer     PELVIS FLOOR NEUROMUSCULAR FUNCTION:  Strength:  1 and Unable to hold greater than 3 sec  Perineal Sensation:  Normal        PELVIC SUPPORT:  [place]:  0  Ant:  0  Post:  0  CST:  negative  UVJ: somewhat hypermobile    Impression/Plan:    ICD-10-CM    1. Urge incontinence  N39.41       2. Postmenopausal atrophic vaginitis  N95.2       3. Pelvic muscle wasting  N81.84       4. Dyspareunia in female  N94.10       5. Dysuria  R30.0           Discussion Items:   Behavioral and pharmacologic treatments for OAB  Pelvic muscle rehabilitation including pelvic floor PT  Topical estrogen therapy for treating UGA  Behavioral and medical treatment for recurrent UTIs    Discussed dyspareunia - vag estrogen, vag moisturizers, & lube w/ coitus. If sx persist will need PFPT    Discussed management of recurrent urinary tract infections. Discussed use of pharmacologic treatment options for suppression therapy. Risks vs benefits reviewed with patient   Follow ucx, if recurrent UTIs will manage accordingly    Discussed mgmt of vulvovaginal atrophy with vaginal estrogen cream. Reviewed associated benefits, risks, alternatives, and goals. Recommend low dose twice weekly mgmt   Resume vag estrogen twice weekly    Discussed dietary and behavioral modifications for mgmt of urinary symptoms  Discussed weight management and  benefits of weight loss on urinary symptoms  Reviewed AUA/SUFU guidelines on mgmt of non-neurogenic OAB  Discussed pharmacologic and nonpharmacologic mgmt options of urinary symptoms - reviewed risks, benefits, alternatives, and goals of treatment  Discussed specific risks related to OAB meds including, but not limited to dry mouth, constipation, blurry vision, cognitive changes, and BP elevation.  Cont myrbetriq    Diagnostic Items:  Urine testing  Empiric NTF 100mg PO BID x7d, follow ucx results    Medications Discussed:  Estrace Cream  Vag moisturizers  Lube w. Coitus    Treatment Plan, Non-surgical:   RN teaching/pt education done  Estrace / Premarin cream  Vag moisturizers  Lube w/ coitus    Treatment Plan, Surgical:   None    All questions answered  She understands and agrees to plan    Return in about 2 months (around 7/14/2024) for UTI f/u.    Florecita Quintero DO, FACOG, FACS    The 21st Century Cures Act makes medical notes like these available to patients in the interest of transparency. However, be advised this is a medical document. It is intended as peer to peer communication. It is written in medical language and may contain abbreviations or verbiage that are unfamiliar. It may appear blunt or direct. Medical documents are intended to carry relevant information, facts as evident, and the clinical opinion of the practitioner.

## 2024-05-16 DIAGNOSIS — G43.709 CHRONIC MIGRAINE W/O AURA W/O STATUS MIGRAINOSUS, NOT INTRACTABLE: ICD-10-CM

## 2024-05-16 NOTE — TELEPHONE ENCOUNTER
Medication: Rizatriptan Benzoate 10 MG Oral Tab      Date of last refill: 09/21/2023  (#10/5)  [place]     Last office visit: 09/21/2023  Due back to clinic per last office note:  in 1 yr   Date next office visit scheduled:    Future Appointments   Date Time Provider Department Center   7/9/2024 12:45 PM Florecita Quintero DO [facility] None   7/23/2024  3:00 PM Greyson Main MD  [facility] [place]   8/7/2024 11:00 AM Xiang Torres MD [facility]   3/7/2025 11:45 AM Scott Corona MD  [facility]           Last OV note recommendation:       ASSESSMENT/PLAN:   (G43.709) Chronic migraine w/o aura w/o status migrainosus, not intractable  (primary encounter diagnosis)  Plan: Erenumab-aooe (AIMOVIG) 140 MG/ML Subcutaneous         Solution Auto-injector, Rizatriptan Benzoate 10        MG Oral Tab           (Z86.73) Old lacunar stroke without late effect        Migraines stable. Still gets some stress related headache     Change Topamax to 50 mg AM And 100 mg PM for better migraine control  Continue Aimovig 140 mg monthly injections  Use CPAP machine regularly           RTC in about 12 months     See orders and medications filed with this encounter. The patient indicates understanding of these issues and agrees with the plan.              MD Greyson [facility]

## 2024-05-17 RX ORDER — RIZATRIPTAN BENZOATE 10 MG/1
10 TABLET ORAL AS NEEDED
Qty: 10 TABLET | Refills: 5 | Status: SHIPPED | OUTPATIENT
Start: 2024-05-17

## 2024-05-17 NOTE — TELEPHONE ENCOUNTER
Today's Date: 5/17/2024  Patient Name: Brennen Mcclure  Date of admission: 5/16/2024  3:01 PM  Patient's age: 63 y.o., 1961  Admission Dx: Intractable headache, unspecified chronicity pattern, unspecified headache type [R51.9]    Reason for Consult: ITP on IVIG   Requesting Physician: Asher Seo MD    CHIEF COMPLAINT:  No chief complaint on file.    History Obtained From:  patient and chart  INTERVAL HISTORY :    Patient is seen and examined  Wife at bedside  Patient now transferred from [facility] to [facility] unit  Still complains of headache and neurology on board  His platelets improved to 137K  Discussed with neurology    HISTORY OF PRESENT ILLNESS:    Brennen Mcclure is a 63 y.o. male who is admitted to the [facility] at [facility] on 5/16/2024  for acute depression    Patient is known to me for his thrombocytopenia/ITP and was recently admitted to [facility].  He has chronic thrombocytopenia, patient had a evaluation in [place] and also in the past and thought to be immune mediated  History of dural sinus thrombosis and history of DVT in 2019  Chronic anticoagulation with Eliquis  Had a hemorrhagic stroke in December 2023  Patient received platelet transfusion during the hospitalization and also was given IVIG on 1/9/2024 and 1/10/2024  Patient was treated with Decadron 40 mg on 2/18 - 2/20 1-24, with no response  Bone marrow biopsy negative for acute bone marrow pathology  Plan for IVIG weekly for 5 doses and also prescription for Promacta   Patient started taking Promacta on 4/14/2024  First IVIG given on 4/17/2024   He presented to the office 5/7 for planned IVIG infusion.  Per wife, he had been having low-grade fever with slight confusion and cloudy urine.  Prescription given for Cipro.  Post-infusion he had a fever of 103 F, was diaphoretic, and had altered mental status.     During the hospitalization his CBC subsequently showed platelet count improved to 116K    He complains of  Patient picked up headache and also had depressive symptoms and seen by psychiatry and subsequently transferred to [facility]  Past Medical History:   has a past medical history of Anemia, CAD (coronary artery disease), Chronic deep vein thrombosis (DVT) of both lower extremities (HCC), Chronic deep vein thrombosis (DVT) of femoral vein of right lower extremity (HCC), Chronic deep vein thrombosis (DVT) of proximal vein of right lower extremity (HCC), Chronic deep vein thrombosis (DVT) of right iliac vein (HCC), Chronic idiopathic thrombocytopenia (HCC), Chronic idiopathic thrombocytopenic purpura (HCC), CVA (cerebral vascular accident) (HCC), Emphysema lung (HCC), Hemosiderin pigmentation of skin, HLD (hyperlipidemia), Intracranial hemorrhage (HCC), Left hemiplegia (HCC), Right leg swelling, and Thrombocytopenia (HCC).    Past Surgical History:   has a past surgical history that includes Coronary artery bypass graft; Coronary stent placement; craniotomy (Right); and CT BIOPSY BONE MARROW (3/18/2024).     Medications:    Prior to Admission medications    Medication Sig Start Date End Date Taking? Authorizing Provider   melatonin 3 MG TABS tablet TAKE 2 TABLETS BY MOUTH AT BEDTIME 5/14/24   Blanca Ren MD   topiramate (TOPAMAX) 50 MG tablet Take 1.5 tablets by mouth 2 times daily 5/14/24   Leonora Brown MD   magnesium oxide (MAG-OX) 400 (240 Mg) MG tablet Take 1 tablet by mouth daily 5/15/24   Leonora Brown MD   eltrombopag olamine (PROMACTA) 50 MG TABS tablet Take 1 tablet by mouth daily    Zeenat Mercer MD   lidocaine (LIDODERM) 5 % Place 2 patches onto the skin daily Change daily remove after 12 hours 5/2/24   Danica De La Paz MD   ferrous sulfate (IRON 325) 325 (65 Fe) MG tablet Take 1 tablet by mouth every other day 1/26/24   Zeenat Mercer MD   amitriptyline (ELAVIL) 75 MG tablet Take 1 tablet by mouth nightly 4/30/24   Danica De La Paz MD   acetaminophen (TYLENOL) 500 MG tablet Take 2 tablets by mouth in the  MD  Hematologist/Medical Oncologist      This note is created with the assistance of a speech recognition program.  While intending to generate a document that actually reflects the content of the visit, the document can still have some errors including those of syntax and sound a like substitutions which may escape proof reading.  It such instances, actual meaning can be extrapolated by contextual diversion.

## 2024-05-20 ENCOUNTER — HOSPITAL ENCOUNTER (OUTPATIENT)
Age: 64
Discharge: HOME | End: 2024-05-20

## 2024-05-20 ENCOUNTER — TELEPHONE (OUTPATIENT)
Facility: CLINIC | Age: 64
End: 2024-05-20

## 2024-05-20 VITALS
RESPIRATION RATE: 20 BRPM | TEMPERATURE: 98 F | BODY MASS INDEX: 22.66 KG/M2 | WEIGHT: 120 LBS | SYSTOLIC BLOOD PRESSURE: 94 MMHG | HEIGHT: 61 IN | OXYGEN SATURATION: 98 % | DIASTOLIC BLOOD PRESSURE: 62 MMHG | HEART RATE: 93 BPM

## 2024-05-20 DIAGNOSIS — R51.9 ACUTE NONINTRACTABLE HEADACHE, UNSPECIFIED HEADACHE TYPE: ICD-10-CM

## 2024-05-20 DIAGNOSIS — R05.1 ACUTE COUGH: Primary | ICD-10-CM

## 2024-05-20 LAB — SARS-COV-2 RNA RESP QL NAA+PROBE: NOT DETECTED

## 2024-05-20 PROCEDURE — 96374 THER/PROPH/DIAG INJ IV PUSH: CPT | Performed by: PHYSICIAN ASSISTANT

## 2024-05-20 PROCEDURE — U0002 COVID-19 LAB TEST NON-CDC: HCPCS | Performed by: PHYSICIAN ASSISTANT

## 2024-05-20 PROCEDURE — 96375 TX/PRO/DX INJ NEW DRUG ADDON: CPT | Performed by: PHYSICIAN ASSISTANT

## 2024-05-20 PROCEDURE — 96361 HYDRATE IV INFUSION ADD-ON: CPT | Performed by: PHYSICIAN ASSISTANT

## 2024-05-20 PROCEDURE — 99213 OFFICE O/P EST LOW 20 MIN: CPT | Performed by: PHYSICIAN ASSISTANT

## 2024-05-20 RX ORDER — ONDANSETRON 2 MG/ML
4 INJECTION INTRAMUSCULAR; INTRAVENOUS ONCE
Status: COMPLETED | OUTPATIENT
Start: 2024-05-20 | End: 2024-05-20

## 2024-05-20 RX ORDER — BENZONATATE 100 MG/1
100 CAPSULE ORAL 3 TIMES DAILY PRN
Qty: 15 CAPSULE | Refills: 0 | Status: ON HOLD | OUTPATIENT
Start: 2024-05-20

## 2024-05-20 RX ORDER — DEXAMETHASONE SODIUM PHOSPHATE 10 MG/ML
10 INJECTION, SOLUTION INTRAMUSCULAR; INTRAVENOUS ONCE
Status: COMPLETED | OUTPATIENT
Start: 2024-05-20 | End: 2024-05-20

## 2024-05-20 RX ORDER — SODIUM CHLORIDE 9 MG/ML
1000 INJECTION, SOLUTION INTRAVENOUS ONCE
Status: COMPLETED | OUTPATIENT
Start: 2024-05-20 | End: 2024-05-20

## 2024-05-20 NOTE — ED PROVIDER NOTES
Patient Seen in: [facility]      History     Chief Complaint   Patient presents with    Headache     Stated Complaint: very bad migraine 3 strght days    Subjective:   HPI    62 YO female with PMHx of tobacco use, COPD, migraine without aura, chronic pansinusitis, HTN, CAD, T2DM, presents to immediate care for evaluation of \"migraine headache\" that has been off-and-on starting 3 days ago. Patient states she took Rizatriptan yesterday, which resolved headache and allowed sleep but when she woke up this morning, migraine started again. She states headache is at the temples.  Associated nausea with few episodes of emesis and photophobia.    Patient also reports a cough for 2 days, which she states makes migraine worse. She denies SOB, chest pain, fever/chills.          Objective:   Past Medical History:    Abdominal pain, other specified site    groin    Acute bronchitis    Allergic rhinitis    Anxiety    Arthritis    Back problem    Jamison esophagus    Chronic low back pain without sciatica    COPD (chronic obstructive pulmonary disease) (HCC)    Depression    Diabetes (HCC)    Difficult intubation    difficult to intubate with bladder surgery,instructed by anesthesia to communicate to future anesthesiologist. Small airway    Esophageal reflux    Essential hypertension    Don't remember    Fitting and adjustment of dental prosthetic device    High blood pressure    History of 2019 novel coronavirus disease (COVID-19)    No hospitalization,symptoms; Fever,fatigue ,body aches,headache no loss of taste or smell    Hx of motion sickness    Hyperlipidemia    Mass of spine    Migraines    barometric pressure    Myocardial infarction (HCC)    Osteoarthritis    Other ill-defined conditions(799.89)    Jamison's    Pain in joint, forearm    wrist    Pain in joint, pelvic region and thigh    hip    Pelvic mass    Personal history of urinary (tract) infection    Sleep apnea    I don't date               Past Surgical History:   Procedure Laterality Date    Cabg  23    Colonoscopy      Colonoscopy N/A 2022    Procedure: COLONOSCOPY AND ESOPHAGOGASTRODUODENOSCOPY;  Surgeon: Miguel Camargo MD;  Location:  ENDOSCOPY    Heart surgery      double bypass    Laparoscopic      sling operation for stress incontinence    Laparoscopy,diagnostic      Kay biopsy stereo nodule 2 site bilat (cpt=19081/10964) Left 2010    BENIGN 2 SITES    Kay biopsy stereo w/calc 2 site left (cpt=19081/93636)  2010    benign x 2    Kay stereo-clip w/calc 2 site left      Oophorectomy Left 2019    Other  2022    RIGHT SACRAL SUBCUTANEOUS CYST EXCISION    Other surgical history  ,     Bladder Sling, Large mass removed from left abdomen and ovar    Tonsillectomy      Was a child, have no idea    Tubal ligation      Upper gi endoscopy,exam                  Social History     Socioeconomic History    Marital status: Single   Tobacco Use    Smoking status: Former     Current packs/day: 0.00     Average packs/day: 1.5 packs/day for 48.0 years (72.0 ttl pk-yrs)     Types: Cigarettes     Start date:      Quit date:      Years since quittin.3    Smokeless tobacco: Never    Tobacco comments:     smoking JUUL from 2019 until current   Vaping Use    Vaping status: Every Day    Start date: 2019    Substances: Nicotine, Flavoring    Devices: Disposable, Pre-filled pod   Substance and Sexual Activity    Alcohol use: Not Currently     Comment: On occasion    Drug use: No    Sexual activity: Not Currently     Partners: Male   Other Topics Concern    Caffeine Concern Yes    Stress Concern Yes    Weight Concern Yes    Special Diet No    Exercise No    Seat Belt Yes     Social Determinants of Health     Financial Resource Strain: Not on File (10/7/2022)    [facility] from [facility]     Financial Resource Strain     Financial Resource Strain: 0   Food Insecurity: No Food Insecurity  (12/21/2023)    Food Insecurity     Food Insecurity: Never true   Transportation Needs: No Transportation Needs (12/21/2023)    Transportation Needs     Lack of Transportation: No   Physical Activity: Not on File (10/7/2022)    Received from [facility]     Physical Activity     Physical Activity: 0   Stress: Not on File (10/7/2022)    Received from [facility]     Stress     Stress: 0   Social Connections: Not on File (10/7/2022)    Received from [facility]     Social Connections     Social Connections and Isolation: 0   Housing Stability: Low Risk  (12/21/2023)    Housing Stability     Housing Instability: No              Review of Systems    Positive for stated complaint: very bad migraine 3 strght days  Other systems are as noted in HPI.  Constitutional and vital signs reviewed.      All other systems reviewed and negative except as noted above.    Physical Exam     ED Triage Vitals [05/20/24 1232]   BP 99/69   Pulse 107   Resp 18   Temp 98.2 °F (36.8 °C)   Temp src Temporal   SpO2 97 %   O2 Device None (Room air)       Current Vitals:   No data recorded          Physical Exam  Vitals and nursing note reviewed.   Constitutional:       General: She is not in acute distress.     Appearance: Normal appearance. She is not ill-appearing, toxic-appearing or diaphoretic.   Cardiovascular:      Rate and Rhythm: Normal rate.   Pulmonary:      Effort: Pulmonary effort is normal. No respiratory distress.      Breath sounds: Normal breath sounds. No wheezing.   Neurological:      General: No focal deficit present.      Mental Status: She is alert and oriented to person, place, and time.      GCS: GCS eye subscore is 4. GCS verbal subscore is 5. GCS motor subscore is 6.      Motor: No weakness.   Psychiatric:         Mood and Affect: Mood normal.         Behavior: Behavior normal.         ED Course     Labs Reviewed   RAPID SARS-COV-2 BY PCR - Normal       MDM      Differential diagnosis considered but not limited to  viral illness, pneumonia, benign headache, other       Patient is afebrile with reassuring physical exam. Lungs sound clear to auscultation bilaterally.  Low clinical suspicion for pneumonia, chest x-ray deferred. Tessalon perles prescribed for acute cough. COVID negative. Likely other viral URI.   Neurological exam is unremarkable. No focal deficits. GCS 15.    Patient has a documented Naprosyn allergy. Headache resolution after Decadron, Zofran and IV fluids. Signs/symptoms necessitating reevaluation thoroughly discussed with patient understanding.      Medical Decision Making  Amount and/or Complexity of Data Reviewed  Labs: ordered. Decision-making details documented in ED Course.    Risk  Prescription drug management.        Disposition and Plan     Clinical Impression:  1. Acute cough    2. Acute nonintractable headache, unspecified headache type         Disposition:  Discharge  5/20/2024  2:26 pm    Follow-up:  [facility] [address] [address]  [phone]    If symptoms worsen          Medications Prescribed:  Discharge Medication List as of 5/20/2024  2:27 PM        START taking these medications    Details   benzonatate (TESSALON PERLES) 100 MG Oral Cap Take 1 capsule (100 mg total) by mouth 3 (three) times daily as needed for cough., Normal, Disp-15 capsule, R-0

## 2024-05-20 NOTE — ED INITIAL ASSESSMENT (HPI)
Pt has  had a severe headache that has not improved for the past 3 days and she had had a bad cough with rib pain for the past few days as well

## 2024-05-26 ENCOUNTER — APPOINTMENT (OUTPATIENT)
Dept: GENERAL RADIOLOGY | Facility: HOSPITAL | Age: 64
DRG: 163 | End: 2024-05-26
Attending: EMERGENCY MEDICINE
Payer: MEDICAID

## 2024-05-26 ENCOUNTER — APPOINTMENT (OUTPATIENT)
Dept: CT IMAGING | Facility: HOSPITAL | Age: 64
DRG: 163 | End: 2024-05-26
Attending: EMERGENCY MEDICINE
Payer: MEDICAID

## 2024-05-26 ENCOUNTER — APPOINTMENT (OUTPATIENT)
Dept: ULTRASOUND IMAGING | Facility: HOSPITAL | Age: 64
DRG: 163 | End: 2024-05-26
Attending: EMERGENCY MEDICINE
Payer: MEDICAID

## 2024-05-26 ENCOUNTER — HOSPITAL ENCOUNTER (INPATIENT)
Facility: HOSPITAL | Age: 64
LOS: 15 days | Discharge: HOME HEALTH | DRG: 163 | End: 2024-06-10
Attending: EMERGENCY MEDICINE | Admitting: HOSPITALIST
Payer: MEDICAID

## 2024-05-26 DIAGNOSIS — G89.18 ACUTE POST-OPERATIVE PAIN: ICD-10-CM

## 2024-05-26 DIAGNOSIS — D64.9 ACUTE ANEMIA: ICD-10-CM

## 2024-05-26 DIAGNOSIS — D75.839 THROMBOCYTOSIS: ICD-10-CM

## 2024-05-26 DIAGNOSIS — R74.01 TRANSAMINITIS: ICD-10-CM

## 2024-05-26 DIAGNOSIS — N17.9 AKI (ACUTE KIDNEY INJURY) (HCC): ICD-10-CM

## 2024-05-26 DIAGNOSIS — E87.1 HYPONATREMIA: ICD-10-CM

## 2024-05-26 DIAGNOSIS — J98.4 CAVITARY LESION OF LUNG: ICD-10-CM

## 2024-05-26 DIAGNOSIS — J86.9 EMPYEMA OF RIGHT PLEURAL SPACE (HCC): Primary | ICD-10-CM

## 2024-05-26 PROBLEM — R73.9 HYPERGLYCEMIA: Status: ACTIVE | Noted: 2024-05-26

## 2024-05-26 LAB
ALBUMIN SERPL-MCNC: 1.7 G/DL (ref 3.4–5)
ALBUMIN/GLOB SERPL: 0.3 {RATIO} (ref 1–2)
ALP LIVER SERPL-CCNC: 289 U/L
ALT SERPL-CCNC: 151 U/L
ANION GAP SERPL CALC-SCNC: 12 MMOL/L (ref 0–18)
APTT PPP: 48.5 SECONDS (ref 23–36)
AST SERPL-CCNC: 89 U/L (ref 15–37)
ATRIAL RATE: 100 BPM
BASOPHILS # BLD: 0 X10(3) UL (ref 0–0.2)
BASOPHILS NFR BLD: 0 %
BILIRUB SERPL-MCNC: 0.4 MG/DL (ref 0.1–2)
BILIRUB UR QL STRIP.AUTO: NEGATIVE
BUN BLD-MCNC: 21 MG/DL (ref 9–23)
CALCIUM BLD-MCNC: 8.3 MG/DL (ref 8.5–10.1)
CHLORIDE SERPL-SCNC: 98 MMOL/L (ref 98–112)
CLARITY UR REFRACT.AUTO: CLEAR
CO2 SERPL-SCNC: 17 MMOL/L (ref 21–32)
COLOR UR AUTO: COLORLESS
CREAT BLD-MCNC: 1.26 MG/DL
D DIMER PPP FEU-MCNC: 4.53 UG/ML FEU (ref ?–0.61)
EGFRCR SERPLBLD CKD-EPI 2021: 49 ML/MIN/1.73M2 (ref 60–?)
EOSINOPHIL # BLD: 0 X10(3) UL (ref 0–0.7)
EOSINOPHIL NFR BLD: 0 %
ERYTHROCYTE [DISTWIDTH] IN BLOOD BY AUTOMATED COUNT: 16 %
EST. AVERAGE GLUCOSE BLD GHB EST-MCNC: 157 MG/DL (ref 68–126)
GLOBULIN PLAS-MCNC: 5 G/DL (ref 2.8–4.4)
GLUCOSE BLD-MCNC: 124 MG/DL (ref 70–99)
GLUCOSE BLD-MCNC: 145 MG/DL (ref 70–99)
GLUCOSE BLD-MCNC: 190 MG/DL (ref 70–99)
GLUCOSE UR STRIP.AUTO-MCNC: NORMAL MG/DL
HBA1C MFR BLD: 7.1 % (ref ?–5.7)
HCT VFR BLD AUTO: 22.4 %
HGB BLD-MCNC: 7.4 G/DL
INR BLD: 1.51 (ref 0.8–1.2)
KETONES UR STRIP.AUTO-MCNC: NEGATIVE MG/DL
L PNEUMO AG UR QL: NEGATIVE
LACTATE SERPL-SCNC: 1.4 MMOL/L (ref 0.4–2)
LEUKOCYTE ESTERASE UR QL STRIP.AUTO: NEGATIVE
LYMPHOCYTES NFR BLD: 0.84 X10(3) UL (ref 1–4)
LYMPHOCYTES NFR BLD: 2 %
MCH RBC QN AUTO: 28.8 PG (ref 26–34)
MCHC RBC AUTO-ENTMCNC: 33 G/DL (ref 31–37)
MCV RBC AUTO: 87.2 FL
MONOCYTES # BLD: 0.84 X10(3) UL (ref 0.1–1)
MONOCYTES NFR BLD: 2 %
MORPHOLOGY: NORMAL
NEUTROPHILS # BLD AUTO: 37.2 X10 (3) UL (ref 1.5–7.7)
NEUTROPHILS NFR BLD: 96 %
NEUTS HYPERSEG # BLD: 40.22 X10(3) UL (ref 1.5–7.7)
NITRITE UR QL STRIP.AUTO: NEGATIVE
NT-PROBNP SERPL-MCNC: 1235 PG/ML (ref ?–125)
OSMOLALITY SERPL CALC.SUM OF ELEC: 270 MOSM/KG (ref 275–295)
P AXIS: 70 DEGREES
P-R INTERVAL: 126 MS
PH UR STRIP.AUTO: 5 [PH] (ref 5–8)
PLATELET # BLD AUTO: 537 10(3)UL (ref 150–450)
PLATELET MORPHOLOGY: NORMAL
POTASSIUM SERPL-SCNC: 4 MMOL/L (ref 3.5–5.1)
PROT SERPL-MCNC: 6.7 G/DL (ref 6.4–8.2)
PROT UR STRIP.AUTO-MCNC: NEGATIVE MG/DL
PROTHROMBIN TIME: 18.3 SECONDS (ref 11.6–14.8)
Q-T INTERVAL: 364 MS
QRS DURATION: 80 MS
QTC CALCULATION (BEZET): 469 MS
R AXIS: 40 DEGREES
RBC # BLD AUTO: 2.57 X10(6)UL
RBC UR QL AUTO: NEGATIVE
SODIUM SERPL-SCNC: 127 MMOL/L (ref 136–145)
SP GR UR STRIP.AUTO: 1.01 (ref 1–1.03)
STREP PNEUMO ANTIGEN, URINE: NEGATIVE
T AXIS: 60 DEGREES
TOTAL CELLS COUNTED BLD: 100
TROPONIN I SERPL HS-MCNC: 3 NG/L
UROBILINOGEN UR STRIP.AUTO-MCNC: NORMAL MG/DL
VENTRICULAR RATE: 100 BPM
WBC # BLD AUTO: 41.9 X10(3) UL (ref 4–11)

## 2024-05-26 PROCEDURE — 99223 1ST HOSP IP/OBS HIGH 75: CPT | Performed by: HOSPITALIST

## 2024-05-26 PROCEDURE — 71275 CT ANGIOGRAPHY CHEST: CPT | Performed by: EMERGENCY MEDICINE

## 2024-05-26 PROCEDURE — 71046 X-RAY EXAM CHEST 2 VIEWS: CPT | Performed by: EMERGENCY MEDICINE

## 2024-05-26 PROCEDURE — 93971 EXTREMITY STUDY: CPT | Performed by: EMERGENCY MEDICINE

## 2024-05-26 RX ORDER — DEXTROSE MONOHYDRATE 25 G/50ML
50 INJECTION, SOLUTION INTRAVENOUS
Status: DISCONTINUED | OUTPATIENT
Start: 2024-05-26 | End: 2024-06-10

## 2024-05-26 RX ORDER — NICOTINE POLACRILEX 4 MG
15 LOZENGE BUCCAL
Status: DISCONTINUED | OUTPATIENT
Start: 2024-05-26 | End: 2024-06-10

## 2024-05-26 RX ORDER — SENNOSIDES 8.6 MG
17.2 TABLET ORAL NIGHTLY PRN
Status: DISCONTINUED | OUTPATIENT
Start: 2024-05-26 | End: 2024-06-05

## 2024-05-26 RX ORDER — METOPROLOL SUCCINATE 50 MG/1
50 TABLET, EXTENDED RELEASE ORAL
Status: DISCONTINUED | OUTPATIENT
Start: 2024-05-27 | End: 2024-05-30

## 2024-05-26 RX ORDER — DESVENLAFAXINE SUCCINATE 100 MG/1
100 TABLET, EXTENDED RELEASE ORAL DAILY
COMMUNITY
Start: 2024-05-15

## 2024-05-26 RX ORDER — CLONAZEPAM 0.5 MG/1
0.5 TABLET ORAL 2 TIMES DAILY
Status: DISCONTINUED | OUTPATIENT
Start: 2024-05-26 | End: 2024-05-31

## 2024-05-26 RX ORDER — ATORVASTATIN CALCIUM 80 MG/1
80 TABLET, FILM COATED ORAL NIGHTLY
Status: DISCONTINUED | OUTPATIENT
Start: 2024-05-26 | End: 2024-06-10

## 2024-05-26 RX ORDER — MELATONIN
3 NIGHTLY PRN
Status: DISCONTINUED | OUTPATIENT
Start: 2024-05-26 | End: 2024-06-10

## 2024-05-26 RX ORDER — POLYETHYLENE GLYCOL 3350 17 G/17G
17 POWDER, FOR SOLUTION ORAL DAILY PRN
Status: DISCONTINUED | OUTPATIENT
Start: 2024-05-26 | End: 2024-06-05

## 2024-05-26 RX ORDER — ONDANSETRON 2 MG/ML
4 INJECTION INTRAMUSCULAR; INTRAVENOUS EVERY 6 HOURS PRN
Status: DISCONTINUED | OUTPATIENT
Start: 2024-05-26 | End: 2024-06-05

## 2024-05-26 RX ORDER — RIZATRIPTAN BENZOATE 5 MG/1
5 TABLET, ORALLY DISINTEGRATING ORAL AS NEEDED
Status: DISCONTINUED | OUTPATIENT
Start: 2024-05-26 | End: 2024-06-10

## 2024-05-26 RX ORDER — MIRABEGRON 50 MG/1
50 TABLET, EXTENDED RELEASE ORAL DAILY
Status: DISCONTINUED | OUTPATIENT
Start: 2024-05-26 | End: 2024-05-30

## 2024-05-26 RX ORDER — NICOTINE POLACRILEX 4 MG
30 LOZENGE BUCCAL
Status: DISCONTINUED | OUTPATIENT
Start: 2024-05-26 | End: 2024-06-10

## 2024-05-26 RX ORDER — AZELASTINE HYDROCHLORIDE 137 UG/1
1 SPRAY, METERED NASAL 2 TIMES DAILY
COMMUNITY
Start: 2024-04-03

## 2024-05-26 RX ORDER — SODIUM CHLORIDE 9 MG/ML
INJECTION, SOLUTION INTRAVENOUS CONTINUOUS
Status: DISCONTINUED | OUTPATIENT
Start: 2024-05-26 | End: 2024-05-28

## 2024-05-26 RX ORDER — PANTOPRAZOLE SODIUM 40 MG/1
40 TABLET, DELAYED RELEASE ORAL
Status: DISCONTINUED | OUTPATIENT
Start: 2024-05-27 | End: 2024-06-10

## 2024-05-26 RX ORDER — ENEMA 19; 7 G/133ML; G/133ML
1 ENEMA RECTAL ONCE AS NEEDED
Status: DISCONTINUED | OUTPATIENT
Start: 2024-05-26 | End: 2024-06-05

## 2024-05-26 RX ORDER — ECHINACEA PURPUREA EXTRACT 125 MG
1 TABLET ORAL
Status: DISCONTINUED | OUTPATIENT
Start: 2024-05-26 | End: 2024-06-10

## 2024-05-26 RX ORDER — ACETAMINOPHEN 500 MG
500 TABLET ORAL EVERY 4 HOURS PRN
Status: DISCONTINUED | OUTPATIENT
Start: 2024-05-26 | End: 2024-06-05

## 2024-05-26 RX ORDER — PROCHLORPERAZINE EDISYLATE 5 MG/ML
5 INJECTION INTRAMUSCULAR; INTRAVENOUS EVERY 8 HOURS PRN
Status: DISCONTINUED | OUTPATIENT
Start: 2024-05-26 | End: 2024-06-05

## 2024-05-26 RX ORDER — HEPARIN SODIUM 5000 [USP'U]/ML
5000 INJECTION, SOLUTION INTRAVENOUS; SUBCUTANEOUS EVERY 8 HOURS SCHEDULED
Status: DISCONTINUED | OUTPATIENT
Start: 2024-05-26 | End: 2024-06-05

## 2024-05-26 RX ORDER — BENZONATATE 100 MG/1
100 CAPSULE ORAL 3 TIMES DAILY PRN
Status: DISCONTINUED | OUTPATIENT
Start: 2024-05-26 | End: 2024-06-10

## 2024-05-26 RX ORDER — GABAPENTIN 600 MG/1
600 TABLET ORAL NIGHTLY
Status: DISCONTINUED | OUTPATIENT
Start: 2024-05-26 | End: 2024-06-10

## 2024-05-26 RX ORDER — BISACODYL 10 MG
10 SUPPOSITORY, RECTAL RECTAL
Status: DISCONTINUED | OUTPATIENT
Start: 2024-05-26 | End: 2024-06-05

## 2024-05-26 RX ORDER — ASPIRIN 81 MG/1
81 TABLET ORAL DAILY
Status: DISCONTINUED | OUTPATIENT
Start: 2024-05-26 | End: 2024-06-10

## 2024-05-26 NOTE — PLAN OF CARE
NURSING ADMISSION NOTE      Patient admitted via Cart  Oriented to room.  Safety precautions initiated.  Bed in low position.  Call light in reach.    Patient admitted from ER from home alone. Tele applied. IVF started. Room air. Patient notified of sputum sample ordered. Urine sample sent. QID accucheck carb controlled diet. Up stand by. Anticoagulation held per pulm order. Patient rounded on routinely. Patient updated on plan of care. PTA med list and admission navigator completed.    Per ED note, Dr. Main with pulm aware of consult. Orders in from MD.

## 2024-05-26 NOTE — H&P
[facility]     Soumya King Patient Status:  Emergency    6/3/1960 MRN [id number]   Location [facility] EMERGENCY DEPARTMENT Attending Missael Osborne DO   Hosp Day # 0 PCP Rika Vinson DO     Chief Complaint: productive cough and SOB    Subjective:    History of Present Illness:     Soumya King is a 63 year old female with hx of COPD, HTN, GERD, Type 2 diabetes, present to the ER with persistant cough and some SOB. Pt also getting some rt sided abdominal pain since the cough. No fevers, chills, nausea or vomiting. No hemoptysis.     History/Other:    Past Medical History:  Past Medical History:    Abdominal pain, other specified site    groin    Acute bronchitis    Allergic rhinitis    Anxiety    Arthritis    Back problem    Jamison esophagus    Chronic low back pain without sciatica    COPD (chronic obstructive pulmonary disease) (HCC)    Depression    Diabetes (HCC)    Difficult intubation    difficult to intubate with bladder surgery,instructed by anesthesia to communicate to future anesthesiologist. Small airway    Esophageal reflux    Essential hypertension    Don't remember    Fitting and adjustment of dental prosthetic device    High blood pressure    History of 2019 novel coronavirus disease (COVID-19)    No hospitalization,symptoms; Fever,fatigue ,body aches,headache no loss of taste or smell    Hx of motion sickness    Hyperlipidemia    Mass of spine    Migraines    barometric pressure    Myocardial infarction (HCC)    Osteoarthritis    Other ill-defined conditions(799.89)    Jamison's    Pain in joint, forearm    wrist    Pain in joint, pelvic region and thigh    hip    Pelvic mass    Personal history of urinary (tract) infection    Sleep apnea    I don't date     Past Surgical History:   Past Surgical History:   Procedure Laterality Date    Cabg  23    Colonoscopy      Colonoscopy N/A 2022    Procedure: COLONOSCOPY AND  ESOPHAGOGASTRODUODENOSCOPY;  Surgeon: Miguel Camargo MD;  Location: [facility]    Heart surgery  2024    double bypass    Laparoscopic  2007    sling operation for stress incontinence    Laparoscopy,diagnostic      Kay biopsy stereo nodule 2 site bilat (cpt=19081/86820) Left 02/2010    BENIGN 2 SITES    Kay biopsy stereo w/calc 2 site left (cpt=19081/64737)  02/2010    benign x 2    Kay stereo-clip w/calc 2 site left  2010    Oophorectomy Left 06/28/2019    Other  08/31/2022    RIGHT SACRAL SUBCUTANEOUS CYST EXCISION    Other surgical history  2000, 2019    Bladder Sling, Large mass removed from left abdomen and ovar    Tonsillectomy      Was a child, have no idea    Tubal ligation      Upper gi endoscopy,exam        Family History:   Family History   Problem Relation Age of Onset    Arthritis Mother     Other (AMI) Mother     Other (diabetes mellitus) Mother     Diabetes Mother      Social History:    reports that she quit smoking about 16 months ago. Her smoking use included cigarettes. She started smoking about 49 years ago. She has a 72 pack-year smoking history. She has never used smokeless tobacco. She reports that she does not currently use alcohol. She reports that she does not use drugs.     Allergies:   Allergies   Allergen Reactions    Naprosyn [Naproxen] ANAPHYLAXIS     Has tolerated ibuprofen and IV toradol     Aspirin OTHER (SEE COMMENTS)     Difficulty swallowing due to  saavedra's esophagus       Medications:    Current Facility-Administered Medications on File Prior to Encounter   Medication Dose Route Frequency Provider Last Rate Last Admin    [COMPLETED] sodium chloride 0.9% infusion 1,000 mL  1,000 mL Intravenous Once Edwige Collier PA-C   Stopped at 05/20/24 1425    [COMPLETED] dexAMETHasone PF (Decadron) 10 MG/ML injection 10 mg  10 mg Intravenous Once Edwige Collier PA-C   10 mg at 05/20/24 1322    [COMPLETED] ondansetron (Zofran) 4 MG/2ML injection 4 mg  4 mg Intravenous Once  Edwige Collier PA-C   4 mg at 24 1328    [COMPLETED] iron dextran (Infed) 25 mg in sodium chloride 0.9% PF 10 mL IV syringe (test dose)  25 mg Intravenous Once Scott Corona MD   25 mg at 03/15/24 1217    [COMPLETED] iron dextran (Infed) 975 mg in sodium chloride 0.9% 269.5 mL IVPB  975 mg Intravenous Once Scott Corona MD   Stopped at 03/15/24 1342     Current Outpatient Medications on File Prior to Encounter   Medication Sig Dispense Refill    benzonatate (TESSALON PERLES) 100 MG Oral Cap Take 1 capsule (100 mg total) by mouth 3 (three) times daily as needed for cough. 15 capsule 0    Rizatriptan Benzoate 10 MG Oral Tab Take 1 tablet (10 mg total) by mouth as needed for Migraine. 10 tablet 5    nitrofurantoin monohydrate macro (MACROBID) 100 MG Oral Cap Take 1 capsule (100 mg total) by mouth 2 (two) times daily. 14 capsule 0    Glucose Blood (ONETOUCH ULTRA) In Vitro Strip Tests 3 x daily 300 each 1    TRULICITY 0.75 MG/0.5ML Subcutaneous Solution Pen-injector Inject 0.75 mg into the skin once a week. 2 mL 0    cyclobenzaprine 10 MG Oral Tab Take 1 tablet (10 mg total) by mouth every 12 (twelve) hours as needed for Muscle spasms. 90 tablet 2    Coenzyme Q10 200 MG Oral Cap Co Q-10 200 mg capsule, [RxNorm: 602840]      lidocaine 5 % External Ointment Apply 1 Application  topically 3 (three) times daily as needed. 240 g 1    diclofenac 75 MG Oral Tab EC Take 1 tablet (75 mg total) by mouth 2 (two) times daily. 180 tablet 3    [] amoxicillin clavulanate 875-125 MG Oral Tab Take 1 tablet by mouth 2 (two) times daily for 7 days. 14 tablet 0    [] sulfamethoxazole-trimethoprim DS (BACTRIM DS) 800-160 MG Oral Tab per tablet Take 1 tablet by mouth 2 (two) times daily for 10 days. 20 tablet 0    metoprolol succinate ER 50 MG Oral Tablet 24 Hr Take 1 tablet (50 mg total) by mouth daily. 90 tablet 0    Omeprazole 40 MG Oral Capsule Delayed Release Take 1 capsule (40 mg total) by mouth 2 (two) times  daily. 300 capsule 0    Blood Glucose Monitoring Suppl (ONETOUCH ULTRA 2) w/Device Does not apply Kit 1 Device by Other route in the morning, at noon, and at bedtime. Use as directed. 1 kit 0    topiramate 50 MG Oral Tab TAKE ONE TABLET BY MOUTH EVERY MORNING AND TWO TABLETS EVERY EVENING (Patient taking differently: Taking one tablet in the morning and one tablet at night.) 270 tablet 2    gabapentin 600 MG Oral Tab Take 1 tablet (600 mg total) by mouth at bedtime. 180 tablet 0    Erenumab-aooe (AIMOVIG) 140 MG/ML Subcutaneous Solution Auto-injector Inject 1 mL (140 mg total) into the skin every 30 (thirty) days. 1 mL 4    umeclidinium bromide (INCRUSE ELLIPTA) 62.5 MCG/ACT Inhalation Aerosol Powder, Breath Activated Inhale 1 puff into the lungs daily. 3 each 0    clopidogrel 75 MG Oral Tab Take 1 tablet (75 mg total) by mouth daily.      aspirin 81 MG Oral Tab EC Take 1 tablet (81 mg total) by mouth daily.      atorvastatin 80 MG Oral Tab Take 1 tablet (80 mg total) by mouth nightly. 90 tablet 1    metFORMIN 500 MG Oral Tab Take 1 tablet (500 mg total) by mouth 2 (two) times daily. 180 tablet 1    furosemide 20 MG Oral Tab Take 1 tablet (20 mg total) by mouth daily. 7 tablet 0    clonazePAM 0.5 MG Oral Tab Take 1 tablet (0.5 mg total) by mouth 2 (two) times daily. 12 tablet 0    busPIRone 10 MG Oral Tab Take 2 tablets (20 mg total) by mouth daily.      Metoclopramide HCl 5 MG/5ML Oral Solution Take 5 mL (5 mg total) by mouth 3 (three) times daily before meals. (Patient taking differently: Take 5 mL (5 mg total) by mouth 3 (three) times daily before meals as needed.) 473 mL 0    cyclobenzaprine 10 MG Oral Tab Take 1 tablet (10 mg total) by mouth every 12 (twelve) hours as needed for Muscle spasms. 60 tablet 1    Lancets (ONETOUCH DELICA PLUS [id number]) Does not apply Misc 1 lancet  by Finger stick route 3 (three) times daily. 300 each 1    estradiol (ESTRACE) 0.1 MG/GM Vaginal Cream Apply 1/2 gram vaginally 2-3  times per week. (Patient not taking: Reported on 5/14/2024) 42.5 g 3    Mirabegron ER (MYRBETRIQ) 50 MG Oral Tablet 24 Hr Take 1 tablet (50 mg total) by mouth daily. 30 tablet 11    Multiple Vitamins-Minerals (ONE-A-DAY WOMENS 50+) Oral Tab Take 1 tablet by mouth daily.  0    QUEtiapine Fumarate  MG Oral Tablet 24 Hr Take 1 tablet (200 mg total) by mouth every evening. 30 tablet 0    Calcium Carb-Cholecalciferol 500-200 MG-UNIT Oral Tab Take 1 tablet by mouth daily.           Review of Systems:   A comprehensive review of systems was completed.    Pertinent positives and negatives noted in the HPI.    Objective:   Physical Exam:    BP (!) 88/60   Pulse 93   Temp 97.9 °F (36.6 °C) (Temporal)   Resp 12   Ht 5' 1\" (1.549 m)   Wt 125 lb (56.7 kg)   SpO2 96%   BMI 23.62 kg/m²   General: No acute distress, Alert  Respiratory: No rhonchi, no wheezes  Cardiovascular: S1, S2. Regular rate and rhythm  Abdomen: Soft, Non-tender, non-distended, positive bowel sounds  Neuro: No new focal deficits  Extremities: No edema      Results:    Labs:      Labs Last 24 Hours:    Recent Labs   Lab 05/26/24  1133   RBC 2.57*   HGB 7.4*   HCT 22.4*   MCV 87.2   MCH 28.8   MCHC 33.0   RDW 16.0   NEPRELIM 37.20*   WBC 41.9*   .0*       Recent Labs   Lab 05/26/24  1133   *   BUN 21   CREATSERUM 1.26*   EGFRCR 49*   CA 8.3*   ALB 1.7*   *   K 4.0   CL 98   CO2 17.0*   ALKPHO 289*   AST 89*   *   BILT 0.4   TP 6.7       Lab Results   Component Value Date    INR 1.51 (H) 05/26/2024    INR 1.28 (H) 12/27/2023    INR 1.07 12/26/2023       Recent Labs   Lab 05/26/24  1133   TROPHS 3       Recent Labs   Lab 05/26/24  1133   PBNP 1,235*       No results for input(s): \"PCT\" in the last 168 hours.    Imaging: Imaging data reviewed in Epic.    Assessment & Plan:      # Cavitary pneumonia  - Will continue on ceftriaxone and azithromycin  - Pulmonology on consult  - Blastomyces antibodies/antigen ordered.    # COPD    - no active wheezing  - Continue home inhalers  - prn nebs.    # Hypertension  -Continue on metoprolol.    # Type 2 diabetes  -Will place on hyperglycemia protocol with correction factor insulin.    # GERD  - Will continue on PPI.        Plan of care discussed with patient at bedside.    Keenan Woodson, DO    Supplementary Documentation:     The 21st Century Cures Act makes medical notes like these available to patients in the interest of transparency. Please be advised this is a medical document. Medical documents are intended to carry relevant information, facts as evident, and the clinical opinion of the practitioner. The medical note is intended as peer to peer communication and may appear blunt or direct. It is written in medical language and may contain abbreviations or verbiage that are unfamiliar.

## 2024-05-26 NOTE — ED PROVIDER NOTES
Patient Seen in: [facility] Emergency Department      History     Chief Complaint   Patient presents with    Cough/URI    Pain     Stated Complaint: cough for 2 weeks. seen at urgent care last week. no improving. covid negative.    Subjective:   63-year-old female, history of COPD, arthritis, hypertension, MI, 40 pack years history of smoking, presents with cough.  States it started couple weeks ago, has been treated with Tessalon Perles for bronchitis with little or no improvement.  Saw pulmonary recently scheduled for a CT next month.  Physical slightly worse, she has an right-sided pleurisy as well.  No fevers.  Productive cough.  No vomiting.  Left leg edema            Objective:   Past Medical History:    Abdominal pain, other specified site    groin    Acute bronchitis    Allergic rhinitis    Anxiety    Arthritis    Back problem    Jamison esophagus    Chronic low back pain without sciatica    COPD (chronic obstructive pulmonary disease) (HCC)    Depression    Diabetes (HCC)    Difficult intubation    difficult to intubate with bladder surgery,instructed by anesthesia to communicate to future anesthesiologist. Small airway    Esophageal reflux    Essential hypertension    Don't remember    Fitting and adjustment of dental prosthetic device    High blood pressure    History of 2019 novel coronavirus disease (COVID-19)    No hospitalization,symptoms; Fever,fatigue ,body aches,headache no loss of taste or smell    Hx of motion sickness    Hyperlipidemia    Mass of spine    Migraines    barometric pressure    Myocardial infarction (HCC)    Osteoarthritis    Other ill-defined conditions(799.89)    Jamison's    Pain in joint, forearm    wrist    Pain in joint, pelvic region and thigh    hip    Pelvic mass    Personal history of urinary (tract) infection    Sleep apnea    I don't date              Past Surgical History:   Procedure Laterality Date    Cabg  12/27/23    Colonoscopy      Colonoscopy N/A  2022    Procedure: COLONOSCOPY AND ESOPHAGOGASTRODUODENOSCOPY;  Surgeon: Miguel Camargo MD;  Location:  ENDOSCOPY    Heart surgery      double bypass    Laparoscopic      sling operation for stress incontinence    Laparoscopy,diagnostic      Kay biopsy stereo nodule 2 site bilat (cpt=19081/56289) Left 2010    BENIGN 2 SITES    Kay biopsy stereo w/calc 2 site left (cpt=19081/42676)  2010    benign x 2    Kay stereo-clip w/calc 2 site left      Oophorectomy Left 2019    Other  2022    RIGHT SACRAL SUBCUTANEOUS CYST EXCISION    Other surgical history  ,     Bladder Sling, Large mass removed from left abdomen and ovar    Tonsillectomy      Was a child, have no idea    Tubal ligation      Upper gi endoscopy,exam                  Social History     Socioeconomic History    Marital status: Single   Tobacco Use    Smoking status: Former     Current packs/day: 0.00     Average packs/day: 1.5 packs/day for 48.0 years (72.0 ttl pk-yrs)     Types: Cigarettes     Start date:      Quit date:      Years since quittin.4    Smokeless tobacco: Never    Tobacco comments:     smoking JUUL from 2019 until current   Vaping Use    Vaping status: Every Day    Start date: 2019    Substances: Nicotine, Flavoring    Devices: Disposable, Pre-filled pod   Substance and Sexual Activity    Alcohol use: Not Currently     Comment: On occasion    Drug use: No    Sexual activity: Not Currently     Partners: Male   Other Topics Concern    Caffeine Concern Yes    Stress Concern Yes    Weight Concern Yes    Special Diet No    Exercise No    Seat Belt Yes     Social Determinants of Health     Financial Resource Strain: Not on File (10/7/2022)    Received from [facility]     Financial Resource Strain     Financial Resource Strain: 0   Food Insecurity: No Food Insecurity (2023)    Food Insecurity     Food Insecurity: Never true   Transportation Needs: No Transportation Needs  (12/21/2023)    Transportation Needs     Lack of Transportation: No   Physical Activity: Not on File (10/7/2022)    Received from [facility]     Physical Activity     Physical Activity: 0   Stress: Not on File (10/7/2022)    Received from [facility]     Stress     Stress: 0   Social Connections: Not on File (10/7/2022)    Received from [facility]     Social Connections     Social Connections and Isolation: 0   Housing Stability: Low Risk  (12/21/2023)    Housing Stability     Housing Instability: No              Review of Systems   Constitutional:  Negative for fever.   HENT:  Positive for congestion.    Respiratory:  Positive for cough and shortness of breath. Negative for wheezing.    Cardiovascular:  Positive for chest pain and leg swelling.   Gastrointestinal: Negative.    Musculoskeletal: Negative.    Neurological:  Negative for dizziness and headaches.   All other systems reviewed and are negative.      Positive for stated complaint: cough for 2 weeks. seen at urgent care last week. no improving. covid negative.  Other systems are as noted in HPI.  Constitutional and vital signs reviewed.      All other systems reviewed and negative except as noted above.    Physical Exam     ED Triage Vitals [05/26/24 1123]   BP (!) 159/112   Pulse 110   Resp 21   Temp 97.9 °F (36.6 °C)   Temp src Temporal   SpO2 100 %   O2 Device None (Room air)       Current Vitals:   Vital Signs  BP: (!) 88/60  Pulse: 93  Resp: 12  Temp: 97.9 °F (36.6 °C)  Temp src: Temporal  MAP (mmHg): 69    Oxygen Therapy  SpO2: 96 %  O2 Device: None (Room air)            Physical Exam  Vitals and nursing note reviewed.   Constitutional:       General: She is not in acute distress.  HENT:      Head: Normocephalic.      Nose: Nose normal.      Mouth/Throat:      Mouth: Mucous membranes are moist.   Eyes:      Extraocular Movements: Extraocular movements intact.   Cardiovascular:      Rate and Rhythm: Normal rate.      Pulses: Normal pulses.       Heart sounds: Normal heart sounds.   Pulmonary:      Effort: Respiratory distress present.      Breath sounds: No wheezing.      Comments: Rhonchi and crackles in the right mid to lower lung fields  Abdominal:      Palpations: Abdomen is soft.   Musculoskeletal:         General: Normal range of motion.      Cervical back: Neck supple.   Skin:     General: Skin is warm and dry.   Neurological:      General: No focal deficit present.      Mental Status: She is alert and oriented to person, place, and time.      Cranial Nerves: No cranial nerve deficit.               ED Course     Labs Reviewed   COMP METABOLIC PANEL (14) - Abnormal; Notable for the following components:       Result Value    Glucose 145 (*)     Sodium 127 (*)     CO2 17.0 (*)     Creatinine 1.26 (*)     Calcium, Total 8.3 (*)     Calculated Osmolality 270 (*)     eGFR-Cr 49 (*)     AST 89 (*)      (*)     Alkaline Phosphatase 289 (*)     Albumin 1.7 (*)     Globulin  5.0 (*)     A/G Ratio 0.3 (*)     All other components within normal limits   PRO BETA NATRIURETIC PEPTIDE - Abnormal; Notable for the following components:    Pro-Beta Natriuretic Peptide 1,235 (*)     All other components within normal limits   D-DIMER - Abnormal; Notable for the following components:    D-Dimer 4.53 (*)     All other components within normal limits   MANUAL DIFFERENTIAL - Abnormal; Notable for the following components:    Neutrophil Absolute Manual 40.22 (*)     Lymphocyte Absolute Manual 0.84 (*)     All other components within normal limits   PROTHROMBIN TIME (PT) - Abnormal; Notable for the following components:    PT 18.3 (*)     INR 1.51 (*)     All other components within normal limits   PTT, ACTIVATED - Abnormal; Notable for the following components:    PTT 48.5 (*)     All other components within normal limits   CBC W/ DIFFERENTIAL - Abnormal; Notable for the following components:    WBC 41.9 (*)     RBC 2.57 (*)     HGB 7.4 (*)     HCT 22.4 (*)     PLT  537.0 (*)     Neutrophil Absolute Prelim 37.20 (*)     All other components within normal limits   TROPONIN I HIGH SENSITIVITY - Normal   LACTIC ACID, PLASMA - Normal   CBC WITH DIFFERENTIAL WITH PLATELET    Narrative:     The following orders were created for panel order CBC With Differential With Platelet.  Procedure                               Abnormality         Status                     ---------                               -----------         ------                     CBC W/ DIFFERENTIAL[[id number]]          Abnormal            Final result                 Please view results for these tests on the individual orders.   URINALYSIS WITH CULTURE REFLEX   BLOOD CULTURE   BLOOD CULTURE     EKG    Rate, intervals and axes as noted on EKG Report.  Rate: 100  Rhythm: Sinus Rhythm  Reading: EKG sinus rhythm 100 bpm.  Low voltage EKG.  Normal axis.  No ST elevations.  Intervals appear stable.  When compared to February 2024, no obvious changes are noted    Patient placed on cardiac monitor for telemetry monitoring secondary to yue dunbar. Interpretation at bedside by me is sinus rhythm.                            MDM      CT ANGIOGRAPHY, CHEST (CPT=71275)    Result Date: 5/26/2024  CONCLUSION:  1. Masslike consolidation with areas of central necrosis and mild cavitation in the right lower lobe.  Given its acute appearance this may represent an infectious process.  Correlate clinically.  Short-term follow-up after treatment is recommended. 2. Mediastinal adenopathy is likely reactive.  Attention on follow-up is recommended.    LOCATION:  [facility]   Dictated by (CST): Roc Mosqueda MD on 5/26/2024 at 1:21 PM     Finalized by (CST): Roc Mosqueda MD on 5/26/2024 at 1:25 PM       XR CHEST PA + LAT CHEST (CPT=71046)    Result Date: 5/26/2024  CONCLUSION:  Masslike consolidation in the right upper lobe is noted.  Given it is acute appearance this likely represents an infectious process.  Small right effusion is noted.   Correlate clinically.   LOCATION:  [facility]   Dictated by (CST): Roc Mosqueda MD on 5/26/2024 at 12:05 PM     Finalized by (CST): Roc Mosqueda MD on 5/26/2024 at 12:06 PM              I independently interpreted the x-ray of the chest and note a large middle lobe area mass    Admission disposition: 5/26/2024  1:40 PM          at bedside helpful to provide information history presenting illness      Differential diagnosis includes but is not limited to, pneumonia, PE, pneumothorax, viral syndrome bacterial infection    External chart review demonstrates recent outpatient visit with Dr. Main as well as her walk-in clinic visits for cough recently and headache      63-year-old female presents with cough and congestion, right-sided pleuritic chest pain.  Has a masslike structure on x-ray and on CTA there is no PE however there is a necrotic, cavitary like lesion concerning for pneumonia.  No fever.  Productive cough.  She is hypotensive as well.  Given sepsis fluid bolus.  Not requiring pressors at this time.  Lactic is negative.  She does have some incidental finding including some acute on chronic anemia, thrombocytosis, acute kidney injury hyponatremia.  Dr. Main aware and will see in consultation.  Admitted to Dr. Luke, awaiting bed assignment.  Patient and  updated      CRITICAL CARE:  A total of 33 minutes of critical care time (exclusive of billable procedures) was administered to manage the patient's critical lab values, critical imaging findings, unstable vital signs, and cardiovascular instability due to her hypotension secondary to cavitary necrotic lung lesion, possibly infectious origin.  Sepsis bolus.  Frequent assessments and reassessments.  Telemetry monitoring secondary to her pleuritic chest pain.  Sodium correction.  Discussion with consultants, admitting team, patient and family.  Chart review, documentation, imaging reviewed interpretation. this involved direct patient  intervention, complex decision making, and/or extensive discussions with the patient, family, and clinical staff.                              [facility]   part of [facility]      Sepsis Reassessment Note    BP (!) 87/56   Pulse 94   Temp 97.9 °F (36.6 °C) (Temporal)   Resp 13   Ht 154.9 cm (5' 1\")   Wt 56.7 kg   SpO2 98%   BMI 23.62 kg/m²      I completed the sepsis reassessment at 1330    Cardiac:  Regularity: Regular  Rate: Normal  Heart Sounds: S1,S2    Lungs:   Right: Rhonchi  Left: Clear    Peripheral Pulses:  Radial: Right 1+ or Left 1+      Capillary Refill:  <3 Secs    Skin:  Temp/Moisture: Warm and Dry  Color: Normal      Missael Osborne DO  5/26/2024  12:13 PM            Medical Decision Making      Disposition and Plan     Clinical Impression:  1. Cavitary lesion of lung    2. Acute anemia    3. MARÍA ELENA (acute kidney injury) (HCC)    4. Transaminitis    5. Thrombocytosis    6. Hyponatremia         Disposition:  Admit  5/26/2024  1:40 pm    Follow-up:  No follow-up provider specified.        Medications Prescribed:  Current Discharge Medication List                            Hospital Problems       Present on Admission  Date Reviewed: 5/20/2024            ICD-10-CM Noted POA    * (Principal) Cavitary lesion of lung J98.4 5/26/2024 Unknown    Hyperglycemia R73.9 5/26/2024 Yes

## 2024-05-26 NOTE — ED QUICK NOTES
Orders for admission, patient is aware of plan and ready to go upstairs. Any questions, please call ED RN valente at extension [phone]    Patient Covid vaccination status: Fully vaccinated     COVID Test Ordered in ED: None    COVID Suspicion at Admission: N/A    Running Infusions:      Mental Status/LOC at time of transport: oriented x4    Other pertinent information: na  CIWA score: N/A   NIH score:  N/A

## 2024-05-26 NOTE — ED INITIAL ASSESSMENT (HPI)
Cough since 2 weeks .difficulty in breathing right rib pain  and swelling on left lower leg since morning . Seen in urgent care covid test negative . Took some cough medicine did not help ' no fever

## 2024-05-27 PROBLEM — J90 PLEURAL EFFUSION: Status: ACTIVE | Noted: 2024-05-27

## 2024-05-27 LAB
ANION GAP SERPL CALC-SCNC: 8 MMOL/L (ref 0–18)
ANTIBODY SCREEN: NEGATIVE
ARTERIAL PATENCY WRIST A: POSITIVE
BASE EXCESS BLDA CALC-SCNC: -6.8 MMOL/L (ref ?–2)
BASOPHILS # BLD: 0 X10(3) UL (ref 0–0.2)
BASOPHILS NFR BLD: 0 %
BODY TEMPERATURE: 98.6 F
BUN BLD-MCNC: 19 MG/DL (ref 9–23)
CA-I BLD-SCNC: 1.17 MMOL/L (ref 0.95–1.32)
CALCIUM BLD-MCNC: 7.9 MG/DL (ref 8.5–10.1)
CHLORIDE SERPL-SCNC: 109 MMOL/L (ref 98–112)
CO2 SERPL-SCNC: 19 MMOL/L (ref 21–32)
COHGB MFR BLD: 0.3 % SAT (ref 0–3)
CREAT BLD-MCNC: 0.9 MG/DL
EGFRCR SERPLBLD CKD-EPI 2021: 73 ML/MIN/1.73M2 (ref 60–?)
EOSINOPHIL # BLD: 0.59 X10(3) UL (ref 0–0.7)
EOSINOPHIL NFR BLD: 2 %
ERYTHROCYTE [DISTWIDTH] IN BLOOD BY AUTOMATED COUNT: 16.3 %
GLUCOSE BLD-MCNC: 138 MG/DL (ref 70–99)
GLUCOSE BLD-MCNC: 161 MG/DL (ref 70–99)
GLUCOSE BLD-MCNC: 169 MG/DL (ref 70–99)
GLUCOSE BLD-MCNC: 90 MG/DL (ref 70–99)
GLUCOSE BLD-MCNC: 99 MG/DL (ref 70–99)
HCO3 BLDA-SCNC: 19.6 MEQ/L (ref 21–27)
HCT VFR BLD AUTO: 20.2 %
HGB BLD-MCNC: 6.5 G/DL
HGB BLD-MCNC: 7.5 G/DL
HGB BLD-MCNC: 8.5 G/DL
LACTATE BLD-SCNC: 0.9 MMOL/L (ref 0.5–2)
LYMPHOCYTES NFR BLD: 13 %
LYMPHOCYTES NFR BLD: 3.86 X10(3) UL (ref 1–4)
MCH RBC QN AUTO: 28.6 PG (ref 26–34)
MCHC RBC AUTO-ENTMCNC: 32.2 G/DL (ref 31–37)
MCV RBC AUTO: 89 FL
METHGB MFR BLD: 0 % SAT (ref 0.4–1.5)
MONOCYTES # BLD: 0.89 X10(3) UL (ref 0.1–1)
MONOCYTES NFR BLD: 3 %
NEUTROPHILS # BLD AUTO: 24.54 X10 (3) UL (ref 1.5–7.7)
NEUTROPHILS NFR BLD: 82 %
NEUTS HYPERSEG # BLD: 24.35 X10(3) UL (ref 1.5–7.7)
OSMOLALITY SERPL CALC.SUM OF ELEC: 284 MOSM/KG (ref 275–295)
OXYHGB MFR BLDA: 96.3 % (ref 92–100)
PCO2 BLDA: 34 MM HG (ref 35–45)
PH BLDA: 7.34 [PH] (ref 7.35–7.45)
PLATELET # BLD AUTO: 502 10(3)UL (ref 150–450)
PLATELET MORPHOLOGY: NORMAL
PO2 BLDA: 80 MM HG (ref 80–100)
POTASSIUM BLD-SCNC: 4.1 MMOL/L (ref 3.6–5.1)
POTASSIUM SERPL-SCNC: 3.7 MMOL/L (ref 3.5–5.1)
RBC # BLD AUTO: 2.27 X10(6)UL
RH BLOOD TYPE: NEGATIVE
SODIUM BLD-SCNC: 135 MMOL/L (ref 135–145)
SODIUM SERPL-SCNC: 136 MMOL/L (ref 136–145)
TOTAL CELLS COUNTED BLD: 100
WBC # BLD AUTO: 29.7 X10(3) UL (ref 4–11)

## 2024-05-27 PROCEDURE — 99223 1ST HOSP IP/OBS HIGH 75: CPT | Performed by: INTERNAL MEDICINE

## 2024-05-27 PROCEDURE — 99232 SBSQ HOSP IP/OBS MODERATE 35: CPT | Performed by: HOSPITALIST

## 2024-05-27 PROCEDURE — 30233N1 TRANSFUSION OF NONAUTOLOGOUS RED BLOOD CELLS INTO PERIPHERAL VEIN, PERCUTANEOUS APPROACH: ICD-10-PCS | Performed by: HOSPITALIST

## 2024-05-27 RX ORDER — TRAMADOL HYDROCHLORIDE 50 MG/1
50 TABLET ORAL EVERY 6 HOURS PRN
Status: DISCONTINUED | OUTPATIENT
Start: 2024-05-27 | End: 2024-06-05

## 2024-05-27 RX ORDER — SODIUM CHLORIDE 9 MG/ML
INJECTION, SOLUTION INTRAVENOUS ONCE
Status: COMPLETED | OUTPATIENT
Start: 2024-05-27 | End: 2024-05-27

## 2024-05-27 RX ORDER — AZITHROMYCIN 250 MG/1
500 TABLET, FILM COATED ORAL EVERY 24 HOURS
Status: DISCONTINUED | OUTPATIENT
Start: 2024-05-28 | End: 2024-05-29

## 2024-05-27 NOTE — CONSULTS
[facility]  Report of Consultation    Soumya King Patient Status:  Inpatient    6/3/1960 MRN [id number]   Location [facility] 5NW-A Attending Apurva Logan MD   Hosp Day # 1 PCP Rika Vinson DO     Reason for Consultation:  Progressive cough dyspnea right greater than left lower chest pain    History of Present Illness:  Soumya King is a a(n) 63 year old female recent ex-smoker now vaping-extensive medical history that includes known COPD on Incruse, diabetes, migraines ,recent diagnosis of MGUS , known iron deficiency anemia with intermittent iron infusions-(negative scopes ) coronary disease status post CABG , SUMMER untreated and most recently undergoing evaluation for multiple nodular opacities and groundglass opacities left and right with adenopathy.  Plans in place for possible biopsy and followed by Dr. Main.  She states she has been sick for a month with progressive coughing rarely productive of occasional green.  There is been no hemoptysis.  She reports low-grade fevers on and off without chills.  She has had a very poor appetite and has had occasional vomiting without diarrhea.-Over the past 2 weeks she has had increasing cough and over the last 2 to 3 days with increasing chest pain.  She reports it is bilateral at this time right greater than left anterior and back.  Since admission she has been afebrile and remains on room air.  Episodes of questionable confusion, lethargic and impulsiveness were noted this a.m. though she remains oriented x 4  Hgb on admission 6.5 - denies apparent bleeding   History:  Past Medical History:    Abdominal pain, other specified site    groin    Acute bronchitis    Allergic rhinitis    Anxiety    Arthritis    Back problem    Jamison esophagus    Chronic low back pain without sciatica    COPD (chronic obstructive pulmonary disease) (HCC)    Depression    Diabetes (HCC)    Difficult intubation    difficult to intubate with bladder  surgery,instructed by anesthesia to communicate to future anesthesiologist. Small airway    Esophageal reflux    Essential hypertension    Don't remember    Fitting and adjustment of dental prosthetic device    High blood pressure    History of 2019 novel coronavirus disease (COVID-19)    No hospitalization,symptoms; Fever,fatigue ,body aches,headache no loss of taste or smell    Hx of motion sickness    Hyperlipidemia    Mass of spine    Migraines    barometric pressure    Myocardial infarction (HCC)    Osteoarthritis    Other ill-defined conditions(799.89)    Saavedra's    Pain in joint, forearm    wrist    Pain in joint, pelvic region and thigh    hip    Pelvic mass    Personal history of urinary (tract) infection    Sleep apnea    I don't date     Family History   Problem Relation Age of Onset    Arthritis Mother     Other (AMI) Mother     Other (diabetes mellitus) Mother     Diabetes Mother       reports that she quit smoking about 16 months ago. Her smoking use included cigarettes. She started smoking about 49 years ago. She has a 72 pack-year smoking history. She has never used smokeless tobacco. She reports that she does not currently use alcohol. She reports that she does not use drugs.  States she continues to work at [employer]-unable to give up vaping using 1 cartridge a day.    Allergies:  Allergies   Allergen Reactions    Naprosyn [Naproxen] ANAPHYLAXIS     Has tolerated ibuprofen and IV toradol     Aspirin OTHER (SEE COMMENTS)     Difficulty swallowing due to  saavedra's esophagus       Medications:  Medications Prior to Admission   Medication Sig Dispense Refill Last Dose    azelastine 137 MCG/SPRAY Nasal Solution 1 spray by Nasal route 2 (two) times daily.   5/26/2024    PRISTIQ 100 MG Oral Tablet 24 Hr Take 1 tablet (100 mg total) by mouth daily.   5/26/2024    benzonatate (TESSALON PERLES) 100 MG Oral Cap Take 1 capsule (100 mg total) by mouth 3 (three) times daily as needed for cough. 15 capsule 0  5/25/2024    Rizatriptan Benzoate 10 MG Oral Tab Take 1 tablet (10 mg total) by mouth as needed for Migraine. 10 tablet 5 5/25/2024    nitrofurantoin monohydrate macro (MACROBID) 100 MG Oral Cap Take 1 capsule (100 mg total) by mouth 2 (two) times daily. 14 capsule 0 5/26/2024    Glucose Blood (ONETOUCH ULTRA) In Vitro Strip Tests 3 x daily 300 each 1 5/26/2024    TRULICITY 0.75 MG/0.5ML Subcutaneous Solution Pen-injector Inject 0.75 mg into the skin once a week. 2 mL 0 Past Week    Coenzyme Q10 200 MG Oral Cap Co Q-10 200 mg capsule, [RxNorm: 529454]   5/25/2024    diclofenac 75 MG Oral Tab EC Take 1 tablet (75 mg total) by mouth 2 (two) times daily. 180 tablet 3 5/26/2024    metoprolol succinate ER 50 MG Oral Tablet 24 Hr Take 1 tablet (50 mg total) by mouth daily. 90 tablet 0 5/26/2024    Omeprazole 40 MG Oral Capsule Delayed Release Take 1 capsule (40 mg total) by mouth 2 (two) times daily. 300 capsule 0 5/26/2024    Blood Glucose Monitoring Suppl (ONETOUCH ULTRA 2) w/Device Does not apply Kit 1 Device by Other route in the morning, at noon, and at bedtime. Use as directed. 1 kit 0 5/26/2024    topiramate 50 MG Oral Tab TAKE ONE TABLET BY MOUTH EVERY MORNING AND TWO TABLETS EVERY EVENING (Patient taking differently: Taking one tablet in the morning and one tablet at night.) 270 tablet 2 5/26/2024    gabapentin 600 MG Oral Tab Take 1 tablet (600 mg total) by mouth at bedtime. 180 tablet 0 5/25/2024    Erenumab-aooe (AIMOVIG) 140 MG/ML Subcutaneous Solution Auto-injector Inject 1 mL (140 mg total) into the skin every 30 (thirty) days. 1 mL 4 Past Week    umeclidinium bromide (INCRUSE ELLIPTA) 62.5 MCG/ACT Inhalation Aerosol Powder, Breath Activated Inhale 1 puff into the lungs daily. 3 each 0 5/26/2024    clopidogrel 75 MG Oral Tab Take 1 tablet (75 mg total) by mouth daily.   5/26/2024    aspirin 81 MG Oral Tab EC Take 1 tablet (81 mg total) by mouth daily.   5/26/2024    atorvastatin 80 MG Oral Tab Take 1 tablet  (80 mg total) by mouth nightly. 90 tablet 1 2024    metFORMIN 500 MG Oral Tab Take 1 tablet (500 mg total) by mouth 2 (two) times daily. 180 tablet 1 2024    furosemide 20 MG Oral Tab Take 1 tablet (20 mg total) by mouth daily. 7 tablet 0 2024    clonazePAM 0.5 MG Oral Tab Take 1 tablet (0.5 mg total) by mouth 2 (two) times daily. 12 tablet 0 2024    busPIRone 10 MG Oral Tab Take 2 tablets (20 mg total) by mouth daily.   2024    cyclobenzaprine 10 MG Oral Tab Take 1 tablet (10 mg total) by mouth every 12 (twelve) hours as needed for Muscle spasms. 60 tablet 1 2024    Lancets (ONETOUCH DELICA PLUS [id number]) Does not apply Misc 1 lancet  by Finger stick route 3 (three) times daily. 300 each 1 2024    Mirabegron ER (MYRBETRIQ) 50 MG Oral Tablet 24 Hr Take 1 tablet (50 mg total) by mouth daily. 30 tablet 11 2024    Multiple Vitamins-Minerals (ONE-A-DAY WOMENS 50+) Oral Tab Take 1 tablet by mouth daily.  0 2024    QUEtiapine Fumarate  MG Oral Tablet 24 Hr Take 1 tablet (200 mg total) by mouth every evening. 30 tablet 0 2024    Calcium Carb-Cholecalciferol 500-200 MG-UNIT Oral Tab Take 1 tablet by mouth daily.     2024    cyclobenzaprine 10 MG Oral Tab Take 1 tablet (10 mg total) by mouth every 12 (twelve) hours as needed for Muscle spasms. (Patient taking differently: Take 1 tablet (10 mg total) by mouth every 12 (twelve) hours as needed for Muscle spasms. Duplicate med) 90 tablet 2     lidocaine 5 % External Ointment Apply 1 Application  topically 3 (three) times daily as needed. 240 g 1 Unknown    [] amoxicillin clavulanate 875-125 MG Oral Tab Take 1 tablet by mouth 2 (two) times daily for 7 days. 14 tablet 0     [] sulfamethoxazole-trimethoprim DS (BACTRIM DS) 800-160 MG Oral Tab per tablet Take 1 tablet by mouth 2 (two) times daily for 10 days. 20 tablet 0     Metoclopramide HCl 5 MG/5ML Oral Solution Take 5 mL (5 mg total) by mouth 3 (three)  times daily before meals. (Patient taking differently: Take 5 mL (5 mg total) by mouth 3 (three) times daily before meals as needed.) 473 mL 0 Unknown    estradiol (ESTRACE) 0.1 MG/GM Vaginal Cream Apply 1/2 gram vaginally 2-3 times per week. (Patient not taking: Reported on 5/14/2024) 42.5 g 3        Review of Systems:    Constitutional: Patient is noted to be lethargic however able to answer questions thinks her weight has been decreasing  Eyes: Denies any specific changes ongoing migraine issues  Ears, nose, mouth, throat, and face: Denies any sore throat trouble swallowing  Respiratory: Dyspnea cough chest pain as above  Cardiovascular: Unaware of any palpitations  Gastrointestinal: Rare vomiting as above denies diarrhea unaware of reflux though remains on PPI reportedly  Musculoskeletal: Denies any lower extremity edema or pain  Neurological: No specific migraines at this time otherwise as above     All other review of systems are negative.  Denies any trouble sleeping    Vital signs in last 24 hours:  Patient Vitals for the past 24 hrs:   BP Temp Temp src Pulse Resp SpO2 Height Weight   05/27/24 0759 110/56 -- Oral 111 18 96 % -- --   05/27/24 0517 98/51 97.6 °F (36.4 °C) Oral 110 18 95 % -- --   05/27/24 0036 98/59 97.2 °F (36.2 °C) Oral 101 18 97 % -- --   05/26/24 1900 95/57 97.5 °F (36.4 °C) Oral 87 16 97 % -- --   05/26/24 1623 -- -- -- -- -- -- -- 132 lb 12.8 oz (60.2 kg)   05/26/24 1600 113/68 98.1 °F (36.7 °C) Temporal 87 14 100 % -- --   05/26/24 1530 90/70 -- -- 96 13 91 % -- --   05/26/24 1515 95/67 -- -- 93 18 100 % -- --   05/26/24 1445 100/60 -- -- 93 13 100 % -- --   05/26/24 1330 (!) 88/60 -- -- 93 12 96 % -- --   05/26/24 1300 102/60 -- -- 91 12 94 % -- --   05/26/24 1245 -- -- -- 89 12 99 % -- --   05/26/24 1145 (!) 87/56 -- -- 94 13 98 % -- --   05/26/24 1123 (!) 159/112 97.9 °F (36.6 °C) Temporal 110 21 100 % 5' 1\" (1.549 m) 125 lb (56.7 kg)         Physical Exam:   General: Lethargic but  arousable answers questions appropriately cooperative, oriented.  No respiratory distress.   Head: Normocephalic, without obvious abnormality, atraumatic.   Eyes/Nose: Pupils appear equal   Throat: Lips, mucosa, and tongue normal.  No thrush noted.  Small posterior pharyngeal area   Neck: trachea midline, no adenopathy, no thyromegaly. No JVD.  60 degrees   Lungs: Rales throughout the right base questionable rub left with slight rales--no auscultated wheeze   Chest wall: No tenderness or deformity.   Heart: Tachycardic to 115 no auscultated murmurs   Abdomen: soft, non-distended, no masses, no guarding, no     Rebound.  Protuberant denies alcohol no obvious hepatosplenomegaly or ascites   Extremity: No edema nontender no clubbing   Skin: No rashes or lesions.   Neurological: Lethargic but interactive, no focal deficits    Lab Data Review:  Lab Results   Component Value Date    WBC 29.7 05/27/2024    HGB 6.5 05/27/2024    HCT 20.2 05/27/2024    .0 05/27/2024    CREATSERUM 0.90 05/27/2024    BUN 19 05/27/2024     05/27/2024    K 3.7 05/27/2024     05/27/2024    CO2 19.0 05/27/2024    GLU 90 05/27/2024    CA 7.9 05/27/2024    ALB 1.7 05/26/2024    ALKPHO 289 05/26/2024    BILT 0.4 05/26/2024    TP 6.7 05/26/2024    AST 89 05/26/2024     05/26/2024    PTT 48.5 05/26/2024    INR 1.51 05/26/2024    PTP 18.3 05/26/2024    DDIMER 4.53 05/26/2024    PGLU 99 05/27/2024       Cultures:   E. coli UTI 5/14  Blood cultures and sputum culture pending    Radiology:  US VENOUS DOPPLER LEG LEFT - DIAG IMG (CPT=93971)    Result Date: 5/26/2024  CONCLUSION:  No evidence of DVT in the left lower extremity.   LOCATION:  [facility]    Dictated by (CST): Roc Mosqueda MD on 5/26/2024 at 2:46 PM     Finalized by (CST): Roc Mosqueda MD on 5/26/2024 at 2:49 PM       CT ANGIOGRAPHY, CHEST (CPT=71275)    Result Date: 5/26/2024  CONCLUSION:  1. Masslike consolidation with areas of central necrosis and mild cavitation in  the right lower lobe.  Given its acute appearance this may represent an infectious process.  Correlate clinically.  Short-term follow-up after treatment is recommended. 2. Mediastinal adenopathy is likely reactive.  Attention on follow-up is recommended.    LOCATION:  [facility]   Dictated by (CST): Roc Mosqueda MD on 5/26/2024 at 1:21 PM     Finalized by (CST): oRc Mosqueda MD on 5/26/2024 at 1:25 PM       XR CHEST PA + LAT CHEST (CPT=71046)    Result Date: 5/26/2024  CONCLUSION:  Masslike consolidation in the right upper lobe is noted.  Given it is acute appearance this likely represents an infectious process.  Small right effusion is noted.  Correlate clinically.   LOCATION:  [facility]   Dictated by (CST): Rco Mosqueda MD on 5/26/2024 at 12:05 PM     Finalized by (CST): Roc Mosqueda MD on 5/26/2024 at 12:06 PM      CTs reviewed--new right-sided pleural effusion-new from 4/11/24 4/11 CT with 1 to 2 cm nodule right lower lobe-now with dramatic increase areas of consolidation difficult to rule out mass lesion some areas of cavitation---  Mediastinal adenopathy  Cavitary small nodule right upper lobe    Assessment and Plan:  Patient Active Problem List   Diagnosis    Vitamin D deficiency    Jamison's esophagus    Tobacco use    Insomnia    COPD, mild (HCC) - Dx'd via PFTs done in 3/2015    Migraine without aura and without status migrainosus, not intractable    Hyperlipidemia    SUMMER on CPAP    MDD (major depressive disorder), recurrent episode, moderate (HCC)    GERD (gastroesophageal reflux disease)    Hypertension    Chronic pansinusitis    Mucinous cystadenoma of ovary, left    KIRK (generalized anxiety disorder)    History of pubovaginal sling    Absence of bladder continence    Vasomotor flushing    Primary osteoarthritis of left knee    Mass of spine    Hx of motion sickness    Difficult intubation    Chronic low back pain without sciatica    Type 2 diabetes mellitus with diabetic neuropathy (HCC)    Pulmonary  nodule    CAD, multiple vessel    S/P CABG x 2    Type 2 diabetes mellitus with hyperlipidemia (HCC)    Anemia    Iron deficiency anemia secondary to inadequate dietary iron intake    Subacute cough    Cavitary lesion of lung    Hyperglycemia    Acute anemia    MARÍA ELENA (acute kidney injury) (HCC)    Transaminitis    Thrombocytosis    Hyponatremia       Assessment:  Large progressive right lower lobe process suspected pneumonia -bacterial versus fungal  Right pleural effusion with some component loculation associated pleuritic pain  Background of multiple pulmonary nodules-plans have been in place for biopsy  Severe anemia followed for iron deficiency by Dr. David jaimes with last scopes 2022- no obvious loss   Component COPD   IgM kappa MGUS   Altered mental status--ABG pending    Plan:  Sputum culture and blood cultures remain pending-other noninvasive measures pending  Remains on broad-spectrum antibiotic coverage  Plan for chest tube placement-unclear if on Plavix  Transfusions --would recommend hematology consultation  Further recommendations pending clinical course    Irina Ya MD  5/27/2024  8:22 AM

## 2024-05-27 NOTE — PROGRESS NOTES
[facility]   part of [facility]     Hospitalist Progress Note     Soumya King Patient Status:  Inpatient    6/3/1960 MRN [id number]   Location [facility] 5NW-A Attending Apurva Logan MD   Hosp Day # 1 PCP Rika Vinson DO     Chief Complaint: Pneumonia    Subjective:     Patient overall feeling better today, pain is improved    Objective:    Review of Systems:   A comprehensive review of systems was completed; pertinent positive and negatives stated in subjective.    Vital signs:  Temp:  [97.2 °F (36.2 °C)-98.1 °F (36.7 °C)] 97.6 °F (36.4 °C)  Pulse:  [] 111  Resp:  [12-21] 18  BP: ()/() 110/56  SpO2:  [91 %-100 %] 96 %    Physical Exam:    General: No acute distress  Respiratory: No wheezes, no rhonchi  Cardiovascular: S1, S2, regular rate and rhythm  Abdomen: Soft, Non-tender, non-distended, positive bowel sounds  Neuro: No new focal deficits.   Extremities: No edema      Diagnostic Data:    Labs:  Recent Labs   Lab 24  1133 24  0528   WBC 41.9* 29.7*   HGB 7.4* 6.5*   MCV 87.2 89.0   .0* 502.0*   INR 1.51*  --        Recent Labs   Lab 24  1133 24  0528   * 90   BUN 21 19   CREATSERUM 1.26* 0.90   CA 8.3* 7.9*   ALB 1.7*  --    * 136   K 4.0 3.7   CL 98 109   CO2 17.0* 19.0*   ALKPHO 289*  --    AST 89*  --    *  --    BILT 0.4  --    TP 6.7  --        Estimated Creatinine Clearance: 49.5 mL/min (based on SCr of 0.9 mg/dL).    Recent Labs   Lab 24  1133   TROPHS 3       Recent Labs   Lab 24  1133   PTP 18.3*   INR 1.51*          Microbiology    No results found for this visit on 24.      Imaging: Reviewed in Epic.    Medications:    aspirin  81 mg Oral Daily    atorvastatin  80 mg Oral Nightly    busPIRone  20 mg Oral Daily    clonazePAM  0.5 mg Oral BID    gabapentin  600 mg Oral Nightly    metoprolol succinate ER  50 mg Oral Daily Beta Blocker    [Held by provider] Mirabegron ER  50 mg Oral Daily     pantoprazole  40 mg Oral QAM AC    QUEtiapine ER  200 mg Oral QPM    umeclidinium bromide  1 puff Inhalation Daily    heparin  5,000 Units Subcutaneous Q8H JOSÉ ANTONIO    insulin aspart  1-10 Units Subcutaneous TID AC and HS    azithromycin  500 mg Intravenous Q24H    ampicillin-sulbactam  3 g Intravenous q6h       Assessment & Plan:      #Cavitary pneumonia  -continue on ceftriaxone and azithromycin  -Pulmonology on consult  -Blastomyces Ag pending    # Anemia  -known iron def  -hemoglobin 6.5 this morning  -transfusion ordered  -await repeat hemoglobin  -may need to involve heme and or GI if she develops a GIB ( h/o barrets's on ASA and Plavix for CABG in 2023)  -hold Plavix for possible bronch. Thorac or CT     #COPD   -no active wheezing  -Continue inhalers  -prn nebs.     #Hypertension  -metoprolol.     #Type 2 diabetes  -hyperglycemia protocol     #GERD  #Jamison's esophagus  -PPI.      Apurva Logan MD    Supplementary Documentation:     Quality:  DVT Mechanical Prophylaxis:   SCDs,    DVT Pharmacologic Prophylaxis   Medication    heparin (Porcine) 5000 UNIT/ML injection 5,000 Units         DVT Pharmacologic prophylaxis: Aspirin 81 mg      Code Status: Full Code  Gotti: No urinary catheter in place  Gotti Duration (in days):   Central line:    ILYA:     Discharge is dependent on: progress  At this point Ms. King is expected to be discharge to: home    The 21st Century Cures Act makes medical notes like these available to patients in the interest of transparency. Please be advised this is a medical document. Medical documents are intended to carry relevant information, facts as evident, and the clinical opinion of the practitioner. The medical note is intended as peer to peer communication and may appear blunt or direct. It is written in medical language and may contain abbreviations or verbiage that are unfamiliar.

## 2024-05-27 NOTE — PLAN OF CARE
Pt oriented x2-3, forgetful, lethargic, impulsive at times. Bed alarm on. On room air, BP stable. Holding anticoags. Pt incontinent of urine, purewick placed. Denies pain. Up SBA. IVF.     Problem: Patient/Family Goals  Goal: Patient/Family Long Term Goal  Description: Patient's Long Term Goal:   Discharge to home    Interventions:  - Follow plan of care  - See additional Care Plan goals for specific interventions  Outcome: Progressing     Problem: Patient/Family Goals  Goal: Patient/Family Short Term Goal  Description: Patient's Short Term Goal:   5/26 noc: maintain on room air  5/27: manage hgb    Interventions:   - labs, blood  - Medications  - IVF  - IVF ABT  - See additional Care Plan goals for specific interventions  Outcome: Progressing

## 2024-05-27 NOTE — PLAN OF CARE
Problem: PNA  Data: Ax04. Telemetry- sinus rhythm. , on room air. Afebrile. Right side pleuritic pain. Prn given. Mild dry cough. Voids. IVF 0.9 Nacl @ 100 ml/hr. Soft Bps. 1800 ADA diet. Accu checks. Up with sba. Scds.   Intervention: IV ampicillin, Seroquel, Robitussin, acetaminophen.   Education: Medication, call light   Response: Cooperative with care    Problem: Diabetes/Glucose Control  Goal: Glucose maintained within prescribed range  Description: INTERVENTIONS:  - Monitor Blood Glucose as ordered  - Assess for signs and symptoms of hyperglycemia and hypoglycemia  - Administer ordered medications to maintain glucose within target range  - Assess barriers to adequate nutritional intake and initiate nutrition consult as needed  - Instruct patient on self management of diabetes  Outcome: Progressing     Problem: Patient/Family Goals  Goal: Patient/Family Long Term Goal  Description: Patient's Long Term Goal:   Discharge to home    Interventions:  - Follow plan of care  - See additional Care Plan goals for specific interventions  Outcome: Progressing  Goal: Patient/Family Short Term Goal  Description: Patient's Short Term Goal:   5/26 noc: maintain on room air    Interventions:   - Medications  - IVF  - IVF ABT  - See additional Care Plan goals for specific interventions  Outcome: Progressing     Problem: RESPIRATORY - ADULT  Goal: Achieves optimal ventilation and oxygenation  Description: INTERVENTIONS:  - Assess for changes in respiratory status  - Assess for changes in mentation and behavior  - Position to facilitate oxygenation and minimize respiratory effort  - Oxygen supplementation based on oxygen saturation or ABGs  - Provide Smoking Cessation handout, if applicable  - Encourage broncho-pulmonary hygiene including cough, deep breathe, Incentive Spirometry  - Assess the need for suctioning and perform as needed  - Assess and instruct to report SOB or any respiratory difficulty  - Respiratory Therapy  support as indicated  - Manage/alleviate anxiety  - Monitor for signs/symptoms of CO2 retention  Outcome: Progressing

## 2024-05-28 ENCOUNTER — APPOINTMENT (OUTPATIENT)
Dept: GENERAL RADIOLOGY | Facility: HOSPITAL | Age: 64
DRG: 163 | End: 2024-05-28
Attending: INTERNAL MEDICINE
Payer: MEDICAID

## 2024-05-28 ENCOUNTER — APPOINTMENT (OUTPATIENT)
Dept: ULTRASOUND IMAGING | Facility: HOSPITAL | Age: 64
DRG: 163 | End: 2024-05-28
Attending: INTERNAL MEDICINE
Payer: MEDICAID

## 2024-05-28 ENCOUNTER — APPOINTMENT (OUTPATIENT)
Dept: CT IMAGING | Facility: HOSPITAL | Age: 64
DRG: 163 | End: 2024-05-28
Attending: INTERNAL MEDICINE
Payer: MEDICAID

## 2024-05-28 ENCOUNTER — APPOINTMENT (OUTPATIENT)
Dept: GENERAL RADIOLOGY | Facility: HOSPITAL | Age: 64
DRG: 163 | End: 2024-05-28
Attending: RADIOLOGY
Payer: MEDICAID

## 2024-05-28 PROBLEM — J96.01 ACUTE RESPIRATORY FAILURE WITH HYPOXIA (HCC): Status: ACTIVE | Noted: 2024-05-28

## 2024-05-28 PROBLEM — R40.0 SOMNOLENCE: Status: ACTIVE | Noted: 2024-05-28

## 2024-05-28 LAB
ALBUMIN SERPL-MCNC: 1.3 G/DL (ref 3.4–5)
ALBUMIN/GLOB SERPL: 0.3 {RATIO} (ref 1–2)
ALP LIVER SERPL-CCNC: 295 U/L
ALT SERPL-CCNC: 106 U/L
AMMONIA PLAS-MCNC: 24 UMOL/L (ref 11–32)
AMYLASE PLR-CCNC: 27 U/L
ANION GAP SERPL CALC-SCNC: 6 MMOL/L (ref 0–18)
ARTERIAL PATENCY WRIST A: POSITIVE
AST SERPL-CCNC: 93 U/L (ref 15–37)
BASE EXCESS BLDA CALC-SCNC: -6.2 MMOL/L (ref ?–2)
BASOPHILS # BLD: 0 X10(3) UL (ref 0–0.2)
BASOPHILS NFR BLD: 0 %
BASOPHILS NFR PLR: 0 %
BILIRUB SERPL-MCNC: 0.4 MG/DL (ref 0.1–2)
BLOOD TYPE BARCODE: 9500
BODY TEMPERATURE: 98.6 F
BUN BLD-MCNC: 12 MG/DL (ref 9–23)
CA-I BLD-SCNC: 1.19 MMOL/L (ref 0.95–1.32)
CALCIUM BLD-MCNC: 7.9 MG/DL (ref 8.5–10.1)
CHLORIDE SERPL-SCNC: 112 MMOL/L (ref 98–112)
CO2 SERPL-SCNC: 19 MMOL/L (ref 21–32)
COHGB MFR BLD: 1.1 % SAT (ref 0–3)
CREAT BLD-MCNC: 0.75 MG/DL
DEPRECATED HBV CORE AB SER IA-ACNC: 1583.7 NG/ML
EGFRCR SERPLBLD CKD-EPI 2021: 91 ML/MIN/1.73M2 (ref 60–?)
EOSINOPHIL # BLD: 0.34 X10(3) UL (ref 0–0.7)
EOSINOPHIL NFR BLD: 1 %
EOSINOPHIL NFR PLR: 0 %
ERYTHROCYTE [DISTWIDTH] IN BLOOD BY AUTOMATED COUNT: 16.3 %
FOLATE SERPL-MCNC: 18.5 NG/ML (ref 8.7–?)
GLOBULIN PLAS-MCNC: 4.5 G/DL (ref 2.8–4.4)
GLUCOSE BLD-MCNC: 104 MG/DL (ref 70–99)
GLUCOSE BLD-MCNC: 111 MG/DL (ref 70–99)
GLUCOSE BLD-MCNC: 112 MG/DL (ref 70–99)
GLUCOSE BLD-MCNC: 98 MG/DL (ref 70–99)
GLUCOSE BLD-MCNC: 98 MG/DL (ref 70–99)
GLUCOSE PLR-MCNC: 2 MG/DL
HCO3 BLDA-SCNC: 20.1 MEQ/L (ref 21–27)
HCT VFR BLD AUTO: 22.7 %
HGB BLD-MCNC: 7.6 G/DL
HGB BLD-MCNC: 7.9 G/DL
HGB BLD-MCNC: 8 G/DL
HGB RETIC QN AUTO: 26.6 PG (ref 28.2–36.6)
IGA SERPL-MCNC: 281 MG/DL (ref 70–312)
IGM SERPL-MCNC: 41.5 MG/DL (ref 43–279)
IMM RETICS NFR: 0.2 RATIO (ref 0.1–0.3)
IMMUNOGLOBULIN PNL SER-MCNC: 691 MG/DL (ref 791–1643)
INR BLD: 1.58 (ref 0.8–1.2)
IRON SATN MFR SERPL: 14 %
IRON SERPL-MCNC: 13 UG/DL
L/M: 3 L/MIN
LACTATE BLD-SCNC: 0.4 MMOL/L (ref 0.5–2)
LDH FLD L TO P-CCNC: 2920 U/L
LYMPHOCYTES NFR BLD: 3.03 X10(3) UL (ref 1–4)
LYMPHOCYTES NFR BLD: 9 %
LYMPHOCYTES NFR PLR: 2 %
MCH RBC QN AUTO: 30 PG (ref 26–34)
MCHC RBC AUTO-ENTMCNC: 33.5 G/DL (ref 31–37)
MCV RBC AUTO: 89.7 FL
METHGB MFR BLD: 0.3 % SAT (ref 0.4–1.5)
MONOCYTES # BLD: 0 X10(3) UL (ref 0.1–1)
MONOCYTES NFR BLD: 0 %
MONOS+MACROS NFR PLR: 0 %
NEUTROPHILS # BLD AUTO: 27.27 X10 (3) UL (ref 1.5–7.7)
NEUTROPHILS NFR BLD: 87 %
NEUTROPHILS NFR PLR: 98 %
NEUTS BAND NFR BLD: 3 %
NEUTS HYPERSEG # BLD: 30.33 X10(3) UL (ref 1.5–7.7)
OSMOLALITY SERPL CALC.SUM OF ELEC: 284 MOSM/KG (ref 275–295)
OXYHGB MFR BLDA: 95.9 % (ref 92–100)
PCO2 BLDA: 34 MM HG (ref 35–45)
PH BLDA: 7.35 [PH] (ref 7.35–7.45)
PH PLR: <6.81 [PH] (ref 7.2–7.5)
PLATELET # BLD AUTO: 523 10(3)UL (ref 150–450)
PLATELET MORPHOLOGY: NORMAL
PO2 BLDA: 91 MM HG (ref 80–100)
POTASSIUM BLD-SCNC: 4.2 MMOL/L (ref 3.6–5.1)
POTASSIUM SERPL-SCNC: 3.7 MMOL/L (ref 3.5–5.1)
PROT PLR-MCNC: 4 G/DL
PROT SERPL-MCNC: 5.8 G/DL (ref 6.4–8.2)
PROTHROMBIN TIME: 19 SECONDS (ref 11.6–14.8)
RBC # BLD AUTO: 2.53 X10(6)UL
RBC PLEURAL FLUID: 8000 /MM3
RETICS # AUTO: 28.1 X10(3) UL (ref 22.5–147.5)
RETICS/RBC NFR AUTO: 1.1 %
SODIUM BLD-SCNC: 135 MMOL/L (ref 135–145)
SODIUM SERPL-SCNC: 137 MMOL/L (ref 136–145)
TIBC SERPL-MCNC: 94 UG/DL (ref 240–450)
TOTAL CELLS COUNTED BLD: 100
TOTAL CELLS COUNTED FLD: 100
TRANSFERRIN SERPL-MCNC: 63 MG/DL (ref 200–360)
TRIGL PLR-MCNC: 42 MG/DL
UNIT VOLUME: 350 ML
VIT B12 SERPL-MCNC: >2000 PG/ML (ref 193–986)
WBC # BLD AUTO: 33.7 X10(3) UL (ref 4–11)
WBC # PLR: 8501 /MM3

## 2024-05-28 PROCEDURE — 99233 SBSQ HOSP IP/OBS HIGH 50: CPT | Performed by: INTERNAL MEDICINE

## 2024-05-28 PROCEDURE — 99232 SBSQ HOSP IP/OBS MODERATE 35: CPT | Performed by: HOSPITALIST

## 2024-05-28 PROCEDURE — 76705 ECHO EXAM OF ABDOMEN: CPT | Performed by: INTERNAL MEDICINE

## 2024-05-28 PROCEDURE — 74177 CT ABD & PELVIS W/CONTRAST: CPT | Performed by: INTERNAL MEDICINE

## 2024-05-28 PROCEDURE — 0W993ZZ DRAINAGE OF RIGHT PLEURAL CAVITY, PERCUTANEOUS APPROACH: ICD-10-PCS | Performed by: RADIOLOGY

## 2024-05-28 PROCEDURE — 70450 CT HEAD/BRAIN W/O DYE: CPT | Performed by: INTERNAL MEDICINE

## 2024-05-28 PROCEDURE — 99253 IP/OBS CNSLTJ NEW/EST LOW 45: CPT | Performed by: INTERNAL MEDICINE

## 2024-05-28 PROCEDURE — 32555 ASPIRATE PLEURA W/ IMAGING: CPT | Performed by: INTERNAL MEDICINE

## 2024-05-28 PROCEDURE — 71045 X-RAY EXAM CHEST 1 VIEW: CPT | Performed by: INTERNAL MEDICINE

## 2024-05-28 RX ORDER — IPRATROPIUM BROMIDE AND ALBUTEROL SULFATE 2.5; .5 MG/3ML; MG/3ML
3 SOLUTION RESPIRATORY (INHALATION)
Status: DISCONTINUED | OUTPATIENT
Start: 2024-05-28 | End: 2024-05-29

## 2024-05-28 NOTE — PROGRESS NOTES
[facility]  Progress Note    Soumya King Patient Status:  Inpatient    6/3/1960 MRN [id number]   Location [facility] 5NW-A Attending Apurva Logan MD   Hosp Day # 2 PCP Rika Vinson DO     Subjective:  Soumya King is a(n) 63 year old female   Temp this a.m. to 99 5  No acute events overnight though slept  This a.m. is more lethargic more dysarthric  Did receive BuSpar and clonazepam yesterday  Now n.p.o.-head CT reviewed    Objective:  /62 (BP Location: Left arm)   Pulse 119   Temp 99.2 °F (37.3 °C) (Axillary)   Resp 20   Ht 5' 1\" (1.549 m)   Wt 132 lb 12.8 oz (60.2 kg)   SpO2 97%   BMI 25.09 kg/m² now on 2 L nasal cannula      Temp (24hrs), Av.3 °F (36.8 °C), Min:97.5 °F (36.4 °C), Max:99.5 °F (37.5 °C)      Intake/Output:    Intake/Output Summary (Last 24 hours) at 2024 0813  Last data filed at 2024 0601  Gross per 24 hour   Intake 1454.42 ml   Output 902 ml   Net 552.42 ml       Physical Exam:   General: Sleeping but arousable at times to voice follows commands garbled speech   Head: Normocephalic, without obvious abnormality, atraumatic.   Throat: Lips, mucosa, and tongue normal.  No thrush noted.   Neck: trachea midline, no adenopathy, no thyromegaly. No JVD.  At 45 degrees   Lungs: Diminished tones on the right rales throughout the left central rhonchi noted   Chest wall: No tenderness or deformity.   Heart: Distant tones regular rate and rhythm   Abdomen: soft, non-distended, no masses, no guarding, no     Rebound.  Nontender no diarrhea   Extremity: No edema nontender   Skin: No rashes or lesions.   Neurological: Lethargic but follows commands no focal deficits    Lab Data Review:  Recent Labs     24  1133 24  0528 24  1156 24  0548   WBC 41.9* 29.7*  --  33.7*   HGB 7.4* 6.5* 8.5* 7.6*   .0* 502.0*  --  523.0*     Recent Labs     24  1133 24  0528 24  0548   * 136 137   K 4.0 3.7 3.7   CL 98 109  112   CO2 17.0* 19.0* 19.0*   BUN 21 19 12   CREATSERUM 1.26* 0.90 0.75     Recent Labs   Lab 05/26/24  1133 05/28/24  0548   PTP 18.3* 19.0*   INR 1.51* 1.58*   PTT 48.5*  --        Cultures: Sputum Gram stain with white cells without organisms seen fungal is pending blood cultures remain negative    Radiology:  No results found.  Chest x-ray with increasing right lower lobe densities suspected increased effusion    Recent Labs   Lab 05/28/24  0758   ABGPHT 7.35   GNHHXZ2G 34*   MHUEW7C 91   ABGHCO3 20.1*   ABGBE -6.2*   TEMP 98.6   CHARLES Positive   SITE Right Radial   DEV Nasal cannula   THGB 8.0*         Medications reviewed     Assessment and Plan:   Patient Active Problem List   Diagnosis    Vitamin D deficiency    Jamison's esophagus    Tobacco use    Insomnia    COPD, mild (HCC) - Dx'd via PFTs done in 3/2015    Migraine without aura and without status migrainosus, not intractable    Hyperlipidemia    SUMMER on CPAP    MDD (major depressive disorder), recurrent episode, moderate (HCC)    GERD (gastroesophageal reflux disease)    Hypertension    Chronic pansinusitis    Mucinous cystadenoma of ovary, left    KIRK (generalized anxiety disorder)    History of pubovaginal sling    Absence of bladder continence    Vasomotor flushing    Primary osteoarthritis of left knee    Mass of spine    Hx of motion sickness    Difficult intubation    Chronic low back pain without sciatica    Type 2 diabetes mellitus with diabetic neuropathy (HCC)    Pulmonary nodule    CAD, multiple vessel    S/P CABG x 2    Type 2 diabetes mellitus with hyperlipidemia (HCC)    Anemia    Iron deficiency anemia secondary to inadequate dietary iron intake    Subacute cough    Cavitary lesion of lung    Hyperglycemia    Acute anemia    MARÍA ELENA (acute kidney injury) (HCC)    Transaminitis    Thrombocytosis    Hyponatremia    Pleural effusion       Assessment:  Large progressive right lower lobe process suspected pneumonia -bacterial versus fungal  Right  pleural effusion with some component loculation associated pleuritic pain-increasing on chest x-ray-limited intervention related to Plavix  Background of multiple pulmonary nodules-plans have been in place for biopsy  Severe anemia followed for iron deficiency by Dr. David jaimes with last scopes 2022- no obvious loss - response to transfusion though continues to drift with plans for GI to see  Component COPD   Elevated LFTs-unremarkable  liver on CTA  IgM kappa MGUS   Altered mental status--CT with prior lacunar infarct otherwise unremarkable ABG remains unremarkable-questionably med related with plans to hold sedative medications    Plan:  Continuing broad-spectrum antibiotic coverage and awaiting culture results  Reviewed with interventional radiology plan for thoracentesis later risk versus benefit reviewed-  Hematology consult appreciated-GI to see  Empirically adjusting antibiotics  Would need to hold on scopes at this time pending clinical course  Further recommendations pending clinical course  Discussed with sister justa at length - aware of need for thora with risk of plavix discussed     CC     Irina Ya MD  5/28/2024  8:13 AM

## 2024-05-28 NOTE — PROGRESS NOTES
05/27/24 2144   BiPAP   $ RT Standby Charge (per 15 min) 1   $ SUMMER Follow up charge Yes   BiPAP/CPAP Monitored Parameters   Toleration Refused     Patient refused

## 2024-05-28 NOTE — CONSULTS
Hematology/Oncology Initial Consultation Note    Patient Name: Soumya King  Medical Record Number: [id number]    YOB: 1960   Date of Consultation: 5/28/2024   Physician requesting consultation: Dr Logan    Reason for Consultation:  Soumya King was seen today for the diagnosis of anemia    History of Present Illness:      64 y/o F PMH CAD s/p 2v CABG 12/2023 who is currently admitted for pneumonia.    - saw me in 3/5/24 for iron deficiency anemia, noted to have anemia to 9.1 g/dL, thrombocytosis to 489k, with iron sat of 11% and ferritin of 33. She received IV infed 1000mg 3/15/24  - she was also noted to have IgM kappa monoclonal protein 0.17 g/dL with kappa FLC 5.624, lambda FLC 3.072, with K/L FLC ratio 1.83. IgM was normal 135mg/dL then.  - Saw Dr Main on 3/13/24; CT chest LD showed improvement in diffuse reticular nodular opacities  - CT 5/26/24 with masslike consolidation with areas of central necrosis and mild cavitation  - currently on unasyn  - Soumya is lethargic this AM. She is able to wake up with simulation to answer my orientation questions, A&Ox3, but she quickly falls back asleep and she is snoring/gurgling. O2 remains >95%, ABG was obtained by pulm this AM without hypoxia  - no report of GIB in chart; she is on ASA;plavix for hx of CABG    Past Medical History:  Past Medical History:    Abdominal pain, other specified site    groin    Acute bronchitis    Allergic rhinitis    Anxiety    Arthritis    Back problem    Jamison esophagus    Chronic low back pain without sciatica    COPD (chronic obstructive pulmonary disease) (HCC)    Depression    Diabetes (HCC)    Difficult intubation    difficult to intubate with bladder surgery,instructed by anesthesia to communicate to future anesthesiologist. Small airway    Esophageal reflux    Essential hypertension    Don't remember    Fitting and adjustment of dental prosthetic device    High blood pressure    History of 2019 novel  coronavirus disease (COVID-19)    No hospitalization,symptoms; Fever,fatigue ,body aches,headache no loss of taste or smell    Hx of motion sickness    Hyperlipidemia    Mass of spine    Migraines    barometric pressure    Myocardial infarction (HCC)    Osteoarthritis    Other ill-defined conditions(799.89)    Jamison's    Pain in joint, forearm    wrist    Pain in joint, pelvic region and thigh    hip    Pelvic mass    Personal history of urinary (tract) infection    Sleep apnea    I don't date       Past Surgical History:   Procedure Laterality Date    Cabg  12/27/23    Colonoscopy      Colonoscopy N/A 05/18/2022    Procedure: COLONOSCOPY AND ESOPHAGOGASTRODUODENOSCOPY;  Surgeon: Miguel Camargo MD;  Location:  ENDOSCOPY    Heart surgery  2024    double bypass    Laparoscopic  2007    sling operation for stress incontinence    Laparoscopy,diagnostic      Kay biopsy stereo nodule 2 site bilat (cpt=19081/63597) Left 02/2010    BENIGN 2 SITES    Kay biopsy stereo w/calc 2 site left (cpt=19081/10293)  02/2010    benign x 2    Kay stereo-clip w/calc 2 site left  2010    Oophorectomy Left 06/28/2019    Other  08/31/2022    RIGHT SACRAL SUBCUTANEOUS CYST EXCISION    Other surgical history  2000, 2019    Bladder Sling, Large mass removed from left abdomen and ovar    Tonsillectomy      Was a child, have no idea    Tubal ligation      Upper gi endoscopy,exam         Home Medications:  Current Facility-Administered Medications on File Prior to Encounter   Medication Dose Route Frequency Provider Last Rate Last Admin    [COMPLETED] sodium chloride 0.9% infusion 1,000 mL  1,000 mL Intravenous Once Edwige Collier PA-C   Stopped at 05/20/24 1425    [COMPLETED] dexAMETHasone PF (Decadron) 10 MG/ML injection 10 mg  10 mg Intravenous Once Edwige Collier PA-C   10 mg at 05/20/24 1322    [COMPLETED] ondansetron (Zofran) 4 MG/2ML injection 4 mg  4 mg Intravenous Once Edwige Collier PA-C   4 mg at 05/20/24 1328      Current Outpatient Medications on File Prior to Encounter   Medication Sig Dispense Refill    azelastine 137 MCG/SPRAY Nasal Solution 1 spray by Nasal route 2 (two) times daily.      PRISTIQ 100 MG Oral Tablet 24 Hr Take 1 tablet (100 mg total) by mouth daily.      benzonatate (TESSALON PERLES) 100 MG Oral Cap Take 1 capsule (100 mg total) by mouth 3 (three) times daily as needed for cough. 15 capsule 0    Rizatriptan Benzoate 10 MG Oral Tab Take 1 tablet (10 mg total) by mouth as needed for Migraine. 10 tablet 5    nitrofurantoin monohydrate macro (MACROBID) 100 MG Oral Cap Take 1 capsule (100 mg total) by mouth 2 (two) times daily. 14 capsule 0    Glucose Blood (ONETOUCH ULTRA) In Vitro Strip Tests 3 x daily 300 each 1    TRULICITY 0.75 MG/0.5ML Subcutaneous Solution Pen-injector Inject 0.75 mg into the skin once a week. 2 mL 0    Coenzyme Q10 200 MG Oral Cap Co Q-10 200 mg capsule, [RxNorm: 173264]      diclofenac 75 MG Oral Tab EC Take 1 tablet (75 mg total) by mouth 2 (two) times daily. 180 tablet 3    metoprolol succinate ER 50 MG Oral Tablet 24 Hr Take 1 tablet (50 mg total) by mouth daily. 90 tablet 0    Omeprazole 40 MG Oral Capsule Delayed Release Take 1 capsule (40 mg total) by mouth 2 (two) times daily. 300 capsule 0    Blood Glucose Monitoring Suppl (ONETOUCH ULTRA 2) w/Device Does not apply Kit 1 Device by Other route in the morning, at noon, and at bedtime. Use as directed. 1 kit 0    topiramate 50 MG Oral Tab TAKE ONE TABLET BY MOUTH EVERY MORNING AND TWO TABLETS EVERY EVENING (Patient taking differently: Taking one tablet in the morning and one tablet at night.) 270 tablet 2    gabapentin 600 MG Oral Tab Take 1 tablet (600 mg total) by mouth at bedtime. 180 tablet 0    Erenumab-aooe (AIMOVIG) 140 MG/ML Subcutaneous Solution Auto-injector Inject 1 mL (140 mg total) into the skin every 30 (thirty) days. 1 mL 4    umeclidinium bromide (INCRUSE ELLIPTA) 62.5 MCG/ACT Inhalation Aerosol Powder, Breath  Activated Inhale 1 puff into the lungs daily. 3 each 0    clopidogrel 75 MG Oral Tab Take 1 tablet (75 mg total) by mouth daily.      aspirin 81 MG Oral Tab EC Take 1 tablet (81 mg total) by mouth daily.      atorvastatin 80 MG Oral Tab Take 1 tablet (80 mg total) by mouth nightly. 90 tablet 1    metFORMIN 500 MG Oral Tab Take 1 tablet (500 mg total) by mouth 2 (two) times daily. 180 tablet 1    furosemide 20 MG Oral Tab Take 1 tablet (20 mg total) by mouth daily. 7 tablet 0    clonazePAM 0.5 MG Oral Tab Take 1 tablet (0.5 mg total) by mouth 2 (two) times daily. 12 tablet 0    busPIRone 10 MG Oral Tab Take 2 tablets (20 mg total) by mouth daily.      cyclobenzaprine 10 MG Oral Tab Take 1 tablet (10 mg total) by mouth every 12 (twelve) hours as needed for Muscle spasms. 60 tablet 1    Lancets (ONETOUCH DELICA PLUS OGWUEX95Z) Does not apply Misc 1 lancet  by Finger stick route 3 (three) times daily. 300 each 1    Mirabegron ER (MYRBETRIQ) 50 MG Oral Tablet 24 Hr Take 1 tablet (50 mg total) by mouth daily. 30 tablet 11    Multiple Vitamins-Minerals (ONE-A-DAY WOMENS 50+) Oral Tab Take 1 tablet by mouth daily.  0    QUEtiapine Fumarate  MG Oral Tablet 24 Hr Take 1 tablet (200 mg total) by mouth every evening. 30 tablet 0    Calcium Carb-Cholecalciferol 500-200 MG-UNIT Oral Tab Take 1 tablet by mouth daily.        cyclobenzaprine 10 MG Oral Tab Take 1 tablet (10 mg total) by mouth every 12 (twelve) hours as needed for Muscle spasms. (Patient taking differently: Take 1 tablet (10 mg total) by mouth every 12 (twelve) hours as needed for Muscle spasms. Duplicate med) 90 tablet 2    lidocaine 5 % External Ointment Apply 1 Application  topically 3 (three) times daily as needed. 240 g 1    [] amoxicillin clavulanate 875-125 MG Oral Tab Take 1 tablet by mouth 2 (two) times daily for 7 days. 14 tablet 0    [] sulfamethoxazole-trimethoprim DS (BACTRIM DS) 800-160 MG Oral Tab per tablet Take 1 tablet by mouth 2  (two) times daily for 10 days. 20 tablet 0    Metoclopramide HCl 5 MG/5ML Oral Solution Take 5 mL (5 mg total) by mouth 3 (three) times daily before meals. (Patient taking differently: Take 5 mL (5 mg total) by mouth 3 (three) times daily before meals as needed.) 473 mL 0    estradiol (ESTRACE) 0.1 MG/GM Vaginal Cream Apply 1/2 gram vaginally 2-3 times per week. (Patient not taking: Reported on 5/14/2024) 42.5 g 3       Current Inpatient Medications:  Inpatient Meds:   azithromycin  500 mg Oral Q24H    aspirin  81 mg Oral Daily    atorvastatin  80 mg Oral Nightly    busPIRone  20 mg Oral Daily    [Held by provider] clonazePAM  0.5 mg Oral BID    gabapentin  600 mg Oral Nightly    metoprolol succinate ER  50 mg Oral Daily Beta Blocker    [Held by provider] Mirabegron ER  50 mg Oral Daily    pantoprazole  40 mg Oral QAM AC    QUEtiapine ER  200 mg Oral QPM    umeclidinium bromide  1 puff Inhalation Daily    heparin  5,000 Units Subcutaneous Q8H JOSÉ ANTONIO    insulin aspart  1-10 Units Subcutaneous TID AC and HS    ampicillin-sulbactam  3 g Intravenous q6h         PRN Meds:    traMADol    benzonatate    rizatriptan    glucose **OR** glucose **OR** glucose-vitamin C **OR** dextrose **OR** glucose **OR** glucose **OR** glucose-vitamin C    melatonin    acetaminophen    ondansetron    prochlorperazine    polyethylene glycol (PEG 3350)    sennosides    bisacodyl    fleet enema    guaiFENesin    sodium chloride    Allergies:   Allergies   Allergen Reactions    Naprosyn [Naproxen] ANAPHYLAXIS     Has tolerated ibuprofen and IV toradol     Aspirin OTHER (SEE COMMENTS)     Difficulty swallowing due to  saavedra's esophagus       Psychosocial History:  Social History     Social History Narrative    Not on file     Social History     Socioeconomic History    Marital status: Single   Tobacco Use    Smoking status: Former     Current packs/day: 0.00     Average packs/day: 1.5 packs/day for 48.0 years (72.0 ttl pk-yrs)     Types:  Cigarettes     Start date:      Quit date:      Years since quittin.4    Smokeless tobacco: Never    Tobacco comments:     smoking JUUL from 2019 until current   Vaping Use    Vaping status: Every Day    Start date: 2019    Substances: Nicotine, Flavoring    Devices: Disposable, Pre-filled pod   Substance and Sexual Activity    Alcohol use: Not Currently     Comment: On occasion    Drug use: No    Sexual activity: Not Currently     Partners: Male   Other Topics Concern    Caffeine Concern Yes    Stress Concern Yes    Weight Concern Yes    Special Diet No    Exercise No    Seat Belt Yes     Social Determinants of Health     Financial Resource Strain: Not on File (10/7/2022)    [facility] from [facility]     Financial Resource Strain     Financial Resource Strain: 0   Food Insecurity: No Food Insecurity (2024)    Food Insecurity     Food Insecurity: Never true   Transportation Needs: No Transportation Needs (2024)    Transportation Needs     Lack of Transportation: No   Physical Activity: Not on File (10/7/2022)    Received from [facility]     Physical Activity     Physical Activity: 0   Stress: Not on File (10/7/2022)    Received from [facility]     Stress     Stress: 0   Social Connections: Not on File (10/7/2022)    Received from [facility]     Social Connections     Social Connections and Isolation: 0   Housing Stability: Low Risk  (2024)    Housing Stability     Housing Instability: No       Family Medical History:  Family History   Problem Relation Age of Onset    Arthritis Mother     Other (AMI) Mother     Other (diabetes mellitus) Mother     Diabetes Mother        Review of Systems:  A 10-point ROS was done with pertinent positives and negative per the HPI    Vital Signs:  Height: --  Weight: --  BSA (Calculated - sq m): --  Pulse: 119 (700)  BP: 112/62 (700)  Temp: 99.2 °F (37.3 °C) (700)  Do Not Use - Resp Rate: --  SpO2: 97 % (700)      Wt  Readings from Last 6 Encounters:   24 60.2 kg (132 lb 12.8 oz)   24 54.4 kg (120 lb)   24 57.6 kg (127 lb)   24 56.2 kg (124 lb)   24 57.2 kg (126 lb)   24 56.7 kg (125 lb)       Physical Examination:  General: Patient is alert and oriented, not in acute distress  Psych: Mood and affect are appropriate  Eyes: EOMI, PERRL  ENT: Oropharynx is clear, no adenopathy  CV: no LE edema  Respiratory: Respirations with gurgling  GI/Abd: Soft, non-tender   Neurological: Grossly intact. Somnolent. A&Ox3  Skin: no rashes or petechiae    Laboratory:  Recent Labs   Lab 24  1133 24  0528 24  1156 24  0548   WBC 41.9* 29.7*  --  33.7*   HGB 7.4* 6.5* 8.5* 7.6*   HCT 22.4* 20.2*  --  22.7*   .0* 502.0*  --  523.0*   MCV 87.2 89.0  --  89.7   RDW 16.0 16.3  --  16.3   NEPRELIM 37.20* 24.54*  --  27.27*       Recent Labs   Lab 24  1133 24  0528 24  0548   * 136 137   K 4.0 3.7 3.7   CL 98 109 112   CO2 17.0* 19.0* 19.0*   BUN 21 19 12   CREATSERUM 1.26* 0.90 0.75   * 90 98   CA 8.3* 7.9* 7.9*   TP 6.7  --  5.8*   ALB 1.7*  --  1.3*   ALKPHO 289*  --  295*   AST 89*  --  93*   *  --  106*   BILT 0.4  --  0.4       Recent Labs   Lab 24  1133 24  0548   INR 1.51* 1.58*   PTT 48.5*  --        Imagin/26/24 CT:  CONCLUSION:    1. Masslike consolidation with areas of central necrosis and mild cavitation in the right lower lobe.  Given its acute appearance this may represent an infectious process.  Correlate clinically.  Short-term follow-up after treatment is recommended.   2. Mediastinal adenopathy is likely reactive.  Attention on follow-up is recommended.     Impression & Plan:     Lethargy  - ABG without hypoxia/hypercarbia. Pt A&Ox3 but very somnolent. Ordered head CT     Anemia  IgM kappa MGUS  - she had prior colonoscopy in  that did not show bleeding lesion. She had 2v CABG in 2023 and is now on asa/plavix  and had noted iron deficiency anemia on 3/15/24  - GI consult for colonoscopy to r/o GI bleeding source  - unlikely to be progression of her MGUS, given her recent monoclonal protein studies showed a low level of monoclonal IgM at 0.17g/dL, IgM was normal  - recheck monoclonal protein studies. Check serum viscosity  - iron studies are consistent with anemia of chronic inflammation. Ferritin >500 indicating adequate iron stores. She has already received 1000mg IV infed 3/2024.  - thrombocytosis likely secondary to inflammation    Cavitary pneumonia  - on unasyn. Per primary/pulm    Scott Corona MD  Hematology/Medical Oncology  [facility]

## 2024-05-28 NOTE — IMAGING NOTE
Called and spoke to Jace Ana Laura RN regarding pt's procedure.     Verified pt has been NPO 6 hrs prior to procedure, and blood thinners have been held.    Patient's sister is POA, RN states he will contact POA for consent.     Procedure is scheduled for 16:00 today. RN verbalized understanding.

## 2024-05-28 NOTE — PROCEDURES
Pt could not be seated upright.  Supine oblique only.  8F.  10-11 ml of thin clear yellow fluid aspirated w/ samples to RT and lab.  Comp-none.

## 2024-05-28 NOTE — PROGRESS NOTES
[facility]   part of [facility]     Hospitalist Progress Note     Soumya King Patient Status:  Inpatient    6/3/1960 MRN [id number]   Location [facility] 5NW-A Attending Apurva Logan MD   Hosp Day # 2 PCP Rika Vinson DO     Chief Complaint: Pneumonia    Subjective:     Sleepy,  confused,  returned from CT which was negative for acute changes    Objective:    Review of Systems:   A comprehensive review of systems was completed; pertinent positive and negatives stated in subjective.    Vital signs:  Temp:  [97.7 °F (36.5 °C)-99.5 °F (37.5 °C)] 99.2 °F (37.3 °C)  Pulse:  [107-122] 119  Resp:  [17-20] 20  BP: (100-117)/(53-62) 112/62  SpO2:  [92 %-100 %] 97 %    Physical Exam:    General: No acute distress, confused, sleepy  Respiratory: No wheezes, no rhonchi  Cardiovascular: S1, S2, regular rate and rhythm  Abdomen: Soft, Non-tender, non-distended, positive bowel sounds  Neuro: No new focal deficits.   Extremities: No edema      Diagnostic Data:    Labs:  Recent Labs   Lab 24  1133 24  0528 24  1156 24  0548   WBC 41.9* 29.7*  --  33.7*   HGB 7.4* 6.5* 8.5* 7.6*   MCV 87.2 89.0  --  89.7   .0* 502.0*  --  523.0*   BAND  --   --   --  3   INR 1.51*  --   --  1.58*       Recent Labs   Lab 24  1133 24  0528 24  0548   * 90 98   BUN 21 19 12   CREATSERUM 1.26* 0.90 0.75   CA 8.3* 7.9* 7.9*   ALB 1.7*  --  1.3*   * 136 137   K 4.0 3.7 3.7   CL 98 109 112   CO2 17.0* 19.0* 19.0*   ALKPHO 289*  --  295*   AST 89*  --  93*   *  --  106*   BILT 0.4  --  0.4   TP 6.7  --  5.8*       Estimated Creatinine Clearance: 59.4 mL/min (based on SCr of 0.75 mg/dL).    Recent Labs   Lab 24  1133   TROPHS 3       Recent Labs   Lab 24  1133 24  0548   PTP 18.3* 19.0*   INR 1.51* 1.58*          Microbiology    Hospital Encounter on 24   1. Blood Culture     Status: None (Preliminary result)    Collection Time:  05/26/24  4:09 PM    Specimen: Blood,peripheral   Result Value Ref Range    Blood Culture Result No Growth 1 Day N/A         Imaging: Reviewed in Epic.    Medications:    piperacillin-tazobactam  3.375 g Intravenous Q8H    ipratropium-albuterol  3 mL Nebulization Q4H WA (5 times daily)    azithromycin  500 mg Oral Q24H    aspirin  81 mg Oral Daily    atorvastatin  80 mg Oral Nightly    [Held by provider] busPIRone  20 mg Oral Daily    [Held by provider] clonazePAM  0.5 mg Oral BID    gabapentin  600 mg Oral Nightly    metoprolol succinate ER  50 mg Oral Daily Beta Blocker    [Held by provider] Mirabegron ER  50 mg Oral Daily    pantoprazole  40 mg Oral QAM AC    QUEtiapine ER  200 mg Oral QPM    umeclidinium bromide  1 puff Inhalation Daily    heparin  5,000 Units Subcutaneous Q8H JOSÉ ANTONIO    insulin aspart  1-10 Units Subcutaneous TID AC and HS       Assessment & Plan:      #Cavitary pneumonia  -continue zosyn  -Pulmonology on consult  -Blastomyces Ag pending    # Anemia  -known iron def  -trend  -GI consult for consideration of scopes   -hold Plavix for possible bronch. Thorac or CT , GI procedures    #Elevated LFTs  #Coagulopathy  -trend  -GI on consult  -vit K x 1- recheck INR in am     #COPD   -no active wheezing  -Continue inhalers  -prn nebs.     #Acute metabolic encephalopathy  -check ammonia  -CT brain negative  -ABG reviewed    #Hypertension  -metoprolol.     #Type 2 diabetes  -hyperglycemia protocol     #GERD  #Jamison's esophagus  -PPI.      Apurva Logan MD    Supplementary Documentation:     Quality:  DVT Mechanical Prophylaxis:   SCDs,    DVT Pharmacologic Prophylaxis   Medication    heparin (Porcine) 5000 UNIT/ML injection 5,000 Units         DVT Pharmacologic prophylaxis: Aspirin 81 mg      Code Status: Full Code  Gotti: No urinary catheter in place  Gotti Duration (in days):   Central line:    ILYA:     Discharge is dependent on: progress  At this point Ms. King is expected to be discharge to:  home    The 21st Century Cures Act makes medical notes like these available to patients in the interest of transparency. Please be advised this is a medical document. Medical documents are intended to carry relevant information, facts as evident, and the clinical opinion of the practitioner. The medical note is intended as peer to peer communication and may appear blunt or direct. It is written in medical language and may contain abbreviations or verbiage that are unfamiliar.

## 2024-05-28 NOTE — IMAGING NOTE
Floor RN-Jace with post procedure SBAR. Dressing to right upper back has a scant amount of blood, and VSS. Pt presently denies pain and awaiting CXR then will be transport back to RM # [id number] with transport.

## 2024-05-28 NOTE — PLAN OF CARE
Problem: PNA  Data: Ax04. Telemetry- sinus rhythm.ST 110s with activity.  , on room air. Afebrile. Right side pleuritic pain and headache,  Prn given. Mild dry cough. Voids. IVF 0.9 Nacl @ 100 ml/hr. 1800 ADA diet. Accu checks. Up with sba. Scds. Hgb 8.5, CBC AM.     0740: Patient lethargic. Desat 88%. Placed on 2L NC. Afebrile. Audible gurgling. Pulmonology notified. Stat ABGs ordred.     Intervention: IV ampicillin, Tramadol.   Education: Medication, call light   Response: Cooperative with care        Problem: Diabetes/Glucose Control  Goal: Glucose maintained within prescribed range  Description: INTERVENTIONS:  - Monitor Blood Glucose as ordered  - Assess for signs and symptoms of hyperglycemia and hypoglycemia  - Administer ordered medications to maintain glucose within target range  - Assess barriers to adequate nutritional intake and initiate nutrition consult as needed  - Instruct patient on self management of diabetes  Outcome: Progressing     Problem: Patient/Family Goals  Goal: Patient/Family Long Term Goal  Description: Patient's Long Term Goal:   Discharge to home    Interventions:  - Follow plan of care  - See additional Care Plan goals for specific interventions  Outcome: Progressing  Goal: Patient/Family Short Term Goal  Description: Patient's Short Term Goal:   5/26 noc: maintain on room air  5/27: manage hgb  5/27 noc: reduce pleuritic pain       Interventions:   - labs, blood  - Medications  - IVF  - IVF ABT  - See additional Care Plan goals for specific interventions  Outcome: Progressing     Problem: RESPIRATORY - ADULT  Goal: Achieves optimal ventilation and oxygenation  Description: INTERVENTIONS:  - Assess for changes in respiratory status  - Assess for changes in mentation and behavior  - Position to facilitate oxygenation and minimize respiratory effort  - Oxygen supplementation based on oxygen saturation or ABGs  - Provide Smoking Cessation handout, if applicable  - Encourage  broncho-pulmonary hygiene including cough, deep breathe, Incentive Spirometry  - Assess the need for suctioning and perform as needed  - Assess and instruct to report SOB or any respiratory difficulty  - Respiratory Therapy support as indicated  - Manage/alleviate anxiety  - Monitor for signs/symptoms of CO2 retention  Outcome: Progressing

## 2024-05-28 NOTE — PLAN OF CARE
5/28 AM: Pt is A/O x 1-2 at the moment. Very lethargic this morning, was able to cooperate more in the afternoon. Lung sounds are diminished, on 2L for SUMMER. BP and HR are WNL, NSR on tele/ST. No BM today, GI consulted, no apparent s/s of bleeding. CT head negative, ABGs done. Possible thora depending on IR.     Problem: Diabetes/Glucose Control  Goal: Glucose maintained within prescribed range  Description: INTERVENTIONS:  - Monitor Blood Glucose as ordered  - Assess for signs and symptoms of hyperglycemia and hypoglycemia  - Administer ordered medications to maintain glucose within target range  - Assess barriers to adequate nutritional intake and initiate nutrition consult as needed  - Instruct patient on self management of diabetes  Outcome: Progressing

## 2024-05-28 NOTE — CONSULTS
[facility]  Report of Consultation    Soumya King Patient Status:  Inpatient    6/3/1960 MRN [id number]   Location [facility] 5NW-A Attending Apurva Logan MD   Hosp Day # 2 PCP Rika Vinson,      Reason for Consultation:  anemia    Soumya King is a a(n) 63 year old female. No family at bedside.     I also called her sister, Alana, to discuss pt's history     Egd/colon w/ Dr Camargo 2022 for weight loss and occ loose stools. EGD with irreg zline (gerd like changes, 3 eosinophils per hpf) and gastritis (reactive gastropathy on path), nl duodenal bx. Colonoscopy  described as nl and random colon bx w/ focal active colitis (path comment with likely nsaid/bowel prep related issues and less likely ibd)    Now admit with pneumonia.  Known iron deficiency anemia, is seen by heme for mgus as well. She gets intermittent iron transfusions    Followed by pulm for lung nodules as well and copd as outpt    Sister/pt deny any overt gi bleeding.  Chart hx of intermittent weight loss and diarrhea, they states occasional diarrhea but \"not constant\" and had some weight loss with w/ cabg earlier this year but otherwise denies chronic symptoms/problems.  States eats \"a lot of junk food\"    She has ppi and asa/plavix listed as outpt meds    Pt has some confusion , oriented person/place only, answers questions/follows directions when asked several times    Sister/pt deny etoh.      Bun/cr 12/0.75, ast/alt/AP/tbili 93/106/295/0.4.    24 hg 9.1.   7.4, then 6.5 , transfused to 8.5 and 24 was 7.6    No melena or overt gi bleeding or n/v per RN    Risk of egd and colonoscopy including prep, sedation, bleeding, perforation, infection, miss rate or issues with accuracy, etc and possible morbidity/mortalty discussed.  We discussed risk, benefit, alternatives, limitations as well as not having the procedures.  All questions answered, sister demonstrated understanding.        Patient Active  Problem List   Diagnosis    Vitamin D deficiency    Jamison's esophagus    Tobacco use    Insomnia    COPD, mild (HCC) - Dx'd via PFTs done in 3/2015    Migraine without aura and without status migrainosus, not intractable    Hyperlipidemia    SUMMER on CPAP    MDD (major depressive disorder), recurrent episode, moderate (HCC)    GERD (gastroesophageal reflux disease)    Hypertension    Chronic pansinusitis    Mucinous cystadenoma of ovary, left    KIRK (generalized anxiety disorder)    History of pubovaginal sling    Absence of bladder continence    Vasomotor flushing    Primary osteoarthritis of left knee    Mass of spine    Hx of motion sickness    Difficult intubation    Chronic low back pain without sciatica    Type 2 diabetes mellitus with diabetic neuropathy ([facility])    Pulmonary nodule    CAD, multiple vessel    S/P CABG x 2    Type 2 diabetes mellitus with hyperlipidemia ([facility])    Anemia    Iron deficiency anemia secondary to inadequate dietary iron intake    Subacute cough    Cavitary lesion of lung    Hyperglycemia    Acute anemia    MARÍA ELENA (acute kidney injury) ([facility])    Transaminitis    Thrombocytosis    Hyponatremia    Pleural effusion         History:  Past Medical History:    Abdominal pain, other specified site    groin    Acute bronchitis    Allergic rhinitis    Anxiety    Arthritis    Back problem    Jamison esophagus    Chronic low back pain without sciatica    COPD (chronic obstructive pulmonary disease) (HCC)    Depression    Diabetes (HCC)    Difficult intubation    difficult to intubate with bladder surgery,instructed by anesthesia to communicate to future anesthesiologist. Small airway    Esophageal reflux    Essential hypertension    Don't remember    Fitting and adjustment of dental prosthetic device    High blood pressure    History of 2019 novel coronavirus disease (COVID-19)    No hospitalization,symptoms; Fever,fatigue ,body aches,headache no loss of taste or smell    Hx of motion sickness     Hyperlipidemia    Mass of spine    Migraines    barometric pressure    Myocardial infarction (HCC)    Osteoarthritis    Other ill-defined conditions(799.89)    Saavedra's    Pain in joint, forearm    wrist    Pain in joint, pelvic region and thigh    hip    Pelvic mass    Personal history of urinary (tract) infection    Sleep apnea    I don't date       Past Surgical History:   Procedure Laterality Date    Cabg  12/27/23    Colonoscopy      Colonoscopy N/A 05/18/2022    Procedure: COLONOSCOPY AND ESOPHAGOGASTRODUODENOSCOPY;  Surgeon: Miguel Camargo MD;  Location:  ENDOSCOPY    Heart surgery  2024    double bypass    Laparoscopic  2007    sling operation for stress incontinence    Laparoscopy,diagnostic      Kay biopsy stereo nodule 2 site bilat (cpt=19081/49885) Left 02/2010    BENIGN 2 SITES    Kay biopsy stereo w/calc 2 site left (cpt=19081/38300)  02/2010    benign x 2    Kay stereo-clip w/calc 2 site left  2010    Oophorectomy Left 06/28/2019    Other  08/31/2022    RIGHT SACRAL SUBCUTANEOUS CYST EXCISION    Other surgical history  2000, 2019    Bladder Sling, Large mass removed from left abdomen and ovar    Tonsillectomy      Was a child, have no idea    Tubal ligation      Upper gi endoscopy,exam         Family History   Problem Relation Age of Onset    Arthritis Mother     Other (AMI) Mother     Other (diabetes mellitus) Mother     Diabetes Mother         reports that she quit smoking about 16 months ago. Her smoking use included cigarettes. She started smoking about 49 years ago. She has a 72 pack-year smoking history. She has never used smokeless tobacco. She reports that she does not currently use alcohol. She reports that she does not use drugs.    Allergies:  Allergies   Allergen Reactions    Naprosyn [Naproxen] ANAPHYLAXIS     Has tolerated ibuprofen and IV toradol     Aspirin OTHER (SEE COMMENTS)     Difficulty swallowing due to  saavedra's esophagus       Medications:  Current  Facility-Administered Medications   Medication Dose Route Frequency    azithromycin (Zithromax) tab 500 mg  500 mg Oral Q24H    traMADol (Ultram) tab 50 mg  50 mg Oral Q6H PRN    aspirin DR tab 81 mg  81 mg Oral Daily    atorvastatin (Lipitor) tab 80 mg  80 mg Oral Nightly    benzonatate (Tessalon) cap 100 mg  100 mg Oral TID PRN    [Held by provider] busPIRone (Buspar) tab 20 mg  20 mg Oral Daily    [Held by provider] clonazePAM (KlonoPIN) tab 0.5 mg  0.5 mg Oral BID    gabapentin (Neurontin) tab 600 mg  600 mg Oral Nightly    metoprolol succinate ER (Toprol XL) 24 hr tab 50 mg  50 mg Oral Daily Beta Blocker    [Held by provider] Mirabegron ER (Myrbetriq) 50 MG tablet TB24 50 mg  50 mg Oral Daily    pantoprazole (Protonix) DR tab 40 mg  40 mg Oral QAM AC    QUEtiapine ER (SEROquel XR) 24 hr tab 200 mg  200 mg Oral QPM    rizatriptan (Maxalt-MLT) disintegrating tab 5 mg  5 mg Oral PRN    umeclidinium bromide (Incruse Ellipta) 62.5 MCG/ACT inhaler 1 puff  1 puff Inhalation Daily    glucose (Dex4) 15 GM/59ML oral liquid 15 g  15 g Oral Q15 Min PRN    Or    glucose (Glutose) 40% oral gel 15 g  15 g Oral Q15 Min PRN    Or    glucose-vitamin C (Dex-4) chewable tab 4 tablet  4 tablet Oral Q15 Min PRN    Or    dextrose 50% injection 50 mL  50 mL Intravenous Q15 Min PRN    Or    glucose (Dex4) 15 GM/59ML oral liquid 30 g  30 g Oral Q15 Min PRN    Or    glucose (Glutose) 40% oral gel 30 g  30 g Oral Q15 Min PRN    Or    glucose-vitamin C (Dex-4) chewable tab 8 tablet  8 tablet Oral Q15 Min PRN    melatonin tab 3 mg  3 mg Oral Nightly PRN    heparin (Porcine) 5000 UNIT/ML injection 5,000 Units  5,000 Units Subcutaneous Q8H JOSÉ ANTONIO    acetaminophen (Tylenol Extra Strength) tab 500 mg  500 mg Oral Q4H PRN    ondansetron (Zofran) 4 MG/2ML injection 4 mg  4 mg Intravenous Q6H PRN    prochlorperazine (Compazine) 10 MG/2ML injection 5 mg  5 mg Intravenous Q8H PRN    polyethylene glycol (PEG 3350) (Miralax) 17 g oral packet 17 g  17 g  Oral Daily PRN    sennosides (Senokot) tab 17.2 mg  17.2 mg Oral Nightly PRN    bisacodyl (Dulcolax) 10 MG rectal suppository 10 mg  10 mg Rectal Daily PRN    fleet enema (Fleet) 7-19 GM/118ML rectal enema 133 mL  1 enema Rectal Once PRN    insulin aspart (NovoLOG) 100 Units/mL FlexPen 1-10 Units  1-10 Units Subcutaneous TID AC and HS    guaiFENesin (Robitussin) 100 MG/5 ML oral liquid 200 mg  200 mg Oral Q4H PRN    sodium chloride (Saline Mist) 0.65 % nasal solution 1 spray  1 spray Each Nare Q3H PRN    ampicillin-sulbactam (Unasyn) 3 g in sodium chloride 0.9% 100mL IVPB-ADD  3 g Intravenous q6h       Current Facility-Administered Medications on File Prior to Encounter   Medication Dose Route Frequency Provider Last Rate Last Admin    [COMPLETED] sodium chloride 0.9% infusion 1,000 mL  1,000 mL Intravenous Once Edwige Collier PA-C   Stopped at 05/20/24 1425    [COMPLETED] dexAMETHasone PF (Decadron) 10 MG/ML injection 10 mg  10 mg Intravenous Once Edwige Collier PA-C   10 mg at 05/20/24 1322    [COMPLETED] ondansetron (Zofran) 4 MG/2ML injection 4 mg  4 mg Intravenous Once Edwige Collier PA-C   4 mg at 05/20/24 1328    [COMPLETED] iron dextran (Infed) 25 mg in sodium chloride 0.9% PF 10 mL IV syringe (test dose)  25 mg Intravenous Once Scott Corona MD   25 mg at 03/15/24 1217    [COMPLETED] iron dextran (Infed) 975 mg in sodium chloride 0.9% 269.5 mL IVPB  975 mg Intravenous Once Scott Corona MD   Stopped at 03/15/24 1342     Current Outpatient Medications on File Prior to Encounter   Medication Sig Dispense Refill    azelastine 137 MCG/SPRAY Nasal Solution 1 spray by Nasal route 2 (two) times daily.      PRISTIQ 100 MG Oral Tablet 24 Hr Take 1 tablet (100 mg total) by mouth daily.      benzonatate (TESSALON PERLES) 100 MG Oral Cap Take 1 capsule (100 mg total) by mouth 3 (three) times daily as needed for cough. 15 capsule 0    Rizatriptan Benzoate 10 MG Oral Tab Take 1 tablet (10 mg total) by  mouth as needed for Migraine. 10 tablet 5    nitrofurantoin monohydrate macro (MACROBID) 100 MG Oral Cap Take 1 capsule (100 mg total) by mouth 2 (two) times daily. 14 capsule 0    Glucose Blood (ONETOUCH ULTRA) In Vitro Strip Tests 3 x daily 300 each 1    TRULICITY 0.75 MG/0.5ML Subcutaneous Solution Pen-injector Inject 0.75 mg into the skin once a week. 2 mL 0    Coenzyme Q10 200 MG Oral Cap Co Q-10 200 mg capsule, [RxNorm: 551286]      diclofenac 75 MG Oral Tab EC Take 1 tablet (75 mg total) by mouth 2 (two) times daily. 180 tablet 3    metoprolol succinate ER 50 MG Oral Tablet 24 Hr Take 1 tablet (50 mg total) by mouth daily. 90 tablet 0    Omeprazole 40 MG Oral Capsule Delayed Release Take 1 capsule (40 mg total) by mouth 2 (two) times daily. 300 capsule 0    Blood Glucose Monitoring Suppl (ONETOUCH ULTRA 2) w/Device Does not apply Kit 1 Device by Other route in the morning, at noon, and at bedtime. Use as directed. 1 kit 0    topiramate 50 MG Oral Tab TAKE ONE TABLET BY MOUTH EVERY MORNING AND TWO TABLETS EVERY EVENING (Patient taking differently: Taking one tablet in the morning and one tablet at night.) 270 tablet 2    gabapentin 600 MG Oral Tab Take 1 tablet (600 mg total) by mouth at bedtime. 180 tablet 0    Erenumab-aooe (AIMOVIG) 140 MG/ML Subcutaneous Solution Auto-injector Inject 1 mL (140 mg total) into the skin every 30 (thirty) days. 1 mL 4    umeclidinium bromide (INCRUSE ELLIPTA) 62.5 MCG/ACT Inhalation Aerosol Powder, Breath Activated Inhale 1 puff into the lungs daily. 3 each 0    clopidogrel 75 MG Oral Tab Take 1 tablet (75 mg total) by mouth daily.      aspirin 81 MG Oral Tab EC Take 1 tablet (81 mg total) by mouth daily.      atorvastatin 80 MG Oral Tab Take 1 tablet (80 mg total) by mouth nightly. 90 tablet 1    metFORMIN 500 MG Oral Tab Take 1 tablet (500 mg total) by mouth 2 (two) times daily. 180 tablet 1    furosemide 20 MG Oral Tab Take 1 tablet (20 mg total) by mouth daily. 7 tablet 0     clonazePAM 0.5 MG Oral Tab Take 1 tablet (0.5 mg total) by mouth 2 (two) times daily. 12 tablet 0    busPIRone 10 MG Oral Tab Take 2 tablets (20 mg total) by mouth daily.      cyclobenzaprine 10 MG Oral Tab Take 1 tablet (10 mg total) by mouth every 12 (twelve) hours as needed for Muscle spasms. 60 tablet 1    Lancets (ONETOUCH DELICA PLUS [id number]) Does not apply Misc 1 lancet  by Finger stick route 3 (three) times daily. 300 each 1    Mirabegron ER (MYRBETRIQ) 50 MG Oral Tablet 24 Hr Take 1 tablet (50 mg total) by mouth daily. 30 tablet 11    Multiple Vitamins-Minerals (ONE-A-DAY WOMENS 50+) Oral Tab Take 1 tablet by mouth daily.  0    QUEtiapine Fumarate  MG Oral Tablet 24 Hr Take 1 tablet (200 mg total) by mouth every evening. 30 tablet 0    Calcium Carb-Cholecalciferol 500-200 MG-UNIT Oral Tab Take 1 tablet by mouth daily.        cyclobenzaprine 10 MG Oral Tab Take 1 tablet (10 mg total) by mouth every 12 (twelve) hours as needed for Muscle spasms. (Patient taking differently: Take 1 tablet (10 mg total) by mouth every 12 (twelve) hours as needed for Muscle spasms. Duplicate med) 90 tablet 2    lidocaine 5 % External Ointment Apply 1 Application  topically 3 (three) times daily as needed. 240 g 1    [] amoxicillin clavulanate 875-125 MG Oral Tab Take 1 tablet by mouth 2 (two) times daily for 7 days. 14 tablet 0    [] sulfamethoxazole-trimethoprim DS (BACTRIM DS) 800-160 MG Oral Tab per tablet Take 1 tablet by mouth 2 (two) times daily for 10 days. 20 tablet 0    Metoclopramide HCl 5 MG/5ML Oral Solution Take 5 mL (5 mg total) by mouth 3 (three) times daily before meals. (Patient taking differently: Take 5 mL (5 mg total) by mouth 3 (three) times daily before meals as needed.) 473 mL 0    estradiol (ESTRACE) 0.1 MG/GM Vaginal Cream Apply 1/2 gram vaginally 2-3 times per week. (Patient not taking: Reported on 2024) 42.5 g 3         Review of Systems:  Limited by pt's ability to give  hx.  Some cough over the last week  GENERAL HEALTH: as hpi  SKIN: denies any unusual skin lesions or rashes  EYES: denies new visual complaints or deficits   RESPIRATORY: as hpi  CARDIOVASCULAR: denies chest pain  GI: as above  GENITAL//GYN: denies acute change in frequency , denies hematuria  MUSCULOSKELETAL: denies new joint issues  NEURO: denies new sensory or motor complaint  PSYCHE: mood is unchanged a except as stated above  HEMATOLOGY: denies bruising or excessive bleeding except as stated  ENDOCRINE: denies unexpected wt gain or wt loss except as stated  ALLERGY/IMM.:medication allergies as above        PHYSICAL EXAM   /62 (BP Location: Left arm)   Pulse 119   Temp 99.2 °F (37.3 °C) (Axillary)   Resp 20   Ht 5' 1\" (1.549 m)   Wt 132 lb 12.8 oz (60.2 kg)   SpO2 97%   BMI 25.09 kg/m²     GENERAL: age appropriate. Non toxic appearing, but moving a bit in bed. Follows instructions well  HEENT: normocephalic, mucous membranes moist.  EYES: no scleral icterus  NECK:non tender, supple  RESPIRATORY: dec bs right base and some rales, no wheezing  CARDIOVASCULAR: reg rate    ABDOMEN: normal, active bowel sounds, no masses, HSM or tenderness, soft, nondistended, no G/R/R , no pain w/ palpation  RECTAL: in presence of covering RN (Nguyen). Smear of brown stool  EXTREMITIES: no le edema  NEURO:  Oriented x 2 -- person and hospital, not oriented to year/month    BACK: no CVA tenderness  PSYCH: unable to assess        LAB/IMAGING RESULTS:     Lab Results   Component Value Date    INR 1.58 05/28/2024     Recent Labs   Lab 05/26/24  1133 05/27/24  0528 05/28/24  0548   * 90 98   BUN 21 19 12   CREATSERUM 1.26* 0.90 0.75   CA 8.3* 7.9* 7.9*   * 136 137   K 4.0 3.7 3.7   CL 98 109 112   CO2 17.0* 19.0* 19.0*       Recent Labs   Lab 05/26/24  1133 05/27/24  0528 05/27/24  1156 05/28/24  0548   RBC 2.57* 2.27*  --  2.53*   HGB 7.4* 6.5*   < > 7.6*   HCT 22.4* 20.2*  --  22.7*   MCV 87.2 89.0  --  89.7    MCH 28.8 28.6  --  30.0   MCHC 33.0 32.2  --  33.5   RDW 16.0 16.3  --  16.3   NEPRELIM 37.20* 24.54*  --  27.27*   WBC 41.9* 29.7*  --  33.7*   .0* 502.0*  --  523.0*    < > = values in this interval not displayed.       Recent Labs   Lab 05/26/24  1133 05/28/24  0548   ALKPHO 289* 295*   BILT 0.4 0.4   TP 6.7 5.8*   ALB 1.7* 1.3*   * 106*   AST 89* 93*       No results for input(s): \"MICHELE\", \"LIP\" in the last 168 hours.        ASSESSMENT AND PLAN:          1 inc lfts -- denies chronic etoh use.  May be multifactorial and from the pneumonia and poor eating with the pneumonia  etc. Inr ~ 1.58.  also w/ some confusion but has had sleeping/anxiety meds    --check ruq us  --ammonia pending  --oral vit k pending, follow inr  --otherwise defer confusion w/u to hospitalist           2 iron deficiency anemia -- unclear source.  No clear source on egd/colon 5/2022.  On asa/plavix/ppi and bun not elevated and stool is brown smear and does not clinically appear to be having large gi bleeding    Not having overt gi bleeding. Does have pl effusion and lung lesion    Has higher risk of sedation/endoscopy currently, sister and I do not think she would tolerate attempts at colonoscopy prep.    Non gi source of anemia possible     Has thoracentesis pending    --non gi etio's as per heme   --can check ct abd/pelvis for retroperitoneal bleeding  --agree w/ continuing ppi  --follow labs              sister demonstrated understanding of risks of morbidity/mortality if diagnoses and/or treatments were delayed balanced against risks of further investigation and/or treatment.     --the sister demonstrated understanding of the results and recommendations and options and the risk/benefit/limitations and alternatives to the plan.  All of their questions and concerns were addressed and were agreeable to the plan as listed      Brandon Paez MD  5/28/2024  10:13 AM      Addendum: I also discussed her care with Dr Logan and  Dr Ya  --plavix on hold for now until possible thoracentesis      Brandon Paez MD  [facility]

## 2024-05-29 ENCOUNTER — APPOINTMENT (OUTPATIENT)
Dept: CT IMAGING | Facility: HOSPITAL | Age: 64
DRG: 163 | End: 2024-05-29
Attending: INTERNAL MEDICINE
Payer: MEDICAID

## 2024-05-29 ENCOUNTER — APPOINTMENT (OUTPATIENT)
Dept: GENERAL RADIOLOGY | Facility: HOSPITAL | Age: 64
DRG: 163 | End: 2024-05-29
Attending: INTERNAL MEDICINE
Payer: MEDICAID

## 2024-05-29 LAB
ALBUMIN SERPL-MCNC: 1.3 G/DL (ref 3.4–5)
ALBUMIN/GLOB SERPL: 0.3 {RATIO} (ref 1–2)
ALP LIVER SERPL-CCNC: 306 U/L
ALT SERPL-CCNC: 86 U/L
ANION GAP SERPL CALC-SCNC: 9 MMOL/L (ref 0–18)
ARTERIAL PATENCY WRIST A: POSITIVE
AST SERPL-CCNC: 80 U/L (ref 15–37)
BASE EXCESS BLDA CALC-SCNC: -7.2 MMOL/L (ref ?–2)
BASOPHILS # BLD: 0.29 X10(3) UL (ref 0–0.2)
BASOPHILS NFR BLD: 1 %
BILIRUB SERPL-MCNC: 0.6 MG/DL (ref 0.1–2)
BODY TEMPERATURE: 98.6 F
BUN BLD-MCNC: 9 MG/DL (ref 9–23)
CA-I BLD-SCNC: 1.26 MMOL/L (ref 0.95–1.32)
CALCIUM BLD-MCNC: 8.3 MG/DL (ref 8.5–10.1)
CHLORIDE SERPL-SCNC: 110 MMOL/L (ref 98–112)
CO2 SERPL-SCNC: 17 MMOL/L (ref 21–32)
COHGB MFR BLD: 1.5 % SAT (ref 0–3)
CREAT BLD-MCNC: 0.73 MG/DL
EGFRCR SERPLBLD CKD-EPI 2021: 94 ML/MIN/1.73M2 (ref 60–?)
EOSINOPHIL # BLD: 0.29 X10(3) UL (ref 0–0.7)
EOSINOPHIL NFR BLD: 1 %
ERYTHROCYTE [DISTWIDTH] IN BLOOD BY AUTOMATED COUNT: 16.5 %
FIO2: 21 %
GLOBULIN PLAS-MCNC: 4.8 G/DL (ref 2.8–4.4)
GLUCOSE BLD-MCNC: 113 MG/DL (ref 70–99)
GLUCOSE BLD-MCNC: 115 MG/DL (ref 70–99)
GLUCOSE BLD-MCNC: 142 MG/DL (ref 70–99)
GLUCOSE BLD-MCNC: 217 MG/DL (ref 70–99)
GLUCOSE BLD-MCNC: 99 MG/DL (ref 70–99)
GLUCOSE PLR-MCNC: 5 MG/DL
HCO3 BLDA-SCNC: 19.3 MEQ/L (ref 21–27)
HCT VFR BLD AUTO: 22.7 %
HGB BLD-MCNC: 7.2 G/DL
HGB BLD-MCNC: 7.2 G/DL
HGB BLD-MCNC: 7.6 G/DL
INR BLD: 1.5 (ref 0.8–1.2)
LACTATE BLD-SCNC: 0.8 MMOL/L (ref 0.5–2)
LDH FLD L TO P-CCNC: 3538 U/L
LDH SERPL L TO P-CCNC: 162 U/L
LYMPHOCYTES NFR BLD: 2.61 X10(3) UL (ref 1–4)
LYMPHOCYTES NFR BLD: 9 %
MCH RBC QN AUTO: 28.9 PG (ref 26–34)
MCHC RBC AUTO-ENTMCNC: 31.7 G/DL (ref 31–37)
MCV RBC AUTO: 91.2 FL
METAMYELOCYTES # BLD: 0.58 X10(3) UL
METAMYELOCYTES NFR BLD: 2 %
METHGB MFR BLD: 0 % SAT (ref 0.4–1.5)
MONOCYTES # BLD: 1.45 X10(3) UL (ref 0.1–1)
MONOCYTES NFR BLD: 5 %
MORPHOLOGY: NORMAL
NEUTROPHILS # BLD AUTO: 22.84 X10 (3) UL (ref 1.5–7.7)
NEUTROPHILS NFR BLD: 82 %
NEUTS HYPERSEG # BLD: 23.78 X10(3) UL (ref 1.5–7.7)
OSMOLALITY SERPL CALC.SUM OF ELEC: 281 MOSM/KG (ref 275–295)
OXYHGB MFR BLDA: 94.7 % (ref 92–100)
P/F RATIO: 352 MMHG
PCO2 BLDA: 32 MM HG (ref 35–45)
PH BLDA: 7.35 [PH] (ref 7.35–7.45)
PLATELET # BLD AUTO: 547 10(3)UL (ref 150–450)
PLATELET MORPHOLOGY: NORMAL
PO2 BLDA: 74 MM HG (ref 80–100)
POTASSIUM BLD-SCNC: 4.2 MMOL/L (ref 3.6–5.1)
POTASSIUM SERPL-SCNC: 3.9 MMOL/L (ref 3.5–5.1)
PROT PLR-MCNC: 4 G/DL
PROT SERPL-MCNC: 6.1 G/DL (ref 6.4–8.2)
PROTHROMBIN TIME: 18.2 SECONDS (ref 11.6–14.8)
RBC # BLD AUTO: 2.49 X10(6)UL
SODIUM BLD-SCNC: 133 MMOL/L (ref 135–145)
SODIUM SERPL-SCNC: 136 MMOL/L (ref 136–145)
TOTAL CELLS COUNTED BLD: 100
WBC # BLD AUTO: 29 X10(3) UL (ref 4–11)

## 2024-05-29 PROCEDURE — 32557 INSERT CATH PLEURA W/ IMAGE: CPT | Performed by: INTERNAL MEDICINE

## 2024-05-29 PROCEDURE — 0W9930Z DRAINAGE OF RIGHT PLEURAL CAVITY WITH DRAINAGE DEVICE, PERCUTANEOUS APPROACH: ICD-10-PCS | Performed by: RADIOLOGY

## 2024-05-29 PROCEDURE — 99232 SBSQ HOSP IP/OBS MODERATE 35: CPT | Performed by: INTERNAL MEDICINE

## 2024-05-29 PROCEDURE — 71250 CT THORAX DX C-: CPT | Performed by: INTERNAL MEDICINE

## 2024-05-29 PROCEDURE — 99152 MOD SED SAME PHYS/QHP 5/>YRS: CPT | Performed by: INTERNAL MEDICINE

## 2024-05-29 PROCEDURE — 99233 SBSQ HOSP IP/OBS HIGH 50: CPT | Performed by: INTERNAL MEDICINE

## 2024-05-29 PROCEDURE — 71045 X-RAY EXAM CHEST 1 VIEW: CPT | Performed by: INTERNAL MEDICINE

## 2024-05-29 RX ORDER — MIDAZOLAM HYDROCHLORIDE 1 MG/ML
1 INJECTION INTRAMUSCULAR; INTRAVENOUS EVERY 5 MIN PRN
Status: DISCONTINUED | OUTPATIENT
Start: 2024-05-29 | End: 2024-05-29 | Stop reason: HOSPADM

## 2024-05-29 RX ORDER — SODIUM CHLORIDE 9 MG/ML
INJECTION, SOLUTION INTRAVENOUS CONTINUOUS
Status: DISCONTINUED | OUTPATIENT
Start: 2024-05-29 | End: 2024-05-29 | Stop reason: HOSPADM

## 2024-05-29 RX ORDER — HYDROCODONE BITARTRATE AND ACETAMINOPHEN 5; 325 MG/1; MG/1
1 TABLET ORAL EVERY 4 HOURS PRN
Status: DISCONTINUED | OUTPATIENT
Start: 2024-05-29 | End: 2024-06-05

## 2024-05-29 RX ORDER — IPRATROPIUM BROMIDE AND ALBUTEROL SULFATE 2.5; .5 MG/3ML; MG/3ML
3 SOLUTION RESPIRATORY (INHALATION)
Status: DISCONTINUED | OUTPATIENT
Start: 2024-05-29 | End: 2024-05-30

## 2024-05-29 RX ORDER — HYDROCODONE BITARTRATE AND ACETAMINOPHEN 5; 325 MG/1; MG/1
2 TABLET ORAL EVERY 4 HOURS PRN
Status: DISCONTINUED | OUTPATIENT
Start: 2024-05-29 | End: 2024-06-05

## 2024-05-29 RX ORDER — MIDAZOLAM HYDROCHLORIDE 1 MG/ML
INJECTION INTRAMUSCULAR; INTRAVENOUS
Status: COMPLETED
Start: 2024-05-29 | End: 2024-05-29

## 2024-05-29 RX ORDER — FLUMAZENIL 0.1 MG/ML
0.2 INJECTION INTRAVENOUS AS NEEDED
Status: DISCONTINUED | OUTPATIENT
Start: 2024-05-29 | End: 2024-05-29 | Stop reason: HOSPADM

## 2024-05-29 RX ORDER — NALOXONE HYDROCHLORIDE 0.4 MG/ML
80 INJECTION, SOLUTION INTRAMUSCULAR; INTRAVENOUS; SUBCUTANEOUS AS NEEDED
Status: DISCONTINUED | OUTPATIENT
Start: 2024-05-29 | End: 2024-05-29 | Stop reason: HOSPADM

## 2024-05-29 NOTE — PROGRESS NOTES
[facility]  Progress Note    Soumya King Patient Status:  Inpatient    6/3/1960 MRN [id number]   Location [facility] 5NW-A Attending Hoda Juan MD   Hosp Day # 3 PCP Rika Vinson,      Subjective:  Soumya King is a(n) 63 year old female Temp to 101.7 overnight  More awake connected though does not sleep well noted to desat at night requiring 2 L  Currently on room air  Overall states she does not feel well  Denies any pleuritic chest pain  Cough remains productive of thin slightly yellow      Objective:  BP 98/64 (BP Location: Left arm)   Pulse 97   Temp 98.4 °F (36.9 °C) (Oral)   Resp 17   Ht 5' 1\" (1.549 m)   Wt 132 lb 12.8 oz (60.2 kg)   SpO2 95%   BMI 25.09 kg/m² room air currently      Temp (24hrs), Av.4 °F (37.4 °C), Min:97.8 °F (36.6 °C), Max:101.7 °F (38.7 °C)      Intake/Output:    Intake/Output Summary (Last 24 hours) at 2024 0817  Last data filed at 2024  Gross per 24 hour   Intake --   Output 1 ml   Net -1 ml       Physical Exam:   General: alert, cooperative, oriented.  No respiratory distress.   Head: Normocephalic, without obvious abnormality, atraumatic.   Throat: Lips, mucosa, and tongue normal.  No thrush noted.   Neck: trachea midline, no adenopathy, no thyromegaly. No JVD.  90 degrees   Lungs: Consolidated breath sounds on the right no true rub-minimal dullness slight rales on the left   Chest wall: No tenderness or deformity.   Heart: Regular rate and rhythm   Abdomen: soft, non-distended, no masses, no guarding, no     Rebound.  Mildly protuberant no obvious hepatosplenomegaly or ascites   Extremity: No edema nontender   Skin: No rashes or lesions.   Neurological: Alert, interactive, no focal deficits    Lab Data Review:  Recent Labs     24  1133 24  0528 24  1156 24  0548 24  1151 24  0737   WBC 41.9* 29.7*  --  33.7*  --  29.0*   HGB 7.4* 6.5* 8.5* 7.6* 7.9* 7.2*   .0* 502.0*  --  523.0*  --   547.0*     Recent Labs     05/26/24  1133 05/27/24  0528 05/28/24  0548   * 136 137   K 4.0 3.7 3.7   CL 98 109 112   CO2 17.0* 19.0* 19.0*   BUN 21 19 12   CREATSERUM 1.26* 0.90 0.75     Recent Labs   Lab 05/26/24  1133 05/28/24  0548   PTP 18.3* 19.0*   INR 1.51* 1.58*   PTT 48.5*  --        Cultures: Blood cultures remain negative/pending  Sputum remains pending    Radiology:  XR CHEST AP PORTABLE  (CPT=71045)    Result Date: 5/29/2024  CONCLUSION:  1. No significant change, allowing for differences in technique when compared to 5/28/2024 chest radiograph.   LOCATION:  [facility]      Dictated by (CST): Britney Mendes MD on 5/29/2024 at 7:58 AM     Finalized by (CST): Britney Mendes MD on 5/29/2024 at 8:00 AM       US THORACENTESIS GUIDED RIGHT (CPT=32555)    Result Date: 5/28/2024  CONCLUSION:  Small complex right pleural effusion.  Only a small amount of fluid could be aspirated.  Sample sent to RT and the laboratory for testing.   LOCATION:  [facility]    Dictated by (CST): Jace Govea MD on 5/28/2024 at 5:13 PM     Finalized by (CST): Jace Govea MD on 5/28/2024 at 5:16 PM       US ABDOMEN LIMITED (CPT=76705)    Result Date: 5/28/2024  CONCLUSION:  1. Trace ascites is noted in posterior subhepatic space. 2. Right pleural effusion has been noted on prior CT scan. 3. There is otherwise no abnormality detected on this abdominal ultrasound.    LOCATION:  [facility]   Dictated by (CST): Abad Chen MD on 5/28/2024 at 4:56 PM     Finalized by (CST): Abad Chen MD on 5/28/2024 at 4:57 PM       XR CHEST AP PORTABLE  (CPT=71045)    Result Date: 5/28/2024  CONCLUSION:  Small right pleural effusion with patchy airspace disease the right lung base could represent pneumonia.  There is no pneumothorax.   LOCATION:  [facility]      Dictated by (CST): Elieser Torres MD on 5/28/2024 at 4:55 PM     Finalized by (CST): Elieser Torres MD on 5/28/2024 at 4:56 PM       CT ABDOMEN+PELVIS(CONTRAST ONLY)(CPT=74177)    Result Date:  5/28/2024  CONCLUSION:  1. Loculated right-sided pleural effusion suggestive empyema with consolidation within the right lower lobe.  There are areas of fluid and small foci of air within the consolidation which may represent pulmonary abscesses/necrotizing pneumonia. 2. Small left-sided pleural effusion with associated atelectasis. 3. Nonspecific mildly prominent retroperitoneal lymph nodes which may be reactive in nature. 4. Trace amount of free pelvic fluid.    LOCATION:  [facility]   Dictated by (CST): Annette Ferreira MD on 5/28/2024 at 2:11 PM     Finalized by (CST): Annette Ferreira MD on 5/28/2024 at 2:17 PM       CT BRAIN OR HEAD (09598)    Result Date: 5/28/2024  CONCLUSION:  No evidence of acute intracranial process.    LOCATION:  [facility]   Dictated by (CST): Elieser Torres MD on 5/28/2024 at 8:49 AM     Finalized by (CST): Elieser Torres MD on 5/28/2024 at 8:54 AM      Chest x-ray reviewed slight improved aeration in the right base increasing right mid field infiltrates  CT reviewed      Medications reviewed     Assessment and Plan:   Patient Active Problem List   Diagnosis    Vitamin D deficiency    Jamison's esophagus    Tobacco use    Insomnia    COPD, mild (HCC) - Dx'd via PFTs done in 3/2015    Migraine without aura and without status migrainosus, not intractable    Hyperlipidemia    SUMMER on CPAP    MDD (major depressive disorder), recurrent episode, moderate (HCC)    GERD (gastroesophageal reflux disease)    Hypertension    Chronic pansinusitis    Mucinous cystadenoma of ovary, left    KIRK (generalized anxiety disorder)    History of pubovaginal sling    Absence of bladder continence    Vasomotor flushing    Primary osteoarthritis of left knee    Mass of spine    Hx of motion sickness    Difficult intubation    Chronic low back pain without sciatica    Type 2 diabetes mellitus with diabetic neuropathy (HCC)    Pulmonary nodule    CAD, multiple vessel    S/P CABG x 2    Type 2 diabetes mellitus with  hyperlipidemia (HCC)    Anemia    Iron deficiency anemia secondary to inadequate dietary iron intake    Subacute cough    Cavitary lesion of lung    Hyperglycemia    Acute anemia    MARÍA ELENA (acute kidney injury) (HCC)    Transaminitis    Thrombocytosis    Hyponatremia    Pleural effusion    Somnolence    Acute respiratory failure with hypoxia (HCC)       Assessment:  Large progressive right lower lobe process suspected pneumonia -bacterial versus fungal  Right pleural effusion with loculation associated pleuritic pain--acidotic pH with heavily exudative fluid cultures and cytology pending-compatible with empyema  Background of multiple pulmonary nodules-plans have been in place for biopsy  Severe anemia followed for iron deficiency by Dr. Corona- hx barretts with last scopes reportedly without Jamison's 2022- no obvious loss - response to transfusion though continues to drift-GI note appreciated  Component COPD   Elevated LFTs-unremarkable  liver on CTA  IgM kappa MGUS   Altered mental status--CT with prior lacunar infarct otherwise unremarkable ABG remains unremarkable-questionably med related with plans to hold sedative medications-now resolved with medications adjusted    Plan:  Interventional radiology had reported inability to get significant fluid out related to loculations--plan to review and consider chest tube placement if able  Continuing broad-spectrum antibiotic coverage and awaiting culture results--plan to proceed with bronchoscopy in a.m. if no positive culture results--blasto titers still pending  Continuing nebulized bronchodilators  Following hemoglobin carefully stool for occult blood ordered and GI following    Addendum-- all from cytology with bacteria noted on cytology -- plan to proceed with CT placement as able -   Discussed with pt at length-- asked me not to call her sister at this time   CC     Irina Ya MD  5/29/2024  8:17 AM

## 2024-05-29 NOTE — IMAGING NOTE
Spoke to Jace Du RN regarding pt's Chest Tube placement procedure.   Instructed to keep pt NPO. Blood thinners have been on hold.  Pt is consentable. Plan to do procedure at 1700. RN verbalized understanding.

## 2024-05-29 NOTE — PLAN OF CARE
Pt Aox2-3. Lethargic, confused at times. Febrile- prn tylenol given. Spo2 >90% on RA, 2L noc prn. Nebs. ST on tele, EP, heparin. Up SBA. IV/PO abx. QID accucheck. Pt updated on poc. Call light within reach, safety precautions in place. No further pt needs at this time.     Problem: Diabetes/Glucose Control  Goal: Glucose maintained within prescribed range  Description: INTERVENTIONS:  - Monitor Blood Glucose as ordered  - Assess for signs and symptoms of hyperglycemia and hypoglycemia  - Administer ordered medications to maintain glucose within target range  - Assess barriers to adequate nutritional intake and initiate nutrition consult as needed  - Instruct patient on self management of diabetes  Outcome: Progressing     Problem: Patient/Family Goals  Goal: Patient/Family Long Term Goal  Description: Patient's Long Term Goal:   Discharge to home    Interventions:  - Follow plan of care  - See additional Care Plan goals for specific interventions  Outcome: Progressing  Goal: Patient/Family Short Term Goal  Description: Patient's Short Term Goal:   5/26 noc: maintain on room air  5/27: manage hgb  5/27 noc: reduce pleuritic pain   5/28 AM: CT scan of head/NPO possible thora  5/28noc: sleep    Interventions:   - labs, blood  - Medications  - IVF  - IVF ABT  - See additional Care Plan goals for specific interventions  Outcome: Progressing     Problem: RESPIRATORY - ADULT  Goal: Achieves optimal ventilation and oxygenation  Description: INTERVENTIONS:  - Assess for changes in respiratory status  - Assess for changes in mentation and behavior  - Position to facilitate oxygenation and minimize respiratory effort  - Oxygen supplementation based on oxygen saturation or ABGs  - Provide Smoking Cessation handout, if applicable  - Encourage broncho-pulmonary hygiene including cough, deep breathe, Incentive Spirometry  - Assess the need for suctioning and perform as needed  - Assess and instruct to report SOB or any respiratory  difficulty  - Respiratory Therapy support as indicated  - Manage/alleviate anxiety  - Monitor for signs/symptoms of CO2 retention  Outcome: Progressing

## 2024-05-29 NOTE — PROGRESS NOTES
[facility]  Progress Note    Soumya King Patient Status:  Inpatient    6/3/1960 MRN [id number]   Location [facility] 5NW-A Attending Hoda Juan MD   Hosp Day # 3 PCP Rika Vinson,      Subjective:  Soumya King is a(n) 63 year old female.         Current complaints: denies new issues    More awake today    States on asa/plavix and ppi at home, deneis nsaids    Denies chronic gi sympotms. Denies chronic abd pain, n/v, diarrhea, difficulty eating, melena, overt gi bleeding at home    Denies etoh    More awake this morning, ox3    Able to acurately describe her home meds when asked open ended questions about asa/plavix etc    Risk of  egd and colonoscopy including prep, sedation, bleeding, perforation, infection, miss rate or issues with accuracy, etc and possible morbidity/mortalty discussed.  We discussed risk, benefit, alternatives, limitations as well as not having the procedures.  All questions answered, pt demonstrated understanding.      Current Facility-Administered Medications   Medication Dose Route Frequency    piperacillin-tazobactam (Zosyn) 3.375 g in dextrose 5% 100 mL IVPB-ADDV  3.375 g Intravenous Q8H    ipratropium-albuterol (Duoneb) 0.5-2.5 (3) MG/3ML inhalation solution 3 mL  3 mL Nebulization Q4H WA (5 times daily)    azithromycin (Zithromax) tab 500 mg  500 mg Oral Q24H    traMADol (Ultram) tab 50 mg  50 mg Oral Q6H PRN    aspirin DR tab 81 mg  81 mg Oral Daily    atorvastatin (Lipitor) tab 80 mg  80 mg Oral Nightly    benzonatate (Tessalon) cap 100 mg  100 mg Oral TID PRN    [Held by provider] busPIRone (Buspar) tab 20 mg  20 mg Oral Daily    [Held by provider] clonazePAM (KlonoPIN) tab 0.5 mg  0.5 mg Oral BID    gabapentin (Neurontin) tab 600 mg  600 mg Oral Nightly    metoprolol succinate ER (Toprol XL) 24 hr tab 50 mg  50 mg Oral Daily Beta Blocker    [Held by provider] Mirabegron ER (Myrbetriq) 50 MG tablet TB24 50 mg  50 mg Oral Daily    pantoprazole (Protonix)   tab 40 mg  40 mg Oral QAM AC    QUEtiapine ER (SEROquel XR) 24 hr tab 200 mg  200 mg Oral QPM    rizatriptan (Maxalt-MLT) disintegrating tab 5 mg  5 mg Oral PRN    umeclidinium bromide (Incruse Ellipta) 62.5 MCG/ACT inhaler 1 puff  1 puff Inhalation Daily    glucose (Dex4) 15 GM/59ML oral liquid 15 g  15 g Oral Q15 Min PRN    Or    glucose (Glutose) 40% oral gel 15 g  15 g Oral Q15 Min PRN    Or    glucose-vitamin C (Dex-4) chewable tab 4 tablet  4 tablet Oral Q15 Min PRN    Or    dextrose 50% injection 50 mL  50 mL Intravenous Q15 Min PRN    Or    glucose (Dex4) 15 GM/59ML oral liquid 30 g  30 g Oral Q15 Min PRN    Or    glucose (Glutose) 40% oral gel 30 g  30 g Oral Q15 Min PRN    Or    glucose-vitamin C (Dex-4) chewable tab 8 tablet  8 tablet Oral Q15 Min PRN    melatonin tab 3 mg  3 mg Oral Nightly PRN    heparin (Porcine) 5000 UNIT/ML injection 5,000 Units  5,000 Units Subcutaneous Q8H JOSÉ ANTONIO    acetaminophen (Tylenol Extra Strength) tab 500 mg  500 mg Oral Q4H PRN    ondansetron (Zofran) 4 MG/2ML injection 4 mg  4 mg Intravenous Q6H PRN    prochlorperazine (Compazine) 10 MG/2ML injection 5 mg  5 mg Intravenous Q8H PRN    polyethylene glycol (PEG 3350) (Miralax) 17 g oral packet 17 g  17 g Oral Daily PRN    sennosides (Senokot) tab 17.2 mg  17.2 mg Oral Nightly PRN    bisacodyl (Dulcolax) 10 MG rectal suppository 10 mg  10 mg Rectal Daily PRN    fleet enema (Fleet) 7-19 GM/118ML rectal enema 133 mL  1 enema Rectal Once PRN    insulin aspart (NovoLOG) 100 Units/mL FlexPen 1-10 Units  1-10 Units Subcutaneous TID AC and HS    guaiFENesin (Robitussin) 100 MG/5 ML oral liquid 200 mg  200 mg Oral Q4H PRN    sodium chloride (Saline Mist) 0.65 % nasal solution 1 spray  1 spray Each Nare Q3H PRN        Objective:    /51 (BP Location: Left arm)   Pulse (!) 122   Temp 97.8 °F (36.6 °C) (Oral)   Resp 18   Ht 5' 1\" (1.549 m)   Wt 132 lb 12.8 oz (60.2 kg)   SpO2 94%   BMI 25.09 kg/m²   General appearance: alert,  appears stated age, and cooperative  Lungs: rales and dec bs rt lung field  Heart: reg rate   Abdomen: soft, non-tender; bowel sounds normal; no masses,  no organomegaly , n pain w/ palpation  Neurologic: Grossly normal, ox 3, awake, answers questions appropriately         Labs:     Recent Labs   Lab 05/26/24  1133 05/27/24  0528 05/27/24  1156 05/28/24  0548 05/28/24  1151   RBC 2.57* 2.27*  --  2.53*  --    HGB 7.4* 6.5*   < > 7.6* 7.9*   HCT 22.4* 20.2*  --  22.7*  --    MCV 87.2 89.0  --  89.7  --    MCH 28.8 28.6  --  30.0  --    MCHC 33.0 32.2  --  33.5  --    RDW 16.0 16.3  --  16.3  --    NEPRELIM 37.20* 24.54*  --  27.27*  --    WBC 41.9* 29.7*  --  33.7*  --    .0* 502.0*  --  523.0*  --     < > = values in this interval not displayed.                  )    Lab Results   Component Value Date    B12 >2,000 05/28/2024    PGLU 99 05/29/2024       Narrative   PROCEDURE:  US ABDOMEN LIMITED (CPT=76705)     COMPARISON:  [facility] , CT, CT ABDOMEN+PELVIS(CONTRAST ONLY)(CPT=74177), 5/28/2024, 1:32 PM.     INDICATIONS:  elevated lfts. eval bile duct, cirrhosis, etc     PATIENT STATED HISTORY: (As transcribed by Technologist)           FINDINGS:    LIVER:  Normal size and echogenicity. No significant masses.  BILIARY:  Common bile duct measures 6 mm in region of stephanie hepatis.  There is no intrahepatic bile duct distension.  Gallbladder is unremarkable.  PANCREAS:  Normal.  RIGHT KIDNEY:  Normal.  OTHER:  There is a small amount of free fluid in posterior subhepatic space.  Incidental note is made of a right pleural effusion.                   Impression   CONCLUSION:    1. Trace ascites is noted in posterior subhepatic space.  2. Right pleural effusion has been noted on prior CT scan.  3. There is otherwise no abnormality detected on this abdominal ultrasound.          LOCATION:  [facility]        Dictated by (CST): Abad Chen MD on 5/28/2024 at 4:56 PM      Finalized by (CST): Abad Chen MD on 5/28/2024 at  4:57 PM                 Narrative   PROCEDURE:  CT ABDOMEN+PELVIS (CONTRAST ONLY) (CPT=74177)     COMPARISON:  [facility], CT, CT ABDOMEN (ATTENTION PANCREAS) (W+ WO) (CPT=74170), 3/02/2022, 3:52 PM.  [facility], CT, CT ABDOMEN+PELVIS(CONTRAST ONLY)(CPT=74177), 9/18/2021, 2:27 PM.     INDICATIONS:  GIB     TECHNIQUE:  CT scanning was performed from the dome of the diaphragm to the pubic symphysis with non-ionic intravenous contrast material. Post contrast coronal MPR imaging was performed.  Dose reduction techniques were used. Dose information is  transmitted to the ACR (American College of Radiology) NRDR (National Radiology Data Registry) which includes the Dose Index Registry.     PATIENT STATED HISTORY:(As transcribed by Technologist)  Poor historian      CONTRAST USED:  85cc of Isovue 370     FINDINGS:    LUNG BASES:  Loculated right-sided pleural effusion with thick wall measuring approximately 7.8 x 2.5 x 12.7 cm.  Findings suggestive of empyema.  There is consolidation within the right lower lobe with complex fluid and small foci of air measuring 3.0 x   2.7 x 3.2 cm and 2.1 x 2.8 x 1.7 cm which may represent pulmonary abscesses/necrotizing pneumonia.  Small left-sided pleural effusion.  LIVER:  Normal in shape and contour.    BILIARY:  Gallbladder is unremarkable in appearance.  No intrahepatic biliary dilatation..  SPLEEN:  Normal.  No enlargement or focal lesion.  PANCREAS:  No kirk-pancreatic inflammatory stranding  ADRENALS:  Normal.  No mass or enlargement.    KIDNEYS:  Kidneys are symmetrical in size without evidence of hydronephrosis.  BOWEL/MESENTERY:  Bowel is normal in caliber. No evidence of obstruction.  Probable Villanueva's type hernia containing fat and a portion of bowel  within the right para umbilical region.  No evidence of obstruction.  Mildly prominent retroperitoneal lymph  nodes.  PELVIS:  Bladder is distended.  Small amount of free pelvic fluid.    AORTA/VASCULAR:  Atheromatous  calcifications of the aorta.  BONES:  No acute fractures.  Median sternotomy approximated by wire sutures.                   Impression   CONCLUSION:    1. Loculated right-sided pleural effusion suggestive empyema with consolidation within the right lower lobe.  There are areas of fluid and small foci of air within the consolidation which may represent pulmonary abscesses/necrotizing pneumonia.  2. Small left-sided pleural effusion with associated atelectasis.  3. Nonspecific mildly prominent retroperitoneal lymph nodes which may be reactive in nature.  4. Trace amount of free pelvic fluid.          LOCATION:  [facility]        Dictated by (CST): Annette Ferreira MD on 5/28/2024 at 2:11 PM               Assessment and Plan:  Patient Active Problem List   Diagnosis    Vitamin D deficiency    Jamison's esophagus    Tobacco use    Insomnia    COPD, mild (HCC) - Dx'd via PFTs done in 3/2015    Migraine without aura and without status migrainosus, not intractable    Hyperlipidemia    SUMMER on CPAP    MDD (major depressive disorder), recurrent episode, moderate (HCC)    GERD (gastroesophageal reflux disease)    Hypertension    Chronic pansinusitis    Mucinous cystadenoma of ovary, left    KIRK (generalized anxiety disorder)    History of pubovaginal sling    Absence of bladder continence    Vasomotor flushing    Primary osteoarthritis of left knee    Mass of spine    Hx of motion sickness    Difficult intubation    Chronic low back pain without sciatica    Type 2 diabetes mellitus with diabetic neuropathy (HCC)    Pulmonary nodule    CAD, multiple vessel    S/P CABG x 2    Type 2 diabetes mellitus with hyperlipidemia (HCC)    Anemia    Iron deficiency anemia secondary to inadequate dietary iron intake    Subacute cough    Cavitary lesion of lung    Hyperglycemia    Acute anemia    MARÍA ELENA (acute kidney injury) (HCC)    Transaminitis    Thrombocytosis    Hyponatremia    Pleural effusion    Somnolence    Acute respiratory failure with  hypoxia (HCC)       1 inc lfts -- no source on u/s. Likely mutifactorial in setting of pneumonia and empyema, mild improvement    2 iron deficiency anemia -- no overt gi losses, no clear source on ct. She did not want to consider endoscopy for now  --am labs with hg okay at 72 w/out overt gi bleeding  --follow labs per primary  --if acute drop or overt gi bleeding, endoscopy can be reconsidered at that time. For now she does not want endoscpy          No further gi recs for now    --patient demonstrated understanding of the results and recommendations and risks, benefits, limitations, and alternatives to the plan. All of their questions and concerns were addressed and were agreeable to the plan as listed      Brandon Paez MD

## 2024-05-29 NOTE — PAYOR COMM NOTE
-5/26 THRU 5/28   ADMISSION REVIEW     Payor: [facility]  Subscriber #:  [id number]  Authorization Number: [id number]    Admit date: 5/26/24  Admit time:  3:45 PM       REVIEW DOCUMENTATION:     ED Provider Notes        ED Provider Notes signed by Missael Osborne DO at 5/26/2024  1:44 PM       Author: Missael Osborne DO Service: -- Author Type: Physician    Filed: 5/26/2024  1:44 PM Date of Service: 5/26/2024 12:13 PM Status: Signed    : Missael Osborne DO (Physician)           Patient Seen in: [facility] Emergency Department      History     Chief Complaint   Patient presents with    Cough/URI    Pain     Stated Complaint: cough for 2 weeks. seen at urgent care last week. no improving. covid negative.    Subjective:   63-year-old female, history of COPD, arthritis, hypertension, MI, 40 pack years history of smoking, presents with cough.  States it started couple weeks ago, has been treated with Tessalon Perles for bronchitis with little or no improvement.  Saw pulmonary recently scheduled for a CT next month.  Physical slightly worse, she has an right-sided pleurisy as well.  No fevers.  Productive cough.  No vomiting.  Left leg edema            Objective:   Past Medical History:    Abdominal pain, other specified site    groin    Acute bronchitis    Allergic rhinitis    Anxiety    Arthritis    Back problem    Jamison esophagus    Chronic low back pain without sciatica    COPD (chronic obstructive pulmonary disease) (HCC)    Depression    Diabetes (HCC)    Difficult intubation    difficult to intubate with bladder surgery,instructed by anesthesia to communicate to future anesthesiologist. Small airway    Esophageal reflux    Essential hypertension    Don't remember    Fitting and adjustment of dental prosthetic device    High blood pressure    History of 2019 novel coronavirus disease (COVID-19)    No hospitalization,symptoms; Fever,fatigue ,body aches,headache  no loss of taste or smell    Hx of motion sickness    Hyperlipidemia    Mass of spine    Migraines    barometric pressure    Myocardial infarction (HCC)    Osteoarthritis    Other ill-defined conditions(799.89)    Jamison's    Pain in joint, forearm    wrist    Pain in joint, pelvic region and thigh    hip    Pelvic mass    Personal history of urinary (tract) infection    Sleep apnea    I don't date              Past Surgical History:   Procedure Laterality Date    Cabg  23    Colonoscopy      Colonoscopy N/A 2022    Procedure: COLONOSCOPY AND ESOPHAGOGASTRODUODENOSCOPY;  Surgeon: Miguel Camargo MD;  Location:  ENDOSCOPY    Heart surgery      double bypass    Laparoscopic      sling operation for stress incontinence    Laparoscopy,diagnostic      Kay biopsy stereo nodule 2 site bilat (cpt=19081/13516) Left 2010    BENIGN 2 SITES    Kay biopsy stereo w/calc 2 site left (cpt=19081/97399)  2010    benign x 2    Kay stereo-clip w/calc 2 site left      Oophorectomy Left 2019    Other  2022    RIGHT SACRAL SUBCUTANEOUS CYST EXCISION    Other surgical history  ,     Bladder Sling, Large mass removed from left abdomen and ovar    Tonsillectomy      Was a child, have no idea    Tubal ligation      Upper gi endoscopy,exam                  Social History     Socioeconomic History    Marital status: Single   Tobacco Use    Smoking status: Former     Current packs/day: 0.00     Average packs/day: 1.5 packs/day for 48.0 years (72.0 ttl pk-yrs)     Types: Cigarettes     Start date:      Quit date:      Years since quittin.4    Smokeless tobacco: Never    Tobacco comments:     smoking JUUL from 2019 until current   Vaping Use    Vaping status: Every Day    Start date: 2019    Substances: Nicotine, Flavoring    Devices: Disposable, Pre-filled pod   Substance and Sexual Activity    Alcohol use: Not Currently     Comment: On occasion    Drug use: No    Sexual  activity: Not Currently     Partners: Male   Other Topics Concern    Caffeine Concern Yes    Stress Concern Yes    Weight Concern Yes    Special Diet No    Exercise No    Seat Belt Yes     Social Determinants of Health     Financial Resource Strain: Not on File (10/7/2022)    Received from [facility]     Financial Resource Strain     Financial Resource Strain: 0   Food Insecurity: No Food Insecurity (12/21/2023)    Food Insecurity     Food Insecurity: Never true   Transportation Needs: No Transportation Needs (12/21/2023)    Transportation Needs     Lack of Transportation: No   Physical Activity: Not on File (10/7/2022)    Received from [facility]     Physical Activity     Physical Activity: 0   Stress: Not on File (10/7/2022)    Received from [employer] [facility]     Stress     Stress: 0   Social Connections: Not on File (10/7/2022)    Received from [facility]     Social Connections     Social Connections and Isolation: 0   Housing Stability: Low Risk  (12/21/2023)    Housing Stability     Housing Instability: No              Review of Systems   Constitutional:  Negative for fever.   HENT:  Positive for congestion.    Respiratory:  Positive for cough and shortness of breath. Negative for wheezing.    Cardiovascular:  Positive for chest pain and leg swelling.   Gastrointestinal: Negative.    Musculoskeletal: Negative.    Neurological:  Negative for dizziness and headaches.   All other systems reviewed and are negative.      Positive for stated complaint: cough for 2 weeks. seen at urgent care last week. no improving. covid negative.  Other systems are as noted in HPI.  Constitutional and vital signs reviewed.      All other systems reviewed and negative except as noted above.    Physical Exam     ED Triage Vitals [05/26/24 1123]   BP (!) 159/112   Pulse 110   Resp 21   Temp 97.9 °F (36.6 °C)   Temp src Temporal   SpO2 100 %   O2 Device None (Room air)       Current Vitals:   Vital Signs  BP: (!) 88/60  Pulse:  93  Resp: 12  Temp: 97.9 °F (36.6 °C)  Temp src: Temporal  MAP (mmHg): 69    Oxygen Therapy  SpO2: 96 %  O2 Device: None (Room air)            Physical Exam  Vitals and nursing note reviewed.   Constitutional:       General: She is not in acute distress.  HENT:      Head: Normocephalic.      Nose: Nose normal.      Mouth/Throat:      Mouth: Mucous membranes are moist.   Eyes:      Extraocular Movements: Extraocular movements intact.   Cardiovascular:      Rate and Rhythm: Normal rate.      Pulses: Normal pulses.      Heart sounds: Normal heart sounds.   Pulmonary:      Effort: Respiratory distress present.      Breath sounds: No wheezing.      Comments: Rhonchi and crackles in the right mid to lower lung fields  Abdominal:      Palpations: Abdomen is soft.   Musculoskeletal:         General: Normal range of motion.      Cervical back: Neck supple.   Skin:     General: Skin is warm and dry.   Neurological:      General: No focal deficit present.      Mental Status: She is alert and oriented to person, place, and time.      Cranial Nerves: No cranial nerve deficit.               ED Course     Labs Reviewed   COMP METABOLIC PANEL (14) - Abnormal; Notable for the following components:       Result Value    Glucose 145 (*)     Sodium 127 (*)     CO2 17.0 (*)     Creatinine 1.26 (*)     Calcium, Total 8.3 (*)     Calculated Osmolality 270 (*)     eGFR-Cr 49 (*)     AST 89 (*)      (*)     Alkaline Phosphatase 289 (*)     Albumin 1.7 (*)     Globulin  5.0 (*)     A/G Ratio 0.3 (*)     All other components within normal limits   PRO BETA NATRIURETIC PEPTIDE - Abnormal; Notable for the following components:    Pro-Beta Natriuretic Peptide 1,235 (*)     All other components within normal limits   D-DIMER - Abnormal; Notable for the following components:    D-Dimer 4.53 (*)     All other components within normal limits   MANUAL DIFFERENTIAL - Abnormal; Notable for the following components:    Neutrophil Absolute Manual  40.22 (*)     Lymphocyte Absolute Manual 0.84 (*)     All other components within normal limits   PROTHROMBIN TIME (PT) - Abnormal; Notable for the following components:    PT 18.3 (*)     INR 1.51 (*)     All other components within normal limits   PTT, ACTIVATED - Abnormal; Notable for the following components:    PTT 48.5 (*)     All other components within normal limits   CBC W/ DIFFERENTIAL - Abnormal; Notable for the following components:    WBC 41.9 (*)     RBC 2.57 (*)     HGB 7.4 (*)     HCT 22.4 (*)     .0 (*)     Neutrophil Absolute Prelim 37.20 (*)     All other components within normal limits   TROPONIN I HIGH SENSITIVITY - Normal   LACTIC ACID, PLASMA - Normal   CBC WITH DIFFERENTIAL WITH PLATELET    Narrative:     The following orders were created for panel order CBC With Differential With Platelet.  Procedure                               Abnormality         Status                     ---------                               -----------         ------                     CBC W/ DIFFERENTIAL[[id number]]          Abnormal            Final result                 Please view results for these tests on the individual orders.   URINALYSIS WITH CULTURE REFLEX   BLOOD CULTURE   BLOOD CULTURE     EKG    Rate, intervals and axes as noted on EKG Report.  Rate: 100  Rhythm: Sinus Rhythm  Reading: EKG sinus rhythm 100 bpm.  Low voltage EKG.  Normal axis.  No ST elevations.  Intervals appear stable.  When compared to February 2024, no obvious changes are noted    Patient placed on cardiac monitor for telemetry monitoring secondary to yue dunbar. Interpretation at bedside by me is sinus rhythm.                           MDM      CT ANGIOGRAPHY, CHEST (CPT=71275)    Result Date: 5/26/2024  CONCLUSION:  1. Masslike consolidation with areas of central necrosis and mild cavitation in the right lower lobe.  Given its acute appearance this may represent an infectious process.  Correlate clinically.  Short-term  follow-up after treatment is recommended. 2. Mediastinal adenopathy is likely reactive.  Attention on follow-up is recommended.    LOCATION:  [facility]   Dictated by (CST): Roc Mosqueda MD on 5/26/2024 at 1:21 PM     Finalized by (CST): Roc Mosqueda MD on 5/26/2024 at 1:25 PM       XR CHEST PA + LAT CHEST (CPT=71046)    Result Date: 5/26/2024  CONCLUSION:  Masslike consolidation in the right upper lobe is noted.  Given it is acute appearance this likely represents an infectious process.  Small right effusion is noted.  Correlate clinically.   LOCATION:  [facility]   Dictated by (CST): Roc Mosqueda MD on 5/26/2024 at 12:05 PM     Finalized by (CST): Roc Mosqueda MD on 5/26/2024 at 12:06 PM              I independently interpreted the x-ray of the chest and note a large middle lobe area mass    Admission disposition: 5/26/2024  1:40 PM          at bedside helpful to provide information history presenting illness      Differential diagnosis includes but is not limited to, pneumonia, PE, pneumothorax, viral syndrome bacterial infection    External chart review demonstrates recent outpatient visit with Dr. Main as well as her walk-in clinic visits for cough recently and headache      63-year-old female presents with cough and congestion, right-sided pleuritic chest pain.  Has a masslike structure on x-ray and on CTA there is no PE however there is a necrotic, cavitary like lesion concerning for pneumonia.  No fever.  Productive cough.  She is hypotensive as well.  Given sepsis fluid bolus.  Not requiring pressors at this time.  Lactic is negative.  She does have some incidental finding including some acute on chronic anemia, thrombocytosis, acute kidney injury hyponatremia.  Dr. Main aware and will see in consultation.  Admitted to Dr. Luke, awaiting bed assignment.  Patient and  updated      CRITICAL CARE:  A total of 33 minutes of critical care time (exclusive of billable procedures) was  administered to manage the patient's critical lab values, critical imaging findings, unstable vital signs, and cardiovascular instability due to her hypotension secondary to cavitary necrotic lung lesion, possibly infectious origin.  Sepsis bolus.  Frequent assessments and reassessments.  Telemetry monitoring secondary to her pleuritic chest pain.  Sodium correction.  Discussion with consultants, admitting team, patient and family.  Chart review, documentation, imaging reviewed interpretation. this involved direct patient intervention, complex decision making, and/or extensive discussions with the patient, family, and clinical staff.                              [facility]   part of [facility]      Sepsis Reassessment Note    BP (!) 87/56   Pulse 94   Temp 97.9 °F (36.6 °C) (Temporal)   Resp 13   Ht 154.9 cm (5' 1\")   Wt 56.7 kg   SpO2 98%   BMI 23.62 kg/m²      I completed the sepsis reassessment at 1330    Cardiac:  Regularity: Regular  Rate: Normal  Heart Sounds: S1,S2    Lungs:   Right: Rhonchi  Left: Clear    Peripheral Pulses:  Radial: Right 1+ or Left 1+      Capillary Refill:  <3 Secs    Skin:  Temp/Moisture: Warm and Dry  Color: Normal      Missael Osborne DO  5/26/2024  12:13 PM            Medical Decision Making      Disposition and Plan     Clinical Impression:  1. Cavitary lesion of lung    2. Acute anemia    3. MARÍA ELENA (acute kidney injury) (HCC)    4. Transaminitis    5. Thrombocytosis    6. Hyponatremia         Disposition:  Admit  5/26/2024  1:40 pm    Follow-up:  No follow-up provider specified.        Medications Prescribed:  Current Discharge Medication List                            Hospital Problems       Present on Admission  Date Reviewed: 5/20/2024            ICD-10-CM Noted POA    * (Principal) Cavitary lesion of lung J98.4 5/26/2024 Unknown    Hyperglycemia R73.9 5/26/2024 Yes                     Signed by Missael Osborne DO on 5/26/2024  1:44 PM         MEDICATIONS  ADMINISTERED IN LAST 1 DAY:  acetaminophen (Tylenol Extra Strength) tab 500 mg       Date Action Dose Route User    5/29/2024 0438 Given 500 mg Oral Polina Jain RN          atorvastatin (Lipitor) tab 80 mg       Date Action Dose Route User    5/28/2024 2009 Given 80 mg Oral Polina Jain RN          azithromycin (Zithromax) tab 500 mg       Date Action Dose Route User    5/28/2024 1352 Given 500 mg Oral Jace Sanchez RN          gabapentin (Neurontin) tab 600 mg       Date Action Dose Route User    5/28/2024 2009 Given 600 mg Oral Polina Jain RN          iopamidol 76% (ISOVUE-370) injection for power injector       Date Action Dose Route User    5/28/2024 1349 Given 85 mL Intravenous Abrar, Ambar S          ipratropium-albuterol (Duoneb) 0.5-2.5 (3) MG/3ML inhalation solution 3 mL       Date Action Dose Route User    5/29/2024 0800 Given 3 mL Nebulization Yaakov Purcell RCP    5/28/2024 2210 Given 3 mL Nebulization Carlos Alberto Lewis RCP    5/28/2024 1900 Given 3 mL Nebulization Carlos Alberto Lewis RCP          ipratropium-albuterol (Duoneb) 0.5-2.5 (3) MG/3ML inhalation solution 3 mL       Date Action Dose Route User    5/29/2024 1140 Given 3 mL Nebulization Yaakov Purcell, TARSHA          metoprolol succinate ER (Toprol XL) 24 hr tab 50 mg       Date Action Dose Route User    5/29/2024 0438 Given 50 mg Oral Polina Jain RN          pantoprazole (Protonix) DR tab 40 mg       Date Action Dose Route User    5/29/2024 0700 Given 40 mg Oral Polina Jain RN          piperacillin-tazobactam (Zosyn) 3.375 g in dextrose 5% 100 mL IVPB-ADDV       Date Action Dose Route User    5/29/2024 0438 New Bag 3.375 g Intravenous Polina Jain RN    5/28/2024 2009 New Bag 3.375 g Intravenous Polina Jain RN    5/28/2024 1153 New Bag 3.375 g Intravenous Jace Sanchez RN          traMADol (Ultram) tab 50 mg       Date Action Dose Route User    5/29/2024 1101 Given 50 mg Oral Jace Sanchez RN    5/28/2024 1736 Given 50 mg Oral Laura,  RYNE Mccormick          umeclidinium bromide (Incruse Ellipta) 62.5 MCG/ACT inhaler 1 puff       Date Action Dose Route User    5/29/2024 1035 Given 1 puff Inhalation Jace Sanchez RN          QUEtiapine ER (SEROquel XR) 24 hr tab 200 mg       Date Action Dose Route User    5/28/2024 2009 Given 200 mg Oral Polina Jain RN            Vitals (last day)       Date/Time Temp Pulse Resp BP SpO2 Weight O2 Device O2 Flow Rate (L/min) [facility]    05/29/24 0725 98.4 °F (36.9 °C) 97 17 98/64 95 % -- None (Room air) --     05/29/24 0603 97.8 °F (36.6 °C) -- -- -- -- -- -- --     05/29/24 0440 101.7 °F (38.7 °C) 122 18 114/51 94 % -- Nasal cannula 2 L/min SC    05/29/24 0025 99.1 °F (37.3 °C) 118 16 103/59 94 % -- Nasal cannula 2 L/min SC    05/28/24 2005 100.4 °F (38 °C) 115 16 111/61 95 % -- None (Room air) -- SC    05/28/24 1625 -- 102 20 97/59 96 % -- None (Room air) --     05/28/24 1100 99.2 °F (37.3 °C) 109 20 112/61 97 % -- None (Room air) 0 L/min DP    05/28/24 0700 99.2 °F (37.3 °C) 119 20 112/62 97 % -- None (Room air) 0 L/min     05/28/24 0616 99.5 °F (37.5 °C) 118 20 100/58 96 % -- Nasal cannula --     05/28/24 0611 -- -- -- -- 96 % -- Nasal cannula --     05/28/24 0556 -- 118 20 108/53 92 % -- None (Room air) --     05/28/24 0030 98.3 °F (36.8 °C) 108 17 111/58 95 % -- None (Room air) -- AW       Blood Transfusion Record       Product Unit Status Volume Start End            Transfuse RBC       24  [id number]  [id number] Stopped 365.42 mL 05/27/24 0813 05/27/24 1104                5/26 H&P    Chief Complaint: productive cough and SOB        Subjective:  History of Present Illness:      Soumya King is a 63 year old female with hx of COPD, HTN, GERD, Type 2 diabetes, present to the ER with persistant cough and some SOB. Pt also getting some rt sided abdominal pain since the cough. No fevers, chills, nausea or vomiting. No hemoptysis.            Assessment & Plan:  # Cavitary pneumonia  - Will continue on  ceftriaxone and azithromycin  - Pulmonology on consult  - Blastomyces antibodies/antigen ordered.     # COPD   - no active wheezing  - Continue home inhalers  - prn nebs.     # Hypertension  -Continue on metoprolol.     # Type 2 diabetes  -Will place on hyperglycemia protocol with correction factor insulin.     # GERD  - Will continue on PPI.    5/27 HOSPITALIST NOTE  bjective:  Patient overall feeling better today, pain is improved     Vital signs:  Temp:  [97.2 °F (36.2 °C)-98.1 °F (36.7 °C)] 97.6 °F (36.4 °C)  Pulse:  [] 111  Resp:  [12-21] 18  BP: ()/() 110/56  SpO2:  [91 %-100 %] 96 %    Recent Labs   Lab 05/26/24  1133 05/27/24  0528   WBC 41.9* 29.7*   HGB 7.4* 6.5*   MCV 87.2 89.0   .0* 502.0*   INR 1.51*  --               Recent Labs   Lab 05/26/24  1133 05/27/24  0528   * 90   BUN 21 19   CREATSERUM 1.26* 0.90   CA 8.3* 7.9*   ALB 1.7*  --    * 136   K 4.0 3.7   CL 98 109   CO2 17.0* 19.0*   ALKPHO 289*  --    AST 89*  --    *  --    BILT 0.4  --    TP 6.7  --             Assessment & Plan:  #Cavitary pneumonia  -continue on ceftriaxone and azithromycin  -Pulmonology on consult  -Blastomyces Ag pending     # Anemia  -known iron def  -hemoglobin 6.5 this morning  -transfusion ordered  -await repeat hemoglobin  -may need to involve heme and or GI if she develops a GIB ( h/o barrets's on ASA and Plavix for CABG in 2023)  -hold Plavix for possible bronch. Thorac or CT      #COPD   -no active wheezing  -Continue inhalers  -prn nebs.     #Hypertension  -metoprolol.     #Type 2 diabetes  -hyperglycemia protocol     #GERD  #Jamison's esophagus  -PPI.    5/27 PULMONARY CONSULT NOTE    Reason for Consultation:  Progressive cough dyspnea right greater than left lower chest pain     History of Present Illness:  Soumya SALGADO Fernando is a a(n) 63 year old female recent ex-smoker now vaping-extensive medical history that includes known COPD on Incruse, diabetes, migraines ,recent  diagnosis of MGUS , known iron deficiency anemia with intermittent iron infusions-(negative scopes 5/22) coronary disease status post CABG 12/23, [facility] untreated and most recently undergoing evaluation for multiple nodular opacities and groundglass opacities left and right with adenopathy.  Plans in place for possible biopsy and followed by Dr. Main.  She states she has been sick for a month with progressive coughing rarely productive of occasional green.  There is been no hemoptysis.  She reports low-grade fevers on and off without chills.  She has had a very poor appetite and has had occasional vomiting without diarrhea.-Over the past 2 weeks she has had increasing cough and over the last 2 to 3 days with increasing chest pain.  She reports it is bilateral at this time right greater than left anterior and back.  Since admission she has been afebrile and remains on room air.  Episodes of questionable confusion, lethargic and impulsiveness were noted this a.m. though she remains oriented x 4  Hgb on admission 6.5 - denies apparent bleeding    Lab Data Review:        Lab Results   Component Value Date     WBC 29.7 05/27/2024     HGB 6.5 05/27/2024     HCT 20.2 05/27/2024     .0 05/27/2024     CREATSERUM 0.90 05/27/2024     BUN 19 05/27/2024      05/27/2024     K 3.7 05/27/2024      05/27/2024     CO2 19.0 05/27/2024     GLU 90 05/27/2024     CA 7.9 05/27/2024          Assessment:  Large progressive right lower lobe process suspected pneumonia -bacterial versus fungal  Right pleural effusion with some component loculation associated pleuritic pain  Background of multiple pulmonary nodules-plans have been in place for biopsy  Severe anemia followed for iron deficiency by Dr. David jaimes with last scopes 2022- no obvious loss   Component COPD   IgM kappa MGUS   Altered mental status--ABG pending     Plan:  Sputum culture and blood cultures remain pending-other noninvasive measures  pending  Remains on broad-spectrum antibiotic coverage  Plan for chest tube placement-unclear if on Plavix  Transfusions --would recommend hematology consultation  Further recommendations pending clinical course     5/28 PULMONARY NOTE    History of Present Illness:       62 y/o F PMH CAD s/p 2v CABG 12/2023 who is currently admitted for pneumonia.     - saw me in 3/5/24 for iron deficiency anemia, noted to have anemia to 9.1 g/dL, thrombocytosis to 489k, with iron sat of 11% and ferritin of 33. She received IV infed 1000mg 3/15/24  - she was also noted to have IgM kappa monoclonal protein 0.17 g/dL with kappa FLC 5.624, lambda FLC 3.072, with K/L FLC ratio 1.83. IgM was normal 135mg/dL then.  - Saw Dr Main on 3/13/24; CT chest LD showed improvement in diffuse reticular nodular opacities  - CT 5/26/24 with masslike consolidation with areas of central necrosis and mild cavitation  - currently on unasyn  - Soumya is lethargic this AM. She is able to wake up with simulation to answer my orientation questions, A&Ox3, but she quickly falls back asleep and she is snoring/gurgling. O2 remains >95%, ABG was obtained by pulm this AM without hypoxia  - no report of GIB in chart; she is on ASA;plavix for hx of CABG       Laboratory:         Recent Labs   Lab 05/26/24  1133 05/27/24  0528 05/27/24  1156 05/28/24  0548   WBC 41.9* 29.7*  --  33.7*   HGB 7.4* 6.5* 8.5* 7.6*   HCT 22.4* 20.2*  --  22.7*   .0* 502.0*  --  523.0*   MCV 87.2 89.0  --  89.7   RDW 16.0 16.3  --  16.3   NEPRELIM 37.20* 24.54*  --  27.27*               Recent Labs   Lab 05/26/24  1133 05/27/24  0528 05/28/24  0548   * 136 137   K 4.0 3.7 3.7   CL 98 109 112   CO2 17.0* 19.0* 19.0*   BUN 21 19 12   CREATSERUM 1.26* 0.90 0.75   * 90 98   CA 8.3* 7.9* 7.9*   TP 6.7  --  5.8*   ALB 1.7*  --  1.3*   ALKPHO 289*  --  295*   AST 89*  --  93*   *  --  106*   BILT 0.4  --  0.4              Recent Labs   Lab 05/26/24  6502  05/28/24  0548   INR 1.51* 1.58*   PTT 48.5*  --        Impression & Plan:      Lethargy  - ABG without hypoxia/hypercarbia. Pt A&Ox3 but very somnolent. Ordered head CT      Anemia  IgM kappa MGUS  - she had prior colonoscopy in 2022 that did not show bleeding lesion. She had 2v CABG in 12/2023 and is now on asa/plavix and had noted iron deficiency anemia on 3/15/24  - GI consult for colonoscopy to r/o GI bleeding source  - unlikely to be progression of her MGUS, given her recent monoclonal protein studies showed a low level of monoclonal IgM at 0.17g/dL, IgM was normal  - recheck monoclonal protein studies. Check serum viscosity  - iron studies are consistent with anemia of chronic inflammation. Ferritin >500 indicating adequate iron stores. She has already received 1000mg IV infed 3/2024.  - thrombocytosis likely secondary to inflammation     Cavitary pneumonia  - on unasyn. Per primary/pulm     5/28 GI CONSULT NOTE    Reason for Consultation:  anemia     Soumya King is a a(n) 63 year old female. No family at bedside.      I also called her sister, Alana, to discuss pt's history      Egd/colon w/ Dr Camargo 5/2022 for weight loss and occ loose stools. EGD with irreg zline (gerd like changes, 3 eosinophils per hpf) and gastritis (reactive gastropathy on path), nl duodenal bx. Colonoscopy  described as nl and random colon bx w/ focal active colitis (path comment with likely nsaid/bowel prep related issues and less likely ibd)     Now admit with pneumonia.  Known iron deficiency anemia, is seen by heme for mgus as well. She gets intermittent iron transfusions     Followed by pulm for lung nodules as well and copd as outpt     Sister/pt deny any overt gi bleeding.  Chart hx of intermittent weight loss and diarrhea, they states occasional diarrhea but \"not constant\" and had some weight loss with w/ cabg earlier this year but otherwise denies chronic symptoms/problems.  States eats \"a lot of junk food\"     She has  ppi and asa/plavix listed as outpt meds     Pt has some confusion , oriented person/place only, answers questions/follows directions when asked several times     Sister/pt deny etoh.       Bun/cr 12/0.75, ast/alt/AP/tbili 93/106/295/0.4.    2/23/24 hg 9.1.  5/26 7.4, then 6.5 5/27, transfused to 8.5 and 5/28/24 was 7.6     No melena or overt gi bleeding or n/v per RN         ASSESSMENT AND PLAN:            1 inc lfts -- denies chronic etoh use.  May be multifactorial and from the pneumonia and poor eating with the pneumonia  etc. Inr ~ 1.58.  also w/ some confusion but has had sleeping/anxiety meds     --check ruq us  --ammonia pending  --oral vit k pending, follow inr  --otherwise defer confusion w/u to hospitalist               2 iron deficiency anemia -- unclear source.  No clear source on egd/colon 5/2022.  On asa/plavix/ppi and bun not elevated and stool is brown smear and does not clinically appear to be having large gi bleeding     Not having overt gi bleeding. Does have pl effusion and lung lesion     Has higher risk of sedation/endoscopy currently, sister and I do not think she would tolerate attempts at colonoscopy prep.     Non gi source of anemia possible      Has thoracentesis pending     --non gi etio's as per heme   --can check ct abd/pelvis for retroperitoneal bleeding  --agree w/ continuing ppi  --follow labs    5/28 HOSPITALIST NOTE    Subjective:  Sleepy,  confused,  returned from CT which was negative for acute changes     Vital signs:  Temp:  [97.7 °F (36.5 °C)-99.5 °F (37.5 °C)] 99.2 °F (37.3 °C)  Pulse:  [107-122] 119  Resp:  [17-20] 20  BP: (100-117)/(53-62) 112/62  SpO2:  [92 %-100 %] 97 %    Recent Labs   Lab 05/26/24  1133 05/27/24  0528 05/27/24  1156 05/28/24  0548   WBC 41.9* 29.7*  --  33.7*   HGB 7.4* 6.5* 8.5* 7.6*   MCV 87.2 89.0  --  89.7   .0* 502.0*  --  523.0*   BAND  --   --   --  3   INR 1.51*  --   --  1.58*               Recent Labs   Lab 05/26/24  1133 05/27/24  0528  05/28/24  0548   * 90 98   BUN 21 19 12   CREATSERUM 1.26* 0.90 0.75   CA 8.3* 7.9* 7.9*   ALB 1.7*  --  1.3*   * 136 137   K 4.0 3.7 3.7   CL 98 109 112   CO2 17.0* 19.0* 19.0*   ALKPHO 289*  --  295*   AST 89*  --  93*   *  --  106*   BILT 0.4  --  0.4   TP 6.7  --  5.8*          Assessment & Plan:  #Cavitary pneumonia  -continue zosyn  -Pulmonology on consult  -Blastomyces Ag pending     # Anemia  -known iron def  -trend  -GI consult for consideration of scopes   -hold Plavix for possible bronch. Thorac or CT , GI procedures     #Elevated LFTs  #Coagulopathy  -trend  -GI on consult  -vit K x 1- recheck INR in am      #COPD   -no active wheezing  -Continue inhalers  -prn nebs.     #Acute metabolic encephalopathy  -check ammonia  -CT brain negative  -ABG reviewed     #Hypertension  -metoprolol.     #Type 2 diabetes  -hyperglycemia protocol     #GERD  #Jamison's esophagus  -PPI.      Apurva Logan MD

## 2024-05-29 NOTE — PROGRESS NOTES
[facility]   part of [facility]     Hospitalist Progress Note     Soumya King Patient Status:  Inpatient    6/3/1960 MRN [id number]   Location [facility] 5NW-A Attending Apurva Logan MD   Hosp Day # 3 PCP Rika Vinson DO     Chief Complaint:confusion     Subjective:     Patient seems less confused per staff. Does not recall the day yesterday. Has chest pain on the right side where thora was done last night.     Objective:    Review of Systems:   A comprehensive review of systems was completed; pertinent positive and negatives stated in subjective.    Vital signs:  Temp:  [97.8 °F (36.6 °C)-101.7 °F (38.7 °C)] 98.4 °F (36.9 °C)  Pulse:  [] 97  Resp:  [16-20] 17  BP: ()/(51-64) 98/64  SpO2:  [94 %-97 %] 95 %    Physical Exam:    General: No acute distress, confused, sleepy  Respiratory: No wheezes, no rhonchi  Cardiovascular: S1, S2, regular rate and rhythm  Abdomen: Soft, Non-tender, non-distended, positive bowel sounds  Neuro: No new focal deficits.   Extremities: No edema      Diagnostic Data:    Labs:  Recent Labs   Lab 24  1133 24  0528 24  1156 24  0548 24  1151   WBC 41.9* 29.7*  --  33.7*  --    HGB 7.4* 6.5* 8.5* 7.6* 7.9*   MCV 87.2 89.0  --  89.7  --    .0* 502.0*  --  523.0*  --    BAND  --   --   --  3  --    INR 1.51*  --   --  1.58*  --        Recent Labs   Lab 24  1133 24  0528 24  0548   * 90 98   BUN 21 19 12   CREATSERUM 1.26* 0.90 0.75   CA 8.3* 7.9* 7.9*   ALB 1.7*  --  1.3*   * 136 137   K 4.0 3.7 3.7   CL 98 109 112   CO2 17.0* 19.0* 19.0*   ALKPHO 289*  --  295*   AST 89*  --  93*   *  --  106*   BILT 0.4  --  0.4   TP 6.7  --  5.8*       Estimated Creatinine Clearance: 59.4 mL/min (based on SCr of 0.75 mg/dL).    Recent Labs   Lab 24  1133   TROPHS 3       Recent Labs   Lab 24  1133 24  0548   PTP 18.3* 19.0*   INR 1.51* 1.58*           Microbiology    Hospital Encounter on 05/26/24   1. Blood Culture     Status: None (Preliminary result)    Collection Time: 05/26/24  4:09 PM    Specimen: Blood,peripheral   Result Value Ref Range    Blood Culture Result No Growth 2 Days N/A         Imaging: Reviewed in Epic.    Medications:    piperacillin-tazobactam  3.375 g Intravenous Q8H    ipratropium-albuterol  3 mL Nebulization Q4H WA (5 times daily)    azithromycin  500 mg Oral Q24H    aspirin  81 mg Oral Daily    atorvastatin  80 mg Oral Nightly    [Held by provider] busPIRone  20 mg Oral Daily    [Held by provider] clonazePAM  0.5 mg Oral BID    gabapentin  600 mg Oral Nightly    metoprolol succinate ER  50 mg Oral Daily Beta Blocker    [Held by provider] Mirabegron ER  50 mg Oral Daily    pantoprazole  40 mg Oral QAM AC    QUEtiapine ER  200 mg Oral QPM    umeclidinium bromide  1 puff Inhalation Daily    heparin  5,000 Units Subcutaneous Q8H JOSÉ ANTONIO    insulin aspart  1-10 Units Subcutaneous TID AC and HS       Assessment & Plan:      # RLL pneumonia, atypical   # RLL effusion with loculation and chest pain   -continue broad spectrum abx   -Pulmonology on consult  -Blastomyces Ag pending  - sp thora yesterday per IR  - consider bronch??  - ID consult     # Iron def Anemia  - no s/s bleeding   -trend  -GI following   -hold Plavix for possible bronch and possible scope     #Elevated LFTs  #Coagulopathy  -trend  -GI on consult  -vit K x 1 given   -US abd and CT AP  without obvious source of LFT rise     #COPD   -no active wheezing  -Continue inhalers  -prn nebs.     #Acute metabolic toxic ( anxiolytic) encephalopathy  -Ammonia normal   -CT brain negative  -ABG reviewed  - improved     #Hypertension  -metoprolol.     #DM 2  -hyperglycemia protocol     #GERD  #Jamison's esophagus  -PPI.       ADDENDUM:  - polina Ya, plan for chest tube placement and bronch tomorrow     Hoda Juan MD      Supplementary Documentation:     Quality:  DVT Mechanical  Prophylaxis:   SCDs,    DVT Pharmacologic Prophylaxis   Medication    heparin (Porcine) 5000 UNIT/ML injection 5,000 Units         DVT Pharmacologic prophylaxis: Aspirin 81 mg      Code Status: Full Code  Gotti: No urinary catheter in place  Gotti Duration (in days):   Central line:    ILYA:     Discharge is dependent on: progress  At this point Ms. King is expected to be discharge to: home    The 21st Century Cures Act makes medical notes like these available to patients in the interest of transparency. Please be advised this is a medical document. Medical documents are intended to carry relevant information, facts as evident, and the clinical opinion of the practitioner. The medical note is intended as peer to peer communication and may appear blunt or direct. It is written in medical language and may contain abbreviations or verbiage that are unfamiliar.

## 2024-05-29 NOTE — PLAN OF CARE
5/29 AM: Pt is A/O x 2-3, confused but awake. Lung sounds are diminished, on room air. BP on softer side, NSR on tele. Blood thinners on hold per doctors. NPO for chest tube placement.     10 fr chest tube placed in IR, - 20 suction    Problem: Diabetes/Glucose Control  Goal: Glucose maintained within prescribed range  Description: INTERVENTIONS:  - Monitor Blood Glucose as ordered  - Assess for signs and symptoms of hyperglycemia and hypoglycemia  - Administer ordered medications to maintain glucose within target range  - Assess barriers to adequate nutritional intake and initiate nutrition consult as needed  - Instruct patient on self management of diabetes  Outcome: Progressing

## 2024-05-29 NOTE — PROGRESS NOTES
[facility] Pharmacy Dosing Service      Initial Pharmacokinetic Consult for Vancomycin Dosing     Soumya King is a 63 year old female who is being initiated on vancomycin therapy for  GPC in pleural fluid .  Pharmacy has been asked to dose vancomycin by Dr. Guillen.  The initial treatment and monitoring approach will be steady state AUC strategy.        Weight and Temperature:    Wt Readings from Last 1 Encounters:   24 60.2 kg (132 lb 12.8 oz)        Temp Readings from Last 1 Encounters:   24 98.2 °F (36.8 °C) (Oral)      Labs:   Recent Labs   Lab 24  0524  0548 24  0737   CREATSERUM 0.90 0.75 0.73      Estimated Creatinine Clearance: 61.1 mL/min (based on SCr of 0.73 mg/dL).     Recent Labs   Lab 24  0548 24  0737   WBC 29.7* 33.7* 29.0*          The Pharmacokinetic Target is:     to 600 mg-h/L and trough <=15 mg/L    Renal Dosing Considerations:    None     Assessment/Plan:   Initial/Loading dose: Will receive 1000 mg IV (15 mg/kg, capped at 2250 mg) x 1 initial dose.      Maintenance dose: Pharmacy will dose vancomycin at 1000 mg IV every 12 hours    Monitorin) Plan for vancomycin peak and trough to be obtained at steady state    2) Pharmacy will order SCr as clinically indicated to assess renal function.    3) Pharmacy will monitor for toxicity and efficacy, adjust vancomycin dose and/or frequency, and order vancomycin levels as appropriate per the Pharmacy and Therapeutics Committee approved protocol until discontinuation of the medication.       We appreciate the opportunity to assist in the care of this patient.     Deni Weldon, PharmD  2024  3:27 PM  [facility]: [phone]

## 2024-05-29 NOTE — PROGRESS NOTES
Hematology/Oncology Progress Note    Patient Name: Soumya King  Medical Record Number: [id number]    YOB: 1960     Reason for Consultation:  Soumya King was seen today for the diagnosis of anemia    Interval events:      - more awake today  - seen by GI, refused endoscopy  - CT chest with concern for growing empyema; scheduled for EBUS by Dr Main tomorrow  - febrile this AM to 101.7   - was on RA this AM    Inpatient Meds:   ipratropium-albuterol  3 mL Nebulization QID    piperacillin-tazobactam  3.375 g Intravenous Q8H    azithromycin  500 mg Oral Q24H    aspirin  81 mg Oral Daily    atorvastatin  80 mg Oral Nightly    [Held by provider] busPIRone  20 mg Oral Daily    [Held by provider] clonazePAM  0.5 mg Oral BID    gabapentin  600 mg Oral Nightly    metoprolol succinate ER  50 mg Oral Daily Beta Blocker    [Held by provider] Mirabegron ER  50 mg Oral Daily    pantoprazole  40 mg Oral QAM AC    QUEtiapine ER  200 mg Oral QPM    umeclidinium bromide  1 puff Inhalation Daily    heparin  5,000 Units Subcutaneous Q8H JOSÉ ANTONIO    insulin aspart  1-10 Units Subcutaneous TID AC and HS         PRN Meds:    traMADol    benzonatate    rizatriptan    glucose **OR** glucose **OR** glucose-vitamin C **OR** dextrose **OR** glucose **OR** glucose **OR** glucose-vitamin C    melatonin    acetaminophen    ondansetron    prochlorperazine    polyethylene glycol (PEG 3350)    sennosides    bisacodyl    fleet enema    guaiFENesin    sodium chloride    Allergies:   Allergies   Allergen Reactions    Naprosyn [Naproxen] ANAPHYLAXIS     Has tolerated ibuprofen and IV toradol     Aspirin OTHER (SEE COMMENTS)     Difficulty swallowing due to  saavedra's esophagus       Vital Signs:  Height: --  Weight: --  BSA (Calculated - sq m): --  Pulse: 97 (05/29 0725)  BP: 98/64 (05/29 0725)  Temp: 98.4 °F (36.9 °C) (05/29 0725)  Do Not Use - Resp Rate: --  SpO2: 95 % (05/29 0725)    Wt Readings from Last 6 Encounters:   05/26/24  60.2 kg (132 lb 12.8 oz)   24 54.4 kg (120 lb)   24 57.6 kg (127 lb)   24 56.2 kg (124 lb)   24 57.2 kg (126 lb)   24 56.7 kg (125 lb)       Physical Examination:  General: Patient is alert and oriented, not in acute distress  Psych: Mood and affect are appropriate  Eyes: EOMI, PERRL  ENT: Oropharynx is clear, no adenopathy  CV: no LE edema  Respiratory: Non-labored respirations  GI/Abd: Soft, non-tender   Neurological: Grossly intact. A&Ox3  Skin: no rashes or petechiae    Laboratory:  Recent Labs   Lab 24  0528 24  1156 24  0548 24  1151 24  0737   WBC 29.7*  --  33.7*  --  29.0*   HGB 6.5*   < > 7.6* 7.9* 7.2*   HCT 20.2*  --  22.7*  --  22.7*   .0*  --  523.0*  --  547.0*   MCV 89.0  --  89.7  --  91.2   RDW 16.3  --  16.3  --  16.5   NEPRELIM 24.54*  --  27.27*  --  22.84*    < > = values in this interval not displayed.       Recent Labs   Lab 24  1133 24  0528 24  0548 24  0737   * 136 137 136   K 4.0 3.7 3.7 3.9   CL 98 109 112 110   CO2 17.0* 19.0* 19.0* 17.0*   BUN 21 19 12 9   CREATSERUM 1.26* 0.90 0.75 0.73   * 90 98 113*   CA 8.3* 7.9* 7.9* 8.3*   TP 6.7  --  5.8* 6.1*   ALB 1.7*  --  1.3* 1.3*   ALKPHO 289*  --  295* 306*   AST 89*  --  93* 80*   *  --  106* 86*   BILT 0.4  --  0.4 0.6       Recent Labs   Lab 24  1133 24  0548 24  0737   INR 1.51* 1.58* 1.50*   PTT 48.5*  --   --        Imagin/29/24:  CONCLUSION:    1. When compared to most recent CT of the chest performed 2024, patient's known right lower lobe empyema has slightly increased in size with increasing adjacent interstitial and ground-glass density, correlate clinically.   2. Increasing pleural fluid as detailed above.         Impression & Plan:     Lethargy  - head CT negative. May have been due to infection +/- anemia    Anemia  IgM kappa MGUS  - she had prior colonoscopy in  that did not show  bleeding lesion. She had 2v CABG in 12/2023 and is now on asa/plavix and had noted iron deficiency anemia on 3/15/24  - pt declining endoscopy, holding off for now given no s/s bleeding  - unlikely to be progression of her MGUS, given her recent monoclonal protein studies showed a low level of monoclonal IgM at 0.17g/dL, IgM was normal. IgM recheck; low  - iron studies are consistent with anemia of chronic inflammation. Ferritin >500 indicating adequate iron stores. She has already received 1000mg IV infed 3/2024.  - thrombocytosis likely secondary to inflammation  - anemia may likely be ongoing from sepsis/infection. Will follow EBUS/cytology results   - transfuse for hgb <7    Cavitary pneumonia/empyema  - on unasyn. Per primary/pulm  - scheduled for EBUS tomorrow      Scott Corona MD  Hematology/Medical Oncology  [facility]

## 2024-05-29 NOTE — CONSULTS
[facility]   part of [facility] ID CONSULT NOTE    Soumya King Patient Status:  Inpatient    6/3/1960 MRN [id number]   Location [facility] 5NW-A Attending Hoda Juan MD   Hosp Day # 3 PCP Rika Vinson DO       Reason for Consultation:  Atypical pna, possible fungal    History of Present Illness:  Soumya King is a 63 year old female with a history of COPD, DM and CAD s/p CABG. Patient presents with cough that she states started about 1 week ago. She reports green/yellow sputum production. Associated symptoms include right sided chest discomfort and SOB. Denies any hemoptysis. Patient is unsure if she had any fevers or chills. Denies any recent travel or recent hospitalizations.     History:  Past Medical History:    Abdominal pain, other specified site    groin    Acute bronchitis    Allergic rhinitis    Anxiety    Arthritis    Back problem    Jamison esophagus    Chronic low back pain without sciatica    COPD (chronic obstructive pulmonary disease) (HCC)    Depression    Diabetes (HCC)    Difficult intubation    difficult to intubate with bladder surgery,instructed by anesthesia to communicate to future anesthesiologist. Small airway    Esophageal reflux    Essential hypertension    Don't remember    Fitting and adjustment of dental prosthetic device    High blood pressure    History of 2019 novel coronavirus disease (COVID-19)    No hospitalization,symptoms; Fever,fatigue ,body aches,headache no loss of taste or smell    Hx of motion sickness    Hyperlipidemia    Mass of spine    Migraines    barometric pressure    Myocardial infarction (HCC)    Osteoarthritis    Other ill-defined conditions(799.89)    Jamison's    Pain in joint, forearm    wrist    Pain in joint, pelvic region and thigh    hip    Pelvic mass    Personal history of urinary (tract) infection    Sleep apnea    I don't date     Past Surgical History:   Procedure Laterality Date    Cabg  23     Colonoscopy      Colonoscopy N/A 05/18/2022    Procedure: COLONOSCOPY AND ESOPHAGOGASTRODUODENOSCOPY;  Surgeon: Miguel Camargo MD;  Location:  ENDOSCOPY    Heart surgery  2024    double bypass    Laparoscopic  2007    sling operation for stress incontinence    Laparoscopy,diagnostic      Kay biopsy stereo nodule 2 site bilat (cpt=19081/09962) Left 02/2010    BENIGN 2 SITES    Kay biopsy stereo w/calc 2 site left (cpt=19081/77602)  02/2010    benign x 2    Kay stereo-clip w/calc 2 site left  2010    Oophorectomy Left 06/28/2019    Other  08/31/2022    RIGHT SACRAL SUBCUTANEOUS CYST EXCISION    Other surgical history  2000, 2019    Bladder Sling, Large mass removed from left abdomen and ovar    Tonsillectomy      Was a child, have no idea    Tubal ligation      Upper gi endoscopy,exam       Family History   Problem Relation Age of Onset    Arthritis Mother     Other (AMI) Mother     Other (diabetes mellitus) Mother     Diabetes Mother       reports that she quit smoking about 16 months ago. Her smoking use included cigarettes. She started smoking about 49 years ago. She has a 72 pack-year smoking history. She has never used smokeless tobacco. She reports that she does not currently use alcohol. She reports that she does not use drugs.    Allergies:  Allergies   Allergen Reactions    Naprosyn [Naproxen] ANAPHYLAXIS     Has tolerated ibuprofen and IV toradol     Aspirin OTHER (SEE COMMENTS)     Difficulty swallowing due to  saavedra's esophagus       Medications:    Current Facility-Administered Medications:     ipratropium-albuterol (Duoneb) 0.5-2.5 (3) MG/3ML inhalation solution 3 mL, 3 mL, Nebulization, QID    piperacillin-tazobactam (Zosyn) 3.375 g in dextrose 5% 100 mL IVPB-ADDV, 3.375 g, Intravenous, Q8H    azithromycin (Zithromax) tab 500 mg, 500 mg, Oral, Q24H    traMADol (Ultram) tab 50 mg, 50 mg, Oral, Q6H PRN    [Held by provider] aspirin DR tab 81 mg, 81 mg, Oral, Daily    atorvastatin (Lipitor) tab 80  mg, 80 mg, Oral, Nightly    benzonatate (Tessalon) cap 100 mg, 100 mg, Oral, TID PRN    [Held by provider] busPIRone (Buspar) tab 20 mg, 20 mg, Oral, Daily    [Held by provider] clonazePAM (KlonoPIN) tab 0.5 mg, 0.5 mg, Oral, BID    gabapentin (Neurontin) tab 600 mg, 600 mg, Oral, Nightly    metoprolol succinate ER (Toprol XL) 24 hr tab 50 mg, 50 mg, Oral, Daily Beta Blocker    [Held by provider] Mirabegron ER (Myrbetriq) 50 MG tablet TB24 50 mg, 50 mg, Oral, Daily    pantoprazole (Protonix) DR tab 40 mg, 40 mg, Oral, QAM AC    QUEtiapine ER (SEROquel XR) 24 hr tab 200 mg, 200 mg, Oral, QPM    rizatriptan (Maxalt-MLT) disintegrating tab 5 mg, 5 mg, Oral, PRN    umeclidinium bromide (Incruse Ellipta) 62.5 MCG/ACT inhaler 1 puff, 1 puff, Inhalation, Daily    glucose (Dex4) 15 GM/59ML oral liquid 15 g, 15 g, Oral, Q15 Min PRN **OR** glucose (Glutose) 40% oral gel 15 g, 15 g, Oral, Q15 Min PRN **OR** glucose-vitamin C (Dex-4) chewable tab 4 tablet, 4 tablet, Oral, Q15 Min PRN **OR** dextrose 50% injection 50 mL, 50 mL, Intravenous, Q15 Min PRN **OR** glucose (Dex4) 15 GM/59ML oral liquid 30 g, 30 g, Oral, Q15 Min PRN **OR** glucose (Glutose) 40% oral gel 30 g, 30 g, Oral, Q15 Min PRN **OR** glucose-vitamin C (Dex-4) chewable tab 8 tablet, 8 tablet, Oral, Q15 Min PRN    melatonin tab 3 mg, 3 mg, Oral, Nightly PRN    [Held by provider] heparin (Porcine) 5000 UNIT/ML injection 5,000 Units, 5,000 Units, Subcutaneous, Q8H JOSÉ ANTONIO    acetaminophen (Tylenol Extra Strength) tab 500 mg, 500 mg, Oral, Q4H PRN    ondansetron (Zofran) 4 MG/2ML injection 4 mg, 4 mg, Intravenous, Q6H PRN    prochlorperazine (Compazine) 10 MG/2ML injection 5 mg, 5 mg, Intravenous, Q8H PRN    polyethylene glycol (PEG 3350) (Miralax) 17 g oral packet 17 g, 17 g, Oral, Daily PRN    sennosides (Senokot) tab 17.2 mg, 17.2 mg, Oral, Nightly PRN    bisacodyl (Dulcolax) 10 MG rectal suppository 10 mg, 10 mg, Rectal, Daily PRN    fleet enema (Fleet) 7-19 GM/118ML  rectal enema 133 mL, 1 enema, Rectal, Once PRN    insulin aspart (NovoLOG) 100 Units/mL FlexPen 1-10 Units, 1-10 Units, Subcutaneous, TID AC and HS    guaiFENesin (Robitussin) 100 MG/5 ML oral liquid 200 mg, 200 mg, Oral, Q4H PRN    sodium chloride (Saline Mist) 0.65 % nasal solution 1 spray, 1 spray, Each Nare, Q3H PRN    Review of Systems:  Attempted but limited given patient drowsy at the time of my eval.     CARDIOVASCULAR:  + chest pain.   RESPIRATORY:  + shortness of breath, + cough, + sputum.  GASTROINTESTINAL:  No  nausea, vomiting or diarrhea. No abdominal pain  HEMATOLOGIC:  + anemia    Physical Exam:  Vital signs: Blood pressure 107/63, pulse 98, temperature 98.2 °F (36.8 °C), temperature source Oral, resp. rate 17, height 154.9 cm (5' 1\"), weight 132 lb 12.8 oz (60.2 kg), SpO2 98%, not currently breastfeeding.    General: Drowsy but arousable, NAD  HEENT: Moist mucous membranes.   Neck: No lymphadenopathy.  Supple.  Cardiovascular: RRR.   Respiratory: Diminished breaths with crackles to  lower lung  Abdomen: Soft, nontender, nondistended.   Musculoskeletal: No edema noted  Integument: No lesions. No erythema.    Laboratory Data:  Recent Labs   Lab 05/29/24  0737 05/29/24  1235   RBC 2.49*  --    HGB 7.2* 7.6*   HCT 22.7*  --    MCV 91.2  --    MCH 28.9  --    MCHC 31.7  --    RDW 16.5  --    NEPRELIM 22.84*  --    WBC 29.0*  --    .0*  --    NEUT 82  --    LYMPH 9  --    MON 5  --    EOS 1  --      Recent Labs   Lab 05/26/24  1133 05/27/24  0528 05/28/24  0548 05/29/24  0737   * 90 98 113*   BUN 21 19 12 9   CREATSERUM 1.26* 0.90 0.75 0.73   CA 8.3* 7.9* 7.9* 8.3*   ALB 1.7*  --  1.3* 1.3*   * 136 137 136   K 4.0 3.7 3.7 3.9   CL 98 109 112 110   CO2 17.0* 19.0* 19.0* 17.0*   ALKPHO 289*  --  295* 306*   AST 89*  --  93* 80*   *  --  106* 86*   BILT 0.4  --  0.4 0.6   TP 6.7  --  5.8* 6.1*       Microbiology: Reviewed in EMR    Radiology: Reviewed.    CORRECTION Corrected on:  5/29/2024;         PROCEDURE:  CT CHEST (CPT=71250)    COMPARISON:  [facility] , XR, XR CHEST AP PORTABLE  (CPT=71045), 5/29/2024,   4:27 AM.  [facility], CT, CT ANGIOGRAPHY, CHEST (CPT=71275), 5/26/2024, 12:49   PM.    INDICATIONS:  Follow-up empyema    TECHNIQUE:  Unenhanced multislice CT scanning is performed through the   chest.  Dose reduction techniques were used. Dose information is   transmitted to the ACR (American College of Radiology) NRDR (National   Radiology Data Registry) which includes the Dose  Index Registry.  There is image degradation due to motion slightly   limiting evaluation    PATIENT STATED HISTORY: (As transcribed by Technologist)  Recent empyema       FINDINGS:   LUNGS:  Masslike consolidation with internal punctate foci of air is most   consistent with patient's known consolidation and measures 5.7 x 8.1 x 6.1   versus 5 x 7.2 x 5.1 cm, AP x T x cc dimension respectively.  Increasing   adjacent interstitial and   ground-glass density. VASCULATURE:  Pulmonary vessels are unremarkable within the limits of a   noncontrast CT.   ANKUSH:  Limited due to lack of IV contrast.   MEDIASTINUM:  Adenopathy with subcarinal node measuring 2.8 x 3.3 versus   2.1 x 2 cm and right paratracheal node measuring 2 x 1.3 versus 2 x 1.4   cm.  Mediastinal clips. CARDIAC:  No enlargement or pericardial thickening.  Mild coronary artery   calcifications. PLEURA:  Increasing pleural fluid along the right fissures which is   lobular in contour and measures 2.6 cm in depth.  Right pleural effusion   measures 3.7 versus 2.9 cm with left pleural fluid measuring 1.7 versus   0.8 cm in depth.   THORACIC AORTA:  Atherosclerosis.   CHEST WALL:  No mass or axillary adenopathy.  Sternotomy wires. LIMITED ABDOMEN:  Limited images of the upper abdomen are unremarkable.   BONES:  No fracture.      CONCLUSION:   1. When compared to most recent CT of the chest performed 5/26/2024,   patient's known right lower lobe consolidation has  slightly increased in   size with increasing adjacent interstitial and ground-glass density,   correlate clinically. 2. Increasing pleural fluid as detailed above.    LOCATION:  [facility]       Dictated by (CST): Britney Mendes MD on 5/29/2024 at 9:39 AM       Finalized by (CST): Britney Mendes MD on 5/29/2024 at 9:51 AM      Dictated by (CST): Britney Mendes MD on 5/29/2024 at 11:23 AM       Finalized by (CST): Britney Mendes MD on 5/29/2024 at 11:23 AM                       Signed: 05/29/24 1123 by Britney Mendes MD  Narrative:    PROCEDURE:  CT CHEST (CPT=71250)     COMPARISON:  [facility] , XR, XR CHEST AP PORTABLE  (CPT=71045), 5/29/2024, 4:27 AM.  [facility], CT, CT ANGIOGRAPHY, CHEST (CPT=71275), 5/26/2024, 12:49 PM.     INDICATIONS:  Follow-up empyema     TECHNIQUE:  Unenhanced multislice CT scanning is performed through the chest.  Dose reduction techniques were used. Dose information is transmitted to the ACR (American College of Radiology) NRDR (National Radiology Data Registry) which includes the Dose   Index Registry.  There is image degradation due to motion slightly limiting evaluation     PATIENT STATED HISTORY: (As transcribed by Technologist)  Recent empyema      FINDINGS:    LUNGS:  Masslike consolidation with internal punctate foci of air is most consistent with patient's known empyema and measures 5.7 x 8.1 x 6.1 versus 5 x 7.2 x 5.1 cm, AP x T x cc dimension respectively.  Increasing adjacent interstitial and ground-glass   density.  VASCULATURE:  Pulmonary vessels are unremarkable within the limits of a noncontrast CT.    ANKUSH:  Limited due to lack of IV contrast.    MEDIASTINUM:  Adenopathy with subcarinal node measuring 2.8 x 3.3 versus 2.1 x 2 cm and right paratracheal node measuring 2 x 1.3 versus 2 x 1.4 cm.  Mediastinal clips.  CARDIAC:  No enlargement or pericardial thickening.  Mild coronary artery calcifications.  PLEURA:  Increasing pleural fluid along the right fissures which is lobular in contour and measures 2.6  cm in depth.  Right pleural effusion measures 3.7 versus 2.9 cm with left pleural fluid measuring 1.7 versus 0.8 cm in depth.    THORACIC AORTA:  Atherosclerosis.  CHEST WALL:  No mass or axillary adenopathy.  Sternotomy wires.  LIMITED ABDOMEN:  Limited images of the upper abdomen are unremarkable.    BONES:  No fracture.       Impression:    CONCLUSION:    1. When compared to most recent CT of the chest performed 5/26/2024, patient's known right lower lobe empyema has slightly increased in size with increasing adjacent interstitial and ground-glass density, correlate clinically.  2. Increasing pleural fluid as detailed above.     LOCATION:  [facility]     Dictated by (CST): Britney Mendes MD on 5/29/2024 at 9:39 AM      Finalized by (CST): Britney Mendes MD on 5/29/2024 at 9:51 AM       PROCEDURE:  XR CHEST AP PORTABLE  (CPT=71045)     TECHNIQUE:  AP chest radiograph was obtained.     COMPARISON:  [facility] , CT, CT ABDOMEN+PELVIS(CONTRAST ONLY)(CPT=74177), 5/28/2024, 1:32 PM.  [facility] , XR, XR CHEST AP PORTABLE  (CPT=71045), 5/28/2024, 4:38 PM.     INDICATIONS:  dyspnea     PATIENT STATED HISTORY: (As transcribed by Technologist)  dyspnea     FINDINGS:  Cardiomediastinal silhouette is stable in size and appearance with sternotomy wires and mediastinal clips.  Persistent blunting of the right costophrenic angle is consistent with small pleural effusion versus reaction.  Persistent interstitial   and right perihilar/lower lobe masslike airspace opacity.  No new focal consolidation.  No pneumothorax.     Impression:    CONCLUSION:    1. No significant change, allowing for differences in technique when compared to 5/28/2024 chest radiograph.     LOCATION:  [facility]    Dictated by (CST): Britney Mendes MD on 5/29/2024 at 7:58 AM      Finalized by (CST): Britney Mendes MD on 5/29/2024 at 8:00 AM        PROCEDURE:  US THORACENTESIS GUIDED RIGHT (CPT=32555)     COMPARISON:  [facility] , XR, XR CHEST AP PORTABLE  (CPT=71045), 5/28/2024, 4:38 PM.  [facility]  XR, XR CHEST AP PORTABLE  (CPT=71045), 5/28/2024, 4:48 AM.  [facility], CT, CT ANGIOGRAPHY, CHEST (CPT=71275), 5/26/2024, 12:49 PM.     INDICATIONS:  pneumonia     DESCRIPTION OF PROCEDURE:  The clinical scenario and referral were discussed with Dr. Ya.  Referral for ultrasound-guided right thoracentesis.  Because of her altered mental status, witnessed written and verbal informed consent were obtained from her  sister.  The procedure was also discussed with her.  She voiced understanding and wished to proceed.  Time-out was performed by the staff.     She could not achieve a seated position.  Positioning was optimized, supine oblique.  Ultrasound demonstrated a small right complex pleural effusion corresponding to the CTA and chest x-ray abnormality.  Access site was chosen.  The overlying skin was  prepped in the usual sterile manner.  1% lidocaine was used locally.  Using realtime ultrasound an 8 Portuguese multi side hole sheath needle was advanced.  Once in position aspiration to dryness with removal of only 10-11 mL of thin clear yellow fluid.    Samples were sent to RT and the laboratory for testing.  The system was removed.  Sterile dressings were placed.  She tolerated the procedure.  No immediate complication.     Impression:    CONCLUSION:  Small complex right pleural effusion.  Only a small amount of fluid could be aspirated.  Sample sent to RT and the laboratory for testing.     LOCATION:  [facility]     Dictated by (CST): Jace Govea MD on 5/28/2024 at 5:13 PM      Finalized by (CST): Jace Govea MD on 5/28/2024 at 5:16 PM      PROCEDURE:  US ABDOMEN LIMITED (CPT=76705)     COMPARISON:  [facility] , CT, CT ABDOMEN+PELVIS(CONTRAST ONLY)(CPT=74177), 5/28/2024, 1:32 PM.     INDICATIONS:  elevated lfts. eval bile duct, cirrhosis, etc     PATIENT STATED HISTORY: (As transcribed by Technologist)        FINDINGS:    LIVER:  Normal size and echogenicity. No significant masses.  BILIARY:  Common bile duct measures 6 mm in  region of stephanie hepatis.  There is no intrahepatic bile duct distension.  Gallbladder is unremarkable.  PANCREAS:  Normal.  RIGHT KIDNEY:  Normal.  OTHER:  There is a small amount of free fluid in posterior subhepatic space.  Incidental note is made of a right pleural effusion.     Impression:    CONCLUSION:    1. Trace ascites is noted in posterior subhepatic space.  2. Right pleural effusion has been noted on prior CT scan.  3. There is otherwise no abnormality detected on this abdominal ultrasound.       LOCATION:  [facility]     Dictated by (CST): Abad Chen MD on 5/28/2024 at 4:56 PM      Finalized by (CST): Abad Chen MD on 5/28/2024 at 4:57 PM          PROCEDURE:  XR CHEST AP PORTABLE  (CPT=71045)     TECHNIQUE:  AP chest radiograph was obtained.     COMPARISON:  [facility] , US, US THORACENTESIS GUIDED RIGHT (CPT=32555), 5/28/2024, 3:53 PM.  [facility] , XR, XR CHEST AP PORTABLE  (CPT=71045), 5/28/2024, 4:48 AM.     INDICATIONS:  Post Thoracentesis     PATIENT STATED HISTORY: (As transcribed by Technologist)  Right sided post thoracentesis. Patient offered no additional history at this time.      FINDINGS:  Status post right thoracentesis.  There is a small right pleural effusion.  Patchy opacity at the right lung base may represent pneumonia.  There is no pneumothorax.  Median sternotomy wires are stable.  The heart is normal in size.     Impression:    CONCLUSION:  Small right pleural effusion with patchy airspace disease the right lung base could represent pneumonia.  There is no pneumothorax.     LOCATION:  [facility]     Dictated by (CST): Elieser Torres MD on 5/28/2024 at 4:55 PM      Finalized by (CST): Elieser Torres MD on 5/28/2024 at 4:56 PM      PROCEDURE:  CT ABDOMEN+PELVIS (CONTRAST ONLY) (CPT=74177)     COMPARISON:  [facility], CT, CT ABDOMEN (ATTENTION PANCREAS) (W+ WO) (CPT=74170), 3/02/2022, 3:52 PM.  [facility], CT, CT ABDOMEN+PELVIS(CONTRAST ONLY)(CPT=74177), 9/18/2021, 2:27 PM.     INDICATIONS:   GIB     TECHNIQUE:  CT scanning was performed from the dome of the diaphragm to the pubic symphysis with non-ionic intravenous contrast material. Post contrast coronal MPR imaging was performed.  Dose reduction techniques were used. Dose information is  transmitted to the ACR (American College of Radiology) NRDR (National Radiology Data Registry) which includes the Dose Index Registry.     PATIENT STATED HISTORY:(As transcribed by Technologist)  Poor historian      CONTRAST USED:  85cc of Isovue 370     FINDINGS:    LUNG BASES:  Loculated right-sided pleural effusion with thick wall measuring approximately 7.8 x 2.5 x 12.7 cm.  Findings suggestive of empyema.  There is consolidation within the right lower lobe with complex fluid and small foci of air measuring 3.0 x   2.7 x 3.2 cm and 2.1 x 2.8 x 1.7 cm which may represent pulmonary abscesses/necrotizing pneumonia.  Small left-sided pleural effusion.  LIVER:  Normal in shape and contour.    BILIARY:  Gallbladder is unremarkable in appearance.  No intrahepatic biliary dilatation..  SPLEEN:  Normal.  No enlargement or focal lesion.  PANCREAS:  No kirk-pancreatic inflammatory stranding  ADRENALS:  Normal.  No mass or enlargement.    KIDNEYS:  Kidneys are symmetrical in size without evidence of hydronephrosis.  BOWEL/MESENTERY:  Bowel is normal in caliber. No evidence of obstruction.  Probable Villanueva's type hernia containing fat and a portion of bowel  within the right para umbilical region.  No evidence of obstruction.  Mildly prominent retroperitoneal lymph  nodes.  PELVIS:  Bladder is distended.  Small amount of free pelvic fluid.    AORTA/VASCULAR:  Atheromatous calcifications of the aorta.  BONES:  No acute fractures.  Median sternotomy approximated by wire sutures.     Impression:    CONCLUSION:    1. Loculated right-sided pleural effusion suggestive empyema with consolidation within the right lower lobe.  There are areas of fluid and small foci of air within the  consolidation which may represent pulmonary abscesses/necrotizing pneumonia.  2. Small left-sided pleural effusion with associated atelectasis.  3. Nonspecific mildly prominent retroperitoneal lymph nodes which may be reactive in nature.  4. Trace amount of free pelvic fluid.       LOCATION:  [facility]     Dictated by (CST): Annette Ferreira MD on 5/28/2024 at 2:11 PM      Finalized by (CST): Annette Ferreira MD on 5/28/2024 at 2:17 PM         PROCEDURE:  CT BRAIN OR HEAD (53743)     COMPARISON:  None.     INDICATIONS:  lethargy     TECHNIQUE:  Noncontrast CT scanning is performed through the brain. Dose reduction techniques were used. Dose information is transmitted to the ACR (American College of Radiology) NRDR (National Radiology Data Registry) which includes the Dose Index  Registry.     PATIENT STATED HISTORY: (As transcribed by Technologist)  Patient is lethargic.       FINDINGS:    VENTRICLES/SULCI:  Ventricles and sulci are normal in size.    INTRACRANIAL:  There is a chronic appearing lacunar infarct involving the right caudate nucleus at the junction of the head and body..  There are no abnormal extraaxial fluid collections.  There is no midline shift.  There are no intraparenchymal brain  abnormalities.  There is nothing specific for acute infarct.  There is no hemorrhage or mass lesion.    SINUSES:           No sign of acute sinusitis.    MASTOIDS:          No sign of acute inflammation.  SKULL:             No evidence for fracture or osseous abnormality.  OTHER:             None.    Impression:    CONCLUSION:  No evidence of acute intracranial process.     LOCATION:  [facility]     Dictated by (CST): Elieser Torres MD on 5/28/2024 at 8:49 AM      Finalized by (CST): Elieser Torres MD on 5/28/2024 at 8:54 AM      PROCEDURE:  XR CHEST AP PORTABLE  (CPT=71045)     TECHNIQUE:  AP chest radiograph was obtained.     COMPARISON:  [facility], CT, CT ANGIOGRAPHY, CHEST (CPT=71275), 5/26/2024, 12:49 PM.  [facility], XR, XR CHEST  PA + LAT CHEST (CPT=71046), 5/26/2024, 11:43 AM.     INDICATIONS:  dyspnea     PATIENT STATED HISTORY: (As transcribed by Technologist)  dyspnea      Impression:    CONCLUSION:    Limited AP portable view shows small-moderate right pleural effusion cleaning some pleural fluid tracking up the lower right lateral chest.  Possibly loculated.  Patchy consolidation mid-lower right chest.  Small blunting left costophrenic   angle.  Cardiomegaly.  No pneumothorax. Consider upright PA and lateral examination of the chest, when tolerated.  CT follow-up may also be beneficial.       LOCATION:  [facility]     Dictated by (CST): Abhishek Mora MD on 5/28/2024 at 8:36 AM      Finalized by (CST): Abhishek Mora MD on 5/28/2024 at 8:37 AM          PROCEDURE:  US VENOUS DOPPLER LEG LEFT - DIAG IMG (CPT=93971)     COMPARISON:  None.     INDICATIONS:  Left lower extremity swelling and pain     TECHNIQUE:  Real time, grey scale, and duplex ultrasound was used to evaluate the lower extremity venous system. B-mode two-dimensional images of the vascular structures, Doppler spectral analysis, and color flow.  Doppler imaging were performed.  The  following veins were imaged:  Common, deep, and superficial femoral, popliteal, sapheno-femoral junction, posterior tibial veins, and the contralateral common femoral vein.     PATIENT STATED HISTORY: (As transcribed by Technologist)        FINDINGS:    EXTREMITY EXAMINED:  Left lower extremity  SAPHENOFEMORAL JUNCTION:  No reflux.  THROMBI:  None visible.  COMPRESSION:  Normal compressibility, phasicity, and augmentation.  OTHER:  Negative.     Impression:    CONCLUSION:  No evidence of DVT in the left lower extremity.     LOCATION:  [facility]    Dictated by (CST): Roc Mosqueda MD on 5/26/2024 at 2:46 PM      Finalized by (CST): Roc Mosqueda MD on 5/26/2024 at 2:49 PM      PROCEDURE:  CT ANGIOGRAPHY, CHEST (CPT=71275)     COMPARISON:  [facility], CT, CT CHEST LD NO CAD(CPT=71250), 3/13/2024, 8:38  PM.     INDICATIONS:  sob, hypotension, pleurisy, R lung mass     TECHNIQUE:  IV contrast-enhanced multislice CT angiography is performed through the pulmonary arterial anatomy. 3D volume renderings are generated.  Dose reduction techniques were used. Dose information is transmitted to the ACR (American College of  Radiology) NRDR (National Radiology Data Registry) which includes the Dose Index Registry.     PATIENT STATED HISTORY:(As transcribed by Technologist)  SOB, hypotension; right lung mass      CONTRAST USED:  100cc of Isovue 370     FINDINGS:    VASCULATURE:  No visible pulmonary arterial thrombus or attenuation.    THORACIC AORTA:  No aneurysm or visible dissection.    LUNGS:  Masslike consolidation in the right lower lobe is new compared to the prior examination.  Given its acute appearance an infectious process is most likely.  Small central areas of cavitation are noted with associated low attenuation which may  represent necrosis.  Moderate right pleural effusion is noted.  MEDIASTINUM:  Right paratracheal node is noted measuring up to 2.0 x 1.4 cm (image 38).  A subcarinal lymph node is noted measuring up to 2.0 x 2.1 cm (image 93)..    ANKUSH:  No mass or adenopathy.    CARDIAC:  No enlargement, pericardial thickening, or significant coronary artery calcification.  PLEURA:  No mass or effusion.    CHEST WALL:  No mass or axillary adenopathy.  Patient is status post median sternotomy.  LIMITED ABDOMEN:  Limited images of the upper abdomen are unremarkable.    BONES:  No bony lesion or fracture.    OTHER:  Negative.       Impression:    CONCLUSION:    1. Masslike consolidation with areas of central necrosis and mild cavitation in the right lower lobe.  Given its acute appearance this may represent an infectious process.  Correlate clinically.  Short-term follow-up after treatment is recommended.  2. Mediastinal adenopathy is likely reactive.  Attention on follow-up is recommended.     LOCATION:  [facility]      Dictated by (CST): Roc Mosqueda MD on 5/26/2024 at 1:21 PM      Finalized by (CST): Roc Mosqueda MD on 5/26/2024 at 1:25 PM        PROCEDURE:  XR CHEST PA + LAT CHEST (CPT=71046)     INDICATIONS:  sob, cough, crackles R base     COMPARISON:  [facility], CT, CT CHEST LD NO CAD(CPT=71250), 3/13/2024, 8:38 PM.  [facility] , XR, XR CHEST DECUBITUS BILAT INCL PA AND LAT(4 VIEWS)(CPT=71048), 1/15/2024, 9:15 AM.     TECHNIQUE:  PA and lateral chest radiographs were obtained.     PATIENT STATED HISTORY: (As transcribed by Technologist)  Patient stated she has been having a cough for 1 week and a crackle sound in her right lung.      FINDINGS:    LUNGS:  An area of masslike consolidation in the right upper lobe is noted which is new from the prior examination.  Small right pleural effusion is present.  CARDIAC:  Normal size cardiac silhouette.  Patient is status post median sternotomy.  MEDIASTINUM:  Normal.  PLEURA:  Normal.  No pleural effusions.  BONES:  Normal for age.     Impression:    CONCLUSION:  Masslike consolidation in the right upper lobe is noted.  Given it is acute appearance this likely represents an infectious process.  Small right effusion is noted.  Correlate clinically.     LOCATION:  [facility]     Dictated by (CST): Roc Mosqueda MD on 5/26/2024 at 12:05 PM      Finalized by (CST): Roc Mosqueda MD on 5/26/2024 at 12:06 PM    ASSESSMENT:  Right empyema, s/p thoracentesis 5/28, gram stain with GPC. Path w/ acute inflammation with occasional bacterial organisms present.   RLL pneumonia. Legionella and strep pneumo Uags negative.  Fevers and leukocytosis, assume d/t above  Alerted mental status. ? Related to above vs other. Head CT w/o acute process.  Elevated LFTs. CT a/p and US abd unremarkable. Improving.   Anemia  IgM kappa MGUS  DM II. Hgb A1C 7.1  COPD    PLAN:  - continue IV Zosyn, will add IV vanco until further ID on GPC in pleural fluid. DC azithromycin.   - follow blood cultures- ngtd; will repeat  blood cultures today in view of fevers overnight  - await final pleural fluid culture from 5/28  - follow temps and wbc  - plans for chest tube placement by IR today  - reviewed labs, micro, imaging reports, available old records    Discussed case with RN, patient, Dr. Menezes and Dr. Ya.     Thank you for allowing us to participate in the care of this patient. Please do not hesitate to call if you have any questions.   We will continue to follow with you and will make further recommendations based on her progress.    [facility]  [phone]  5/29/2024    ID ATTENDING ADDENDUM     Pt seen an examined independently. Chart reviewed. Agree with above. Note has been reviewed by me and modified as needed.  Exam and Impression/ Recs as noted above.  Will follow  D/w staff and with pt  More than 50% of clinical time and 100% of the clinical decision making performed by me.    Vibha Menezes MD

## 2024-05-29 NOTE — PROCEDURES
[facility]   part of [facility]  Procedure Note    Soumya King Patient Status:  Inpatient    6/3/1960 MRN [id number]   Location [facility] 5NW-A Attending Hoda Juan MD   Hosp Day # 3 PCP Rika Vinson DO     Procedure: Right chest tube    Pre-Procedure Diagnosis:  Right pleural effusion    Post-Procedure Diagnosis: Same    Anesthesia:  Sedation    Findings:  Serous fluid    Specimens:  10 mL    Blood Loss:  < 5 cc    Tourniquet Time: None  Complications:  None  Drains:  10.2 Fr, pigtail is locked.    Secondary Diagnosis:  N/A    Annette Ferreira MD  2024

## 2024-05-29 NOTE — IMAGING NOTE
MD Ferreira confirmed okay with plavix administered last on 5/26/24.  States Dr Hernandez alerted him.  Okay to proceed

## 2024-05-29 NOTE — IMAGING NOTE
NPO status verified, since 0900 today.  Pertinent labs and radiology imaging reviewed  Consent signed and on file.  Patient present with JETHRO Warner, sister who came with her from the floor    Patient tolerated procedural sedation without any immediate complications noted.  Thoracentesis and chest tube site to right lower lobe lung / right lateral chest with sorbaview dressing. C/D/I. Patient denies pain.  Report given to Jace MICHELE. Patient transported back to 6 accompanied by RN and transporter.

## 2024-05-30 ENCOUNTER — APPOINTMENT (OUTPATIENT)
Dept: GENERAL RADIOLOGY | Facility: HOSPITAL | Age: 64
DRG: 163 | End: 2024-05-30
Attending: INTERNAL MEDICINE
Payer: MEDICAID

## 2024-05-30 PROBLEM — J86.9 EMPYEMA (HCC): Status: ACTIVE | Noted: 2024-05-30

## 2024-05-30 LAB
ALBUMIN SERPL-MCNC: 1.3 G/DL (ref 3.4–5)
ALBUMIN/GLOB SERPL: 0.3 {RATIO} (ref 1–2)
ALP LIVER SERPL-CCNC: 544 U/L
ALT SERPL-CCNC: 497 U/L
ANION GAP SERPL CALC-SCNC: 13 MMOL/L (ref 0–18)
AST SERPL-CCNC: 1228 U/L (ref 15–37)
BASOPHILS # BLD AUTO: 0.06 X10(3) UL (ref 0–0.2)
BASOPHILS # BLD: 0 X10(3) UL (ref 0–0.2)
BASOPHILS NFR BLD AUTO: 0.3 %
BASOPHILS NFR BLD: 0 %
BILIRUB SERPL-MCNC: 0.6 MG/DL (ref 0.1–2)
BUN BLD-MCNC: 9 MG/DL (ref 9–23)
CALCIUM BLD-MCNC: 8.5 MG/DL (ref 8.5–10.1)
CHLORIDE SERPL-SCNC: 109 MMOL/L (ref 98–112)
CO2 SERPL-SCNC: 20 MMOL/L (ref 21–32)
CREAT BLD-MCNC: 0.67 MG/DL
CREAT BLD-MCNC: 0.67 MG/DL
EGFRCR SERPLBLD CKD-EPI 2021: 99 ML/MIN/1.73M2 (ref 60–?)
EGFRCR SERPLBLD CKD-EPI 2021: 99 ML/MIN/1.73M2 (ref 60–?)
EOSINOPHIL # BLD AUTO: 0.29 X10(3) UL (ref 0–0.7)
EOSINOPHIL # BLD: 0.56 X10(3) UL (ref 0–0.7)
EOSINOPHIL NFR BLD AUTO: 1.6 %
EOSINOPHIL NFR BLD: 3 %
ERYTHROCYTE [DISTWIDTH] IN BLOOD BY AUTOMATED COUNT: 16.2 %
GLOBULIN PLAS-MCNC: 4.8 G/DL (ref 2.8–4.4)
GLUCOSE BLD-MCNC: 112 MG/DL (ref 70–99)
GLUCOSE BLD-MCNC: 112 MG/DL (ref 70–99)
GLUCOSE BLD-MCNC: 120 MG/DL (ref 70–99)
GLUCOSE BLD-MCNC: 160 MG/DL (ref 70–99)
GLUCOSE BLD-MCNC: 160 MG/DL (ref 70–99)
HCT VFR BLD AUTO: 24.1 %
HGB BLD-MCNC: 6.9 G/DL
HGB BLD-MCNC: 8.3 G/DL
HGB BLD-MCNC: 8.4 G/DL
HGB BLD-MCNC: 9.2 G/DL
IMM GRANULOCYTES # BLD AUTO: 0.67 X10(3) UL (ref 0–1)
IMM GRANULOCYTES NFR BLD: 3.6 %
INR BLD: 1.51 (ref 0.8–1.2)
LYMPHOCYTES # BLD AUTO: 2.19 X10(3) UL (ref 1–4)
LYMPHOCYTES NFR BLD AUTO: 11.7 %
LYMPHOCYTES NFR BLD: 14 %
LYMPHOCYTES NFR BLD: 2.62 X10(3) UL (ref 1–4)
MCH RBC QN AUTO: 30.4 PG (ref 26–34)
MCHC RBC AUTO-ENTMCNC: 34.9 G/DL (ref 31–37)
MCV RBC AUTO: 87.3 FL
MONOCYTES # BLD AUTO: 1.16 X10(3) UL (ref 0.1–1)
MONOCYTES # BLD: 0.75 X10(3) UL (ref 0.1–1)
MONOCYTES NFR BLD AUTO: 6.2 %
MONOCYTES NFR BLD: 4 %
MORPHOLOGY: NORMAL
NEUTROPHILS # BLD AUTO: 14.3 X10 (3) UL (ref 1.5–7.7)
NEUTROPHILS # BLD AUTO: 14.3 X10(3) UL (ref 1.5–7.7)
NEUTROPHILS NFR BLD AUTO: 76.6 %
NEUTROPHILS NFR BLD: 78 %
NEUTS BAND NFR BLD: 1 %
NEUTS HYPERSEG # BLD: 14.77 X10(3) UL (ref 1.5–7.7)
OSMOLALITY SERPL CALC.SUM OF ELEC: 294 MOSM/KG (ref 275–295)
PLATELET # BLD AUTO: 457 10(3)UL (ref 150–450)
PLATELET MORPHOLOGY: NORMAL
POTASSIUM SERPL-SCNC: 3.8 MMOL/L (ref 3.5–5.1)
PROT SERPL-MCNC: 6.1 G/DL (ref 6.4–8.2)
PROTHROMBIN TIME: 18.3 SECONDS (ref 11.6–14.8)
RBC # BLD AUTO: 2.76 X10(6)UL
SODIUM SERPL-SCNC: 142 MMOL/L (ref 136–145)
TOTAL CELLS COUNTED BLD: 100
VISCOSITY: 1.6 REL.SALINE
WBC # BLD AUTO: 18.7 X10(3) UL (ref 4–11)

## 2024-05-30 PROCEDURE — 99233 SBSQ HOSP IP/OBS HIGH 50: CPT | Performed by: INTERNAL MEDICINE

## 2024-05-30 PROCEDURE — 99232 SBSQ HOSP IP/OBS MODERATE 35: CPT | Performed by: INTERNAL MEDICINE

## 2024-05-30 PROCEDURE — 71045 X-RAY EXAM CHEST 1 VIEW: CPT | Performed by: INTERNAL MEDICINE

## 2024-05-30 RX ORDER — OXYBUTYNIN CHLORIDE 5 MG/1
5 TABLET, EXTENDED RELEASE ORAL DAILY
Status: DISCONTINUED | OUTPATIENT
Start: 2024-05-30 | End: 2024-06-10

## 2024-05-30 RX ORDER — IPRATROPIUM BROMIDE AND ALBUTEROL SULFATE 2.5; .5 MG/3ML; MG/3ML
3 SOLUTION RESPIRATORY (INHALATION)
Status: DISCONTINUED | OUTPATIENT
Start: 2024-05-30 | End: 2024-06-03

## 2024-05-30 RX ORDER — SODIUM CHLORIDE 9 MG/ML
INJECTION, SOLUTION INTRAVENOUS ONCE
Status: COMPLETED | OUTPATIENT
Start: 2024-05-30 | End: 2024-05-30

## 2024-05-30 NOTE — CM/SW NOTE
05/30/24 1100   CM/SW Referral Data   Referral Source Social Work (self-referral)   Reason for Referral Discharge planning;Protocol order set   Specify order set Pneumonia   Informant Patient;EMR   Medical Hx   Does patient have an established PCP? Yes   Patient Info   Patient's Current Mental Status at Time of Assessment Alert;Oriented   Patient's Home Environment Condo/Apt no elevator   Patient lives with Alone   Discharge Needs   Anticipated D/C needs No anticipated discharge needs     Patient is a 62 y/o female who admitted with Cavitary lesion of lung.   SW acknowledged order for Community Acquired Pneumonia order set for HH eval.    Met with patient, who was alert and oriented, to complete assessment. She lives alone in a condo in [place]. Her sister (Alana) is supportive and lives locally. She was here in December 2023 for CABG with history of RHH. PCP is still Dr. Vinson. She is normally independent and drives. Offered HH, she politely declined at this time.    CANDI/SAMARIA to remain available for support and/or discharge planning.    Erlinda Nance, [facility]  Discharge Planner  [phone]

## 2024-05-30 NOTE — PROGRESS NOTES
Hematology/Oncology Progress Note    Patient Name: Soumya King  Medical Record Number: [id number]    YOB: 1960     Reason for Consultation:  Soumya King was seen today for the diagnosis of anemia    Interval events:      - sleeping this AM  - on RA  - had chest tube placed with fluid concerning for empyema; stain positive for GPC, fluid pH <6.81    Inpatient Meds:   ipratropium-albuterol  3 mL Nebulization QID    vancomycin  15 mg/kg Intravenous Q12H    piperacillin-tazobactam  3.375 g Intravenous Q8H    [Held by provider] aspirin  81 mg Oral Daily    atorvastatin  80 mg Oral Nightly    [Held by provider] busPIRone  20 mg Oral Daily    [Held by provider] clonazePAM  0.5 mg Oral BID    gabapentin  600 mg Oral Nightly    [Held by provider] Mirabegron ER  50 mg Oral Daily    pantoprazole  40 mg Oral QAM AC    QUEtiapine ER  200 mg Oral QPM    umeclidinium bromide  1 puff Inhalation Daily    [Held by provider] heparin  5,000 Units Subcutaneous Q8H JOSÉ ANTONIO    insulin aspart  1-10 Units Subcutaneous TID AC and HS         PRN Meds:    HYDROcodone-acetaminophen **OR** HYDROcodone-acetaminophen    traMADol    benzonatate    rizatriptan    glucose **OR** glucose **OR** glucose-vitamin C **OR** dextrose **OR** glucose **OR** glucose **OR** glucose-vitamin C    melatonin    acetaminophen    ondansetron    prochlorperazine    polyethylene glycol (PEG 3350)    sennosides    bisacodyl    fleet enema    guaiFENesin    sodium chloride    Allergies:   Allergies   Allergen Reactions    Naprosyn [Naproxen] ANAPHYLAXIS     Has tolerated ibuprofen and IV toradol     Aspirin OTHER (SEE COMMENTS)     Difficulty swallowing due to  saavedra's esophagus       Vital Signs:  Height: --  Weight: --  BSA (Calculated - sq m): --  Pulse: 91 (05/30 0730)  BP: 105/59 (05/30 0730)  Temp: 97.8 °F (36.6 °C) (05/30 0730)  Do Not Use - Resp Rate: --  SpO2: 100 % (05/30 0730)    Wt Readings from Last 6 Encounters:   05/26/24 60.2 kg (132 lb  12.8 oz)   24 54.4 kg (120 lb)   24 57.6 kg (127 lb)   24 56.2 kg (124 lb)   24 57.2 kg (126 lb)   24 56.7 kg (125 lb)       Physical Examination:  General: Patient is alert and oriented, not in acute distress  Psych: Mood and affect are appropriate  Eyes: EOMI, PERRL  ENT: Oropharynx is clear, no adenopathy  CV: no LE edema  Respiratory: Non-labored respirations  GI/Abd: Soft, non-tender   Neurological: Grossly intact. A&Ox3  Skin: no rashes or petechiae    Laboratory:  Recent Labs   Lab 24  0528 24  1156 24  0548 24  1151 24  0737 24  1235 24  2356   WBC 29.7*  --  33.7*  --  29.0*  --   --    HGB 6.5*   < > 7.6*   < > 7.2* 7.6* 6.9*   HCT 20.2*  --  22.7*  --  22.7*  --   --    .0*  --  523.0*  --  547.0*  --   --    MCV 89.0  --  89.7  --  91.2  --   --    RDW 16.3  --  16.3  --  16.5  --   --    NEPRELIM 24.54*  --  27.27*  --  22.84*  --   --     < > = values in this interval not displayed.       Recent Labs   Lab 24  1133 24  0528 24  0548 24  0737   * 136 137 136   K 4.0 3.7 3.7 3.9   CL 98 109 112 110   CO2 17.0* 19.0* 19.0* 17.0*   BUN 21 19 12 9   CREATSERUM 1.26* 0.90 0.75 0.73   * 90 98 113*   CA 8.3* 7.9* 7.9* 8.3*   TP 6.7  --  5.8* 6.1*   ALB 1.7*  --  1.3* 1.3*   ALKPHO 289*  --  295* 306*   AST 89*  --  93* 80*   *  --  106* 86*   BILT 0.4  --  0.4 0.6       Recent Labs   Lab 24  1133 24  0548 24  0737   INR 1.51* 1.58* 1.50*   PTT 48.5*  --   --        Imagin/29/24:  CONCLUSION:    1. When compared to most recent CT of the chest performed 2024, patient's known right lower lobe empyema has slightly increased in size with increasing adjacent interstitial and ground-glass density, correlate clinically.   2. Increasing pleural fluid as detailed above.         Impression & Plan:     Anemia  IgM kappa MGUS  - she had prior colonoscopy in  that did  not show bleeding lesion. She had 2v CABG in 12/2023 and is now on asa/plavix and had noted iron deficiency anemia on 3/15/24  - pt declining endoscopy, holding off for now given no s/s bleeding  - unlikely to be progression of her MGUS, given her recent monoclonal protein studies showed a low level of monoclonal IgM at 0.17g/dL, IgM was normal. IgM recheck was low.  - iron studies are consistent with anemia of chronic inflammation. Ferritin >500 indicating adequate iron stores. She has already received 1000mg IV infed 3/2024.  - thrombocytosis likely secondary to inflammation  - anemia secondary to sepsis/infection; should recover with time as infection gets adequately treated   - transfuse for hgb <7 in the meantime    Cavitary pneumonia/empyema  - on unasyn. Per primary/pulm  - s/p chest tube placement    Will sign off. Please page if questions arise.    Scott Corona MD  Hematology/Medical Oncology  [facility]

## 2024-05-30 NOTE — PLAN OF CARE
Patient able to answer orientation questions, however with generalized confusion. Room air. Tele NSR/ST. Right chest tube to -20 suction. Scheduled nebs. Blood thinners on hold. Voids, BM today on BSC. Up stand by. Bed alarm, fall precautions. QID accucheck, carb controlled diet. IV abx. Patient rounded on routinely. Patient and family updated on plan of care.      Problem: Diabetes/Glucose Control  Goal: Glucose maintained within prescribed range  Description: INTERVENTIONS:  - Monitor Blood Glucose as ordered  - Assess for signs and symptoms of hyperglycemia and hypoglycemia  - Administer ordered medications to maintain glucose within target range  - Assess barriers to adequate nutritional intake and initiate nutrition consult as needed  - Instruct patient on self management of diabetes  Outcome: Progressing     Problem: Patient/Family Goals  Goal: Patient/Family Long Term Goal  Description: Patient's Long Term Goal:   Discharge to home    Interventions:  - Follow plan of care  - See additional Care Plan goals for specific interventions  Outcome: Progressing  Goal: Patient/Family Short Term Goal  Description: Patient's Short Term Goal:   5/26 noc: maintain on room air  5/27: manage hgb  5/27 noc: reduce pleuritic pain   5/28 AM: CT scan of head/NPO possible thora  5/28noc: sleep  5/29 AM: Chest Tube placement  5/29noc: CT management, control pain, sleep  Interventions:   - labs, blood  - Medications  - IVF  - IVF ABT  - See additional Care Plan goals for specific interventions  Outcome: Progressing     Problem: RESPIRATORY - ADULT  Goal: Achieves optimal ventilation and oxygenation  Description: INTERVENTIONS:  - Assess for changes in respiratory status  - Assess for changes in mentation and behavior  - Position to facilitate oxygenation and minimize respiratory effort  - Oxygen supplementation based on oxygen saturation or ABGs  - Provide Smoking Cessation handout, if applicable  - Encourage broncho-pulmonary  hygiene including cough, deep breathe, Incentive Spirometry  - Assess the need for suctioning and perform as needed  - Assess and instruct to report SOB or any respiratory difficulty  - Respiratory Therapy support as indicated  - Manage/alleviate anxiety  - Monitor for signs/symptoms of CO2 retention  Outcome: Progressing

## 2024-05-30 NOTE — PROGRESS NOTES
[facility]  Progress Note    Soumya King Patient Status:  Inpatient    6/3/1960 MRN [id number]   Location [facility] 5NW-A Attending Hoda Juan MD   Hosp Day # 4 PCP Rika Vinson DO     Subjective:  Soumya King is a(n) 63 year old female remains afeb last 24 hours   Some increased right-sided pain post chest tube placement--remains on room air cough productive of slightly less colored material no hemoptysis      Objective:  /59 (BP Location: Left arm)   Pulse 91   Temp 97.8 °F (36.6 °C) (Oral)   Resp 17   Ht 5' 1\" (1.549 m)   Wt 132 lb 12.8 oz (60.2 kg)   SpO2 100%   BMI 25.09 kg/m² room air      Temp (24hrs), Av.1 °F (36.7 °C), Min:97.4 °F (36.3 °C), Max:98.8 °F (37.1 °C)      Intake/Output:    Intake/Output Summary (Last 24 hours) at 2024 0815  Last data filed at 2024 0700  Gross per 24 hour   Intake 150 ml   Output 75 ml   Net 75 ml       Physical Exam:   General: alert, cooperative, oriented.  No respiratory distress.  Seems a bit more comfortable   Head: Normocephalic, without obvious abnormality, atraumatic.   Throat: Lips, mucosa, and tongue normal.  No thrush noted.   Neck: trachea midline, no adenopathy, no thyromegaly. No JVD.   Lungs: Dullness right base slight rales no auscultated wheeze   Chest wall: No tenderness or deformity.   Heart: Regular rate and rhythm   Abdomen: soft, non-distended, no masses, no guarding, no     Rebound.  Denies diarrhea   Extremity: Thin nontender   Skin: No rashes or lesions.   Neurological: Alert, interactive, no focal deficits    Lab Data Review:  Recent Labs     24  0548 24  1151 24  0737 24  1235 24  2356 24  0714   WBC 33.7*  --  29.0*  --   --  18.7*   HGB 7.6* 7.9* 7.2* 7.6* 6.9* 8.3*  8.4*   .0*  --  547.0*  --   --  457.0*     Recent Labs     24  0548 24  0737    136   K 3.7 3.9    110   CO2 19.0* 17.0*   BUN 12 9   CREATSERUM 0.75 0.73      Recent Labs   Lab 05/26/24  1133 05/28/24  0548 05/29/24  0737 05/30/24  0714   PTP 18.3* 19.0* 18.2* 18.3*   INR 1.51* 1.58* 1.50* 1.51*   PTT 48.5*  --   --   --        Cultures: Pleural fluid culture 5/28 with positive Gram stain gram-positive cocci  Blood cultures remain  Fluid fungus thus far  Blasto antigens and antibodies pending    Radiology:  CT THORACENTESIS WITH TUBE INSERT W IMAGING (CPT=32557)    Result Date: 5/29/2024  CONCLUSION: 1. Successful right sided chest tube placement for loculated pleural effusion 2. The locking mechanism of the pigtail catheter was not cut prior to insertion.  Locking mechanism is intact.  LOCATION:  [facility]   Dictated by (CST): Annette Ferreira MD on 5/29/2024 at 5:53 PM     Finalized by (CST): Annette Ferreira MD on 5/29/2024 at 5:55 PM       CT CHEST (HDN=60309)    Addendum Date: 5/29/2024    CORRECTION Corrected on: 5/29/2024;   PROCEDURE:  CT CHEST (CPT=71250)  COMPARISON:  [facility] , XR, XR CHEST AP PORTABLE  (CPT=71045), 5/29/2024, 4:27 AM.  [facility], CT, CT ANGIOGRAPHY, CHEST (CPT=71275), 5/26/2024, 12:49 PM.  INDICATIONS:  Follow-up empyema  TECHNIQUE:  Unenhanced multislice CT scanning is performed through the chest.  Dose reduction techniques were used. Dose information is transmitted to the ACR (American College of Radiology) NRDR (National Radiology Data Registry) which includes the Dose  Index Registry.  There is image degradation due to motion slightly limiting evaluation  PATIENT STATED HISTORY: (As transcribed by Technologist)  Recent empyema    FINDINGS:  LUNGS:  Masslike consolidation with internal punctate foci of air is most consistent with patient's known consolidation and measures 5.7 x 8.1 x 6.1 versus 5 x 7.2 x 5.1 cm, AP x T x cc dimension respectively.  Increasing adjacent interstitial and ground-glass density. VASCULATURE:  Pulmonary vessels are unremarkable within the limits of a noncontrast CT.  ANKUSH:  Limited due to lack of IV contrast.   MEDIASTINUM:  Adenopathy with subcarinal node measuring 2.8 x 3.3 versus 2.1 x 2 cm and right paratracheal node measuring 2 x 1.3 versus 2 x 1.4 cm.  Mediastinal clips. CARDIAC:  No enlargement or pericardial thickening.  Mild coronary artery calcifications. PLEURA:  Increasing pleural fluid along the right fissures which is lobular in contour and measures 2.6 cm in depth.  Right pleural effusion measures 3.7 versus 2.9 cm with left pleural fluid measuring 1.7 versus 0.8 cm in depth.  THORACIC AORTA:  Atherosclerosis. CHEST WALL:  No mass or axillary adenopathy.  Sternotomy wires. LIMITED ABDOMEN:  Limited images of the upper abdomen are unremarkable.  BONES:  No fracture.   CONCLUSION:  1. When compared to most recent CT of the chest performed 5/26/2024, patient's known right lower lobe consolidation has slightly increased in size with increasing adjacent interstitial and ground-glass density, correlate clinically. 2. Increasing pleural fluid as detailed above.  LOCATION:  [facility]   Dictated by ([facility]): Britney Mendes MD on 5/29/2024 at 9:39 AM     Finalized by (CST): Britney Mendes MD on 5/29/2024 at 9:51 AM    Dictated by (CST): Britney Mendes MD on 5/29/2024 at 11:23 AM     Finalized by (CST): Britney Mendes MD on 5/29/2024 at 11:23 AM              Result Date: 5/29/2024  CONCLUSION:  1. When compared to most recent CT of the chest performed 5/26/2024, patient's known right lower lobe empyema has slightly increased in size with increasing adjacent interstitial and ground-glass density, correlate clinically. 2. Increasing pleural fluid as detailed above.  LOCATION:  [facility]   Dictated by (CST): Britney Mendes MD on 5/29/2024 at 9:39 AM     Finalized by (CST): Britney Mendes MD on 5/29/2024 at 9:51 AM        Chest x-ray and images from CT reviewed  Chest tube noted no evidence of pneumothorax  Mid field density about the same right lower lobe about the same     Medications reviewed     Assessment and Plan:   Patient Active Problem List    Diagnosis    Vitamin D deficiency    Jamison's esophagus    Tobacco use    Insomnia    COPD, mild (HCC) - Dx'd via PFTs done in 3/2015    Migraine without aura and without status migrainosus, not intractable    Hyperlipidemia    SUMMER on CPAP    MDD (major depressive disorder), recurrent episode, moderate (HCC)    GERD (gastroesophageal reflux disease)    Hypertension    Chronic pansinusitis    Mucinous cystadenoma of ovary, left    KIRK (generalized anxiety disorder)    History of pubovaginal sling    Absence of bladder continence    Vasomotor flushing    Primary osteoarthritis of left knee    Mass of spine    Hx of motion sickness    Difficult intubation    Chronic low back pain without sciatica    Type 2 diabetes mellitus with diabetic neuropathy (HCC)    Pulmonary nodule    CAD, multiple vessel    S/P CABG x 2    Type 2 diabetes mellitus with hyperlipidemia (HCC)    Anemia    Iron deficiency anemia secondary to inadequate dietary iron intake    Subacute cough    Cavitary lesion of lung    Hyperglycemia    Acute anemia    MARÍA ELENA (acute kidney injury) (HCC)    Transaminitis    Thrombocytosis    Hyponatremia    Pleural effusion    Somnolence    Acute respiratory failure with hypoxia (HCC)    Empyema (HCC)       Assessment:  Large progressive right lower lobe process suspected pneumonia -bacterial versus fungal-positive fluid culture suspects bacterial  Right pleural effusion with loculation associated pleuritic pain--acidotic pH with heavily exudative fluid +cultures for bacteria ID pending and cytology pending-compatible with empyema  Background of multiple pulmonary nodules-plans have been in place for biopsy  Severe anemia followed for iron deficiency by Dr. Corona- vikki jaimes with last scopes reportedly without Jamison's 2022- no obvious loss - response to transfusion though continues to drift-GI note appreciated  Component COPD   Elevated LFTs-unremarkable  liver on CTA-now with dramatic increase in SGOT and  PT  IgM kappa MGUS   Altered mental status--CT with prior lacunar infarct otherwise unremarkable ABG remains unremarkable-questionably med related with plans to hold sedative medications-now resolved with medications adjusted       Plan:  Ongoing broad-spectrum antibiotic coverage as we await further culture results  Continuing chest tube to suction  Plan for follow-up CT next 1 to 2 days  Serial LFTs to be checked  Blasto antibody antigen remains pending    Discussed at length with the patient at bedside    CC     Irina Ya MD  5/30/2024  8:15 AM

## 2024-05-30 NOTE — PROGRESS NOTES
[facility]   part of [facility] ID PROGRESS NOTE    Soumya King Patient Status:  Inpatient    6/3/1960 MRN [id number]   [facility] 5NW-A Attending Hoda Juan MD   Hosp Day # 4 PCP Rika Vinson DO     Abx: IV Zosyn D#2, IV vanco D#1    Subjective: Patient seen and examined today. Complaining of cough and SOB. Denies any sputum production. Afebrile. On room air.     Allergies:  Allergies   Allergen Reactions    Naprosyn [Naproxen] ANAPHYLAXIS     Has tolerated ibuprofen and IV toradol     Aspirin OTHER (SEE COMMENTS)     Difficulty swallowing due to  saavedra's esophagus       Medications:    Current Facility-Administered Medications:     phytonadione (Vitamin K) 1 mg/mL oral syringe 5 mg, 5 mg, Oral, Once    ipratropium-albuterol (Duoneb) 0.5-2.5 (3) MG/3ML inhalation solution 3 mL, 3 mL, Nebulization, TID    oxybutynin ER (Ditropan-XL) 24 hr tab 5 mg, 5 mg, Oral, Daily    vancomycin (Vancocin) 1,000 mg in sodium chloride 0.9% 250 mL IVPB-ADDV, 15 mg/kg, Intravenous, Q12H    HYDROcodone-acetaminophen (Norco) 5-325 MG per tab 1 tablet, 1 tablet, Oral, Q4H PRN **OR** HYDROcodone-acetaminophen (Norco) 5-325 MG per tab 2 tablet, 2 tablet, Oral, Q4H PRN    piperacillin-tazobactam (Zosyn) 3.375 g in dextrose 5% 100 mL IVPB-ADDV, 3.375 g, Intravenous, Q8H    traMADol (Ultram) tab 50 mg, 50 mg, Oral, Q6H PRN    [Held by provider] aspirin DR tab 81 mg, 81 mg, Oral, Daily    atorvastatin (Lipitor) tab 80 mg, 80 mg, Oral, Nightly    benzonatate (Tessalon) cap 100 mg, 100 mg, Oral, TID PRN    [Held by provider] busPIRone (Buspar) tab 20 mg, 20 mg, Oral, Daily    [Held by provider] clonazePAM (KlonoPIN) tab 0.5 mg, 0.5 mg, Oral, BID    gabapentin (Neurontin) tab 600 mg, 600 mg, Oral, Nightly    pantoprazole (Protonix) DR tab 40 mg, 40 mg, Oral, QAM AC    QUEtiapine ER (SEROquel XR) 24 hr tab 200 mg, 200 mg, Oral, QPM    rizatriptan (Maxalt-MLT) disintegrating tab 5 mg, 5 mg, Oral,  PRN    umeclidinium bromide (Incruse Ellipta) 62.5 MCG/ACT inhaler 1 puff, 1 puff, Inhalation, Daily    glucose (Dex4) 15 GM/59ML oral liquid 15 g, 15 g, Oral, Q15 Min PRN **OR** glucose (Glutose) 40% oral gel 15 g, 15 g, Oral, Q15 Min PRN **OR** glucose-vitamin C (Dex-4) chewable tab 4 tablet, 4 tablet, Oral, Q15 Min PRN **OR** dextrose 50% injection 50 mL, 50 mL, Intravenous, Q15 Min PRN **OR** glucose (Dex4) 15 GM/59ML oral liquid 30 g, 30 g, Oral, Q15 Min PRN **OR** glucose (Glutose) 40% oral gel 30 g, 30 g, Oral, Q15 Min PRN **OR** glucose-vitamin C (Dex-4) chewable tab 8 tablet, 8 tablet, Oral, Q15 Min PRN    melatonin tab 3 mg, 3 mg, Oral, Nightly PRN    [Held by provider] heparin (Porcine) 5000 UNIT/ML injection 5,000 Units, 5,000 Units, Subcutaneous, Q8H JOSÉ ANTONIO    acetaminophen (Tylenol Extra Strength) tab 500 mg, 500 mg, Oral, Q4H PRN    ondansetron (Zofran) 4 MG/2ML injection 4 mg, 4 mg, Intravenous, Q6H PRN    prochlorperazine (Compazine) 10 MG/2ML injection 5 mg, 5 mg, Intravenous, Q8H PRN    polyethylene glycol (PEG 3350) (Miralax) 17 g oral packet 17 g, 17 g, Oral, Daily PRN    sennosides (Senokot) tab 17.2 mg, 17.2 mg, Oral, Nightly PRN    bisacodyl (Dulcolax) 10 MG rectal suppository 10 mg, 10 mg, Rectal, Daily PRN    fleet enema (Fleet) 7-19 GM/118ML rectal enema 133 mL, 1 enema, Rectal, Once PRN    insulin aspart (NovoLOG) 100 Units/mL FlexPen 1-10 Units, 1-10 Units, Subcutaneous, TID AC and HS    guaiFENesin (Robitussin) 100 MG/5 ML oral liquid 200 mg, 200 mg, Oral, Q4H PRN    sodium chloride (Saline Mist) 0.65 % nasal solution 1 spray, 1 spray, Each Nare, Q3H PRN    Review of Systems:  Attempted but limited given patient drowsy at the time of my eval.     CARDIOVASCULAR:  + chest pain.   RESPIRATORY:  + shortness of breath, + cough, + sputum.  GASTROINTESTINAL:  No  nausea, vomiting or diarrhea. No abdominal pain  HEMATOLOGIC:  + anemia    Physical Exam:  Vital signs: Blood pressure 121/65, pulse  94, temperature 97.8 °F (36.6 °C), temperature source Oral, resp. rate 17, height 154.9 cm (5' 1\"), weight 132 lb 12.8 oz (60.2 kg), SpO2 100%, not currently breastfeeding.    General: Drowsy but arousable, NAD  HEENT: Moist mucous membranes.   Neck: No lymphadenopathy.  Supple.  Cardiovascular: RRR.   Respiratory: Diminished breaths with crackles to  lower lung  Abdomen: Soft, nontender, nondistended.   Musculoskeletal: No edema noted  Integument: No lesions. No erythema.    Laboratory Data:  Recent Labs   Lab 05/30/24  0714   RBC 2.76*   HGB 8.3*  8.4*   HCT 24.1*   MCV 87.3   MCH 30.4   MCHC 34.9   RDW 16.2   NEPRELIM 14.30*   WBC 18.7*   .0*   NEUT 78   LYMPH 14   MON 4   EOS 3     Recent Labs   Lab 05/28/24  0548 05/29/24  0737 05/30/24  0714   GLU 98 113* 120*   BUN 12 9 9   CREATSERUM 0.75 0.73 0.67  0.67   CA 7.9* 8.3* 8.5   ALB 1.3* 1.3* 1.3*    136 142   K 3.7 3.9 3.8    110 109   CO2 19.0* 17.0* 20.0*   ALKPHO 295* 306* 544*   AST 93* 80* 1,228*   * 86* 497*   BILT 0.4 0.6 0.6   TP 5.8* 6.1* 6.1*       Microbiology: Reviewed in EMR    Radiology: Reviewed.    CORRECTION Corrected on: 5/29/2024;         PROCEDURE:  CT CHEST (CPT=71250)    COMPARISON:  [facility] , XR, XR CHEST AP PORTABLE  (CPT=71045), 5/29/2024,   4:27 AM.  [facility], CT, CT ANGIOGRAPHY, CHEST (CPT=71275), 5/26/2024, 12:49   PM.    INDICATIONS:  Follow-up empyema    TECHNIQUE:  Unenhanced multislice CT scanning is performed through the   chest.  Dose reduction techniques were used. Dose information is   transmitted to the ACR (American College of Radiology) NRDR (National   Radiology Data Registry) which includes the Dose  Index Registry.  There is image degradation due to motion slightly   limiting evaluation    PATIENT STATED HISTORY: (As transcribed by Technologist)  Recent empyema       FINDINGS:   LUNGS:  Masslike consolidation with internal punctate foci of air is most   consistent with patient's known  consolidation and measures 5.7 x 8.1 x 6.1   versus 5 x 7.2 x 5.1 cm, AP x T x cc dimension respectively.  Increasing   adjacent interstitial and   ground-glass density. VASCULATURE:  Pulmonary vessels are unremarkable within the limits of a   noncontrast CT.   ANKUSH:  Limited due to lack of IV contrast.   MEDIASTINUM:  Adenopathy with subcarinal node measuring 2.8 x 3.3 versus   2.1 x 2 cm and right paratracheal node measuring 2 x 1.3 versus 2 x 1.4   cm.  Mediastinal clips. CARDIAC:  No enlargement or pericardial thickening.  Mild coronary artery   calcifications. PLEURA:  Increasing pleural fluid along the right fissures which is   lobular in contour and measures 2.6 cm in depth.  Right pleural effusion   measures 3.7 versus 2.9 cm with left pleural fluid measuring 1.7 versus   0.8 cm in depth.   THORACIC AORTA:  Atherosclerosis.   CHEST WALL:  No mass or axillary adenopathy.  Sternotomy wires. LIMITED ABDOMEN:  Limited images of the upper abdomen are unremarkable.   BONES:  No fracture.      CONCLUSION:   1. When compared to most recent CT of the chest performed 5/26/2024,   patient's known right lower lobe consolidation has slightly increased in   size with increasing adjacent interstitial and ground-glass density,   correlate clinically. 2. Increasing pleural fluid as detailed above.    LOCATION:  [facility]       Dictated by (CST): Britney Mendes MD on 5/29/2024 at 9:39 AM       Finalized by (CST): Britney Mendes MD on 5/29/2024 at 9:51 AM      Dictated by (CST): Britney Mendes MD on 5/29/2024 at 11:23 AM       Finalized by (CST): Britney Mendes MD on 5/29/2024 at 11:23 AM                       Signed: 05/29/24 1123 by Britney Mendes MD  Narrative:    PROCEDURE:  CT CHEST (CPT=71250)     COMPARISON:  [facility] , XR, XR CHEST AP PORTABLE  (CPT=71045), 5/29/2024, 4:27 AM.  [facility], CT, CT ANGIOGRAPHY, CHEST (CPT=71275), 5/26/2024, 12:49 PM.     INDICATIONS:  Follow-up empyema     TECHNIQUE:  Unenhanced multislice CT scanning is performed  through the chest.  Dose reduction techniques were used. Dose information is transmitted to the ACR (American College of Radiology) NRDR (National Radiology Data Registry) which includes the Dose   Index Registry.  There is image degradation due to motion slightly limiting evaluation     PATIENT STATED HISTORY: (As transcribed by Technologist)  Recent empyema      FINDINGS:    LUNGS:  Masslike consolidation with internal punctate foci of air is most consistent with patient's known empyema and measures 5.7 x 8.1 x 6.1 versus 5 x 7.2 x 5.1 cm, AP x T x cc dimension respectively.  Increasing adjacent interstitial and ground-glass   density.  VASCULATURE:  Pulmonary vessels are unremarkable within the limits of a noncontrast CT.    ANKUSH:  Limited due to lack of IV contrast.    MEDIASTINUM:  Adenopathy with subcarinal node measuring 2.8 x 3.3 versus 2.1 x 2 cm and right paratracheal node measuring 2 x 1.3 versus 2 x 1.4 cm.  Mediastinal clips.  CARDIAC:  No enlargement or pericardial thickening.  Mild coronary artery calcifications.  PLEURA:  Increasing pleural fluid along the right fissures which is lobular in contour and measures 2.6 cm in depth.  Right pleural effusion measures 3.7 versus 2.9 cm with left pleural fluid measuring 1.7 versus 0.8 cm in depth.    THORACIC AORTA:  Atherosclerosis.  CHEST WALL:  No mass or axillary adenopathy.  Sternotomy wires.  LIMITED ABDOMEN:  Limited images of the upper abdomen are unremarkable.    BONES:  No fracture.       Impression:    CONCLUSION:    1. When compared to most recent CT of the chest performed 5/26/2024, patient's known right lower lobe empyema has slightly increased in size with increasing adjacent interstitial and ground-glass density, correlate clinically.  2. Increasing pleural fluid as detailed above.     LOCATION:  [facility]     Dictated by (CST): Britney Mendes MD on 5/29/2024 at 9:39 AM      Finalized by (CST): Britney Mendes MD on 5/29/2024 at 9:51 AM       PROCEDURE:  XR  CHEST AP PORTABLE  (CPT=71045)     TECHNIQUE:  AP chest radiograph was obtained.     COMPARISON:  [facility] , CT, CT ABDOMEN+PELVIS(CONTRAST ONLY)(CPT=74177), 5/28/2024, 1:32 PM.  [facility] , XR, XR CHEST AP PORTABLE  (CPT=71045), 5/28/2024, 4:38 PM.     INDICATIONS:  dyspnea     PATIENT STATED HISTORY: (As transcribed by Technologist)  dyspnea     FINDINGS:  Cardiomediastinal silhouette is stable in size and appearance with sternotomy wires and mediastinal clips.  Persistent blunting of the right costophrenic angle is consistent with small pleural effusion versus reaction.  Persistent interstitial   and right perihilar/lower lobe masslike airspace opacity.  No new focal consolidation.  No pneumothorax.     Impression:    CONCLUSION:    1. No significant change, allowing for differences in technique when compared to 5/28/2024 chest radiograph.     LOCATION:  [facility]    Dictated by (CST): Britney Mendes MD on 5/29/2024 at 7:58 AM      Finalized by (CST): Britney Mendes MD on 5/29/2024 at 8:00 AM        PROCEDURE:  US THORACENTESIS GUIDED RIGHT (CPT=32555)     COMPARISON:  [facility] , XR, XR CHEST AP PORTABLE  (CPT=71045), 5/28/2024, 4:38 PM.  [facility] , XR, XR CHEST AP PORTABLE  (CPT=71045), 5/28/2024, 4:48 AM.  [facility], CT, CT ANGIOGRAPHY, CHEST (CPT=71275), 5/26/2024, 12:49 PM.     INDICATIONS:  pneumonia     DESCRIPTION OF PROCEDURE:  The clinical scenario and referral were discussed with Dr. Ya.  Referral for ultrasound-guided right thoracentesis.  Because of her altered mental status, witnessed written and verbal informed consent were obtained from her  sister.  The procedure was also discussed with her.  She voiced understanding and wished to proceed.  Time-out was performed by the staff.     She could not achieve a seated position.  Positioning was optimized, supine oblique.  Ultrasound demonstrated a small right complex pleural effusion corresponding to the CTA and chest x-ray abnormality.  Access site was chosen.  The overlying  skin was  prepped in the usual sterile manner.  1% lidocaine was used locally.  Using realtime ultrasound an 8 [place] multi side hole sheath needle was advanced.  Once in position aspiration to dryness with removal of only 10-11 mL of thin clear yellow fluid.    Samples were sent to RT and the laboratory for testing.  The system was removed.  Sterile dressings were placed.  She tolerated the procedure.  No immediate complication.     Impression:    CONCLUSION:  Small complex right pleural effusion.  Only a small amount of fluid could be aspirated.  Sample sent to RT and the laboratory for testing.     LOCATION:  [facility]     Dictated by (CST): Jace Govea MD on 5/28/2024 at 5:13 PM      Finalized by (CST): Jace Govea MD on 5/28/2024 at 5:16 PM      PROCEDURE:  US ABDOMEN LIMITED (CPT=76705)     COMPARISON:  [facility] , CT, CT ABDOMEN+PELVIS(CONTRAST ONLY)(CPT=74177), 5/28/2024, 1:32 PM.     INDICATIONS:  elevated lfts. eval bile duct, cirrhosis, etc     PATIENT STATED HISTORY: (As transcribed by Technologist)        FINDINGS:    LIVER:  Normal size and echogenicity. No significant masses.  BILIARY:  Common bile duct measures 6 mm in region of stephanie hepatis.  There is no intrahepatic bile duct distension.  Gallbladder is unremarkable.  PANCREAS:  Normal.  RIGHT KIDNEY:  Normal.  OTHER:  There is a small amount of free fluid in posterior subhepatic space.  Incidental note is made of a right pleural effusion.     Impression:    CONCLUSION:    1. Trace ascites is noted in posterior subhepatic space.  2. Right pleural effusion has been noted on prior CT scan.  3. There is otherwise no abnormality detected on this abdominal ultrasound.       LOCATION:  [facility]     Dictated by (CST): Abad Chen MD on 5/28/2024 at 4:56 PM      Finalized by (CST): Abad Chen MD on 5/28/2024 at 4:57 PM          PROCEDURE:  XR CHEST AP PORTABLE  (CPT=71045)     TECHNIQUE:  AP chest radiograph was obtained.     COMPARISON:  [facility] , , US  THORACENTESIS GUIDED RIGHT (CPT=32555), 5/28/2024, 3:53 PM.  [facility] , XR, XR CHEST AP PORTABLE  (CPT=71045), 5/28/2024, 4:48 AM.     INDICATIONS:  Post Thoracentesis     PATIENT STATED HISTORY: (As transcribed by Technologist)  Right sided post thoracentesis. Patient offered no additional history at this time.      FINDINGS:  Status post right thoracentesis.  There is a small right pleural effusion.  Patchy opacity at the right lung base may represent pneumonia.  There is no pneumothorax.  Median sternotomy wires are stable.  The heart is normal in size.     Impression:    CONCLUSION:  Small right pleural effusion with patchy airspace disease the right lung base could represent pneumonia.  There is no pneumothorax.     LOCATION:  [facility]     Dictated by (CST): Elieser Torres MD on 5/28/2024 at 4:55 PM      Finalized by (CST): Elieser Torres MD on 5/28/2024 at 4:56 PM      PROCEDURE:  CT ABDOMEN+PELVIS (CONTRAST ONLY) (CPT=74177)     COMPARISON:  [facility], CT, CT ABDOMEN (ATTENTION PANCREAS) (W+ WO) (CPT=74170), 3/02/2022, 3:52 PM.  [facility], CT, CT ABDOMEN+PELVIS(CONTRAST ONLY)(CPT=74177), 9/18/2021, 2:27 PM.     INDICATIONS:  GIB     TECHNIQUE:  CT scanning was performed from the dome of the diaphragm to the pubic symphysis with non-ionic intravenous contrast material. Post contrast coronal MPR imaging was performed.  Dose reduction techniques were used. Dose information is  transmitted to the ACR (American College of Radiology) NRDR (National Radiology Data Registry) which includes the Dose Index Registry.     PATIENT STATED HISTORY:(As transcribed by Technologist)  Poor historian      CONTRAST USED:  85cc of Isovue 370     FINDINGS:    LUNG BASES:  Loculated right-sided pleural effusion with thick wall measuring approximately 7.8 x 2.5 x 12.7 cm.  Findings suggestive of empyema.  There is consolidation within the right lower lobe with complex fluid and small foci of air measuring 3.0 x   2.7 x 3.2 cm and 2.1 x  2.8 x 1.7 cm which may represent pulmonary abscesses/necrotizing pneumonia.  Small left-sided pleural effusion.  LIVER:  Normal in shape and contour.    BILIARY:  Gallbladder is unremarkable in appearance.  No intrahepatic biliary dilatation..  SPLEEN:  Normal.  No enlargement or focal lesion.  PANCREAS:  No kirk-pancreatic inflammatory stranding  ADRENALS:  Normal.  No mass or enlargement.    KIDNEYS:  Kidneys are symmetrical in size without evidence of hydronephrosis.  BOWEL/MESENTERY:  Bowel is normal in caliber. No evidence of obstruction.  Probable Villanueva's type hernia containing fat and a portion of bowel  within the right para umbilical region.  No evidence of obstruction.  Mildly prominent retroperitoneal lymph  nodes.  PELVIS:  Bladder is distended.  Small amount of free pelvic fluid.    AORTA/VASCULAR:  Atheromatous calcifications of the aorta.  BONES:  No acute fractures.  Median sternotomy approximated by wire sutures.     Impression:    CONCLUSION:    1. Loculated right-sided pleural effusion suggestive empyema with consolidation within the right lower lobe.  There are areas of fluid and small foci of air within the consolidation which may represent pulmonary abscesses/necrotizing pneumonia.  2. Small left-sided pleural effusion with associated atelectasis.  3. Nonspecific mildly prominent retroperitoneal lymph nodes which may be reactive in nature.  4. Trace amount of free pelvic fluid.       LOCATION:  [facility]     Dictated by (CST): Annette Ferreira MD on 5/28/2024 at 2:11 PM      Finalized by (CST): Annette Ferreira MD on 5/28/2024 at 2:17 PM         PROCEDURE:  CT BRAIN OR HEAD (53467)     COMPARISON:  None.     INDICATIONS:  lethargy     TECHNIQUE:  Noncontrast CT scanning is performed through the brain. Dose reduction techniques were used. Dose information is transmitted to the ACR (American College of Radiology) NRDR (National Radiology Data Registry) which includes the Dose Index  Registry.      PATIENT STATED HISTORY: (As transcribed by Technologist)  Patient is lethargic.       FINDINGS:    VENTRICLES/SULCI:  Ventricles and sulci are normal in size.    INTRACRANIAL:  There is a chronic appearing lacunar infarct involving the right caudate nucleus at the junction of the head and body..  There are no abnormal extraaxial fluid collections.  There is no midline shift.  There are no intraparenchymal brain  abnormalities.  There is nothing specific for acute infarct.  There is no hemorrhage or mass lesion.    SINUSES:           No sign of acute sinusitis.    MASTOIDS:          No sign of acute inflammation.  SKULL:             No evidence for fracture or osseous abnormality.  OTHER:             None.    Impression:    CONCLUSION:  No evidence of acute intracranial process.     LOCATION:  [facility]     Dictated by (CST): Elieser Torres MD on 5/28/2024 at 8:49 AM      Finalized by (CST): Elieser Torres MD on 5/28/2024 at 8:54 AM      PROCEDURE:  XR CHEST AP PORTABLE  (CPT=71045)     TECHNIQUE:  AP chest radiograph was obtained.     COMPARISON:  [facility], CT, CT ANGIOGRAPHY, CHEST (CPT=71275), 5/26/2024, 12:49 PM.  [facility], XR, XR CHEST PA + LAT CHEST (CPT=71046), 5/26/2024, 11:43 AM.     INDICATIONS:  dyspnea     PATIENT STATED HISTORY: (As transcribed by Technologist)  dyspnea      Impression:    CONCLUSION:    Limited AP portable view shows small-moderate right pleural effusion cleaning some pleural fluid tracking up the lower right lateral chest.  Possibly loculated.  Patchy consolidation mid-lower right chest.  Small blunting left costophrenic   angle.  Cardiomegaly.  No pneumothorax. Consider upright PA and lateral examination of the chest, when tolerated.  CT follow-up may also be beneficial.       LOCATION:  [facility]     Dictated by (CST): Abhishek Mora MD on 5/28/2024 at 8:36 AM      Finalized by (CST): Abhishek Mora MD on 5/28/2024 at 8:37 AM          PROCEDURE:  US VENOUS DOPPLER LEG LEFT - DIAG IMG  (CPT=93971)     COMPARISON:  None.     INDICATIONS:  Left lower extremity swelling and pain     TECHNIQUE:  Real time, grey scale, and duplex ultrasound was used to evaluate the lower extremity venous system. B-mode two-dimensional images of the vascular structures, Doppler spectral analysis, and color flow.  Doppler imaging were performed.  The  following veins were imaged:  Common, deep, and superficial femoral, popliteal, sapheno-femoral junction, posterior tibial veins, and the contralateral common femoral vein.     PATIENT STATED HISTORY: (As transcribed by Technologist)        FINDINGS:    EXTREMITY EXAMINED:  Left lower extremity  SAPHENOFEMORAL JUNCTION:  No reflux.  THROMBI:  None visible.  COMPRESSION:  Normal compressibility, phasicity, and augmentation.  OTHER:  Negative.     Impression:    CONCLUSION:  No evidence of DVT in the left lower extremity.     LOCATION:  [facility]    Dictated by (CST): Roc Mosqueda MD on 5/26/2024 at 2:46 PM      Finalized by (CST): Roc Mosqueda MD on 5/26/2024 at 2:49 PM      PROCEDURE:  CT ANGIOGRAPHY, CHEST (CPT=71275)     COMPARISON:  [facility], CT, CT CHEST LD NO CAD(CPT=71250), 3/13/2024, 8:38 PM.     INDICATIONS:  sob, hypotension, pleurisy, R lung mass     TECHNIQUE:  IV contrast-enhanced multislice CT angiography is performed through the pulmonary arterial anatomy. 3D volume renderings are generated.  Dose reduction techniques were used. Dose information is transmitted to the ACR (American College of  Radiology) NRDR (National Radiology Data Registry) which includes the Dose Index Registry.     PATIENT STATED HISTORY:(As transcribed by Technologist)  SOB, hypotension; right lung mass      CONTRAST USED:  100cc of Isovue 370     FINDINGS:    VASCULATURE:  No visible pulmonary arterial thrombus or attenuation.    THORACIC AORTA:  No aneurysm or visible dissection.    LUNGS:  Masslike consolidation in the right lower lobe is new compared to the prior examination.  Given  its acute appearance an infectious process is most likely.  Small central areas of cavitation are noted with associated low attenuation which may  represent necrosis.  Moderate right pleural effusion is noted.  MEDIASTINUM:  Right paratracheal node is noted measuring up to 2.0 x 1.4 cm (image 38).  A subcarinal lymph node is noted measuring up to 2.0 x 2.1 cm (image 93)..    ANKUSH:  No mass or adenopathy.    CARDIAC:  No enlargement, pericardial thickening, or significant coronary artery calcification.  PLEURA:  No mass or effusion.    CHEST WALL:  No mass or axillary adenopathy.  Patient is status post median sternotomy.  LIMITED ABDOMEN:  Limited images of the upper abdomen are unremarkable.    BONES:  No bony lesion or fracture.    OTHER:  Negative.       Impression:    CONCLUSION:    1. Masslike consolidation with areas of central necrosis and mild cavitation in the right lower lobe.  Given its acute appearance this may represent an infectious process.  Correlate clinically.  Short-term follow-up after treatment is recommended.  2. Mediastinal adenopathy is likely reactive.  Attention on follow-up is recommended.     LOCATION:  [facility]     Dictated by (CST): Roc Mosqueda MD on 5/26/2024 at 1:21 PM      Finalized by (CST): Roc Mosqueda MD on 5/26/2024 at 1:25 PM        PROCEDURE:  XR CHEST PA + LAT CHEST (CPT=71046)     INDICATIONS:  sob, cough, crackles R base     COMPARISON:  [facility], CT, CT CHEST LD NO CAD(CPT=71250), 3/13/2024, 8:38 PM.  [facility] , XR, XR CHEST DECUBITUS BILAT INCL PA AND LAT(4 VIEWS)(CPT=71048), 1/15/2024, 9:15 AM.     TECHNIQUE:  PA and lateral chest radiographs were obtained.     PATIENT STATED HISTORY: (As transcribed by Technologist)  Patient stated she has been having a cough for 1 week and a crackle sound in her right lung.      FINDINGS:    LUNGS:  An area of masslike consolidation in the right upper lobe is noted which is new from the prior examination.  Small right pleural effusion  is present.  CARDIAC:  Normal size cardiac silhouette.  Patient is status post median sternotomy.  MEDIASTINUM:  Normal.  PLEURA:  Normal.  No pleural effusions.  BONES:  Normal for age.     Impression:    CONCLUSION:  Masslike consolidation in the right upper lobe is noted.  Given it is acute appearance this likely represents an infectious process.  Small right effusion is noted.  Correlate clinically.     LOCATION:  [facility]     Dictated by (CST): Roc Mosqueda MD on 5/26/2024 at 12:05 PM      Finalized by (CST): Roc Mosqueda MD on 5/26/2024 at 12:06 PM    ASSESSMENT:  Right empyema, s/p thoracentesis 5/28, gram stain with GPC. Path w/ acute inflammation with occasional bacterial organisms present. S/p R chest tube placement 5/29  RLL pneumonia. Legionella and strep pneumo Uags negative.  Fevers and leukocytosis, assume d/t above. WBC improving. No further temps.  Alerted mental status. ? Related to above vs other. Head CT w/o acute process.  Elevated LFTs. CT a/p and US abd unremarkable. Worsening today. GI following.    Anemia  IgM kappa MGUS  DM II. Hgb A1C 7.1  COPD    PLAN:  - continue IV Zosyn and IV vanco for now  - follow blood cultures  - await final pleural fluid culture from 5/28 and 529.  - follow temps and wbc  - follow LFTs  - follow chest tube output    Discussed case with RN, patient and Dr. Ya.     HIEN Licona   [facility]  [phone]    ID ATTENDING ADDENDUM     Pt seen an examined independently. Chart reviewed. Agree with above. Note has been reviewed by me and modified as needed.  Exam and Impression/ Recs as noted above.  Will follow  D/w staff and with pt  More than 50% of clinical time and 100% of the clinical decision making performed by me.    Vibha Menezes MD

## 2024-05-30 NOTE — PLAN OF CARE
Pt Aox2-3. Lethargic, confused at times. Spo2 >90% on RA, 2L noc prn. Nebs. ST on tele, EP, heparin. Up SBA. IV abx. QID accucheck. Right CT to -20 suction. Prn pain meds. Pt updated on poc. Call light within reach, safety precautions in place. No further pt needs at this time.     **Hgb 6.9- 1 unit PRBC per MD    Problem: Diabetes/Glucose Control  Goal: Glucose maintained within prescribed range  Description: INTERVENTIONS:  - Monitor Blood Glucose as ordered  - Assess for signs and symptoms of hyperglycemia and hypoglycemia  - Administer ordered medications to maintain glucose within target range  - Assess barriers to adequate nutritional intake and initiate nutrition consult as needed  - Instruct patient on self management of diabetes  Outcome: Progressing     Problem: Patient/Family Goals  Goal: Patient/Family Long Term Goal  Description: Patient's Long Term Goal:   Discharge to home    Interventions:  - Follow plan of care  - See additional Care Plan goals for specific interventions  Outcome: Progressing  Goal: Patient/Family Short Term Goal  Description: Patient's Short Term Goal:   5/26 noc: maintain on room air  5/27: manage hgb  5/27 noc: reduce pleuritic pain   5/28 AM: CT scan of head/NPO possible thora  5/28noc: sleep  5/29 AM: Chest Tube placement  5/29noc: CT management, control pain, sleep  Interventions:   - labs, blood  - Medications  - IVF  - IVF ABT  - See additional Care Plan goals for specific interventions  Outcome: Progressing     Problem: RESPIRATORY - ADULT  Goal: Achieves optimal ventilation and oxygenation  Description: INTERVENTIONS:  - Assess for changes in respiratory status  - Assess for changes in mentation and behavior  - Position to facilitate oxygenation and minimize respiratory effort  - Oxygen supplementation based on oxygen saturation or ABGs  - Provide Smoking Cessation handout, if applicable  - Encourage broncho-pulmonary hygiene including cough, deep breathe, Incentive  Spirometry  - Assess the need for suctioning and perform as needed  - Assess and instruct to report SOB or any respiratory difficulty  - Respiratory Therapy support as indicated  - Manage/alleviate anxiety  - Monitor for signs/symptoms of CO2 retention  Outcome: Progressing

## 2024-05-30 NOTE — PROGRESS NOTES
[facility]   part of [facility]     Hospitalist Progress Note     Soumya King Patient Status:  Inpatient    6/3/1960 MRN [id number]   Location [facility] 5NW-A Attending Apurva Logan MD   Hosp Day # 4 PCP Rika Vinson DO     Chief Complaint: less chest pain     Subjective:     Patient sp right chest tube placement , less pain this am per patient. No overnight issues. Less confused    Objective:    Review of Systems:   A comprehensive review of systems was completed; pertinent positive and negatives stated in subjective.    Vital signs:  Temp:  [97.4 °F (36.3 °C)-98.8 °F (37.1 °C)] 97.7 °F (36.5 °C)  Pulse:  [] 102  Resp:  [16-30] 16  BP: ()/(48-95) 105/58  SpO2:  [92 %-100 %] 94 %    Physical Exam:    General: No acute distress, confused, sleepy  Respiratory: No wheezes, no rhonchi  Cardiovascular: S1, S2, regular rate and rhythm  Abdomen: Soft, Non-tender, non-distended, positive bowel sounds  Neuro: No new focal deficits.   Extremities: No edema      Diagnostic Data:    Labs:  Recent Labs   Lab 24  1133 05/27/24  0528 24  1156 24  0548 24  1151 24  0737 24  1235 24  2356   WBC 41.9* 29.7*  --  33.7*  --  29.0*  --   --    HGB 7.4* 6.5*   < > 7.6* 7.9* 7.2* 7.6* 6.9*   MCV 87.2 89.0  --  89.7  --  91.2  --   --    .0* 502.0*  --  523.0*  --  547.0*  --   --    BAND  --   --   --  3  --   --   --   --    INR 1.51*  --   --  1.58*  --  1.50*  --   --     < > = values in this interval not displayed.       Recent Labs   Lab 24  1133 24  0528 24  0548 24  0737   * 90 98 113*   BUN 21 19 12 9   CREATSERUM 1.26* 0.90 0.75 0.73   CA 8.3* 7.9* 7.9* 8.3*   ALB 1.7*  --  1.3* 1.3*   * 136 137 136   K 4.0 3.7 3.7 3.9   CL 98 109 112 110   CO2 17.0* 19.0* 19.0* 17.0*   ALKPHO 289*  --  295* 306*   AST 89*  --  93* 80*   *  --  106* 86*   BILT 0.4  --  0.4 0.6   TP 6.7  --  5.8* 6.1*        Estimated Creatinine Clearance: 61.1 mL/min (based on SCr of 0.73 mg/dL).    Recent Labs   Lab 05/26/24  1133   TROPHS 3       Recent Labs   Lab 05/26/24  1133 05/28/24  0548 05/29/24  0737   PTP 18.3* 19.0* 18.2*   INR 1.51* 1.58* 1.50*          Microbiology    Hospital Encounter on 05/26/24   1. Body Fluid Cult Aerobic and Anaerobic     Status: None (Preliminary result)    Collection Time: 05/29/24  3:46 PM    Specimen: Pleural Fluid, Right; Body fluid, unspecified   Result Value Ref Range    Body Fluid Smear 4+ WBCs seen N/A    Body Fluid Smear No organisms seen N/A    Body Fluid Smear This is a cytocentrifuged smear. N/A   2. Blood Culture     Status: None (Preliminary result)    Collection Time: 05/26/24  4:09 PM    Specimen: Blood,peripheral   Result Value Ref Range    Blood Culture Result No Growth 3 Days N/A         Imaging: Reviewed in Epic.    Medications:    ipratropium-albuterol  3 mL Nebulization QID    vancomycin  15 mg/kg Intravenous Q12H    piperacillin-tazobactam  3.375 g Intravenous Q8H    [Held by provider] aspirin  81 mg Oral Daily    atorvastatin  80 mg Oral Nightly    [Held by provider] busPIRone  20 mg Oral Daily    [Held by provider] clonazePAM  0.5 mg Oral BID    gabapentin  600 mg Oral Nightly    metoprolol succinate ER  50 mg Oral Daily Beta Blocker    [Held by provider] Mirabegron ER  50 mg Oral Daily    pantoprazole  40 mg Oral QAM AC    QUEtiapine ER  200 mg Oral QPM    umeclidinium bromide  1 puff Inhalation Daily    [Held by provider] heparin  5,000 Units Subcutaneous Q8H [facility]    insulin aspart  1-10 Units Subcutaneous TID AC and HS       Assessment & Plan:      # RLL pneumonia, atypical   # RLL effusion with loculation and chest pain   -continue broad spectrum abx   -Pulmonology rec appreciated   - plan for bronch today?  - ID rec appreciated   - sp chest tube by IR on 5/29    # Iron def Anemia  - sp PRBC last night.   - no s/s bleeding   -trend  -GI following   -hold Plavix for  possible bronch and possible scope     #Elevated LFTs  #Coagulopathy  -trend  -GI on consult  -vit K x 1 given   -US abd and CT AP  without obvious source of LFT rise     #COPD   -no active wheezing  -Continue inhalers  -prn nebs.     #Acute metabolic toxic ( anxiolytic) encephalopathy  -Ammonia normal   -CT brain negative  -ABG reviewed  - improved     #Hypertension  -metoprolol.     #DM 2  -hyperglycemia protocol     #GERD  #Jamison's esophagus  -PPI.    Hoda Juan MD      Supplementary Documentation:     Quality:  DVT Mechanical Prophylaxis:   SCDs,    DVT Pharmacologic Prophylaxis   Medication    [Held by provider] heparin (Porcine) 5000 UNIT/ML injection 5,000 Units         DVT Pharmacologic prophylaxis: Aspirin 81 mg      Code Status: Full Code  Gotti: No urinary catheter in place  Gotti Duration (in days):   Central line:    ILYA:     Discharge is dependent on: progress  At this point Ms. King is expected to be discharge to: home    The 21st Century Cures Act makes medical notes like these available to patients in the interest of transparency. Please be advised this is a medical document. Medical documents are intended to carry relevant information, facts as evident, and the clinical opinion of the practitioner. The medical note is intended as peer to peer communication and may appear blunt or direct. It is written in medical language and may contain abbreviations or verbiage that are unfamiliar.

## 2024-05-30 NOTE — PROGRESS NOTES
[facility]  Progress Note    Soumya King Patient Status:  Inpatient    6/3/1960 MRN [id number]   [facility] 5NW-A Attending Hoda Juan MD   Hosp Day # 4 PCP Rika Vinson,      Subjective:  Soumya King is a(n) 63 year old female.         Current complaints: denies new issues     awake today    Had chest tube    Denies melena, overt gi bleeding     Transfused      Current Facility-Administered Medications   Medication Dose Route Frequency    phytonadione (Vitamin K) 1 mg/mL oral syringe 5 mg  5 mg Oral Once    ipratropium-albuterol (Duoneb) 0.5-2.5 (3) MG/3ML inhalation solution 3 mL  3 mL Nebulization TID    vancomycin (Vancocin) 1,000 mg in sodium chloride 0.9% 250 mL IVPB-ADDV  15 mg/kg Intravenous Q12H    HYDROcodone-acetaminophen (Norco) 5-325 MG per tab 1 tablet  1 tablet Oral Q4H PRN    Or    HYDROcodone-acetaminophen (Norco) 5-325 MG per tab 2 tablet  2 tablet Oral Q4H PRN    piperacillin-tazobactam (Zosyn) 3.375 g in dextrose 5% 100 mL IVPB-ADDV  3.375 g Intravenous Q8H    traMADol (Ultram) tab 50 mg  50 mg Oral Q6H PRN    [Held by provider] aspirin DR tab 81 mg  81 mg Oral Daily    atorvastatin (Lipitor) tab 80 mg  80 mg Oral Nightly    benzonatate (Tessalon) cap 100 mg  100 mg Oral TID PRN    [Held by provider] busPIRone (Buspar) tab 20 mg  20 mg Oral Daily    [Held by provider] clonazePAM (KlonoPIN) tab 0.5 mg  0.5 mg Oral BID    gabapentin (Neurontin) tab 600 mg  600 mg Oral Nightly    [Held by provider] Mirabegron ER (Myrbetriq) 50 MG tablet TB24 50 mg  50 mg Oral Daily    pantoprazole (Protonix) DR tab 40 mg  40 mg Oral QAM AC    QUEtiapine ER (SEROquel XR) 24 hr tab 200 mg  200 mg Oral QPM    rizatriptan (Maxalt-MLT) disintegrating tab 5 mg  5 mg Oral PRN    umeclidinium bromide (Incruse Ellipta) 62.5 MCG/ACT inhaler 1 puff  1 puff Inhalation Daily    glucose (Dex4) 15 GM/59ML oral liquid 15 g  15 g Oral Q15 Min PRN    Or    glucose (Glutose) 40% oral gel 15 g   15 g Oral Q15 Min PRN    Or    glucose-vitamin C (Dex-4) chewable tab 4 tablet  4 tablet Oral Q15 Min PRN    Or    dextrose 50% injection 50 mL  50 mL Intravenous Q15 Min PRN    Or    glucose (Dex4) 15 GM/59ML oral liquid 30 g  30 g Oral Q15 Min PRN    Or    glucose (Glutose) 40% oral gel 30 g  30 g Oral Q15 Min PRN    Or    glucose-vitamin C (Dex-4) chewable tab 8 tablet  8 tablet Oral Q15 Min PRN    melatonin tab 3 mg  3 mg Oral Nightly PRN    [Held by provider] heparin (Porcine) 5000 UNIT/ML injection 5,000 Units  5,000 Units Subcutaneous Q8H [facility]    acetaminophen (Tylenol Extra Strength) tab 500 mg  500 mg Oral Q4H PRN    ondansetron (Zofran) 4 MG/2ML injection 4 mg  4 mg Intravenous Q6H PRN    prochlorperazine (Compazine) 10 MG/2ML injection 5 mg  5 mg Intravenous Q8H PRN    polyethylene glycol (PEG 3350) (Miralax) 17 g oral packet 17 g  17 g Oral Daily PRN    sennosides (Senokot) tab 17.2 mg  17.2 mg Oral Nightly PRN    bisacodyl (Dulcolax) 10 MG rectal suppository 10 mg  10 mg Rectal Daily PRN    fleet enema (Fleet) 7-19 GM/118ML rectal enema 133 mL  1 enema Rectal Once PRN    insulin aspart (NovoLOG) 100 Units/mL FlexPen 1-10 Units  1-10 Units Subcutaneous TID AC and HS    guaiFENesin (Robitussin) 100 MG/5 ML oral liquid 200 mg  200 mg Oral Q4H PRN    sodium chloride (Saline Mist) 0.65 % nasal solution 1 spray  1 spray Each Nare Q3H PRN        Objective:    /59 (BP Location: Left arm)   Pulse 91   Temp 97.8 °F (36.6 °C) (Oral)   Resp 17   Ht 5' 1\" (1.549 m)   Wt 132 lb 12.8 oz (60.2 kg)   SpO2 100%   BMI 25.09 kg/m²   General appearance: alert, appears stated age, and cooperative  Lungs: rales and dec bs rt lung field  Heart: reg rate   Abdomen: soft, non-tender; bowel sounds normal; no masses,  no organomegaly , n pain w/ palpation  Neurologic: Grossly normal, ox 3, awake, answers questions appropriately         Labs:     Recent Labs   Lab 05/28/24  0548 05/28/24  1151 05/29/24  0706  05/29/24  1235 05/30/24  0714   RBC 2.53*  --  2.49*  --  2.76*   HGB 7.6*   < > 7.2*   < > 8.3*  8.4*   HCT 22.7*  --  22.7*  --  24.1*   MCV 89.7  --  91.2  --  87.3   MCH 30.0  --  28.9  --  30.4   MCHC 33.5  --  31.7  --  34.9   RDW 16.3  --  16.5  --  16.2   NEPRELIM 27.27*  --  22.84*  --  14.30*   WBC 33.7*  --  29.0*  --  18.7*   .0*  --  547.0*  --  457.0*    < > = values in this interval not displayed.       Lab Results   Component Value Date    CREATSERUM 0.67 05/30/2024    CREATSERUM 0.67 05/30/2024    BUN 9 05/30/2024     05/30/2024    K 3.8 05/30/2024     05/30/2024    CO2 20.0 05/30/2024     05/30/2024    CA 8.5 05/30/2024       Lab Results   Component Value Date    ALB 1.3 05/30/2024    ALKPHO 544 05/30/2024    BILT 0.6 05/30/2024    TP 6.1 05/30/2024    AST 1,228 05/30/2024     05/30/2024       Lab Results   Component Value Date    INR 1.51 05/30/2024   )    Lab Results   Component Value Date    PTP 18.3 05/30/2024    PGLU 112 05/30/2024       Narrative   PROCEDURE:  US ABDOMEN LIMITED (CPT=76705)     COMPARISON:  [facility] , CT, CT ABDOMEN+PELVIS(CONTRAST ONLY)(CPT=74177), 5/28/2024, 1:32 PM.     INDICATIONS:  elevated lfts. eval bile duct, cirrhosis, etc     PATIENT STATED HISTORY: (As transcribed by Technologist)           FINDINGS:    LIVER:  Normal size and echogenicity. No significant masses.  BILIARY:  Common bile duct measures 6 mm in region of stephanie hepatis.  There is no intrahepatic bile duct distension.  Gallbladder is unremarkable.  PANCREAS:  Normal.  RIGHT KIDNEY:  Normal.  OTHER:  There is a small amount of free fluid in posterior subhepatic space.  Incidental note is made of a right pleural effusion.                   Impression   CONCLUSION:    1. Trace ascites is noted in posterior subhepatic space.  2. Right pleural effusion has been noted on prior CT scan.  3. There is otherwise no abnormality detected on this abdominal ultrasound.           LOCATION:  [place]        Dictated by (CST): Abad Chen MD on 5/28/2024 at 4:56 PM      Finalized by (CST): Abad Chen MD on 5/28/2024 at 4:57 PM                 Narrative   PROCEDURE:  CT ABDOMEN+PELVIS (CONTRAST ONLY) (CPT=74177)     COMPARISON:  [facility], CT, CT ABDOMEN (ATTENTION PANCREAS) (W+ WO) (CPT=74170), 3/02/2022, 3:52 PM.  [facility], CT, CT ABDOMEN+PELVIS(CONTRAST ONLY)(CPT=74177), 9/18/2021, 2:27 PM.     INDICATIONS:  GIB     TECHNIQUE:  CT scanning was performed from the dome of the diaphragm to the pubic symphysis with non-ionic intravenous contrast material. Post contrast coronal MPR imaging was performed.  Dose reduction techniques were used. Dose information is  transmitted to the ACR (American College of Radiology) NRDR (National Radiology Data Registry) which includes the Dose Index Registry.     PATIENT STATED HISTORY:(As transcribed by Technologist)  Poor historian      CONTRAST USED:  85cc of Isovue 370     FINDINGS:    LUNG BASES:  Loculated right-sided pleural effusion with thick wall measuring approximately 7.8 x 2.5 x 12.7 cm.  Findings suggestive of empyema.  There is consolidation within the right lower lobe with complex fluid and small foci of air measuring 3.0 x   2.7 x 3.2 cm and 2.1 x 2.8 x 1.7 cm which may represent pulmonary abscesses/necrotizing pneumonia.  Small left-sided pleural effusion.  LIVER:  Normal in shape and contour.    BILIARY:  Gallbladder is unremarkable in appearance.  No intrahepatic biliary dilatation..  SPLEEN:  Normal.  No enlargement or focal lesion.  PANCREAS:  No kirk-pancreatic inflammatory stranding  ADRENALS:  Normal.  No mass or enlargement.    KIDNEYS:  Kidneys are symmetrical in size without evidence of hydronephrosis.  BOWEL/MESENTERY:  Bowel is normal in caliber. No evidence of obstruction.  Probable Villanueva's type hernia containing fat and a portion of bowel  within the right para umbilical region.  No evidence of obstruction.  Mildly  prominent retroperitoneal lymph  nodes.  PELVIS:  Bladder is distended.  Small amount of free pelvic fluid.    AORTA/VASCULAR:  Atheromatous calcifications of the aorta.  BONES:  No acute fractures.  Median sternotomy approximated by wire sutures.                   Impression   CONCLUSION:    1. Loculated right-sided pleural effusion suggestive empyema with consolidation within the right lower lobe.  There are areas of fluid and small foci of air within the consolidation which may represent pulmonary abscesses/necrotizing pneumonia.  2. Small left-sided pleural effusion with associated atelectasis.  3. Nonspecific mildly prominent retroperitoneal lymph nodes which may be reactive in nature.  4. Trace amount of free pelvic fluid.          LOCATION:  [facility]        Dictated by (CST): Annette Ferreira MD on 5/28/2024 at 2:11 PM               Assessment and Plan:  Patient Active Problem List   Diagnosis    Vitamin D deficiency    Jamison's esophagus    Tobacco use    Insomnia    COPD, mild (HCC) - Dx'd via PFTs done in 3/2015    Migraine without aura and without status migrainosus, not intractable    Hyperlipidemia    SUMMER on CPAP    MDD (major depressive disorder), recurrent episode, moderate (HCC)    GERD (gastroesophageal reflux disease)    Hypertension    Chronic pansinusitis    Mucinous cystadenoma of ovary, left    KIRK (generalized anxiety disorder)    History of pubovaginal sling    Absence of bladder continence    Vasomotor flushing    Primary osteoarthritis of left knee    Mass of spine    Hx of motion sickness    Difficult intubation    Chronic low back pain without sciatica    Type 2 diabetes mellitus with diabetic neuropathy (HCC)    Pulmonary nodule    CAD, multiple vessel    S/P CABG x 2    Type 2 diabetes mellitus with hyperlipidemia (HCC)    Anemia    Iron deficiency anemia secondary to inadequate dietary iron intake    Subacute cough    Cavitary lesion of lung    Hyperglycemia    Acute anemia    MARÍA ELENA  (acute kidney injury) (HCC)    Transaminitis    Thrombocytosis    Hyponatremia    Pleural effusion    Somnolence    Acute respiratory failure with hypoxia (HCC)    Empyema (HCC)       1 inc lfts -- no source on u/s. Likely mutifactorial in setting of pneumonia and empyema, mild improvement  --can give 5 mg vit k and recheck inr tomorrow  --recheck lfts in am, inc ast and alt, unclear if related to chest tube etc    2 iron deficiency anemia -- no overt gi losses, no clear source on ct. She did not want to consider endoscopy for now    No overt bleeding, bun still nl   --follow labs per primary  --if acute drop or overt gi bleeding, endoscopy can be reconsidered at that time. For now she does not want endoscpy           --patient demonstrated understanding of the results and recommendations and risks, benefits, limitations, and alternatives to the plan. All of their questions and concerns were addressed and were agreeable to the plan as listed      Brandon Paez MD

## 2024-05-31 ENCOUNTER — APPOINTMENT (OUTPATIENT)
Dept: GENERAL RADIOLOGY | Facility: HOSPITAL | Age: 64
DRG: 163 | End: 2024-05-31
Attending: INTERNAL MEDICINE
Payer: MEDICAID

## 2024-05-31 PROBLEM — J86.9 EMPYEMA LUNG (HCC): Status: ACTIVE | Noted: 2024-05-30

## 2024-05-31 LAB
ALBUMIN SERPL-MCNC: 1.4 G/DL (ref 3.4–5)
ALBUMIN/GLOB SERPL: 0.3 {RATIO} (ref 1–2)
ALP LIVER SERPL-CCNC: 473 U/L
ALT SERPL-CCNC: 371 U/L
ANION GAP SERPL CALC-SCNC: 8 MMOL/L (ref 0–18)
AST SERPL-CCNC: 590 U/L (ref 15–37)
BASOPHILS # BLD: 0 X10(3) UL (ref 0–0.2)
BASOPHILS NFR BLD: 0 %
BILIRUB SERPL-MCNC: 0.6 MG/DL (ref 0.1–2)
BLASTOMYCES ABS QN DID: NEGATIVE
BLASTOMYCES AG INTERP: NEGATIVE
BLASTOMYCES AG INTERP: NEGATIVE
BLOOD TYPE BARCODE: 9500
BUN BLD-MCNC: 5 MG/DL (ref 9–23)
CALCIUM BLD-MCNC: 8.4 MG/DL (ref 8.5–10.1)
CHLORIDE SERPL-SCNC: 108 MMOL/L (ref 98–112)
CO2 SERPL-SCNC: 20 MMOL/L (ref 21–32)
CREAT BLD-MCNC: 0.57 MG/DL
CREAT BLD-MCNC: 0.57 MG/DL
EGFRCR SERPLBLD CKD-EPI 2021: 103 ML/MIN/1.73M2 (ref 60–?)
EGFRCR SERPLBLD CKD-EPI 2021: 103 ML/MIN/1.73M2 (ref 60–?)
EOSINOPHIL # BLD: 0.53 X10(3) UL (ref 0–0.7)
EOSINOPHIL NFR BLD: 3 %
ERYTHROCYTE [DISTWIDTH] IN BLOOD BY AUTOMATED COUNT: 16.5 %
GLOBULIN PLAS-MCNC: 4.9 G/DL (ref 2.8–4.4)
GLUCOSE BLD-MCNC: 102 MG/DL (ref 70–99)
GLUCOSE BLD-MCNC: 117 MG/DL (ref 70–99)
GLUCOSE BLD-MCNC: 129 MG/DL (ref 70–99)
GLUCOSE BLD-MCNC: 141 MG/DL (ref 70–99)
GLUCOSE BLD-MCNC: 170 MG/DL (ref 70–99)
HCT VFR BLD AUTO: 26 %
HGB BLD-MCNC: 8.6 G/DL
HGB BLD-MCNC: 8.9 G/DL
HGB BLD-MCNC: 8.9 G/DL
HGB BLD-MCNC: 9.8 G/DL
INR BLD: 1.33 (ref 0.8–1.2)
INR BLD: >15.49 (ref 0.8–1.2)
LYMPHOCYTES NFR BLD: 17 %
LYMPHOCYTES NFR BLD: 3.03 X10(3) UL (ref 1–4)
MCH RBC QN AUTO: 30.7 PG (ref 26–34)
MCHC RBC AUTO-ENTMCNC: 34.2 G/DL (ref 31–37)
MCV RBC AUTO: 89.7 FL
MONOCYTES # BLD: 0.71 X10(3) UL (ref 0.1–1)
MONOCYTES NFR BLD: 4 %
MORPHOLOGY: NORMAL
NEUTROPHILS # BLD AUTO: 13.57 X10 (3) UL (ref 1.5–7.7)
NEUTROPHILS NFR BLD: 74 %
NEUTS BAND NFR BLD: 2 %
NEUTS HYPERSEG # BLD: 13.53 X10(3) UL (ref 1.5–7.7)
NON GYNE INTERPRETATION: NEGATIVE
OSMOLALITY SERPL CALC.SUM OF ELEC: 281 MOSM/KG (ref 275–295)
PLATELET # BLD AUTO: 520 10(3)UL (ref 150–450)
PLATELET MORPHOLOGY: NORMAL
POTASSIUM SERPL-SCNC: 3.8 MMOL/L (ref 3.5–5.1)
PROT SERPL-MCNC: 6.3 G/DL (ref 6.4–8.2)
PROTHROMBIN TIME: 16.6 SECONDS (ref 11.6–14.8)
PROTHROMBIN TIME: >120 SECONDS (ref 11.6–14.8)
RBC # BLD AUTO: 2.9 X10(6)UL
SODIUM SERPL-SCNC: 136 MMOL/L (ref 136–145)
TOTAL CELLS COUNTED BLD: 100
UNIT VOLUME: 350 ML
WBC # BLD AUTO: 17.8 X10(3) UL (ref 4–11)

## 2024-05-31 PROCEDURE — 99232 SBSQ HOSP IP/OBS MODERATE 35: CPT | Performed by: INTERNAL MEDICINE

## 2024-05-31 PROCEDURE — 71045 X-RAY EXAM CHEST 1 VIEW: CPT | Performed by: INTERNAL MEDICINE

## 2024-05-31 RX ORDER — HYDROMORPHONE HYDROCHLORIDE 1 MG/ML
1 INJECTION, SOLUTION INTRAMUSCULAR; INTRAVENOUS; SUBCUTANEOUS EVERY 2 HOUR PRN
Status: DISCONTINUED | OUTPATIENT
Start: 2024-05-31 | End: 2024-06-05

## 2024-05-31 RX ORDER — HYDROMORPHONE HYDROCHLORIDE 1 MG/ML
2 INJECTION, SOLUTION INTRAMUSCULAR; INTRAVENOUS; SUBCUTANEOUS EVERY 2 HOUR PRN
Status: DISCONTINUED | OUTPATIENT
Start: 2024-05-31 | End: 2024-06-05

## 2024-05-31 RX ORDER — HYDROMORPHONE HYDROCHLORIDE 1 MG/ML
0.2 INJECTION, SOLUTION INTRAMUSCULAR; INTRAVENOUS; SUBCUTANEOUS ONCE
Status: DISCONTINUED | OUTPATIENT
Start: 2024-05-31 | End: 2024-06-10

## 2024-05-31 RX ORDER — CLONAZEPAM 0.5 MG/1
0.5 TABLET ORAL 2 TIMES DAILY PRN
Status: DISCONTINUED | OUTPATIENT
Start: 2024-05-31 | End: 2024-06-10

## 2024-05-31 RX ORDER — HYDROMORPHONE HYDROCHLORIDE 1 MG/ML
0.5 INJECTION, SOLUTION INTRAMUSCULAR; INTRAVENOUS; SUBCUTANEOUS EVERY 2 HOUR PRN
Status: DISCONTINUED | OUTPATIENT
Start: 2024-05-31 | End: 2024-06-05

## 2024-05-31 NOTE — PROGRESS NOTES
[facility]   part of [facility]    Progress Note    Soumya King Patient Status:  Inpatient    6/3/1960 MRN [id number]   Location [facility] 5NW-A Attending Hoda Juan MD   Hosp Day # 5 PCP Rika Vinson,      Subjective:  Soumya King is a(n) 63 year old female.    Hospital course to date:      Current complaints:     Doing well.  Resting   denies abdominal pain    Objective:  Blood pressure 141/81, pulse 90, temperature 98.3 °F (36.8 °C), temperature source Oral, resp. rate 17, height 5' 1\" (1.549 m), weight 132 lb 12.8 oz (60.2 kg), SpO2 97%, not currently breastfeeding.    General:  In no acute distress; no jaundice;   Skin: warm;   HEENT:  Anicteric sclera; no cervical lymphadenopathy  Vitals stable  Lungs: clear without wheezing or rales  Cardiac: regular rate and rhythm   Abd:  Soft NT ND + BS; no hepatosplenomegaly  Ext: no edema       Labs:   Lab Results   Component Value Date    WBC 17.8 2024    HGB 8.9 2024    HGB 8.9 2024    HCT 26.0 2024    .0 2024    CREATSERUM 0.57 2024    CREATSERUM 0.57 2024    BUN 5 2024     2024    K 3.8 2024     2024    CO2 20.0 2024     2024    CA 8.4 2024    ALB 1.4 2024    ALKPHO 473 2024    BILT 0.6 2024    TP 6.3 2024     2024     2024    INR 1.33 2024       Imaging:       Assessment:    Sharp rise in liver tests.  C/w ischemic hepatitis.  Had episode of hypotension on  at 4 AM with 95/56.   LFTs improved    Keep hydrated     Avoid hypoT    Chest discomfort from chest tube    DELORES.  Multifactorial .  No overt bleeding.  F/u GI as outpatient in 4-6 weeks    Sign off at this point.  Please call us with questions.  Thank you very much for allowing us to participate in the care of your patient.        Patient Active Problem List   Diagnosis    Vitamin D deficiency    Jamison's  esophagus    Tobacco use    Insomnia    COPD, mild (HCC) - Dx'd via PFTs done in 3/2015    Migraine without aura and without status migrainosus, not intractable    Hyperlipidemia    SUMMER on CPAP    MDD (major depressive disorder), recurrent episode, moderate (HCC)    GERD (gastroesophageal reflux disease)    Hypertension    Chronic pansinusitis    Mucinous cystadenoma of ovary, left    KIRK (generalized anxiety disorder)    History of pubovaginal sling    Absence of bladder continence    Vasomotor flushing    Primary osteoarthritis of left knee    Mass of spine    Hx of motion sickness    Difficult intubation    Chronic low back pain without sciatica    Type 2 diabetes mellitus with diabetic neuropathy (HCC)    Pulmonary nodule    CAD, multiple vessel    S/P CABG x 2    Type 2 diabetes mellitus with hyperlipidemia (HCC)    Anemia    Iron deficiency anemia secondary to inadequate dietary iron intake    Subacute cough    Cavitary lesion of lung    Hyperglycemia    Acute anemia    MARÍA ELENA (acute kidney injury) (HCC)    Transaminitis    Thrombocytosis    Hyponatremia    Pleural effusion    Somnolence    Acute respiratory failure with hypoxia (HCC)    Empyema lung (HCC)            Plan:        Miguel Camargo MD  5/31/2024  2:45 PM

## 2024-05-31 NOTE — PROGRESS NOTES
[facility]   part of [facility]     Hospitalist Progress Note     Soumya King Patient Status:  Inpatient    6/3/1960 MRN [id number]   Location [facility] 5NW-A Attending Apurva Logan MD   Hosp Day # 5 PCP Rika Vinson DO     Chief Complaint: chest wall pain     Subjective:     Patient more awake, states she feels less irritable than yesterday. No N/V/CP/SOB/abd pain     Objective:    Review of Systems:   A comprehensive review of systems was completed; pertinent positive and negatives stated in subjective.    Vital signs:  Temp:  [97.8 °F (36.6 °C)-99.3 °F (37.4 °C)] 98.6 °F (37 °C)  Pulse:  [] 94  Resp:  [16-18] 17  BP: (103-140)/(50-78) 127/64  SpO2:  [94 %-100 %] 95 %    Physical Exam:    General: No acute distress, confused, sleepy  Respiratory: No wheezes, no rhonchi  Cardiovascular: S1, S2, regular rate and rhythm  Abdomen: Soft, Non-tender, non-distended, positive bowel sounds  Neuro: No new focal deficits.   Extremities: No edema      Diagnostic Data:    Labs:  Recent Labs   Lab 24  1133 24  0528 24  1156 24  0548 24  1151 24  0737 24  1235 24  2356 24  0714 24  1623 24  0122 24  0733   WBC 41.9* 29.7*  --  33.7*  --  29.0*  --   --  18.7*  --   --  17.8*   HGB 7.4* 6.5*   < > 7.6*   < > 7.2*   < > 6.9* 8.3*  8.4* 9.2* 8.6* 8.9*  8.9*   MCV 87.2 89.0  --  89.7  --  91.2  --   --  87.3  --   --  89.7   .0* 502.0*  --  523.0*  --  547.0*  --   --  457.0*  --   --  520.0*   BAND  --   --   --  3  --   --   --   --  1  --   --   --    INR 1.51*  --   --  1.58*  --  1.50*  --   --  1.51*  --   --  >15.49*    < > = values in this interval not displayed.       Recent Labs   Lab 24  0548 24  0737 24  0714   GLU 98 113* 120*   BUN 12 9 9   CREATSERUM 0.75 0.73 0.67  0.67   CA 7.9* 8.3* 8.5   ALB 1.3* 1.3* 1.3*    136 142   K 3.7 3.9 3.8    110 109   CO2 19.0* 17.0*  20.0*   ALKPHO 295* 306* 544*   AST 93* 80* 1,228*   * 86* 497*   BILT 0.4 0.6 0.6   TP 5.8* 6.1* 6.1*       Estimated Creatinine Clearance: 66.5 mL/min (based on SCr of 0.67 mg/dL).    Recent Labs   Lab 05/26/24  1133   TROPHS 3       Recent Labs   Lab 05/29/24  0737 05/30/24  0714 05/31/24  0733   PTP 18.2* 18.3* >120.0*   INR 1.50* 1.51* >15.49*          Microbiology    Hospital Encounter on 05/26/24   1. Body Fluid Cult Aerobic and Anaerobic     Status: None (Preliminary result)    Collection Time: 05/29/24  3:46 PM    Specimen: Pleural Fluid, Right; Body fluid, unspecified   Result Value Ref Range    Body Fluid Culture Result No Growth at <18 hours N/A    Body Fluid Smear 4+ WBCs seen N/A    Body Fluid Smear No organisms seen N/A    Body Fluid Smear This is a cytocentrifuged smear. N/A   2. Blood Culture     Status: None (Preliminary result)    Collection Time: 05/29/24  2:24 PM    Specimen: Blood,peripheral   Result Value Ref Range    Blood Culture Result No Growth 1 Day N/A         Imaging: Reviewed in Epic.    Medications:    ipratropium-albuterol  3 mL Nebulization TID    oxybutynin ER  5 mg Oral Daily    vancomycin  15 mg/kg Intravenous Q12H    piperacillin-tazobactam  3.375 g Intravenous Q8H    [Held by provider] aspirin  81 mg Oral Daily    atorvastatin  80 mg Oral Nightly    [Held by provider] busPIRone  20 mg Oral Daily    [Held by provider] clonazePAM  0.5 mg Oral BID    gabapentin  600 mg Oral Nightly    pantoprazole  40 mg Oral QAM AC    QUEtiapine ER  200 mg Oral QPM    umeclidinium bromide  1 puff Inhalation Daily    [Held by provider] heparin  5,000 Units Subcutaneous Q8H JOSÉ ANTONIO    insulin aspart  1-10 Units Subcutaneous TID AC and HS       Assessment & Plan:      # RLL pneumonia, atypical   # RLL effusion with loculation and chest pain   -continue broad spectrum abx   -Pulmonology rec appreciated   - no plan for bronch  - ID rec appreciated   - sp chest tube by IR on 5/29    # elevated liver  enzymes  - ? Etiology-> ? Ischemic  - exam benign   - US abd with doppler today   - avoid hepatotoxic agents    # Iron def Anemia  - sp PRBC   - no s/s bleeding   -trend  -GI following     #Coagulopathy  - repeat INR this am, does not seem correct   -trend  -GI on consult  -additional Vit K given per GI   -US abd and CT AP  without obvious source of LFT rise     #COPD   -no active wheezing  -Continue inhalers  -prn nebs.     #Acute metabolic toxic ( anxiolytic) encephalopathy  -Ammonia normal   -CT brain negative  -ABG reviewed  - improved     #Hypertension  -metoprolol on hold due to low BP     #DM 2  -hyperglycemia protocol     #GERD  #Jamison's esophagus  -PPI.    #MDD/anxiety  Resume home meds slowly    Sister has been updated today    Hoda Juan MD      Supplementary Documentation:     Quality:  DVT Mechanical Prophylaxis:   SCDs,    DVT Pharmacologic Prophylaxis   Medication    [Held by provider] heparin (Porcine) 5000 UNIT/ML injection 5,000 Units         DVT Pharmacologic prophylaxis: Aspirin 81 mg      Code Status: Full Code  Gotti: No urinary catheter in place  Gotti Duration (in days):   Central line:    ILYA:     Discharge is dependent on: progress  At this point Ms. King is expected to be discharge to: home    The 21st Century Cures Act makes medical notes like these available to patients in the interest of transparency. Please be advised this is a medical document. Medical documents are intended to carry relevant information, facts as evident, and the clinical opinion of the practitioner. The medical note is intended as peer to peer communication and may appear blunt or direct. It is written in medical language and may contain abbreviations or verbiage that are unfamiliar.

## 2024-05-31 NOTE — DIETARY NOTE
[facility]   part of [facility]   CLINICAL NUTRITION    Soumya King admitted on 5/26 presents with cavitary PNA.    PMH: Anxiety, Arthritis, Jamison esophagus, COPD, Depression, Diabetes, Esophageal reflux, Essential hypertension, Hyperlipidemia, Mass of spine, Migraines, Myocardial infarction, Osteoarthritis, Sleep apnea.    Admitting diagnosis:  Hyponatremia [E87.1]  Transaminitis [R74.01]  Thrombocytosis [D75.839]  MARÍA ELENA (acute kidney injury) (HCC) [N17.9]  Cavitary lesion of lung [J98.4]  Acute anemia [D64.9]    Ht: 154.9 cm (5' 1\")  Wt: 60.2 kg (132 lb 12.8 oz).   Body mass index is 25.09 kg/m².    Wt Readings from Last 6 Encounters:   05/26/24 60.2 kg (132 lb 12.8 oz)   05/20/24 54.4 kg (120 lb)   05/14/24 57.6 kg (127 lb)   05/08/24 56.2 kg (124 lb)   05/01/24 57.2 kg (126 lb)   03/05/24 56.7 kg (125 lb)        Labs/Meds reviewed    Diet:       Procedures    Carbohydrate controlled diet 1800 kcal/60 grams; Is Patient on Accuchecks? Yes    NPO       Percent Meals Eaten (last 3 days)       Date/Time Percent Meals Eaten (%)    05/29/24 0725 100 %            Pt chart reviewed d/t LOS. Visited pt at bedside. Pt reports her appetite is \"okay\" and did eat bfast this AM. Nursing notes reports Percent Meals Eaten (%): 100 % intake for last meal. Denies GI symptoms at this time with last BM today. No chewing or swallowing difficulties and NKFA. Reports her UBW is ~120 lbs with no significant weight changes noted. Offered ONS but pt declined at this time. Continued to encourage PO intake; all questions answered at this time.    Patient is at low nutrition risk at this time.    Please consult if patient status changes or nutrition issues arise.    Riana Monet, RD, LDN, [facility]  Clinical Dietitian  Spectra: [phone]

## 2024-05-31 NOTE — PLAN OF CARE
Patient AAOx3-4, intermittent confusion. Room air. PRN cough medicine given. Tele NSR/ST. Voids. BM today. BSC. Up stand by assist, bed alarm and fall precautions. Right chest tube to -20 suction. Started on MIST2 Protocol today per pulm. Abdominal ultrasound ordered, spoke to ultrasound stated need patient NPO 8 hours prior, stated to make patient NPO/MN and they will complete test tomorrow. IV abx changed to Unasyn. Patient rounded on routinely. Patient and family updated on plan of care.    Patient complaining of a lot of pain at chest tube site, hospitalist paged, did not hear back yet, paged pulm, received order for one dose of 0.2 dilaudid ordered.      Problem: Diabetes/Glucose Control  Goal: Glucose maintained within prescribed range  Description: INTERVENTIONS:  - Monitor Blood Glucose as ordered  - Assess for signs and symptoms of hyperglycemia and hypoglycemia  - Administer ordered medications to maintain glucose within target range  - Assess barriers to adequate nutritional intake and initiate nutrition consult as needed  - Instruct patient on self management of diabetes  Outcome: Progressing     Problem: Patient/Family Goals  Goal: Patient/Family Long Term Goal  Description: Patient's Long Term Goal:   Discharge to home    Interventions:  - Follow plan of care  - See additional Care Plan goals for specific interventions  Outcome: Progressing  Goal: Patient/Family Short Term Goal  Description: Patient's Short Term Goal:   5/26 noc: maintain on room air  5/27: manage hgb  5/27 noc: reduce pleuritic pain   5/28 AM: CT scan of head/NPO possible thora  5/28noc: sleep  5/29 AM: Chest Tube placement  5/29noc: CT management, control pain, sleep  Interventions:   - labs, blood  - Medications  - IVF  - IVF ABT  - See additional Care Plan goals for specific interventions  Outcome: Progressing     Problem: RESPIRATORY - ADULT  Goal: Achieves optimal ventilation and oxygenation  Description: INTERVENTIONS:  - Assess  for changes in respiratory status  - Assess for changes in mentation and behavior  - Position to facilitate oxygenation and minimize respiratory effort  - Oxygen supplementation based on oxygen saturation or ABGs  - Provide Smoking Cessation handout, if applicable  - Encourage broncho-pulmonary hygiene including cough, deep breathe, Incentive Spirometry  - Assess the need for suctioning and perform as needed  - Assess and instruct to report SOB or any respiratory difficulty  - Respiratory Therapy support as indicated  - Manage/alleviate anxiety  - Monitor for signs/symptoms of CO2 retention  Outcome: Progressing

## 2024-05-31 NOTE — PAYOR COMM NOTE
5/30 & 5/31  CONTINUED STAY REVIEW    Payor: [facility]  Subscriber #:  [id number]  Authorization Number: [id number]    Admit date: 5/26/24  Admit time:  3:45 PM         MEDICATIONS ADMINISTERED IN LAST 1 DAY:  acetaminophen (Tylenol Extra Strength) tab 500 mg       Date Action Dose Route User    5/31/2024 0659 Given 500 mg Oral Maria Dolores Emerson RN          alteplase (Activase) 10 mg in sodium chloride 0.9% 30 mL (MIST2) intrapleural syringe       Date Action Dose Route User    5/31/2024 1214 Given 10 mg Intrapleural Xochitl Blackmon RN          atorvastatin (Lipitor) tab 80 mg       Date Action Dose Route User    5/30/2024 2018 Given 80 mg Oral Maria Dolores Emerson RN          dornase franck (Pulmozyme) 5 mg in sterile water for injection (PF) 30 mL (MIST2) intrapleural syringe       Date Action Dose Route User    5/31/2024 1214 Given 5 mg Intrapleural Xochitl Blackmon RN          gabapentin (Neurontin) tab 600 mg       Date Action Dose Route User    5/30/2024 2018 Given 600 mg Oral Maria Dolores Emerson RN          HYDROcodone-acetaminophen (Norco) 5-325 MG per tab 1 tablet       Date Action Dose Route User    5/30/2024 2150 Given 1 tablet Oral Maria Dolores Emerson RN          ipratropium-albuterol (Duoneb) 0.5-2.5 (3) MG/3ML inhalation solution 3 mL       Date Action Dose Route User    5/31/2024 0900 Given 3 mL Nebulization Erlinda Brennan RCP    5/30/2024 1910 Given 3 mL Nebulization Nichole Pacheco RCP    5/30/2024 1420 Given 3 mL Nebulization Jennifer aWgner RCP          oxybutynin ER (Ditropan-XL) 24 hr tab 5 mg       Date Action Dose Route User    5/31/2024 0831 Given 5 mg Oral Xochitl Blackmon RN          pantoprazole (Protonix) DR tab 40 mg       Date Action Dose Route User    5/31/2024 0541 Given 40 mg Oral Maria Dolores Emerson RN          piperacillin-tazobactam (Zosyn) 3.375 g in dextrose 5% 100 mL IVPB-ADDV       Date Action Dose Route User    5/31/2024 1128 New Bag 3.375 g Intravenous Xochitl Blackmon, RN     5/31/2024 0408 New Bag 3.375 g Intravenous Maria Doloers Emerson RN    5/30/2024 2017 New Bag 3.375 g Intravenous Maria Dolores Emerson RN          traMADol (Ultram) tab 50 mg       Date Action Dose Route User    5/31/2024 1213 Given 50 mg Oral Xochitl Blackmon RN    5/30/2024 1629 Given 50 mg Oral Xochitl Blackmon RN          umeclidinium bromide (Incruse Ellipta) 62.5 MCG/ACT inhaler 1 puff       Date Action Dose Route User    5/31/2024 0831 Given 1 puff Inhalation Xochitl Blackmon RN          vancomycin (Vancocin) 1,000 mg in sodium chloride 0.9% 250 mL IVPB-ADDV       Date Action Dose Route User    5/31/2024 0306 New Bag 1,000 mg Intravenous Maria Dolores Emerson RN    5/30/2024 1629 New Bag 1,000 mg Intravenous Xochitl Blackmon RN          QUEtiapine ER (SEROquel XR) 24 hr tab 200 mg       Date Action Dose Route User    5/30/2024 2018 Given 200 mg Oral Maria Dolores Emerson RN            Vitals (last day)       Date/Time Temp Pulse Resp BP SpO2 Weight O2 Device O2 Flow Rate (L/min) [facility]    05/31/24 1145 98.3 °F (36.8 °C) 90 17 141/81 97 % -- None (Room air) 0 L/min DP    05/31/24 0730 98.6 °F (37 °C) 94 17 127/64 95 % -- None (Room air) 0 L/min DP    05/31/24 0421 98.4 °F (36.9 °C) 114 16 129/72 95 % -- None (Room air) -- AA    05/30/24 2340 98.4 °F (36.9 °C) 91 16 103/50 97 % -- None (Room air) --     05/30/24 2021 -- 105 18 104/78 94 % -- None (Room air) --     05/30/24 1700 99.3 °F (37.4 °C) 98 17 140/70 99 % -- None (Room air) 0 L/min DP    05/30/24 1200 97.8 °F (36.6 °C) 94 17 121/65 100 % -- None (Room air) 0 L/min DP    05/30/24 0730 97.8 °F (36.6 °C) 91 17 105/59 100 % -- None (Room air) 0 L/min DP    05/30/24 0415 97.7 °F (36.5 °C) 102 16 105/58 94 % -- None (Room air) -- SC    05/30/24 0340 97.4 °F (36.3 °C) 105 17 93/56 95 % -- None (Room air) -- AW    05/30/24 0240 98.5 °F (36.9 °C) 99 17 108/60 95 % -- None (Room air) -- AW    05/30/24 0140 97.8 °F (36.6 °C) 101 16 103/58 95 % -- None (Room air) -- SC    05/30/24 0125 98.8 °F (37.1  °C) 102 16 101/61 94 % -- None (Room air) -- SC    05/30/24 0009 98.6 °F (37 °C) 98 17 94/59 96 % -- None (Room air) -- AW       Blood Transfusion Record       Product Unit Status Volume Start End            Transfuse RBC       24  [id number]  [id number] Completed 05/30/24 0908 350 mL 05/30/24 0119 05/30/24 0900       24  [id number]  [id number] Stopped 365.42 mL 05/27/24 0813 05/27/24 1104                5/30 HOSPITALIST NOTE    Subjective:  Patient sp right chest tube placement , less pain this am per patient. No overnight issues. Less confused           Objective:  Review of Systems:   A comprehensive review of systems was completed; pertinent positive and negatives stated in subjective.     Vital signs:  Temp:  [97.4 °F (36.3 °C)-98.8 °F (37.1 °C)] 97.7 °F (36.5 °C)  Pulse:  [] 102  Resp:  [16-30] 16  BP: ()/(48-95) 105/58  SpO2:  [92 %-100 %] 94 %     Physical Exam:    General: No acute distress, confused, sleepy  Respiratory: No wheezes, no rhonchi  Cardiovascular: S1, S2, regular rate and rhythm  Abdomen: Soft, Non-tender, non-distended, positive bowel sounds  Neuro: No new focal deficits.   Extremities: No edema        Diagnostic Data:    Labs:             Recent Labs   Lab 05/26/24  1133 05/27/24  0528 05/27/24  1156 05/28/24  0548 05/28/24  1151 05/29/24  0737 05/29/24  1235 05/29/24  2356   WBC 41.9* 29.7*  --  33.7*  --  29.0*  --   --    HGB 7.4* 6.5*   < > 7.6* 7.9* 7.2* 7.6* 6.9*   MCV 87.2 89.0  --  89.7  --  91.2  --   --    .0* 502.0*  --  523.0*  --  547.0*  --   --    BAND  --   --   --  3  --   --   --   --    INR 1.51*  --   --  1.58*  --  1.50*  --   --     < > = values in this interval not displayed.                Recent Labs   Lab 05/26/24  1133 05/27/24  0528 05/28/24  0548 05/29/24  0737   * 90 98 113*   BUN 21 19 12 9   CREATSERUM 1.26* 0.90 0.75 0.73   CA 8.3* 7.9* 7.9* 8.3*   ALB 1.7*  --  1.3* 1.3*   * 136 137 136   K 4.0 3.7 3.7 3.9   CL 98 109 112  110   CO2 17.0* 19.0* 19.0* 17.0*   ALKPHO 289*  --  295* 306*   AST 89*  --  93* 80*   *  --  106* 86*   BILT 0.4  --  0.4 0.6   TP 6.7  --  5.8* 6.1*       Medications:   Scheduled Medications    ipratropium-albuterol  3 mL Nebulization QID    vancomycin  15 mg/kg Intravenous Q12H    piperacillin-tazobactam  3.375 g Intravenous Q8H    [Held by provider] aspirin  81 mg Oral Daily    atorvastatin  80 mg Oral Nightly    [Held by provider] busPIRone  20 mg Oral Daily    [Held by provider] clonazePAM  0.5 mg Oral BID    gabapentin  600 mg Oral Nightly    metoprolol succinate ER  50 mg Oral Daily Beta Blocker    [Held by provider] Mirabegron ER  50 mg Oral Daily    pantoprazole  40 mg Oral QAM AC    QUEtiapine ER  200 mg Oral QPM    umeclidinium bromide  1 puff Inhalation Daily    [Held by provider] heparin  5,000 Units Subcutaneous Q8H JOSÉ ANTONIO    insulin aspart  1-10 Units Subcutaneous TID AC and HS                  Assessment & Plan:  # RLL pneumonia, atypical   # RLL effusion with loculation and chest pain   -continue broad spectrum abx   -Pulmonology rec appreciated   - plan for bronch today?  - ID rec appreciated   - sp chest tube by IR on 5/29     # Iron def Anemia  - sp PRBC last night.   - no s/s bleeding   -trend  -GI following   -hold Plavix for possible bronch and possible scope      #Elevated LFTs  #Coagulopathy  -trend  -GI on consult  -vit K x 1 given   -US abd and CT AP  without obvious source of LFT rise      #COPD   -no active wheezing  -Continue inhalers  -prn nebs.     #Acute metabolic toxic ( anxiolytic) encephalopathy  -Ammonia normal   -CT brain negative  -ABG reviewed  - improved      #Hypertension  -metoprolol.     #DM 2  -hyperglycemia protocol     #GERD  #Jamison's esophagus  -PPI.    5/30 HEMA/ONC NOTE    Interval events:       - sleeping this AM  - on RA  - had chest tube placed with fluid concerning for empyema; stain positive for GPC, fluid pH <6.81         Impression & Plan:       Anemia  IgM kappa MGUS  - she had prior colonoscopy in 2022 that did not show bleeding lesion. She had 2v CABG in 12/2023 and is now on asa/plavix and had noted iron deficiency anemia on 3/15/24  - pt declining endoscopy, holding off for now given no s/s bleeding  - unlikely to be progression of her MGUS, given her recent monoclonal protein studies showed a low level of monoclonal IgM at 0.17g/dL, IgM was normal. IgM recheck was low.  - iron studies are consistent with anemia of chronic inflammation. Ferritin >500 indicating adequate iron stores. She has already received 1000mg IV infed 3/2024.  - thrombocytosis likely secondary to inflammation  - anemia secondary to sepsis/infection; should recover with time as infection gets adequately treated   - transfuse for hgb <7 in the meantime     Cavitary pneumonia/empyema  - on unasyn. Per primary/pulm  - s/p chest tube placement    Will sign off. Please page if questions arise.     5/30 PULMONARY NOTE    Subjective:  Soumya King is a(n) 63 year old female remains afeb last 24 hours   Some increased right-sided pain post chest tube placement--remains on room air cough productive of slightly less colored material no hemoptysis        Objective:  /59 (BP Location: Left arm)   Pulse 91   Temp 97.8 °F (36.6 °C) (Oral)   Resp 17   Ht 5' 1\" (1.549 m)   Wt 132 lb 12.8 oz (60.2 kg)   SpO2 100%   BMI 25.09 kg/m² room air          Assessment:  Large progressive right lower lobe process suspected pneumonia -bacterial versus fungal-positive fluid culture suspects bacterial  Right pleural effusion with loculation associated pleuritic pain--acidotic pH with heavily exudative fluid +cultures for bacteria ID pending and cytology pending-compatible with empyema  Background of multiple pulmonary nodules-plans have been in place for biopsy  Severe anemia followed for iron deficiency by Dr. Corona- vikki jaimes with last scopes reportedly without Jamison's 2022- no obvious  loss - response to transfusion though continues to drift-GI note appreciated  Component COPD   Elevated LFTs-unremarkable  liver on CTA-now with dramatic increase in SGOT and PT  IgM kappa MGUS   Altered mental status--CT with prior lacunar infarct otherwise unremarkable ABG remains unremarkable-questionably med related with plans to hold sedative medications-now resolved with medications adjusted        Plan:  Ongoing broad-spectrum antibiotic coverage as we await further culture results  Continuing chest tube to suction  Plan for follow-up CT next 1 to 2 days  Serial LFTs to be checked  Blasto antibody antigen remains pending     5/30 GI NOTE       Current complaints: denies new issues     awake today     Had chest tube     Denies melena, overt gi bleeding      Transfused     Assessment and Plan:      1 inc lfts -- no source on u/s. Likely mutifactorial in setting of pneumonia and empyema, mild improvement  --can give 5 mg vit k and recheck inr tomorrow  --recheck lfts in am, inc ast and alt, unclear if related to chest tube etc     2 iron deficiency anemia -- no overt gi losses, no clear source on ct. She did not want to consider endoscopy for now     No overt bleeding, bun still nl   --follow labs per primary  --if acute drop or overt gi bleeding, endoscopy can be reconsidered at that time. For now she does not want endoscpy              --patient demonstrated understanding of the results and recommendations and risks, benefits, limitations, and alternatives to the plan. All of their questions and concerns were addressed and were agreeable to the plan as listed     5/30 ID NOTE  Abx: IV Zosyn D#2, IV vanco D#1     Subjective: Patient seen and examined today. Complaining of cough and SOB. Denies any sputum production. Afebrile. On room air.       ASSESSMENT:  Right empyema, s/p thoracentesis 5/28, gram stain with GPC. Path w/ acute inflammation with occasional bacterial organisms present. S/p R chest tube  placement 5/29  RLL pneumonia. Legionella and strep pneumo Uags negative.  Fevers and leukocytosis, assume d/t above. WBC improving. No further temps.  Alerted mental status. ? Related to above vs other. Head CT w/o acute process.  Elevated LFTs. CT a/p and US abd unremarkable. Worsening today. GI following.    Anemia  IgM kappa MGUS  DM II. Hgb A1C 7.1  COPD     PLAN:  - continue IV Zosyn and IV vanco for now  - follow blood cultures  - await final pleural fluid culture from 5/28 and 529.  - follow temps and wbc  - follow LFTs  - follow chest tube output       5/31 ID NOTE  Abx: IV Zosyn D#3, IV vanco D#2     Subjective: Patient seen and examined today. States she is feeling better today. No SOB at the present time. Denies any abdominal pain. Afebrile. On room air.       Laboratory Data:      Recent Labs   Lab 05/31/24  0733   RBC 2.90*   HGB 8.9*  8.9*   HCT 26.0*   MCV 89.7   MCH 30.7   MCHC 34.2   RDW 16.5   NEPRELIM 13.57*   WBC 17.8*   .0*   NEUT 74   LYMPH 17   MON 4   EOS 3            Recent Labs   Lab 05/29/24  0737 05/30/24  0714 05/31/24  0741   * 120* 129*   BUN 9 9 5*   CREATSERUM 0.73 0.67  0.67 0.57  0.57   CA 8.3* 8.5 8.4*   ALB 1.3* 1.3* 1.4*    142 136   K 3.9 3.8 3.8    109 108   CO2 17.0* 20.0* 20.0*   ALKPHO 306* 544* 473*   AST 80* 1,228* 590*   ALT 86* 497* 371*   BILT 0.6 0.6 0.6   TP 6.1* 6.1* 6.3*     ASSESSMENT:  Right empyema, s/p thoracentesis 5/28, gram stain with GPC, cx with ngtd. Path w/ acute inflammation with occasional bacterial organisms present. S/p R chest tube placement 5/29  RLL pneumonia. Legionella and strep pneumo Uags negative. Blasto ag negative.  Fevers and leukocytosis, assume d/t above. WBC improving. No further temps.  Alerted mental status. ? Related to above vs other. Head CT w/o acute process. Significantly improved today.   Elevated LFTs. CT a/p and US abd unremarkable. Improved today. GI following.   Anemia  IgM kappa MGUS  DM II.  Hgb A1C 7.1  COPD     PLAN:  - continue IV Zosyn and IV vanco for now, await further info on cultures. If no growth would consider narrowing to CTX/Flagyl.  - follow blood cultures- ngtd  - await final pleural fluid culture from 5/28 and 529.  - follow temps and wbc  - follow LFTs  - follow chest tube output

## 2024-05-31 NOTE — PROGRESS NOTES
[facility]   part of [facility] ID PROGRESS NOTE    Soumya King Patient Status:  Inpatient    6/3/1960 MRN [id number]   Location [facility] 5NW-A Attending Hoda Juan MD   Hosp Day # 5 PCP Rika Vinson DO     Abx: IV Zosyn D#3, IV vanco D#2    Subjective: Patient seen and examined today. States she is feeling better today. No SOB at the present time. Denies any abdominal pain. Afebrile. On room air.     Allergies:  Allergies   Allergen Reactions    Naprosyn [Naproxen] ANAPHYLAXIS     Has tolerated ibuprofen and IV toradol     Aspirin OTHER (SEE COMMENTS)     Difficulty swallowing due to  saavedra's esophagus       Medications:    Current Facility-Administered Medications:     alteplase (Activase) 10 mg in sodium chloride 0.9% 30 mL (MIST2) intrapleural syringe, 10 mg, Intrapleural, Q12H    dornase franck (Pulmozyme) 5 mg in sterile water for injection (PF) 30 mL (MIST2) intrapleural syringe, 5 mg, Intrapleural, Q12H    ipratropium-albuterol (Duoneb) 0.5-2.5 (3) MG/3ML inhalation solution 3 mL, 3 mL, Nebulization, TID    oxybutynin ER (Ditropan-XL) 24 hr tab 5 mg, 5 mg, Oral, Daily    vancomycin (Vancocin) 1,000 mg in sodium chloride 0.9% 250 mL IVPB-ADDV, 15 mg/kg, Intravenous, Q12H    HYDROcodone-acetaminophen (Norco) 5-325 MG per tab 1 tablet, 1 tablet, Oral, Q4H PRN **OR** HYDROcodone-acetaminophen (Norco) 5-325 MG per tab 2 tablet, 2 tablet, Oral, Q4H PRN    piperacillin-tazobactam (Zosyn) 3.375 g in dextrose 5% 100 mL IVPB-ADDV, 3.375 g, Intravenous, Q8H    traMADol (Ultram) tab 50 mg, 50 mg, Oral, Q6H PRN    [Held by provider] aspirin DR tab 81 mg, 81 mg, Oral, Daily    atorvastatin (Lipitor) tab 80 mg, 80 mg, Oral, Nightly    benzonatate (Tessalon) cap 100 mg, 100 mg, Oral, TID PRN    [Held by provider] busPIRone (Buspar) tab 20 mg, 20 mg, Oral, Daily    [Held by provider] clonazePAM (KlonoPIN) tab 0.5 mg, 0.5 mg, Oral, BID    gabapentin (Neurontin) tab 600 mg, 600 mg,  Oral, Nightly    pantoprazole (Protonix) DR tab 40 mg, 40 mg, Oral, QAM AC    QUEtiapine ER (SEROquel XR) 24 hr tab 200 mg, 200 mg, Oral, QPM    rizatriptan (Maxalt-MLT) disintegrating tab 5 mg, 5 mg, Oral, PRN    umeclidinium bromide (Incruse Ellipta) 62.5 MCG/ACT inhaler 1 puff, 1 puff, Inhalation, Daily    glucose (Dex4) 15 GM/59ML oral liquid 15 g, 15 g, Oral, Q15 Min PRN **OR** glucose (Glutose) 40% oral gel 15 g, 15 g, Oral, Q15 Min PRN **OR** glucose-vitamin C (Dex-4) chewable tab 4 tablet, 4 tablet, Oral, Q15 Min PRN **OR** dextrose 50% injection 50 mL, 50 mL, Intravenous, Q15 Min PRN **OR** glucose (Dex4) 15 GM/59ML oral liquid 30 g, 30 g, Oral, Q15 Min PRN **OR** glucose (Glutose) 40% oral gel 30 g, 30 g, Oral, Q15 Min PRN **OR** glucose-vitamin C (Dex-4) chewable tab 8 tablet, 8 tablet, Oral, Q15 Min PRN    melatonin tab 3 mg, 3 mg, Oral, Nightly PRN    [Held by provider] heparin (Porcine) 5000 UNIT/ML injection 5,000 Units, 5,000 Units, Subcutaneous, Q8H JOSÉ ANTONIO    acetaminophen (Tylenol Extra Strength) tab 500 mg, 500 mg, Oral, Q4H PRN    ondansetron (Zofran) 4 MG/2ML injection 4 mg, 4 mg, Intravenous, Q6H PRN    prochlorperazine (Compazine) 10 MG/2ML injection 5 mg, 5 mg, Intravenous, Q8H PRN    polyethylene glycol (PEG 3350) (Miralax) 17 g oral packet 17 g, 17 g, Oral, Daily PRN    sennosides (Senokot) tab 17.2 mg, 17.2 mg, Oral, Nightly PRN    bisacodyl (Dulcolax) 10 MG rectal suppository 10 mg, 10 mg, Rectal, Daily PRN    fleet enema (Fleet) 7-19 GM/118ML rectal enema 133 mL, 1 enema, Rectal, Once PRN    insulin aspart (NovoLOG) 100 Units/mL FlexPen 1-10 Units, 1-10 Units, Subcutaneous, TID AC and HS    guaiFENesin (Robitussin) 100 MG/5 ML oral liquid 200 mg, 200 mg, Oral, Q4H PRN    sodium chloride (Saline Mist) 0.65 % nasal solution 1 spray, 1 spray, Each Nare, Q3H PRN    Review of Systems:  Completed. See pertinent positives and negatives above.    Physical Exam:  Vital signs: Blood pressure 127/64,  pulse 94, temperature 98.6 °F (37 °C), temperature source Oral, resp. rate 17, height 154.9 cm (5' 1\"), weight 132 lb 12.8 oz (60.2 kg), SpO2 95%, not currently breastfeeding.    General: Alert, answering questions appropriately. NAD. On room air.   HEENT: Moist mucous membranes.   Neck: No lymphadenopathy.  Supple.  Cardiovascular: RRR.   Respiratory: Diminished breaths with crackles to lower lung. + R chest tube in place.   Abdomen: Soft, nontender, nondistended.   Musculoskeletal: No edema noted  Integument: No lesions. No erythema.    Laboratory Data:  Recent Labs   Lab 05/31/24  0733   RBC 2.90*   HGB 8.9*  8.9*   HCT 26.0*   MCV 89.7   MCH 30.7   MCHC 34.2   RDW 16.5   NEPRELIM 13.57*   WBC 17.8*   .0*   NEUT 74   LYMPH 17   MON 4   EOS 3     Recent Labs   Lab 05/29/24  0737 05/30/24  0714 05/31/24  0741   * 120* 129*   BUN 9 9 5*   CREATSERUM 0.73 0.67  0.67 0.57  0.57   CA 8.3* 8.5 8.4*   ALB 1.3* 1.3* 1.4*    142 136   K 3.9 3.8 3.8    109 108   CO2 17.0* 20.0* 20.0*   ALKPHO 306* 544* 473*   AST 80* 1,228* 590*   ALT 86* 497* 371*   BILT 0.6 0.6 0.6   TP 6.1* 6.1* 6.3*       Microbiology: Reviewed in EMR    Radiology: Reviewed.    CORRECTION Corrected on: 5/29/2024;         PROCEDURE:  CT CHEST (CPT=71250)    COMPARISON:  [facility] , XR, XR CHEST AP PORTABLE  (CPT=71045), 5/29/2024,   4:27 AM.  [facility], CT, CT ANGIOGRAPHY, CHEST (CPT=71275), 5/26/2024, 12:49   PM.    INDICATIONS:  Follow-up empyema    TECHNIQUE:  Unenhanced multislice CT scanning is performed through the   chest.  Dose reduction techniques were used. Dose information is   transmitted to the ACR (American College of Radiology) NRDR (National   Radiology Data Registry) which includes the Dose  Index Registry.  There is image degradation due to motion slightly   limiting evaluation    PATIENT STATED HISTORY: (As transcribed by Technologist)  Recent empyema       FINDINGS:   LUNGS:  Masslike consolidation with internal  punctate foci of air is most   consistent with patient's known consolidation and measures 5.7 x 8.1 x 6.1   versus 5 x 7.2 x 5.1 cm, AP x T x cc dimension respectively.  Increasing   adjacent interstitial and   ground-glass density. VASCULATURE:  Pulmonary vessels are unremarkable within the limits of a   noncontrast CT.   ANKUSH:  Limited due to lack of IV contrast.   MEDIASTINUM:  Adenopathy with subcarinal node measuring 2.8 x 3.3 versus   2.1 x 2 cm and right paratracheal node measuring 2 x 1.3 versus 2 x 1.4   cm.  Mediastinal clips. CARDIAC:  No enlargement or pericardial thickening.  Mild coronary artery   calcifications. PLEURA:  Increasing pleural fluid along the right fissures which is   lobular in contour and measures 2.6 cm in depth.  Right pleural effusion   measures 3.7 versus 2.9 cm with left pleural fluid measuring 1.7 versus   0.8 cm in depth.   THORACIC AORTA:  Atherosclerosis.   CHEST WALL:  No mass or axillary adenopathy.  Sternotomy wires. LIMITED ABDOMEN:  Limited images of the upper abdomen are unremarkable.   BONES:  No fracture.      CONCLUSION:   1. When compared to most recent CT of the chest performed 5/26/2024,   patient's known right lower lobe consolidation has slightly increased in   size with increasing adjacent interstitial and ground-glass density,   correlate clinically. 2. Increasing pleural fluid as detailed above.    LOCATION:  [facility]       Dictated by (CST): Britney Mednes MD on 5/29/2024 at 9:39 AM       Finalized by (CST): Britney Mendes MD on 5/29/2024 at 9:51 AM      Dictated by (CST): Britney Mendes MD on 5/29/2024 at 11:23 AM       Finalized by (CST): Britney Mendes MD on 5/29/2024 at 11:23 AM                       Signed: 05/29/24 1123 by Britney Mendes MD  Narrative:    PROCEDURE:  CT CHEST (CPT=71250)     COMPARISON:  [facility] , XR, XR CHEST AP PORTABLE  (CPT=71045), 5/29/2024, 4:27 AM.  [facility], CT, CT ANGIOGRAPHY, CHEST (CPT=71275), 5/26/2024, 12:49 PM.     INDICATIONS:  Follow-up empyema      TECHNIQUE:  Unenhanced multislice CT scanning is performed through the chest.  Dose reduction techniques were used. Dose information is transmitted to the ACR (American College of Radiology) NRDR (National Radiology Data Registry) which includes the Dose   Index Registry.  There is image degradation due to motion slightly limiting evaluation     PATIENT STATED HISTORY: (As transcribed by Technologist)  Recent empyema      FINDINGS:    LUNGS:  Masslike consolidation with internal punctate foci of air is most consistent with patient's known empyema and measures 5.7 x 8.1 x 6.1 versus 5 x 7.2 x 5.1 cm, AP x T x cc dimension respectively.  Increasing adjacent interstitial and ground-glass   density.  VASCULATURE:  Pulmonary vessels are unremarkable within the limits of a noncontrast CT.    ANKUSH:  Limited due to lack of IV contrast.    MEDIASTINUM:  Adenopathy with subcarinal node measuring 2.8 x 3.3 versus 2.1 x 2 cm and right paratracheal node measuring 2 x 1.3 versus 2 x 1.4 cm.  Mediastinal clips.  CARDIAC:  No enlargement or pericardial thickening.  Mild coronary artery calcifications.  PLEURA:  Increasing pleural fluid along the right fissures which is lobular in contour and measures 2.6 cm in depth.  Right pleural effusion measures 3.7 versus 2.9 cm with left pleural fluid measuring 1.7 versus 0.8 cm in depth.    THORACIC AORTA:  Atherosclerosis.  CHEST WALL:  No mass or axillary adenopathy.  Sternotomy wires.  LIMITED ABDOMEN:  Limited images of the upper abdomen are unremarkable.    BONES:  No fracture.       Impression:    CONCLUSION:    1. When compared to most recent CT of the chest performed 5/26/2024, patient's known right lower lobe empyema has slightly increased in size with increasing adjacent interstitial and ground-glass density, correlate clinically.  2. Increasing pleural fluid as detailed above.     LOCATION:  [facility]     Dictated by (CST): Britney Mendes MD on 5/29/2024 at 9:39 AM      Finalized by  (CST): Britney Mendes MD on 5/29/2024 at 9:51 AM       PROCEDURE:  XR CHEST AP PORTABLE  (CPT=71045)     TECHNIQUE:  AP chest radiograph was obtained.     COMPARISON:  [facility] , CT, CT ABDOMEN+PELVIS(CONTRAST ONLY)(CPT=74177), 5/28/2024, 1:32 PM.  [facility] , XR, XR CHEST AP PORTABLE  (CPT=71045), 5/28/2024, 4:38 PM.     INDICATIONS:  dyspnea     PATIENT STATED HISTORY: (As transcribed by Technologist)  dyspnea     FINDINGS:  Cardiomediastinal silhouette is stable in size and appearance with sternotomy wires and mediastinal clips.  Persistent blunting of the right costophrenic angle is consistent with small pleural effusion versus reaction.  Persistent interstitial   and right perihilar/lower lobe masslike airspace opacity.  No new focal consolidation.  No pneumothorax.     Impression:    CONCLUSION:    1. No significant change, allowing for differences in technique when compared to 5/28/2024 chest radiograph.     LOCATION:  [facility]    Dictated by (CST): Britney Mendes MD on 5/29/2024 at 7:58 AM      Finalized by (CST): Britney Mendes MD on 5/29/2024 at 8:00 AM        PROCEDURE:  US THORACENTESIS GUIDED RIGHT (CPT=32555)     COMPARISON:  [facility] , XR, XR CHEST AP PORTABLE  (CPT=71045), 5/28/2024, 4:38 PM.  [facility] , XR, XR CHEST AP PORTABLE  (CPT=71045), 5/28/2024, 4:48 AM.  [facility], CT, CT ANGIOGRAPHY, CHEST (CPT=71275), 5/26/2024, 12:49 PM.     INDICATIONS:  pneumonia     DESCRIPTION OF PROCEDURE:  The clinical scenario and referral were discussed with Dr. Ya.  Referral for ultrasound-guided right thoracentesis.  Because of her altered mental status, witnessed written and verbal informed consent were obtained from her  sister.  The procedure was also discussed with her.  She voiced understanding and wished to proceed.  Time-out was performed by the staff.     She could not achieve a seated position.  Positioning was optimized, supine oblique.  Ultrasound demonstrated a small right complex pleural effusion corresponding to the CTA  and chest x-ray abnormality.  Access site was chosen.  The overlying skin was  prepped in the usual sterile manner.  1% lidocaine was used locally.  Using realtime ultrasound an 8 Spanish multi side hole sheath needle was advanced.  Once in position aspiration to dryness with removal of only 10-11 mL of thin clear yellow fluid.    Samples were sent to RT and the laboratory for testing.  The system was removed.  Sterile dressings were placed.  She tolerated the procedure.  No immediate complication.     Impression:    CONCLUSION:  Small complex right pleural effusion.  Only a small amount of fluid could be aspirated.  Sample sent to RT and the laboratory for testing.     LOCATION:  [facility]     Dictated by (CST): aJce Govea MD on 5/28/2024 at 5:13 PM      Finalized by (CST): Jace Govea MD on 5/28/2024 at 5:16 PM      PROCEDURE:  US ABDOMEN LIMITED (CPT=76705)     COMPARISON:  [facility] , CT, CT ABDOMEN+PELVIS(CONTRAST ONLY)(CPT=74177), 5/28/2024, 1:32 PM.     INDICATIONS:  elevated lfts. eval bile duct, cirrhosis, etc     PATIENT STATED HISTORY: (As transcribed by Technologist)        FINDINGS:    LIVER:  Normal size and echogenicity. No significant masses.  BILIARY:  Common bile duct measures 6 mm in region of stephanie hepatis.  There is no intrahepatic bile duct distension.  Gallbladder is unremarkable.  PANCREAS:  Normal.  RIGHT KIDNEY:  Normal.  OTHER:  There is a small amount of free fluid in posterior subhepatic space.  Incidental note is made of a right pleural effusion.     Impression:    CONCLUSION:    1. Trace ascites is noted in posterior subhepatic space.  2. Right pleural effusion has been noted on prior CT scan.  3. There is otherwise no abnormality detected on this abdominal ultrasound.       LOCATION:  [facility]     Dictated by (CST): Abad Chen MD on 5/28/2024 at 4:56 PM      Finalized by (CST): Abad Chen MD on 5/28/2024 at 4:57 PM          PROCEDURE:  XR CHEST AP PORTABLE  (CPT=71045)     TECHNIQUE:   AP chest radiograph was obtained.     COMPARISON:  [facility] , US, US THORACENTESIS GUIDED RIGHT (CPT=32555), 5/28/2024, 3:53 PM.  [facility] , XR, XR CHEST AP PORTABLE  (CPT=71045), 5/28/2024, 4:48 AM.     INDICATIONS:  Post Thoracentesis     PATIENT STATED HISTORY: (As transcribed by Technologist)  Right sided post thoracentesis. Patient offered no additional history at this time.      FINDINGS:  Status post right thoracentesis.  There is a small right pleural effusion.  Patchy opacity at the right lung base may represent pneumonia.  There is no pneumothorax.  Median sternotomy wires are stable.  The heart is normal in size.     Impression:    CONCLUSION:  Small right pleural effusion with patchy airspace disease the right lung base could represent pneumonia.  There is no pneumothorax.     LOCATION:  [facility]     Dictated by (CST): Elieser Torres MD on 5/28/2024 at 4:55 PM      Finalized by (CST): Elieser Torres MD on 5/28/2024 at 4:56 PM      PROCEDURE:  CT ABDOMEN+PELVIS (CONTRAST ONLY) (CPT=74177)     COMPARISON:  [facility], CT, CT ABDOMEN (ATTENTION PANCREAS) (W+ WO) (CPT=74170), 3/02/2022, 3:52 PM.  [facility], CT, CT ABDOMEN+PELVIS(CONTRAST ONLY)(CPT=74177), 9/18/2021, 2:27 PM.     INDICATIONS:  GIB     TECHNIQUE:  CT scanning was performed from the dome of the diaphragm to the pubic symphysis with non-ionic intravenous contrast material. Post contrast coronal MPR imaging was performed.  Dose reduction techniques were used. Dose information is  transmitted to the ACR (American College of Radiology) NRDR (National Radiology Data Registry) which includes the Dose Index Registry.     PATIENT STATED HISTORY:(As transcribed by Technologist)  Poor historian      CONTRAST USED:  85cc of Isovue 370     FINDINGS:    LUNG BASES:  Loculated right-sided pleural effusion with thick wall measuring approximately 7.8 x 2.5 x 12.7 cm.  Findings suggestive of empyema.  There is consolidation within the right lower lobe with complex  fluid and small foci of air measuring 3.0 x   2.7 x 3.2 cm and 2.1 x 2.8 x 1.7 cm which may represent pulmonary abscesses/necrotizing pneumonia.  Small left-sided pleural effusion.  LIVER:  Normal in shape and contour.    BILIARY:  Gallbladder is unremarkable in appearance.  No intrahepatic biliary dilatation..  SPLEEN:  Normal.  No enlargement or focal lesion.  PANCREAS:  No kirk-pancreatic inflammatory stranding  ADRENALS:  Normal.  No mass or enlargement.    KIDNEYS:  Kidneys are symmetrical in size without evidence of hydronephrosis.  BOWEL/MESENTERY:  Bowel is normal in caliber. No evidence of obstruction.  Probable Villanueva's type hernia containing fat and a portion of bowel  within the right para umbilical region.  No evidence of obstruction.  Mildly prominent retroperitoneal lymph  nodes.  PELVIS:  Bladder is distended.  Small amount of free pelvic fluid.    AORTA/VASCULAR:  Atheromatous calcifications of the aorta.  BONES:  No acute fractures.  Median sternotomy approximated by wire sutures.     Impression:    CONCLUSION:    1. Loculated right-sided pleural effusion suggestive empyema with consolidation within the right lower lobe.  There are areas of fluid and small foci of air within the consolidation which may represent pulmonary abscesses/necrotizing pneumonia.  2. Small left-sided pleural effusion with associated atelectasis.  3. Nonspecific mildly prominent retroperitoneal lymph nodes which may be reactive in nature.  4. Trace amount of free pelvic fluid.       LOCATION:  [facility]     Dictated by (CST): Annette Ferreira MD on 5/28/2024 at 2:11 PM      Finalized by (CST): Annette Ferreira MD on 5/28/2024 at 2:17 PM         PROCEDURE:  CT BRAIN OR HEAD (32045)     COMPARISON:  None.     INDICATIONS:  lethargy     TECHNIQUE:  Noncontrast CT scanning is performed through the brain. Dose reduction techniques were used. Dose information is transmitted to the ACR (American College of Radiology) NRDR (National  Radiology Data Registry) which includes the Dose Index  Registry.     PATIENT STATED HISTORY: (As transcribed by Technologist)  Patient is lethargic.       FINDINGS:    VENTRICLES/SULCI:  Ventricles and sulci are normal in size.    INTRACRANIAL:  There is a chronic appearing lacunar infarct involving the right caudate nucleus at the junction of the head and body..  There are no abnormal extraaxial fluid collections.  There is no midline shift.  There are no intraparenchymal brain  abnormalities.  There is nothing specific for acute infarct.  There is no hemorrhage or mass lesion.    SINUSES:           No sign of acute sinusitis.    MASTOIDS:          No sign of acute inflammation.  SKULL:             No evidence for fracture or osseous abnormality.  OTHER:             None.    Impression:    CONCLUSION:  No evidence of acute intracranial process.     LOCATION:  [facility]     Dictated by (CST): Elieser Torres MD on 5/28/2024 at 8:49 AM      Finalized by (CST): Elieser Torres MD on 5/28/2024 at 8:54 AM      PROCEDURE:  XR CHEST AP PORTABLE  (CPT=71045)     TECHNIQUE:  AP chest radiograph was obtained.     COMPARISON:  [facility], CT, CT ANGIOGRAPHY, CHEST (CPT=71275), 5/26/2024, 12:49 PM.  [facility], XR, XR CHEST PA + LAT CHEST (CPT=71046), 5/26/2024, 11:43 AM.     INDICATIONS:  dyspnea     PATIENT STATED HISTORY: (As transcribed by Technologist)  dyspnea      Impression:    CONCLUSION:    Limited AP portable view shows small-moderate right pleural effusion cleaning some pleural fluid tracking up the lower right lateral chest.  Possibly loculated.  Patchy consolidation mid-lower right chest.  Small blunting left costophrenic   angle.  Cardiomegaly.  No pneumothorax. Consider upright PA and lateral examination of the chest, when tolerated.  CT follow-up may also be beneficial.       LOCATION:  [facility]     Dictated by (CST): Abhishek Mora MD on 5/28/2024 at 8:36 AM      Finalized by (CST): Abhishek Mora MD on 5/28/2024 at 8:37  AM          PROCEDURE:  US VENOUS DOPPLER LEG LEFT - DIAG IMG (CPT=93971)     COMPARISON:  None.     INDICATIONS:  Left lower extremity swelling and pain     TECHNIQUE:  Real time, grey scale, and duplex ultrasound was used to evaluate the lower extremity venous system. B-mode two-dimensional images of the vascular structures, Doppler spectral analysis, and color flow.  Doppler imaging were performed.  The  following veins were imaged:  Common, deep, and superficial femoral, popliteal, sapheno-femoral junction, posterior tibial veins, and the contralateral common femoral vein.     PATIENT STATED HISTORY: (As transcribed by Technologist)        FINDINGS:    EXTREMITY EXAMINED:  Left lower extremity  SAPHENOFEMORAL JUNCTION:  No reflux.  THROMBI:  None visible.  COMPRESSION:  Normal compressibility, phasicity, and augmentation.  OTHER:  Negative.     Impression:    CONCLUSION:  No evidence of DVT in the left lower extremity.     LOCATION:  [facility]    Dictated by (CST): Roc Mosqueda MD on 5/26/2024 at 2:46 PM      Finalized by (CST): Roc Mosqueda MD on 5/26/2024 at 2:49 PM      PROCEDURE:  CT ANGIOGRAPHY, CHEST (CPT=71275)     COMPARISON:  [facility], CT, CT CHEST LD NO CAD(CPT=71250), 3/13/2024, 8:38 PM.     INDICATIONS:  sob, hypotension, pleurisy, R lung mass     TECHNIQUE:  IV contrast-enhanced multislice CT angiography is performed through the pulmonary arterial anatomy. 3D volume renderings are generated.  Dose reduction techniques were used. Dose information is transmitted to the ACR (American College of  Radiology) NRDR (National Radiology Data Registry) which includes the Dose Index Registry.     PATIENT STATED HISTORY:(As transcribed by Technologist)  SOB, hypotension; right lung mass      CONTRAST USED:  100cc of Isovue 370     FINDINGS:    VASCULATURE:  No visible pulmonary arterial thrombus or attenuation.    THORACIC AORTA:  No aneurysm or visible dissection.    LUNGS:  Masslike consolidation in the  right lower lobe is new compared to the prior examination.  Given its acute appearance an infectious process is most likely.  Small central areas of cavitation are noted with associated low attenuation which may  represent necrosis.  Moderate right pleural effusion is noted.  MEDIASTINUM:  Right paratracheal node is noted measuring up to 2.0 x 1.4 cm (image 38).  A subcarinal lymph node is noted measuring up to 2.0 x 2.1 cm (image 93)..    ANKUSH:  No mass or adenopathy.    CARDIAC:  No enlargement, pericardial thickening, or significant coronary artery calcification.  PLEURA:  No mass or effusion.    CHEST WALL:  No mass or axillary adenopathy.  Patient is status post median sternotomy.  LIMITED ABDOMEN:  Limited images of the upper abdomen are unremarkable.    BONES:  No bony lesion or fracture.    OTHER:  Negative.       Impression:    CONCLUSION:    1. Masslike consolidation with areas of central necrosis and mild cavitation in the right lower lobe.  Given its acute appearance this may represent an infectious process.  Correlate clinically.  Short-term follow-up after treatment is recommended.  2. Mediastinal adenopathy is likely reactive.  Attention on follow-up is recommended.     LOCATION:  [facility]     Dictated by (CST): Roc Mosqueda MD on 5/26/2024 at 1:21 PM      Finalized by (CST): Roc Mosqueda MD on 5/26/2024 at 1:25 PM        PROCEDURE:  XR CHEST PA + LAT CHEST (CPT=71046)     INDICATIONS:  sob, cough, crackles R base     COMPARISON:  [facility], CT, CT CHEST LD NO CAD(CPT=71250), 3/13/2024, 8:38 PM.  [facility] , XR, XR CHEST DECUBITUS BILAT INCL PA AND LAT(4 VIEWS)(CPT=71048), 1/15/2024, 9:15 AM.     TECHNIQUE:  PA and lateral chest radiographs were obtained.     PATIENT STATED HISTORY: (As transcribed by Technologist)  Patient stated she has been having a cough for 1 week and a crackle sound in her right lung.      FINDINGS:    LUNGS:  An area of masslike consolidation in the right upper lobe is noted  which is new from the prior examination.  Small right pleural effusion is present.  CARDIAC:  Normal size cardiac silhouette.  Patient is status post median sternotomy.  MEDIASTINUM:  Normal.  PLEURA:  Normal.  No pleural effusions.  BONES:  Normal for age.     Impression:    CONCLUSION:  Masslike consolidation in the right upper lobe is noted.  Given it is acute appearance this likely represents an infectious process.  Small right effusion is noted.  Correlate clinically.     LOCATION:  [facility]     Dictated by (CST): Roc Mosqueda MD on 5/26/2024 at 12:05 PM      Finalized by (CST): Roc Mosqueda MD on 5/26/2024 at 12:06 PM    ASSESSMENT:  Right empyema, s/p thoracentesis 5/28, gram stain with GPC, cx with ngtd. Path w/ acute inflammation with occasional bacterial organisms present. S/p R chest tube placement 5/29  RLL pneumonia. Legionella and strep pneumo Uags negative. Blasto ag negative.  Fevers and leukocytosis, assume d/t above. WBC improving. No further temps.  Alerted mental status. ? Related to above vs other. Head CT w/o acute process. Significantly improved today.   Elevated LFTs. CT a/p and US abd unremarkable. Improved today. GI following.   Anemia  IgM kappa MGUS  DM II. Hgb A1C 7.1  COPD    PLAN:  - continue IV Zosyn and IV vanco for now, await further info on cultures. If no growth would consider narrowing to CTX/Flagyl.  - follow blood cultures- ngtd  - await final pleural fluid culture from 5/28 and 529.  - follow temps and wbc  - follow LFTs  - follow chest tube output    Discussed case with RN, patient, Dr. Menezes and Dr. Juan.      Addendum: Pleural fluid cultures from 5/28 and 5/29 both with strep intermedius. Will dc IV Zosyn and vancomycin and narrow to IV Unasyn. D/w Dr. Menezes.    HIEN Licona   [facility]  [phone]    ID ATTENDING ADDENDUM     Pt seen an examined independently. Chart reviewed. Agree with above. Note has been reviewed by me and  modified as needed.  Exam and Impression/ Recs as noted above.  Will follow  D/w staff and with pt  More than 50% of clinical time and 100% of the clinical decision making performed by me.    Vibha Menezes MD

## 2024-05-31 NOTE — PLAN OF CARE
Alert x2-3, confused at times. 2Room air. Scheduled nebs. NSR/ ST on tele. Voids per BSC. Up SBA. Accuchecks QID. PRN Norco given for c/o pain to chest tube site. CT to -20 continuous suction. Site c/d/I. Draining well at this time. All questions and concerns addressed to pt satisfaction. Call light within reach. Bed alarm on.    Problem: RESPIRATORY - ADULT  Goal: Achieves optimal ventilation and oxygenation  Description: INTERVENTIONS:  - Assess for changes in respiratory status  - Assess for changes in mentation and behavior  - Position to facilitate oxygenation and minimize respiratory effort  - Oxygen supplementation based on oxygen saturation or ABGs  - Provide Smoking Cessation handout, if applicable  - Encourage broncho-pulmonary hygiene including cough, deep breathe, Incentive Spirometry  - Assess the need for suctioning and perform as needed  - Assess and instruct to report SOB or any respiratory difficulty  - Respiratory Therapy support as indicated  - Manage/alleviate anxiety  - Monitor for signs/symptoms of CO2 retention  Outcome: Progressing     Problem: Patient/Family Goals  Goal: Patient/Family Long Term Goal  Description: Patient's Long Term Goal:   Discharge to home    Interventions:  - Follow plan of care  - See additional Care Plan goals for specific interventions  Outcome: Progressing  Goal: Patient/Family Short Term Goal  Description: Patient's Short Term Goal:   5/26 noc: maintain on room air  5/27: manage hgb  5/27 noc: reduce pleuritic pain   5/28 AM: CT scan of head/NPO possible thora  5/28noc: sleep  5/29 AM: Chest Tube placement  5/29noc: CT management, control pain, sleep  Interventions:   - labs, blood  - Medications  - IVF  - IVF ABT  - See additional Care Plan goals for specific interventions  Outcome: Progressing     Problem: Diabetes/Glucose Control  Goal: Glucose maintained within prescribed range  Description: INTERVENTIONS:  - Monitor Blood Glucose as ordered  - Assess for signs  and symptoms of hyperglycemia and hypoglycemia  - Administer ordered medications to maintain glucose within target range  - Assess barriers to adequate nutritional intake and initiate nutrition consult as needed  - Instruct patient on self management of diabetes  Outcome: Progressing

## 2024-05-31 NOTE — PROGRESS NOTES
[facility] Progress Note    Soumya King Patient Status:  Inpatient    6/3/1960 MRN [id number]   Location [facility] 5NW-A Attending Hoda Juan MD   Hosp Day # 5 PCP Rika Vinson DO     Subjective:  Soumya King is a(n) 63 year old female who is admitted for pneumonia with empyema.    Overnight: no acute events overnight, starting to feel a little better    Objective:  /81 (BP Location: Left arm)   Pulse 90   Temp 98.3 °F (36.8 °C) (Oral)   Resp 17   Ht 5' 1\" (1.549 m)   Wt 132 lb 12.8 oz (60.2 kg)   SpO2 97%   BMI 25.09 kg/m²       Temp (24hrs), Av.6 °F (37 °C), Min:98.3 °F (36.8 °C), Max:99.3 °F (37.4 °C)      Intake/Output:    Intake/Output Summary (Last 24 hours) at 2024 1601  Last data filed at 2024 1800  Gross per 24 hour   Intake --   Output 40 ml   Net -40 ml       Physical Exam:   General: alert, cooperative, oriented.  No respiratory distress.   Head: Normocephalic, without obvious abnormality, atraumatic.   Throat: Lips, mucosa, and tongue normal.  No thrush noted.   Neck: trachea midline, no adenopathy, no thyromegaly. No JVD.   Lungs: clear to auscultation bilaterally   Chest wall: No tenderness or deformity.   Heart: regular rate and rhythm, S1, S2 normal, no murmur, click, rub or gallop   Abdomen: soft, non-distended, no masses, no guarding, no     Rebound.   Extremity: No edema or cyanosis   Skin: No rashes or lesions.   Neurological: Alert, interactive, no focal deficits    Lab Data Review:  Recent Labs     24  0737 24  1235 24  2356 24  0714 24  1623 24  0122 24  0733   WBC 29.0*  --   --  18.7*  --   --  17.8*   HGB 7.2*   < > 6.9* 8.3*  8.4* 9.2* 8.6* 8.9*  8.9*   .0*  --   --  457.0*  --   --  520.0*    < > = values in this interval not displayed.     Recent Labs     24  0737 24  0714 24  0741    142 136   K 3.9 3.8 3.8    109 108   CO2 17.0* 20.0* 20.0*   BUN  9 9 5*   CREATSERUM 0.73 0.67  0.67 0.57  0.57     Recent Labs   Lab 05/26/24  1133 05/28/24  0548 05/30/24  0714 05/31/24  0733 05/31/24  0855   PTP 18.3*   < > 18.3* >120.0* 16.6*   INR 1.51*   < > 1.51* >15.49* 1.33*   PTT 48.5*  --   --   --   --     < > = values in this interval not displayed.       Cultures:   Hospital Encounter on 05/26/24   1. Body Fluid Cult Aerobic and Anaerobic     Status: Abnormal    Collection Time: 05/29/24  3:46 PM    Specimen: Pleural Fluid, Right; Body fluid, unspecified   Result Value Ref Range    Body Fluid Culture Result 3+ growth Streptococcus intermedius (A) N/A    Body Fluid Smear 4+ WBCs seen N/A    Body Fluid Smear No organisms seen N/A    Body Fluid Smear This is a cytocentrifuged smear. N/A   2. Blood Culture     Status: None (Preliminary result)    Collection Time: 05/29/24  2:24 PM    Specimen: Blood,peripheral   Result Value Ref Range    Blood Culture Result No Growth 1 Day N/A       Radiology:  XR CHEST AP PORTABLE  (CPT=71045)  Narrative: PROCEDURE:  XR CHEST AP PORTABLE  (CPT=71045)     TECHNIQUE:  AP chest radiograph was obtained.     COMPARISON:  [facility] , XR, XR CHEST AP PORTABLE  (CPT=71045), 5/30/2024, 5:33 AM.     INDICATIONS:  dyspnea     PATIENT STATED HISTORY: (As transcribed by Technologist)  Patient offered no additional history at this time.         FINDINGS:  Postoperative changes from sternotomy.  Enlarged cardiac silhouette, unchanged.  There is a layering right pleural effusion, unchanged.  A right basilar chest tube is in stable position.  There is persistent airspace disease throughout the mid   and lower right lung.  No lobar consolidation is seen within the left lung.  No measurable pneumothorax.                   Impression: CONCLUSION:  No significant interval change since 5/30/2024.        LOCATION:  [id number]                 Dictated by (CST): Stromberg, LeRoy, MD on 5/31/2024 at 7:52 AM       Finalized by (CST): Stromberg, LeRoy, MD on  5/31/2024 at 7:53 AM         Medications reviewed     Assessment and Plan:   Patient Active Problem List   Diagnosis    Vitamin D deficiency    Jamison's esophagus    Tobacco use    Insomnia    COPD, mild (HCC) - Dx'd via PFTs done in 3/2015    Migraine without aura and without status migrainosus, not intractable    Hyperlipidemia    SUMMER on CPAP    MDD (major depressive disorder), recurrent episode, moderate (HCC)    GERD (gastroesophageal reflux disease)    Hypertension    Chronic pansinusitis    Mucinous cystadenoma of ovary, left    KIRK (generalized anxiety disorder)    History of pubovaginal sling    Absence of bladder continence    Vasomotor flushing    Primary osteoarthritis of left knee    Mass of spine    Hx of motion sickness    Difficult intubation    Chronic low back pain without sciatica    Type 2 diabetes mellitus with diabetic neuropathy (HCC)    Pulmonary nodule    CAD, multiple vessel    S/P CABG x 2    Type 2 diabetes mellitus with hyperlipidemia (HCC)    Anemia    Iron deficiency anemia secondary to inadequate dietary iron intake    Subacute cough    Cavitary lesion of lung    Hyperglycemia    Acute anemia    MARÍA ELENA (acute kidney injury) (HCC)    Transaminitis    Thrombocytosis    Hyponatremia    Pleural effusion    Somnolence    Acute respiratory failure with hypoxia (HCC)    Empyema lung (HCC)       Assessment:  Large right lower lobe pneumonia with empyema  Right pleural effusion with empyema as noted above, cultures pending  Multiple pulmonary nodules  Acute encephalopathy, improved    Plan:  Continue broad spectrum abx, ID is following  Start MIST protocol, chest tube to suction   Blasto antigen is pending, may require bronchoscopy if cultures remain negative  Continue chest tube to suction, will need to keep tube in while instilling tpa and dornase        Liana Villanueva MD  [facility]  Office: [phone]

## 2024-06-01 ENCOUNTER — APPOINTMENT (OUTPATIENT)
Dept: ULTRASOUND IMAGING | Facility: HOSPITAL | Age: 64
DRG: 163 | End: 2024-06-01
Attending: INTERNAL MEDICINE
Payer: MEDICAID

## 2024-06-01 LAB
ALBUMIN SERPL-MCNC: 1.4 G/DL (ref 3.4–5)
ALBUMIN/GLOB SERPL: 0.3 {RATIO} (ref 1–2)
ALP LIVER SERPL-CCNC: 381 U/L
ALT SERPL-CCNC: 262 U/L
ANION GAP SERPL CALC-SCNC: 7 MMOL/L (ref 0–18)
AST SERPL-CCNC: 243 U/L (ref 15–37)
BILIRUB SERPL-MCNC: 0.7 MG/DL (ref 0.1–2)
BUN BLD-MCNC: 6 MG/DL (ref 9–23)
CALCIUM BLD-MCNC: 8.3 MG/DL (ref 8.5–10.1)
CHLORIDE SERPL-SCNC: 108 MMOL/L (ref 98–112)
CO2 SERPL-SCNC: 21 MMOL/L (ref 21–32)
CREAT BLD-MCNC: 0.6 MG/DL
EGFRCR SERPLBLD CKD-EPI 2021: 102 ML/MIN/1.73M2 (ref 60–?)
ERYTHROCYTE [DISTWIDTH] IN BLOOD BY AUTOMATED COUNT: 16.1 %
GLOBULIN PLAS-MCNC: 4.7 G/DL (ref 2.8–4.4)
GLUCOSE BLD-MCNC: 133 MG/DL (ref 70–99)
GLUCOSE BLD-MCNC: 134 MG/DL (ref 70–99)
GLUCOSE BLD-MCNC: 136 MG/DL (ref 70–99)
GLUCOSE BLD-MCNC: 163 MG/DL (ref 70–99)
GLUCOSE BLD-MCNC: 185 MG/DL (ref 70–99)
HCT VFR BLD AUTO: 27.8 %
HGB BLD-MCNC: 8.8 G/DL
HGB BLD-MCNC: 9.2 G/DL
INR BLD: 1.35 (ref 0.8–1.2)
MCH RBC QN AUTO: 29.7 PG (ref 26–34)
MCHC RBC AUTO-ENTMCNC: 33.1 G/DL (ref 31–37)
MCV RBC AUTO: 89.7 FL
OSMOLALITY SERPL CALC.SUM OF ELEC: 282 MOSM/KG (ref 275–295)
PLATELET # BLD AUTO: 577 10(3)UL (ref 150–450)
POTASSIUM SERPL-SCNC: 3.7 MMOL/L (ref 3.5–5.1)
PROT SERPL-MCNC: 6.1 G/DL (ref 6.4–8.2)
PROTHROMBIN TIME: 16.7 SECONDS (ref 11.6–14.8)
RBC # BLD AUTO: 3.1 X10(6)UL
SODIUM SERPL-SCNC: 136 MMOL/L (ref 136–145)
WBC # BLD AUTO: 21.6 X10(3) UL (ref 4–11)

## 2024-06-01 PROCEDURE — 99233 SBSQ HOSP IP/OBS HIGH 50: CPT | Performed by: INTERNAL MEDICINE

## 2024-06-01 PROCEDURE — 93975 VASCULAR STUDY: CPT | Performed by: INTERNAL MEDICINE

## 2024-06-01 PROCEDURE — 99232 SBSQ HOSP IP/OBS MODERATE 35: CPT | Performed by: INTERNAL MEDICINE

## 2024-06-01 NOTE — PROGRESS NOTES
[facility] Progress Note    Soumya King Patient Status:  Inpatient    6/3/1960 MRN [id number]   Location [facility] 5NW-A Attending Hoda Juan MD   Hosp Day # 6 PCP Rika Vinson,      Subjective:  Soumya King is a(n) 63 year old female who is admitted for pneumonia with empyema.    Overnight: Good output overnight of chest tube, pleural studies consistent with strep    Objective:  /61 (BP Location: Left arm)   Pulse (!) 126   Temp 99.4 °F (37.4 °C) (Oral)   Resp 16   Ht 5' 1\" (1.549 m)   Wt 132 lb 12.8 oz (60.2 kg)   SpO2 100%   BMI 25.09 kg/m²       Temp (24hrs), Av.2 °F (37.3 °C), Min:98.2 °F (36.8 °C), Max:99.9 °F (37.7 °C)      Intake/Output:    Intake/Output Summary (Last 24 hours) at 2024 1231  Last data filed at 2024 0632  Gross per 24 hour   Intake 60 ml   Output 530 ml   Net -470 ml       Physical Exam:   General: alert, cooperative, oriented.  No respiratory distress.   Head: Normocephalic, without obvious abnormality, atraumatic.   Throat: Lips, mucosa, and tongue normal.  No thrush noted.   Neck: trachea midline, no adenopathy, no thyromegaly. No JVD.   Lungs: clear to auscultation bilaterally   Chest wall: No tenderness or deformity.   Heart: regular rate and rhythm, S1, S2 normal, no murmur, click, rub or gallop   Abdomen: soft, non-distended, no masses, no guarding, no     Rebound.   Extremity: No edema or cyanosis   Skin: No rashes or lesions.   Neurological: Alert, interactive, no focal deficits    Lab Data Review:  Recent Labs     24  0737 24  1235 24  2356 24  0714 24  1623 24  0122 24  0733   WBC 29.0*  --   --  18.7*  --   --  17.8*   HGB 7.2*   < > 6.9* 8.3*  8.4* 9.2* 8.6* 8.9*  8.9*   .0*  --   --  457.0*  --   --  520.0*    < > = values in this interval not displayed.     Recent Labs     24  0737 24  0714 24  0741    142 136   K 3.9 3.8 3.8    109 108    CO2 17.0* 20.0* 20.0*   BUN 9 9 5*   CREATSERUM 0.73 0.67  0.67 0.57  0.57     Recent Labs   Lab 05/26/24  1133 05/28/24  0548 05/31/24  0733 05/31/24  0855 06/01/24  0758   PTP 18.3*   < > >120.0* 16.6* 16.7*   INR 1.51*   < > >15.49* 1.33* 1.35*   PTT 48.5*  --   --   --   --     < > = values in this interval not displayed.       Cultures:   Hospital Encounter on 05/26/24   1. Body Fluid Cult Aerobic and Anaerobic     Status: Abnormal    Collection Time: 05/29/24  3:46 PM    Specimen: Pleural Fluid, Right; Body fluid, unspecified   Result Value Ref Range    Body Fluid Culture Result 3+ growth Streptococcus intermedius (A) N/A    Body Fluid Smear 4+ WBCs seen N/A    Body Fluid Smear No organisms seen N/A    Body Fluid Smear This is a cytocentrifuged smear. N/A   2. Blood Culture     Status: None (Preliminary result)    Collection Time: 05/29/24  2:24 PM    Specimen: Blood,peripheral   Result Value Ref Range    Blood Culture Result No Growth 2 Days N/A       Radiology:  US ABDOMEN DOPPLER ONLY (CPT=93975)  Narrative: PROCEDURE:  US ABDOMEN DOPPLER ONLY (CPT=93975)     COMPARISON:  [facility], CT ABDOMEN (ATTENTION PANCREAS) (W+ WO) (CPT=74170), 3/02/2022, 3:52 PM.     INDICATIONS:  Elevated liver enzyme     TECHNIQUE:  Real time gray-scale ultrasound was used to evaluate the abdomen.  B-mode images, Doppler color flow, and spectral waveform analysis were performed of the portal vein, hepatic artery, hepatic vein, and splenic vein.  The exam includes images   of the liver, gallbladder, common bile duct, pancreas, spleen, kidneys, IVC, and aorta.     PATIENT STATED HISTORY: (As transcribed by Technologist)           FINDINGS:     PEAK VELOCITIES (cm/sec):     Main hepatic artery:  65     Right hepatic vein:  32     Middle hepatic vein:  31     Left hepatic vein:  27     Main portal vein:  23     Right portal vein:  17     Left portal vein:  24     Splenic vein:  43        ABDOMEN  LIVER:  Normal liver  morphology and echogenicity.           DOPPLER WAVE FORMS  FLOW:  There is normal arterial and venous Doppler wave forms.  The spectral analysis is within normal limits.  Hepatic veins demonstrate hepatofugal flow.  Portal veins demonstrate hepatopetal flow.                       Impression: CONCLUSION:  Patency and normal directionality of hepatic and portal vasculature.        LOCATION:  [facility]              Dictated by (CST): Donald Moffett MD on 6/01/2024 at 10:14 AM       Finalized by (CST): Donald Moffett MD on 6/01/2024 at 10:16 AM         Medications reviewed     Assessment and Plan:   Patient Active Problem List   Diagnosis    Vitamin D deficiency    Jamison's esophagus    Tobacco use    Insomnia    COPD, mild (HCC) - Dx'd via PFTs done in 3/2015    Migraine without aura and without status migrainosus, not intractable    Hyperlipidemia    SUMMER on CPAP    MDD (major depressive disorder), recurrent episode, moderate (HCC)    GERD (gastroesophageal reflux disease)    Hypertension    Chronic pansinusitis    Mucinous cystadenoma of ovary, left    KIRK (generalized anxiety disorder)    History of pubovaginal sling    Absence of bladder continence    Vasomotor flushing    Primary osteoarthritis of left knee    Mass of spine    Hx of motion sickness    Difficult intubation    Chronic low back pain without sciatica    Type 2 diabetes mellitus with diabetic neuropathy (HCC)    Pulmonary nodule    CAD, multiple vessel    S/P CABG x 2    Type 2 diabetes mellitus with hyperlipidemia (HCC)    Anemia    Iron deficiency anemia secondary to inadequate dietary iron intake    Subacute cough    Cavitary lesion of lung    Hyperglycemia    Acute anemia    MARÍA ELENA (acute kidney injury) (HCC)    Transaminitis    Thrombocytosis    Hyponatremia    Pleural effusion    Somnolence    Acute respiratory failure with hypoxia (HCC)    Empyema lung (HCC)       Assessment:  Large right lower lobe pneumonia with empyema secondary to Streptococcus  intermedius  Right pleural effusion with empyema as noted above  Multiple pulmonary nodules  Acute encephalopathy, improved    Plan:  ID following for antibiotic management  Continue mist protocol, keep close eye for bleeding  Continue chest tube to suction, will need to keep tube in while instilling tpa and dornase  Will plan to recheck CT chest Monday morning after tPA and dornase is completed  Discussed plan of care with ID and hospitalist  Will follow        Liana Villanueva MD  [facility]  Office: [phone]

## 2024-06-01 NOTE — PLAN OF CARE
Pt from home, plan to dc home  Aox4, confused at times  VSS on RA/2L NOC  afebrile  NSR/ST on tele, receiving Heparin  Electrolyte non-cardiac replacement  Continent, uses commode  QID accucheck  Up SBA, bed alarm on and call light within reach  Carb controlled diet/NPO at midnight for abd US  Receiving Unasyn  CT on right side hooked up to -20 suction w/ MIST 2 protocol in place  Pt updated on current POC, no questions at this time    Problem: Diabetes/Glucose Control  Goal: Glucose maintained within prescribed range  Description: INTERVENTIONS:  - Monitor Blood Glucose as ordered  - Assess for signs and symptoms of hyperglycemia and hypoglycemia  - Administer ordered medications to maintain glucose within target range  - Assess barriers to adequate nutritional intake and initiate nutrition consult as needed  - Instruct patient on self management of diabetes  Outcome: Progressing     Problem: Patient/Family Goals  Goal: Patient/Family Long Term Goal  Description: Patient's Long Term Goal:   Discharge to home    Interventions:  - Follow plan of care  - See additional Care Plan goals for specific interventions  Outcome: Progressing  Goal: Patient/Family Short Term Goal  Description: Patient's Short Term Goal:   5/26 noc: maintain on room air  5/27: manage hgb  5/27 noc: reduce pleuritic pain   5/28 AM: CT scan of head/NPO possible thora  5/28noc: sleep  5/29 AM: Chest Tube placement  5/29noc: CT management, control pain, sleep  Interventions:   - labs, blood  - Medications  - IVF  - IVF ABT  - See additional Care Plan goals for specific interventions  Outcome: Progressing     Problem: RESPIRATORY - ADULT  Goal: Achieves optimal ventilation and oxygenation  Description: INTERVENTIONS:  - Assess for changes in respiratory status  - Assess for changes in mentation and behavior  - Position to facilitate oxygenation and minimize respiratory effort  - Oxygen supplementation based on oxygen saturation or ABGs  - Provide  Smoking Cessation handout, if applicable  - Encourage broncho-pulmonary hygiene including cough, deep breathe, Incentive Spirometry  - Assess the need for suctioning and perform as needed  - Assess and instruct to report SOB or any respiratory difficulty  - Respiratory Therapy support as indicated  - Manage/alleviate anxiety  - Monitor for signs/symptoms of CO2 retention  Outcome: Progressing     Problem: HEMATOLOGIC - ADULT  Goal: Free from bleeding injury  Description: (Example usage: patient with low platelets)  INTERVENTIONS:  - Avoid intramuscular injections, enemas and rectal medication administration  - Ensure safe mobilization of patient  - Hold pressure on venipuncture sites to achieve adequate hemostasis  - Assess for signs and symptoms of internal bleeding  - Monitor lab trends  - Patient is to report abnormal signs of bleeding to staff  - Avoid use of toothpicks and dental floss  - Use electric shaver for shaving  - Use soft bristle tooth brush  - Limit straining and forceful nose blowing  Outcome: Progressing

## 2024-06-01 NOTE — PROGRESS NOTES
[facility]   part of [facility]     Hospitalist Progress Note     Soumya King Patient Status:  Inpatient    6/3/1960 MRN [id number]   Location [facility] 5NW-A Attending Apurva Logan MD   Hosp Day # 6 PCP Rika Vinson DO     Chief Complaint: chest wall pain     Subjective:     Patient with some pain in her CT site specially after MIST protocol    Objective:    Review of Systems:   A comprehensive review of systems was completed; pertinent positive and negatives stated in subjective.    Vital signs:  Temp:  [98.2 °F (36.8 °C)-99.9 °F (37.7 °C)] 99.4 °F (37.4 °C)  Pulse:  [] 126  Resp:  [16-21] 16  BP: ()/(56-84) 124/61  SpO2:  [94 %-100 %] 100 %    Physical Exam:    General: No acute distress, confused, sleepy  Respiratory: No wheezes, no rhonchi  Cardiovascular: S1, S2, regular rate and rhythm  Abdomen: Soft, Non-tender, non-distended, positive bowel sounds  Neuro: No new focal deficits.   Extremities: No edema      Diagnostic Data:    Labs:  Recent Labs   Lab 24  0548 24  1151 24  0737 24  1235 24  0714 24  1623 24  0122 24  0733 24  0855 24  1708 24  0041 24  0758   WBC 33.7*  --  29.0*  --  18.7*  --   --  17.8*  --   --   --  21.6*   HGB 7.6*   < > 7.2*   < > 8.3*  8.4*   < > 8.6* 8.9*  8.9*  --  9.8* 8.8* 9.2*  9.2*   MCV 89.7  --  91.2  --  87.3  --   --  89.7  --   --   --  89.7   .0*  --  547.0*  --  457.0*  --   --  520.0*  --   --   --  577.0*   BAND 3  --   --   --  1  --   --  2  --   --   --   --    INR 1.58*  --  1.50*  --  1.51*  --   --  >15.49* 1.33*  --   --  1.35*    < > = values in this interval not displayed.       Recent Labs   Lab 24  0714 24  0741 24  0758   * 129* 136*   BUN 9 5* 6*   CREATSERUM 0.67  0.67 0.57  0.57 0.60   CA 8.5 8.4* 8.3*   ALB 1.3* 1.4* 1.4*    136 136   K 3.8 3.8 3.7    108 108   CO2 20.0* 20.0* 21.0   ALKPHO  544* 473* 381*   AST 1,228* 590* 243*   * 371* 262*   BILT 0.6 0.6 0.7   TP 6.1* 6.3* 6.1*       Estimated Creatinine Clearance: 74.3 mL/min (based on SCr of 0.6 mg/dL).    Recent Labs   Lab 05/26/24  1133   TROPHS 3       Recent Labs   Lab 05/31/24  0733 05/31/24  0855 06/01/24  0758   PTP >120.0* 16.6* 16.7*   INR >15.49* 1.33* 1.35*          Microbiology    Hospital Encounter on 05/26/24   1. Body Fluid Cult Aerobic and Anaerobic     Status: Abnormal    Collection Time: 05/29/24  3:46 PM    Specimen: Pleural Fluid, Right; Body fluid, unspecified   Result Value Ref Range    Body Fluid Culture Result 3+ growth Streptococcus intermedius (A) N/A    Body Fluid Smear 4+ WBCs seen N/A    Body Fluid Smear No organisms seen N/A    Body Fluid Smear This is a cytocentrifuged smear. N/A   2. Blood Culture     Status: None (Preliminary result)    Collection Time: 05/29/24  2:24 PM    Specimen: Blood,peripheral   Result Value Ref Range    Blood Culture Result No Growth 2 Days N/A         Imaging: Reviewed in Epic.    Medications:    alteplase (Activase) 10 mg in sodium chloride 0.9% 30 mL (MIST2) intrapleural syringe  10 mg Intrapleural Q12H    dornase frnack (Pulmozyme) 5 mg in sterile water for injection (PF) 30 mL (MIST2) intrapleural syringe  5 mg Intrapleural Q12H    ampicillin-sulbactam  3 g Intravenous q6h    HYDROmorphone  0.2 mg Intravenous Once    ipratropium-albuterol  3 mL Nebulization TID    oxybutynin ER  5 mg Oral Daily    aspirin  81 mg Oral Daily    atorvastatin  80 mg Oral Nightly    busPIRone  20 mg Oral Daily    gabapentin  600 mg Oral Nightly    pantoprazole  40 mg Oral QAM AC    QUEtiapine ER  200 mg Oral QPM    umeclidinium bromide  1 puff Inhalation Daily    heparin  5,000 Units Subcutaneous Q8H JOSÉ ANTONIO    insulin aspart  1-10 Units Subcutaneous TID AC and HS       Assessment & Plan:      # RLL pneumonia with empyema   # RLL effusion with loculation and chest pain   -abx adjusted per cultures   -  started MIST protocol and plan to cont through the weekend until Monday for repeat CT Chest   -Pulmonology and ID rec appreciated   - sp chest tube by IR on 5/29    # elevated liver enzymes seems ischemic in nature per course   -improved  - exam benign   - US abd with doppler neg   - avoid hepatotoxic agents    # Iron def Anemia  - sp PRBC   - no s/s bleeding   -trend    #Coagulopathy  - repeat INR normalized   -trend  -sp Vit K   -US abd and CT AP  without obvious source of LFT rise     #COPD   -no active wheezing  -Continue inhalers  -prn nebs.     #Acute metabolic toxic ( anxiolytic) encephalopathy  -Ammonia normal   -CT brain negative  -ABG reviewed  - improved    #Hypertension  -metoprolol on hold due to low BP     #DM 2  -hyperglycemia protocol     #GERD  #Jamison's esophagus  -PPI.    #MDD/anxiety  Resumed home meds slowly    Sister has been updated .  D/w ID and pulmonary service.    Hoda Juan MD      Supplementary Documentation:     Quality:  DVT Mechanical Prophylaxis:   SCDs,    DVT Pharmacologic Prophylaxis   Medication    heparin (Porcine) 5000 UNIT/ML injection 5,000 Units         DVT Pharmacologic prophylaxis: Aspirin 81 mg      Code Status: Full Code  Gotti: No urinary catheter in place  Gotti Duration (in days):   Central line:    ILYA:     Discharge is dependent on: progress  At this point Ms. King is expected to be discharge to: home    The 21st Century Cures Act makes medical notes like these available to patients in the interest of transparency. Please be advised this is a medical document. Medical documents are intended to carry relevant information, facts as evident, and the clinical opinion of the practitioner. The medical note is intended as peer to peer communication and may appear blunt or direct. It is written in medical language and may contain abbreviations or verbiage that are unfamiliar.

## 2024-06-01 NOTE — PROGRESS NOTES
[facility]   part of [facility] ID PROGRESS NOTE    Soumya King Patient Status:  Inpatient    6/3/1960 MRN [id number]   [facility] 5NW-A Attending Hoda Juan MD   Hosp Day # 6 PCP Rika Vinson DO     Abx: IV Unasyn D#1, s/p Zosyn and vanco    Subjective: Patient seen and examined today. States she is feeling better today. Cough without sputum production. Afebrile.     Allergies:  Allergies   Allergen Reactions    Naprosyn [Naproxen] ANAPHYLAXIS     Has tolerated ibuprofen and IV toradol     Aspirin OTHER (SEE COMMENTS)     Difficulty swallowing due to  saavedra's esophagus       Medications:    Current Facility-Administered Medications:     alteplase (Activase) 10 mg in sodium chloride 0.9% 30 mL (MIST2) intrapleural syringe, 10 mg, Intrapleural, Q12H    dornase franck (Pulmozyme) 5 mg in sterile water for injection (PF) 30 mL (MIST2) intrapleural syringe, 5 mg, Intrapleural, Q12H    ampicillin-sulbactam (Unasyn) 3 g in sodium chloride 0.9% 100mL IVPB-ADD, 3 g, Intravenous, q6h    [Held by provider] clonazePAM (KlonoPIN) tab 0.5 mg, 0.5 mg, Oral, BID PRN    HYDROmorphone (Dilaudid) 1 MG/ML injection 0.2 mg, 0.2 mg, Intravenous, Once    HYDROmorphone (Dilaudid) 1 MG/ML injection 0.5 mg, 0.5 mg, Intravenous, Q2H PRN **OR** HYDROmorphone (Dilaudid) 1 MG/ML injection 1 mg, 1 mg, Intravenous, Q2H PRN **OR** HYDROmorphone (Dilaudid) 1 MG/ML injection 2 mg, 2 mg, Intravenous, Q2H PRN    ipratropium-albuterol (Duoneb) 0.5-2.5 (3) MG/3ML inhalation solution 3 mL, 3 mL, Nebulization, TID    oxybutynin ER (Ditropan-XL) 24 hr tab 5 mg, 5 mg, Oral, Daily    HYDROcodone-acetaminophen (Norco) 5-325 MG per tab 1 tablet, 1 tablet, Oral, Q4H PRN **OR** HYDROcodone-acetaminophen (Norco) 5-325 MG per tab 2 tablet, 2 tablet, Oral, Q4H PRN    traMADol (Ultram) tab 50 mg, 50 mg, Oral, Q6H PRN    aspirin DR tab 81 mg, 81 mg, Oral, Daily    atorvastatin (Lipitor) tab 80 mg, 80 mg, Oral,  Nightly    benzonatate (Tessalon) cap 100 mg, 100 mg, Oral, TID PRN    busPIRone (Buspar) tab 20 mg, 20 mg, Oral, Daily    gabapentin (Neurontin) tab 600 mg, 600 mg, Oral, Nightly    pantoprazole (Protonix) DR tab 40 mg, 40 mg, Oral, QAM AC    QUEtiapine ER (SEROquel XR) 24 hr tab 200 mg, 200 mg, Oral, QPM    rizatriptan (Maxalt-MLT) disintegrating tab 5 mg, 5 mg, Oral, PRN    umeclidinium bromide (Incruse Ellipta) 62.5 MCG/ACT inhaler 1 puff, 1 puff, Inhalation, Daily    glucose (Dex4) 15 GM/59ML oral liquid 15 g, 15 g, Oral, Q15 Min PRN **OR** glucose (Glutose) 40% oral gel 15 g, 15 g, Oral, Q15 Min PRN **OR** glucose-vitamin C (Dex-4) chewable tab 4 tablet, 4 tablet, Oral, Q15 Min PRN **OR** dextrose 50% injection 50 mL, 50 mL, Intravenous, Q15 Min PRN **OR** glucose (Dex4) 15 GM/59ML oral liquid 30 g, 30 g, Oral, Q15 Min PRN **OR** glucose (Glutose) 40% oral gel 30 g, 30 g, Oral, Q15 Min PRN **OR** glucose-vitamin C (Dex-4) chewable tab 8 tablet, 8 tablet, Oral, Q15 Min PRN    melatonin tab 3 mg, 3 mg, Oral, Nightly PRN    heparin (Porcine) 5000 UNIT/ML injection 5,000 Units, 5,000 Units, Subcutaneous, Q8H JOSÉ ANTONIO    acetaminophen (Tylenol Extra Strength) tab 500 mg, 500 mg, Oral, Q4H PRN    ondansetron (Zofran) 4 MG/2ML injection 4 mg, 4 mg, Intravenous, Q6H PRN    prochlorperazine (Compazine) 10 MG/2ML injection 5 mg, 5 mg, Intravenous, Q8H PRN    polyethylene glycol (PEG 3350) (Miralax) 17 g oral packet 17 g, 17 g, Oral, Daily PRN    sennosides (Senokot) tab 17.2 mg, 17.2 mg, Oral, Nightly PRN    bisacodyl (Dulcolax) 10 MG rectal suppository 10 mg, 10 mg, Rectal, Daily PRN    fleet enema (Fleet) 7-19 GM/118ML rectal enema 133 mL, 1 enema, Rectal, Once PRN    insulin aspart (NovoLOG) 100 Units/mL FlexPen 1-10 Units, 1-10 Units, Subcutaneous, TID AC and HS    guaiFENesin (Robitussin) 100 MG/5 ML oral liquid 200 mg, 200 mg, Oral, Q4H PRN    sodium chloride (Saline Mist) 0.65 % nasal solution 1 spray, 1 spray, Each  Nare, Q3H PRN    Review of Systems:  Completed. See pertinent positives and negatives above.    Physical Exam:  Vital signs: Blood pressure 115/61, pulse (!) 126, temperature 98.5 °F (36.9 °C), temperature source Oral, resp. rate 18, height 154.9 cm (5' 1\"), weight 132 lb 12.8 oz (60.2 kg), SpO2 94%, not currently breastfeeding.    General: Alert, oriented. NAD. On room air.   HEENT: Moist mucous membranes.   Neck: No lymphadenopathy.  Supple.  Cardiovascular: RRR.   Respiratory: Diminished breaths with crackles to lower lung. + R chest tube in place.   Abdomen: Soft, nontender, nondistended.   Musculoskeletal: Some pedal edema  Integument: No lesions. No erythema.    Laboratory Data:  Recent Labs   Lab 05/31/24  0733 05/31/24  1708 06/01/24  0758   RBC 2.90*  --  3.10*   HGB 8.9*  8.9*   < > 9.2*  9.2*   HCT 26.0*  --  27.8*   MCV 89.7  --  89.7   MCH 30.7  --  29.7   MCHC 34.2  --  33.1   RDW 16.5  --  16.1   NEPRELIM 13.57*  --   --    WBC 17.8*  --  21.6*   .0*  --  577.0*   NEUT 74  --   --    LYMPH 17  --   --    MON 4  --   --    EOS 3  --   --     < > = values in this interval not displayed.     Recent Labs   Lab 05/30/24  0714 05/31/24  0741 06/01/24  0758   * 129* 136*   BUN 9 5* 6*   CREATSERUM 0.67  0.67 0.57  0.57 0.60   CA 8.5 8.4* 8.3*   ALB 1.3* 1.4* 1.4*    136 136   K 3.8 3.8 3.7    108 108   CO2 20.0* 20.0* 21.0   ALKPHO 544* 473* 381*   AST 1,228* 590* 243*   * 371* 262*   BILT 0.6 0.6 0.7   TP 6.1* 6.3* 6.1*       Microbiology: Reviewed in EMR    Radiology: Reviewed.    CORRECTION Corrected on: 5/29/2024;         PROCEDURE:  CT CHEST (CPT=71250)    COMPARISON:  [facility] , XR, XR CHEST AP PORTABLE  (CPT=71045), 5/29/2024,   4:27 AM.  [facility], CT, CT ANGIOGRAPHY, CHEST (CPT=71275), 5/26/2024, 12:49   PM.    INDICATIONS:  Follow-up empyema    TECHNIQUE:  Unenhanced multislice CT scanning is performed through the   chest.  Dose reduction techniques were used. Dose  information is   transmitted to the ACR (American College of Radiology) NRDR (National   Radiology Data Registry) which includes the Dose  Index Registry.  There is image degradation due to motion slightly   limiting evaluation    PATIENT STATED HISTORY: (As transcribed by Technologist)  Recent empyema       FINDINGS:   LUNGS:  Masslike consolidation with internal punctate foci of air is most   consistent with patient's known consolidation and measures 5.7 x 8.1 x 6.1   versus 5 x 7.2 x 5.1 cm, AP x T x cc dimension respectively.  Increasing   adjacent interstitial and   ground-glass density. VASCULATURE:  Pulmonary vessels are unremarkable within the limits of a   noncontrast CT.   ANKUSH:  Limited due to lack of IV contrast.   MEDIASTINUM:  Adenopathy with subcarinal node measuring 2.8 x 3.3 versus   2.1 x 2 cm and right paratracheal node measuring 2 x 1.3 versus 2 x 1.4   cm.  Mediastinal clips. CARDIAC:  No enlargement or pericardial thickening.  Mild coronary artery   calcifications. PLEURA:  Increasing pleural fluid along the right fissures which is   lobular in contour and measures 2.6 cm in depth.  Right pleural effusion   measures 3.7 versus 2.9 cm with left pleural fluid measuring 1.7 versus   0.8 cm in depth.   THORACIC AORTA:  Atherosclerosis.   CHEST WALL:  No mass or axillary adenopathy.  Sternotomy wires. LIMITED ABDOMEN:  Limited images of the upper abdomen are unremarkable.   BONES:  No fracture.      CONCLUSION:   1. When compared to most recent CT of the chest performed 5/26/2024,   patient's known right lower lobe consolidation has slightly increased in   size with increasing adjacent interstitial and ground-glass density,   correlate clinically. 2. Increasing pleural fluid as detailed above.    LOCATION:  [facility]       Dictated by (CST): Britney Mendes MD on 5/29/2024 at 9:39 AM       Finalized by (CST): Britney Mendes MD on 5/29/2024 at 9:51 AM      Dictated by (CST): Britney Mendes MD on 5/29/2024 at 11:23 AM        Finalized by (CST): Britney Mendes MD on 5/29/2024 at 11:23 AM                       Signed: 05/29/24 1123 by Britney Mendes MD  Narrative:    PROCEDURE:  CT CHEST (CPT=71250)     COMPARISON:  [facility] , XR, XR CHEST AP PORTABLE  (CPT=71045), 5/29/2024, 4:27 AM.  [facility], CT, CT ANGIOGRAPHY, CHEST (CPT=71275), 5/26/2024, 12:49 PM.     INDICATIONS:  Follow-up empyema     TECHNIQUE:  Unenhanced multislice CT scanning is performed through the chest.  Dose reduction techniques were used. Dose information is transmitted to the ACR (American College of Radiology) NRDR (National Radiology Data Registry) which includes the Dose   Index Registry.  There is image degradation due to motion slightly limiting evaluation     PATIENT STATED HISTORY: (As transcribed by Technologist)  Recent empyema      FINDINGS:    LUNGS:  Masslike consolidation with internal punctate foci of air is most consistent with patient's known empyema and measures 5.7 x 8.1 x 6.1 versus 5 x 7.2 x 5.1 cm, AP x T x cc dimension respectively.  Increasing adjacent interstitial and ground-glass   density.  VASCULATURE:  Pulmonary vessels are unremarkable within the limits of a noncontrast CT.    ANKUSH:  Limited due to lack of IV contrast.    MEDIASTINUM:  Adenopathy with subcarinal node measuring 2.8 x 3.3 versus 2.1 x 2 cm and right paratracheal node measuring 2 x 1.3 versus 2 x 1.4 cm.  Mediastinal clips.  CARDIAC:  No enlargement or pericardial thickening.  Mild coronary artery calcifications.  PLEURA:  Increasing pleural fluid along the right fissures which is lobular in contour and measures 2.6 cm in depth.  Right pleural effusion measures 3.7 versus 2.9 cm with left pleural fluid measuring 1.7 versus 0.8 cm in depth.    THORACIC AORTA:  Atherosclerosis.  CHEST WALL:  No mass or axillary adenopathy.  Sternotomy wires.  LIMITED ABDOMEN:  Limited images of the upper abdomen are unremarkable.    BONES:  No fracture.       Impression:    CONCLUSION:    1. When  compared to most recent CT of the chest performed 5/26/2024, patient's known right lower lobe empyema has slightly increased in size with increasing adjacent interstitial and ground-glass density, correlate clinically.  2. Increasing pleural fluid as detailed above.     LOCATION:  [facility]     Dictated by (CST): Britney Mendes MD on 5/29/2024 at 9:39 AM      Finalized by (CST): Britney Mendes MD on 5/29/2024 at 9:51 AM       PROCEDURE:  XR CHEST AP PORTABLE  (CPT=71045)     TECHNIQUE:  AP chest radiograph was obtained.     COMPARISON:  [facility] , CT, CT ABDOMEN+PELVIS(CONTRAST ONLY)(CPT=74177), 5/28/2024, 1:32 PM.  [facility] , XR, XR CHEST AP PORTABLE  (CPT=71045), 5/28/2024, 4:38 PM.     INDICATIONS:  dyspnea     PATIENT STATED HISTORY: (As transcribed by Technologist)  dyspnea     FINDINGS:  Cardiomediastinal silhouette is stable in size and appearance with sternotomy wires and mediastinal clips.  Persistent blunting of the right costophrenic angle is consistent with small pleural effusion versus reaction.  Persistent interstitial   and right perihilar/lower lobe masslike airspace opacity.  No new focal consolidation.  No pneumothorax.     Impression:    CONCLUSION:    1. No significant change, allowing for differences in technique when compared to 5/28/2024 chest radiograph.     LOCATION:  [facility]    Dictated by (CST): Britney Mendes MD on 5/29/2024 at 7:58 AM      Finalized by (CST): Britney Mendes MD on 5/29/2024 at 8:00 AM        PROCEDURE:  US THORACENTESIS GUIDED RIGHT (CPT=32555)     COMPARISON:  [facility] , XR, XR CHEST AP PORTABLE  (CPT=71045), 5/28/2024, 4:38 PM.  [facility] , XR, XR CHEST AP PORTABLE  (CPT=71045), 5/28/2024, 4:48 AM.  [facility], CT, CT ANGIOGRAPHY, CHEST (CPT=71275), 5/26/2024, 12:49 PM.     INDICATIONS:  pneumonia     DESCRIPTION OF PROCEDURE:  The clinical scenario and referral were discussed with Dr. Ya.  Referral for ultrasound-guided right thoracentesis.  Because of her altered mental status, witnessed written and  verbal informed consent were obtained from her  sister.  The procedure was also discussed with her.  She voiced understanding and wished to proceed.  Time-out was performed by the staff.     She could not achieve a seated position.  Positioning was optimized, supine oblique.  Ultrasound demonstrated a small right complex pleural effusion corresponding to the CTA and chest x-ray abnormality.  Access site was chosen.  The overlying skin was  prepped in the usual sterile manner.  1% lidocaine was used locally.  Using realtime ultrasound an 8 Tamazight multi side hole sheath needle was advanced.  Once in position aspiration to dryness with removal of only 10-11 mL of thin clear yellow fluid.    Samples were sent to RT and the laboratory for testing.  The system was removed.  Sterile dressings were placed.  She tolerated the procedure.  No immediate complication.     Impression:    CONCLUSION:  Small complex right pleural effusion.  Only a small amount of fluid could be aspirated.  Sample sent to RT and the laboratory for testing.     LOCATION:  [facility]     Dictated by (CST): Jace Govea MD on 5/28/2024 at 5:13 PM      Finalized by (CST): Jace Govea MD on 5/28/2024 at 5:16 PM      PROCEDURE:  US ABDOMEN LIMITED (CPT=76705)     COMPARISON:  [facility] , CT, CT ABDOMEN+PELVIS(CONTRAST ONLY)(CPT=74177), 5/28/2024, 1:32 PM.     INDICATIONS:  elevated lfts. eval bile duct, cirrhosis, etc     PATIENT STATED HISTORY: (As transcribed by Technologist)        FINDINGS:    LIVER:  Normal size and echogenicity. No significant masses.  BILIARY:  Common bile duct measures 6 mm in region of stephanie hepatis.  There is no intrahepatic bile duct distension.  Gallbladder is unremarkable.  PANCREAS:  Normal.  RIGHT KIDNEY:  Normal.  OTHER:  There is a small amount of free fluid in posterior subhepatic space.  Incidental note is made of a right pleural effusion.     Impression:    CONCLUSION:    1. Trace ascites is noted in posterior subhepatic  space.  2. Right pleural effusion has been noted on prior CT scan.  3. There is otherwise no abnormality detected on this abdominal ultrasound.       LOCATION:  [facility]     Dictated by (CST): Abad Chen MD on 5/28/2024 at 4:56 PM      Finalized by (CST): Abad Chen MD on 5/28/2024 at 4:57 PM          PROCEDURE:  XR CHEST AP PORTABLE  (CPT=71045)     TECHNIQUE:  AP chest radiograph was obtained.     COMPARISON:  [facility] , US, US THORACENTESIS GUIDED RIGHT (CPT=32555), 5/28/2024, 3:53 PM.  [facility] , XR, XR CHEST AP PORTABLE  (CPT=71045), 5/28/2024, 4:48 AM.     INDICATIONS:  Post Thoracentesis     PATIENT STATED HISTORY: (As transcribed by Technologist)  Right sided post thoracentesis. Patient offered no additional history at this time.      FINDINGS:  Status post right thoracentesis.  There is a small right pleural effusion.  Patchy opacity at the right lung base may represent pneumonia.  There is no pneumothorax.  Median sternotomy wires are stable.  The heart is normal in size.     Impression:    CONCLUSION:  Small right pleural effusion with patchy airspace disease the right lung base could represent pneumonia.  There is no pneumothorax.     LOCATION:  [facility]     Dictated by (CST): Elieser Torres MD on 5/28/2024 at 4:55 PM      Finalized by (CST): Elieser Torres MD on 5/28/2024 at 4:56 PM      PROCEDURE:  CT ABDOMEN+PELVIS (CONTRAST ONLY) (CPT=74177)     COMPARISON:  [facility], CT, CT ABDOMEN (ATTENTION PANCREAS) (W+ WO) (CPT=74170), 3/02/2022, 3:52 PM.  [facility], CT, CT ABDOMEN+PELVIS(CONTRAST ONLY)(CPT=74177), 9/18/2021, 2:27 PM.     INDICATIONS:  GIB     TECHNIQUE:  CT scanning was performed from the dome of the diaphragm to the pubic symphysis with non-ionic intravenous contrast material. Post contrast coronal MPR imaging was performed.  Dose reduction techniques were used. Dose information is  transmitted to the ACR (American College of Radiology) NRDR (National Radiology Data Registry) which includes  the Dose Index Registry.     PATIENT STATED HISTORY:(As transcribed by Technologist)  Poor historian      CONTRAST USED:  85cc of Isovue 370     FINDINGS:    LUNG BASES:  Loculated right-sided pleural effusion with thick wall measuring approximately 7.8 x 2.5 x 12.7 cm.  Findings suggestive of empyema.  There is consolidation within the right lower lobe with complex fluid and small foci of air measuring 3.0 x   2.7 x 3.2 cm and 2.1 x 2.8 x 1.7 cm which may represent pulmonary abscesses/necrotizing pneumonia.  Small left-sided pleural effusion.  LIVER:  Normal in shape and contour.    BILIARY:  Gallbladder is unremarkable in appearance.  No intrahepatic biliary dilatation..  SPLEEN:  Normal.  No enlargement or focal lesion.  PANCREAS:  No kirk-pancreatic inflammatory stranding  ADRENALS:  Normal.  No mass or enlargement.    KIDNEYS:  Kidneys are symmetrical in size without evidence of hydronephrosis.  BOWEL/MESENTERY:  Bowel is normal in caliber. No evidence of obstruction.  Probable Villanueva's type hernia containing fat and a portion of bowel  within the right para umbilical region.  No evidence of obstruction.  Mildly prominent retroperitoneal lymph  nodes.  PELVIS:  Bladder is distended.  Small amount of free pelvic fluid.    AORTA/VASCULAR:  Atheromatous calcifications of the aorta.  BONES:  No acute fractures.  Median sternotomy approximated by wire sutures.     Impression:    CONCLUSION:    1. Loculated right-sided pleural effusion suggestive empyema with consolidation within the right lower lobe.  There are areas of fluid and small foci of air within the consolidation which may represent pulmonary abscesses/necrotizing pneumonia.  2. Small left-sided pleural effusion with associated atelectasis.  3. Nonspecific mildly prominent retroperitoneal lymph nodes which may be reactive in nature.  4. Trace amount of free pelvic fluid.       LOCATION:  [facility]     Dictated by (CST): Annette Ferreira MD on 5/28/2024 at  2:11 PM      Finalized by (CST): Annette Ferreira MD on 5/28/2024 at 2:17 PM         PROCEDURE:  CT BRAIN OR HEAD (49677)     COMPARISON:  None.     INDICATIONS:  lethargy     TECHNIQUE:  Noncontrast CT scanning is performed through the brain. Dose reduction techniques were used. Dose information is transmitted to the ACR (American College of Radiology) NRDR (National Radiology Data Registry) which includes the Dose Index  Registry.     PATIENT STATED HISTORY: (As transcribed by Technologist)  Patient is lethargic.       FINDINGS:    VENTRICLES/SULCI:  Ventricles and sulci are normal in size.    INTRACRANIAL:  There is a chronic appearing lacunar infarct involving the right caudate nucleus at the junction of the head and body..  There are no abnormal extraaxial fluid collections.  There is no midline shift.  There are no intraparenchymal brain  abnormalities.  There is nothing specific for acute infarct.  There is no hemorrhage or mass lesion.    SINUSES:           No sign of acute sinusitis.    MASTOIDS:          No sign of acute inflammation.  SKULL:             No evidence for fracture or osseous abnormality.  OTHER:             None.    Impression:    CONCLUSION:  No evidence of acute intracranial process.     LOCATION:  [facility]     Dictated by (CST): Elieser Torres MD on 5/28/2024 at 8:49 AM      Finalized by (CST): Elieser Torres MD on 5/28/2024 at 8:54 AM      PROCEDURE:  XR CHEST AP PORTABLE  (CPT=71045)     TECHNIQUE:  AP chest radiograph was obtained.     COMPARISON:  [facility], CT, CT ANGIOGRAPHY, CHEST (CPT=71275), 5/26/2024, 12:49 PM.  [facility], XR, XR CHEST PA + LAT CHEST (CPT=71046), 5/26/2024, 11:43 AM.     INDICATIONS:  dyspnea     PATIENT STATED HISTORY: (As transcribed by Technologist)  dyspnea      Impression:    CONCLUSION:    Limited AP portable view shows small-moderate right pleural effusion cleaning some pleural fluid tracking up the lower right lateral chest.  Possibly loculated.  Patchy  consolidation mid-lower right chest.  Small blunting left costophrenic   angle.  Cardiomegaly.  No pneumothorax. Consider upright PA and lateral examination of the chest, when tolerated.  CT follow-up may also be beneficial.       LOCATION:  [facility]     Dictated by (CST): Abhishek Mora MD on 5/28/2024 at 8:36 AM      Finalized by (CST): Abhishek Mora MD on 5/28/2024 at 8:37 AM          PROCEDURE:  US VENOUS DOPPLER LEG LEFT - DIAG IMG (CPT=93971)     COMPARISON:  None.     INDICATIONS:  Left lower extremity swelling and pain     TECHNIQUE:  Real time, grey scale, and duplex ultrasound was used to evaluate the lower extremity venous system. B-mode two-dimensional images of the vascular structures, Doppler spectral analysis, and color flow.  Doppler imaging were performed.  The  following veins were imaged:  Common, deep, and superficial femoral, popliteal, sapheno-femoral junction, posterior tibial veins, and the contralateral common femoral vein.     PATIENT STATED HISTORY: (As transcribed by Technologist)        FINDINGS:    EXTREMITY EXAMINED:  Left lower extremity  SAPHENOFEMORAL JUNCTION:  No reflux.  THROMBI:  None visible.  COMPRESSION:  Normal compressibility, phasicity, and augmentation.  OTHER:  Negative.     Impression:    CONCLUSION:  No evidence of DVT in the left lower extremity.     LOCATION:  [facility]    Dictated by (CST): Roc Mosqueda MD on 5/26/2024 at 2:46 PM      Finalized by (CST): Roc Mosqueda MD on 5/26/2024 at 2:49 PM      PROCEDURE:  CT ANGIOGRAPHY, CHEST (CPT=71275)     COMPARISON:  [facility], CT, CT CHEST LD NO CAD(CPT=71250), 3/13/2024, 8:38 PM.     INDICATIONS:  sob, hypotension, pleurisy, R lung mass     TECHNIQUE:  IV contrast-enhanced multislice CT angiography is performed through the pulmonary arterial anatomy. 3D volume renderings are generated.  Dose reduction techniques were used. Dose information is transmitted to the ACR (American College of  Radiology) NRDR (National  Radiology Data Registry) which includes the Dose Index Registry.     PATIENT STATED HISTORY:(As transcribed by Technologist)  SOB, hypotension; right lung mass      CONTRAST USED:  100cc of Isovue 370     FINDINGS:    VASCULATURE:  No visible pulmonary arterial thrombus or attenuation.    THORACIC AORTA:  No aneurysm or visible dissection.    LUNGS:  Masslike consolidation in the right lower lobe is new compared to the prior examination.  Given its acute appearance an infectious process is most likely.  Small central areas of cavitation are noted with associated low attenuation which may  represent necrosis.  Moderate right pleural effusion is noted.  MEDIASTINUM:  Right paratracheal node is noted measuring up to 2.0 x 1.4 cm (image 38).  A subcarinal lymph node is noted measuring up to 2.0 x 2.1 cm (image 93)..    ANKUSH:  No mass or adenopathy.    CARDIAC:  No enlargement, pericardial thickening, or significant coronary artery calcification.  PLEURA:  No mass or effusion.    CHEST WALL:  No mass or axillary adenopathy.  Patient is status post median sternotomy.  LIMITED ABDOMEN:  Limited images of the upper abdomen are unremarkable.    BONES:  No bony lesion or fracture.    OTHER:  Negative.       Impression:    CONCLUSION:    1. Masslike consolidation with areas of central necrosis and mild cavitation in the right lower lobe.  Given its acute appearance this may represent an infectious process.  Correlate clinically.  Short-term follow-up after treatment is recommended.  2. Mediastinal adenopathy is likely reactive.  Attention on follow-up is recommended.     LOCATION:  [facility]     Dictated by (CST): Roc Mosqueda MD on 5/26/2024 at 1:21 PM      Finalized by (CST): Roc Mosqueda MD on 5/26/2024 at 1:25 PM        PROCEDURE:  XR CHEST PA + LAT CHEST (CPT=71046)     INDICATIONS:  sob, cough, crackles R base     COMPARISON:  [facility], CT, CT CHEST LD NO CAD(CPT=71250), 3/13/2024, 8:38 PM.  [facility] , XR, XR CHEST  DECUBITUS BILAT INCL PA AND LAT(4 VIEWS)(CPT=71048), 1/15/2024, 9:15 AM.     TECHNIQUE:  PA and lateral chest radiographs were obtained.     PATIENT STATED HISTORY: (As transcribed by Technologist)  Patient stated she has been having a cough for 1 week and a crackle sound in her right lung.      FINDINGS:    LUNGS:  An area of masslike consolidation in the right upper lobe is noted which is new from the prior examination.  Small right pleural effusion is present.  CARDIAC:  Normal size cardiac silhouette.  Patient is status post median sternotomy.  MEDIASTINUM:  Normal.  PLEURA:  Normal.  No pleural effusions.  BONES:  Normal for age.     Impression:    CONCLUSION:  Masslike consolidation in the right upper lobe is noted.  Given it is acute appearance this likely represents an infectious process.  Small right effusion is noted.  Correlate clinically.     LOCATION:  [facility]     Dictated by (CST): Roc Mosqueda MD on 5/26/2024 at 12:05 PM      Finalized by (CST): Roc Mosqueda MD on 5/26/2024 at 12:06 PM    ASSESSMENT:  Right empyema, s/p thoracentesis 5/28, cx with strep intermedius. Path w/ acute inflammation with occasional bacterial organisms present. S/p R chest tube placement 5/29, cx with strep intermedius.   RLL pneumonia. Legionella and strep pneumo Uags negative. Blasto ag negative.  Fevers and leukocytosis, assume d/t above. No further temps, some increase in WBC today.   Alerted mental status. ? Related to above vs other. Head CT w/o acute process. Resolved.  Elevated LFTs. CT a/p and US abd unremarkable. Improving.    Anemia  IgM kappa MGUS  DM II. Hgb A1C 7.1  COPD    PLAN:  - continue IV Unasyn   - follow blood cultures- ngtd  - follow temps and wbc  - follow LFTs  - MIST 2 as per pulm  - follow chest tube output; plans for repeat chest CT Monday.    Discussed case with RN, patient, Dr. Burger, Dr. Villanueva and HIEN Wynn   [facility]  [phone]

## 2024-06-02 LAB
ALBUMIN SERPL-MCNC: 1.2 G/DL (ref 3.4–5)
ALBUMIN/GLOB SERPL: 0.3 {RATIO} (ref 1–2)
ALP LIVER SERPL-CCNC: 319 U/L
ALT SERPL-CCNC: 194 U/L
ANION GAP SERPL CALC-SCNC: 10 MMOL/L (ref 0–18)
AST SERPL-CCNC: 183 U/L (ref 15–37)
BILIRUB SERPL-MCNC: 0.7 MG/DL (ref 0.1–2)
BUN BLD-MCNC: 4 MG/DL (ref 9–23)
CALCIUM BLD-MCNC: 8.1 MG/DL (ref 8.5–10.1)
CHLORIDE SERPL-SCNC: 107 MMOL/L (ref 98–112)
CO2 SERPL-SCNC: 20 MMOL/L (ref 21–32)
CREAT BLD-MCNC: 0.42 MG/DL
EGFRCR SERPLBLD CKD-EPI 2021: 111 ML/MIN/1.73M2 (ref 60–?)
ERYTHROCYTE [DISTWIDTH] IN BLOOD BY AUTOMATED COUNT: 15.9 %
ERYTHROCYTE [DISTWIDTH] IN BLOOD BY AUTOMATED COUNT: 15.9 %
GLOBULIN PLAS-MCNC: 4.5 G/DL (ref 2.8–4.4)
GLUCOSE BLD-MCNC: 134 MG/DL (ref 70–99)
GLUCOSE BLD-MCNC: 144 MG/DL (ref 70–99)
GLUCOSE BLD-MCNC: 147 MG/DL (ref 70–99)
GLUCOSE BLD-MCNC: 163 MG/DL (ref 70–99)
GLUCOSE BLD-MCNC: 183 MG/DL (ref 70–99)
HCT VFR BLD AUTO: 26.3 %
HCT VFR BLD AUTO: 27.9 %
HGB BLD-MCNC: 8.9 G/DL
HGB BLD-MCNC: 8.9 G/DL
HGB BLD-MCNC: 9.3 G/DL
HGB BLD-MCNC: 9.9 G/DL
INR BLD: 1.34 (ref 0.8–1.2)
LACTATE SERPL-SCNC: 1.4 MMOL/L (ref 0.4–2)
MCH RBC QN AUTO: 29.8 PG (ref 26–34)
MCH RBC QN AUTO: 30.2 PG (ref 26–34)
MCHC RBC AUTO-ENTMCNC: 33.3 G/DL (ref 31–37)
MCHC RBC AUTO-ENTMCNC: 33.8 G/DL (ref 31–37)
MCV RBC AUTO: 89.2 FL
MCV RBC AUTO: 89.4 FL
OSMOLALITY SERPL CALC.SUM OF ELEC: 284 MOSM/KG (ref 275–295)
PLATELET # BLD AUTO: 536 10(3)UL (ref 150–450)
PLATELET # BLD AUTO: 626 10(3)UL (ref 150–450)
POTASSIUM SERPL-SCNC: 3.7 MMOL/L (ref 3.5–5.1)
PROT SERPL-MCNC: 5.7 G/DL (ref 6.4–8.2)
PROTHROMBIN TIME: 16.6 SECONDS (ref 11.6–14.8)
RBC # BLD AUTO: 2.95 X10(6)UL
RBC # BLD AUTO: 3.12 X10(6)UL
SODIUM SERPL-SCNC: 137 MMOL/L (ref 136–145)
WBC # BLD AUTO: 26.3 X10(3) UL (ref 4–11)
WBC # BLD AUTO: 31.5 X10(3) UL (ref 4–11)

## 2024-06-02 PROCEDURE — 99232 SBSQ HOSP IP/OBS MODERATE 35: CPT | Performed by: INTERNAL MEDICINE

## 2024-06-02 PROCEDURE — 99233 SBSQ HOSP IP/OBS HIGH 50: CPT | Performed by: INTERNAL MEDICINE

## 2024-06-02 RX ORDER — OXYCODONE HCL 10 MG/1
10 TABLET, FILM COATED, EXTENDED RELEASE ORAL EVERY 12 HOURS
Status: DISCONTINUED | OUTPATIENT
Start: 2024-06-02 | End: 2024-06-05

## 2024-06-02 NOTE — PROGRESS NOTES
[facility] Pharmacy Dosing Service      Initial Pharmacokinetic Consult for Vancomycin Dosing     Soumya King is a 63 year old female who is being initiated on vancomycin therapy for pneumonia.  Pharmacy has been asked to dose vancomycin by Dr. Burger.  The initial treatment and monitoring approach will be steady state AUC strategy.        Weight and Temperature:    Wt Readings from Last 1 Encounters:   24 60.2 kg (132 lb 12.8 oz)        Temp Readings from Last 1 Encounters:   24 99.8 °F (37.7 °C) (Oral)      Labs:   Recent Labs   Lab 24  0741 24  0758 24  0806   CREATSERUM 0.57  0.57 0.60 0.42*      Estimated Creatinine Clearance: 106.1 mL/min (A) (based on SCr of 0.42 mg/dL (L)).     Recent Labs   Lab 24  0733 24  0758 24  0806   WBC 17.8* 21.6* 26.3*          The Pharmacokinetic Target is:     to 600 mg-h/L and trough <=15 mg/L    Renal Dosing Considerations:    None     Assessment/Plan:   Initial/Loading dose: none      Maintenance dose: Pharmacy will dose vancomycin at 1000 mg IV every 12 hours    Monitorin) Plan for vancomycin peak and trough to be obtained at steady state    2) Pharmacy will order SCr as clinically indicated to assess renal function.    3) Pharmacy will monitor for toxicity and efficacy, adjust vancomycin dose and/or frequency, and order vancomycin levels as appropriate per the Pharmacy and Therapeutics Committee approved protocol until discontinuation of the medication.       We appreciate the opportunity to assist in the care of this patient.     Marianna Buck, PharmD  2024  9:04 AM  [facility] Extension: [phone]

## 2024-06-02 NOTE — PROGRESS NOTES
[facility] Pulmonary Progress Note    Soumya King Patient Status:  Inpatient    6/3/1960 MRN [id number]   Location [facility] 5NW-A Attending Hoda Juan MD   Hosp Day # 7 PCP Rika Vinson DO     Subjective:  Soumya King is a(n) 63 year old female who is admitted for pneumonia with empyema.    Overnight: Increased fever overnight and white blood cell count increasing as well    Objective:  /60 (BP Location: Right arm)   Pulse 120   Temp 98.7 °F (37.1 °C) (Oral)   Resp 24   Ht 5' 1\" (1.549 m)   Wt 132 lb 12.8 oz (60.2 kg)   SpO2 93%   BMI 25.09 kg/m²       Temp (24hrs), Av.3 °F (37.4 °C), Min:98.3 °F (36.8 °C), Max:101.3 °F (38.5 °C)      Intake/Output:    Intake/Output Summary (Last 24 hours) at 2024 1152  Last data filed at 2024 0830  Gross per 24 hour   Intake 120 ml   Output 220 ml   Net -100 ml       Physical Exam:   General: alert, cooperative, oriented.  No respiratory distress.   Head: Normocephalic, without obvious abnormality, atraumatic.   Throat: Lips, mucosa, and tongue normal.  No thrush noted.   Neck: trachea midline, no adenopathy, no thyromegaly. No JVD.   Lungs: clear to auscultation bilaterally   Chest wall: No tenderness or deformity.   Heart: regular rate and rhythm, S1, S2 normal, no murmur, click, rub or gallop   Abdomen: soft, non-distended, no masses, no guarding, no     Rebound.   Extremity: No edema or cyanosis   Skin: No rashes or lesions.   Neurological: Alert, interactive, no focal deficits    Lab Data Review:  Recent Labs     24  0737 24  1235 24  2356 24  0714 24  1623 24  0122 24  0733   WBC 29.0*  --   --  18.7*  --   --  17.8*   HGB 7.2*   < > 6.9* 8.3*  8.4* 9.2* 8.6* 8.9*  8.9*   .0*  --   --  457.0*  --   --  520.0*    < > = values in this interval not displayed.     Recent Labs     24  0737 24  0714 24  0741    142 136   K 3.9 3.8 3.8    109 108   CO2  17.0* 20.0* 20.0*   BUN 9 9 5*   CREATSERUM 0.73 0.67  0.67 0.57  0.57     Recent Labs   Lab 05/31/24  0855 06/01/24  0758 06/02/24  0806   PTP 16.6* 16.7* 16.6*   INR 1.33* 1.35* 1.34*       Cultures:   Hospital Encounter on 05/26/24   1. Body Fluid Cult Aerobic and Anaerobic     Status: Abnormal    Collection Time: 05/29/24  3:46 PM    Specimen: Pleural Fluid, Right; Body fluid, unspecified   Result Value Ref Range    Body Fluid Culture Result 3+ growth Streptococcus intermedius (A) N/A    Body Fluid Smear 4+ WBCs seen N/A    Body Fluid Smear No organisms seen N/A    Body Fluid Smear This is a cytocentrifuged smear. N/A   2. Blood Culture     Status: None (Preliminary result)    Collection Time: 05/29/24  2:24 PM    Specimen: Blood,peripheral   Result Value Ref Range    Blood Culture Result No Growth 3 Days N/A       Radiology:  US ABDOMEN DOPPLER ONLY (CPT=93975)  Narrative: PROCEDURE:  US ABDOMEN DOPPLER ONLY (CPT=93975)     COMPARISON:  [facility] CT, CT ABDOMEN (ATTENTION PANCREAS) (W+ WO) (CPT=74170), 3/02/2022, 3:52 PM.     INDICATIONS:  Elevated liver enzyme     TECHNIQUE:  Real time gray-scale ultrasound was used to evaluate the abdomen.  B-mode images, Doppler color flow, and spectral waveform analysis were performed of the portal vein, hepatic artery, hepatic vein, and splenic vein.  The exam includes images   of the liver, gallbladder, common bile duct, pancreas, spleen, kidneys, IVC, and aorta.     PATIENT STATED HISTORY: (As transcribed by Technologist)           FINDINGS:     PEAK VELOCITIES (cm/sec):     Main hepatic artery:  65     Right hepatic vein:  32     Middle hepatic vein:  31     Left hepatic vein:  27     Main portal vein:  23     Right portal vein:  17     Left portal vein:  24     Splenic vein:  43        ABDOMEN  LIVER:  Normal liver morphology and echogenicity.           DOPPLER WAVE FORMS  FLOW:  There is normal arterial and venous Doppler wave forms.  The spectral analysis is  within normal limits.  Hepatic veins demonstrate hepatofugal flow.  Portal veins demonstrate hepatopetal flow.                       Impression: CONCLUSION:  Patency and normal directionality of hepatic and portal vasculature.        LOCATION:  [facility]              Dictated by (CST): Donald Moffett MD on 6/01/2024 at 10:14 AM       Finalized by (CST): Donald Moffett MD on 6/01/2024 at 10:16 AM         Medications reviewed     Assessment and Plan:   Patient Active Problem List   Diagnosis    Vitamin D deficiency    Jamison's esophagus    Tobacco use    Insomnia    COPD, mild (HCC) - Dx'd via PFTs done in 3/2015    Migraine without aura and without status migrainosus, not intractable    Hyperlipidemia    SUMMER on CPAP    MDD (major depressive disorder), recurrent episode, moderate (HCC)    GERD (gastroesophageal reflux disease)    Hypertension    Chronic pansinusitis    Mucinous cystadenoma of ovary, left    KIRK (generalized anxiety disorder)    History of pubovaginal sling    Absence of bladder continence    Vasomotor flushing    Primary osteoarthritis of left knee    Mass of spine    Hx of motion sickness    Difficult intubation    Chronic low back pain without sciatica    Type 2 diabetes mellitus with diabetic neuropathy (HCC)    Pulmonary nodule    CAD, multiple vessel    S/P CABG x 2    Type 2 diabetes mellitus with hyperlipidemia (HCC)    Anemia    Iron deficiency anemia secondary to inadequate dietary iron intake    Subacute cough    Cavitary lesion of lung    Hyperglycemia    Acute anemia    MARÍA LEENA (acute kidney injury) (HCC)    Transaminitis    Thrombocytosis    Hyponatremia    Pleural effusion    Somnolence    Acute respiratory failure with hypoxia (HCC)    Empyema lung (HCC)       Assessment:  Large right lower lobe pneumonia with empyema secondary to Streptococcus intermedius  Febrile illness with increasing white blood cell count  Right pleural effusion with empyema as noted above  Pain from around chest tube  site  Multiple pulmonary nodules  Acute encephalopathy, improved    Plan:  ID following for antibiotic management, agree with adding back vancomycin  Continue mist protocol, keep close eye for bleeding, completing today  Continue chest tube to suction, will need to keep tube in while instilling tpa and dornase  Placed order for repeat CT scan tomorrow morning  Will follow        Liana Villanueva MD  [facility]  Office: [phone]

## 2024-06-02 NOTE — PROGRESS NOTES
[facility]   part of [facility]     Hospitalist Progress Note     Soumya King Patient Status:  Inpatient    6/3/1960 MRN [id number]   Location [facility] 5NW-A Attending Apurva Logan MD   Hosp Day # 7 PCP Rika Vinson DO     Chief Complaint: chest wall pain     Subjective:     Patient states she is hurting everywhere mainly at the CT site.     Objective:    Review of Systems:   A comprehensive review of systems was completed; pertinent positive and negatives stated in subjective.    Vital signs:  Temp:  [98.3 °F (36.8 °C)-101.3 °F (38.5 °C)] 99.8 °F (37.7 °C)  Pulse:  [120] 120  Resp:  [18-24] 24  BP: (102-133)/(60-75) 102/60  SpO2:  [93 %-94 %] 93 %    Physical Exam:    General: No acute distress, confused, sleepy  Respiratory: No wheezes, no rhonchi  Cardiovascular: S1, S2, regular rate and rhythm  Abdomen: Soft, Non-tender, non-distended, positive bowel sounds  Neuro: No new focal deficits.   Extremities: No edema      Diagnostic Data:    Labs:  Recent Labs   Lab 24  0548 24  1151 24  0737 24  1235 24  0714 24  1623 24  0733 24  0855 24  1708 24  0041 24  0758 24  1615 24  0016   WBC 33.7*  --  29.0*  --  18.7*  --  17.8*  --   --   --  21.6*  --   --    HGB 7.6*   < > 7.2*   < > 8.3*  8.4*   < > 8.9*  8.9*  --  9.8* 8.8* 9.2*  9.2* 9.2* 9.9*   MCV 89.7  --  91.2  --  87.3  --  89.7  --   --   --  89.7  --   --    .0*  --  547.0*  --  457.0*  --  520.0*  --   --   --  577.0*  --   --    BAND 3  --   --   --  1  --  2  --   --   --   --   --   --    INR 1.58*  --  1.50*  --  1.51*  --  >15.49* 1.33*  --   --  1.35*  --   --     < > = values in this interval not displayed.       Recent Labs   Lab 24  0714 24  0741 24  0758   * 129* 136*   BUN 9 5* 6*   CREATSERUM 0.67  0.67 0.57  0.57 0.60   CA 8.5 8.4* 8.3*   ALB 1.3* 1.4* 1.4*    136 136   K 3.8 3.8 3.7     108 108   CO2 20.0* 20.0* 21.0   ALKPHO 544* 473* 381*   AST 1,228* 590* 243*   * 371* 262*   BILT 0.6 0.6 0.7   TP 6.1* 6.3* 6.1*       Estimated Creatinine Clearance: 74.3 mL/min (based on SCr of 0.6 mg/dL).    Recent Labs   Lab 05/26/24  1133   TROPHS 3       Recent Labs   Lab 05/31/24  0733 05/31/24  0855 06/01/24  0758   PTP >120.0* 16.6* 16.7*   INR >15.49* 1.33* 1.35*          Microbiology    Hospital Encounter on 05/26/24   1. Body Fluid Cult Aerobic and Anaerobic     Status: Abnormal    Collection Time: 05/29/24  3:46 PM    Specimen: Pleural Fluid, Right; Body fluid, unspecified   Result Value Ref Range    Body Fluid Culture Result 3+ growth Streptococcus intermedius (A) N/A    Body Fluid Smear 4+ WBCs seen N/A    Body Fluid Smear No organisms seen N/A    Body Fluid Smear This is a cytocentrifuged smear. N/A   2. Blood Culture     Status: None (Preliminary result)    Collection Time: 05/29/24  2:24 PM    Specimen: Blood,peripheral   Result Value Ref Range    Blood Culture Result No Growth 3 Days N/A         Imaging: Reviewed in Epic.    Medications:    alteplase (Activase) 10 mg in sodium chloride 0.9% 30 mL (MIST2) intrapleural syringe  10 mg Intrapleural Q12H    dornase franck (Pulmozyme) 5 mg in sterile water for injection (PF) 30 mL (MIST2) intrapleural syringe  5 mg Intrapleural Q12H    ampicillin-sulbactam  3 g Intravenous q6h    HYDROmorphone  0.2 mg Intravenous Once    ipratropium-albuterol  3 mL Nebulization TID    oxybutynin ER  5 mg Oral Daily    aspirin  81 mg Oral Daily    atorvastatin  80 mg Oral Nightly    busPIRone  20 mg Oral Daily    gabapentin  600 mg Oral Nightly    pantoprazole  40 mg Oral QAM AC    QUEtiapine ER  200 mg Oral QPM    umeclidinium bromide  1 puff Inhalation Daily    heparin  5,000 Units Subcutaneous Q8H JOSÉ ANTONIO    insulin aspart  1-10 Units Subcutaneous TID AC and HS       Assessment & Plan:      # RLL pneumonia with empyema   # RLL effusion with loculation and chest  pain   -abx adjusted per cultures   - started MIST protocol and plan to cont through the weekend until Monday for repeat CT Chest   -Pulmonology and ID rec appreciated   - sp chest tube by IR on 5/29    # elevated liver enzymes seems ischemic in nature per course   -improved  - exam benign   - US abd with doppler neg   - avoid hepatotoxic agents    # Iron def Anemia  - sp PRBC   - no s/s bleeding   -trend    #Coagulopathy  - repeat INR normalized   -trend  -sp Vit K   -US abd and CT AP  without obvious source of LFT rise     #COPD   -no active wheezing  -Continue inhalers  -prn nebs.     #Acute metabolic toxic ( anxiolytic) encephalopathy  -Ammonia normal   -CT brain negative  -ABG reviewed  - improved    #Hypertension  -metoprolol on hold due to low BP     #DM 2  -hyperglycemia protocol     #GERD  #Jamison's esophagus  -PPI.    #MDD/anxiety  Home med       Hoda Juan MD      Supplementary Documentation:     Quality:  DVT Mechanical Prophylaxis:   SCDs,    DVT Pharmacologic Prophylaxis   Medication    heparin (Porcine) 5000 UNIT/ML injection 5,000 Units         DVT Pharmacologic prophylaxis: Aspirin 81 mg      Code Status: Full Code  Gotti: No urinary catheter in place  Gotti Duration (in days):   Central line:    ILYA:     Discharge is dependent on: progress  At this point Ms. King is expected to be discharge to: home    The 21st Century Cures Act makes medical notes like these available to patients in the interest of transparency. Please be advised this is a medical document. Medical documents are intended to carry relevant information, facts as evident, and the clinical opinion of the practitioner. The medical note is intended as peer to peer communication and may appear blunt or direct. It is written in medical language and may contain abbreviations or verbiage that are unfamiliar.

## 2024-06-02 NOTE — PROGRESS NOTES
[facility]   part of [facility] ID PROGRESS NOTE    Soumya King Patient Status:  Inpatient    6/3/1960 MRN [id number]   [facility] 5NW-A Attending Hoda Juan MD   Hosp Day # 7 PCP Rika Vinson DO     Abx: IV Unasyn D#2  s/p Zosyn and vanco    Subjective: Patient febrile to 101.3F last night. No fever currently. WBC increased further today. Right chest tube output has decreased. Patient without noreen hypoxia but feels short of breath.  Plans for CT chest soon.     WBC increased further today. Patient reports no sputum production, abdominal pain, nausea, vomiting, diarrhea, or dysuria.     Allergies:  Allergies   Allergen Reactions    Naprosyn [Naproxen] ANAPHYLAXIS     Has tolerated ibuprofen and IV toradol     Aspirin OTHER (SEE COMMENTS)     Difficulty swallowing due to  saavedra's esophagus       Medications:    Current Facility-Administered Medications:     alteplase (Activase) 10 mg in sodium chloride 0.9% 30 mL (MIST2) intrapleural syringe, 10 mg, Intrapleural, Q12H    dornase franck (Pulmozyme) 5 mg in sterile water for injection (PF) 30 mL (MIST2) intrapleural syringe, 5 mg, Intrapleural, Q12H    ampicillin-sulbactam (Unasyn) 3 g in sodium chloride 0.9% 100mL IVPB-ADD, 3 g, Intravenous, q6h    [Held by provider] clonazePAM (KlonoPIN) tab 0.5 mg, 0.5 mg, Oral, BID PRN    HYDROmorphone (Dilaudid) 1 MG/ML injection 0.2 mg, 0.2 mg, Intravenous, Once    HYDROmorphone (Dilaudid) 1 MG/ML injection 0.5 mg, 0.5 mg, Intravenous, Q2H PRN **OR** HYDROmorphone (Dilaudid) 1 MG/ML injection 1 mg, 1 mg, Intravenous, Q2H PRN **OR** HYDROmorphone (Dilaudid) 1 MG/ML injection 2 mg, 2 mg, Intravenous, Q2H PRN    ipratropium-albuterol (Duoneb) 0.5-2.5 (3) MG/3ML inhalation solution 3 mL, 3 mL, Nebulization, TID    oxybutynin ER (Ditropan-XL) 24 hr tab 5 mg, 5 mg, Oral, Daily    HYDROcodone-acetaminophen (Norco) 5-325 MG per tab 1 tablet, 1 tablet, Oral, Q4H PRN **OR**  HYDROcodone-acetaminophen (Norco) 5-325 MG per tab 2 tablet, 2 tablet, Oral, Q4H PRN    traMADol (Ultram) tab 50 mg, 50 mg, Oral, Q6H PRN    aspirin DR tab 81 mg, 81 mg, Oral, Daily    atorvastatin (Lipitor) tab 80 mg, 80 mg, Oral, Nightly    benzonatate (Tessalon) cap 100 mg, 100 mg, Oral, TID PRN    busPIRone (Buspar) tab 20 mg, 20 mg, Oral, Daily    gabapentin (Neurontin) tab 600 mg, 600 mg, Oral, Nightly    pantoprazole (Protonix) DR tab 40 mg, 40 mg, Oral, QAM AC    QUEtiapine ER (SEROquel XR) 24 hr tab 200 mg, 200 mg, Oral, QPM    rizatriptan (Maxalt-MLT) disintegrating tab 5 mg, 5 mg, Oral, PRN    umeclidinium bromide (Incruse Ellipta) 62.5 MCG/ACT inhaler 1 puff, 1 puff, Inhalation, Daily    glucose (Dex4) 15 GM/59ML oral liquid 15 g, 15 g, Oral, Q15 Min PRN **OR** glucose (Glutose) 40% oral gel 15 g, 15 g, Oral, Q15 Min PRN **OR** glucose-vitamin C (Dex-4) chewable tab 4 tablet, 4 tablet, Oral, Q15 Min PRN **OR** dextrose 50% injection 50 mL, 50 mL, Intravenous, Q15 Min PRN **OR** glucose (Dex4) 15 GM/59ML oral liquid 30 g, 30 g, Oral, Q15 Min PRN **OR** glucose (Glutose) 40% oral gel 30 g, 30 g, Oral, Q15 Min PRN **OR** glucose-vitamin C (Dex-4) chewable tab 8 tablet, 8 tablet, Oral, Q15 Min PRN    melatonin tab 3 mg, 3 mg, Oral, Nightly PRN    heparin (Porcine) 5000 UNIT/ML injection 5,000 Units, 5,000 Units, Subcutaneous, Q8H JOSÉ ANTONIO    acetaminophen (Tylenol Extra Strength) tab 500 mg, 500 mg, Oral, Q4H PRN    ondansetron (Zofran) 4 MG/2ML injection 4 mg, 4 mg, Intravenous, Q6H PRN    prochlorperazine (Compazine) 10 MG/2ML injection 5 mg, 5 mg, Intravenous, Q8H PRN    polyethylene glycol (PEG 3350) (Miralax) 17 g oral packet 17 g, 17 g, Oral, Daily PRN    sennosides (Senokot) tab 17.2 mg, 17.2 mg, Oral, Nightly PRN    bisacodyl (Dulcolax) 10 MG rectal suppository 10 mg, 10 mg, Rectal, Daily PRN    fleet enema (Fleet) 7-19 GM/118ML rectal enema 133 mL, 1 enema, Rectal, Once PRN    insulin aspart (NovoLOG)  100 Units/mL FlexPen 1-10 Units, 1-10 Units, Subcutaneous, TID AC and HS    guaiFENesin (Robitussin) 100 MG/5 ML oral liquid 200 mg, 200 mg, Oral, Q4H PRN    sodium chloride (Saline Mist) 0.65 % nasal solution 1 spray, 1 spray, Each Nare, Q3H PRN    Review of Systems:  Completed. See pertinent positives and negatives above.    Physical Exam:  Vital signs: Blood pressure 102/60, pulse 120, temperature 99.8 °F (37.7 °C), temperature source Oral, resp. rate 24, height 5' 1\" (1.549 m), weight 132 lb 12.8 oz (60.2 kg), SpO2 93%, not currently breastfeeding.    General: Alert, oriented. NAD. On room air.   HEENT: Moist mucous membranes.   Neck: No lymphadenopathy.  Supple.  Cardiovascular: RRR.   Respiratory: Diminished breaths with crackles to lower lung. + R chest tube in place.   Abdomen: Soft, nontender, nondistended.   Musculoskeletal: Some pedal edema  Integument: No lesions. No erythema.    Laboratory Data:  Recent Labs   Lab 05/31/24  0733 05/31/24  1708 06/02/24  0806   RBC 2.90*   < > 2.95*   HGB 8.9*  8.9*   < > 8.9*  8.9*   HCT 26.0*   < > 26.3*   MCV 89.7   < > 89.2   MCH 30.7   < > 30.2   MCHC 34.2   < > 33.8   RDW 16.5   < > 15.9   NEPRELIM 13.57*  --   --    WBC 17.8*   < > 26.3*   .0*   < > 536.0*   NEUT 74  --   --    LYMPH 17  --   --    MON 4  --   --    EOS 3  --   --     < > = values in this interval not displayed.     Recent Labs   Lab 05/30/24  0714 05/31/24  0741 06/01/24  0758   * 129* 136*   BUN 9 5* 6*   CREATSERUM 0.67  0.67 0.57  0.57 0.60   CA 8.5 8.4* 8.3*   ALB 1.3* 1.4* 1.4*    136 136   K 3.8 3.8 3.7    108 108   CO2 20.0* 20.0* 21.0   ALKPHO 544* 473* 381*   AST 1,228* 590* 243*   * 371* 262*   BILT 0.6 0.6 0.7   TP 6.1* 6.3* 6.1*       Microbiology: Reviewed in EMR    Radiology: Reviewed.    CORRECTION Corrected on: 5/29/2024;         PROCEDURE:  CT CHEST (CPT=71250)    COMPARISON:  [facility] , XR, XR CHEST AP PORTABLE  (CPT=71045), 5/29/2024,   4:27  [facility], CT, CT ANGIOGRAPHY, CHEST (CPT=71275), 5/26/2024, 12:49   PM.    INDICATIONS:  Follow-up empyema    TECHNIQUE:  Unenhanced multislice CT scanning is performed through the   chest.  Dose reduction techniques were used. Dose information is   transmitted to the ACR (American College of Radiology) NRDR (National   Radiology Data Registry) which includes the Dose  Index Registry.  There is image degradation due to motion slightly   limiting evaluation    PATIENT STATED HISTORY: (As transcribed by Technologist)  Recent empyema       FINDINGS:   LUNGS:  Masslike consolidation with internal punctate foci of air is most   consistent with patient's known consolidation and measures 5.7 x 8.1 x 6.1   versus 5 x 7.2 x 5.1 cm, AP x T x cc dimension respectively.  Increasing   adjacent interstitial and   ground-glass density. VASCULATURE:  Pulmonary vessels are unremarkable within the limits of a   noncontrast CT.   ANKUSH:  Limited due to lack of IV contrast.   MEDIASTINUM:  Adenopathy with subcarinal node measuring 2.8 x 3.3 versus   2.1 x 2 cm and right paratracheal node measuring 2 x 1.3 versus 2 x 1.4   cm.  Mediastinal clips. CARDIAC:  No enlargement or pericardial thickening.  Mild coronary artery   calcifications. PLEURA:  Increasing pleural fluid along the right fissures which is   lobular in contour and measures 2.6 cm in depth.  Right pleural effusion   measures 3.7 versus 2.9 cm with left pleural fluid measuring 1.7 versus   0.8 cm in depth.   THORACIC AORTA:  Atherosclerosis.   CHEST WALL:  No mass or axillary adenopathy.  Sternotomy wires. LIMITED ABDOMEN:  Limited images of the upper abdomen are unremarkable.   BONES:  No fracture.      CONCLUSION:   1. When compared to most recent CT of the chest performed 5/26/2024,   patient's known right lower lobe consolidation has slightly increased in   size with increasing adjacent interstitial and ground-glass density,   correlate clinically. 2. Increasing  pleural fluid as detailed above.    LOCATION:  [facility]       Dictated by (CST): Britney Mendes MD on 5/29/2024 at 9:39 AM       Finalized by (CST): Britney Mendes MD on 5/29/2024 at 9:51 AM      Dictated by (CST): Britney Mendes MD on 5/29/2024 at 11:23 AM       Finalized by (CST): Britney Mendes MD on 5/29/2024 at 11:23 AM                       Signed: 05/29/24 1123 by Britney Mendes MD  Narrative:    PROCEDURE:  CT CHEST (CPT=71250)     COMPARISON:  [facility] , XR, XR CHEST AP PORTABLE  (CPT=71045), 5/29/2024, 4:27 AM.  [facility], CT, CT ANGIOGRAPHY, CHEST (CPT=71275), 5/26/2024, 12:49 PM.     INDICATIONS:  Follow-up empyema     TECHNIQUE:  Unenhanced multislice CT scanning is performed through the chest.  Dose reduction techniques were used. Dose information is transmitted to the ACR (American College of Radiology) NRDR (National Radiology Data Registry) which includes the Dose   Index Registry.  There is image degradation due to motion slightly limiting evaluation     PATIENT STATED HISTORY: (As transcribed by Technologist)  Recent empyema      FINDINGS:    LUNGS:  Masslike consolidation with internal punctate foci of air is most consistent with patient's known empyema and measures 5.7 x 8.1 x 6.1 versus 5 x 7.2 x 5.1 cm, AP x T x cc dimension respectively.  Increasing adjacent interstitial and ground-glass   density.  VASCULATURE:  Pulmonary vessels are unremarkable within the limits of a noncontrast CT.    ANKUSH:  Limited due to lack of IV contrast.    MEDIASTINUM:  Adenopathy with subcarinal node measuring 2.8 x 3.3 versus 2.1 x 2 cm and right paratracheal node measuring 2 x 1.3 versus 2 x 1.4 cm.  Mediastinal clips.  CARDIAC:  No enlargement or pericardial thickening.  Mild coronary artery calcifications.  PLEURA:  Increasing pleural fluid along the right fissures which is lobular in contour and measures 2.6 cm in depth.  Right pleural effusion measures 3.7 versus 2.9 cm with left pleural fluid measuring 1.7 versus 0.8 cm in depth.     THORACIC AORTA:  Atherosclerosis.  CHEST WALL:  No mass or axillary adenopathy.  Sternotomy wires.  LIMITED ABDOMEN:  Limited images of the upper abdomen are unremarkable.    BONES:  No fracture.       Impression:    CONCLUSION:    1. When compared to most recent CT of the chest performed 5/26/2024, patient's known right lower lobe empyema has slightly increased in size with increasing adjacent interstitial and ground-glass density, correlate clinically.  2. Increasing pleural fluid as detailed above.     LOCATION:  [facility]     Dictated by (CST): Britney Mendes MD on 5/29/2024 at 9:39 AM      Finalized by (CST): Britney Mendes MD on 5/29/2024 at 9:51 AM       PROCEDURE:  XR CHEST AP PORTABLE  (CPT=71045)     TECHNIQUE:  AP chest radiograph was obtained.     COMPARISON:  [facility] , CT, CT ABDOMEN+PELVIS(CONTRAST ONLY)(CPT=74177), 5/28/2024, 1:32 PM.  [facility] , XR, XR CHEST AP PORTABLE  (CPT=71045), 5/28/2024, 4:38 PM.     INDICATIONS:  dyspnea     PATIENT STATED HISTORY: (As transcribed by Technologist)  dyspnea     FINDINGS:  Cardiomediastinal silhouette is stable in size and appearance with sternotomy wires and mediastinal clips.  Persistent blunting of the right costophrenic angle is consistent with small pleural effusion versus reaction.  Persistent interstitial   and right perihilar/lower lobe masslike airspace opacity.  No new focal consolidation.  No pneumothorax.     Impression:    CONCLUSION:    1. No significant change, allowing for differences in technique when compared to 5/28/2024 chest radiograph.     LOCATION:  [facility]    Dictated by (CST): Britney Mendes MD on 5/29/2024 at 7:58 AM      Finalized by (CST): Britney Mendes MD on 5/29/2024 at 8:00 AM        PROCEDURE:  US THORACENTESIS GUIDED RIGHT (CPT=32555)     COMPARISON:  [facility] , XR, XR CHEST AP PORTABLE  (CPT=71045), 5/28/2024, 4:38 PM.  [facility] , XR, XR CHEST AP PORTABLE  (CPT=71045), 5/28/2024, 4:48 AM.  [facility], CT, CT ANGIOGRAPHY, CHEST (CPT=71275), 5/26/2024, 12:49  PM.     INDICATIONS:  pneumonia     DESCRIPTION OF PROCEDURE:  The clinical scenario and referral were discussed with Dr. Ya.  Referral for ultrasound-guided right thoracentesis.  Because of her altered mental status, witnessed written and verbal informed consent were obtained from her  sister.  The procedure was also discussed with her.  She voiced understanding and wished to proceed.  Time-out was performed by the staff.     She could not achieve a seated position.  Positioning was optimized, supine oblique.  Ultrasound demonstrated a small right complex pleural effusion corresponding to the CTA and chest x-ray abnormality.  Access site was chosen.  The overlying skin was  prepped in the usual sterile manner.  1% lidocaine was used locally.  Using realtime ultrasound an 8 Urdu multi side hole sheath needle was advanced.  Once in position aspiration to dryness with removal of only 10-11 mL of thin clear yellow fluid.    Samples were sent to RT and the laboratory for testing.  The system was removed.  Sterile dressings were placed.  She tolerated the procedure.  No immediate complication.     Impression:    CONCLUSION:  Small complex right pleural effusion.  Only a small amount of fluid could be aspirated.  Sample sent to RT and the laboratory for testing.     LOCATION:  [facility]     Dictated by (CST): Jace Govea MD on 5/28/2024 at 5:13 PM      Finalized by (CST): Jace Govea MD on 5/28/2024 at 5:16 PM      PROCEDURE:  US ABDOMEN LIMITED (CPT=76705)     COMPARISON:  [facility] , CT, CT ABDOMEN+PELVIS(CONTRAST ONLY)(CPT=74177), 5/28/2024, 1:32 PM.     INDICATIONS:  elevated lfts. eval bile duct, cirrhosis, etc     PATIENT STATED HISTORY: (As transcribed by Technologist)        FINDINGS:    LIVER:  Normal size and echogenicity. No significant masses.  BILIARY:  Common bile duct measures 6 mm in region of stephanie hepatis.  There is no intrahepatic bile duct distension.  Gallbladder is unremarkable.  PANCREAS:   Normal.  RIGHT KIDNEY:  Normal.  OTHER:  There is a small amount of free fluid in posterior subhepatic space.  Incidental note is made of a right pleural effusion.     Impression:    CONCLUSION:    1. Trace ascites is noted in posterior subhepatic space.  2. Right pleural effusion has been noted on prior CT scan.  3. There is otherwise no abnormality detected on this abdominal ultrasound.       LOCATION:  [facility]     Dictated by (CST): Abad Chen MD on 5/28/2024 at 4:56 PM      Finalized by (CST): Abad Chen MD on 5/28/2024 at 4:57 PM          PROCEDURE:  XR CHEST AP PORTABLE  (CPT=71045)     TECHNIQUE:  AP chest radiograph was obtained.     COMPARISON:  [facility] , US, US THORACENTESIS GUIDED RIGHT (CPT=32555), 5/28/2024, 3:53 PM.  [facility] , XR, XR CHEST AP PORTABLE  (CPT=71045), 5/28/2024, 4:48 AM.     INDICATIONS:  Post Thoracentesis     PATIENT STATED HISTORY: (As transcribed by Technologist)  Right sided post thoracentesis. Patient offered no additional history at this time.      FINDINGS:  Status post right thoracentesis.  There is a small right pleural effusion.  Patchy opacity at the right lung base may represent pneumonia.  There is no pneumothorax.  Median sternotomy wires are stable.  The heart is normal in size.     Impression:    CONCLUSION:  Small right pleural effusion with patchy airspace disease the right lung base could represent pneumonia.  There is no pneumothorax.     LOCATION:  [facility]     Dictated by (CST): Elieser Torres MD on 5/28/2024 at 4:55 PM      Finalized by (CST): Elieser Torres MD on 5/28/2024 at 4:56 PM      PROCEDURE:  CT ABDOMEN+PELVIS (CONTRAST ONLY) (CPT=74177)     COMPARISON:  [facility], CT, CT ABDOMEN (ATTENTION PANCREAS) (W+ WO) (CPT=74170), 3/02/2022, 3:52 PM.  [facility], CT, CT ABDOMEN+PELVIS(CONTRAST ONLY)(CPT=74177), 9/18/2021, 2:27 PM.     INDICATIONS:  GIB     TECHNIQUE:  CT scanning was performed from the dome of the diaphragm to the pubic symphysis with non-ionic  intravenous contrast material. Post contrast coronal MPR imaging was performed.  Dose reduction techniques were used. Dose information is  transmitted to the ACR (American College of Radiology) NRDR (National Radiology Data Registry) which includes the Dose Index Registry.     PATIENT STATED HISTORY:(As transcribed by Technologist)  Poor historian      CONTRAST USED:  85cc of Isovue 370     FINDINGS:    LUNG BASES:  Loculated right-sided pleural effusion with thick wall measuring approximately 7.8 x 2.5 x 12.7 cm.  Findings suggestive of empyema.  There is consolidation within the right lower lobe with complex fluid and small foci of air measuring 3.0 x   2.7 x 3.2 cm and 2.1 x 2.8 x 1.7 cm which may represent pulmonary abscesses/necrotizing pneumonia.  Small left-sided pleural effusion.  LIVER:  Normal in shape and contour.    BILIARY:  Gallbladder is unremarkable in appearance.  No intrahepatic biliary dilatation..  SPLEEN:  Normal.  No enlargement or focal lesion.  PANCREAS:  No kirk-pancreatic inflammatory stranding  ADRENALS:  Normal.  No mass or enlargement.    KIDNEYS:  Kidneys are symmetrical in size without evidence of hydronephrosis.  BOWEL/MESENTERY:  Bowel is normal in caliber. No evidence of obstruction.  Probable Villanueva's type hernia containing fat and a portion of bowel  within the right para umbilical region.  No evidence of obstruction.  Mildly prominent retroperitoneal lymph  nodes.  PELVIS:  Bladder is distended.  Small amount of free pelvic fluid.    AORTA/VASCULAR:  Atheromatous calcifications of the aorta.  BONES:  No acute fractures.  Median sternotomy approximated by wire sutures.     Impression:    CONCLUSION:    1. Loculated right-sided pleural effusion suggestive empyema with consolidation within the right lower lobe.  There are areas of fluid and small foci of air within the consolidation which may represent pulmonary abscesses/necrotizing pneumonia.  2. Small left-sided pleural effusion  with associated atelectasis.  3. Nonspecific mildly prominent retroperitoneal lymph nodes which may be reactive in nature.  4. Trace amount of free pelvic fluid.       LOCATION:  [facility]     Dictated by (CST): Annette Ferreira MD on 5/28/2024 at 2:11 PM      Finalized by (CST): Annette Ferreira MD on 5/28/2024 at 2:17 PM         PROCEDURE:  CT BRAIN OR HEAD (09946)     COMPARISON:  None.     INDICATIONS:  lethargy     TECHNIQUE:  Noncontrast CT scanning is performed through the brain. Dose reduction techniques were used. Dose information is transmitted to the ACR (American College of Radiology) NRDR (National Radiology Data Registry) which includes the Dose Index  Registry.     PATIENT STATED HISTORY: (As transcribed by Technologist)  Patient is lethargic.       FINDINGS:    VENTRICLES/SULCI:  Ventricles and sulci are normal in size.    INTRACRANIAL:  There is a chronic appearing lacunar infarct involving the right caudate nucleus at the junction of the head and body..  There are no abnormal extraaxial fluid collections.  There is no midline shift.  There are no intraparenchymal brain  abnormalities.  There is nothing specific for acute infarct.  There is no hemorrhage or mass lesion.    SINUSES:           No sign of acute sinusitis.    MASTOIDS:          No sign of acute inflammation.  SKULL:             No evidence for fracture or osseous abnormality.  OTHER:             None.    Impression:    CONCLUSION:  No evidence of acute intracranial process.     LOCATION:  [facility]     Dictated by (CST): Elieser Torres MD on 5/28/2024 at 8:49 AM      Finalized by (CST): Elieser Torres MD on 5/28/2024 at 8:54 AM      PROCEDURE:  XR CHEST AP PORTABLE  (CPT=71045)     TECHNIQUE:  AP chest radiograph was obtained.     COMPARISON:  [facility], CT, CT ANGIOGRAPHY, CHEST (CPT=71275), 5/26/2024, 12:49 PM.  [facility], XR, XR CHEST PA + LAT CHEST (CPT=71046), 5/26/2024, 11:43 AM.     INDICATIONS:  dyspnea     PATIENT STATED HISTORY: (As  transcribed by Technologist)  dyspnea      Impression:    CONCLUSION:    Limited AP portable view shows small-moderate right pleural effusion cleaning some pleural fluid tracking up the lower right lateral chest.  Possibly loculated.  Patchy consolidation mid-lower right chest.  Small blunting left costophrenic   angle.  Cardiomegaly.  No pneumothorax. Consider upright PA and lateral examination of the chest, when tolerated.  CT follow-up may also be beneficial.       LOCATION:  [facility]     Dictated by (CST): Abhishek Mora MD on 5/28/2024 at 8:36 AM      Finalized by (CST): Abhishek Mora MD on 5/28/2024 at 8:37 AM          PROCEDURE:  US VENOUS DOPPLER LEG LEFT - DIAG IMG (CPT=93971)     COMPARISON:  None.     INDICATIONS:  Left lower extremity swelling and pain     TECHNIQUE:  Real time, grey scale, and duplex ultrasound was used to evaluate the lower extremity venous system. B-mode two-dimensional images of the vascular structures, Doppler spectral analysis, and color flow.  Doppler imaging were performed.  The  following veins were imaged:  Common, deep, and superficial femoral, popliteal, sapheno-femoral junction, posterior tibial veins, and the contralateral common femoral vein.     PATIENT STATED HISTORY: (As transcribed by Technologist)        FINDINGS:    EXTREMITY EXAMINED:  Left lower extremity  SAPHENOFEMORAL JUNCTION:  No reflux.  THROMBI:  None visible.  COMPRESSION:  Normal compressibility, phasicity, and augmentation.  OTHER:  Negative.     Impression:    CONCLUSION:  No evidence of DVT in the left lower extremity.     LOCATION:  [facility]    Dictated by (CST): Roc Mosqueda MD on 5/26/2024 at 2:46 PM      Finalized by (CST): Roc Mosqueda MD on 5/26/2024 at 2:49 PM      PROCEDURE:  CT ANGIOGRAPHY, CHEST (CPT=71275)     COMPARISON:  [facility], CT, CT CHEST LD NO CAD(CPT=71250), 3/13/2024, 8:38 PM.     INDICATIONS:  sob, hypotension, pleurisy, R lung mass     TECHNIQUE:  IV contrast-enhanced  multislice CT angiography is performed through the pulmonary arterial anatomy. 3D volume renderings are generated.  Dose reduction techniques were used. Dose information is transmitted to the ACR (American College of  Radiology) NRDR (National Radiology Data Registry) which includes the Dose Index Registry.     PATIENT STATED HISTORY:(As transcribed by Technologist)  SOB, hypotension; right lung mass      CONTRAST USED:  100cc of Isovue 370     FINDINGS:    VASCULATURE:  No visible pulmonary arterial thrombus or attenuation.    THORACIC AORTA:  No aneurysm or visible dissection.    LUNGS:  Masslike consolidation in the right lower lobe is new compared to the prior examination.  Given its acute appearance an infectious process is most likely.  Small central areas of cavitation are noted with associated low attenuation which may  represent necrosis.  Moderate right pleural effusion is noted.  MEDIASTINUM:  Right paratracheal node is noted measuring up to 2.0 x 1.4 cm (image 38).  A subcarinal lymph node is noted measuring up to 2.0 x 2.1 cm (image 93)..    ANKUSH:  No mass or adenopathy.    CARDIAC:  No enlargement, pericardial thickening, or significant coronary artery calcification.  PLEURA:  No mass or effusion.    CHEST WALL:  No mass or axillary adenopathy.  Patient is status post median sternotomy.  LIMITED ABDOMEN:  Limited images of the upper abdomen are unremarkable.    BONES:  No bony lesion or fracture.    OTHER:  Negative.       Impression:    CONCLUSION:    1. Masslike consolidation with areas of central necrosis and mild cavitation in the right lower lobe.  Given its acute appearance this may represent an infectious process.  Correlate clinically.  Short-term follow-up after treatment is recommended.  2. Mediastinal adenopathy is likely reactive.  Attention on follow-up is recommended.     LOCATION:  [place]     Dictated by (CST): Roc Mosqueda MD on 5/26/2024 at 1:21 PM      Finalized by (CST): Roc  MD Panda on 5/26/2024 at 1:25 PM        PROCEDURE:  XR CHEST PA + LAT CHEST (CPT=71046)     INDICATIONS:  sob, cough, crackles R base     COMPARISON:  [facility], CT, CT CHEST LD NO CAD(CPT=71250), 3/13/2024, 8:38 PM.  [facility] , XR, XR CHEST DECUBITUS BILAT INCL PA AND LAT(4 VIEWS)(CPT=71048), 1/15/2024, 9:15 AM.     TECHNIQUE:  PA and lateral chest radiographs were obtained.     PATIENT STATED HISTORY: (As transcribed by Technologist)  Patient stated she has been having a cough for 1 week and a crackle sound in her right lung.      FINDINGS:    LUNGS:  An area of masslike consolidation in the right upper lobe is noted which is new from the prior examination.  Small right pleural effusion is present.  CARDIAC:  Normal size cardiac silhouette.  Patient is status post median sternotomy.  MEDIASTINUM:  Normal.  PLEURA:  Normal.  No pleural effusions.  BONES:  Normal for age.     Impression:    CONCLUSION:  Masslike consolidation in the right upper lobe is noted.  Given it is acute appearance this likely represents an infectious process.  Small right effusion is noted.  Correlate clinically.     LOCATION:  [facility]     Dictated by (CST): Roc Mosqueda MD on 5/26/2024 at 12:05 PM      Finalized by (CST): Roc Mosqueda MD on 5/26/2024 at 12:06 PM    ASSESSMENT:  Right empyema, s/p thoracentesis 5/28, cx with strep intermedius. Path w/ acute inflammation with occasional bacterial organisms present. S/p R chest tube placement 5/29, cx with strep intermedius.   RLL pneumonia. Legionella and strep pneumo Uags negative. Blasto ag negative.  Fevers and leukocytosis, assume d/t above. No further temps, some increase in WBC today.   Alerted mental status. ? Related to above vs other. Head CT w/o acute process. Resolved.  Elevated LFTs. CT a/p and US abd unremarkable. Improving.    Anemia  IgM kappa MGUS  DM II. Hgb A1C 7.1  COPD  Increasing leukocytosis- inadequate chest tube drainage vs. Other     PLAN:  - continue IV Unasyn   -  add back Vancomycin IV for now given fever and increasing leukocytosis in case Strep intermedius isolate is more drug-resistant.   - follow blood cultures- ngtd  - follow temps and wbc  - follow LFTs  - MIST 2 as per pulm  - follow chest tube output; plans for repeat chest CT Monday.    Antimicrobial plan of care discussed in detail with patient at bedside. All questions answered  Discussed with floor nurse    Travon Burger MD   [facility]  [phone]

## 2024-06-02 NOTE — PLAN OF CARE
Problem: Diabetes/Glucose Control  Goal: Glucose maintained within prescribed range  Description: INTERVENTIONS:  - Monitor Blood Glucose as ordered  - Assess for signs and symptoms of hyperglycemia and hypoglycemia  - Administer ordered medications to maintain glucose within target range  - Assess barriers to adequate nutritional intake and initiate nutrition consult as needed  - Instruct patient on self management of diabetes  Outcome: Progressing     Problem: Patient/Family Goals  Goal: Patient/Family Long Term Goal  Description: Patient's Long Term Goal:   Discharge to home    Interventions:  - Follow plan of care  - See additional Care Plan goals for specific interventions  Outcome: Progressing  Goal: Patient/Family Short Term Goal  Description: Patient's Short Term Goal:   5/26 noc: maintain on room air  5/27: manage hgb  5/27 noc: reduce pleuritic pain   5/28 AM: CT scan of head/NPO possible thora  5/28noc: sleep  5/29 AM: Chest Tube placement  5/29noc: CT management, control pain, sleep  6/1 AM: Pain control, Abd US  06/01/2024 - pain control    Interventions:   - labs, blood  - Medications  - IVF  - IVF ABT  - See additional Care Plan goals for specific interventions  Outcome: Progressing     Problem: RESPIRATORY - ADULT  Goal: Achieves optimal ventilation and oxygenation  Description: INTERVENTIONS:  - Assess for changes in respiratory status  - Assess for changes in mentation and behavior  - Position to facilitate oxygenation and minimize respiratory effort  - Oxygen supplementation based on oxygen saturation or ABGs  - Provide Smoking Cessation handout, if applicable  - Encourage broncho-pulmonary hygiene including cough, deep breathe, Incentive Spirometry  - Assess the need for suctioning and perform as needed  - Assess and instruct to report SOB or any respiratory difficulty  - Respiratory Therapy support as indicated  - Manage/alleviate anxiety  - Monitor for signs/symptoms of CO2  retention  Outcome: Progressing     Problem: HEMATOLOGIC - ADULT  Goal: Free from bleeding injury  Description: (Example usage: patient with low platelets)  INTERVENTIONS:  - Avoid intramuscular injections, enemas and rectal medication administration  - Ensure safe mobilization of patient  - Hold pressure on venipuncture sites to achieve adequate hemostasis  - Assess for signs and symptoms of internal bleeding  - Monitor lab trends  - Patient is to report abnormal signs of bleeding to staff  - Avoid use of toothpicks and dental floss  - Use electric shaver for shaving  - Use soft bristle tooth brush  - Limit straining and forceful nose blowing  Outcome: Progressing

## 2024-06-03 ENCOUNTER — APPOINTMENT (OUTPATIENT)
Dept: CT IMAGING | Facility: HOSPITAL | Age: 64
DRG: 163 | End: 2024-06-03
Attending: INTERNAL MEDICINE
Payer: MEDICAID

## 2024-06-03 PROBLEM — J98.4 CAVITARY LESION OF LUNG: Status: RESOLVED | Noted: 2024-05-26 | Resolved: 2024-06-03

## 2024-06-03 LAB
ALBUMIN SERPL-MCNC: 1.3 G/DL (ref 3.4–5)
ALBUMIN/GLOB SERPL: 0.3 {RATIO} (ref 1–2)
ALP LIVER SERPL-CCNC: 295 U/L
ALT SERPL-CCNC: 156 U/L
ANION GAP SERPL CALC-SCNC: 9 MMOL/L (ref 0–18)
AST SERPL-CCNC: 94 U/L (ref 15–37)
BILIRUB SERPL-MCNC: 0.6 MG/DL (ref 0.1–2)
BUN BLD-MCNC: 7 MG/DL (ref 9–23)
CALCIUM BLD-MCNC: 8.3 MG/DL (ref 8.5–10.1)
CHLORIDE SERPL-SCNC: 101 MMOL/L (ref 98–112)
CO2 SERPL-SCNC: 21 MMOL/L (ref 21–32)
CREAT BLD-MCNC: 0.54 MG/DL
EGFRCR SERPLBLD CKD-EPI 2021: 104 ML/MIN/1.73M2 (ref 60–?)
ERYTHROCYTE [DISTWIDTH] IN BLOOD BY AUTOMATED COUNT: 15.9 %
GLOBULIN PLAS-MCNC: 4.9 G/DL (ref 2.8–4.4)
GLUCOSE BLD-MCNC: 134 MG/DL (ref 70–99)
GLUCOSE BLD-MCNC: 145 MG/DL (ref 70–99)
GLUCOSE BLD-MCNC: 149 MG/DL (ref 70–99)
GLUCOSE BLD-MCNC: 149 MG/DL (ref 70–99)
GLUCOSE BLD-MCNC: 182 MG/DL (ref 70–99)
HCT VFR BLD AUTO: 28.7 %
HGB BLD-MCNC: 8.2 G/DL
HGB BLD-MCNC: 8.8 G/DL
HGB BLD-MCNC: 9 G/DL
HGB BLD-MCNC: 9.2 G/DL
INR BLD: 1.38 (ref 0.8–1.2)
MCH RBC QN AUTO: 29.7 PG (ref 26–34)
MCHC RBC AUTO-ENTMCNC: 32.1 G/DL (ref 31–37)
MCV RBC AUTO: 92.6 FL
OSMOLALITY SERPL CALC.SUM OF ELEC: 273 MOSM/KG (ref 275–295)
PLATELET # BLD AUTO: 698 10(3)UL (ref 150–450)
POTASSIUM SERPL-SCNC: 4.1 MMOL/L (ref 3.5–5.1)
PROT SERPL-MCNC: 6.2 G/DL (ref 6.4–8.2)
PROTHROMBIN TIME: 17 SECONDS (ref 11.6–14.8)
RBC # BLD AUTO: 3.1 X10(6)UL
SODIUM SERPL-SCNC: 131 MMOL/L (ref 136–145)
WBC # BLD AUTO: 38.6 X10(3) UL (ref 4–11)

## 2024-06-03 PROCEDURE — 99233 SBSQ HOSP IP/OBS HIGH 50: CPT | Performed by: INTERNAL MEDICINE

## 2024-06-03 PROCEDURE — 71250 CT THORAX DX C-: CPT | Performed by: INTERNAL MEDICINE

## 2024-06-03 PROCEDURE — 99222 1ST HOSP IP/OBS MODERATE 55: CPT | Performed by: STUDENT IN AN ORGANIZED HEALTH CARE EDUCATION/TRAINING PROGRAM

## 2024-06-03 PROCEDURE — 99232 SBSQ HOSP IP/OBS MODERATE 35: CPT | Performed by: INTERNAL MEDICINE

## 2024-06-03 RX ORDER — ASPIRIN 81 MG/1
81 TABLET ORAL DAILY
Status: CANCELLED | OUTPATIENT
Start: 2024-06-04

## 2024-06-03 RX ORDER — IPRATROPIUM BROMIDE AND ALBUTEROL SULFATE 2.5; .5 MG/3ML; MG/3ML
3 SOLUTION RESPIRATORY (INHALATION) EVERY 4 HOURS PRN
Status: DISCONTINUED | OUTPATIENT
Start: 2024-06-03 | End: 2024-06-10

## 2024-06-03 NOTE — PLAN OF CARE
Problem: Diabetes/Glucose Control  Goal: Glucose maintained within prescribed range  Description: INTERVENTIONS:  - Monitor Blood Glucose as ordered  - Assess for signs and symptoms of hyperglycemia and hypoglycemia  - Administer ordered medications to maintain glucose within target range  - Assess barriers to adequate nutritional intake and initiate nutrition consult as needed  - Instruct patient on self management of diabetes  Outcome: Progressing     Problem: Patient/Family Goals  Goal: Patient/Family Long Term Goal  Description: Patient's Long Term Goal:   Discharge to home    Interventions:  - Follow plan of care  - See additional Care Plan goals for specific interventions  Outcome: Progressing  Goal: Patient/Family Short Term Goal  Description: Patient's Short Term Goal:   5/26 noc: maintain on room air  5/27: manage hgb  5/27 noc: reduce pleuritic pain   5/28 AM: CT scan of head/NPO possible thora  5/28noc: sleep  5/29 AM: Chest Tube placement  5/29noc: CT management, control pain, sleep  6/1 AM: Pain control, Abd US  06/01/2024 - pain control  06/02/2024 - pain control  Interventions:   - labs, blood  - Medications  - IVF  - IVF ABT  - See additional Care Plan goals for specific interventions  Outcome: Progressing     Problem: RESPIRATORY - ADULT  Goal: Achieves optimal ventilation and oxygenation  Description: INTERVENTIONS:  - Assess for changes in respiratory status  - Assess for changes in mentation and behavior  - Position to facilitate oxygenation and minimize respiratory effort  - Oxygen supplementation based on oxygen saturation or ABGs  - Provide Smoking Cessation handout, if applicable  - Encourage broncho-pulmonary hygiene including cough, deep breathe, Incentive Spirometry  - Assess the need for suctioning and perform as needed  - Assess and instruct to report SOB or any respiratory difficulty  - Respiratory Therapy support as indicated  - Manage/alleviate anxiety  - Monitor for signs/symptoms  of CO2 retention  Outcome: Progressing

## 2024-06-03 NOTE — PLAN OF CARE
Patient AAOx4, forgetful. Received pt on room air, pt placed on 2L O2 to maintain SpO2 WNL. Tele-ST 120s. Discussed with Dr. Juan and Dr. Villanueva. PO metoprolol given per MAR. Patient febrile, PRN tylenol given per MAR with improvement. Patient with increased respiratory rate, increase shortness of breath, requiring 2L O2. Discussed with Dr. Villanueva, labs ordered, ABG refused by patient, MD aware. Encourage pain control per Dr. Villanueva, PRN pain medication given per MAR with some improvement per pt. Plan for CT Chest tomorrow, IV antibiotics, chest tube MIST2 therapy. 20ml chest tube output over shift. Patient continent x 2, increased urinary frequency, standby assist to commode. QID accucheck. Safety precautions in place. Patient updated on plan of care, questions answered, patient verbalized understanding.     Problem: Patient/Family Goals  Goal: Patient/Family Long Term Goal  Description: Patient's Long Term Goal:   Discharge to home with adequate resources    Interventions:  - Follow plan of care  - See additional Care Plan goals for specific interventions  Outcome: Progressing  Goal: Patient/Family Short Term Goal  Description: Patient's Short Term Goal:   6/2: adequate pain control    Interventions:   - PRN pain medication  - See additional Care Plan goals for specific interventions  Outcome: Progressing     Problem: Diabetes/Glucose Control  Goal: Glucose maintained within prescribed range  Description: INTERVENTIONS:  - Monitor Blood Glucose as ordered  - Assess for signs and symptoms of hyperglycemia and hypoglycemia  - Administer ordered medications to maintain glucose within target range  - Assess barriers to adequate nutritional intake and initiate nutrition consult as needed  - Instruct patient on self management of diabetes  Outcome: Progressing     Problem: RESPIRATORY - ADULT  Goal: Achieves optimal ventilation and oxygenation  Description: INTERVENTIONS:  - Assess for changes in respiratory  status  - Assess for changes in mentation and behavior  - Position to facilitate oxygenation and minimize respiratory effort  - Oxygen supplementation based on oxygen saturation or ABGs  - Provide Smoking Cessation handout, if applicable  - Encourage broncho-pulmonary hygiene including cough, deep breathe, Incentive Spirometry  - Assess the need for suctioning and perform as needed  - Assess and instruct to report SOB or any respiratory difficulty  - Respiratory Therapy support as indicated  - Manage/alleviate anxiety  - Monitor for signs/symptoms of CO2 retention  Outcome: Progressing     Problem: HEMATOLOGIC - ADULT  Goal: Free from bleeding injury  Description: (Example usage: patient with low platelets)  INTERVENTIONS:  - Avoid intramuscular injections, enemas and rectal medication administration  - Ensure safe mobilization of patient  - Hold pressure on venipuncture sites to achieve adequate hemostasis  - Assess for signs and symptoms of internal bleeding  - Monitor lab trends  - Patient is to report abnormal signs of bleeding to staff  - Avoid use of toothpicks and dental floss  - Use electric shaver for shaving  - Use soft bristle tooth brush  - Limit straining and forceful nose blowing  Outcome: Progressing     Problem: PAIN - ADULT  Goal: Verbalizes/displays adequate comfort level or patient's stated pain goal  Description: INTERVENTIONS:  - Encourage pt to monitor pain and request assistance  - Assess pain using appropriate pain scale  - Administer analgesics based on type and severity of pain and evaluate response  - Implement non-pharmacological measures as appropriate and evaluate response  - Consider cultural and social influences on pain and pain management  - Manage/alleviate anxiety  - Utilize distraction and/or relaxation techniques  - Monitor for opioid side effects  - Notify MD/LIP if interventions unsuccessful or patient reports new pain  - Anticipate increased pain with activity and  pre-medicate as appropriate  Outcome: Progressing

## 2024-06-03 NOTE — PROGRESS NOTES
[facility]   part of [facility] ID PROGRESS NOTE    Soumya King Patient Status:  Inpatient    6/3/1960 MRN [id number]   [facility] 5NW-A Attending Hoda Juan MD   Hosp Day # 8 PCP Rika Vinson DO     Abx: IV Unasyn D#3, IV vanco D#1  s/p Zosyn and vanco    Subjective: Patient seen and examined today. Reports worsening pain to her R back. Continues to have a cough with sputum production and SOB. Denies any abdominal pain. Denies any diarrhea. Temps better today, Tmax 100.8. On NC.    Allergies:  Allergies   Allergen Reactions    Naprosyn [Naproxen] ANAPHYLAXIS     Has tolerated ibuprofen and IV toradol     Aspirin OTHER (SEE COMMENTS)     Difficulty swallowing due to  saavedra's esophagus       Medications:    Current Facility-Administered Medications:     ipratropium-albuterol (Duoneb) 0.5-2.5 (3) MG/3ML inhalation solution 3 mL, 3 mL, Nebulization, Q4H PRN    vancomycin (Vancocin) 1,000 mg in sodium chloride 0.9% 250 mL IVPB-ADDV, 15 mg/kg, Intravenous, Q12H    oxyCODONE ER (OxyCONTIN ER) 12 hr tab 10 mg, 10 mg, Oral, 2 times per day    metoprolol tartrate (Lopressor) tab 25 mg, 25 mg, Oral, 2x Daily(Beta Blocker)    ampicillin-sulbactam (Unasyn) 3 g in sodium chloride 0.9% 100mL IVPB-ADD, 3 g, Intravenous, q6h    [Held by provider] clonazePAM (KlonoPIN) tab 0.5 mg, 0.5 mg, Oral, BID PRN    HYDROmorphone (Dilaudid) 1 MG/ML injection 0.2 mg, 0.2 mg, Intravenous, Once    HYDROmorphone (Dilaudid) 1 MG/ML injection 0.5 mg, 0.5 mg, Intravenous, Q2H PRN **OR** HYDROmorphone (Dilaudid) 1 MG/ML injection 1 mg, 1 mg, Intravenous, Q2H PRN **OR** HYDROmorphone (Dilaudid) 1 MG/ML injection 2 mg, 2 mg, Intravenous, Q2H PRN    oxybutynin ER (Ditropan-XL) 24 hr tab 5 mg, 5 mg, Oral, Daily    HYDROcodone-acetaminophen (Norco) 5-325 MG per tab 1 tablet, 1 tablet, Oral, Q4H PRN **OR** HYDROcodone-acetaminophen (Norco) 5-325 MG per tab 2 tablet, 2 tablet, Oral, Q4H PRN    traMADol  (Ultram) tab 50 mg, 50 mg, Oral, Q6H PRN    aspirin DR tab 81 mg, 81 mg, Oral, Daily    atorvastatin (Lipitor) tab 80 mg, 80 mg, Oral, Nightly    benzonatate (Tessalon) cap 100 mg, 100 mg, Oral, TID PRN    busPIRone (Buspar) tab 20 mg, 20 mg, Oral, Daily    gabapentin (Neurontin) tab 600 mg, 600 mg, Oral, Nightly    pantoprazole (Protonix) DR tab 40 mg, 40 mg, Oral, QAM AC    QUEtiapine ER (SEROquel XR) 24 hr tab 200 mg, 200 mg, Oral, QPM    rizatriptan (Maxalt-MLT) disintegrating tab 5 mg, 5 mg, Oral, PRN    umeclidinium bromide (Incruse Ellipta) 62.5 MCG/ACT inhaler 1 puff, 1 puff, Inhalation, Daily    glucose (Dex4) 15 GM/59ML oral liquid 15 g, 15 g, Oral, Q15 Min PRN **OR** glucose (Glutose) 40% oral gel 15 g, 15 g, Oral, Q15 Min PRN **OR** glucose-vitamin C (Dex-4) chewable tab 4 tablet, 4 tablet, Oral, Q15 Min PRN **OR** dextrose 50% injection 50 mL, 50 mL, Intravenous, Q15 Min PRN **OR** glucose (Dex4) 15 GM/59ML oral liquid 30 g, 30 g, Oral, Q15 Min PRN **OR** glucose (Glutose) 40% oral gel 30 g, 30 g, Oral, Q15 Min PRN **OR** glucose-vitamin C (Dex-4) chewable tab 8 tablet, 8 tablet, Oral, Q15 Min PRN    melatonin tab 3 mg, 3 mg, Oral, Nightly PRN    heparin (Porcine) 5000 UNIT/ML injection 5,000 Units, 5,000 Units, Subcutaneous, Q8H JOSÉ ANTONIO    acetaminophen (Tylenol Extra Strength) tab 500 mg, 500 mg, Oral, Q4H PRN    ondansetron (Zofran) 4 MG/2ML injection 4 mg, 4 mg, Intravenous, Q6H PRN    prochlorperazine (Compazine) 10 MG/2ML injection 5 mg, 5 mg, Intravenous, Q8H PRN    polyethylene glycol (PEG 3350) (Miralax) 17 g oral packet 17 g, 17 g, Oral, Daily PRN    sennosides (Senokot) tab 17.2 mg, 17.2 mg, Oral, Nightly PRN    bisacodyl (Dulcolax) 10 MG rectal suppository 10 mg, 10 mg, Rectal, Daily PRN    fleet enema (Fleet) 7-19 GM/118ML rectal enema 133 mL, 1 enema, Rectal, Once PRN    insulin aspart (NovoLOG) 100 Units/mL FlexPen 1-10 Units, 1-10 Units, Subcutaneous, TID AC and HS    guaiFENesin  (Robitussin) 100 MG/5 ML oral liquid 200 mg, 200 mg, Oral, Q4H PRN    sodium chloride (Saline Mist) 0.65 % nasal solution 1 spray, 1 spray, Each Nare, Q3H PRN    Review of Systems:  Completed. See pertinent positives and negatives above.    Physical Exam:  Vital signs: Blood pressure 110/49, pulse 89, temperature 97.9 °F (36.6 °C), temperature source Oral, resp. rate 17, height 154.9 cm (5' 1\"), weight 132 lb 12.8 oz (60.2 kg), SpO2 96%, not currently breastfeeding.    General: Alert, oriented. NAD. On room air.   HEENT: Moist mucous membranes.   Neck: No lymphadenopathy.  Supple.  Cardiovascular: RRR.   Respiratory: Diminished breaths with crackles to lower lung. + R chest tube in place.   Abdomen: Soft, nontender, nondistended.   Musculoskeletal: Some pedal edema  Integument: No lesions. No erythema.    Laboratory Data:  Recent Labs   Lab 05/31/24  0733 05/31/24  1708 06/03/24  0755   RBC 2.90*   < > 3.10*   HGB 8.9*  8.9*   < > 9.2*  8.8*   HCT 26.0*   < > 28.7*   MCV 89.7   < > 92.6   MCH 30.7   < > 29.7   MCHC 34.2   < > 32.1   RDW 16.5   < > 15.9   NEPRELIM 13.57*  --   --    WBC 17.8*   < > 38.6*   .0*   < > 698.0*   NEUT 74  --   --    LYMPH 17  --   --    MON 4  --   --    EOS 3  --   --     < > = values in this interval not displayed.     Recent Labs   Lab 06/01/24  0758 06/02/24  0806 06/03/24  0755   * 147* 149*   BUN 6* 4* 7*   CREATSERUM 0.60 0.42* 0.54*   CA 8.3* 8.1* 8.3*   ALB 1.4* 1.2* 1.3*    137 131*   K 3.7 3.7 4.1    107 101   CO2 21.0 20.0* 21.0   ALKPHO 381* 319* 295*   * 183* 94*   * 194* 156*   BILT 0.7 0.7 0.6   TP 6.1* 5.7* 6.2*       Microbiology: Reviewed in EMR    Radiology: Reviewed.    PROCEDURE:  CT CHEST (CPT=71250)     COMPARISON:  [facility] , CT, CT CHEST (CPT=71250), 5/29/2024, 9:05 AM.     INDICATIONS:  evaluate empyema     TECHNIQUE:  Unenhanced multislice CT scanning is performed through the chest.  Dose reduction techniques were used.  Dose information is transmitted to the ACR (American College of Radiology) NRDR (National Radiology Data Registry) which includes the Dose   Index Registry.     PATIENT STATED HISTORY: (As transcribed by Technologist)  Evaluation of chest tube.      FINDINGS:  Evaluation of the visceral organs is limited due to the lack of intravenous contrast.     LUNGS:  There is consolidation within the right lower lobe, right middle lobe, and to a lesser extent the right upper lobe suspicious for multifocal pneumonia.  There are small nodular ground-glass airspace opacities within the right upper lobe which may   represent the known pneumonia.  VASCULATURE:  Unremarkable.  THORACIC AORTA:  Scattered atherosclerosis.  MEDIASTINUM/ANKUSH:  Mild to moderate mediastinal adenopathy is nonspecific but may be reactive.  CARDIAC:  Calcified coronary artery disease is noted.  PLEURA:  There is a moderate amount of empyema within the right pleural space.  This is most notable at the posterior inferior aspect of the right pleural space measuring up to 11.1 x 7.3 cm in the axial plane.  Foci of air are seen within this empyema.   A right sided chest tube is seen centered within this dominant region of empyema.  The amount of empyema has increased since 5/29/2024.  A mild left pleural effusion is seen.  CHEST WALL:  Sternotomy changes.  LIMITED ABDOMEN:  Unremarkable.  BONES:  Degenerative changes the spine.  OTHER:  Mild supraclavicular adenopathy is noted.     Impression:    CONCLUSION:    1. A moderate amount empyema is seen within the right pleural space.  The extent of this empyema is worse since 5/29/2024.  A chest tube is well seen within the dominant loculation of probable empyema.  2. Multifocal pneumonia is again seen, most notable within the right middle lobe and right lower lobe.  Continued imaging follow-up is recommended.  3. Mild to moderate mediastinal adenopathy is nonspecific but may be reactive.  Mild supraclavicular  adenopathy is also seen.     LOCATION:  [id number]     Dictated by (CST): Stromberg, LeRoy, MD on 6/03/2024 at 11:24 AM      Finalized by (CST): Stromberg, LeRoy, MD on 6/03/2024 at 11:35 AM        CORRECTION Corrected on: 5/29/2024;         PROCEDURE:  CT CHEST (CPT=71250)    COMPARISON:  [facility] , XR, XR CHEST AP PORTABLE  (CPT=71045), 5/29/2024,   4:27 AM.  [facility], CT, CT ANGIOGRAPHY, CHEST (CPT=71275), 5/26/2024, 12:49   PM.    INDICATIONS:  Follow-up empyema    TECHNIQUE:  Unenhanced multislice CT scanning is performed through the   chest.  Dose reduction techniques were used. Dose information is   transmitted to the ACR (American College of Radiology) NRDR (National   Radiology Data Registry) which includes the Dose  Index Registry.  There is image degradation due to motion slightly   limiting evaluation    PATIENT STATED HISTORY: (As transcribed by Technologist)  Recent empyema       FINDINGS:   LUNGS:  Masslike consolidation with internal punctate foci of air is most   consistent with patient's known consolidation and measures 5.7 x 8.1 x 6.1   versus 5 x 7.2 x 5.1 cm, AP x T x cc dimension respectively.  Increasing   adjacent interstitial and   ground-glass density. VASCULATURE:  Pulmonary vessels are unremarkable within the limits of a   noncontrast CT.   ANKUSH:  Limited due to lack of IV contrast.   MEDIASTINUM:  Adenopathy with subcarinal node measuring 2.8 x 3.3 versus   2.1 x 2 cm and right paratracheal node measuring 2 x 1.3 versus 2 x 1.4   cm.  Mediastinal clips. CARDIAC:  No enlargement or pericardial thickening.  Mild coronary artery   calcifications. PLEURA:  Increasing pleural fluid along the right fissures which is   lobular in contour and measures 2.6 cm in depth.  Right pleural effusion   measures 3.7 versus 2.9 cm with left pleural fluid measuring 1.7 versus   0.8 cm in depth.   THORACIC AORTA:  Atherosclerosis.   CHEST WALL:  No mass or axillary adenopathy.  Sternotomy wires. LIMITED ABDOMEN:   Limited images of the upper abdomen are unremarkable.   BONES:  No fracture.      CONCLUSION:   1. When compared to most recent CT of the chest performed 5/26/2024,   patient's known right lower lobe consolidation has slightly increased in   size with increasing adjacent interstitial and ground-glass density,   correlate clinically. 2. Increasing pleural fluid as detailed above.    LOCATION:  [facility]       Dictated by (CST): Britney Mendes MD on 5/29/2024 at 9:39 AM       Finalized by (CST): Britney Mendes MD on 5/29/2024 at 9:51 AM      Dictated by (CST): Britney Mendes MD on 5/29/2024 at 11:23 AM       Finalized by (CST): Britney Mendes MD on 5/29/2024 at 11:23 AM                       Signed: 05/29/24 1123 by Britney Mendes MD  Narrative:    PROCEDURE:  CT CHEST (CPT=71250)     COMPARISON:  [facility] , XR, XR CHEST AP PORTABLE  (CPT=71045), 5/29/2024, 4:27 AM.  [facility], CT, CT ANGIOGRAPHY, CHEST (CPT=71275), 5/26/2024, 12:49 PM.     INDICATIONS:  Follow-up empyema     TECHNIQUE:  Unenhanced multislice CT scanning is performed through the chest.  Dose reduction techniques were used. Dose information is transmitted to the ACR (American College of Radiology) NRDR (National Radiology Data Registry) which includes the Dose   Index Registry.  There is image degradation due to motion slightly limiting evaluation     PATIENT STATED HISTORY: (As transcribed by Technologist)  Recent empyema      FINDINGS:    LUNGS:  Masslike consolidation with internal punctate foci of air is most consistent with patient's known empyema and measures 5.7 x 8.1 x 6.1 versus 5 x 7.2 x 5.1 cm, AP x T x cc dimension respectively.  Increasing adjacent interstitial and ground-glass   density.  VASCULATURE:  Pulmonary vessels are unremarkable within the limits of a noncontrast CT.    ANKUSH:  Limited due to lack of IV contrast.    MEDIASTINUM:  Adenopathy with subcarinal node measuring 2.8 x 3.3 versus 2.1 x 2 cm and right paratracheal node measuring 2 x 1.3 versus 2 x 1.4  cm.  Mediastinal clips.  CARDIAC:  No enlargement or pericardial thickening.  Mild coronary artery calcifications.  PLEURA:  Increasing pleural fluid along the right fissures which is lobular in contour and measures 2.6 cm in depth.  Right pleural effusion measures 3.7 versus 2.9 cm with left pleural fluid measuring 1.7 versus 0.8 cm in depth.    THORACIC AORTA:  Atherosclerosis.  CHEST WALL:  No mass or axillary adenopathy.  Sternotomy wires.  LIMITED ABDOMEN:  Limited images of the upper abdomen are unremarkable.    BONES:  No fracture.       Impression:    CONCLUSION:    1. When compared to most recent CT of the chest performed 5/26/2024, patient's known right lower lobe empyema has slightly increased in size with increasing adjacent interstitial and ground-glass density, correlate clinically.  2. Increasing pleural fluid as detailed above.     LOCATION:  [facility]     Dictated by (CST): Britney Mendes MD on 5/29/2024 at 9:39 AM      Finalized by (CST): Britney Mendes MD on 5/29/2024 at 9:51 AM       PROCEDURE:  XR CHEST AP PORTABLE  (CPT=71045)     TECHNIQUE:  AP chest radiograph was obtained.     COMPARISON:  [facility] , CT, CT ABDOMEN+PELVIS(CONTRAST ONLY)(CPT=74177), 5/28/2024, 1:32 PM.  [facility] , XR, XR CHEST AP PORTABLE  (CPT=71045), 5/28/2024, 4:38 PM.     INDICATIONS:  dyspnea     PATIENT STATED HISTORY: (As transcribed by Technologist)  dyspnea     FINDINGS:  Cardiomediastinal silhouette is stable in size and appearance with sternotomy wires and mediastinal clips.  Persistent blunting of the right costophrenic angle is consistent with small pleural effusion versus reaction.  Persistent interstitial   and right perihilar/lower lobe masslike airspace opacity.  No new focal consolidation.  No pneumothorax.     Impression:    CONCLUSION:    1. No significant change, allowing for differences in technique when compared to 5/28/2024 chest radiograph.     LOCATION:  [facility]    Dictated by (CST): Britney Mendes MD on 5/29/2024 at 7:58  AM      Finalized by (CST): Britney Mendes MD on 5/29/2024 at 8:00 AM        PROCEDURE:  US THORACENTESIS GUIDED RIGHT (CPT=32555)     COMPARISON:  [facility] , XR, XR CHEST AP PORTABLE  (CPT=71045), 5/28/2024, 4:38 PM.  [facility] , XR, XR CHEST AP PORTABLE  (CPT=71045), 5/28/2024, 4:48 AM.  [facility], CT, CT ANGIOGRAPHY, CHEST (CPT=71275), 5/26/2024, 12:49 PM.     INDICATIONS:  pneumonia     DESCRIPTION OF PROCEDURE:  The clinical scenario and referral were discussed with Dr. Ya.  Referral for ultrasound-guided right thoracentesis.  Because of her altered mental status, witnessed written and verbal informed consent were obtained from her  sister.  The procedure was also discussed with her.  She voiced understanding and wished to proceed.  Time-out was performed by the staff.     She could not achieve a seated position.  Positioning was optimized, supine oblique.  Ultrasound demonstrated a small right complex pleural effusion corresponding to the CTA and chest x-ray abnormality.  Access site was chosen.  The overlying skin was  prepped in the usual sterile manner.  1% lidocaine was used locally.  Using realtime ultrasound an 8 Nepalese multi side hole sheath needle was advanced.  Once in position aspiration to dryness with removal of only 10-11 mL of thin clear yellow fluid.    Samples were sent to RT and the laboratory for testing.  The system was removed.  Sterile dressings were placed.  She tolerated the procedure.  No immediate complication.     Impression:    CONCLUSION:  Small complex right pleural effusion.  Only a small amount of fluid could be aspirated.  Sample sent to RT and the laboratory for testing.     LOCATION:  [facility]     Dictated by (CST): Jace Govea MD on 5/28/2024 at 5:13 PM      Finalized by (CST): Jace Govea MD on 5/28/2024 at 5:16 PM      PROCEDURE:  US ABDOMEN LIMITED (CPT=76705)     COMPARISON:  [facility] , CT, CT ABDOMEN+PELVIS(CONTRAST ONLY)(CPT=74177), 5/28/2024, 1:32 PM.     INDICATIONS:  elevated  lfts. eval bile duct, cirrhosis, etc     PATIENT STATED HISTORY: (As transcribed by Technologist)        FINDINGS:    LIVER:  Normal size and echogenicity. No significant masses.  BILIARY:  Common bile duct measures 6 mm in region of stephanie hepatis.  There is no intrahepatic bile duct distension.  Gallbladder is unremarkable.  PANCREAS:  Normal.  RIGHT KIDNEY:  Normal.  OTHER:  There is a small amount of free fluid in posterior subhepatic space.  Incidental note is made of a right pleural effusion.     Impression:    CONCLUSION:    1. Trace ascites is noted in posterior subhepatic space.  2. Right pleural effusion has been noted on prior CT scan.  3. There is otherwise no abnormality detected on this abdominal ultrasound.       LOCATION:  [facility]     Dictated by (CST): Abad Chen MD on 5/28/2024 at 4:56 PM      Finalized by (CST): Abad Chen MD on 5/28/2024 at 4:57 PM          PROCEDURE:  XR CHEST AP PORTABLE  (CPT=71045)     TECHNIQUE:  AP chest radiograph was obtained.     COMPARISON:  [facility] , US, US THORACENTESIS GUIDED RIGHT (CPT=32555), 5/28/2024, 3:53 PM.  [facility] , XR, XR CHEST AP PORTABLE  (CPT=71045), 5/28/2024, 4:48 AM.     INDICATIONS:  Post Thoracentesis     PATIENT STATED HISTORY: (As transcribed by Technologist)  Right sided post thoracentesis. Patient offered no additional history at this time.      FINDINGS:  Status post right thoracentesis.  There is a small right pleural effusion.  Patchy opacity at the right lung base may represent pneumonia.  There is no pneumothorax.  Median sternotomy wires are stable.  The heart is normal in size.     Impression:    CONCLUSION:  Small right pleural effusion with patchy airspace disease the right lung base could represent pneumonia.  There is no pneumothorax.     LOCATION:  [facility]     Dictated by (CST): Elieser Torres MD on 5/28/2024 at 4:55 PM      Finalized by (CST): Elieser Torres MD on 5/28/2024 at 4:56 PM      PROCEDURE:  CT ABDOMEN+PELVIS (CONTRAST  ONLY) (CPT=74177)     [facility]ATTENTION PANCREAS) (W+ WO) (CPT=74170), 3/02/2022, 3:52 PM.  [facility], CT, CT ABDOMEN+PELVIS(CONTRAST ONLY)(CPT=74177), 9/18/2021, 2:27 PM.     INDICATIONS:  GIB     TECHNIQUE:  CT scanning was performed from the dome of the diaphragm to the pubic symphysis with non-ionic intravenous contrast material. Post contrast coronal MPR imaging was performed.  Dose reduction techniques were used. Dose information is  transmitted to the ACR (American College of Radiology) NRDR (National Radiology Data Registry) which includes the Dose Index Registry.     PATIENT STATED HISTORY:(As transcribed by Technologist)  Poor historian      CONTRAST USED:  85cc of Isovue 370     FINDINGS:    LUNG BASES:  Loculated right-sided pleural effusion with thick wall measuring approximately 7.8 x 2.5 x 12.7 cm.  Findings suggestive of empyema.  There is consolidation within the right lower lobe with complex fluid and small foci of air measuring 3.0 x   2.7 x 3.2 cm and 2.1 x 2.8 x 1.7 cm which may represent pulmonary abscesses/necrotizing pneumonia.  Small left-sided pleural effusion.  LIVER:  Normal in shape and contour.    BILIARY:  Gallbladder is unremarkable in appearance.  No intrahepatic biliary dilatation..  SPLEEN:  Normal.  No enlargement or focal lesion.  PANCREAS:  No kirk-pancreatic inflammatory stranding  ADRENALS:  Normal.  No mass or enlargement.    KIDNEYS:  Kidneys are symmetrical in size without evidence of hydronephrosis.  BOWEL/MESENTERY:  Bowel is normal in caliber. No evidence of obstruction.  Probable Villanueva's type hernia containing fat and a portion of bowel  within the right para umbilical region.  No evidence of obstruction.  Mildly prominent retroperitoneal lymph  nodes.  PELVIS:  Bladder is distended.  Small amount of free pelvic fluid.    AORTA/VASCULAR:  Atheromatous calcifications of the aorta.  BONES:  No acute fractures.  Median sternotomy  approximated by wire sutures.     Impression:    CONCLUSION:    1. Loculated right-sided pleural effusion suggestive empyema with consolidation within the right lower lobe.  There are areas of fluid and small foci of air within the consolidation which may represent pulmonary abscesses/necrotizing pneumonia.  2. Small left-sided pleural effusion with associated atelectasis.  3. Nonspecific mildly prominent retroperitoneal lymph nodes which may be reactive in nature.  4. Trace amount of free pelvic fluid.       LOCATION:  [facility]     Dictated by (CST): Annette Ferreira MD on 5/28/2024 at 2:11 PM      Finalized by (CST): Annette Ferreira MD on 5/28/2024 at 2:17 PM         PROCEDURE:  CT BRAIN OR HEAD (31818)     COMPARISON:  None.     INDICATIONS:  lethargy     TECHNIQUE:  Noncontrast CT scanning is performed through the brain. Dose reduction techniques were used. Dose information is transmitted to the ACR (American College of Radiology) NRDR (National Radiology Data Registry) which includes the Dose Index  Registry.     PATIENT STATED HISTORY: (As transcribed by Technologist)  Patient is lethargic.       FINDINGS:    VENTRICLES/SULCI:  Ventricles and sulci are normal in size.    INTRACRANIAL:  There is a chronic appearing lacunar infarct involving the right caudate nucleus at the junction of the head and body..  There are no abnormal extraaxial fluid collections.  There is no midline shift.  There are no intraparenchymal brain  abnormalities.  There is nothing specific for acute infarct.  There is no hemorrhage or mass lesion.    SINUSES:           No sign of acute sinusitis.    MASTOIDS:          No sign of acute inflammation.  SKULL:             No evidence for fracture or osseous abnormality.  OTHER:             None.    Impression:    CONCLUSION:  No evidence of acute intracranial process.     LOCATION:  [facility]     Dictated by (CST): Elieser Torres MD on 5/28/2024 at 8:49 AM      Finalized by (CST): Elieser Torres MD  on 5/28/2024 at 8:54 AM      PROCEDURE:  XR CHEST AP PORTABLE  (CPT=71045)     TECHNIQUE:  AP chest radiograph was obtained.     COMPARISON:  [facility], CT, CT ANGIOGRAPHY, CHEST (CPT=71275), 5/26/2024, 12:49 PM.  [facility], XR, XR CHEST PA + LAT CHEST (CPT=71046), 5/26/2024, 11:43 AM.     INDICATIONS:  dyspnea     PATIENT STATED HISTORY: (As transcribed by Technologist)  dyspnea      Impression:    CONCLUSION:    Limited AP portable view shows small-moderate right pleural effusion cleaning some pleural fluid tracking up the lower right lateral chest.  Possibly loculated.  Patchy consolidation mid-lower right chest.  Small blunting left costophrenic   angle.  Cardiomegaly.  No pneumothorax. Consider upright PA and lateral examination of the chest, when tolerated.  CT follow-up may also be beneficial.       LOCATION:  [facility]     Dictated by (CST): Abhishek Mora MD on 5/28/2024 at 8:36 AM      Finalized by (CST): Abhishek Mora MD on 5/28/2024 at 8:37 AM          PROCEDURE:  US VENOUS DOPPLER LEG LEFT - DIAG IMG (CPT=93971)     COMPARISON:  None.     INDICATIONS:  Left lower extremity swelling and pain     TECHNIQUE:  Real time, grey scale, and duplex ultrasound was used to evaluate the lower extremity venous system. B-mode two-dimensional images of the vascular structures, Doppler spectral analysis, and color flow.  Doppler imaging were performed.  The  following veins were imaged:  Common, deep, and superficial femoral, popliteal, sapheno-femoral junction, posterior tibial veins, and the contralateral common femoral vein.     PATIENT STATED HISTORY: (As transcribed by Technologist)        FINDINGS:    EXTREMITY EXAMINED:  Left lower extremity  SAPHENOFEMORAL JUNCTION:  No reflux.  THROMBI:  None visible.  COMPRESSION:  Normal compressibility, phasicity, and augmentation.  OTHER:  Negative.     Impression:    CONCLUSION:  No evidence of DVT in the left lower extremity.     LOCATION:  [facility]    Dictated by (CST): Roc  MD Panda on 5/26/2024 at 2:46 PM      Finalized by (CST): Roc Mosqueda MD on 5/26/2024 at 2:49 PM      PROCEDURE:  CT ANGIOGRAPHY, CHEST (CPT=71275)     COMPARISON:  [facility], CT, CT CHEST LD NO CAD(CPT=71250), 3/13/2024, 8:38 PM.     INDICATIONS:  sob, hypotension, pleurisy, R lung mass     TECHNIQUE:  IV contrast-enhanced multislice CT angiography is performed through the pulmonary arterial anatomy. 3D volume renderings are generated.  Dose reduction techniques were used. Dose information is transmitted to the ACR (American [employer]) NRDR (National Radiology Data Registry) which includes the Dose Index Registry.     PATIENT STATED HISTORY:(As transcribed by Technologist)  SOB, hypotension; right lung mass      CONTRAST USED:  100cc of Isovue 370     FINDINGS:    VASCULATURE:  No visible pulmonary arterial thrombus or attenuation.    THORACIC AORTA:  No aneurysm or visible dissection.    LUNGS:  Masslike consolidation in the right lower lobe is new compared to the prior examination.  Given its acute appearance an infectious process is most likely.  Small central areas of cavitation are noted with associated low attenuation which may  represent necrosis.  Moderate right pleural effusion is noted.  MEDIASTINUM:  Right paratracheal node is noted measuring up to 2.0 x 1.4 cm (image 38).  A subcarinal lymph node is noted measuring up to 2.0 x 2.1 cm (image 93)..    ANKUSH:  No mass or adenopathy.    CARDIAC:  No enlargement, pericardial thickening, or significant coronary artery calcification.  PLEURA:  No mass or effusion.    CHEST WALL:  No mass or axillary adenopathy.  Patient is status post median sternotomy.  LIMITED ABDOMEN:  Limited images of the upper abdomen are unremarkable.    BONES:  No bony lesion or fracture.    OTHER:  Negative.       Impression:    CONCLUSION:    1. Masslike consolidation with areas of central necrosis and mild cavitation in the right lower lobe.  Given its acute  appearance this may represent an infectious process.  Correlate clinically.  Short-term follow-up after treatment is recommended.  2. Mediastinal adenopathy is likely reactive.  Attention on follow-up is recommended.     LOCATION:  [facility]     Dictated by (CST): Roc Mosqueda MD on 5/26/2024 at 1:21 PM      Finalized by (CST): Roc Mosqueda MD on 5/26/2024 at 1:25 PM        PROCEDURE:  XR CHEST PA + LAT CHEST (CPT=71046)     INDICATIONS:  sob, cough, crackles R base     COMPARISON:  [facility], CT, CT CHEST LD NO CAD(CPT=71250), 3/13/2024, 8:38 PM.  [facility] , XR, XR CHEST DECUBITUS BILAT INCL PA AND LAT(4 VIEWS)(CPT=71048), 1/15/2024, 9:15 AM.     TECHNIQUE:  PA and lateral chest radiographs were obtained.     PATIENT STATED HISTORY: (As transcribed by Technologist)  Patient stated she has been having a cough for 1 week and a crackle sound in her right lung.      FINDINGS:    LUNGS:  An area of masslike consolidation in the right upper lobe is noted which is new from the prior examination.  Small right pleural effusion is present.  CARDIAC:  Normal size cardiac silhouette.  Patient is status post median sternotomy.  MEDIASTINUM:  Normal.  PLEURA:  Normal.  No pleural effusions.  BONES:  Normal for age.     Impression:    CONCLUSION:  Masslike consolidation in the right upper lobe is noted.  Given it is acute appearance this likely represents an infectious process.  Small right effusion is noted.  Correlate clinically.     LOCATION:  [facility]     Dictated by ([facility]): Roc Mosqueda MD on 5/26/2024 at 12:05 PM      Finalized by (CST): Roc Mosqueda MD on 5/26/2024 at 12:06 PM    ASSESSMENT:  Right empyema, s/p thoracentesis 5/28, cx with strep intermedius. Path w/ acute inflammation with occasional bacterial organisms present. S/p R chest tube placement 5/29, cx with strep intermedius. S/p MIST 2 protocol.   RLL pneumonia. Legionella and strep pneumo Uags negative. Blasto ag negative.  Fevers and worsening leukocytosis,  assume d/t above (repeat CT chest with worsening empyema).   Alerted mental status. ? Related to above vs other. Head CT w/o acute process. Improved.  Elevated LFTs. CT a/p and US abd unremarkable. Improving.    Anemia  IgM kappa MGUS  DM II. Hgb A1C 7.1  COPD    PLAN:  - continue IV Unasyn and vancomycin  - follow blood cultures- ngtd  - follow temps and wbc  - follow LFTs  - chest CT noted--> await pulmonary eval    Discussed case with RN, patient, patient's sister over the phone and Dr. Menezes.    Beverly Guillen PA-C    ID ATTENDING ADDENDUM     Pt seen an examined independently. Chart reviewed. Agree with above. Note has been reviewed by me and modified as needed.  Exam and Impression/ Recs as noted above.  Will follow  D/w staff and with pt  More than 50% of clinical time and 100% of the clinical decision making performed by me.    Vibha Menezes MD

## 2024-06-03 NOTE — PROGRESS NOTES
Patient is alert and oriented x 4, afebrile. C/o mild to moderate pain, severe with movement. She is on 1-2 L NC, tachypneic with pain and ambulation. CT to R chest, CT scan showed worsening empyema, Thoracic surgery consulted, procedure planned for Wednesday morning, to hold Aspirin. NSR for most of the day, ST with increase in pain. Patient is on accuchecks qid, carb controlled diet. Insulin per scale. Nausea x 1, Zofran prn. Patient is continent. Up to BSC with 1 person assist.

## 2024-06-03 NOTE — CM/SW NOTE
Care Progression Note:  Length of stay: 8  GMLOS:   Avoidable Delays:   Code Status: Full    Acute Medical Issue/Factors:   Empyema lung (HCC) - Chest tube in place. Pulmonary following. Pt completed MIST protocol Sun 6/2. Plan for repeat chest CT today. IV antibiotics managed by ID. Pt currently on IV Zosyn and Vanco. Following cultures.   Iron deficiency anemia- CBC daily. Monitoring bleeding with MIST. Pt's hgb 9.2 this am    Discharge Barriers: Clinical improvement.  Expected discharge date: TBD   Expected next site of care: Home. Pt is currently on 2L/NC oxygen. O2 needs will need to be assessed prior to dc. .     / available for discharge planning.       KITTY Portillo, CMSRN    [id number]

## 2024-06-03 NOTE — PAYOR COMM NOTE
--------------  CONTINUED STAY REVIEW    Payor: [facility]  Subscriber #:  [id number]  Authorization Number: [id number]    Admit date: 5/26/24  Admit time:  3:45 PM    REVIEW DOCUMENTATION:  6/3  Hospitalist Progress Note   Assessment & Plan:  # RLL pneumonia with empyema   # RLL effusion with loculation and chest pain   -abx adjusted per cultures   - started MIST protocol   - CT Chest pending   - may need thoracic surgery eval   -Pulmonology and ID rec appreciated   - sp chest tube by IR on 5/29  - cont pain meds     # elevated liver enzymes seems ischemic in nature per course   -improved  - exam benign   - US abd with doppler neg   - avoid hepatotoxic agents     # Iron def Anemia  - sp PRBC   - no s/s bleeding   -trend     #Coagulopathy  - repeat INR normalized   -trend  -sp Vit K   -US abd and CT AP  without obvious source of LFT rise      #COPD   -no active wheezing  -Continue inhalers  -prn nebs.     #Acute metabolic toxic ( anxiolytic) encephalopathy  -Ammonia normal   -CT brain negative  -ABG reviewed  - improved     #Hypertension  -metoprolol on hold due to low BP     #DM 2  -hyperglycemia protocol     #GERD  #Jamison's esophagus  -PPI.     #MDD/anxiety  Home med    Thoracic Surgery Consult Note      Assessment/Plan:      Ms. King is a 64 year old female  smoker presenting with right empyema.      Patient presented on 5/26/24 with persistent cough, shortness of breath, and right sided pain. CTA on admission showed mass-like consolidation with necrosis in the right lower lobe. She was later found to have a right sided effusion on workup for possible GI bleed and underwent thoracentesis on 5/28/24 in which minimal fluid could be aspirated. Cultures positive for streptococcus intermedius. She underwent chest tube placement on 5/29/24 and MIST II protocol from 5/31/24-6/2/24. Repeat CT chest showed worsening right empyema. Currently on 2L per NC, no supplemental oxygen needed at  baseline.      Discussed options including right VATS decortication. This is recommended because she has failed to improve with more conservative measures including chest tube placement and MIST II protocol. Discussed the risks and benefits of surgery including bleeding, infection, and lung injury. Patient expressed understanding and would like to proceed with surgery.      -Plan to proceed with right VATS decortication with Dr. Maldonado. Will plan on Wednesday morning pending OR availability.   -Continue chest tube to suction, okay to ambulate off of suction from our standpoint.   -Abx per ID.   [facility] ID PROGRESS NOTE   ASSESSMENT:  Right empyema, s/p thoracentesis 5/28, cx with strep intermedius. Path w/ acute inflammation with occasional bacterial organisms present. S/p R chest tube placement 5/29, cx with strep intermedius. S/p MIST 2 protocol.   RLL pneumonia. Legionella and strep pneumo Uags negative. Blasto ag negative.  Fevers and worsening leukocytosis, assume d/t above (repeat CT chest with worsening empyema).   Alerted mental status. ? Related to above vs other. Head CT w/o acute process. Improved.  Elevated LFTs. CT a/p and US abd unremarkable. Improving.    Anemia  IgM kappa MGUS  DM II. Hgb A1C 7.1  COPD     PLAN:  - continue IV Unasyn and vancomycin  - follow blood cultures- ngtd  - follow temps and wbc  - follow LFTs  - chest CT noted--> await pulmonary eval       MEDICATIONS ADMINISTERED IN LAST 1 DAY:  acetaminophen (Tylenol Extra Strength) tab 500 mg       Date Action Dose Route User    6/2/2024 1759 Given 500 mg Oral Monisha Modi RN    6/2/2024 1350 Given 500 mg Oral Jessica Dimas, RYNE          alteplase (Activase) 10 mg in sodium chloride 0.9% 30 mL (MIST2) intrapleural syringe       Date Action Dose Route User    6/2/2024 2326 Given 10 mg Intrapleural Helga Gonzáles RN          ampicillin-sulbactam (Unasyn) 3 g in sodium chloride 0.9% 100mL IVPB-ADD       Date Action Dose Route User     6/3/2024 0812 New Bag 3 g Intravenous Emily Jacob RN    6/3/2024 0139 New Bag 3 g Intravenous Helga Gonzáles RN    6/2/2024 2017 New Bag 3 g Intravenous Helga Gonzáles RN    6/2/2024 1350 New Bag 3 g Intravenous Jessica Dimas RN          aspirin DR tab 81 mg       Date Action Dose Route User    6/3/2024 1017 Given 81 mg Oral Emily Jacob RN          atorvastatin (Lipitor) tab 80 mg       Date Action Dose Route User    6/2/2024 2022 Given 80 mg Oral Helga Gonzáles RN          dornase franck (Pulmozyme) 5 mg in sterile water for injection (PF) 30 mL (MIST2) intrapleural syringe       Date Action Dose Route User    6/2/2024 2321 Given 5 mg Intrapleural Helga Gonzáles RN          gabapentin (Neurontin) tab 600 mg       Date Action Dose Route User    6/2/2024 2022 Given 600 mg Oral Helga Gonzáles RN          heparin (Porcine) 5000 UNIT/ML injection 5,000 Units       Date Action Dose Route User    6/3/2024 0505 Given 5,000 Units Subcutaneous (Left Lower Abdomen) Helga Gonzáles RN    6/2/2024 2025 Given 5,000 Units Subcutaneous (Right Lower Abdomen) Helga Gonzáles RN    6/2/2024 1413 Given 5,000 Units Subcutaneous (Left Lower Abdomen) Jessica Dimas RN          HYDROmorphone (Dilaudid) 1 MG/ML injection 0.5 mg       Date Action Dose Route User    6/3/2024 0755 Given 0.5 mg Intravenous Emily Jacob RN    6/3/2024 0458 Given 0.5 mg Intravenous Helga Gonzáles RN    6/3/2024 0215 Given 0.5 mg Intravenous Helga Gonzáles RN    6/2/2024 2340 Given 0.5 mg Intravenous Helga Gonzáles RN          HYDROmorphone (Dilaudid) 1 MG/ML injection 1 mg       Date Action Dose Route User    6/3/2024 1127 Given 1 mg Intravenous Nikolova, Emily, RN          HYDROmorphone (Dilaudid) 1 MG/ML injection 2 mg       Date Action Dose Route User    6/2/2024 1716 Given 2 mg Intravenous Jessica Dimas, RYNE          insulin aspart (NovoLOG) 100 Units/mL FlexPen 1-10 Units       Date Action  Dose Route User    6/2/2024 1400 Given 1 Units Subcutaneous (Left Lower Abdomen) Jessica Dimas RN          ipratropium-albuterol (Duoneb) 0.5-2.5 (3) MG/3ML inhalation solution 3 mL       Date Action Dose Route User    6/3/2024 0710 Given 3 mL Nebulization Florecita Arellano RCP    6/2/2024 1942 Given 3 mL Nebulization Pk Rico, REBECCA          metoprolol tartrate (Lopressor) tab 25 mg       Date Action Dose Route User    6/3/2024 0501 Given 25 mg Oral Helga Gonzáles RN    6/2/2024 1547 Given 25 mg Oral Jessica Dimas RN          ondansetron (Zofran) 4 MG/2ML injection 4 mg       Date Action Dose Route User    6/3/2024 1134 Given 4 mg Intravenous Emily Jacob RN          oxybutynin ER (Ditropan-XL) 24 hr tab 5 mg       Date Action Dose Route User    6/3/2024 1017 Given 5 mg Oral Emily Jacob RN          oxyCODONE ER (OxyCONTIN ER) 12 hr tab 10 mg       Date Action Dose Route User    6/3/2024 0501 Given 10 mg Oral Helga Gonzáles RN          pantoprazole (Protonix) DR tab 40 mg       Date Action Dose Route User    6/3/2024 0501 Given 40 mg Oral Helga Gonzáles RN          umeclidinium bromide (Incruse Ellipta) 62.5 MCG/ACT inhaler 1 puff       Date Action Dose Route User    6/3/2024 1017 Given 1 puff Inhalation Emily Jacob RN          vancomycin (Vancocin) 1,000 mg in sodium chloride 0.9% 250 mL IVPB-ADDV       Date Action Dose Route User    6/3/2024 0856 New Bag 1,000 mg Intravenous Emily Jacob RN    6/2/2024 2110 New Bag 1,000 mg Intravenous Helga Gonzáles RN          busPIRone (Buspar) tab 20 mg       Date Action Dose Route User    6/3/2024 1017 Given 20 mg Oral Emily Jacob RN          QUEtiapine ER (SEROquel XR) 24 hr tab 200 mg       Date Action Dose Route User    6/2/2024 2023 Given 200 mg Oral Helga Gonzáles T, RN            Vitals (last day)       Date/Time Temp Pulse Resp BP SpO2 Weight O2 Device O2 Flow Rate (L/min) [facility]    06/03/24 1130 97.9 °F (36.6  °C) 89 17 110/49 96 % -- Nasal cannula 2 L/min CT    06/03/24 0710 -- -- -- -- -- -- Nasal cannula 2 L/min [place]    06/03/24 0147 -- 98 -- -- 87 % -- -- -- MA    06/02/24 1900 100.1 °F (37.8 °C) 120 20 116/62 94 % -- Nasal cannula 2 L/min [place]    06/02/24 1838 99.7 °F (37.6 °C) 118 36 -- 95 % -- Nasal cannula 2 L/min     06/02/24 1725 -- -- -- -- 92 % -- Nasal cannula 2 L/min     06/02/24 1724 100.4 °F (38 °C) 105 38 112/53 86 % -- None (Room air) --     06/02/24 1539 98.9 °F (37.2 °C) 124 36 123/77 92 % -- None (Room air) --     06/02/24 1448 -- 127 30 -- 92 % -- None (Room air) --     06/02/24 1336 100.8 °F (38.2 °C) 123 40 109/51 96 % -- None (Room air) --     06/02/24 1224 99.4 °F (37.4 °C) 126 26 114/70 95 % -- None (Room air) --     06/02/24 0027 101.3 °F (38.5 °C) -- -- -- -- -- -- -- SR          CIWA Scores (since admission)       None          Blood Transfusion Record       Product Unit Status Volume Start End            Transfuse RBC       24  [id number]  [id number] Completed 05/30/24 0908 350 mL 05/30/24 0119 05/30/24 0900       24  [id number]  [id number] Stopped 365.42 mL 05/27/24 0813 05/27/24 1104              PLEASE FAX DAYS CERTIFIED AND NEXT REVIEW DATE [phone]

## 2024-06-03 NOTE — CONSULTS
Thoracic Surgery Consult Note     Name: Soumya King   Age: 64 year old   Sex: female.   MRN: [id number]    Reason for Consultation: right empyema    Consulting Physician: Dr. Villanueva    Subjective:     Chief Complaint: \"I had pain\"     History of Present Illness:   Ms. King is a 64 year old female smoker presenting with right empyema.     Patient presented on 5/26/24 with persistent cough and shortness of breath. She reported a one month history of general malaise and productive cough. About 3 days prior to admission, patient reported new, severe, right sided chest pain, worse with cough. No fevers or chills. Patient feels short of breath at baseline but felt progressive dyspnea with minimal activity such as walking to the bathroom that she did not have before. She does not use oxygen at baseline. CTA on admission showed mass-like consolidation with necrosis in the right lower lobe. She was later found to have a right sided effusion on workup for possible GI bleed and underwent thoracentesis. Cultures positive for streptococcus intermedius. She underwent chest tube placement on 5/29/24 and MIST II protocol from 5/31/24-6/2/24. Repeat CT chest showed worsening right empyema. Since admission, patient denies improvement of her breathing or pain despite chest tub drainage. She has received PRBC for anemia.     PMH includes COPD, MGUS, DM, SUMMER, CAD s/p CABG 12/27/23. No previous lung infections. Patient was undergoing workup by Dr. Main for pulmonary nodules and lymphadenopathy, had discussed possible biopsy. Has been taking ASA 81mg while inpatient. Current smoker, 40+ pyh. Patient has a family history of diabetes.     Review Of Systems:   10 point review of systems was conducted and was negative except for the pertinent positives listed in the above HPI.    Past Medical History:   Past Medical History:    Abdominal pain, other specified site    groin    Acute bronchitis    Allergic rhinitis    Anxiety     Arthritis    Back problem    Jamison esophagus    Chronic low back pain without sciatica    COPD (chronic obstructive pulmonary disease) (HCC)    Depression    Diabetes (HCC)    Difficult intubation    difficult to intubate with bladder surgery,instructed by anesthesia to communicate to future anesthesiologist. Small airway    Esophageal reflux    Essential hypertension    Don't remember    Fitting and adjustment of dental prosthetic device    High blood pressure    History of 2019 novel coronavirus disease (COVID-19)    No hospitalization,symptoms; Fever,fatigue ,body aches,headache no loss of taste or smell    Hx of motion sickness    Hyperlipidemia    Mass of spine    Migraines    barometric pressure    Myocardial infarction (HCC)    Osteoarthritis    Other ill-defined conditions(799.89)    Jamison's    Pain in joint, forearm    wrist    Pain in joint, pelvic region and thigh    hip    Pelvic mass    Personal history of urinary (tract) infection    Sleep apnea    I don't date       Past Surgical History:   Past Surgical History:   Procedure Laterality Date    Cabg  12/27/23    Colonoscopy      Colonoscopy N/A 05/18/2022    Procedure: COLONOSCOPY AND ESOPHAGOGASTRODUODENOSCOPY;  Surgeon: Miguel Camargo MD;  Location:  ENDOSCOPY    Heart surgery  2024    double bypass    Laparoscopic  2007    sling operation for stress incontinence    Laparoscopy,diagnostic      Kay biopsy stereo nodule 2 site bilat (cpt=19081/81058) Left 02/2010    BENIGN 2 SITES    Kay biopsy stereo w/calc 2 site left (cpt=19081/25363)  02/2010    benign x 2    Kay stereo-clip w/calc 2 site left  2010    Oophorectomy Left 06/28/2019    Other  08/31/2022    RIGHT SACRAL SUBCUTANEOUS CYST EXCISION    Other surgical history  2000, 2019    Bladder Sling, Large mass removed from left abdomen and ovar    Tonsillectomy      Was a child, have no idea    Tubal ligation      Upper gi endoscopy,exam         Social History:   Social History      Socioeconomic History    Marital status: Single     Spouse name: Not on file    Number of children: Not on file    Years of education: Not on file    Highest education level: Not on file   Occupational History    Not on file   Tobacco Use    Smoking status: Former     Current packs/day: 0.00     Average packs/day: 1.5 packs/day for 48.0 years (72.0 ttl pk-yrs)     Types: Cigarettes     Start date:      Quit date:      Years since quittin.4    Smokeless tobacco: Never    Tobacco comments:     smoking JUUL from 2019 until current   Vaping Use    Vaping status: Every Day    Start date: 2019    Substances: Nicotine, Flavoring    Devices: Disposable, Pre-filled pod   Substance and Sexual Activity    Alcohol use: Not Currently     Comment: On occasion    Drug use: No    Sexual activity: Not Currently     Partners: Male   Other Topics Concern     Service Not Asked    Blood Transfusions Not Asked    Caffeine Concern Yes    Occupational Exposure Not Asked    Hobby Hazards Not Asked    Sleep Concern Not Asked    Stress Concern Yes    Weight Concern Yes    Special Diet No    Back Care Not Asked    Exercise No    Bike Helmet Not Asked    Seat Belt Yes    Self-Exams Not Asked   Social History Narrative    Not on file     Social Determinants of Health     Financial Resource Strain: Not on File (10/7/2022)    [facility] from [facility]     Financial Resource Strain     Financial Resource Strain: 0   Food Insecurity: No Food Insecurity (2024)    Food Insecurity     Food Insecurity: Never true   Transportation Needs: No Transportation Needs (2024)    Transportation Needs     Lack of Transportation: No     Car Seat: Not on file   Physical Activity: Not on File (10/7/2022)    Received from [facility]     Physical Activity     Physical Activity: 0   Stress: Not on File (10/7/2022)    Received from [facility]     Stress     Stress: 0   Social Connections: Not on File (10/7/2022)    Received  from [facility]     Social Connections     Social Connections and Isolation: 0   Housing Stability: Low Risk  (5/26/2024)    Housing Stability     Housing Instability: No     Housing Instability Emergency: Not on file     Crib or Bassinette: Not on file       Family History:   Family History   Problem Relation Age of Onset    Arthritis Mother     Other (AMI) Mother     Other (diabetes mellitus) Mother     Diabetes Mother        Allergies:  Allergies   Allergen Reactions    Naprosyn [Naproxen] ANAPHYLAXIS     Has tolerated ibuprofen and IV toradol     Aspirin OTHER (SEE COMMENTS)     Difficulty swallowing due to  saavedra's esophagus       Medications:   Current Facility-Administered Medications on File Prior to Encounter   Medication Dose Route Frequency Provider Last Rate Last Admin    [COMPLETED] sodium chloride 0.9% infusion 1,000 mL  1,000 mL Intravenous Once Edwige Collier PA-C   Stopped at 05/20/24 1425    [COMPLETED] dexAMETHasone PF (Decadron) 10 MG/ML injection 10 mg  10 mg Intravenous Once Edwige Collier PA-C   10 mg at 05/20/24 1322    [COMPLETED] ondansetron (Zofran) 4 MG/2ML injection 4 mg  4 mg Intravenous Once Edwige Collier PA-C   4 mg at 05/20/24 1328    [COMPLETED] iron dextran (Infed) 25 mg in sodium chloride 0.9% PF 10 mL IV syringe (test dose)  25 mg Intravenous Once Scott Corona MD   25 mg at 03/15/24 1217    [COMPLETED] iron dextran (Infed) 975 mg in sodium chloride 0.9% 269.5 mL IVPB  975 mg Intravenous Once Scott Corona MD   Stopped at 03/15/24 1342     Current Outpatient Medications on File Prior to Encounter   Medication Sig Dispense Refill    azelastine 137 MCG/SPRAY Nasal Solution 1 spray by Nasal route 2 (two) times daily.      PRISTIQ 100 MG Oral Tablet 24 Hr Take 1 tablet (100 mg total) by mouth daily.      benzonatate (TESSALON PERLES) 100 MG Oral Cap Take 1 capsule (100 mg total) by mouth 3 (three) times daily as needed for cough. 15 capsule 0    Rizatriptan  Benzoate 10 MG Oral Tab Take 1 tablet (10 mg total) by mouth as needed for Migraine. 10 tablet 5    nitrofurantoin monohydrate macro (MACROBID) 100 MG Oral Cap Take 1 capsule (100 mg total) by mouth 2 (two) times daily. 14 capsule 0    Glucose Blood (ONETOUCH ULTRA) In Vitro Strip Tests 3 x daily 300 each 1    TRULICITY 0.75 MG/0.5ML Subcutaneous Solution Pen-injector Inject 0.75 mg into the skin once a week. 2 mL 0    Coenzyme Q10 200 MG Oral Cap Co Q-10 200 mg capsule, [RxNorm: 785195]      diclofenac 75 MG Oral Tab EC Take 1 tablet (75 mg total) by mouth 2 (two) times daily. 180 tablet 3    metoprolol succinate ER 50 MG Oral Tablet 24 Hr Take 1 tablet (50 mg total) by mouth daily. 90 tablet 0    Omeprazole 40 MG Oral Capsule Delayed Release Take 1 capsule (40 mg total) by mouth 2 (two) times daily. 300 capsule 0    Blood Glucose Monitoring Suppl (ONETOUCH ULTRA 2) w/Device Does not apply Kit 1 Device by Other route in the morning, at noon, and at bedtime. Use as directed. 1 kit 0    topiramate 50 MG Oral Tab TAKE ONE TABLET BY MOUTH EVERY MORNING AND TWO TABLETS EVERY EVENING (Patient taking differently: Taking one tablet in the morning and one tablet at night.) 270 tablet 2    gabapentin 600 MG Oral Tab Take 1 tablet (600 mg total) by mouth at bedtime. 180 tablet 0    Erenumab-aooe (AIMOVIG) 140 MG/ML Subcutaneous Solution Auto-injector Inject 1 mL (140 mg total) into the skin every 30 (thirty) days. 1 mL 4    umeclidinium bromide (INCRUSE ELLIPTA) 62.5 MCG/ACT Inhalation Aerosol Powder, Breath Activated Inhale 1 puff into the lungs daily. 3 each 0    clopidogrel 75 MG Oral Tab Take 1 tablet (75 mg total) by mouth daily.      aspirin 81 MG Oral Tab EC Take 1 tablet (81 mg total) by mouth daily.      atorvastatin 80 MG Oral Tab Take 1 tablet (80 mg total) by mouth nightly. 90 tablet 1    metFORMIN 500 MG Oral Tab Take 1 tablet (500 mg total) by mouth 2 (two) times daily. 180 tablet 1    furosemide 20 MG Oral Tab  Take 1 tablet (20 mg total) by mouth daily. 7 tablet 0    clonazePAM 0.5 MG Oral Tab Take 1 tablet (0.5 mg total) by mouth 2 (two) times daily. 12 tablet 0    busPIRone 10 MG Oral Tab Take 2 tablets (20 mg total) by mouth daily.      cyclobenzaprine 10 MG Oral Tab Take 1 tablet (10 mg total) by mouth every 12 (twelve) hours as needed for Muscle spasms. 60 tablet 1    Lancets (ONETOUCH DELICA PLUS [id number]) Does not apply Misc 1 lancet  by Finger stick route 3 (three) times daily. 300 each 1    Mirabegron ER (MYRBETRIQ) 50 MG Oral Tablet 24 Hr Take 1 tablet (50 mg total) by mouth daily. 30 tablet 11    Multiple Vitamins-Minerals (ONE-A-DAY WOMENS 50+) Oral Tab Take 1 tablet by mouth daily.  0    QUEtiapine Fumarate  MG Oral Tablet 24 Hr Take 1 tablet (200 mg total) by mouth every evening. 30 tablet 0    Calcium Carb-Cholecalciferol 500-200 MG-UNIT Oral Tab Take 1 tablet by mouth daily.        cyclobenzaprine 10 MG Oral Tab Take 1 tablet (10 mg total) by mouth every 12 (twelve) hours as needed for Muscle spasms. (Patient taking differently: Take 1 tablet (10 mg total) by mouth every 12 (twelve) hours as needed for Muscle spasms. Duplicate med) 90 tablet 2    lidocaine 5 % External Ointment Apply 1 Application  topically 3 (three) times daily as needed. 240 g 1    [] amoxicillin clavulanate 875-125 MG Oral Tab Take 1 tablet by mouth 2 (two) times daily for 7 days. 14 tablet 0    [] sulfamethoxazole-trimethoprim DS (BACTRIM DS) 800-160 MG Oral Tab per tablet Take 1 tablet by mouth 2 (two) times daily for 10 days. 20 tablet 0    Metoclopramide HCl 5 MG/5ML Oral Solution Take 5 mL (5 mg total) by mouth 3 (three) times daily before meals. (Patient taking differently: Take 5 mL (5 mg total) by mouth 3 (three) times daily before meals as needed.) 473 mL 0    estradiol (ESTRACE) 0.1 MG/GM Vaginal Cream Apply 1/2 gram vaginally 2-3 times per week. (Patient not taking: Reported on 2024) 42.5 g 3       ipratropium-albuterol (Duoneb) 0.5-2.5 (3) MG/3ML inhalation solution 3 mL  3 mL Nebulization Q4H PRN    vancomycin (Vancocin) 1,000 mg in sodium chloride 0.9% 250 mL IVPB-ADDV  15 mg/kg Intravenous Q12H    oxyCODONE ER (OxyCONTIN ER) 12 hr tab 10 mg  10 mg Oral 2 times per day    metoprolol tartrate (Lopressor) tab 25 mg  25 mg Oral 2x Daily(Beta Blocker)    [COMPLETED] alteplase (Activase) 10 mg in sodium chloride 0.9% 30 mL (MIST2) intrapleural syringe  10 mg Intrapleural Q12H    [COMPLETED] dornase franck (Pulmozyme) 5 mg in sterile water for injection (PF) 30 mL (MIST2) intrapleural syringe  5 mg Intrapleural Q12H    ampicillin-sulbactam (Unasyn) 3 g in sodium chloride 0.9% 100mL IVPB-ADD  3 g Intravenous q6h    [Held by provider] clonazePAM (KlonoPIN) tab 0.5 mg  0.5 mg Oral BID PRN    HYDROmorphone (Dilaudid) 1 MG/ML injection 0.2 mg  0.2 mg Intravenous Once    HYDROmorphone (Dilaudid) 1 MG/ML injection 0.5 mg  0.5 mg Intravenous Q2H PRN    Or    HYDROmorphone (Dilaudid) 1 MG/ML injection 1 mg  1 mg Intravenous Q2H PRN    Or    HYDROmorphone (Dilaudid) 1 MG/ML injection 2 mg  2 mg Intravenous Q2H PRN    [COMPLETED] phytonadione (Vitamin K) 1 mg/mL oral syringe 5 mg  5 mg Oral Once    [COMPLETED] sodium chloride 0.9% infusion   Intravenous Once    oxybutynin ER (Ditropan-XL) 24 hr tab 5 mg  5 mg Oral Daily    [COMPLETED] midazolam (Versed) 2 MG/2ML injection        [COMPLETED] fentaNYL (Sublimaze) 50 mcg/mL injection        HYDROcodone-acetaminophen (Norco) 5-325 MG per tab 1 tablet  1 tablet Oral Q4H PRN    Or    HYDROcodone-acetaminophen (Norco) 5-325 MG per tab 2 tablet  2 tablet Oral Q4H PRN    [COMPLETED] phytonadione (Vitamin K) 1 mg/mL oral syringe 5 mg  5 mg Oral Once    [COMPLETED] iopamidol 76% (ISOVUE-370) injection for power injector  85 mL Intravenous ONCE PRN    [COMPLETED] sodium chloride 0.9% infusion   Intravenous Once    traMADol (Ultram) tab 50 mg  50 mg Oral Q6H PRN    [COMPLETED] cefTRIAXone  (Rocephin) 1 g in D5W 100 mL IVPB-ADD  1 g Intravenous Once    [COMPLETED] azithromycin (Zithromax) 500 mg in sodium chloride 0.9% 250mL IVPB premix  500 mg Intravenous Once    [COMPLETED] sodium chloride 0.9 % IV bolus 1,701 mL  30 mL/kg Intravenous Once    [COMPLETED] iopamidol 76% (ISOVUE-370) injection for power injector  100 mL Intravenous ONCE PRN    aspirin DR tab 81 mg  81 mg Oral Daily    atorvastatin (Lipitor) tab 80 mg  80 mg Oral Nightly    benzonatate (Tessalon) cap 100 mg  100 mg Oral TID PRN    busPIRone (Buspar) tab 20 mg  20 mg Oral Daily    gabapentin (Neurontin) tab 600 mg  600 mg Oral Nightly    pantoprazole (Protonix) DR tab 40 mg  40 mg Oral QAM AC    QUEtiapine ER (SEROquel XR) 24 hr tab 200 mg  200 mg Oral QPM    rizatriptan (Maxalt-MLT) disintegrating tab 5 mg  5 mg Oral PRN    umeclidinium bromide (Incruse Ellipta) 62.5 MCG/ACT inhaler 1 puff  1 puff Inhalation Daily    glucose (Dex4) 15 GM/59ML oral liquid 15 g  15 g Oral Q15 Min PRN    Or    glucose (Glutose) 40% oral gel 15 g  15 g Oral Q15 Min PRN    Or    glucose-vitamin C (Dex-4) chewable tab 4 tablet  4 tablet Oral Q15 Min PRN    Or    dextrose 50% injection 50 mL  50 mL Intravenous Q15 Min PRN    Or    glucose (Dex4) 15 GM/59ML oral liquid 30 g  30 g Oral Q15 Min PRN    Or    glucose (Glutose) 40% oral gel 30 g  30 g Oral Q15 Min PRN    Or    glucose-vitamin C (Dex-4) chewable tab 8 tablet  8 tablet Oral Q15 Min PRN    melatonin tab 3 mg  3 mg Oral Nightly PRN    heparin (Porcine) 5000 UNIT/ML injection 5,000 Units  5,000 Units Subcutaneous Q8H JOSÉ ANTONIO    acetaminophen (Tylenol Extra Strength) tab 500 mg  500 mg Oral Q4H PRN    ondansetron (Zofran) 4 MG/2ML injection 4 mg  4 mg Intravenous Q6H PRN    prochlorperazine (Compazine) 10 MG/2ML injection 5 mg  5 mg Intravenous Q8H PRN    polyethylene glycol (PEG 3350) (Miralax) 17 g oral packet 17 g  17 g Oral Daily PRN    sennosides (Senokot) tab 17.2 mg  17.2 mg Oral Nightly PRN     bisacodyl (Dulcolax) 10 MG rectal suppository 10 mg  10 mg Rectal Daily PRN    fleet enema (Fleet) 7-19 GM/118ML rectal enema 133 mL  1 enema Rectal Once PRN    insulin aspart (NovoLOG) 100 Units/mL FlexPen 1-10 Units  1-10 Units Subcutaneous TID AC and HS    guaiFENesin (Robitussin) 100 MG/5 ML oral liquid 200 mg  200 mg Oral Q4H PRN    sodium chloride (Saline Mist) 0.65 % nasal solution 1 spray  1 spray Each Nare Q3H PRN         Objective:      Vital Signs:  /49 (BP Location: Left arm)   Pulse 89   Temp 97.9 °F (36.6 °C) (Oral)   Resp 17   Ht 154.9 cm (5' 1\")   Wt 132 lb 12.8 oz (60.2 kg)   SpO2 96%   BMI 25.09 kg/m²     Physical Exam:  General: well appearing female in no acute distress  HEENT: Normocephalic, PERRL, EOMI, no scleral icterus  Neck: Supple, trachea midline, no JVD, no masses. Thyroid not grossly enlarged  Nodes: no cervical or supraclavicular lymphadenopathy appreciated  Heart: regular rate and rhythm.   Lungs: Normal respiratory effort.   Chest tube in place with purulent output   Abdomen: Soft, Non-tender, non-distended. No hepatosplenomegaly noted.  Extremities: No clubbing or cyanosis. No lateralizing weakness  Neuro: No gross cranial nerve defects, no loss of sensation  Psych:  forgetful       Labs:   Lab Results   Component Value Date/Time    WBC 38.6 (H) 06/03/2024 07:55 AM    HGB 8.8 (L) 06/03/2024 07:55 AM    HGB 9.2 (L) 06/03/2024 07:55 AM    HCT 28.7 (L) 06/03/2024 07:55 AM    .0 (H) 06/03/2024 07:55 AM    BAND 2 05/31/2024 07:33 AM    MCV 92.6 06/03/2024 07:55 AM     Lab Results   Component Value Date/Time     (L) 06/03/2024 07:55 AM    K 4.1 06/03/2024 07:55 AM     06/03/2024 07:55 AM    CO2 21.0 06/03/2024 07:55 AM    BUN 7 (L) 06/03/2024 07:55 AM     (H) 06/03/2024 07:55 AM    CA 8.3 (L) 06/03/2024 07:55 AM    MG 1.9 12/28/2023 04:09 AM    PHOS 2.3 (L) 06/30/2019 05:52 AM     Lab Results   Component Value Date/Time    INR 1.38 (H) 06/03/2024  07:55 AM    PTT 48.5 (H) 05/26/2024 11:33 AM     No components found for: \"TROPI\"  Lab Results   Component Value Date/Time    ALB 1.3 (L) 06/03/2024 07:55 AM    TP 6.2 (L) 06/03/2024 07:55 AM     (H) 06/03/2024 07:55 AM    AST 94 (H) 06/03/2024 07:55 AM         Review of Data:   CT chest 6/3/24    FINDINGS:  Evaluation of the visceral organs is limited due to the lack of intravenous contrast.     LUNGS:  There is consolidation within the right lower lobe, right middle lobe, and to a lesser extent the right upper lobe suspicious for multifocal pneumonia.  There are small nodular ground-glass airspace opacities within the right upper lobe which may   represent the known pneumonia.  VASCULATURE:  Unremarkable.  THORACIC AORTA:  Scattered atherosclerosis.  MEDIASTINUM/ANKUSH:  Mild to moderate mediastinal adenopathy is nonspecific but may be reactive.  CARDIAC:  Calcified coronary artery disease is noted.  PLEURA:  There is a moderate amount of empyema within the right pleural space.  This is most notable at the posterior inferior aspect of the right pleural space measuring up to 11.1 x 7.3 cm in the axial plane.  Foci of air are seen within this empyema.   A right sided chest tube is seen centered within this dominant region of empyema.  The amount of empyema has increased since 5/29/2024.  A mild left pleural effusion is seen.  CHEST WALL:  Sternotomy changes.  LIMITED ABDOMEN:  Unremarkable.  BONES:  Degenerative changes the spine.  OTHER:  Mild supraclavicular adenopathy is noted.                   Impression   CONCLUSION:    1. A moderate amount empyema is seen within the right pleural space.  The extent of this empyema is worse since 5/29/2024.  A chest tube is well seen within the dominant loculation of probable empyema.  2. Multifocal pneumonia is again seen, most notable within the right middle lobe and right lower lobe.  Continued imaging follow-up is recommended.  3. Mild to moderate mediastinal  adenopathy is nonspecific but may be reactive.  Mild supraclavicular adenopathy is also seen.       Assessment/Plan:     Ms. King is a 64 year old female  smoker presenting with right empyema.     Patient presented on 5/26/24 with persistent cough, shortness of breath, and right sided pain. CTA on admission showed mass-like consolidation with necrosis in the right lower lobe. She was later found to have a right sided effusion on workup for possible GI bleed and underwent thoracentesis on 5/28/24 in which minimal fluid could be aspirated. Cultures positive for streptococcus intermedius. She underwent chest tube placement on 5/29/24 and MIST II protocol from 5/31/24-6/2/24. Repeat CT chest showed worsening right empyema. Currently on 2L per NC, no supplemental oxygen needed at baseline.     Discussed options including right VATS decortication. This is recommended because she has failed to improve with more conservative measures including chest tube placement and MIST II protocol. Discussed the risks and benefits of surgery including bleeding, infection, and lung injury. Patient expressed understanding and would like to proceed with surgery.     -Plan to proceed with right VATS decortication with Dr. Maldonado. Will plan on Wednesday morning pending OR availability.   -Continue chest tube to suction, okay to ambulate off of suction from our standpoint.   -Abx per ID.     Patient discussed with Dr. Maldonado.     Yudy Albrecht PA-C  Thoracic Surgery

## 2024-06-03 NOTE — PROGRESS NOTES
[facility]   part of [facility]     Hospitalist Progress Note     Soumya King Patient Status:  Inpatient    6/3/1960 MRN [id number]   [facility] 5NW-A Attending Apurva Logan MD   Hosp Day # 8 PCP Rika Vinson DO     Chief Complaint: chest wall pain     Subjective:     Patient with persistent chest pain. Trying to rest before going down to get CT     Objective:    Review of Systems:   A comprehensive review of systems was completed; pertinent positive and negatives stated in subjective.    Vital signs:  Temp:  [98.7 °F (37.1 °C)-100.8 °F (38.2 °C)] 100 °F (37.8 °C)  Pulse:  [] 95  Resp:  [18-40] 18  BP: (102-123)/(49-77) 104/64  SpO2:  [86 %-98 %] 95 %    Physical Exam:    General: No acute distress, confused, sleepy  Respiratory: No wheezes, no rhonchi  Cardiovascular: S1, S2, regular rate and rhythm  Abdomen: Soft, Non-tender, non-distended, positive bowel sounds  Neuro: No new focal deficits.   Extremities: No edema      Diagnostic Data:    Labs:  Recent Labs   Lab 24  0548 24  1151 24  0714 24  1623 24  0733 24  0855 24  1708 24  0758 24  1615 24  0016 24  0806 24  1422 24  0034 24  0755   WBC 33.7*   < > 18.7*  --  17.8*  --   --  21.6*  --   --  26.3* 31.5*  --  38.6*   HGB 7.6*   < > 8.3*  8.4*   < > 8.9*  8.9*  --    < > 9.2*  9.2*   < > 9.9* 8.9*  8.9* 9.3* 9.0* 9.2*  8.8*   MCV 89.7   < > 87.3  --  89.7  --   --  89.7  --   --  89.2 89.4  --  92.6   .0*   < > 457.0*  --  520.0*  --   --  577.0*  --   --  536.0* 626.0*  --  698.0*   BAND 3  --  1  --  2  --   --   --   --   --   --   --   --   --    INR 1.58*   < > 1.51*  --  >15.49* 1.33*  --  1.35*  --   --  1.34*  --   --   --     < > = values in this interval not displayed.       Recent Labs   Lab 24  0741 24  0758 24  0806   * 136* 147*   BUN 5* 6* 4*   CREATSERUM 0.57  0.57 0.60 0.42*    CA 8.4* 8.3* 8.1*   ALB 1.4* 1.4* 1.2*    136 137   K 3.8 3.7 3.7    108 107   CO2 20.0* 21.0 20.0*   ALKPHO 473* 381* 319*   * 243* 183*   * 262* 194*   BILT 0.6 0.7 0.7   TP 6.3* 6.1* 5.7*       Estimated Creatinine Clearance: 104.8 mL/min (A) (based on SCr of 0.42 mg/dL (L)).    No results for input(s): \"TROP\", \"TROPHS\", \"CK\" in the last 168 hours.      Recent Labs   Lab 05/31/24  0855 06/01/24  0758 06/02/24  0806   PTP 16.6* 16.7* 16.6*   INR 1.33* 1.35* 1.34*          Microbiology    Hospital Encounter on 05/26/24   1. Body Fluid Cult Aerobic and Anaerobic     Status: Abnormal    Collection Time: 05/29/24  3:46 PM    Specimen: Pleural Fluid, Right; Body fluid, unspecified   Result Value Ref Range    Body Fluid Culture Result 3+ growth Streptococcus intermedius (A) N/A    Body Fluid Smear 4+ WBCs seen N/A    Body Fluid Smear No organisms seen N/A    Body Fluid Smear This is a cytocentrifuged smear. N/A   2. Blood Culture     Status: None (Preliminary result)    Collection Time: 05/29/24  2:24 PM    Specimen: Blood,peripheral   Result Value Ref Range    Blood Culture Result No Growth 4 Days N/A         Imaging: Reviewed in Epic.    Medications:    vancomycin  15 mg/kg Intravenous Q12H    oxyCODONE ER  10 mg Oral 2 times per day    metoprolol tartrate  25 mg Oral 2x Daily(Beta Blocker)    ampicillin-sulbactam  3 g Intravenous q6h    HYDROmorphone  0.2 mg Intravenous Once    ipratropium-albuterol  3 mL Nebulization TID    oxybutynin ER  5 mg Oral Daily    aspirin  81 mg Oral Daily    atorvastatin  80 mg Oral Nightly    busPIRone  20 mg Oral Daily    gabapentin  600 mg Oral Nightly    pantoprazole  40 mg Oral QAM AC    QUEtiapine ER  200 mg Oral QPM    umeclidinium bromide  1 puff Inhalation Daily    heparin  5,000 Units Subcutaneous Q8H JOSÉ ANTONIO    insulin aspart  1-10 Units Subcutaneous TID AC and HS       Assessment & Plan:      # RLL pneumonia with empyema   # RLL effusion with loculation  and chest pain   -abx adjusted per cultures   - started MIST protocol   - CT Chest pending   - may need thoracic surgery eval   -Pulmonology and ID rec appreciated   - sp chest tube by IR on 5/29  - cont pain meds    # elevated liver enzymes seems ischemic in nature per course   -improved  - exam benign   - US abd with doppler neg   - avoid hepatotoxic agents    # Iron def Anemia  - sp PRBC   - no s/s bleeding   -trend    #Coagulopathy  - repeat INR normalized   -trend  -sp Vit K   -US abd and CT AP  without obvious source of LFT rise     #COPD   -no active wheezing  -Continue inhalers  -prn nebs.     #Acute metabolic toxic ( anxiolytic) encephalopathy  -Ammonia normal   -CT brain negative  -ABG reviewed  - improved    #Hypertension  -metoprolol on hold due to low BP     #DM 2  -hyperglycemia protocol     #GERD  #Jamison's esophagus  -PPI.    #MDD/anxiety  Home med       Hoda Juan MD      Supplementary Documentation:     Quality:  DVT Mechanical Prophylaxis:   SCDs,    DVT Pharmacologic Prophylaxis   Medication    heparin (Porcine) 5000 UNIT/ML injection 5,000 Units         DVT Pharmacologic prophylaxis: Aspirin 81 mg      Code Status: Full Code  Gotti: No urinary catheter in place  Gotti Duration (in days):   Central line:    ILYA:     Discharge is dependent on: progress  At this point Ms. King is expected to be discharge to: home    The 21st Century Cures Act makes medical notes like these available to patients in the interest of transparency. Please be advised this is a medical document. Medical documents are intended to carry relevant information, facts as evident, and the clinical opinion of the practitioner. The medical note is intended as peer to peer communication and may appear blunt or direct. It is written in medical language and may contain abbreviations or verbiage that are unfamiliar.

## 2024-06-03 NOTE — PROGRESS NOTES
[facility] Progress Note    Soumya King Patient Status:  Inpatient    6/3/1960 MRN [id number]   Location [facility] 5NW-A Attending Hoda Juan MD   Hosp Day # 8 PCP Rika Vinson DO     Subjective:  Soumya King is a(n) 64 year old female who is admitted for pneumonia with empyema.    Overnight: still with pain around right side    Objective:  /49 (BP Location: Left arm)   Pulse 89   Temp 97.9 °F (36.6 °C) (Oral)   Resp 17   Ht 5' 1\" (1.549 m)   Wt 132 lb 12.8 oz (60.2 kg)   SpO2 96%   BMI 25.09 kg/m²       Temp (24hrs), Av.4 °F (37.4 °C), Min:97.9 °F (36.6 °C), Max:100.4 °F (38 °C)      Intake/Output:    Intake/Output Summary (Last 24 hours) at 6/3/2024 1348  Last data filed at 6/3/2024 1000  Gross per 24 hour   Intake 1961 ml   Output 425 ml   Net 1536 ml       Physical Exam:   General: alert, cooperative, oriented.  No respiratory distress.   Head: Normocephalic, without obvious abnormality, atraumatic.   Throat: Lips, mucosa, and tongue normal.  No thrush noted.   Neck: trachea midline, no adenopathy, no thyromegaly. No JVD.   Lungs: clear to auscultation bilaterally   Chest wall: No tenderness or deformity.   Heart: regular rate and rhythm, S1, S2 normal, no murmur, click, rub or gallop   Abdomen: soft, non-distended, no masses, no guarding, no     Rebound.   Extremity: No edema or cyanosis   Skin: No rashes or lesions.   Neurological: Alert, interactive, no focal deficits    Lab Data Review:  Recent Labs     24  0737 24  1235 24  2356 24  0714 24  1623 24  0122 24  0733   WBC 29.0*  --   --  18.7*  --   --  17.8*   HGB 7.2*   < > 6.9* 8.3*  8.4* 9.2* 8.6* 8.9*  8.9*   .0*  --   --  457.0*  --   --  520.0*    < > = values in this interval not displayed.     Recent Labs     24  0737 24  0714 24  0741    142 136   K 3.9 3.8 3.8    109 108   CO2 17.0* 20.0* 20.0*   BUN 9 9 5*   CREATSERUM  0.73 0.67  0.67 0.57  0.57     Recent Labs   Lab 06/01/24  0758 06/02/24  0806 06/03/24  0755   PTP 16.7* 16.6* 17.0*   INR 1.35* 1.34* 1.38*       Cultures:   Hospital Encounter on 05/26/24   1. Body Fluid Cult Aerobic and Anaerobic     Status: Abnormal    Collection Time: 05/29/24  3:46 PM    Specimen: Pleural Fluid, Right; Body fluid, unspecified   Result Value Ref Range    Body Fluid Culture Result 3+ growth Streptococcus intermedius (A) N/A    Body Fluid Smear 4+ WBCs seen N/A    Body Fluid Smear No organisms seen N/A    Body Fluid Smear This is a cytocentrifuged smear. N/A   2. Blood Culture     Status: None (Preliminary result)    Collection Time: 05/29/24  2:24 PM    Specimen: Blood,peripheral   Result Value Ref Range    Blood Culture Result No Growth 4 Days N/A       Radiology:  CT CHEST (GYJ=62105)  Narrative: PROCEDURE:  CT CHEST (CPT=71250)     COMPARISON:  THAIS MELGOZA, CT CHEST (CPT=71250), 5/29/2024, 9:05 AM.     INDICATIONS:  evaluate empyema     TECHNIQUE:  Unenhanced multislice CT scanning is performed through the chest.  Dose reduction techniques were used. Dose information is transmitted to the ACR (American College of Radiology) NRDR (National Radiology Data Registry) which includes the Dose   Index Registry.     PATIENT STATED HISTORY: (As transcribed by Technologist)  Evaluation of chest tube.          FINDINGS:  Evaluation of the visceral organs is limited due to the lack of intravenous contrast.     LUNGS:  There is consolidation within the right lower lobe, right middle lobe, and to a lesser extent the right upper lobe suspicious for multifocal pneumonia.  There are small nodular ground-glass airspace opacities within the right upper lobe which may   represent the known pneumonia.  VASCULATURE:  Unremarkable.  THORACIC AORTA:  Scattered atherosclerosis.  MEDIASTINUM/ANKUSH:  Mild to moderate mediastinal adenopathy is nonspecific but may be reactive.  CARDIAC:  Calcified coronary artery  disease is noted.  PLEURA:  There is a moderate amount of empyema within the right pleural space.  This is most notable at the posterior inferior aspect of the right pleural space measuring up to 11.1 x 7.3 cm in the axial plane.  Foci of air are seen within this empyema.    A right sided chest tube is seen centered within this dominant region of empyema.  The amount of empyema has increased since 5/29/2024.  A mild left pleural effusion is seen.  CHEST WALL:  Sternotomy changes.  LIMITED ABDOMEN:  Unremarkable.  BONES:  Degenerative changes the spine.  OTHER:  Mild supraclavicular adenopathy is noted.                   Impression: CONCLUSION:    1. A moderate amount empyema is seen within the right pleural space.  The extent of this empyema is worse since 5/29/2024.  A chest tube is well seen within the dominant loculation of probable empyema.  2. Multifocal pneumonia is again seen, most notable within the right middle lobe and right lower lobe.  Continued imaging follow-up is recommended.  3. Mild to moderate mediastinal adenopathy is nonspecific but may be reactive.  Mild supraclavicular adenopathy is also seen.           LOCATION:  [id number]        Dictated by (CST): Stromberg, LeRoy, MD on 6/03/2024 at 11:24 AM       Finalized by (CST): Stromberg, LeRoy, MD on 6/03/2024 at 11:35 AM         Medications reviewed     Assessment and Plan:   Patient Active Problem List   Diagnosis    Vitamin D deficiency    Jamison's esophagus    Tobacco use    Insomnia    COPD, mild (HCC) - Dx'd via PFTs done in 3/2015    Migraine without aura and without status migrainosus, not intractable    Hyperlipidemia    SUMMER on CPAP    MDD (major depressive disorder), recurrent episode, moderate (HCC)    GERD (gastroesophageal reflux disease)    Hypertension    Chronic pansinusitis    Mucinous cystadenoma of ovary, left    KIRK (generalized anxiety disorder)    History of pubovaginal sling    Absence of bladder continence    Vasomotor flushing     Primary osteoarthritis of left knee    Mass of spine    Hx of motion sickness    Difficult intubation    Chronic low back pain without sciatica    Type 2 diabetes mellitus with diabetic neuropathy (HCC)    Pulmonary nodule    CAD, multiple vessel    S/P CABG x 2    Type 2 diabetes mellitus with hyperlipidemia (HCC)    Anemia    Iron deficiency anemia secondary to inadequate dietary iron intake    Subacute cough    Hyperglycemia    Acute anemia    MARÍA ELENA (acute kidney injury) ([facility])    Transaminitis    Thrombocytosis    Hyponatremia    Pleural effusion    Somnolence    Acute respiratory failure with hypoxia ([facility])    Empyema lung ([facility])       Assessment:  Large right lower lobe pneumonia with empyema secondary to Streptococcus intermedius with ongoing empyema despite MIST protocol  Febrile illness with increasing white blood cell count  Right pleural effusion with empyema as noted above  Pain from around chest tube site  Multiple pulmonary nodules  Acute encephalopathy, improved    Plan:  Has completed MIST protocol   Plan for repeat CT today with still ongoing empyema   Will consult CT surgery for decortication  Abx per ID   Continue chest tube to suction ok to ambulate without suction as needed  Ongoing pain control        Liana Villanueva MD  [facility]  Office: [phone]

## 2024-06-03 NOTE — PAYOR COMM NOTE
--------------  CONTINUED STAY REVIEW    Payor: [facility]  Subscriber #:  [id number]  Authorization Number: [id number]    Admit date: 5/26/24  Admit time:  3:45 PM    REVIEW DOCUMENTATION:  6/2  [facility] ID PROGRESS NOTE   ASSESSMENT:  Right empyema, s/p thoracentesis 5/28, cx with strep intermedius. Path w/ acute inflammation with occasional bacterial organisms present. S/p R chest tube placement 5/29, cx with strep intermedius.   RLL pneumonia. Legionella and strep pneumo Uags negative. Blasto ag negative.  Fevers and leukocytosis, assume d/t above. No further temps, some increase in WBC today.   Alerted mental status. ? Related to above vs other. Head CT w/o acute process. Resolved.  Elevated LFTs. CT a/p and US abd unremarkable. Improving.    Anemia  IgM kappa MGUS  DM II. Hgb A1C 7.1  COPD  Increasing leukocytosis- inadequate chest tube drainage vs. Other      PLAN:  - continue IV Unasyn   - add back Vancomycin IV for now given fever and increasing leukocytosis in case Strep intermedius isolate is more drug-resistant.   - follow blood cultures- ngtd  - follow temps and wbc  - follow LFTs  - MIST 2 as per pulm  - follow chest tube output; plans for repeat chest CT Monday.  Hospitalist Progress Note   Assessment & Plan:  # RLL pneumonia with empyema   # RLL effusion with loculation and chest pain   -abx adjusted per cultures   - started MIST protocol and plan to cont through the weekend until Monday for repeat CT Chest   -Pulmonology and ID rec appreciated   - sp chest tube by IR on 5/29     # elevated liver enzymes seems ischemic in nature per course   -improved  - exam benign   - US abd with doppler neg   - avoid hepatotoxic agents     # Iron def Anemia  - sp PRBC   - no s/s bleeding   -trend     #Coagulopathy  - repeat INR normalized   -trend  -sp Vit K   -US abd and CT AP  without obvious source of LFT rise      #COPD   -no active wheezing  -Continue inhalers  -prn nebs.     #Acute  metabolic toxic ( anxiolytic) encephalopathy  -Ammonia normal   -CT brain negative  -ABG reviewed  - improved     #Hypertension  -metoprolol on hold due to low BP     #DM 2  -hyperglycemia protocol     #GERD  #Jamison's esophagus  -PPI.     #MDD/anxiety  Home med   [facility] Progress Note   Assessment:  Large right lower lobe pneumonia with empyema secondary to Streptococcus intermedius  Febrile illness with increasing white blood cell count  Right pleural effusion with empyema as noted above  Pain from around chest tube site  Multiple pulmonary nodules  Acute encephalopathy, improved     Plan:  ID following for antibiotic management, agree with adding back vancomycin  Continue mist protocol, keep close eye for bleeding, completing today  Continue chest tube to suction, will need to keep tube in while instilling tpa and dornase  Placed order for repeat CT scan tomorrow morning  Will follow           MEDICATIONS ADMINISTERED IN LAST 1 DAY:  acetaminophen (Tylenol Extra Strength) tab 500 mg       Date Action Dose Route User    6/2/2024 1759 Given 500 mg Oral Monisha Modi RN    6/2/2024 1350 Given 500 mg Oral Jessica Dimas RN          alteplase (Activase) 10 mg in sodium chloride 0.9% 30 mL (MIST2) intrapleural syringe       Date Action Dose Route User    6/2/2024 2326 Given 10 mg Intrapleural Helga Gonzáles RN    6/2/2024 1225 Given 10 mg Intrapleural Jessica Dimas RN          ampicillin-sulbactam (Unasyn) 3 g in sodium chloride 0.9% 100mL IVPB-ADD       Date Action Dose Route User    6/3/2024 0812 New Bag 3 g Intravenous Emily Jacob RN    6/3/2024 0139 New Bag 3 g Intravenous Helga Gonzáles RN    6/2/2024 2017 New Bag 3 g Intravenous Helga Gonzáles RN    6/2/2024 1350 New Bag 3 g Intravenous Jessica Dimas RN          aspirin DR tab 81 mg       Date Action Dose Route User    6/3/2024 1017 Given 81 mg Oral Emily Jacob RN          atorvastatin (Lipitor) tab 80 mg       Date  Action Dose Route User    6/2/2024 2022 Given 80 mg Oral Helga Gonzáles RN          dornase franck (Pulmozyme) 5 mg in sterile water for injection (PF) 30 mL (MIST2) intrapleural syringe       Date Action Dose Route User    6/2/2024 2321 Given 5 mg Intrapleural Helga Gonzáles RN    6/2/2024 1225 Given 5 mg Intrapleural Jessica Dimas RN          gabapentin (Neurontin) tab 600 mg       Date Action Dose Route User    6/2/2024 2022 Given 600 mg Oral Helga Gonzáles RN          heparin (Porcine) 5000 UNIT/ML injection 5,000 Units       Date Action Dose Route User    6/3/2024 0505 Given 5,000 Units Subcutaneous (Left Lower Abdomen) Helga Gonzáles RN    6/2/2024 2025 Given 5,000 Units Subcutaneous (Right Lower Abdomen) Helga Gonzáles RN    6/2/2024 1413 Given 5,000 Units Subcutaneous (Left Lower Abdomen) Jessica Dimas RN          HYDROmorphone (Dilaudid) 1 MG/ML injection 0.5 mg       Date Action Dose Route User    6/3/2024 0755 Given 0.5 mg Intravenous Emily Jacob RN    6/3/2024 0458 Given 0.5 mg Intravenous Helga Gonzáles RN    6/3/2024 0215 Given 0.5 mg Intravenous Helga Gonzáles RN    6/2/2024 2340 Given 0.5 mg Intravenous eHlga Gonzáles RN          HYDROmorphone (Dilaudid) 1 MG/ML injection 1 mg       Date Action Dose Route User    6/2/2024 1237 Given 1 mg Intravenous Jessica Dimas RN          HYDROmorphone (Dilaudid) 1 MG/ML injection 2 mg       Date Action Dose Route User    6/2/2024 1716 Given 2 mg Intravenous Jessica Dimas RN          insulin aspart (NovoLOG) 100 Units/mL FlexPen 1-10 Units       Date Action Dose Route User    6/2/2024 1400 Given 1 Units Subcutaneous (Left Lower Abdomen) Jessica Dimas RN          ipratropium-albuterol (Duoneb) 0.5-2.5 (3) MG/3ML inhalation solution 3 mL       Date Action Dose Route User    6/3/2024 0710 Given 3 mL Nebulization Florecita Arellano, RCP    6/2/2024 1942 Given 3 mL Nebulization Pk Rico, RCP     6/2/2024 1314 Given 3 mL Nebulization Luis Antonio England, RCP          metoprolol tartrate (Lopressor) tab 25 mg       Date Action Dose Route User    6/3/2024 0501 Given 25 mg Oral Helga Gonzáles RN    6/2/2024 1547 Given 25 mg Oral Jessica Dimas RN          oxybutynin ER (Ditropan-XL) 24 hr tab 5 mg       Date Action Dose Route User    6/3/2024 1017 Given 5 mg Oral Emily Jacob RN          oxyCODONE ER (OxyCONTIN ER) 12 hr tab 10 mg       Date Action Dose Route User    6/3/2024 0501 Given 10 mg Oral Helga Gonzáles RN          pantoprazole (Protonix) DR tab 40 mg       Date Action Dose Route User    6/3/2024 0501 Given 40 mg Oral Helga Gonzáles RN          umeclidinium bromide (Incruse Ellipta) 62.5 MCG/ACT inhaler 1 puff       Date Action Dose Route User    6/3/2024 1017 Given 1 puff Inhalation Emily Jacob RN          vancomycin (Vancocin) 1,000 mg in sodium chloride 0.9% 250 mL IVPB-ADDV       Date Action Dose Route User    6/3/2024 0856 New Bag 1,000 mg Intravenous Emily Jacob RN    6/2/2024 2110 New Bag 1,000 mg Intravenous Helga Gonzáles RN    6/2/2024 1125 New Bag 1,000 mg Intravenous Jessica Dimas RN          busPIRone (Buspar) tab 20 mg       Date Action Dose Route User    6/3/2024 1017 Given 20 mg Oral Emily Jacob RN          QUEtiapine ER (SEROquel XR) 24 hr tab 200 mg       Date Action Dose Route User    6/2/2024 2023 Given 200 mg Oral Helga Gonzáles RN            Vitals (last day)       Date/Time Temp Pulse Resp BP SpO2 Weight O2 Device O2 Flow Rate (L/min) Who    06/03/24 0710 -- -- -- -- -- -- Nasal cannula 2 L/min KS    06/03/24 0147 -- 98 -- -- 87 % -- -- -- MA    06/02/24 2340 99.2 °F (37.3 °C) 88 18 102/49 95 % -- None (Room air) -- SD    06/02/24 1900 100.1 °F (37.8 °C) 120 20 116/62 94 % -- Nasal cannula 2 L/min MA    06/02/24 1838 99.7 °F (37.6 °C) 118 36 -- 95 % -- Nasal cannula 2 L/min     06/02/24 1725 -- -- -- -- 92 % -- Nasal cannula 2 L/min      06/02/24 1724 100.4 °F (38 °C) 105 38 112/53 86 % -- None (Room air) --     06/02/24 1539 98.9 °F (37.2 °C) 124 36 123/77 92 % -- None (Room air) --     06/02/24 1448 -- 127 30 -- 92 % -- None (Room air) --     06/02/24 1336 100.8 °F (38.2 °C) 123 40 109/51 96 % -- None (Room air) --     06/02/24 1224 99.4 °F (37.4 °C) 126 26 114/70 95 % -- None (Room air) --     06/02/24 0027 101.3 °F (38.5 °C) -- -- -- -- -- -- -- SR          CIWA Scores (since admission)       None          Blood Transfusion Record       Product Unit Status Volume Start End            Transfuse RBC       24  [id number]  [id number] Completed 05/30/24 0908 350 mL 05/30/24 0119 05/30/24 0900       24  [id number]  [id number] Stopped 365.42 mL 05/27/24 0813 05/27/24 1104                PLEASE FAX DAYS CERTIFIED AND NEXT REVIEW DATE [phone]

## 2024-06-04 ENCOUNTER — ANESTHESIA EVENT (OUTPATIENT)
Dept: CARDIAC SURGERY | Facility: HOSPITAL | Age: 64
End: 2024-06-04
Payer: MEDICAID

## 2024-06-04 PROBLEM — J86.9 EMPYEMA OF RIGHT PLEURAL SPACE (HCC): Status: ACTIVE | Noted: 2024-05-30

## 2024-06-04 LAB
ANTIBODY SCREEN: NEGATIVE
CREAT BLD-MCNC: 0.59 MG/DL
EGFRCR SERPLBLD CKD-EPI 2021: 102 ML/MIN/1.73M2 (ref 60–?)
GLUCOSE BLD-MCNC: 141 MG/DL (ref 70–99)
GLUCOSE BLD-MCNC: 144 MG/DL (ref 70–99)
GLUCOSE BLD-MCNC: 152 MG/DL (ref 70–99)
GLUCOSE BLD-MCNC: 153 MG/DL (ref 70–99)
HGB BLD-MCNC: 7.6 G/DL
HGB BLD-MCNC: 7.6 G/DL
HGB BLD-MCNC: 7.7 G/DL
RH BLOOD TYPE: NEGATIVE
VANCOMYCIN PEAK SERPL-MCNC: 27.6 UG/ML (ref 30–50)
VANCOMYCIN TROUGH SERPL-MCNC: 16.9 UG/ML (ref 10–20)

## 2024-06-04 PROCEDURE — 99231 SBSQ HOSP IP/OBS SF/LOW 25: CPT | Performed by: STUDENT IN AN ORGANIZED HEALTH CARE EDUCATION/TRAINING PROGRAM

## 2024-06-04 PROCEDURE — 99232 SBSQ HOSP IP/OBS MODERATE 35: CPT | Performed by: INTERNAL MEDICINE

## 2024-06-04 NOTE — PROGRESS NOTES
[facility] Pharmacy Dosing Service      Follow Up Pharmacokinetic Consult for Vancomycin Dosing     Soumya King is a 64 year old female who is receiving vancomycin therapy for empyema. Patient is on day 3 of vancomycin and is currently receiving 1000 mg IV every 12 hours. The current treatment and monitoring approach is steady state AUC strategy.        Weight and Temperature:    Wt Readings from Last 1 Encounters:   24 60.2 kg (132 lb 12.8 oz)         Temp Readings from Last 1 Encounters:   24 98.1 °F (36.7 °C) (Oral)      Labs:   Recent Labs   Lab 24  0806 24  0755 24  0910   CREATSERUM 0.42* 0.54* 0.59      Estimated Creatinine Clearance: 74.6 mL/min (based on SCr of 0.59 mg/dL).     Recent Labs   Lab 24  0806 24  1422 24  0755   WBC 26.3* 31.5* 38.6*        Vancomycin Levels:  Lab Results   Component Value Date/Time    VANCT 16.9 2024 09:11 AM    VANCP 27.6 (L) 2024 12:10 AM       Corresponding 24 h-AUC:  550 mg-h/L     The Pharmacokinetic Target is:     to 600 mg-h/L and trough <=15 mg/L    Renal Dosing Considerations:    None     Assessment/Plan:   Maintenance Regimen: Adjust vancomycin to 750 mg IV every 12 hours.  The predicted AUC and trough with this new regimen are 12.5 mg-h/L and 413 mg/L, respectively    Monitorin) Plan for vancomycin peak and trough to be obtained prior to 3rd dose    2) Pharmacy will order SCr as clinically indicated to assess renal function.    3) Pharmacy will monitor for toxicity and efficacy, adjust vancomycin dose and/or frequency, and order vancomycin levels as appropriate per the Pharmacy and Therapeutics Committee approved protocol until discontinuation of the medication.       We appreciate the opportunity to assist in the care of this patient.     Lisa Jordan, PharmD  2024  2:32 PM  [facility] Extension: [phone]

## 2024-06-04 NOTE — PAYOR COMM NOTE
--------------  CONTINUED STAY REVIEW    Payor: [facility]  Subscriber #:  [id number]  Authorization Number: [id number]    Admit date: 24  Admit time:  3:45 PM    REVIEW DOCUMENTATION:       Thoracic Surgery Progress Note      Soumya King is a 64 year old female. MRN [id number]. Admitted 2024     SUBJECTIVE/24H EVENTS:     No acute events overnight. Feels \"loopy\" from her pain medication.      Objective:      VITALS:     Temp (24hrs), Av.9 °F (36.6 °C), Min:97.8 °F (36.6 °C), Max:98 °F (36.7 °C)   BP 95/54 (BP Location: Left arm)   Pulse 98   Temp 98 °F (36.7 °C) (Oral)   Resp 17   Ht 154.9 cm (5' 1\")   Wt 132 lb 12.8 oz (60.2 kg)   SpO2 99%   BMI 25.09 kg/m²      EXAM:   General: well appearing female in no acute distress  Heart: regular rate and rhythm.   Lungs: Normal respiratory effort. Equal chest rise bilaterally  Chest tube with minimal output  Abdomen: Soft, Non-tender, non-distended.  Extremities: No clubbing or cyanosis. No lateralizing weakness  Neuro: no focal defects  Psych:  oriented to person place and time, normal mood and affect        Intake/Output Summary (Last 24 hours) at 2024 0902  Last data filed at 2024 0600      Gross per 24 hour   Intake 1033 ml   Output 516 ml   Net 517 ml            MEDS:  Scheduled Medications[]Expand by Default    vancomycin  15 mg/kg Intravenous Q12H    oxyCODONE ER  10 mg Oral 2 times per day    metoprolol tartrate  25 mg Oral 2x Daily(Beta Blocker)    ampicillin-sulbactam  3 g Intravenous q6h    HYDROmorphone  0.2 mg Intravenous Once    oxybutynin ER  5 mg Oral Daily    [Held by provider] aspirin  81 mg Oral Daily    atorvastatin  80 mg Oral Nightly    busPIRone  20 mg Oral Daily    gabapentin  600 mg Oral Nightly    pantoprazole  40 mg Oral QAM AC    QUEtiapine ER  200 mg Oral QPM    umeclidinium bromide  1 puff Inhalation Daily    heparin  5,000 Units Subcutaneous Q8H JOSÉ ANTONIO    insulin aspart  1-10  Units Subcutaneous TID AC and HS            LABS:        Lab Results   Component Value Date     HGB 7.7 06/04/2024            Assessment/Plan:      64 year old female presenting with right empyema s/p chest tube placement and MIST II protocol without improvement on CT chest.      -Plan for right VATS decortication with Dr. Maldonado on Wednesday or Thursday pending OR availability.   -Chest tube to suction. Okay to ambulate off of suction.   -Abx per ID   -General diet. NPO at midnight pending OR scheduling.   -Maintain saturations above 90%.   -Ambulate 3-4 times per day in the halls. Spend majority of day out of bed, in chair. Encouraged cough and IS use 10x hourly.             MEDICATIONS ADMINISTERED IN LAST 1 DAY:  ampicillin-sulbactam (Unasyn) 3 g in sodium chloride 0.9% 100mL IVPB-ADD       Date Action Dose Route User    6/4/2024 0922 New Bag 3 g Intravenous Emily Jacob RN    6/4/2024 0120 New Bag 3 g Intravenous Chiquis Plaza RN    6/3/2024 1959 New Bag 3 g Intravenous Chiquis Plaza RN    6/3/2024 1459 New Bag 3 g Intravenous Emily Jacob RN          atorvastatin (Lipitor) tab 80 mg       Date Action Dose Route User    6/3/2024 1956 Given 80 mg Oral Chiquis Plaza RN          gabapentin (Neurontin) tab 600 mg       Date Action Dose Route User    6/3/2024 1956 Given 600 mg Oral Chiquis Plaza RN          heparin (Porcine) 5000 UNIT/ML injection 5,000 Units       Date Action Dose Route User    6/4/2024 0532 Given 5,000 Units Subcutaneous (Right Lower Abdomen) Chiquis Plaza RN    6/3/2024 2129 Given 5,000 Units Subcutaneous (Right Lower Abdomen) Chiquis Plaza RN    6/3/2024 1400 Given 5,000 Units Subcutaneous (Right Lower Abdomen) Emily Jacob RN          HYDROmorphone (Dilaudid) 1 MG/ML injection 0.5 mg       Date Action Dose Route User    6/4/2024 0921 Given 0.5 mg Intravenous Emily Jacob RN    6/4/2024 0555 Given 0.5 mg Intravenous Chiquis Plaza RN    6/3/2024 2130  Given 0.5 mg Intravenous Chiquis Plaza RN    6/3/2024 1833 Given 0.5 mg Intravenous Emily Jacob RN    6/3/2024 1532 Given 0.5 mg Intravenous Emily Jacob RN          HYDROmorphone (Dilaudid) 1 MG/ML injection 1 mg       Date Action Dose Route User    6/3/2024 1127 Given 1 mg Intravenous Emily Jacob RN          insulin aspart (NovoLOG) 100 Units/mL FlexPen 1-10 Units       Date Action Dose Route User    6/3/2024 1657 Given 1 Units Subcutaneous (Right Upper Arm) Emily Jacob RN          metoprolol tartrate (Lopressor) tab 25 mg       Date Action Dose Route User    6/4/2024 0532 Given 25 mg Oral Chiquis Plaza RN    6/3/2024 1835 Given 25 mg Oral Emily Jacob RN          ondansetron (Zofran) 4 MG/2ML injection 4 mg       Date Action Dose Route User    6/3/2024 1134 Given 4 mg Intravenous Emily Jacob RN          oxybutynin ER (Ditropan-XL) 24 hr tab 5 mg       Date Action Dose Route User    6/4/2024 0921 Given 5 mg Oral Emily Jacob RN          oxyCODONE ER (OxyCONTIN ER) 12 hr tab 10 mg       Date Action Dose Route User    6/4/2024 0532 Given 10 mg Oral Emily Jacob RN          pantoprazole (Protonix) DR tab 40 mg       Date Action Dose Route User    6/4/2024 0532 Given 40 mg Oral Chiquis Plaza RN          umeclidinium bromide (Incruse Ellipta) 62.5 MCG/ACT inhaler 1 puff       Date Action Dose Route User    6/4/2024 0928 Given 1 puff Inhalation Emily Jacob RN          vancomycin (Vancocin) 1,000 mg in sodium chloride 0.9% 250 mL IVPB-ADDV       Date Action Dose Route User    6/4/2024 1016 New Bag 1,000 mg Intravenous Emily Jacob RN    6/3/2024 2124 New Bag 1,000 mg Intravenous Chiquis Plaza RN          busPIRone (Buspar) tab 20 mg       Date Action Dose Route User    6/4/2024 0921 Given 20 mg Oral Emily Jacob, RN          QUEtiapine ER (SEROquel XR) 24 hr tab 200 mg       Date Action Dose Route User    6/3/2024 1955 Given 200 mg Oral Chiquis Plaza, RN             Vitals (last day)       Date/Time Temp Pulse Resp BP SpO2 Weight O2 Device O2 Flow Rate (L/min) [facility]    06/04/24 0715 -- -- -- -- 99 % -- Nasal cannula 2 L/min AF    06/04/24 0517 98 °F (36.7 °C) -- 18 120/81 -- -- Nasal cannula 2 L/min MI    06/04/24 0104 97.9 °F (36.6 °C) -- 16 98/61 -- -- Nasal cannula 2 L/min MI    06/03/24 1945 98 °F (36.7 °C) -- 18 123/64 -- -- Nasal cannula 2 L/min MI    06/03/24 1530 97.8 °F (36.6 °C) 95 18 106/63 100 % -- Nasal cannula 2 L/min CT    06/03/24 1130 97.9 °F (36.6 °C) 89 17 110/49 96 % -- Nasal cannula 2 L/min CT    06/03/24 0710 -- -- -- -- -- -- Nasal cannula 2 L/min [place]    06/03/24 0147 -- 98 -- -- 87 % -- -- -- MA          CIWA Scores (since admission)       None          Blood Transfusion Record       Product Unit Status Volume Start End            Transfuse RBC      24  [id number]  [id number] Completed 05/30/24 0908 350 mL 05/30/24 0119 05/30/24 0900       24  [id number]  [id number] Stopped 365.42 mL 05/27/24 0813 05/27/24 1104              PLEASE FAX DAYS CERTIFIED AND NEXT REVIEW DATE [phone]

## 2024-06-04 NOTE — PROGRESS NOTES
[facility] Pulmonary Progress Note    Soumya King Patient Status:  Inpatient    6/3/1960 MRN [id number]   Location [facility] 5NW-A Attending Hoda Juan MD   Hosp Day # 9 PCP Rika Vinson DO     Subjective:  Soumya King is a(n) 64 year old female who is admitted for pneumonia with empyema.    Overnight: no acute changes, still having right sided pain    Objective:  /48 (BP Location: Left arm)   Pulse 85   Temp 98.1 °F (36.7 °C) (Oral)   Resp 18   Ht 5' 1\" (1.549 m)   Wt 132 lb 12.8 oz (60.2 kg)   SpO2 99%   BMI 25.09 kg/m²       Temp (24hrs), Av °F (36.7 °C), Min:97.8 °F (36.6 °C), Max:98.1 °F (36.7 °C)      Intake/Output:    Intake/Output Summary (Last 24 hours) at 2024 1248  Last data filed at 2024 0600  Gross per 24 hour   Intake 1033 ml   Output 451 ml   Net 582 ml       Physical Exam:   General: alert, cooperative, oriented.  No respiratory distress.   Head: Normocephalic, without obvious abnormality, atraumatic.   Throat: Lips, mucosa, and tongue normal.  No thrush noted.   Neck: trachea midline, no adenopathy, no thyromegaly. No JVD.   Lungs: clear to auscultation bilaterally   Chest wall: No tenderness or deformity.   Heart: regular rate and rhythm, S1, S2 normal, no murmur, click, rub or gallop   Abdomen: soft, non-distended, no masses, no guarding, no     Rebound.   Extremity: No edema or cyanosis   Skin: No rashes or lesions.   Neurological: Alert, interactive, no focal deficits    Lab Data Review:  Recent Labs     24  0737 24  1235 24  2356 24  0714 24  1623 24  0122 24  0733   WBC 29.0*  --   --  18.7*  --   --  17.8*   HGB 7.2*   < > 6.9* 8.3*  8.4* 9.2* 8.6* 8.9*  8.9*   .0*  --   --  457.0*  --   --  520.0*    < > = values in this interval not displayed.     Recent Labs     24  0737 24  0714 24  0741    142 136   K 3.9 3.8 3.8    109 108   CO2 17.0* 20.0* 20.0*   BUN 9 9 5*    CREATSERUM 0.73 0.67  0.67 0.57  0.57     Recent Labs   Lab 06/01/24  0758 06/02/24  0806 06/03/24  0755   PTP 16.7* 16.6* 17.0*   INR 1.35* 1.34* 1.38*       Cultures:   Hospital Encounter on 05/26/24   1. Body Fluid Cult Aerobic and Anaerobic     Status: Abnormal    Collection Time: 05/29/24  3:46 PM    Specimen: Pleural Fluid, Right; Body fluid, unspecified   Result Value Ref Range    Body Fluid Culture Result 3+ growth Streptococcus intermedius (A) N/A    Body Fluid Smear 4+ WBCs seen N/A    Body Fluid Smear No organisms seen N/A    Body Fluid Smear This is a cytocentrifuged smear. N/A   2. Blood Culture     Status: None    Collection Time: 05/29/24  2:24 PM    Specimen: Blood,peripheral   Result Value Ref Range    Blood Culture Result No Growth 5 Days N/A       Radiology:  CT CHEST (IGI=32402)  Narrative: PROCEDURE:  CT CHEST (CPT=71250)     COMPARISON:  THAIS MELGOZA, CT CHEST (CPT=71250), 5/29/2024, 9:05 AM.     INDICATIONS:  evaluate empyema     TECHNIQUE:  Unenhanced multislice CT scanning is performed through the chest.  Dose reduction techniques were used. Dose information is transmitted to the ACR (American College of Radiology) NRDR (National Radiology Data Registry) which includes the Dose   Index Registry.     PATIENT STATED HISTORY: (As transcribed by Technologist)  Evaluation of chest tube.          FINDINGS:  Evaluation of the visceral organs is limited due to the lack of intravenous contrast.     LUNGS:  There is consolidation within the right lower lobe, right middle lobe, and to a lesser extent the right upper lobe suspicious for multifocal pneumonia.  There are small nodular ground-glass airspace opacities within the right upper lobe which may   represent the known pneumonia.  VASCULATURE:  Unremarkable.  THORACIC AORTA:  Scattered atherosclerosis.  MEDIASTINUM/ANKUSH:  Mild to moderate mediastinal adenopathy is nonspecific but may be reactive.  CARDIAC:  Calcified coronary artery disease is  noted.  PLEURA:  There is a moderate amount of empyema within the right pleural space.  This is most notable at the posterior inferior aspect of the right pleural space measuring up to 11.1 x 7.3 cm in the axial plane.  Foci of air are seen within this empyema.    A right sided chest tube is seen centered within this dominant region of empyema.  The amount of empyema has increased since 5/29/2024.  A mild left pleural effusion is seen.  CHEST WALL:  Sternotomy changes.  LIMITED ABDOMEN:  Unremarkable.  BONES:  Degenerative changes the spine.  OTHER:  Mild supraclavicular adenopathy is noted.                   Impression: CONCLUSION:    1. A moderate amount empyema is seen within the right pleural space.  The extent of this empyema is worse since 5/29/2024.  A chest tube is well seen within the dominant loculation of probable empyema.  2. Multifocal pneumonia is again seen, most notable within the right middle lobe and right lower lobe.  Continued imaging follow-up is recommended.  3. Mild to moderate mediastinal adenopathy is nonspecific but may be reactive.  Mild supraclavicular adenopathy is also seen.           LOCATION:  [id number]        Dictated by (CST): Stromberg, LeRoy, MD on 6/03/2024 at 11:24 AM       Finalized by (CST): Stromberg, LeRoy, MD on 6/03/2024 at 11:35 AM         Medications reviewed     Assessment and Plan:   Patient Active Problem List   Diagnosis    Vitamin D deficiency    Jamison's esophagus    Tobacco use    Insomnia    COPD, mild (HCC) - Dx'd via PFTs done in 3/2015    Migraine without aura and without status migrainosus, not intractable    Hyperlipidemia    SUMMER on CPAP    MDD (major depressive disorder), recurrent episode, moderate (HCC)    GERD (gastroesophageal reflux disease)    Hypertension    Chronic pansinusitis    Mucinous cystadenoma of ovary, left    KIRK (generalized anxiety disorder)    History of pubovaginal sling    Absence of bladder continence    Vasomotor flushing    Primary  osteoarthritis of left knee    Mass of spine    Hx of motion sickness    Difficult intubation    Chronic low back pain without sciatica    Type 2 diabetes mellitus with diabetic neuropathy (HCC)    Pulmonary nodule    CAD, multiple vessel    S/P CABG x 2    Type 2 diabetes mellitus with hyperlipidemia (HCC)    Anemia    Iron deficiency anemia secondary to inadequate dietary iron intake    Subacute cough    Hyperglycemia    Acute anemia    MARÍA ELENA (acute kidney injury) (HCC)    Transaminitis    Thrombocytosis    Hyponatremia    Pleural effusion    Somnolence    Acute respiratory failure with hypoxia ([facility])    Empyema of right pleural space ([facility])       Assessment:  Large right lower lobe pneumonia with empyema secondary to Streptococcus intermedius with ongoing empyema despite MIST protocol  Febrile illness with increasing white blood cell count  Right pleural effusion with empyema as noted above  Pain from around chest tube site  Multiple pulmonary nodules  Acute encephalopathy, improved    Plan:  Has completed MIST protocol   Plan for decortication tomorrow  Abx per ID   Continue chest tube to suction ok to ambulate without suction as needed  Ongoing pain control        Liana Villanueva MD  [facility]  Office: [phone]

## 2024-06-04 NOTE — PROGRESS NOTES
Patient Aox4. , On 2L NC. Tele NSR/ST. CT to R Side, -20 suction continuous. ACCU QID. Patient cont. X2. Up with standby assist. Call light within reach. Bed lowered to lowest position. No further needs at the moment.

## 2024-06-04 NOTE — PLAN OF CARE
Patient is alert and oriented x 4, afebrile. Severe pain in right chest (CT insertion point) and mid back - oxycontin scheduled, patient refused evening dose d/t feeling \"loopy\", Diluadid based on pain scale, Lidocaine patch to mid back, cold packs offered, patient more comfortable today.  On RA at rest, needs 2-3 L to get out of bed. Thoracic procedure planned for tomorrow. Chest tube to right chest, drainage improved, notified HIEN Godfrey, no change of plans, regarding procedure. Heparin held. NPO after midnight discussed with patient. Consent for procedure in chart.   Patient is qid accuchecks, no insulin per scale. Good PO intake. Patient is continent, voids. Up to BSC with SBA.     Problem: Diabetes/Glucose Control  Goal: Glucose maintained within prescribed range  Description: INTERVENTIONS:  - Monitor Blood Glucose as ordered  - Assess for signs and symptoms of hyperglycemia and hypoglycemia  - Administer ordered medications to maintain glucose within target range  - Assess barriers to adequate nutritional intake and initiate nutrition consult as needed  - Instruct patient on self management of diabetes  Outcome: Progressing     Problem: Patient/Family Goals  Goal: Patient/Family Long Term Goal  Description: Patient's Long Term Goal:   Discharge to home    Interventions:  - Follow plan of care  - See additional Care Plan goals for specific interventions  Outcome: Progressing  Goal: Patient/Family Short Term Goal  Description: Patient's Short Term Goal:   5/26 noc: maintain on room air  5/27: manage hgb  5/27 noc: reduce pleuritic pain   5/28 AM: CT scan of head/NPO possible thora  5/28noc: sleep  5/29 AM: Chest Tube placement  5/29noc: CT management, control pain, sleep  6/1 AM: Pain control, Abd US  06/01/2024 - pain control  06/02/2024 - pain control  6/4 AM: pain control; maintain O2  Interventions:   - labs, blood  - Medications  - IVF  - IVF ABT  - See additional Care Plan goals for specific  interventions  Outcome: Progressing     Problem: RESPIRATORY - ADULT  Goal: Achieves optimal ventilation and oxygenation  Description: INTERVENTIONS:  - Assess for changes in respiratory status  - Assess for changes in mentation and behavior  - Position to facilitate oxygenation and minimize respiratory effort  - Oxygen supplementation based on oxygen saturation or ABGs  - Provide Smoking Cessation handout, if applicable  - Encourage broncho-pulmonary hygiene including cough, deep breathe, Incentive Spirometry  - Assess the need for suctioning and perform as needed  - Assess and instruct to report SOB or any respiratory difficulty  - Respiratory Therapy support as indicated  - Manage/alleviate anxiety  - Monitor for signs/symptoms of CO2 retention  Outcome: Progressing     Problem: HEMATOLOGIC - ADULT  Goal: Free from bleeding injury  Description: (Example usage: patient with low platelets)  INTERVENTIONS:  - Avoid intramuscular injections, enemas and rectal medication administration  - Ensure safe mobilization of patient  - Hold pressure on venipuncture sites to achieve adequate hemostasis  - Assess for signs and symptoms of internal bleeding  - Monitor lab trends  - Patient is to report abnormal signs of bleeding to staff  - Avoid use of toothpicks and dental floss  - Use electric shaver for shaving  - Use soft bristle tooth brush  - Limit straining and forceful nose blowing  Outcome: Progressing     Problem: PAIN - ADULT  Goal: Verbalizes/displays adequate comfort level or patient's stated pain goal  Description: INTERVENTIONS:  - Encourage pt to monitor pain and request assistance  - Assess pain using appropriate pain scale  - Administer analgesics based on type and severity of pain and evaluate response  - Implement non-pharmacological measures as appropriate and evaluate response  - Consider cultural and social influences on pain and pain management  - Manage/alleviate anxiety  - Utilize distraction and/or  relaxation techniques  - Monitor for opioid side effects  - Notify MD/LIP if interventions unsuccessful or patient reports new pain  - Anticipate increased pain with activity and pre-medicate as appropriate  Outcome: Progressing

## 2024-06-04 NOTE — PROGRESS NOTES
[facility]   part of [facility] ID PROGRESS NOTE    Soumya King Patient Status:  Inpatient    6/3/1960 MRN [id number]   Location [facility] 5NW-A Attending Hoda Juan MD   Hosp Day # 9 PCP Rika Vinson DO     Abx: IV Unasyn D#4, IV vanco D#2  s/p Zosyn and vanco    Subjective: Patient seen and examined today. Worsening R back pain. SOB about the same. + Coughing. Afebrile. 2L NC.     Allergies:  Allergies   Allergen Reactions    Naprosyn [Naproxen] ANAPHYLAXIS     Has tolerated ibuprofen and IV toradol     Aspirin OTHER (SEE COMMENTS)     Difficulty swallowing due to  saavedra's esophagus       Medications:    Current Facility-Administered Medications:     lidocaine-menthol 4-1 % patch 1 patch, 1 patch, Transdermal, Daily    ipratropium-albuterol (Duoneb) 0.5-2.5 (3) MG/3ML inhalation solution 3 mL, 3 mL, Nebulization, Q4H PRN    vancomycin (Vancocin) 1,000 mg in sodium chloride 0.9% 250 mL IVPB-ADDV, 15 mg/kg, Intravenous, Q12H    oxyCODONE ER (OxyCONTIN ER) 12 hr tab 10 mg, 10 mg, Oral, 2 times per day    metoprolol tartrate (Lopressor) tab 25 mg, 25 mg, Oral, 2x Daily(Beta Blocker)    ampicillin-sulbactam (Unasyn) 3 g in sodium chloride 0.9% 100mL IVPB-ADD, 3 g, Intravenous, q6h    [Held by provider] clonazePAM (KlonoPIN) tab 0.5 mg, 0.5 mg, Oral, BID PRN    HYDROmorphone (Dilaudid) 1 MG/ML injection 0.2 mg, 0.2 mg, Intravenous, Once    HYDROmorphone (Dilaudid) 1 MG/ML injection 0.5 mg, 0.5 mg, Intravenous, Q2H PRN **OR** HYDROmorphone (Dilaudid) 1 MG/ML injection 1 mg, 1 mg, Intravenous, Q2H PRN **OR** HYDROmorphone (Dilaudid) 1 MG/ML injection 2 mg, 2 mg, Intravenous, Q2H PRN    oxybutynin ER (Ditropan-XL) 24 hr tab 5 mg, 5 mg, Oral, Daily    HYDROcodone-acetaminophen (Norco) 5-325 MG per tab 1 tablet, 1 tablet, Oral, Q4H PRN **OR** HYDROcodone-acetaminophen (Norco) 5-325 MG per tab 2 tablet, 2 tablet, Oral, Q4H PRN    traMADol (Ultram) tab 50 mg, 50 mg, Oral, Q6H PRN     [Held by provider] aspirin DR tab 81 mg, 81 mg, Oral, Daily    atorvastatin (Lipitor) tab 80 mg, 80 mg, Oral, Nightly    benzonatate (Tessalon) cap 100 mg, 100 mg, Oral, TID PRN    busPIRone (Buspar) tab 20 mg, 20 mg, Oral, Daily    gabapentin (Neurontin) tab 600 mg, 600 mg, Oral, Nightly    pantoprazole (Protonix) DR tab 40 mg, 40 mg, Oral, QAM AC    QUEtiapine ER (SEROquel XR) 24 hr tab 200 mg, 200 mg, Oral, QPM    rizatriptan (Maxalt-MLT) disintegrating tab 5 mg, 5 mg, Oral, PRN    umeclidinium bromide (Incruse Ellipta) 62.5 MCG/ACT inhaler 1 puff, 1 puff, Inhalation, Daily    glucose (Dex4) 15 GM/59ML oral liquid 15 g, 15 g, Oral, Q15 Min PRN **OR** glucose (Glutose) 40% oral gel 15 g, 15 g, Oral, Q15 Min PRN **OR** glucose-vitamin C (Dex-4) chewable tab 4 tablet, 4 tablet, Oral, Q15 Min PRN **OR** dextrose 50% injection 50 mL, 50 mL, Intravenous, Q15 Min PRN **OR** glucose (Dex4) 15 GM/59ML oral liquid 30 g, 30 g, Oral, Q15 Min PRN **OR** glucose (Glutose) 40% oral gel 30 g, 30 g, Oral, Q15 Min PRN **OR** glucose-vitamin C (Dex-4) chewable tab 8 tablet, 8 tablet, Oral, Q15 Min PRN    melatonin tab 3 mg, 3 mg, Oral, Nightly PRN    heparin (Porcine) 5000 UNIT/ML injection 5,000 Units, 5,000 Units, Subcutaneous, Q8H JOSÉ ANTONIO    acetaminophen (Tylenol Extra Strength) tab 500 mg, 500 mg, Oral, Q4H PRN    ondansetron (Zofran) 4 MG/2ML injection 4 mg, 4 mg, Intravenous, Q6H PRN    prochlorperazine (Compazine) 10 MG/2ML injection 5 mg, 5 mg, Intravenous, Q8H PRN    polyethylene glycol (PEG 3350) (Miralax) 17 g oral packet 17 g, 17 g, Oral, Daily PRN    sennosides (Senokot) tab 17.2 mg, 17.2 mg, Oral, Nightly PRN    bisacodyl (Dulcolax) 10 MG rectal suppository 10 mg, 10 mg, Rectal, Daily PRN    fleet enema (Fleet) 7-19 GM/118ML rectal enema 133 mL, 1 enema, Rectal, Once PRN    insulin aspart (NovoLOG) 100 Units/mL FlexPen 1-10 Units, 1-10 Units, Subcutaneous, TID AC and HS    guaiFENesin (Robitussin) 100 MG/5 ML oral liquid  200 mg, 200 mg, Oral, Q4H PRN    sodium chloride (Saline Mist) 0.65 % nasal solution 1 spray, 1 spray, Each Nare, Q3H PRN    Review of Systems:  Completed. See pertinent positives and negatives above.    Physical Exam:  Vital signs: Blood pressure 95/54, pulse 98, temperature 98 °F (36.7 °C), temperature source Oral, resp. rate 17, height 154.9 cm (5' 1\"), weight 132 lb 12.8 oz (60.2 kg), SpO2 99%, not currently breastfeeding.    General: Alert, oriented. NAD. On room air.   HEENT: Moist mucous membranes.   Neck: No lymphadenopathy.  Supple.  Cardiovascular: RRR.   Respiratory: Diminished breaths with crackles to lower lung. + R chest tube in place with minimal output.  Abdomen: Soft, nontender, nondistended.   Musculoskeletal: Some pedal edema  Integument: No lesions. No erythema.    Laboratory Data:  Recent Labs   Lab 05/31/24  0733 05/31/24  1708 06/03/24  0755 06/03/24  1558 06/04/24  0911   RBC 2.90*   < > 3.10*  --   --    HGB 8.9*  8.9*   < > 9.2*  8.8*   < > 7.6*   HCT 26.0*   < > 28.7*  --   --    MCV 89.7   < > 92.6  --   --    MCH 30.7   < > 29.7  --   --    MCHC 34.2   < > 32.1  --   --    RDW 16.5   < > 15.9  --   --    NEPRELIM 13.57*  --   --   --   --    WBC 17.8*   < > 38.6*  --   --    .0*   < > 698.0*  --   --    NEUT 74  --   --   --   --    LYMPH 17  --   --   --   --    MON 4  --   --   --   --    EOS 3  --   --   --   --     < > = values in this interval not displayed.     Recent Labs   Lab 06/01/24  0758 06/02/24  0806 06/03/24  0755 06/04/24  0910   * 147* 149*  --    BUN 6* 4* 7*  --    CREATSERUM 0.60 0.42* 0.54* 0.59   CA 8.3* 8.1* 8.3*  --    ALB 1.4* 1.2* 1.3*  --     137 131*  --    K 3.7 3.7 4.1  --     107 101  --    CO2 21.0 20.0* 21.0  --    ALKPHO 381* 319* 295*  --    * 183* 94*  --    * 194* 156*  --    BILT 0.7 0.7 0.6  --    TP 6.1* 5.7* 6.2*  --        Microbiology: Reviewed in EMR    Radiology: Reviewed.    PROCEDURE:  CT CHEST  (CPT=71250)     COMPARISON:  [facility] , CT, CT CHEST (CPT=71250), 5/29/2024, 9:05 AM.     INDICATIONS:  evaluate empyema     TECHNIQUE:  Unenhanced multislice CT scanning is performed through the chest.  Dose reduction techniques were used. Dose information is transmitted to the ACR (American College of Radiology) NRDR (National Radiology Data Registry) which includes the Dose   Index Registry.     PATIENT STATED HISTORY: (As transcribed by Technologist)  Evaluation of chest tube.      FINDINGS:  Evaluation of the visceral organs is limited due to the lack of intravenous contrast.     LUNGS:  There is consolidation within the right lower lobe, right middle lobe, and to a lesser extent the right upper lobe suspicious for multifocal pneumonia.  There are small nodular ground-glass airspace opacities within the right upper lobe which may   represent the known pneumonia.  VASCULATURE:  Unremarkable.  THORACIC AORTA:  Scattered atherosclerosis.  MEDIASTINUM/ANKUSH:  Mild to moderate mediastinal adenopathy is nonspecific but may be reactive.  CARDIAC:  Calcified coronary artery disease is noted.  PLEURA:  There is a moderate amount of empyema within the right pleural space.  This is most notable at the posterior inferior aspect of the right pleural space measuring up to 11.1 x 7.3 cm in the axial plane.  Foci of air are seen within this empyema.   A right sided chest tube is seen centered within this dominant region of empyema.  The amount of empyema has increased since 5/29/2024.  A mild left pleural effusion is seen.  CHEST WALL:  Sternotomy changes.  LIMITED ABDOMEN:  Unremarkable.  BONES:  Degenerative changes the spine.  OTHER:  Mild supraclavicular adenopathy is noted.     Impression:    CONCLUSION:    1. A moderate amount empyema is seen within the right pleural space.  The extent of this empyema is worse since 5/29/2024.  A chest tube is well seen within the dominant loculation of probable empyema.  2. Multifocal  pneumonia is again seen, most notable within the right middle lobe and right lower lobe.  Continued imaging follow-up is recommended.  3. Mild to moderate mediastinal adenopathy is nonspecific but may be reactive.  Mild supraclavicular adenopathy is also seen.     LOCATION:  [id number]     Dictated by (CST): Stromberg, LeRoy, MD on 6/03/2024 at 11:24 AM      Finalized by (CST): Stromberg, LeRoy, MD on 6/03/2024 at 11:35 AM        CORRECTION Corrected on: 5/29/2024;         PROCEDURE:  CT CHEST (CPT=71250)    COMPARISON:  [facility] , XR, XR CHEST AP PORTABLE  (CPT=71045), 5/29/2024,   4:27 AM.  [facility], CT, CT ANGIOGRAPHY, CHEST (CPT=71275), 5/26/2024, 12:49   PM.    INDICATIONS:  Follow-up empyema    TECHNIQUE:  Unenhanced multislice CT scanning is performed through the   chest.  Dose reduction techniques were used. Dose information is   transmitted to the ACR (American College of Radiology) NRDR (National   Radiology Data Registry) which includes the Dose  Index Registry.  There is image degradation due to motion slightly   limiting evaluation    PATIENT STATED HISTORY: (As transcribed by Technologist)  Recent empyema       FINDINGS:   LUNGS:  Masslike consolidation with internal punctate foci of air is most   consistent with patient's known consolidation and measures 5.7 x 8.1 x 6.1   versus 5 x 7.2 x 5.1 cm, AP x T x cc dimension respectively.  Increasing   adjacent interstitial and   ground-glass density. VASCULATURE:  Pulmonary vessels are unremarkable within the limits of a   noncontrast CT.   ANKUSH:  Limited due to lack of IV contrast.   MEDIASTINUM:  Adenopathy with subcarinal node measuring 2.8 x 3.3 versus   2.1 x 2 cm and right paratracheal node measuring 2 x 1.3 versus 2 x 1.4   cm.  Mediastinal clips. CARDIAC:  No enlargement or pericardial thickening.  Mild coronary artery   calcifications. PLEURA:  Increasing pleural fluid along the right fissures which is   lobular in contour and measures 2.6 cm in depth.   Right pleural effusion   measures 3.7 versus 2.9 cm with left pleural fluid measuring 1.7 versus   0.8 cm in depth.   THORACIC AORTA:  Atherosclerosis.   CHEST WALL:  No mass or axillary adenopathy.  Sternotomy wires. LIMITED ABDOMEN:  Limited images of the upper abdomen are unremarkable.   BONES:  No fracture.      CONCLUSION:   1. When compared to most recent CT of the chest performed 5/26/2024,   patient's known right lower lobe consolidation has slightly increased in   size with increasing adjacent interstitial and ground-glass density,   correlate clinically. 2. Increasing pleural fluid as detailed above.    LOCATION:  [facility]       Dictated by (CST): Britney Mendes MD on 5/29/2024 at 9:39 AM       Finalized by (CST): Britney Mendes MD on 5/29/2024 at 9:51 AM      Dictated by (CST): Britney Mendes MD on 5/29/2024 at 11:23 AM       Finalized by (CST): Britney Mendes MD on 5/29/2024 at 11:23 AM                       Signed: 05/29/24 1123 by Britney Mendes MD  Narrative:    PROCEDURE:  CT CHEST (CPT=71250)     COMPARISON:  [facility] , XR, XR CHEST AP PORTABLE  (CPT=71045), 5/29/2024, 4:27 AM.  [facility], CT, CT ANGIOGRAPHY, CHEST (CPT=71275), 5/26/2024, 12:49 PM.     INDICATIONS:  Follow-up empyema     TECHNIQUE:  Unenhanced multislice CT scanning is performed through the chest.  Dose reduction techniques were used. Dose information is transmitted to the ACR (American College of Radiology) NRDR (National Radiology Data Registry) which includes the Dose   Index Registry.  There is image degradation due to motion slightly limiting evaluation     PATIENT STATED HISTORY: (As transcribed by Technologist)  Recent empyema      FINDINGS:    LUNGS:  Masslike consolidation with internal punctate foci of air is most consistent with patient's known empyema and measures 5.7 x 8.1 x 6.1 versus 5 x 7.2 x 5.1 cm, AP x T x cc dimension respectively.  Increasing adjacent interstitial and ground-glass   density.  VASCULATURE:  Pulmonary vessels are unremarkable  within the limits of a noncontrast CT.    ANKUSH:  Limited due to lack of IV contrast.    MEDIASTINUM:  Adenopathy with subcarinal node measuring 2.8 x 3.3 versus 2.1 x 2 cm and right paratracheal node measuring 2 x 1.3 versus 2 x 1.4 cm.  Mediastinal clips.  CARDIAC:  No enlargement or pericardial thickening.  Mild coronary artery calcifications.  PLEURA:  Increasing pleural fluid along the right fissures which is lobular in contour and measures 2.6 cm in depth.  Right pleural effusion measures 3.7 versus 2.9 cm with left pleural fluid measuring 1.7 versus 0.8 cm in depth.    THORACIC AORTA:  Atherosclerosis.  CHEST WALL:  No mass or axillary adenopathy.  Sternotomy wires.  LIMITED ABDOMEN:  Limited images of the upper abdomen are unremarkable.    BONES:  No fracture.       Impression:    CONCLUSION:    1. When compared to most recent CT of the chest performed 5/26/2024, patient's known right lower lobe empyema has slightly increased in size with increasing adjacent interstitial and ground-glass density, correlate clinically.  2. Increasing pleural fluid as detailed above.     LOCATION:  [facility]     Dictated by (CST): Britney Mendes MD on 5/29/2024 at 9:39 AM      Finalized by (CST): Britney Mendes MD on 5/29/2024 at 9:51 AM       PROCEDURE:  XR CHEST AP PORTABLE  (CPT=71045)     TECHNIQUE:  AP chest radiograph was obtained.     COMPARISON:  [facility] , CT, CT ABDOMEN+PELVIS(CONTRAST ONLY)(CPT=74177), 5/28/2024, 1:32 PM.  [facility] , XR, XR CHEST AP PORTABLE  (CPT=71045), 5/28/2024, 4:38 PM.     INDICATIONS:  dyspnea     PATIENT STATED HISTORY: (As transcribed by Technologist)  dyspnea     FINDINGS:  Cardiomediastinal silhouette is stable in size and appearance with sternotomy wires and mediastinal clips.  Persistent blunting of the right costophrenic angle is consistent with small pleural effusion versus reaction.  Persistent interstitial   and right perihilar/lower lobe masslike airspace opacity.  No new focal consolidation.  No  pneumothorax.     Impression:    CONCLUSION:    1. No significant change, allowing for differences in technique when compared to 5/28/2024 chest radiograph.     LOCATION:  [facility]    Dictated by (CST): Britney Mendes MD on 5/29/2024 at 7:58 AM      Finalized by (CST): Britney Mendes MD on 5/29/2024 at 8:00 AM        PROCEDURE:  US THORACENTESIS GUIDED RIGHT (CPT=32555)     COMPARISON:  [facility] , XR, XR CHEST AP PORTABLE  (CPT=71045), 5/28/2024, 4:38 PM.  [facility] , XR, XR CHEST AP PORTABLE  (CPT=71045), 5/28/2024, 4:48 AM.  [facility], CT, CT ANGIOGRAPHY, CHEST (CPT=71275), 5/26/2024, 12:49 PM.     INDICATIONS:  pneumonia     DESCRIPTION OF PROCEDURE:  The clinical scenario and referral were discussed with Dr. Ya.  Referral for ultrasound-guided right thoracentesis.  Because of her altered mental status, witnessed written and verbal informed consent were obtained from her  sister.  The procedure was also discussed with her.  She voiced understanding and wished to proceed.  Time-out was performed by the staff.     She could not achieve a seated position.  Positioning was optimized, supine oblique.  Ultrasound demonstrated a small right complex pleural effusion corresponding to the CTA and chest x-ray abnormality.  Access site was chosen.  The overlying skin was  prepped in the usual sterile manner.  1% lidocaine was used locally.  Using realtime ultrasound an 8 Yemeni multi side hole sheath needle was advanced.  Once in position aspiration to dryness with removal of only 10-11 mL of thin clear yellow fluid.    Samples were sent to RT and the laboratory for testing.  The system was removed.  Sterile dressings were placed.  She tolerated the procedure.  No immediate complication.     Impression:    CONCLUSION:  Small complex right pleural effusion.  Only a small amount of fluid could be aspirated.  Sample sent to RT and the laboratory for testing.     LOCATION:  [facility]     Dictated by (CST): Jace Govea MD on 5/28/2024 at 5:13 PM       Finalized by (CST): Jace Govea MD on 5/28/2024 at 5:16 PM      PROCEDURE:  US ABDOMEN LIMITED (CPT=76705)     COMPARISON:  [facility] , CT, CT ABDOMEN+PELVIS(CONTRAST ONLY)(CPT=74177), 5/28/2024, 1:32 PM.     INDICATIONS:  elevated lfts. eval bile duct, cirrhosis, etc     PATIENT STATED HISTORY: (As transcribed by Technologist)        FINDINGS:    LIVER:  Normal size and echogenicity. No significant masses.  BILIARY:  Common bile duct measures 6 mm in region of stephanie hepatis.  There is no intrahepatic bile duct distension.  Gallbladder is unremarkable.  PANCREAS:  Normal.  RIGHT KIDNEY:  Normal.  OTHER:  There is a small amount of free fluid in posterior subhepatic space.  Incidental note is made of a right pleural effusion.     Impression:    CONCLUSION:    1. Trace ascites is noted in posterior subhepatic space.  2. Right pleural effusion has been noted on prior CT scan.  3. There is otherwise no abnormality detected on this abdominal ultrasound.       LOCATION:  [facility]     Dictated by (CST): Abad Chen MD on 5/28/2024 at 4:56 PM      Finalized by (CST): Abad Chen MD on 5/28/2024 at 4:57 PM          PROCEDURE:  XR CHEST AP PORTABLE  (CPT=71045)     TECHNIQUE:  AP chest radiograph was obtained.     COMPARISON:  [facility] , US, US THORACENTESIS GUIDED RIGHT (CPT=32555), 5/28/2024, 3:53 PM.  [facility] , XR, XR CHEST AP PORTABLE  (CPT=71045), 5/28/2024, 4:48 AM.     INDICATIONS:  Post Thoracentesis     PATIENT STATED HISTORY: (As transcribed by Technologist)  Right sided post thoracentesis. Patient offered no additional history at this time.      FINDINGS:  Status post right thoracentesis.  There is a small right pleural effusion.  Patchy opacity at the right lung base may represent pneumonia.  There is no pneumothorax.  Median sternotomy wires are stable.  The heart is normal in size.     Impression:    CONCLUSION:  Small right pleural effusion with patchy airspace disease the right lung base could represent  pneumonia.  There is no pneumothorax.     LOCATION:  [facility]     Dictated by (CST): Elieser Torres MD on 5/28/2024 at 4:55 PM      Finalized by (CST): Elieser Torres MD on 5/28/2024 at 4:56 PM      PROCEDURE:  CT ABDOMEN+PELVIS (CONTRAST ONLY) (CPT=74177)     COMPARISON:  [facility], CT, CT ABDOMEN (ATTENTION PANCREAS) (W+ WO) (CPT=74170), 3/02/2022, 3:52 PM.  [facility], CT, CT ABDOMEN+PELVIS(CONTRAST ONLY)(CPT=74177), 9/18/2021, 2:27 PM.     INDICATIONS:  GIB     TECHNIQUE:  CT scanning was performed from the dome of the diaphragm to the pubic symphysis with non-ionic intravenous contrast material. Post contrast coronal MPR imaging was performed.  Dose reduction techniques were used. Dose information is  transmitted to the ACR (American College of Radiology) NRDR (National Radiology Data Registry) which includes the Dose Index Registry.     PATIENT STATED HISTORY:(As transcribed by Technologist)  Poor historian      CONTRAST USED:  85cc of Isovue 370     FINDINGS:    LUNG BASES:  Loculated right-sided pleural effusion with thick wall measuring approximately 7.8 x 2.5 x 12.7 cm.  Findings suggestive of empyema.  There is consolidation within the right lower lobe with complex fluid and small foci of air measuring 3.0 x   2.7 x 3.2 cm and 2.1 x 2.8 x 1.7 cm which may represent pulmonary abscesses/necrotizing pneumonia.  Small left-sided pleural effusion.  LIVER:  Normal in shape and contour.    BILIARY:  Gallbladder is unremarkable in appearance.  No intrahepatic biliary dilatation..  SPLEEN:  Normal.  No enlargement or focal lesion.  PANCREAS:  No kirk-pancreatic inflammatory stranding  ADRENALS:  Normal.  No mass or enlargement.    KIDNEYS:  Kidneys are symmetrical in size without evidence of hydronephrosis.  BOWEL/MESENTERY:  Bowel is normal in caliber. No evidence of obstruction.  Probable Villanueva's type hernia containing fat and a portion of bowel  within the right para umbilical region.  No evidence of  obstruction.  Mildly prominent retroperitoneal lymph  nodes.  PELVIS:  Bladder is distended.  Small amount of free pelvic fluid.    AORTA/VASCULAR:  Atheromatous calcifications of the aorta.  BONES:  No acute fractures.  Median sternotomy approximated by wire sutures.     Impression:    CONCLUSION:    1. Loculated right-sided pleural effusion suggestive empyema with consolidation within the right lower lobe.  There are areas of fluid and small foci of air within the consolidation which may represent pulmonary abscesses/necrotizing pneumonia.  2. Small left-sided pleural effusion with associated atelectasis.  3. Nonspecific mildly prominent retroperitoneal lymph nodes which may be reactive in nature.  4. Trace amount of free pelvic fluid.       LOCATION:  [facility]     Dictated by (CST): Annette Ferreira MD on 5/28/2024 at 2:11 PM      Finalized by (CST): Annette Ferreira MD on 5/28/2024 at 2:17 PM         PROCEDURE:  CT BRAIN OR HEAD (92926)     COMPARISON:  None.     INDICATIONS:  lethargy     TECHNIQUE:  Noncontrast CT scanning is performed through the brain. Dose reduction techniques were used. Dose information is transmitted to the ACR (American College of Radiology) NRDR (National Radiology Data Registry) which includes the Dose Index  Registry.     PATIENT STATED HISTORY: (As transcribed by Technologist)  Patient is lethargic.       FINDINGS:    VENTRICLES/SULCI:  Ventricles and sulci are normal in size.    INTRACRANIAL:  There is a chronic appearing lacunar infarct involving the right caudate nucleus at the junction of the head and body..  There are no abnormal extraaxial fluid collections.  There is no midline shift.  There are no intraparenchymal brain  abnormalities.  There is nothing specific for acute infarct.  There is no hemorrhage or mass lesion.    SINUSES:           No sign of acute sinusitis.    MASTOIDS:          No sign of acute inflammation.  SKULL:             No evidence for fracture or osseous  abnormality.  OTHER:             None.    Impression:    CONCLUSION:  No evidence of acute intracranial process.     LOCATION:  [facility]     Dictated by (CST): Elieser Torres MD on 5/28/2024 at 8:49 AM      Finalized by (CST): Elieser Torres MD on 5/28/2024 at 8:54 AM      PROCEDURE:  XR CHEST AP PORTABLE  (CPT=71045)     TECHNIQUE:  AP chest radiograph was obtained.     COMPARISON:  [facility], CT, CT ANGIOGRAPHY, CHEST (CPT=71275), 5/26/2024, 12:49 PM.  [facility], XR, XR CHEST PA + LAT CHEST (CPT=71046), 5/26/2024, 11:43 AM.     INDICATIONS:  dyspnea     PATIENT STATED HISTORY: (As transcribed by Technologist)  dyspnea      Impression:    CONCLUSION:    Limited AP portable view shows small-moderate right pleural effusion cleaning some pleural fluid tracking up the lower right lateral chest.  Possibly loculated.  Patchy consolidation mid-lower right chest.  Small blunting left costophrenic   angle.  Cardiomegaly.  No pneumothorax. Consider upright PA and lateral examination of the chest, when tolerated.  CT follow-up may also be beneficial.       LOCATION:  [facility]     Dictated by (CST): Abhishek Mora MD on 5/28/2024 at 8:36 AM      Finalized by (CST): Abhishek Mora MD on 5/28/2024 at 8:37 AM          PROCEDURE:  US VENOUS DOPPLER LEG LEFT - DIAG IMG (CPT=93971)     COMPARISON:  None.     INDICATIONS:  Left lower extremity swelling and pain     TECHNIQUE:  Real time, grey scale, and duplex ultrasound was used to evaluate the lower extremity venous system. B-mode two-dimensional images of the vascular structures, Doppler spectral analysis, and color flow.  Doppler imaging were performed.  The  following veins were imaged:  Common, deep, and superficial femoral, popliteal, sapheno-femoral junction, posterior tibial veins, and the contralateral common femoral vein.     PATIENT STATED HISTORY: (As transcribed by Technologist)        FINDINGS:    EXTREMITY EXAMINED:  Left lower extremity  SAPHENOFEMORAL JUNCTION:  No  reflux.  THROMBI:  None visible.  COMPRESSION:  Normal compressibility, phasicity, and augmentation.  OTHER:  Negative.     Impression:    CONCLUSION:  No evidence of DVT in the left lower extremity.     LOCATION:  [facility]    Dictated by (CST): Roc Mosqueda MD on 5/26/2024 at 2:46 PM      Finalized by (CST): Roc Mosqueda MD on 5/26/2024 at 2:49 PM      PROCEDURE:  CT ANGIOGRAPHY, CHEST (CPT=71275)     COMPARISON:  [facility], CT, CT CHEST LD NO CAD(CPT=71250), 3/13/2024, 8:38 PM.     INDICATIONS:  sob, hypotension, pleurisy, R lung mass     TECHNIQUE:  IV contrast-enhanced multislice CT angiography is performed through the pulmonary arterial anatomy. 3D volume renderings are generated.  Dose reduction techniques were used. Dose information is transmitted to the ACR (American College of  Radiology) NRDR (National Radiology Data Registry) which includes the Dose Index Registry.     PATIENT STATED HISTORY:(As transcribed by Technologist)  SOB, hypotension; right lung mass      CONTRAST USED:  100cc of Isovue 370     FINDINGS:    VASCULATURE:  No visible pulmonary arterial thrombus or attenuation.    THORACIC AORTA:  No aneurysm or visible dissection.    LUNGS:  Masslike consolidation in the right lower lobe is new compared to the prior examination.  Given its acute appearance an infectious process is most likely.  Small central areas of cavitation are noted with associated low attenuation which may  represent necrosis.  Moderate right pleural effusion is noted.  MEDIASTINUM:  Right paratracheal node is noted measuring up to 2.0 x 1.4 cm (image 38).  A subcarinal lymph node is noted measuring up to 2.0 x 2.1 cm (image 93)..    ANKUSH:  No mass or adenopathy.    CARDIAC:  No enlargement, pericardial thickening, or significant coronary artery calcification.  PLEURA:  No mass or effusion.    CHEST WALL:  No mass or axillary adenopathy.  Patient is status post median sternotomy.  LIMITED ABDOMEN:  Limited images of the upper  abdomen are unremarkable.    BONES:  No bony lesion or fracture.    OTHER:  Negative.       Impression:    CONCLUSION:    1. Masslike consolidation with areas of central necrosis and mild cavitation in the right lower lobe.  Given its acute appearance this may represent an infectious process.  Correlate clinically.  Short-term follow-up after treatment is recommended.  2. Mediastinal adenopathy is likely reactive.  Attention on follow-up is recommended.     LOCATION:  [facility]     Dictated by (CST): Roc Mosqueda MD on 5/26/2024 at 1:21 PM      Finalized by (CST): Roc Mosqueda MD on 5/26/2024 at 1:25 PM        PROCEDURE:  XR CHEST PA + LAT CHEST (CPT=71046)     INDICATIONS:  sob, cough, crackles R base     COMPARISON:  [facility], CT, CT CHEST LD NO CAD(CPT=71250), 3/13/2024, 8:38 PM.  [facility] , XR, XR CHEST DECUBITUS BILAT INCL PA AND LAT(4 VIEWS)(CPT=71048), 1/15/2024, 9:15 AM.     TECHNIQUE:  PA and lateral chest radiographs were obtained.     PATIENT STATED HISTORY: (As transcribed by Technologist)  Patient stated she has been having a cough for 1 week and a crackle sound in her right lung.      FINDINGS:    LUNGS:  An area of masslike consolidation in the right upper lobe is noted which is new from the prior examination.  Small right pleural effusion is present.  CARDIAC:  Normal size cardiac silhouette.  Patient is status post median sternotomy.  MEDIASTINUM:  Normal.  PLEURA:  Normal.  No pleural effusions.  BONES:  Normal for age.     Impression:    CONCLUSION:  Masslike consolidation in the right upper lobe is noted.  Given it is acute appearance this likely represents an infectious process.  Small right effusion is noted.  Correlate clinically.     LOCATION:  [facility]     Dictated by ([facility]): Roc Mosqueda MD on 5/26/2024 at 12:05 PM      Finalized by (CST): Roc Mosqueda MD on 5/26/2024 at 12:06 PM    ASSESSMENT:  Right empyema, s/p thoracentesis 5/28, cx with strep intermedius. Path w/ acute inflammation with  occasional bacterial organisms present. S/p R chest tube placement 5/29, cx with strep intermedius. S/p MIST 2 protocol.   RLL pneumonia. Legionella and strep pneumo Uags negative. Blasto ag negative.  Fevers and worsening leukocytosis, assume d/t above (repeat CT chest with worsening empyema). Bcxs negative.  Alerted mental status. ? Related to above vs other. Head CT w/o acute process. Improved.  Elevated LFTs. CT a/p and US abd unremarkable. Improving.    Anemia  IgM kappa MGUS  DM II. Hgb A1C 7.1  COPD    PLAN:  - continue IV Unasyn and vancomycin  - follow temps and wbc--> repeat CBC in am  - follow LFTs  - thoracic surgery eval appreciated, plans for right VATS decortication tomorrow or Thursday.    Discussed case with RN and patient.     Beverly Guillen PA-C    ID ATTENDING ADDENDUM     Pt seen an examined independently. Chart reviewed. Agree with above. Note has been reviewed by me and modified as needed.  Exam and Impression/ Recs as noted above.  Will follow  D/w staff and with pt  More than 50% of clinical time and 100% of the clinical decision making performed by me.    Vibha Menezes MD

## 2024-06-04 NOTE — PROGRESS NOTES
[facility]   part of [facility]     Hospitalist Progress Note     Soumya King Patient Status:  Inpatient    6/3/1960 MRN [id number]   Location [facility] 5NW-A Attending Apurva Logan MD   Hosp Day # 9 PCP Rika Vinson DO     Chief Complaint: chest wall pain     Subjective:     Patient with persistent chest pain specially at CT site.     Objective:    Review of Systems:   A comprehensive review of systems was completed; pertinent positive and negatives stated in subjective.    Vital signs:  Temp:  [97.8 °F (36.6 °C)-98 °F (36.7 °C)] 98 °F (36.7 °C)  Pulse:  [89-95] 95  Resp:  [16-18] 18  BP: ()/(49-81) 120/81  SpO2:  [96 %-100 %] 99 %    Physical Exam:    General: No acute distress, confused, sleepy  Respiratory: No wheezes, no rhonchi  Cardiovascular: S1, S2, regular rate and rhythm  Abdomen: Soft, Non-tender, non-distended, positive bowel sounds  Neuro: No new focal deficits.   Extremities: No edema      Diagnostic Data:    Labs:  Recent Labs   Lab 24  0714 24  1623 24  0733 24  0855 24  1708 24  0758 24  1615 24  0806 24  1422 24  0034 24  0755 24  1558 24  0010   WBC 18.7*  --  17.8*  --   --  21.6*  --  26.3* 31.5*  --  38.6*  --   --    HGB 8.3*  8.4*   < > 8.9*  8.9*  --    < > 9.2*  9.2*   < > 8.9*  8.9* 9.3* 9.0* 9.2*  8.8* 8.2* 7.7*   MCV 87.3  --  89.7  --   --  89.7  --  89.2 89.4  --  92.6  --   --    .0*  --  520.0*  --   --  577.0*  --  536.0* 626.0*  --  698.0*  --   --    BAND 1  --  2  --   --   --   --   --   --   --   --   --   --    INR 1.51*  --  >15.49* 1.33*  --  1.35*  --  1.34*  --   --  1.38*  --   --     < > = values in this interval not displayed.       Recent Labs   Lab 24  0758 24  0806 24  0755   * 147* 149*   BUN 6* 4* 7*   CREATSERUM 0.60 0.42* 0.54*   CA 8.3* 8.1* 8.3*   ALB 1.4* 1.2* 1.3*    137 131*   K 3.7 3.7 4.1     107 101   CO2 21.0 20.0* 21.0   ALKPHO 381* 319* 295*   * 183* 94*   * 194* 156*   BILT 0.7 0.7 0.6   TP 6.1* 5.7* 6.2*       Estimated Creatinine Clearance: 81.5 mL/min (A) (based on SCr of 0.54 mg/dL (L)).    No results for input(s): \"TROP\", \"TROPHS\", \"CK\" in the last 168 hours.      Recent Labs   Lab 06/01/24  0758 06/02/24  0806 06/03/24  0755   PTP 16.7* 16.6* 17.0*   INR 1.35* 1.34* 1.38*          Microbiology    Hospital Encounter on 05/26/24   1. Body Fluid Cult Aerobic and Anaerobic     Status: Abnormal    Collection Time: 05/29/24  3:46 PM    Specimen: Pleural Fluid, Right; Body fluid, unspecified   Result Value Ref Range    Body Fluid Culture Result 3+ growth Streptococcus intermedius (A) N/A    Body Fluid Smear 4+ WBCs seen N/A    Body Fluid Smear No organisms seen N/A    Body Fluid Smear This is a cytocentrifuged smear. N/A   2. Blood Culture     Status: None    Collection Time: 05/29/24  2:24 PM    Specimen: Blood,peripheral   Result Value Ref Range    Blood Culture Result No Growth 5 Days N/A         Imaging: Reviewed in Epic.    Medications:    vancomycin  15 mg/kg Intravenous Q12H    oxyCODONE ER  10 mg Oral 2 times per day    metoprolol tartrate  25 mg Oral 2x Daily(Beta Blocker)    ampicillin-sulbactam  3 g Intravenous q6h    HYDROmorphone  0.2 mg Intravenous Once    oxybutynin ER  5 mg Oral Daily    [Held by provider] aspirin  81 mg Oral Daily    atorvastatin  80 mg Oral Nightly    busPIRone  20 mg Oral Daily    gabapentin  600 mg Oral Nightly    pantoprazole  40 mg Oral QAM AC    QUEtiapine ER  200 mg Oral QPM    umeclidinium bromide  1 puff Inhalation Daily    heparin  5,000 Units Subcutaneous Q8H JOSÉ ANTONIO    insulin aspart  1-10 Units Subcutaneous TID AC and HS       Assessment & Plan:      # RLL pneumonia with empyema   # RLL effusion with loculation and chest pain   -abx adjusted per cultures   -sp MIST w/o improvement   - plan for VATS decortication tomorrow ?  - CT Chest  reviewed  -Pulmonology and ID rec appreciated   - sp chest tube by IR on 5/29  - cont pain meds    # elevated liver enzymes seems ischemic in nature per course   -improved  - exam benign   - US abd with doppler neg   - avoid hepatotoxic agents    # Iron def Anemia  - sp PRBC   - no s/s bleeding   -trend    #Coagulopathy  - repeat INR normalized     #COPD   -no active wheezing  -Continue inhalers  -prn nebs.     #Acute metabolic toxic ( anxiolytic) encephalopathy, resolved   -Ammonia normal   -CT brain negative  -ABG reviewed    #Hypertension  -metoprolol on hold due to low BP     #DM 2  -hyperglycemia protocol     #GERD  #Jamison's esophagus  -PPI.    #MDD/anxiety  Home med     Will update sister     Hoda Juan MD      Supplementary Documentation:     Quality:  DVT Mechanical Prophylaxis:   SCDs,    DVT Pharmacologic Prophylaxis   Medication    heparin (Porcine) 5000 UNIT/ML injection 5,000 Units         DVT Pharmacologic prophylaxis: Aspirin 81 mg      Code Status: Full Code  Gotti: No urinary catheter in place  Gotti Duration (in days):   Central line:    ILYA:     Discharge is dependent on: progress  At this point Ms. King is expected to be discharge to: home    The 21st Century Cures Act makes medical notes like these available to patients in the interest of transparency. Please be advised this is a medical document. Medical documents are intended to carry relevant information, facts as evident, and the clinical opinion of the practitioner. The medical note is intended as peer to peer communication and may appear blunt or direct. It is written in medical language and may contain abbreviations or verbiage that are unfamiliar.

## 2024-06-05 ENCOUNTER — APPOINTMENT (OUTPATIENT)
Dept: GENERAL RADIOLOGY | Facility: HOSPITAL | Age: 64
DRG: 163 | End: 2024-06-05
Attending: STUDENT IN AN ORGANIZED HEALTH CARE EDUCATION/TRAINING PROGRAM
Payer: MEDICAID

## 2024-06-05 ENCOUNTER — ANESTHESIA (OUTPATIENT)
Dept: CARDIAC SURGERY | Facility: HOSPITAL | Age: 64
End: 2024-06-05
Payer: MEDICAID

## 2024-06-05 LAB
ALBUMIN SERPL ELPH-MCNC: 1.58 G/DL (ref 3.75–5.21)
ALBUMIN/GLOB SERPL: 0.45 {RATIO} (ref 1–2)
ALPHA1 GLOB SERPL ELPH-MCNC: 0.82 G/DL (ref 0.19–0.46)
ALPHA2 GLOB SERPL ELPH-MCNC: 1.24 G/DL (ref 0.48–1.05)
ANION GAP SERPL CALC-SCNC: 9 MMOL/L (ref 0–18)
B-GLOBULIN SERPL ELPH-MCNC: 0.86 G/DL (ref 0.68–1.23)
BASOPHILS # BLD: 0 X10(3) UL (ref 0–0.2)
BASOPHILS NFR BLD: 0 %
BUN BLD-MCNC: 9 MG/DL (ref 9–23)
CALCIUM BLD-MCNC: 8 MG/DL (ref 8.5–10.1)
CHLORIDE SERPL-SCNC: 105 MMOL/L (ref 98–112)
CO2 SERPL-SCNC: 22 MMOL/L (ref 21–32)
CREAT BLD-MCNC: 0.48 MG/DL
CREAT BLD-MCNC: 0.48 MG/DL
EGFRCR SERPLBLD CKD-EPI 2021: 107 ML/MIN/1.73M2 (ref 60–?)
EGFRCR SERPLBLD CKD-EPI 2021: 107 ML/MIN/1.73M2 (ref 60–?)
EOSINOPHIL # BLD: 0 X10(3) UL (ref 0–0.7)
EOSINOPHIL NFR BLD: 0 %
ERYTHROCYTE [DISTWIDTH] IN BLOOD BY AUTOMATED COUNT: 15.2 %
ERYTHROCYTE [DISTWIDTH] IN BLOOD BY AUTOMATED COUNT: 15.8 %
GAMMA GLOB SERPL ELPH-MCNC: 0.6 G/DL (ref 0.62–1.7)
GLUCOSE BLD-MCNC: 137 MG/DL (ref 70–99)
GLUCOSE BLD-MCNC: 144 MG/DL (ref 70–99)
GLUCOSE BLD-MCNC: 163 MG/DL (ref 70–99)
GLUCOSE BLD-MCNC: 173 MG/DL (ref 70–99)
GLUCOSE BLD-MCNC: 211 MG/DL (ref 70–99)
HCT VFR BLD AUTO: 23 %
HCT VFR BLD AUTO: 26.8 %
HGB BLD-MCNC: 7.2 G/DL
HGB BLD-MCNC: 7.3 G/DL
HGB BLD-MCNC: 8 G/DL
HGB BLD-MCNC: 8.7 G/DL
KAPPA LC FREE SER-MCNC: 4.85 MG/DL (ref 0.33–1.94)
KAPPA LC FREE/LAMBDA FREE SER NEPH: 1.16 {RATIO} (ref 0.26–1.65)
LAMBDA LC FREE SERPL-MCNC: 4.16 MG/DL (ref 0.57–2.63)
LYMPHOCYTES NFR BLD: 1.93 X10(3) UL (ref 1–4)
LYMPHOCYTES NFR BLD: 7 %
MCH RBC QN AUTO: 29.4 PG (ref 26–34)
MCH RBC QN AUTO: 29.7 PG (ref 26–34)
MCHC RBC AUTO-ENTMCNC: 31.7 G/DL (ref 31–37)
MCHC RBC AUTO-ENTMCNC: 32.5 G/DL (ref 31–37)
MCV RBC AUTO: 90.5 FL
MCV RBC AUTO: 93.5 FL
MONOCYTES # BLD: 0 X10(3) UL (ref 0.1–1)
MONOCYTES NFR BLD: 0 %
MORPHOLOGY: NORMAL
MRSA DNA SPEC QL NAA+PROBE: NEGATIVE
NEUTROPHILS # BLD AUTO: 22.83 X10 (3) UL (ref 1.5–7.7)
NEUTROPHILS NFR BLD: 89 %
NEUTS BAND NFR BLD: 4 %
NEUTS HYPERSEG # BLD: 25.58 X10(3) UL (ref 1.5–7.7)
OSMOLALITY SERPL CALC.SUM OF ELEC: 283 MOSM/KG (ref 275–295)
PLATELET # BLD AUTO: 550 10(3)UL (ref 150–450)
PLATELET # BLD AUTO: 694 10(3)UL (ref 150–450)
PLATELET MORPHOLOGY: NORMAL
POTASSIUM SERPL-SCNC: 3.7 MMOL/L (ref 3.5–5.1)
PROT SERPL-MCNC: 5.1 G/DL (ref 5.7–8.2)
RBC # BLD AUTO: 2.46 X10(6)UL
RBC # BLD AUTO: 2.96 X10(6)UL
SODIUM SERPL-SCNC: 136 MMOL/L (ref 136–145)
TOTAL CELLS COUNTED BLD: 100
WBC # BLD AUTO: 27.5 X10(3) UL (ref 4–11)
WBC # BLD AUTO: 31.9 X10(3) UL (ref 4–11)

## 2024-06-05 PROCEDURE — 0BNC8ZZ RELEASE RIGHT UPPER LUNG LOBE, VIA NATURAL OR ARTIFICIAL OPENING ENDOSCOPIC: ICD-10-PCS | Performed by: THORACIC SURGERY (CARDIOTHORACIC VASCULAR SURGERY)

## 2024-06-05 PROCEDURE — 0BND8ZZ RELEASE RIGHT MIDDLE LUNG LOBE, VIA NATURAL OR ARTIFICIAL OPENING ENDOSCOPIC: ICD-10-PCS | Performed by: THORACIC SURGERY (CARDIOTHORACIC VASCULAR SURGERY)

## 2024-06-05 PROCEDURE — 71045 X-RAY EXAM CHEST 1 VIEW: CPT | Performed by: STUDENT IN AN ORGANIZED HEALTH CARE EDUCATION/TRAINING PROGRAM

## 2024-06-05 PROCEDURE — 4A133J1 MONITORING OF ARTERIAL PULSE, PERIPHERAL, PERCUTANEOUS APPROACH: ICD-10-PCS | Performed by: ANESTHESIOLOGY

## 2024-06-05 PROCEDURE — 03HY32Z INSERTION OF MONITORING DEVICE INTO UPPER ARTERY, PERCUTANEOUS APPROACH: ICD-10-PCS | Performed by: ANESTHESIOLOGY

## 2024-06-05 PROCEDURE — 36430 TRANSFUSION BLD/BLD COMPNT: CPT | Performed by: ANESTHESIOLOGY

## 2024-06-05 PROCEDURE — 0BNF8ZZ RELEASE RIGHT LOWER LUNG LOBE, VIA NATURAL OR ARTIFICIAL OPENING ENDOSCOPIC: ICD-10-PCS | Performed by: THORACIC SURGERY (CARDIOTHORACIC VASCULAR SURGERY)

## 2024-06-05 PROCEDURE — 99232 SBSQ HOSP IP/OBS MODERATE 35: CPT | Performed by: HOSPITALIST

## 2024-06-05 PROCEDURE — 0B9N80Z DRAINAGE OF RIGHT PLEURA WITH DRAINAGE DEVICE, VIA NATURAL OR ARTIFICIAL OPENING ENDOSCOPIC: ICD-10-PCS | Performed by: ANESTHESIOLOGY

## 2024-06-05 PROCEDURE — 4A133B1 MONITORING OF ARTERIAL PRESSURE, PERIPHERAL, PERCUTANEOUS APPROACH: ICD-10-PCS | Performed by: ANESTHESIOLOGY

## 2024-06-05 RX ORDER — CALCIUM CARBONATE 500 MG/1
1000 TABLET, CHEWABLE ORAL 3 TIMES DAILY PRN
Status: DISCONTINUED | OUTPATIENT
Start: 2024-06-05 | End: 2024-06-10

## 2024-06-05 RX ORDER — MIDAZOLAM HYDROCHLORIDE 1 MG/ML
1 INJECTION INTRAMUSCULAR; INTRAVENOUS EVERY 5 MIN PRN
Status: ACTIVE | OUTPATIENT
Start: 2024-06-05 | End: 2024-06-05

## 2024-06-05 RX ORDER — POLYETHYLENE GLYCOL 3350 17 G/17G
17 POWDER, FOR SOLUTION ORAL DAILY PRN
Status: DISCONTINUED | OUTPATIENT
Start: 2024-06-05 | End: 2024-06-10

## 2024-06-05 RX ORDER — ACETAMINOPHEN 10 MG/ML
1000 INJECTION, SOLUTION INTRAVENOUS EVERY 6 HOURS
Status: COMPLETED | OUTPATIENT
Start: 2024-06-05 | End: 2024-06-07

## 2024-06-05 RX ORDER — NALOXONE HYDROCHLORIDE 0.4 MG/ML
0.08 INJECTION, SOLUTION INTRAMUSCULAR; INTRAVENOUS; SUBCUTANEOUS AS NEEDED
Status: ACTIVE | OUTPATIENT
Start: 2024-06-05 | End: 2024-06-05

## 2024-06-05 RX ORDER — PROCHLORPERAZINE EDISYLATE 5 MG/ML
5 INJECTION INTRAMUSCULAR; INTRAVENOUS EVERY 8 HOURS PRN
Status: DISCONTINUED | OUTPATIENT
Start: 2024-06-05 | End: 2024-06-10

## 2024-06-05 RX ORDER — MIDAZOLAM HYDROCHLORIDE 1 MG/ML
INJECTION INTRAMUSCULAR; INTRAVENOUS AS NEEDED
Status: DISCONTINUED | OUTPATIENT
Start: 2024-06-05 | End: 2024-06-05 | Stop reason: SURG

## 2024-06-05 RX ORDER — SODIUM CHLORIDE, SODIUM LACTATE, POTASSIUM CHLORIDE, CALCIUM CHLORIDE 600; 310; 30; 20 MG/100ML; MG/100ML; MG/100ML; MG/100ML
INJECTION, SOLUTION INTRAVENOUS CONTINUOUS PRN
Status: DISCONTINUED | OUTPATIENT
Start: 2024-06-05 | End: 2024-06-05 | Stop reason: SURG

## 2024-06-05 RX ORDER — ACETAMINOPHEN 500 MG
1000 TABLET ORAL ONCE AS NEEDED
Status: COMPLETED | OUTPATIENT
Start: 2024-06-05 | End: 2024-06-05

## 2024-06-05 RX ORDER — HYDROMORPHONE HYDROCHLORIDE 1 MG/ML
0.4 INJECTION, SOLUTION INTRAMUSCULAR; INTRAVENOUS; SUBCUTANEOUS EVERY 5 MIN PRN
Status: ACTIVE | OUTPATIENT
Start: 2024-06-05 | End: 2024-06-05

## 2024-06-05 RX ORDER — LIDOCAINE HYDROCHLORIDE 10 MG/ML
INJECTION, SOLUTION EPIDURAL; INFILTRATION; INTRACAUDAL; PERINEURAL AS NEEDED
Status: DISCONTINUED | OUTPATIENT
Start: 2024-06-05 | End: 2024-06-05 | Stop reason: SURG

## 2024-06-05 RX ORDER — HYDROMORPHONE HYDROCHLORIDE 1 MG/ML
0.2 INJECTION, SOLUTION INTRAMUSCULAR; INTRAVENOUS; SUBCUTANEOUS EVERY 5 MIN PRN
Status: DISPENSED | OUTPATIENT
Start: 2024-06-05 | End: 2024-06-05

## 2024-06-05 RX ORDER — HYDROCODONE BITARTRATE AND ACETAMINOPHEN 5; 325 MG/1; MG/1
1 TABLET ORAL ONCE AS NEEDED
Status: COMPLETED | OUTPATIENT
Start: 2024-06-05 | End: 2024-06-05

## 2024-06-05 RX ORDER — ENEMA 19; 7 G/133ML; G/133ML
1 ENEMA RECTAL ONCE AS NEEDED
Status: DISCONTINUED | OUTPATIENT
Start: 2024-06-05 | End: 2024-06-10

## 2024-06-05 RX ORDER — OXYCODONE HYDROCHLORIDE 5 MG/1
10 TABLET ORAL EVERY 4 HOURS PRN
Status: DISCONTINUED | OUTPATIENT
Start: 2024-06-05 | End: 2024-06-10

## 2024-06-05 RX ORDER — MEPERIDINE HYDROCHLORIDE 25 MG/ML
25 INJECTION INTRAMUSCULAR; INTRAVENOUS; SUBCUTANEOUS
Status: DISCONTINUED | OUTPATIENT
Start: 2024-06-05 | End: 2024-06-10

## 2024-06-05 RX ORDER — ONDANSETRON 2 MG/ML
4 INJECTION INTRAMUSCULAR; INTRAVENOUS EVERY 6 HOURS PRN
Status: DISCONTINUED | OUTPATIENT
Start: 2024-06-05 | End: 2024-06-10

## 2024-06-05 RX ORDER — OXYCODONE HYDROCHLORIDE 5 MG/1
5 TABLET ORAL EVERY 4 HOURS PRN
Status: DISCONTINUED | OUTPATIENT
Start: 2024-06-05 | End: 2024-06-10

## 2024-06-05 RX ORDER — BISACODYL 10 MG
10 SUPPOSITORY, RECTAL RECTAL
Status: DISCONTINUED | OUTPATIENT
Start: 2024-06-05 | End: 2024-06-10

## 2024-06-05 RX ORDER — HYDROCODONE BITARTRATE AND ACETAMINOPHEN 5; 325 MG/1; MG/1
2 TABLET ORAL ONCE AS NEEDED
Status: COMPLETED | OUTPATIENT
Start: 2024-06-05 | End: 2024-06-05

## 2024-06-05 RX ORDER — ONDANSETRON 2 MG/ML
INJECTION INTRAMUSCULAR; INTRAVENOUS AS NEEDED
Status: DISCONTINUED | OUTPATIENT
Start: 2024-06-05 | End: 2024-06-05 | Stop reason: SURG

## 2024-06-05 RX ORDER — HEPARIN SODIUM 5000 [USP'U]/ML
5000 INJECTION, SOLUTION INTRAVENOUS; SUBCUTANEOUS EVERY 12 HOURS SCHEDULED
Status: DISCONTINUED | OUTPATIENT
Start: 2024-06-05 | End: 2024-06-10

## 2024-06-05 RX ORDER — HYDROMORPHONE HYDROCHLORIDE 1 MG/ML
0.8 INJECTION, SOLUTION INTRAMUSCULAR; INTRAVENOUS; SUBCUTANEOUS EVERY 2 HOUR PRN
Status: DISCONTINUED | OUTPATIENT
Start: 2024-06-05 | End: 2024-06-10

## 2024-06-05 RX ORDER — SODIUM CHLORIDE 9 MG/ML
INJECTION, SOLUTION INTRAVENOUS CONTINUOUS PRN
Status: DISCONTINUED | OUTPATIENT
Start: 2024-06-05 | End: 2024-06-05 | Stop reason: SURG

## 2024-06-05 RX ORDER — SENNOSIDES 8.6 MG
17.2 TABLET ORAL NIGHTLY PRN
Status: DISCONTINUED | OUTPATIENT
Start: 2024-06-05 | End: 2024-06-10

## 2024-06-05 RX ORDER — METOCLOPRAMIDE HYDROCHLORIDE 5 MG/ML
INJECTION INTRAMUSCULAR; INTRAVENOUS AS NEEDED
Status: DISCONTINUED | OUTPATIENT
Start: 2024-06-05 | End: 2024-06-05 | Stop reason: SURG

## 2024-06-05 RX ORDER — HYDROMORPHONE HYDROCHLORIDE 1 MG/ML
0.4 INJECTION, SOLUTION INTRAMUSCULAR; INTRAVENOUS; SUBCUTANEOUS EVERY 2 HOUR PRN
Status: DISCONTINUED | OUTPATIENT
Start: 2024-06-05 | End: 2024-06-10

## 2024-06-05 RX ORDER — SODIUM CHLORIDE, SODIUM LACTATE, POTASSIUM CHLORIDE, CALCIUM CHLORIDE 600; 310; 30; 20 MG/100ML; MG/100ML; MG/100ML; MG/100ML
INJECTION, SOLUTION INTRAVENOUS CONTINUOUS
Status: DISCONTINUED | OUTPATIENT
Start: 2024-06-05 | End: 2024-06-10

## 2024-06-05 RX ORDER — HYDROMORPHONE HYDROCHLORIDE 1 MG/ML
0.6 INJECTION, SOLUTION INTRAMUSCULAR; INTRAVENOUS; SUBCUTANEOUS EVERY 5 MIN PRN
Status: ACTIVE | OUTPATIENT
Start: 2024-06-05 | End: 2024-06-05

## 2024-06-05 RX ORDER — ONDANSETRON 2 MG/ML
4 INJECTION INTRAMUSCULAR; INTRAVENOUS EVERY 6 HOURS PRN
Status: DISCONTINUED | OUTPATIENT
Start: 2024-06-05 | End: 2024-06-05

## 2024-06-05 RX ORDER — ACETAMINOPHEN 10 MG/ML
INJECTION, SOLUTION INTRAVENOUS AS NEEDED
Status: DISCONTINUED | OUTPATIENT
Start: 2024-06-05 | End: 2024-06-05 | Stop reason: SURG

## 2024-06-05 RX ORDER — SODIUM CHLORIDE 9 MG/ML
INJECTION, SOLUTION INTRAVENOUS CONTINUOUS
Status: DISCONTINUED | OUTPATIENT
Start: 2024-06-05 | End: 2024-06-10

## 2024-06-05 RX ORDER — ROCURONIUM BROMIDE 10 MG/ML
INJECTION, SOLUTION INTRAVENOUS AS NEEDED
Status: DISCONTINUED | OUTPATIENT
Start: 2024-06-05 | End: 2024-06-05 | Stop reason: SURG

## 2024-06-05 RX ORDER — BUPIVACAINE HYDROCHLORIDE AND EPINEPHRINE 2.5; 5 MG/ML; UG/ML
INJECTION, SOLUTION EPIDURAL; INFILTRATION; INTRACAUDAL; PERINEURAL AS NEEDED
Status: DISCONTINUED | OUTPATIENT
Start: 2024-06-05 | End: 2024-06-05 | Stop reason: HOSPADM

## 2024-06-05 RX ADMIN — LIDOCAINE HYDROCHLORIDE 10 MG: 10 INJECTION, SOLUTION EPIDURAL; INFILTRATION; INTRACAUDAL; PERINEURAL at 07:29:00

## 2024-06-05 RX ADMIN — ONDANSETRON 4 MG: 2 INJECTION INTRAMUSCULAR; INTRAVENOUS at 10:55:00

## 2024-06-05 RX ADMIN — SODIUM CHLORIDE: 9 INJECTION, SOLUTION INTRAVENOUS at 07:24:00

## 2024-06-05 RX ADMIN — MIDAZOLAM HYDROCHLORIDE 2 MG: 1 INJECTION INTRAMUSCULAR; INTRAVENOUS at 07:24:00

## 2024-06-05 RX ADMIN — ROCURONIUM BROMIDE 50 MG: 10 INJECTION, SOLUTION INTRAVENOUS at 07:29:00

## 2024-06-05 RX ADMIN — SODIUM CHLORIDE: 9 INJECTION, SOLUTION INTRAVENOUS at 10:40:00

## 2024-06-05 RX ADMIN — METOCLOPRAMIDE HYDROCHLORIDE 10 MG: 5 INJECTION INTRAMUSCULAR; INTRAVENOUS at 10:00:00

## 2024-06-05 RX ADMIN — SODIUM CHLORIDE, SODIUM LACTATE, POTASSIUM CHLORIDE, CALCIUM CHLORIDE: 600; 310; 30; 20 INJECTION, SOLUTION INTRAVENOUS at 07:44:00

## 2024-06-05 RX ADMIN — SODIUM CHLORIDE: 9 INJECTION, SOLUTION INTRAVENOUS at 08:50:00

## 2024-06-05 RX ADMIN — ACETAMINOPHEN 1000 MG: 10 INJECTION, SOLUTION INTRAVENOUS at 10:45:00

## 2024-06-05 NOTE — ANESTHESIA PREPROCEDURE EVALUATION
PRE-OP EVALUATION    Patient Name: Soumya King    Admit Diagnosis: Hyponatremia [E87.1]  Transaminitis [R74.01]  Thrombocytosis [D75.839]  MARÍA ELENA (acute kidney injury) (HCC) [N17.9]  Cavitary lesion of lung [J98.4]  Acute anemia [D64.9]    Pre-op Diagnosis: EMPYEMA    FLEXIBLE BRONCHOSCOPY, RIGHT VIDEO-ASSISTED THORACOSCOPY,  DECORTICATION    Anesthesia Procedure: FLEXIBLE BRONCHOSCOPY, RIGHT VIDEO-ASSISTED THORACOSCOPY,  DECORTICATION (Right)    Surgeons and Role:     * Erica Gilliland, David RASCON MD - Primary    Pre-op vitals reviewed.  Temp: 98.9 °F (37.2 °C)  Pulse: 108  Resp: 16  BP: 118/62  SpO2: 99 %  Body mass index is 25.09 kg/m².    Current medications reviewed.  Hospital Medications:   lidocaine-menthol 4-1 % patch 1 patch  1 patch Transdermal Daily    vancomycin (Vancocin) 750 mg in sodium chloride 0.9% 250 mL IVPB-ADDV  750 mg Intravenous Q12H    ipratropium-albuterol (Duoneb) 0.5-2.5 (3) MG/3ML inhalation solution 3 mL  3 mL Nebulization Q4H PRN    oxyCODONE ER (OxyCONTIN ER) 12 hr tab 10 mg  10 mg Oral 2 times per day    metoprolol tartrate (Lopressor) tab 25 mg  25 mg Oral 2x Daily(Beta Blocker)    [COMPLETED] alteplase (Activase) 10 mg in sodium chloride 0.9% 30 mL (MIST2) intrapleural syringe  10 mg Intrapleural Q12H    [COMPLETED] dornase franck (Pulmozyme) 5 mg in sterile water for injection (PF) 30 mL (MIST2) intrapleural syringe  5 mg Intrapleural Q12H    ampicillin-sulbactam (Unasyn) 3 g in sodium chloride 0.9% 100mL IVPB-ADD  3 g Intravenous q6h    [Held by provider] clonazePAM (KlonoPIN) tab 0.5 mg  0.5 mg Oral BID PRN    HYDROmorphone (Dilaudid) 1 MG/ML injection 0.2 mg  0.2 mg Intravenous Once    HYDROmorphone (Dilaudid) 1 MG/ML injection 0.5 mg  0.5 mg Intravenous Q2H PRN    Or    HYDROmorphone (Dilaudid) 1 MG/ML injection 1 mg  1 mg Intravenous Q2H PRN    Or    HYDROmorphone (Dilaudid) 1 MG/ML injection 2 mg  2 mg Intravenous Q2H PRN    [COMPLETED] phytonadione (Vitamin K) 1 mg/mL oral syringe  5 mg  5 mg Oral Once    [COMPLETED] sodium chloride 0.9% infusion   Intravenous Once    oxybutynin ER (Ditropan-XL) 24 hr tab 5 mg  5 mg Oral Daily    [COMPLETED] midazolam (Versed) 2 MG/2ML injection        [COMPLETED] fentaNYL (Sublimaze) 50 mcg/mL injection        HYDROcodone-acetaminophen (Norco) 5-325 MG per tab 1 tablet  1 tablet Oral Q4H PRN    Or    HYDROcodone-acetaminophen (Norco) 5-325 MG per tab 2 tablet  2 tablet Oral Q4H PRN    [COMPLETED] phytonadione (Vitamin K) 1 mg/mL oral syringe 5 mg  5 mg Oral Once    [COMPLETED] iopamidol 76% (ISOVUE-370) injection for power injector  85 mL Intravenous ONCE PRN    [COMPLETED] sodium chloride 0.9% infusion   Intravenous Once    traMADol (Ultram) tab 50 mg  50 mg Oral Q6H PRN    [COMPLETED] cefTRIAXone (Rocephin) 1 g in D5W 100 mL IVPB-ADD  1 g Intravenous Once    [COMPLETED] azithromycin (Zithromax) 500 mg in sodium chloride 0.9% 250mL IVPB premix  500 mg Intravenous Once    [COMPLETED] sodium chloride 0.9 % IV bolus 1,701 mL  30 mL/kg Intravenous Once    [COMPLETED] iopamidol 76% (ISOVUE-370) injection for power injector  100 mL Intravenous ONCE PRN    [Held by provider] aspirin DR tab 81 mg  81 mg Oral Daily    atorvastatin (Lipitor) tab 80 mg  80 mg Oral Nightly    benzonatate (Tessalon) cap 100 mg  100 mg Oral TID PRN    busPIRone (Buspar) tab 20 mg  20 mg Oral Daily    gabapentin (Neurontin) tab 600 mg  600 mg Oral Nightly    pantoprazole (Protonix) DR tab 40 mg  40 mg Oral QAM AC    QUEtiapine ER (SEROquel XR) 24 hr tab 200 mg  200 mg Oral QPM    rizatriptan (Maxalt-MLT) disintegrating tab 5 mg  5 mg Oral PRN    umeclidinium bromide (Incruse Ellipta) 62.5 MCG/ACT inhaler 1 puff  1 puff Inhalation Daily    glucose (Dex4) 15 GM/59ML oral liquid 15 g  15 g Oral Q15 Min PRN    Or    glucose (Glutose) 40% oral gel 15 g  15 g Oral Q15 Min PRN    Or    glucose-vitamin C (Dex-4) chewable tab 4 tablet  4 tablet Oral Q15 Min PRN    Or    dextrose 50% injection 50 mL   50 mL Intravenous Q15 Min PRN    Or    glucose (Dex4) 15 GM/59ML oral liquid 30 g  30 g Oral Q15 Min PRN    Or    glucose (Glutose) 40% oral gel 30 g  30 g Oral Q15 Min PRN    Or    glucose-vitamin C (Dex-4) chewable tab 8 tablet  8 tablet Oral Q15 Min PRN    melatonin tab 3 mg  3 mg Oral Nightly PRN    [Held by provider] heparin (Porcine) 5000 UNIT/ML injection 5,000 Units  5,000 Units Subcutaneous Q8H JOSÉ ANTONIO    acetaminophen (Tylenol Extra Strength) tab 500 mg  500 mg Oral Q4H PRN    ondansetron (Zofran) 4 MG/2ML injection 4 mg  4 mg Intravenous Q6H PRN    prochlorperazine (Compazine) 10 MG/2ML injection 5 mg  5 mg Intravenous Q8H PRN    polyethylene glycol (PEG 3350) (Miralax) 17 g oral packet 17 g  17 g Oral Daily PRN    sennosides (Senokot) tab 17.2 mg  17.2 mg Oral Nightly PRN    bisacodyl (Dulcolax) 10 MG rectal suppository 10 mg  10 mg Rectal Daily PRN    fleet enema (Fleet) 7-19 GM/118ML rectal enema 133 mL  1 enema Rectal Once PRN    insulin aspart (NovoLOG) 100 Units/mL FlexPen 1-10 Units  1-10 Units Subcutaneous TID AC and HS    guaiFENesin (Robitussin) 100 MG/5 ML oral liquid 200 mg  200 mg Oral Q4H PRN    sodium chloride (Saline Mist) 0.65 % nasal solution 1 spray  1 spray Each Nare Q3H PRN       Outpatient Medications:     Medications Prior to Admission   Medication Sig Dispense Refill Last Dose    azelastine 137 MCG/SPRAY Nasal Solution 1 spray by Nasal route 2 (two) times daily.   5/26/2024    PRISTIQ 100 MG Oral Tablet 24 Hr Take 1 tablet (100 mg total) by mouth daily.   5/26/2024    benzonatate (TESSALON PERLES) 100 MG Oral Cap Take 1 capsule (100 mg total) by mouth 3 (three) times daily as needed for cough. 15 capsule 0 5/25/2024    Rizatriptan Benzoate 10 MG Oral Tab Take 1 tablet (10 mg total) by mouth as needed for Migraine. 10 tablet 5 5/25/2024    nitrofurantoin monohydrate macro (MACROBID) 100 MG Oral Cap Take 1 capsule (100 mg total) by mouth 2 (two) times daily. 14 capsule 0 5/26/2024     Glucose Blood (ONETOUCH ULTRA) In Vitro Strip Tests 3 x daily 300 each 1 5/26/2024    TRULICITY 0.75 MG/0.5ML Subcutaneous Solution Pen-injector Inject 0.75 mg into the skin once a week. 2 mL 0 Past Week    Coenzyme Q10 200 MG Oral Cap Co Q-10 200 mg capsule, [RxNorm: 754258]   5/25/2024    diclofenac 75 MG Oral Tab EC Take 1 tablet (75 mg total) by mouth 2 (two) times daily. 180 tablet 3 5/26/2024    metoprolol succinate ER 50 MG Oral Tablet 24 Hr Take 1 tablet (50 mg total) by mouth daily. 90 tablet 0 5/26/2024    Omeprazole 40 MG Oral Capsule Delayed Release Take 1 capsule (40 mg total) by mouth 2 (two) times daily. 300 capsule 0 5/26/2024    Blood Glucose Monitoring Suppl (ONETOUCH ULTRA 2) w/Device Does not apply Kit 1 Device by Other route in the morning, at noon, and at bedtime. Use as directed. 1 kit 0 5/26/2024    topiramate 50 MG Oral Tab TAKE ONE TABLET BY MOUTH EVERY MORNING AND TWO TABLETS EVERY EVENING (Patient taking differently: Taking one tablet in the morning and one tablet at night.) 270 tablet 2 5/26/2024    gabapentin 600 MG Oral Tab Take 1 tablet (600 mg total) by mouth at bedtime. 180 tablet 0 5/25/2024    Erenumab-aooe (AIMOVIG) 140 MG/ML Subcutaneous Solution Auto-injector Inject 1 mL (140 mg total) into the skin every 30 (thirty) days. 1 mL 4 Past Week    umeclidinium bromide (INCRUSE ELLIPTA) 62.5 MCG/ACT Inhalation Aerosol Powder, Breath Activated Inhale 1 puff into the lungs daily. 3 each 0 5/26/2024    clopidogrel 75 MG Oral Tab Take 1 tablet (75 mg total) by mouth daily.   5/26/2024    aspirin 81 MG Oral Tab EC Take 1 tablet (81 mg total) by mouth daily.   5/26/2024    atorvastatin 80 MG Oral Tab Take 1 tablet (80 mg total) by mouth nightly. 90 tablet 1 5/25/2024    metFORMIN 500 MG Oral Tab Take 1 tablet (500 mg total) by mouth 2 (two) times daily. 180 tablet 1 5/26/2024    furosemide 20 MG Oral Tab Take 1 tablet (20 mg total) by mouth daily. 7 tablet 0 5/26/2024    clonazePAM 0.5 MG  Oral Tab Take 1 tablet (0.5 mg total) by mouth 2 (two) times daily. 12 tablet 0 2024    busPIRone 10 MG Oral Tab Take 2 tablets (20 mg total) by mouth daily.   2024    cyclobenzaprine 10 MG Oral Tab Take 1 tablet (10 mg total) by mouth every 12 (twelve) hours as needed for Muscle spasms. 60 tablet 1 2024    Lancets (ONETOUCH DELICA PLUS [id number]) Does not apply Misc 1 lancet  by Finger stick route 3 (three) times daily. 300 each 1 2024    Mirabegron ER (MYRBETRIQ) 50 MG Oral Tablet 24 Hr Take 1 tablet (50 mg total) by mouth daily. 30 tablet 11 2024    Multiple Vitamins-Minerals (ONE-A-DAY WOMENS 50+) Oral Tab Take 1 tablet by mouth daily.  0 2024    QUEtiapine Fumarate  MG Oral Tablet 24 Hr Take 1 tablet (200 mg total) by mouth every evening. 30 tablet 0 2024    Calcium Carb-Cholecalciferol 500-200 MG-UNIT Oral Tab Take 1 tablet by mouth daily.     2024    cyclobenzaprine 10 MG Oral Tab Take 1 tablet (10 mg total) by mouth every 12 (twelve) hours as needed for Muscle spasms. (Patient taking differently: Take 1 tablet (10 mg total) by mouth every 12 (twelve) hours as needed for Muscle spasms. Duplicate med) 90 tablet 2     lidocaine 5 % External Ointment Apply 1 Application  topically 3 (three) times daily as needed. 240 g 1 Unknown    [] amoxicillin clavulanate 875-125 MG Oral Tab Take 1 tablet by mouth 2 (two) times daily for 7 days. 14 tablet 0     [] sulfamethoxazole-trimethoprim DS (BACTRIM DS) 800-160 MG Oral Tab per tablet Take 1 tablet by mouth 2 (two) times daily for 10 days. 20 tablet 0     Metoclopramide HCl 5 MG/5ML Oral Solution Take 5 mL (5 mg total) by mouth 3 (three) times daily before meals. (Patient taking differently: Take 5 mL (5 mg total) by mouth 3 (three) times daily before meals as needed.) 473 mL 0 Unknown    estradiol (ESTRACE) 0.1 MG/GM Vaginal Cream Apply 1/2 gram vaginally 2-3 times per week. (Patient not taking: Reported on  5/14/2024) 42.5 g 3        Allergies: Naprosyn [naproxen] and Aspirin      Anesthesia Evaluation    Patient summary reviewed.    Anesthetic Complications  (-) history of anesthetic complications         GI/Hepatic/Renal      (+) GERD                           Cardiovascular  Comment: Conclusions:     1. Left ventricle: The cavity size was normal. Wall thickness was normal.      Systolic function was reduced. The estimated ejection fraction was      40-45%, by visual assessment. Severe hypokinesis of the      mid-apicalanteroseptal and apical walls. Doppler parameters are      consistent with abnormal left ventricular relaxation - grade 1 diastolic      dysfunction.   2. Left atrium: The left atrial volume was normal.   Impressions:  This study is compared with previous dated 04/15/2024: New   RWMA concerning for CAD in left anterior descending coronary distribution   versus stress CM.   *                   (+) hypertension     (+) CAD                                Endo/Other      (+) diabetes  type 2,                          Pulmonary        (+) COPD            (+) sleep apnea       Neuro/Psych      (+) depression           (+) neuromuscular disease             Per chart - flagged hx of difficult intubation; chart review shows GETA 2019 without note of airway details or any indication of difficulty at that time      Past Surgical History:   Procedure Laterality Date    Cabg  12/27/23    Colonoscopy      Colonoscopy N/A 05/18/2022    Procedure: COLONOSCOPY AND ESOPHAGOGASTRODUODENOSCOPY;  Surgeon: Miguel Camargo MD;  Location:  ENDOSCOPY    Heart surgery  2024    double bypass    Laparoscopic  2007    sling operation for stress incontinence    Laparoscopy,diagnostic      Kay biopsy stereo nodule 2 site bilat (cpt=19081/98299) Left 02/2010    BENIGN 2 SITES    Kay biopsy stereo w/calc 2 site left (cpt=19081/89345)  02/2010    benign x 2    Kay stereo-clip w/calc 2 site left  2010    Oophorectomy Left  2019    Other  2022    RIGHT SACRAL SUBCUTANEOUS CYST EXCISION    Other surgical history  ,     Bladder Sling, Large mass removed from left abdomen and ovar    Tonsillectomy      Was a child, have no idea    Tubal ligation      Upper gi endoscopy,exam       Social History     Socioeconomic History    Marital status: Single   Tobacco Use    Smoking status: Former     Current packs/day: 0.00     Average packs/day: 1.5 packs/day for 48.0 years (72.0 ttl pk-yrs)     Types: Cigarettes     Start date:      Quit date:      Years since quittin.4    Smokeless tobacco: Never    Tobacco comments:     smoking JUUL from 2019 until current   Vaping Use    Vaping status: Every Day    Start date: 2019    Substances: Nicotine, Flavoring    Devices: Disposable, Pre-filled pod   Substance and Sexual Activity    Alcohol use: Not Currently     Comment: On occasion    Drug use: No    Sexual activity: Not Currently     Partners: Male   Other Topics Concern    Caffeine Concern Yes    Stress Concern Yes    Weight Concern Yes    Special Diet No    Exercise No    Seat Belt Yes     History   Drug Use No     Available pre-op labs reviewed.  Lab Results   Component Value Date    WBC 38.6 (H) 2024    RBC 3.10 (L) 2024    HGB 7.2 (L) 2024    HCT 28.7 (L) 2024    MCV 92.6 2024    MCH 29.7 2024    MCHC 32.1 2024    RDW 15.9 2024    .0 (H) 2024     Lab Results   Component Value Date     (L) 2024    K 4.1 2024     2024    CO2 21.0 2024    BUN 7 (L) 2024    CREATSERUM 0.59 2024     (H) 2024    CA 8.3 (L) 2024     Lab Results   Component Value Date    INR 1.38 (H) 2024         Airway      Mallampati: II      Neck ROM: limited Cardiovascular    Cardiovascular exam normal.         Dental    Dentition appears grossly intact         Pulmonary    Pulmonary exam normal.                 Other  findings              ASA: 4   Plan: general  NPO status verified and patient meets guidelines.    Post-procedure pain management plan discussed with surgeon and patient.      Plan/risks discussed with: patient                Present on Admission:   (Resolved) Cavitary lesion of lung   Hyperglycemia   COPD, mild (HCC) - Dx'd via PFTs done in 3/2015   GERD (gastroesophageal reflux disease)   Type 2 diabetes mellitus with diabetic neuropathy (HCC)   MDD (major depressive disorder), recurrent episode, moderate (HCC)   Hypertension   Hyperlipidemia

## 2024-06-05 NOTE — ANESTHESIA PROCEDURE NOTES
Peripheral IV  Date/Time: 6/5/2024 7:44 AM  Inserted by: Dale Mack MD    Placement  Needle size: 20 G  Laterality: right  Location: wrist  Local anesthetic: none  Site prep: alcohol  Technique: anatomical landmarks  Attempts: 1

## 2024-06-05 NOTE — ANESTHESIA PROCEDURE NOTES
Peripheral IV  Date/Time: 6/5/2024 7:39 AM  Inserted by: Dale Mack MD    Placement  Needle size: 18 G  Laterality: left  Location: wrist  Local anesthetic: none  Site prep: alcohol  Technique: anatomical landmarks  Attempts: 1

## 2024-06-05 NOTE — PROGRESS NOTES
Thoracic Surgery Progress Note     Soumya King is a 64 year old female. MRN [id number]. Admitted 2024    SUBJECTIVE/24H EVENTS:     No acute events overnight. Still feels confused - woke up thinking she already had the surgery. Denies worsening pain or shortness of breath.     Objective:     VITALS:     Temp (24hrs), Av.5 °F (36.9 °C), Min:97.6 °F (36.4 °C), Max:99.6 °F (37.6 °C)   /62 (BP Location: Left arm)   Pulse 108   Temp 98.9 °F (37.2 °C) (Oral)   Resp 16   Ht 154.9 cm (5' 1\")   Wt 132 lb 12.8 oz (60.2 kg)   SpO2 99%   BMI 25.09 kg/m²     EXAM:   General: well appearing female in no acute distress  Heart: tachycardic   Lungs: Normal respiratory effort. Equal chest rise bilaterally  Chest tube with 385cc of output, no air leak   Abdomen: Soft, Non-tender, non-distended.  Extremities: No clubbing or cyanosis. No lateralizing weakness  Neuro: no focal defects  Psych:  oriented to person place and time but reports confusion, normal mood and affect      Intake/Output Summary (Last 24 hours) at 2024 0658  Last data filed at 2024 0554  Gross per 24 hour   Intake 500 ml   Output 3005 ml   Net -2505 ml         MEDS:   lidocaine-menthol  1 patch Transdermal Daily    vancomycin  750 mg Intravenous Q12H    oxyCODONE ER  10 mg Oral 2 times per day    metoprolol tartrate  25 mg Oral 2x Daily(Beta Blocker)    ampicillin-sulbactam  3 g Intravenous q6h    HYDROmorphone  0.2 mg Intravenous Once    oxybutynin ER  5 mg Oral Daily    [Held by provider] aspirin  81 mg Oral Daily    atorvastatin  80 mg Oral Nightly    busPIRone  20 mg Oral Daily    gabapentin  600 mg Oral Nightly    pantoprazole  40 mg Oral QAM AC    QUEtiapine ER  200 mg Oral QPM    umeclidinium bromide  1 puff Inhalation Daily    [Held by provider] heparin  5,000 Units Subcutaneous Q8H JOSÉ ANTONIO    insulin aspart  1-10 Units Subcutaneous TID AC and HS       LABS:  Lab Results   Component Value Date    HGB 7.2 2024    CREATSERUM  0.59 06/04/2024         Assessment/Plan:     64 year old female presenting with right empyema s/p chest tube placement and MIST II protocol without improvement on CT chest.     -Plan to proceed with right VATS decortication with Dr. Maldonado today.  -Chest tube to suction. Okay to ambulate off of suction.   -Abx per ID   -NPO for surgery   -Maintain saturations above 90%.       Patient seen and discussed with Dr. Maldonado.     Yudy Albrecht PA-C  Thoracic Surgery  Pager: [phone]

## 2024-06-05 NOTE — ANESTHESIA PROCEDURE NOTES
Arterial Line    Date/Time: 6/5/2024 7:31 AM    Performed by: Dale Mack MD  Authorized by: Dale Mack MD    General Information and Staff    Procedure Start:  6/5/2024 7:31 AM  Procedure End:  6/5/2024 7:34 AM  Anesthesiologist:  Dale Mack MD  Performed By:  Anesthesiologist  Patient Location:  OR  Indication: continuous blood pressure monitoring and blood sampling needed    Site Identification: surface landmarks    Preanesthetic Checklist: 2 patient identifiers, IV checked, risks and benefits discussed, monitors and equipment checked, pre-op evaluation, timeout performed, anesthesia consent and sterile technique used    Procedure Details    Catheter Size:  20 G  Catheter Length:  1 and 3/4 inch  Catheter Type:  Arrow  Seldinger Technique?: Yes    Laterality:  Left  Site:  Radial artery  Site Prep: chlorhexidine    Line Secured:  Wrist Brace, tape and Tegaderm    Assessment    Events: patient tolerated procedure well with no complications      Medications  6/5/2024 7:31 AM      Additional Comments

## 2024-06-05 NOTE — ANESTHESIA POSTPROCEDURE EVALUATION
[facility]    Soumya King Patient Status:  Inpatient   Age/Gender 64 year old female MRN [id number]   Location [facility] 6NE-A Attending Jose Rafael Raphael MD   Hosp Day # 10 PCP Rika Vinson DO       Anesthesia Post-op Note    FLEXIBLE BRONCHOSCOPY, RIGHT VIDEO-ASSISTED THORACOSCOPY,  DECORTICATION    Procedure Summary       Date: 06/05/24 Room / Location: [facility] 01 /  [facility]    Anesthesia Start: 0724 Anesthesia Stop: 1134    Procedure: FLEXIBLE BRONCHOSCOPY, RIGHT VIDEO-ASSISTED THORACOSCOPY,  DECORTICATION (Right) Diagnosis: (EMPYEMA)    Surgeons: David Maldonado Jr., MD Anesthesiologist: Dale Mack MD    Anesthesia Type: general ASA Status: 4            Anesthesia Type: general    Vitals Value Taken Time   /61 06/05/24 1145   Temp 97 °F (36.1 °C) 06/05/24 1130   Pulse 113 06/05/24 1153   Resp 17 06/05/24 1153   SpO2 93 % 06/05/24 1153   Vitals shown include unfiled device data.    Patient Location: ICU    Anesthesia Type: general    Airway Patency: extubated    Postop Pain Control: adequate    Mental Status: preanesthetic baseline    Nausea/Vomiting: none    Cardiopulmonary/Hydration status: stable euvolemic    Complications: no apparent anesthesia related complications    Postop vital signs: stable    Dental Exam: Unchanged from Preop    Sign out given to ICU staff.

## 2024-06-05 NOTE — PROGRESS NOTES
[facility]   part of [facility]     Hospitalist Progress Note     Soumya King Patient Status:  Inpatient    6/3/1960 MRN [id number]   Location [facility] 5NW-A Attending Apurva Logan MD   Hosp Day # 10 PCP Rika Vinson,      Subjective:     S/p VATS and in pain but manageable.   No fevers.     Objective:    Review of Systems:   A comprehensive review of systems was completed; pertinent positive and negatives stated in subjective.    Vital signs:  Temp:  [97.6 °F (36.4 °C)-99.6 °F (37.6 °C)] 98.9 °F (37.2 °C)  Pulse:  [] 108  Resp:  [16-18] 16  BP: ()/(43-62) 118/62  SpO2:  [93 %-100 %] 99 %    Physical Exam:    General: No acute distress, in pain   Respiratory: No wheezes, no rhonchi  Cardiovascular: S1, S2, regular rate and rhythm  Abdomen: Soft, Non-tender, non-distended, positive bowel sounds  Neuro: No new focal deficits.   Extremities: No edema      Diagnostic Data:    Labs:  Recent Labs   Lab 24  0714 24  1623 24  0733 24  0855 24  1708 24  0758 24  1615 24  0806 24  1422 24  0034 24  0755 24  1558 24  0010 24  0911 24  1626 24  0021   WBC 18.7*  --  17.8*  --   --  21.6*  --  26.3* 31.5*  --  38.6*  --   --   --   --   --    HGB 8.3*  8.4*   < > 8.9*  8.9*  --    < > 9.2*  9.2*   < > 8.9*  8.9* 9.3*   < > 9.2*  8.8* 8.2* 7.7* 7.6* 7.6* 7.2*   MCV 87.3  --  89.7  --   --  89.7  --  89.2 89.4  --  92.6  --   --   --   --   --    .0*  --  520.0*  --   --  577.0*  --  536.0* 626.0*  --  698.0*  --   --   --   --   --    BAND 1  --  2  --   --   --   --   --   --   --   --   --   --   --   --   --    INR 1.51*  --  >15.49* 1.33*  --  1.35*  --  1.34*  --   --  1.38*  --   --   --   --   --     < > = values in this interval not displayed.       Recent Labs   Lab 24  07524  0806 24  0755 24  0910   * 147* 149*  --    BUN 6* 4* 7*  --     CREATSERUM 0.60 0.42* 0.54* 0.59   CA 8.3* 8.1* 8.3*  --    ALB 1.4* 1.2* 1.3*  --     137 131*  --    K 3.7 3.7 4.1  --     107 101  --    CO2 21.0 20.0* 21.0  --    ALKPHO 381* 319* 295*  --    * 183* 94*  --    * 194* 156*  --    BILT 0.7 0.7 0.6  --    TP 6.1* 5.7* 6.2*  --        Estimated Creatinine Clearance: 74.6 mL/min (based on SCr of 0.59 mg/dL).    No results for input(s): \"TROP\", \"TROPHS\", \"CK\" in the last 168 hours.      Recent Labs   Lab 06/01/24  0758 06/02/24  0806 06/03/24  0755   PTP 16.7* 16.6* 17.0*   INR 1.35* 1.34* 1.38*          Microbiology    Hospital Encounter on 05/26/24   1. Body Fluid Cult Aerobic and Anaerobic     Status: Abnormal    Collection Time: 05/29/24  3:46 PM    Specimen: Pleural Fluid, Right; Body fluid, unspecified   Result Value Ref Range    Body Fluid Culture Result 3+ growth Streptococcus intermedius (A) N/A    Body Fluid Smear 4+ WBCs seen N/A    Body Fluid Smear No organisms seen N/A    Body Fluid Smear This is a cytocentrifuged smear. N/A   2. Blood Culture     Status: None    Collection Time: 05/29/24  2:24 PM    Specimen: Blood,peripheral   Result Value Ref Range    Blood Culture Result No Growth 5 Days N/A         Imaging: Reviewed in Epic.    Medications:    lidocaine-menthol  1 patch Transdermal Daily    vancomycin  750 mg Intravenous Q12H    oxyCODONE ER  10 mg Oral 2 times per day    metoprolol tartrate  25 mg Oral 2x Daily(Beta Blocker)    ampicillin-sulbactam  3 g Intravenous q6h    HYDROmorphone  0.2 mg Intravenous Once    oxybutynin ER  5 mg Oral Daily    [Held by provider] aspirin  81 mg Oral Daily    atorvastatin  80 mg Oral Nightly    busPIRone  20 mg Oral Daily    gabapentin  600 mg Oral Nightly    pantoprazole  40 mg Oral QAM AC    QUEtiapine ER  200 mg Oral QPM    umeclidinium bromide  1 puff Inhalation Daily    [Held by provider] heparin  5,000 Units Subcutaneous Q8H JOSÉ ANTONIO    insulin aspart  1-10 Units Subcutaneous TID AC  and HS       Assessment & Plan:      # RLL pneumonia with empyema   # RLL effusion with loculation and chest pain   - sp chest tube by IR on 5/29  -abx adjusted per cultures   - sp MIST w/o improvement   - s/p VATS decortication today monitoring in ICU   - CT Chest reviewed  - Pulmonology and ID rec appreciated   - cont pain meds    # elevated liver enzymes seems ischemic in nature per course   -improved  - exam benign   - US abd with doppler neg   - avoid hepatotoxic agents    # Iron def Anemia  - sp PRBC   - no s/s bleeding     #Coagulopathy- repeat INR normalized     #COPD   -no active wheezing  -Continue inhalers  -prn nebs.     #Acute metabolic toxic ( anxiolytic) encephalopathy, resolved   -Ammonia normal   -CT brain negative  -ABG reviewed    #Hypertension-metoprolol on hold due to low BP     #DM 2-hyperglycemia protocol     #GERD/ Jamison's esophagus -PPI    #MDD/anxiety- Home med         Supplementary Documentation:     Quality:  DVT Mechanical Prophylaxis:   SCDs,    DVT Pharmacologic Prophylaxis   Medication    [Held by provider] heparin (Porcine) 5000 UNIT/ML injection 5,000 Units         DVT Pharmacologic prophylaxis: Aspirin 81 mg      Code Status: Full Code  Gotti: No urinary catheter in place  Gotti Duration (in days):   Central line:    ILYA:     Discharge is dependent on: progress  At this point Ms. King is expected to be discharge to: home    The 21st Century Cures Act makes medical notes like these available to patients in the interest of transparency. Please be advised this is a medical document. Medical documents are intended to carry relevant information, facts as evident, and the clinical opinion of the practitioner. The medical note is intended as peer to peer communication and may appear blunt or direct. It is written in medical language and may contain abbreviations or verbiage that are unfamiliar.

## 2024-06-05 NOTE — BRIEF OP NOTE
Pre-Operative Diagnosis: EMPYEMA     Post-Operative Diagnosis: same     Procedure Performed:   FLEXIBLE BRONCHOSCOPY, RIGHT VIDEO-ASSISTED THORACOSCOPY,  DECORTICATION    Surgeons and Role:     * David Maldonado Jr., MD - Primary    Assistant(s):  PA: Yudy Albrecht PA-C     Surgical Findings: lung abscess, trapped lung     Specimen:   ID Type Source Tests Collected by Time Destination   1 : Upper Lobe Pleural Peel Tissue Lung right SURGICAL PATHOLOGY TISSUE David Maldonado Jr., MD 6/5/2024 0827    2 : Right Lower Lobe Pleural Peel Tissue Lung right SURGICAL PATHOLOGY TISSUE David Maldonado Jr., MD 6/5/2024 0852    3 : Right Middle Lobe Pleural Peel Tissue Lung right SURGICAL PATHOLOGY TISSUE David Maldonado Jr., MD 6/5/2024 0916    A : Right Pleural Fluid Body fluid, unspecified Pleural Fluid, Right BODY FLUID CULT,AEROBIC AND ANAEROBIC David Maldonado Jr., MD 6/5/2024 0807    B : Right Pleural Debris Tissue Tissue ANAEROBIC CULTURE, TISSUE AEROBIC CULTURE David Maldonado Jr., MD 6/5/2024 0815    C : Right Lower Lobe Abscess Tissue Lung right ANAEROBIC CULTURE, TISSUE AEROBIC CULTURE, FUNGUS CULTURE AND SMEAR(INCLUDES STAIN) David Maldonado Jr., MD 6/5/2024 0832    D : Right Lower Lobe Pleural Peel Tissue Lung right ANAEROBIC CULTURE, TISSUE AEROBIC CULTURE David Maldonado Jr., MD 6/5/2024 0843        Estimated Blood Loss: Blood Output: 500 mL (6/5/2024 10:41 AM)    Disposition: ICU    Yudy Albrecht PA-C  6/5/2024  11:03 AM

## 2024-06-05 NOTE — PLAN OF CARE
Patient arrived to unit from OR at 1130. VSS, afebrile. 2 chest tubes in place, #1 anterior 28F curved, #2 posterior 28F straight. Both to -20 mmHg suction, tidaling, intermittent air leak.     Patient able to eat, using IS. Reports pain at 6-8/10, pain medications given per orders    1500- BP 85/48, IV fluid bolus given per order. Repeat Hgb stable.

## 2024-06-05 NOTE — ANESTHESIA PROCEDURE NOTES
Airway  Date/Time: 6/5/2024 7:30 AM  Urgency: elective    Airway not difficult    General Information and Staff    Patient location during procedure: OR  Anesthesiologist: Dale Mack MD  Performed: anesthesiologist   Performed by: Dale Mack MD  Authorized by: Dale Mack MD      Indications and Patient Condition  Indications for airway management: anesthesia  Spontaneous Ventilation: absent  Sedation level: deep  Preoxygenated: yes  Patient position: sniffing  Mask difficulty assessment: 1 - vent by mask    Final Airway Details  Final airway type: endotracheal airway      Successful airway: ETT and ETT - double lumen left  Cuffed: yes   Successful intubation technique: direct laryngoscopy  Facilitating devices/methods: intubating stylet  Endotracheal tube insertion site: oral  Blade: Ronnie  Blade size: #3  ETT DL size (fr): 35    Cormack-Lehane Classification: grade IIA - partial view of glottis  Placement verified by: capnometry   Measured from: lips  Number of attempts at approach: 1  Ventilation between attempts: none  Number of other approaches attempted: 0

## 2024-06-05 NOTE — CM/SW NOTE
In anticipation of need for IV antibiotics @ discharge--will send referral to check cost and benefits--referrals in [facility] sent

## 2024-06-05 NOTE — SPIRITUAL CARE NOTE
Spiritual Care Visit Note    Patient Name: Soumya King Date of Spiritual Care Visit: 24   : 6/3/1960 Primary Dx: Empyema of right pleural space (HCC)       Referred By: Referral From: Other (Comment)    Spiritual Care Taxonomy:    Intended Effects: Establish rapport and connectedness    Methods: Offer support    Interventions: Explain  role;Acknowledge current situation;Active listening;Ask guided questions;Discuss plan of care    Visit Type/Summary:     - Spiritual Care: Consulted with RN prior to visit. Patient and family expressed appreciation for  visit.  remains available as needed for follow up.    Spiritual Care support can be requested via an Epic consult. For urgent/immediate needs, please contact the On Call  at: Edward: ext [phone]

## 2024-06-05 NOTE — PROGRESS NOTES
[facility]   part of [facility] ID PROGRESS NOTE    Soumya King Patient Status:  Inpatient    6/3/1960 MRN [id number]   Location [facility] 5NW-A Attending Hoda Juan MD   Hosp Day # 10 PCP Rika Vinson DO     Abx: IV Unasyn D#5, IV vanco D#3  s/p Zosyn and vanco    Subjective: Patient seen and examined today. Reports pain at the surgical site. Denies any SOB. On 2L NC. Afebrile.     Allergies:  Allergies   Allergen Reactions    Naprosyn [Naproxen] ANAPHYLAXIS     Has tolerated ibuprofen and IV toradol     Aspirin OTHER (SEE COMMENTS)     Difficulty swallowing due to  saavedra's esophagus       Medications:    Current Facility-Administered Medications:     lactated ringers infusion, , Intravenous, Continuous    lactated ringers IV bolus 500 mL, 500 mL, Intravenous, Once PRN    atropine 0.1 MG/ML injection 0.5 mg, 0.5 mg, Intravenous, PRN    naloxone (Narcan) 0.4 MG/ML injection 0.08 mg, 0.08 mg, Intravenous, PRN    fentaNYL (Sublimaze) 50 mcg/mL injection 25 mcg, 25 mcg, Intravenous, Q5 Min PRN **OR** fentaNYL (Sublimaze) 50 mcg/mL injection 50 mcg, 50 mcg, Intravenous, Q5 Min PRN    HYDROmorphone (Dilaudid) 1 MG/ML injection 0.2 mg, 0.2 mg, Intravenous, Q5 Min PRN **OR** HYDROmorphone (Dilaudid) 1 MG/ML injection 0.4 mg, 0.4 mg, Intravenous, Q5 Min PRN **OR** HYDROmorphone (Dilaudid) 1 MG/ML injection 0.6 mg, 0.6 mg, Intravenous, Q5 Min PRN    midazolam (Versed) 2 MG/2ML injection 1 mg, 1 mg, Intravenous, Q5 Min PRN    meperidine (Demerol) 25 MG/ML injection 25 mg, 25 mg, Intravenous, Q1H PRN    sodium chloride 0.9% infusion, , Intravenous, Continuous    acetaminophen (Ofirmev) 10 mg/mL infusion premix 1,000 mg, 1,000 mg, Intravenous, Q6H    oxyCODONE immediate release tab 5 mg, 5 mg, Oral, Q4H PRN **OR** oxyCODONE immediate release tab 10 mg, 10 mg, Oral, Q4H PRN    HYDROmorphone (Dilaudid) 1 MG/ML injection 0.4 mg, 0.4 mg, Intravenous, Q2H PRN **OR** HYDROmorphone  (Dilaudid) 1 MG/ML injection 0.8 mg, 0.8 mg, Intravenous, Q2H PRN    polyethylene glycol (PEG 3350) (Miralax) 17 g oral packet 17 g, 17 g, Oral, Daily PRN    sennosides (Senokot) tab 17.2 mg, 17.2 mg, Oral, Nightly PRN    bisacodyl (Dulcolax) 10 MG rectal suppository 10 mg, 10 mg, Rectal, Daily PRN    fleet enema (Fleet) 7-19 GM/118ML rectal enema 133 mL, 1 enema, Rectal, Once PRN    ondansetron (Zofran) 4 MG/2ML injection 4 mg, 4 mg, Intravenous, Q6H PRN    prochlorperazine (Compazine) 10 MG/2ML injection 5 mg, 5 mg, Intravenous, Q8H PRN    calcium carbonate (Tums) chewable tab 1,000 mg, 1,000 mg, Oral, TID PRN    heparin (Porcine) 5000 UNIT/ML injection 5,000 Units, 5,000 Units, Subcutaneous, 2 times per day    lidocaine-menthol 4-1 % patch 1 patch, 1 patch, Transdermal, Daily    vancomycin (Vancocin) 750 mg in sodium chloride 0.9% 250 mL IVPB-ADDV, 750 mg, Intravenous, Q12H    ipratropium-albuterol (Duoneb) 0.5-2.5 (3) MG/3ML inhalation solution 3 mL, 3 mL, Nebulization, Q4H PRN    metoprolol tartrate (Lopressor) tab 25 mg, 25 mg, Oral, 2x Daily(Beta Blocker)    ampicillin-sulbactam (Unasyn) 3 g in sodium chloride 0.9% 100mL IVPB-ADD, 3 g, Intravenous, q6h    [Held by provider] clonazePAM (KlonoPIN) tab 0.5 mg, 0.5 mg, Oral, BID PRN    [Transfer Hold] HYDROmorphone (Dilaudid) 1 MG/ML injection 0.2 mg, 0.2 mg, Intravenous, Once    oxybutynin ER (Ditropan-XL) 24 hr tab 5 mg, 5 mg, Oral, Daily    [Held by provider] aspirin DR tab 81 mg, 81 mg, Oral, Daily    atorvastatin (Lipitor) tab 80 mg, 80 mg, Oral, Nightly    benzonatate (Tessalon) cap 100 mg, 100 mg, Oral, TID PRN    busPIRone (Buspar) tab 20 mg, 20 mg, Oral, Daily    gabapentin (Neurontin) tab 600 mg, 600 mg, Oral, Nightly    pantoprazole (Protonix) DR tab 40 mg, 40 mg, Oral, QAM AC    QUEtiapine ER (SEROquel XR) 24 hr tab 200 mg, 200 mg, Oral, QPM    rizatriptan (Maxalt-MLT) disintegrating tab 5 mg, 5 mg, Oral, PRN    umeclidinium bromide (Incruse Ellipta)  62.5 MCG/ACT inhaler 1 puff, 1 puff, Inhalation, Daily    glucose (Dex4) 15 GM/59ML oral liquid 15 g, 15 g, Oral, Q15 Min PRN **OR** glucose (Glutose) 40% oral gel 15 g, 15 g, Oral, Q15 Min PRN **OR** glucose-vitamin C (Dex-4) chewable tab 4 tablet, 4 tablet, Oral, Q15 Min PRN **OR** dextrose 50% injection 50 mL, 50 mL, Intravenous, Q15 Min PRN **OR** glucose (Dex4) 15 GM/59ML oral liquid 30 g, 30 g, Oral, Q15 Min PRN **OR** glucose (Glutose) 40% oral gel 30 g, 30 g, Oral, Q15 Min PRN **OR** glucose-vitamin C (Dex-4) chewable tab 8 tablet, 8 tablet, Oral, Q15 Min PRN    melatonin tab 3 mg, 3 mg, Oral, Nightly PRN    insulin aspart (NovoLOG) 100 Units/mL FlexPen 1-10 Units, 1-10 Units, Subcutaneous, TID AC and HS    guaiFENesin (Robitussin) 100 MG/5 ML oral liquid 200 mg, 200 mg, Oral, Q4H PRN    sodium chloride (Saline Mist) 0.65 % nasal solution 1 spray, 1 spray, Each Nare, Q3H PRN    Review of Systems:  Completed. See pertinent positives and negatives above.    Physical Exam:  Vital signs: Blood pressure 95/51, pulse 99, temperature 97.6 °F (36.4 °C), temperature source Temporal, resp. rate 21, height 154.9 cm (5' 1\"), weight 132 lb 12.8 oz (60.2 kg), SpO2 100%, not currently breastfeeding.    General: Alert, oriented. NAD. On NC.   HEENT: Moist mucous membranes.   Neck: No lymphadenopathy.  Supple.  Cardiovascular: RRR.   Respiratory: Diminished breath sounds on the R. + R chest tubes in place.   Abdomen: Soft, nontender, nondistended.   Musculoskeletal: Some pedal edema  Integument: No lesions. No erythema.    Laboratory Data:  Recent Labs   Lab 06/05/24  0650 06/05/24  1153   RBC 2.46* 2.96*   HGB 7.3* 8.7*   HCT 23.0* 26.8*   MCV 93.5 90.5   MCH 29.7 29.4   MCHC 31.7 32.5   RDW 15.8 15.2   NEPRELIM 22.83*  --    WBC 27.5* 31.9*   .0* 550.0*   NEUT 89  --    LYMPH 7  --    MON 0  --    EOS 0  --      Recent Labs   Lab 06/01/24  0758 06/02/24  0806 06/03/24  0755 06/04/24  0910 06/05/24  0650   *  147* 149*  --  137*   BUN 6* 4* 7*  --  9   CREATSERUM 0.60 0.42* 0.54* 0.59 0.48*  0.48*   CA 8.3* 8.1* 8.3*  --  8.0*   ALB 1.4* 1.2* 1.3*  --   --     137 131*  --  136   K 3.7 3.7 4.1  --  3.7    107 101  --  105   CO2 21.0 20.0* 21.0  --  22.0   ALKPHO 381* 319* 295*  --   --    * 183* 94*  --   --    * 194* 156*  --   --    BILT 0.7 0.7 0.6  --   --    TP 6.1* 5.7* 6.2*  --   --        Microbiology: Reviewed in EMR    Radiology: Reviewed.    PROCEDURE:  CT CHEST (CPT=71250)     COMPARISON:  [facility] , CT, CT CHEST (CPT=71250), 5/29/2024, 9:05 AM.     INDICATIONS:  evaluate empyema     TECHNIQUE:  Unenhanced multislice CT scanning is performed through the chest.  Dose reduction techniques were used. Dose information is transmitted to the ACR (American College of Radiology) NRDR (National Radiology Data Registry) which includes the Dose   Index Registry.     PATIENT STATED HISTORY: (As transcribed by Technologist)  Evaluation of chest tube.      FINDINGS:  Evaluation of the visceral organs is limited due to the lack of intravenous contrast.     LUNGS:  There is consolidation within the right lower lobe, right middle lobe, and to a lesser extent the right upper lobe suspicious for multifocal pneumonia.  There are small nodular ground-glass airspace opacities within the right upper lobe which may   represent the known pneumonia.  VASCULATURE:  Unremarkable.  THORACIC AORTA:  Scattered atherosclerosis.  MEDIASTINUM/ANKUSH:  Mild to moderate mediastinal adenopathy is nonspecific but may be reactive.  CARDIAC:  Calcified coronary artery disease is noted.  PLEURA:  There is a moderate amount of empyema within the right pleural space.  This is most notable at the posterior inferior aspect of the right pleural space measuring up to 11.1 x 7.3 cm in the axial plane.  Foci of air are seen within this empyema.   A right sided chest tube is seen centered within this dominant region of empyema.  The  amount of empyema has increased since 5/29/2024.  A mild left pleural effusion is seen.  CHEST WALL:  Sternotomy changes.  LIMITED ABDOMEN:  Unremarkable.  BONES:  Degenerative changes the spine.  OTHER:  Mild supraclavicular adenopathy is noted.     Impression:    CONCLUSION:    1. A moderate amount empyema is seen within the right pleural space.  The extent of this empyema is worse since 5/29/2024.  A chest tube is well seen within the dominant loculation of probable empyema.  2. Multifocal pneumonia is again seen, most notable within the right middle lobe and right lower lobe.  Continued imaging follow-up is recommended.  3. Mild to moderate mediastinal adenopathy is nonspecific but may be reactive.  Mild supraclavicular adenopathy is also seen.     LOCATION:  [id number]     Dictated by (CST): Stromberg, LeRoy, MD on 6/03/2024 at 11:24 AM      Finalized by (CST): Stromberg, LeRoy, MD on 6/03/2024 at 11:35 AM        CORRECTION Corrected on: 5/29/2024;         PROCEDURE:  CT CHEST (CPT=71250)    COMPARISON:  [facility] , XR, XR CHEST AP PORTABLE  (CPT=71045), 5/29/2024,   4:27 AM.  [facility], CT, CT ANGIOGRAPHY, CHEST (CPT=71275), 5/26/2024, 12:49   PM.    INDICATIONS:  Follow-up empyema    TECHNIQUE:  Unenhanced multislice CT scanning is performed through the   chest.  Dose reduction techniques were used. Dose information is   transmitted to the ACR (American College of Radiology) NRDR (National   Radiology Data Registry) which includes the Dose  Index Registry.  There is image degradation due to motion slightly   limiting evaluation    PATIENT STATED HISTORY: (As transcribed by Technologist)  Recent empyema       FINDINGS:   LUNGS:  Masslike consolidation with internal punctate foci of air is most   consistent with patient's known consolidation and measures 5.7 x 8.1 x 6.1   versus 5 x 7.2 x 5.1 cm, AP x T x cc dimension respectively.  Increasing   adjacent interstitial and   ground-glass density. VASCULATURE:  Pulmonary  vessels are unremarkable within the limits of a   noncontrast CT.   ANKUSH:  Limited due to lack of IV contrast.   MEDIASTINUM:  Adenopathy with subcarinal node measuring 2.8 x 3.3 versus   2.1 x 2 cm and right paratracheal node measuring 2 x 1.3 versus 2 x 1.4   cm.  Mediastinal clips. CARDIAC:  No enlargement or pericardial thickening.  Mild coronary artery   calcifications. PLEURA:  Increasing pleural fluid along the right fissures which is   lobular in contour and measures 2.6 cm in depth.  Right pleural effusion   measures 3.7 versus 2.9 cm with left pleural fluid measuring 1.7 versus   0.8 cm in depth.   THORACIC AORTA:  Atherosclerosis.   CHEST WALL:  No mass or axillary adenopathy.  Sternotomy wires. LIMITED ABDOMEN:  Limited images of the upper abdomen are unremarkable.   BONES:  No fracture.      CONCLUSION:   1. When compared to most recent CT of the chest performed 5/26/2024,   patient's known right lower lobe consolidation has slightly increased in   size with increasing adjacent interstitial and ground-glass density,   correlate clinically. 2. Increasing pleural fluid as detailed above.    LOCATION:  [facility]       Dictated by (CST): Britney Mendes MD on 5/29/2024 at 9:39 AM       Finalized by (CST): Britney Mendes MD on 5/29/2024 at 9:51 AM      Dictated by (CST): Britney Mendes MD on 5/29/2024 at 11:23 AM       Finalized by (CST): Britney Mendes MD on 5/29/2024 at 11:23 AM                       Signed: 05/29/24 1123 by Britney Mendes MD  Narrative:    PROCEDURE:  CT CHEST (CPT=71250)     COMPARISON:  [facility] , XR, XR CHEST AP PORTABLE  (CPT=71045), 5/29/2024, 4:27 AM.  [facility], CT, CT ANGIOGRAPHY, CHEST (CPT=71275), 5/26/2024, 12:49 PM.     INDICATIONS:  Follow-up empyema     TECHNIQUE:  Unenhanced multislice CT scanning is performed through the chest.  Dose reduction techniques were used. Dose information is transmitted to the ACR (American College of Radiology) NRDR (National Radiology Data Registry) which includes the Dose    Index Registry.  There is image degradation due to motion slightly limiting evaluation     PATIENT STATED HISTORY: (As transcribed by Technologist)  Recent empyema      FINDINGS:    LUNGS:  Masslike consolidation with internal punctate foci of air is most consistent with patient's known empyema and measures 5.7 x 8.1 x 6.1 versus 5 x 7.2 x 5.1 cm, AP x T x cc dimension respectively.  Increasing adjacent interstitial and ground-glass   density.  VASCULATURE:  Pulmonary vessels are unremarkable within the limits of a noncontrast CT.    ANKUSH:  Limited due to lack of IV contrast.    MEDIASTINUM:  Adenopathy with subcarinal node measuring 2.8 x 3.3 versus 2.1 x 2 cm and right paratracheal node measuring 2 x 1.3 versus 2 x 1.4 cm.  Mediastinal clips.  CARDIAC:  No enlargement or pericardial thickening.  Mild coronary artery calcifications.  PLEURA:  Increasing pleural fluid along the right fissures which is lobular in contour and measures 2.6 cm in depth.  Right pleural effusion measures 3.7 versus 2.9 cm with left pleural fluid measuring 1.7 versus 0.8 cm in depth.    THORACIC AORTA:  Atherosclerosis.  CHEST WALL:  No mass or axillary adenopathy.  Sternotomy wires.  LIMITED ABDOMEN:  Limited images of the upper abdomen are unremarkable.    BONES:  No fracture.       Impression:    CONCLUSION:    1. When compared to most recent CT of the chest performed 5/26/2024, patient's known right lower lobe empyema has slightly increased in size with increasing adjacent interstitial and ground-glass density, correlate clinically.  2. Increasing pleural fluid as detailed above.     LOCATION:  [facility]     Dictated by (CST): Britney Mendes MD on 5/29/2024 at 9:39 AM      Finalized by (CST): Britney Mendes MD on 5/29/2024 at 9:51 AM       PROCEDURE:  XR CHEST AP PORTABLE  (CPT=71045)     TECHNIQUE:  AP chest radiograph was obtained.     COMPARISON:  [facility] , CT, CT ABDOMEN+PELVIS(CONTRAST ONLY)(CPT=74177), 5/28/2024, 1:32 PM.  [facility] , XR, XR  CHEST AP PORTABLE  (CPT=71045), 5/28/2024, 4:38 PM.     INDICATIONS:  dyspnea     PATIENT STATED HISTORY: (As transcribed by Technologist)  dyspnea     FINDINGS:  Cardiomediastinal silhouette is stable in size and appearance with sternotomy wires and mediastinal clips.  Persistent blunting of the right costophrenic angle is consistent with small pleural effusion versus reaction.  Persistent interstitial   and right perihilar/lower lobe masslike airspace opacity.  No new focal consolidation.  No pneumothorax.     Impression:    CONCLUSION:    1. No significant change, allowing for differences in technique when compared to 5/28/2024 chest radiograph.     LOCATION:  [id number]    Dictated by (CST): Britney Mendes MD on 5/29/2024 at 7:58 AM      Finalized by (CST): Britney Mendes MD on 5/29/2024 at 8:00 AM        PROCEDURE:  US THORACENTESIS GUIDED RIGHT (CPT=32555)     COMPARISON:  [facility] , XR, XR CHEST AP PORTABLE  (CPT=71045), 5/28/2024, 4:38 PM.  [facility] , XR, XR CHEST AP PORTABLE  (CPT=71045), 5/28/2024, 4:48 AM.  [facility], CT, CT ANGIOGRAPHY, CHEST (CPT=71275), 5/26/2024, 12:49 PM.     INDICATIONS:  pneumonia     DESCRIPTION OF PROCEDURE:  The clinical scenario and referral were discussed with Dr. Ya.  Referral for ultrasound-guided right thoracentesis.  Because of her altered mental status, witnessed written and verbal informed consent were obtained from her  sister.  The procedure was also discussed with her.  She voiced understanding and wished to proceed.  Time-out was performed by the staff.     She could not achieve a seated position.  Positioning was optimized, supine oblique.  Ultrasound demonstrated a small right complex pleural effusion corresponding to the CTA and chest x-ray abnormality.  Access site was chosen.  The overlying skin was  prepped in the usual sterile manner.  1% lidocaine was used locally.  Using realtime ultrasound an 8 Japanese multi side hole sheath needle was advanced.  Once in position aspiration  to dryness with removal of only 10-11 mL of thin clear yellow fluid.    Samples were sent to RT and the laboratory for testing.  The system was removed.  Sterile dressings were placed.  She tolerated the procedure.  No immediate complication.     Impression:    CONCLUSION:  Small complex right pleural effusion.  Only a small amount of fluid could be aspirated.  Sample sent to RT and the laboratory for testing.     LOCATION:  [facility]     Dictated by (CST): Jace Govea MD on 5/28/2024 at 5:13 PM      Finalized by (CST): Jace Govea MD on 5/28/2024 at 5:16 PM      PROCEDURE:  US ABDOMEN LIMITED (CPT=76705)     COMPARISON:  [facility] , CT, CT ABDOMEN+PELVIS(CONTRAST ONLY)(CPT=74177), 5/28/2024, 1:32 PM.     INDICATIONS:  elevated lfts. eval bile duct, cirrhosis, etc     PATIENT STATED HISTORY: (As transcribed by Technologist)        FINDINGS:    LIVER:  Normal size and echogenicity. No significant masses.  BILIARY:  Common bile duct measures 6 mm in region of stephanie hepatis.  There is no intrahepatic bile duct distension.  Gallbladder is unremarkable.  PANCREAS:  Normal.  RIGHT KIDNEY:  Normal.  OTHER:  There is a small amount of free fluid in posterior subhepatic space.  Incidental note is made of a right pleural effusion.     Impression:    CONCLUSION:    1. Trace ascites is noted in posterior subhepatic space.  2. Right pleural effusion has been noted on prior CT scan.  3. There is otherwise no abnormality detected on this abdominal ultrasound.       LOCATION:  [facility]     Dictated by (CST): Abad Chen MD on 5/28/2024 at 4:56 PM      Finalized by (CST): Abad Chen MD on 5/28/2024 at 4:57 PM          PROCEDURE:  XR CHEST AP PORTABLE  (CPT=71045)     TECHNIQUE:  AP chest radiograph was obtained.     COMPARISON:  [facility] , US, US THORACENTESIS GUIDED RIGHT (CPT=32555), 5/28/2024, 3:53 PM.  [facility] , XR, XR CHEST AP PORTABLE  (CPT=71045), 5/28/2024, 4:48 AM.     INDICATIONS:  Post Thoracentesis     PATIENT STATED HISTORY:  (As transcribed by Technologist)  Right sided post thoracentesis. Patient offered no additional history at this time.      FINDINGS:  Status post right thoracentesis.  There is a small right pleural effusion.  Patchy opacity at the right lung base may represent pneumonia.  There is no pneumothorax.  Median sternotomy wires are stable.  The heart is normal in size.     Impression:    CONCLUSION:  Small right pleural effusion with patchy airspace disease the right lung base could represent pneumonia.  There is no pneumothorax.     LOCATION:  [facility]     Dictated by (CST): Elieser Torres MD on 5/28/2024 at 4:55 PM      Finalized by (CST): Elieser Torres MD on 5/28/2024 at 4:56 PM      PROCEDURE:  CT ABDOMEN+PELVIS (CONTRAST ONLY) (CPT=74177)     COMPARISON:  [facility], CT, CT ABDOMEN (ATTENTION PANCREAS) (W+ WO) (CPT=74170), 3/02/2022, 3:52 PM.  [facility], CT, CT ABDOMEN+PELVIS(CONTRAST ONLY)(CPT=74177), 9/18/2021, 2:27 PM.     INDICATIONS:  GIB     TECHNIQUE:  CT scanning was performed from the dome of the diaphragm to the pubic symphysis with non-ionic intravenous contrast material. Post contrast coronal MPR imaging was performed.  Dose reduction techniques were used. Dose information is  transmitted to the ACR (American College of Radiology) NRDR (National Radiology Data Registry) which includes the Dose Index Registry.     PATIENT STATED HISTORY:(As transcribed by Technologist)  Poor historian      CONTRAST USED:  85cc of Isovue 370     FINDINGS:    LUNG BASES:  Loculated right-sided pleural effusion with thick wall measuring approximately 7.8 x 2.5 x 12.7 cm.  Findings suggestive of empyema.  There is consolidation within the right lower lobe with complex fluid and small foci of air measuring 3.0 x   2.7 x 3.2 cm and 2.1 x 2.8 x 1.7 cm which may represent pulmonary abscesses/necrotizing pneumonia.  Small left-sided pleural effusion.  LIVER:  Normal in shape and contour.    BILIARY:  Gallbladder is unremarkable in  appearance.  No intrahepatic biliary dilatation..  SPLEEN:  Normal.  No enlargement or focal lesion.  PANCREAS:  No kirk-pancreatic inflammatory stranding  ADRENALS:  Normal.  No mass or enlargement.    KIDNEYS:  Kidneys are symmetrical in size without evidence of hydronephrosis.  BOWEL/MESENTERY:  Bowel is normal in caliber. No evidence of obstruction.  Probable Villanueva's type hernia containing fat and a portion of bowel  within the right para umbilical region.  No evidence of obstruction.  Mildly prominent retroperitoneal lymph  nodes.  PELVIS:  Bladder is distended.  Small amount of free pelvic fluid.    AORTA/VASCULAR:  Atheromatous calcifications of the aorta.  BONES:  No acute fractures.  Median sternotomy approximated by wire sutures.     Impression:    CONCLUSION:    1. Loculated right-sided pleural effusion suggestive empyema with consolidation within the right lower lobe.  There are areas of fluid and small foci of air within the consolidation which may represent pulmonary abscesses/necrotizing pneumonia.  2. Small left-sided pleural effusion with associated atelectasis.  3. Nonspecific mildly prominent retroperitoneal lymph nodes which may be reactive in nature.  4. Trace amount of free pelvic fluid.       LOCATION:  [facility]     Dictated by (CST): Annette Ferreira MD on 5/28/2024 at 2:11 PM      Finalized by (CST): Annette Ferreira MD on 5/28/2024 at 2:17 PM         PROCEDURE:  CT BRAIN OR HEAD (27929)     COMPARISON:  None.     INDICATIONS:  lethargy     TECHNIQUE:  Noncontrast CT scanning is performed through the brain. Dose reduction techniques were used. Dose information is transmitted to the ACR (American College of Radiology) NRDR (National Radiology Data Registry) which includes the Dose Index  Registry.     PATIENT STATED HISTORY: (As transcribed by Technologist)  Patient is lethargic.       FINDINGS:    VENTRICLES/SULCI:  Ventricles and sulci are normal in size.    INTRACRANIAL:  There is a  chronic appearing lacunar infarct involving the right caudate nucleus at the junction of the head and body..  There are no abnormal extraaxial fluid collections.  There is no midline shift.  There are no intraparenchymal brain  abnormalities.  There is nothing specific for acute infarct.  There is no hemorrhage or mass lesion.    SINUSES:           No sign of acute sinusitis.    MASTOIDS:          No sign of acute inflammation.  SKULL:             No evidence for fracture or osseous abnormality.  OTHER:             None.    Impression:    CONCLUSION:  No evidence of acute intracranial process.     LOCATION:  [facility]     Dictated by (CST): Elieser Torres MD on 5/28/2024 at 8:49 AM      Finalized by (CST): Elieser Torres MD on 5/28/2024 at 8:54 AM      PROCEDURE:  XR CHEST AP PORTABLE  (CPT=71045)     TECHNIQUE:  AP chest radiograph was obtained.     COMPARISON:  [facility], CT, CT ANGIOGRAPHY, CHEST (CPT=71275), 5/26/2024, 12:49 PM.  [facility], XR, XR CHEST PA + LAT CHEST (CPT=71046), 5/26/2024, 11:43 AM.     INDICATIONS:  dyspnea     PATIENT STATED HISTORY: (As transcribed by Technologist)  dyspnea      Impression:    CONCLUSION:    Limited AP portable view shows small-moderate right pleural effusion cleaning some pleural fluid tracking up the lower right lateral chest.  Possibly loculated.  Patchy consolidation mid-lower right chest.  Small blunting left costophrenic   angle.  Cardiomegaly.  No pneumothorax. Consider upright PA and lateral examination of the chest, when tolerated.  CT follow-up may also be beneficial.       LOCATION:  [facility]     Dictated by (CST): Abhishek Mora MD on 5/28/2024 at 8:36 AM      Finalized by (CST): Abhishek Mora MD on 5/28/2024 at 8:37 AM          PROCEDURE:  US VENOUS DOPPLER LEG LEFT - DIAG IMG (CPT=93971)     COMPARISON:  None.     INDICATIONS:  Left lower extremity swelling and pain     TECHNIQUE:  Real time, grey scale, and duplex ultrasound was used to evaluate the lower extremity  venous system. B-mode two-dimensional images of the vascular structures, Doppler spectral analysis, and color flow.  Doppler imaging were performed.  The  following veins were imaged:  Common, deep, and superficial femoral, popliteal, sapheno-femoral junction, posterior tibial veins, and the contralateral common femoral vein.     PATIENT STATED HISTORY: (As transcribed by Technologist)        FINDINGS:    EXTREMITY EXAMINED:  Left lower extremity  SAPHENOFEMORAL JUNCTION:  No reflux.  THROMBI:  None visible.  COMPRESSION:  Normal compressibility, phasicity, and augmentation.  OTHER:  Negative.     Impression:    CONCLUSION:  No evidence of DVT in the left lower extremity.     LOCATION:  [facility]    Dictated by (CST): Roc Mosqueda MD on 5/26/2024 at 2:46 PM      Finalized by (CST): Roc Mosqueda MD on 5/26/2024 at 2:49 PM      PROCEDURE:  CT ANGIOGRAPHY, CHEST (CPT=71275)     COMPARISON:  [facility], CT, CT CHEST LD NO CAD(CPT=71250), 3/13/2024, 8:38 PM.     INDICATIONS:  sob, hypotension, pleurisy, R lung mass     TECHNIQUE:  IV contrast-enhanced multislice CT angiography is performed through the pulmonary arterial anatomy. 3D volume renderings are generated.  Dose reduction techniques were used. Dose information is transmitted to the ACR (American College of  Radiology) NRDR (National Radiology Data Registry) which includes the Dose Index Registry.     PATIENT STATED HISTORY:(As transcribed by Technologist)  SOB, hypotension; right lung mass      CONTRAST USED:  100cc of Isovue 370     FINDINGS:    VASCULATURE:  No visible pulmonary arterial thrombus or attenuation.    THORACIC AORTA:  No aneurysm or visible dissection.    LUNGS:  Masslike consolidation in the right lower lobe is new compared to the prior examination.  Given its acute appearance an infectious process is most likely.  Small central areas of cavitation are noted with associated low attenuation which may  represent necrosis.  Moderate right pleural  effusion is noted.  MEDIASTINUM:  Right paratracheal node is noted measuring up to 2.0 x 1.4 cm (image 38).  A subcarinal lymph node is noted measuring up to 2.0 x 2.1 cm (image 93)..    ANKUSH:  No mass or adenopathy.    CARDIAC:  No enlargement, pericardial thickening, or significant coronary artery calcification.  PLEURA:  No mass or effusion.    CHEST WALL:  No mass or axillary adenopathy.  Patient is status post median sternotomy.  LIMITED ABDOMEN:  Limited images of the upper abdomen are unremarkable.    BONES:  No bony lesion or fracture.    OTHER:  Negative.       Impression:    CONCLUSION:    1. Masslike consolidation with areas of central necrosis and mild cavitation in the right lower lobe.  Given its acute appearance this may represent an infectious process.  Correlate clinically.  Short-term follow-up after treatment is recommended.  2. Mediastinal adenopathy is likely reactive.  Attention on follow-up is recommended.     LOCATION:  [facility]     Dictated by (CST): Roc Mosqueda MD on 5/26/2024 at 1:21 PM      Finalized by (CST): Roc Mosqueda MD on 5/26/2024 at 1:25 PM        PROCEDURE:  XR CHEST PA + LAT CHEST (CPT=71046)     INDICATIONS:  sob, cough, crackles R base     COMPARISON:  [facility], CT, CT CHEST LD NO CAD(CPT=71250), 3/13/2024, 8:38 PM.  [facility] , XR, XR CHEST DECUBITUS BILAT INCL PA AND LAT(4 VIEWS)(CPT=71048), 1/15/2024, 9:15 AM.     TECHNIQUE:  PA and lateral chest radiographs were obtained.     PATIENT STATED HISTORY: (As transcribed by Technologist)  Patient stated she has been having a cough for 1 week and a crackle sound in her right lung.      FINDINGS:    LUNGS:  An area of masslike consolidation in the right upper lobe is noted which is new from the prior examination.  Small right pleural effusion is present.  CARDIAC:  Normal size cardiac silhouette.  Patient is status post median sternotomy.  MEDIASTINUM:  Normal.  PLEURA:  Normal.  No pleural effusions.  BONES:  Normal for age.      Impression:    CONCLUSION:  Masslike consolidation in the right upper lobe is noted.  Given it is acute appearance this likely represents an infectious process.  Small right effusion is noted.  Correlate clinically.     LOCATION:  [facility]     Dictated by (CST): Roc Mosqueda MD on 5/26/2024 at 12:05 PM      Finalized by (CST): Roc Mosqueda MD on 5/26/2024 at 12:06 PM    ASSESSMENT:  Right empyema, s/p thoracentesis 5/28, cx with strep intermedius. Path w/ acute inflammation with occasional bacterial organisms present. S/p R chest tube placement 5/29, cx with strep intermedius. S/p MIST 2 protocol.   S/p R VATS and total pulmonary decortication 6/5, R purulent empyema with RLL lung abscess noted  RLL pneumonia/pulmonary abscess. Legionella and strep pneumo Uags negative. Blasto ag negative.  Fevers and worsening leukocytosis, assume d/t above. Bcxs negative.  Alerted mental status. ? Related to above vs other. Head CT w/o acute process. Improved.  Elevated LFTs. CT a/p and US abd unremarkable. Improving.    Anemia  IgM kappa MGUS  DM II. Hgb A1C 7.1  COPD    PLAN:  - continue IV Unasyn and vancomycin  - follow temps and wbc--> repeat CBC in am  - follow LFTs  - follow OR cultures  - follow O2 needs  - will need prolonged course of IV abx on dc (anticipate 4 weeks from surgery)    Discussed case with RN, Dr. Menezes, CM, and patient.     Beverly Guillen PA-C    ID ATTENDING ADDENDUM     Pt seen an examined independently. Chart reviewed. Agree with above. Note has been reviewed by me and modified as needed.  Exam and Impression/ Recs as noted above.  Will follow  More than 50% of clinical time and 100% of the clinical decision making performed by me.    Vibha Menezes MD

## 2024-06-05 NOTE — PLAN OF CARE
Pt from Home, plan to dc Home  Aox4, can be confused at times  VSS on 1-2L NC  afebrile  NSR/ST on tele, subcutaneous heparin and aspirin on hold  Electrolyte non-cardiac replacement  Continent, uses bedside commode  QID accucheck  Up SBA, bed alarm on and call light within reach  Carb controlled diet/ NPO and midnight for VATS  Receiving IV Unasyn/Vanco  R CT hooked up to -20 suction  Pt updated on current POC, no questions at this time    Problem: Diabetes/Glucose Control  Goal: Glucose maintained within prescribed range  Description: INTERVENTIONS:  - Monitor Blood Glucose as ordered  - Assess for signs and symptoms of hyperglycemia and hypoglycemia  - Administer ordered medications to maintain glucose within target range  - Assess barriers to adequate nutritional intake and initiate nutrition consult as needed  - Instruct patient on self management of diabetes  Outcome: Progressing     Problem: Patient/Family Goals  Goal: Patient/Family Long Term Goal  Description: Patient's Long Term Goal:   Discharge to home    Interventions:  - Follow plan of care  - See additional Care Plan goals for specific interventions  Outcome: Progressing  Goal: Patient/Family Short Term Goal  Description: Patient's Short Term Goal:   5/26 noc: maintain on room air  5/27: manage hgb  5/27 noc: reduce pleuritic pain   5/28 AM: CT scan of head/NPO possible thora  5/28noc: sleep  5/29 AM: Chest Tube placement  5/29noc: CT management, control pain, sleep  6/1 AM: Pain control, Abd US  06/01/2024 - pain control  06/02/2024 - pain control  6/4 AM: pain control; maintain O2  Interventions:   - labs, blood  - Medications  - IVF  - IVF ABT  - See additional Care Plan goals for specific interventions  Outcome: Progressing     Problem: RESPIRATORY - ADULT  Goal: Achieves optimal ventilation and oxygenation  Description: INTERVENTIONS:  - Assess for changes in respiratory status  - Assess for changes in mentation and behavior  - Position to  facilitate oxygenation and minimize respiratory effort  - Oxygen supplementation based on oxygen saturation or ABGs  - Provide Smoking Cessation handout, if applicable  - Encourage broncho-pulmonary hygiene including cough, deep breathe, Incentive Spirometry  - Assess the need for suctioning and perform as needed  - Assess and instruct to report SOB or any respiratory difficulty  - Respiratory Therapy support as indicated  - Manage/alleviate anxiety  - Monitor for signs/symptoms of CO2 retention  Outcome: Progressing     Problem: HEMATOLOGIC - ADULT  Goal: Free from bleeding injury  Description: (Example usage: patient with low platelets)  INTERVENTIONS:  - Avoid intramuscular injections, enemas and rectal medication administration  - Ensure safe mobilization of patient  - Hold pressure on venipuncture sites to achieve adequate hemostasis  - Assess for signs and symptoms of internal bleeding  - Monitor lab trends  - Patient is to report abnormal signs of bleeding to staff  - Avoid use of toothpicks and dental floss  - Use electric shaver for shaving  - Use soft bristle tooth brush  - Limit straining and forceful nose blowing  Outcome: Progressing     Problem: PAIN - ADULT  Goal: Verbalizes/displays adequate comfort level or patient's stated pain goal  Description: INTERVENTIONS:  - Encourage pt to monitor pain and request assistance  - Assess pain using appropriate pain scale  - Administer analgesics based on type and severity of pain and evaluate response  - Implement non-pharmacological measures as appropriate and evaluate response  - Consider cultural and social influences on pain and pain management  - Manage/alleviate anxiety  - Utilize distraction and/or relaxation techniques  - Monitor for opioid side effects  - Notify MD/LIP if interventions unsuccessful or patient reports new pain  - Anticipate increased pain with activity and pre-medicate as appropriate  Outcome: Progressing

## 2024-06-05 NOTE — OPERATIVE REPORT
[facility]    PATIENT'S NAME: RADHA IGLESIAS   ATTENDING PHYSICIAN: Jose Rafael Raphael M.D.   OPERATING PHYSICIAN: David Maldonado Jr, MD   PATIENT ACCOUNT#:   [id number]    LOCATION:  [address] [place] [address]  MEDICAL RECORD #:   [id number]       YOB: 1960  ADMISSION DATE:       05/26/2024      OPERATION DATE:  06/05/2024    OPERATIVE REPORT      PREOPERATIVE DIAGNOSIS:  Right empyema.  POSTOPERATIVE DIAGNOSIS:  Right purulent empyema with right lower lobe lung abscess.  PROCEDURE:    1.   Flexible bronchoscopy with therapeutic aspiration.  2.   Right video-assisted thoracoscopic exploration.  3.   Total pulmonary decortication.      ASSISTANT:  Yudy Albrecth PA-C     ANESTHESIA:  General dual-lumen endotracheal anesthesia.    ANESTHESIOLOGIST:  Dale Mack MD    SPECIMENS:  Right upper,middle, and lower lobe pleural peel; pleural debris; abscess material; pleural fluid.    DRAINS:  28-Spanish chest tube x2.      IMPLANTS:  None.    ESTIMATED BLOOD LOSS:  500 mL.    COMPLICATIONS:  None.    CONDITION:  Stable, to ICU.     INDICATIONS:  The patient is a 64-year-old woman who presented with pneumonia and associated strep empyema.  She had a pigtail catheter placed and MIST protocol was started.  Despite this, the empyema remained with significant trapping of the lung.  It was recommended that she undergo decortication.      FINDINGS:  Severe infection with significant inflammation and pleural peel.  On decorticating the lung, a right lower lobe lung abscess spontaneously drained.    OPERATIVE TECHNIQUE:  Patient was brought to the operating room, laid supine, and underwent general dual-lumen endotracheal anesthesia.  I performed a flexible bronchoscopy.  The trachea, mainstem bronchi, lobar and segmental bronchi were examined bilaterally.  The left side was normal; however, on the right side, there were some purulent secretions emanating from the right lower lobe.  These were suctioned out.  Once  this was done, she was placed in left lateral decubitus position.  All pressure points were padded.  She was prepped and draped in usual sterile fashion.  A time-out was performed to confirm patient, side, and site of surgery.  I made a 2 cm incision in the inframammary crease in the midclavicular line and entered the chest slowly using electrocautery until I got back pleural fluid.  At this point, I suctioned out the pleural fluid and sent some for culture.  With this out, I inserted a trocar and subsequently a camera.  I could see that there was significant trapping of the lung.  I began to first push the diaphragm down off the chest wall inferiorly to give myself enough room for a 5 mm incision in the posterior axillary line for a camera incision.  A trocar was inserted under direct visualization at this site, and I moved my camera to this location.  I then continued to take down the lung off the chest wall going cephalad, and in doing so created enough room for a third incision in the posterior axillary line at approximately fifth intercostal space.  An Blaise wound retractor was placed at this location.  I then began to continue to mobilize the lung off the chest wall as well as pull pleural peel off it.  There was significant peel over the entirety of the right lung.  I first decorticated peel off the upper lobe and completely mobilized it.  I then worked my way inferiorly along the posterior aspect of the right chest with the right lower lobe.  There was a large pleural peel that was successfully decorticated off this area.  In doing this, there was purulent drainage from within the right lower lobe.  Some of this material was sent for culture.  I then continued inferiorly all the way down to the diaphragm to mobilize the lung and pull off pleural peel.  With this done, I turned my attention anteriorly and began to separate the middle lobe.  The middle lobe was very densely adhesed to the anterior chest wall.   I feel that this was likely in part due to her previous CABG as opposed to infection.  Once I was satisfied that all the fluid was drained, I left the middle lobe attached to the anterior chest wall.  I then turned my attention to the fissures.  I opened up the major and minor fissure and drained out any fluid in there, as well as removing any peel.  I then turned my attention to the base of the right lower lobe.  It was very densely adhesed to the diaphragm.  I spent a great deal of time trying to remove the right lower lobe from the surface of the diaphragm.  I was able to do this in some areas, but in other areas, it was so densely adhesed that I was unsuccessful in doing so without creating unacceptable lung injuries.  I did ask the anesthesiologist to expand the lung.  It expanded well, and I felt that additional attempts to separate the lower lobe off the diaphragm were unnecessary and carried undue risk of additional lung injury.  I irrigated with copious amounts with warm water irrigation.  I checked for any bleeding.  There were some sites of oozing that were addressed with electrocautery; however, no significant bleeding was seen.  I then placed two 28-Spanish chest tubes, 1 curved, 1 straight, and secured them using 0 silk sutures.  Finally, I asked the anesthesiologist to reinflate the lung.  It reinflated well and filled the majority of the chest space with some restriction posteriorly, but I felt that this was small enough that it will expand well as the lung heals.  I then withdrew my camera and all instruments and closed the remaining incisions in 3 layers with 2 layers of 2-0 Vicryl and 1 of 4-0 Monocryl.  Patient tolerated the procedure well.  I was present for the entirety of the procedure.    Dictated By David Maldonado Jr, MD  d: 06/05/2024 10:55:26  t: 06/05/2024 13:51:19  Job [id number]  JLL/

## 2024-06-06 LAB
BLOOD TYPE BARCODE: 9500
ERYTHROCYTE [DISTWIDTH] IN BLOOD BY AUTOMATED COUNT: 16.6 %
GLUCOSE BLD-MCNC: 122 MG/DL (ref 70–99)
GLUCOSE BLD-MCNC: 129 MG/DL (ref 70–99)
GLUCOSE BLD-MCNC: 148 MG/DL (ref 70–99)
GLUCOSE BLD-MCNC: 165 MG/DL (ref 70–99)
HCT VFR BLD AUTO: 24.4 %
HGB BLD-MCNC: 7.8 G/DL
HGB BLD-MCNC: 7.8 G/DL
HGB BLD-MCNC: 8.1 G/DL
HGB BLD-MCNC: 9.4 G/DL
MCH RBC QN AUTO: 29.2 PG (ref 26–34)
MCHC RBC AUTO-ENTMCNC: 32 G/DL (ref 31–37)
MCV RBC AUTO: 91.4 FL
PLATELET # BLD AUTO: 597 10(3)UL (ref 150–450)
RBC # BLD AUTO: 2.67 X10(6)UL
UNIT VOLUME: 350 ML
WBC # BLD AUTO: 26.9 X10(3) UL (ref 4–11)

## 2024-06-06 PROCEDURE — 99232 SBSQ HOSP IP/OBS MODERATE 35: CPT | Performed by: HOSPITALIST

## 2024-06-06 PROCEDURE — 99233 SBSQ HOSP IP/OBS HIGH 50: CPT | Performed by: INTERNAL MEDICINE

## 2024-06-06 RX ORDER — SODIUM CHLORIDE 9 MG/ML
INJECTION, SOLUTION INTRAVENOUS ONCE
Status: COMPLETED | OUTPATIENT
Start: 2024-06-06 | End: 2024-06-06

## 2024-06-06 NOTE — CM/SW NOTE
Pepin from [facility]--patient only eligible for in---if patient was home bound and interested in infusions @ home would need to find another provider.  To follow up with patient

## 2024-06-06 NOTE — PROGRESS NOTES
[facility] Progress Note    Soumya King Patient Status:  Inpatient    6/3/1960 MRN [id number]   Location [facility] 5NW-A Attending Hoda Juan MD   Hosp Day # 11 PCP Rika Vinson DO     Subjective:  Soumya King is a(n) 64 year old female who is admitted for pneumonia with empyema.    Overnight: underwent VATS with decortication yesterday, having some pain around chest tube site and anxiety last night    Objective:  BP 90/66 (BP Location: Right arm)   Pulse (!) 122   Temp 98 °F (36.7 °C) (Temporal)   Resp 22   Ht 5' 1\" (1.549 m)   Wt 132 lb 12.8 oz (60.2 kg)   SpO2 99%   BMI 25.09 kg/m²       Temp (24hrs), Av.6 °F (36.4 °C), Min:97 °F (36.1 °C), Max:98 °F (36.7 °C)      Intake/Output:    Intake/Output Summary (Last 24 hours) at 2024 1058  Last data filed at 2024 1000  Gross per 24 hour   Intake 5029 ml   Output 4095 ml   Net 934 ml       Physical Exam:   General: alert, cooperative, oriented.  No respiratory distress.   Head: Normocephalic, without obvious abnormality, atraumatic.   Throat: Lips, mucosa, and tongue normal.  No thrush noted.   Neck: trachea midline, no adenopathy, no thyromegaly. No JVD.   Lungs: clear to auscultation bilaterally   Chest wall: No tenderness or deformity.   Heart: regular rate and rhythm, S1, S2 normal, no murmur, click, rub or gallop   Abdomen: soft, non-distended, no masses, no guarding, no     Rebound.   Extremity: No edema or cyanosis   Skin: No rashes or lesions.   Neurological: Alert, interactive, no focal deficits    Lab Data Review:  Recent Labs     24  0737 24  1235 24  2356 24  0714 24  1623 24  0122 24  0733   WBC 29.0*  --   --  18.7*  --   --  17.8*   HGB 7.2*   < > 6.9* 8.3*  8.4* 9.2* 8.6* 8.9*  8.9*   .0*  --   --  457.0*  --   --  520.0*    < > = values in this interval not displayed.     Recent Labs     24  0737 24  0714 24  0741    142 136   K  3.9 3.8 3.8    109 108   CO2 17.0* 20.0* 20.0*   BUN 9 9 5*   CREATSERUM 0.73 0.67  0.67 0.57  0.57     Recent Labs   Lab 06/01/24  0758 06/02/24  0806 06/03/24  0755   PTP 16.7* 16.6* 17.0*   INR 1.35* 1.34* 1.38*       Cultures:   Hospital Encounter on 05/26/24   1. Anaerobic Culture     Status: None (Preliminary result)    Collection Time: 06/05/24  8:43 AM    Specimen: Lung right; Tissue   Result Value Ref Range    Anaerobic Culture Pending N/A   2. Tissue Aerobic Culture     Status: Abnormal (Preliminary result)    Collection Time: 06/05/24  8:32 AM    Specimen: Lung right; Tissue   Result Value Ref Range    Tissue Smear 2+ WBCs seen (A) N/A    Tissue Smear 1+ Gram Positive Cocci (A) N/A    Tissue Smear 1+ Gram Negative Rods (A) N/A   3. Body Fluid Cult Aerobic and Anaerobic     Status: None (Preliminary result)    Collection Time: 06/05/24  8:07 AM    Specimen: Pleural Fluid, Right; Body fluid, unspecified   Result Value Ref Range    Body Fluid Smear 3+ WBCs seen N/A    Body Fluid Smear No organisms seen N/A    Body Fluid Smear This is a cytocentrifuged smear. N/A   4. Blood Culture     Status: None    Collection Time: 05/29/24  2:24 PM    Specimen: Blood,peripheral   Result Value Ref Range    Blood Culture Result No Growth 5 Days N/A       Radiology:  XR CHEST AP PORTABLE  (CPT=71045)  Narrative: PROCEDURE:  XR CHEST AP PORTABLE  (CPT=71045)     TECHNIQUE:  AP chest radiograph was obtained.     COMPARISON:  [facility] , XR, XR CHEST AP PORTABLE  (CPT=71045), 5/31/2024, 5:43 AM.     INDICATIONS:  s/p VATS decortication     PATIENT STATED HISTORY: (As transcribed by Technologist)  Patient offered no additional history at this time.          FINDINGS:  Postoperative changes from right-sided chest surgery.  2 right-sided chest tubes are identified.  There is subcutaneous air within the right chest wall.  There is a right apical pneumothorax measuring 8 mm.  There is patchy airspace disease   within the right  lung base, mildly improved since previous chest radiograph from 5/31/2024.  Postoperative changes from sternotomy.  Mild blunting of the left costophrenic angle.  Mild left basilar atelectasis.                     Impression: CONCLUSION:  Postoperative changes from right chest surgery.  Two right-sided chest tubes are noted.  There is an 8 mm right apical pneumothorax.        Critical results were discussed with Gilda MICHELE at 1234 hours on 6/5/2024. Critical results were read back.           LOCATION:  [facility]                 Dictated by (CST): Stromberg, LeRoy, MD on 6/05/2024 at 12:32 PM       Finalized by (CST): Stromberg, LeRoy, MD on 6/05/2024 at 12:35 PM         Medications reviewed     Assessment and Plan:   Patient Active Problem List   Diagnosis    Vitamin D deficiency    Jamison's esophagus    Tobacco use    Insomnia    COPD, mild (HCC) - Dx'd via PFTs done in 3/2015    Migraine without aura and without status migrainosus, not intractable    Hyperlipidemia    SUMMER on CPAP    MDD (major depressive disorder), recurrent episode, moderate (HCC)    GERD (gastroesophageal reflux disease)    Hypertension    Chronic pansinusitis    Mucinous cystadenoma of ovary, left    KIRK (generalized anxiety disorder)    History of pubovaginal sling    Absence of bladder continence    Vasomotor flushing    Primary osteoarthritis of left knee    Mass of spine    Hx of motion sickness    Difficult intubation    Chronic low back pain without sciatica    Type 2 diabetes mellitus with diabetic neuropathy (HCC)    Pulmonary nodule    CAD, multiple vessel    S/P CABG x 2    Type 2 diabetes mellitus with hyperlipidemia (HCC)    Anemia    Iron deficiency anemia secondary to inadequate dietary iron intake    Subacute cough    Hyperglycemia    Acute anemia    MARÍA ELENA (acute kidney injury) (HCC)    Transaminitis    Thrombocytosis    Hyponatremia    Pleural effusion    Somnolence    Acute respiratory failure with hypoxia (HCC)    Empyema of right  pleural space (HCC)       Assessment:  Large right lower lobe pneumonia with empyema secondary to Streptococcus intermedius with ongoing empyema despite MIST protocol  Sepsis 2/2 above, WBC 26.9 with likely reactive thrombocytosis  S/p VATS with decortication GPC and GNRs growing from cultur  Right pleural effusion with empyema as noted above  Pain from around chest tube site  Multiple pulmonary nodules  Acute encephalopathy, improved    Plan:  Post op care started  Encouraged use of IS  Pain control to avoid splinting  Out of bed to chair when able  Advance diet as tolerated  Abx per ID, I suspect will need long term abx  Continue chest tube to suction ok to ambulate without suction as needed  Will monitor output and tidaling chest tube out hopefully in the next few days      Liana Villanueva MD  [facility]  Office: [phone]

## 2024-06-06 NOTE — PLAN OF CARE
Assumed care at 1930.  AOx3 forgetful at times.  Chest tube x2 in place to -20mmHg suction.  C/o pain to chest tube site. Dilaudid IVP and scheduled IV Tylenol given for pain management.  Encouraged to use IS.  Gotti catheter in place.  Continue on IV abx.  Plan of care discussed with pt.  Call light within reach. Continue to monitor.

## 2024-06-06 NOTE — DISCHARGE INSTRUCTIONS
Thoracic Surgery Post-Operative Appointment     Follow up appointment scheduled: with Dr. Maldonado on June 26th at 12:30pm located at the [facility].  Thank you.      Thoracic Surgery Discharge Instructions     Pain Management  You will be sent home with a prescription for pain medicine.  This will likely be a narcotic pain medicine such as Oxycodone or Norco (hydrocodone/acetaminophen).  If no contraindications, start with extra strength acetaminophen (Tylenol) or ibuprofen (Advil/Motrin) scheduled, and use narcotics for breakthrough pain. Ice packs can be helpful as well for surface level, skin incision pain.      - Take extra strength acetaminophen (Tylenol) 500 mg every 4 to 6 hours, as long as you have no known liver conditions.   - **Max dose for acetaminophen (Tylenol) is 4000 mg per day. If taking Norco, please note that each tab contains 325 mg of acetaminophen (Tylenol).  - Unless you have kidney problems, ibuprofen 600 mg every 6 hours can be used along with Tylenol for additional pain control.    Restrictions  Upon discharge home, do not get in an airplane or soak in a tub/pool until after your first follow up to see me in the office. You may shower, washing incisions gently with soap and water.    Other than that, no activity restrictions. You should attempt to be as active as possible. What ever you feel up to doing, you can do. Walking is strongly encouraged. You may drive (not within 4 hours of taking prescription pain medications).     No dietary restrictions. If taking prescription pain medications you may become constipated. Fluids, fiber and over the counter stool softeners are helpful for this.    Exercises  Do range of motion exercises twice a day with the arm on the side of your operation. The easiest is to \"walk\" your hand up the wall with your fingers. Eventually you should be able to get your arm straight up over your head.    You will be sent home with your breathing \"toys\" --  like your incentive spirometer. Use this frequently at home. 10 times per hour while awake, if possible. If watching TV, use it at every commercial break.    Follow-up Appointment  Our office will reach out to you regarding a follow up appointment, if not already scheduled. Appointment will be approximately 1-2 weeks after discharge.     If you are experiencing any of these symptoms, please call our office at [phone]. Office hours are Monday through Friday, 8 am to 4:30 pm.  If you are calling the office after hours, please call the Thoracic Surgery On-Call number [phone], and state that you are a patient from [facility].      - Persistent fevers of 101.5 or greater  - Worsening pain  - Worsening shortness of breath  - Signs of infection in your wound such as drainage, worsening of redness, swelling or     pain.  - Anything else that concerns you.    Sometimes managing your health at home requires assistance.  The [facility] team has recognized your preference to use [facility].  They can be reached by phone at [phone].  The fax number for your reference is [phone].. A representative from the home health agency will contact you or your family to schedule your first visit.       [facility] # [phone]  [facility] will be supplying your IV antibiotics. [facility] will assist with teaching for the IV antibiotics and will come see you the day after hospital discharge to initiate the first dose of your antibiotic.     [employer]  P:[phone]  F:[phone]    Your home oxygen has been ordered through [employer].

## 2024-06-06 NOTE — PLAN OF CARE
Assumed patient care at 0730. Patient awake, alert and oriented x3 (unable to confirm time of day). Patient complains of continued right-sided thorax pain. VSS. Medications administered per MAR. Ctx2 to R chest draining appropriately.     Patient aware of plan of care and endorses understanding. All questions answered.

## 2024-06-06 NOTE — PROGRESS NOTES
THORACIC SURGERY POST-OPERATIVE PROGRESS NOTE    Subjective: complains of pain at chest tube sites.  Issues with anxiety overnight    Objective:    I/O last 3 completed shifts:  In: 7949 [P.O.:1200; I.V.:3989; Blood:1050; IV PIGGYBACK:1710]  Out: 7300 [Urine:5645; Blood:500; Chest Tube:1155]    Chest tube: serosang, no air leak    Recent Labs   Lab 06/05/24  0650 06/05/24  1153 06/05/24  1749 06/06/24  0027 06/06/24  0442   HGB 7.3* 8.7* 8.0* 8.1* 7.8*  7.8*   WBC 27.5* 31.9*  --   --  26.9*   .0* 550.0*  --   --  597.0*       Recent Labs   Lab 06/02/24  0806 06/03/24  0755 06/05/24  0650    131* 136   K 3.7 4.1 3.7    101 105   CO2 20.0* 21.0 22.0   BUN 4* 7* 9       Exam:    BP 90/38 (BP Location: Right arm)   Pulse 98   Temp 98 °F (36.7 °C) (Temporal)   Resp 11   Ht 1.549 m (5' 1\")   Wt 60.2 kg (132 lb 12.8 oz)   SpO2 98%   BMI 25.09 kg/m²      General: Awake, alert, NAD  Pulm: CTA B, no w/w/r  Card: RRR, no m/r/g  Abd: soft, NT, ND  Wounds: clean, dry, intact, No signs of infection    Assessment: 64 year old female, 1 Day Post-Op from Decortication for empyema and right lower lobe lung abscess    Plan:    Ambulate as tolerated, PT  Spend majority of day out of bed, in chair  Encouraged cough and IS use  Hep Lock, d/c bui  Pain control  General Diet  Chest tubes to suction  Will transfuse 1 unit pRBC for low hgb, BP and tachy, no concern for ongoing bleeding    David Maldonado MD  Thoracic Surgery  Pager # [phone]

## 2024-06-06 NOTE — DIETARY NOTE
[facility]   part of [facility]   CLINICAL NUTRITION    Soumya King admitted on 5/26 presents with cavitary PNA.    PMH: Anxiety, Arthritis, Jamison esophagus, COPD, Depression, Diabetes, Esophageal reflux, Essential hypertension, Hyperlipidemia, Mass of spine, Migraines, Myocardial infarction, Osteoarthritis, Sleep apnea.    Admitting diagnosis:  Hyponatremia [E87.1]  Transaminitis [R74.01]  Thrombocytosis [D75.839]  MARÍA ELENA (acute kidney injury) (HCC) [N17.9]  Cavitary lesion of lung [J98.4]  Acute anemia [D64.9]    Ht: 154.9 cm (5' 1\")  Wt: 60.2 kg (132 lb 12.8 oz).   Body mass index is 25.09 kg/m².    Wt Readings from Last 6 Encounters:   05/26/24 60.2 kg (132 lb 12.8 oz)   05/20/24 54.4 kg (120 lb)   05/14/24 57.6 kg (127 lb)   05/08/24 56.2 kg (124 lb)   05/01/24 57.2 kg (126 lb)   03/05/24 56.7 kg (125 lb)        Labs/Meds reviewed    Diet:       Procedures    Regular/General diet Calorie Restriction/Carb Controlled: 1800 kcal/60 grams; Is Patient on Accuchecks? Yes       Percent Meals Eaten (last 3 days)       Date/Time Percent Meals Eaten (%)    06/05/24 1600 100 %    06/06/24 0800 100 %          6/6: Pt went to OR 6/5 for bronch, R VATS, decortication. Per chart review, pt continues with fair appetite/PO intake. No nausea, vomiting or diarrhea but having constipation with last BM 6/1 - prn miralax and senokot ordered but not given; per RN, pt refusing stating this is normal d/t her UC. Will continue to monitor and follow up as appropriate.  5/31: Pt chart reviewed d/t LOS. Visited pt at bedside. Pt reports her appetite is \"okay\" and did eat bfast this AM. Nursing notes reports Percent Meals Eaten (%): 100 % intake for last meal. Denies GI symptoms at this time with last BM today. No chewing or swallowing difficulties and NKFA. Reports her UBW is ~120 lbs with no significant weight changes noted. Offered ONS but pt declined at this time. Continued to encourage PO intake; all questions answered at  this time.    Patient is at low nutrition risk at this time.    Please consult if patient status changes or nutrition issues arise.    Riana Monet RD, LDN, [facility]  Clinical Dietitian  Spectra: [phone]

## 2024-06-06 NOTE — CM/SW NOTE
Will need to present list of ABX infusion companies to pt. Pt will need HH to do initial set up & train of IV ABX in the home. Referral sent via [employer]. Will present choice of both HH and  ABX infusion.    EDIN Wood RN, CM

## 2024-06-06 NOTE — PROGRESS NOTES
[facility]   part of [facility] ID PROGRESS NOTE    Soumya Knig Patient Status:  Inpatient    6/3/1960 MRN [id number]   Location [facility] 5NW-A Attending Hoda Juan MD   Hosp Day # 11 PCP Rika Vinson DO     Abx: IV Unasyn D#6, IV vanco D#4  s/p Zosyn and vanco    Subjective: Patient seen and examined today. Drowsy but arousable. Reports pain to the chest tube sites/incision sites. Denies any SOB. Afebrile. On room air.     Allergies:  Allergies   Allergen Reactions    Naprosyn [Naproxen] ANAPHYLAXIS     Has tolerated ibuprofen and IV toradol     Aspirin OTHER (SEE COMMENTS)     Difficulty swallowing due to  saavedra's esophagus       Medications:    Current Facility-Administered Medications:     lactated ringers infusion, , Intravenous, Continuous    meperidine (Demerol) 25 MG/ML injection 25 mg, 25 mg, Intravenous, Q1H PRN    sodium chloride 0.9% infusion, , Intravenous, Continuous    acetaminophen (Ofirmev) 10 mg/mL infusion premix 1,000 mg, 1,000 mg, Intravenous, Q6H    oxyCODONE immediate release tab 5 mg, 5 mg, Oral, Q4H PRN **OR** oxyCODONE immediate release tab 10 mg, 10 mg, Oral, Q4H PRN    HYDROmorphone (Dilaudid) 1 MG/ML injection 0.4 mg, 0.4 mg, Intravenous, Q2H PRN **OR** HYDROmorphone (Dilaudid) 1 MG/ML injection 0.8 mg, 0.8 mg, Intravenous, Q2H PRN    polyethylene glycol (PEG 3350) (Miralax) 17 g oral packet 17 g, 17 g, Oral, Daily PRN    sennosides (Senokot) tab 17.2 mg, 17.2 mg, Oral, Nightly PRN    bisacodyl (Dulcolax) 10 MG rectal suppository 10 mg, 10 mg, Rectal, Daily PRN    fleet enema (Fleet) 7-19 GM/118ML rectal enema 133 mL, 1 enema, Rectal, Once PRN    ondansetron (Zofran) 4 MG/2ML injection 4 mg, 4 mg, Intravenous, Q6H PRN    prochlorperazine (Compazine) 10 MG/2ML injection 5 mg, 5 mg, Intravenous, Q8H PRN    calcium carbonate (Tums) chewable tab 1,000 mg, 1,000 mg, Oral, TID PRN    heparin (Porcine) 5000 UNIT/ML injection 5,000 Units, 5,000  Units, Subcutaneous, 2 times per day    lidocaine-menthol 4-1 % patch 1 patch, 1 patch, Transdermal, Daily    vancomycin (Vancocin) 750 mg in sodium chloride 0.9% 250 mL IVPB-ADDV, 750 mg, Intravenous, Q12H    ipratropium-albuterol (Duoneb) 0.5-2.5 (3) MG/3ML inhalation solution 3 mL, 3 mL, Nebulization, Q4H PRN    metoprolol tartrate (Lopressor) tab 25 mg, 25 mg, Oral, 2x Daily(Beta Blocker)    ampicillin-sulbactam (Unasyn) 3 g in sodium chloride 0.9% 100mL IVPB-ADD, 3 g, Intravenous, q6h    [Held by provider] clonazePAM (KlonoPIN) tab 0.5 mg, 0.5 mg, Oral, BID PRN    [Transfer Hold] HYDROmorphone (Dilaudid) 1 MG/ML injection 0.2 mg, 0.2 mg, Intravenous, Once    oxybutynin ER (Ditropan-XL) 24 hr tab 5 mg, 5 mg, Oral, Daily    [Held by provider] aspirin DR tab 81 mg, 81 mg, Oral, Daily    atorvastatin (Lipitor) tab 80 mg, 80 mg, Oral, Nightly    benzonatate (Tessalon) cap 100 mg, 100 mg, Oral, TID PRN    busPIRone (Buspar) tab 20 mg, 20 mg, Oral, Daily    gabapentin (Neurontin) tab 600 mg, 600 mg, Oral, Nightly    pantoprazole (Protonix) DR tab 40 mg, 40 mg, Oral, QAM AC    QUEtiapine ER (SEROquel XR) 24 hr tab 200 mg, 200 mg, Oral, QPM    rizatriptan (Maxalt-MLT) disintegrating tab 5 mg, 5 mg, Oral, PRN    umeclidinium bromide (Incruse Ellipta) 62.5 MCG/ACT inhaler 1 puff, 1 puff, Inhalation, Daily    glucose (Dex4) 15 GM/59ML oral liquid 15 g, 15 g, Oral, Q15 Min PRN **OR** glucose (Glutose) 40% oral gel 15 g, 15 g, Oral, Q15 Min PRN **OR** glucose-vitamin C (Dex-4) chewable tab 4 tablet, 4 tablet, Oral, Q15 Min PRN **OR** dextrose 50% injection 50 mL, 50 mL, Intravenous, Q15 Min PRN **OR** glucose (Dex4) 15 GM/59ML oral liquid 30 g, 30 g, Oral, Q15 Min PRN **OR** glucose (Glutose) 40% oral gel 30 g, 30 g, Oral, Q15 Min PRN **OR** glucose-vitamin C (Dex-4) chewable tab 8 tablet, 8 tablet, Oral, Q15 Min PRN    melatonin tab 3 mg, 3 mg, Oral, Nightly PRN    insulin aspart (NovoLOG) 100 Units/mL FlexPen 1-10 Units,  1-10 Units, Subcutaneous, TID AC and HS    guaiFENesin (Robitussin) 100 MG/5 ML oral liquid 200 mg, 200 mg, Oral, Q4H PRN    sodium chloride (Saline Mist) 0.65 % nasal solution 1 spray, 1 spray, Each Nare, Q3H PRN    Review of Systems:  Completed. See pertinent positives and negatives above.    Physical Exam:  Vital signs: Blood pressure 100/54, pulse 91, temperature 98.2 °F (36.8 °C), temperature source Temporal, resp. rate 13, height 154.9 cm (5' 1\"), weight 132 lb 12.8 oz (60.2 kg), SpO2 93%, not currently breastfeeding.    General: Alert, oriented. NAD. On NC.   HEENT: Moist mucous membranes.   Neck: No lymphadenopathy.  Supple.  Cardiovascular: RRR.   Respiratory: Diminished breath sounds on the R. + R chest tubes in place.   Abdomen: Soft, nontender, nondistended.   Musculoskeletal: Some pedal edema  Integument: No lesions. No erythema.    Laboratory Data:  Recent Labs   Lab 06/05/24  0650 06/05/24  1153 06/06/24  0442   RBC 2.46*   < > 2.67*   HGB 7.3*   < > 7.8*  7.8*   HCT 23.0*   < > 24.4*   MCV 93.5   < > 91.4   MCH 29.7   < > 29.2   MCHC 31.7   < > 32.0   RDW 15.8   < > 16.6   NEPRELIM 22.83*  --   --    WBC 27.5*   < > 26.9*   .0*   < > 597.0*   NEUT 89  --   --    LYMPH 7  --   --    MON 0  --   --    EOS 0  --   --     < > = values in this interval not displayed.     Recent Labs   Lab 06/01/24  0758 06/02/24  0806 06/03/24  0755 06/04/24  0910 06/05/24  0650   * 147* 149*  --  137*   BUN 6* 4* 7*  --  9   CREATSERUM 0.60 0.42* 0.54* 0.59 0.48*  0.48*   CA 8.3* 8.1* 8.3*  --  8.0*   ALB 1.4* 1.2* 1.3*  --   --     137 131*  --  136   K 3.7 3.7 4.1  --  3.7    107 101  --  105   CO2 21.0 20.0* 21.0  --  22.0   ALKPHO 381* 319* 295*  --   --    * 183* 94*  --   --    * 194* 156*  --   --    BILT 0.7 0.7 0.6  --   --    TP 6.1* 5.7* 6.2*  --   --        Microbiology: Reviewed in EMR    Radiology: Reviewed.    PROCEDURE:  CT CHEST (CPT=71250)     COMPARISON:   [facility] , CT, CT CHEST (CPT=71250), 5/29/2024, 9:05 AM.     INDICATIONS:  evaluate empyema     TECHNIQUE:  Unenhanced multislice CT scanning is performed through the chest.  Dose reduction techniques were used. Dose information is transmitted to the ACR (American College of Radiology) NRDR (National Radiology Data Registry) which includes the Dose   Index Registry.     PATIENT STATED HISTORY: (As transcribed by Technologist)  Evaluation of chest tube.      FINDINGS:  Evaluation of the visceral organs is limited due to the lack of intravenous contrast.     LUNGS:  There is consolidation within the right lower lobe, right middle lobe, and to a lesser extent the right upper lobe suspicious for multifocal pneumonia.  There are small nodular ground-glass airspace opacities within the right upper lobe which may   represent the known pneumonia.  VASCULATURE:  Unremarkable.  THORACIC AORTA:  Scattered atherosclerosis.  MEDIASTINUM/ANKUSH:  Mild to moderate mediastinal adenopathy is nonspecific but may be reactive.  CARDIAC:  Calcified coronary artery disease is noted.  PLEURA:  There is a moderate amount of empyema within the right pleural space.  This is most notable at the posterior inferior aspect of the right pleural space measuring up to 11.1 x 7.3 cm in the axial plane.  Foci of air are seen within this empyema.   A right sided chest tube is seen centered within this dominant region of empyema.  The amount of empyema has increased since 5/29/2024.  A mild left pleural effusion is seen.  CHEST WALL:  Sternotomy changes.  LIMITED ABDOMEN:  Unremarkable.  BONES:  Degenerative changes the spine.  OTHER:  Mild supraclavicular adenopathy is noted.     Impression:    CONCLUSION:    1. A moderate amount empyema is seen within the right pleural space.  The extent of this empyema is worse since 5/29/2024.  A chest tube is well seen within the dominant loculation of probable empyema.  2. Multifocal pneumonia is again seen, most  notable within the right middle lobe and right lower lobe.  Continued imaging follow-up is recommended.  3. Mild to moderate mediastinal adenopathy is nonspecific but may be reactive.  Mild supraclavicular adenopathy is also seen.     LOCATION:  [id number]     Dictated by (CST): Stromberg, LeRoy, MD on 6/03/2024 at 11:24 AM      Finalized by (CST): Stromberg, LeRoy, MD on 6/03/2024 at 11:35 AM        CORRECTION Corrected on: 5/29/2024;         PROCEDURE:  CT CHEST (CPT=71250)    COMPARISON:  [facility] , XR, XR CHEST AP PORTABLE  (CPT=71045), 5/29/2024,   4:27 AM.  [facility], CT, CT ANGIOGRAPHY, CHEST (CPT=71275), 5/26/2024, 12:49   PM.    INDICATIONS:  Follow-up empyema    TECHNIQUE:  Unenhanced multislice CT scanning is performed through the   chest.  Dose reduction techniques were used. Dose information is   transmitted to the ACR (American College of Radiology) NRDR (National   Radiology Data Registry) which includes the Dose  Index Registry.  There is image degradation due to motion slightly   limiting evaluation    PATIENT STATED HISTORY: (As transcribed by Technologist)  Recent empyema       FINDINGS:   LUNGS:  Masslike consolidation with internal punctate foci of air is most   consistent with patient's known consolidation and measures 5.7 x 8.1 x 6.1   versus 5 x 7.2 x 5.1 cm, AP x T x cc dimension respectively.  Increasing   adjacent interstitial and   ground-glass density. VASCULATURE:  Pulmonary vessels are unremarkable within the limits of a   noncontrast CT.   ANKUSH:  Limited due to lack of IV contrast.   MEDIASTINUM:  Adenopathy with subcarinal node measuring 2.8 x 3.3 versus   2.1 x 2 cm and right paratracheal node measuring 2 x 1.3 versus 2 x 1.4   cm.  Mediastinal clips. CARDIAC:  No enlargement or pericardial thickening.  Mild coronary artery   calcifications. PLEURA:  Increasing pleural fluid along the right fissures which is   lobular in contour and measures 2.6 cm in depth.  Right pleural effusion    measures 3.7 versus 2.9 cm with left pleural fluid measuring 1.7 versus   0.8 cm in depth.   THORACIC AORTA:  Atherosclerosis.   CHEST WALL:  No mass or axillary adenopathy.  Sternotomy wires. LIMITED ABDOMEN:  Limited images of the upper abdomen are unremarkable.   BONES:  No fracture.      CONCLUSION:   1. When compared to most recent CT of the chest performed 5/26/2024,   patient's known right lower lobe consolidation has slightly increased in   size with increasing adjacent interstitial and ground-glass density,   correlate clinically. 2. Increasing pleural fluid as detailed above.    LOCATION:  [facility]       Dictated by (CST): Britney Mendes MD on 5/29/2024 at 9:39 AM       Finalized by (CST): Britney Mendes MD on 5/29/2024 at 9:51 AM      Dictated by (CST): Britney Mendes MD on 5/29/2024 at 11:23 AM       Finalized by (CST): Britney Mendes MD on 5/29/2024 at 11:23 AM                       Signed: 05/29/24 1123 by Britney Mendes MD  Narrative:    PROCEDURE:  CT CHEST (CPT=71250)     COMPARISON:  [facility] , XR, XR CHEST AP PORTABLE  (CPT=71045), 5/29/2024, 4:27 AM.  [facility], CT, CT ANGIOGRAPHY, CHEST (CPT=71275), 5/26/2024, 12:49 PM.     INDICATIONS:  Follow-up empyema     TECHNIQUE:  Unenhanced multislice CT scanning is performed through the chest.  Dose reduction techniques were used. Dose information is transmitted to the ACR (American College of Radiology) NRDR (National Radiology Data Registry) which includes the Dose   Index Registry.  There is image degradation due to motion slightly limiting evaluation     PATIENT STATED HISTORY: (As transcribed by Technologist)  Recent empyema      FINDINGS:    LUNGS:  Masslike consolidation with internal punctate foci of air is most consistent with patient's known empyema and measures 5.7 x 8.1 x 6.1 versus 5 x 7.2 x 5.1 cm, AP x T x cc dimension respectively.  Increasing adjacent interstitial and ground-glass   density.  VASCULATURE:  Pulmonary vessels are unremarkable within the limits of a  noncontrast CT.    ANKUSH:  Limited due to lack of IV contrast.    MEDIASTINUM:  Adenopathy with subcarinal node measuring 2.8 x 3.3 versus 2.1 x 2 cm and right paratracheal node measuring 2 x 1.3 versus 2 x 1.4 cm.  Mediastinal clips.  CARDIAC:  No enlargement or pericardial thickening.  Mild coronary artery calcifications.  PLEURA:  Increasing pleural fluid along the right fissures which is lobular in contour and measures 2.6 cm in depth.  Right pleural effusion measures 3.7 versus 2.9 cm with left pleural fluid measuring 1.7 versus 0.8 cm in depth.    THORACIC AORTA:  Atherosclerosis.  CHEST WALL:  No mass or axillary adenopathy.  Sternotomy wires.  LIMITED ABDOMEN:  Limited images of the upper abdomen are unremarkable.    BONES:  No fracture.       Impression:    CONCLUSION:    1. When compared to most recent CT of the chest performed 5/26/2024, patient's known right lower lobe empyema has slightly increased in size with increasing adjacent interstitial and ground-glass density, correlate clinically.  2. Increasing pleural fluid as detailed above.     LOCATION:  [facility]     Dictated by (CST): Britney Mendes MD on 5/29/2024 at 9:39 AM      Finalized by (CST): Britney Mendes MD on 5/29/2024 at 9:51 AM       PROCEDURE:  XR CHEST AP PORTABLE  (CPT=71045)     TECHNIQUE:  AP chest radiograph was obtained.     COMPARISON:  [facility] , CT, CT ABDOMEN+PELVIS(CONTRAST ONLY)(CPT=74177), 5/28/2024, 1:32 PM.  [facility] , XR, XR CHEST AP PORTABLE  (CPT=71045), 5/28/2024, 4:38 PM.     INDICATIONS:  dyspnea     PATIENT STATED HISTORY: (As transcribed by Technologist)  dyspnea     FINDINGS:  Cardiomediastinal silhouette is stable in size and appearance with sternotomy wires and mediastinal clips.  Persistent blunting of the right costophrenic angle is consistent with small pleural effusion versus reaction.  Persistent interstitial   and right perihilar/lower lobe masslike airspace opacity.  No new focal consolidation.  No pneumothorax.      Impression:    CONCLUSION:    1. No significant change, allowing for differences in technique when compared to 5/28/2024 chest radiograph.     LOCATION:  [facility]    Dictated by (CST): Britney Mendes MD on 5/29/2024 at 7:58 AM      Finalized by (CST): Britney Mendes MD on 5/29/2024 at 8:00 AM        PROCEDURE:  US THORACENTESIS GUIDED RIGHT (CPT=32555)     COMPARISON:  [facility] , XR, XR CHEST AP PORTABLE  (CPT=71045), 5/28/2024, 4:38 PM.  [facility] , XR, XR CHEST AP PORTABLE  (CPT=71045), 5/28/2024, 4:48 AM.  [facility], CT, CT ANGIOGRAPHY, CHEST (CPT=71275), 5/26/2024, 12:49 PM.     INDICATIONS:  pneumonia     DESCRIPTION OF PROCEDURE:  The clinical scenario and referral were discussed with Dr. Ya.  Referral for ultrasound-guided right thoracentesis.  Because of her altered mental status, witnessed written and verbal informed consent were obtained from her  sister.  The procedure was also discussed with her.  She voiced understanding and wished to proceed.  Time-out was performed by the staff.     She could not achieve a seated position.  Positioning was optimized, supine oblique.  Ultrasound demonstrated a small right complex pleural effusion corresponding to the CTA and chest x-ray abnormality.  Access site was chosen.  The overlying skin was  prepped in the usual sterile manner.  1% lidocaine was used locally.  Using realtime ultrasound an 8 Burmese multi side hole sheath needle was advanced.  Once in position aspiration to dryness with removal of only 10-11 mL of thin clear yellow fluid.    Samples were sent to RT and the laboratory for testing.  The system was removed.  Sterile dressings were placed.  She tolerated the procedure.  No immediate complication.     Impression:    CONCLUSION:  Small complex right pleural effusion.  Only a small amount of fluid could be aspirated.  Sample sent to RT and the laboratory for testing.     LOCATION:  [facility]     Dictated by (CST): Jace Govea MD on 5/28/2024 at 5:13 PM      Finalized by  (CST): Jace Govea MD on 5/28/2024 at 5:16 PM      PROCEDURE:  US ABDOMEN LIMITED (CPT=76705)     COMPARISON:  [facility] , CT, CT ABDOMEN+PELVIS(CONTRAST ONLY)(CPT=74177), 5/28/2024, 1:32 PM.     INDICATIONS:  elevated lfts. eval bile duct, cirrhosis, etc     PATIENT STATED HISTORY: (As transcribed by Technologist)        FINDINGS:    LIVER:  Normal size and echogenicity. No significant masses.  BILIARY:  Common bile duct measures 6 mm in region of stephanie hepatis.  There is no intrahepatic bile duct distension.  Gallbladder is unremarkable.  PANCREAS:  Normal.  RIGHT KIDNEY:  Normal.  OTHER:  There is a small amount of free fluid in posterior subhepatic space.  Incidental note is made of a right pleural effusion.     Impression:    CONCLUSION:    1. Trace ascites is noted in posterior subhepatic space.  2. Right pleural effusion has been noted on prior CT scan.  3. There is otherwise no abnormality detected on this abdominal ultrasound.       LOCATION:  [facility]     Dictated by (CST): Abad Chen MD on 5/28/2024 at 4:56 PM      Finalized by (CST): Abad Chen MD on 5/28/2024 at 4:57 PM          PROCEDURE:  XR CHEST AP PORTABLE  (CPT=71045)     TECHNIQUE:  AP chest radiograph was obtained.     COMPARISON:  [facility] , US, US THORACENTESIS GUIDED RIGHT (CPT=32555), 5/28/2024, 3:53 PM.  [facility] , XR, XR CHEST AP PORTABLE  (CPT=71045), 5/28/2024, 4:48 AM.     INDICATIONS:  Post Thoracentesis     PATIENT STATED HISTORY: (As transcribed by Technologist)  Right sided post thoracentesis. Patient offered no additional history at this time.      FINDINGS:  Status post right thoracentesis.  There is a small right pleural effusion.  Patchy opacity at the right lung base may represent pneumonia.  There is no pneumothorax.  Median sternotomy wires are stable.  The heart is normal in size.     Impression:    CONCLUSION:  Small right pleural effusion with patchy airspace disease the right lung base could represent pneumonia.  There is no  pneumothorax.     LOCATION:  [facility]     Dictated by (CST): Elieser Torres MD on 5/28/2024 at 4:55 PM      Finalized by (CST): Elieser Torres MD on 5/28/2024 at 4:56 PM      PROCEDURE:  CT ABDOMEN+PELVIS (CONTRAST ONLY) (CPT=74177)     COMPARISON:  [facility], CT, CT ABDOMEN (ATTENTION PANCREAS) (W+ WO) (CPT=74170), 3/02/2022, 3:52 PM.  [facility], CT, CT ABDOMEN+PELVIS(CONTRAST ONLY)(CPT=74177), 9/18/2021, 2:27 PM.     INDICATIONS:  GIB     TECHNIQUE:  CT scanning was performed from the dome of the diaphragm to the pubic symphysis with non-ionic intravenous contrast material. Post contrast coronal MPR imaging was performed.  Dose reduction techniques were used. Dose information is  transmitted to the ACR (American College of Radiology) NRDR (National Radiology Data Registry) which includes the Dose Index Registry.     PATIENT STATED HISTORY:(As transcribed by Technologist)  Poor historian      CONTRAST USED:  85cc of Isovue 370     FINDINGS:    LUNG BASES:  Loculated right-sided pleural effusion with thick wall measuring approximately 7.8 x 2.5 x 12.7 cm.  Findings suggestive of empyema.  There is consolidation within the right lower lobe with complex fluid and small foci of air measuring 3.0 x   2.7 x 3.2 cm and 2.1 x 2.8 x 1.7 cm which may represent pulmonary abscesses/necrotizing pneumonia.  Small left-sided pleural effusion.  LIVER:  Normal in shape and contour.    BILIARY:  Gallbladder is unremarkable in appearance.  No intrahepatic biliary dilatation..  SPLEEN:  Normal.  No enlargement or focal lesion.  PANCREAS:  No kirk-pancreatic inflammatory stranding  ADRENALS:  Normal.  No mass or enlargement.    KIDNEYS:  Kidneys are symmetrical in size without evidence of hydronephrosis.  BOWEL/MESENTERY:  Bowel is normal in caliber. No evidence of obstruction.  Probable Villanueva's type hernia containing fat and a portion of bowel  within the right para umbilical region.  No evidence of obstruction.  Mildly prominent  retroperitoneal lymph  nodes.  PELVIS:  Bladder is distended.  Small amount of free pelvic fluid.    AORTA/VASCULAR:  Atheromatous calcifications of the aorta.  BONES:  No acute fractures.  Median sternotomy approximated by wire sutures.     Impression:    CONCLUSION:    1. Loculated right-sided pleural effusion suggestive empyema with consolidation within the right lower lobe.  There are areas of fluid and small foci of air within the consolidation which may represent pulmonary abscesses/necrotizing pneumonia.  2. Small left-sided pleural effusion with associated atelectasis.  3. Nonspecific mildly prominent retroperitoneal lymph nodes which may be reactive in nature.  4. Trace amount of free pelvic fluid.       LOCATION:  [facility]     Dictated by (CST): Annette Ferreira MD on 5/28/2024 at 2:11 PM      Finalized by (CST): Annette Ferreira MD on 5/28/2024 at 2:17 PM         PROCEDURE:  CT BRAIN OR HEAD (43508)     COMPARISON:  None.     INDICATIONS:  lethargy     TECHNIQUE:  Noncontrast CT scanning is performed through the brain. Dose reduction techniques were used. Dose information is transmitted to the ACR (American College of Radiology) NRDR (National Radiology Data Registry) which includes the Dose Index  Registry.     PATIENT STATED HISTORY: (As transcribed by Technologist)  Patient is lethargic.       FINDINGS:    VENTRICLES/SULCI:  Ventricles and sulci are normal in size.    INTRACRANIAL:  There is a chronic appearing lacunar infarct involving the right caudate nucleus at the junction of the head and body..  There are no abnormal extraaxial fluid collections.  There is no midline shift.  There are no intraparenchymal brain  abnormalities.  There is nothing specific for acute infarct.  There is no hemorrhage or mass lesion.    SINUSES:           No sign of acute sinusitis.    MASTOIDS:          No sign of acute inflammation.  SKULL:             No evidence for fracture or osseous abnormality.  OTHER:              None.    Impression:    CONCLUSION:  No evidence of acute intracranial process.     LOCATION:  [facility]     Dictated by (CST): Elieser Torres MD on 5/28/2024 at 8:49 AM      Finalized by (CST): Elieser Torres MD on 5/28/2024 at 8:54 AM      PROCEDURE:  XR CHEST AP PORTABLE  (CPT=71045)     TECHNIQUE:  AP chest radiograph was obtained.     COMPARISON:  [facility], CT, CT ANGIOGRAPHY, CHEST (CPT=71275), 5/26/2024, 12:49 PM.  [facility], XR, XR CHEST PA + LAT CHEST (CPT=71046), 5/26/2024, 11:43 AM.     INDICATIONS:  dyspnea     PATIENT STATED HISTORY: (As transcribed by Technologist)  dyspnea      Impression:    CONCLUSION:    Limited AP portable view shows small-moderate right pleural effusion cleaning some pleural fluid tracking up the lower right lateral chest.  Possibly loculated.  Patchy consolidation mid-lower right chest.  Small blunting left costophrenic   angle.  Cardiomegaly.  No pneumothorax. Consider upright PA and lateral examination of the chest, when tolerated.  CT follow-up may also be beneficial.       LOCATION:  [facility]     Dictated by (CST): Abhishek Mora MD on 5/28/2024 at 8:36 AM      Finalized by (CST): Abhishek Mora MD on 5/28/2024 at 8:37 AM          PROCEDURE:  US VENOUS DOPPLER LEG LEFT - DIAG IMG (CPT=93971)     COMPARISON:  None.     INDICATIONS:  Left lower extremity swelling and pain     TECHNIQUE:  Real time, grey scale, and duplex ultrasound was used to evaluate the lower extremity venous system. B-mode two-dimensional images of the vascular structures, Doppler spectral analysis, and color flow.  Doppler imaging were performed.  The  following veins were imaged:  Common, deep, and superficial femoral, popliteal, sapheno-femoral junction, posterior tibial veins, and the contralateral common femoral vein.     PATIENT STATED HISTORY: (As transcribed by Technologist)        FINDINGS:    EXTREMITY EXAMINED:  Left lower extremity  SAPHENOFEMORAL JUNCTION:  No reflux.  THROMBI:  None  visible.  COMPRESSION:  Normal compressibility, phasicity, and augmentation.  OTHER:  Negative.     Impression:    CONCLUSION:  No evidence of DVT in the left lower extremity.     LOCATION:  [facility]    Dictated by (CST): Roc Mosqueda MD on 5/26/2024 at 2:46 PM      Finalized by (CST): Roc Mosqueda MD on 5/26/2024 at 2:49 PM      PROCEDURE:  CT ANGIOGRAPHY, CHEST (CPT=71275)     COMPARISON:  [facility], CT, CT CHEST LD NO CAD(CPT=71250), 3/13/2024, 8:38 PM.     INDICATIONS:  sob, hypotension, pleurisy, R lung mass     TECHNIQUE:  IV contrast-enhanced multislice CT angiography is performed through the pulmonary arterial anatomy. 3D volume renderings are generated.  Dose reduction techniques were used. Dose information is transmitted to the ACR (American College of  Radiology) NRDR (National Radiology Data Registry) which includes the Dose Index Registry.     PATIENT STATED HISTORY:(As transcribed by Technologist)  SOB, hypotension; right lung mass      CONTRAST USED:  100cc of Isovue 370     FINDINGS:    VASCULATURE:  No visible pulmonary arterial thrombus or attenuation.    THORACIC AORTA:  No aneurysm or visible dissection.    LUNGS:  Masslike consolidation in the right lower lobe is new compared to the prior examination.  Given its acute appearance an infectious process is most likely.  Small central areas of cavitation are noted with associated low attenuation which may  represent necrosis.  Moderate right pleural effusion is noted.  MEDIASTINUM:  Right paratracheal node is noted measuring up to 2.0 x 1.4 cm (image 38).  A subcarinal lymph node is noted measuring up to 2.0 x 2.1 cm (image 93)..    ANKUSH:  No mass or adenopathy.    CARDIAC:  No enlargement, pericardial thickening, or significant coronary artery calcification.  PLEURA:  No mass or effusion.    CHEST WALL:  No mass or axillary adenopathy.  Patient is status post median sternotomy.  LIMITED ABDOMEN:  Limited images of the upper abdomen are  unremarkable.    BONES:  No bony lesion or fracture.    OTHER:  Negative.       Impression:    CONCLUSION:    1. Masslike consolidation with areas of central necrosis and mild cavitation in the right lower lobe.  Given its acute appearance this may represent an infectious process.  Correlate clinically.  Short-term follow-up after treatment is recommended.  2. Mediastinal adenopathy is likely reactive.  Attention on follow-up is recommended.     LOCATION:  [facility]     Dictated by (CST): Roc Mosqueda MD on 5/26/2024 at 1:21 PM      Finalized by (CST): Roc Mosqueda MD on 5/26/2024 at 1:25 PM        PROCEDURE:  XR CHEST PA + LAT CHEST (CPT=71046)     INDICATIONS:  sob, cough, crackles R base     COMPARISON:  [facility], CT, CT CHEST LD NO CAD(CPT=71250), 3/13/2024, 8:38 PM.  [facility] , XR, XR CHEST DECUBITUS BILAT INCL PA AND LAT(4 VIEWS)(CPT=71048), 1/15/2024, 9:15 AM.     TECHNIQUE:  PA and lateral chest radiographs were obtained.     PATIENT STATED HISTORY: (As transcribed by Technologist)  Patient stated she has been having a cough for 1 week and a crackle sound in her right lung.      FINDINGS:    LUNGS:  An area of masslike consolidation in the right upper lobe is noted which is new from the prior examination.  Small right pleural effusion is present.  CARDIAC:  Normal size cardiac silhouette.  Patient is status post median sternotomy.  MEDIASTINUM:  Normal.  PLEURA:  Normal.  No pleural effusions.  BONES:  Normal for age.     Impression:    CONCLUSION:  Masslike consolidation in the right upper lobe is noted.  Given it is acute appearance this likely represents an infectious process.  Small right effusion is noted.  Correlate clinically.     LOCATION:  [facility]     Dictated by (CST): Roc Mosqueda MD on 5/26/2024 at 12:05 PM      Finalized by (CST): Roc Mosqueda MD on 5/26/2024 at 12:06 PM    ASSESSMENT:  RLL pneumonia/pulmonary abscess with right empyema  Legionella and strep pneumo Uags negative. Blasto ag  negative.  S/p thoracentesis 5/28, cx with strep intermedius. Path w/ acute inflammation with occasional bacterial organisms present.   S/p R chest tube placement 5/29, cx with strep intermedius. S/p MIST 2 protocol.   S/p R VATS and total pulmonary decortication 6/5, R purulent empyema with RLL lung abscess noted. Tissue cx from RLL lung abscess with GNR and GPC. Tissue cx from pleural peel with GPC.  Fevers and worsening leukocytosis, d/t above. Bcxs negative. WBC improving, no further fevers.  Alerted mental status. ? Related to above vs other. Head CT w/o acute process.   Elevated LFTs. CT a/p and US abd unremarkable. Improving.    Anemia  IgM kappa MGUS  DM II. Hgb A1C 7.1  COPD    PLAN:  - continue IV vancomycin, will dc IV Unasyn and broaden to IV Zosyn given now with GNR in tissue cx from lung abscess  - follow temps and wbc--> repeat CBC in am  - follow LFTs--> repeat CMP in am  - follow OR cultures  - will need prolonged course of IV abx on dc (anticipate 4 weeks from surgery)    Discussed case with Dr. Menezes, CM, and patient.     Beverly Guillen PA-C    ID ATTENDING ADDENDUM     Pt seen an examined independently. Chart reviewed. Agree with above. Note has been reviewed by me and modified as needed.  Exam and Impression/ Recs as noted above.  Will follow  D/w staff and with pt  More than 50% of clinical time and 100% of the clinical decision making performed by me.    Vibha Menezes MD

## 2024-06-07 ENCOUNTER — APPOINTMENT (OUTPATIENT)
Facility: HOSPITAL | Age: 64
DRG: 163 | End: 2024-06-07
Attending: PHYSICIAN ASSISTANT
Payer: MEDICAID

## 2024-06-07 PROBLEM — Z98.890 STATUS POST THORACOTOMY: Status: ACTIVE | Noted: 2024-06-07

## 2024-06-07 LAB
ALBUMIN SERPL-MCNC: 1 G/DL (ref 3.4–5)
ALBUMIN/GLOB SERPL: 0.2 {RATIO} (ref 1–2)
ALP LIVER SERPL-CCNC: 193 U/L
ALT SERPL-CCNC: 62 U/L
ANION GAP SERPL CALC-SCNC: 4 MMOL/L (ref 0–18)
AST SERPL-CCNC: 65 U/L (ref 15–37)
BASOPHILS # BLD AUTO: 0.08 X10(3) UL (ref 0–0.2)
BASOPHILS NFR BLD AUTO: 0.5 %
BILIRUB SERPL-MCNC: 0.3 MG/DL (ref 0.1–2)
BLOOD TYPE BARCODE: 9500
BUN BLD-MCNC: 4 MG/DL (ref 9–23)
CALCIUM BLD-MCNC: 7.5 MG/DL (ref 8.5–10.1)
CHLORIDE SERPL-SCNC: 112 MMOL/L (ref 98–112)
CO2 SERPL-SCNC: 25 MMOL/L (ref 21–32)
CREAT BLD-MCNC: 0.4 MG/DL
EGFRCR SERPLBLD CKD-EPI 2021: 112 ML/MIN/1.73M2 (ref 60–?)
EOSINOPHIL # BLD AUTO: 0.37 X10(3) UL (ref 0–0.7)
EOSINOPHIL NFR BLD AUTO: 2.3 %
ERYTHROCYTE [DISTWIDTH] IN BLOOD BY AUTOMATED COUNT: 16.2 %
GLOBULIN PLAS-MCNC: 4.3 G/DL (ref 2.8–4.4)
GLUCOSE BLD-MCNC: 107 MG/DL (ref 70–99)
GLUCOSE BLD-MCNC: 126 MG/DL (ref 70–99)
GLUCOSE BLD-MCNC: 127 MG/DL (ref 70–99)
GLUCOSE BLD-MCNC: 129 MG/DL (ref 70–99)
GLUCOSE BLD-MCNC: 131 MG/DL (ref 70–99)
HCT VFR BLD AUTO: 25 %
HGB BLD-MCNC: 8.2 G/DL
HGB BLD-MCNC: 9.1 G/DL
IMM GRANULOCYTES # BLD AUTO: 0.21 X10(3) UL (ref 0–1)
IMM GRANULOCYTES NFR BLD: 1.3 %
LYMPHOCYTES # BLD AUTO: 2.64 X10(3) UL (ref 1–4)
LYMPHOCYTES NFR BLD AUTO: 16.4 %
MCH RBC QN AUTO: 29.7 PG (ref 26–34)
MCHC RBC AUTO-ENTMCNC: 32.8 G/DL (ref 31–37)
MCV RBC AUTO: 90.6 FL
MONOCYTES # BLD AUTO: 1.27 X10(3) UL (ref 0.1–1)
MONOCYTES NFR BLD AUTO: 7.9 %
NEUTROPHILS # BLD AUTO: 11.54 X10 (3) UL (ref 1.5–7.7)
NEUTROPHILS # BLD AUTO: 11.54 X10(3) UL (ref 1.5–7.7)
NEUTROPHILS NFR BLD AUTO: 71.6 %
OSMOLALITY SERPL CALC.SUM OF ELEC: 291 MOSM/KG (ref 275–295)
PLATELET # BLD AUTO: 585 10(3)UL (ref 150–450)
POTASSIUM SERPL-SCNC: 4.2 MMOL/L (ref 3.5–5.1)
PROT SERPL-MCNC: 5.3 G/DL (ref 6.4–8.2)
RBC # BLD AUTO: 2.76 X10(6)UL
SODIUM SERPL-SCNC: 141 MMOL/L (ref 136–145)
UNIT VOLUME: 350 ML
VANCOMYCIN PEAK SERPL-MCNC: 11.4 UG/ML (ref 30–50)
VANCOMYCIN TROUGH SERPL-MCNC: 12.1 UG/ML (ref 10–20)
WBC # BLD AUTO: 16.1 X10(3) UL (ref 4–11)

## 2024-06-07 PROCEDURE — B548ZZA ULTRASONOGRAPHY OF SUPERIOR VENA CAVA, GUIDANCE: ICD-10-PCS | Performed by: HOSPITALIST

## 2024-06-07 PROCEDURE — 99232 SBSQ HOSP IP/OBS MODERATE 35: CPT | Performed by: HOSPITALIST

## 2024-06-07 PROCEDURE — 99291 CRITICAL CARE FIRST HOUR: CPT | Performed by: INTERNAL MEDICINE

## 2024-06-07 PROCEDURE — 02HV33Z INSERTION OF INFUSION DEVICE INTO SUPERIOR VENA CAVA, PERCUTANEOUS APPROACH: ICD-10-PCS | Performed by: HOSPITALIST

## 2024-06-07 RX ORDER — LIDOCAINE HYDROCHLORIDE 10 MG/ML
5 INJECTION, SOLUTION EPIDURAL; INFILTRATION; INTRACAUDAL; PERINEURAL
Status: COMPLETED | OUTPATIENT
Start: 2024-06-07 | End: 2024-06-07

## 2024-06-07 NOTE — PHYSICAL THERAPY NOTE
PHYSICAL THERAPY EVALUATION - INPATIENT     Room Number: 6615/6615-A  Evaluation Date: 6/7/2024  Type of Evaluation: Initial  Physician Order: PT Eval and Treat    Presenting Problem: empyema of R pleural space, R LL pneumonia s/p R chest tube placement by IR 5/29 and R VATS decortication 6/5/24  Co-Morbidities : COPD, HTN, GERD, DMT2, MDD/anxiety, NSTEMI s/p CABG x2 12/27, smoker  Reason for Therapy: Mobility Dysfunction and Discharge Planning    PHYSICAL THERAPY ASSESSMENT   Patient is currently functioning below baseline with transfers and gait.  Prior to admission, patient's baseline is independent.  Patient is requiring contact guard assist as a result of the following impairments: decreased functional strength, decreased endurance/aerobic capacity, pain, impaired standing balance, decreased muscular endurance, and medical status.  Physical Therapy will continue to follow for duration of hospitalization.    Patient will benefit from continued skilled PT Services at discharge to promote functional independence in home.  Anticipate patient will return home with home health PT.    PLAN  PT Treatment Plan: Bed mobility;Body mechanics;Endurance;Energy conservation;Patient education;Family education;Gait training;Strengthening;Stair training;Transfer training;Balance training  Rehab Potential : Good  Frequency (Obs): 3-5x/week  Number of Visits to Meet Established Goals: 5      CURRENT GOALS    Goal #1 Patient is able to demonstrate supine - sit EOB @ level: independent     Goal #2 Patient is able to demonstrate transfers Sit to/from Stand at assistance level: modified independent     Goal #3 Patient is able to ambulate 150 feet with assist device: walker - rolling at assistance level: modified independent     Goal #4    Goal #5    Goal #6    Goal Comments: Goals established on 6/7/2024      PHYSICAL THERAPY MEDICAL/SOCIAL HISTORY  History related to current admission: Patient is a 64 year old female admitted  on 2024 from home to the ER for persistent cough and shortness of breath.  Pt diagnosed with empyema of R pleural space, R LL pneumonia s/p R chest tube placement by IR  and R VATS decortication 24. Patient with 1 R chest tube to suction in place. Per notes/orders, ok to ambulate off suction.       HOME SITUATION  Type of Home: Condo   Home Layout: One level  Stairs to Enter : 0             Lives With: Alone  Drives: Yes  Patient Owned Equipment: None  Patient Regularly Uses: Reading glasses    Prior Level of [place]: Patient reports she functions fully independently at baseline. No DME. Works as a  at [employer].     SUBJECTIVE  \"I need to get back to working.\"       OBJECTIVE  Precautions: Chest tube to suction (ok to ambulate off suction)  Fall Risk: High fall risk    WEIGHT BEARING RESTRICTION  Weight Bearing Restriction: None                PAIN ASSESSMENT  Ratin  Location: back  Management Techniques: Activity promotion;Repositioning;Relaxation    COGNITION  Overall Cognitive Status:  WFL - within functional limits  Following Commands:  follows all commands and directions without difficulty    RANGE OF MOTION AND STRENGTH ASSESSMENT  Upper extremity ROM and strength - NT     Lower extremity ROM is within functional limits     Lower extremity strength is within functional limits       BALANCE  Static Sitting: Good  Dynamic Sitting: Good  Static Standing: Fair +  Dynamic Standing: Fair    ADDITIONAL TESTS                                    ACTIVITY TOLERANCE  Pulse: 84  Heart Rate Source: Monitor  Resp: 23                O2 WALK  Oxygen Therapy  SPO2% on Room Air at Rest: 95    NEUROLOGICAL FINDINGS                        AM-PAC '6-Clicks' INPATIENT SHORT FORM - BASIC MOBILITY  How much difficulty does the patient currently have...  Patient Difficulty: Turning over in bed (including adjusting bedclothes, sheets and blankets)?: A Little   Patient Difficulty: Sitting down on and standing  up from a chair with arms (e.g., wheelchair, bedside commode, etc.): A Little   Patient Difficulty: Moving from lying on back to sitting on the side of the bed?: A Little   How much help from another person does the patient currently need...   Help from Another: Moving to and from a bed to a chair (including a wheelchair)?: A Little   Help from Another: Need to walk in hospital room?: A Little   Help from Another: Climbing 3-5 steps with a railing?: A Little       AM-PAC Score:  Raw Score: 18   Approx Degree of Impairment: 46.58%   Standardized Score (AM-PAC Scale): 43.63   CMS Modifier (G-Code): CK    FUNCTIONAL ABILITY STATUS  Gait Assessment   Functional Mobility/Gait Assessment  Gait Assistance: Contact guard assist  Distance (ft): 115  Assistive Device: Rolling walker  Pattern: Within Functional Limits    Skilled Therapy Provided     Bed Mobility:  Rolling: NT  Supine to sit: NT   Sit to supine: NT     Transfer Mobility:  Sit to stand: CGA, RW   Stand to sit: CGA, RW  Gait = CGA, x115 ft., RW    Therapist's Comments: Patient presents to PT sitting in the chair, VSS on room air and R chest tube to wall suction in place (ok to ambulate off suction). Patient performs transfers and ambulation using a RW within the halls with CGA. Patient returned to her room, resumed sitting in the chair with chair alarm donned and all needs placed within reach at the end of the session. Encouraged patient to sit in the chair for all meals and to ambulate halls with RW and assistance as needed from nursing staff 3x/day - patient verbalized understanding. Recommend dc home with HHPT, SW/CM updated. RN updated.     Exercise/Education Provided:  Bed mobility  Body mechanics  Functional activity tolerated  Gait training  Strengthening  Transfer training    Patient End of Session: Up in chair;Needs met;Call light within reach;RN aware of session/findings;All patient questions and concerns addressed;Alarm set;Discussed recommendations  with /      Patient Evaluation Complexity Level:  History Low - no personal factors and/or co-morbidities   Examination of body systems Low - addressing 1-2 elements   Clinical Presentation Moderate - Evolving   Clinical Decision Making Low - Stable       PT Session Time: 27 minutes  Gait Trainin minutes  Therapeutic Activity: 3 minutes  Neuromuscular Re-education: 0 minutes  Therapeutic Exercise: 0 minutes

## 2024-06-07 NOTE — PAYOR COMM NOTE
6/6 & 6/7    CONTINUED STAY REVIEW    Payor: [facility]  Subscriber #:  [id number]  Authorization Number: [id number]    Admit date: 5/26/24  Admit time:  3:45 PM          MEDICATIONS ADMINISTERED IN LAST 1 DAY:  acetaminophen (Ofirmev) 10 mg/mL infusion premix 1,000 mg       Date Action Dose Route User    6/7/2024 0418 New Bag 1,000 mg Intravenous Vee Neri RN    6/6/2024 2224 New Bag 1,000 mg Intravenous Vee Neri RN    6/6/2024 1821 New Bag 1,000 mg Intravenous Samantha Patterson RN    6/6/2024 1137 New Bag 1,000 mg Intravenous Samantha Patterson RN          atorvastatin (Lipitor) tab 80 mg       Date Action Dose Route User    6/6/2024 2009 Given 80 mg Oral Vee Neri RN          busPIRone (Buspar) tab 20 mg       Date Action Dose Route User    6/7/2024 0826 Given 20 mg Oral Jud Biswas RN          gabapentin (Neurontin) tab 600 mg       Date Action Dose Route User    6/6/2024 2010 Given 600 mg Oral Vee Neri RN          heparin (Porcine) 5000 UNIT/ML injection 5,000 Units       Date Action Dose Route User    6/7/2024 0831 Given 5,000 Units Subcutaneous (Left Lower Abdomen) Jud Biswas RN    6/6/2024 2009 Given 5,000 Units Subcutaneous (Left Lower Abdomen) Vee Neri RN          HYDROmorphone (Dilaudid) 1 MG/ML injection 0.8 mg       Date Action Dose Route User    6/6/2024 2149 Given 0.8 mg Intravenous Vee Neri RN    6/6/2024 1724 Given 0.8 mg Intravenous Samantha Patterson RN          lidocaine-menthol 4-1 % patch 1 patch       Date Action Dose Route User    6/7/2024 0830 Patch Applied 1 patch Transdermal (Mid Back) Jud Biswas RN          oxybutynin ER (Ditropan-XL) 24 hr tab 5 mg       Date Action Dose Route User    6/7/2024 0831 Given 5 mg Oral Jud Biswas RN          oxyCODONE immediate release tab 10 mg       Date Action Dose Route User    6/7/2024 0851 Given 10 mg Oral Jud Biswas, RN    6/6/2024 2009 Given 10 mg Oral  Vee Neri RN    6/6/2024 1436 Given 10 mg Oral Samantha Patterson RN          piperacillin-tazobactam (Zosyn) 3.375 g in dextrose 5% 100 mL IVPB-ADDV       Date Action Dose Route User    6/7/2024 0620 New Bag 3.375 g Intravenous Vee Neri RN    6/6/2024 2158 New Bag 3.375 g Intravenous Vee Neri RN    6/6/2024 1430 New Bag 3.375 g Intravenous Samantha Patterson RN          sodium chloride 0.9% infusion       Date Action Dose Route User    6/6/2024 2225 New Bag (none) Intravenous Vee Neri RN          vancomycin (Vancocin) 750 mg in sodium chloride 0.9% 250 mL IVPB-ADDV       Date Action Dose Route User    6/7/2024 0418 New Bag 750 mg Intravenous Vee Neri RN    6/6/2024 1602 New Bag 750 mg Intravenous Samantha Patterson RN          QUEtiapine ER (SEROquel XR) 24 hr tab 200 mg       Date Action Dose Route User    6/6/2024 2010 Given 200 mg Oral Vee Neri RN            Vitals (last day)       Date/Time Temp Pulse Resp BP SpO2 Weight O2 Device O2 Flow Rate (L/min) [facility]    06/07/24 0800 -- 92 9 106/55 98 % -- -- -- MD    06/07/24 0700 -- 96 12 114/64 100 % -- Nasal cannula 2 L/min SC    06/07/24 0600 -- 86 16 102/60 98 % -- Nasal cannula 2 L/min SC    06/07/24 0500 -- 80 13 94/54 100 % -- Nasal cannula 2 L/min SC    06/07/24 0400 98.1 °F (36.7 °C) 103 10 107/57 98 % -- Nasal cannula -- SC    06/07/24 0300 -- 112 12 115/65 98 % -- None (Room air) -- SC    06/07/24 0200 -- 110 9 114/61 98 % -- None (Room air) -- SC    06/07/24 0100 -- 101 16 116/57 98 % -- None (Room air) -- SC    06/07/24 0000 97.6 °F (36.4 °C) 102 12 102/58 98 % -- None (Room air) -- SC    06/06/24 2300 -- 96 12 107/53 96 % -- None (Room air) -- SC    06/06/24 2200 -- 104 16 121/69 94 % -- None (Room air) -- SC    06/06/24 2100 -- 79 24 130/63 100 % -- None (Room air) -- SC    06/06/24 2000 97.9 °F (36.6 °C) 73 23 125/80 95 % -- None (Room air) -- SC    06/06/24 1900 -- 90 19 126/63 98 % -- Nasal cannula 2 L/min     06/06/24 1800 -- 83 12  116/63 98 % -- Nasal cannula 2 L/min     06/06/24 1700 -- 100 22 115/57 91 % -- None (Room air) --     06/06/24 1600 -- 102 22 126/93 95 % -- None (Room air) --     06/06/24 1500 -- 99 27 130/68 94 % -- None (Room air) --     06/06/24 1300 -- 91 13 100/54 93 % -- None (Room air) --     06/06/24 1200 98.2 °F (36.8 °C) 97 9 98/50 92 % -- None (Room air) --     06/06/24 1100 -- 96 11 85/39 95 % -- None (Room air) --     06/06/24 1000 -- 122 22 90/66 99 % -- None (Room air) --     06/06/24 0900 -- 98 11 90/38 98 % -- Nasal cannula 2 L/min     06/06/24 0800 98 °F (36.7 °C) 102 12 91/57 98 % -- Nasal cannula 2 L/min     06/06/24 0730 -- -- -- -- -- -- Nasal cannula 2 L/min     06/06/24 0700 -- 96 10 92/49 97 % -- Nasal cannula 2 L/min SC    06/06/24 0600 -- 93 11 100/47 100 % -- Nasal cannula 2 L/min SC    06/06/24 0500 -- 96 16 95/46 100 % -- Nasal cannula 2 L/min SC    06/06/24 0400 97.5 °F (36.4 °C) 88 19 94/45 100 % -- Nasal cannula 2 L/min SC    06/06/24 0300 -- 87 7 101/49 99 % -- Nasal cannula 2 L/min SC    06/06/24 0200 -- 80 12 94/49 100 % -- Nasal cannula 2 L/min SC    06/06/24 0100 -- 96 17 100/65 97 % -- Nasal cannula 2 L/min SC    06/06/24 0000 97.9 °F (36.6 °C) 93 16 110/56 98 % -- Nasal cannula 2 L/min SC     Blood Transfusion Record       Product Unit Status Volume Start End            Transfuse RBC       24  [id number]  [id number] Completed 06/06/24 1430 448.33 mL 06/06/24 1001 06/06/24 1430       24  [id number]  [id number] Completed 06/05/24 1154 350 mL 06/05/24 0923 06/05/24 0933       24  [id number]  [id number] Completed 06/05/24 1154 350 mL 06/05/24 0823 06/05/24 0833       24  [id number]  [id number] Completed 06/05/24 1154 350 mL 06/05/24 0807 06/05/24 0817       24  [id number]  [id number] Completed 05/30/24 0908 350 mL 05/30/24 0119 05/30/24 0900       24  [id number]  [id number] Completed 06/06/24 1430 365.42 mL 05/27/24 0813 05/27/24 1104                6/6 HOSPITALIST  NOTE    Subjective:  Still with pain and didn't sleep well      Vital signs:  Temp:  [97 °F (36.1 °C)-97.9 °F (36.6 °C)] 97.5 °F (36.4 °C)  Pulse:  [] 96  Resp:  [7-29] 10  BP: ()/(45-88) 92/49  SpO2:  [91 %-100 %] 97 %     Physical Exam:    General: No acute distress, in pain   Respiratory: No wheezes, no rhonchi  Cardiovascular: S1, S2, regular rate and rhythm  Abdomen: Soft, Non-tender, non-distended, positive bowel sounds  Neuro: No new focal deficits.   Extremities: No edema     Diagnostic Data:    Labs:                      Recent Labs   Lab 05/30/24  0714 05/30/24  1623 05/31/24  0733 05/31/24  0855 05/31/24  1708 06/01/24  0758 06/01/24  1615 06/02/24  0806 06/02/24  1422 06/03/24  0034 06/03/24  0755 06/03/24  1558 06/05/24  0650 06/05/24  1153 06/05/24  1749 06/06/24  0027 06/06/24  0442   WBC 18.7*  --  17.8*  --   --  21.6*  --  26.3* 31.5*  --  38.6*  --  27.5* 31.9*  --   --  26.9*   HGB 8.3*  8.4*   < > 8.9*  8.9*  --    < > 9.2*  9.2*   < > 8.9*  8.9* 9.3*   < > 9.2*  8.8*   < > 7.3* 8.7* 8.0* 8.1* 7.8*  7.8*   MCV 87.3  --  89.7  --   --  89.7  --  89.2 89.4  --  92.6  --  93.5 90.5  --   --  91.4   .0*  --  520.0*  --   --  577.0*  --  536.0* 626.0*  --  698.0*  --  694.0* 550.0*  --   --  597.0*   BAND 1  --  2  --   --   --   --   --   --   --   --   --  4  --   --   --   --    INR 1.51*  --  >15.49* 1.33*  --  1.35*  --  1.34*  --   --  1.38*  --   --   --   --   --   --     < > = values in this interval not displayed.                 Recent Labs   Lab 06/01/24  0758 06/02/24  0806 06/03/24  0755 06/04/24  0910 06/05/24  0650   * 147* 149*  --  137*   BUN 6* 4* 7*  --  9   CREATSERUM 0.60 0.42* 0.54* 0.59 0.48*  0.48*   CA 8.3* 8.1* 8.3*  --  8.0*   ALB 1.4* 1.2* 1.3*  --   --     137 131*  --  136   K 3.7 3.7 4.1  --  3.7    107 101  --  105   CO2 21.0 20.0* 21.0  --  22.0   ALKPHO 381* 319* 295*  --   --    * 183* 94*  --   --    *  194* 156*  --   --    BILT 0.7 0.7 0.6  --   --    TP 6.1* 5.7* 6.2*  --   --                 Assessment & Plan:  # RLL pneumonia with empyema   # RLL effusion with loculation and chest pain   - sp chest tube by IR on 5/29  -abx adjusted per cultures   - sp MIST w/o improvement   - s/p VATS decortication today monitoring in ICU   - CT Chest reviewed  - Pulmonology and ID rec appreciated   - pain meds     # elevated liver enzymes seems ischemic in nature per course   -improved  - exam benign   - US abd with doppler neg   - avoid hepatotoxic agents     # Iron def Anemia  - sp PRBC   - no s/s bleeding      #Coagulopathy- repeat INR normalized      #COPD   -no active wheezing  -Continue inhalers  -prn nebs.     #Acute metabolic toxic ( anxiolytic) encephalopathy, resolved   -Ammonia normal   -CT brain negative  -ABG reviewed     #Hypertension-metoprolol on hold due to low BP     #DM 2-hyperglycemia protocol     #GERD/ Jamison's esophagus -PPI     #MDD/anxiety- Home med       6/6 THORACIC SURGERY POST-OPERATIVE PROGRESS NOTE     Subjective: complains of pain at chest tube sites.  Issues with anxiety overnight     Objective:     I/O last 3 completed shifts:  In: 7949 [P.O.:1200; I.V.:3989; Blood:1050; IV PIGGYBACK:1710]  Out: 7300 [Urine:5645; Blood:500; Chest Tube:1155]     Chest tube: serosang, no air leak             Recent Labs   Lab 06/05/24  0650 06/05/24  1153 06/05/24  1749 06/06/24  0027 06/06/24  0442   HGB 7.3* 8.7* 8.0* 8.1* 7.8*  7.8*   WBC 27.5* 31.9*  --   --  26.9*   .0* 550.0*  --   --  597.0*               Recent Labs   Lab 06/02/24  0806 06/03/24  0755 06/05/24  0650    131* 136   K 3.7 4.1 3.7    101 105   CO2 20.0* 21.0 22.0   BUN 4* 7* 9       General: Awake, alert, NAD  Pulm: CTA B, no w/w/r  Card: RRR, no m/r/g  Abd: soft, NT, ND  Wounds: clean, dry, intact, No signs of infection     Assessment: 64 year old female, 1 Day Post-Op from Decortication for empyema and right lower  lobe lung abscess     Plan:     Ambulate as tolerated, PT  Spend majority of day out of bed, in chair  Encouraged cough and IS use  Hep Lock, d/c bui  Pain control  General Diet  Chest tubes to suction  Will transfuse 1 unit pRBC for low hgb, BP and tachy, no concern for ongoing bleeding      PULMONARY NOTE  ubjective:  Soumya King is a(n) 64 year old female who is admitted for pneumonia with empyema.     Overnight: underwent VATS with decortication yesterday, having some pain around chest tube site and anxiety last night     Objective:  BP 90/66 (BP Location: Right arm)   Pulse (!) 122   Temp 98 °F (36.7 °C) (Temporal)   Resp 22   Ht 5' 1\" (1.549 m)   Wt 132 lb 12.8 oz (60.2 kg)   SpO2 99%   BMI 25.09 kg/m²         Temp (24hrs), Av.6 °F (36.4 °C), Min:97 °F (36.1 °C), Max:98 °F (36.7 °C)         Assessment:  Large right lower lobe pneumonia with empyema secondary to Streptococcus intermedius with ongoing empyema despite MIST protocol  Sepsis 2/2 above, WBC 26.9 with likely reactive thrombocytosis  S/p VATS with decortication GPC and GNRs growing from cultur  Right pleural effusion with empyema as noted above  Pain from around chest tube site  Multiple pulmonary nodules  Acute encephalopathy, improved     Plan:  Post op care started  Encouraged use of IS  Pain control to avoid splinting  Out of bed to chair when able  Advance diet as tolerated  Abx per ID, I suspect will need long term abx  Continue chest tube to suction ok to ambulate without suction as needed  Will monitor output and tidaling chest tube out hopefully in the next few days     ID NOTE  Abx: IV Unasyn D#6, IV vanco D#4  s/p Zosyn and vanco     Subjective: Patient seen and examined today. Drowsy but arousable. Reports pain to the chest tube sites/incision sites. Denies any SOB. Afebrile. On room air.      Physical Exam:  Vital signs: Blood pressure 100/54, pulse 91, temperature 98.2 °F (36.8 °C), temperature source Temporal,  resp. rate 13, height 154.9 cm (5' 1\"), weight 132 lb 12.8 oz (60.2 kg), SpO2 93%, not currently breastfeeding.     General: Alert, oriented. NAD. On NC.   HEENT: Moist mucous membranes.   Neck: No lymphadenopathy.  Supple.  Cardiovascular: RRR.   Respiratory: Diminished breath sounds on the R. + R chest tubes in place.   Abdomen: Soft, nontender, nondistended.   Musculoskeletal: Some pedal edema  Integument: No lesions. No erythema.        ASSESSMENT:  RLL pneumonia/pulmonary abscess with right empyema  Legionella and strep pneumo Uags negative. Blasto ag negative.  S/p thoracentesis 5/28, cx with strep intermedius. Path w/ acute inflammation with occasional bacterial organisms present.   S/p R chest tube placement 5/29, cx with strep intermedius. S/p MIST 2 protocol.   S/p R VATS and total pulmonary decortication 6/5, R purulent empyema with RLL lung abscess noted. Tissue cx from RLL lung abscess with GNR and GPC. Tissue cx from pleural peel with GPC.  Fevers and worsening leukocytosis, d/t above. Bcxs negative. WBC improving, no further fevers.  Alerted mental status. ? Related to above vs other. Head CT w/o acute process.   Elevated LFTs. CT a/p and US abd unremarkable. Improving.    Anemia  IgM kappa MGUS  DM II. Hgb A1C 7.1  COPD     PLAN:  - continue IV vancomycin, will dc IV Unasyn and broaden to IV Zosyn given now with GNR in tissue cx from lung abscess  - follow temps and wbc--> repeat CBC in am  - follow LFTs--> repeat CMP in am  - follow OR cultures  - will need prolonged course of IV abx on dc (anticipate 4 weeks from surgery)    6/7 HOSPITALIST NOTE    Subjective:  Feeling better. One CT out           Objective:  Review of Systems:   A comprehensive review of systems was completed; pertinent positive and negatives stated in subjective.     Vital signs:  Temp:  [97.6 °F (36.4 °C)-98.2 °F (36.8 °C)] 98.1 °F (36.7 °C)  Pulse:  [] 96  Resp:  [9-27] 12  BP: ()/(38-93) 114/64  SpO2:  [91 %-100 %]  100 %     Physical Exam:    General: No acute distress   Respiratory: No wheezes, no rhonchi  Cardiovascular: S1, S2, regular rate and rhythm  Abdomen: Soft, Non-tender, non-distended, positive bowel sounds  Neuro: No new focal deficits.   Extremities: No edema    Recent Labs   Lab 05/31/24  0733 05/31/24  0855 05/31/24  1708 06/01/24  0758 06/01/24  1615 06/02/24  0806 06/02/24  1422 06/03/24  0755 06/03/24  1558 06/05/24  0650 06/05/24  1153 06/05/24  1749 06/06/24  0027 06/06/24  0442 06/06/24  1636 06/07/24  0017 06/07/24  0538   WBC 17.8*  --   --  21.6*  --  26.3*   < > 38.6*  --  27.5* 31.9*  --   --  26.9*  --   --  16.1*   HGB 8.9*  8.9*  --    < > 9.2*  9.2*   < > 8.9*  8.9*   < > 9.2*  8.8*   < > 7.3* 8.7*   < > 8.1* 7.8*  7.8* 9.4* 9.1* 8.2*   MCV 89.7  --   --  89.7  --  89.2   < > 92.6  --  93.5 90.5  --   --  91.4  --   --  90.6   .0*  --   --  577.0*  --  536.0*   < > 698.0*  --  694.0* 550.0*  --   --  597.0*  --   --  585.0*   BAND 2  --   --   --   --   --   --   --   --  4  --   --   --   --   --   --   --    INR >15.49* 1.33*  --  1.35*  --  1.34*  --  1.38*  --   --   --   --   --   --   --   --   --     < > = values in this interval not displayed.                 Recent Labs   Lab 06/02/24  0806 06/03/24  0755 06/04/24  0910 06/05/24  0650 06/07/24  0538   * 149*  --  137* 131*   BUN 4* 7*  --  9 4*   CREATSERUM 0.42* 0.54* 0.59 0.48*  0.48* 0.40*   CA 8.1* 8.3*  --  8.0* 7.5*   ALB 1.2* 1.3*  --   --  1.0*    131*  --  136 141   K 3.7 4.1  --  3.7 4.2    101  --  105 112   CO2 20.0* 21.0  --  22.0 25.0   ALKPHO 319* 295*  --   --  193*   * 94*  --   --  65*   * 156*  --   --  62*   BILT 0.7 0.6  --   --  0.3   TP 5.7* 6.2*  --   --  5.3*               Assessment & Plan:  # RLL pneumonia with empyema   # RLL effusion with loculation and chest pain   - sp chest tube by IR on 5/29  -abx adjusted per cultures   - sp MIST w/o improvement   - s/p VATS  decortication today monitoring in ICU   - CT Chest reviewed  - Pulmonology and ID rec appreciated   - pain meds     # elevated liver enzymes seems ischemic in nature per course - abd benign on exam   -improving  - US abd with doppler neg   - avoid hepatotoxic agents     # Iron def Anemia- sp PRBC   - no s/s bleeding      #Coagulopathy- repeat INR normalized      #COPD   -no active wheezing  -Continue inhalers  -prn nebs.     #Acute metabolic toxic (anxiolytic) encephalopathy, resolved   -Ammonia normal   -CT brain negative  -ABG reviewed     #Hypertension-metoprolol on hold due to low BP     #DM 2-hyperglycemia protocol     #GERD/ Jamison's esophagus -PPI     #MDD/anxiety- Home med      Remove bui  Ok to t/f out of CCU from my perspective      6/7 THORACIC SURGERY POST-OPERATIVE PROGRESS NOTE     Subjective: Was up in the chair yesterday. Getting IS to 500. Not ambulating. Still has some pain.      Objective:     I/O last 3 completed shifts:  In: 5345.3 [P.O.:2400; I.V.:547; Blood:448.3; IV PIGGYBACK:1950]  Out: 6725 [Urine:6300; Chest Tube:425]     Chest tube 1: 160cc serous, no air leak   Chest tube 2: 110cc serous, no air leak               Recent Labs   Lab 06/05/24  1153 06/05/24  1749 06/06/24  0442 06/06/24  1636 06/07/24  0017 06/07/24  0538   HGB 8.7*   < > 7.8*  7.8* 9.4* 9.1* 8.2*   WBC 31.9*  --  26.9*  --   --  16.1*   .0*  --  597.0*  --   --  585.0*    < > = values in this interval not displayed.               Recent Labs   Lab 06/03/24  0755 06/05/24  0650 06/07/24  0538   * 136 141   K 4.1 3.7 4.2    105 112   CO2 21.0 22.0 25.0   BUN 7* 9 4*         Exam:     /64 (BP Location: Right arm)   Pulse 96   Temp 98.1 °F (36.7 °C) (Temporal)   Resp 12   Ht 154.9 cm (5' 1\")   Wt 132 lb 12.8 oz (60.2 kg)   SpO2 100%   BMI 25.09 kg/m²         Assessment: 64 year old female, 2 Days Post-Op from Decortication for empyema and right lower lobe lung abscess     Plan:  Chest tube  #2 removed.   Continue chest tube #1 to suction, okay to ambulate off of suction.   Ambulate as tolerated, PT  Spend majority of day out of bed, in chair  Encouraged cough and IS use  Hep Lock, d/c bui  Pain control  General Diet  Okay for transfer to the floor

## 2024-06-07 NOTE — PROGRESS NOTES
[facility]   part of [facility] ID PROGRESS NOTE    Soumya King Patient Status:  Inpatient    6/3/1960 MRN [id number]   Location [facility] 5NW-A Attending Hoda Juan MD   Hosp Day # 12 PCP Rika Vinson DO     Abx: IV Unasyn D#7, IV vanco D#5  s/p Zosyn and vanco    Subjective: Patient seen and examined today. Awake. Some cough. Afebrile. On room air.     Allergies:  Allergies   Allergen Reactions    Naprosyn [Naproxen] ANAPHYLAXIS     Has tolerated ibuprofen and IV toradol     Aspirin OTHER (SEE COMMENTS)     Difficulty swallowing due to  saavedra's esophagus       Medications:    Current Facility-Administered Medications:     piperacillin-tazobactam (Zosyn) 3.375 g in dextrose 5% 100 mL IVPB-ADDV, 3.375 g, Intravenous, Q8H    lactated ringers infusion, , Intravenous, Continuous    meperidine (Demerol) 25 MG/ML injection 25 mg, 25 mg, Intravenous, Q1H PRN    sodium chloride 0.9% infusion, , Intravenous, Continuous    oxyCODONE immediate release tab 5 mg, 5 mg, Oral, Q4H PRN **OR** oxyCODONE immediate release tab 10 mg, 10 mg, Oral, Q4H PRN    HYDROmorphone (Dilaudid) 1 MG/ML injection 0.4 mg, 0.4 mg, Intravenous, Q2H PRN **OR** HYDROmorphone (Dilaudid) 1 MG/ML injection 0.8 mg, 0.8 mg, Intravenous, Q2H PRN    polyethylene glycol (PEG 3350) (Miralax) 17 g oral packet 17 g, 17 g, Oral, Daily PRN    sennosides (Senokot) tab 17.2 mg, 17.2 mg, Oral, Nightly PRN    bisacodyl (Dulcolax) 10 MG rectal suppository 10 mg, 10 mg, Rectal, Daily PRN    fleet enema (Fleet) 7-19 GM/118ML rectal enema 133 mL, 1 enema, Rectal, Once PRN    ondansetron (Zofran) 4 MG/2ML injection 4 mg, 4 mg, Intravenous, Q6H PRN    prochlorperazine (Compazine) 10 MG/2ML injection 5 mg, 5 mg, Intravenous, Q8H PRN    calcium carbonate (Tums) chewable tab 1,000 mg, 1,000 mg, Oral, TID PRN    heparin (Porcine) 5000 UNIT/ML injection 5,000 Units, 5,000 Units, Subcutaneous, 2 times per day    lidocaine-menthol 4-1 %  patch 1 patch, 1 patch, Transdermal, Daily    vancomycin (Vancocin) 750 mg in sodium chloride 0.9% 250 mL IVPB-ADDV, 750 mg, Intravenous, Q12H    ipratropium-albuterol (Duoneb) 0.5-2.5 (3) MG/3ML inhalation solution 3 mL, 3 mL, Nebulization, Q4H PRN    metoprolol tartrate (Lopressor) tab 25 mg, 25 mg, Oral, 2x Daily(Beta Blocker)    [Held by provider] clonazePAM (KlonoPIN) tab 0.5 mg, 0.5 mg, Oral, BID PRN    [Transfer Hold] HYDROmorphone (Dilaudid) 1 MG/ML injection 0.2 mg, 0.2 mg, Intravenous, Once    oxybutynin ER (Ditropan-XL) 24 hr tab 5 mg, 5 mg, Oral, Daily    aspirin DR tab 81 mg, 81 mg, Oral, Daily    atorvastatin (Lipitor) tab 80 mg, 80 mg, Oral, Nightly    benzonatate (Tessalon) cap 100 mg, 100 mg, Oral, TID PRN    busPIRone (Buspar) tab 20 mg, 20 mg, Oral, Daily    gabapentin (Neurontin) tab 600 mg, 600 mg, Oral, Nightly    pantoprazole (Protonix) DR tab 40 mg, 40 mg, Oral, QAM AC    QUEtiapine ER (SEROquel XR) 24 hr tab 200 mg, 200 mg, Oral, QPM    rizatriptan (Maxalt-MLT) disintegrating tab 5 mg, 5 mg, Oral, PRN    umeclidinium bromide (Incruse Ellipta) 62.5 MCG/ACT inhaler 1 puff, 1 puff, Inhalation, Daily    glucose (Dex4) 15 GM/59ML oral liquid 15 g, 15 g, Oral, Q15 Min PRN **OR** glucose (Glutose) 40% oral gel 15 g, 15 g, Oral, Q15 Min PRN **OR** glucose-vitamin C (Dex-4) chewable tab 4 tablet, 4 tablet, Oral, Q15 Min PRN **OR** dextrose 50% injection 50 mL, 50 mL, Intravenous, Q15 Min PRN **OR** glucose (Dex4) 15 GM/59ML oral liquid 30 g, 30 g, Oral, Q15 Min PRN **OR** glucose (Glutose) 40% oral gel 30 g, 30 g, Oral, Q15 Min PRN **OR** glucose-vitamin C (Dex-4) chewable tab 8 tablet, 8 tablet, Oral, Q15 Min PRN    melatonin tab 3 mg, 3 mg, Oral, Nightly PRN    insulin aspart (NovoLOG) 100 Units/mL FlexPen 1-10 Units, 1-10 Units, Subcutaneous, TID AC and HS    guaiFENesin (Robitussin) 100 MG/5 ML oral liquid 200 mg, 200 mg, Oral, Q4H PRN    sodium chloride (Saline Mist) 0.65 % nasal solution 1  spray, 1 spray, Each Nare, Q3H PRN    Review of Systems:  Completed. See pertinent positives and negatives above.    Physical Exam:  Vital signs: Blood pressure (!) 123/97, pulse 120, temperature 97.3 °F (36.3 °C), resp. rate (!) 29, height 154.9 cm (5' 1\"), weight 132 lb 12.8 oz (60.2 kg), SpO2 91%, not currently breastfeeding.    General: Alert, oriented. NAD. On NC.   HEENT: Moist mucous membranes.   Neck: No lymphadenopathy.  Supple.  Cardiovascular: RRR.   Respiratory: Diminished breath sounds on the R. + R chest tube in place.   Abdomen: Soft, nontender, nondistended.   Musculoskeletal: Some pedal edema  Integument: No lesions. No erythema.    Laboratory Data:  Recent Labs   Lab 06/05/24  0650 06/05/24  1153 06/07/24  0538   RBC 2.46*   < > 2.76*   HGB 7.3*   < > 8.2*   HCT 23.0*   < > 25.0*   MCV 93.5   < > 90.6   MCH 29.7   < > 29.7   MCHC 31.7   < > 32.8   RDW 15.8   < > 16.2   NEPRELIM 22.83*  --  11.54*   WBC 27.5*   < > 16.1*   .0*   < > 585.0*   NEUT 89  --   --    LYMPH 7  --   --    MON 0  --   --    EOS 0  --   --     < > = values in this interval not displayed.     Recent Labs   Lab 06/02/24  0806 06/03/24  0755 06/04/24  0910 06/05/24  0650 06/07/24  0538   * 149*  --  137* 131*   BUN 4* 7*  --  9 4*   CREATSERUM 0.42* 0.54* 0.59 0.48*  0.48* 0.40*   CA 8.1* 8.3*  --  8.0* 7.5*   ALB 1.2* 1.3*  --   --  1.0*    131*  --  136 141   K 3.7 4.1  --  3.7 4.2    101  --  105 112   CO2 20.0* 21.0  --  22.0 25.0   ALKPHO 319* 295*  --   --  193*   * 94*  --   --  65*   * 156*  --   --  62*   BILT 0.7 0.6  --   --  0.3   TP 5.7* 6.2*  --   --  5.3*       Microbiology: Reviewed in EMR    Radiology: Reviewed.    PROCEDURE:  CT CHEST (CPT=71250)     COMPARISON:  [facility], CT CHEST (CPT=71250), 5/29/2024, 9:05 AM.     INDICATIONS:  evaluate empyema     TECHNIQUE:  Unenhanced multislice CT scanning is performed through the chest.  Dose reduction techniques were used.  Dose information is transmitted to the ACR (American College of Radiology) NRDR (National Radiology Data Registry) which includes the Dose   Index Registry.     PATIENT STATED HISTORY: (As transcribed by Technologist)  Evaluation of chest tube.      FINDINGS:  Evaluation of the visceral organs is limited due to the lack of intravenous contrast.     LUNGS:  There is consolidation within the right lower lobe, right middle lobe, and to a lesser extent the right upper lobe suspicious for multifocal pneumonia.  There are small nodular ground-glass airspace opacities within the right upper lobe which may   represent the known pneumonia.  VASCULATURE:  Unremarkable.  THORACIC AORTA:  Scattered atherosclerosis.  MEDIASTINUM/ANKUSH:  Mild to moderate mediastinal adenopathy is nonspecific but may be reactive.  CARDIAC:  Calcified coronary artery disease is noted.  PLEURA:  There is a moderate amount of empyema within the right pleural space.  This is most notable at the posterior inferior aspect of the right pleural space measuring up to 11.1 x 7.3 cm in the axial plane.  Foci of air are seen within this empyema.   A right sided chest tube is seen centered within this dominant region of empyema.  The amount of empyema has increased since 5/29/2024.  A mild left pleural effusion is seen.  CHEST WALL:  Sternotomy changes.  LIMITED ABDOMEN:  Unremarkable.  BONES:  Degenerative changes the spine.  OTHER:  Mild supraclavicular adenopathy is noted.     Impression:    CONCLUSION:    1. A moderate amount empyema is seen within the right pleural space.  The extent of this empyema is worse since 5/29/2024.  A chest tube is well seen within the dominant loculation of probable empyema.  2. Multifocal pneumonia is again seen, most notable within the right middle lobe and right lower lobe.  Continued imaging follow-up is recommended.  3. Mild to moderate mediastinal adenopathy is nonspecific but may be reactive.  Mild supraclavicular  adenopathy is also seen.     LOCATION:  [id number]     Dictated by (CST): Stromberg, LeRoy, MD on 6/03/2024 at 11:24 AM      Finalized by (CST): Stromberg, LeRoy, MD on 6/03/2024 at 11:35 AM        CORRECTION Corrected on: 5/29/2024;         PROCEDURE:  CT CHEST (CPT=71250)    COMPARISON:  [facility] , XR, XR CHEST AP PORTABLE  (CPT=71045), 5/29/2024,   4:27 AM.  [facility], CT, CT ANGIOGRAPHY, CHEST (CPT=71275), 5/26/2024, 12:49   PM.    INDICATIONS:  Follow-up empyema    TECHNIQUE:  Unenhanced multislice CT scanning is performed through the   chest.  Dose reduction techniques were used. Dose information is   transmitted to the ACR (American College of Radiology) NRDR (National   Radiology Data Registry) which includes the Dose  Index Registry.  There is image degradation due to motion slightly   limiting evaluation    PATIENT STATED HISTORY: (As transcribed by Technologist)  Recent empyema       FINDINGS:   LUNGS:  Masslike consolidation with internal punctate foci of air is most   consistent with patient's known consolidation and measures 5.7 x 8.1 x 6.1   versus 5 x 7.2 x 5.1 cm, AP x T x cc dimension respectively.  Increasing   adjacent interstitial and   ground-glass density. VASCULATURE:  Pulmonary vessels are unremarkable within the limits of a   noncontrast CT.   ANKUSH:  Limited due to lack of IV contrast.   MEDIASTINUM:  Adenopathy with subcarinal node measuring 2.8 x 3.3 versus   2.1 x 2 cm and right paratracheal node measuring 2 x 1.3 versus 2 x 1.4   cm.  Mediastinal clips. CARDIAC:  No enlargement or pericardial thickening.  Mild coronary artery   calcifications. PLEURA:  Increasing pleural fluid along the right fissures which is   lobular in contour and measures 2.6 cm in depth.  Right pleural effusion   measures 3.7 versus 2.9 cm with left pleural fluid measuring 1.7 versus   0.8 cm in depth.   THORACIC AORTA:  Atherosclerosis.   CHEST WALL:  No mass or axillary adenopathy.  Sternotomy wires. LIMITED ABDOMEN:   Limited images of the upper abdomen are unremarkable.   BONES:  No fracture.      CONCLUSION:   1. When compared to most recent CT of the chest performed 5/26/2024,   patient's known right lower lobe consolidation has slightly increased in   size with increasing adjacent interstitial and ground-glass density,   correlate clinically. 2. Increasing pleural fluid as detailed above.    LOCATION:  [facility]       Dictated by (CST): Britney Mendes MD on 5/29/2024 at 9:39 AM       Finalized by (CST): Britney Mendes MD on 5/29/2024 at 9:51 AM      Dictated by (CST): Britney Mendes MD on 5/29/2024 at 11:23 AM       Finalized by (CST): Britney Mendes MD on 5/29/2024 at 11:23 AM                       Signed: 05/29/24 1123 by Britney Mendes MD  Narrative:    PROCEDURE:  CT CHEST (CPT=71250)     COMPARISON:  [facility] , XR, XR CHEST AP PORTABLE  (CPT=71045), 5/29/2024, 4:27 AM.  [facility], CT, CT ANGIOGRAPHY, CHEST (CPT=71275), 5/26/2024, 12:49 PM.     INDICATIONS:  Follow-up empyema     TECHNIQUE:  Unenhanced multislice CT scanning is performed through the chest.  Dose reduction techniques were used. Dose information is transmitted to the ACR (American College of Radiology) NRDR (National Radiology Data Registry) which includes the Dose   Index Registry.  There is image degradation due to motion slightly limiting evaluation     PATIENT STATED HISTORY: (As transcribed by Technologist)  Recent empyema      FINDINGS:    LUNGS:  Masslike consolidation with internal punctate foci of air is most consistent with patient's known empyema and measures 5.7 x 8.1 x 6.1 versus 5 x 7.2 x 5.1 cm, AP x T x cc dimension respectively.  Increasing adjacent interstitial and ground-glass   density.  VASCULATURE:  Pulmonary vessels are unremarkable within the limits of a noncontrast CT.    ANKUSH:  Limited due to lack of IV contrast.    MEDIASTINUM:  Adenopathy with subcarinal node measuring 2.8 x 3.3 versus 2.1 x 2 cm and right paratracheal node measuring 2 x 1.3 versus 2 x 1.4  cm.  Mediastinal clips.  CARDIAC:  No enlargement or pericardial thickening.  Mild coronary artery calcifications.  PLEURA:  Increasing pleural fluid along the right fissures which is lobular in contour and measures 2.6 cm in depth.  Right pleural effusion measures 3.7 versus 2.9 cm with left pleural fluid measuring 1.7 versus 0.8 cm in depth.    THORACIC AORTA:  Atherosclerosis.  CHEST WALL:  No mass or axillary adenopathy.  Sternotomy wires.  LIMITED ABDOMEN:  Limited images of the upper abdomen are unremarkable.    BONES:  No fracture.       Impression:    CONCLUSION:    1. When compared to most recent CT of the chest performed 5/26/2024, patient's known right lower lobe empyema has slightly increased in size with increasing adjacent interstitial and ground-glass density, correlate clinically.  2. Increasing pleural fluid as detailed above.     LOCATION:  [facility]     Dictated by (CST): Britney Mendes MD on 5/29/2024 at 9:39 AM      Finalized by (CST): Britney Mendes MD on 5/29/2024 at 9:51 AM       PROCEDURE:  XR CHEST AP PORTABLE  (CPT=71045)     TECHNIQUE:  AP chest radiograph was obtained.     COMPARISON:  [facility] , CT, CT ABDOMEN+PELVIS(CONTRAST ONLY)(CPT=74177), 5/28/2024, 1:32 PM.  [facility] , XR, XR CHEST AP PORTABLE  (CPT=71045), 5/28/2024, 4:38 PM.     INDICATIONS:  dyspnea     PATIENT STATED HISTORY: (As transcribed by Technologist)  dyspnea     FINDINGS:  Cardiomediastinal silhouette is stable in size and appearance with sternotomy wires and mediastinal clips.  Persistent blunting of the right costophrenic angle is consistent with small pleural effusion versus reaction.  Persistent interstitial   and right perihilar/lower lobe masslike airspace opacity.  No new focal consolidation.  No pneumothorax.     Impression:    CONCLUSION:    1. No significant change, allowing for differences in technique when compared to 5/28/2024 chest radiograph.     LOCATION:  [facility]    Dictated by (CST): Britney Mendes MD on 5/29/2024 at 7:58  AM      Finalized by (CST): Britney Mendes MD on 5/29/2024 at 8:00 AM        PROCEDURE:  US THORACENTESIS GUIDED RIGHT (CPT=32555)     COMPARISON:  [facility] , XR, XR CHEST AP PORTABLE  (CPT=71045), 5/28/2024, 4:38 PM.  [facility] , XR, XR CHEST AP PORTABLE  (CPT=71045), 5/28/2024, 4:48 AM.  [facility], CT, CT ANGIOGRAPHY, CHEST (CPT=71275), 5/26/2024, 12:49 PM.     INDICATIONS:  pneumonia     DESCRIPTION OF PROCEDURE:  The clinical scenario and referral were discussed with Dr. Ya.  Referral for ultrasound-guided right thoracentesis.  Because of her altered mental status, witnessed written and verbal informed consent were obtained from her  sister.  The procedure was also discussed with her.  She voiced understanding and wished to proceed.  Time-out was performed by the staff.     She could not achieve a seated position.  Positioning was optimized, supine oblique.  Ultrasound demonstrated a small right complex pleural effusion corresponding to the CTA and chest x-ray abnormality.  Access site was chosen.  The overlying skin was  prepped in the usual sterile manner.  1% lidocaine was used locally.  Using realtime ultrasound an 8 Citizen of [place] multi side hole sheath needle was advanced.  Once in position aspiration to dryness with removal of only 10-11 mL of thin clear yellow fluid.    Samples were sent to RT and the laboratory for testing.  The system was removed.  Sterile dressings were placed.  She tolerated the procedure.  No immediate complication.     Impression:    CONCLUSION:  Small complex right pleural effusion.  Only a small amount of fluid could be aspirated.  Sample sent to RT and the laboratory for testing.     LOCATION:  [facility]     Dictated by (CST): Jace Govea MD on 5/28/2024 at 5:13 PM      Finalized by (CST): Jace Govea MD on 5/28/2024 at 5:16 PM      PROCEDURE:  US ABDOMEN LIMITED (CPT=76705)     COMPARISON:  [facility] , CT, CT ABDOMEN+PELVIS(CONTRAST ONLY)(CPT=74177), 5/28/2024, 1:32 PM.     INDICATIONS:  elevated  lfts. eval bile duct, cirrhosis, etc     PATIENT STATED HISTORY: (As transcribed by Technologist)        FINDINGS:    LIVER:  Normal size and echogenicity. No significant masses.  BILIARY:  Common bile duct measures 6 mm in region of stephanie hepatis.  There is no intrahepatic bile duct distension.  Gallbladder is unremarkable.  PANCREAS:  Normal.  RIGHT KIDNEY:  Normal.  OTHER:  There is a small amount of free fluid in posterior subhepatic space.  Incidental note is made of a right pleural effusion.     Impression:    CONCLUSION:    1. Trace ascites is noted in posterior subhepatic space.  2. Right pleural effusion has been noted on prior CT scan.  3. There is otherwise no abnormality detected on this abdominal ultrasound.       LOCATION:  [facility]     Dictated by (CST): Abad Chen MD on 5/28/2024 at 4:56 PM      Finalized by (CST): Abad Chen MD on 5/28/2024 at 4:57 PM          PROCEDURE:  XR CHEST AP PORTABLE  (CPT=71045)     TECHNIQUE:  AP chest radiograph was obtained.     COMPARISON:  [facility] , US, US THORACENTESIS GUIDED RIGHT (CPT=32555), 5/28/2024, 3:53 PM.  [facility] , XR, XR CHEST AP PORTABLE  (CPT=71045), 5/28/2024, 4:48 AM.     INDICATIONS:  Post Thoracentesis     PATIENT STATED HISTORY: (As transcribed by Technologist)  Right sided post thoracentesis. Patient offered no additional history at this time.      FINDINGS:  Status post right thoracentesis.  There is a small right pleural effusion.  Patchy opacity at the right lung base may represent pneumonia.  There is no pneumothorax.  Median sternotomy wires are stable.  The heart is normal in size.     Impression:    CONCLUSION:  Small right pleural effusion with patchy airspace disease the right lung base could represent pneumonia.  There is no pneumothorax.     LOCATION:  [facility]     Dictated by (CST): Elieser Torres MD on 5/28/2024 at 4:55 PM      Finalized by (CST): Elieser Torres MD on 5/28/2024 at 4:56 PM      PROCEDURE:  CT ABDOMEN+PELVIS (CONTRAST  ONLY) (CPT=74177)     [facility]ATTENTION PANCREAS) (W+ WO) (CPT=74170), 3/02/2022, 3:52 PM.  [facility], CT, CT ABDOMEN+PELVIS(CONTRAST ONLY)(CPT=74177), 9/18/2021, 2:27 PM.     INDICATIONS:  GIB     TECHNIQUE:  CT scanning was performed from the dome of the diaphragm to the pubic symphysis with non-ionic intravenous contrast material. Post contrast coronal MPR imaging was performed.  Dose reduction techniques were used. Dose information is  transmitted to the ACR (American College of Radiology) NRDR (National Radiology Data Registry) which includes the Dose Index Registry.     PATIENT STATED HISTORY:(As transcribed by Technologist)  Poor historian      CONTRAST USED:  85cc of Isovue 370     FINDINGS:    LUNG BASES:  Loculated right-sided pleural effusion with thick wall measuring approximately 7.8 x 2.5 x 12.7 cm.  Findings suggestive of empyema.  There is consolidation within the right lower lobe with complex fluid and small foci of air measuring 3.0 x   2.7 x 3.2 cm and 2.1 x 2.8 x 1.7 cm which may represent pulmonary abscesses/necrotizing pneumonia.  Small left-sided pleural effusion.  LIVER:  Normal in shape and contour.    BILIARY:  Gallbladder is unremarkable in appearance.  No intrahepatic biliary dilatation..  SPLEEN:  Normal.  No enlargement or focal lesion.  PANCREAS:  No kirk-pancreatic inflammatory stranding  ADRENALS:  Normal.  No mass or enlargement.    KIDNEYS:  Kidneys are symmetrical in size without evidence of hydronephrosis.  BOWEL/MESENTERY:  Bowel is normal in caliber. No evidence of obstruction.  Probable Villanueva's type hernia containing fat and a portion of bowel  within the right para umbilical region.  No evidence of obstruction.  Mildly prominent retroperitoneal lymph  nodes.  PELVIS:  Bladder is distended.  Small amount of free pelvic fluid.    AORTA/VASCULAR:  Atheromatous calcifications of the aorta.  BONES:  No acute fractures.  Median sternotomy  approximated by wire sutures.     Impression:    CONCLUSION:    1. Loculated right-sided pleural effusion suggestive empyema with consolidation within the right lower lobe.  There are areas of fluid and small foci of air within the consolidation which may represent pulmonary abscesses/necrotizing pneumonia.  2. Small left-sided pleural effusion with associated atelectasis.  3. Nonspecific mildly prominent retroperitoneal lymph nodes which may be reactive in nature.  4. Trace amount of free pelvic fluid.       LOCATION:  [facility]     Dictated by (CST): Annette Ferreira MD on 5/28/2024 at 2:11 PM      Finalized by (CST): Annette Ferreira MD on 5/28/2024 at 2:17 PM         PROCEDURE:  CT BRAIN OR HEAD (05284)     COMPARISON:  None.     INDICATIONS:  lethargy     TECHNIQUE:  Noncontrast CT scanning is performed through the brain. Dose reduction techniques were used. Dose information is transmitted to the ACR (American College of Radiology) NRDR (National Radiology Data Registry) which includes the Dose Index  Registry.     PATIENT STATED HISTORY: (As transcribed by Technologist)  Patient is lethargic.       FINDINGS:    VENTRICLES/SULCI:  Ventricles and sulci are normal in size.    INTRACRANIAL:  There is a chronic appearing lacunar infarct involving the right caudate nucleus at the junction of the head and body..  There are no abnormal extraaxial fluid collections.  There is no midline shift.  There are no intraparenchymal brain  abnormalities.  There is nothing specific for acute infarct.  There is no hemorrhage or mass lesion.    SINUSES:           No sign of acute sinusitis.    MASTOIDS:          No sign of acute inflammation.  SKULL:             No evidence for fracture or osseous abnormality.  OTHER:             None.    Impression:    CONCLUSION:  No evidence of acute intracranial process.     LOCATION:  [facility]     Dictated by (CST): Elieser Torres MD on 5/28/2024 at 8:49 AM      Finalized by (CST): Elieser Torres MD  on 5/28/2024 at 8:54 AM      PROCEDURE:  XR CHEST AP PORTABLE  (CPT=71045)     TECHNIQUE:  AP chest radiograph was obtained.     COMPARISON:  [facility], CT, CT ANGIOGRAPHY, CHEST (CPT=71275), 5/26/2024, 12:49 PM.  [facility], XR, XR CHEST PA + LAT CHEST (CPT=71046), 5/26/2024, 11:43 AM.     INDICATIONS:  dyspnea     PATIENT STATED HISTORY: (As transcribed by Technologist)  dyspnea      Impression:    CONCLUSION:    Limited AP portable view shows small-moderate right pleural effusion cleaning some pleural fluid tracking up the lower right lateral chest.  Possibly loculated.  Patchy consolidation mid-lower right chest.  Small blunting left costophrenic   angle.  Cardiomegaly.  No pneumothorax. Consider upright PA and lateral examination of the chest, when tolerated.  CT follow-up may also be beneficial.       LOCATION:  [facility]     Dictated by (CST): Abhishek Mora MD on 5/28/2024 at 8:36 AM      Finalized by (CST): Abhishek Mora MD on 5/28/2024 at 8:37 AM          PROCEDURE:  US VENOUS DOPPLER LEG LEFT - DIAG IMG (CPT=93971)     COMPARISON:  None.     INDICATIONS:  Left lower extremity swelling and pain     TECHNIQUE:  Real time, grey scale, and duplex ultrasound was used to evaluate the lower extremity venous system. B-mode two-dimensional images of the vascular structures, Doppler spectral analysis, and color flow.  Doppler imaging were performed.  The  following veins were imaged:  Common, deep, and superficial femoral, popliteal, sapheno-femoral junction, posterior tibial veins, and the contralateral common femoral vein.     PATIENT STATED HISTORY: (As transcribed by Technologist)        FINDINGS:    EXTREMITY EXAMINED:  Left lower extremity  SAPHENOFEMORAL JUNCTION:  No reflux.  THROMBI:  None visible.  COMPRESSION:  Normal compressibility, phasicity, and augmentation.  OTHER:  Negative.     Impression:    CONCLUSION:  No evidence of DVT in the left lower extremity.     LOCATION:  [facility]    Dictated by (CST): Roc  MD Panda on 5/26/2024 at 2:46 PM      Finalized by (CST): Roc Mosqueda MD on 5/26/2024 at 2:49 PM      PROCEDURE:  CT ANGIOGRAPHY, CHEST (CPT=71275)     COMPARISON:  [facility], CT, CT CHEST LD NO CAD(CPT=71250), 3/13/2024, 8:38 PM.     INDICATIONS:  sob, hypotension, pleurisy, R lung mass     TECHNIQUE:  IV contrast-enhanced multislice CT angiography is performed through the pulmonary arterial anatomy. 3D volume renderings are generated.  Dose reduction techniques were used. Dose information is transmitted to the ACR (American College of  Radiology) NRDR (National Radiology Data Registry) which includes the Dose Index Registry.     PATIENT STATED HISTORY:(As transcribed by Technologist)  SOB, hypotension; right lung mass      CONTRAST USED:  100cc of Isovue 370     FINDINGS:    VASCULATURE:  No visible pulmonary arterial thrombus or attenuation.    THORACIC AORTA:  No aneurysm or visible dissection.    LUNGS:  Masslike consolidation in the right lower lobe is new compared to the prior examination.  Given its acute appearance an infectious process is most likely.  Small central areas of cavitation are noted with associated low attenuation which may  represent necrosis.  Moderate right pleural effusion is noted.  MEDIASTINUM:  Right paratracheal node is noted measuring up to 2.0 x 1.4 cm (image 38).  A subcarinal lymph node is noted measuring up to 2.0 x 2.1 cm (image 93)..    ANKUSH:  No mass or adenopathy.    CARDIAC:  No enlargement, pericardial thickening, or significant coronary artery calcification.  PLEURA:  No mass or effusion.    CHEST WALL:  No mass or axillary adenopathy.  Patient is status post median sternotomy.  LIMITED ABDOMEN:  Limited images of the upper abdomen are unremarkable.    BONES:  No bony lesion or fracture.    OTHER:  Negative.       Impression:    CONCLUSION:    1. Masslike consolidation with areas of central necrosis and mild cavitation in the right lower lobe.  Given its acute  appearance this may represent an infectious process.  Correlate clinically.  Short-term follow-up after treatment is recommended.  2. Mediastinal adenopathy is likely reactive.  Attention on follow-up is recommended.     LOCATION:  [facility]     Dictated by (CST): Roc Mosqueda MD on 5/26/2024 at 1:21 PM      Finalized by (CST): Roc Mosqueda MD on 5/26/2024 at 1:25 PM        PROCEDURE:  XR CHEST PA + LAT CHEST (CPT=71046)     INDICATIONS:  sob, cough, crackles R base     COMPARISON:  [facility], CT, CT CHEST LD NO CAD(CPT=71250), 3/13/2024, 8:38 PM.  [facility] , XR, XR CHEST DECUBITUS BILAT INCL PA AND LAT(4 VIEWS)(CPT=71048), 1/15/2024, 9:15 AM.     TECHNIQUE:  PA and lateral chest radiographs were obtained.     PATIENT STATED HISTORY: (As transcribed by Technologist)  Patient stated she has been having a cough for 1 week and a crackle sound in her right lung.      FINDINGS:    LUNGS:  An area of masslike consolidation in the right upper lobe is noted which is new from the prior examination.  Small right pleural effusion is present.  CARDIAC:  Normal size cardiac silhouette.  Patient is status post median sternotomy.  MEDIASTINUM:  Normal.  PLEURA:  Normal.  No pleural effusions.  BONES:  Normal for age.     Impression:    CONCLUSION:  Masslike consolidation in the right upper lobe is noted.  Given it is acute appearance this likely represents an infectious process.  Small right effusion is noted.  Correlate clinically.     LOCATION:  [facility]     Dictated by (CST): Roc Mosqueda MD on 5/26/2024 at 12:05 PM      Finalized by (CST): Roc Mosqueda MD on 5/26/2024 at 12:06 PM    ASSESSMENT:  RLL pneumonia/pulmonary abscess with right empyema  Legionella and strep pneumo Uags negative. Blasto ag negative.  S/p thoracentesis 5/28, cx with strep intermedius. Path w/ acute inflammation with occasional bacterial organisms present.   S/p R chest tube placement 5/29, cx with strep intermedius. S/p MIST 2 protocol.   S/p R VATS and  total pulmonary decortication 6/5, R purulent empyema with RLL lung abscess noted. Tissue cx from RLL lung abscess with GNR and GPC- per micro, gram + is lactobacillus and they feel GNR may be an anaerobe. Tissue cx from pleural peel with lactobacillus.  Fevers and worsening leukocytosis, d/t above. Bcxs negative. WBC improving, no further fevers.  Alerted mental status. ? Related to above vs other. Head CT w/o acute process. Resolved.  Elevated LFTs. CT a/p and US abd unremarkable. Improving.    Anemia  IgM kappa MGUS  DM II. Hgb A1C 7.1  COPD    PLAN:  - will dc IV vancomycin and IV Zosyn and narrow to IV Unasyn  - follow temps and wbc  - follow LFTs  - follow OR cultures- d/w micro, they will see if they can do jessica on lactobacillus species  - will need prolonged course of IV abx on dc (anticipate 4 weeks from surgery). Ok for PICC placement today.     Discussed case with Dr. Menezes, ken, RN and patient. D/w patient's sister over the phone.     Beverly Guillen PA-C    ID ATTENDING ADDENDUM     Pt seen an examined independently. Chart reviewed. Agree with above. Note has been reviewed by me and modified as needed.  Exam and Impression/ Recs as noted above.  Will follow  D/w staff and with pt  More than 50% of clinical time and 100% of the clinical decision making performed by me.    Vibha Menezes MD

## 2024-06-07 NOTE — CM/SW NOTE
Met with patient at bedside to discuss list for IV ABX and HH. [facility] and [facility] chosen. Reserved in [place]. Per pt she may stay with sister Alana in [place] at discharge. This plan has not been finalized at this time, this is pending length of IV ABX.    [facility] and [facility] at discharge. Will need final ABX order.     Deepti Larson, [facility] RN, CM  [phone]

## 2024-06-07 NOTE — PLAN OF CARE
Assumed care at 1930.  AOx3 forgetful and anxious at times.  PRN Oxy and Dilaudid IVP for pain management. IV Ofirmev given as scheduled.  Right side chest tube x2 in place to -20mmHg suction.  Encouraged to us IS.  PT/OT to see.  Plan of care discussed with pt.  Call light within reach. Continue to monitor.

## 2024-06-07 NOTE — PROGRESS NOTES
[facility]  Progress Note    Soumya King Patient Status:  Inpatient    6/3/1960 MRN [id number]   Formerly [facility] 6NE-A Attending Jose Rafael Raphael MD   Hosp Day # 12 PCP Rika Vinson DO     Subjective:  Soumya King is a(n) 64 year old female remains afebrile  Overall thinks a better night was had  Remains with significant pain taking OxyContin  Being weaned to room air    Objective:  /60   Pulse 103   Temp 97.8 °F (36.6 °C)   Resp 22   Ht 5' 1\" (1.549 m)   Wt 132 lb 12.8 oz (60.2 kg)   SpO2 96%   BMI 25.09 kg/m² currently 2 L      Temp (24hrs), Av.9 °F (36.6 °C), Min:97.6 °F (36.4 °C), Max:98.2 °F (36.8 °C)      Intake/Output:    Intake/Output Summary (Last 24 hours) at 2024 1105  Last data filed at 2024 0800  Gross per 24 hour   Intake 2848.33 ml   Output 3940 ml   Net -1091.67 ml       Physical Exam:   General: alert, cooperative, oriented.  No respiratory distress.  Remains anxious appearing   Head: Normocephalic, without obvious abnormality, atraumatic.   Throat: Lips, mucosa, and tongue normal.  No thrush noted.   Neck: trachea midline, no adenopathy, no thyromegaly.    Lungs: Diminished tones right greater than left no auscultated wheeze   Chest wall: No tenderness or deformity.   Heart: Regular rate and rhythm   Abdomen: soft, non-distended, no masses, no guarding, no     Rebound.  Nontender   Extremity: No edema nontender   Skin: No rashes or lesions.   Neurological: Alert, interactive, no focal deficits    Lab Data Review:  Recent Labs     24  0650 24  1153 24  1749 24  0027 24  0442 24  1636 24  0017 24  0538   WBC 27.5* 31.9*  --   --  26.9*  --   --  16.1*   HGB 7.3* 8.7*   < > 8.1* 7.8*  7.8* 9.4* 9.1* 8.2*   .0* 550.0*  --   --  597.0*  --   --  585.0*    < > = values in this interval not displayed.     Recent Labs     24  0650 24  0538    141   K 3.7 4.2    112   CO2  22.0 25.0   BUN 9 4*   CREATSERUM 0.48*  0.48* 0.40*     Recent Labs   Lab 06/01/24  0758 06/02/24  0806 06/03/24  0755   PTP 16.7* 16.6* 17.0*   INR 1.35* 1.34* 1.38*       Cultures: Surgical specimen tissue growing lactobacillus    Radiology:  No results found.  X-rays reviewed      Medications reviewed     Assessment and Plan:   Patient Active Problem List   Diagnosis    Vitamin D deficiency    Jamison's esophagus    Tobacco use    Insomnia    COPD, mild (HCC) - Dx'd via PFTs done in 3/2015    Migraine without aura and without status migrainosus, not intractable    Hyperlipidemia    SUMMER on CPAP    MDD (major depressive disorder), recurrent episode, moderate (HCC)    GERD (gastroesophageal reflux disease)    Hypertension    Chronic pansinusitis    Mucinous cystadenoma of ovary, left    KIRK (generalized anxiety disorder)    History of pubovaginal sling    Absence of bladder continence    Vasomotor flushing    Primary osteoarthritis of left knee    Mass of spine    Hx of motion sickness    Difficult intubation    Chronic low back pain without sciatica    Type 2 diabetes mellitus with diabetic neuropathy (HCC)    Pulmonary nodule    CAD, multiple vessel    S/P CABG x 2    Type 2 diabetes mellitus with hyperlipidemia (HCC)    Anemia    Iron deficiency anemia secondary to inadequate dietary iron intake    Subacute cough    Hyperglycemia    Acute anemia    MARÍA ELENA (acute kidney injury) (HCC)    Transaminitis    Thrombocytosis    Hyponatremia    Pleural effusion    Somnolence    Acute respiratory failure with hypoxia (HCC)    Empyema of right pleural space (HCC)       Assessment:  Large right lower lobe pneumonia with empyema secondary to Streptococcus intermedius with ongoing empyema despite MIST protocol  Sepsis 2/2 above, WBC 26.9 with likely reactive thrombocytosis  S/p VATS with decortication-lactobacillus and cultures  Pain management  Elevated LFTs resolving  Multiple pulmonary nodules  Acute encephalopathy,  improved  Plan:  1 chest tube removed as per thoracic surgery  Increase activity  Okay to transfer to the floor from pulmonary standpoint  Infectious disease input appreciated    CC     Irina Ya MD  6/7/2024  40min cct   11:05 AM   Griseofulvin Counseling:  I discussed with the patient the risks of griseofulvin including but not limited to photosensitivity, cytopenia, liver damage, nausea/vomiting and severe allergy.  The patient understands that this medication is best absorbed when taken with a fatty meal (e.g., ice cream or french fries).

## 2024-06-07 NOTE — PROGRESS NOTES
THORACIC SURGERY POST-OPERATIVE PROGRESS NOTE    Subjective: Was up in the chair yesterday. Getting IS to 500. Not ambulating. Still has some pain.     Objective:    I/O last 3 completed shifts:  In: 5345.3 [P.O.:2400; I.V.:547; Blood:448.3; IV PIGGYBACK:1950]  Out: 6725 [Urine:6300; Chest Tube:425]    Chest tube 1: 160cc serous, no air leak   Chest tube 2: 110cc serous, no air leak     Recent Labs   Lab 06/05/24  1153 06/05/24  1749 06/06/24  0442 06/06/24  1636 06/07/24  0017 06/07/24  0538   HGB 8.7*   < > 7.8*  7.8* 9.4* 9.1* 8.2*   WBC 31.9*  --  26.9*  --   --  16.1*   .0*  --  597.0*  --   --  585.0*    < > = values in this interval not displayed.       Recent Labs   Lab 06/03/24  0755 06/05/24  0650 06/07/24  0538   * 136 141   K 4.1 3.7 4.2    105 112   CO2 21.0 22.0 25.0   BUN 7* 9 4*       Exam:    /64 (BP Location: Right arm)   Pulse 96   Temp 98.1 °F (36.7 °C) (Temporal)   Resp 12   Ht 154.9 cm (5' 1\")   Wt 132 lb 12.8 oz (60.2 kg)   SpO2 100%   BMI 25.09 kg/m²      General: Awake, alert, NAD  Pulm: normal respiratory effort  Card: RRR  Abd: soft, NT, ND  Wounds: clean, dry, intact, No signs of infection    Assessment: 64 year old female, 2 Days Post-Op from Decortication for empyema and right lower lobe lung abscess    Plan:  Chest tube #2 removed.   Continue chest tube #1 to suction, okay to ambulate off of suction.   Ambulate as tolerated, PT  Spend majority of day out of bed, in chair  Encouraged cough and IS use  Hep Lock, d/c bui  Pain control  General Diet  Okay for transfer to the floor     Patient discussed with Dr. Maldonado.     Yudy Albrecht PA-C  Thoracic Surgery  Pager: [phone]

## 2024-06-07 NOTE — PROGRESS NOTES
[facility]   part of [facility]     Hospitalist Progress Note     Soumya King Patient Status:  Inpatient    6/3/1960 MRN [id number]   Formerly [facility] 5NW-A Attending Apurva Logan MD   Hosp Day # 12 PCP Rika Vinson DO     Subjective:   Feeling better. One CT out    Objective:    Review of Systems:   A comprehensive review of systems was completed; pertinent positive and negatives stated in subjective.    Vital signs:  Temp:  [97.6 °F (36.4 °C)-98.2 °F (36.8 °C)] 98.1 °F (36.7 °C)  Pulse:  [] 96  Resp:  [9-27] 12  BP: ()/(38-93) 114/64  SpO2:  [91 %-100 %] 100 %    Physical Exam:    General: No acute distress   Respiratory: No wheezes, no rhonchi  Cardiovascular: S1, S2, regular rate and rhythm  Abdomen: Soft, Non-tender, non-distended, positive bowel sounds  Neuro: No new focal deficits.   Extremities: No edema    Diagnostic Data:    Labs:  Recent Labs   Lab 24  0733 24  0855 24  1708 24  0758 24  1615 24  0806 24  1422 24  0755 24  1558 24  0650 24  1153 24  1749 24  0027 24  0442 24  1636 24  0017 24  0538   WBC 17.8*  --   --  21.6*  --  26.3*   < > 38.6*  --  27.5* 31.9*  --   --  26.9*  --   --  16.1*   HGB 8.9*  8.9*  --    < > 9.2*  9.2*   < > 8.9*  8.9*   < > 9.2*  8.8*   < > 7.3* 8.7*   < > 8.1* 7.8*  7.8* 9.4* 9.1* 8.2*   MCV 89.7  --   --  89.7  --  89.2   < > 92.6  --  93.5 90.5  --   --  91.4  --   --  90.6   .0*  --   --  577.0*  --  536.0*   < > 698.0*  --  694.0* 550.0*  --   --  597.0*  --   --  585.0*   BAND 2  --   --   --   --   --   --   --   --  4  --   --   --   --   --   --   --    INR >15.49* 1.33*  --  1.35*  --  1.34*  --  1.38*  --   --   --   --   --   --   --   --   --     < > = values in this interval not displayed.       Recent Labs   Lab 24  0806 24  0755 24  0910 24  0650 24  0538   *  149*  --  137* 131*   BUN 4* 7*  --  9 4*   CREATSERUM 0.42* 0.54* 0.59 0.48*  0.48* 0.40*   CA 8.1* 8.3*  --  8.0* 7.5*   ALB 1.2* 1.3*  --   --  1.0*    131*  --  136 141   K 3.7 4.1  --  3.7 4.2    101  --  105 112   CO2 20.0* 21.0  --  22.0 25.0   ALKPHO 319* 295*  --   --  193*   * 94*  --   --  65*   * 156*  --   --  62*   BILT 0.7 0.6  --   --  0.3   TP 5.7* 6.2*  --   --  5.3*       Estimated Creatinine Clearance: 110 mL/min (A) (based on SCr of 0.4 mg/dL (L)).    No results for input(s): \"TROP\", \"TROPHS\", \"CK\" in the last 168 hours.      Recent Labs   Lab 06/01/24  0758 06/02/24  0806 06/03/24  0755   PTP 16.7* 16.6* 17.0*   INR 1.35* 1.34* 1.38*          Microbiology    Hospital Encounter on 05/26/24   1. Tissue Aerobic Culture     Status: Abnormal (Preliminary result)    Collection Time: 06/05/24  8:43 AM    Specimen: Lung right; Tissue   Result Value Ref Range    Tissue Culture Result No Growth at 18-24 hrs. N/A    Tissue Smear 2+ WBCs seen (A) N/A    Tissue Smear 1+ Gram Positive Cocci (A) N/A   2. Anaerobic Culture     Status: None (Preliminary result)    Collection Time: 06/05/24  8:43 AM    Specimen: Lung right; Tissue   Result Value Ref Range    Anaerobic Culture Pending N/A   3. Body Fluid Cult Aerobic and Anaerobic     Status: None (Preliminary result)    Collection Time: 06/05/24  8:07 AM    Specimen: Pleural Fluid, Right; Body fluid, unspecified   Result Value Ref Range    Body Fluid Smear 3+ WBCs seen N/A    Body Fluid Smear No organisms seen N/A    Body Fluid Smear This is a cytocentrifuged smear. N/A   4. Blood Culture     Status: None    Collection Time: 05/29/24  2:24 PM    Specimen: Blood,peripheral   Result Value Ref Range    Blood Culture Result No Growth 5 Days N/A         Imaging: Reviewed in Epic.    Medications:    piperacillin-tazobactam  3.375 g Intravenous Q8H    acetaminophen  1,000 mg Intravenous Q6H    heparin  5,000 Units Subcutaneous 2 times per day     lidocaine-menthol  1 patch Transdermal Daily    vancomycin  750 mg Intravenous Q12H    metoprolol tartrate  25 mg Oral 2x Daily(Beta Blocker)    [Transfer Hold] HYDROmorphone  0.2 mg Intravenous Once    oxybutynin ER  5 mg Oral Daily    [Held by provider] aspirin  81 mg Oral Daily    atorvastatin  80 mg Oral Nightly    busPIRone  20 mg Oral Daily    gabapentin  600 mg Oral Nightly    pantoprazole  40 mg Oral QAM AC    QUEtiapine ER  200 mg Oral QPM    umeclidinium bromide  1 puff Inhalation Daily    insulin aspart  1-10 Units Subcutaneous TID AC and HS       Assessment & Plan:      # RLL pneumonia with empyema   # RLL effusion with loculation and chest pain   - sp chest tube by IR on 5/29  -abx adjusted per cultures   - sp MIST w/o improvement   - s/p VATS decortication today monitoring in ICU   - CT Chest reviewed  - Pulmonology and ID rec appreciated   - pain meds    # elevated liver enzymes seems ischemic in nature per course - abd benign on exam   -improving  - US abd with doppler neg   - avoid hepatotoxic agents    # Iron def Anemia- sp PRBC   - no s/s bleeding     #Coagulopathy- repeat INR normalized     #COPD   -no active wheezing  -Continue inhalers  -prn nebs.     #Acute metabolic toxic (anxiolytic) encephalopathy, resolved   -Ammonia normal   -CT brain negative  -ABG reviewed    #Hypertension-metoprolol on hold due to low BP     #DM 2-hyperglycemia protocol     #GERD/ Jamison's esophagus -PPI    #MDD/anxiety- Home med     Remove bui  Ok to t/f out of CCU from my perspective     Supplementary Documentation:     Quality:  DVT Mechanical Prophylaxis:   SCDs, Early ambuation  DVT Pharmacologic Prophylaxis   Medication    heparin (Porcine) 5000 UNIT/ML injection 5,000 Units         DVT Pharmacologic prophylaxis: Aspirin 81 mg      Code Status: Full Code  Bui: Bui catheter in place  Bui Duration (in days):   Central line:    ILYA:   Discharge is dependent on: progress  At this point Ms. King is  expected to be discharge to: home    The 21st Century Cures Act makes medical notes like these available to patients in the interest of transparency. Please be advised this is a medical document. Medical documents are intended to carry relevant information, facts as evident, and the clinical opinion of the practitioner. The medical note is intended as peer to peer communication and may appear blunt or direct. It is written in medical language and may contain abbreviations or verbiage that are unfamiliar.

## 2024-06-08 LAB
ALBUMIN SERPL-MCNC: 1 G/DL (ref 3.4–5)
ALBUMIN/GLOB SERPL: 0.2 {RATIO} (ref 1–2)
ALP LIVER SERPL-CCNC: 177 U/L
ALT SERPL-CCNC: 56 U/L
ANION GAP SERPL CALC-SCNC: 4 MMOL/L (ref 0–18)
AST SERPL-CCNC: 55 U/L (ref 15–37)
BASOPHILS # BLD AUTO: 0.09 X10(3) UL (ref 0–0.2)
BASOPHILS NFR BLD AUTO: 0.6 %
BILIRUB SERPL-MCNC: 0.3 MG/DL (ref 0.1–2)
BUN BLD-MCNC: 4 MG/DL (ref 9–23)
CALCIUM BLD-MCNC: 7.6 MG/DL (ref 8.5–10.1)
CHLORIDE SERPL-SCNC: 108 MMOL/L (ref 98–112)
CO2 SERPL-SCNC: 28 MMOL/L (ref 21–32)
CREAT BLD-MCNC: 0.44 MG/DL
EGFRCR SERPLBLD CKD-EPI 2021: 109 ML/MIN/1.73M2 (ref 60–?)
EOSINOPHIL # BLD AUTO: 0.3 X10(3) UL (ref 0–0.7)
EOSINOPHIL NFR BLD AUTO: 2.1 %
ERYTHROCYTE [DISTWIDTH] IN BLOOD BY AUTOMATED COUNT: 16 %
GLOBULIN PLAS-MCNC: 4.7 G/DL (ref 2.8–4.4)
GLUCOSE BLD-MCNC: 104 MG/DL (ref 70–99)
GLUCOSE BLD-MCNC: 118 MG/DL (ref 70–99)
GLUCOSE BLD-MCNC: 123 MG/DL (ref 70–99)
GLUCOSE BLD-MCNC: 125 MG/DL (ref 70–99)
GLUCOSE BLD-MCNC: 141 MG/DL (ref 70–99)
HCT VFR BLD AUTO: 25 %
HGB BLD-MCNC: 8 G/DL
HGB BLD-MCNC: 8.1 G/DL
HGB BLD-MCNC: 8.2 G/DL
HGB BLD-MCNC: 8.7 G/DL
IMM GRANULOCYTES # BLD AUTO: 0.2 X10(3) UL (ref 0–1)
IMM GRANULOCYTES NFR BLD: 1.4 %
LYMPHOCYTES # BLD AUTO: 2.38 X10(3) UL (ref 1–4)
LYMPHOCYTES NFR BLD AUTO: 16.7 %
MCH RBC QN AUTO: 29.7 PG (ref 26–34)
MCHC RBC AUTO-ENTMCNC: 32.4 G/DL (ref 31–37)
MCV RBC AUTO: 91.6 FL
MONOCYTES # BLD AUTO: 1.18 X10(3) UL (ref 0.1–1)
MONOCYTES NFR BLD AUTO: 8.3 %
NEUTROPHILS # BLD AUTO: 10.08 X10 (3) UL (ref 1.5–7.7)
NEUTROPHILS # BLD AUTO: 10.08 X10(3) UL (ref 1.5–7.7)
NEUTROPHILS NFR BLD AUTO: 70.9 %
OSMOLALITY SERPL CALC.SUM OF ELEC: 288 MOSM/KG (ref 275–295)
PLATELET # BLD AUTO: 611 10(3)UL (ref 150–450)
POTASSIUM SERPL-SCNC: 4.1 MMOL/L (ref 3.5–5.1)
PROT SERPL-MCNC: 5.7 G/DL (ref 6.4–8.2)
RBC # BLD AUTO: 2.73 X10(6)UL
SODIUM SERPL-SCNC: 140 MMOL/L (ref 136–145)
WBC # BLD AUTO: 14.2 X10(3) UL (ref 4–11)

## 2024-06-08 PROCEDURE — 99232 SBSQ HOSP IP/OBS MODERATE 35: CPT | Performed by: HOSPITALIST

## 2024-06-08 PROCEDURE — 99232 SBSQ HOSP IP/OBS MODERATE 35: CPT | Performed by: INTERNAL MEDICINE

## 2024-06-08 RX ORDER — VALACYCLOVIR HYDROCHLORIDE 500 MG/1
500 TABLET, FILM COATED ORAL EVERY 12 HOURS SCHEDULED
Status: DISCONTINUED | OUTPATIENT
Start: 2024-06-08 | End: 2024-06-08

## 2024-06-08 RX ORDER — VALACYCLOVIR HYDROCHLORIDE 500 MG/1
1000 TABLET, FILM COATED ORAL EVERY 12 HOURS SCHEDULED
Status: DISCONTINUED | OUTPATIENT
Start: 2024-06-08 | End: 2024-06-10

## 2024-06-08 NOTE — PROGRESS NOTES
[facility]   part of [facility] ID PROGRESS NOTE    Soumya King Patient Status:  Inpatient    6/3/1960 MRN [id number]   Location [facility] 5NW-A Attending Hoda Juan MD   Hosp Day # 13 PCP Rika Vinson,      Abx: IV Unasyn (-->) D#7, s/p Zosyn and vanco    Subjective: Patient seen and examined this morning. Reports that she did better overnight. Has some pain at the incision and CT sites but otherwise ok. Was on room air, but then got up moved around and went for a walk, now sitting on 1L NC. PICC nontender and clean. She c/o pain on tongue, also has 2 scabbed cold sores at lower lip, reports getting cold sores before. This tongue pain affecting eating / appetite. Patient found vaping by RN this morning.     Allergies:  Allergies   Allergen Reactions    Naprosyn [Naproxen] ANAPHYLAXIS     Has tolerated ibuprofen and IV toradol     Aspirin OTHER (SEE COMMENTS)     Difficulty swallowing due to  saavedra's esophagus       Medications:    Current Facility-Administered Medications:     ampicillin-sulbactam (Unasyn) 3 g in sodium chloride 0.9% 100mL IVPB-ADD, 3 g, Intravenous, q6h    lactated ringers infusion, , Intravenous, Continuous    meperidine (Demerol) 25 MG/ML injection 25 mg, 25 mg, Intravenous, Q1H PRN    sodium chloride 0.9% infusion, , Intravenous, Continuous    oxyCODONE immediate release tab 5 mg, 5 mg, Oral, Q4H PRN **OR** oxyCODONE immediate release tab 10 mg, 10 mg, Oral, Q4H PRN    HYDROmorphone (Dilaudid) 1 MG/ML injection 0.4 mg, 0.4 mg, Intravenous, Q2H PRN **OR** HYDROmorphone (Dilaudid) 1 MG/ML injection 0.8 mg, 0.8 mg, Intravenous, Q2H PRN    polyethylene glycol (PEG 3350) (Miralax) 17 g oral packet 17 g, 17 g, Oral, Daily PRN    sennosides (Senokot) tab 17.2 mg, 17.2 mg, Oral, Nightly PRN    bisacodyl (Dulcolax) 10 MG rectal suppository 10 mg, 10 mg, Rectal, Daily PRN    fleet enema (Fleet) 7-19 GM/118ML rectal enema 133 mL, 1 enema, Rectal, Once  PRN    ondansetron (Zofran) 4 MG/2ML injection 4 mg, 4 mg, Intravenous, Q6H PRN    prochlorperazine (Compazine) 10 MG/2ML injection 5 mg, 5 mg, Intravenous, Q8H PRN    calcium carbonate (Tums) chewable tab 1,000 mg, 1,000 mg, Oral, TID PRN    heparin (Porcine) 5000 UNIT/ML injection 5,000 Units, 5,000 Units, Subcutaneous, 2 times per day    lidocaine-menthol 4-1 % patch 1 patch, 1 patch, Transdermal, Daily    ipratropium-albuterol (Duoneb) 0.5-2.5 (3) MG/3ML inhalation solution 3 mL, 3 mL, Nebulization, Q4H PRN    metoprolol tartrate (Lopressor) tab 25 mg, 25 mg, Oral, 2x Daily(Beta Blocker)    [Held by provider] clonazePAM (KlonoPIN) tab 0.5 mg, 0.5 mg, Oral, BID PRN    [Transfer Hold] HYDROmorphone (Dilaudid) 1 MG/ML injection 0.2 mg, 0.2 mg, Intravenous, Once    oxybutynin ER (Ditropan-XL) 24 hr tab 5 mg, 5 mg, Oral, Daily    aspirin DR tab 81 mg, 81 mg, Oral, Daily    atorvastatin (Lipitor) tab 80 mg, 80 mg, Oral, Nightly    benzonatate (Tessalon) cap 100 mg, 100 mg, Oral, TID PRN    busPIRone (Buspar) tab 20 mg, 20 mg, Oral, Daily    gabapentin (Neurontin) tab 600 mg, 600 mg, Oral, Nightly    pantoprazole (Protonix) DR tab 40 mg, 40 mg, Oral, QAM AC    QUEtiapine ER (SEROquel XR) 24 hr tab 200 mg, 200 mg, Oral, QPM    rizatriptan (Maxalt-MLT) disintegrating tab 5 mg, 5 mg, Oral, PRN    umeclidinium bromide (Incruse Ellipta) 62.5 MCG/ACT inhaler 1 puff, 1 puff, Inhalation, Daily    glucose (Dex4) 15 GM/59ML oral liquid 15 g, 15 g, Oral, Q15 Min PRN **OR** glucose (Glutose) 40% oral gel 15 g, 15 g, Oral, Q15 Min PRN **OR** glucose-vitamin C (Dex-4) chewable tab 4 tablet, 4 tablet, Oral, Q15 Min PRN **OR** dextrose 50% injection 50 mL, 50 mL, Intravenous, Q15 Min PRN **OR** glucose (Dex4) 15 GM/59ML oral liquid 30 g, 30 g, Oral, Q15 Min PRN **OR** glucose (Glutose) 40% oral gel 30 g, 30 g, Oral, Q15 Min PRN **OR** glucose-vitamin C (Dex-4) chewable tab 8 tablet, 8 tablet, Oral, Q15 Min PRN    melatonin tab 3 mg, 3  mg, Oral, Nightly PRN    insulin aspart (NovoLOG) 100 Units/mL FlexPen 1-10 Units, 1-10 Units, Subcutaneous, TID AC and HS    guaiFENesin (Robitussin) 100 MG/5 ML oral liquid 200 mg, 200 mg, Oral, Q4H PRN    sodium chloride (Saline Mist) 0.65 % nasal solution 1 spray, 1 spray, Each Nare, Q3H PRN    Review of Systems:  Completed. See pertinent positives and negatives above.    Physical Exam:  Vital signs: Blood pressure 116/66, pulse 104, temperature 98.2 °F (36.8 °C), temperature source Oral, resp. rate 17, height 5' 1\" (1.549 m), weight 154 lb (69.9 kg), SpO2 98%, not currently breastfeeding.    General: Alert, oriented. NAD. On NC.   HEENT: Moist mucous membranes.   Neck: No lymphadenopathy.  Supple.  Cardiovascular: RRR.   Respiratory: Diminished breath sounds on the R. + R chest tube in place.   Abdomen: Soft, nontender, nondistended.   Musculoskeletal: Some pedal edema  Integument: No lesions. No erythema. 2 scabbed lesions on lower lip, R tongue with vesicles.     Laboratory Data:  Recent Labs   Lab 06/05/24  0650 06/05/24  1153 06/08/24  0538   RBC 2.46*   < > 2.73*   HGB 7.3*   < > 8.1*  8.2*   HCT 23.0*   < > 25.0*   MCV 93.5   < > 91.6   MCH 29.7   < > 29.7   MCHC 31.7   < > 32.4   RDW 15.8   < > 16.0   NEPRELIM 22.83*   < > 10.08*   WBC 27.5*   < > 14.2*   .0*   < > 611.0*   NEUT 89  --   --    LYMPH 7  --   --    MON 0  --   --    EOS 0  --   --     < > = values in this interval not displayed.     Recent Labs   Lab 06/03/24  0755 06/04/24  0910 06/05/24  0650 06/07/24  0538 06/08/24  0538   *  --  137* 131* 125*   BUN 7*  --  9 4* 4*   CREATSERUM 0.54*   < > 0.48*  0.48* 0.40* 0.44*   CA 8.3*  --  8.0* 7.5* 7.6*   ALB 1.3*  --   --  1.0* 1.0*   *  --  136 141 140   K 4.1  --  3.7 4.2 4.1     --  105 112 108   CO2 21.0  --  22.0 25.0 28.0   ALKPHO 295*  --   --  193* 177*   AST 94*  --   --  65* 55*   *  --   --  62* 56   BILT 0.6  --   --  0.3 0.3   TP 6.2*  --   --   5.3* 5.7*    < > = values in this interval not displayed.       Microbiology: Reviewed in EMR    Radiology: Reviewed.    PROCEDURE:  CT CHEST (CPT=71250)     COMPARISON:  [facility] , CT, CT CHEST (CPT=71250), 5/29/2024, 9:05 AM.     INDICATIONS:  evaluate empyema     TECHNIQUE:  Unenhanced multislice CT scanning is performed through the chest.  Dose reduction techniques were used. Dose information is transmitted to the ACR (American College of Radiology) NRDR (National Radiology Data Registry) which includes the Dose   Index Registry.     PATIENT STATED HISTORY: (As transcribed by Technologist)  Evaluation of chest tube.      FINDINGS:  Evaluation of the visceral organs is limited due to the lack of intravenous contrast.     LUNGS:  There is consolidation within the right lower lobe, right middle lobe, and to a lesser extent the right upper lobe suspicious for multifocal pneumonia.  There are small nodular ground-glass airspace opacities within the right upper lobe which may   represent the known pneumonia.  VASCULATURE:  Unremarkable.  THORACIC AORTA:  Scattered atherosclerosis.  MEDIASTINUM/ANKUSH:  Mild to moderate mediastinal adenopathy is nonspecific but may be reactive.  CARDIAC:  Calcified coronary artery disease is noted.  PLEURA:  There is a moderate amount of empyema within the right pleural space.  This is most notable at the posterior inferior aspect of the right pleural space measuring up to 11.1 x 7.3 cm in the axial plane.  Foci of air are seen within this empyema.   A right sided chest tube is seen centered within this dominant region of empyema.  The amount of empyema has increased since 5/29/2024.  A mild left pleural effusion is seen.  CHEST WALL:  Sternotomy changes.  LIMITED ABDOMEN:  Unremarkable.  BONES:  Degenerative changes the spine.  OTHER:  Mild supraclavicular adenopathy is noted.     Impression:    CONCLUSION:    1. A moderate amount empyema is seen within the right pleural space.  The  extent of this empyema is worse since 5/29/2024.  A chest tube is well seen within the dominant loculation of probable empyema.  2. Multifocal pneumonia is again seen, most notable within the right middle lobe and right lower lobe.  Continued imaging follow-up is recommended.  3. Mild to moderate mediastinal adenopathy is nonspecific but may be reactive.  Mild supraclavicular adenopathy is also seen.     LOCATION:  [id number]     Dictated by (CST): Stromberg, LeRoy, MD on 6/03/2024 at 11:24 AM      Finalized by (CST): Stromberg, LeRoy, MD on 6/03/2024 at 11:35 AM        CORRECTION Corrected on: 5/29/2024;         PROCEDURE:  CT CHEST (CPT=71250)    COMPARISON:  [facility] , XR, XR CHEST AP PORTABLE  (CPT=71045), 5/29/2024,   4:27 AM.  [facility], CT, CT ANGIOGRAPHY, CHEST (CPT=71275), 5/26/2024, 12:49   PM.    INDICATIONS:  Follow-up empyema    TECHNIQUE:  Unenhanced multislice CT scanning is performed through the   chest.  Dose reduction techniques were used. Dose information is   transmitted to the ACR (American College of Radiology) NRDR (National   Radiology Data Registry) which includes the Dose  Index Registry.  There is image degradation due to motion slightly   limiting evaluation    PATIENT STATED HISTORY: (As transcribed by Technologist)  Recent empyema       FINDINGS:   LUNGS:  Masslike consolidation with internal punctate foci of air is most   consistent with patient's known consolidation and measures 5.7 x 8.1 x 6.1   versus 5 x 7.2 x 5.1 cm, AP x T x cc dimension respectively.  Increasing   adjacent interstitial and   ground-glass density. VASCULATURE:  Pulmonary vessels are unremarkable within the limits of a   noncontrast CT.   ANKUSH:  Limited due to lack of IV contrast.   MEDIASTINUM:  Adenopathy with subcarinal node measuring 2.8 x 3.3 versus   2.1 x 2 cm and right paratracheal node measuring 2 x 1.3 versus 2 x 1.4   cm.  Mediastinal clips. CARDIAC:  No enlargement or pericardial thickening.  Mild coronary  artery   calcifications. PLEURA:  Increasing pleural fluid along the right fissures which is   lobular in contour and measures 2.6 cm in depth.  Right pleural effusion   measures 3.7 versus 2.9 cm with left pleural fluid measuring 1.7 versus   0.8 cm in depth.   THORACIC AORTA:  Atherosclerosis.   CHEST WALL:  No mass or axillary adenopathy.  Sternotomy wires. LIMITED ABDOMEN:  Limited images of the upper abdomen are unremarkable.   BONES:  No fracture.      CONCLUSION:   1. When compared to most recent CT of the chest performed 5/26/2024,   patient's known right lower lobe consolidation has slightly increased in   size with increasing adjacent interstitial and ground-glass density,   correlate clinically. 2. Increasing pleural fluid as detailed above.    LOCATION:  [facility]       Dictated by (CST): Britney Mendes MD on 5/29/2024 at 9:39 AM       Finalized by (CST): Britney Mendes MD on 5/29/2024 at 9:51 AM      Dictated by (CST): Britney Mendes MD on 5/29/2024 at 11:23 AM       Finalized by (CST): Britney Mendes MD on 5/29/2024 at 11:23 AM                       Signed: 05/29/24 1123 by Britney Mendes MD  Narrative:    PROCEDURE:  CT CHEST (CPT=71250)     COMPARISON:  [facility] , XR, XR CHEST AP PORTABLE  (CPT=71045), 5/29/2024, 4:27 AM.  [facility], CT, CT ANGIOGRAPHY, CHEST (CPT=71275), 5/26/2024, 12:49 PM.     INDICATIONS:  Follow-up empyema     TECHNIQUE:  Unenhanced multislice CT scanning is performed through the chest.  Dose reduction techniques were used. Dose information is transmitted to the ACR (American College of Radiology) NRDR (National Radiology Data Registry) which includes the Dose   Index Registry.  There is image degradation due to motion slightly limiting evaluation     PATIENT STATED HISTORY: (As transcribed by Technologist)  Recent empyema      FINDINGS:    LUNGS:  Masslike consolidation with internal punctate foci of air is most consistent with patient's known empyema and measures 5.7 x 8.1 x 6.1 versus 5 x 7.2 x 5.1 cm, AP  x T x cc dimension respectively.  Increasing adjacent interstitial and ground-glass   density.  VASCULATURE:  Pulmonary vessels are unremarkable within the limits of a noncontrast CT.    ANKUSH:  Limited due to lack of IV contrast.    MEDIASTINUM:  Adenopathy with subcarinal node measuring 2.8 x 3.3 versus 2.1 x 2 cm and right paratracheal node measuring 2 x 1.3 versus 2 x 1.4 cm.  Mediastinal clips.  CARDIAC:  No enlargement or pericardial thickening.  Mild coronary artery calcifications.  PLEURA:  Increasing pleural fluid along the right fissures which is lobular in contour and measures 2.6 cm in depth.  Right pleural effusion measures 3.7 versus 2.9 cm with left pleural fluid measuring 1.7 versus 0.8 cm in depth.    THORACIC AORTA:  Atherosclerosis.  CHEST WALL:  No mass or axillary adenopathy.  Sternotomy wires.  LIMITED ABDOMEN:  Limited images of the upper abdomen are unremarkable.    BONES:  No fracture.       Impression:    CONCLUSION:    1. When compared to most recent CT of the chest performed 5/26/2024, patient's known right lower lobe empyema has slightly increased in size with increasing adjacent interstitial and ground-glass density, correlate clinically.  2. Increasing pleural fluid as detailed above.     LOCATION:  [facility]     Dictated by (CST): Britney Mendes MD on 5/29/2024 at 9:39 AM      Finalized by (CST): Britney Mendes MD on 5/29/2024 at 9:51 AM       PROCEDURE:  XR CHEST AP PORTABLE  (CPT=71045)     TECHNIQUE:  AP chest radiograph was obtained.     COMPARISON:  [facility] , CT, CT ABDOMEN+PELVIS(CONTRAST ONLY)(CPT=74177), 5/28/2024, 1:32 PM.  [facility] , XR, XR CHEST AP PORTABLE  (CPT=71045), 5/28/2024, 4:38 PM.     INDICATIONS:  dyspnea     PATIENT STATED HISTORY: (As transcribed by Technologist)  dyspnea     FINDINGS:  Cardiomediastinal silhouette is stable in size and appearance with sternotomy wires and mediastinal clips.  Persistent blunting of the right costophrenic angle is consistent with small pleural  effusion versus reaction.  Persistent interstitial   and right perihilar/lower lobe masslike airspace opacity.  No new focal consolidation.  No pneumothorax.     Impression:    CONCLUSION:    1. No significant change, allowing for differences in technique when compared to 5/28/2024 chest radiograph.     LOCATION:  [facility]    Dictated by (CST): Britney Mendes MD on 5/29/2024 at 7:58 AM      Finalized by (CST): Britney Mendes MD on 5/29/2024 at 8:00 AM        PROCEDURE:  US THORACENTESIS GUIDED RIGHT (CPT=32555)     COMPARISON:  [facility] , XR, XR CHEST AP PORTABLE  (CPT=71045), 5/28/2024, 4:38 PM.  [facility] , XR, XR CHEST AP PORTABLE  (CPT=71045), 5/28/2024, 4:48 AM.  [facility], CT, CT ANGIOGRAPHY, CHEST (CPT=71275), 5/26/2024, 12:49 PM.     INDICATIONS:  pneumonia     DESCRIPTION OF PROCEDURE:  The clinical scenario and referral were discussed with Dr. Ya.  Referral for ultrasound-guided right thoracentesis.  Because of her altered mental status, witnessed written and verbal informed consent were obtained from her  sister.  The procedure was also discussed with her.  She voiced understanding and wished to proceed.  Time-out was performed by the staff.     She could not achieve a seated position.  Positioning was optimized, supine oblique.  Ultrasound demonstrated a small right complex pleural effusion corresponding to the CTA and chest x-ray abnormality.  Access site was chosen.  The overlying skin was  prepped in the usual sterile manner.  1% lidocaine was used locally.  Using realtime ultrasound an 8 Armenian multi side hole sheath needle was advanced.  Once in position aspiration to dryness with removal of only 10-11 mL of thin clear yellow fluid.    Samples were sent to RT and the laboratory for testing.  The system was removed.  Sterile dressings were placed.  She tolerated the procedure.  No immediate complication.     Impression:    CONCLUSION:  Small complex right pleural effusion.  Only a small amount of fluid could be  aspirated.  Sample sent to RT and the laboratory for testing.     LOCATION:  [facility]     Dictated by (CST): Jace Govea MD on 5/28/2024 at 5:13 PM      Finalized by (CST): Jace Govea MD on 5/28/2024 at 5:16 PM      PROCEDURE:  US ABDOMEN LIMITED (CPT=76705)     COMPARISON:  [facility] , CT, CT ABDOMEN+PELVIS(CONTRAST ONLY)(CPT=74177), 5/28/2024, 1:32 PM.     INDICATIONS:  elevated lfts. eval bile duct, cirrhosis, etc     PATIENT STATED HISTORY: (As transcribed by Technologist)        FINDINGS:    LIVER:  Normal size and echogenicity. No significant masses.  BILIARY:  Common bile duct measures 6 mm in region of stephanie hepatis.  There is no intrahepatic bile duct distension.  Gallbladder is unremarkable.  PANCREAS:  Normal.  RIGHT KIDNEY:  Normal.  OTHER:  There is a small amount of free fluid in posterior subhepatic space.  Incidental note is made of a right pleural effusion.     Impression:    CONCLUSION:    1. Trace ascites is noted in posterior subhepatic space.  2. Right pleural effusion has been noted on prior CT scan.  3. There is otherwise no abnormality detected on this abdominal ultrasound.       LOCATION:  [facility]     Dictated by (CST): Abad Chen MD on 5/28/2024 at 4:56 PM      Finalized by (CST): Abad Chen MD on 5/28/2024 at 4:57 PM          PROCEDURE:  XR CHEST AP PORTABLE  (CPT=71045)     TECHNIQUE:  AP chest radiograph was obtained.     COMPARISON:  [facility] , US, US THORACENTESIS GUIDED RIGHT (CPT=32555), 5/28/2024, 3:53 PM.  [facility] , XR, XR CHEST AP PORTABLE  (CPT=71045), 5/28/2024, 4:48 AM.     INDICATIONS:  Post Thoracentesis     PATIENT STATED HISTORY: (As transcribed by Technologist)  Right sided post thoracentesis. Patient offered no additional history at this time.      FINDINGS:  Status post right thoracentesis.  There is a small right pleural effusion.  Patchy opacity at the right lung base may represent pneumonia.  There is no pneumothorax.  Median sternotomy wires are stable.  The heart is  normal in size.     Impression:    CONCLUSION:  Small right pleural effusion with patchy airspace disease the right lung base could represent pneumonia.  There is no pneumothorax.     LOCATION:  [facility]     Dictated by (CST): Elieser Torres MD on 5/28/2024 at 4:55 PM      Finalized by (CST): Elieser Torres MD on 5/28/2024 at 4:56 PM      PROCEDURE:  CT ABDOMEN+PELVIS (CONTRAST ONLY) (CPT=74177)     COMPARISON:  [facility], CT, CT ABDOMEN (ATTENTION PANCREAS) (W+ WO) (CPT=74170), 3/02/2022, 3:52 PM.  [facility], CT, CT ABDOMEN+PELVIS(CONTRAST ONLY)(CPT=74177), 9/18/2021, 2:27 PM.     INDICATIONS:  GIB     TECHNIQUE:  CT scanning was performed from the dome of the diaphragm to the pubic symphysis with non-ionic intravenous contrast material. Post contrast coronal MPR imaging was performed.  Dose reduction techniques were used. Dose information is  transmitted to the ACR (American College of Radiology) NRDR (National Radiology Data Registry) which includes the Dose Index Registry.     PATIENT STATED HISTORY:(As transcribed by Technologist)  Poor historian      CONTRAST USED:  85cc of Isovue 370     FINDINGS:    LUNG BASES:  Loculated right-sided pleural effusion with thick wall measuring approximately 7.8 x 2.5 x 12.7 cm.  Findings suggestive of empyema.  There is consolidation within the right lower lobe with complex fluid and small foci of air measuring 3.0 x   2.7 x 3.2 cm and 2.1 x 2.8 x 1.7 cm which may represent pulmonary abscesses/necrotizing pneumonia.  Small left-sided pleural effusion.  LIVER:  Normal in shape and contour.    BILIARY:  Gallbladder is unremarkable in appearance.  No intrahepatic biliary dilatation..  SPLEEN:  Normal.  No enlargement or focal lesion.  PANCREAS:  No kirk-pancreatic inflammatory stranding  ADRENALS:  Normal.  No mass or enlargement.    KIDNEYS:  Kidneys are symmetrical in size without evidence of hydronephrosis.  BOWEL/MESENTERY:  Bowel is normal in caliber. No evidence of  obstruction.  Probable Villanueva's type hernia containing fat and a portion of bowel  within the right para umbilical region.  No evidence of obstruction.  Mildly prominent retroperitoneal lymph  nodes.  PELVIS:  Bladder is distended.  Small amount of free pelvic fluid.    AORTA/VASCULAR:  Atheromatous calcifications of the aorta.  BONES:  No acute fractures.  Median sternotomy approximated by wire sutures.     Impression:    CONCLUSION:    1. Loculated right-sided pleural effusion suggestive empyema with consolidation within the right lower lobe.  There are areas of fluid and small foci of air within the consolidation which may represent pulmonary abscesses/necrotizing pneumonia.  2. Small left-sided pleural effusion with associated atelectasis.  3. Nonspecific mildly prominent retroperitoneal lymph nodes which may be reactive in nature.  4. Trace amount of free pelvic fluid.       LOCATION:  [facility]     Dictated by (CST): Annette Ferreira MD on 5/28/2024 at 2:11 PM      Finalized by (CST): Annette Ferreira MD on 5/28/2024 at 2:17 PM         PROCEDURE:  CT BRAIN OR HEAD (83908)     COMPARISON:  None.     INDICATIONS:  lethargy     TECHNIQUE:  Noncontrast CT scanning is performed through the brain. Dose reduction techniques were used. Dose information is transmitted to the ACR (American College of Radiology) NRDR (National Radiology Data Registry) which includes the Dose Index  Registry.     PATIENT STATED HISTORY: (As transcribed by Technologist)  Patient is lethargic.       FINDINGS:    VENTRICLES/SULCI:  Ventricles and sulci are normal in size.    INTRACRANIAL:  There is a chronic appearing lacunar infarct involving the right caudate nucleus at the junction of the head and body..  There are no abnormal extraaxial fluid collections.  There is no midline shift.  There are no intraparenchymal brain  abnormalities.  There is nothing specific for acute infarct.  There is no hemorrhage or mass lesion.    SINUSES:            No sign of acute sinusitis.    MASTOIDS:          No sign of acute inflammation.  SKULL:             No evidence for fracture or osseous abnormality.  OTHER:             None.    Impression:    CONCLUSION:  No evidence of acute intracranial process.     LOCATION:  [facility]     Dictated by (CST): Elieser Torres MD on 5/28/2024 at 8:49 AM      Finalized by (CST): Elieser Torres MD on 5/28/2024 at 8:54 AM      PROCEDURE:  XR CHEST AP PORTABLE  (CPT=71045)     TECHNIQUE:  AP chest radiograph was obtained.     COMPARISON:  [facility], CT, CT ANGIOGRAPHY, CHEST (CPT=71275), 5/26/2024, 12:49 PM.  [facility], XR, XR CHEST PA + LAT CHEST (CPT=71046), 5/26/2024, 11:43 AM.     INDICATIONS:  dyspnea     PATIENT STATED HISTORY: (As transcribed by Technologist)  dyspnea      Impression:    CONCLUSION:    Limited AP portable view shows small-moderate right pleural effusion cleaning some pleural fluid tracking up the lower right lateral chest.  Possibly loculated.  Patchy consolidation mid-lower right chest.  Small blunting left costophrenic   angle.  Cardiomegaly.  No pneumothorax. Consider upright PA and lateral examination of the chest, when tolerated.  CT follow-up may also be beneficial.       LOCATION:  [facility]     Dictated by (CST): Abhishek Mora MD on 5/28/2024 at 8:36 AM      Finalized by (CST): Abhishek Mora MD on 5/28/2024 at 8:37 AM          PROCEDURE:  US VENOUS DOPPLER LEG LEFT - DIAG IMG (CPT=93971)     COMPARISON:  None.     INDICATIONS:  Left lower extremity swelling and pain     TECHNIQUE:  Real time, grey scale, and duplex ultrasound was used to evaluate the lower extremity venous system. B-mode two-dimensional images of the vascular structures, Doppler spectral analysis, and color flow.  Doppler imaging were performed.  The  following veins were imaged:  Common, deep, and superficial femoral, popliteal, sapheno-femoral junction, posterior tibial veins, and the contralateral common femoral vein.     PATIENT STATED  HISTORY: (As transcribed by Technologist)        FINDINGS:    EXTREMITY EXAMINED:  Left lower extremity  SAPHENOFEMORAL JUNCTION:  No reflux.  THROMBI:  None visible.  COMPRESSION:  Normal compressibility, phasicity, and augmentation.  OTHER:  Negative.     Impression:    CONCLUSION:  No evidence of DVT in the left lower extremity.     LOCATION:  [facility]    Dictated by (CST): Roc Mosqueda MD on 5/26/2024 at 2:46 PM      Finalized by (CST): Roc Mosqueda MD on 5/26/2024 at 2:49 PM      PROCEDURE:  CT ANGIOGRAPHY, CHEST (CPT=71275)     COMPARISON:  [facility], CT, CT CHEST LD NO CAD(CPT=71250), 3/13/2024, 8:38 PM.     INDICATIONS:  sob, hypotension, pleurisy, R lung mass     TECHNIQUE:  IV contrast-enhanced multislice CT angiography is performed through the pulmonary arterial anatomy. 3D volume renderings are generated.  Dose reduction techniques were used. Dose information is transmitted to the ACR (American College of  Radiology) NRDR (National Radiology Data Registry) which includes the Dose Index Registry.     PATIENT STATED HISTORY:(As transcribed by Technologist)  SOB, hypotension; right lung mass      CONTRAST USED:  100cc of Isovue 370     FINDINGS:    VASCULATURE:  No visible pulmonary arterial thrombus or attenuation.    THORACIC AORTA:  No aneurysm or visible dissection.    LUNGS:  Masslike consolidation in the right lower lobe is new compared to the prior examination.  Given its acute appearance an infectious process is most likely.  Small central areas of cavitation are noted with associated low attenuation which may  represent necrosis.  Moderate right pleural effusion is noted.  MEDIASTINUM:  Right paratracheal node is noted measuring up to 2.0 x 1.4 cm (image 38).  A subcarinal lymph node is noted measuring up to 2.0 x 2.1 cm (image 93)..    ANKUSH:  No mass or adenopathy.    CARDIAC:  No enlargement, pericardial thickening, or significant coronary artery calcification.  PLEURA:  No mass or effusion.     CHEST WALL:  No mass or axillary adenopathy.  Patient is status post median sternotomy.  LIMITED ABDOMEN:  Limited images of the upper abdomen are unremarkable.    BONES:  No bony lesion or fracture.    OTHER:  Negative.       Impression:    CONCLUSION:    1. Masslike consolidation with areas of central necrosis and mild cavitation in the right lower lobe.  Given its acute appearance this may represent an infectious process.  Correlate clinically.  Short-term follow-up after treatment is recommended.  2. Mediastinal adenopathy is likely reactive.  Attention on follow-up is recommended.     LOCATION:  [facility]     Dictated by (CST): Roc Mosqueda MD on 5/26/2024 at 1:21 PM      Finalized by (CST): Roc Mosqueda MD on 5/26/2024 at 1:25 PM        PROCEDURE:  XR CHEST PA + LAT CHEST (CPT=71046)     INDICATIONS:  sob, cough, crackles R base     COMPARISON:  [facility], CT, CT CHEST LD NO CAD(CPT=71250), 3/13/2024, 8:38 PM.  [facility] , XR, XR CHEST DECUBITUS BILAT INCL PA AND LAT(4 VIEWS)(CPT=71048), 1/15/2024, 9:15 AM.     TECHNIQUE:  PA and lateral chest radiographs were obtained.     PATIENT STATED HISTORY: (As transcribed by Technologist)  Patient stated she has been having a cough for 1 week and a crackle sound in her right lung.      FINDINGS:    LUNGS:  An area of masslike consolidation in the right upper lobe is noted which is new from the prior examination.  Small right pleural effusion is present.  CARDIAC:  Normal size cardiac silhouette.  Patient is status post median sternotomy.  MEDIASTINUM:  Normal.  PLEURA:  Normal.  No pleural effusions.  BONES:  Normal for age.     Impression:    CONCLUSION:  Masslike consolidation in the right upper lobe is noted.  Given it is acute appearance this likely represents an infectious process.  Small right effusion is noted.  Correlate clinically.     LOCATION:  [facility]     Dictated by (CST): Roc Mosqueda MD on 5/26/2024 at 12:05 PM      Finalized by (CST): Roc Mosqueda MD on  5/26/2024 at 12:06 PM    ASSESSMENT:  RLL pneumonia/pulmonary abscess with right empyema  Legionella and strep pneumo Uags negative. Blasto ag negative.  S/p thoracentesis 5/28, cx with strep intermedius. Path w/ acute inflammation with occasional bacterial organisms present.   S/p R chest tube placement 5/29, cx with strep intermedius. S/p MIST 2 protocol.   S/p R VATS and total pulmonary decortication 6/5, R purulent empyema with RLL lung abscess noted. Tissue cx from RLL lung abscess with GNR and GPC- per micro, gram + is lactobacillus and they feel GNR may be an anaerobe. Tissue cx from pleural peel with lactobacillus.  Fevers and worsening leukocytosis, d/t above. Bcxs negative. WBC improving, no further fevers.  Alerted mental status. ? Related to above vs other. Head CT w/o acute process. Resolved.  Elevated LFTs. CT a/p and US abd unremarkable. Improving.    Anemia  IgM kappa MGUS  DM II. Hgb A1C 7.1  COPD  Oral lesions appear c/w HSV, patient has a history of this and tongue lesions are painful and affecting her appetite/intake today, will start valtrex     PLAN:  - start valtrex for oral lesions   - continue IV Unasyn  - follow temps and wbc  - follow LFTs  - follow OR cultures- d/w micro, they will see if they can do jessica on lactobacillus species  - will need prolonged course of IV abx on dc (anticipate 4 weeks from surgery). PICC placed     [facility]

## 2024-06-08 NOTE — PROGRESS NOTES
THORACIC SURGERY POST-OPERATIVE PROGRESS NOTE    Subjective: No acute events overnight. Feeling better this morning. Ambulated twice yesterday. Reports chest tube discomfort.      Objective:    I/O last 3 completed shifts:  In: 2950 [P.O.:2200; IV PIGGYBACK:750]  Out: 4940 [Urine:4650; Chest Tube:290]    Chest tube 1: 250cc serous, no air leak     Recent Labs   Lab 06/06/24  0442 06/06/24  1636 06/07/24  0538 06/08/24  0125 06/08/24  0538   HGB 7.8*  7.8*   < > 8.2* 8.0* 8.1*  8.2*   WBC 26.9*  --  16.1*  --  14.2*   .0*  --  585.0*  --  611.0*    < > = values in this interval not displayed.       Recent Labs   Lab 06/05/24  0650 06/07/24  0538 06/08/24  0538    141 140   K 3.7 4.2 4.1    112 108   CO2 22.0 25.0 28.0   BUN 9 4* 4*       Exam:    /74 (BP Location: Left arm)   Pulse 100   Temp 97.4 °F (36.3 °C) (Temporal)   Resp 15   Ht 154.9 cm (5' 1\")   Wt 154 lb (69.9 kg)   SpO2 99%   BMI 29.10 kg/m²      General: Awake, alert, NAD  Pulm: normal respiratory effort  Card: RRR  Abd: soft, NT, ND  Wounds: clean, dry, intact, No signs of infection    Assessment: 64 year old female, 3 Days Post-Op from Decortication for empyema and right lower lobe lung abscess    Plan:  Continue chest tube to suction, okay to ambulate off of suction. Anticipate removal tomorrow.   Ambulate as tolerated, PT  Spend majority of day out of bed, in chair  Encouraged cough and IS use  Pain control  General Diet    Patient discussed with Dr. Maldonado.     Yudy Albrecht PA-C  Thoracic Surgery  Pager: [phone]

## 2024-06-08 NOTE — PROGRESS NOTES
[facility]  Progress Note    Soumya King Patient Status:  Inpatient    6/3/1960 MRN [id number]   Location [facility] 8NE-A Attending Jose Rafael Raphael MD   Hosp Day # 13 PCP Rika Vinson,      Subjective:  Soumya King is a(n) 64 year old female remains afebrile  Overall seems to feel much better seems stronger more connected  Remains with some pain at chest tube site  Minimal coughing  Thinks her mouth lesions are feeling better    Objective:  /66 (BP Location: Left arm)   Pulse 104   Temp 98.2 °F (36.8 °C) (Oral)   Resp 17   Ht 5' 1\" (1.549 m)   Wt 154 lb (69.9 kg)   SpO2 98%   BMI 29.10 kg/m² remains on 2 L she feels just for comfort      Temp (24hrs), Av °F (36.7 °C), Min:97.4 °F (36.3 °C), Max:98.5 °F (36.9 °C)      Intake/Output:    Intake/Output Summary (Last 24 hours) at 2024 1248  Last data filed at 2024 0900  Gross per 24 hour   Intake 1820 ml   Output 2650 ml   Net -830 ml       Physical Exam:   General: alert, cooperative, oriented.  No respiratory distress.   Head: Normocephalic, without obvious abnormality, atraumatic.   Throat: Lips, mucosa, and tongue normal.  No thrush noted.   Neck: trachea midline, no adenopathy, no thyromegaly. No JVD.   Lungs: Rales both bases right greater than left dullness   Chest wall: No tenderness or deformity.   Heart: Regular rate and rhythm   Abdomen: soft, non-distended, no masses, no guarding, no     Rebound.  No diarrhea   Extremity: No edema nontender   Skin: No rashes or lesions.   Neurological: Alert, interactive, no focal deficits    Lab Data Review:  Recent Labs     24  0442 24  1636 24  0017 24  0538 24  0125 24  0538   WBC 26.9*  --   --  16.1*  --  14.2*   HGB 7.8*  7.8* 9.4* 9.1* 8.2* 8.0* 8.1*  8.2*   .0*  --   --  585.0*  --  611.0*     Recent Labs     24  0538 24  0538    140   K 4.2 4.1    108   CO2 25.0 28.0   BUN 4* 4*   CREATSERUM  0.40* 0.44*     Recent Labs   Lab 06/02/24  0806 06/03/24  0755   PTP 16.6* 17.0*   INR 1.34* 1.38*       Cultures: Surgical results with lactobacillus ;fungus thus far negative  Fluid cultures prior 5/28/2021 with strep intermedius    Radiology:  No results found.  Reviewed      Medications reviewed     Assessment and Plan:   Patient Active Problem List   Diagnosis    Vitamin D deficiency    Jamison's esophagus    Tobacco use    Insomnia    COPD, mild (HCC) - Dx'd via PFTs done in 3/2015    Migraine without aura and without status migrainosus, not intractable    Hyperlipidemia    SUMMER on CPAP    MDD (major depressive disorder), recurrent episode, moderate (HCC)    GERD (gastroesophageal reflux disease)    Hypertension    Chronic pansinusitis    Mucinous cystadenoma of ovary, left    KIRK (generalized anxiety disorder)    History of pubovaginal sling    Absence of bladder continence    Vasomotor flushing    Primary osteoarthritis of left knee    Mass of spine    Hx of motion sickness    Difficult intubation    Chronic low back pain without sciatica    Type 2 diabetes mellitus with diabetic neuropathy (HCC)    Pulmonary nodule    CAD, multiple vessel    S/P CABG x 2    Type 2 diabetes mellitus with hyperlipidemia (HCC)    Anemia    Iron deficiency anemia secondary to inadequate dietary iron intake    Subacute cough    Hyperglycemia    Acute anemia    MARÍA ELENA (acute kidney injury) (HCC)    Transaminitis    Thrombocytosis    Hyponatremia    Pleural effusion    Somnolence    Acute respiratory failure with hypoxia (HCC)    Empyema of right pleural space (HCC)    Status post thoracotomy       Assessment:  Large right lower lobe pneumonia/abscess formation with empyema secondary to Streptococcus intermedius and role of lactobacillus  Sepsis 2/2 above resolved  S/p VATS with decortication-lactobacillus on surgical results  Pain management  Elevated LFTs resolving  Multiple pulmonary nodules  Acute encephalopathy, improved  Iron  deficiency anemia follows with Dr. Corona-status post transfusion-now stable postop  History of COPD remains on Incruse    Plan:  Chest tube management per thoracic surgery  Remains with some output  Encouraged to increase activity and walking chair  Anticipate removal in a.m.  ID input appreciated  Continue to follow with you      CC     Irina Ya MD  6/8/2024  12:48 PM

## 2024-06-08 NOTE — PLAN OF CARE
Received patient from ICU around 2230, complains of chest site pain. Oxycodone and dilaudid given as needed for pain. Right sided chest tube draining serosanguinous fluid. Output of 50 cc overnight. Atrium changed. 2L oxygen with SPO2>92%, Up with assistance. Skin assessment with  nursing assistance. Skin intact, old chest tube side dressing open to air. Sutured and glued. Vitals stable. Tolerated oral medications. Kept clean and dry.   Problem: Diabetes/Glucose Control  Goal: Glucose maintained within prescribed range  Description: INTERVENTIONS:  - Monitor Blood Glucose as ordered  - Assess for signs and symptoms of hyperglycemia and hypoglycemia  - Administer ordered medications to maintain glucose within target range  - Assess barriers to adequate nutritional intake and initiate nutrition consult as needed  - Instruct patient on self management of diabetes  Outcome: Progressing     Problem: RESPIRATORY - ADULT  Goal: Achieves optimal ventilation and oxygenation  Description: INTERVENTIONS:  - Assess for changes in respiratory status  - Assess for changes in mentation and behavior  - Position to facilitate oxygenation and minimize respiratory effort  - Oxygen supplementation based on oxygen saturation or ABGs  - Provide Smoking Cessation handout, if applicable  - Encourage broncho-pulmonary hygiene including cough, deep breathe, Incentive Spirometry  - Assess the need for suctioning and perform as needed  - Assess and instruct to report SOB or any respiratory difficulty  - Respiratory Therapy support as indicated  - Manage/alleviate anxiety  - Monitor for signs/symptoms of CO2 retention  Outcome: Progressing     Problem: HEMATOLOGIC - ADULT  Goal: Free from bleeding injury  Description: (Example usage: patient with low platelets)  INTERVENTIONS:  - Avoid intramuscular injections, enemas and rectal medication administration  - Ensure safe mobilization of patient  - Hold pressure on venipuncture sites to achieve  adequate hemostasis  - Assess for signs and symptoms of internal bleeding  - Monitor lab trends  - Patient is to report abnormal signs of bleeding to staff  - Avoid use of toothpicks and dental floss  - Use electric shaver for shaving  - Use soft bristle tooth brush  - Limit straining and forceful nose blowing  Outcome: Progressing     Problem: PAIN - ADULT  Goal: Verbalizes/displays adequate comfort level or patient's stated pain goal  Description: INTERVENTIONS:  - Encourage pt to monitor pain and request assistance  - Assess pain using appropriate pain scale  - Administer analgesics based on type and severity of pain and evaluate response  - Implement non-pharmacological measures as appropriate and evaluate response  - Consider cultural and social influences on pain and pain management  - Manage/alleviate anxiety  - Utilize distraction and/or relaxation techniques  - Monitor for opioid side effects  - Notify MD/LIP if interventions unsuccessful or patient reports new pain  - Anticipate increased pain with activity and pre-medicate as appropriate  Outcome: Progressing

## 2024-06-08 NOTE — PROGRESS NOTES
[facility]   part of [facility]     Hospitalist Progress Note     Soumya King Patient Status:  Inpatient    6/3/1960 MRN [id number]   [facility] 5NW-A Attending Apurva Logan MD   Hosp Day # 13 PCP Rika Vinson DO     Subjective:   Doing better each day     Objective:    Review of Systems:   A comprehensive review of systems was completed; pertinent positive and negatives stated in subjective.    Vital signs:  Temp:  [97.3 °F (36.3 °C)-98.5 °F (36.9 °C)] 97.4 °F (36.3 °C)  Pulse:  [] 100  Resp:  [9-31] 15  BP: (106-158)/() 131/74  SpO2:  [89 %-100 %] 99 %    Physical Exam:    General: No acute distress   Respiratory: No wheezes, no rhonchi  Cardiovascular: S1, S2, regular rate and rhythm  Abdomen: Soft, Non-tender, non-distended, positive bowel sounds  Neuro: No new focal deficits.   Extremities: No edema    Diagnostic Data:    Labs:  Recent Labs   Lab 24  0758 24  1615 24  0806 24  1422 24  0755 24  1558 24  0650 24  1153 24  1749 24  0442 24  1636 24  0017 24  0538 24  0125   WBC 21.6*  --  26.3*   < > 38.6*  --  27.5* 31.9*  --  26.9*  --   --  16.1*  --    HGB 9.2*  9.2*   < > 8.9*  8.9*   < > 9.2*  8.8*   < > 7.3* 8.7*   < > 7.8*  7.8* 9.4* 9.1* 8.2* 8.0*   MCV 89.7  --  89.2   < > 92.6  --  93.5 90.5  --  91.4  --   --  90.6  --    .0*  --  536.0*   < > 698.0*  --  694.0* 550.0*  --  597.0*  --   --  585.0*  --    BAND  --   --   --   --   --   --  4  --   --   --   --   --   --   --    INR 1.35*  --  1.34*  --  1.38*  --   --   --   --   --   --   --   --   --     < > = values in this interval not displayed.       Recent Labs   Lab 24  0806 24  0755 24  0910 24  0650 24  0538   * 149*  --  137* 131*   BUN 4* 7*  --  9 4*   CREATSERUM 0.42* 0.54* 0.59 0.48*  0.48* 0.40*   CA 8.1* 8.3*  --  8.0* 7.5*   ALB 1.2* 1.3*  --   --   1.0*    131*  --  136 141   K 3.7 4.1  --  3.7 4.2    101  --  105 112   CO2 20.0* 21.0  --  22.0 25.0   ALKPHO 319* 295*  --   --  193*   * 94*  --   --  65*   * 156*  --   --  62*   BILT 0.7 0.6  --   --  0.3   TP 5.7* 6.2*  --   --  5.3*       Estimated Creatinine Clearance: 110 mL/min (A) (based on SCr of 0.4 mg/dL (L)).    No results for input(s): \"TROP\", \"TROPHS\", \"CK\" in the last 168 hours.      Recent Labs   Lab 06/01/24  0758 06/02/24  0806 06/03/24  0755   PTP 16.7* 16.6* 17.0*   INR 1.35* 1.34* 1.38*          Microbiology    Hospital Encounter on 05/26/24   1. Tissue Aerobic Culture     Status: Abnormal    Collection Time: 06/05/24  8:43 AM    Specimen: Lung right; Tissue   Result Value Ref Range    Tissue Culture Result 2+ growth Lactobacillus species N/A    Tissue Smear 2+ WBCs seen (A) N/A    Tissue Smear 1+ Gram Positive Cocci (A) N/A   2. Anaerobic Culture     Status: None (Preliminary result)    Collection Time: 06/05/24  8:43 AM    Specimen: Lung right; Tissue   Result Value Ref Range    Anaerobic Culture Pending N/A   3. Body Fluid Cult Aerobic and Anaerobic     Status: None (Preliminary result)    Collection Time: 06/05/24  8:07 AM    Specimen: Pleural Fluid, Right; Body fluid, unspecified   Result Value Ref Range    Body Fluid Culture Result No Growth 2 Days N/A    Body Fluid Smear 3+ WBCs seen N/A    Body Fluid Smear No organisms seen N/A    Body Fluid Smear This is a cytocentrifuged smear. N/A   4. Blood Culture     Status: None    Collection Time: 05/29/24  2:24 PM    Specimen: Blood,peripheral   Result Value Ref Range    Blood Culture Result No Growth 5 Days N/A         Imaging: Reviewed in Epic.    Medications:    ampicillin-sulbactam  3 g Intravenous q6h    heparin  5,000 Units Subcutaneous 2 times per day    lidocaine-menthol  1 patch Transdermal Daily    metoprolol tartrate  25 mg Oral 2x Daily(Beta Blocker)    [Transfer Hold] HYDROmorphone  0.2 mg Intravenous  Once    oxybutynin ER  5 mg Oral Daily    aspirin  81 mg Oral Daily    atorvastatin  80 mg Oral Nightly    busPIRone  20 mg Oral Daily    gabapentin  600 mg Oral Nightly    pantoprazole  40 mg Oral QAM AC    QUEtiapine ER  200 mg Oral QPM    umeclidinium bromide  1 puff Inhalation Daily    insulin aspart  1-10 Units Subcutaneous TID AC and HS       Assessment & Plan:      # RLL pneumonia with empyema   # RLL effusion with loculation and chest pain   - sp chest tube by IR on 5/29  -abx adjusted per cultures   - s/p MIST w/o improvement   - s/p VATS decortication 6/5   - CT Chest reviewed  - Pulmonology and ID rec appreciated   - pain meds    # elevated liver enzymes seems ischemic in nature per course - abd benign on exam   - improving  - US abd with doppler neg   - avoid hepatotoxic agents    # Iron def Anemia- sp PRBC - no s/s bleeding     #Coagulopathy- repeat INR normalized     #COPD -Continue inhalers, nebs      #Acute metabolic toxic (anxiolytic) encephalopathy, resolved   -Ammonia normal   -CT brain negative    #Hypertension- metoprolol     #DM 2-hyperglycemia protocol     #GERD/ Jamison's esophagus -PPI    #MDD/anxiety- Home med     CMP in AM       Supplementary Documentation:     Quality:  DVT Mechanical Prophylaxis:   SCDs, Early ambuation  DVT Pharmacologic Prophylaxis   Medication    heparin (Porcine) 5000 UNIT/ML injection 5,000 Units         DVT Pharmacologic prophylaxis: Aspirin 81 mg      Code Status: Full Code  Gotti: External urinary catheter in place  Gotti Duration (in days):   Central line:    ILYA:   Discharge is dependent on: progress  At this point Ms. King is expected to be discharge to: home    The 21st Century Cures Act makes medical notes like these available to patients in the interest of transparency. Please be advised this is a medical document. Medical documents are intended to carry relevant information, facts as evident, and the clinical opinion of the practitioner. The medical  note is intended as peer to peer communication and may appear blunt or direct. It is written in medical language and may contain abbreviations or verbiage that are unfamiliar.

## 2024-06-08 NOTE — PROGRESS NOTES
Received care of pt at 1930. A/Ox4, responds appropriately, follows commands. ST on monitor, particularly with movement. Will return to NSR when resting. Placed on 2L O2 to maintain SpO2>92%. CT in place, one removed, stitches in place. Clean, dry, intact. Drainage WNL, serosanguinous. Q8hr hgb labs.     Pt has bump on right side of tongue and small cut on lower right lip. Bruising present on L forearm. R arm precautions due to PICC line placement. Purewick placed per pt request due to frequent urination. No complaints of pain with urination. Pain at R flank controlled with PRN medications. Pt is ready for transfer to CTU 8.  Report given to receiving RN at 2117.     This RN accompanied transport to CTU 8, was received by RN on unit.

## 2024-06-08 NOTE — PLAN OF CARE
Received pt at shift change. AxOx4. 1L O2 w stats maintaining >90%. Denies pain/SOB. Vitals stable. NSR/ST on tele. Hgb stable at 8.1 this am. R chest tube to suction. Found vaping in bathroom this am.  See flowsheets for additional assessments.   Pt updated on POC. Call light within reach. All needs met at this time.     Plan:  -Chest tube mgmt  -IV Unasyn q6  -Hgb q8  -Encourage IS    Problem: Diabetes/Glucose Control  Goal: Glucose maintained within prescribed range  Description: INTERVENTIONS:  - Monitor Blood Glucose as ordered  - Assess for signs and symptoms of hyperglycemia and hypoglycemia  - Administer ordered medications to maintain glucose within target range  - Assess barriers to adequate nutritional intake and initiate nutrition consult as needed  - Instruct patient on self management of diabetes  Outcome: Progressing     Problem: Patient/Family Goals  Goal: Patient/Family Long Term Goal  Description: Patient's Long Term Goal:   Discharge to home    Interventions:  - Follow plan of care  - See additional Care Plan goals for specific interventions  Outcome: Progressing  Goal: Patient/Family Short Term Goal  Description: Patient's Short Term Goal:   5/26 noc: maintain on room air  5/27: manage hgb  5/27 noc: reduce pleuritic pain   5/28 AM: CT scan of head/NPO possible thora  5/28noc: sleep  5/29 AM: Chest Tube placement  5/29noc: CT management, control pain, sleep  6/1 AM: Pain control, Abd US  06/01/2024 - pain control  06/02/2024 - pain control  6/4 AM: pain control; maintain O2  Interventions:   - labs, blood  - Medications  - IVF  - IVF ABT  - See additional Care Plan goals for specific interventions  Outcome: Progressing     Problem: RESPIRATORY - ADULT  Goal: Achieves optimal ventilation and oxygenation  Description: INTERVENTIONS:  - Assess for changes in respiratory status  - Assess for changes in mentation and behavior  - Position to facilitate oxygenation and minimize respiratory effort  -  Oxygen supplementation based on oxygen saturation or ABGs  - Provide Smoking Cessation handout, if applicable  - Encourage broncho-pulmonary hygiene including cough, deep breathe, Incentive Spirometry  - Assess the need for suctioning and perform as needed  - Assess and instruct to report SOB or any respiratory difficulty  - Respiratory Therapy support as indicated  - Manage/alleviate anxiety  - Monitor for signs/symptoms of CO2 retention  Outcome: Progressing     Problem: HEMATOLOGIC - ADULT  Goal: Free from bleeding injury  Description: (Example usage: patient with low platelets)  INTERVENTIONS:  - Avoid intramuscular injections, enemas and rectal medication administration  - Ensure safe mobilization of patient  - Hold pressure on venipuncture sites to achieve adequate hemostasis  - Assess for signs and symptoms of internal bleeding  - Monitor lab trends  - Patient is to report abnormal signs of bleeding to staff  - Avoid use of toothpicks and dental floss  - Use electric shaver for shaving  - Use soft bristle tooth brush  - Limit straining and forceful nose blowing  Outcome: Progressing     Problem: PAIN - ADULT  Goal: Verbalizes/displays adequate comfort level or patient's stated pain goal  Description: INTERVENTIONS:  - Encourage pt to monitor pain and request assistance  - Assess pain using appropriate pain scale  - Administer analgesics based on type and severity of pain and evaluate response  - Implement non-pharmacological measures as appropriate and evaluate response  - Consider cultural and social influences on pain and pain management  - Manage/alleviate anxiety  - Utilize distraction and/or relaxation techniques  - Monitor for opioid side effects  - Notify MD/LIP if interventions unsuccessful or patient reports new pain  - Anticipate increased pain with activity and pre-medicate as appropriate  Outcome: Progressing

## 2024-06-09 ENCOUNTER — APPOINTMENT (OUTPATIENT)
Dept: GENERAL RADIOLOGY | Facility: HOSPITAL | Age: 64
DRG: 163 | End: 2024-06-09
Attending: INTERNAL MEDICINE
Payer: MEDICAID

## 2024-06-09 PROBLEM — R09.02 HYPOXIA: Status: ACTIVE | Noted: 2024-06-09

## 2024-06-09 LAB
ALBUMIN SERPL-MCNC: 1 G/DL (ref 3.4–5)
ALBUMIN/GLOB SERPL: 0.2 {RATIO} (ref 1–2)
ALP LIVER SERPL-CCNC: 151 U/L
ALT SERPL-CCNC: 54 U/L
ANION GAP SERPL CALC-SCNC: 3 MMOL/L (ref 0–18)
AST SERPL-CCNC: 59 U/L (ref 15–37)
BILIRUB SERPL-MCNC: 0.4 MG/DL (ref 0.1–2)
BUN BLD-MCNC: 4 MG/DL (ref 9–23)
CALCIUM BLD-MCNC: 7.6 MG/DL (ref 8.5–10.1)
CHLORIDE SERPL-SCNC: 107 MMOL/L (ref 98–112)
CO2 SERPL-SCNC: 32 MMOL/L (ref 21–32)
CREAT BLD-MCNC: 0.46 MG/DL
EGFRCR SERPLBLD CKD-EPI 2021: 108 ML/MIN/1.73M2 (ref 60–?)
ERYTHROCYTE [DISTWIDTH] IN BLOOD BY AUTOMATED COUNT: 15.4 %
GLOBULIN PLAS-MCNC: 4.6 G/DL (ref 2.8–4.4)
GLUCOSE BLD-MCNC: 116 MG/DL (ref 70–99)
GLUCOSE BLD-MCNC: 122 MG/DL (ref 70–99)
GLUCOSE BLD-MCNC: 122 MG/DL (ref 70–99)
GLUCOSE BLD-MCNC: 146 MG/DL (ref 70–99)
GLUCOSE BLD-MCNC: 149 MG/DL (ref 70–99)
HCT VFR BLD AUTO: 26.2 %
HGB BLD-MCNC: 8.2 G/DL
HGB BLD-MCNC: 8.6 G/DL
MCH RBC QN AUTO: 29.7 PG (ref 26–34)
MCHC RBC AUTO-ENTMCNC: 32.8 G/DL (ref 31–37)
MCV RBC AUTO: 90.3 FL
OSMOLALITY SERPL CALC.SUM OF ELEC: 292 MOSM/KG (ref 275–295)
PLATELET # BLD AUTO: 592 10(3)UL (ref 150–450)
POTASSIUM SERPL-SCNC: 4 MMOL/L (ref 3.5–5.1)
PROT SERPL-MCNC: 5.6 G/DL (ref 6.4–8.2)
RBC # BLD AUTO: 2.9 X10(6)UL
SODIUM SERPL-SCNC: 142 MMOL/L (ref 136–145)
WBC # BLD AUTO: 11.3 X10(3) UL (ref 4–11)

## 2024-06-09 PROCEDURE — 99232 SBSQ HOSP IP/OBS MODERATE 35: CPT | Performed by: INTERNAL MEDICINE

## 2024-06-09 PROCEDURE — 71045 X-RAY EXAM CHEST 1 VIEW: CPT | Performed by: INTERNAL MEDICINE

## 2024-06-09 RX ORDER — OXYCODONE HYDROCHLORIDE 5 MG/1
5 TABLET ORAL EVERY 6 HOURS PRN
Qty: 20 TABLET | Refills: 0 | Status: SHIPPED | OUTPATIENT
Start: 2024-06-09 | End: 2024-06-10

## 2024-06-09 RX ORDER — NALOXONE HYDROCHLORIDE 4 MG/.1ML
4 SPRAY NASAL AS NEEDED
Qty: 1 KIT | Refills: 0 | Status: SHIPPED | OUTPATIENT
Start: 2024-06-09 | End: 2024-06-10

## 2024-06-09 NOTE — PROGRESS NOTES
INFECTIOUS DISEASE  PROGRESS NOTE            [facility]    Soumya King Patient Status:  Inpatient    6/3/1960 MRN [id number]   Formerly [facility] 8NE-A Attending Jose Rafael Raphael MD   Hosp Day # 14 PCP Rika Vinson DO     Antibiotics: valtrex #1  unasyn    Subjective:  : oral ulcers    Objective:  Temp:  [97.8 °F (36.6 °C)-99.3 °F (37.4 °C)] 97.8 °F (36.6 °C)  Pulse:  [] 76  Resp:  [18-25] 24  BP: (121-144)/(58-80) 131/58  SpO2:  [95 %-100 %] 100 %    General: awake alert   Vital signs:Temp:  [97.8 °F (36.6 °C)-99.3 °F (37.4 °C)] 97.8 °F (36.6 °C)  Pulse:  [] 76  Resp:  [18-25] 24  BP: (121-144)/(58-80) 131/58  SpO2:  [95 %-100 %] 100 %  HEENT:oral ulcer noted  Respiratory: Non labored, symmetric excursion, normal respirations  Cardiovascular: no irregularities in rhythm  Abdomen: Soft, nontender, nondistended.   Musculoskeletal: joints: no swelling   Integument: No lesions. No erythema. No open wounds.  Labs:     COVID-19 Lab Results    COVID-19  Lab Results   Component Value Date    COVID19 Not Detected 2024    COVID19 Not Detected 2024    COVID19 Not Detected 2023       Pro-Calcitonin  No results for input(s): \"PCT\" in the last 168 hours.    Cardiac  No results for input(s): \"TROP\", \"PBNP\" in the last 168 hours.    Creatinine Kinase  No results for input(s): \"CK\" in the last 168 hours.    Inflammatory Markers  No results for input(s): \"CRP\", \"KD\", \"LDH\", \"DDIMER\" in the last 168 hours.    Recent Labs   Lab 24  0650 24  1153 24  0538 24  1707 24  0847   RBC 2.46*   < > 2.73*  --  2.90*   HGB 7.3*   < > 8.1*  8.2*   < > 8.6*   HCT 23.0*   < > 25.0*  --  26.2*   MCV 93.5   < > 91.6  --  90.3   MCH 29.7   < > 29.7  --  29.7   MCHC 31.7   < > 32.4  --  32.8   RDW 15.8   < > 16.0  --  15.4   NEPRELIM 22.83*   < > 10.08*  --   --    WBC 27.5*   < > 14.2*  --  11.3*   .0*   < > 611.0*  --  592.0*   NEUT 89  --   --   --    --    LYMPH 7  --   --   --   --    MON 0  --   --   --   --    EOS 0  --   --   --   --     < > = values in this interval not displayed.         Recent Labs   Lab 06/07/24 0538 06/08/24 0538 06/09/24  0418   * 125* 116*   BUN 4* 4* 4*   CREATSERUM 0.40* 0.44* 0.46*   CA 7.5* 7.6* 7.6*   ALB 1.0* 1.0* 1.0*    140 142   K 4.2 4.1 4.0    108 107   CO2 25.0 28.0 32.0   ALKPHO 193* 177* 151*   AST 65* 55* 59*   ALT 62* 56 54   BILT 0.3 0.3 0.4   TP 5.3* 5.7* 5.6*       Vancomycin Trough (ug/mL)   Date Value   06/07/2024 12.1     Microbiology    Hospital Encounter on 05/26/24   1. Tissue Aerobic Culture     Status: Abnormal    Collection Time: 06/05/24  8:43 AM    Specimen: Lung right; Tissue   Result Value Ref Range    Tissue Culture Result 2+ growth Lactobacillus species N/A    Tissue Smear 2+ WBCs seen (A) N/A    Tissue Smear 1+ Gram Positive Cocci (A) N/A   2. Anaerobic Culture     Status: None (Preliminary result)    Collection Time: 06/05/24  8:43 AM    Specimen: Lung right; Tissue   Result Value Ref Range    Anaerobic Culture No Anaerobes to date N/A   3. Body Fluid Cult Aerobic and Anaerobic     Status: None (Preliminary result)    Collection Time: 06/05/24  8:07 AM    Specimen: Pleural Fluid, Right; Body fluid, unspecified   Result Value Ref Range    Body Fluid Culture Result No Growth 3 Days N/A    Body Fluid Smear 3+ WBCs seen N/A    Body Fluid Smear No organisms seen N/A    Body Fluid Smear This is a cytocentrifuged smear. N/A   4. Blood Culture     Status: None    Collection Time: 05/29/24  2:24 PM    Specimen: Blood,peripheral   Result Value Ref Range    Blood Culture Result No Growth 5 Days N/A         Problem list reviewed:  Patient Active Problem List   Diagnosis    Vitamin D deficiency    Jamison's esophagus    Tobacco use    Insomnia    COPD, mild (HCC) - Dx'd via PFTs done in 3/2015    Migraine without aura and without status migrainosus, not intractable    Hyperlipidemia    SUMMER  on CPAP    MDD (major depressive disorder), recurrent episode, moderate (HCC)    GERD (gastroesophageal reflux disease)    Hypertension    Chronic pansinusitis    Mucinous cystadenoma of ovary, left    KIRK (generalized anxiety disorder)    History of pubovaginal sling    Absence of bladder continence    Vasomotor flushing    Primary osteoarthritis of left knee    Mass of spine    Hx of motion sickness    Difficult intubation    Chronic low back pain without sciatica    Type 2 diabetes mellitus with diabetic neuropathy (HCC)    Pulmonary nodule    CAD, multiple vessel    S/P CABG x 2    Type 2 diabetes mellitus with hyperlipidemia (HCC)    Anemia    Iron deficiency anemia secondary to inadequate dietary iron intake    Subacute cough    Hyperglycemia    Acute anemia    MARÍA ELENA (acute kidney injury) (Formerly [facility])    Transaminitis    Thrombocytosis    Hyponatremia    Pleural effusion    Somnolence    Acute respiratory failure with hypoxia (Formerly [facility])    Empyema of right pleural space (Formerly [facility])    Status post thoracotomy    Hypoxia             ASSESSMENT/PLAN:  1. RLL pneumonia, abscess, empyema  SP chest tube, followed by VATS    -strep intermedius isolated from pleural fluid on 5/28 and 5/29  -lactobacillus isolated from tissue culture 6/5    Continue unasyn    2. Oral ulcer was started on po valtrexx      Bro Herrera MD, MD [facility]  [phone]

## 2024-06-09 NOTE — PLAN OF CARE
RN SHIFT NOTE    Assumed care of pt at 1930. Patient given IV dilaudid and po oxy for pain 10/10 CT site pain. A&O x 4. NSR/ST on tele. BL foot non-pitting edema present. Lung sounds diminished with crackles. Abdomen soft and round. CT dressing C/D/I. CT to continuous suction and no crepitus present. Call light within reach and bed alarm on. Tolerating medications and care needs have been met at this time.      POC:CT to suction, pain control, wean o2 as able, IV abx, monitor hgb.    Problem: Diabetes/Glucose Control  Goal: Glucose maintained within prescribed range  Description: INTERVENTIONS:  - Monitor Blood Glucose as ordered  - Assess for signs and symptoms of hyperglycemia and hypoglycemia  - Administer ordered medications to maintain glucose within target range  - Assess barriers to adequate nutritional intake and initiate nutrition consult as needed  - Instruct patient on self management of diabetes  Outcome: Progressing     Problem: Patient/Family Goals  Goal: Patient/Family Long Term Goal  Description: Patient's Long Term Goal:   Discharge to home    Interventions:  - Follow plan of care  - See additional Care Plan goals for specific interventions  Outcome: Progressing  Goal: Patient/Family Short Term Goal  Description: Patient's Short Term Goal:   5/26 noc: maintain on room air  5/27: manage hgb  5/27 noc: reduce pleuritic pain   5/28 AM: CT scan of head/NPO possible thora  5/28noc: sleep  5/29 AM: Chest Tube placement  5/29noc: CT management, control pain, sleep  6/1 AM: Pain control, Abd US  06/01/2024 - pain control  06/02/2024 - pain control  6/4 AM: pain control; maintain O2  Interventions:   - labs, blood  - Medications  - IVF  - IVF ABT  - See additional Care Plan goals for specific interventions  Outcome: Progressing     Problem: RESPIRATORY - ADULT  Goal: Achieves optimal ventilation and oxygenation  Description: INTERVENTIONS:  - Assess for changes in respiratory status  - Assess for changes in  mentation and behavior  - Position to facilitate oxygenation and minimize respiratory effort  - Oxygen supplementation based on oxygen saturation or ABGs  - Provide Smoking Cessation handout, if applicable  - Encourage broncho-pulmonary hygiene including cough, deep breathe, Incentive Spirometry  - Assess the need for suctioning and perform as needed  - Assess and instruct to report SOB or any respiratory difficulty  - Respiratory Therapy support as indicated  - Manage/alleviate anxiety  - Monitor for signs/symptoms of CO2 retention  Outcome: Progressing     Problem: HEMATOLOGIC - ADULT  Goal: Free from bleeding injury  Description: (Example usage: patient with low platelets)  INTERVENTIONS:  - Avoid intramuscular injections, enemas and rectal medication administration  - Ensure safe mobilization of patient  - Hold pressure on venipuncture sites to achieve adequate hemostasis  - Assess for signs and symptoms of internal bleeding  - Monitor lab trends  - Patient is to report abnormal signs of bleeding to staff  - Avoid use of toothpicks and dental floss  - Use electric shaver for shaving  - Use soft bristle tooth brush  - Limit straining and forceful nose blowing  Outcome: Progressing     Problem: PAIN - ADULT  Goal: Verbalizes/displays adequate comfort level or patient's stated pain goal  Description: INTERVENTIONS:  - Encourage pt to monitor pain and request assistance  - Assess pain using appropriate pain scale  - Administer analgesics based on type and severity of pain and evaluate response  - Implement non-pharmacological measures as appropriate and evaluate response  - Consider cultural and social influences on pain and pain management  - Manage/alleviate anxiety  - Utilize distraction and/or relaxation techniques  - Monitor for opioid side effects  - Notify MD/LIP if interventions unsuccessful or patient reports new pain  - Anticipate increased pain with activity and pre-medicate as appropriate  Outcome:  Progressing

## 2024-06-09 NOTE — CM/SW NOTE
SW sent updated clinical information to [facility]. PICC line information and ID note uploaded to referral.     SW will continue to follow for final IV ABX recommendations for DC. Script will be needed when decision is made in order to sent to [facility].     SW will continue to follow for plan of care changes and remain available for any additional DC needs or concerns.     Lucy Leo MSW, LSW  Discharge Planner   [id number]

## 2024-06-09 NOTE — PROGRESS NOTES
[facility]  Progress Note    Soumya King Patient Status:  Inpatient    6/3/1960 MRN [id number]   Location [facility] 8NE-A Attending Jose Rafael Raphael MD   Hosp Day # 14 PCP Rika Vinson,      Subjective:  Soumya King is a(n) 64 year old female Tmax 99.3 afebrile this a.m.  Second chest tube removed  Decreased sats this morning requiring 1 to 2 L of O2-some decreasing with sleep as well compatible with known SUMMER  Overall however patient feels better remains with pain at surgical site    Objective:  /80 (BP Location: Left arm)   Pulse 93   Temp 98.3 °F (36.8 °C) (Oral)   Resp 25   Ht 5' 1\" (1.549 m)   Wt 154 lb (69.9 kg)   SpO2 97%   BMI 29.10 kg/m² currently on 2 L      Temp (24hrs), Av.6 °F (37 °C), Min:97.9 °F (36.6 °C), Max:99.3 °F (37.4 °C)      Intake/Output:    Intake/Output Summary (Last 24 hours) at 2024 0902  Last data filed at 2024 0820  Gross per 24 hour   Intake 480 ml   Output 3265 ml   Net -2785 ml       Physical Exam:   General: alert, cooperative, oriented.  No respiratory distress.   Head: Normocephalic, without obvious abnormality, atraumatic.   Throat: Lips, mucosa, and tongue normal.  No thrush noted.   Neck: trachea midline, no adenopathy, no thyromegaly. No JVD.   Lungs: Remains with rub and rales right base no subcu air   Chest wall: No tenderness or deformity.   Heart: Regular rate and rhythm   Abdomen: soft, non-distended, no masses, no guarding, no     Rebound.  Protuberant no diarrhea   Extremity: Slight increase in edema diffusely   Skin: No rashes or lesions.   Neurological: Alert, interactive, no focal deficits    Lab Data Review:  Recent Labs     24  0538 24  0125 24  0538 24  1707 24  2324   WBC 16.1*  --  14.2*  --   --    HGB 8.2* 8.0* 8.1*  8.2* 8.7* 8.2*   .0*  --  611.0*  --   --      Recent Labs     24  0538 24  0538 24  0418    140 142   K 4.2 4.1 4.0    108  107   CO2 25.0 28.0 32.0   BUN 4* 4* 4*   CREATSERUM 0.40* 0.44* 0.46*     Recent Labs   Lab 06/03/24  0755   PTP 17.0*   INR 1.38*       Cultures: Fungal remains negative    Radiology:  No results found.  Reviewed      Medications reviewed     Assessment and Plan:   Patient Active Problem List   Diagnosis    Vitamin D deficiency    Jamison's esophagus    Tobacco use    Insomnia    COPD, mild (HCC) - Dx'd via PFTs done in 3/2015    Migraine without aura and without status migrainosus, not intractable    Hyperlipidemia    SUMMER on CPAP    MDD (major depressive disorder), recurrent episode, moderate (HCC)    GERD (gastroesophageal reflux disease)    Hypertension    Chronic pansinusitis    Mucinous cystadenoma of ovary, left    KIRK (generalized anxiety disorder)    History of pubovaginal sling    Absence of bladder continence    Vasomotor flushing    Primary osteoarthritis of left knee    Mass of spine    Hx of motion sickness    Difficult intubation    Chronic low back pain without sciatica    Type 2 diabetes mellitus with diabetic neuropathy (HCC)    Pulmonary nodule    CAD, multiple vessel    S/P CABG x 2    Type 2 diabetes mellitus with hyperlipidemia (HCC)    Anemia    Iron deficiency anemia secondary to inadequate dietary iron intake    Subacute cough    Hyperglycemia    Acute anemia    MARÍA ELENA (acute kidney injury) (HCC)    Transaminitis    Thrombocytosis    Hyponatremia    Pleural effusion    Somnolence    Acute respiratory failure with hypoxia (HCC)    Empyema of right pleural space (HCC)    Status post thoracotomy       Assessment:  Large right lower lobe pneumonia/abscess formation with empyema secondary to Streptococcus intermedius and role of lactobacillus plans for long-term antibiotics  Sepsis 2/2 above resolved  S/p VATS with decortication-lactobacillus on surgical results  Pain management  Elevated LFTs resolving  Multiple pulmonary nodules with plans to follow once recovered  Acute encephalopathy,  improved  Iron deficiency anemia follows with Dr. Corona-status post transfusion-now stable postop  History of COPD remains on Incruse  SUMMER 2017 with AHI 26 supine 89 REM 43 grzegorz 75%--remains untreated    Plan:  Recheck chest x-ray given O2 needs  Continue postop care including increasing activity continuing to walk in the halls up out of bed and I-S  Antibiotics per infectious disease service  Consider revisiting SUMMER    CC     Irina Ya MD  6/9/2024  9:02 AM

## 2024-06-09 NOTE — PROGRESS NOTES
[facility]   part of [facility]     Hospitalist Progress Note     Soumya King Patient Status:  Inpatient    6/3/1960 MRN [id number]   [facility] 5NW-A Attending Apurva Logan MD   Hosp Day # 14 PCP Rika Vinson DO     Subjective:   In pain with 2nd chest tube just removed     Objective:    Review of Systems:   A comprehensive review of systems was completed; pertinent positive and negatives stated in subjective.    Vital signs:  Temp:  [97.9 °F (36.6 °C)-99.3 °F (37.4 °C)] 98.3 °F (36.8 °C)  Pulse:  [] 93  Resp:  [18-25] 25  BP: (112-144)/(57-80) 141/80  SpO2:  [95 %-97 %] 97 %    Physical Exam:    General: No acute distress   Respiratory: No wheezes, no rhonchi  Cardiovascular: S1, S2, regular rate and rhythm  Abdomen: Soft, Non-tender, non-distended, positive bowel sounds  Neuro: No new focal deficits.   Extremities: No edema    Diagnostic Data:    Labs:  Recent Labs   Lab 24  0755 24  1558 24  0650 24  1153 24  1749 24  0442 24  1636 24  0538 24  0125 24  0538 24  1707 24  2324 24  0847   WBC 38.6*  --  27.5* 31.9*  --  26.9*  --  16.1*  --  14.2*  --   --  11.3*   HGB 9.2*  8.8*   < > 7.3* 8.7*   < > 7.8*  7.8*   < > 8.2* 8.0* 8.1*  8.2* 8.7* 8.2* 8.6*   MCV 92.6  --  93.5 90.5  --  91.4  --  90.6  --  91.6  --   --  90.3   .0*  --  694.0* 550.0*  --  597.0*  --  585.0*  --  611.0*  --   --  592.0*   BAND  --   --  4  --   --   --   --   --   --   --   --   --   --    INR 1.38*  --   --   --   --   --   --   --   --   --   --   --   --     < > = values in this interval not displayed.       Recent Labs   Lab 24  0538 24  0538 24  0418   * 125* 116*   BUN 4* 4* 4*   CREATSERUM 0.40* 0.44* 0.46*   CA 7.5* 7.6* 7.6*   ALB 1.0* 1.0* 1.0*    140 142   K 4.2 4.1 4.0    108 107   CO2 25.0 28.0 32.0   ALKPHO 193* 177* 151*   AST 65* 55* 59*   ALT 62* 56  54   BILT 0.3 0.3 0.4   TP 5.3* 5.7* 5.6*       Estimated Creatinine Clearance: 95.7 mL/min (A) (based on SCr of 0.46 mg/dL (L)).    No results for input(s): \"TROP\", \"TROPHS\", \"CK\" in the last 168 hours.      Recent Labs   Lab 06/03/24  0755   PTP 17.0*   INR 1.38*          Microbiology    Hospital Encounter on 05/26/24   1. Tissue Aerobic Culture     Status: Abnormal    Collection Time: 06/05/24  8:43 AM    Specimen: Lung right; Tissue   Result Value Ref Range    Tissue Culture Result 2+ growth Lactobacillus species N/A    Tissue Smear 2+ WBCs seen (A) N/A    Tissue Smear 1+ Gram Positive Cocci (A) N/A   2. Anaerobic Culture     Status: None (Preliminary result)    Collection Time: 06/05/24  8:43 AM    Specimen: Lung right; Tissue   Result Value Ref Range    Anaerobic Culture No Anaerobes to date N/A   3. Body Fluid Cult Aerobic and Anaerobic     Status: None (Preliminary result)    Collection Time: 06/05/24  8:07 AM    Specimen: Pleural Fluid, Right; Body fluid, unspecified   Result Value Ref Range    Body Fluid Culture Result No Growth 3 Days N/A    Body Fluid Smear 3+ WBCs seen N/A    Body Fluid Smear No organisms seen N/A    Body Fluid Smear This is a cytocentrifuged smear. N/A   4. Blood Culture     Status: None    Collection Time: 05/29/24  2:24 PM    Specimen: Blood,peripheral   Result Value Ref Range    Blood Culture Result No Growth 5 Days N/A         Imaging: Reviewed in Epic.    Medications:    valACYclovir  1,000 mg Oral 2 times per day    ampicillin-sulbactam  3 g Intravenous q6h    heparin  5,000 Units Subcutaneous 2 times per day    lidocaine-menthol  1 patch Transdermal Daily    metoprolol tartrate  25 mg Oral 2x Daily(Beta Blocker)    [Transfer Hold] HYDROmorphone  0.2 mg Intravenous Once    oxybutynin ER  5 mg Oral Daily    aspirin  81 mg Oral Daily    atorvastatin  80 mg Oral Nightly    busPIRone  20 mg Oral Daily    gabapentin  600 mg Oral Nightly    pantoprazole  40 mg Oral QAM AC    QUEtiapine  ER  200 mg Oral QPM    umeclidinium bromide  1 puff Inhalation Daily    insulin aspart  1-10 Units Subcutaneous TID AC and HS       Assessment & Plan:      # RLL pneumonia with empyema   # RLL effusion with loculation and chest pain   - sp chest tube by IR on 5/29  -abx adjusted per cultures   - s/p MIST w/o improvement   - s/p VATS decortication 6/5   - CT Chest reviewed  - Pulmonology and ID rec appreciated   - pain meds    # elevated liver enzymes seems ischemic in nature per course - abd benign on exam   - improving  - US abd with doppler neg   - avoid hepatotoxic agents    # Iron def Anemia- sp PRBC - no s/s bleeding     #Coagulopathy- repeat INR normalized     #COPD -Continue inhalers, nebs      #Acute metabolic toxic (anxiolytic) encephalopathy, resolved   -Ammonia normal   -CT brain negative    #Hypertension- metoprolol     #DM 2-hyperglycemia protocol     #GERD/ Jamison's esophagus -PPI    #MDD/anxiety- Home med       DC planning in next 24 hours     Supplementary Documentation:     Quality:  DVT Mechanical Prophylaxis:   SCDs, Early ambuation  DVT Pharmacologic Prophylaxis   Medication    heparin (Porcine) 5000 UNIT/ML injection 5,000 Units         DVT Pharmacologic prophylaxis: Aspirin 81 mg      Code Status: Full Code  Gotti: External urinary catheter in place  Gotti Duration (in days):   Central line:    ILYA:   Discharge is dependent on: progress  At this point Ms. King is expected to be discharge to: home    The 21st Century Cures Act makes medical notes like these available to patients in the interest of transparency. Please be advised this is a medical document. Medical documents are intended to carry relevant information, facts as evident, and the clinical opinion of the practitioner. The medical note is intended as peer to peer communication and may appear blunt or direct. It is written in medical language and may contain abbreviations or verbiage that are unfamiliar.

## 2024-06-09 NOTE — PROGRESS NOTES
THORACIC SURGERY POST-OPERATIVE PROGRESS NOTE    Subjective: No acute events overnight. Feeling better today. Ambulated.     Objective:    I/O last 3 completed shifts:  In: 1540 [P.O.:1440; IV PIGGYBACK:100]  Out: 4565 [Urine:4250; Chest Tube:315]    Chest tube 1: 65cc serous, no air leak     Recent Labs   Lab 06/06/24  0442 06/06/24  1636 06/07/24  0538 06/08/24  0125 06/08/24  0538 06/08/24  1707 06/08/24  2324   HGB 7.8*  7.8*   < > 8.2*   < > 8.1*  8.2* 8.7* 8.2*   WBC 26.9*  --  16.1*  --  14.2*  --   --    .0*  --  585.0*  --  611.0*  --   --     < > = values in this interval not displayed.       Recent Labs   Lab 06/07/24  0538 06/08/24  0538 06/09/24  0418    140 142   K 4.2 4.1 4.0    108 107   CO2 25.0 28.0 32.0   BUN 4* 4* 4*       Exam:    /66 (BP Location: Right arm)   Pulse 100   Temp 98.4 °F (36.9 °C) (Oral)   Resp 22   Ht 154.9 cm (5' 1\")   Wt 154 lb (69.9 kg)   SpO2 97%   BMI 29.10 kg/m²      General: Awake, alert, NAD  Pulm: normal respiratory effort  Card: RRR  Abd: soft, NT, ND  Wounds: clean, dry, intact, No signs of infection    Assessment: 64 year old female, 4 Days Post-Op from Decortication for empyema and right lower lobe lung abscess    Plan:  Chest tube removed.   Ambulate as tolerated, PT  Spend majority of day out of bed, in chair  Encouraged cough and IS use  Pain control  General Diet  Abx per ID.  Okay to discharge from thoracic surgery standpoint once cleared with other services. Follow up in 2 weeks.     Patient discussed with Dr. Maldonado.     Yudy Albrecht PA-C  Thoracic Surgery  Pager: [phone]

## 2024-06-09 NOTE — PLAN OF CARE
Received pt at shift change. AxOx4. 2L O2 w stats maintaining >90%. C/o R sided pain, oxy given w relief. Vitals stable. NSR on tele. Chest tube pulled this am.  See flowsheets for additional assessments.   Pt updated on POC. Call light within reach. All needs met at this time.     Plan:  -IV Unasyn q6  -Encourage IS  -Wean O2 as able    Problem: Diabetes/Glucose Control  Goal: Glucose maintained within prescribed range  Description: INTERVENTIONS:  - Monitor Blood Glucose as ordered  - Assess for signs and symptoms of hyperglycemia and hypoglycemia  - Administer ordered medications to maintain glucose within target range  - Assess barriers to adequate nutritional intake and initiate nutrition consult as needed  - Instruct patient on self management of diabetes  Outcome: Progressing     Problem: Patient/Family Goals  Goal: Patient/Family Long Term Goal  Description: Patient's Long Term Goal:   Discharge to home    Interventions:  - Follow plan of care  - See additional Care Plan goals for specific interventions  Outcome: Progressing  Goal: Patient/Family Short Term Goal  Description: Patient's Short Term Goal:   5/26 noc: maintain on room air  5/27: manage hgb  5/27 noc: reduce pleuritic pain   5/28 AM: CT scan of head/NPO possible thora  5/28noc: sleep  5/29 AM: Chest Tube placement  5/29noc: CT management, control pain, sleep  6/1 AM: Pain control, Abd US  06/01/2024 - pain control  06/02/2024 - pain control  6/4 AM: pain control; maintain O2  Interventions:   - labs, blood  - Medications  - IVF  - IVF ABT  - See additional Care Plan goals for specific interventions  Outcome: Progressing     Problem: RESPIRATORY - ADULT  Goal: Achieves optimal ventilation and oxygenation  Description: INTERVENTIONS:  - Assess for changes in respiratory status  - Assess for changes in mentation and behavior  - Position to facilitate oxygenation and minimize respiratory effort  - Oxygen supplementation based on oxygen saturation or  ABGs  - Provide Smoking Cessation handout, if applicable  - Encourage broncho-pulmonary hygiene including cough, deep breathe, Incentive Spirometry  - Assess the need for suctioning and perform as needed  - Assess and instruct to report SOB or any respiratory difficulty  - Respiratory Therapy support as indicated  - Manage/alleviate anxiety  - Monitor for signs/symptoms of CO2 retention  Outcome: Progressing     Problem: HEMATOLOGIC - ADULT  Goal: Free from bleeding injury  Description: (Example usage: patient with low platelets)  INTERVENTIONS:  - Avoid intramuscular injections, enemas and rectal medication administration  - Ensure safe mobilization of patient  - Hold pressure on venipuncture sites to achieve adequate hemostasis  - Assess for signs and symptoms of internal bleeding  - Monitor lab trends  - Patient is to report abnormal signs of bleeding to staff  - Avoid use of toothpicks and dental floss  - Use electric shaver for shaving  - Use soft bristle tooth brush  - Limit straining and forceful nose blowing  Outcome: Progressing     Problem: PAIN - ADULT  Goal: Verbalizes/displays adequate comfort level or patient's stated pain goal  Description: INTERVENTIONS:  - Encourage pt to monitor pain and request assistance  - Assess pain using appropriate pain scale  - Administer analgesics based on type and severity of pain and evaluate response  - Implement non-pharmacological measures as appropriate and evaluate response  - Consider cultural and social influences on pain and pain management  - Manage/alleviate anxiety  - Utilize distraction and/or relaxation techniques  - Monitor for opioid side effects  - Notify MD/LIP if interventions unsuccessful or patient reports new pain  - Anticipate increased pain with activity and pre-medicate as appropriate  Outcome: Progressing

## 2024-06-10 VITALS
OXYGEN SATURATION: 98 % | HEART RATE: 75 BPM | DIASTOLIC BLOOD PRESSURE: 62 MMHG | HEIGHT: 61 IN | TEMPERATURE: 98 F | BODY MASS INDEX: 29.07 KG/M2 | WEIGHT: 154 LBS | SYSTOLIC BLOOD PRESSURE: 150 MMHG | RESPIRATION RATE: 19 BRPM

## 2024-06-10 LAB
ALBUMIN SERPL-MCNC: 1.1 G/DL (ref 3.4–5)
ALBUMIN/GLOB SERPL: 0.2 {RATIO} (ref 1–2)
ALP LIVER SERPL-CCNC: 146 U/L
ALT SERPL-CCNC: 58 U/L
ANION GAP SERPL CALC-SCNC: 4 MMOL/L (ref 0–18)
AST SERPL-CCNC: 89 U/L (ref 15–37)
BASOPHILS # BLD AUTO: 0.05 X10(3) UL (ref 0–0.2)
BASOPHILS NFR BLD AUTO: 0.5 %
BILIRUB SERPL-MCNC: 0.3 MG/DL (ref 0.1–2)
BUN BLD-MCNC: 4 MG/DL (ref 9–23)
CALCIUM BLD-MCNC: 8 MG/DL (ref 8.5–10.1)
CHLORIDE SERPL-SCNC: 104 MMOL/L (ref 98–112)
CO2 SERPL-SCNC: 31 MMOL/L (ref 21–32)
CREAT BLD-MCNC: 0.42 MG/DL
EGFRCR SERPLBLD CKD-EPI 2021: 111 ML/MIN/1.73M2 (ref 60–?)
EOSINOPHIL # BLD AUTO: 0.28 X10(3) UL (ref 0–0.7)
EOSINOPHIL NFR BLD AUTO: 3 %
ERYTHROCYTE [DISTWIDTH] IN BLOOD BY AUTOMATED COUNT: 15.1 %
GLOBULIN PLAS-MCNC: 5.1 G/DL (ref 2.8–4.4)
GLUCOSE BLD-MCNC: 127 MG/DL (ref 70–99)
GLUCOSE BLD-MCNC: 137 MG/DL (ref 70–99)
GLUCOSE BLD-MCNC: 154 MG/DL (ref 70–99)
HCT VFR BLD AUTO: 23.6 %
HGB BLD-MCNC: 7.7 G/DL
IMM GRANULOCYTES # BLD AUTO: 0.15 X10(3) UL (ref 0–1)
IMM GRANULOCYTES NFR BLD: 1.6 %
LYMPHOCYTES # BLD AUTO: 2.14 X10(3) UL (ref 1–4)
LYMPHOCYTES NFR BLD AUTO: 23 %
MCH RBC QN AUTO: 29.7 PG (ref 26–34)
MCHC RBC AUTO-ENTMCNC: 32.6 G/DL (ref 31–37)
MCV RBC AUTO: 91.1 FL
MONOCYTES # BLD AUTO: 1.13 X10(3) UL (ref 0.1–1)
MONOCYTES NFR BLD AUTO: 12.1 %
NEUTROPHILS # BLD AUTO: 5.57 X10 (3) UL (ref 1.5–7.7)
NEUTROPHILS # BLD AUTO: 5.57 X10(3) UL (ref 1.5–7.7)
NEUTROPHILS NFR BLD AUTO: 59.8 %
OSMOLALITY SERPL CALC.SUM OF ELEC: 286 MOSM/KG (ref 275–295)
PLATELET # BLD AUTO: 530 10(3)UL (ref 150–450)
POTASSIUM SERPL-SCNC: 4.1 MMOL/L (ref 3.5–5.1)
PROT SERPL-MCNC: 6.2 G/DL (ref 6.4–8.2)
RBC # BLD AUTO: 2.59 X10(6)UL
SODIUM SERPL-SCNC: 139 MMOL/L (ref 136–145)
WBC # BLD AUTO: 9.3 X10(3) UL (ref 4–11)

## 2024-06-10 PROCEDURE — 99233 SBSQ HOSP IP/OBS HIGH 50: CPT | Performed by: INTERNAL MEDICINE

## 2024-06-10 PROCEDURE — 99239 HOSP IP/OBS DSCHRG MGMT >30: CPT | Performed by: HOSPITALIST

## 2024-06-10 RX ORDER — NALOXONE HYDROCHLORIDE 4 MG/.1ML
4 SPRAY NASAL AS NEEDED
Qty: 1 KIT | Refills: 0 | Status: SHIPPED | OUTPATIENT
Start: 2024-06-10

## 2024-06-10 RX ORDER — ERTAPENEM 1 G/1
1 INJECTION, POWDER, LYOPHILIZED, FOR SOLUTION INTRAMUSCULAR; INTRAVENOUS DAILY
Qty: 24 EACH | Refills: 0 | Status: SHIPPED | OUTPATIENT
Start: 2024-06-10 | End: 2024-07-04

## 2024-06-10 RX ORDER — VALACYCLOVIR HYDROCHLORIDE 1 G/1
1000 TABLET, FILM COATED ORAL EVERY 12 HOURS SCHEDULED
Qty: 10 TABLET | Refills: 0 | Status: SHIPPED | OUTPATIENT
Start: 2024-06-10 | End: 2024-06-15

## 2024-06-10 RX ORDER — OXYCODONE HYDROCHLORIDE 5 MG/1
5 TABLET ORAL EVERY 6 HOURS PRN
Qty: 20 TABLET | Refills: 0 | Status: SHIPPED | OUTPATIENT
Start: 2024-06-10

## 2024-06-10 NOTE — PROGRESS NOTES
Discharge paperwork reviewed with pt.  Pt verbalized understanding. Peripheral iv and tele removed. PICC in place and in good working order.  After dc paperwork reviewed pt verbalized concern that her legs were swollen and her LUE had a bruise and some swelling noted.  MD made aware and no new orders received.  Pt assisted to sisters car in  by staff with all personal belongings.

## 2024-06-10 NOTE — PROGRESS NOTES
06/10/24 1042   Mobility   O2 walk? Yes   SPO2% on Room Air at Rest 95   At rest oxygen flow (liters per minute) 0   SPO2% Ambulation on Room Air 85   SPO2% Ambulation on Oxygen 95   Ambulation oxygen flow (liters per minute) 2

## 2024-06-10 NOTE — PROGRESS NOTES
[facility]   part of [facility] ID PROGRESS NOTE    Soumya King Patient Status:  Inpatient    6/3/1960 MRN [id number]   Location [facility] 5NW-A Attending Hoda Juan MD   Hosp Day # 15 PCP Rika Vinson DO     Abx: IV Unasyn, s/p Zosyn and vanco    Subjective: Patient seen and examined today. Occasional cough with sputum production. No SOB at the preset time. Still with some pain to her incision sites. Lips/tongue feels much better today. Afebrile. On NC.     Allergies:  Allergies   Allergen Reactions    Naprosyn [Naproxen] ANAPHYLAXIS     Has tolerated ibuprofen and IV toradol     Aspirin OTHER (SEE COMMENTS)     Difficulty swallowing due to  saavedra's esophagus       Medications:    Current Facility-Administered Medications:     valACYclovir (Valtrex) tab 1,000 mg, 1,000 mg, Oral, 2 times per day    ampicillin-sulbactam (Unasyn) 3 g in sodium chloride 0.9% 100mL IVPB-ADD, 3 g, Intravenous, q6h    lactated ringers infusion, , Intravenous, Continuous    meperidine (Demerol) 25 MG/ML injection 25 mg, 25 mg, Intravenous, Q1H PRN    sodium chloride 0.9% infusion, , Intravenous, Continuous    oxyCODONE immediate release tab 5 mg, 5 mg, Oral, Q4H PRN **OR** oxyCODONE immediate release tab 10 mg, 10 mg, Oral, Q4H PRN    HYDROmorphone (Dilaudid) 1 MG/ML injection 0.4 mg, 0.4 mg, Intravenous, Q2H PRN **OR** HYDROmorphone (Dilaudid) 1 MG/ML injection 0.8 mg, 0.8 mg, Intravenous, Q2H PRN    polyethylene glycol (PEG 3350) (Miralax) 17 g oral packet 17 g, 17 g, Oral, Daily PRN    sennosides (Senokot) tab 17.2 mg, 17.2 mg, Oral, Nightly PRN    bisacodyl (Dulcolax) 10 MG rectal suppository 10 mg, 10 mg, Rectal, Daily PRN    fleet enema (Fleet) 7-19 GM/118ML rectal enema 133 mL, 1 enema, Rectal, Once PRN    ondansetron (Zofran) 4 MG/2ML injection 4 mg, 4 mg, Intravenous, Q6H PRN    prochlorperazine (Compazine) 10 MG/2ML injection 5 mg, 5 mg, Intravenous, Q8H PRN    calcium carbonate (Tums)  chewable tab 1,000 mg, 1,000 mg, Oral, TID PRN    heparin (Porcine) 5000 UNIT/ML injection 5,000 Units, 5,000 Units, Subcutaneous, 2 times per day    lidocaine-menthol 4-1 % patch 1 patch, 1 patch, Transdermal, Daily    ipratropium-albuterol (Duoneb) 0.5-2.5 (3) MG/3ML inhalation solution 3 mL, 3 mL, Nebulization, Q4H PRN    metoprolol tartrate (Lopressor) tab 25 mg, 25 mg, Oral, 2x Daily(Beta Blocker)    [Held by provider] clonazePAM (KlonoPIN) tab 0.5 mg, 0.5 mg, Oral, BID PRN    [Transfer Hold] HYDROmorphone (Dilaudid) 1 MG/ML injection 0.2 mg, 0.2 mg, Intravenous, Once    oxybutynin ER (Ditropan-XL) 24 hr tab 5 mg, 5 mg, Oral, Daily    aspirin DR tab 81 mg, 81 mg, Oral, Daily    atorvastatin (Lipitor) tab 80 mg, 80 mg, Oral, Nightly    benzonatate (Tessalon) cap 100 mg, 100 mg, Oral, TID PRN    busPIRone (Buspar) tab 20 mg, 20 mg, Oral, Daily    gabapentin (Neurontin) tab 600 mg, 600 mg, Oral, Nightly    pantoprazole (Protonix) DR tab 40 mg, 40 mg, Oral, QAM AC    QUEtiapine ER (SEROquel XR) 24 hr tab 200 mg, 200 mg, Oral, QPM    rizatriptan (Maxalt-MLT) disintegrating tab 5 mg, 5 mg, Oral, PRN    umeclidinium bromide (Incruse Ellipta) 62.5 MCG/ACT inhaler 1 puff, 1 puff, Inhalation, Daily    glucose (Dex4) 15 GM/59ML oral liquid 15 g, 15 g, Oral, Q15 Min PRN **OR** glucose (Glutose) 40% oral gel 15 g, 15 g, Oral, Q15 Min PRN **OR** glucose-vitamin C (Dex-4) chewable tab 4 tablet, 4 tablet, Oral, Q15 Min PRN **OR** dextrose 50% injection 50 mL, 50 mL, Intravenous, Q15 Min PRN **OR** glucose (Dex4) 15 GM/59ML oral liquid 30 g, 30 g, Oral, Q15 Min PRN **OR** glucose (Glutose) 40% oral gel 30 g, 30 g, Oral, Q15 Min PRN **OR** glucose-vitamin C (Dex-4) chewable tab 8 tablet, 8 tablet, Oral, Q15 Min PRN    melatonin tab 3 mg, 3 mg, Oral, Nightly PRN    insulin aspart (NovoLOG) 100 Units/mL FlexPen 1-10 Units, 1-10 Units, Subcutaneous, TID AC and HS    guaiFENesin (Robitussin) 100 MG/5 ML oral liquid 200 mg, 200 mg,  Oral, Q4H PRN    sodium chloride (Saline Mist) 0.65 % nasal solution 1 spray, 1 spray, Each Nare, Q3H PRN    Review of Systems:  Completed. See pertinent positives and negatives above.    Physical Exam:  Vital signs: Blood pressure 131/64, pulse 66, temperature 98.3 °F (36.8 °C), temperature source Oral, resp. rate 21, height 154.9 cm (5' 1\"), weight 154 lb (69.9 kg), SpO2 100%, not currently breastfeeding.    General: Alert, oriented. NAD. On NC.   HEENT: Moist mucous membranes.   Neck: No lymphadenopathy.  Supple.  Cardiovascular: RRR.   Respiratory: Diminished breath sounds on the R.   Abdomen: Soft, nontender, nondistended.   Musculoskeletal: Movement of all extremities  Integument: No lesions. No erythema. 2 scabbed lesions on lower lip.    Laboratory Data:  Recent Labs   Lab 06/05/24  0650 06/05/24  1153 06/10/24  0417   RBC 2.46*   < > 2.59*   HGB 7.3*   < > 7.7*   HCT 23.0*   < > 23.6*   MCV 93.5   < > 91.1   MCH 29.7   < > 29.7   MCHC 31.7   < > 32.6   RDW 15.8   < > 15.1   NEPRELIM 22.83*   < > 5.57   WBC 27.5*   < > 9.3   .0*   < > 530.0*   NEUT 89  --   --    LYMPH 7  --   --    MON 0  --   --    EOS 0  --   --     < > = values in this interval not displayed.     Recent Labs   Lab 06/08/24  0538 06/09/24  0418 06/10/24  0417   * 116* 127*   BUN 4* 4* 4*   CREATSERUM 0.44* 0.46* 0.42*   CA 7.6* 7.6* 8.0*   ALB 1.0* 1.0* 1.1*    142 139   K 4.1 4.0 4.1    107 104   CO2 28.0 32.0 31.0   ALKPHO 177* 151* 146*   AST 55* 59* 89*   ALT 56 54 58*   BILT 0.3 0.4 0.3   TP 5.7* 5.6* 6.2*       Microbiology: Reviewed in EMR    Radiology: Reviewed.    PROCEDURE:  CT CHEST (CPT=71250)     COMPARISON:  [facility] CT, CT CHEST (CPT=71250), 5/29/2024, 9:05 AM.     INDICATIONS:  evaluate empyema     TECHNIQUE:  Unenhanced multislice CT scanning is performed through the chest.  Dose reduction techniques were used. Dose information is transmitted to the ACR (American College of Radiology) NRDR  (National Radiology Data Registry) which includes the Dose   Index Registry.     PATIENT STATED HISTORY: (As transcribed by Technologist)  Evaluation of chest tube.      FINDINGS:  Evaluation of the visceral organs is limited due to the lack of intravenous contrast.     LUNGS:  There is consolidation within the right lower lobe, right middle lobe, and to a lesser extent the right upper lobe suspicious for multifocal pneumonia.  There are small nodular ground-glass airspace opacities within the right upper lobe which may   represent the known pneumonia.  VASCULATURE:  Unremarkable.  THORACIC AORTA:  Scattered atherosclerosis.  MEDIASTINUM/ANKUHS:  Mild to moderate mediastinal adenopathy is nonspecific but may be reactive.  CARDIAC:  Calcified coronary artery disease is noted.  PLEURA:  There is a moderate amount of empyema within the right pleural space.  This is most notable at the posterior inferior aspect of the right pleural space measuring up to 11.1 x 7.3 cm in the axial plane.  Foci of air are seen within this empyema.   A right sided chest tube is seen centered within this dominant region of empyema.  The amount of empyema has increased since 5/29/2024.  A mild left pleural effusion is seen.  CHEST WALL:  Sternotomy changes.  LIMITED ABDOMEN:  Unremarkable.  BONES:  Degenerative changes the spine.  OTHER:  Mild supraclavicular adenopathy is noted.     Impression:    CONCLUSION:    1. A moderate amount empyema is seen within the right pleural space.  The extent of this empyema is worse since 5/29/2024.  A chest tube is well seen within the dominant loculation of probable empyema.  2. Multifocal pneumonia is again seen, most notable within the right middle lobe and right lower lobe.  Continued imaging follow-up is recommended.  3. Mild to moderate mediastinal adenopathy is nonspecific but may be reactive.  Mild supraclavicular adenopathy is also seen.     LOCATION:  [id number]     Dictated by (CST): Stromberg, LeRoy,  MD on 6/03/2024 at 11:24 AM      Finalized by (CST): Stromberg, LeRoy, MD on 6/03/2024 at 11:35 AM        CORRECTION Corrected on: 5/29/2024;         PROCEDURE:  CT CHEST (CPT=71250)    COMPARISON:  [facility] , XR, XR CHEST AP PORTABLE  (CPT=71045), 5/29/2024,   4:27 AM.  [facility], CT, CT ANGIOGRAPHY, CHEST (CPT=71275), 5/26/2024, 12:49   PM.    INDICATIONS:  Follow-up empyema    TECHNIQUE:  Unenhanced multislice CT scanning is performed through the   chest.  Dose reduction techniques were used. Dose information is   transmitted to the ACR (American College of Radiology) NRDR (National   Radiology Data Registry) which includes the Dose  Index Registry.  There is image degradation due to motion slightly   limiting evaluation    PATIENT STATED HISTORY: (As transcribed by Technologist)  Recent empyema       FINDINGS:   LUNGS:  Masslike consolidation with internal punctate foci of air is most   consistent with patient's known consolidation and measures 5.7 x 8.1 x 6.1   versus 5 x 7.2 x 5.1 cm, AP x T x cc dimension respectively.  Increasing   adjacent interstitial and   ground-glass density. VASCULATURE:  Pulmonary vessels are unremarkable within the limits of a   noncontrast CT.   ANKUSH:  Limited due to lack of IV contrast.   MEDIASTINUM:  Adenopathy with subcarinal node measuring 2.8 x 3.3 versus   2.1 x 2 cm and right paratracheal node measuring 2 x 1.3 versus 2 x 1.4   cm.  Mediastinal clips. CARDIAC:  No enlargement or pericardial thickening.  Mild coronary artery   calcifications. PLEURA:  Increasing pleural fluid along the right fissures which is   lobular in contour and measures 2.6 cm in depth.  Right pleural effusion   measures 3.7 versus 2.9 cm with left pleural fluid measuring 1.7 versus   0.8 cm in depth.   THORACIC AORTA:  Atherosclerosis.   CHEST WALL:  No mass or axillary adenopathy.  Sternotomy wires. LIMITED ABDOMEN:  Limited images of the upper abdomen are unremarkable.   BONES:  No fracture.       CONCLUSION:   1. When compared to most recent CT of the chest performed 5/26/2024,   patient's known right lower lobe consolidation has slightly increased in   size with increasing adjacent interstitial and ground-glass density,   correlate clinically. 2. Increasing pleural fluid as detailed above.    LOCATION:  [facility]       Dictated by (CST): Britney Mendes MD on 5/29/2024 at 9:39 AM       Finalized by (CST): Britney Mendes MD on 5/29/2024 at 9:51 AM      Dictated by (CST): Britney Mendes MD on 5/29/2024 at 11:23 AM       Finalized by (CST): Britney Mendes MD on 5/29/2024 at 11:23 AM                       Signed: 05/29/24 1123 by Britney Mendes MD  Narrative:    PROCEDURE:  CT CHEST (CPT=71250)     COMPARISON:  [facility] , XR, XR CHEST AP PORTABLE  (CPT=71045), 5/29/2024, 4:27 AM.  [facility], CT, CT ANGIOGRAPHY, CHEST (CPT=71275), 5/26/2024, 12:49 PM.     INDICATIONS:  Follow-up empyema     TECHNIQUE:  Unenhanced multislice CT scanning is performed through the chest.  Dose reduction techniques were used. Dose information is transmitted to the ACR (American College of Radiology) NRDR (National Radiology Data Registry) which includes the Dose   Index Registry.  There is image degradation due to motion slightly limiting evaluation     PATIENT STATED HISTORY: (As transcribed by Technologist)  Recent empyema      FINDINGS:    LUNGS:  Masslike consolidation with internal punctate foci of air is most consistent with patient's known empyema and measures 5.7 x 8.1 x 6.1 versus 5 x 7.2 x 5.1 cm, AP x T x cc dimension respectively.  Increasing adjacent interstitial and ground-glass   density.  VASCULATURE:  Pulmonary vessels are unremarkable within the limits of a noncontrast CT.    ANKUSH:  Limited due to lack of IV contrast.    MEDIASTINUM:  Adenopathy with subcarinal node measuring 2.8 x 3.3 versus 2.1 x 2 cm and right paratracheal node measuring 2 x 1.3 versus 2 x 1.4 cm.  Mediastinal clips.  CARDIAC:  No enlargement or pericardial thickening.  Mild  coronary artery calcifications.  PLEURA:  Increasing pleural fluid along the right fissures which is lobular in contour and measures 2.6 cm in depth.  Right pleural effusion measures 3.7 versus 2.9 cm with left pleural fluid measuring 1.7 versus 0.8 cm in depth.    THORACIC AORTA:  Atherosclerosis.  CHEST WALL:  No mass or axillary adenopathy.  Sternotomy wires.  LIMITED ABDOMEN:  Limited images of the upper abdomen are unremarkable.    BONES:  No fracture.       Impression:    CONCLUSION:    1. When compared to most recent CT of the chest performed 5/26/2024, patient's known right lower lobe empyema has slightly increased in size with increasing adjacent interstitial and ground-glass density, correlate clinically.  2. Increasing pleural fluid as detailed above.     LOCATION:  [facility]     Dictated by (CST): Britney Mendes MD on 5/29/2024 at 9:39 AM      Finalized by (CST): Britney Mendes MD on 5/29/2024 at 9:51 AM       PROCEDURE:  XR CHEST AP PORTABLE  (CPT=71045)     TECHNIQUE:  AP chest radiograph was obtained.     COMPARISON:  [facility] , CT, CT ABDOMEN+PELVIS(CONTRAST ONLY)(CPT=74177), 5/28/2024, 1:32 PM.  [facility] , XR, XR CHEST AP PORTABLE  (CPT=71045), 5/28/2024, 4:38 PM.     INDICATIONS:  dyspnea     PATIENT STATED HISTORY: (As transcribed by Technologist)  dyspnea     FINDINGS:  Cardiomediastinal silhouette is stable in size and appearance with sternotomy wires and mediastinal clips.  Persistent blunting of the right costophrenic angle is consistent with small pleural effusion versus reaction.  Persistent interstitial   and right perihilar/lower lobe masslike airspace opacity.  No new focal consolidation.  No pneumothorax.     Impression:    CONCLUSION:    1. No significant change, allowing for differences in technique when compared to 5/28/2024 chest radiograph.     LOCATION:  [facility]    Dictated by (CST): Britney Mendes MD on 5/29/2024 at 7:58 AM      Finalized by (CST): Britney Mendes MD on 5/29/2024 at 8:00 AM        PROCEDURE:   US THORACENTESIS GUIDED RIGHT (CPT=32555)     COMPARISON:  [facility] , XR, XR CHEST AP PORTABLE  (CPT=71045), 5/28/2024, 4:38 PM.  [facility] , XR, XR CHEST AP PORTABLE  (CPT=71045), 5/28/2024, 4:48 AM.  [facility], CT, CT ANGIOGRAPHY, CHEST (CPT=71275), 5/26/2024, 12:49 PM.     INDICATIONS:  pneumonia     DESCRIPTION OF PROCEDURE:  The clinical scenario and referral were discussed with Dr. Ya.  Referral for ultrasound-guided right thoracentesis.  Because of her altered mental status, witnessed written and verbal informed consent were obtained from her  sister.  The procedure was also discussed with her.  She voiced understanding and wished to proceed.  Time-out was performed by the staff.     She could not achieve a seated position.  Positioning was optimized, supine oblique.  Ultrasound demonstrated a small right complex pleural effusion corresponding to the CTA and chest x-ray abnormality.  Access site was chosen.  The overlying skin was  prepped in the usual sterile manner.  1% lidocaine was used locally.  Using realtime ultrasound an 8 [place] multi side hole sheath needle was advanced.  Once in position aspiration to dryness with removal of only 10-11 mL of thin clear yellow fluid.    Samples were sent to RT and the laboratory for testing.  The system was removed.  Sterile dressings were placed.  She tolerated the procedure.  No immediate complication.     Impression:    CONCLUSION:  Small complex right pleural effusion.  Only a small amount of fluid could be aspirated.  Sample sent to RT and the laboratory for testing.     LOCATION:  [facility]     Dictated by (CST): Jace Govea MD on 5/28/2024 at 5:13 PM      Finalized by (CST): Jace Govea MD on 5/28/2024 at 5:16 PM      PROCEDURE:  US ABDOMEN LIMITED (CPT=76705)     COMPARISON:  [facility] , CT, CT ABDOMEN+PELVIS(CONTRAST ONLY)(CPT=74177), 5/28/2024, 1:32 PM.     INDICATIONS:  elevated lfts. eval bile duct, cirrhosis, etc     PATIENT STATED HISTORY: (As transcribed by  Technologist)        FINDINGS:    LIVER:  Normal size and echogenicity. No significant masses.  BILIARY:  Common bile duct measures 6 mm in region of stephanie hepatis.  There is no intrahepatic bile duct distension.  Gallbladder is unremarkable.  PANCREAS:  Normal.  RIGHT KIDNEY:  Normal.  OTHER:  There is a small amount of free fluid in posterior subhepatic space.  Incidental note is made of a right pleural effusion.     Impression:    CONCLUSION:    1. Trace ascites is noted in posterior subhepatic space.  2. Right pleural effusion has been noted on prior CT scan.  3. There is otherwise no abnormality detected on this abdominal ultrasound.       LOCATION:  [facility]     Dictated by (CST): Abad Chen MD on 5/28/2024 at 4:56 PM      Finalized by (CST): Abad Chen MD on 5/28/2024 at 4:57 PM          PROCEDURE:  XR CHEST AP PORTABLE  (CPT=71045)     TECHNIQUE:  AP chest radiograph was obtained.     COMPARISON:  [facility] , US, US THORACENTESIS GUIDED RIGHT (CPT=32555), 5/28/2024, 3:53 PM.  [facility] , XR, XR CHEST AP PORTABLE  (CPT=71045), 5/28/2024, 4:48 AM.     INDICATIONS:  Post Thoracentesis     PATIENT STATED HISTORY: (As transcribed by Technologist)  Right sided post thoracentesis. Patient offered no additional history at this time.      FINDINGS:  Status post right thoracentesis.  There is a small right pleural effusion.  Patchy opacity at the right lung base may represent pneumonia.  There is no pneumothorax.  Median sternotomy wires are stable.  The heart is normal in size.     Impression:    CONCLUSION:  Small right pleural effusion with patchy airspace disease the right lung base could represent pneumonia.  There is no pneumothorax.     LOCATION:  [facility]     Dictated by (CST): Elieser Torres MD on 5/28/2024 at 4:55 PM      Finalized by (CST): Elieser Torres MD on 5/28/2024 at 4:56 PM      PROCEDURE:  CT ABDOMEN+PELVIS (CONTRAST ONLY) (CPT=74177)     COMPARISON:  [facility], CT ABDOMEN (ATTENTION PANCREAS) (W+  WO) (CPT=74170), 3/02/2022, 3:52 PM.  [facility]LVIS(CONTRAST ONLY)(CPT=74177), 9/18/2021, 2:27 PM.     INDICATIONS:  GIB     TECHNIQUE:  CT scanning was performed from the dome of the diaphragm to the pubic symphysis with non-ionic intravenous contrast material. Post contrast coronal MPR imaging was performed.  Dose reduction techniques were used. Dose information is  transmitted to the ACR (American College of Radiology) NRDR (National Radiology Data Registry) which includes the Dose Index Registry.     PATIENT STATED HISTORY:(As transcribed by Technologist)  Poor historian      CONTRAST USED:  85cc of Isovue 370     FINDINGS:    LUNG BASES:  Loculated right-sided pleural effusion with thick wall measuring approximately 7.8 x 2.5 x 12.7 cm.  Findings suggestive of empyema.  There is consolidation within the right lower lobe with complex fluid and small foci of air measuring 3.0 x   2.7 x 3.2 cm and 2.1 x 2.8 x 1.7 cm which may represent pulmonary abscesses/necrotizing pneumonia.  Small left-sided pleural effusion.  LIVER:  Normal in shape and contour.    BILIARY:  Gallbladder is unremarkable in appearance.  No intrahepatic biliary dilatation..  SPLEEN:  Normal.  No enlargement or focal lesion.  PANCREAS:  No kirk-pancreatic inflammatory stranding  ADRENALS:  Normal.  No mass or enlargement.    KIDNEYS:  Kidneys are symmetrical in size without evidence of hydronephrosis.  BOWEL/MESENTERY:  Bowel is normal in caliber. No evidence of obstruction.  Probable Villanueva's type hernia containing fat and a portion of bowel  within the right para umbilical region.  No evidence of obstruction.  Mildly prominent retroperitoneal lymph  nodes.  PELVIS:  Bladder is distended.  Small amount of free pelvic fluid.    AORTA/VASCULAR:  Atheromatous calcifications of the aorta.  BONES:  No acute fractures.  Median sternotomy approximated by wire sutures.     Impression:    CONCLUSION:    1. Loculated right-sided  pleural effusion suggestive empyema with consolidation within the right lower lobe.  There are areas of fluid and small foci of air within the consolidation which may represent pulmonary abscesses/necrotizing pneumonia.  2. Small left-sided pleural effusion with associated atelectasis.  3. Nonspecific mildly prominent retroperitoneal lymph nodes which may be reactive in nature.  4. Trace amount of free pelvic fluid.       LOCATION:  [facility]     Dictated by (CST): Annette Ferreira MD on 5/28/2024 at 2:11 PM      Finalized by (CST): Annette Ferreira MD on 5/28/2024 at 2:17 PM         PROCEDURE:  CT BRAIN OR HEAD (92110)     COMPARISON:  None.     INDICATIONS:  lethargy     TECHNIQUE:  Noncontrast CT scanning is performed through the brain. Dose reduction techniques were used. Dose information is transmitted to the ACR (American College of Radiology) NRDR (National Radiology Data Registry) which includes the Dose Index  Registry.     PATIENT STATED HISTORY: (As transcribed by Technologist)  Patient is lethargic.       FINDINGS:    VENTRICLES/SULCI:  Ventricles and sulci are normal in size.    INTRACRANIAL:  There is a chronic appearing lacunar infarct involving the right caudate nucleus at the junction of the head and body..  There are no abnormal extraaxial fluid collections.  There is no midline shift.  There are no intraparenchymal brain  abnormalities.  There is nothing specific for acute infarct.  There is no hemorrhage or mass lesion.    SINUSES:           No sign of acute sinusitis.    MASTOIDS:          No sign of acute inflammation.  SKULL:             No evidence for fracture or osseous abnormality.  OTHER:             None.    Impression:    CONCLUSION:  No evidence of acute intracranial process.     LOCATION:  [facility]     Dictated by (CST): Elieser Torres MD on 5/28/2024 at 8:49 AM      Finalized by (CST): Elieser Torres MD on 5/28/2024 at 8:54 AM      PROCEDURE:  XR CHEST AP PORTABLE  (CPT=71045)      TECHNIQUE:  AP chest radiograph was obtained.     COMPARISON:  [facility], CT, CT ANGIOGRAPHY, CHEST (CPT=71275), 5/26/2024, 12:49 PM.  [facility], XR, XR CHEST PA + LAT CHEST (CPT=71046), 5/26/2024, 11:43 AM.     INDICATIONS:  dyspnea     PATIENT STATED HISTORY: (As transcribed by Technologist)  dyspnea      Impression:    CONCLUSION:    Limited AP portable view shows small-moderate right pleural effusion cleaning some pleural fluid tracking up the lower right lateral chest.  Possibly loculated.  Patchy consolidation mid-lower right chest.  Small blunting left costophrenic   angle.  Cardiomegaly.  No pneumothorax. Consider upright PA and lateral examination of the chest, when tolerated.  CT follow-up may also be beneficial.       LOCATION:  [facility]     Dictated by (CST): Abhishek Mora MD on 5/28/2024 at 8:36 AM      Finalized by (CST): Abhishek Mora MD on 5/28/2024 at 8:37 AM          PROCEDURE:  US VENOUS DOPPLER LEG LEFT - DIAG IMG (CPT=93971)     COMPARISON:  None.     INDICATIONS:  Left lower extremity swelling and pain     TECHNIQUE:  Real time, grey scale, and duplex ultrasound was used to evaluate the lower extremity venous system. B-mode two-dimensional images of the vascular structures, Doppler spectral analysis, and color flow.  Doppler imaging were performed.  The  following veins were imaged:  Common, deep, and superficial femoral, popliteal, sapheno-femoral junction, posterior tibial veins, and the contralateral common femoral vein.     PATIENT STATED HISTORY: (As transcribed by Technologist)        FINDINGS:    EXTREMITY EXAMINED:  Left lower extremity  SAPHENOFEMORAL JUNCTION:  No reflux.  THROMBI:  None visible.  COMPRESSION:  Normal compressibility, phasicity, and augmentation.  OTHER:  Negative.     Impression:    CONCLUSION:  No evidence of DVT in the left lower extremity.     LOCATION:  [facility]    Dictated by (CST): Roc Mosqueda MD on 5/26/2024 at 2:46 PM      Finalized by (CST): Roc Mosqueda MD  on 5/26/2024 at 2:49 PM      PROCEDURE:  CT ANGIOGRAPHY, CHEST (CPT=71275)     COMPARISON:  [facility], CT, CT CHEST LD NO CAD(CPT=71250), 3/13/2024, 8:38 PM.     INDICATIONS:  sob, hypotension, pleurisy, R lung mass     TECHNIQUE:  IV contrast-enhanced multislice CT angiography is performed through the pulmonary arterial anatomy. 3D volume renderings are generated.  Dose reduction techniques were used. Dose information is transmitted to the ACR (American College of  Radiology) NRDR (National Radiology Data Registry) which includes the Dose [facility].     PATIENT STATED HISTORY:(As transcribed by Technologist)  SOB, hypotension; right lung mass      CONTRAST USED:  100cc of Isovue 370     FINDINGS:    VASCULATURE:  No visible pulmonary arterial thrombus or attenuation.    THORACIC AORTA:  No aneurysm or visible dissection.    LUNGS:  Masslike consolidation in the right lower lobe is new compared to the prior examination.  Given its acute appearance an infectious process is most likely.  Small central areas of cavitation are noted with associated low attenuation which may  represent necrosis.  Moderate right pleural effusion is noted.  MEDIASTINUM:  Right paratracheal node is noted measuring up to 2.0 x 1.4 cm (image 38).  A subcarinal lymph node is noted measuring up to 2.0 x 2.1 cm (image 93)..    ANKUSH:  No mass or adenopathy.    CARDIAC:  No enlargement, pericardial thickening, or significant coronary artery calcification.  PLEURA:  No mass or effusion.    CHEST WALL:  No mass or axillary adenopathy.  Patient is status post median sternotomy.  LIMITED ABDOMEN:  Limited images of the upper abdomen are unremarkable.    BONES:  No bony lesion or fracture.    OTHER:  Negative.       Impression:    CONCLUSION:    1. Masslike consolidation with areas of central necrosis and mild cavitation in the right lower lobe.  Given its acute appearance this may represent an infectious process.  Correlate clinically.   Short-term follow-up after treatment is recommended.  2. Mediastinal adenopathy is likely reactive.  Attention on follow-up is recommended.     LOCATION:  [facility]     Dictated by (CST): Roc Mosqueda MD on 5/26/2024 at 1:21 PM      Finalized by (CST): Roc Mosqueda MD on 5/26/2024 at 1:25 PM        PROCEDURE:  XR CHEST PA + LAT CHEST (CPT=71046)     INDICATIONS:  sob, cough, crackles R base     COMPARISON:  [facility], CT, CT CHEST LD NO CAD(CPT=71250), 3/13/2024, 8:38 PM.  [facility] , XR, XR CHEST DECUBITUS BILAT INCL PA AND LAT(4 VIEWS)(CPT=71048), 1/15/2024, 9:15 AM.     TECHNIQUE:  PA and lateral chest radiographs were obtained.     PATIENT STATED HISTORY: (As transcribed by Technologist)  Patient stated she has been having a cough for 1 week and a crackle sound in her right lung.      FINDINGS:    LUNGS:  An area of masslike consolidation in the right upper lobe is noted which is new from the prior examination.  Small right pleural effusion is present.  CARDIAC:  Normal size cardiac silhouette.  Patient is status post median sternotomy.  MEDIASTINUM:  Normal.  PLEURA:  Normal.  No pleural effusions.  BONES:  Normal for age.     Impression:    CONCLUSION:  Masslike consolidation in the right upper lobe is noted.  Given it is acute appearance this likely represents an infectious process.  Small right effusion is noted.  Correlate clinically.     LOCATION:  [facility]     Dictated by (CST): Roc Mosqueda MD on 5/26/2024 at 12:05 PM      Finalized by (CST): Roc Mosqueda MD on 5/26/2024 at 12:06 PM    ASSESSMENT:  RLL pneumonia/pulmonary abscess with right empyema  Legionella and strep pneumo Uags negative. Blasto ag negative.  S/p thoracentesis 5/28, cx with strep intermedius. Path w/ acute inflammation with occasional bacterial organisms present.   S/p R chest tube placement 5/29, cx with strep intermedius. S/p MIST 2 protocol.   S/p R VATS and total pulmonary decortication 6/5, R purulent empyema with RLL lung abscess  noted. Tissue cx from RLL lung abscess with GNR and GPC- per micro, gram + is lactobacillus and they feel GNR may be an anaerobe. Tissue cx from pleural peel with lactobacillus.  Fevers and worsening leukocytosis, d/t above. Bcxs negative. Wbc now wnl, no further fevers.  Alerted mental status. ? Related to above vs other. Head CT w/o acute process. Resolved.  Elevated LFTs. CT a/p and US abd unremarkable. Improving.    Anemia  IgM kappa MGUS  DM II. Hgb A1C 7.1  COPD  Oral lesions appear c/w HSV, patient has a history of this. Also with tongue lesions.    PLAN:  - continue IV Unasyn for RLL abscess/R empyema  - continue valtrex for oral lesions   - follow temps and wbc  - follow LFTs  - follow OR cultures- lactobacillus species sent out for jessica per micro  - will plan for IV Invanz x 4 weeks on dc (EOT 7/4/24). PICC placed to RUE 6/7. Will need dose of IV Invanz prior to dc. Ok for dc from ID standpoint with IV abx once arranged.    Discussed case with RN, patient, Dr. Menezes and TONYA Guillen PA-C    ID ATTENDING ADDENDUM     Pt seen an examined independently. Chart reviewed. Agree with above. Note has been reviewed by me and modified as needed.  Exam and Impression/ Recs as noted above.  Will follow up with me in 2 weeks  D/w staff and with pt  More than 50% of clinical time and 100% of the clinical decision making performed by me.    Vibha Menezes MD

## 2024-06-10 NOTE — CM/SW NOTE
Received call from EDDIE STANFORD that the final abx order was placed. Uploaded to [facility] in [place] and message sent to inform of delivery for the abx today and also included the discharge address.     BASILIO Wilkins, LSW  Discharge Planner  [id number]

## 2024-06-10 NOTE — PAYOR COMM NOTE
6/8 THRU 6/10  CONTINUED STAY REVIEW    Payor: [facility]  Subscriber #:  [id number]  Authorization Number: [id number]    Admit date: 5/26/24  Admit time:  3:45 PM    MEDICATIONS ADMINISTERED IN LAST 1 DAY:  ampicillin-sulbactam (Unasyn) 3 g in sodium chloride 0.9% 100mL IVPB-ADD       Date Action Dose Route User    6/10/2024 0919 New Bag 3 g Intravenous Summer Azul RN    6/10/2024 0512 New Bag 3 g Intravenous Ann Catherine RN    6/9/2024 2123 New Bag 3 g Intravenous Ann Catherine RN    6/9/2024 1654 New Bag 3 g Intravenous Farhana Blackmon RN          aspirin DR tab 81 mg       Date Action Dose Route User    6/10/2024 0814 Given 81 mg Oral Summer Azul RN          atorvastatin (Lipitor) tab 80 mg       Date Action Dose Route User    6/9/2024 2115 Given 80 mg Oral Ann Catherine RN          gabapentin (Neurontin) tab 600 mg       Date Action Dose Route User    6/9/2024 2115 Given 600 mg Oral Ann Catherine RN          heparin (Porcine) 5000 UNIT/ML injection 5,000 Units       Date Action Dose Route User    6/10/2024 0814 Given 5,000 Units Subcutaneous (Right Upper Abdomen) Summer Azul RN    6/9/2024 2115 Given 5,000 Units Subcutaneous (Right Lower Abdomen) Ann Catherine RN          HYDROmorphone (Dilaudid) 1 MG/ML injection 0.8 mg       Date Action Dose Route User    6/10/2024 0830 Given 0.8 mg Intravenous Summer Azul RN    6/9/2024 1416 Given 0.8 mg Intravenous Farhana Blackmon RN          lidocaine-menthol 4-1 % patch 1 patch       Date Action Dose Route User    6/10/2024 0814 Patch Applied 1 patch Transdermal (Right Lower Back) Summer Azul RN          metoprolol tartrate (Lopressor) tab 25 mg       Date Action Dose Route User    6/10/2024 0512 Given 25 mg Oral Ann Catherine RN    6/9/2024 1702 Given 25 mg Oral Farhana Blackmon RN          oxybutynin ER (Ditropan-XL) 24 hr tab 5 mg       Date Action Dose Route User     6/10/2024 0814 Given 5 mg Oral Summer Azul RN          oxyCODONE immediate release tab 10 mg       Date Action Dose Route User    6/10/2024 0955 Given 10 mg Oral Summer Azul RN    6/9/2024 2115 Given 10 mg Oral Ann Catherine RN    6/9/2024 1719 Given 10 mg Oral Farhana Blackmon RN          pantoprazole (Protonix) DR tab 40 mg       Date Action Dose Route User    6/10/2024 0512 Given 40 mg Oral Ann Catherine RN          umeclidinium bromide (Incruse Ellipta) 62.5 MCG/ACT inhaler 1 puff       Date Action Dose Route User    6/10/2024 0814 Given 1 puff Inhalation Summer Azul RN          valACYclovir (Valtrex) tab 1,000 mg       Date Action Dose Route User    6/10/2024 0815 Given 1,000 mg Oral Summer Azul RN    6/9/2024 2116 Given 1,000 mg Oral Ann Catherine RN          busPIRone (Buspar) tab 20 mg       Date Action Dose Route User    6/10/2024 0814 Given 20 mg Oral Summer Azul RN          QUEtiapine ER (SEROquel XR) 24 hr tab 200 mg       Date Action Dose Route User    6/9/2024 2115 Given 200 mg Oral Ann Catherine RN            Vitals (last day)       Date/Time Temp Pulse Resp BP SpO2 Weight O2 Device O2 Flow Rate (L/min) [facility]    06/10/24 0729 98.4 °F (36.9 °C) 101 28 138/88 94 % -- Nasal cannula 2 L/min AC    06/10/24 0700 -- -- -- -- -- -- Nasal cannula 2 L/min DL    06/10/24 0351 98.4 °F (36.9 °C) 99 24 149/69 95 % -- Nasal cannula 2 L/min KP    06/10/24 0334 -- 122 19 -- 89 % -- -- --     06/09/24 2348 98.6 °F (37 °C) 103 21 136/63 95 % -- Nasal cannula 2 L/min KP    06/09/24 2030 -- 110 32 -- -- -- -- --     06/09/24 1858 -- 98 33 -- -- -- -- --     06/09/24 1855 98.7 °F (37.1 °C) 69 21 115/95 100 % -- Nasal cannula 2 L/min     06/09/24 1657 98 °F (36.7 °C) 94 23 136/72 89 % -- Nasal cannula 2 L/min AL    06/09/24 1358 -- 101 33 -- 100 % -- -- --     06/09/24 1230 97.8 °F (36.6 °C) 76 24 131/58 100 % -- Nasal cannula 2 L/min     06/09/24  0952 -- 101 18 -- 95 % -- -- -- AP    06/09/24 0820 98.3 °F (36.8 °C) 93 25 141/80 97 % -- Nasal cannula 2 L/min AP    06/09/24 0400 98.4 °F (36.9 °C) 100 22 121/66 97 % -- Nasal cannula 1 L/min VB     Blood Transfusion Record       Product Unit Status Volume Start End            Transfuse RBC       24  [id number]  [id number] Completed 06/06/24 1430 448.33 mL 06/06/24 1001 06/06/24 1430       24  [id number]  [id number] Completed 06/05/24 1154 350 mL 06/05/24 0923 06/05/24 0933       24  [id number]  [id number] Completed 06/05/24 1154 350 mL 06/05/24 0823 06/05/24 0833       24  [id number]  [id number] Completed 06/05/24 1154 350 mL 06/05/24 0807 06/05/24 0817     [facility]  24  [id number]  [id number] Completed 05/30/24 0908 350 mL 05/30/24 0119 05/30/24 0900     0397  24  [id number]  [id number] Completed 06/06/24 1430 365.42 mL 05/27/24 0813 05/27/24 1104                6/8 HOSPITALIST NOTE    Vital signs:  Temp:  [97.3 °F (36.3 °C)-98.5 °F (36.9 °C)] 97.4 °F (36.3 °C)  Pulse:  [] 100  Resp:  [9-31] 15  BP: (106-158)/() 131/74  SpO2:  [89 %-100 %] 99 %                      Recent Labs   Lab 06/01/24  0758 06/01/24  1615 06/02/24  0806 06/02/24  1422 06/03/24  0755 06/03/24  1558 06/05/24  0650 06/05/24  1153 06/05/24  1749 06/06/24  0442 06/06/24  1636 06/07/24  0017 06/07/24  0538 06/08/24  0125   WBC 21.6*  --  26.3*   < > 38.6*  --  27.5* 31.9*  --  26.9*  --   --  16.1*  --    HGB 9.2*  9.2*   < > 8.9*  8.9*   < > 9.2*  8.8*   < > 7.3* 8.7*   < > 7.8*  7.8* 9.4* 9.1* 8.2* 8.0*   MCV 89.7  --  89.2   < > 92.6  --  93.5 90.5  --  91.4  --   --  90.6  --    .0*  --  536.0*   < > 698.0*  --  694.0* 550.0*  --  597.0*  --   --  585.0*  --    BAND  --   --   --   --   --   --  4  --   --   --   --   --   --   --    INR 1.35*  --  1.34*  --  1.38*  --   --   --   --   --   --   --   --   --           Assessment & Plan:  # RLL pneumonia with empyema   # RLL effusion with loculation and chest pain   - sp  chest tube by IR on 5/29  -abx adjusted per cultures   - s/p MIST w/o improvement   - s/p VATS decortication 6/5   - CT Chest reviewed  - Pulmonology and ID rec appreciated   - pain meds     # elevated liver enzymes seems ischemic in nature per course - abd benign on exam   - improving  - US abd with doppler neg   - avoid hepatotoxic agents     # Iron def Anemia- sp PRBC - no s/s bleeding      #Coagulopathy- repeat INR normalized      #COPD -Continue inhalers, nebs      #Acute metabolic toxic (anxiolytic) encephalopathy, resolved   -Ammonia normal   -CT brain negative     #Hypertension- metoprolol     #DM 2-hyperglycemia protocol     #GERD/ Jamison's esophagus -PPI     #MDD/anxiety- Home med      CMP in AM     6/8 THORACIC SURGERY NOTE  THORACIC SURGERY POST-OPERATIVE PROGRESS NOTE     Subjective: No acute events overnight. Feeling better this morning. Ambulated twice yesterday. Reports chest tube discomfort.       Objective:     I/O last 3 completed shifts:  In: 2950 [P.O.:2200; IV PIGGYBACK:750]  Out: 4940 [Urine:4650; Chest Tube:290]     Chest tube 1: 250cc serous, no air leak              Recent Labs   Lab 06/06/24  0442 06/06/24  1636 06/07/24  0538 06/08/24  0125 06/08/24  0538   HGB 7.8*  7.8*   < > 8.2* 8.0* 8.1*  8.2*   WBC 26.9*  --  16.1*  --  14.2*   .0*  --  585.0*  --  611.0*    < > = values in this interval not displayed.               Recent Labs   Lab 06/05/24  0650 06/07/24  0538 06/08/24  0538    141 140   K 3.7 4.2 4.1    112 108   CO2 22.0 25.0 28.0   BUN 9 4* 4*     Assessment: 64 year old female, 3 Days Post-Op from Decortication for empyema and right lower lobe lung abscess     Plan:  Continue chest tube to suction, okay to ambulate off of suction. Anticipate removal tomorrow.   Ambulate as tolerated, PT  Spend majority of day out of bed, in chair  Encouraged cough and IS use  Pain control  General Diet    6/8 ID NOTE  Abx: IV Unasyn (5/31-->) D#7, s/p Zosyn and vanco      Subjective: Patient seen and examined this morning. Reports that she did better overnight. Has some pain at the incision and CT sites but otherwise ok. Was on room air, but then got up moved around and went for a walk, now sitting on 1L NC. PICC nontender and clean. She c/o pain on tongue, also has 2 scabbed cold sores at lower lip, reports getting cold sores before. This tongue pain affecting eating / appetite. Patient found vaping by RN this morning.        FINDINGS:    LUNG BASES:  Loculated right-sided pleural effusion with thick wall measuring approximately 7.8 x 2.5 x 12.7 cm.  Findings suggestive of empyema.  There is consolidation within the right lower lobe with complex fluid and small foci of air measuring 3.0 x   2.7 x 3.2 cm and 2.1 x 2.8 x 1.7 cm which may represent pulmonary abscesses/necrotizing pneumonia.  Small left-sided pleural effusion.  LIVER:  Normal in shape and contour.    BILIARY:  Gallbladder is unremarkable in appearance.  No intrahepatic biliary dilatation..  SPLEEN:  Normal.  No enlargement or focal lesion.  PANCREAS:  No kirk-pancreatic inflammatory stranding  ADRENALS:  Normal.  No mass or enlargement.    KIDNEYS:  Kidneys are symmetrical in size without evidence of hydronephrosis.  BOWEL/MESENTERY:  Bowel is normal in caliber. No evidence of obstruction.  Probable Villanueva's type hernia containing fat and a portion of bowel  within the right para umbilical region.  No evidence of obstruction.  Mildly prominent retroperitoneal lymph  nodes.  PELVIS:  Bladder is distended.  Small amount of free pelvic fluid.    AORTA/VASCULAR:  Atheromatous calcifications of the aorta.  BONES:  No acute fractures.  Median sternotomy approximated by wire sutures.     Impression:    CONCLUSION:    1. Loculated right-sided pleural effusion suggestive empyema with consolidation within the right lower lobe.  There are areas of fluid and small foci of air within the consolidation which may represent  pulmonary abscesses/necrotizing pneumonia.  2. Small left-sided pleural effusion with associated atelectasis.  3. Nonspecific mildly prominent retroperitoneal lymph nodes which may be reactive in nature.  4. Trace amount of free pelvic fluid.        PULMONARY NOTE    Subjective:  Soumya King is a(n) 64 year old female remains afebrile  Overall seems to feel much better seems stronger more connected  Remains with some pain at chest tube site  Minimal coughing  Thinks her mouth lesions are feeling better     Objective:  /66 (BP Location: Left arm)   Pulse 104   Temp 98.2 °F (36.8 °C) (Oral)   Resp 17   Ht 5' 1\" (1.549 m)   Wt 154 lb (69.9 kg)   SpO2 98%   BMI 29.10 kg/m² remains on 2 L she feels just for comfort        Temp (24hrs), Av °F (36.7 °C), Min:97.4 °F (36.3 °C), Max:98.5 °F (36.9 °C)       Assessment:  Large right lower lobe pneumonia/abscess formation with empyema secondary to Streptococcus intermedius and role of lactobacillus  Sepsis 2/2 above resolved  S/p VATS with decortication-lactobacillus on surgical results  Pain management  Elevated LFTs resolving  Multiple pulmonary nodules  Acute encephalopathy, improved  Iron deficiency anemia follows with Dr. Corona-status post transfusion-now stable postop  History of COPD remains on Incruse     Plan:  Chest tube management per thoracic surgery  Remains with some output  Encouraged to increase activity and walking chair  Anticipate removal in a.m.  ID input appreciated  Continue to follow with you     CARDIOTHORACIC SURGERY NOTE  Subjective: No acute events overnight. Feeling better today. Ambulated.      Objective:     I/O last 3 completed shifts:  In: 1540 [P.O.:1440; IV PIGGYBACK:100]  Out: 4565 [Urine:4250; Chest Tube:315]     Chest tube 1: 65cc serous, no air leak                Recent Labs   Lab 24  0442 24  1636 24  0538 24  0125 24  0538 24  1707 24  2324   HGB 7.8*  7.8*   < > 8.2*    < > 8.1*  8.2* 8.7* 8.2*   WBC 26.9*  --  16.1*  --  14.2*  --   --    .0*  --  585.0*  --  611.0*  --   --     < > = values in this interval not displayed.               Recent Labs   Lab 06/07/24  0538 06/08/24  0538 06/09/24  0418    140 142   K 4.2 4.1 4.0    108 107   CO2 25.0 28.0 32.0   BUN 4* 4* 4*         Exam:     /66 (BP Location: Right arm)   Pulse 100   Temp 98.4 °F (36.9 °C) (Oral)   Resp 22   Ht 154.9 cm (5' 1\")   Wt 154 lb (69.9 kg)   SpO2 97%   BMI 29.10 kg/m²       General: Awake, alert, NAD  Pulm: normal respiratory effort  Card: RRR  Abd: soft, NT, ND  Wounds: clean, dry, intact, No signs of infection     Assessment: 64 year old female, 4 Days Post-Op from Decortication for empyema and right lower lobe lung abscess     Plan:  Chest tube removed.   Ambulate as tolerated, PT  Spend majority of day out of bed, in chair  Encouraged cough and IS use  Pain control  General Diet  Abx per ID.  Okay to discharge from thoracic surgery standpoint once cleared with other services. Follow up in 2 weeks.      Patient discussed with Dr. Maldonado.      Yudy Albrecht PA-C  Thoracic Surgery  Pager: [phone]              Exam:     /66 (BP Location: Right arm)   Pulse 100   Temp 98.4 °F (36.9 °C) (Oral)   Resp 22   Ht 154.9 cm (5' 1\")   Wt 154 lb (69.9 kg)   SpO2 97%   BMI 29.10 kg/m²       General: Awake, alert, NAD  Pulm: normal respiratory effort  Card: RRR  Abd: soft, NT, ND  Wounds: clean, dry, intact, No signs of infection     Assessment: 64 year old female, 4 Days Post-Op from Decortication for empyema and right lower lobe lung abscess     Plan:  Chest tube removed.   Ambulate as tolerated, PT  Spend majority of day out of bed, in chair  Encouraged cough and IS use  Pain control  General Diet  Abx per ID.    6/9 PULMONARY NOTE    Subjective:  Soumya SALGADO Fernando is a(n) 64 year old female Tmax 99.3 afebrile this a.m.  Second chest tube removed  Decreased sats  this morning requiring 1 to 2 L of O2-some decreasing with sleep as well compatible with known SUMMER  Overall however patient feels better remains with pain at surgical site     Objective:  /80 (BP Location: Left arm)   Pulse 93   Temp 98.3 °F (36.8 °C) (Oral)   Resp 25   Ht 5' 1\" (1.549 m)   Wt 154 lb (69.9 kg)   SpO2 97%   BMI 29.10 kg/m² currently on 2 L          Assessment:  Large right lower lobe pneumonia/abscess formation with empyema secondary to Streptococcus intermedius and role of lactobacillus plans for long-term antibiotics  Sepsis 2/2 above resolved  S/p VATS with decortication-lactobacillus on surgical results  Pain management  Elevated LFTs resolving    6/9 HOSPITALIST NOTE    Subjective:  In pain with 2nd chest tube just removed            Objective:  Review of Systems:   A comprehensive review of systems was completed; pertinent positive and negatives stated in subjective.     Vital signs:  Temp:  [97.9 °F (36.6 °C)-99.3 °F (37.4 °C)] 98.3 °F (36.8 °C)  Pulse:  [] 93  Resp:  [18-25] 25  BP: (112-144)/(57-80) 141/80  SpO2:  [95 %-97 %] 97 %    Recent Labs   Lab 06/03/24  0755 06/03/24  1558 06/05/24  0650 06/05/24  1153 06/05/24  1749 06/06/24  0442 06/06/24  1636 06/07/24  0538 06/08/24  0125 06/08/24  0538 06/08/24  1707 06/08/24  2324 06/09/24  0847   WBC 38.6*  --  27.5* 31.9*  --  26.9*  --  16.1*  --  14.2*  --   --  11.3*   HGB 9.2*  8.8*   < > 7.3* 8.7*   < > 7.8*  7.8*   < > 8.2* 8.0* 8.1*  8.2* 8.7* 8.2* 8.6*   MCV 92.6  --  93.5 90.5  --  91.4  --  90.6  --  91.6  --   --  90.3   .0*  --  694.0* 550.0*  --  597.0*  --  585.0*  --  611.0*  --   --  592.0*   BAND  --   --  4  --   --   --   --   --   --   --   --   --   --    INR 1.38*  --   --   --   --   --   --   --   --   --   --   --   --     < > = values in this interval not displayed.               Recent Labs   Lab 06/07/24 0538 06/08/24 0538 06/09/24  0418   * 125* 116*   BUN 4* 4* 4*    CREATSERUM 0.40* 0.44* 0.46*   CA 7.5* 7.6* 7.6*   ALB 1.0* 1.0* 1.0*    140 142   K 4.2 4.1 4.0    108 107   CO2 25.0 28.0 32.0   ALKPHO 193* 177* 151*   AST 65* 55* 59*   ALT 62* 56 54   BILT 0.3 0.3 0.4   TP 5.3* 5.7* 5.6*             Assessment & Plan:  # RLL pneumonia with empyema   # RLL effusion with loculation and chest pain   - sp chest tube by IR on 5/29  -abx adjusted per cultures   - s/p MIST w/o improvement   - s/p VATS decortication 6/5   - CT Chest reviewed  - Pulmonology and ID rec appreciated   - pain meds     # elevated liver enzymes seems ischemic in nature per course - abd benign on exam   - improving  - US abd with doppler neg   - avoid hepatotoxic agents     # Iron def Anemia- sp PRBC - no s/s bleeding      #Coagulopathy- repeat INR normalized      #COPD -Continue inhalers, nebs      #Acute metabolic toxic (anxiolytic) encephalopathy, resolved   -Ammonia normal   -CT brain negative     #Hypertension- metoprolol     #DM 2-hyperglycemia protocol     #GERD/ Jamison's esophagus -PPI     #MDD/anxiety- Home med       6/9 ID NOTE    Antibiotics: valtrex #1  unasyn     Subjective:  : oral ulcers     Objective:  Temp:  [97.8 °F (36.6 °C)-99.3 °F (37.4 °C)] 97.8 °F (36.6 °C)  Pulse:  [] 76  Resp:  [18-25] 24  BP: (121-144)/(58-80) 131/58  SpO2:  [95 %-100 %] 100 %         ASSESSMENT/PLAN:  1. RLL pneumonia, abscess, empyema  SP chest tube, followed by VATS     -strep intermedius isolated from pleural fluid on 5/28 and 5/29  -lactobacillus isolated from tissue culture 6/5     Continue unasyn     2. Oral ulcer was started on po valtrexx    6/9 SW NOTE  SW sent updated clinical information to [facility]. PICC line information and ID note uploaded to referral.      SW will continue to follow for final IV ABX recommendations for DC. Script will be needed when decision is made in order to sent to [facility].      SW will continue to follow for plan of care changes and remain available for  any additional DC needs or concerns.           Multiple pulmonary nodules with plans to follow once recovered  Acute encephalopathy, improved  Iron deficiency anemia follows with Dr. Corona-status post transfusion-now stable postop  History of COPD remains on Incruse  SUMMER 2017 with AHI 26 supine 89 REM 43 grzegorz 75%--remains untreated     Plan:  Recheck chest x-ray given O2 needs  Continue postop care including increasing activity continuing to walk in the halls up out of bed and I-S  Antibiotics per infectious disease service  Consider revisiting SUMMER     6/10 HOSPITALIST NOTE    Subjective:  Up in chair still on IV analgesics      Vital signs:  Temp:  [97.8 °F (36.6 °C)-98.7 °F (37.1 °C)] 98.4 °F (36.9 °C)  Pulse:  [] 99  Resp:  [18-33] 24  BP: (115-149)/(58-95) 149/69  SpO2:  [89 %-100 %] 95 %    Recent Labs   Lab 06/03/24  0755 06/03/24  1558 06/05/24  0650 06/05/24  1153 06/06/24  0442 06/06/24  1636 06/07/24  0538 06/08/24  0125 06/08/24  0538 06/08/24  1707 06/08/24  2324 06/09/24  0847 06/10/24  0417   WBC 38.6*  --  27.5*   < > 26.9*  --  16.1*  --  14.2*  --   --  11.3* 9.3   HGB 9.2*  8.8*   < > 7.3*   < > 7.8*  7.8*   < > 8.2*   < > 8.1*  8.2* 8.7* 8.2* 8.6* 7.7*   MCV 92.6  --  93.5   < > 91.4  --  90.6  --  91.6  --   --  90.3 91.1   .0*  --  694.0*   < > 597.0*  --  585.0*  --  611.0*  --   --  592.0* 530.0*   BAND  --   --  4  --   --   --   --   --   --   --   --   --   --    INR 1.38*  --   --   --   --   --   --   --   --   --   --   --   --     < > = values in this interval not displayed.               Recent Labs   Lab 06/08/24  0538 06/09/24  0418 06/10/24  0417   * 116* 127*   BUN 4* 4* 4*   CREATSERUM 0.44* 0.46* 0.42*   CA 7.6* 7.6* 8.0*   ALB 1.0* 1.0* 1.1*    142 139   K 4.1 4.0 4.1    107 104   CO2 28.0 32.0 31.0   ALKPHO 177* 151* 146*   AST 55* 59* 89*   ALT 56 54 58*   BILT 0.3 0.4 0.3   TP 5.7* 5.6* 6.2*       Assessment & Plan:  # RLL pneumonia with  empyema   # RLL effusion with loculation and chest pain   - sp chest tube by IR on 5/29  -abx adjusted per cultures   - s/p MIST w/o improvement   - s/p VATS decortication 6/5   - CT Chest reviewed  - Pulmonology and ID rec appreciated   - pain meds  - repeat CXR 6/9 b/l pleural effusions - per Pulm      # elevated liver enzymes seems ischemic in nature per course - abd benign on exam   - improving  - US abd with doppler neg   - avoid hepatotoxic agents     # Iron def Anemia- sp PRBC - no s/s bleeding      #Coagulopathy- repeat INR normalized      #COPD -Continue inhalers, nebs      #Acute metabolic toxic (anxiolytic) encephalopathy, resolved   -Ammonia normal   -CT brain negative     #Hypertension- metoprolol     #DM 2-hyperglycemia protocol     #GERD/ Jamison's esophagus -PPI     #MDD/anxiety- Home med         DC planning - attempting to wean O2      6/10 PULMONARY NOTE  SUBJECTIVE/Interval history:  No acute events overnight, she notes pain in right chest/surgical sites today, feels tired. Remains on supplemental o2. No fevers       Assessment:  RLL cavitary pneumonia  Right empyema due to Streptococcus intermedius and possible lactobacillus   Sepsis 2/2 above resolved  S/p VATS with decortication  Pain management  Elevated LFTs   Multiple pulmonary nodules with plans to follow as outpatient  Acute encephalopathy, improved  Iron deficiency anemia follows with Dr. Corona-status post transfusion-now stable postop  History of COPD remains on Incruse  SUMMER 2017 with AHI 26 supine 89 REM 43 grzegorz 75%--remains untreated     Plan:  Continue close monitoring of respiratory status, wean o2 for sats >89%  O2 walk today  Complete antibiotics per ID  Use IS  Ambulate/OOBTC as able  Control pain  PPX: HSQ, PPI  Dispo: floor, discharge planning    6/10 CTS NOTE  Subjective: No acute events overnight. Feeling better with chest tubes out.       Assessment: 64 year old female, 5 Days Post-Op from decortication for empyema and right  lower lobe lung abscess     Plan:  Ambulate as tolerated, PT  Spend majority of day out of bed, in chair  Encouraged cough and IS use  Pain control  General Diet  Abx per ID.  Okay to discharge from thoracic surgery standpoint once cleared with other services. Plan for home once abx and home O2 is arranged. Follow up with Dr. Maldonado in 2 weeks.

## 2024-06-10 NOTE — PHYSICAL THERAPY NOTE
PHYSICAL THERAPY TREATMENT NOTE - INPATIENT    Room Number: 8614/8614-A     Session: 1     Number of Visits to Meet Established Goals: 5    Presenting Problem: empyema of R pleural space, R LL pneumonia s/p R chest tube placement by IR  and R VATS decortication 24  Co-Morbidities : COPD, HTN, GERD, DMT2, MDD/anxiety, NSTEMI s/p CABG x2 , smoker    ASSESSMENT   Patient demonstrates good  progress this sessions. Goals progressed to mod ind to sup level.     Patient continues to function near baseline with gait, transfers and bed mobility. Patient required 2 L o2 to maintain O2 saturation at 96%. On 1 L, patient desaturated to 90%. Patient able to navigate 4 steps with one rail and sup. Educated on activity recommendations and exercises. HEP provided .    Patient continues to benefit from continued skilled PT services: at discharge to promote functional independence in home.  Anticipate patient will return home with home health PT.    PLAN  PT Treatment Plan: Bed mobility;Body mechanics;Endurance;Energy conservation;Patient education;Family education;Gait training;Strengthening;Stair training;Transfer training;Balance training  Rehab Potential : Good  Frequency (Obs): 3-5x/week    CURRENT GOALS     Goal #1 Patient is able to demonstrate supine - sit EOB @ level: independent     Goal #2 Patient is able to demonstrate transfers Sit to/from Stand at assistance level: modified independent     Goal #3 Patient is able to ambulate 150 feet with assist device: walker - rolling at assistance level: modified independent.   Met      Goal #4    Goal #5    Goal #6    Goal Comments: Goals established on 2024  6/10/2024 all goals     SUBJECTIVE  \" I am not staying here, I will probably leave today.\"    OBJECTIVE  Precautions: Chest tube to suction (ok to ambulate off suction)    WEIGHT BEARING RESTRICTION  Weight Bearing Restriction: None                PAIN ASSESSMENT   Ratin  Location: at old chest tube  site  Management Techniques: Repositioning    BALANCE                                                                                                                       Static Sitting: Good  Dynamic Sitting: Good           Static Standing: Fair +  Dynamic Standing: Fair    ACTIVITY TOLERANCE            2 L O2  Spo2 96 to 99%             O2 WALK         AM-PAC '6-Clicks' INPATIENT SHORT FORM - BASIC MOBILITY  How much difficulty does the patient currently have...  Patient Difficulty: Turning over in bed (including adjusting bedclothes, sheets and blankets)?: None   Patient Difficulty: Sitting down on and standing up from a chair with arms (e.g., wheelchair, bedside commode, etc.): None   Patient Difficulty: Moving from lying on back to sitting on the side of the bed?: None   How much help from another person does the patient currently need...   Help from Another: Moving to and from a bed to a chair (including a wheelchair)?: None   Help from Another: Need to walk in hospital room?: None   Help from Another: Climbing 3-5 steps with a railing?: A Little       AM-PAC Score:  Raw Score: 23   Approx Degree of Impairment: 11.2%   Standardized Score (AM-PAC Scale): 56.93   CMS Modifier (G-Code): CI    FUNCTIONAL ABILITY STATUS  Gait Assessment   Functional Mobility/Gait Assessment  Gait Assistance: Modified independent  Distance (ft): 150  Assistive Device: Rolling walker  Pattern: Within Functional Limits  Stairs: Stairs  How Many Stairs: 4  Device: 1 Rail  Assist: Supervision    Skilled Therapy Provided    Bed Mobility:  Rolling: mod ind   Supine<>Sit: mod ind   Sit<>Supine: mod ind     Transfer Mobility:  Sit<>Stand: mod ind   Stand<>Sit: mod ind   Gait: RW and mod ind,   Patient amb 50' without any device. Pt declined a RW for home use.   Stairs - 4 steps with one rail and sup via step to pattern.   Therapist's Comments: patient ambulated 200' and 150'. Education provided on  Benefits of upright position  Promotion  of walking with nursing staff  IS reinforcement  Exercises   Body mechanics         THERAPEUTIC EXERCISES  Lower Extremity Alternating marching  Ankle pumps  Hip AB/AD  Heel raises  LAQ     Upper Extremity      Position Sitting     Repetitions   10   Sets   1     Patient End of Session: In bed;Needs met;Call light within reach;RN aware of session/findings;All patient questions and concerns addressed;Alarm set    PT Session Time: 30 minutes  Gait Training: 15 minutes  Therapeutic Activity: 10 minutes  Therapeutic Exercise: 5 minutes   Neuromuscular Re-education: 0 minutes

## 2024-06-10 NOTE — CM/SW NOTE
CANDI spoke with Miranda at [facility]. Oxygen approved. Miranda spoke with pt and family member and coordinated delivery. Will dispense an oxygen tank when pt is getting ready to discharge.     [facility]  P:[phone]  F:[phone]    Cori Pineda, BASILIO, LSW  Discharge Planner  [id number]

## 2024-06-10 NOTE — PLAN OF CARE
Assumed pt care at 0730. A&O x 4. On 2L O2 via NC. Reports incisional pain, pain medications given. Vital signs stable, NSR on tele. Up with SBA. JUANA PICC.    Plan of care: R arm precautions, O2, IV abx, DC planning.    Plan of care updated with patient. Questions answered, pt verbalized understanding. Call light is within reach. All needs met at this time.    Problem: Diabetes/Glucose Control  Goal: Glucose maintained within prescribed range  Description: INTERVENTIONS:  - Monitor Blood Glucose as ordered  - Assess for signs and symptoms of hyperglycemia and hypoglycemia  - Administer ordered medications to maintain glucose within target range  - Assess barriers to adequate nutritional intake and initiate nutrition consult as needed  - Instruct patient on self management of diabetes  Outcome: Progressing     Problem: Patient/Family Goals  Goal: Patient/Family Long Term Goal  Description: Patient's Long Term Goal:   Discharge to home    Interventions:  - Follow plan of care  - See additional Care Plan goals for specific interventions  Outcome: Progressing  Goal: Patient/Family Short Term Goal  Description: Patient's Short Term Goal:   5/26 noc: maintain on room air  5/27: manage hgb  5/27 noc: reduce pleuritic pain   5/28 AM: CT scan of head/NPO possible thora  5/28noc: sleep  5/29 AM: Chest Tube placement  5/29noc: CT management, control pain, sleep  6/1 AM: Pain control, Abd US  06/01/2024 - pain control  06/02/2024 - pain control  6/4 AM: pain control; maintain O2  Interventions:   - labs, blood  - Medications  - IVF  - IVF ABT  - See additional Care Plan goals for specific interventions  Outcome: Progressing     Problem: RESPIRATORY - ADULT  Goal: Achieves optimal ventilation and oxygenation  Description: INTERVENTIONS:  - Assess for changes in respiratory status  - Assess for changes in mentation and behavior  - Position to facilitate oxygenation and minimize respiratory effort  - Oxygen supplementation based on  oxygen saturation or ABGs  - Provide Smoking Cessation handout, if applicable  - Encourage broncho-pulmonary hygiene including cough, deep breathe, Incentive Spirometry  - Assess the need for suctioning and perform as needed  - Assess and instruct to report SOB or any respiratory difficulty  - Respiratory Therapy support as indicated  - Manage/alleviate anxiety  - Monitor for signs/symptoms of CO2 retention  Outcome: Progressing     Problem: HEMATOLOGIC - ADULT  Goal: Free from bleeding injury  Description: (Example usage: patient with low platelets)  INTERVENTIONS:  - Avoid intramuscular injections, enemas and rectal medication administration  - Ensure safe mobilization of patient  - Hold pressure on venipuncture sites to achieve adequate hemostasis  - Assess for signs and symptoms of internal bleeding  - Monitor lab trends  - Patient is to report abnormal signs of bleeding to staff  - Avoid use of toothpicks and dental floss  - Use electric shaver for shaving  - Use soft bristle tooth brush  - Limit straining and forceful nose blowing  Outcome: Progressing     Problem: PAIN - ADULT  Goal: Verbalizes/displays adequate comfort level or patient's stated pain goal  Description: INTERVENTIONS:  - Encourage pt to monitor pain and request assistance  - Assess pain using appropriate pain scale  - Administer analgesics based on type and severity of pain and evaluate response  - Implement non-pharmacological measures as appropriate and evaluate response  - Consider cultural and social influences on pain and pain management  - Manage/alleviate anxiety  - Utilize distraction and/or relaxation techniques  - Monitor for opioid side effects  - Notify MD/LIP if interventions unsuccessful or patient reports new pain  - Anticipate increased pain with activity and pre-medicate as appropriate  Outcome: Progressing

## 2024-06-10 NOTE — CM/SW NOTE
06/10/24 1500   Discharge disposition   Expected discharge disposition Home-Health   Post Acute Care Provider [facility]   DME/Infusion Providers [facility]   Discharge transportation Private car       CANDI met with pt and spoke with sister, Alana, on the phone to discuss discharge. Both pt and sister were under the impression that discharge was set for Tuesday. Explained that pt is all approved for the medication, oxygen and start of care for home health. Discussed transportation home if needed. Sister expressed that pt has to get clothes from her condo as pt is staying in [place] for a little bit and then  her medications at [place]. Explained that we can transfer pt's prescriptions if needed, but explained that pt would have to get her home medications anyway. SW encouraged sister to come when she can to hospital to coordinate discharge as pt is medically cleared today and insurance would not cover another day stay. Sister stated that she will plan on coming within the hour. Pt to get a dose of the abx before discharge also. RN updated. CANDI spoke with RT and requested that they dispense an oxygen tank to pt's room. Spoke with Miranda at [facility] to coordinate delivery for the oxygen, and Kylah aware for discharge this evening. Luz Marina [facility]is/ [facility] stated that their SOC for Te[employer] is on Tues 6/11 at 12:00p.     [facility] [phone]    [facility]  P:[phone]  F:[phone]    [facility]  P:[phone]  F:[phone]    Cori Pineda, MSW, LSW  Discharge Planner  [phone]

## 2024-06-10 NOTE — PROGRESS NOTES
[facility]   part of [facility]     Hospitalist Progress Note     Soumya King Patient Status:  Inpatient    6/3/1960 MRN [id number]   Location [facility] 5NW-A Attending Apurva Logan MD   Hosp Day # 15 PCP Rika Vinson DO     Subjective:   Up in chair still on IV analgesics     Objective:    Review of Systems:   A comprehensive review of systems was completed; pertinent positive and negatives stated in subjective.    Vital signs:  Temp:  [97.8 °F (36.6 °C)-98.7 °F (37.1 °C)] 98.4 °F (36.9 °C)  Pulse:  [] 99  Resp:  [18-33] 24  BP: (115-149)/(58-95) 149/69  SpO2:  [89 %-100 %] 95 %    Physical Exam:    General: No acute distress   Respiratory: No wheezes, no rhonchi  Cardiovascular: S1, S2, regular rate and rhythm  Abdomen: Soft, Non-tender, non-distended, positive bowel sounds  Extremities: No edema    Diagnostic Data:    Labs:  Recent Labs   Lab 24  0755 24  1558 24  0650 24  1153 24  0442 24  1636 24  0538 24  0125 24  0538 24  1707 24  2324 24  0847 06/10/24  0417   WBC 38.6*  --  27.5*   < > 26.9*  --  16.1*  --  14.2*  --   --  11.3* 9.3   HGB 9.2*  8.8*   < > 7.3*   < > 7.8*  7.8*   < > 8.2*   < > 8.1*  8.2* 8.7* 8.2* 8.6* 7.7*   MCV 92.6  --  93.5   < > 91.4  --  90.6  --  91.6  --   --  90.3 91.1   .0*  --  694.0*   < > 597.0*  --  585.0*  --  611.0*  --   --  592.0* 530.0*   BAND  --   --  4  --   --   --   --   --   --   --   --   --   --    INR 1.38*  --   --   --   --   --   --   --   --   --   --   --   --     < > = values in this interval not displayed.       Recent Labs   Lab 24  0538 24  0418 06/10/24  0417   * 116* 127*   BUN 4* 4* 4*   CREATSERUM 0.44* 0.46* 0.42*   CA 7.6* 7.6* 8.0*   ALB 1.0* 1.0* 1.1*    142 139   K 4.1 4.0 4.1    107 104   CO2 28.0 32.0 31.0   ALKPHO 177* 151* 146*   AST 55* 59* 89*   ALT 56 54 58*   BILT 0.3 0.4 0.3   TP 5.7*  5.6* 6.2*       Estimated Creatinine Clearance: 104.8 mL/min (A) (based on SCr of 0.42 mg/dL (L)).    No results for input(s): \"TROP\", \"TROPHS\", \"CK\" in the last 168 hours.      Recent Labs   Lab 06/03/24  0755   PTP 17.0*   INR 1.38*          Microbiology    Hospital Encounter on 05/26/24   1. Tissue Aerobic Culture     Status: Abnormal    Collection Time: 06/05/24  8:43 AM    Specimen: Lung right; Tissue   Result Value Ref Range    Tissue Culture Result 2+ growth Lactobacillus species N/A    Tissue Smear 2+ WBCs seen (A) N/A    Tissue Smear 1+ Gram Positive Cocci (A) N/A   2. Anaerobic Culture     Status: None (Preliminary result)    Collection Time: 06/05/24  8:43 AM    Specimen: Lung right; Tissue   Result Value Ref Range    Anaerobic Culture No Anaerobes to date N/A   3. Body Fluid Cult Aerobic and Anaerobic     Status: None (Preliminary result)    Collection Time: 06/05/24  8:07 AM    Specimen: Pleural Fluid, Right; Body fluid, unspecified   Result Value Ref Range    Body Fluid Culture Result No Growth 4 Days N/A    Body Fluid Smear 3+ WBCs seen N/A    Body Fluid Smear No organisms seen N/A    Body Fluid Smear This is a cytocentrifuged smear. N/A   4. Blood Culture     Status: None    Collection Time: 05/29/24  2:24 PM    Specimen: Blood,peripheral   Result Value Ref Range    Blood Culture Result No Growth 5 Days N/A         Imaging: Reviewed in Epic.    Medications:    valACYclovir  1,000 mg Oral 2 times per day    ampicillin-sulbactam  3 g Intravenous q6h    heparin  5,000 Units Subcutaneous 2 times per day    lidocaine-menthol  1 patch Transdermal Daily    metoprolol tartrate  25 mg Oral 2x Daily(Beta Blocker)    [Transfer Hold] HYDROmorphone  0.2 mg Intravenous Once    oxybutynin ER  5 mg Oral Daily    aspirin  81 mg Oral Daily    atorvastatin  80 mg Oral Nightly    busPIRone  20 mg Oral Daily    gabapentin  600 mg Oral Nightly    pantoprazole  40 mg Oral QAM AC    QUEtiapine ER  200 mg Oral QPM     umeclidinium bromide  1 puff Inhalation Daily    insulin aspart  1-10 Units Subcutaneous TID AC and HS       Assessment & Plan:      # RLL pneumonia with empyema   # RLL effusion with loculation and chest pain   - sp chest tube by IR on 5/29  -abx adjusted per cultures   - s/p MIST w/o improvement   - s/p VATS decortication 6/5   - CT Chest reviewed  - Pulmonology and ID rec appreciated   - pain meds  - repeat CXR 6/9 b/l pleural effusions - per Pulm     # elevated liver enzymes seems ischemic in nature per course - abd benign on exam   - improving  - US abd with doppler neg   - avoid hepatotoxic agents    # Iron def Anemia- sp PRBC - no s/s bleeding     #Coagulopathy- repeat INR normalized     #COPD -Continue inhalers, nebs      #Acute metabolic toxic (anxiolytic) encephalopathy, resolved   -Ammonia normal   -CT brain negative    #Hypertension- metoprolol     #DM 2-hyperglycemia protocol     #GERD/ Jamison's esophagus -PPI    #MDD/anxiety- Home med       DC planning - attempting to wean O2   Pt cleared by all consultants    ADDENDUM:  Please see notes regarding challenges with DC planning  Paged by nurse for dependant edema. Pt was advised to elevate legs when resting and will also improve with more activity  Patient also placed on diuretics which should help as well.  Patient on heparin subcutaneous and SCDs for DVT prophylaxis and had CTA chest and doppler which was negative     Supplementary Documentation:     Quality:  DVT Mechanical Prophylaxis:   SCDs, Early ambuation  DVT Pharmacologic Prophylaxis   Medication    heparin (Porcine) 5000 UNIT/ML injection 5,000 Units         DVT Pharmacologic prophylaxis: Aspirin 81 mg      Code Status: Full Code  Gotti: External urinary catheter in place  Gotti Duration (in days):   Central line:    ILYA: 6/11/2024  Discharge is dependent on: progress  At this point Ms. King is expected to be discharge to: home    The 21st Century Cures Act makes medical notes like these  available to patients in the interest of transparency. Please be advised this is a medical document. Medical documents are intended to carry relevant information, facts as evident, and the clinical opinion of the practitioner. The medical note is intended as peer to peer communication and may appear blunt or direct. It is written in medical language and may contain abbreviations or verbiage that are unfamiliar.

## 2024-06-10 NOTE — HOME CARE LIAISON
Received referral via [place] for Home Health services. Spoke w/ patient at the bedside along with her sister Alana via the phone. Patient is agreeable for home health care services for teach and train for IV abx. Updated AVS with contact information. Demographics confirmed at the bedside. Patient will be staying with her sister for a week or so until she feels comfortable to the ic medication and administration and then will return to her home in [place]

## 2024-06-10 NOTE — PLAN OF CARE
RN SHIFT NOTE    Assumed care of pt at 1930. A&O x 4. NSR/ST on tele. BL foot non-pitting edema present. Lung sounds diminished. Abdomen soft and round. Last BM on 6/9. Right site incision sites from CT's are C/D/I and open to air. Call light within reach and bed alarm on. Tolerating medications and care needs have been met a this time.       POC: Wean o2 as able, dc with IV abx, IS.       Problem: Diabetes/Glucose Control  Goal: Glucose maintained within prescribed range  Description: INTERVENTIONS:  - Monitor Blood Glucose as ordered  - Assess for signs and symptoms of hyperglycemia and hypoglycemia  - Administer ordered medications to maintain glucose within target range  - Assess barriers to adequate nutritional intake and initiate nutrition consult as needed  - Instruct patient on self management of diabetes  Outcome: Progressing     Problem: Patient/Family Goals  Goal: Patient/Family Long Term Goal  Description: Patient's Long Term Goal:   Discharge to home    Interventions:  - Follow plan of care  - See additional Care Plan goals for specific interventions  Outcome: Progressing  Goal: Patient/Family Short Term Goal  Description: Patient's Short Term Goal:   5/26 noc: maintain on room air  5/27: manage hgb  5/27 noc: reduce pleuritic pain   5/28 AM: CT scan of head/NPO possible thora  5/28noc: sleep  5/29 AM: Chest Tube placement  5/29noc: CT management, control pain, sleep  6/1 AM: Pain control, Abd US  06/01/2024 - pain control  06/02/2024 - pain control  6/4 AM: pain control; maintain O2  Interventions:   - labs, blood  - Medications  - IVF  - IVF ABT  - See additional Care Plan goals for specific interventions  Outcome: Progressing     Problem: RESPIRATORY - ADULT  Goal: Achieves optimal ventilation and oxygenation  Description: INTERVENTIONS:  - Assess for changes in respiratory status  - Assess for changes in mentation and behavior  - Position to facilitate oxygenation and minimize respiratory effort  -  Oxygen supplementation based on oxygen saturation or ABGs  - Provide Smoking Cessation handout, if applicable  - Encourage broncho-pulmonary hygiene including cough, deep breathe, Incentive Spirometry  - Assess the need for suctioning and perform as needed  - Assess and instruct to report SOB or any respiratory difficulty  - Respiratory Therapy support as indicated  - Manage/alleviate anxiety  - Monitor for signs/symptoms of CO2 retention  Outcome: Progressing     Problem: HEMATOLOGIC - ADULT  Goal: Free from bleeding injury  Description: (Example usage: patient with low platelets)  INTERVENTIONS:  - Avoid intramuscular injections, enemas and rectal medication administration  - Ensure safe mobilization of patient  - Hold pressure on venipuncture sites to achieve adequate hemostasis  - Assess for signs and symptoms of internal bleeding  - Monitor lab trends  - Patient is to report abnormal signs of bleeding to staff  - Avoid use of toothpicks and dental floss  - Use electric shaver for shaving  - Use soft bristle tooth brush  - Limit straining and forceful nose blowing  Outcome: Progressing     Problem: PAIN - ADULT  Goal: Verbalizes/displays adequate comfort level or patient's stated pain goal  Description: INTERVENTIONS:  - Encourage pt to monitor pain and request assistance  - Assess pain using appropriate pain scale  - Administer analgesics based on type and severity of pain and evaluate response  - Implement non-pharmacological measures as appropriate and evaluate response  - Consider cultural and social influences on pain and pain management  - Manage/alleviate anxiety  - Utilize distraction and/or relaxation techniques  - Monitor for opioid side effects  - Notify MD/LIP if interventions unsuccessful or patient reports new pain  - Anticipate increased pain with activity and pre-medicate as appropriate  Outcome: Progressing

## 2024-06-10 NOTE — PROGRESS NOTES
THORACIC SURGERY POST-OPERATIVE PROGRESS NOTE    Subjective: No acute events overnight. Feeling better with chest tubes out.     Objective:    I/O last 3 completed shifts:  In: 720 [P.O.:720]  Out: 4040 [Urine:4000; Chest Tube:40]    Recent Labs   Lab 06/08/24  0538 06/08/24  1707 06/08/24  2324 06/09/24  0847 06/10/24  0417   HGB 8.1*  8.2*   < > 8.2* 8.6* 7.7*   WBC 14.2*  --   --  11.3* 9.3   .0*  --   --  592.0* 530.0*    < > = values in this interval not displayed.       Recent Labs   Lab 06/08/24  0538 06/09/24 0418 06/10/24  0417    142 139   K 4.1 4.0 4.1    107 104   CO2 28.0 32.0 31.0   BUN 4* 4* 4*       Exam:    /88 (BP Location: Left arm)   Pulse 101   Temp 98.4 °F (36.9 °C) (Oral)   Resp (!) 28   Ht 154.9 cm (5' 1\")   Wt 154 lb (69.9 kg)   SpO2 94%   BMI 29.10 kg/m²      General: Awake, alert, NAD  Pulm: normal respiratory effort  Card: RRR  Abd: soft, NT, ND  Wounds: clean, dry, intact, No signs of infection    Assessment: 64 year old female, 5 Days Post-Op from decortication for empyema and right lower lobe lung abscess    Plan:  Ambulate as tolerated, PT  Spend majority of day out of bed, in chair  Encouraged cough and IS use  Pain control  General Diet  Abx per ID.  Okay to discharge from thoracic surgery standpoint once cleared with other services. Plan for home once abx and home O2 is arranged. Follow up with Dr. Maldonado in 2 weeks.     Patient discussed with Dr. Maldonado.     Yudy Albrecht PA-C  Thoracic Surgery  Pager: [phone]

## 2024-06-10 NOTE — HOME CARE LIAISON
Spoke with Kylah from [facility] and confirmed IV ABX will be delivered to patient sister home in [place] on 6/10 and [facility] will start care for teach and train on 6/11. Informed CM Cori as well. [facility] will remain available for any and all home health care needs

## 2024-06-10 NOTE — CM/SW NOTE
CANDI was made aware by RN that pt is going to need O2 at discharge. Order entered, messaged Pulm to sign order. Will send to [facility] once completed.     BASILIO Wilkins, LSW  Discharge Planner  [phone]    Addendum: Order signed, CANDI sent order and supporting O2 documentation to [facility] in [place]. Awaiting approval.     Per [facility] Liaison - pt is discharging to [address] [place] [address] Dr Quirino GALVAN at discharge.

## 2024-06-10 NOTE — PLAN OF CARE
Pt alert and oriented x 4.  Continuous tele monitoring in place.  NSR on the monitor.  Reports chronic generalized pain.  Oxycodone given with some relief of pain.  O2 at 2L per NC.  Plan is for dc later this afternoon.  Cleared for dc to home by all consultants.  Pt provided with O2 tank for use until O2 delivery is complete at home.  Safety maintained.      Problem: Diabetes/Glucose Control  Goal: Glucose maintained within prescribed range  Description: INTERVENTIONS:  - Monitor Blood Glucose as ordered  - Assess for signs and symptoms of hyperglycemia and hypoglycemia  - Administer ordered medications to maintain glucose within target range  - Assess barriers to adequate nutritional intake and initiate nutrition consult as needed  - Instruct patient on self management of diabetes  Outcome: Progressing     Problem: RESPIRATORY - ADULT  Goal: Achieves optimal ventilation and oxygenation  Description: INTERVENTIONS:  - Assess for changes in respiratory status  - Assess for changes in mentation and behavior  - Position to facilitate oxygenation and minimize respiratory effort  - Oxygen supplementation based on oxygen saturation or ABGs  - Provide Smoking Cessation handout, if applicable  - Encourage broncho-pulmonary hygiene including cough, deep breathe, Incentive Spirometry  - Assess the need for suctioning and perform as needed  - Assess and instruct to report SOB or any respiratory difficulty  - Respiratory Therapy support as indicated  - Manage/alleviate anxiety  - Monitor for signs/symptoms of CO2 retention  Outcome: Progressing     Problem: HEMATOLOGIC - ADULT  Goal: Free from bleeding injury  Description: (Example usage: patient with low platelets)  INTERVENTIONS:  - Avoid intramuscular injections, enemas and rectal medication administration  - Ensure safe mobilization of patient  - Hold pressure on venipuncture sites to achieve adequate hemostasis  - Assess for signs and symptoms of internal bleeding  -  Monitor lab trends  - Patient is to report abnormal signs of bleeding to staff  - Avoid use of toothpicks and dental floss  - Use electric shaver for shaving  - Use soft bristle tooth brush  - Limit straining and forceful nose blowing  Outcome: Progressing     Problem: PAIN - ADULT  Goal: Verbalizes/displays adequate comfort level or patient's stated pain goal  Description: INTERVENTIONS:  - Encourage pt to monitor pain and request assistance  - Assess pain using appropriate pain scale  - Administer analgesics based on type and severity of pain and evaluate response  - Implement non-pharmacological measures as appropriate and evaluate response  - Consider cultural and social influences on pain and pain management  - Manage/alleviate anxiety  - Utilize distraction and/or relaxation techniques  - Monitor for opioid side effects  - Notify MD/LIP if interventions unsuccessful or patient reports new pain  - Anticipate increased pain with activity and pre-medicate as appropriate  Outcome: Progressing

## 2024-06-10 NOTE — PROGRESS NOTES
[facility] Progress Note     SUBJECTIVE/Interval history:  No acute events overnight, she notes pain in right chest/surgical sites today, feels tired. Remains on supplemental o2. No fevers    Review of Systems:   A comprehensive 14 point review of systems was completed.   Pertinent positives and negatives noted in the HPI.    Medications  Reviewed personally   valACYclovir  1,000 mg Oral 2 times per day    ampicillin-sulbactam  3 g Intravenous q6h    heparin  5,000 Units Subcutaneous 2 times per day    lidocaine-menthol  1 patch Transdermal Daily    metoprolol tartrate  25 mg Oral 2x Daily(Beta Blocker)    [Transfer Hold] HYDROmorphone  0.2 mg Intravenous Once    oxybutynin ER  5 mg Oral Daily    aspirin  81 mg Oral Daily    atorvastatin  80 mg Oral Nightly    busPIRone  20 mg Oral Daily    gabapentin  600 mg Oral Nightly    pantoprazole  40 mg Oral QAM AC    QUEtiapine ER  200 mg Oral QPM    umeclidinium bromide  1 puff Inhalation Daily    insulin aspart  1-10 Units Subcutaneous TID AC and HS       meperidine    oxyCODONE **OR** oxyCODONE    HYDROmorphone **OR** HYDROmorphone    polyethylene glycol (PEG 3350)    sennosides    bisacodyl    fleet enema    ondansetron    prochlorperazine    calcium carbonate    ipratropium-albuterol    [Held by provider] clonazePAM    benzonatate    rizatriptan    glucose **OR** glucose **OR** glucose-vitamin C **OR** dextrose **OR** glucose **OR** glucose **OR** glucose-vitamin C    melatonin    guaiFENesin    sodium chloride    OBJECTIVE:  Vitals:    06/09/24 2348 06/10/24 0334 06/10/24 0351 06/10/24 0729   BP: 136/63  149/69 138/88   BP Location: Left arm  Left arm Left arm   Pulse: 103 (!) 122 99 101   Resp: 21 19 24 (!) 28   Temp: 98.6 °F (37 °C)  98.4 °F (36.9 °C) 98.4 °F (36.9 °C)   TempSrc: Oral  Oral Oral   SpO2: 95% (!) 89% 95% 94%   Weight:       Height:           Vital signs in last 24 hours:  Blood pressure 138/88, pulse 101, temperature 98.4  °F (36.9 °C), temperature source Oral, resp. rate (!) 28, height 5' 1\" (1.549 m), weight 154 lb (69.9 kg), SpO2 94%, not currently breastfeeding.     Intake/Output:  I/O last 3 completed shifts:  In: 720 [P.O.:720]  Out: 4040 [Urine:4000; Chest Tube:40]       Physical Exam:  General: Appears alert, comfortable. No acute distress  Lungs:diminished bs bilaterally. No wheeze. No distress  Heart:RRR no m  Abdomen:Soft, non-tender.  No masses.  No guarding.  No rebound.  Extremities:no edema    Lab Data Review:   Recent Labs   Lab 06/08/24  0538 06/09/24  0418 06/10/24  0417   * 116* 127*   BUN 4* 4* 4*   CREATSERUM 0.44* 0.46* 0.42*   CA 7.6* 7.6* 8.0*    142 139   K 4.1 4.0 4.1    107 104   CO2 28.0 32.0 31.0     Recent Labs   Lab 06/07/24  0538 06/08/24  0125 06/08/24  0538 06/08/24  1707 06/08/24  2324 06/09/24  0847 06/10/24  0417   RBC 2.76*  --  2.73*  --   --  2.90* 2.59*   HGB 8.2*   < > 8.1*  8.2*   < > 8.2* 8.6* 7.7*   HCT 25.0*  --  25.0*  --   --  26.2* 23.6*   MCV 90.6  --  91.6  --   --  90.3 91.1   MCH 29.7  --  29.7  --   --  29.7 29.7   MCHC 32.8  --  32.4  --   --  32.8 32.6   RDW 16.2  --  16.0  --   --  15.4 15.1   NEPRELIM 11.54*  --  10.08*  --   --   --  5.57   WBC 16.1*  --  14.2*  --   --  11.3* 9.3   .0*  --  611.0*  --   --  592.0* 530.0*    < > = values in this interval not displayed.     No results for input(s): \"BNP\" in the last 168 hours.  No results for input(s): \"TROP\", \"CK\" in the last 168 hours.  No results for input(s): \"PT\", \"INR\", \"PTT\" in the last 168 hours.  No results for input(s): \"ABGPHT\", \"RMPLWP2I\", \"CNQLU6U\", \"ABGHCO3\", \"ABGBE\", \"TEMP\", \"CHARLES\", \"SITE\", \"DEV\", \"THGB\" in the last 168 hours.    Invalid input(s): \"WHR72XFB\", \"CHOB\"    Other Labs:  Interval Culture Data:   Hospital Encounter on 05/26/24   1. Tissue Aerobic Culture     Status: Abnormal (Preliminary result)    Collection Time: 06/05/24  8:43 AM    Specimen: Lung right; Tissue   Result  Value Ref Range    Tissue Culture Result 2+ growth Lactobacillus species N/A    Tissue Smear 2+ WBCs seen (A) N/A    Tissue Smear 1+ Gram Positive Cocci (A) N/A   2. Anaerobic Culture     Status: None (Preliminary result)    Collection Time: 06/05/24  8:43 AM    Specimen: Lung right; Tissue   Result Value Ref Range    Anaerobic Culture No Anaerobes to date N/A   3. Body Fluid Cult Aerobic and Anaerobic     Status: None (Preliminary result)    Collection Time: 06/05/24  8:07 AM    Specimen: Pleural Fluid, Right; Body fluid, unspecified   Result Value Ref Range    Body Fluid Culture Result No Growth 4 Days N/A    Body Fluid Smear 3+ WBCs seen N/A    Body Fluid Smear No organisms seen N/A    Body Fluid Smear This is a cytocentrifuged smear. N/A   4. Blood Culture     Status: None    Collection Time: 05/29/24  2:24 PM    Specimen: Blood,peripheral   Result Value Ref Range    Blood Culture Result No Growth 5 Days N/A     No results for input(s): \"COLORUR\", \"CLARITY\", \"SPECGRAVITY\", \"GLUUR\", \"BILUR\", \"KETUR\", \"BLOODURINE\", \"PHURINE\", \"PROUR\", \"UROBILINOGEN\", \"NITRITE\", \"LEUUR\", \"WBCUR\", \"RBCUR\", \"BACUR\", \"EPIUR\" in the last 168 hours.    Interval Radiology:   Reviewed personally  XR CHEST AP PORTABLE  (CPT=71045)    Result Date: 6/9/2024  CONCLUSION:    Removal of chest tube.  No sign of sizable pneumothorax.  Bilateral pleural effusion and lower lung consolidations greater on the right.  Probably stable.  PICC line catheter present with the distal visualized aspect reaching at least the lower SVC, exact tip not visualized well.  Cardiac enlargement.  LOCATION:  [id number]      Dictated by (CST): Abhishek Mora MD on 6/09/2024 at 12:42 PM     Finalized by (CST): Abhishek Mora MD on 6/09/2024 at 12:43 PM        Assessment:  RLL cavitary pneumonia  Right empyema due to Streptococcus intermedius and possible lactobacillus   Sepsis 2/2 above resolved  S/p VATS with decortication  Pain management  Elevated LFTs   Multiple  pulmonary nodules with plans to follow as outpatient  Acute encephalopathy, improved  Iron deficiency anemia follows with Dr. Corona-status post transfusion-now stable postop  History of COPD remains on Incruse  SUMMER 2017 with AHI 26 supine 89 REM 43 grzegorz 75%--remains untreated     Plan:  Continue close monitoring of respiratory status, wean o2 for sats >89%  O2 walk today  Complete antibiotics per ID  Use IS  Ambulate/OOBTC as able  Control pain  PPX: HSQ, PPI  Dispo: floor, discharge planning    Home o2 assessment per nursing reviewed. I had a face to face visit with this patient and I evaluated the patient's O2 saturations.  The patient is mobile in their home and is in need of a portable oxygen tank.  The home oxygen will improve the patient's condition.         Greyson Main MD

## 2024-06-11 ENCOUNTER — TELEPHONE (OUTPATIENT)
Dept: INTERNAL MEDICINE CLINIC | Facility: CLINIC | Age: 64
End: 2024-06-11

## 2024-06-11 ENCOUNTER — PATIENT OUTREACH (OUTPATIENT)
Dept: CASE MANAGEMENT | Age: 64
End: 2024-06-11

## 2024-06-11 ENCOUNTER — MED REC SCAN ONLY (OUTPATIENT)
Dept: INTERNAL MEDICINE CLINIC | Facility: CLINIC | Age: 64
End: 2024-06-11

## 2024-06-11 NOTE — TELEPHONE ENCOUNTER
Reina, Nurse with [facility] called to inform case has been opened with patient. Plan to have IV infusion for 4 weeks.  Stated plan of care with findings will be faxed in about 5 days business days.

## 2024-06-11 NOTE — PROGRESS NOTES
[facility].  [facility] contact information provided as well as [facility] office number, [phone].

## 2024-06-11 NOTE — DISCHARGE SUMMARY
[facility]ITAL[facility]  DISCHARGE SUMMARY     Soumya King Patient Status:  Inpatient    6/3/1960 MRN [id number]   Location [facility] 8NE-A Attending No att. providers found   Hosp Day # 15 PCP Rika Vinson DO     Date of Admission: 2024  Date of Discharge: 6/10/2024    Discharge Disposition: Home Health Care Services    Admitting Diagnosis:   Hyponatremia [E87.1]  Transaminitis [R74.01]  Thrombocytosis [D75.839]  MARÍA ELENA (acute kidney injury) (HCC) [N17.9]  Cavitary lesion of lung [J98.4]  Acute anemia [D64.9]    Hospital Discharge Diagnoses:  # RLL pneumonia with empyema   # RLL effusion with loculation and chest pain   - sp chest tube by IR on   -abx adjusted per cultures   - s/p MIST w/o improvement   - s/p VATS decortication    - CT Chest reviewed  - Pulmonology and ID rec appreciated      # elevated liver enzymes seems ischemic in nature per course - abd benign on exam   - improving  - US abd with doppler neg   - avoid hepatotoxic agents     # Iron def Anemia- sp PRBC - no s/s bleeding   #IgM Kappa MGUS- per Hem   #Coagulopathy- repeat INR normalized   #COPD -Continue inhalers, nebs   #Acute metabolic toxic (anxiolytic) encephalopathy, resolved    #Hypertension- metoprolol  #DM 2-hyperglycemia protocol  #GERD/ Jamison's esophagus -PPI  #MDD/anxiety- Home med       ADDENDUM:  Please see notes regarding challenges with DC planning  Paged by nurse for dependant edema. Pt was advised to elevate legs when resting and will also improve with more activity  Patient also placed on diuretics which should help as well.  Patient on heparin subcutaneous and SCDs for DVT prophylaxis during the hospital stay and had CTA chest and LE doppler which was negative     Lace+ Score: 81  59-90 High Risk  29-58 Medium Risk  0-28   Low Risk.     History of Present Illness: Soumya King is a 63 year old female with hx of COPD, HTN, GERD, Type 2 diabetes, present to the ER with persistant cough and some SOB. Pt  also getting some rt sided abdominal pain since the cough. No fevers, chills, nausea or vomiting. No hemoptysis.     Brief Synopsis: Pulmonology and infectious disease placed in consultation for right lower lobe pneumonia with empyema.  Patient was placed on IV antibiotics.  Patient subsequent chest tube placed by interventional radiology.  Patient continued on IV antibiotics and placed on mist protocol.  Patient did not have any significant improvement.  Patient was monitored closely by GI and hematology as well for her anemia.  No further GI workup and signed off.  Due to lack of improvement patient was evaluated by cardiothoracic surgery.  Decision was made to go for VATS and decortication.  Patient underwent surgery and slowly improved throughout the rest of the hospital course.  Consultants continue to follow as the patient defervesced.  Patient was still on low amounts of oxygen prior to discharge.  Pulmonology continue to follow.  Repeat chest x-ray showed small pleural effusions but were stable compared to her hospital course.  Patient was to receive IV antibiotics upon discharge per infectious disease recommendations.  Patient was cleared by the consultants and discharged in stable condition.  She will warrant close outpatient follow-up with consultants as well as primary care physician.    Procedures during hospitalization:   MIST  VATS    Lab/Test results pending at Discharge:   None     Consultants:  Pulmonology, hematology, GI, infectious disease, cardiothoracic surgery    Discharge Medication List:     Discharge Medications        START taking these medications        Instructions Prescription details   ertapenem 1 g Solr  Commonly known as: Invanz      Inject 1 g into the vein daily for 24 days. Will need weekly labs with CBC and BMP while on this medication.   Stop taking on: July 4, 2024  Quantity: 24 each  Refills: 0     Naloxone HCl 4 MG/0.1ML Liqd      4 mg by Nasal route as needed. If patient  remains unresponsive, repeat dose in other nostril 2-5 minutes after first dose.   Quantity: 1 kit  Refills: 0     oxyCODONE 5 MG Tabs      Take 1 tablet (5 mg total) by mouth every 6 (six) hours as needed for Pain.   Quantity: 20 tablet  Refills: 0     valACYclovir 1 G Tabs  Commonly known as: Valtrex      Take 1 tablet (1,000 mg total) by mouth every 12 (twelve) hours for 5 days.   Stop taking on: Chio 15, 2024  Quantity: 10 tablet  Refills: 0            CHANGE how you take these medications        Instructions Prescription details   cyclobenzaprine 10 MG Tabs  Commonly known as: Flexeril  What changed: Another medication with the same name was removed. Continue taking this medication, and follow the directions you see here.      Take 1 tablet (10 mg total) by mouth every 12 (twelve) hours as needed for Muscle spasms.   Quantity: 60 tablet  Refills: 1     topiramate 50 MG Tabs  Commonly known as: TOPAMAX  What changed: additional instructions      TAKE ONE TABLET BY MOUTH EVERY MORNING AND TWO TABLETS EVERY EVENING   Quantity: 270 tablet  Refills: 2            CONTINUE taking these medications        Instructions Prescription details   Aimovig 140 MG/ML Soaj  Generic drug: Erenumab-aooe      Inject 1 mL (140 mg total) into the skin every 30 (thirty) days.   Quantity: 1 mL  Refills: 4     aspirin 81 MG Tbec      Take 1 tablet (81 mg total) by mouth daily.   Refills: 0     atorvastatin 80 MG Tabs  Commonly known as: Lipitor      Take 1 tablet (80 mg total) by mouth nightly.   Quantity: 90 tablet  Refills: 1     azelastine 137 MCG/SPRAY Soln      1 spray by Nasal route 2 (two) times daily.   Refills: 0     benzonatate 100 MG Caps  Commonly known as: Tessalon Perles      Take 1 capsule (100 mg total) by mouth 3 (three) times daily as needed for cough.   Quantity: 15 capsule  Refills: 0     busPIRone 10 MG Tabs  Commonly known as: Buspar      Take 2 tablets (20 mg total) by mouth daily.   Refills: 0     Calcium  Carb-Cholecalciferol 500-200 MG-UNIT Tabs      Take 1 tablet by mouth daily.   Refills: 0     clonazePAM 0.5 MG Tabs  Commonly known as: KlonoPIN      Take 1 tablet (0.5 mg total) by mouth 2 (two) times daily.   Quantity: 12 tablet  Refills: 0     clopidogrel 75 MG Tabs  Commonly known as: Plavix      Take 1 tablet (75 mg total) by mouth daily.   Refills: 0     Coenzyme Q10 200 MG Caps  Notes to patient: Take as directed       Co Q-10 200 mg capsule, [RxNorm: 182445]   Refills: 0     diclofenac 75 MG Tbec  Commonly known as: Voltaren      Take 1 tablet (75 mg total) by mouth 2 (two) times daily.   Quantity: 180 tablet  Refills: 3     furosemide 20 MG Tabs  Commonly known as: Lasix      Take 1 tablet (20 mg total) by mouth daily.   Quantity: 7 tablet  Refills: 0     gabapentin 600 MG Tabs  Commonly known as: Neurontin      Take 1 tablet (600 mg total) by mouth at bedtime.   Quantity: 180 tablet  Refills: 0     Incruse Ellipta 62.5 MCG/ACT Aepb  Generic drug: umeclidinium bromide      Inhale 1 puff into the lungs daily.   Quantity: 3 each  Refills: 0     lidocaine 5 % Oint  Commonly known as: Xylocaine      Apply 1 Application  topically 3 (three) times daily as needed.   Quantity: 240 g  Refills: 1     metFORMIN 500 MG Tabs  Commonly known as: Glucophage      Take 1 tablet (500 mg total) by mouth 2 (two) times daily.   Quantity: 180 tablet  Refills: 1     Metoclopramide HCl 5 MG/5ML Soln  Commonly known as: REGLAN      Take 5 mL (5 mg total) by mouth 3 (three) times daily before meals.   Quantity: 473 mL  Refills: 0     metoprolol succinate ER 50 MG Tb24  Commonly known as: Toprol XL      Take 1 tablet (50 mg total) by mouth daily.   Quantity: 90 tablet  Refills: 0     Myrbetriq 50 MG Tb24  Generic drug: Mirabegron ER      Take 1 tablet (50 mg total) by mouth daily.   Quantity: 30 tablet  Refills: 11     Omeprazole 40 MG Cpdr      Take 1 capsule (40 mg total) by mouth 2 (two) times daily.   Quantity: 300  capsule  Refills: 0     One-A-Day Womens 50+ Tabs      Take 1 tablet by mouth daily.   Refills: 0     OneTouch Delica Plus [id number] Misc      1 lancet  by Finger stick route 3 (three) times daily.   Quantity: 300 each  Refills: 1     OneTouch Ultra 2 w/Device Kit      1 Device by Other route in the morning, at noon, and at bedtime. Use as directed.   Quantity: 1 kit  Refills: 0     OneTouch Ultra Strp  Generic drug: Glucose Blood      Tests 3 x daily   Quantity: 300 each  Refills: 1     Pristiq 100 MG Tb24  Generic drug: desvenlafaxine ER      Take 1 tablet (100 mg total) by mouth daily.   Refills: 0     QUEtiapine  MG Tb24  Commonly known as: SEROquel XR      Take 1 tablet (200 mg total) by mouth every evening.   Quantity: 30 tablet  Refills: 0     Rizatriptan Benzoate 10 MG Tabs  Commonly known as: MAXALT      Take 1 tablet (10 mg total) by mouth as needed for Migraine.   Quantity: 10 tablet  Refills: 5     Trulicity 0.75 MG/0.5ML Sopn  Generic drug: Dulaglutide      Inject 0.75 mg into the skin once a week.   Quantity: 2 mL  Refills: 0            STOP taking these medications      amoxicillin clavulanate 875-125 MG Tabs  Commonly known as: Augmentin        estradiol 0.1 MG/GM Crea  Commonly known as: Estrace        nitrofurantoin monohydrate macro 100 MG Caps  Commonly known as: Macrobid        sulfamethoxazole-trimethoprim -160 MG Tabs per tablet  Commonly known as: Bactrim DS                  Where to Get Your Medications        These medications were sent to [id number] - [place] - [address] [phone], [phone]  [address], [place] [address]      Phone: [phone]   Naloxone HCl 4 MG/0.1ML Liqd  oxyCODONE 5 MG Tabs  valACYclovir 1 G Tabs       Please  your prescriptions at the location directed by your doctor or nurse    Bring a paper prescription for each of these medications  ertapenem 1 g Solr         Follow-up appointment:   Greyson Main MD  [address] [place] [address]  [address]  [facility] [address]  [phone]    Schedule an appointment as soon as possible for a visit in 1 month(s)  obtain a chest xray prior to appointment    Rika Vinson DO  [address] [facility] [address]  [facility] [address]  [phone]    Schedule an appointment as soon as possible for a visit in 1 week(s)      Miguel Camargo MD  [address]   [facility] [address]  [place] [address]  [phone]    Schedule an appointment as soon as possible for a visit  in 4-6 weeks    Vibha Menezes MD  [address] [facility] [address]  [facility] [address] [phone]    Schedule an appointment as soon as possible for a visit in 2 week(s)  Please call to make an appointment to be seen in 2 weeks.      -----------------------------------------------------------------------------------------------  PATIENT DISCHARGE INSTRUCTIONS: See electronic chart    Jose Rafael Raphael MD 6/11/2024    Time spent: 43 minutes

## 2024-06-11 NOTE — PAYOR COMM NOTE
--------------  DISCHARGE REVIEW    Payor: [facility]  Subscriber #:  [id number]  Authorization Number: [id number]    Admit date: 24  Admit time:   3:45 PM  Discharge Date: 6/10/2024  6:34 PM     Admitting Physician: Keenan Woodson DO  Attending Physician:  No att. providers found  Primary Care Physician: Rika Vinson DO          Discharge Summary Notes        Discharge Summary signed by Jose Rafael Raphael MD at 2024  7:05 AM       Author: Jose Rafael Raphael MD Specialty: HOSPITALIST, Internal Medicine Author Type: Physician    Filed: 2024  7:05 AM Date of Service: 2024  6:57 AM Status: Signed    : Jose Rafael Raphael MD (Physician)           [facility]  DISCHARGE SUMMARY     Soumya King Patient Status:  Inpatient    6/3/1960 MRN [id number]   Location [address] Attending No att. providers found   Hosp Day # 15 PCP Rika Vinson DO     Date of Admission: 2024  Date of Discharge: 6/10/2024    Discharge Disposition: Home Health Care Services    Admitting Diagnosis:   Hyponatremia [E87.1]  Transaminitis [R74.01]  Thrombocytosis [D75.839]  MARÍA ELENA (acute kidney injury) (HCC) [N17.9]  Cavitary lesion of lung [J98.4]  Acute anemia [D64.9]    Hospital Discharge Diagnoses:  # RLL pneumonia with empyema   # RLL effusion with loculation and chest pain   - sp chest tube by IR on   -abx adjusted per cultures   - s/p MIST w/o improvement   - s/p VATS decortication    - CT Chest reviewed  - Pulmonology and ID rec appreciated      # elevated liver enzymes seems ischemic in nature per course - abd benign on exam   - improving  - US abd with doppler neg   - avoid hepatotoxic agents     # Iron def Anemia- sp PRBC - no s/s bleeding   #IgM Kappa MGUS- per Hem   #Coagulopathy- repeat INR normalized   #COPD -Continue inhalers, nebs   #Acute metabolic toxic (anxiolytic) encephalopathy, resolved    #Hypertension- metoprolol  #DM  2-hyperglycemia protocol  #GERD/ Jamison's esophagus -PPI  #MDD/anxiety- Home med       ADDENDUM:  Please see notes regarding challenges with DC planning  Paged by nurse for dependant edema. Pt was advised to elevate legs when resting and will also improve with more activity  Patient also placed on diuretics which should help as well.  Patient on heparin subcutaneous and SCDs for DVT prophylaxis during the hospital stay and had CTA chest and LE doppler which was negative     Lace+ Score: 81  59-90 High Risk  29-58 Medium Risk  0-28   Low Risk.     History of Present Illness: Soumya King is a 63 year old female with hx of COPD, HTN, GERD, Type 2 diabetes, present to the ER with persistant cough and some SOB. Pt also getting some rt sided abdominal pain since the cough. No fevers, chills, nausea or vomiting. No hemoptysis.     Brief Synopsis: Pulmonology and infectious disease placed in consultation for right lower lobe pneumonia with empyema.  Patient was placed on IV antibiotics.  Patient subsequent chest tube placed by interventional radiology.  Patient continued on IV antibiotics and placed on mist protocol.  Patient did not have any significant improvement.  Patient was monitored closely by GI and hematology as well for her anemia.  No further GI workup and signed off.  Due to lack of improvement patient was evaluated by cardiothoracic surgery.  Decision was made to go for VATS and decortication.  Patient underwent surgery and slowly improved throughout the rest of the hospital course.  Consultants continue to follow as the patient defervesced.  Patient was still on low amounts of oxygen prior to discharge.  Pulmonology continue to follow.  Repeat chest x-ray showed small pleural effusions but were stable compared to her hospital course.  Patient was to receive IV antibiotics upon discharge per infectious disease recommendations.  Patient was cleared by the consultants and discharged in stable condition.  She  will warrant close outpatient follow-up with consultants as well as primary care physician.    Procedures during hospitalization:   MIST  VATS    Lab/Test results pending at Discharge:   None     Consultants:  Pulmonology, hematology, GI, infectious disease, cardiothoracic surgery    Discharge Medication List:     Discharge Medications        START taking these medications        Instructions Prescription details   ertapenem 1 g Solr  Commonly known as: Invanz      Inject 1 g into the vein daily for 24 days. Will need weekly labs with CBC and BMP while on this medication.   Stop taking on: July 4, 2024  Quantity: 24 each  Refills: 0     Naloxone HCl 4 MG/0.1ML Liqd      4 mg by Nasal route as needed. If patient remains unresponsive, repeat dose in other nostril 2-5 minutes after first dose.   Quantity: 1 kit  Refills: 0     oxyCODONE 5 MG Tabs      Take 1 tablet (5 mg total) by mouth every 6 (six) hours as needed for Pain.   Quantity: 20 tablet  Refills: 0     valACYclovir 1 G Tabs  Commonly known as: Valtrex      Take 1 tablet (1,000 mg total) by mouth every 12 (twelve) hours for 5 days.   Stop taking on: Chio 15, 2024  Quantity: 10 tablet  Refills: 0            CHANGE how you take these medications        Instructions Prescription details   cyclobenzaprine 10 MG Tabs  Commonly known as: Flexeril  What changed: Another medication with the same name was removed. Continue taking this medication, and follow the directions you see here.      Take 1 tablet (10 mg total) by mouth every 12 (twelve) hours as needed for Muscle spasms.   Quantity: 60 tablet  Refills: 1     topiramate 50 MG Tabs  Commonly known as: TOPAMAX  What changed: additional instructions      TAKE ONE TABLET BY MOUTH EVERY MORNING AND TWO TABLETS EVERY EVENING   Quantity: 270 tablet  Refills: 2            CONTINUE taking these medications        Instructions Prescription details   Aimovig 140 MG/ML Soaj  Generic drug: Erenumab-aooe      Inject 1 mL  (140 mg total) into the skin every 30 (thirty) days.   Quantity: 1 mL  Refills: 4     aspirin 81 MG Tbec      Take 1 tablet (81 mg total) by mouth daily.   Refills: 0     atorvastatin 80 MG Tabs  Commonly known as: Lipitor      Take 1 tablet (80 mg total) by mouth nightly.   Quantity: 90 tablet  Refills: 1     azelastine 137 MCG/SPRAY Soln      1 spray by Nasal route 2 (two) times daily.   Refills: 0     benzonatate 100 MG Caps  Commonly known as: Tessalon Perles      Take 1 capsule (100 mg total) by mouth 3 (three) times daily as needed for cough.   Quantity: 15 capsule  Refills: 0     busPIRone 10 MG Tabs  Commonly known as: Buspar      Take 2 tablets (20 mg total) by mouth daily.   Refills: 0     Calcium Carb-Cholecalciferol 500-200 MG-UNIT Tabs      Take 1 tablet by mouth daily.   Refills: 0     clonazePAM 0.5 MG Tabs  Commonly known as: KlonoPIN      Take 1 tablet (0.5 mg total) by mouth 2 (two) times daily.   Quantity: 12 tablet  Refills: 0     clopidogrel 75 MG Tabs  Commonly known as: Plavix      Take 1 tablet (75 mg total) by mouth daily.   Refills: 0     Coenzyme Q10 200 MG Caps  Notes to patient: Take as directed       Co Q-10 200 mg capsule, [RxNorm: 014989]   Refills: 0     diclofenac 75 MG Tbec  Commonly known as: Voltaren      Take 1 tablet (75 mg total) by mouth 2 (two) times daily.   Quantity: 180 tablet  Refills: 3     furosemide 20 MG Tabs  Commonly known as: Lasix      Take 1 tablet (20 mg total) by mouth daily.   Quantity: 7 tablet  Refills: 0     gabapentin 600 MG Tabs  Commonly known as: Neurontin      Take 1 tablet (600 mg total) by mouth at bedtime.   Quantity: 180 tablet  Refills: 0     Incruse Ellipta 62.5 MCG/ACT Aepb  Generic drug: umeclidinium bromide      Inhale 1 puff into the lungs daily.   Quantity: 3 each  Refills: 0     lidocaine 5 % Oint  Commonly known as: Xylocaine      Apply 1 Application  topically 3 (three) times daily as needed.   Quantity: 240 g  Refills: 1     metFORMIN  500 MG Tabs  Commonly known as: Glucophage      Take 1 tablet (500 mg total) by mouth 2 (two) times daily.   Quantity: 180 tablet  Refills: 1     Metoclopramide HCl 5 MG/5ML Soln  Commonly known as: REGLAN      Take 5 mL (5 mg total) by mouth 3 (three) times daily before meals.   Quantity: 473 mL  Refills: 0     metoprolol succinate ER 50 MG Tb24  Commonly known as: Toprol XL      Take 1 tablet (50 mg total) by mouth daily.   Quantity: 90 tablet  Refills: 0     Myrbetriq 50 MG Tb24  Generic drug: Mirabegron ER      Take 1 tablet (50 mg total) by mouth daily.   Quantity: 30 tablet  Refills: 11     Omeprazole 40 MG Cpdr      Take 1 capsule (40 mg total) by mouth 2 (two) times daily.   Quantity: 300 capsule  Refills: 0     One-A-Day Womens 50+ Tabs      Take 1 tablet by mouth daily.   Refills: 0     OneTouch Delica Plus [id number] Misc      1 lancet  by Finger stick route 3 (three) times daily.   Quantity: 300 each  Refills: 1     OneTouch Ultra 2 w/Device Kit      1 Device by Other route in the morning, at noon, and at bedtime. Use as directed.   Quantity: 1 kit  Refills: 0     OneTouch Ultra Strp  Generic drug: Glucose Blood      Tests 3 x daily   Quantity: 300 each  Refills: 1     Pristiq 100 MG Tb24  Generic drug: desvenlafaxine ER      Take 1 tablet (100 mg total) by mouth daily.   Refills: 0     QUEtiapine  MG Tb24  Commonly known as: SEROquel XR      Take 1 tablet (200 mg total) by mouth every evening.   Quantity: 30 tablet  Refills: 0     Rizatriptan Benzoate 10 MG Tabs  Commonly known as: MAXALT      Take 1 tablet (10 mg total) by mouth as needed for Migraine.   Quantity: 10 tablet  Refills: 5     Trulicity 0.75 MG/0.5ML Sopn  Generic drug: Dulaglutide      Inject 0.75 mg into the skin once a week.   Quantity: 2 mL  Refills: 0            STOP taking these medications      amoxicillin clavulanate 875-125 MG Tabs  Commonly known as: Augmentin        estradiol 0.1 MG/GM Crea  Commonly known as: Estrace         nitrofurantoin monohydrate macro 100 MG Caps  Commonly known as: Macrobid        sulfamethoxazole-trimethoprim -160 MG Tabs per tablet  Commonly known as: Bactrim DS                  Where to Get Your Medications        These medications were sent to [id number] - [place], [place] - [address] [phone], [phone]  [address], [facility] [address]      Phone: [phone]   Naloxone HCl 4 MG/0.1ML Liqd  oxyCODONE 5 MG Tabs  valACYclovir 1 G Tabs       Please  your prescriptions at the location directed by your doctor or nurse    Bring a paper prescription for each of these medications  ertapenem 1 g Solr         Follow-up appointment:   Greyson Main MD  [address]  [facility] [address]  [facility] [address]  [phone]    Schedule an appointment as soon as possible for a visit in 1 month(s)  obtain a chest xray prior to appointment    Rika Vinson DO  [address] [facility] [address]  [facility] [address]  [phone]    Schedule an appointment as soon as possible for a visit in 1 week(s)      Miguel Camargo MD  [address]  [facility] [address]  [place] [address]  [phone]    Schedule an appointment as soon as possible for a visit  in 4-6 weeks    Vibha Menezes MD  [address] [facility] [address]  [facility] [address] [phone]    Schedule an appointment as soon as possible for a visit in 2 week(s)  Please call to make an appointment to be seen in 2 weeks.      -----------------------------------------------------------------------------------------------  PATIENT DISCHARGE INSTRUCTIONS: See electronic chart    Jose Rafael Raphael MD 6/11/2024    Time spent: 43 minutes      Electronically signed by Jose Rafael Raphael MD on 6/11/2024  7:05 AM         REVIEWER COMMENTS

## 2024-06-12 NOTE — PROGRESS NOTES
NCM spoke with pt briefly, states unable to take call at this time, she is still sleeping. NCM will try calling back later.

## 2024-06-13 ENCOUNTER — TELEPHONE (OUTPATIENT)
Dept: INTERNAL MEDICINE CLINIC | Facility: CLINIC | Age: 64
End: 2024-06-13

## 2024-06-13 NOTE — TELEPHONE ENCOUNTER
Patient called requesting to be seen earlier than July for a hospital follow-up. Currently no openings available. Hospital visit 5/26/24-6/10/24 with Dr Raphael for Empyema of right pleural space.     Please advise

## 2024-06-17 ENCOUNTER — LAB REQUISITION (OUTPATIENT)
Dept: LAB | Facility: HOSPITAL | Age: 64
End: 2024-06-17

## 2024-06-17 DIAGNOSIS — J85.1 ABSCESS OF LUNG WITH PNEUMONIA (HCC): ICD-10-CM

## 2024-06-17 LAB
ANION GAP SERPL CALC-SCNC: 10 MMOL/L (ref 0–18)
BASOPHILS # BLD AUTO: 0.08 X10(3) UL (ref 0–0.2)
BASOPHILS NFR BLD AUTO: 0.5 %
BUN BLD-MCNC: 11 MG/DL (ref 9–23)
CALCIUM BLD-MCNC: 8.9 MG/DL (ref 8.5–10.1)
CHLORIDE SERPL-SCNC: 104 MMOL/L (ref 98–112)
CO2 SERPL-SCNC: 24 MMOL/L (ref 21–32)
CREAT BLD-MCNC: 0.9 MG/DL
EGFRCR SERPLBLD CKD-EPI 2021: 72 ML/MIN/1.73M2 (ref 60–?)
EOSINOPHIL # BLD AUTO: 0.21 X10(3) UL (ref 0–0.7)
EOSINOPHIL NFR BLD AUTO: 1.3 %
ERYTHROCYTE [DISTWIDTH] IN BLOOD BY AUTOMATED COUNT: 15.8 %
GLUCOSE BLD-MCNC: 79 MG/DL (ref 70–99)
HCT VFR BLD AUTO: 32 %
HGB BLD-MCNC: 10.1 G/DL
IMM GRANULOCYTES # BLD AUTO: 0.14 X10(3) UL (ref 0–1)
IMM GRANULOCYTES NFR BLD: 0.9 %
LYMPHOCYTES # BLD AUTO: 2.31 X10(3) UL (ref 1–4)
LYMPHOCYTES NFR BLD AUTO: 14.6 %
MCH RBC QN AUTO: 29.9 PG (ref 26–34)
MCHC RBC AUTO-ENTMCNC: 31.6 G/DL (ref 31–37)
MCV RBC AUTO: 94.7 FL
MONOCYTES # BLD AUTO: 1.15 X10(3) UL (ref 0.1–1)
MONOCYTES NFR BLD AUTO: 7.3 %
NEUTROPHILS # BLD AUTO: 11.93 X10 (3) UL (ref 1.5–7.7)
NEUTROPHILS # BLD AUTO: 11.93 X10(3) UL (ref 1.5–7.7)
NEUTROPHILS NFR BLD AUTO: 75.4 %
OSMOLALITY SERPL CALC.SUM OF ELEC: 284 MOSM/KG (ref 275–295)
PLATELET # BLD AUTO: 793 10(3)UL (ref 150–450)
POTASSIUM SERPL-SCNC: 3.4 MMOL/L (ref 3.5–5.1)
RBC # BLD AUTO: 3.38 X10(6)UL
SODIUM SERPL-SCNC: 138 MMOL/L (ref 136–145)
WBC # BLD AUTO: 15.8 X10(3) UL (ref 4–11)

## 2024-06-17 PROCEDURE — 85025 COMPLETE CBC W/AUTO DIFF WBC: CPT | Performed by: INTERNAL MEDICINE

## 2024-06-17 PROCEDURE — 80048 BASIC METABOLIC PNL TOTAL CA: CPT | Performed by: INTERNAL MEDICINE

## 2024-06-18 ENCOUNTER — APPOINTMENT (OUTPATIENT)
Dept: CT IMAGING | Facility: HOSPITAL | Age: 64
DRG: 948 | End: 2024-06-18
Attending: STUDENT IN AN ORGANIZED HEALTH CARE EDUCATION/TRAINING PROGRAM

## 2024-06-18 ENCOUNTER — APPOINTMENT (OUTPATIENT)
Dept: GENERAL RADIOLOGY | Facility: HOSPITAL | Age: 64
DRG: 948 | End: 2024-06-18

## 2024-06-18 ENCOUNTER — TELEPHONE (OUTPATIENT)
Dept: INTERNAL MEDICINE CLINIC | Facility: CLINIC | Age: 64
End: 2024-06-18

## 2024-06-18 ENCOUNTER — HOSPITAL ENCOUNTER (INPATIENT)
Facility: HOSPITAL | Age: 64
LOS: 2 days | Discharge: HOME HEALTH | DRG: 948 | End: 2024-06-23
Attending: STUDENT IN AN ORGANIZED HEALTH CARE EDUCATION/TRAINING PROGRAM | Admitting: INTERNAL MEDICINE

## 2024-06-18 DIAGNOSIS — D75.839 THROMBOCYTOSIS: ICD-10-CM

## 2024-06-18 DIAGNOSIS — E87.6 HYPOKALEMIA: ICD-10-CM

## 2024-06-18 DIAGNOSIS — E86.0 DEHYDRATION: ICD-10-CM

## 2024-06-18 DIAGNOSIS — R62.7 FTT (FAILURE TO THRIVE) IN ADULT: Primary | ICD-10-CM

## 2024-06-18 DIAGNOSIS — R53.1 GENERALIZED WEAKNESS: ICD-10-CM

## 2024-06-18 LAB
ALBUMIN SERPL-MCNC: 2.4 G/DL (ref 3.4–5)
ALBUMIN/GLOB SERPL: 0.3 {RATIO} (ref 1–2)
ALP LIVER SERPL-CCNC: 149 U/L
ALT SERPL-CCNC: 72 U/L
ANION GAP SERPL CALC-SCNC: 10 MMOL/L (ref 0–18)
AST SERPL-CCNC: 67 U/L (ref 15–37)
BASOPHILS # BLD AUTO: 0.09 X10(3) UL (ref 0–0.2)
BASOPHILS NFR BLD AUTO: 0.6 %
BILIRUB SERPL-MCNC: 0.2 MG/DL (ref 0.1–2)
BUN BLD-MCNC: 16 MG/DL (ref 9–23)
CALCIUM BLD-MCNC: 8.8 MG/DL (ref 8.5–10.1)
CHLORIDE SERPL-SCNC: 101 MMOL/L (ref 98–112)
CO2 SERPL-SCNC: 24 MMOL/L (ref 21–32)
CREAT BLD-MCNC: 0.7 MG/DL
D DIMER PPP FEU-MCNC: 4.54 UG/ML FEU (ref ?–0.62)
EGFRCR SERPLBLD CKD-EPI 2021: 98 ML/MIN/1.73M2 (ref 60–?)
EOSINOPHIL # BLD AUTO: 0.2 X10(3) UL (ref 0–0.7)
EOSINOPHIL NFR BLD AUTO: 1.4 %
ERYTHROCYTE [DISTWIDTH] IN BLOOD BY AUTOMATED COUNT: 15.6 %
GLOBULIN PLAS-MCNC: 7.1 G/DL (ref 2.8–4.4)
GLUCOSE BLD-MCNC: 117 MG/DL (ref 70–99)
HCT VFR BLD AUTO: 33.1 %
HGB BLD-MCNC: 10.7 G/DL
IMM GRANULOCYTES # BLD AUTO: 0.09 X10(3) UL (ref 0–1)
IMM GRANULOCYTES NFR BLD: 0.6 %
LACTATE SERPL-SCNC: 0.9 MMOL/L (ref 0.4–2)
LYMPHOCYTES # BLD AUTO: 3.25 X10(3) UL (ref 1–4)
LYMPHOCYTES NFR BLD AUTO: 22.7 %
MCH RBC QN AUTO: 29.9 PG (ref 26–34)
MCHC RBC AUTO-ENTMCNC: 32.3 G/DL (ref 31–37)
MCV RBC AUTO: 92.5 FL
MONOCYTES # BLD AUTO: 0.84 X10(3) UL (ref 0.1–1)
MONOCYTES NFR BLD AUTO: 5.9 %
NEUTROPHILS # BLD AUTO: 9.87 X10 (3) UL (ref 1.5–7.7)
NEUTROPHILS # BLD AUTO: 9.87 X10(3) UL (ref 1.5–7.7)
NEUTROPHILS NFR BLD AUTO: 68.8 %
OSMOLALITY SERPL CALC.SUM OF ELEC: 282 MOSM/KG (ref 275–295)
PLATELET # BLD AUTO: 756 10(3)UL (ref 150–450)
POTASSIUM SERPL-SCNC: 3.2 MMOL/L (ref 3.5–5.1)
PROT SERPL-MCNC: 9.5 G/DL (ref 6.4–8.2)
RBC # BLD AUTO: 3.58 X10(6)UL
SODIUM SERPL-SCNC: 135 MMOL/L (ref 136–145)
TSI SER-ACNC: 1.9 MIU/ML (ref 0.36–3.74)
WBC # BLD AUTO: 14.3 X10(3) UL (ref 4–11)

## 2024-06-18 PROCEDURE — 71260 CT THORAX DX C+: CPT | Performed by: STUDENT IN AN ORGANIZED HEALTH CARE EDUCATION/TRAINING PROGRAM

## 2024-06-18 PROCEDURE — 71045 X-RAY EXAM CHEST 1 VIEW: CPT

## 2024-06-18 PROCEDURE — 99223 1ST HOSP IP/OBS HIGH 75: CPT | Performed by: INTERNAL MEDICINE

## 2024-06-18 RX ORDER — NICOTINE POLACRILEX 4 MG
30 LOZENGE BUCCAL
Status: DISCONTINUED | OUTPATIENT
Start: 2024-06-18 | End: 2024-06-23

## 2024-06-18 RX ORDER — MELATONIN
3 NIGHTLY PRN
Status: DISCONTINUED | OUTPATIENT
Start: 2024-06-18 | End: 2024-06-23

## 2024-06-18 RX ORDER — ACETAMINOPHEN 500 MG
500 TABLET ORAL EVERY 4 HOURS PRN
Status: DISCONTINUED | OUTPATIENT
Start: 2024-06-18 | End: 2024-06-23

## 2024-06-18 RX ORDER — POTASSIUM CHLORIDE 20 MEQ/1
40 TABLET, EXTENDED RELEASE ORAL ONCE
Status: COMPLETED | OUTPATIENT
Start: 2024-06-18 | End: 2024-06-18

## 2024-06-18 RX ORDER — ENOXAPARIN SODIUM 100 MG/ML
40 INJECTION SUBCUTANEOUS DAILY
Status: DISCONTINUED | OUTPATIENT
Start: 2024-06-19 | End: 2024-06-22

## 2024-06-18 RX ORDER — DEXTROSE MONOHYDRATE 25 G/50ML
50 INJECTION, SOLUTION INTRAVENOUS
Status: DISCONTINUED | OUTPATIENT
Start: 2024-06-18 | End: 2024-06-23

## 2024-06-18 RX ORDER — NICOTINE POLACRILEX 4 MG
15 LOZENGE BUCCAL
Status: DISCONTINUED | OUTPATIENT
Start: 2024-06-18 | End: 2024-06-23

## 2024-06-18 RX ORDER — ONDANSETRON 2 MG/ML
4 INJECTION INTRAMUSCULAR; INTRAVENOUS EVERY 6 HOURS PRN
Status: DISCONTINUED | OUTPATIENT
Start: 2024-06-18 | End: 2024-06-23

## 2024-06-18 NOTE — TELEPHONE ENCOUNTER
Lillie/[facility] [phone]    Reporting patient is not eating and sleeping 22-23 hours per day.  HH visit was 6/17/24, all vitals WNL.  Patient is currently staying with her sister.  Patient is on IV abx, patient self reports she has been sleeping this much, not eating since picc line placement 6/11.  Has upcoming appt with pcp:    Future Appointments   Date Time Provider Department Center   6/21/2024  1:00 PM Rika Vinson,  [facility] [place]

## 2024-06-18 NOTE — TELEPHONE ENCOUNTER
I do have an appointment with her on the 21st, but her case was very complicated. If the patient is more tired then she was prior to discharge then I advise to go to the ER  Rika Vinson DO

## 2024-06-18 NOTE — TELEPHONE ENCOUNTER
Spoke to patient's sister Alana, she was provided SV recommendation's. She states patient is not eating and sleeping about 19hrs during the day. She is concerned with patient's blood test results and states the patient's color is not looking good as well. Sister also expressed concern removing patient from oxygen. She was strongly advised to call EMS but if not an option advised sister to go to nearest hospital to her or [place] ER with continuous pulse ox on at all times. She was advised to ask for oxygen as soon as she arrives to ER. Sister v/u to all and is agreeable.

## 2024-06-18 NOTE — ED INITIAL ASSESSMENT (HPI)
Patient endorspearl was released from hospital last Monday after Thoracic Surgery and chest tube placement. Has had IV abx Ertapenem through PICC line. Last dose was today. Patient notes increased fatigue, decreased po intake, chills unsure if she has had fever, patient is sleeping more per sister. Denies chest pain currently.

## 2024-06-18 NOTE — TELEPHONE ENCOUNTER
Patients sister called in because her blood work, but also the constant sleeping and not eating concerns.     Advise provider has been notified.     # [phone]   Alana (sister)

## 2024-06-19 ENCOUNTER — APPOINTMENT (OUTPATIENT)
Dept: ULTRASOUND IMAGING | Facility: HOSPITAL | Age: 64
DRG: 948 | End: 2024-06-19
Attending: INTERNAL MEDICINE

## 2024-06-19 LAB
ANION GAP SERPL CALC-SCNC: 6 MMOL/L (ref 0–18)
ATRIAL RATE: 93 BPM
BASOPHILS # BLD AUTO: 0.07 X10(3) UL (ref 0–0.2)
BASOPHILS NFR BLD AUTO: 0.6 %
BUN BLD-MCNC: 14 MG/DL (ref 9–23)
CALCIUM BLD-MCNC: 8.8 MG/DL (ref 8.5–10.1)
CHLORIDE SERPL-SCNC: 109 MMOL/L (ref 98–112)
CO2 SERPL-SCNC: 24 MMOL/L (ref 21–32)
CREAT BLD-MCNC: 0.54 MG/DL
EGFRCR SERPLBLD CKD-EPI 2021: 104 ML/MIN/1.73M2 (ref 60–?)
EOSINOPHIL # BLD AUTO: 0.2 X10(3) UL (ref 0–0.7)
EOSINOPHIL NFR BLD AUTO: 1.6 %
ERYTHROCYTE [DISTWIDTH] IN BLOOD BY AUTOMATED COUNT: 15.4 %
GLUCOSE BLD-MCNC: 106 MG/DL (ref 70–99)
GLUCOSE BLD-MCNC: 107 MG/DL (ref 70–99)
GLUCOSE BLD-MCNC: 112 MG/DL (ref 70–99)
GLUCOSE BLD-MCNC: 113 MG/DL (ref 70–99)
GLUCOSE BLD-MCNC: 121 MG/DL (ref 70–99)
HCT VFR BLD AUTO: 29.5 %
HGB BLD-MCNC: 9.5 G/DL
IMM GRANULOCYTES # BLD AUTO: 0.06 X10(3) UL (ref 0–1)
IMM GRANULOCYTES NFR BLD: 0.5 %
LYMPHOCYTES # BLD AUTO: 2.33 X10(3) UL (ref 1–4)
LYMPHOCYTES NFR BLD AUTO: 19.1 %
MCH RBC QN AUTO: 29.6 PG (ref 26–34)
MCHC RBC AUTO-ENTMCNC: 32.2 G/DL (ref 31–37)
MCV RBC AUTO: 91.9 FL
MONOCYTES # BLD AUTO: 0.82 X10(3) UL (ref 0.1–1)
MONOCYTES NFR BLD AUTO: 6.7 %
NEUTROPHILS # BLD AUTO: 8.69 X10 (3) UL (ref 1.5–7.7)
NEUTROPHILS # BLD AUTO: 8.69 X10(3) UL (ref 1.5–7.7)
NEUTROPHILS NFR BLD AUTO: 71.5 %
OSMOLALITY SERPL CALC.SUM OF ELEC: 289 MOSM/KG (ref 275–295)
P AXIS: 64 DEGREES
P-R INTERVAL: 122 MS
PLATELET # BLD AUTO: 637 10(3)UL (ref 150–450)
POTASSIUM SERPL-SCNC: 3.7 MMOL/L (ref 3.5–5.1)
Q-T INTERVAL: 354 MS
QRS DURATION: 82 MS
QTC CALCULATION (BEZET): 440 MS
R AXIS: 68 DEGREES
RBC # BLD AUTO: 3.21 X10(6)UL
SODIUM SERPL-SCNC: 139 MMOL/L (ref 136–145)
T AXIS: 71 DEGREES
VENTRICULAR RATE: 93 BPM
WBC # BLD AUTO: 12.2 X10(3) UL (ref 4–11)

## 2024-06-19 PROCEDURE — 93970 EXTREMITY STUDY: CPT | Performed by: INTERNAL MEDICINE

## 2024-06-19 PROCEDURE — 99232 SBSQ HOSP IP/OBS MODERATE 35: CPT | Performed by: STUDENT IN AN ORGANIZED HEALTH CARE EDUCATION/TRAINING PROGRAM

## 2024-06-19 PROCEDURE — 99223 1ST HOSP IP/OBS HIGH 75: CPT | Performed by: INTERNAL MEDICINE

## 2024-06-19 RX ORDER — METOPROLOL SUCCINATE 50 MG/1
50 TABLET, EXTENDED RELEASE ORAL
Status: DISCONTINUED | OUTPATIENT
Start: 2024-06-19 | End: 2024-06-23

## 2024-06-19 RX ORDER — AZELASTINE 1 MG/ML
1 SPRAY, METERED NASAL 2 TIMES DAILY
Status: DISCONTINUED | OUTPATIENT
Start: 2024-06-19 | End: 2024-06-23

## 2024-06-19 RX ORDER — ERTAPENEM 1 G/1
1 INJECTION, POWDER, LYOPHILIZED, FOR SOLUTION INTRAMUSCULAR; INTRAVENOUS DAILY
Status: DISCONTINUED | OUTPATIENT
Start: 2024-06-19 | End: 2024-06-19 | Stop reason: ALTCHOICE

## 2024-06-19 RX ORDER — BUSPIRONE HYDROCHLORIDE 5 MG/1
20 TABLET ORAL DAILY
Status: DISCONTINUED | OUTPATIENT
Start: 2024-06-19 | End: 2024-06-19

## 2024-06-19 RX ORDER — CYCLOBENZAPRINE HCL 10 MG
10 TABLET ORAL EVERY 12 HOURS PRN
Status: DISCONTINUED | OUTPATIENT
Start: 2024-06-19 | End: 2024-06-23

## 2024-06-19 RX ORDER — OXYCODONE HYDROCHLORIDE 5 MG/1
5 TABLET ORAL EVERY 6 HOURS PRN
Status: DISCONTINUED | OUTPATIENT
Start: 2024-06-19 | End: 2024-06-23

## 2024-06-19 RX ORDER — CLONAZEPAM 0.5 MG/1
0.5 TABLET ORAL 2 TIMES DAILY
Status: DISCONTINUED | OUTPATIENT
Start: 2024-06-19 | End: 2024-06-21

## 2024-06-19 RX ORDER — BENZONATATE 100 MG/1
100 CAPSULE ORAL 3 TIMES DAILY PRN
Status: DISCONTINUED | OUTPATIENT
Start: 2024-06-19 | End: 2024-06-23

## 2024-06-19 RX ORDER — METOCLOPRAMIDE HYDROCHLORIDE 5 MG/5ML
5 SOLUTION ORAL
Status: DISCONTINUED | OUTPATIENT
Start: 2024-06-19 | End: 2024-06-23

## 2024-06-19 RX ORDER — CLOPIDOGREL BISULFATE 75 MG/1
75 TABLET ORAL DAILY
Status: DISCONTINUED | OUTPATIENT
Start: 2024-06-19 | End: 2024-06-23

## 2024-06-19 RX ORDER — PANTOPRAZOLE SODIUM 40 MG/1
40 TABLET, DELAYED RELEASE ORAL
Status: DISCONTINUED | OUTPATIENT
Start: 2024-06-19 | End: 2024-06-22

## 2024-06-19 RX ORDER — MIRABEGRON 50 MG/1
50 TABLET, EXTENDED RELEASE ORAL DAILY
Status: DISCONTINUED | OUTPATIENT
Start: 2024-06-19 | End: 2024-06-20

## 2024-06-19 RX ORDER — ATORVASTATIN CALCIUM 40 MG/1
80 TABLET, FILM COATED ORAL NIGHTLY
Status: DISCONTINUED | OUTPATIENT
Start: 2024-06-19 | End: 2024-06-23

## 2024-06-19 RX ORDER — DESVENLAFAXINE 100 MG/1
100 TABLET, EXTENDED RELEASE ORAL DAILY
Status: DISCONTINUED | OUTPATIENT
Start: 2024-06-19 | End: 2024-06-23

## 2024-06-19 RX ORDER — POTASSIUM CHLORIDE 20 MEQ/1
40 TABLET, EXTENDED RELEASE ORAL ONCE
Status: COMPLETED | OUTPATIENT
Start: 2024-06-19 | End: 2024-06-19

## 2024-06-19 RX ORDER — FUROSEMIDE 20 MG/1
20 TABLET ORAL DAILY
Status: DISCONTINUED | OUTPATIENT
Start: 2024-06-19 | End: 2024-06-23

## 2024-06-19 RX ORDER — GABAPENTIN 600 MG/1
600 TABLET ORAL NIGHTLY
Status: DISCONTINUED | OUTPATIENT
Start: 2024-06-19 | End: 2024-06-23

## 2024-06-19 RX ORDER — TOPIRAMATE 25 MG/1
50 TABLET ORAL 2 TIMES DAILY
Status: DISCONTINUED | OUTPATIENT
Start: 2024-06-19 | End: 2024-06-23

## 2024-06-19 RX ORDER — POTASSIUM CHLORIDE 20 MEQ/1
40 TABLET, EXTENDED RELEASE ORAL EVERY 4 HOURS
Status: DISPENSED | OUTPATIENT
Start: 2024-06-19 | End: 2024-06-19

## 2024-06-19 RX ORDER — BUSPIRONE HYDROCHLORIDE 5 MG/1
10 TABLET ORAL 2 TIMES DAILY
Status: DISCONTINUED | OUTPATIENT
Start: 2024-06-19 | End: 2024-06-21

## 2024-06-19 RX ORDER — ASPIRIN 81 MG/1
81 TABLET ORAL DAILY
Status: DISCONTINUED | OUTPATIENT
Start: 2024-06-19 | End: 2024-06-23

## 2024-06-19 NOTE — PLAN OF CARE
Alert and oriented x 4. VSS; hx SUMMER on 2L O2 NC. Patient denies any pain. Patient denies any nausea. Voiding without difficulty. Last BM 6/19/24. Up ad enrique. PICC line in RUE. Plan for PT/OT. POC discussed with patient. Safety precautions in place. Call light within reach.

## 2024-06-19 NOTE — PLAN OF CARE
Patient admitted for failure to thrive, generalized weakness, fatigue.  Medications given as ordered.  Patient tolerated Unasyn given.  PRN Tylenol given for headache, patient states pain relieved.  Up ad enrique.  Plan for US BLE due to elevated d-dimer.

## 2024-06-19 NOTE — PROGRESS NOTES
Patient was readmitted into [facility] yesterday.  Encounter closing.

## 2024-06-19 NOTE — DIETARY NOTE
[facility]   part of [facility]    NUTRITION ASSESSMENT    Unable to diagnose malnutrition criteria at this time.    NUTRITION INTERVENTION:    RD nutrition Care Plan- See RD nutrition assessment for additional recommendations  Meal and Snacks - Monitor and encourage adequate PO intake. Cardiac, Carb Controlled:60 gm CHO per meal.  Medical Food Supplements -  Ensure Plus High Protein at breakfast and Glucerna at dinner .     PATIENT STATUS:     6/19/24- 63 y/o female admitted with FTT (poor appetite, generalized weakness, sleeping most of the day). Pt chart reviewed d/t MST score of 2. Pt currently off unit for ultrasound of legs d/t elevated D-Dimer.   Will gather wt/diet hx and nutrition focused physical exam at f/u visit.   Recorded PO intakes:20% of breakfast and 50% of a later lunch. Pt feeling nauseous this AM per RN report. Given Zofran.   PMH:quit smoking 17 months ago, COPD, DM, Jamison's esophagus, CAD, CABG (12/28/23), recent admit (5/26-6/10) for RLL PNA/pulmonary abscess w/ R empyema, s/p VATS and total pulmonary decortication (6/5).       ANTHROPOMETRICS:  Ht:  5' 1\"  Wt: 54.4 kg (120 lb).   BMI: Body mass index is 22.67 kg/m².  IBW: 47.7 kg      WEIGHT HISTORY:   Noted no significant wt changes per EMR hx.  Per previous RD note (5/31/24), pt reported UBW of ~120 lb.    Wt Readings from Last 10 Encounters:   06/18/24 54.4 kg (120 lb)   06/07/24 69.9 kg (154 lb)   05/20/24 54.4 kg (120 lb)   05/14/24 57.6 kg (127 lb)   05/08/24 56.2 kg (124 lb)   05/01/24 57.2 kg (126 lb)   03/05/24 56.7 kg (125 lb)   02/26/24 58.1 kg (128 lb)   02/12/24 57.8 kg (127 lb 6 oz)   02/08/24 54.4 kg (120 lb)        NUTRITION:  Diet:       Procedures    Cardiac diet Cardiac; Calorie Restriction/Carb Controlled: 1800 kcal/60 grams; Is Patient on Accuchecks? Yes      Food Allergies: No  Cultural/Ethnic/Yarsanism Preferences Addressed: Yes    Percent Meals Eaten (last 3 days)       Date/Time Percent Meals Eaten (%)     06/19/24 1149 25 %    06/19/24 1500 50 %          GI system review: diarrhea and nausea Last BM: 6/18  Skin and wounds: No pressure ulcers per RN documentation. Sutures intact from previous chest tubes, deep surgical chest incision from CABG (12/2024).    NUTRITION RELATED PHYSICAL FINDINGS:     1. Body Fat/Muscle Mass: unable to assess at this time as pt out of room for testing/procedure. Will attempt at follow up or as able     2. Fluid Accumulation: none per RN documentation     NUTRITION PRESCRIPTION: 54.4 kg-120 lb (6/18)  Calories: 7946-5347 calories/day (25-30 kcal/kg)  Protein: 65-82 grams protein/day (1.2-1.5 grams protein per kg)  Fluid: ~1 ml/kcal or per MD discretion    NUTRITION DIAGNOSIS/PROBLEM:  Inadequate energy intake related to physiological causes and decreased ability to consume sufficient energy intakes  as evidenced by documented/reported insufficient oral intake.      MONITOR AND EVALUATE/NUTRITION GOALS:  PO intake of 75% of meals TID - New  PO intake of 75% of oral nutrition supplement/s - New  Weight stable within 1 to 2 lbs during admission - New      MEDICATIONS:  Kcl:20 meq, IV abx, Lasix, Zofran    LABS:  POC Glu:105-121, Glu:112, K+:3.7, elevated LFT's    Pt is at High nutrition risk    Jyothi Gray MS, RD, LDN  Clinical Dietitian  Ext:[phone]

## 2024-06-19 NOTE — RESPIRATORY THERAPY NOTE
PATIENT HAS HISTORY OF SUMMER. PATIENT REFUSES TO USE CPAP DURING HOSPITAL STAY. SUMMER PROTOCOL IN CHART.

## 2024-06-19 NOTE — OCCUPATIONAL THERAPY NOTE
Attempted to see pt for skilled OT services this date. Pt is currently requesting to sleep for 1.5 hours. Will re-attempt as schedule allows. Marina Rizzo, 06/19/24, 9:40 AM     11:04am Pt soundly sleeping. Will re-attempt as schedule allows.

## 2024-06-19 NOTE — PHYSICAL THERAPY NOTE
PHYSICAL THERAPY EVALUATION - INPATIENT     Room Number: 360/360-A  Evaluation Date: 6/19/2024  Type of Evaluation: Initial  Physician Order: PT Eval and Treat    Presenting Problem: FTT, weakness, dehydration  Co-Morbidities : CAD s/p CABG, HTN, DM, COPD  Reason for Therapy: Mobility Dysfunction and Discharge Planning    PHYSICAL THERAPY ASSESSMENT   Patient is currently functioning near baseline with bed mobility, transfers, and gait.  Prior to admission, patient's baseline is MOD I.  Patient is requiring modified independent as a result of the following impairments: medical status and fatigue .  Physical Therapy will continue to follow for duration of hospitalization.    Patient will benefit from continued skilled PT Services For duration of hospitalization, however, given the patient is functioning near baseline level do not anticipate skilled therapy needs at discharge .    PLAN  PT Treatment Plan: Endurance;Energy conservation;Patient education;Gait training;Stair training;Transfer training  Rehab Potential : Good  Frequency (Obs): 3x/week  Number of Visits to Meet Established Goals: 3      CURRENT GOALS    Goal #1 Patient is able to demonstrate supine - sit EOB @ level: modified independent  MET   Goal #2 Patient is able to demonstrate transfers EOB to/from BSC at assistance level: modified independent  MET   Goal #3 Patient is able to ambulate 150 feet with assist device: none at assistance level: modified independent     Goal #4 Pt will ascend/descend 1 flight of stairs at MOD I level   Goal #5    Goal #6    Goal Comments: Goals established on 6/19/2024      PHYSICAL THERAPY MEDICAL/SOCIAL HISTORY  History related to current admission: Patient is a 64 year old female admitted on 6/18/2024 from home for weakness and lethargy.  Pt diagnosed with hypokalemia and dehydration.    Recent admission: 5/26-6/10/24: empyema s/p R VATs (from home and DC home)      HOME SITUATION  Type of Home: [facility]   Home  Layout: One level                Lives With: Alone  Drives: Yes          Prior Level of [place]: Pt lives alone in condo building with elevator. Pt independent with ADL and mobility. Pt does not use RW or cane. Pt has been staying with her sister in [address] home since recent [facility] admission. Pt works as a  at [employer] but has not been back to work yet.     SUBJECTIVE  \"I didn't sleep all night. I'm not walking right now.\"       OBJECTIVE  Precautions: None  Fall Risk: Standard fall risk    WEIGHT BEARING RESTRICTION  Weight Bearing Restriction: None                PAIN ASSESSMENT  Ratin          COGNITION  Overall Cognitive Status:  WFL - within functional limits    RANGE OF MOTION AND STRENGTH ASSESSMENT  Upper extremity ROM and strength are within functional limits     Lower extremity ROM is within functional limits     Lower extremity strength is within functional limits       BALANCE  Static Sitting: Good  Dynamic Sitting: Good  Static Standing: Fair +  Dynamic Standing: Fair +    ADDITIONAL TESTS                                    ACTIVITY TOLERANCE                         O2 WALK       NEUROLOGICAL FINDINGS                        AM-PAC '6-Clicks' INPATIENT SHORT FORM - BASIC MOBILITY  How much difficulty does the patient currently have...  Patient Difficulty: Turning over in bed (including adjusting bedclothes, sheets and blankets)?: None   Patient Difficulty: Sitting down on and standing up from a chair with arms (e.g., wheelchair, bedside commode, etc.): None   Patient Difficulty: Moving from lying on back to sitting on the side of the bed?: None   How much help from another person does the patient currently need...   Help from Another: Moving to and from a bed to a chair (including a wheelchair)?: None   Help from Another: Need to walk in hospital room?: None   Help from Another: Climbing 3-5 steps with a railing?: A Little       AM-PAC Score:  Raw Score: 23   Approx Degree of Impairment: 11.2%    Standardized Score (AM-PAC Scale): 56.93   CMS Modifier (G-Code): CI    FUNCTIONAL ABILITY STATUS  Gait Assessment   Functional Mobility/Gait Assessment  Gait Assistance: Modified independent  Distance (ft): 10  Assistive Device: None    Skilled Therapy Provided   Received supine in bed with RN present.   Encouraged patient to attempt OOB mobility.   Performs bed mobility with MOD I.   Donned socks without assist.   Sit-stand with MOD I.   Ambulatory within room and in bathroom with MOD I.   Declines ambulation due to fatigue.   Returned to supine in bed with MOD I.     Bed Mobility:  Rolling: MOD I  Supine to sit: MOD I   Sit to supine: MOD I     Transfer Mobility:  Sit to stand: MOD I   Stand to sit: MOD I  Gait = MOD I    Therapist's Comments: Reviewed activity recommendations.     Exercise/Education Provided:  Bed mobility  Energy conservation  Functional activity tolerated  Gait training  Posture  Strengthening  Transfer training    Patient End of Session: In bed;Needs met;Call light within reach;RN aware of session/findings;All patient questions and concerns addressed      Patient Evaluation Complexity Level:  History High - 3 or more personal factors and/or co-morbidities   Examination of body systems Low - addressing 1-2 elements   Clinical Presentation Low - Stable   Clinical Decision Making Low - Stable       PT Session Time: 20 minutes  Gait Training:  minutes  Therapeutic Activity:  minutes  Neuromuscular Re-education:  minutes  Therapeutic Exercise:  minutes

## 2024-06-19 NOTE — HOME CARE LIAISON
Current with [facility] for RN services. [facility] is following for IV medication at home. Patient is currently under obs status and will need COCO order if patient status goes to inpatient

## 2024-06-19 NOTE — DISCHARGE INSTRUCTIONS
Resume Home Health Care with [facility]  [phone]  PICC Line dressing change due on 6/27/24  [facility]   Ph: [phone], fax: [phone]    Advised patient to be compliant with CPAP machine at 12 cm water pressure whenever sleeping.    Counseling/Psychiatry In-Network with Medicaid     [facility]  [address] [facility] [address], [place], [place]   [phone]    [facility]  [address]  [facility] [address], [place]   [phone]  ([facility] MEDICAID ONLY)    [facility]  [address], [facility]   [phone]    [facility], [facility]  [address] [place] [address] [phone]    [facility]  [address] [place] [address] [phone]    [facility]  [address], [place], [place] [address] [phone]    [facility]  ** Therapy and Psychiatry, multiple locations  [phone]    [facility]  [address] [place] [address] [phone]

## 2024-06-19 NOTE — CM/SW NOTE
06/19/24 1000   CM/SW Referral Data   Referral Source Social Work (self-referral)   Reason for Referral Discharge planning   Informant EMR;Clinical Staff Member   Patient Info   Patient's Current Mental Status at Time of Assessment Alert;Oriented   Patient lives with Alone;Sibling   Patient Status Prior to Admission   Services in place prior to admission Home Health Care;DME/Supplies at home;Infusion   Home Health Provider Info [facility]   DME Provider/Supplier [facility]   Type of DME/Supplies Home Oxygen   Infusion Provider [facility]   Discharge Needs   Anticipated D/C needs Home health care;Infusion care;To be determined       Chart reviewed for discharge planning.  Pt is a 65 y/o woman readmitted after recent hospital admission from 5/26-6/10/24.  Pt was discharged to home with [facility] RN, [facility] for daily IV invanz, and home O2 through HME.  Noted that pt planned to stay with her sister in [place].  Pt now readmitted with failure to thrive.  Referral sent to [facility] via [facility].  Confirmed with Luz Marina from [facility] that they are aware of pt's readmission.  Resumption orders placed.  Will follow for MD and therapy recommendations for further DC planning.  / to remain available for support and/or discharge planning.     Betina Shah, [facility]  Discharge Planner  [phone]

## 2024-06-19 NOTE — CONSULTS
[facility]  Pulmonary/Critical Care Consult note    Soumya King Patient Status:  Observation    6/3/1960 MRN [id number]   Location [facility] 3SW-A Attending Bill Brian, *   Hosp Day # 0 PCP Rika Vinson DO     Reason for Consultation:  Abnormal CT chest with right lower lobe consolidation/history of empyema    History of Present Illness:  Soumya King is a a(n) 64 year old female.  With history of allergic rhinitis, anxiety, COPD, diabetes mellitus and GERD who presented to the emergency department on 2024 with complaints of generalized weakness and poor appetite.  The patient underwent right VATS and decortication on 2024.  A CT chest performed on 2024 shows right lower lobe consolidation with a cavitary lesion so pulmonary consultation was obtained for further evaluation and management.  Infectious diseases already started patient on Unasyn    The patient admits to generalized weakness.  She admits to shortness of breath with exertion, she has mild cough she denies any choking while eating or drinking.  Although admits to symptoms of GERD frequently denies any focal weakness.  Denies abdominal pains.    History:  Past Medical History:    Abdominal pain, other specified site    groin    Acute bronchitis    Allergic rhinitis    Anxiety    Arthritis    Back problem    Jamison esophagus    Chronic low back pain without sciatica    COPD (chronic obstructive pulmonary disease) (HCC)    Depression    Diabetes (HCC)    Difficult intubation    difficult to intubate with bladder surgery,instructed by anesthesia to communicate to future anesthesiologist. Small airway    Esophageal reflux    Essential hypertension    Don't remember    Fitting and adjustment of dental prosthetic device    High blood pressure    History of 2019 novel coronavirus disease (COVID-19)    No hospitalization,symptoms; Fever,fatigue ,body aches,headache no loss of taste or smell    Hx of motion  sickness    Hyperlipidemia    Mass of spine    Migraines    barometric pressure    Myocardial infarction (HCC)    Osteoarthritis    Other ill-defined conditions(799.89)    Saavedra's    Pain in joint, forearm    wrist    Pain in joint, pelvic region and thigh    hip    Pelvic mass    Personal history of urinary (tract) infection    Sleep apnea    I don't date     Past Surgical History:   Procedure Laterality Date    Cabg  12/27/23    Colonoscopy      Colonoscopy N/A 05/18/2022    Procedure: COLONOSCOPY AND ESOPHAGOGASTRODUODENOSCOPY;  Surgeon: Miguel Camargo MD;  Location:  ENDOSCOPY    Heart surgery  2024    double bypass    Laparoscopic  2007    sling operation for stress incontinence    Laparoscopy,diagnostic      Kay biopsy stereo nodule 2 site bilat (cpt=19081/39915) Left 02/2010    BENIGN 2 SITES    Kay biopsy stereo w/calc 2 site left (cpt=19081/90924)  02/2010    benign x 2    Kay stereo-clip w/calc 2 site left  2010    Oophorectomy Left 06/28/2019    Other  08/31/2022    RIGHT SACRAL SUBCUTANEOUS CYST EXCISION    Other surgical history  2000, 2019    Bladder Sling, Large mass removed from left abdomen and ovar    Tonsillectomy      Was a child, have no idea    Tubal ligation      Upper gi endoscopy,exam       Family History   Problem Relation Age of Onset    Arthritis Mother     Other (AMI) Mother     Other (diabetes mellitus) Mother     Diabetes Mother       reports that she quit smoking about 17 months ago. Her smoking use included cigarettes. She started smoking about 49 years ago. She has a 72 pack-year smoking history. She has never used smokeless tobacco. She reports that she does not currently use alcohol. She reports that she does not use drugs.    Allergies:  Allergies   Allergen Reactions    Naprosyn [Naproxen] ANAPHYLAXIS     Has tolerated ibuprofen and IV toradol     Aspirin OTHER (SEE COMMENTS)     Difficulty swallowing due to  saavedra's esophagus       Medications:    Current  Facility-Administered Medications:     QUEtiapine ER (SEROquel XR) 24 hr tab 200 mg, 200 mg, Oral, QPM    [Held by provider] Mirabegron ER (Myrbetriq) 50 MG tablet TB24 50 mg, 50 mg, Oral, Daily    cyclobenzaprine (Flexeril) tab 10 mg, 10 mg, Oral, Q12H PRN    metoclopramide (Reglan) solution 5 mg, 5 mg, Oral, TID AC PRN    clonazePAM (KlonoPIN) tab 0.5 mg, 0.5 mg, Oral, BID    furosemide (Lasix) tab 20 mg, 20 mg, Oral, Daily    atorvastatin (Lipitor) tab 80 mg, 80 mg, Oral, Nightly    clopidogrel (Plavix) tab 75 mg, 75 mg, Oral, Daily    aspirin DR tab 81 mg, 81 mg, Oral, Daily    umeclidinium bromide (Incruse Ellipta) 62.5 MCG/ACT inhaler 1 puff, 1 puff, Inhalation, Daily    gabapentin (Neurontin) tab 600 mg, 600 mg, Oral, Nightly    topiramate (TopaMAX) tab 50 mg, 50 mg, Oral, BID    metoprolol succinate ER (Toprol XL) 24 hr tab 50 mg, 50 mg, Oral, Daily Beta Blocker    benzonatate (Tessalon) cap 100 mg, 100 mg, Oral, TID PRN    azelastine (Astelin) 0.1 % nasal solution 1 spray, 1 spray, Each Nare, BID    desvenlafaxine ER (Pristiq) 24 hr tab 100 mg, 100 mg, Oral, Daily    oxyCODONE immediate release tab 5 mg, 5 mg, Oral, Q6H PRN    pantoprazole (Protonix) DR tab 40 mg, 40 mg, Oral, QAM AC    busPIRone (Buspar) tab 10 mg, 10 mg, Oral, BID    ampicillin-sulbactam (Unasyn) 3 g in sodium chloride 0.9% 100mL IVPB-ADD, 3 g, Intravenous, q6h    glucose (Dex4) 15 GM/59ML oral liquid 15 g, 15 g, Oral, Q15 Min PRN **OR** glucose (Glutose) 40% oral gel 15 g, 15 g, Oral, Q15 Min PRN **OR** glucose-vitamin C (Dex-4) chewable tab 4 tablet, 4 tablet, Oral, Q15 Min PRN **OR** dextrose 50% injection 50 mL, 50 mL, Intravenous, Q15 Min PRN **OR** glucose (Dex4) 15 GM/59ML oral liquid 30 g, 30 g, Oral, Q15 Min PRN **OR** glucose (Glutose) 40% oral gel 30 g, 30 g, Oral, Q15 Min PRN **OR** glucose-vitamin C (Dex-4) chewable tab 8 tablet, 8 tablet, Oral, Q15 Min PRN    enoxaparin (Lovenox) 40 MG/0.4ML SUBQ injection 40 mg, 40 mg,  Subcutaneous, Daily    acetaminophen (Tylenol Extra Strength) tab 500 mg, 500 mg, Oral, Q4H PRN    melatonin tab 3 mg, 3 mg, Oral, Nightly PRN    ondansetron (Zofran) 4 MG/2ML injection 4 mg, 4 mg, Intravenous, Q6H PRN    insulin aspart (NovoLOG) 100 Units/mL FlexPen 1-10 Units, 1-10 Units, Subcutaneous, TID AC and HS    Intake/Output:    Intake/Output Summary (Last 24 hours) at 6/19/2024 1651  Last data filed at 6/19/2024 1500  Gross per 24 hour   Intake 1360 ml   Output 1100 ml   Net 260 ml      Body mass index is 22.67 kg/m².    Review of Systems  Review of Systems:   A comprehensive 10 point review of systems was completed.  Pertinent positives and negatives noted in the the HPI.           Patient Vitals for the past 24 hrs:   BP Temp Temp src Pulse Resp SpO2 Weight   06/19/24 1131 139/74 99.1 °F (37.3 °C) Oral 74 18 98 % --   06/19/24 0746 125/69 98.5 °F (36.9 °C) Oral 85 18 96 % --   06/19/24 0445 111/59 98.3 °F (36.8 °C) Oral 75 18 96 % --   06/19/24 0219 -- -- -- 83 -- 94 % --   06/19/24 0029 -- -- -- 77 -- 95 % --   06/18/24 2342 124/66 98.3 °F (36.8 °C) Oral 80 18 100 % --   06/18/24 2300 143/70 -- -- 70 16 100 % --   06/18/24 2245 130/73 -- -- 76 16 100 % --   06/18/24 2200 121/57 -- -- 78 16 100 % --   06/18/24 2045 122/78 -- -- 83 16 100 % --   06/18/24 1807 120/83 98.5 °F (36.9 °C) Oral 99 20 98 % 120 lb (54.4 kg)     Vitals:    06/19/24 0219 06/19/24 0445 06/19/24 0746 06/19/24 1131   BP:  111/59 125/69 139/74   BP Location:  Left arm Left arm Left arm   Pulse: 83 75 85 74   Resp:  18 18 18   Temp:  98.3 °F (36.8 °C) 98.5 °F (36.9 °C) 99.1 °F (37.3 °C)   TempSrc:  Oral Oral Oral   SpO2: 94% 96% 96% 98%   Weight:             Physical Exam  Constitutional:       General: She is not in acute distress.     Appearance: Normal appearance. She is not ill-appearing or diaphoretic.   HENT:      Head: Normocephalic and atraumatic.      Nose: Nose normal. No congestion or rhinorrhea.      Mouth/Throat:       Mouth: Mucous membranes are moist.      Pharynx: Oropharynx is clear. No oropharyngeal exudate or posterior oropharyngeal erythema.      Comments: Mallampati class III palate  Eyes:      Extraocular Movements: Extraocular movements intact.      Pupils: Pupils are equal, round, and reactive to light.   Cardiovascular:      Rate and Rhythm: Normal rate.      Pulses: Normal pulses.      Heart sounds: Normal heart sounds. No murmur heard.  Pulmonary:      Effort: Pulmonary effort is normal. No respiratory distress.      Breath sounds: Normal breath sounds. No wheezing or rhonchi.      Comments: Diminished breath sound bilateral specially in right lower lung zones  Chest:      Chest wall: No tenderness.   Abdominal:      General: Abdomen is flat. Bowel sounds are normal.      Palpations: Abdomen is soft.   Musculoskeletal:         General: Normal range of motion.   Skin:     General: Skin is warm.   Neurological:      General: No focal deficit present.      Mental Status: She is alert and oriented to person, place, and time.   Psychiatric:         Mood and Affect: Mood normal.         Behavior: Behavior normal.         Thought Content: Thought content normal.         Judgment: Judgment normal.              Lab Data Review:  Recent Labs   Lab 06/17/24  1415 06/18/24 1827 06/19/24  0551   WBC 15.8* 14.3* 12.2*   HGB 10.1* 10.7* 9.5*   HCT 32.0* 33.1* 29.5*   .0* 756.0* 637.0*       Recent Labs   Lab 06/17/24  1415 06/18/24  1827 06/19/24  0551    135* 139   K 3.4* 3.2* 3.7    101 109   CO2 24.0 24.0 24.0   BUN 11 16 14   CREATSERUM 0.90 0.70 0.54*   CA 8.9 8.8 8.8   ALB  --  2.4*  --    ALKPHO  --  149*  --    ALT  --  72*  --    AST  --  67*  --    GLU 79 117* 112*       No results for input(s): \"MG\" in the last 168 hours.    Lab Results   Component Value Date    PHOS 2.3 (L) 06/30/2019        No results for input(s): \"PT\", \"INR\", \"PTT\" in the last 168 hours.    No results for input(s): \"ABGPHT\",  \"AXZJEQ8O\", \"KDODN0E\", \"ABGHCO3\", \"ABGBE\", \"TEMP\", \"CHARLES\", \"SITE\", \"DEV\", \"THGB\" in the last 168 hours.    No results for input(s): \"TROP\", \"CKMB\" in the last 168 hours.    Cultures: No results found for this visit on 06/18/24.        Radiology personally reviewed:  US VENOUS DOPPLER LEG BILAT - DIAG IMG (CPT=93970)    Result Date: 6/19/2024  CONCLUSION:  No DVT lower extremities.   LOCATION:  [facility]   Dictated by (CST): Elieser Torres MD on 6/19/2024 at 4:32 PM     Finalized by (CST): Elieser Torres MD on 6/19/2024 at 4:32 PM       CT CHEST PE AORTA (IV ONLY) (CPT=71260)    Result Date: 6/18/2024  CONCLUSION:   1. Marked atelectasis the right lung along with consolidations have resolved.  Large fluid collection has mostly resolved.  Only minimal residual right pleural effusion is noted.  Cavitary lesion in the right lower lobe is identified.  This cavitary lesion may be sequelae of prior infectious process.  A pneumatocele is of consideration.  2. There is mild mediastinal and right perihilar lymphadenopathy again noted.    LOCATION:  [facility]   Dictated by (CST): Gregory Vernon MD on 6/18/2024 at 10:50 PM     Finalized by (CST): Gregory Vernon MD on 6/18/2024 at 10:54 PM       XR CHEST AP PORTABLE  (CPT=71045)    Result Date: 6/18/2024  CONCLUSION:  Right lower lobe consolidation with small right pleural effusion is noted.  This is improved compared to prior examination.   LOCATION:  [id number]      Dictated by (CST): Gregory Vernon MD on 6/18/2024 at 8:17 PM     Finalized by (CST): Gregory Vernon MD on 6/18/2024 at 8:18 PM         Patient Active Problem List   Diagnosis    Vitamin D deficiency    Jamison's esophagus    Tobacco use    Insomnia    COPD, mild (HCC) - Dx'd via PFTs done in 3/2015    Migraine without aura and without status migrainosus, not intractable    Hyperlipidemia    SUMMER on CPAP    MDD (major depressive disorder), recurrent episode, moderate (HCC)    GERD (gastroesophageal reflux  disease)    Hypertension    Chronic pansinusitis    Mucinous cystadenoma of ovary, left    KIRK (generalized anxiety disorder)    History of pubovaginal sling    Absence of bladder continence    Vasomotor flushing    Primary osteoarthritis of left knee    Mass of spine    Hx of motion sickness    Difficult intubation    Chronic low back pain without sciatica    Type 2 diabetes mellitus with diabetic neuropathy (HCC)    Pulmonary nodule    CAD, multiple vessel    S/P CABG x 2    Type 2 diabetes mellitus with hyperlipidemia (HCC)    Anemia    Iron deficiency anemia secondary to inadequate dietary iron intake    Subacute cough    Hyperglycemia    Acute anemia    MARÍA ELENA (acute kidney injury) (HCC)    Transaminitis    Thrombocytosis    Hyponatremia    Pleural effusion    Somnolence    Acute respiratory failure with hypoxia (HCC)    Empyema of right pleural space (HCC)    Status post thoracotomy    Hypoxia    FTT (failure to thrive) in adult    Dehydration    Hypokalemia    Generalized weakness       Assessment:  Shortness of breath  Right lower lobe consolidation: This could be related to aspiration pneumonia with GERD versus community-acquired pneumonia  Right lower lobe cavitary lesion:Right lower lobe cavitary lesion measures 4.1 x 3.1 cm.  Marked consolidations in the right lung have improved since prior examination: This could represent lung abscess/empyema status post right VATS and pulmonary decortication on 6/5/2020  Right perihilar lymph node 2.3 x 1.4 cm.  Mediastinal lymph node 1.1 x 1.2 cm  Small right pleural effusion  COPD  IgM kappa MGUS  History of tobacco smoking 72 pack years of tobacco although quit smoking in 2023  Obstructive sleep apnea syndrome: Polysomnogram on 2/21/2017: Showed AHI was 26.4, supine index of 88.7, REM AHI of 43.4, SaO2 grzegorz of 75%.  A follow-up CPAP titration sleep study on 4/26/2017 suggested CPAP at 12 cm water pressure  Generalized weakness  Allergic  rhinitis  Anxiety  GERD/Jamison's esophagus  Coronary artery disease/history of MI: Currently on Plavix  Type 2 diabetes mellitus  Depression  Hypertension  Hyperlipidemia  Transaminitis/elevated LFTs:  Thrombocytosis: Suspect reactive      Plan:  Continue current antibiotics per infectious disease  CPAP at 12 cm water pressure when sleeping  Continue current antibiotics per infectious disease  Change to Anoro 1 puff daily and discontinue umeclidinium  DVT prophylaxis:  Lovenox 40 mg subcutaneous every 24 hours  GI prophylaxis: Protonix 40 mg daily   Follow labs, procalcitonin in a.m.  Will follow for further recommendations    Thank You for allowing me to participate in this patient's care     John Pacheco MD    Note to the patient: The 21st Century Cures Act makes medical notes like these available to patients in the interest of transparency. However, be advised that this is a medical document. It is intended as peer to peer communication. It is written in medical language and may contain abbreviations or verbiage that are unfamiliar. It may appear blunt or direct. Medical documents are intended to carry relevant information, facts as evident, and clinical opinion of the practitioner.      Disclaimer: Components of this note were documented using voice recognition system and are subject to errors not corrected at proofreading. Contact the author of this note for any clarifications

## 2024-06-19 NOTE — ED QUICK NOTES
Orders for admission, patient is aware of plan and ready to go upstairs. Any questions, please call ED RN Jaylin at extension [phone].     Patient Covid vaccination status: Fully vaccinated     COVID Test Ordered in ED: None    COVID Suspicion at Admission: N/A    Running Infusions:      Mental Status/LOC at time of transport: A&Ox3    Other pertinent information: Blood cultures were ordered during triage, MD stated no need for second set of cultures.   CIWA score: N/A   NIH score:  N/A

## 2024-06-19 NOTE — ED PROVIDER NOTES
History     Chief Complaint   Patient presents with    Fatigue       HPI    64 year old female brought in by family for evaluation of generalized fatigue, weakness, poor appetite, sleeping many hours of the day up to 19 hours, limited activity barely gets out of bed. Familyhas been caring for her.  Patient was recently discharged from the hospital after pneumonia, empyema status post VATS and chest tube, she is on ertapenem via PICC line.  She also has a history of MGUS, COPD, hypertension, diabetes.  She is on numerous medicines for depression and anxiety.          Past Medical History:    Abdominal pain, other specified site    groin    Acute bronchitis    Allergic rhinitis    Anxiety    Arthritis    Back problem    Jamison esophagus    Chronic low back pain without sciatica    COPD (chronic obstructive pulmonary disease) (HCC)    Depression    Diabetes (HCC)    Difficult intubation    difficult to intubate with bladder surgery,instructed by anesthesia to communicate to future anesthesiologist. Small airway    Esophageal reflux    Essential hypertension    Don't remember    Fitting and adjustment of dental prosthetic device    High blood pressure    History of 2019 novel coronavirus disease (COVID-19)    No hospitalization,symptoms; Fever,fatigue ,body aches,headache no loss of taste or smell    Hx of motion sickness    Hyperlipidemia    Mass of spine    Migraines    barometric pressure    Myocardial infarction (HCC)    Osteoarthritis    Other ill-defined conditions(799.89)    Jamison's    Pain in joint, forearm    wrist    Pain in joint, pelvic region and thigh    hip    Pelvic mass    Personal history of urinary (tract) infection    Sleep apnea    I don't date       Past Surgical History:   Procedure Laterality Date    Cabg  12/27/23    Colonoscopy      Colonoscopy N/A 05/18/2022    Procedure: COLONOSCOPY AND ESOPHAGOGASTRODUODENOSCOPY;  Surgeon: iMguel Camargo MD;  Location: [facility]  surgery  2024    double bypass    Laparoscopic  2007    sling operation for stress incontinence    Laparoscopy,diagnostic      Kay biopsy stereo nodule 2 site bilat (cpt=19081/57540) Left 02/2010    BENIGN 2 SITES    Kay biopsy stereo w/calc 2 site left (cpt=19081/83307)  02/2010    benign x 2    Kay stereo-clip w/calc 2 site left  2010    Oophorectomy Left 06/28/2019    Other  08/31/2022    RIGHT SACRAL SUBCUTANEOUS CYST EXCISION    Other surgical history  2000, 2019    Bladder Sling, Large mass removed from left abdomen and ovar    Tonsillectomy      Was a child, have no idea    Tubal ligation      Upper gi endoscopy,exam         No pertinent social history.                 Physical Exam     ED Triage Vitals [06/18/24 1807]   /83   Pulse 99   Resp 20   Temp 98.5 °F (36.9 °C)   Temp src Oral   SpO2 98 %   O2 Device Nasal cannula       Physical Exam  Constitutional:       General: She is not in acute distress.     Comments: Listless   Eyes:      Extraocular Movements: Extraocular movements intact.   Cardiovascular:      Rate and Rhythm: Normal rate.      Pulses: Normal pulses.   Pulmonary:      Effort: Pulmonary effort is normal. No respiratory distress.      Comments: Chest tube insertion sites appear well-healed, sutures in place.  No induration or drainage  Abdominal:      General: Abdomen is flat. There is no distension.      Tenderness: There is no abdominal tenderness.   Musculoskeletal:         General: No swelling or deformity.      Cervical back: Normal range of motion.   Skin:     General: Skin is warm.   Neurological:      General: No focal deficit present.      Mental Status: She is alert.              ED Course     Labs Reviewed   COMP METABOLIC PANEL (14) - Abnormal; Notable for the following components:       Result Value    Glucose 117 (*)     Sodium 135 (*)     Potassium 3.2 (*)     AST 67 (*)     ALT 72 (*)     Alkaline Phosphatase 149 (*)     Total Protein 9.5 (*)     Albumin 2.4 (*)      Globulin  7.1 (*)     A/G Ratio 0.3 (*)     All other components within normal limits   D-DIMER - Abnormal; Notable for the following components:    D-Dimer 4.54 (*)     All other components within normal limits   CBC W/ DIFFERENTIAL - Abnormal; Notable for the following components:    WBC 14.3 (*)     RBC 3.58 (*)     HGB 10.7 (*)     HCT 33.1 (*)     .0 (*)     Neutrophil Absolute Prelim 9.87 (*)     Neutrophil Absolute 9.87 (*)     All other components within normal limits   LACTIC ACID, PLASMA - Normal   CBC WITH DIFFERENTIAL WITH PLATELET    Narrative:     The following orders were created for panel order CBC With Differential With Platelet.  Procedure                               Abnormality         Status                     ---------                               -----------         ------                     CBC W/ DIFFERENTIAL[[id number]]          Abnormal            Final result                 Please view results for these tests on the individual orders.   URINALYSIS WITH CULTURE REFLEX   TSH W REFLEX TO FREE T4   BLOOD CULTURE   BLOOD CULTURE     XR CHEST AP PORTABLE  (CPT=71045)    Result Date: 6/18/2024  CONCLUSION:  Right lower lobe consolidation with small right pleural effusion is noted.  This is improved compared to prior examination.   LOCATION:  [place] [id number]      Dictated by (CST): Gregory Vernon MD on 6/18/2024 at 8:17 PM     Finalized by (CST): Gregory Vernon MD on 6/18/2024 at 8:18 PM            MDM     Vitals:    06/18/24 1807 06/18/24 2045 06/18/24 2200   BP: 120/83 122/78 121/57   Pulse: 99 83 78   Resp: 20 16 16   Temp: 98.5 °F (36.9 °C)     TempSrc: Oral     SpO2: 98% 100% 100%   Weight: 54.4 kg         Generalized fatigue, weakness, failure to thrive.  Possible deconditioning, electro abnormalities, dehydration, recurrence of infection, VTE.  Patient is nontoxic-appearing but appears listless in bed, she has such little energy she can barely speak    ED Course as of 06/18/24  2229  ------------------------------------------------------------  Time: 06/18 2038  Comment: My interpretation of chest x-ray with right lower lobe consolidation.  Radiology is noting improvement compared to prior  ------------------------------------------------------------  Time: 06/18 2048  Comment: EKG interpretation by me: EKG sinus rhythm at a rate of 93, axis nomal, no concerning acute ischemic ST changes  ------------------------------------------------------------  Time: 06/18 2229  Comment: Labs are notable for hypokalemia, mild dehydration, repleted, fluids given.  Noted leukocytosis and thrombocytosis, patient was evaluated by hematology during recent admission and noted to have MGUS.  Cultures were sent from triage, patient is afebrile here and has been on ertapenem.  Her lactate is normal.  D-dimer is elevated, will proceed with CT angiogram chest.  She is not functioning well at home and family having difficulty caring for her, will admit for further care.         Disposition and Plan     Clinical Impression:  1. FTT (failure to thrive) in adult    2. Dehydration    3. Hypokalemia    4. Generalized weakness    5. Thrombocytosis        Disposition:  Admit    Follow-up:  No follow-up provider specified.    Medications Prescribed:  Current Discharge Medication List          Hospital Problems       Present on Admission  Date Reviewed: 5/20/2024            ICD-10-CM Noted POA    * (Principal) FTT (failure to thrive) in adult R62.7 6/18/2024 Unknown

## 2024-06-19 NOTE — CONSULTS
[facility]   part of [facility] ID CONSULT NOTE    Soumya King Patient Status:  Observation    6/3/1960 MRN [id number]   Location [facility] 3SW-A Attending Bill Brian, *   Hosp Day # 0 PCP Rika Vinson DO       Reason for Consultation:  RLL cavitary lesion    History of Present Illness:  Soumya King is a 64 year old female with a history of COPD, HTN and HLD. Patient recently was hospitalized from - 6/10 with a RLL pneumonia/pulmonary abscess with right empyema, s/p  R VATS and total pulmonary decortication . She was discharged on IV Invanz with plans for EOT 24.    Patient now presents with fatigue, generalized weakness and poor appetite/po intake. Denies any SOB, cough, pain, fevers or chills. Denies any abdominal pain, nausea or vomiting. Has had some diarrhea. Denies any dysuria.     Afebrile here. WBC 14.3K on admission--> now 12.2K.    History:  Past Medical History:    Abdominal pain, other specified site    groin    Acute bronchitis    Allergic rhinitis    Anxiety    Arthritis    Back problem    Jamison esophagus    Chronic low back pain without sciatica    COPD (chronic obstructive pulmonary disease) (HCC)    Depression    Diabetes (HCC)    Difficult intubation    difficult to intubate with bladder surgery,instructed by anesthesia to communicate to future anesthesiologist. Small airway    Esophageal reflux    Essential hypertension    Don't remember    Fitting and adjustment of dental prosthetic device    High blood pressure    History of 2019 novel coronavirus disease (COVID-19)    No hospitalization,symptoms; Fever,fatigue ,body aches,headache no loss of taste or smell    Hx of motion sickness    Hyperlipidemia    Mass of spine    Migraines    barometric pressure    Myocardial infarction (HCC)    Osteoarthritis    Other ill-defined conditions(359.89)    Jamison's    Pain in joint, forearm    wrist    Pain in joint, pelvic region and thigh     hip    Pelvic mass    Personal history of urinary (tract) infection    Sleep apnea    I don't date     Past Surgical History:   Procedure Laterality Date    Cabg  12/27/23    Colonoscopy      Colonoscopy N/A 05/18/2022    Procedure: COLONOSCOPY AND ESOPHAGOGASTRODUODENOSCOPY;  Surgeon: Miguel Camargo MD;  Location:  ENDOSCOPY    Heart surgery  2024    double bypass    Laparoscopic  2007    sling operation for stress incontinence    Laparoscopy,diagnostic      Kay biopsy stereo nodule 2 site bilat (cpt=19081/91116) Left 02/2010    BENIGN 2 SITES    Kay biopsy stereo w/calc 2 site left (cpt=19081/64432)  02/2010    benign x 2    Kay stereo-clip w/calc 2 site left  2010    Oophorectomy Left 06/28/2019    Other  08/31/2022    RIGHT SACRAL SUBCUTANEOUS CYST EXCISION    Other surgical history  2000, 2019    Bladder Sling, Large mass removed from left abdomen and ovar    Tonsillectomy      Was a child, have no idea    Tubal ligation      Upper gi endoscopy,exam       Family History   Problem Relation Age of Onset    Arthritis Mother     Other (AMI) Mother     Other (diabetes mellitus) Mother     Diabetes Mother       reports that she quit smoking about 17 months ago. Her smoking use included cigarettes. She started smoking about 49 years ago. She has a 72 pack-year smoking history. She has never used smokeless tobacco. She reports that she does not currently use alcohol. She reports that she does not use drugs.    Allergies:  Allergies   Allergen Reactions    Naprosyn [Naproxen] ANAPHYLAXIS     Has tolerated ibuprofen and IV toradol     Aspirin OTHER (SEE COMMENTS)     Difficulty swallowing due to  saavedra's esophagus       Medications:    Current Facility-Administered Medications:     potassium chloride (Klor-Con M20) tab 40 mEq, 40 mEq, Oral, Q4H    QUEtiapine ER (SEROquel XR) 24 hr tab 200 mg, 200 mg, Oral, QPM    [Held by provider] Mirabegron ER (Myrbetriq) 50 MG tablet TB24 50 mg, 50 mg, Oral, Daily     cyclobenzaprine (Flexeril) tab 10 mg, 10 mg, Oral, Q12H PRN    metoclopramide (Reglan) solution 5 mg, 5 mg, Oral, TID AC PRN    clonazePAM (KlonoPIN) tab 0.5 mg, 0.5 mg, Oral, BID    furosemide (Lasix) tab 20 mg, 20 mg, Oral, Daily    atorvastatin (Lipitor) tab 80 mg, 80 mg, Oral, Nightly    clopidogrel (Plavix) tab 75 mg, 75 mg, Oral, Daily    aspirin DR tab 81 mg, 81 mg, Oral, Daily    umeclidinium bromide (Incruse Ellipta) 62.5 MCG/ACT inhaler 1 puff, 1 puff, Inhalation, Daily    gabapentin (Neurontin) tab 600 mg, 600 mg, Oral, Nightly    topiramate (TopaMAX) tab 50 mg, 50 mg, Oral, BID    metoprolol succinate ER (Toprol XL) 24 hr tab 50 mg, 50 mg, Oral, Daily Beta Blocker    benzonatate (Tessalon) cap 100 mg, 100 mg, Oral, TID PRN    azelastine (Astelin) 0.1 % nasal solution 1 spray, 1 spray, Each Nare, BID    desvenlafaxine ER (Pristiq) 24 hr tab 100 mg, 100 mg, Oral, Daily    oxyCODONE immediate release tab 5 mg, 5 mg, Oral, Q6H PRN    pantoprazole (Protonix) DR tab 40 mg, 40 mg, Oral, QAM AC    meropenem (Merrem) 500 mg in sodium chloride 0.9% 100 mL IVPB-MBP, 500 mg, Intravenous, Q8H    busPIRone (Buspar) tab 10 mg, 10 mg, Oral, BID    glucose (Dex4) 15 GM/59ML oral liquid 15 g, 15 g, Oral, Q15 Min PRN **OR** glucose (Glutose) 40% oral gel 15 g, 15 g, Oral, Q15 Min PRN **OR** glucose-vitamin C (Dex-4) chewable tab 4 tablet, 4 tablet, Oral, Q15 Min PRN **OR** dextrose 50% injection 50 mL, 50 mL, Intravenous, Q15 Min PRN **OR** glucose (Dex4) 15 GM/59ML oral liquid 30 g, 30 g, Oral, Q15 Min PRN **OR** glucose (Glutose) 40% oral gel 30 g, 30 g, Oral, Q15 Min PRN **OR** glucose-vitamin C (Dex-4) chewable tab 8 tablet, 8 tablet, Oral, Q15 Min PRN    enoxaparin (Lovenox) 40 MG/0.4ML SUBQ injection 40 mg, 40 mg, Subcutaneous, Daily    acetaminophen (Tylenol Extra Strength) tab 500 mg, 500 mg, Oral, Q4H PRN    melatonin tab 3 mg, 3 mg, Oral, Nightly PRN    ondansetron (Zofran) 4 MG/2ML injection 4 mg, 4 mg,  Intravenous, Q6H PRN    insulin aspart (NovoLOG) 100 Units/mL FlexPen 1-10 Units, 1-10 Units, Subcutaneous, TID AC and HS    Review of Systems:  CONSTITUTIONAL:  + weakness, + fatigue.  HEENT:  Eyes:  No visual loss Ears, Nose, Throat:  No hearing loss  SKIN:  No rash or itching.  CARDIOVASCULAR:  No chest pain  RESPIRATORY:  No shortness of breath  GASTROINTESTINAL:  No nausea, vomiting, +diarrhea. No abdominal pain  GENITOURINARY:  No Burning on urination.   NEUROLOGICAL:  No headache  MUSCULOSKELETAL:  No back pain  HEMATOLOGIC:  No bleeding  ALLERGIES:  No history of asthma    Physical Exam:  Vital signs: Blood pressure 125/69, pulse 85, temperature 98.5 °F (36.9 °C), temperature source Oral, resp. rate 18, weight 120 lb (54.4 kg), SpO2 96%, not currently breastfeeding.    General: Alert, oriented, NAD, on room air  HEENT: Moist mucous membranes.   Neck: No lymphadenopathy.  Supple.  Cardiovascular: RRR  Respiratory: Clear to auscultation bilaterally.  No wheezes. No rhonchi.  Abdomen: Soft, nontender, nondistended.   Musculoskeletal: No edema noted  Integument: No lesions. No erythema.    Laboratory Data:  Recent Labs   Lab 06/19/24  0551   RBC 3.21*   HGB 9.5*   HCT 29.5*   MCV 91.9   MCH 29.6   MCHC 32.2   RDW 15.4   NEPRELIM 8.69*   WBC 12.2*   .0*     Recent Labs   Lab 06/17/24  1415 06/18/24  1827 06/19/24  0551   GLU 79 117* 112*   BUN 11 16 14   CREATSERUM 0.90 0.70 0.54*   CA 8.9 8.8 8.8   ALB  --  2.4*  --     135* 139   K 3.4* 3.2* 3.7    101 109   CO2 24.0 24.0 24.0   ALKPHO  --  149*  --    AST  --  67*  --    ALT  --  72*  --    BILT  --  0.2  --    TP  --  9.5*  --        Microbiology: Reviewed in EMR    Radiology: Reviewed.    PROCEDURE:  CT CHEST PE AORTA (IV ONLY) (CPT=71260)     COMPARISON:  [facility] , CT, CT CHEST (CPT=71250), 6/03/2024, 11:14 AM.     INDICATIONS:  RECENT EMPYEMA FATIGUE HYPOXIA ELEV DIMER     TECHNIQUE:  CT images were obtained with non-ionic intravenous  contrast material. Dose reduction techniques were used. Dose information is transmitted to the ACR (American College of Radiology) NRDR (National Radiology Data Registry) which includes the  Dose Index Registry.     PATIENT STATED HISTORY:(As transcribed by Technologist)  SOB, elevated d dimer, hypoxia      CONTRAST USED:  80 mLcc of Isovue 370     FINDINGS:    LUNGS:  Right lower lobe cavitary lesion measures 4.1 x 3.1 cm.  Marked consolidations in the right lung have improved since prior examination.  Peripheral interstitial abnormalities are noted.  VASCULATURE:  No visible pulmonary arterial thrombus or attenuation.    ANKUSH:  Right perihilar lymph node measures 2.3 x 1.4 cm.  MEDIASTINUM:  Paratracheal lymph node measures 1.1 x 1.3 cm.  CARDIAC:  No enlargement, pericardial thickening, or significant coronary artery calcification.  PLEURA:  Small right pleural effusion is noted.  THORACIC AORTA:  No aneurysm.    CHEST WALL:  No mass or axillary adenopathy.    LIMITED ABDOMEN:  Limited images of the upper abdomen are unremarkable.    BONES:  Mid sternotomy wires are noted.     Impression:    CONCLUSION:       1. Marked atelectasis the right lung along with consolidations have resolved.  Large fluid collection has mostly resolved.  Only minimal residual right pleural effusion is noted.  Cavitary lesion in the right lower lobe is identified.  This cavitary  lesion may be sequelae of prior infectious process.  A pneumatocele is of consideration.     2. There is mild mediastinal and right perihilar lymphadenopathy again noted.       LOCATION:  [facility]     Dictated by (CST): Gregory Vernon MD on 6/18/2024 at 10:50 PM      Finalized by (CST): Gregory Vernon MD on 6/18/2024 at 10:54 PM        PROCEDURE:  XR CHEST AP PORTABLE  (CPT=71045)     TECHNIQUE:  AP chest radiograph was obtained.     COMPARISON:  [facility] , XR, XR CHEST AP PORTABLE  (CPT=71045), 6/09/2024, 12:24 PM.     INDICATIONS:  recent dx from hospital,  poor appetite, sleeping a lot, on 2L NC at home     PATIENT STATED HISTORY: (As transcribed by Technologist)           FINDINGS:       Right-sided PICC line has tip in the SVC.     Median sternotomy are stable.     Small right lower lobe consolidation pleural effusion.     No pneumothorax.    Impression:    CONCLUSION:  Right lower lobe consolidation with small right pleural effusion is noted.  This is improved compared to prior examination.     LOCATION:  [place] [id number]     Dictated by (CST): Gregory Vernon MD on 6/18/2024 at 8:17 PM      Finalized by (CST): Gregory Vernon MD on 6/18/2024 at 8:18 PM    ASSESSMENT:  Fatigue with poor po intake/appetite  RLL pneumonia/pulmonary abscess with right empyema  S/p thoracentesis 5/28, cx with strep intermedius. Path w/ acute inflammation with occasional bacterial organisms present.   S/p R chest tube placement 5/29, cx with strep intermedius.   S/p R VATS and total pulmonary decortication 6/5, R purulent empyema with RLL lung abscess noted. Tissue cx from RLL lung abscess with lactobacillus. Tissue cx from pleural peel with lactobacillus. Tissue cx from right pleural debris with cutibacterium acnes and avidum.   Has been on IV Invanz outpatient (plan was for EOT 7/4/24)  CT chest this admission much better, cavitary lesion to RLL noted.  Leukocytosis, improving. No fevers.   Thrombocytosis, improving.  Elevated LFTs. CT a/p and US abd unremarkable last admission.  Anemia  IgM kappa MGUS  DM II. Hgb A1C 7.1 (5/2024)  COPD    PLAN:  - will dc IV meropenem and start IV Unasyn for continuation of treatment of RLL abscess/R empyema (EOT of abx 7/4/2024)  - follow blood cultures  - follow temps and cbc  - follow LFTs  - noted plans for LE venous dopplers  - would send c. Diff if further diarrhea (none today per staff).    Discussed case with RN, Dr. Menezes and patient.     Thank you for allowing us to participate in the care of this patient. Please do not hesitate to call if you  have any questions.   We will continue to follow with you and will make further recommendations based on her progress.    HIEN Licona   [facility]  [phone]  6/19/2024    ID ATTENDING ADDENDUM     Pt seen an examined independently. Chart reviewed. Agree with above. Note has been reviewed by me and modified as needed.  Exam and Impression/ Recs as noted above.  Readmitted with fatigue. No focal symptoms, resp symptoms much better, assume cavitary lesion is just residual. Await Bcx. ? Whether fatigue and lethargy are side effects from invanz. On unasyn now  Will follow  D/w staff and with pt  More than 50% of clinical time and 100% of the clinical decision making performed by me.    Vibha Menezes MD

## 2024-06-19 NOTE — PROGRESS NOTES
[facility]   part of [facility]     Hospitalist Progress Note     Soumyaaries King Patient Status:  Observation    6/3/1960 MRN [id number]   Location [facility] 3SW-A Attending Bill Brian, *   Hosp Day # 0 PCP Rika Vinson,      Chief Complaint: Lethargy    Subjective:      - Worsening lethargy since discharge, denies associated f/c, cp, sob, abd pain, dysuria    - Has been receiving IV abx appropriately at home     Objective:    Review of Systems:   A comprehensive review of systems was completed; pertinent positive and negatives stated in subjective.    Vital signs:  Temp:  [98.3 °F (36.8 °C)-98.5 °F (36.9 °C)] 98.3 °F (36.8 °C)  Pulse:  [70-99] 75  Resp:  [16-20] 18  BP: (111-143)/(57-83) 111/59  SpO2:  [94 %-100 %] 96 %    Physical Exam:    General: No acute distress  Respiratory: no wheezes, no rhonchi  Cardiovascular: S1, S2, regular rate and rhythm  Abdomen: Soft, Non-tender, non-distended, positive bowel sounds  Neuro: No new focal deficits.   Extremities: no edema    Diagnostic Data:    Labs:  Recent Labs   Lab 24  1415 24  0551   WBC 15.8* 14.3* 12.2*   HGB 10.1* 10.7* 9.5*   MCV 94.7 92.5 91.9   .0* 756.0* 637.0*       Recent Labs   Lab 24  0551   GLU 79 117* 112*   BUN 11 16 14   CREATSERUM 0.90 0.70 0.54*   CA 8.9 8.8 8.8   ALB  --  2.4*  --     135* 139   K 3.4* 3.2* 3.7    101 109   CO2 24.0 24.0 24.0   ALKPHO  --  149*  --    AST  --  67*  --    ALT  --  72*  --    BILT  --  0.2  --    TP  --  9.5*  --        Estimated Creatinine Clearance: 81.5 mL/min (A) (based on SCr of 0.54 mg/dL (L)).    No results for input(s): \"TROP\", \"TROPHS\", \"CK\" in the last 168 hours.    No results for input(s): \"PTP\", \"INR\" in the last 168 hours.    Lab Results   Component Value Date    TSH 1.900 2024             Microbiology    No results found for this visit on 24.      Imaging: Reviewed in  Epic.    Medications:    potassium chloride  40 mEq Oral Q4H    QUEtiapine ER  200 mg Oral QPM    [Held by provider] Mirabegron ER  50 mg Oral Daily    clonazePAM  0.5 mg Oral BID    furosemide  20 mg Oral Daily    atorvastatin  80 mg Oral Nightly    clopidogrel  75 mg Oral Daily    aspirin  81 mg Oral Daily    umeclidinium bromide  1 puff Inhalation Daily    gabapentin  600 mg Oral Nightly    topiramate  50 mg Oral BID    metoprolol succinate ER  50 mg Oral Daily Beta Blocker    azelastine  1 spray Each Nare BID    desvenlafaxine ER  100 mg Oral Daily    pantoprazole  40 mg Oral QAM AC    meropenem  500 mg Intravenous Q8H    busPIRone  10 mg Oral BID    enoxaparin  40 mg Subcutaneous Daily    insulin aspart  1-10 Units Subcutaneous TID AC and HS       Assessment & Plan:      # Failure to thrive  # Right lower lobe cavitary lesion   - IV Abx continued, f/u Bcx   - Previously had PNA with empyema s/p R VATS and pulmonary decortication   - Pulm and ID consulted   - PT/OT eval    - TSH WNL, AM cortisol ordered   - iron studies with AoCD and B12/folate replete on 5/28/24   - Nutrition consulted    # DM2 - LDSSI + Accucheck QID (or Q6H if NPO)  # Hypertension - Continue home oral antihypertensives  # Hyperlipidemia - continue home statin   # GERD - continue PPI  # CAD s/p CABG - ASA, plavix, statin, BB  # Elevated D-dimer - CTA negative for PE, LE dopplers pending     Bill Brian MD    Supplementary Documentation:     Quality:  DVT Mechanical Prophylaxis:   SCDs,    DVT Pharmacologic Prophylaxis   Medication    enoxaparin (Lovenox) 40 MG/0.4ML SUBQ injection 40 mg         DVT Pharmacologic prophylaxis: Aspirin 81 mg      Code Status: Full Code  Gotti: No urinary catheter in place  Gotti Duration (in days):   Central line:    ILYA:     The 21st Century Cures Act makes medical notes like these available to patients in the interest of transparency. Please be advised this is a medical document. Medical documents  are intended to carry relevant information, facts as evident, and the clinical opinion of the practitioner. The medical note is intended as peer to peer communication and may appear blunt or direct. It is written in medical language and may contain abbreviations or verbiage that are unfamiliar.

## 2024-06-19 NOTE — H&P
[facility]                                                               [facility] Physical         Soumya King Patient Status:  Observation    6/3/1960 MRN [id number]   Location [facility] 3SW-A Attending Malorie Chavez MD   Hosp Day # 0 PCP Rika Vinson DO     Chief Complaint: Fatigue, low appetite    History of Present Illness:  Soumya King is a 64 year old female admitted with fatigue and generalized weakness and poor appetite.  Patient seen with patient's sister at bedside who is helping with history.  Patient was recently admitted in the hospital for empyema and had right video-assisted thoracoscopy and decortication at that time for lung abscess and trapped lung and discharged on 6/10/2024 on IV antibiotics through PICC line.  Patient's sister states she has been helping patient with her antibiotics.  Patient states she was initially feeling better and then progressively feeling worse.  Has generalized weakness and fatigue.  Has decreased appetite.  Denies any nausea vomiting.  Denies any constipation or diarrhea.  Sleeping most of the day and barely getting out of bed according to patient.      History:  Past Medical History:    Abdominal pain, other specified site    groin    Acute bronchitis    Allergic rhinitis    Anxiety    Arthritis    Back problem    Jamison esophagus    Chronic low back pain without sciatica    COPD (chronic obstructive pulmonary disease) (HCC)    Depression    Diabetes (HCC)    Difficult intubation    difficult to intubate with bladder surgery,instructed by anesthesia to communicate to future anesthesiologist. Small airway    Esophageal reflux    Essential hypertension    Don't remember    Fitting and adjustment of dental prosthetic device    High blood pressure    History of 2019 novel coronavirus disease (COVID-19)    No hospitalization,symptoms; Fever,fatigue ,body aches,headache no loss of taste or smell    Hx of motion sickness    Hyperlipidemia     Mass of spine    Migraines    barometric pressure    Myocardial infarction (HCC)    Osteoarthritis    Other ill-defined conditions(799.89)    Saavedra's    Pain in joint, forearm    wrist    Pain in joint, pelvic region and thigh    hip    Pelvic mass    Personal history of urinary (tract) infection    Sleep apnea    I don't date       Past Surgical History:   Procedure Laterality Date    Cabg  12/27/23    Colonoscopy      Colonoscopy N/A 05/18/2022    Procedure: COLONOSCOPY AND ESOPHAGOGASTRODUODENOSCOPY;  Surgeon: Miguel Camargo MD;  Location:  ENDOSCOPY    Heart surgery  2024    double bypass    Laparoscopic  2007    sling operation for stress incontinence    Laparoscopy,diagnostic      Kay biopsy stereo nodule 2 site bilat (cpt=19081/88969) Left 02/2010    BENIGN 2 SITES    Kay biopsy stereo w/calc 2 site left (cpt=19081/01665)  02/2010    benign x 2    Kay stereo-clip w/calc 2 site left  2010    Oophorectomy Left 06/28/2019    Other  08/31/2022    RIGHT SACRAL SUBCUTANEOUS CYST EXCISION    Other surgical history  2000, 2019    Bladder Sling, Large mass removed from left abdomen and ovar    Tonsillectomy      Was a child, have no idea    Tubal ligation      Upper gi endoscopy,exam         Family history:  Family History   Problem Relation Age of Onset    Arthritis Mother     Other (AMI) Mother     Other (diabetes mellitus) Mother     Diabetes Mother       Reviewed    Social history:   reports that she quit smoking about 17 months ago. Her smoking use included cigarettes. She started smoking about 49 years ago. She has a 72 pack-year smoking history. She has never used smokeless tobacco. She reports that she does not currently use alcohol. She reports that she does not use drugs.    Allergies:  Allergies   Allergen Reactions    Naprosyn [Naproxen] ANAPHYLAXIS     Has tolerated ibuprofen and IV toradol     Aspirin OTHER (SEE COMMENTS)     Difficulty swallowing due to  saavedra's esophagus       Home  Medications:  Medications Prior to Admission   Medication Sig Dispense Refill Last Dose    ertapenem 1 g Injection Recon Soln Inject 1 g into the vein daily for 24 days. Will need weekly labs with CBC and BMP while on this medication. 24 each 0     [] valACYclovir 1 G Oral Tab Take 1 tablet (1,000 mg total) by mouth every 12 (twelve) hours for 5 days. 10 tablet 0     oxyCODONE 5 MG Oral Tab Take 1 tablet (5 mg total) by mouth every 6 (six) hours as needed for Pain. 20 tablet 0     Naloxone HCl 4 MG/0.1ML Nasal Liquid 4 mg by Nasal route as needed. If patient remains unresponsive, repeat dose in other nostril 2-5 minutes after first dose. 1 kit 0     azelastine 137 MCG/SPRAY Nasal Solution 1 spray by Nasal route 2 (two) times daily.       PRISTIQ 100 MG Oral Tablet 24 Hr Take 1 tablet (100 mg total) by mouth daily.       benzonatate (TESSALON PERLES) 100 MG Oral Cap Take 1 capsule (100 mg total) by mouth 3 (three) times daily as needed for cough. 15 capsule 0     Rizatriptan Benzoate 10 MG Oral Tab Take 1 tablet (10 mg total) by mouth as needed for Migraine. 10 tablet 5     Glucose Blood (ONETOUCH ULTRA) In Vitro Strip Tests 3 x daily 300 each 1     TRULICITY 0.75 MG/0.5ML Subcutaneous Solution Pen-injector Inject 0.75 mg into the skin once a week. 2 mL 0     Coenzyme Q10 200 MG Oral Cap Co Q-10 200 mg capsule, [RxNorm: 571425]       lidocaine 5 % External Ointment Apply 1 Application  topically 3 (three) times daily as needed. 240 g 1     diclofenac 75 MG Oral Tab EC Take 1 tablet (75 mg total) by mouth 2 (two) times daily. 180 tablet 3     metoprolol succinate ER 50 MG Oral Tablet 24 Hr Take 1 tablet (50 mg total) by mouth daily. 90 tablet 0     Omeprazole 40 MG Oral Capsule Delayed Release Take 1 capsule (40 mg total) by mouth 2 (two) times daily. 300 capsule 0     Blood Glucose Monitoring Suppl (ONETOUCH ULTRA 2) w/Device Does not apply Kit 1 Device by Other route in the morning, at noon, and at bedtime.  Use as directed. 1 kit 0     topiramate 50 MG Oral Tab TAKE ONE TABLET BY MOUTH EVERY MORNING AND TWO TABLETS EVERY EVENING (Patient taking differently: Taking one tablet in the morning and one tablet at night.) 270 tablet 2     gabapentin 600 MG Oral Tab Take 1 tablet (600 mg total) by mouth at bedtime. 180 tablet 0     Erenumab-aooe (AIMOVIG) 140 MG/ML Subcutaneous Solution Auto-injector Inject 1 mL (140 mg total) into the skin every 30 (thirty) days. 1 mL 4     umeclidinium bromide (INCRUSE ELLIPTA) 62.5 MCG/ACT Inhalation Aerosol Powder, Breath Activated Inhale 1 puff into the lungs daily. 3 each 0     clopidogrel 75 MG Oral Tab Take 1 tablet (75 mg total) by mouth daily.       aspirin 81 MG Oral Tab EC Take 1 tablet (81 mg total) by mouth daily.       atorvastatin 80 MG Oral Tab Take 1 tablet (80 mg total) by mouth nightly. 90 tablet 1     metFORMIN 500 MG Oral Tab Take 1 tablet (500 mg total) by mouth 2 (two) times daily. 180 tablet 1     furosemide 20 MG Oral Tab Take 1 tablet (20 mg total) by mouth daily. 7 tablet 0     clonazePAM 0.5 MG Oral Tab Take 1 tablet (0.5 mg total) by mouth 2 (two) times daily. 12 tablet 0     busPIRone 10 MG Oral Tab Take 2 tablets (20 mg total) by mouth daily.       Metoclopramide HCl 5 MG/5ML Oral Solution Take 5 mL (5 mg total) by mouth 3 (three) times daily before meals. (Patient taking differently: Take 5 mL (5 mg total) by mouth 3 (three) times daily before meals as needed.) 473 mL 0     cyclobenzaprine 10 MG Oral Tab Take 1 tablet (10 mg total) by mouth every 12 (twelve) hours as needed for Muscle spasms. 60 tablet 1     Lancets (ONETOUCH DELICA PLUS [id number]) Does not apply Misc 1 lancet  by Finger stick route 3 (three) times daily. 300 each 1     Mirabegron ER (MYRBETRIQ) 50 MG Oral Tablet 24 Hr Take 1 tablet (50 mg total) by mouth daily. 30 tablet 11     Multiple Vitamins-Minerals (ONE-A-DAY WOMENS 50+) Oral Tab Take 1 tablet by mouth daily.  0     QUEtiapine Fumarate   MG Oral Tablet 24 Hr Take 1 tablet (200 mg total) by mouth every evening. 30 tablet 0     Calcium Carb-Cholecalciferol 500-200 MG-UNIT Oral Tab Take 1 tablet by mouth daily.            Review of Systems:  A comprehensive 14 point review of systems was completed.  Pertinent positives and negatives noted in the the HPI.    Physical Exam:     Vital signs: Blood pressure 143/70, pulse 70, temperature 98.5 °F (36.9 °C), temperature source Oral, resp. rate 16, weight 120 lb (54.4 kg), SpO2 100%, not currently breastfeeding.  General: No acute distress.   HEENT: Moist mucous membranes.   Respiratory: Clear to auscultation bilaterally.  No wheezes. No rhonchi.  Cardiovascular: S1, S2.  Regular rate and rhythm.  Abdomen: Soft, nontender, nondistended.  Positive bowel sounds.  Neurologic: Awake alert, generalized fatigue present, no focal neurological deficits.  Musculoskeletal: Full range of motion of all extremities.  Pedal edema or calf tenderness  Psychiatric: Appropriate mood and affect.      Diagnostic Data:      Laboratory Data:   Lab Results   Component Value Date    WBC 14.3 06/18/2024    HGB 10.7 06/18/2024    HCT 33.1 06/18/2024    .0 06/18/2024    CREATSERUM 0.70 06/18/2024    BUN 16 06/18/2024     06/18/2024    K 3.2 06/18/2024     06/18/2024    CO2 24.0 06/18/2024     06/18/2024    CA 8.8 06/18/2024    ALB 2.4 06/18/2024    ALKPHO 149 06/18/2024    BILT 0.2 06/18/2024    TP 9.5 06/18/2024    AST 67 06/18/2024    ALT 72 06/18/2024    TSH 1.900 06/18/2024    DDIMER 4.54 06/18/2024         Imaging:  Imaging data reviewed in Epic.  Chest x-ray shows right lower lobe consolidation with small right pleural effusion which is improved compared to prior examination.  CT of the chest done on 6/18/2024 shows marked atelectasis right lung along with consolidation which has resolved.  Large fluid collection has mostly resolved.  Only minimal residual right pleural effusion.  Cavitary lesion  in right lower lobe which could be sequelae of prior infectious process, rule out pneumatocele.  Mild mediastinal and right perihilar lymphadenopathy noted.    ASSESSMENT / PLAN:     #Generalized weakness, fatigue  #Decreased appetite  #Failure to thrive  Nutritionist consult  #Hypokalemia  Replace with electrolyte protocol  #Reactive thrombocytosis  Follow CBC in a.m.  #Leukocytosis due to recent empyema  #Continue IV antibiotics through PICC line   ID consult in a.m.  Follow CBC in a.m.  #Mild hyponatremia due to dehydration  IV fluids  Follow BMP in a.m.  #Elevated liver function tests  IV fluids  Follow CMP in a.m.  #Diabetes mellitus type 2  Hyperglycemia protocol  Follow Accu-Cheks  #Hypertension  Continue home metoprolol  Follow blood pressure  #Depression and anxiety  Continue home medication  #GERD  Continue home PPI  #Hyperlipidemia  Statin  #CAD with previous CABG  Monitor on telemetry  Continue home aspirin, Plavix, statin, metoprolol  #Elevated D-dimer  CTA chest negative for PE  Will check lower extremity venous duplex rule out DVT    Quality:  DVT Prophylaxis: DVT Mechanical Prophylaxis:   SCDs,    DVT Pharmacologic Prophylaxis   Medication    enoxaparin (Lovenox) 40 MG/0.4ML SUBQ injection 40 mg              CODE status:   Code Status: Full Code  Gotti: No urinary catheter in place      Plan of care discussed with patient, patient's sister at bedside      Discussed with ER Physician.  My colleague will follow from morning tomorrow      Malorie Chavez MD  6/18/2024  11:36 PM

## 2024-06-20 LAB
ALBUMIN SERPL-MCNC: 2.2 G/DL (ref 3.4–5)
ALBUMIN/GLOB SERPL: 0.4 {RATIO} (ref 1–2)
ALP LIVER SERPL-CCNC: 128 U/L
ALT SERPL-CCNC: 93 U/L
ANION GAP SERPL CALC-SCNC: 6 MMOL/L (ref 0–18)
AST SERPL-CCNC: 102 U/L (ref 15–37)
BASOPHILS # BLD AUTO: 0.06 X10(3) UL (ref 0–0.2)
BASOPHILS NFR BLD AUTO: 0.6 %
BILIRUB SERPL-MCNC: 0.3 MG/DL (ref 0.1–2)
BILIRUB UR QL STRIP.AUTO: NEGATIVE
BUN BLD-MCNC: 9 MG/DL (ref 9–23)
CALCIUM BLD-MCNC: 8.8 MG/DL (ref 8.5–10.1)
CHLORIDE SERPL-SCNC: 108 MMOL/L (ref 98–112)
CLARITY UR REFRACT.AUTO: CLEAR
CO2 SERPL-SCNC: 25 MMOL/L (ref 21–32)
CORTIS SERPL-MCNC: 22.7 UG/DL
CREAT BLD-MCNC: 0.61 MG/DL
EGFRCR SERPLBLD CKD-EPI 2021: 101 ML/MIN/1.73M2 (ref 60–?)
EOSINOPHIL # BLD AUTO: 0.18 X10(3) UL (ref 0–0.7)
EOSINOPHIL NFR BLD AUTO: 1.8 %
ERYTHROCYTE [DISTWIDTH] IN BLOOD BY AUTOMATED COUNT: 15.6 %
GLOBULIN PLAS-MCNC: 6.1 G/DL (ref 2.8–4.4)
GLUCOSE BLD-MCNC: 119 MG/DL (ref 70–99)
GLUCOSE BLD-MCNC: 125 MG/DL (ref 70–99)
GLUCOSE BLD-MCNC: 127 MG/DL (ref 70–99)
GLUCOSE BLD-MCNC: 132 MG/DL (ref 70–99)
GLUCOSE BLD-MCNC: 161 MG/DL (ref 70–99)
GLUCOSE UR STRIP.AUTO-MCNC: NORMAL MG/DL
HCT VFR BLD AUTO: 30.1 %
HGB BLD-MCNC: 9.9 G/DL
IMM GRANULOCYTES # BLD AUTO: 0.05 X10(3) UL (ref 0–1)
IMM GRANULOCYTES NFR BLD: 0.5 %
KETONES UR STRIP.AUTO-MCNC: NEGATIVE MG/DL
LEUKOCYTE ESTERASE UR QL STRIP.AUTO: 500
LYMPHOCYTES # BLD AUTO: 2.82 X10(3) UL (ref 1–4)
LYMPHOCYTES NFR BLD AUTO: 27.7 %
MAGNESIUM SERPL-MCNC: 1.5 MG/DL (ref 1.6–2.6)
MCH RBC QN AUTO: 30.6 PG (ref 26–34)
MCHC RBC AUTO-ENTMCNC: 32.9 G/DL (ref 31–37)
MCV RBC AUTO: 92.9 FL
MONOCYTES # BLD AUTO: 0.95 X10(3) UL (ref 0.1–1)
MONOCYTES NFR BLD AUTO: 9.3 %
NEUTROPHILS # BLD AUTO: 6.11 X10 (3) UL (ref 1.5–7.7)
NEUTROPHILS # BLD AUTO: 6.11 X10(3) UL (ref 1.5–7.7)
NEUTROPHILS NFR BLD AUTO: 60.1 %
NITRITE UR QL STRIP.AUTO: NEGATIVE
OSMOLALITY SERPL CALC.SUM OF ELEC: 288 MOSM/KG (ref 275–295)
PH UR STRIP.AUTO: 7.5 [PH] (ref 5–8)
PHOSPHATE SERPL-MCNC: 3.2 MG/DL (ref 2.5–4.9)
PLATELET # BLD AUTO: 573 10(3)UL (ref 150–450)
POTASSIUM SERPL-SCNC: 3.5 MMOL/L (ref 3.5–5.1)
POTASSIUM SERPL-SCNC: 3.5 MMOL/L (ref 3.5–5.1)
PROCALCITONIN SERPL-MCNC: 0.05 NG/ML (ref ?–0.16)
PROT SERPL-MCNC: 8.3 G/DL (ref 6.4–8.2)
PROT UR STRIP.AUTO-MCNC: NEGATIVE MG/DL
RBC # BLD AUTO: 3.24 X10(6)UL
RBC UR QL AUTO: NEGATIVE
SODIUM SERPL-SCNC: 139 MMOL/L (ref 136–145)
SP GR UR STRIP.AUTO: 1.01 (ref 1–1.03)
UROBILINOGEN UR STRIP.AUTO-MCNC: NORMAL MG/DL
WBC # BLD AUTO: 10.2 X10(3) UL (ref 4–11)

## 2024-06-20 PROCEDURE — 99232 SBSQ HOSP IP/OBS MODERATE 35: CPT | Performed by: STUDENT IN AN ORGANIZED HEALTH CARE EDUCATION/TRAINING PROGRAM

## 2024-06-20 PROCEDURE — 99233 SBSQ HOSP IP/OBS HIGH 50: CPT | Performed by: INTERNAL MEDICINE

## 2024-06-20 RX ORDER — POTASSIUM CHLORIDE 20 MEQ/1
40 TABLET, EXTENDED RELEASE ORAL EVERY 4 HOURS
Status: COMPLETED | OUTPATIENT
Start: 2024-06-20 | End: 2024-06-20

## 2024-06-20 RX ORDER — MAGNESIUM OXIDE 400 MG/1
800 TABLET ORAL ONCE
Status: COMPLETED | OUTPATIENT
Start: 2024-06-20 | End: 2024-06-20

## 2024-06-20 NOTE — OCCUPATIONAL THERAPY NOTE
OCCUPATIONAL THERAPY EVALUATION - INPATIENT    Room Number: 360/360-A  Evaluation Date: 6/20/2024     Type of Evaluation: Initial  Presenting Problem: generalized weakness, dehydration, failure to thrive, hypoxia    Physician Order: IP Consult to Occupational Therapy  Reason for Therapy:  ADL/IADL Dysfunction and Discharge Planning      OCCUPATIONAL THERAPY ASSESSMENT   Patient is a 64 year old female admitted on 6/18/2024 with Presenting Problem: generalized weakness, dehydration, failure to thrive, hypoxia. Recent admission 5/26-6/10 for PNA, empyema R pleural space, chest tube placement, discharged to sister's home with home health. Co-Morbidities : DM, COPD, HTN, CAD s/p CABG, anxiety, depression, migraines  Patient is currently functioning near baseline with  all ADLs and functional transfers .  Prior to admission, patient's baseline is independent.  Patient met all OT goals at Modified Independent level for increased time.  Patient reports no further questions/concerns at this time.     No further skilled OT needs at this time.      WEIGHT BEARING RESTRICTION  Weight Bearing Restriction: None                Recommendations for nursing staff:   Transfers: 1-person  Toileting location: Toilet    EVALUATION SESSION:  Patient at start of session: supine  FUNCTIONAL TRANSFER ASSESSMENT  Sit to Stand: Edge of Bed  Edge of Bed: Modified Independent  Toilet Transfer: Modified Independent (simulated at same height as home, light use of armrest, reports grab bar adjacent at home)    BED MOBILITY  Supine to Sit : Modified Independent    BALANCE ASSESSMENT     FUNCTIONAL ADL ASSESSMENT  UB Dressing Seated: Modified Independent (simulated)  LB Dressing Seated: Modified Independent  LB Dressing Standing: Modified Independent  Toileting Seated: Modified Independent  Toileting Standing: Modified Independent      ACTIVITY TOLERANCE: mildly SOB with activity, educated on breathing techniques, 98% on room air                          O2 SATURATIONS  Oxygen Therapy  SPO2% on Room Air at Rest: 98    COGNITION  Overall Cognitive Status:  WFL - within functional limits  COGNITION ASSESSMENTS       Upper Extremity:   ROM: within functional limits (reports some discomfort in R side from previous chest tube site with shoulder ROM, but functional)  Strength: is within functional limits     EDUCATION PROVIDED  Patient: Role of Occupational Therapy; Functional Transfer Techniques; Fall Prevention; Compensatory ADL Techniques; Energy Conservation  Patient's Response to Education: Verbalized Understanding    Equipment used: n/a    Therapist comments: Patient reports has not been getting out of bed much this admission due to fatigue, but motivated and participatory this session. Patient performed all ADLs and functional transfers Mod I for increased time, also performed functional mobility around nurses' station Mod I without device with increased time. Some SOB noted with activity, educated on breathing techniques and energy conservation tips, SPO2 98% on room air. Patient reports plans to return to her condo alone at discharge rather than back to her sister's home; discussed considering having supervision at least first night after discharge pending progress, patient reports able to stay first night at sister's home if needed. Also discussed recommendation of increased check-ins and supervision for showering; patient reports intends to continue with sponge bathing, also states she and her sister are best friends and at a minimum text each other to check in every morning and before bed every night. Patient reports no further concerns regarding return home to ADLs, states sister can assist with IADLs if needed as well. Patient aware of recommendation to be out of bed as much as possible, and encouraged multiple walks with nursing staff a day (RN and PCT aware) to prevent deconditioning. Will sign off.     Patient End of Session: Up in chair;Needs  met;Call light within reach;RN aware of session/findings;All patient questions and concerns addressed    OCCUPATIONAL PROFILE    HOME SITUATION  Type of Home: Condo  Home Layout: One level  Lives With: Alone    Toilet and Equipment: Standard height toilet;Grab bar  Shower/Tub and Equipment: Tub-shower combo;Grab bar       Occupation/Status:  at [employer]     Drives: Yes       Prior Level of Function: Patient reports typically independent in all I/ADL and functional mobility without device prior to admission. Had been staying with sister since recent hospital admission, reports had been managing independently but self-care was becoming more difficult again leading up to current admission due to increasing weakness and fatigue.     SUBJECTIVE  \"I was totally dehydrated. I'm feeling better now.\"    PAIN ASSESSMENT  Ratin  Location: denies       OBJECTIVE  Precautions: None  Fall Risk: Standard fall risk    WEIGHT BEARING RESTRICTION  Weight Bearing Restriction: None                AM-PAC ‘6-Clicks’ Inpatient Daily Activity Short Form  -   Putting on and taking off regular lower body clothing?: None  -   Bathing (including washing, rinsing, drying)?: None  -   Toileting, which includes using toilet, bedpan or urinal? : None  -   Putting on and taking off regular upper body clothing?: None  -   Taking care of personal grooming such as brushing teeth?: None  -   Eating meals?: None    AM-PAC Score:  Score: 24  Approx Degree of Impairment: 0%  Standardized Score (AM-PAC Scale): 57.54      ADDITIONAL TESTS     NEUROLOGICAL FINDINGS        PLAN   Patient has been evaluated and presents with no skilled Occupational Therapy needs at this time.  Patient discharged from Occupational Therapy services.  Please re-order if a new functional limitation presents during this admission.      Patient Evaluation Complexity Level:   Occupational Profile/Medical History LOW - Brief history including review of medical or therapy  records    Specific performance deficits impacting engagement in ADL/IADL LOW  1 - 3 performance deficits    Client Assessment/Performance Deficits LOW - No comorbidities nor modifications of tasks    Clinical Decision Making LOW - Analysis of occupational profile, problem-focused assessments, limited treatment options    Overall Complexity LOW     OT Session Time: 25 minutes  Therapeutic Activity: 10 minutes

## 2024-06-20 NOTE — DIETARY NOTE
[facility]   part of [facility]    NUTRITION ASSESSMENT    Pt meets moderate malnutrition criteria at this time.    CRITERIA FOR MALNUTRITION DIAGNOSIS:  Criteria for non-severe malnutrition diagnosis: acute illness/injury related to energy intake less than 75% for greater than 7 days and muscle mass mild depletion.    NUTRITION INTERVENTION:    RD nutrition Care Plan- See RD nutrition assessment for additional recommendations  Meal and Snacks - Monitor and encourage adequate PO intake. Cardiac, Carb Controlled:60 gm CHO per meal.  Medical Food Supplements - strawberry Ensure Plus High Protein at breakfast and nancy Glucerna at dinner (provides:570 kcal, 30 gm protein).   Nutrition Education- provided handout on Tips for Increasing Protein.    PATIENT STATUS:     6/20- Received nutrition consult for failure to thrive.   Pt stated appetite was poor PTA. Consuming less than 3 meals per day.  Does not care for the hospital food. The only foods she likes off the menu is toast, eggs, and nancy pudding.   Ordered a late breakfast. Consumed 2 egg whites, van Ensure Plus High Protein, and is eating toast at time of visit. Pt prefers strawberry or nancy ONS. ONS ordered adjusted. Encouraged pt to consume smaller more frequent meals and to consume ONS in between meals to help maintain wt/strength.   CT of chest revealed RLL consolidation w/ a cavitary lesion.    6/19/24- 63 y/o female admitted with FTT (poor appetite, generalized weakness, sleeping most of the day). Pt chart reviewed d/t MST score of 2. Pt currently off unit for ultrasound of legs d/t elevated D-Dimer.   Will gather wt/diet hx and nutrition focused physical exam at f/u visit.   Recorded PO intakes:20% of breakfast and 50% of a later lunch. Pt feeling nauseous this AM per RN report. Given Zofran.   PMH:quit smoking 17 months ago, COPD, DM, Jamison's esophagus, CAD, CABG (12/28/23), recent admit (5/26-6/10) for RLL PNA/pulmonary abscess w/ R empyema, s/p  VATS and total pulmonary decortication (6/5).       ANTHROPOMETRICS:  Ht:  5' 1\"  Wt: 54.4 kg (120 lb).   BMI: Body mass index is 22.67 kg/m².  IBW: 47.7 kg      WEIGHT HISTORY:   -Pt stated she has lost wt \"before all this happened,\" but did not specify amount of wt lost or time frame, when asked further.     -Noted no significant wt changes per EMR hx. Wts have been fluctuating between 120 lb-128 lb over the past 4 months.  Per previous RD note (5/31/24), pt reported UBW of ~120 lb.    Wt Readings from Last 10 Encounters:   06/18/24 54.4 kg (120 lb)   06/07/24 69.9 kg (154 lb)   05/20/24 54.4 kg (120 lb)   05/14/24 57.6 kg (127 lb)   05/08/24 56.2 kg (124 lb)   05/01/24 57.2 kg (126 lb)   03/05/24 56.7 kg (125 lb)   02/26/24 58.1 kg (128 lb)   02/12/24 57.8 kg (127 lb 6 oz)   02/08/24 54.4 kg (120 lb)        NUTRITION:  Diet:       Procedures    Cardiac diet Cardiac; Calorie Restriction/Carb Controlled: 1800 kcal/60 grams; Is Patient on Accuchecks? Yes      Food Allergies: No  Cultural/Ethnic/Jainism Preferences Addressed: Yes    Percent Meals Eaten (last 3 days)       Date/Time Percent Meals Eaten (%)    06/19/24 1149 25 %    06/19/24 1500 50 %          GI system review: diarrhea and nausea Last BM: 6/18  Skin and wounds: No pressure ulcers per RN documentation. Sutures intact from previous chest tubes.    NUTRITION RELATED PHYSICAL FINDINGS:     1. Body Fat/Muscle Mass: mild muscle depletion Temple region and Clavicle region     2. Fluid Accumulation: none per RN documentation     NUTRITION PRESCRIPTION: 54.4 kg-120 lb (6/18)  Calories: 4071-3920 calories/day (25-30 kcal/kg)  Protein: 65-82 grams protein/day (1.2-1.5 grams protein per kg)  Fluid: ~1 ml/kcal or per MD discretion    NUTRITION DIAGNOSIS/PROBLEM:  Malnutrition related to  acute illness  as evidenced by  energy intake less than 75% for greater than 7 days and muscle mass mild depletion. .      MONITOR AND EVALUATE/NUTRITION GOALS:  PO intake of 75%  of meals TID - Not met, Continues  PO intake of 75% of oral nutrition supplement/s - partly met, ongoing  Weight stable within 1 to 2 lbs during admission - Ongoing      MEDICATIONS:  Kcl:40 meq, IV abx, Lasix, Insulin, Mag Ox    LABS:  POC Glu:106-121, Glu:127, K+:3.5, M.5, Phos:3.2    Pt is at High nutrition risk    Jyothi Gray MS, RD, LDN  Clinical Dietitian  Ext:[phone]

## 2024-06-20 NOTE — PROGRESS NOTES
[facility]   part of [facility] ID PROGRESS NOTE    Soumya King Patient Status:  Observation    6/3/1960 MRN [id number]   Location [facility] 3SW-A Attending Bill Brian, *   Hosp Day # 0 PCP Rika Vinson,      Abx: unasyn, sp invanz    Subjective: pt seen and examined. No fevers. A little better but still very tired. Did sit up for some time earlier. No cough or SOB, no CP    Medications:    Current Facility-Administered Medications:     potassium chloride (Klor-Con M20) tab 40 mEq, 40 mEq, Oral, Q4H    QUEtiapine ER (SEROquel XR) 24 hr tab 200 mg, 200 mg, Oral, QPM    cyclobenzaprine (Flexeril) tab 10 mg, 10 mg, Oral, Q12H PRN    metoclopramide (Reglan) solution 5 mg, 5 mg, Oral, TID AC PRN    clonazePAM (KlonoPIN) tab 0.5 mg, 0.5 mg, Oral, BID    furosemide (Lasix) tab 20 mg, 20 mg, Oral, Daily    atorvastatin (Lipitor) tab 80 mg, 80 mg, Oral, Nightly    clopidogrel (Plavix) tab 75 mg, 75 mg, Oral, Daily    aspirin DR tab 81 mg, 81 mg, Oral, Daily    gabapentin (Neurontin) tab 600 mg, 600 mg, Oral, Nightly    topiramate (TopaMAX) tab 50 mg, 50 mg, Oral, BID    metoprolol succinate ER (Toprol XL) 24 hr tab 50 mg, 50 mg, Oral, Daily Beta Blocker    benzonatate (Tessalon) cap 100 mg, 100 mg, Oral, TID PRN    azelastine (Astelin) 0.1 % nasal solution 1 spray, 1 spray, Each Nare, BID    desvenlafaxine ER (Pristiq) 24 hr tab 100 mg, 100 mg, Oral, Daily    oxyCODONE immediate release tab 5 mg, 5 mg, Oral, Q6H PRN    pantoprazole (Protonix) DR tab 40 mg, 40 mg, Oral, QAM AC    busPIRone (Buspar) tab 10 mg, 10 mg, Oral, BID    ampicillin-sulbactam (Unasyn) 3 g in sodium chloride 0.9% 100mL IVPB-ADD, 3 g, Intravenous, q6h    umeclidinium-vilanterol (Anoro Ellipta) 62.5-25 MCG/ACT inhaler 1 puff, 1 puff, Inhalation, Daily    glucose (Dex4) 15 GM/59ML oral liquid 15 g, 15 g, Oral, Q15 Min PRN **OR** glucose (Glutose) 40% oral gel 15 g, 15 g, Oral, Q15 Min PRN **OR**  glucose-vitamin C (Dex-4) chewable tab 4 tablet, 4 tablet, Oral, Q15 Min PRN **OR** dextrose 50% injection 50 mL, 50 mL, Intravenous, Q15 Min PRN **OR** glucose (Dex4) 15 GM/59ML oral liquid 30 g, 30 g, Oral, Q15 Min PRN **OR** glucose (Glutose) 40% oral gel 30 g, 30 g, Oral, Q15 Min PRN **OR** glucose-vitamin C (Dex-4) chewable tab 8 tablet, 8 tablet, Oral, Q15 Min PRN    enoxaparin (Lovenox) 40 MG/0.4ML SUBQ injection 40 mg, 40 mg, Subcutaneous, Daily    acetaminophen (Tylenol Extra Strength) tab 500 mg, 500 mg, Oral, Q4H PRN    melatonin tab 3 mg, 3 mg, Oral, Nightly PRN    ondansetron (Zofran) 4 MG/2ML injection 4 mg, 4 mg, Intravenous, Q6H PRN    insulin aspart (NovoLOG) 100 Units/mL FlexPen 1-10 Units, 1-10 Units, Subcutaneous, TID AC and HS    Review of Systems:  Completed    Physical Exam:  Vital signs: Blood pressure 123/70, pulse 92, temperature 98 °F (36.7 °C), temperature source Oral, resp. rate 16, weight 120 lb (54.4 kg), SpO2 93%, not currently breastfeeding.    General: Alert, oriented, NAD, on room air  HEENT: Moist mucous membranes.   Neck: No lymphadenopathy.  Supple.  Cardiovascular: RRR  Respiratory: Clear to auscultation bilaterally.  No wheezes. No rhonchi.  Abdomen: Soft, nontender, nondistended.   Musculoskeletal: No edema noted  Integument: No lesions. No erythema.    Laboratory Data:  Recent Labs   Lab 06/20/24  0638   RBC 3.24*   HGB 9.9*   HCT 30.1*   MCV 92.9   MCH 30.6   MCHC 32.9   RDW 15.6   NEPRELIM 6.11   WBC 10.2   .0*     Recent Labs   Lab 06/18/24  1827 06/19/24  0551 06/20/24  0638   * 112* 127*   BUN 16 14 9   CREATSERUM 0.70 0.54* 0.61   CA 8.8 8.8 8.8   ALB 2.4*  --  2.2*   * 139 139   K 3.2* 3.7 3.5  3.5    109 108   CO2 24.0 24.0 25.0   ALKPHO 149*  --  128   AST 67*  --  102*   ALT 72*  --  93*   BILT 0.2  --  0.3   TP 9.5*  --  8.3*       Microbiology: Reviewed in EMR  Susceptibility data from last 90 days.  Collected Specimen Info Organism  Ampicillin Ampicillin/Sulbactam Cefazolin Ciprofloxacin Gentamicin Levofloxacin Meropenem Nitrofurantoin Piperacillin/Tazobactam Tobramycin Trimethoprim/Sulfamethoxazole   06/05/24 Tissue from Lung right Lactobacillus species              06/05/24 Tissue from Lung right Lactobacillus species                No Staph aureus, Beta Strep or Pseudo aeruginosa isolated              06/05/24 Tissue Cutibacterium (Proprionibacterium) acnes                Cutibacterium avidum              06/01/24 Other from Sputum Candida albicans              05/29/24 Body fluid, unspecified from Pleural Fluid, Right Streptococcus intermedius              05/28/24 Body fluid, unspecified from Pleural Fluid, Right Streptococcus intermedius              05/27/24 Other from Sputum Candida albicans              05/14/24 Urine, clean catch Escherichia coli  R  I  S  S  R  S  S  S  S  S  R         Radiology: Reviewed.    PROCEDURE:  CT CHEST PE AORTA (IV ONLY) (CPT=71260)     COMPARISON:  [facility] , CT, CT CHEST (CPT=71250), 6/03/2024, 11:14 AM.     INDICATIONS:  RECENT EMPYEMA FATIGUE HYPOXIA ELEV DIMER     TECHNIQUE:  CT images were obtained with non-ionic intravenous contrast material. Dose reduction techniques were used. Dose information is transmitted to the ACR (American College of Radiology) NRDR (National Radiology Data Registry) which includes the  Dose Index Registry.     PATIENT STATED HISTORY:(As transcribed by Technologist)  SOB, elevated d dimer, hypoxia      CONTRAST USED:  80 mLcc of Isovue 370     FINDINGS:    LUNGS:  Right lower lobe cavitary lesion measures 4.1 x 3.1 cm.  Marked consolidations in the right lung have improved since prior examination.  Peripheral interstitial abnormalities are noted.  VASCULATURE:  No visible pulmonary arterial thrombus or attenuation.    ANKUSH:  Right perihilar lymph node measures 2.3 x 1.4 cm.  MEDIASTINUM:  Paratracheal lymph node measures 1.1 x 1.3 cm.  CARDIAC:  No enlargement, pericardial  thickening, or significant coronary artery calcification.  PLEURA:  Small right pleural effusion is noted.  THORACIC AORTA:  No aneurysm.    CHEST WALL:  No mass or axillary adenopathy.    LIMITED ABDOMEN:  Limited images of the upper abdomen are unremarkable.    BONES:  Mid sternotomy wires are noted.     Impression:    CONCLUSION:       1. Marked atelectasis the right lung along with consolidations have resolved.  Large fluid collection has mostly resolved.  Only minimal residual right pleural effusion is noted.  Cavitary lesion in the right lower lobe is identified.  This cavitary  lesion may be sequelae of prior infectious process.  A pneumatocele is of consideration.     2. There is mild mediastinal and right perihilar lymphadenopathy again noted.       LOCATION:  [facility]     Dictated by (CST): Gregory Vernon MD on 6/18/2024 at 10:50 PM      Finalized by (CST): Gregory Vernon MD on 6/18/2024 at 10:54 PM        PROCEDURE:  XR CHEST AP PORTABLE  (CPT=71045)     TECHNIQUE:  AP chest radiograph was obtained.     COMPARISON:  [facility] , [facility], XR CHEST AP PORTABLE  (CPT=71045), 6/09/2024, 12:24 PM.     INDICATIONS:  recent dx from hospital, poor appetite, sleeping a lot, on 2L NC at home     PATIENT STATED HISTORY: (As transcribed by Technologist)           FINDINGS:       Right-sided PICC line has tip in the SVC.     Median sternotomy are stable.     Small right lower lobe consolidation pleural effusion.     No pneumothorax.    Impression:    CONCLUSION:  Right lower lobe consolidation with small right pleural effusion is noted.  This is improved compared to prior examination.     LOCATION:  [id number]     Dictated by ([facility]): Gregory Vernon MD on 6/18/2024 at 8:17 PM      Finalized by (CST): Gregory Vernon MD on 6/18/2024 at 8:18 PM    ASSESSMENT:  Fatigue with poor po intake/appetite- ? New infection but not obvious, ? Medication related (invanz), but also on several meds that can cuase lethargy (clonazepam,  seroquel, etc)  RLL pneumonia/pulmonary abscess with right empyema  S/p thoracentesis 5/28, cx with strep intermedius. Path w/ acute inflammation with occasional bacterial organisms present.   S/p R chest tube placement 5/29, cx with strep intermedius.   S/p R VATS and total pulmonary decortication 6/5, R purulent empyema with RLL lung abscess noted. Tissue cx from RLL lung abscess with lactobacillus. Tissue cx from pleural peel with lactobacillus. Tissue cx from right pleural debris with cutibacterium acnes and avidum.   Has been on IV Invanz outpatient (plan was for EOT 7/4/24)  CT chest this admission much better, cavitary lesion to RLL noted.  Leukocytosis, improving. No fevers.   Thrombocytosis, improving.  Elevated LFTs. CT a/p and US abd unremarkable last admission.  Anemia  IgM kappa MGUS  DM II. Hgb A1C 7.1 (5/2024)  COPD    PLAN:  - cont Unasyn for continuation of treatment of RLL abscess/R empyema (EOT of abx 7/4/2024)  - follow blood cultures  - follow temps and cbc  - follow LFTs, recheck in am  - would send c. Diff if further diarrhea (none today per staff).    Discussed case with patient and Dr Roc Menezes MD

## 2024-06-20 NOTE — PROGRESS NOTES
[facility]  Pulmonary/Critical Care Progress note    Soumya King Patient Status:  Observation    6/3/1960 MRN [id number]   Location [facility] 3SW-A Attending Bill Brian, *   Hosp Day # 0 PCP Rika Vinson,        History of Present Illness:  Sitting up in chair.  States her breathing is better.    History:  Past Medical History:    Abdominal pain, other specified site    groin    Acute bronchitis    Allergic rhinitis    Anxiety    Arthritis    Back problem    Jamison esophagus    Chronic low back pain without sciatica    COPD (chronic obstructive pulmonary disease) (HCC)    Depression    Diabetes (HCC)    Difficult intubation    difficult to intubate with bladder surgery,instructed by anesthesia to communicate to future anesthesiologist. Small airway    Esophageal reflux    Essential hypertension    Don't remember    Fitting and adjustment of dental prosthetic device    High blood pressure    History of 2019 novel coronavirus disease (COVID-19)    No hospitalization,symptoms; Fever,fatigue ,body aches,headache no loss of taste or smell    Hx of motion sickness    Hyperlipidemia    Mass of spine    Migraines    barometric pressure    Myocardial infarction (HCC)    Osteoarthritis    Other ill-defined conditions(799.89)    Jamison's    Pain in joint, forearm    wrist    Pain in joint, pelvic region and thigh    hip    Pelvic mass    Personal history of urinary (tract) infection    Sleep apnea    I don't date     Past Surgical History:   Procedure Laterality Date    Cabg  23    Colonoscopy      Colonoscopy N/A 2022    Procedure: COLONOSCOPY AND ESOPHAGOGASTRODUODENOSCOPY;  Surgeon: Miguel Camargo MD;  Location:  ENDOSCOPY    Heart surgery      double bypass    Laparoscopic      sling operation for stress incontinence    Laparoscopy,diagnostic      Kay biopsy stereo nodule 2 site bilat (cpt=19081/81280) Left 2010    BENIGN 2 SITES    Kay biopsy stereo  w/calc 2 site left (cpt=19081/55727)  02/2010    benign x 2    Kay stereo-clip w/calc 2 site left  2010    Oophorectomy Left 06/28/2019    Other  08/31/2022    RIGHT SACRAL SUBCUTANEOUS CYST EXCISION    Other surgical history  2000, 2019    Bladder Sling, Large mass removed from left abdomen and ovar    Tonsillectomy      Was a child, have no idea    Tubal ligation      Upper gi endoscopy,exam       Family History   Problem Relation Age of Onset    Arthritis Mother     Other (AMI) Mother     Other (diabetes mellitus) Mother     Diabetes Mother       reports that she quit smoking about 17 months ago. Her smoking use included cigarettes. She started smoking about 49 years ago. She has a 72 pack-year smoking history. She has never used smokeless tobacco. She reports that she does not currently use alcohol. She reports that she does not use drugs.    Allergies:  Allergies   Allergen Reactions    Naprosyn [Naproxen] ANAPHYLAXIS     Has tolerated ibuprofen and IV toradol     Aspirin OTHER (SEE COMMENTS)     Difficulty swallowing due to  saavedra's esophagus       Medications:    Current Facility-Administered Medications:     potassium chloride (Klor-Con M20) tab 40 mEq, 40 mEq, Oral, Q4H    QUEtiapine ER (SEROquel XR) 24 hr tab 200 mg, 200 mg, Oral, QPM    [Held by provider] Mirabegron ER (Myrbetriq) 50 MG tablet TB24 50 mg, 50 mg, Oral, Daily    cyclobenzaprine (Flexeril) tab 10 mg, 10 mg, Oral, Q12H PRN    metoclopramide (Reglan) solution 5 mg, 5 mg, Oral, TID AC PRN    clonazePAM (KlonoPIN) tab 0.5 mg, 0.5 mg, Oral, BID    furosemide (Lasix) tab 20 mg, 20 mg, Oral, Daily    atorvastatin (Lipitor) tab 80 mg, 80 mg, Oral, Nightly    clopidogrel (Plavix) tab 75 mg, 75 mg, Oral, Daily    aspirin DR tab 81 mg, 81 mg, Oral, Daily    gabapentin (Neurontin) tab 600 mg, 600 mg, Oral, Nightly    topiramate (TopaMAX) tab 50 mg, 50 mg, Oral, BID    metoprolol succinate ER (Toprol XL) 24 hr tab 50 mg, 50 mg, Oral, Daily Beta  Blocker    benzonatate (Tessalon) cap 100 mg, 100 mg, Oral, TID PRN    azelastine (Astelin) 0.1 % nasal solution 1 spray, 1 spray, Each Nare, BID    desvenlafaxine ER (Pristiq) 24 hr tab 100 mg, 100 mg, Oral, Daily    oxyCODONE immediate release tab 5 mg, 5 mg, Oral, Q6H PRN    pantoprazole (Protonix) DR tab 40 mg, 40 mg, Oral, QAM AC    busPIRone (Buspar) tab 10 mg, 10 mg, Oral, BID    ampicillin-sulbactam (Unasyn) 3 g in sodium chloride 0.9% 100mL IVPB-ADD, 3 g, Intravenous, q6h    umeclidinium-vilanterol (Anoro Ellipta) 62.5-25 MCG/ACT inhaler 1 puff, 1 puff, Inhalation, Daily    glucose (Dex4) 15 GM/59ML oral liquid 15 g, 15 g, Oral, Q15 Min PRN **OR** glucose (Glutose) 40% oral gel 15 g, 15 g, Oral, Q15 Min PRN **OR** glucose-vitamin C (Dex-4) chewable tab 4 tablet, 4 tablet, Oral, Q15 Min PRN **OR** dextrose 50% injection 50 mL, 50 mL, Intravenous, Q15 Min PRN **OR** glucose (Dex4) 15 GM/59ML oral liquid 30 g, 30 g, Oral, Q15 Min PRN **OR** glucose (Glutose) 40% oral gel 30 g, 30 g, Oral, Q15 Min PRN **OR** glucose-vitamin C (Dex-4) chewable tab 8 tablet, 8 tablet, Oral, Q15 Min PRN    enoxaparin (Lovenox) 40 MG/0.4ML SUBQ injection 40 mg, 40 mg, Subcutaneous, Daily    acetaminophen (Tylenol Extra Strength) tab 500 mg, 500 mg, Oral, Q4H PRN    melatonin tab 3 mg, 3 mg, Oral, Nightly PRN    ondansetron (Zofran) 4 MG/2ML injection 4 mg, 4 mg, Intravenous, Q6H PRN    insulin aspart (NovoLOG) 100 Units/mL FlexPen 1-10 Units, 1-10 Units, Subcutaneous, TID AC and HS    Intake/Output:    Intake/Output Summary (Last 24 hours) at 6/20/2024 1006  Last data filed at 6/19/2024 1500  Gross per 24 hour   Intake 360 ml   Output --   Net 360 ml      Body mass index is 22.67 kg/m².    Review of Systems  Review of Systems:   A comprehensive 10 point review of systems was completed.  Pertinent positives and negatives noted in the the HPI.           Patient Vitals for the past 24 hrs:   BP Temp Temp src Pulse Resp SpO2 Weight    06/19/24 1131 139/74 99.1 °F (37.3 °C) Oral 74 18 98 % --   06/19/24 0746 125/69 98.5 °F (36.9 °C) Oral 85 18 96 % --   06/19/24 0445 111/59 98.3 °F (36.8 °C) Oral 75 18 96 % --   06/19/24 0219 -- -- -- 83 -- 94 % --   06/19/24 0029 -- -- -- 77 -- 95 % --   06/18/24 2342 124/66 98.3 °F (36.8 °C) Oral 80 18 100 % --   06/18/24 2300 143/70 -- -- 70 16 100 % --   06/18/24 2245 130/73 -- -- 76 16 100 % --   06/18/24 2200 121/57 -- -- 78 16 100 % --   06/18/24 2045 122/78 -- -- 83 16 100 % --   06/18/24 1807 120/83 98.5 °F (36.9 °C) Oral 99 20 98 % 120 lb (54.4 kg)     Vitals:    06/20/24 0001 06/20/24 0400 06/20/24 0735 06/20/24 0806   BP: 103/68 101/68 116/69    BP Location: Left arm Left arm Left arm    Pulse: 91 91 84    Resp: 18 18 16    Temp: 97.5 °F (36.4 °C) 98 °F (36.7 °C) 98 °F (36.7 °C)    TempSrc: Oral Oral Oral    SpO2: 95% 93% 97% 98%   Weight:             Physical Exam  Constitutional:       General: She is not in acute distress.     Appearance: Normal appearance. She is not ill-appearing or diaphoretic.   HENT:      Head: Normocephalic and atraumatic.      Nose: Nose normal. No congestion or rhinorrhea.      Mouth/Throat:      Mouth: Mucous membranes are moist.      Pharynx: Oropharynx is clear. No oropharyngeal exudate or posterior oropharyngeal erythema.      Comments: Mallampati class III palate  Eyes:      Extraocular Movements: Extraocular movements intact.      Pupils: Pupils are equal, round, and reactive to light.   Cardiovascular:      Rate and Rhythm: Normal rate.      Pulses: Normal pulses.      Heart sounds: Normal heart sounds. No murmur heard.  Pulmonary:      Effort: Pulmonary effort is normal. No respiratory distress.      Breath sounds: Normal breath sounds. No wheezing or rhonchi.      Comments: Diminished breath sound bilateral specially in right lower lung zones  Chest:      Chest wall: No tenderness.   Abdominal:      General: Abdomen is flat. Bowel sounds are normal.       Palpations: Abdomen is soft.   Musculoskeletal:         General: Normal range of motion.   Skin:     General: Skin is warm.   Neurological:      General: No focal deficit present.      Mental Status: She is alert and oriented to person, place, and time.   Psychiatric:         Mood and Affect: Mood normal.         Behavior: Behavior normal.         Thought Content: Thought content normal.         Judgment: Judgment normal.              Lab Data Review:  Recent Labs   Lab 06/18/24 1827 06/19/24  0551 06/20/24  0638   WBC 14.3* 12.2* 10.2   HGB 10.7* 9.5* 9.9*   HCT 33.1* 29.5* 30.1*   .0* 637.0* 573.0*       Recent Labs   Lab 06/18/24 1827 06/19/24  0551 06/20/24  0638   * 139 139   K 3.2* 3.7 3.5  3.5    109 108   CO2 24.0 24.0 25.0   BUN 16 14 9   CREATSERUM 0.70 0.54* 0.61   CA 8.8 8.8 8.8   ALB 2.4*  --  2.2*   ALKPHO 149*  --  128   ALT 72*  --  93*   AST 67*  --  102*   * 112* 127*       Recent Labs   Lab 06/20/24  0638   MG 1.5*       Lab Results   Component Value Date    PHOS 3.2 06/20/2024        No results for input(s): \"PT\", \"INR\", \"PTT\" in the last 168 hours.    No results for input(s): \"ABGPHT\", \"SFDAVM2X\", \"CCKUJ9O\", \"ABGHCO3\", \"ABGBE\", \"TEMP\", \"CHARLES\", \"SITE\", \"DEV\", \"THGB\" in the last 168 hours.    No results for input(s): \"TROP\", \"CKMB\" in the last 168 hours.    Cultures:   Hospital Encounter on 06/18/24   1. Blood Culture     Status: None (Preliminary result)    Collection Time: 06/18/24  6:28 PM    Specimen: Blood,peripheral   Result Value Ref Range    Blood Culture Result No Growth 1 Day N/A           Radiology personally reviewed:  US VENOUS DOPPLER LEG BILAT - DIAG IMG (CPT=93970)    Result Date: 6/19/2024  CONCLUSION:  No DVT lower extremities.   LOCATION:  [facility]   Dictated by (CST): Elieser Torres MD on 6/19/2024 at 4:32 PM     Finalized by (CST): Elieser Torres MD on 6/19/2024 at 4:32 PM       CT CHEST PE AORTA (IV ONLY) (CPT=71260)    Result Date:  6/18/2024  CONCLUSION:   1. Marked atelectasis the right lung along with consolidations have resolved.  Large fluid collection has mostly resolved.  Only minimal residual right pleural effusion is noted.  Cavitary lesion in the right lower lobe is identified.  This cavitary lesion may be sequelae of prior infectious process.  A pneumatocele is of consideration.  2. There is mild mediastinal and right perihilar lymphadenopathy again noted.    LOCATION:  [facility]   Dictated by (CST): Gregory Vernon MD on 6/18/2024 at 10:50 PM     Finalized by (CST): Gregory Vernon MD on 6/18/2024 at 10:54 PM       XR CHEST AP PORTABLE  (CPT=71045)    Result Date: 6/18/2024  CONCLUSION:  Right lower lobe consolidation with small right pleural effusion is noted.  This is improved compared to prior examination.   LOCATION:  [id number]      Dictated by (CST): Gregory Vernon MD on 6/18/2024 at 8:17 PM     Finalized by (CST): Gregory Vernon MD on 6/18/2024 at 8:18 PM         Patient Active Problem List   Diagnosis    Vitamin D deficiency    Jamison's esophagus    Tobacco use    Insomnia    COPD, mild (HCC) - Dx'd via PFTs done in 3/2015    Migraine without aura and without status migrainosus, not intractable    Hyperlipidemia    SUMMER on CPAP    MDD (major depressive disorder), recurrent episode, moderate (HCC)    GERD (gastroesophageal reflux disease)    Hypertension    Chronic pansinusitis    Mucinous cystadenoma of ovary, left    KIRK (generalized anxiety disorder)    History of pubovaginal sling    Absence of bladder continence    Vasomotor flushing    Primary osteoarthritis of left knee    Mass of spine    Hx of motion sickness    Difficult intubation    Chronic low back pain without sciatica    Type 2 diabetes mellitus with diabetic neuropathy (HCC)    Pulmonary nodule    CAD, multiple vessel    S/P CABG x 2    Type 2 diabetes mellitus with hyperlipidemia (HCC)    Anemia    Iron deficiency anemia secondary to inadequate  dietary iron intake    Subacute cough    Hyperglycemia    Acute anemia    MARÍA ELENA (acute kidney injury) (HCC)    Transaminitis    Thrombocytosis    Hyponatremia    Pleural effusion    Somnolence    Acute respiratory failure with hypoxia (HCC)    Empyema of right pleural space (HCC)    Status post thoracotomy    Hypoxia    FTT (failure to thrive) in adult    Dehydration    Hypokalemia    Generalized weakness       Assessment:  Shortness of breath: Remains on room air  Right lower lobe consolidation: This could be related to aspiration pneumonia with GERD versus community-acquired pneumonia  Right lower lobe cavitary lesion:Right lower lobe cavitary lesion measures 4.1 x 3.1 cm.  Marked consolidations in the right lung have improved since prior examination: This could represent lung abscess/empyema status post right VATS and pulmonary decortication on 6/5/2020  Right perihilar lymph node 2.3 x 1.4 cm.  Mediastinal lymph node 1.1 x 1.2 cm  Small right pleural effusion  COPD  IgM kappa MGUS  History of tobacco smoking 72 pack years of tobacco although quit smoking in 2023  Obstructive sleep apnea syndrome: Polysomnogram on 2/21/2017: Showed AHI was 26.4, supine index of 88.7, REM AHI of 43.4, SaO2 grzegorz of 75%.  A follow-up CPAP titration sleep study on 4/26/2017 suggested CPAP at 12 cm water pressure  Generalized weakness  Allergic rhinitis  Anxiety  GERD/Jamison's esophagus  Coronary artery disease/history of MI: Currently on Plavix  Type 2 diabetes mellitus  Depression  Hypertension  Hyperlipidemia  Transaminitis/elevated LFTs:  Thrombocytosis: Suspect reactive      Plan:  Continue current antibiotics per infectious disease  Continue CPAP at 12 cm water pressure when sleeping  Continue current antibiotics per infectious disease  Change to Anoro 1 puff daily   DVT prophylaxis:  Lovenox 40 mg subcutaneous every 24 hours  GI prophylaxis: Protonix 40 mg daily   Follow labs, procalcitonin and chest x-ray in a.m.  Will follow  for further recommendations    Thank You for allowing me to participate in this patient's care     John Pacheco MD    Note to the patient: The 21st Century Cures Act makes medical notes like these available to patients in the interest of transparency. However, be advised that this is a medical document. It is intended as peer to peer communication. It is written in medical language and may contain abbreviations or verbiage that are unfamiliar. It may appear blunt or direct. Medical documents are intended to carry relevant information, facts as evident, and clinical opinion of the practitioner.      Disclaimer: Components of this note were documented using voice recognition system and are subject to errors not corrected at proofreading. Contact the author of this note for any clarifications

## 2024-06-20 NOTE — PLAN OF CARE
Assumed care of patient at 0700  A/Ox4 but very lethargic and drowsy throughout shift.   Scheduled klonopin held. RA, VSS on tele  K+ and Mag replaced per protocol  PRN tylenol for HA  Patient ambulated with OT in hallways, rec Home. Needs encouragement to ambulate more and sit in chair.   Need UA and Cdiff sample  Consult to Psych Liaison  Patient currently resting in bed, call light within reach                Problem: Patient/Family Goals  Goal: Patient/Family Long Term Goal  Description: Patient's Long Term Goal: Increase appetite and strength    Interventions:  - encourage fluids  - encourage foods  - monitoring output  - PT/OT  - See additional Care Plan goals for specific interventions  Outcome: Progressing  Goal: Patient/Family Short Term Goal  Description: Patient's Short Term Goal: Increase appetite and strength    Interventions:   - encourage fluids  - encourage foods  - monitoring output  - PT/OT  - See additional Care Plan goals for specific interventions  Outcome: Progressing

## 2024-06-20 NOTE — TELEPHONE ENCOUNTER
Patients sister would just like to advise you patient is currently admitted at .   FYI - appointment cancelled for tomorrow

## 2024-06-20 NOTE — PROGRESS NOTES
[facility]   part of [facility]     Hospitalist Progress Note     Soumyaaries King Patient Status:  Observation    6/3/1960 MRN [id number]   Location [facility] 3SW-A Attending Bill Brian, *   Hosp Day # 0 PCP Rika Vinson,      Chief Complaint: Lethargy    Subjective:      - AF, VSS on RA   - Worked with PT and  appears near baseline in terems of strength, though still feels fatigued and lethargic, doesn't feel she has the energy to get out of bed, sleeping often    Objective:    Review of Systems:   A comprehensive review of systems was completed; pertinent positive and negatives stated in subjective.    Vital signs:  Temp:  [97.5 °F (36.4 °C)-99.1 °F (37.3 °C)] 98 °F (36.7 °C)  Pulse:  [74-91] 84  Resp:  [16-18] 16  BP: (101-139)/(64-74) 116/69  SpO2:  [93 %-98 %] 97 %    Physical Exam:    General: No acute distress  Respiratory: no wheezes, no rhonchi  Cardiovascular: S1, S2, regular rate and rhythm  Abdomen: Soft, Non-tender, non-distended, positive bowel sounds  Neuro: No new focal deficits. Equal strength in bilateral LE to flexion/extension   Extremities: no edema    Diagnostic Data:    Labs:  Recent Labs   Lab 24  1415 24  1827 24  0551 24  0638   WBC 15.8* 14.3* 12.2* 10.2   HGB 10.1* 10.7* 9.5* 9.9*   MCV 94.7 92.5 91.9 92.9   .0* 756.0* 637.0* 573.0*       Recent Labs   Lab 24  1827 24  0551 24  0638   * 112* 127*   BUN 16 14 9   CREATSERUM 0.70 0.54* 0.61   CA 8.8 8.8 8.8   ALB 2.4*  --  2.2*   * 139 139   K 3.2* 3.7 3.5  3.5    109 108   CO2 24.0 24.0 25.0   ALKPHO 149*  --  128   AST 67*  --  102*   ALT 72*  --  93*   BILT 0.2  --  0.3   TP 9.5*  --  8.3*       Estimated Creatinine Clearance: 72.2 mL/min (based on SCr of 0.61 mg/dL).    No results for input(s): \"TROP\", \"TROPHS\", \"CK\" in the last 168 hours.    No results for input(s): \"PTP\", \"INR\" in the last 168 hours.                  Microbiology    Hospital Encounter on 06/18/24   1. Blood Culture     Status: None (Preliminary result)    Collection Time: 06/18/24  6:28 PM    Specimen: Blood,peripheral   Result Value Ref Range    Blood Culture Result No Growth 1 Day N/A         Imaging: Reviewed in Epic.    Medications:    QUEtiapine ER  200 mg Oral QPM    [Held by provider] Mirabegron ER  50 mg Oral Daily    clonazePAM  0.5 mg Oral BID    furosemide  20 mg Oral Daily    atorvastatin  80 mg Oral Nightly    clopidogrel  75 mg Oral Daily    aspirin  81 mg Oral Daily    gabapentin  600 mg Oral Nightly    topiramate  50 mg Oral BID    metoprolol succinate ER  50 mg Oral Daily Beta Blocker    azelastine  1 spray Each Nare BID    desvenlafaxine ER  100 mg Oral Daily    pantoprazole  40 mg Oral QAM AC    busPIRone  10 mg Oral BID    ampicillin-sulbactam  3 g Intravenous q6h    umeclidinium-vilanterol  1 puff Inhalation Daily    enoxaparin  40 mg Subcutaneous Daily    insulin aspart  1-10 Units Subcutaneous TID AC and HS       Assessment & Plan:      # Failure to thrive/lethargy  # Right lower lobe cavitary lesion   - IV Abx continued, f/u Bcx   - Previously had PNA with empyema s/p R VATS and pulmonary decortication   - PCT negative    - Pulm and ID consulted   - PT/OT eval    - TSH WNL, AM cortisol 22.7   - iron studies with AoCD and B12/folate replete on 5/28/24   - Nutrition consulted   - Possibly mediation induced, will have psychiatry evaluate given hx of depression on multiple psych meds     # DM2 - LDSSI + Accucheck QID (or Q6H if NPO)  # Hypertension - Continue home oral antihypertensives  # Hyperlipidemia - continue home statin   # GERD - continue PPI  # CAD s/p CABG - ASA, plavix, statin, BB  # Elevated D-dimer - CTA negative for PE, LE dopplers negative     Bill Brian MD    Supplementary Documentation:     Quality:  DVT Mechanical Prophylaxis:   SCDs,    DVT Pharmacologic Prophylaxis   Medication    enoxaparin  (Lovenox) 40 MG/0.4ML SUBQ injection 40 mg         DVT Pharmacologic prophylaxis: Aspirin 81 mg      Code Status: Full Code  Gotti: No urinary catheter in place  Gotti Duration (in days):   Central line:    ILYA:     The 21st Century Cures Act makes medical notes like these available to patients in the interest of transparency. Please be advised this is a medical document. Medical documents are intended to carry relevant information, facts as evident, and the clinical opinion of the practitioner. The medical note is intended as peer to peer communication and may appear blunt or direct. It is written in medical language and may contain abbreviations or verbiage that are unfamiliar.

## 2024-06-20 NOTE — PLAN OF CARE
A/o x4. RA/. PICC RUE IV abx infusing. Tele. SCD's/ankle pumps encouraged. 1800 ada accuchecks. LBM 6/19. Voiding without difficulty. Pain managed with po pain medication. SB assist. POC updated with pts. All safety measures in place. Call light within reach instructed pt to call.

## 2024-06-21 ENCOUNTER — APPOINTMENT (OUTPATIENT)
Dept: GENERAL RADIOLOGY | Facility: HOSPITAL | Age: 64
DRG: 948 | End: 2024-06-21
Attending: INTERNAL MEDICINE

## 2024-06-21 PROBLEM — E46 MALNUTRITION (HCC): Status: ACTIVE | Noted: 2024-06-21

## 2024-06-21 LAB
ALBUMIN SERPL-MCNC: 1.8 G/DL (ref 3.4–5)
ALBUMIN/GLOB SERPL: 0.4 {RATIO} (ref 1–2)
ALP LIVER SERPL-CCNC: 102 U/L
ALT SERPL-CCNC: 65 U/L
ANION GAP SERPL CALC-SCNC: 7 MMOL/L (ref 0–18)
AST SERPL-CCNC: 57 U/L (ref 15–37)
BASOPHILS # BLD AUTO: 0.09 X10(3) UL (ref 0–0.2)
BASOPHILS NFR BLD AUTO: 0.9 %
BILIRUB SERPL-MCNC: 0.2 MG/DL (ref 0.1–2)
BUN BLD-MCNC: 10 MG/DL (ref 9–23)
CALCIUM BLD-MCNC: 6.8 MG/DL (ref 8.5–10.1)
CHLORIDE SERPL-SCNC: 115 MMOL/L (ref 98–112)
CO2 SERPL-SCNC: 20 MMOL/L (ref 21–32)
CREAT BLD-MCNC: 0.46 MG/DL
EGFRCR SERPLBLD CKD-EPI 2021: 108 ML/MIN/1.73M2 (ref 60–?)
EOSINOPHIL # BLD AUTO: 0.24 X10(3) UL (ref 0–0.7)
EOSINOPHIL NFR BLD AUTO: 2.3 %
ERYTHROCYTE [DISTWIDTH] IN BLOOD BY AUTOMATED COUNT: 15.7 %
GLOBULIN PLAS-MCNC: 5 G/DL (ref 2.8–4.4)
GLUCOSE BLD-MCNC: 116 MG/DL (ref 70–99)
GLUCOSE BLD-MCNC: 117 MG/DL (ref 70–99)
GLUCOSE BLD-MCNC: 124 MG/DL (ref 70–99)
GLUCOSE BLD-MCNC: 132 MG/DL (ref 70–99)
GLUCOSE BLD-MCNC: 133 MG/DL (ref 70–99)
HCT VFR BLD AUTO: 32.8 %
HGB BLD-MCNC: 10.6 G/DL
IMM GRANULOCYTES # BLD AUTO: 0.06 X10(3) UL (ref 0–1)
IMM GRANULOCYTES NFR BLD: 0.6 %
LYMPHOCYTES # BLD AUTO: 3.22 X10(3) UL (ref 1–4)
LYMPHOCYTES NFR BLD AUTO: 31.5 %
MAGNESIUM SERPL-MCNC: 1.1 MG/DL (ref 1.6–2.6)
MCH RBC QN AUTO: 30.1 PG (ref 26–34)
MCHC RBC AUTO-ENTMCNC: 32.3 G/DL (ref 31–37)
MCV RBC AUTO: 93.2 FL
MONOCYTES # BLD AUTO: 0.88 X10(3) UL (ref 0.1–1)
MONOCYTES NFR BLD AUTO: 8.6 %
NEUTROPHILS # BLD AUTO: 5.74 X10 (3) UL (ref 1.5–7.7)
NEUTROPHILS # BLD AUTO: 5.74 X10(3) UL (ref 1.5–7.7)
NEUTROPHILS NFR BLD AUTO: 56.1 %
OSMOLALITY SERPL CALC.SUM OF ELEC: 294 MOSM/KG (ref 275–295)
PHOSPHATE SERPL-MCNC: 1.9 MG/DL (ref 2.5–4.9)
PLATELET # BLD AUTO: 624 10(3)UL (ref 150–450)
POTASSIUM SERPL-SCNC: 3.5 MMOL/L (ref 3.5–5.1)
POTASSIUM SERPL-SCNC: 3.5 MMOL/L (ref 3.5–5.1)
PROCALCITONIN SERPL-MCNC: <0.05 NG/ML (ref ?–0.16)
PROT SERPL-MCNC: 6.8 G/DL (ref 6.4–8.2)
RBC # BLD AUTO: 3.52 X10(6)UL
SODIUM SERPL-SCNC: 142 MMOL/L (ref 136–145)
WBC # BLD AUTO: 10.2 X10(3) UL (ref 4–11)

## 2024-06-21 PROCEDURE — 71045 X-RAY EXAM CHEST 1 VIEW: CPT | Performed by: INTERNAL MEDICINE

## 2024-06-21 PROCEDURE — 99232 SBSQ HOSP IP/OBS MODERATE 35: CPT | Performed by: STUDENT IN AN ORGANIZED HEALTH CARE EDUCATION/TRAINING PROGRAM

## 2024-06-21 PROCEDURE — 99233 SBSQ HOSP IP/OBS HIGH 50: CPT | Performed by: INTERNAL MEDICINE

## 2024-06-21 PROCEDURE — 90792 PSYCH DIAG EVAL W/MED SRVCS: CPT | Performed by: OTHER

## 2024-06-21 RX ORDER — CLONAZEPAM 0.5 MG/1
0.5 TABLET ORAL 2 TIMES DAILY PRN
Status: DISCONTINUED | OUTPATIENT
Start: 2024-06-21 | End: 2024-06-23

## 2024-06-21 RX ORDER — MAGNESIUM OXIDE 400 MG/1
800 TABLET ORAL ONCE
Status: COMPLETED | OUTPATIENT
Start: 2024-06-21 | End: 2024-06-21

## 2024-06-21 RX ORDER — POTASSIUM CHLORIDE 14.9 MG/ML
20 INJECTION INTRAVENOUS ONCE
Status: COMPLETED | OUTPATIENT
Start: 2024-06-21 | End: 2024-06-21

## 2024-06-21 RX ORDER — CEFTRIAXONE 2 G/1
2 INJECTION, POWDER, FOR SOLUTION INTRAMUSCULAR; INTRAVENOUS EVERY 24 HOURS
Qty: 260 ML | Refills: 0 | Status: SHIPPED | OUTPATIENT
Start: 2024-06-21 | End: 2024-07-04

## 2024-06-21 RX ORDER — BUPROPION HYDROCHLORIDE 150 MG/1
150 TABLET ORAL DAILY
Status: DISCONTINUED | OUTPATIENT
Start: 2024-06-21 | End: 2024-06-23

## 2024-06-21 NOTE — PROGRESS NOTES
[facility]   part of [facility]     Hospitalist Progress Note     Soumya King Patient Status:  Observation    6/3/1960 MRN [id number]   Location [facility] 3SW-A Attending Bill Brian, *   Hosp Day # 0 PCP Rika Vinson,      Chief Complaint: Lethargy    Subjective:      - Pt much more awake and alert today, states she feels better though does not she did not get a lot of sleep last night   - Does note she has a history of insomnia and usually has frequent naps throughout the day     Objective:    Review of Systems:   A comprehensive review of systems was completed; pertinent positive and negatives stated in subjective.    Vital signs:  Temp:  [97.5 °F (36.4 °C)-98.3 °F (36.8 °C)] 98.2 °F (36.8 °C)  Pulse:  [83-94] 92  Resp:  [16-18] 18  BP: (108-123)/(62-74) 121/74  SpO2:  [93 %-98 %] 97 %    Physical Exam:    General: No acute distress  Respiratory: no wheezes, no rhonchi  Cardiovascular: S1, S2, regular rate and rhythm  Abdomen: Soft, Non-tender, non-distended, positive bowel sounds  Neuro: No new focal deficits. Equal strength in bilateral LE to flexion/extension   Extremities: no edema    Diagnostic Data:    Labs:  Recent Labs   Lab 24  1415 247 24  0551 24  0638 24  0630   WBC 15.8* 14.3* 12.2* 10.2 10.2   HGB 10.1* 10.7* 9.5* 9.9* 10.6*   MCV 94.7 92.5 91.9 92.9 93.2   .0* 756.0* 637.0* 573.0* 624.0*       Recent Labs   Lab 24  1827 24  0551 24  0638 24  0630   * 112* 127* 116*   BUN 16 14 9 10   CREATSERUM 0.70 0.54* 0.61 0.46*   CA 8.8 8.8 8.8 6.8*   ALB 2.4*  --  2.2* 1.8*   * 139 139 142   K 3.2* 3.7 3.5  3.5 3.5  3.5    109 108 115*   CO2 24.0 24.0 25.0 20.0*   ALKPHO 149*  --  128 102   AST 67*  --  102* 57*   ALT 72*  --  93* 65*   BILT 0.2  --  0.3 0.2   TP 9.5*  --  8.3* 6.8       Estimated Creatinine Clearance: 95.7 mL/min (A) (based on SCr of 0.46 mg/dL (L)).    No results  for input(s): \"TROP\", \"TROPHS\", \"CK\" in the last 168 hours.    No results for input(s): \"PTP\", \"INR\" in the last 168 hours.                 Microbiology    Hospital Encounter on 06/18/24   1. Blood Culture     Status: None (Preliminary result)    Collection Time: 06/18/24  6:28 PM    Specimen: Blood,peripheral   Result Value Ref Range    Blood Culture Result No Growth 2 Days N/A         Imaging: Reviewed in Epic.    Medications:    QUEtiapine ER  200 mg Oral QPM    clonazePAM  0.5 mg Oral BID    furosemide  20 mg Oral Daily    atorvastatin  80 mg Oral Nightly    clopidogrel  75 mg Oral Daily    aspirin  81 mg Oral Daily    gabapentin  600 mg Oral Nightly    topiramate  50 mg Oral BID    metoprolol succinate ER  50 mg Oral Daily Beta Blocker    azelastine  1 spray Each Nare BID    desvenlafaxine ER  100 mg Oral Daily    pantoprazole  40 mg Oral QAM AC    busPIRone  10 mg Oral BID    ampicillin-sulbactam  3 g Intravenous q6h    umeclidinium-vilanterol  1 puff Inhalation Daily    enoxaparin  40 mg Subcutaneous Daily    insulin aspart  1-10 Units Subcutaneous TID AC and HS       Assessment & Plan:      # Failure to thrive/lethargy  # Right lower lobe cavitary lesion   - IV Abx continued, f/u Bcx   - Previously had PNA with empyema s/p R VATS and pulmonary decortication   - PCT negative    - Pulm and ID consulted   - PT/OT eval    - TSH WNL, AM cortisol 22.7   - iron studies with AoCD and B12/folate replete on 5/28/24   - Nutrition consulted   - Possibly mediation induced, will have psychiatry evaluate given hx of depression on multiple psych meds - for now holding AM buspar, klonopin; however appreciate reccs regarding neurontin, pristiq, topamax, seroquel     # DM2 - LDSSI + Accucheck QID (or Q6H if NPO)  # Hypertension - Continue home oral antihypertensives  # Hyperlipidemia - continue home statin   # GERD - continue PPI  # CAD s/p CABG - ASA, plavix, statin, BB  # Elevated D-dimer - CTA negative for PE, LE dopplers  negative     Bill Brian MD    Supplementary Documentation:     Quality:  DVT Mechanical Prophylaxis:   SCDs,    DVT Pharmacologic Prophylaxis   Medication    enoxaparin (Lovenox) 40 MG/0.4ML SUBQ injection 40 mg         DVT Pharmacologic prophylaxis: Aspirin 81 mg      Code Status: Full Code  Gotti: No urinary catheter in place  Gotti Duration (in days):   Central line:    ILYA:     The 21st Century Cures Act makes medical notes like these available to patients in the interest of transparency. Please be advised this is a medical document. Medical documents are intended to carry relevant information, facts as evident, and the clinical opinion of the practitioner. The medical note is intended as peer to peer communication and may appear blunt or direct. It is written in medical language and may contain abbreviations or verbiage that are unfamiliar.

## 2024-06-21 NOTE — CM/SW NOTE
Discussed with Dr Menezes and plan is to discharge on IV Rocephin.  Uploaded order to [facility] and called Kylah to notify her of update and possible discharge tomorrow.    / to remain available for support and/or discharge planning.     Iza SAEZA MSN, RN CTL/  [id number]

## 2024-06-21 NOTE — CONSULTS
[facility]  Report of Psychiatric Consultation    Soumya King Patient Status:  Observation    6/3/1960 MRN [id number]   Formerly [facility] 3SW-A Attending Bill Brian, *   Hosp Day # 3 PCP Rika Vinson DO     Date of Admission: 2024  Date of Consult: 2024  Reason for Consultation: Altered mental status    Impression:  Primary Psychiatric Diagnosis:  Major depressive disorder, recurrent, moderate. She has depressed mood, decreased motivation, anhedonia, and a lot of guilt and worthless feelings about her smoking contributing to her pulmonary issues. She blames herself harshly for her current medical situation.     Generalized anxiety disorder.    Component of anxiety and depressive disorder due to her general medical conditions (ie recent pneumonia/abscess/empyema, COPD).     Delirium/encephalopathy due to recent pneumonia/empyema and possibly meds (ie oxycodone, klonipin, neurontin, seroquel) and low K and Mg. IMPROVED. She is alert and attentive and organized in thinking at this time.      Pertinent Medical Diagnoses:  Pneumonia/abscess/empyema s/p VATS and decortication 24. COPD. DM2. CAD. Migraines.     Recommendations:  1) DC Buspar since it is a weak anxiolytic and her depressive symptoms are the primary issue at this time.    2) Start Wellbutrin XL 150mg po daily to help with depression and smoking cessation. She vapes nicotine daily and is motivated to stop. She switched from cigarettes to vape 4 yrs ago. States that she isn't eating much in the hospital because she doesn't like the food. She is aware that if she loses weight with the Wellbutrin, it may have to be stopped. Not using Chantix for smoking cessation because of her hx of depression.     3) Continue to use opioids prn judiciously.    4) Continue Klonipin at her usual 0.5mg po twice a day PRN anxiety or insomnia. She was taking it at home once a day on average.    5) Continue Pristiq 100mg po daily  for anxiety/depression/migraine prevention.    6) Continue Seroquel XL 200mg nightly to help with mood lability and insomnia. Would not taper at this time since she still c/o insomnia.    7) Will ask psych liaison to make her an appointment with an individual psychotherapist. Can be in person or video. She last saw a psychotherapist 2 yrs ago and is open to counseling again.    8) She will follow up with her outpatient psychiatrist.       Arun Lion MD  6/21/2024  9:01 AM    History of Present Illness:  63 yo female with hx major depression and recent pneumonia/abscess/empyema s/p VATS and decortication 6/5/24 was admitted on 6/18/24 for eval of her fatigue, generalized weakness, poor appetite, and hypersomnia. She was eval by ID and pulm and remains on antibiotics.     Psych consulted because of her increased depressive symptoms the past month with her health issues. She has increased sadness, decreased motivation, anhedonia, and a lot of guilty and worthless feelings about her smoking contributing to her pulmonary issues. She blames herself harshly for her current medical situation. She has decreased energy and appetite and hypersomnia.     She is on Pristiq for anxiety/depression, buspar and klonipin for anxiety, and seroquel for sleep and mood lability/irritability. She used to see an individual psychotherapist, last time about 2 yrs ago.     Psychiatry Review Systems: No hopelessness or suicidal ideation. No voices or visions. No elevated mood, racing thoughts, decreased need for sleep, grandiose thoughts.    Past Psychiatric History: Major depressive disorder, recurrent, moderate, since her partner of 15 yrs left her in 2010. Treated with meds and psychotherapy. No hx of suicidal ideation.     Substance Use History: Nicotine use disorder. Vapes daily.    Psych Family History: None per patient    Social and Developmental History: Single, lives alone in a condo. She works at [employer]. Sister is her main support.       Past Medical History:    Abdominal pain, other specified site    groin    Acute bronchitis    Allergic rhinitis    Anxiety    Arthritis    Back problem    Jamison esophagus    Chronic low back pain without sciatica    COPD (chronic obstructive pulmonary disease) (HCC)    Depression    Diabetes (HCC)    Difficult intubation    difficult to intubate with bladder surgery,instructed by anesthesia to communicate to future anesthesiologist. Small airway    Esophageal reflux    Essential hypertension    Don't remember    Fitting and adjustment of dental prosthetic device    High blood pressure    History of 2019 novel coronavirus disease (COVID-19)    No hospitalization,symptoms; Fever,fatigue ,body aches,headache no loss of taste or smell    Hx of motion sickness    Hyperlipidemia    Mass of spine    Migraines    barometric pressure    Myocardial infarction (HCC)    Osteoarthritis    Other ill-defined conditions(799.89)    Jamison's    Pain in joint, forearm    wrist    Pain in joint, pelvic region and thigh    hip    Pelvic mass    Personal history of urinary (tract) infection    Sleep apnea    I don't date     Past Surgical History:   Procedure Laterality Date    Cabg  12/27/23    Colonoscopy      Colonoscopy N/A 05/18/2022    Procedure: COLONOSCOPY AND ESOPHAGOGASTRODUODENOSCOPY;  Surgeon: Miguel Camargo MD;  Location:  ENDOSCOPY    Heart surgery  2024    double bypass    Laparoscopic  2007    sling operation for stress incontinence    Laparoscopy,diagnostic      Kay biopsy stereo nodule 2 site bilat (cpt=19081/55930) Left 02/2010    BENIGN 2 SITES    Kay biopsy stereo w/calc 2 site left (cpt=19081/16668)  02/2010    benign x 2    Kay stereo-clip w/calc 2 site left  2010    Oophorectomy Left 06/28/2019    Other  08/31/2022    RIGHT SACRAL SUBCUTANEOUS CYST EXCISION    Other surgical history  2000, 2019    Bladder Sling, Large mass removed from left abdomen and ovar    Tonsillectomy      Was a child, have no  idea    Tubal ligation      Upper gi endoscopy,exam       Family History   Problem Relation Age of Onset    Arthritis Mother     Other (AMI) Mother     Other (diabetes mellitus) Mother     Diabetes Mother       reports that she quit smoking about 17 months ago. Her smoking use included cigarettes. She started smoking about 49 years ago. She has a 72 pack-year smoking history. She has never used smokeless tobacco. She reports that she does not currently use alcohol. She reports that she does not use drugs.    Allergies:  Allergies   Allergen Reactions    Naprosyn [Naproxen] ANAPHYLAXIS     Has tolerated ibuprofen and IV toradol     Aspirin OTHER (SEE COMMENTS)     Difficulty swallowing due to  saavedra's esophagus       Medications:    Current Facility-Administered Medications:     QUEtiapine ER (SEROquel XR) 24 hr tab 200 mg, 200 mg, Oral, QPM    cyclobenzaprine (Flexeril) tab 10 mg, 10 mg, Oral, Q12H PRN    metoclopramide (Reglan) solution 5 mg, 5 mg, Oral, TID AC PRN    [Held by provider] clonazePAM (KlonoPIN) tab 0.5 mg, 0.5 mg, Oral, BID    furosemide (Lasix) tab 20 mg, 20 mg, Oral, Daily    atorvastatin (Lipitor) tab 80 mg, 80 mg, Oral, Nightly    clopidogrel (Plavix) tab 75 mg, 75 mg, Oral, Daily    aspirin DR tab 81 mg, 81 mg, Oral, Daily    gabapentin (Neurontin) tab 600 mg, 600 mg, Oral, Nightly    topiramate (TopaMAX) tab 50 mg, 50 mg, Oral, BID    metoprolol succinate ER (Toprol XL) 24 hr tab 50 mg, 50 mg, Oral, Daily Beta Blocker    benzonatate (Tessalon) cap 100 mg, 100 mg, Oral, TID PRN    azelastine (Astelin) 0.1 % nasal solution 1 spray, 1 spray, Each Nare, BID    desvenlafaxine ER (Pristiq) 24 hr tab 100 mg, 100 mg, Oral, Daily    oxyCODONE immediate release tab 5 mg, 5 mg, Oral, Q6H PRN    pantoprazole (Protonix) DR tab 40 mg, 40 mg, Oral, QAM AC    [Held by provider] busPIRone (Buspar) tab 10 mg, 10 mg, Oral, BID    ampicillin-sulbactam (Unasyn) 3 g in sodium chloride 0.9% 100mL IVPB-ADD, 3 g,  Intravenous, q6h    umeclidinium-vilanterol (Anoro Ellipta) 62.5-25 MCG/ACT inhaler 1 puff, 1 puff, Inhalation, Daily    glucose (Dex4) 15 GM/59ML oral liquid 15 g, 15 g, Oral, Q15 Min PRN **OR** glucose (Glutose) 40% oral gel 15 g, 15 g, Oral, Q15 Min PRN **OR** glucose-vitamin C (Dex-4) chewable tab 4 tablet, 4 tablet, Oral, Q15 Min PRN **OR** dextrose 50% injection 50 mL, 50 mL, Intravenous, Q15 Min PRN **OR** glucose (Dex4) 15 GM/59ML oral liquid 30 g, 30 g, Oral, Q15 Min PRN **OR** glucose (Glutose) 40% oral gel 30 g, 30 g, Oral, Q15 Min PRN **OR** glucose-vitamin C (Dex-4) chewable tab 8 tablet, 8 tablet, Oral, Q15 Min PRN    enoxaparin (Lovenox) 40 MG/0.4ML SUBQ injection 40 mg, 40 mg, Subcutaneous, Daily    acetaminophen (Tylenol Extra Strength) tab 500 mg, 500 mg, Oral, Q4H PRN    melatonin tab 3 mg, 3 mg, Oral, Nightly PRN    ondansetron (Zofran) 4 MG/2ML injection 4 mg, 4 mg, Intravenous, Q6H PRN    insulin aspart (NovoLOG) 100 Units/mL FlexPen 1-10 Units, 1-10 Units, Subcutaneous, TID AC and HS    Review of Systems   Constitutional:  Positive for malaise/fatigue.   Psychiatric/Behavioral:  Positive for depression. Negative for suicidal ideas. The patient is nervous/anxious.      Mental Status Exam:     Objective      Vitals:    06/21/24 0830   BP: 111/75   Pulse: 86   Resp: 18   Temp: 97.9 °F (36.6 °C)     Appearance: normal grooming  Behavior: normal psychomotor  Attitude: cooperative and consistent  Gait: Normal    Speech: normal rate, rhythm and volume    Mood: depressed  Affect: Congruent    Thought process: logical and sequential  Thought content: no delusions, obsessions, or other thought abnormalities  Perceptions: no hallucinations  Associations: Intact    Orientation: Oriented person, place, time, situation  Attention and Concentration: fair  Memory:  intact immediate, recent, remote  Language: Intact naming and repetition  Fund of Knowledge: Able to recite name of [place] president    Insight:  fair  Judgment: fair    Laboratory Data:  Lab Results   Component Value Date    WBC 10.2 06/21/2024    HGB 10.6 06/21/2024    HCT 32.8 06/21/2024    .0 06/21/2024    CREATSERUM 0.46 06/21/2024    BUN 10 06/21/2024     06/21/2024    K 3.5 06/21/2024    K 3.5 06/21/2024     06/21/2024    CO2 20.0 06/21/2024     06/21/2024    CA 6.8 06/21/2024    ALB 1.8 06/21/2024    ALKPHO 102 06/21/2024    BILT 0.2 06/21/2024    TP 6.8 06/21/2024    AST 57 06/21/2024    ALT 65 06/21/2024    MG 1.1 06/21/2024    PHOS 1.9 06/21/2024    PGLU 133 06/21/2024

## 2024-06-21 NOTE — PROGRESS NOTES
[facility]   part of [facility] ID PROGRESS NOTE    Soumya King Patient Status:  Observation    6/3/1960 MRN [id number]   Location [facility] 3SW-A Attending Bill Brian, *   Hosp Day # 0 PCP Rika Vinson,      Abx: unasyn, sp invanz    Subjective: pt seen and examined. No fevers. Better this am. Up to chair. Minimal cough. No fevers    Medications:    Current Facility-Administered Medications:     clonazePAM (KlonoPIN) tab 0.5 mg, 0.5 mg, Oral, BID PRN    buPROPion ER (Wellbutrin XL) 24 hr tab 150 mg, 150 mg, Oral, Daily    potassium phosphate dibasic 15 mmol in sodium chloride 0.9% 250 mL IVPB, 15 mmol, Intravenous, Once **FOLLOWED BY** potassium chloride 20 mEq/100mL IVPB premix 20 mEq, 20 mEq, Intravenous, Once    QUEtiapine ER (SEROquel XR) 24 hr tab 200 mg, 200 mg, Oral, QPM    cyclobenzaprine (Flexeril) tab 10 mg, 10 mg, Oral, Q12H PRN    metoclopramide (Reglan) solution 5 mg, 5 mg, Oral, TID AC PRN    furosemide (Lasix) tab 20 mg, 20 mg, Oral, Daily    atorvastatin (Lipitor) tab 80 mg, 80 mg, Oral, Nightly    clopidogrel (Plavix) tab 75 mg, 75 mg, Oral, Daily    aspirin DR tab 81 mg, 81 mg, Oral, Daily    gabapentin (Neurontin) tab 600 mg, 600 mg, Oral, Nightly    topiramate (TopaMAX) tab 50 mg, 50 mg, Oral, BID    metoprolol succinate ER (Toprol XL) 24 hr tab 50 mg, 50 mg, Oral, Daily Beta Blocker    benzonatate (Tessalon) cap 100 mg, 100 mg, Oral, TID PRN    azelastine (Astelin) 0.1 % nasal solution 1 spray, 1 spray, Each Nare, BID    desvenlafaxine ER (Pristiq) 24 hr tab 100 mg, 100 mg, Oral, Daily    oxyCODONE immediate release tab 5 mg, 5 mg, Oral, Q6H PRN    pantoprazole (Protonix) DR tab 40 mg, 40 mg, Oral, QAM AC    ampicillin-sulbactam (Unasyn) 3 g in sodium chloride 0.9% 100mL IVPB-ADD, 3 g, Intravenous, q6h    umeclidinium-vilanterol (Anoro Ellipta) 62.5-25 MCG/ACT inhaler 1 puff, 1 puff, Inhalation, Daily    glucose (Dex4) 15 GM/59ML oral liquid  15 g, 15 g, Oral, Q15 Min PRN **OR** glucose (Glutose) 40% oral gel 15 g, 15 g, Oral, Q15 Min PRN **OR** glucose-vitamin C (Dex-4) chewable tab 4 tablet, 4 tablet, Oral, Q15 Min PRN **OR** dextrose 50% injection 50 mL, 50 mL, Intravenous, Q15 Min PRN **OR** glucose (Dex4) 15 GM/59ML oral liquid 30 g, 30 g, Oral, Q15 Min PRN **OR** glucose (Glutose) 40% oral gel 30 g, 30 g, Oral, Q15 Min PRN **OR** glucose-vitamin C (Dex-4) chewable tab 8 tablet, 8 tablet, Oral, Q15 Min PRN    enoxaparin (Lovenox) 40 MG/0.4ML SUBQ injection 40 mg, 40 mg, Subcutaneous, Daily    acetaminophen (Tylenol Extra Strength) tab 500 mg, 500 mg, Oral, Q4H PRN    melatonin tab 3 mg, 3 mg, Oral, Nightly PRN    ondansetron (Zofran) 4 MG/2ML injection 4 mg, 4 mg, Intravenous, Q6H PRN    insulin aspart (NovoLOG) 100 Units/mL FlexPen 1-10 Units, 1-10 Units, Subcutaneous, TID AC and HS    Review of Systems:  Completed    Physical Exam:  Vital signs: Blood pressure 114/67, pulse 88, temperature 98.3 °F (36.8 °C), temperature source Oral, resp. rate 18, weight 120 lb (54.4 kg), SpO2 97%, not currently breastfeeding.    General: Alert, oriented, NAD, on room air, up to chair  HEENT: Moist mucous membranes.   Neck: No lymphadenopathy.  Supple.  Cardiovascular: RRR  Respiratory: Clear to auscultation bilaterally.  No wheezes. No rhonchi.  Abdomen: Soft, nontender, nondistended.   Musculoskeletal: No edema noted  Integument: No lesions. No erythema.    Laboratory Data:  Recent Labs   Lab 06/21/24  0630   RBC 3.52*   HGB 10.6*   HCT 32.8*   MCV 93.2   MCH 30.1   MCHC 32.3   RDW 15.7   NEPRELIM 5.74   WBC 10.2   .0*     Recent Labs   Lab 06/18/24  1827 06/19/24  0551 06/20/24  0638 06/21/24  0630   * 112* 127* 116*   BUN 16 14 9 10   CREATSERUM 0.70 0.54* 0.61 0.46*   CA 8.8 8.8 8.8 6.8*   ALB 2.4*  --  2.2* 1.8*   * 139 139 142   K 3.2* 3.7 3.5  3.5 3.5  3.5    109 108 115*   CO2 24.0 24.0 25.0 20.0*   ALKPHO 149*  --  128 102    AST 67*  --  102* 57*   ALT 72*  --  93* 65*   BILT 0.2  --  0.3 0.2   TP 9.5*  --  8.3* 6.8       Microbiology: Reviewed in EMR  Susceptibility data from last 90 days.  Collected Specimen Info Organism Ampicillin Ampicillin/Sulbactam Cefazolin Ciprofloxacin Gentamicin Levofloxacin Meropenem Nitrofurantoin Piperacillin/Tazobactam Tobramycin Trimethoprim/Sulfamethoxazole   06/05/24 Tissue from Lung right Lactobacillus species              06/05/24 Tissue from Lung right Lactobacillus species                No Staph aureus, Beta Strep or Pseudo aeruginosa isolated              06/05/24 Tissue Cutibacterium (Proprionibacterium) acnes                Cutibacterium avidum              06/01/24 Other from Sputum Candida albicans              05/29/24 Body fluid, unspecified from Pleural Fluid, Right Streptococcus intermedius              05/28/24 Body fluid, unspecified from Pleural Fluid, Right Streptococcus intermedius              05/27/24 Other from Sputum Candida albicans              05/14/24 Urine, clean catch Escherichia coli  R  I  S  S  R  S  S  S  S  S  R         Radiology: Reviewed.    PROCEDURE:  CT CHEST PE AORTA (IV ONLY) (CPT=71260)     COMPARISON:  [facility] , CT, CT CHEST (CPT=71250), 6/03/2024, 11:14 AM.     INDICATIONS:  RECENT EMPYEMA FATIGUE HYPOXIA ELEV DIMER     TECHNIQUE:  CT images were obtained with non-ionic intravenous contrast material. Dose reduction techniques were used. Dose information is transmitted to the ACR (American College of Radiology) NRDR (National Radiology Data Registry) which includes the  Dose Index Registry.     PATIENT STATED HISTORY:(As transcribed by Technologist)  SOB, elevated d dimer, hypoxia      CONTRAST USED:  80 mLcc of Isovue 370     FINDINGS:    LUNGS:  Right lower lobe cavitary lesion measures 4.1 x 3.1 cm.  Marked consolidations in the right lung have improved since prior examination.  Peripheral interstitial abnormalities are noted.  VASCULATURE:  No visible  pulmonary arterial thrombus or attenuation.    ANKUSH:  Right perihilar lymph node measures 2.3 x 1.4 cm.  MEDIASTINUM:  Paratracheal lymph node measures 1.1 x 1.3 cm.  CARDIAC:  No enlargement, pericardial thickening, or significant coronary artery calcification.  PLEURA:  Small right pleural effusion is noted.  THORACIC AORTA:  No aneurysm.    CHEST WALL:  No mass or axillary adenopathy.    LIMITED ABDOMEN:  Limited images of the upper abdomen are unremarkable.    BONES:  Mid sternotomy wires are noted.     Impression:    CONCLUSION:       1. Marked atelectasis the right lung along with consolidations have resolved.  Large fluid collection has mostly resolved.  Only minimal residual right pleural effusion is noted.  Cavitary lesion in the right lower lobe is identified.  This cavitary  lesion may be sequelae of prior infectious process.  A pneumatocele is of consideration.     2. There is mild mediastinal and right perihilar lymphadenopathy again noted.       [place]  [facility]     Dictated by (CST): Gregory Vernon MD on 6/18/2024 at 10:50 PM      Finalized by (CST): Gregory Vernon MD on 6/18/2024 at 10:54 PM        PROCEDURE:  XR CHEST AP PORTABLE  (CPT=71045)     TECHNIQUE:  AP chest radiograph was obtained.     COMPARISON:  [facility] , XR, XR CHEST AP PORTABLE  (CPT=71045), 6/09/2024, 12:24 PM.     INDICATIONS:  recent dx from hospital, poor appetite, sleeping a lot, on 2L NC at home     PATIENT STATED HISTORY: (As transcribed by Technologist)           FINDINGS:       Right-sided PICC line has tip in the SVC.     Median sternotomy are stable.     Small right lower lobe consolidation pleural effusion.     No pneumothorax.    Impression:    CONCLUSION:  Right lower lobe consolidation with small right pleural effusion is noted.  This is improved compared to prior examination.     LOCATION:  [id number]     Dictated by ([facility]): Gregory Vernon MD on 6/18/2024 at 8:17 PM      Finalized by (CST): Gregory Vernon  MD on 6/18/2024 at 8:18 PM    ASSESSMENT:  Fatigue with poor po intake/appetite- ? New infection but not obvious, ? Medication related (invanz), but also on several meds that can cuase lethargy (clonazepam, seroquel, etc)  -at this point, seems like more likely side effect from invanz, reports none of her psych meds are new  RLL pneumonia/pulmonary abscess with right empyema  S/p thoracentesis 5/28, cx with strep intermedius. Path w/ acute inflammation with occasional bacterial organisms present.   S/p R chest tube placement 5/29, cx with strep intermedius.   S/p R VATS and total pulmonary decortication 6/5, R purulent empyema with RLL lung abscess noted. Tissue cx from RLL lung abscess with lactobacillus. Tissue cx from pleural peel with lactobacillus. Tissue cx from right pleural debris with cutibacterium acnes and avidum.   Has been on IV Invanz outpatient (plan was for EOT 7/4/24)  CT chest this admission much better, cavitary lesion to RLL noted.  Leukocytosis, improving. No fevers.   Thrombocytosis, improving.  Elevated LFTs. CT a/p and US abd unremarkable last admission.  Anemia  IgM kappa MGUS  DM II. Hgb A1C 7.1 (5/2024)  COPD    PLAN:  - cont Unasyn for continuation of treatment of RLL abscess/R empyema (EOT of abx 7/4/2024)  - follow blood cultures, so far neg  - follow temps and cbc  - follow LFTs  - would send c. Diff if further diarrhea (none today per staff).    Discussed case with patient and . If she cont to improve and no new infection identified, could go home over the weekend on CTX to complete course of treatment for empyema (EOT 7/4/24)- d/w SW, new script placed in chart    Vibha Menezes MD

## 2024-06-21 NOTE — PROGRESS NOTES
[facility]  Pulmonary/Critical Care Progress note    Soumya King Patient Status:  Observation    6/3/1960 MRN [id number]   Location [facility] 3SW-A Attending Bill Brian, *   Hosp Day # 0 PCP Rika Vinson,        History of Present Illness:    Breathing much better today.  Denies any increased shortness of breath or chest pains    History:  Past Medical History:    Abdominal pain, other specified site    groin    Acute bronchitis    Allergic rhinitis    Anxiety    Arthritis    Back problem    Jamison esophagus    Chronic low back pain without sciatica    COPD (chronic obstructive pulmonary disease) (HCC)    Depression    Diabetes (HCC)    Difficult intubation    difficult to intubate with bladder surgery,instructed by anesthesia to communicate to future anesthesiologist. Small airway    Esophageal reflux    Essential hypertension    Don't remember    Fitting and adjustment of dental prosthetic device    High blood pressure    History of 2019 novel coronavirus disease (COVID-19)    No hospitalization,symptoms; Fever,fatigue ,body aches,headache no loss of taste or smell    Hx of motion sickness    Hyperlipidemia    Mass of spine    Migraines    barometric pressure    Myocardial infarction (HCC)    Osteoarthritis    Other ill-defined conditions(799.89)    Jamison's    Pain in joint, forearm    wrist    Pain in joint, pelvic region and thigh    hip    Pelvic mass    Personal history of urinary (tract) infection    Sleep apnea    I don't date     Past Surgical History:   Procedure Laterality Date    Cabg  23    Colonoscopy      Colonoscopy N/A 2022    Procedure: COLONOSCOPY AND ESOPHAGOGASTRODUODENOSCOPY;  Surgeon: Miguel Camargo MD;  Location:  ENDOSCOPY    Heart surgery      double bypass    Laparoscopic      sling operation for stress incontinence    Laparoscopy,diagnostic      Kay biopsy stereo nodule 2 site bilat (cpt=19081/84945) Left 2010     BENIGN 2 SITES    Kay biopsy stereo w/calc 2 site left (cpt=19081/26029)  02/2010    benign x 2    Kay stereo-clip w/calc 2 site left  2010    Oophorectomy Left 06/28/2019    Other  08/31/2022    RIGHT SACRAL SUBCUTANEOUS CYST EXCISION    Other surgical history  2000, 2019    Bladder Sling, Large mass removed from left abdomen and ovar    Tonsillectomy      Was a child, have no idea    Tubal ligation      Upper gi endoscopy,exam       Family History   Problem Relation Age of Onset    Arthritis Mother     Other (AMI) Mother     Other (diabetes mellitus) Mother     Diabetes Mother       reports that she quit smoking about 17 months ago. Her smoking use included cigarettes. She started smoking about 49 years ago. She has a 72 pack-year smoking history. She has never used smokeless tobacco. She reports that she does not currently use alcohol. She reports that she does not use drugs.    Allergies:  Allergies   Allergen Reactions    Naprosyn [Naproxen] ANAPHYLAXIS     Has tolerated ibuprofen and IV toradol     Aspirin OTHER (SEE COMMENTS)     Difficulty swallowing due to  saavedra's esophagus       Medications:    Current Facility-Administered Medications:     clonazePAM (KlonoPIN) tab 0.5 mg, 0.5 mg, Oral, BID PRN    buPROPion ER (Wellbutrin XL) 24 hr tab 150 mg, 150 mg, Oral, Daily    QUEtiapine ER (SEROquel XR) 24 hr tab 200 mg, 200 mg, Oral, QPM    cyclobenzaprine (Flexeril) tab 10 mg, 10 mg, Oral, Q12H PRN    metoclopramide (Reglan) solution 5 mg, 5 mg, Oral, TID AC PRN    furosemide (Lasix) tab 20 mg, 20 mg, Oral, Daily    atorvastatin (Lipitor) tab 80 mg, 80 mg, Oral, Nightly    clopidogrel (Plavix) tab 75 mg, 75 mg, Oral, Daily    aspirin DR tab 81 mg, 81 mg, Oral, Daily    gabapentin (Neurontin) tab 600 mg, 600 mg, Oral, Nightly    topiramate (TopaMAX) tab 50 mg, 50 mg, Oral, BID    metoprolol succinate ER (Toprol XL) 24 hr tab 50 mg, 50 mg, Oral, Daily Beta Blocker    benzonatate (Tessalon) cap 100 mg, 100 mg,  Oral, TID PRN    azelastine (Astelin) 0.1 % nasal solution 1 spray, 1 spray, Each Nare, BID    desvenlafaxine ER (Pristiq) 24 hr tab 100 mg, 100 mg, Oral, Daily    oxyCODONE immediate release tab 5 mg, 5 mg, Oral, Q6H PRN    pantoprazole (Protonix) DR tab 40 mg, 40 mg, Oral, QAM AC    ampicillin-sulbactam (Unasyn) 3 g in sodium chloride 0.9% 100mL IVPB-ADD, 3 g, Intravenous, q6h    umeclidinium-vilanterol (Anoro Ellipta) 62.5-25 MCG/ACT inhaler 1 puff, 1 puff, Inhalation, Daily    glucose (Dex4) 15 GM/59ML oral liquid 15 g, 15 g, Oral, Q15 Min PRN **OR** glucose (Glutose) 40% oral gel 15 g, 15 g, Oral, Q15 Min PRN **OR** glucose-vitamin C (Dex-4) chewable tab 4 tablet, 4 tablet, Oral, Q15 Min PRN **OR** dextrose 50% injection 50 mL, 50 mL, Intravenous, Q15 Min PRN **OR** glucose (Dex4) 15 GM/59ML oral liquid 30 g, 30 g, Oral, Q15 Min PRN **OR** glucose (Glutose) 40% oral gel 30 g, 30 g, Oral, Q15 Min PRN **OR** glucose-vitamin C (Dex-4) chewable tab 8 tablet, 8 tablet, Oral, Q15 Min PRN    enoxaparin (Lovenox) 40 MG/0.4ML SUBQ injection 40 mg, 40 mg, Subcutaneous, Daily    acetaminophen (Tylenol Extra Strength) tab 500 mg, 500 mg, Oral, Q4H PRN    melatonin tab 3 mg, 3 mg, Oral, Nightly PRN    ondansetron (Zofran) 4 MG/2ML injection 4 mg, 4 mg, Intravenous, Q6H PRN    insulin aspart (NovoLOG) 100 Units/mL FlexPen 1-10 Units, 1-10 Units, Subcutaneous, TID AC and HS    Intake/Output:    Intake/Output Summary (Last 24 hours) at 6/21/2024 0947  Last data filed at 6/20/2024 2053  Gross per 24 hour   Intake --   Output 400 ml   Net -400 ml      Body mass index is 22.67 kg/m².    Review of Systems  Review of Systems:   A comprehensive 10 point review of systems was completed.  Pertinent positives and negatives noted in the the HPI.         Vitals:    06/20/24 2320 06/21/24 0515 06/21/24 0629 06/21/24 0830   BP: 119/68 121/74  111/75   BP Location: Left arm Left arm  Left arm   Pulse: 83 85 92 86   Resp: 18 18  18   Temp:  98.3 °F (36.8 °C) 98.2 °F (36.8 °C)  97.9 °F (36.6 °C)   TempSrc: Oral Oral  Oral   SpO2:    96%   Weight:             Physical Exam  Constitutional:       General: She is not in acute distress.     Appearance: Normal appearance. She is not ill-appearing or diaphoretic.   HENT:      Head: Normocephalic and atraumatic.      Nose: Nose normal. No congestion or rhinorrhea.      Mouth/Throat:      Mouth: Mucous membranes are moist.      Pharynx: Oropharynx is clear. No oropharyngeal exudate or posterior oropharyngeal erythema.      Comments: Mallampati class III palate  Eyes:      Extraocular Movements: Extraocular movements intact.      Pupils: Pupils are equal, round, and reactive to light.   Cardiovascular:      Rate and Rhythm: Normal rate.      Pulses: Normal pulses.      Heart sounds: Normal heart sounds. No murmur heard.  Pulmonary:      Effort: Pulmonary effort is normal. No respiratory distress.      Breath sounds: Normal breath sounds. No wheezing or rhonchi.      Comments: Diminished breath sound bilateral specially in right lower lung zones  Chest:      Chest wall: No tenderness.   Abdominal:      General: Abdomen is flat. Bowel sounds are normal.      Palpations: Abdomen is soft.   Musculoskeletal:         General: Normal range of motion.   Skin:     General: Skin is warm.   Neurological:      General: No focal deficit present.      Mental Status: She is alert and oriented to person, place, and time.   Psychiatric:         Mood and Affect: Mood normal.         Behavior: Behavior normal.         Thought Content: Thought content normal.         Judgment: Judgment normal.              Lab Data Review:  Recent Labs   Lab 06/19/24  0551 06/20/24  0638 06/21/24  0630   WBC 12.2* 10.2 10.2   HGB 9.5* 9.9* 10.6*   HCT 29.5* 30.1* 32.8*   .0* 573.0* 624.0*       Recent Labs   Lab 06/18/24  1827 06/19/24  0551 06/20/24  0638 06/21/24  0630   * 139 139 142   K 3.2* 3.7 3.5  3.5 3.5  3.5    109 108  115*   CO2 24.0 24.0 25.0 20.0*   BUN 16 14 9 10   CREATSERUM 0.70 0.54* 0.61 0.46*   CA 8.8 8.8 8.8 6.8*   ALB 2.4*  --  2.2* 1.8*   ALKPHO 149*  --  128 102   ALT 72*  --  93* 65*   AST 67*  --  102* 57*   * 112* 127* 116*       Recent Labs   Lab 06/20/24  0638 06/21/24  0630   MG 1.5* 1.1*       Lab Results   Component Value Date    PHOS 1.9 (L) 06/21/2024        No results for input(s): \"PT\", \"INR\", \"PTT\" in the last 168 hours.    No results for input(s): \"ABGPHT\", \"LAYHSM9K\", \"OPNWV4A\", \"ABGHCO3\", \"ABGBE\", \"TEMP\", \"CHARLES\", \"SITE\", \"DEV\", \"THGB\" in the last 168 hours.    No results for input(s): \"TROP\", \"CKMB\" in the last 168 hours.    Cultures:   Hospital Encounter on 06/18/24   1. Blood Culture     Status: None (Preliminary result)    Collection Time: 06/18/24  6:28 PM    Specimen: Blood,peripheral   Result Value Ref Range    Blood Culture Result No Growth 2 Days N/A             Component  Ref Range & Units 6/21/24 0630   Procalcitonin  <0.16 ng/mL <0.05        Radiology personally reviewed:  XR CHEST AP PORTABLE  (CPT=71045)    Result Date: 6/21/2024  CONCLUSION:  Mild worsening of patchy airspace disease the right lung base with small right pleural effusion could represent atelectasis or pneumonia.   LOCATION:  [facility]      Dictated by (CST): Elieser Torres MD on 6/21/2024 at 7:32 AM     Finalized by (CST): Elieser Torres MD on 6/21/2024 at 7:34 AM       US VENOUS DOPPLER LEG BILAT - DIAG IMG (CPT=93970)    Result Date: 6/19/2024  CONCLUSION:  No DVT lower extremities.   LOCATION:  [facility]   Dictated by (CST): Elieser Torres MD on 6/19/2024 at 4:32 PM     Finalized by (CST): Elieser Torres MD on 6/19/2024 at 4:32 PM         Patient Active Problem List   Diagnosis    Vitamin D deficiency    Jamison's esophagus    Tobacco use    Insomnia    COPD, mild (HCC) - Dx'd via PFTs done in 3/2015    Migraine without aura and without status migrainosus, not intractable    Hyperlipidemia    SUMMER on CPAP    MDD (major  depressive disorder), recurrent episode, moderate (HCC)    GERD (gastroesophageal reflux disease)    Hypertension    Chronic pansinusitis    Mucinous cystadenoma of ovary, left    KIRK (generalized anxiety disorder)    History of pubovaginal sling    Absence of bladder continence    Vasomotor flushing    Primary osteoarthritis of left knee    Mass of spine    Hx of motion sickness    Difficult intubation    Chronic low back pain without sciatica    Type 2 diabetes mellitus with diabetic neuropathy (HCC)    Pulmonary nodule    CAD, multiple vessel    S/P CABG x 2    Type 2 diabetes mellitus with hyperlipidemia (HCC)    Anemia    Iron deficiency anemia secondary to inadequate dietary iron intake    Subacute cough    Hyperglycemia    Acute anemia    MARÍA ELENA (acute kidney injury) (HCC)    Transaminitis    Thrombocytosis    Hyponatremia    Pleural effusion    Somnolence    Acute respiratory failure with hypoxia (HCC)    Empyema of right pleural space (HCC)    Status post thoracotomy    Hypoxia    FTT (failure to thrive) in adult    Dehydration    Hypokalemia    Generalized weakness    Malnutrition (HCC)       Assessment:  Shortness of breath: Remains on room air  Right lower lobe consolidation: This could be related to aspiration pneumonia with GERD versus community-acquired pneumonia  Right lower lobe cavitary lesion:Right lower lobe cavitary lesion measures 4.1 x 3.1 cm.  Marked consolidations in the right lung have improved since prior examination: This could represent lung abscess/empyema status post right VATS and pulmonary decortication on 6/5/2020: Leukocyte count normal, afebrile, procalcitonin normal range suggesting right lower lobe opacity sterile  Right perihilar lymph node 2.3 x 1.4 cm.  Mediastinal lymph node 1.1 x 1.2 cm  Small right pleural effusion  COPD  IgM kappa MGUS  History of tobacco smoking 72 pack years of tobacco although quit smoking in 2023  Obstructive sleep apnea syndrome: Polysomnogram on  2/21/2017: Showed AHI was 26.4, supine index of 88.7, REM AHI of 43.4, SaO2 grzegorz of 75%.  A follow-up CPAP titration sleep study on 4/26/2017 suggested CPAP at 12 cm water pressure  Generalized weakness  Allergic rhinitis  Anxiety  GERD/Jamison's esophagus  Coronary artery disease/history of MI: Currently on Plavix  Type 2 diabetes mellitus  Depression  Hypertension  Hyperlipidemia  Transaminitis/elevated LFTs:  Thrombocytosis: Suspect reactive      Plan:  Continue current antibiotics per infectious disease  Continue CPAP at 12 cm water pressure when sleeping but patient apparently on room air only at night  Continue current antibiotics per infectious disease  Continue Anoro 1 puff daily   DVT prophylaxis:  Lovenox 40 mg subcutaneous every 24 hours  GI prophylaxis: Protonix 40 mg daily   Follow labs   Will follow for further recommendations    Thank You for allowing me to participate in this patient's care     John Pacheco MD    Note to the patient: The 21st Century Cures Act makes medical notes like these available to patients in the interest of transparency. However, be advised that this is a medical document. It is intended as peer to peer communication. It is written in medical language and may contain abbreviations or verbiage that are unfamiliar. It may appear blunt or direct. Medical documents are intended to carry relevant information, facts as evident, and clinical opinion of the practitioner.      Disclaimer: Components of this note were documented using voice recognition system and are subject to errors not corrected at proofreading. Contact the author of this note for any clarifications

## 2024-06-21 NOTE — PLAN OF CARE
A/o x4. RA/. PICC RUE IV abx infusing. Tele. SCD's/ankle pumps encouraged. 1800 ada accuchecks. LBM 6/20. Voiding without difficulty. Pain managed with po pain medication. SB assist. POC updated with pts. All safety measures in place. Call light within reach instructed pt to call.

## 2024-06-21 NOTE — PROGRESS NOTES
PSYCH CONSULT    Date of Admission: 6/18/2024  Date of Consult: 6/21/2024  Reason for Consultation: Altered mental status    Impression:  Primary Psychiatric Diagnosis:  Major depressive disorder, recurrent, moderate. She has depressed mood, decreased motivation, anhedonia, and a lot of guilt and worthless feelings about her smoking contributing to her pulmonary issues. She blames herself harshly for her current medical situation.     Generalized anxiety disorder.    Component of anxiety and depressive disorder due to her general medical conditions (ie recent pneumonia/abscess/empyema, COPD).     Delirium/encephalopathy due to recent pneumonia/empyema and possibly meds (ie oxycodone, klonipin, neurontin, seroquel) and low K and Mg. IMPROVED. She is alert and attentive and organized in thinking at this time.      Pertinent Medical Diagnoses:  Pneumonia/abscess/empyema s/p VATS and decortication 6/5/24. COPD. DM2. CAD. Migraines.     Recommendations:  1) DC Buspar since it is a weak anxiolytic and her depressive symptoms are the primary issue at this time.    2) Start Wellbutrin XL 150mg po daily to help with depression and smoking cessation. She vapes nicotine daily and is motivated to stop. She switched from cigarettes to vape 4 yrs ago. States that she isn't eating much in the hospital because she doesn't like the food. She is aware that if she loses weight with the Wellbutrin, it may have to be stopped. Not using Chantix for smoking cessation because of her hx of depression.     3) Continue to use opioids prn judiciously.    4) Continue Klonipin at her usual 0.5mg po twice a day PRN anxiety or insomnia. She was taking it at home once a day on average.    5) Continue Pristiq 100mg po daily for anxiety/depression/migraine prevention.    6) Continue Seroquel XL 200mg nightly to help with mood lability and insomnia. Would not taper at this time since she still c/o insomnia.    7) Will ask psych liaison to make her an  appointment with an individual psychotherapist. Can be in person or video. She last saw a psychotherapist 2 yrs ago and is open to counseling again.    8) She will follow up with her outpatient psychiatrist.      Full note to follow.    Dr. Arun Lion

## 2024-06-21 NOTE — PLAN OF CARE
Alert and oriented x 4. Vitals stable on room air. PICC to RUE with  IV abx infusing . Electrolyte replacement per protocol.  Voiding without difficulty. Last BM 06/20. Tolerating  diet.  Safety precautions in place.

## 2024-06-22 LAB
GLUCOSE BLD-MCNC: 133 MG/DL (ref 70–99)
GLUCOSE BLD-MCNC: 137 MG/DL (ref 70–99)
GLUCOSE BLD-MCNC: 168 MG/DL (ref 70–99)
GLUCOSE BLD-MCNC: 95 MG/DL (ref 70–99)
MAGNESIUM SERPL-MCNC: 1.8 MG/DL (ref 1.6–2.6)
PHOSPHATE SERPL-MCNC: 3.6 MG/DL (ref 2.5–4.9)
POTASSIUM SERPL-SCNC: 3.9 MMOL/L (ref 3.5–5.1)

## 2024-06-22 PROCEDURE — 99233 SBSQ HOSP IP/OBS HIGH 50: CPT | Performed by: INTERNAL MEDICINE

## 2024-06-22 PROCEDURE — 99232 SBSQ HOSP IP/OBS MODERATE 35: CPT | Performed by: STUDENT IN AN ORGANIZED HEALTH CARE EDUCATION/TRAINING PROGRAM

## 2024-06-22 RX ORDER — MAGNESIUM OXIDE 400 MG/1
400 TABLET ORAL ONCE
Status: COMPLETED | OUTPATIENT
Start: 2024-06-22 | End: 2024-06-22

## 2024-06-22 NOTE — PLAN OF CARE
Patient A&OX4, VSS on RA, , tele; NSR. Patient reports headache; PO tylenol given per request. PICC to RUE intact with blood return. Patient up ad enrique. Tolerating diet, voiding freely. Plan to DC home with IV abx pending abx delivery. Paged  to clarify pending DC needs. Plan of care reviewed with patient. Patient demonstrates understanding.

## 2024-06-22 NOTE — PROGRESS NOTES
[facility]   part of [facility]     Hospitalist Progress Note     Soumyaaries King Patient Status:  Observation    6/3/1960 MRN [id number]   Location [facility] 3SW-A Attending Bill Brian, *   Hosp Day # 1 PCP Rika Vinson,      Chief Complaint: Lethargy    Subjective:      - AF, VSS, much more energetic this AM   - ambulated around halls yesterday   - Denies f/c, cp, sob, abd pain     Objective:    Review of Systems:   A comprehensive review of systems was completed; pertinent positive and negatives stated in subjective.    Vital signs:  Temp:  [97.9 °F (36.6 °C)-98.3 °F (36.8 °C)] 98 °F (36.7 °C)  Pulse:  [83-90] 90  Resp:  [18] 18  BP: (103-130)/(57-80) 130/66  SpO2:  [94 %-98 %] 95 %    Physical Exam:    General: No acute distress  Respiratory: no wheezes, no rhonchi  Cardiovascular: S1, S2, regular rate and rhythm  Abdomen: Soft, Non-tender, non-distended, positive bowel sounds  Neuro: No new focal deficits. Equal strength in bilateral LE to flexion/extension   Extremities: no edema    Diagnostic Data:    Labs:  Recent Labs   Lab 24  1415 24  0551 24  0638 24  0630   WBC 15.8* 14.3* 12.2* 10.2 10.2   HGB 10.1* 10.7* 9.5* 9.9* 10.6*   MCV 94.7 92.5 91.9 92.9 93.2   .0* 756.0* 637.0* 573.0* 624.0*       Recent Labs   Lab 24  1827 24  0551 24  0638 24  0630 24  0535   * 112* 127* 116*  --    BUN 16 14 9 10  --    CREATSERUM 0.70 0.54* 0.61 0.46*  --    CA 8.8 8.8 8.8 6.8*  --    ALB 2.4*  --  2.2* 1.8*  --    * 139 139 142  --    K 3.2* 3.7 3.5  3.5 3.5  3.5 3.9    109 108 115*  --    CO2 24.0 24.0 25.0 20.0*  --    ALKPHO 149*  --  128 102  --    AST 67*  --  102* 57*  --    ALT 72*  --  93* 65*  --    BILT 0.2  --  0.3 0.2  --    TP 9.5*  --  8.3* 6.8  --        Estimated Creatinine Clearance: 95.7 mL/min (A) (based on SCr of 0.46 mg/dL (L)).    No results for input(s): \"TROP\",  \"TROPHS\", \"CK\" in the last 168 hours.    No results for input(s): \"PTP\", \"INR\" in the last 168 hours.                 Microbiology    Hospital Encounter on 06/18/24   1. Blood Culture     Status: None (Preliminary result)    Collection Time: 06/18/24  6:28 PM    Specimen: Blood,peripheral   Result Value Ref Range    Blood Culture Result No Growth 3 Days N/A         Imaging: Reviewed in Epic.    Medications:    buPROPion ER  150 mg Oral Daily    QUEtiapine ER  200 mg Oral QPM    furosemide  20 mg Oral Daily    atorvastatin  80 mg Oral Nightly    clopidogrel  75 mg Oral Daily    aspirin  81 mg Oral Daily    gabapentin  600 mg Oral Nightly    topiramate  50 mg Oral BID    metoprolol succinate ER  50 mg Oral Daily Beta Blocker    azelastine  1 spray Each Nare BID    desvenlafaxine ER  100 mg Oral Daily    pantoprazole  40 mg Oral QAM AC    ampicillin-sulbactam  3 g Intravenous q6h    umeclidinium-vilanterol  1 puff Inhalation Daily    enoxaparin  40 mg Subcutaneous Daily    insulin aspart  1-10 Units Subcutaneous TID AC and HS       Assessment & Plan:      # Failure to thrive/lethargy  # Right lower lobe cavitary lesion   - IV Abx continued, f/u Bcx   - Previously had PNA with empyema s/p R VATS and pulmonary decortication   - PCT negative    - Pulm and ID consulted   - PT/OT eval    - TSH WNL, AM cortisol 22.7   - iron studies with AoCD and B12/folate replete on 5/28/24   - Nutrition consulted   - Possibly mediation induced, appreciate psychiatry evaluation  - buspar dc, klonopin prn; Wellbutrin started     # DM2 - LDSSI + Accucheck QID (or Q6H if NPO)  # Hypertension - Continue home oral antihypertensives  # Hyperlipidemia - continue home statin   # GERD - continue PPI  # CAD s/p CABG - ASA, plavix, statin, BB  # Elevated D-dimer - CTA negative for PE, LE dopplers negative     Home today pending coordination of IV abx for dc and consultant clearance     Bill Brian MD    Supplementary Documentation:      Quality:  DVT Mechanical Prophylaxis:   SCDs,    DVT Pharmacologic Prophylaxis   Medication    enoxaparin (Lovenox) 40 MG/0.4ML SUBQ injection 40 mg         DVT Pharmacologic prophylaxis: Aspirin 81 mg      Code Status: Full Code  Gotti: No urinary catheter in place  Gotti Duration (in days):   Central line:    ILYA:     The 21st Century Cures Act makes medical notes like these available to patients in the interest of transparency. Please be advised this is a medical document. Medical documents are intended to carry relevant information, facts as evident, and the clinical opinion of the practitioner. The medical note is intended as peer to peer communication and may appear blunt or direct. It is written in medical language and may contain abbreviations or verbiage that are unfamiliar.

## 2024-06-22 NOTE — CM/SW NOTE
06/22/24 1100   Discharge disposition   Expected discharge disposition Home-Health   Post Acute Care Provider Residential   DME/Infusion Providers [facility]   Discharge transportation Private car     Notified by RN that pt discharging today    Called [facility] and spoke w/Reuben.  They have everything they need and will schedule delivery tonight    Notified [facility] liaison Delia of discharge and need for SOC tomorrow  Called pt in her room to notify her of plan and confirm she is going to her sister's home in [place].  Pt states she is awaiting first dose and evaluation of tolerance to it    Addendum 1520  Notified by RN that pt staying one more night to evaluate for any reaction to rocephin.  Notified [facility] liaison and sent message to [facility]      / to remain available for support and/or discharge planning.     Iza SAEZA MSN, RN CTL/  [phone]

## 2024-06-22 NOTE — PROGRESS NOTES
[facility]  Pulmonary/Critical Care Progress note    Soumya King Patient Status:  Observation    6/3/1960 MRN [id number]   Location [facility] 3SW-A Attending Bill Brian, *   Hosp Day # 1 PCP Rika Vinson,        History of Present Illness:    Walking in the hallways complains only slight shortness of breath.  Remains on room air.    States that she has a CPAP machine at home but she is not compliant.  She wishes to follow-up with a sleep specialist.        History:  Past Medical History:    Abdominal pain, other specified site    groin    Acute bronchitis    Allergic rhinitis    Anxiety    Arthritis    Back problem    Jamison esophagus    Chronic low back pain without sciatica    COPD (chronic obstructive pulmonary disease) (HCC)    Depression    Diabetes (HCC)    Difficult intubation    difficult to intubate with bladder surgery,instructed by anesthesia to communicate to future anesthesiologist. Small airway    Esophageal reflux    Essential hypertension    Don't remember    Fitting and adjustment of dental prosthetic device    High blood pressure    History of 2019 novel coronavirus disease (COVID-19)    No hospitalization,symptoms; Fever,fatigue ,body aches,headache no loss of taste or smell    Hx of motion sickness    Hyperlipidemia    Mass of spine    Migraines    barometric pressure    Myocardial infarction (HCC)    Osteoarthritis    Other ill-defined conditions(799.89)    Jamison's    Pain in joint, forearm    wrist    Pain in joint, pelvic region and thigh    hip    Pelvic mass    Personal history of urinary (tract) infection    Sleep apnea    I don't date     Past Surgical History:   Procedure Laterality Date    Cabg  23    Colonoscopy      Colonoscopy N/A 2022    Procedure: COLONOSCOPY AND ESOPHAGOGASTRODUODENOSCOPY;  Surgeon: Miguel Camargo MD;  Location:  ENDOSCOPY    Heart surgery      double bypass    Laparoscopic  2007    sling operation  for stress incontinence    Laparoscopy,diagnostic      Kay biopsy stereo nodule 2 site bilat (cpt=19081/03757) Left 02/2010    BENIGN 2 SITES    Kay biopsy stereo w/calc 2 site left (cpt=19081/09848)  02/2010    benign x 2    Kay stereo-clip w/calc 2 site left  2010    Oophorectomy Left 06/28/2019    Other  08/31/2022    RIGHT SACRAL SUBCUTANEOUS CYST EXCISION    Other surgical history  2000, 2019    Bladder Sling, Large mass removed from left abdomen and ovar    Tonsillectomy      Was a child, have no idea    Tubal ligation      Upper gi endoscopy,exam       Family History   Problem Relation Age of Onset    Arthritis Mother     Other (AMI) Mother     Other (diabetes mellitus) Mother     Diabetes Mother       reports that she quit smoking about 17 months ago. Her smoking use included cigarettes. She started smoking about 49 years ago. She has a 72 pack-year smoking history. She has never used smokeless tobacco. She reports that she does not currently use alcohol. She reports that she does not use drugs.    Allergies:  Allergies   Allergen Reactions    Naprosyn [Naproxen] ANAPHYLAXIS     Has tolerated ibuprofen and IV toradol     Aspirin OTHER (SEE COMMENTS)     Difficulty swallowing due to  saavedra's esophagus       Medications:    Current Facility-Administered Medications:     clonazePAM (KlonoPIN) tab 0.5 mg, 0.5 mg, Oral, BID PRN    buPROPion ER (Wellbutrin XL) 24 hr tab 150 mg, 150 mg, Oral, Daily    QUEtiapine ER (SEROquel XR) 24 hr tab 200 mg, 200 mg, Oral, QPM    cyclobenzaprine (Flexeril) tab 10 mg, 10 mg, Oral, Q12H PRN    metoclopramide (Reglan) solution 5 mg, 5 mg, Oral, TID AC PRN    furosemide (Lasix) tab 20 mg, 20 mg, Oral, Daily    atorvastatin (Lipitor) tab 80 mg, 80 mg, Oral, Nightly    clopidogrel (Plavix) tab 75 mg, 75 mg, Oral, Daily    aspirin DR tab 81 mg, 81 mg, Oral, Daily    gabapentin (Neurontin) tab 600 mg, 600 mg, Oral, Nightly    topiramate (TopaMAX) tab 50 mg, 50 mg, Oral, BID     metoprolol succinate ER (Toprol XL) 24 hr tab 50 mg, 50 mg, Oral, Daily Beta Blocker    benzonatate (Tessalon) cap 100 mg, 100 mg, Oral, TID PRN    azelastine (Astelin) 0.1 % nasal solution 1 spray, 1 spray, Each Nare, BID    desvenlafaxine ER (Pristiq) 24 hr tab 100 mg, 100 mg, Oral, Daily    oxyCODONE immediate release tab 5 mg, 5 mg, Oral, Q6H PRN    pantoprazole (Protonix) DR tab 40 mg, 40 mg, Oral, QAM AC    ampicillin-sulbactam (Unasyn) 3 g in sodium chloride 0.9% 100mL IVPB-ADD, 3 g, Intravenous, q6h    umeclidinium-vilanterol (Anoro Ellipta) 62.5-25 MCG/ACT inhaler 1 puff, 1 puff, Inhalation, Daily    glucose (Dex4) 15 GM/59ML oral liquid 15 g, 15 g, Oral, Q15 Min PRN **OR** glucose (Glutose) 40% oral gel 15 g, 15 g, Oral, Q15 Min PRN **OR** glucose-vitamin C (Dex-4) chewable tab 4 tablet, 4 tablet, Oral, Q15 Min PRN **OR** dextrose 50% injection 50 mL, 50 mL, Intravenous, Q15 Min PRN **OR** glucose (Dex4) 15 GM/59ML oral liquid 30 g, 30 g, Oral, Q15 Min PRN **OR** glucose (Glutose) 40% oral gel 30 g, 30 g, Oral, Q15 Min PRN **OR** glucose-vitamin C (Dex-4) chewable tab 8 tablet, 8 tablet, Oral, Q15 Min PRN    enoxaparin (Lovenox) 40 MG/0.4ML SUBQ injection 40 mg, 40 mg, Subcutaneous, Daily    acetaminophen (Tylenol Extra Strength) tab 500 mg, 500 mg, Oral, Q4H PRN    melatonin tab 3 mg, 3 mg, Oral, Nightly PRN    ondansetron (Zofran) 4 MG/2ML injection 4 mg, 4 mg, Intravenous, Q6H PRN    insulin aspart (NovoLOG) 100 Units/mL FlexPen 1-10 Units, 1-10 Units, Subcutaneous, TID AC and HS    Intake/Output:    Intake/Output Summary (Last 24 hours) at 6/22/2024 1019  Last data filed at 6/21/2024 1820  Gross per 24 hour   Intake 720 ml   Output 750 ml   Net -30 ml      Body mass index is 22.67 kg/m².    Review of Systems  Review of Systems:   A comprehensive 10 point review of systems was completed.  Pertinent positives and negatives noted in the the HPI.         Vitals:    06/21/24 2000 06/22/24 0000 06/22/24 0500  06/22/24 0810   BP: 129/76 103/57 130/66 115/68   BP Location: Left arm Left arm Left arm Left arm   Pulse: 83 83 90 94   Resp: 18 18 18 18   Temp: 98.3 °F (36.8 °C)  98 °F (36.7 °C) 98.8 °F (37.1 °C)   TempSrc: Oral Oral Oral Oral   SpO2: 97% 94% 95% 98%   Weight:             Physical Exam  Constitutional:       General: She is not in acute distress.     Appearance: Normal appearance. She is not ill-appearing or diaphoretic.   HENT:      Head: Normocephalic and atraumatic.      Nose: Nose normal. No congestion or rhinorrhea.      Mouth/Throat:      Mouth: Mucous membranes are moist.      Pharynx: Oropharynx is clear. No oropharyngeal exudate or posterior oropharyngeal erythema.      Comments: Mallampati class III palate  Eyes:      Extraocular Movements: Extraocular movements intact.      Pupils: Pupils are equal, round, and reactive to light.   Cardiovascular:      Rate and Rhythm: Normal rate.      Pulses: Normal pulses.      Heart sounds: Normal heart sounds. No murmur heard.  Pulmonary:      Effort: Pulmonary effort is normal. No respiratory distress.      Breath sounds: Normal breath sounds. No wheezing or rhonchi.      Comments: Diminished breath sound bilateral specially in right lower lung zones  Chest:      Chest wall: No tenderness.   Abdominal:      General: Abdomen is flat. Bowel sounds are normal.      Palpations: Abdomen is soft.   Musculoskeletal:         General: Normal range of motion.   Skin:     General: Skin is warm.   Neurological:      General: No focal deficit present.      Mental Status: She is alert and oriented to person, place, and time.   Psychiatric:         Mood and Affect: Mood normal.         Behavior: Behavior normal.         Thought Content: Thought content normal.         Judgment: Judgment normal.              Lab Data Review:  Recent Labs   Lab 06/19/24  0551 06/20/24  0638 06/21/24  0630   WBC 12.2* 10.2 10.2   HGB 9.5* 9.9* 10.6*   HCT 29.5* 30.1* 32.8*   .0* 573.0*  624.0*       Recent Labs   Lab 06/18/24  1827 06/19/24  0551 06/20/24  0638 06/21/24  0630 06/22/24  0535   * 139 139 142  --    K 3.2* 3.7 3.5  3.5 3.5  3.5 3.9    109 108 115*  --    CO2 24.0 24.0 25.0 20.0*  --    BUN 16 14 9 10  --    CREATSERUM 0.70 0.54* 0.61 0.46*  --    CA 8.8 8.8 8.8 6.8*  --    ALB 2.4*  --  2.2* 1.8*  --    ALKPHO 149*  --  128 102  --    ALT 72*  --  93* 65*  --    AST 67*  --  102* 57*  --    * 112* 127* 116*  --        Recent Labs   Lab 06/20/24  0638 06/21/24  0630 06/22/24  0535   MG 1.5* 1.1* 1.8       Lab Results   Component Value Date    PHOS 3.6 06/22/2024        No results for input(s): \"PT\", \"INR\", \"PTT\" in the last 168 hours.    No results for input(s): \"ABGPHT\", \"EVTOVE1O\", \"ATXSG8H\", \"ABGHCO3\", \"ABGBE\", \"TEMP\", \"CHARLES\", \"SITE\", \"DEV\", \"THGB\" in the last 168 hours.    No results for input(s): \"TROP\", \"CKMB\" in the last 168 hours.    Cultures:   Hospital Encounter on 06/18/24   1. Urine Culture, Routine     Status: None    Collection Time: 06/20/24  9:14 PM    Specimen: Urine, clean catch   Result Value Ref Range    Urine Culture No Growth at 18-24 hrs. N/A   2. Blood Culture     Status: None (Preliminary result)    Collection Time: 06/18/24  6:28 PM    Specimen: Blood,peripheral   Result Value Ref Range    Blood Culture Result No Growth 3 Days N/A             Component  Ref Range & Units 6/21/24 0630   Procalcitonin  <0.16 ng/mL <0.05        Radiology personally reviewed:  XR CHEST AP PORTABLE  (CPT=71045)    Result Date: 6/21/2024  CONCLUSION:  Mild worsening of patchy airspace disease the right lung base with small right pleural effusion could represent atelectasis or pneumonia.   LOCATION:  [facility]      Dictated by (CST): Elieser Torres MD on 6/21/2024 at 7:32 AM     Finalized by (CST): Elieser Torres MD on 6/21/2024 at 7:34 AM         Patient Active Problem List   Diagnosis    Vitamin D deficiency    Jamison's esophagus    Tobacco use    Insomnia     COPD, mild (HCC) - Dx'd via PFTs done in 3/2015    Migraine without aura and without status migrainosus, not intractable    Hyperlipidemia    SUMMER on CPAP    MDD (major depressive disorder), recurrent episode, moderate (HCC)    GERD (gastroesophageal reflux disease)    Hypertension    Chronic pansinusitis    Mucinous cystadenoma of ovary, left    KIRK (generalized anxiety disorder)    History of pubovaginal sling    Absence of bladder continence    Vasomotor flushing    Primary osteoarthritis of left knee    Mass of spine    Hx of motion sickness    Difficult intubation    Chronic low back pain without sciatica    Type 2 diabetes mellitus with diabetic neuropathy (HCC)    Pulmonary nodule    CAD, multiple vessel    S/P CABG x 2    Type 2 diabetes mellitus with hyperlipidemia (HCC)    Anemia    Iron deficiency anemia secondary to inadequate dietary iron intake    Subacute cough    Hyperglycemia    Acute anemia    MARÍAE LENA (acute kidney injury) (HCC)    Transaminitis    Thrombocytosis    Hyponatremia    Pleural effusion    Somnolence    Acute respiratory failure with hypoxia (HCC)    Empyema of right pleural space (HCC)    Status post thoracotomy    Hypoxia    FTT (failure to thrive) in adult    Dehydration    Hypokalemia    Generalized weakness    Malnutrition (HCC)       Assessment:  Shortness of breath: Remains on room air  Right lower lobe consolidation: This could be related to aspiration pneumonia with GERD versus community-acquired pneumonia: Clinically improving  Right lower lobe cavitary lesion:Right lower lobe cavitary lesion measures 4.1 x 3.1 cm.  Marked consolidations in the right lung have improved since prior examination: This could represent lung abscess/empyema status post right VATS and pulmonary decortication on 6/5/2020: Leukocyte count normal, afebrile, procalcitonin normal range suggesting right lower lobe opacity sterile  Right perihilar lymph node 2.3 x 1.4 cm.  Mediastinal lymph node 1.1 x 1.2  cm  Small right pleural effusion: Stable  COPD  IgM kappa MGUS  History of tobacco smoking 72 pack years of tobacco although quit smoking in 2023  Obstructive sleep apnea syndrome: Polysomnogram on 2/21/2017: Showed AHI was 26.4, supine index of 88.7, REM AHI of 43.4, SaO2 grzegorz of 75%.  A follow-up CPAP titration sleep study on 4/26/2017 suggested CPAP at 12 cm water pressure: The patient admitted today that he is noncompliant with CPAP machine at home but wishes to follow-up with a sleep specialist for further management  Generalized weakness  Allergic rhinitis  Anxiety  GERD/Jamison's esophagus  Coronary artery disease/history of MI: Currently on Plavix  Type 2 diabetes mellitus  Depression  Hypertension  Hyperlipidemia  Transaminitis/elevated LFTs:  Thrombocytosis: Suspect reactive      Plan:  Continue current antibiotics per infectious disease  Advised patient to continue CPAP at 12 cm water pressure at bedtime and whenever napping  Continue current antibiotics per infectious disease  Continue Anoro 1 puff daily   May discontinue Lovenox and Protonix at discharge  May discharge home from pulmonary standpoint.    Follow-up in pulmonary office in 3 months Dr. Pacheco    Thank You for allowing me to participate in this patient's care     John Pacheco MD    Note to the patient: The 21st Century Cures Act makes medical notes like these available to patients in the interest of transparency. However, be advised that this is a medical document. It is intended as peer to peer communication. It is written in medical language and may contain abbreviations or verbiage that are unfamiliar. It may appear blunt or direct. Medical documents are intended to carry relevant information, facts as evident, and clinical opinion of the practitioner.      Disclaimer: Components of this note were documented using voice recognition system and are subject to errors not corrected at proofreading. Contact the author of this note for any  clarifications

## 2024-06-22 NOTE — PROGRESS NOTES
[facility]   part of [facility] ID PROGRESS NOTE    Soumya King Patient Status:  Observation    6/3/1960 MRN [id number]   Location [facility] 3SW-A Attending Bill Brian, *   Hosp Day # 1 PCP Rika Vinson,      Abx: unasyn (-->) #4, sp invanz    Subjective: pt seen and examined this morning. Sleeping, resting comfortably. Tolerating unasyn. Says her fatigue has improved already. No diarrhea. Breathing is comfortable. Afebrile. WBC 10. Blood cultures negative x 3 days. Urine cx pending, ua with pyuria.     Medications:    Current Facility-Administered Medications:     cefTRIAXone (Rocephin) 2 g in sodium chloride 0.9% 100 mL IVPB-ADDV, 2 g, Intravenous, Q24H    clonazePAM (KlonoPIN) tab 0.5 mg, 0.5 mg, Oral, BID PRN    buPROPion ER (Wellbutrin XL) 24 hr tab 150 mg, 150 mg, Oral, Daily    QUEtiapine ER (SEROquel XR) 24 hr tab 200 mg, 200 mg, Oral, QPM    cyclobenzaprine (Flexeril) tab 10 mg, 10 mg, Oral, Q12H PRN    metoclopramide (Reglan) solution 5 mg, 5 mg, Oral, TID AC PRN    furosemide (Lasix) tab 20 mg, 20 mg, Oral, Daily    atorvastatin (Lipitor) tab 80 mg, 80 mg, Oral, Nightly    clopidogrel (Plavix) tab 75 mg, 75 mg, Oral, Daily    aspirin DR tab 81 mg, 81 mg, Oral, Daily    gabapentin (Neurontin) tab 600 mg, 600 mg, Oral, Nightly    topiramate (TopaMAX) tab 50 mg, 50 mg, Oral, BID    metoprolol succinate ER (Toprol XL) 24 hr tab 50 mg, 50 mg, Oral, Daily Beta Blocker    benzonatate (Tessalon) cap 100 mg, 100 mg, Oral, TID PRN    azelastine (Astelin) 0.1 % nasal solution 1 spray, 1 spray, Each Nare, BID    desvenlafaxine ER (Pristiq) 24 hr tab 100 mg, 100 mg, Oral, Daily    oxyCODONE immediate release tab 5 mg, 5 mg, Oral, Q6H PRN    umeclidinium-vilanterol (Anoro Ellipta) 62.5-25 MCG/ACT inhaler 1 puff, 1 puff, Inhalation, Daily    glucose (Dex4) 15 GM/59ML oral liquid 15 g, 15 g, Oral, Q15 Min PRN **OR** glucose (Glutose) 40% oral gel 15 g, 15 g, Oral,  Q15 Min PRN **OR** glucose-vitamin C (Dex-4) chewable tab 4 tablet, 4 tablet, Oral, Q15 Min PRN **OR** dextrose 50% injection 50 mL, 50 mL, Intravenous, Q15 Min PRN **OR** glucose (Dex4) 15 GM/59ML oral liquid 30 g, 30 g, Oral, Q15 Min PRN **OR** glucose (Glutose) 40% oral gel 30 g, 30 g, Oral, Q15 Min PRN **OR** glucose-vitamin C (Dex-4) chewable tab 8 tablet, 8 tablet, Oral, Q15 Min PRN    acetaminophen (Tylenol Extra Strength) tab 500 mg, 500 mg, Oral, Q4H PRN    melatonin tab 3 mg, 3 mg, Oral, Nightly PRN    ondansetron (Zofran) 4 MG/2ML injection 4 mg, 4 mg, Intravenous, Q6H PRN    insulin aspart (NovoLOG) 100 Units/mL FlexPen 1-10 Units, 1-10 Units, Subcutaneous, TID AC and HS    Review of Systems:  Completed    Physical Exam:  Vital signs: Blood pressure 105/70, pulse 93, temperature 98.1 °F (36.7 °C), temperature source Oral, resp. rate 16, weight 120 lb (54.4 kg), SpO2 98%, not currently breastfeeding.    General: Alert, oriented, NAD, on room air, sleeping in bed  HEENT: Moist mucous membranes.   Neck: No lymphadenopathy.  Supple.  Cardiovascular: RRR  Respiratory: Clear to auscultation bilaterally.  No wheezes. No rhonchi.  Abdomen: Soft, nontender, nondistended.   Musculoskeletal: No edema noted  Integument: No lesions. No erythema.    Laboratory Data:  Recent Labs   Lab 06/21/24  0630   RBC 3.52*   HGB 10.6*   HCT 32.8*   MCV 93.2   MCH 30.1   MCHC 32.3   RDW 15.7   NEPRELIM 5.74   WBC 10.2   .0*     Recent Labs   Lab 06/18/24  1827 06/19/24  0551 06/20/24  0638 06/21/24  0630 06/22/24  0535   * 112* 127* 116*  --    BUN 16 14 9 10  --    CREATSERUM 0.70 0.54* 0.61 0.46*  --    CA 8.8 8.8 8.8 6.8*  --    ALB 2.4*  --  2.2* 1.8*  --    * 139 139 142  --    K 3.2* 3.7 3.5  3.5 3.5  3.5 3.9    109 108 115*  --    CO2 24.0 24.0 25.0 20.0*  --    ALKPHO 149*  --  128 102  --    AST 67*  --  102* 57*  --    ALT 72*  --  93* 65*  --    BILT 0.2  --  0.3 0.2  --    TP 9.5*  --  8.3*  6.8  --        Microbiology: Reviewed in EMR  Susceptibility data from last 90 days.  Collected Specimen Info Organism Ampicillin Ampicillin/Sulbactam Cefazolin Ciprofloxacin Gentamicin Levofloxacin Meropenem Nitrofurantoin Piperacillin/Tazobactam Tobramycin Trimethoprim/Sulfamethoxazole   06/05/24 Tissue from Lung right Lactobacillus species              06/05/24 Tissue from Lung right Lactobacillus species                No Staph aureus, Beta Strep or Pseudo aeruginosa isolated              06/05/24 Tissue Cutibacterium (Proprionibacterium) acnes                Cutibacterium avidum              06/01/24 Other from Sputum Candida albicans              05/29/24 Body fluid, unspecified from Pleural Fluid, Right Streptococcus intermedius              05/28/24 Body fluid, unspecified from Pleural Fluid, Right Streptococcus intermedius              05/27/24 Other from Sputum Candida albicans              05/14/24 Urine, clean catch Escherichia coli  R  I  S  S  R  S  S  S  S  S  R         Radiology: Reviewed.    PROCEDURE:  CT CHEST PE AORTA (IV ONLY) (CPT=71260)     COMPARISON:  [facility] , CT, CT CHEST (CPT=71250), 6/03/2024, 11:14 AM.     INDICATIONS:  RECENT EMPYEMA FATIGUE HYPOXIA ELEV DIMER     TECHNIQUE:  CT images were obtained with non-ionic intravenous contrast material. Dose reduction techniques were used. Dose information is transmitted to the ACR (American College of Radiology) NRDR (National Radiology Data Registry) which includes the  Dose Index Registry.     PATIENT STATED HISTORY:(As transcribed by Technologist)  SOB, elevated d dimer, hypoxia      CONTRAST USED:  80 mLcc of Isovue 370     FINDINGS:    LUNGS:  Right lower lobe cavitary lesion measures 4.1 x 3.1 cm.  Marked consolidations in the right lung have improved since prior examination.  Peripheral interstitial abnormalities are noted.  VASCULATURE:  No visible pulmonary arterial thrombus or attenuation.    ANKUSH:  Right perihilar lymph node measures  2.3 x 1.4 cm.  MEDIASTINUM:  Paratracheal lymph node measures 1.1 x 1.3 cm.  CARDIAC:  No enlargement, pericardial thickening, or significant coronary artery calcification.  PLEURA:  Small right pleural effusion is noted.  THORACIC AORTA:  No aneurysm.    CHEST WALL:  No mass or axillary adenopathy.    LIMITED ABDOMEN:  Limited images of the upper abdomen are unremarkable.    BONES:  Mid sternotomy wires are noted.     Impression:    CONCLUSION:       1. Marked atelectasis the right lung along with consolidations have resolved.  Large fluid collection has mostly resolved.  Only minimal residual right pleural effusion is noted.  Cavitary lesion in the right lower lobe is identified.  This cavitary  lesion may be sequelae of prior infectious process.  A pneumatocele is of consideration.     2. There is mild mediastinal and right perihilar lymphadenopathy again noted.       LOCATION:  [facility]     Dictated by (CST): Gregory Vernon MD on 6/18/2024 at 10:50 PM      Finalized by (CST): Gregory Vernon MD on 6/18/2024 at 10:54 PM        PROCEDURE:  XR CHEST AP PORTABLE  (CPT=71045)     TECHNIQUE:  AP chest radiograph was obtained.     COMPARISON:  [facility] , XR, XR CHEST AP PORTABLE  (CPT=71045), 6/09/2024, 12:24 PM.     INDICATIONS:  recent dx from hospital, poor appetite, sleeping a lot, on 2L NC at home     PATIENT STATED HISTORY: (As transcribed by Technologist)           FINDINGS:       Right-sided PICC line has tip in the SVC.     Median sternotomy are stable.     Small right lower lobe consolidation pleural effusion.     No pneumothorax.    Impression:    CONCLUSION:  Right lower lobe consolidation with small right pleural effusion is noted.  This is improved compared to prior examination.     LOCATION:  [id number]     Dictated by (CST): Gregory Vernon MD on 6/18/2024 at 8:17 PM      Finalized by (CST): Gregory Vernon MD on 6/18/2024 at 8:18 PM    ASSESSMENT:  Fatigue with poor po intake/appetite- ? New  infection but not obvious, ? Medication related (invanz), but also on several meds that can cuase lethargy (clonazepam, seroquel, etc)  -at this point, seems like more likely side effect from invanz, reports none of her psych meds are new  - improving   RLL pneumonia/pulmonary abscess with right empyema  S/p thoracentesis 5/28, cx with strep intermedius. Path w/ acute inflammation with occasional bacterial organisms present.   S/p R chest tube placement 5/29, cx with strep intermedius.   S/p R VATS and total pulmonary decortication 6/5, R purulent empyema with RLL lung abscess noted. Tissue cx from RLL lung abscess with lactobacillus. Tissue cx from pleural peel with lactobacillus. Tissue cx from right pleural debris with cutibacterium acnes and avidum.   Has been on IV Invanz outpatient (plan was for EOT 7/4/24)  CT chest this admission much better, cavitary lesion to RLL noted.  Leukocytosis, resolved. No fevers.   Thrombocytosis, improving.  Elevated LFTs. CT a/p and US abd unremarkable last admission.  Anemia  IgM kappa MGUS  DM II. Hgb A1C 7.1 (5/2024)  COPD    PLAN:  - infectious workup unremarkable - will stop unasyn and transition to ceftriaxone in preparation for discharge. From ID standpoint after discussion with patient and sister would prefer to monitor patient overnight to ensure she tolerates prior to discharge. Discussed with RN.   - follow LFTs, improved  - discharge plan: If she cont to improve and no new infection identified, could go home over the weekend on CTX to complete course of treatment for empyema (EOT 7/4/24)    [facility]

## 2024-06-22 NOTE — PLAN OF CARE
Patient A & O x4. VSS, on RA. C/o mild pain, tylenol given. PICC to RUE. IV abx. UP standby assist. Voiding freely. Safety measures in place. Instructed to use call light.

## 2024-06-22 NOTE — PROGRESS NOTES
[facility]   part of [facility] ID PROGRESS NOTE    Soumya King Patient Status:  Observation    6/3/1960 MRN [id number]   Location [facility] 3SW-A Attending Bill Brian, *   Hosp Day # 1 PCP Rika Vinson,      Abx: unasyn (-->) #4, sp invanz    Subjective: pt seen and examined this morning. Sleeping, resting comfortably. Tolerating unasyn. Says her fatigue has improved already. No diarrhea. Breathing is comfortable. Afebrile. WBC 10. Blood cultures negative x 3 days. Urine cx pending, ua with pyuria.     Medications:    Current Facility-Administered Medications:     magnesium oxide (Mag-Ox) tab 400 mg, 400 mg, Oral, Once    clonazePAM (KlonoPIN) tab 0.5 mg, 0.5 mg, Oral, BID PRN    buPROPion ER (Wellbutrin XL) 24 hr tab 150 mg, 150 mg, Oral, Daily    QUEtiapine ER (SEROquel XR) 24 hr tab 200 mg, 200 mg, Oral, QPM    cyclobenzaprine (Flexeril) tab 10 mg, 10 mg, Oral, Q12H PRN    metoclopramide (Reglan) solution 5 mg, 5 mg, Oral, TID AC PRN    furosemide (Lasix) tab 20 mg, 20 mg, Oral, Daily    atorvastatin (Lipitor) tab 80 mg, 80 mg, Oral, Nightly    clopidogrel (Plavix) tab 75 mg, 75 mg, Oral, Daily    aspirin DR tab 81 mg, 81 mg, Oral, Daily    gabapentin (Neurontin) tab 600 mg, 600 mg, Oral, Nightly    topiramate (TopaMAX) tab 50 mg, 50 mg, Oral, BID    metoprolol succinate ER (Toprol XL) 24 hr tab 50 mg, 50 mg, Oral, Daily Beta Blocker    benzonatate (Tessalon) cap 100 mg, 100 mg, Oral, TID PRN    azelastine (Astelin) 0.1 % nasal solution 1 spray, 1 spray, Each Nare, BID    desvenlafaxine ER (Pristiq) 24 hr tab 100 mg, 100 mg, Oral, Daily    oxyCODONE immediate release tab 5 mg, 5 mg, Oral, Q6H PRN    pantoprazole (Protonix) DR tab 40 mg, 40 mg, Oral, QAM AC    ampicillin-sulbactam (Unasyn) 3 g in sodium chloride 0.9% 100mL IVPB-ADD, 3 g, Intravenous, q6h    umeclidinium-vilanterol (Anoro Ellipta) 62.5-25 MCG/ACT inhaler 1 puff, 1 puff, Inhalation, Daily     glucose (Dex4) 15 GM/59ML oral liquid 15 g, 15 g, Oral, Q15 Min PRN **OR** glucose (Glutose) 40% oral gel 15 g, 15 g, Oral, Q15 Min PRN **OR** glucose-vitamin C (Dex-4) chewable tab 4 tablet, 4 tablet, Oral, Q15 Min PRN **OR** dextrose 50% injection 50 mL, 50 mL, Intravenous, Q15 Min PRN **OR** glucose (Dex4) 15 GM/59ML oral liquid 30 g, 30 g, Oral, Q15 Min PRN **OR** glucose (Glutose) 40% oral gel 30 g, 30 g, Oral, Q15 Min PRN **OR** glucose-vitamin C (Dex-4) chewable tab 8 tablet, 8 tablet, Oral, Q15 Min PRN    enoxaparin (Lovenox) 40 MG/0.4ML SUBQ injection 40 mg, 40 mg, Subcutaneous, Daily    acetaminophen (Tylenol Extra Strength) tab 500 mg, 500 mg, Oral, Q4H PRN    melatonin tab 3 mg, 3 mg, Oral, Nightly PRN    ondansetron (Zofran) 4 MG/2ML injection 4 mg, 4 mg, Intravenous, Q6H PRN    insulin aspart (NovoLOG) 100 Units/mL FlexPen 1-10 Units, 1-10 Units, Subcutaneous, TID AC and HS    Review of Systems:  Completed    Physical Exam:  Vital signs: Blood pressure 115/68, pulse 94, temperature 98.8 °F (37.1 °C), temperature source Oral, resp. rate 18, weight 120 lb (54.4 kg), SpO2 98%, not currently breastfeeding.    General: Alert, oriented, NAD, on room air, sleeping in bed  HEENT: Moist mucous membranes.   Neck: No lymphadenopathy.  Supple.  Cardiovascular: RRR  Respiratory: Clear to auscultation bilaterally.  No wheezes. No rhonchi.  Abdomen: Soft, nontender, nondistended.   Musculoskeletal: No edema noted  Integument: No lesions. No erythema.    Laboratory Data:  Recent Labs   Lab 06/21/24  0630   RBC 3.52*   HGB 10.6*   HCT 32.8*   MCV 93.2   MCH 30.1   MCHC 32.3   RDW 15.7   NEPRELIM 5.74   WBC 10.2   .0*     Recent Labs   Lab 06/18/24  1827 06/19/24  0551 06/20/24  0638 06/21/24  0630 06/22/24  0535   * 112* 127* 116*  --    BUN 16 14 9 10  --    CREATSERUM 0.70 0.54* 0.61 0.46*  --    CA 8.8 8.8 8.8 6.8*  --    ALB 2.4*  --  2.2* 1.8*  --    * 139 139 142  --    K 3.2* 3.7 3.5  3.5  3.5  3.5 3.9    109 108 115*  --    CO2 24.0 24.0 25.0 20.0*  --    ALKPHO 149*  --  128 102  --    AST 67*  --  102* 57*  --    ALT 72*  --  93* 65*  --    BILT 0.2  --  0.3 0.2  --    TP 9.5*  --  8.3* 6.8  --        Microbiology: Reviewed in EMR  Susceptibility data from last 90 days.  Collected Specimen Info Organism Ampicillin Ampicillin/Sulbactam Cefazolin Ciprofloxacin Gentamicin Levofloxacin Meropenem Nitrofurantoin Piperacillin/Tazobactam Tobramycin Trimethoprim/Sulfamethoxazole   06/05/24 Tissue from Lung right Lactobacillus species              06/05/24 Tissue from Lung right Lactobacillus species                No Staph aureus, Beta Strep or Pseudo aeruginosa isolated              06/05/24 Tissue Cutibacterium (Proprionibacterium) acnes                Cutibacterium avidum              06/01/24 Other from Sputum Candida albicans              05/29/24 Body fluid, unspecified from Pleural Fluid, Right Streptococcus intermedius              05/28/24 Body fluid, unspecified from Pleural Fluid, Right Streptococcus intermedius              05/27/24 Other from Sputum Candida albicans              05/14/24 Urine, clean catch Escherichia coli  R  I  S  S  R  S  S  S  S  S  R         Radiology: Reviewed.    PROCEDURE:  CT CHEST PE AORTA (IV ONLY) (CPT=71260)     COMPARISON:  [facility] , CT, CT CHEST (CPT=71250), 6/03/2024, 11:14 AM.     INDICATIONS:  RECENT EMPYEMA FATIGUE HYPOXIA ELEV DIMER     TECHNIQUE:  CT images were obtained with non-ionic intravenous contrast material. Dose reduction techniques were used. Dose information is transmitted to the ACR (American College of Radiology) NRDR (National Radiology Data Registry) which includes the  Dose Index Registry.     PATIENT STATED HISTORY:(As transcribed by Technologist)  SOB, elevated d dimer, hypoxia      CONTRAST USED:  80 mLcc of Isovue 370     FINDINGS:    LUNGS:  Right lower lobe cavitary lesion measures 4.1 x 3.1 cm.  Marked consolidations in the  right lung have improved since prior examination.  Peripheral interstitial abnormalities are noted.  VASCULATURE:  No visible pulmonary arterial thrombus or attenuation.    ANKUSH:  Right perihilar lymph node measures 2.3 x 1.4 cm.  MEDIASTINUM:  Paratracheal lymph node measures 1.1 x 1.3 cm.  CARDIAC:  No enlargement, pericardial thickening, or significant coronary artery calcification.  PLEURA:  Small right pleural effusion is noted.  THORACIC AORTA:  No aneurysm.    CHEST WALL:  No mass or axillary adenopathy.    LIMITED ABDOMEN:  Limited images of the upper abdomen are unremarkable.    BONES:  Mid sternotomy wires are noted.     Impression:    CONCLUSION:       1. Marked atelectasis the right lung along with consolidations have resolved.  Large fluid collection has mostly resolved.  Only minimal residual right pleural effusion is noted.  Cavitary lesion in the right lower lobe is identified.  This cavitary  lesion may be sequelae of prior infectious process.  A pneumatocele is of consideration.     2. There is mild mediastinal and right perihilar lymphadenopathy again noted.       LOCATION:  [facility]     Dictated by (CST): Gregory Vernon MD on 6/18/2024 at 10:50 PM      Finalized by (CST): Gregory Vernon MD on 6/18/2024 at 10:54 PM        PROCEDURE:  XR CHEST AP PORTABLE  (CPT=71045)     TECHNIQUE:  AP chest radiograph was obtained.     COMPARISON:  [facility] , XR, XR CHEST AP PORTABLE  (CPT=71045), 6/09/2024, 12:24 PM.     INDICATIONS:  recent dx from hospital, poor appetite, sleeping a lot, on 2L NC at home     PATIENT STATED HISTORY: (As transcribed by Technologist)           FINDINGS:       Right-sided PICC line has tip in the SVC.     Median sternotomy are stable.     Small right lower lobe consolidation pleural effusion.     No pneumothorax.    Impression:    CONCLUSION:  Right lower lobe consolidation with small right pleural effusion is noted.  This is improved compared to prior examination.      LOCATION:  [place] [id number]     Dictated by (CST): Gregory Vernon MD on 6/18/2024 at 8:17 PM      Finalized by (CST): Gregory Vernon MD on 6/18/2024 at 8:18 PM    ASSESSMENT:  Fatigue with poor po intake/appetite- ? New infection but not obvious, ? Medication related (invanz), but also on several meds that can cuase lethargy (clonazepam, seroquel, etc)  -at this point, seems like more likely side effect from invanz, reports none of her psych meds are new  - improving   RLL pneumonia/pulmonary abscess with right empyema  S/p thoracentesis 5/28, cx with strep intermedius. Path w/ acute inflammation with occasional bacterial organisms present.   S/p R chest tube placement 5/29, cx with strep intermedius.   S/p R VATS and total pulmonary decortication 6/5, R purulent empyema with RLL lung abscess noted. Tissue cx from RLL lung abscess with lactobacillus. Tissue cx from pleural peel with lactobacillus. Tissue cx from right pleural debris with cutibacterium acnes and avidum.   Has been on IV Invanz outpatient (plan was for EOT 7/4/24)  CT chest this admission much better, cavitary lesion to RLL noted.  Leukocytosis, resolved. No fevers.   Thrombocytosis, improving.  Elevated LFTs. CT a/p and US abd unremarkable last admission.  Anemia  IgM kappa MGUS  DM II. Hgb A1C 7.1 (5/2024)  COPD    PLAN:  - infectious workup unremarkable - will stop unasyn and transition to ceftriaxone in preparation for discharge   - follow LFTs, improved  - discharge plan: If she cont to improve and no new infection identified, could go home over the weekend on CTX to complete course of treatment for empyema (EOT 7/4/24)    [facility]

## 2024-06-22 NOTE — PLAN OF CARE
Discussed discharge planning with patient and her sister, who both prefer to monitor on ceftriaxone overnight to ensure patient tolerates prior to discharge home. I believe from ID standpoint this is reasonable. Discussed with RN. Ceftriaxone dose administering now.

## 2024-06-23 VITALS
DIASTOLIC BLOOD PRESSURE: 70 MMHG | SYSTOLIC BLOOD PRESSURE: 122 MMHG | RESPIRATION RATE: 19 BRPM | OXYGEN SATURATION: 96 % | WEIGHT: 120 LBS | HEART RATE: 100 BPM | TEMPERATURE: 98 F | BODY MASS INDEX: 23 KG/M2

## 2024-06-23 PROBLEM — F33.1 MAJOR DEPRESSIVE DISORDER, RECURRENT, MODERATE (HCC): Status: ACTIVE | Noted: 2024-06-23

## 2024-06-23 LAB
GLUCOSE BLD-MCNC: 109 MG/DL (ref 70–99)
GLUCOSE BLD-MCNC: 145 MG/DL (ref 70–99)
MAGNESIUM SERPL-MCNC: 1.9 MG/DL (ref 1.6–2.6)

## 2024-06-23 PROCEDURE — 99239 HOSP IP/OBS DSCHRG MGMT >30: CPT | Performed by: STUDENT IN AN ORGANIZED HEALTH CARE EDUCATION/TRAINING PROGRAM

## 2024-06-23 RX ORDER — BUPROPION HYDROCHLORIDE 150 MG/1
150 TABLET ORAL DAILY
Qty: 30 TABLET | Refills: 0 | Status: SHIPPED | OUTPATIENT
Start: 2024-06-23 | End: 2024-07-23

## 2024-06-23 NOTE — CM/SW NOTE
CM updated by RN patient will receive dose of IV antibiotic today at 1:30 PM prior to discharge.  CM updated [facility] for start of care coordination.     Kiara Cabrera RN Case Manager [phone]

## 2024-06-23 NOTE — PROGRESS NOTES
[facility]   part of [facility]     Hospitalist Progress Note     Soumya NAOMI Fernando Patient Status:  Observation    6/3/1960 MRN [id number]   Location [facility] 3SW-A Attending Roc, Bill Helton, *   Hosp Day # 2 PCP Rika Vinson,      Chief Complaint: Lethargy    Subjective:      - Afebrile, feeling well, back to baseline energy levels per pt and sister Alana on the phone    - Tolerated ceftriaxone well, denies new rashes, swelling, abd pain, cp, sob     Objective:    Review of Systems:   A comprehensive review of systems was completed; pertinent positive and negatives stated in subjective.    Vital signs:  Temp:  [97.9 °F (36.6 °C)-98.8 °F (37.1 °C)] 97.9 °F (36.6 °C)  Pulse:  [67-94] 67  Resp:  [16-20] 19  BP: (105-133)/(66-74) 121/66  SpO2:  [96 %-98 %] 96 %    Physical Exam:    General: No acute distress  Respiratory: no wheezes, no rhonchi  Cardiovascular: S1, S2, regular rate and rhythm  Abdomen: Soft, Non-tender, non-distended, positive bowel sounds  Neuro: No new focal deficits. Equal strength in bilateral LE to flexion/extension   Extremities: no edema    Diagnostic Data:    Labs:  Recent Labs   Lab 24  1415 247 24  0551 24  0638 24  0630   WBC 15.8* 14.3* 12.2* 10.2 10.2   HGB 10.1* 10.7* 9.5* 9.9* 10.6*   MCV 94.7 92.5 91.9 92.9 93.2   .0* 756.0* 637.0* 573.0* 624.0*       Recent Labs   Lab 24  1827 24  0551 24  0638 24  0630 24  0535   * 112* 127* 116*  --    BUN 16 14 9 10  --    CREATSERUM 0.70 0.54* 0.61 0.46*  --    CA 8.8 8.8 8.8 6.8*  --    ALB 2.4*  --  2.2* 1.8*  --    * 139 139 142  --    K 3.2* 3.7 3.5  3.5 3.5  3.5 3.9    109 108 115*  --    CO2 24.0 24.0 25.0 20.0*  --    ALKPHO 149*  --  128 102  --    AST 67*  --  102* 57*  --    ALT 72*  --  93* 65*  --    BILT 0.2  --  0.3 0.2  --    TP 9.5*  --  8.3* 6.8  --        Estimated Creatinine Clearance: 95.7 mL/min (A)  (based on SCr of 0.46 mg/dL (L)).    No results for input(s): \"TROP\", \"TROPHS\", \"CK\" in the last 168 hours.    No results for input(s): \"PTP\", \"INR\" in the last 168 hours.                 Microbiology    Hospital Encounter on 06/18/24   1. Urine Culture, Routine     Status: None    Collection Time: 06/20/24  9:14 PM    Specimen: Urine, clean catch   Result Value Ref Range    Urine Culture No Growth at 18-24 hrs. N/A   2. Blood Culture     Status: None (Preliminary result)    Collection Time: 06/18/24  6:28 PM    Specimen: Blood,peripheral   Result Value Ref Range    Blood Culture Result No Growth 4 Days N/A         Imaging: Reviewed in Epic.    Medications:    cefTRIAXone  2 g Intravenous Q24H    buPROPion ER  150 mg Oral Daily    QUEtiapine ER  200 mg Oral QPM    furosemide  20 mg Oral Daily    atorvastatin  80 mg Oral Nightly    clopidogrel  75 mg Oral Daily    aspirin  81 mg Oral Daily    gabapentin  600 mg Oral Nightly    topiramate  50 mg Oral BID    metoprolol succinate ER  50 mg Oral Daily Beta Blocker    azelastine  1 spray Each Nare BID    desvenlafaxine ER  100 mg Oral Daily    umeclidinium-vilanterol  1 puff Inhalation Daily    insulin aspart  1-10 Units Subcutaneous TID AC and HS       Assessment & Plan:      # Failure to thrive/lethargy  # Right lower lobe cavitary lesion   - IV Abx continued, f/u Bcx   - Previously had PNA with empyema s/p R VATS and pulmonary decortication, abx adjusted per ID    - PCT negative    - Pulm and ID consulted   - PT/OT eval    - TSH WNL, AM cortisol 22.7   - iron studies with AoCD and B12/folate replete on 5/28/24   - Nutrition consulted   - Possibly mediation induced, appreciate psychiatry evaluation  - buspar dc, klonopin prn; Wellbutrin started     # DM2 - LDSSI + Accucheck QID (or Q6H if NPO)  # Hypertension - Continue home oral antihypertensives  # Hyperlipidemia - continue home statin   # GERD - continue PPI  # CAD s/p CABG - ASA, plavix, statin, BB  # Elevated  D-dimer - CTA negative for PE, LE dopplers negative     Home today pending consultant clearance     Bill Brian MD    Supplementary Documentation:     Quality:  DVT Mechanical Prophylaxis:   SCDs,    DVT Pharmacologic Prophylaxis   Medication   None         DVT Pharmacologic prophylaxis: Aspirin 81 mg      Code Status: Full Code  Gotti: No urinary catheter in place  Gotti Duration (in days):   Central line:    ILYA: 6/22/2024    The 21st Century Cures Act makes medical notes like these available to patients in the interest of transparency. Please be advised this is a medical document. Medical documents are intended to carry relevant information, facts as evident, and the clinical opinion of the practitioner. The medical note is intended as peer to peer communication and may appear blunt or direct. It is written in medical language and may contain abbreviations or verbiage that are unfamiliar.

## 2024-06-23 NOTE — PLAN OF CARE
Patient A & O x4. VSS, on RA. C/o mild pain, tylenol given. PICC to RUE. IV abx. UP ad enrique. Voiding freely. Safety measures in place. Instructed to use call light.

## 2024-06-23 NOTE — PROGRESS NOTES
Patient verbalized felt better this morning.  Denies having any allergic or side effect reaction from Rocephin IV overnight. Discussed discharge plan today, will give Rocephine IV at 1330 prior to discharge.

## 2024-06-23 NOTE — PROGRESS NOTES
NURSING DISCHARGE NOTE    Discharged Home via Wheelchair.  Accompanied by Family member  Belongings Taken by patient/family.  Discharge instructions discussed with patient, she verbalized understanding.

## 2024-06-25 ENCOUNTER — PATIENT OUTREACH (OUTPATIENT)
Dept: CASE MANAGEMENT | Age: 64
End: 2024-06-25

## 2024-06-25 NOTE — PROGRESS NOTES
Initial Post Discharge Follow Up   Discharge Date: 6/23/24  Contact Date: 6/25/2024    Consent Verification:  Assessment Completed With: Patient  HIPAA Verified?  Yes    Discharge Dx:   FTT (failure to thrive) in adult     General:   How have you been since your discharge from the hospital? I'm feeling okay, sore where chest tubes were. Breathing is good.   Do you have any pain since discharge?  Yes  Where: where the chest tubes were.    Rating on pain scale 1-10, 10 being the worst pain you have ever experienced, what is current pain: 8  Alleviating factors: none  Aggravating factors: none  Is the pain manageable at home? Yes  How well was your pain managed while in the hospital?   On a scale of 1-5   1- Very Poor and 5- Very well   Very Well  When you were leaving the hospital were your discharge instructions reviewed with you? Yes  How well were your discharge instructions explained to you?   On a scale of 1-5   1- Very Poor and 5- Very well   Very Well  Do you have any questions about your discharge instructions?  No  Before leaving the hospital was your diagnoses explained to you? Yes  Do you have any questions about your diagnoses? No  Are you able to perform normal daily activities of living as you have prior to your hospital stay (dressing, bathing, ambulating to the bathroom, etc)? yes  (NCM) Was patient given a different diet per AVS? no      Medications:   Current Outpatient Medications   Medication Sig Dispense Refill    buPROPion  MG Oral Tablet 24 Hr Take 1 tablet (150 mg total) by mouth daily. 30 tablet 0    umeclidinium-vilanterol 62.5-25 MCG/ACT Inhalation Aerosol Powder, Breath Activated Inhale 1 puff into the lungs daily. 1 each 2    cefTRIAXone 2 g Injection Recon Soln Inject 20 mL (2 g total) into the vein daily for 13 days. 260 mL 0    oxyCODONE 5 MG Oral Tab Take 1 tablet (5 mg total) by mouth every 6 (six) hours as needed for Pain. 20 tablet 0    Naloxone HCl 4 MG/0.1ML Nasal Liquid 4  mg by Nasal route as needed. If patient remains unresponsive, repeat dose in other nostril 2-5 minutes after first dose. 1 kit 0    azelastine 137 MCG/SPRAY Nasal Solution 1 spray by Nasal route 2 (two) times daily.      PRISTIQ 100 MG Oral Tablet 24 Hr Take 1 tablet (100 mg total) by mouth daily.      benzonatate (TESSALON PERLES) 100 MG Oral Cap Take 1 capsule (100 mg total) by mouth 3 (three) times daily as needed for cough. 15 capsule 0    Rizatriptan Benzoate 10 MG Oral Tab Take 1 tablet (10 mg total) by mouth as needed for Migraine. 10 tablet 5    Glucose Blood (ONETOUCH ULTRA) In Vitro Strip Tests 3 x daily 300 each 1    TRULICITY 0.75 MG/0.5ML Subcutaneous Solution Pen-injector Inject 0.75 mg into the skin once a week. 2 mL 0    Coenzyme Q10 200 MG Oral Cap Take 200 mg by mouth 2 (two) times daily.      lidocaine 5 % External Ointment Apply 1 Application  topically 3 (three) times daily as needed. 240 g 1    diclofenac 75 MG Oral Tab EC Take 1 tablet (75 mg total) by mouth 2 (two) times daily. 180 tablet 3    metoprolol succinate ER 50 MG Oral Tablet 24 Hr Take 1 tablet (50 mg total) by mouth daily. 90 tablet 0    Omeprazole 40 MG Oral Capsule Delayed Release Take 1 capsule (40 mg total) by mouth 2 (two) times daily. 300 capsule 0    Blood Glucose Monitoring Suppl (ONETOUCH ULTRA 2) w/Device Does not apply Kit 1 Device by Other route in the morning, at noon, and at bedtime. Use as directed. 1 kit 0    topiramate 50 MG Oral Tab TAKE ONE TABLET BY MOUTH EVERY MORNING AND TWO TABLETS EVERY EVENING (Patient taking differently: Taking one tablet in the morning and one tablet at night.) 270 tablet 2    gabapentin 600 MG Oral Tab Take 1 tablet (600 mg total) by mouth at bedtime. 180 tablet 0    Erenumab-aooe (AIMOVIG) 140 MG/ML Subcutaneous Solution Auto-injector Inject 1 mL (140 mg total) into the skin every 30 (thirty) days. 1 mL 4    umeclidinium bromide (INCRUSE ELLIPTA) 62.5 MCG/ACT Inhalation Aerosol Powder,  Breath Activated Inhale 1 puff into the lungs daily. 3 each 0    clopidogrel 75 MG Oral Tab Take 1 tablet (75 mg total) by mouth daily.      aspirin 81 MG Oral Tab EC Take 1 tablet (81 mg total) by mouth daily.      atorvastatin 80 MG Oral Tab Take 1 tablet (80 mg total) by mouth nightly. 90 tablet 1    metFORMIN 500 MG Oral Tab Take 1 tablet (500 mg total) by mouth 2 (two) times daily. 180 tablet 1    furosemide 20 MG Oral Tab Take 1 tablet (20 mg total) by mouth daily. 7 tablet 0    clonazePAM 0.5 MG Oral Tab Take 1 tablet (0.5 mg total) by mouth 2 (two) times daily. 12 tablet 0    Metoclopramide HCl 5 MG/5ML Oral Solution Take 5 mL (5 mg total) by mouth 3 (three) times daily before meals. (Patient taking differently: Take 5 mL (5 mg total) by mouth 3 (three) times daily before meals as needed.) 473 mL 0    Lancets (ONETOUCH DELICA PLUS [id number]) Does not apply Misc 1 lancet  by Finger stick route 3 (three) times daily. 300 each 1    Mirabegron ER (MYRBETRIQ) 50 MG Oral Tablet 24 Hr Take 1 tablet (50 mg total) by mouth daily. 30 tablet 11    Multiple Vitamins-Minerals (ONE-A-DAY WOMENS 50+) Oral Tab Take 1 tablet by mouth daily.  0    QUEtiapine Fumarate  MG Oral Tablet 24 Hr Take 1 tablet (200 mg total) by mouth every evening. 30 tablet 0    Calcium Carb-Cholecalciferol 500-200 MG-UNIT Oral Tab Take 1 tablet by mouth daily.         Were there any changes to your current medication(s) noted on the AVS? Yes  If so, were these medication changes discussed with you prior to leaving the hospital? Yes  If a new medication was prescribed:    Was the new medication's purpose & side effects reviewed? Yes  Do you have any questions about your new medication? No  Did you  your discharge medications when you left the hospital? Yes  Let's go over your medications together to make sure we are not missing anything. Medications Reviewed  Are there any reasons that keep you from taking your medication as prescribed?  No  Are you having any concerns with constipation? No      Discharge medications reviewed/discussed/and reconciled against outpatient medications with patient.  Any changes or updates to medications sent to PCP.  Patient Acknowledged     Referrals/orders at D/C:  Referrals/orders placed at D/C? yes  What services:   Home health   (If HH was ordered) Has HH been set up?  No, Pt states that RN from [facility] is coming today    DME ordered at D/C? No      Discharge orders, AVS reviewed and discussed with patient. Any changes or updates to orders sent to PCP.  Patient Acknowledged      SDOH:   Transportation Needs: No Transportation Needs (6/19/2024)    Transportation Needs     Lack of Transportation: No     Car Seat: Not on file     Financial Resource Strain: Low Risk  (6/25/2024)    Financial Resource Strain     Difficulty of Paying Living Expenses: Not very hard     Med Affordability: No       Follow up appointments:      Your appointments       Date & Time Appointment Department (Center)    Jun 26, 2024 12:30 PM CDT HEMATOLOGY ONCOLOGY FOLLOW UP with [facility] [place] ([facility])        Jun 27, 2024 2:30 PM CDT Hospital Follow Up with Rika Vinson DO [facility] ([facility])        Jul 09, 2024 12:45 PM CDT Virtual Phone Visit with Florecita Quintero DO [facility] (--)    You have been scheduled for a Virtual Telephone visit with your provider. Please be available at your phone so that your physician can contact you, and be prepared with any questions or concerns. As always, your health is our priority.     We suggest confirming with your insurance regarding coverage prior to your appointment as some payors may no longer cover telephone visits. Depending on your insurance you may also be billed a copay at a later time.        Jul 23,  2024 3:00 PM CDT Exam - Established with Greyson Main MD [facility] ([facility])        Aug 07, 2024 11:00 AM CDT Exam - Established with Xiang Torres MD [facility] ([facility])        Mar 07, 2025 11:45 AM CST HEMATOLOGY ONCOLOGY FOLLOW UP with Scott Corona MD [facility] ([facility])              [facility]  [address]  [facility] [address]  [phone] [facility]  [address]  [facility] [address]  [phone] [facility]  [address]  [facility] [address]  [phone]    [facility][note 3[facility]  [address]  Critical access hospital [id number] [phone] [facility] [place] [facility]  [address]  [facility] [address]  [phone]            TCC  Was TCC ordered: Yes  Was TCC scheduled: No, Explain-pt seeing PCP On 6/27      PCP (If no TCC appointment)  Does patient already have a PCP appointment scheduled? Yes  NCM Confirmed PCP office HFU appointment with patient      Specialist    Does the patient have any other follow up appointment(s) needing to be scheduled? No, pt has all follow ups scheduled.     Is there any reason as to why you cannot make your appointment(s)?  No     Needs post D/C:   Now that you are home, are there any needs or concerns you need addressed before your next visit with your PCP?  (DME, meds, questions, etc.): No    Interventions by NCM:   NCM reviewed discharge instructions and when to seek medical attention with the patient. She states  that she is just waking up right now. She states that she is doing okay. She states that her breathing is fine although she still gets short of breath when walking up and down stairs. She states that she has pain where the chest tubes were. NCM instructed on s/s of infection; she v/u and states that the HH RN is coming today and will check. She does not check her bp and has not yet checked her bs but last night it was 163. She denied having any fever, n/v/c/d, lightheadedness, HA or any new or worsening symptoms. Med review completed. She denied having any questions or concerns at this time.       CCM referral placed:    No    BOOK BY DATE: 7/7/24

## 2024-06-25 NOTE — PAYOR COMM NOTE
--------------  ADMISSION REVIEW     Payor: [facility]  Subscriber #:  [id number]  Authorization Number: [id number]    Admit date: 6/21/24  Admit time:  1:59 PM       ED          History     Chief Complaint   Patient presents with    Fatigue       64 year old female brought in by family for evaluation of generalized fatigue, weakness, poor appetite, sleeping many hours of the day up to 19 hours, limited activity barely gets out of bed. Familyhas been caring for her.  Patient was recently discharged from the hospital after pneumonia, empyema status post VATS and chest tube, she is on ertapenem via PICC line.  She also has a history of MGUS, COPD, hypertension, diabetes.        ED Triage Vitals [06/18/24 1807]   /83   Pulse 99   Resp 20   Temp 98.5 °F (36.9 °C)   Temp src Oral   SpO2 98 %   O2 Device Nasal cannula       Physical Exam  Constitutional:       General: She is not in acute distress.     Comments: Listless   Eyes:      Extraocular Movements: Extraocular movements intact.   Cardiovascular:      Rate and Rhythm: Normal rate.      Pulses: Normal pulses.   Pulmonary:      Effort: Pulmonary effort is normal. No respiratory distress.      Comments: Chest tube insertion sites appear well-healed, sutures in place.  No induration or drainage  Abdominal:      General: Abdomen is flat. There is no distension.      Tenderness: There is no abdominal tenderness.       Labs Reviewed   COMP METABOLIC PANEL (14) - Abnormal; Notable for the following components:       Result Value    Glucose 117 (*)     Sodium 135 (*)     Potassium 3.2 (*)     AST 67 (*)     ALT 72 (*)     Alkaline Phosphatase 149 (*)     Total Protein 9.5 (*)     Albumin 2.4 (*)     Globulin  7.1 (*)     A/G Ratio 0.3 (*)     All other components within normal limits   D-DIMER - Abnormal; Notable for the following components:    D-Dimer 4.54 (*)     All other components within normal limits   CBC W/ DIFFERENTIAL -  Abnormal; Notable for the following components:    WBC 14.3 (*)     RBC 3.58 (*)     HGB 10.7 (*)     HCT 33.1 (*)     .0 (*)     Neutrophil Absolute Prelim 9.87 (*)     Neutrophil Absolute 9.87 (*)     All other components within normal limits   LACTIC ACID, PLASMA - Normal   CBC WITH DIFFERENTIAL WITH PLATELET    Narrative:     The following orders were created for panel order CBC With Differential With Platelet.  Procedure                               Abnormality         Status                     ---------                               -----------         ------                     CBC W/ DIFFERENTIAL[[id number]]          Abnormal            Final result                 Please view results for these tests on the individual orders.   URINALYSIS WITH CULTURE REFLEX   TSH W REFLEX TO FREE T4   BLOOD CULTURE   BLOOD CULTURE     XR CHEST AP PORTABLE  (CPT=71045)  Result Date: 6/18/2024  CONCLUSION:  Right lower lobe consolidation with small right pleural effusion is noted.      Vitals:    06/18/24 1807 06/18/24 2045 06/18/24 2200   BP: 120/83 122/78 121/57   Pulse: 99 83 78   Resp: 20 16 16   Temp: 98.5 °F (36.9 °C)     TempSrc: Oral     SpO2: 98% 100% 100%   Weight: 54.4 kg         Generalized fatigue, weakness, failure to thrive.  Possible deconditioning, electro abnormalities, dehydration, recurrence of infection, VTE.  Patient is nontoxic-appearing but appears listless in bed, she has such little energy she can barely speak    ED Course as of 06/18/24 2229  ------------------------------------------------------------  Time: 06/18 2038  Comment: My interpretation of chest x-ray with right lower lobe consolidation.  Radiology is noting improvement compared to prior  ------------------------------------------------------------  Time: 06/18 2048  Comment: EKG interpretation by me: EKG sinus rhythm at a rate of 93, axis nomal, no concerning acute ischemic ST  changes  ------------------------------------------------------------  Time: 06/18 2229  Comment: Labs are notable for hypokalemia, mild dehydration, repleted, fluids given.  Noted leukocytosis and thrombocytosis, patient was evaluated by hematology during recent admission and noted to have MGUS.  Cultures were sent from triage, patient is afebrile here and has been on ertapenem.  Her lactate is normal.  D-dimer is elevated, will proceed with CT angiogram chest.  She is not functioning well at home and family having difficulty caring for her, will admit for further care.         Disposition and Plan     Clinical Impression:  1. FTT (failure to thrive) in adult    2. Dehydration    3. Hypokalemia    4. Generalized weakness    5. Thrombocytosis          H & p    Soumya King is a 64 year old female admitted with fatigue and generalized weakness and poor appetite.  Patient seen with patient's sister at bedside who is helping with history.  Patient was recently admitted in the hospital for empyema and had right video-assisted thoracoscopy and decortication at that time for lung abscess and trapped lung and discharged on 6/10/2024 on IV antibiotics through PICC line.  Patient's sister states she has been helping patient with her antibiotics.  Patient states she was initially feeling better and then progressively feeling worse.  Has generalized weakness and fatigue.  Has decreased appetite.       #Generalized weakness, fatigue  #Decreased appetite  #Failure to thrive  Nutritionist consult  #Hypokalemia  Replace with electrolyte protocol  #Reactive thrombocytosis  Follow CBC in a.m.  #Leukocytosis due to recent empyema  #Continue IV antibiotics through PICC line   ID consult in a.m.  Follow CBC in a.m.  #Mild hyponatremia due to dehydration  IV fluids  Follow BMP in a.m.  #Elevated liver function tests  IV fluids  Follow CMP in a.m.  #Diabetes mellitus type 2  Hyperglycemia protocol  Follow  Accu-Cheks  #Hypertension  Continue home metoprolol    6/19  Reason for Consultation:  RLL cavitary lesion     History of Present Illness:  Soumya King is a 64 year old female with a history of COPD, HTN and HLD. Patient recently was hospitalized from 5/26- 6/10 with a RLL pneumonia/pulmonary abscess with right empyema, s/p  R VATS and total pulmonary decortication 6/5. She was discharged on IV Invanz with plans for EOT 7/4/24.     Patient now presents with fatigue, generalized weakness and poor appetite/po intake. Denies any SOB, cough, pain, fevers or chills. Denies any abdominal pain, nausea or vomiting. Has had some diarrhea. Denies any dysuria.      Afebrile here. WBC 14.3K on admission--> now 12.2K.      6/20  ASSESSMENT:  Fatigue with poor po intake/appetite  RLL pneumonia/pulmonary abscess with right empyema  S/p thoracentesis 5/28, cx with strep intermedius. Path w/ acute inflammation with occasional bacterial organisms present.   S/p R chest tube placement 5/29, cx with strep intermedius.   S/p R VATS and total pulmonary decortication 6/5, R purulent empyema with RLL lung abscess noted. Tissue cx from RLL lung abscess with lactobacillus. Tissue cx from pleural peel with lactobacillus. Tissue cx from right pleural debris with cutibacterium acnes and avidum.   Has been on IV Invanz outpatient (plan was for EOT 7/4/24)  CT chest this admission much better, cavitary lesion to RLL noted.  Leukocytosis, improving. No fevers.   Thrombocytosis, improving.  Elevated LFTs. CT a/p and US abd unremarkable last admission.  Anemia  IgM kappa MGUS  DM II. Hgb A1C 7.1 (5/2024)  COPD     PLAN:  - will dc IV meropenem and start IV Unasyn for continuation of treatment of RLL abscess/R empyema (EOT of abx 7/4/2024)  - follow blood cultures  - follow temps and cbc  - follow LFTs  - noted plans for LE venous dopplers  - would send c. Diff if further diarrhea (none today per staff).          Abnormal CT chest with right  lower lobe consolidation/history of empyema     History of Present Illness:  Soumya King is a a(n) 64 year old female.  With history of allergic rhinitis, anxiety, COPD, diabetes mellitus and GERD who presented to the emergency department on 6/18/2024 with complaints of generalized weakness and poor appetite.  The patient underwent right VATS and decortication on 6/5/2024.  A CT chest performed on 6/18/2024 shows right lower lobe consolidation with a cavitary lesion so pulmonary consultation was obtained for further evaluation and management.  Infectious diseases already started patient on Unasyn      Plan:  Continue current antibiotics per infectious disease  CPAP at 12 cm water pressure when sleeping  Continue current antibiotics per infectious disease  Change to Anoro 1 puff daily and discontinue umeclidinium  DVT prophylaxis:  Lovenox 40 mg subcutaneous every 24 hours  GI prophylaxis: Protonix 40 mg daily   Follow labs, procalcitonin in a.m.    6/21  LETHARGY    # Failure to thrive/lethargy  # Right lower lobe cavitary lesion   - IV Abx continued, f/u Bcx   - Previously had PNA with empyema s/p R VATS and pulmonary decortication   - PCT negative    - Pulm and ID consulted   - PT/OT eval    - TSH WNL, AM cortisol 22.7        # DM2 - LDSSI + Accucheck QID (or Q6H if NPO)  # Hypertension - Continue home oral antihypertensives  # Hyperlipidemia - continue home statin   # GERD - continue PPI  # CAD s/p CABG - ASA, plavix, statin, BB  # Elevated D-dimer - CTA negative for PE, LE dopplers negative       6/22    Abx: unasyn (6/19-->) #4, sp invanz     pt seen and examined this morning. Sleeping, resting comfortably. Tolerating unasyn. Says her fatigue has improved already. No diarrhea. Breathing is comfortable. Afebrile. WBC 10. Blood cultures negative x 3 days. Urine cx pending, ua with pyuria.       DISCHARGED 6/23/2024    MEDICATIONS ADMINISTERED IN LAST 1 DAY:  Administration History       None            Vitals  (last day) before discharge       Date/Time Temp Pulse Resp BP SpO2 Weight O2 Device O2 Flow Rate (L/min) [facility]    06/23/24 1350 -- 100 -- -- -- -- -- -- PB    06/23/24 1200 98 °F (36.7 °C) 86 19 122/70 96 % -- None (Room air) 0 L/min PB    06/23/24 1105 -- 92 -- -- 98 % -- -- -- PB    06/23/24 0815 98.3 °F (36.8 °C) 86 17 113/72 93 % -- None (Room air) -- PB    06/23/24 0400 97.9 °F (36.6 °C) 67 19 121/66 96 % -- -- -- [place]    06/23/24 0000 97.9 °F (36.6 °C) -- 18 117/70 97 % -- None (Room air) -- [place]    06/22/24 2000 98.5 °F (36.9 °C) 84 20 121/70 97 % -- None (Room air) -- [place]    06/22/24 1602 98.1 °F (36.7 °C) 85 20 133/74 97 % -- None (Room air) --     06/22/24 1204 98.1 °F (36.7 °C) 93 16 105/70 -- -- None (Room air) --     06/22/24 0810 98.8 °F (37.1 °C) 94 18 115/68 98 % -- None (Room air) --     06/22/24 0500 98 °F (36.7 °C) 90 18 130/66 95 % -- None (Room air) 0 L/min AF    06/22/24 0000 -- 83 18 103/57 94 % -- None (Room air) 0 L/min AF          CIWA Scores (last 6 days) before discharge       None

## 2024-06-26 ENCOUNTER — OFFICE VISIT (OUTPATIENT)
Dept: HEMATOLOGY/ONCOLOGY | Facility: HOSPITAL | Age: 64
End: 2024-06-26
Attending: INTERNAL MEDICINE

## 2024-06-26 VITALS
WEIGHT: 106.81 LBS | BODY MASS INDEX: 20 KG/M2 | RESPIRATION RATE: 16 BRPM | OXYGEN SATURATION: 97 % | DIASTOLIC BLOOD PRESSURE: 70 MMHG | HEART RATE: 91 BPM | SYSTOLIC BLOOD PRESSURE: 106 MMHG | TEMPERATURE: 98 F

## 2024-06-26 DIAGNOSIS — J86.9 EMPYEMA OF RIGHT PLEURAL SPACE (HCC): Primary | ICD-10-CM

## 2024-06-26 DIAGNOSIS — N39.41 URGE INCONTINENCE: Primary | ICD-10-CM

## 2024-06-26 PROCEDURE — 99024 POSTOP FOLLOW-UP VISIT: CPT | Performed by: STUDENT IN AN ORGANIZED HEALTH CARE EDUCATION/TRAINING PROGRAM

## 2024-06-26 RX ORDER — MIRABEGRON 50 MG/1
50 TABLET, EXTENDED RELEASE ORAL DAILY
Qty: 90 TABLET | Refills: 3 | Status: SHIPPED | OUTPATIENT
Start: 2024-06-26

## 2024-06-26 NOTE — PAYOR COMM NOTE
--------------  DISCHARGE REVIEW    Payor: [facility]  Subscriber #:  [id number]  Authorization Number: [id number]    Admit date: 6/21/24  Admit time:   1:59 PM  Discharge Date: 6/23/2024  3:15 PM     Admitting Physician: Malorie Chavez MD  Attending Physician:  No att. providers found  Primary Care Physician: Rika Vinson DO       Discharge Summary Notes    No notes of this type exist for this encounter.         REVIEWER COMMENTS

## 2024-06-26 NOTE — PAYOR COMM NOTE
ASKING FOR RECONSIDERATION OF INPT  PT ADMITTED ON 6/18 IN OBSERVATION STATUS.   MADE INPT ON 6/21  ON IV ABX THAT CHANGED ON 6/22    ADMISSION REVIEW     Payor: [facility]  Subscriber #:  [id number]  Authorization Number: [id number]    Admit date: 6/21/24  Admit time:  1:59 PM       REVIEW DOCUMENTATION:     ED Provider Notes        ED Provider Notes signed by Brandon Brenner MD at 6/18/2024 10:30 PM       Author: Brandon Brenner MD Service: -- Author Type: Physician    Filed: 6/18/2024 10:30 PM Date of Service: 6/18/2024  8:53 PM Status: Signed    : Brandon Brenner MD (Physician)             History     Chief Complaint   Patient presents with    Fatigue       HPI    64 year old female brought in by family for evaluation of generalized fatigue, weakness, poor appetite, sleeping many hours of the day up to 19 hours, limited activity barely gets out of bed. Familyhas been caring for her.  Patient was recently discharged from the hospital after pneumonia, empyema status post VATS and chest tube, she is on ertapenem via PICC line.  She also has a history of MGUS, COPD, hypertension, diabetes.  She is on numerous medicines for depression and anxiety.          Past Medical History:    Abdominal pain, other specified site    groin    Acute bronchitis    Allergic rhinitis    Anxiety    Arthritis    Back problem    Jamison esophagus    Chronic low back pain without sciatica    COPD (chronic obstructive pulmonary disease) (HCC)    Depression    Diabetes (HCC)    Difficult intubation    difficult to intubate with bladder surgery,instructed by anesthesia to communicate to future anesthesiologist. Small airway    Esophageal reflux    Essential hypertension    Don't remember    Fitting and adjustment of dental prosthetic device    High blood pressure    History of 2019 novel coronavirus disease (COVID-19)    No hospitalization,symptoms; Fever,fatigue ,body aches,headache no loss of taste or smell     Hx of motion sickness    Hyperlipidemia    Mass of spine    Migraines    barometric pressure    Myocardial infarction (HCC)    Osteoarthritis    Other ill-defined conditions(799.89)    Jamison's    Pain in joint, forearm    wrist    Pain in joint, pelvic region and thigh    hip    Pelvic mass    Personal history of urinary (tract) infection    Sleep apnea    I don't date       Past Surgical History:   Procedure Laterality Date    Cabg  12/27/23    Colonoscopy      Colonoscopy N/A 05/18/2022    Procedure: COLONOSCOPY AND ESOPHAGOGASTRODUODENOSCOPY;  Surgeon: Miguel Camargo MD;  Location:  ENDOSCOPY    Heart surgery  2024    double bypass    Laparoscopic  2007    sling operation for stress incontinence    Laparoscopy,diagnostic      Kay biopsy stereo nodule 2 site bilat (cpt=19081/86728) Left 02/2010    BENIGN 2 SITES    Kay biopsy stereo w/calc 2 site left (cpt=19081/74859)  02/2010    benign x 2    Kay stereo-clip w/calc 2 site left  2010    Oophorectomy Left 06/28/2019    Other  08/31/2022    RIGHT SACRAL SUBCUTANEOUS CYST EXCISION    Other surgical history  2000, 2019    Bladder Sling, Large mass removed from left abdomen and ovar    Tonsillectomy      Was a child, have no idea    Tubal ligation      Upper gi endoscopy,exam         No pertinent social history.                 Physical Exam     ED Triage Vitals [06/18/24 1807]   /83   Pulse 99   Resp 20   Temp 98.5 °F (36.9 °C)   Temp src Oral   SpO2 98 %   O2 Device Nasal cannula       Physical Exam  Constitutional:       General: She is not in acute distress.     Comments: Listless   Eyes:      Extraocular Movements: Extraocular movements intact.   Cardiovascular:      Rate and Rhythm: Normal rate.      Pulses: Normal pulses.   Pulmonary:      Effort: Pulmonary effort is normal. No respiratory distress.      Comments: Chest tube insertion sites appear well-healed, sutures in place.  No induration or drainage  Abdominal:      General: Abdomen is  flat. There is no distension.      Tenderness: There is no abdominal tenderness.   Musculoskeletal:         General: No swelling or deformity.      Cervical back: Normal range of motion.   Skin:     General: Skin is warm.   Neurological:      General: No focal deficit present.      Mental Status: She is alert.              ED Course     Labs Reviewed   COMP METABOLIC PANEL (14) - Abnormal; Notable for the following components:       Result Value    Glucose 117 (*)     Sodium 135 (*)     Potassium 3.2 (*)     AST 67 (*)     ALT 72 (*)     Alkaline Phosphatase 149 (*)     Total Protein 9.5 (*)     Albumin 2.4 (*)     Globulin  7.1 (*)     A/G Ratio 0.3 (*)     All other components within normal limits   D-DIMER - Abnormal; Notable for the following components:    D-Dimer 4.54 (*)     All other components within normal limits   CBC W/ DIFFERENTIAL - Abnormal; Notable for the following components:    WBC 14.3 (*)     RBC 3.58 (*)     HGB 10.7 (*)     HCT 33.1 (*)     .0 (*)     Neutrophil Absolute Prelim 9.87 (*)     Neutrophil Absolute 9.87 (*)     All other components within normal limits   LACTIC ACID, PLASMA - Normal   CBC WITH DIFFERENTIAL WITH PLATELET    Narrative:     The following orders were created for panel order CBC With Differential With Platelet.  Procedure                               Abnormality         Status                     ---------                               -----------         ------                     CBC W/ DIFFERENTIAL[[id number]]          Abnormal            Final result                 Please view results for these tests on the individual orders.   URINALYSIS WITH CULTURE REFLEX   TSH W REFLEX TO FREE T4   BLOOD CULTURE   BLOOD CULTURE     XR CHEST AP PORTABLE  (CPT=71045)    Result Date: 6/18/2024  CONCLUSION:  Right lower lobe consolidation with small right pleural effusion is noted.  This is improved compared to prior examination.   LOCATION:  [place] [id number]      Dictated by (CST):  Gregory Vernon MD on 6/18/2024 at 8:17 PM     Finalized by (CST): Gregory Vernon MD on 6/18/2024 at 8:18 PM            [facility]     Vitals:    06/18/24 1807 06/18/24 2045 06/18/24 2200   BP: 120/83 122/78 121/57   Pulse: 99 83 78   Resp: 20 16 16   Temp: 98.5 °F (36.9 °C)     TempSrc: Oral     SpO2: 98% 100% 100%   Weight: 54.4 kg         Generalized fatigue, weakness, failure to thrive.  Possible deconditioning, electro abnormalities, dehydration, recurrence of infection, VTE.  Patient is nontoxic-appearing but appears listless in bed, she has such little energy she can barely speak    ED Course as of 06/18/24 2229  ------------------------------------------------------------  Time: 06/18 2038  Comment: My interpretation of chest x-ray with right lower lobe consolidation.  Radiology is noting improvement compared to prior  ------------------------------------------------------------  Time: 06/18 2048  Comment: EKG interpretation by me: EKG sinus rhythm at a rate of 93, axis nomal, no concerning acute ischemic ST changes  ------------------------------------------------------------  Time: 06/18 2229  Comment: Labs are notable for hypokalemia, mild dehydration, repleted, fluids given.  Noted leukocytosis and thrombocytosis, patient was evaluated by hematology during recent admission and noted to have MGUS.  Cultures were sent from triage, patient is afebrile here and has been on ertapenem.  Her lactate is normal.  D-dimer is elevated, will proceed with CT angiogram chest.  She is not functioning well at home and family having difficulty caring for her, will admit for further care.         Disposition and Plan     Clinical Impression:  1. FTT (failure to thrive) in adult    2. Dehydration    3. Hypokalemia    4. Generalized weakness    5. Thrombocytosis        Disposition:  Admit    Follow-up:  No follow-up provider specified.    Medications Prescribed:  Current Discharge Medication List          Hospital Problems        Present on Admission  Date Reviewed: 5/20/2024            ICD-10-CM Noted POA    * (Principal) FTT (failure to thrive) in adult R62.7 6/18/2024 Unknown            Signed by Brandon Brenner MD on 6/18/2024 10:30 PM         MEDICATIONS ADMINISTERED IN LAST 1 DAY:  Administration History       None            Vitals (last day) before discharge       Date/Time Temp Pulse Resp BP SpO2 Weight O2 Device O2 Flow Rate (L/min) [facility] [place]    06/23/24 1350 -- 100 -- -- -- -- -- -- PB    06/23/24 1200 98 °F (36.7 °C) 86 19 122/70 96 % -- None (Room air) 0 L/min PB    06/23/24 1105 -- 92 -- -- 98 % -- -- -- PB    06/23/24 0815 98.3 °F (36.8 °C) 86 17 113/72 93 % -- None (Room air) -- PB    06/23/24 0400 97.9 °F (36.6 °C) 67 19 121/66 96 % -- -- -- [place]    06/23/24 0000 97.9 °F (36.6 °C) -- 18 117/70 97 % -- None (Room air) -- [facility]    06/22/24 2000 98.5 °F (36.9 °C) 84 20 121/70 97 % -- None (Room air) -- [facility]    06/22/24 1602 98.1 °F (36.7 °C) 85 20 133/74 97 % -- None (Room air) --     06/22/24 1204 98.1 °F (36.7 °C) 93 16 105/70 -- -- None (Room air) -- LW    06/22/24 0810 98.8 °F (37.1 °C) 94 18 115/68 98 % -- None (Room air) -- LW    06/22/24 0500 98 °F (36.7 °C) 90 18 130/66 95 % -- None (Room air) 0 L/min AF    06/22/24 0000 -- 83 18 103/57 94 % -- None (Room air) 0 L/min AF       6/21 HOSPITALIST NOTE      Chief Complaint: Lethargy        Subjective:  - Pt much more awake and alert today, states she feels better though does not she did not get a lot of sleep last night   - Does note she has a history of insomnia and usually has frequent naps throughout the day         Vital signs:  Temp:  [97.5 °F (36.4 °C)-98.3 °F (36.8 °C)] 98.2 °F (36.8 °C)  Pulse:  [83-94] 92  Resp:  [16-18] 18  BP: (108-123)/(62-74) 121/74  SpO2:  [93 %-98 %] 97 %    Recent Labs   Lab 06/17/24  1415 06/18/24  1827 06/19/24  0551 06/20/24  0638 06/21/24  0630   WBC 15.8* 14.3* 12.2* 10.2 10.2   HGB 10.1* 10.7* 9.5* 9.9* 10.6*   MCV 94.7 92.5 91.9 92.9 93.2    .0* 756.0* 637.0* 573.0* 624.0*                Recent Labs   Lab 06/18/24  1827 06/19/24  0551 06/20/24  0638 06/21/24  0630   * 112* 127* 116*   BUN 16 14 9 10   CREATSERUM 0.70 0.54* 0.61 0.46*   CA 8.8 8.8 8.8 6.8*   ALB 2.4*  --  2.2* 1.8*   * 139 139 142   K 3.2* 3.7 3.5  3.5 3.5  3.5    109 108 115*   CO2 24.0 24.0 25.0 20.0*   ALKPHO 149*  --  128 102   AST 67*  --  102* 57*   ALT 72*  --  93* 65*   BILT 0.2  --  0.3 0.2   TP 9.5*  --  8.3* 6.8       Medications:   Scheduled Medications    QUEtiapine ER  200 mg Oral QPM    clonazePAM  0.5 mg Oral BID    furosemide  20 mg Oral Daily    atorvastatin  80 mg Oral Nightly    clopidogrel  75 mg Oral Daily    aspirin  81 mg Oral Daily    gabapentin  600 mg Oral Nightly    topiramate  50 mg Oral BID    metoprolol succinate ER  50 mg Oral Daily Beta Blocker    azelastine  1 spray Each Nare BID    desvenlafaxine ER  100 mg Oral Daily    pantoprazole  40 mg Oral QAM AC    busPIRone  10 mg Oral BID    ampicillin-sulbactam  3 g Intravenous q6h    umeclidinium-vilanterol  1 puff Inhalation Daily    enoxaparin  40 mg Subcutaneous Daily    insulin aspart  1-10 Units Subcutaneous TID AC and HS                  Assessment & Plan:  # Failure to thrive/lethargy  # Right lower lobe cavitary lesion   - IV Abx continued, f/u Bcx   - Previously had PNA with empyema s/p R VATS and pulmonary decortication   - PCT negative    - Pulm and ID consulted   - PT/OT eval    - TSH WNL, AM cortisol 22.7   - iron studies with AoCD and B12/folate replete on 5/28/24   - Nutrition consulted   - Possibly mediation induced, will have psychiatry evaluate given hx of depression on multiple psych meds - for now holding AM buspar, klonopin; however appreciate reccs regarding neurontin, pristiq, topamax, seroquel      # DM2 - LDSSI + Accucheck QID (or Q6H if NPO)  # Hypertension - Continue home oral antihypertensives  # Hyperlipidemia - continue home statin   # GERD -  continue PPI  # CAD s/p CABG - ASA, plavix, statin, BB  # Elevated D-dimer - CTA negative for PE, LE dopplers negative      6/21 PULMONARY NOTE    History of Present Illness:     Breathing much better today.  Denies any increased shortness of breath or chest pains          Assessment:  Shortness of breath: Remains on room air  Right lower lobe consolidation: This could be related to aspiration pneumonia with GERD versus community-acquired pneumonia  Right lower lobe cavitary lesion:Right lower lobe cavitary lesion measures 4.1 x 3.1 cm.  Marked consolidations in the right lung have improved since prior examination: This could represent lung abscess/empyema status post right VATS and pulmonary decortication on 6/5/2020: Leukocyte count normal, afebrile, procalcitonin normal range suggesting right lower lobe opacity sterile  Right perihilar lymph node 2.3 x 1.4 cm.  Mediastinal lymph node 1.1 x 1.2 cm  Small right pleural effusion  COPD  IgM kappa MGUS  History of tobacco smoking 72 pack years of tobacco although quit smoking in 2023  Obstructive sleep apnea syndrome: Polysomnogram on 2/21/2017: Showed AHI was 26.4, supine index of 88.7, REM AHI of 43.4, SaO2 grzegorz of 75%.  A follow-up CPAP titration sleep study on 4/26/2017 suggested CPAP at 12 cm water pressure  Generalized weakness  Allergic rhinitis  Anxiety  GERD/Jamison's esophagus  Coronary artery disease/history of MI: Currently on Plavix  Type 2 diabetes mellitus  Depression  Hypertension  Hyperlipidemia  Transaminitis/elevated LFTs:  Thrombocytosis: Suspect reactive        Plan:  Continue current antibiotics per infectious disease  Continue CPAP at 12 cm water pressure when sleeping but patient apparently on room air only at night  Continue current antibiotics per infectious disease  Continue Anoro 1 puff daily   DVT prophylaxis:  Lovenox 40 mg subcutaneous every 24 hours  GI prophylaxis: Protonix 40 mg daily   Follow labs   Will follow for further  recommendations    6/21 ID NOTE  Abx: unasyn, sp invanz     Physical Exam:  Vital signs: Blood pressure 114/67, pulse 88, temperature 98.3 °F (36.8 °C), temperature source Oral, resp. rate 18, weight 120 lb (54.4 kg), SpO2 97%,    ASSESSMENT:  Fatigue with poor po intake/appetite- ? New infection but not obvious, ? Medication related (invanz), but also on several meds that can cuase lethargy (clonazepam, seroquel, etc)  -at this point, seems like more likely side effect from invanz, reports none of her psych meds are new  RLL pneumonia/pulmonary abscess with right empyema  S/p thoracentesis 5/28, cx with strep intermedius. Path w/ acute inflammation with occasional bacterial organisms present.   S/p R chest tube placement 5/29, cx with strep intermedius.   S/p R VATS and total pulmonary decortication 6/5, R purulent empyema with RLL lung abscess noted. Tissue cx from RLL lung abscess with lactobacillus. Tissue cx from pleural peel with lactobacillus. Tissue cx from right pleural debris with cutibacterium acnes and avidum.   Has been on IV Invanz outpatient (plan was for EOT 7/4/24)  CT chest this admission much better, cavitary lesion to RLL noted.  Leukocytosis, improving. No fevers.   Thrombocytosis, improving.  Elevated LFTs. CT a/p and US abd unremarkable last admission.  Anemia  IgM kappa MGUS  DM II. Hgb A1C 7.1 (5/2024)  COPD     PLAN:  - cont Unasyn for continuation of treatment of RLL abscess/R empyema (EOT of abx 7/4/2024)  - follow blood cultures, so far neg  - follow temps and cbc  - follow LFTs  - would send c. Diff if further diarrhea (none today per staff).    6/22 HOSPITALIST NOTE    Subjective:  - AF, VSS, much more energetic this AM   - ambulated around halls yesterday   - Denies f/c, cp, sob, abd pain      Vital signs:  Temp:  [97.9 °F (36.6 °C)-98.3 °F (36.8 °C)] 98 °F (36.7 °C)  Pulse:  [83-90] 90  Resp:  [18] 18  BP: (103-130)/(57-80) 130/66  SpO2:  [94 %-98 %] 95 %    Medications:   Scheduled  Medications    buPROPion ER  150 mg Oral Daily    QUEtiapine ER  200 mg Oral QPM    furosemide  20 mg Oral Daily    atorvastatin  80 mg Oral Nightly    clopidogrel  75 mg Oral Daily    aspirin  81 mg Oral Daily    gabapentin  600 mg Oral Nightly    topiramate  50 mg Oral BID    metoprolol succinate ER  50 mg Oral Daily Beta Blocker    azelastine  1 spray Each Nare BID    desvenlafaxine ER  100 mg Oral Daily    pantoprazole  40 mg Oral QAM AC    ampicillin-sulbactam  3 g Intravenous q6h    umeclidinium-vilanterol  1 puff Inhalation Daily    enoxaparin  40 mg Subcutaneous Daily    insulin aspart  1-10 Units Subcutaneous TID AC and HS                  Assessment & Plan:  # Failure to thrive/lethargy  # Right lower lobe cavitary lesion   - IV Abx continued, f/u Bcx   - Previously had PNA with empyema s/p R VATS and pulmonary decortication   - PCT negative    - Pulm and ID consulted   - PT/OT eval    - TSH WNL, AM cortisol 22.7   - iron studies with AoCD and B12/folate replete on 5/28/24   - Nutrition consulted   - Possibly mediation induced, appreciate psychiatry evaluation  - buspar dc, klonopin prn; Wellbutrin started      # DM2 - LDSSI + Accucheck QID (or Q6H if NPO)  # Hypertension - Continue home oral antihypertensives  # Hyperlipidemia - continue home statin   # GERD - continue PPI  # CAD s/p CABG - ASA, plavix, statin, BB  # Elevated D-dimer - CTA negative for PE, LE dopplers negative     6/22 ID NOTE    Abx: unasyn (6/19-->) #4, sp invanz     Subjective: pt seen and examined this morning. Sleeping, resting comfortably. Tolerating unasyn. Says her fatigue has improved already. No diarrhea. Breathing is comfortable. Afebrile. WBC 10. Blood cultures negative x 3 days. Urine cx pending, ua with pyuria.         ASSESSMENT:  Fatigue with poor po intake/appetite- ? New infection but not obvious, ? Medication related (invanz), but also on several meds that can cuase lethargy (clonazepam, seroquel, etc)  -at this point,  seems like more likely side effect from invanz, reports none of her psych meds are new  - improving   RLL pneumonia/pulmonary abscess with right empyema  S/p thoracentesis 5/28, cx with strep intermedius. Path w/ acute inflammation with occasional bacterial organisms present.   S/p R chest tube placement 5/29, cx with strep intermedius.   S/p R VATS and total pulmonary decortication 6/5, R purulent empyema with RLL lung abscess noted. Tissue cx from RLL lung abscess with lactobacillus. Tissue cx from pleural peel with lactobacillus. Tissue cx from right pleural debris with cutibacterium acnes and avidum.   Has been on IV Invanz outpatient (plan was for EOT 7/4/24)  CT chest this admission much better, cavitary lesion to RLL noted.  Leukocytosis, resolved. No fevers.   Thrombocytosis, improving.  Elevated LFTs. CT a/p and US abd unremarkable last admission.  Anemia  IgM kappa MGUS  DM II. Hgb A1C 7.1 (5/2024)  COPD     PLAN:  - infectious workup unremarkable - will stop unasyn and transition to ceftriaxone in preparation for discharge   - follow LFTs, improved  - discharge plan: If she cont to improve and no new infection identified, could go home over the weekend on CTX to complete course of treatment for empyema (EOT 7/4/24)     6/22 PULMONARY NOTE    History of Present Illness:     Walking in the hallways complains only slight shortness of breath.  Remains on room air.     States that she has a CPAP machine at home but she is not compliant.  She wishes to follow-up with a sleep specialist.        Assessment:  Shortness of breath: Remains on room air  Right lower lobe consolidation: This could be related to aspiration pneumonia with GERD versus community-acquired pneumonia: Clinically improving  Right lower lobe cavitary lesion:Right lower lobe cavitary lesion measures 4.1 x 3.1 cm.  Marked consolidations in the right lung have improved since prior examination: This could represent lung abscess/empyema status post  right VATS and pulmonary decortication on 6/5/2020: Leukocyte count normal, afebrile, procalcitonin normal range suggesting right lower lobe opacity sterile  Right perihilar lymph node 2.3 x 1.4 cm.  Mediastinal lymph node 1.1 x 1.2 cm  Small right pleural effusion: Stable  COPD  IgM kappa MGUS  History of tobacco smoking 72 pack years of tobacco although quit smoking in 2023  Obstructive sleep apnea syndrome: Polysomnogram on 2/21/2017: Showed AHI was 26.4, supine index of 88.7, REM AHI of 43.4, SaO2 grzegorz of 75%.  A follow-up CPAP titration sleep study on 4/26/2017 suggested CPAP at 12 cm water pressure: The patient admitted today that he is noncompliant with CPAP machine at home but wishes to follow-up with a sleep specialist for further management  Generalized weakness  Allergic rhinitis  Anxiety  GERD/Jamison's esophagus  Coronary artery disease/history of MI: Currently on Plavix  Type 2 diabetes mellitus  Depression  Hypertension  Hyperlipidemia  Transaminitis/elevated LFTs:  Thrombocytosis: Suspect reactive        Plan:  Continue current antibiotics per infectious disease  Advised patient to continue CPAP at 12 cm water pressure at bedtime and whenever napping  Continue current antibiotics per infectious disease  Continue Anoro 1 puff daily   May discontinue Lovenox and Protonix at discharge    6/22 ID NOTE  Discussed discharge planning with patient and her sister, who both prefer to monitor on ceftriaxone overnight to ensure patient tolerates prior to discharge home. I believe from ID standpoint this is reasonable. Discussed with RN. Ceftriaxone dose administering now.

## 2024-06-26 NOTE — TELEPHONE ENCOUNTER
LOV 5/14/24-to continue Myrbetriq per office notes  Next office visit 7/9/24    Rx refill sent as requested per pt's incoming VM

## 2024-06-26 NOTE — PROGRESS NOTES
Thoracic Surgery Post-Op Progress Note    Ms. King is a 64 year old female who presents today in follow up after a right VATS decortication performed on 6/5/24. Patient was readmitted for dehydration and lethargy on 6/18/24. She is doing better now. She complains of nothing. She denies fevers or chills. No cough. No hemoptysis. No shortness of breath. No worsening pain, swelling, or drainage from wounds. She is not taking any prescription pain medications. She is scheduled to get IV abx through 7/4/24. She has been staying active and using incentive spirometry at home.    PMH:  Past Medical History:    Abdominal pain, other specified site    groin    Acute bronchitis    Allergic rhinitis    Anxiety    Arthritis    Back problem    Jamison esophagus    Chronic low back pain without sciatica    COPD (chronic obstructive pulmonary disease) (HCC)    Depression    Diabetes (HCC)    Difficult intubation    difficult to intubate with bladder surgery,instructed by anesthesia to communicate to future anesthesiologist. Small airway    Esophageal reflux    Essential hypertension    Don't remember    Fitting and adjustment of dental prosthetic device    High blood pressure    History of 2019 novel coronavirus disease (COVID-19)    No hospitalization,symptoms; Fever,fatigue ,body aches,headache no loss of taste or smell    Hx of motion sickness    Hyperlipidemia    Mass of spine    Migraines    barometric pressure    Myocardial infarction (HCC)    Osteoarthritis    Other ill-defined conditions(049.89)    Jamison's    Pain in joint, forearm    wrist    Pain in joint, pelvic region and thigh    hip    Pelvic mass    Personal history of urinary (tract) infection    Sleep apnea    I don't date       Meds:    Current Outpatient Medications:     Mirabegron ER (MYRBETRIQ) 50 MG Oral Tablet 24 Hr, Take 1 tablet (50 mg total) by mouth daily., Disp: 90 tablet, Rfl: 3    buPROPion  MG Oral Tablet 24 Hr, Take 1 tablet (150 mg total)  by mouth daily., Disp: 30 tablet, Rfl: 0    umeclidinium-vilanterol 62.5-25 MCG/ACT Inhalation Aerosol Powder, Breath Activated, Inhale 1 puff into the lungs daily., Disp: 1 each, Rfl: 2    cefTRIAXone 2 g Injection Recon Soln, Inject 20 mL (2 g total) into the vein daily for 13 days., Disp: 260 mL, Rfl: 0    oxyCODONE 5 MG Oral Tab, Take 1 tablet (5 mg total) by mouth every 6 (six) hours as needed for Pain., Disp: 20 tablet, Rfl: 0    Naloxone HCl 4 MG/0.1ML Nasal Liquid, 4 mg by Nasal route as needed. If patient remains unresponsive, repeat dose in other nostril 2-5 minutes after first dose., Disp: 1 kit, Rfl: 0    azelastine 137 MCG/SPRAY Nasal Solution, 1 spray by Nasal route 2 (two) times daily., Disp: , Rfl:     PRISTIQ 100 MG Oral Tablet 24 Hr, Take 1 tablet (100 mg total) by mouth daily., Disp: , Rfl:     benzonatate (TESSALON PERLES) 100 MG Oral Cap, Take 1 capsule (100 mg total) by mouth 3 (three) times daily as needed for cough., Disp: 15 capsule, Rfl: 0    Rizatriptan Benzoate 10 MG Oral Tab, Take 1 tablet (10 mg total) by mouth as needed for Migraine., Disp: 10 tablet, Rfl: 5    Glucose Blood (ONETOUCH ULTRA) In Vitro Strip, Tests 3 x daily, Disp: 300 each, Rfl: 1    TRULICITY 0.75 MG/0.5ML Subcutaneous Solution Pen-injector, Inject 0.75 mg into the skin once a week., Disp: 2 mL, Rfl: 0    Coenzyme Q10 200 MG Oral Cap, Take 200 mg by mouth 2 (two) times daily., Disp: , Rfl:     lidocaine 5 % External Ointment, Apply 1 Application  topically 3 (three) times daily as needed., Disp: 240 g, Rfl: 1    diclofenac 75 MG Oral Tab EC, Take 1 tablet (75 mg total) by mouth 2 (two) times daily., Disp: 180 tablet, Rfl: 3    metoprolol succinate ER 50 MG Oral Tablet 24 Hr, Take 1 tablet (50 mg total) by mouth daily., Disp: 90 tablet, Rfl: 0    Omeprazole 40 MG Oral Capsule Delayed Release, Take 1 capsule (40 mg total) by mouth 2 (two) times daily., Disp: 300 capsule, Rfl: 0    Blood Glucose Monitoring Suppl (ONETOUCH  ULTRA 2) w/Device Does not apply Kit, 1 Device by Other route in the morning, at noon, and at bedtime. Use as directed., Disp: 1 kit, Rfl: 0    topiramate 50 MG Oral Tab, TAKE ONE TABLET BY MOUTH EVERY MORNING AND TWO TABLETS EVERY EVENING (Patient taking differently: Taking one tablet in the morning and one tablet at night.), Disp: 270 tablet, Rfl: 2    gabapentin 600 MG Oral Tab, Take 1 tablet (600 mg total) by mouth at bedtime., Disp: 180 tablet, Rfl: 0    Erenumab-aooe (AIMOVIG) 140 MG/ML Subcutaneous Solution Auto-injector, Inject 1 mL (140 mg total) into the skin every 30 (thirty) days., Disp: 1 mL, Rfl: 4    umeclidinium bromide (INCRUSE ELLIPTA) 62.5 MCG/ACT Inhalation Aerosol Powder, Breath Activated, Inhale 1 puff into the lungs daily., Disp: 3 each, Rfl: 0    clopidogrel 75 MG Oral Tab, Take 1 tablet (75 mg total) by mouth daily., Disp: , Rfl:     aspirin 81 MG Oral Tab EC, Take 1 tablet (81 mg total) by mouth daily., Disp: , Rfl:     atorvastatin 80 MG Oral Tab, Take 1 tablet (80 mg total) by mouth nightly., Disp: 90 tablet, Rfl: 1    metFORMIN 500 MG Oral Tab, Take 1 tablet (500 mg total) by mouth 2 (two) times daily., Disp: 180 tablet, Rfl: 1    furosemide 20 MG Oral Tab, Take 1 tablet (20 mg total) by mouth daily., Disp: 7 tablet, Rfl: 0    clonazePAM 0.5 MG Oral Tab, Take 1 tablet (0.5 mg total) by mouth 2 (two) times daily., Disp: 12 tablet, Rfl: 0    Metoclopramide HCl 5 MG/5ML Oral Solution, Take 5 mL (5 mg total) by mouth 3 (three) times daily before meals. (Patient taking differently: Take 5 mL (5 mg total) by mouth 3 (three) times daily before meals as needed.), Disp: 473 mL, Rfl: 0    Lancets (ONETOUCH DELICA PLUS [id number]) Does not apply Misc, 1 lancet  by Finger stick route 3 (three) times daily., Disp: 300 each, Rfl: 1    Multiple Vitamins-Minerals (ONE-A-DAY WOMENS 50+) Oral Tab, Take 1 tablet by mouth daily., Disp: , Rfl: 0    QUEtiapine Fumarate  MG Oral Tablet 24 Hr, Take 1  tablet (200 mg total) by mouth every evening., Disp: 30 tablet, Rfl: 0    Calcium Carb-Cholecalciferol 500-200 MG-UNIT Oral Tab, Take 1 tablet by mouth daily.  , Disp: , Rfl:     Vital Signs:    /70 (BP Location: Left arm, Patient Position: Sitting, Cuff Size: adult)   Pulse 91   Temp 97.5 °F (36.4 °C) (Temporal)   Resp 16   Wt 48.4 kg (106 lb 12.8 oz)   SpO2 97%   BMI 20.18 kg/m²     Physical Exam:    General: well appearing female in no acute distress  HEENT: Normocephalic, PERRL, EOMI  Heart: regular rate and rhythm. No murmurs, rubs or gallops. No lower extremity edema.  Lungs: Normal respiratory effort. Clear to ascultation bilaterally.  Chest: incisions are clean, dry and intact. No signs of infection. Chest tube stitch removed.  Abdomen: Soft, Non-tender, non-distended. No hepatosplenomegaly noted.  Extremities: No clubbing or cyanosis. No lateralizing weakness  Neuro: No gross cranial nerve defects, no loss of sensation  Psych: oriented to person place and time, normal mood and affect    Pathology:  Final Diagnosis:   A.  Right upper lobe pleural peel:  -Acute and chronic pleuritis with thick fibrosis.  -Portions of underlying pulmonary tissue with intra-alveolar fibrin and reactive pneumocytes.  -Fungus and pneumocystis stains negative.     B.  Right lung lower lobe pleural peel:  -Acute and chronic pleuritis with thick fibrosis.     C.  Right lung middle lobe pleural peel:  -Acute and chronic pleuritis.     Assessment:    Ms. King is a 64 year old female who presents today in follow up after a right VATS decortication performed on 6/5/24 for right sided empyema. Final pathology consistent with pleuritis and cultures were positive for Lactobacillus and Cutibacterium. She is doing quite well. Ms. King has no apparent complications from the surgery. She was readmitted for dehydration on 6/18/24 but is doing better now. We will continue to follow her as needed in the postoperative period.  Long term follow up will be with Dr. Main.  She has no restriction and we have encouraged an active lifestyle.     Plan:  Encouraged continued exercise and incentive spirometer use.  No activity restrictions  Abx per ID  Ms. King should follow up with thoracic surgery on an as needed basis. She is encouraged to call with any further questions or concerns.     OPAL GodfreyC  Thoracic Surgery

## 2024-06-27 ENCOUNTER — TELEPHONE (OUTPATIENT)
Dept: UROLOGY | Facility: CLINIC | Age: 64
End: 2024-06-27

## 2024-06-27 ENCOUNTER — TELEPHONE (OUTPATIENT)
Dept: INTERNAL MEDICINE CLINIC | Facility: CLINIC | Age: 64
End: 2024-06-27

## 2024-06-27 ENCOUNTER — OFFICE VISIT (OUTPATIENT)
Dept: INTERNAL MEDICINE CLINIC | Facility: CLINIC | Age: 64
End: 2024-06-27

## 2024-06-27 VITALS
OXYGEN SATURATION: 96 % | HEIGHT: 61 IN | DIASTOLIC BLOOD PRESSURE: 62 MMHG | TEMPERATURE: 98 F | BODY MASS INDEX: 20.39 KG/M2 | SYSTOLIC BLOOD PRESSURE: 106 MMHG | WEIGHT: 108 LBS | HEART RATE: 102 BPM | RESPIRATION RATE: 14 BRPM

## 2024-06-27 DIAGNOSIS — Z72.0 TOBACCO USE: ICD-10-CM

## 2024-06-27 DIAGNOSIS — I25.10 CAD, MULTIPLE VESSEL: ICD-10-CM

## 2024-06-27 DIAGNOSIS — D50.8 IRON DEFICIENCY ANEMIA SECONDARY TO INADEQUATE DIETARY IRON INTAKE: ICD-10-CM

## 2024-06-27 DIAGNOSIS — E11.40 TYPE 2 DIABETES MELLITUS WITH DIABETIC NEUROPATHY, WITHOUT LONG-TERM CURRENT USE OF INSULIN (HCC): ICD-10-CM

## 2024-06-27 DIAGNOSIS — R62.7 FTT (FAILURE TO THRIVE) IN ADULT: ICD-10-CM

## 2024-06-27 DIAGNOSIS — E46 MALNUTRITION, UNSPECIFIED TYPE (HCC): ICD-10-CM

## 2024-06-27 DIAGNOSIS — J86.9 EMPYEMA OF RIGHT PLEURAL SPACE (HCC): Primary | ICD-10-CM

## 2024-06-27 LAB
CARTRIDGE EXPIRATION DATE: 2025 DATE
CARTRIDGE LOT#: 5888 NUMERIC
HEMOGLOBIN A1C: 5.6 % (ref 4.3–5.6)

## 2024-06-27 PROCEDURE — 99214 OFFICE O/P EST MOD 30 MIN: CPT | Performed by: INTERNAL MEDICINE

## 2024-06-27 PROCEDURE — 83036 HEMOGLOBIN GLYCOSYLATED A1C: CPT | Performed by: INTERNAL MEDICINE

## 2024-06-27 NOTE — DISCHARGE SUMMARY
[facility]     Soumya King Patient Status:  Inpatient    6/3/1960 MRN [id number]   Location [facility] 3SW-A Attending No att. providers found   Hosp Day # 2 PCP Rika Vinson DO     Date of Admission: 2024  Date of Discharge: 2024  Discharge Disposition: Home Health Care Services    Admitting Diagnosis:   Dehydration [E86.0]  Hypokalemia [E87.6]  Generalized weakness [R53.1]  Thrombocytosis [D75.839]  FTT (failure to thrive) in adult [R62.7]    Hospital Discharge Diagnoses:   Failure to thrive/lethargy  Right lower lobe cavitary lesion  History of recent empyema status post right VATS and pulmonary decortication  Diabetes mellitus type 2  Hypertension  Hyperlipidemia  GERD  CAD status post CABG  Elevated D-dimer    Lace+ Score: 80  59-90 High Risk  29-58 Medium Risk  0-28   Low Risk.    TCM Follow-Up Recommendation:  LACE > 58: High Risk of readmission after discharge from the hospital.        Discharge Diagnosis:   Failure to thrive/lethargy    History of Present Illness: Soumya King is a 64 year old female admitted with fatigue and generalized weakness and poor appetite.  Patient seen with patient's sister at bedside who is helping with history.  Patient was recently admitted in the hospital for empyema and had right video-assisted thoracoscopy and decortication at that time for lung abscess and trapped lung and discharged on 6/10/2024 on IV antibiotics through PICC line.  Patient's sister states she has been helping patient with her antibiotics.  Patient states she was initially feeling better and then progressively feeling worse.  Has generalized weakness and fatigue.  Has decreased appetite.  Denies any nausea vomiting.  Denies any constipation or diarrhea.  Sleeping most of the day and barely getting out of bed according to patient.     Brief Synopsis: Patient presented with failure to thrive and increased lethargy at home with recent admission for  pneumonia with empyema and VATS with pulmonary decortication.  Pulmonology, infectious disease consulted.  Infectious workup completed with repeat CTA chest without PE, resolution of right lung consolidation, resolution of large fluid collection.  Cavitary lesion right lower lobe identified, though may be sequela of prior infection.  PCT negative, blood cultures no growth to date, urine cultures without growth.  TSH within normal limits, a.m. cortisol normal.  Infectious disease suspected noninfectious source of patient's lethargy, no new infection identified, suspected possible medication induced given she was on Invanz as outpt, antibiotics for discharge switch to ceftriaxone.  Patient also on multiple psychiatric medications, psychiatry consulted and adjusted medication by doing BuSpar, adding Wellbutrin.    Procedures during hospitalization:   None    Incidental or significant findings and recommendations (brief descriptions):  IV antibiotic therapy for prior pneumonia and pulmonary abscess adjusted to ceftriaxone  Psychiatric regimen adjusted with Buspar discontinued, added Wellbutrin per psychiatry  Lethargy suspected secondary to medication effects, no new infection or endocrine abnormalities found    Lab/Test results pending at Discharge:   None    Consultants:  Infectious disease, pulmonology, psychiatry    Discharge Medication List:     Discharge Medications        START taking these medications        Instructions Prescription details   buPROPion  MG Tb24  Commonly known as: Wellbutrin XL      Take 1 tablet (150 mg total) by mouth daily.   Stop taking on: July 23, 2024  Quantity: 30 tablet  Refills: 0     cefTRIAXone 2 g Solr  Commonly known as: Rocephin      Inject 20 mL (2 g total) into the vein daily for 13 days.   Stop taking on: July 4, 2024  Quantity: 260 mL  Refills: 0     umeclidinium-vilanterol 62.5-25 MCG/ACT Aepb  Commonly known as: Anoro Ellipta      Inhale 1 puff into the lungs daily.    Quantity: 1 each  Refills: 2            CHANGE how you take these medications        Instructions Prescription details   topiramate 50 MG Tabs  Commonly known as: TOPAMAX  What changed: additional instructions      TAKE ONE TABLET BY MOUTH EVERY MORNING AND TWO TABLETS EVERY EVENING   Quantity: 270 tablet  Refills: 2            CONTINUE taking these medications        Instructions Prescription details   Aimovig 140 MG/ML Soaj  Generic drug: Erenumab-aooe      Inject 1 mL (140 mg total) into the skin every 30 (thirty) days.   Quantity: 1 mL  Refills: 4     aspirin 81 MG Tbec      Take 1 tablet (81 mg total) by mouth daily.   Refills: 0     atorvastatin 80 MG Tabs  Commonly known as: Lipitor      Take 1 tablet (80 mg total) by mouth nightly.   Quantity: 90 tablet  Refills: 1     azelastine 137 MCG/SPRAY Soln      1 spray by Nasal route 2 (two) times daily.   Refills: 0     benzonatate 100 MG Caps  Commonly known as: Tessalon Perles      Take 1 capsule (100 mg total) by mouth 3 (three) times daily as needed for cough.   Quantity: 15 capsule  Refills: 0     Calcium Carb-Cholecalciferol 500-200 MG-UNIT Tabs      Take 1 tablet by mouth daily.   Refills: 0     clonazePAM 0.5 MG Tabs  Commonly known as: KlonoPIN      Take 1 tablet (0.5 mg total) by mouth 2 (two) times daily.   Quantity: 12 tablet  Refills: 0     clopidogrel 75 MG Tabs  Commonly known as: Plavix      Take 1 tablet (75 mg total) by mouth daily.   Refills: 0     Coenzyme Q10 200 MG Caps      Take 200 mg by mouth 2 (two) times daily.   Refills: 0     diclofenac 75 MG Tbec  Commonly known as: Voltaren      Take 1 tablet (75 mg total) by mouth 2 (two) times daily.   Quantity: 180 tablet  Refills: 3     furosemide 20 MG Tabs  Commonly known as: Lasix      Take 1 tablet (20 mg total) by mouth daily.   Quantity: 7 tablet  Refills: 0     gabapentin 600 MG Tabs  Commonly known as: Neurontin      Take 1 tablet (600 mg total) by mouth at bedtime.   Quantity: 180  tablet  Refills: 0     Incruse Ellipta 62.5 MCG/ACT Aepb  Generic drug: umeclidinium bromide      Inhale 1 puff into the lungs daily.   Quantity: 3 each  Refills: 0     lidocaine 5 % Oint  Commonly known as: Xylocaine      Apply 1 Application  topically 3 (three) times daily as needed.   Quantity: 240 g  Refills: 1     metFORMIN 500 MG Tabs  Commonly known as: Glucophage      Take 1 tablet (500 mg total) by mouth 2 (two) times daily.   Quantity: 180 tablet  Refills: 1     Metoclopramide HCl 5 MG/5ML Soln  Commonly known as: REGLAN      Take 5 mL (5 mg total) by mouth 3 (three) times daily before meals.   Quantity: 473 mL  Refills: 0     metoprolol succinate ER 50 MG Tb24  Commonly known as: Toprol XL      Take 1 tablet (50 mg total) by mouth daily.   Quantity: 90 tablet  Refills: 0     Naloxone HCl 4 MG/0.1ML Liqd      4 mg by Nasal route as needed. If patient remains unresponsive, repeat dose in other nostril 2-5 minutes after first dose.   Quantity: 1 kit  Refills: 0     Omeprazole 40 MG Cpdr      Take 1 capsule (40 mg total) by mouth 2 (two) times daily.   Quantity: 300 capsule  Refills: 0     One-A-Day Womens 50+ Tabs      Take 1 tablet by mouth daily.   Refills: 0     OneTouch Delica Plus [id number] Misc      1 lancet  by Finger stick route 3 (three) times daily.   Quantity: 300 each  Refills: 1     OneTouch Ultra 2 w/Device Kit      1 Device by Other route in the morning, at noon, and at bedtime. Use as directed.   Quantity: 1 kit  Refills: 0     OneTouch Ultra Strp  Generic drug: Glucose Blood      Tests 3 x daily   Quantity: 300 each  Refills: 1     oxyCODONE 5 MG Tabs      Take 1 tablet (5 mg total) by mouth every 6 (six) hours as needed for Pain.   Quantity: 20 tablet  Refills: 0     Pristiq 100 MG Tb24  Generic drug: desvenlafaxine ER      Take 1 tablet (100 mg total) by mouth daily.   Refills: 0     QUEtiapine  MG Tb24  Commonly known as: SEROquel XR      Take 1 tablet (200 mg total) by mouth every  evening.   Quantity: 30 tablet  Refills: 0     Rizatriptan Benzoate 10 MG Tabs  Commonly known as: MAXALT      Take 1 tablet (10 mg total) by mouth as needed for Migraine.   Quantity: 10 tablet  Refills: 5     Trulicity 0.75 MG/0.5ML Sopn  Generic drug: Dulaglutide      Inject 0.75 mg into the skin once a week.   Quantity: 2 mL  Refills: 0            STOP taking these medications      busPIRone 10 MG Tabs  Commonly known as: Buspar        cyclobenzaprine 10 MG Tabs  Commonly known as: Flexeril        ertapenem 1 g Solr  Commonly known as: Invanz        valACYclovir 1 G Tabs  Commonly known as: Valtrex                  Where to Get Your Medications        These medications were sent to [id number] - [place], [place] - [address] [phone], [phone]  [address] [place] [address]      Phone: [phone]   buPROPion  MG Tb24  umeclidinium-vilanterol 62.5-25 MCG/ACT Aepb       Please  your prescriptions at the location directed by your doctor or nurse    Bring a paper prescription for each of these medications  cefTRIAXone 2 g Solr         [place]    Follow-up appointment:   Rika Vinson DO  [address] [address]  [phone]    Schedule an appointment as soon as possible for a visit in 1 week(s)  For routine follow-up after hospitilization    Vibha Menezes MD  [address] [place] [address] 3  [facility] [address] [phone]    Follow up  As needed    John Pacheco MD  [address] [facility]  [address]  [facility] [id number] [phone]    Follow up in 3 month(s)        Vital signs:       Physical Exam:  See exam from day of discharge note  -----------------------------------------------------------------------------------------------  PATIENT DISCHARGE INSTRUCTIONS: See electronic chart    Bill Brian MD 6/27/2024    Time spent:  35 minutes

## 2024-06-27 NOTE — TELEPHONE ENCOUNTER
Please send this note stating that the patient, Soumya King does not need ay further oxygen. Please discontinue oxygen      I have a note under communication as well    Please fax it to [phone]    Rika Vinson DO

## 2024-06-28 PROBLEM — R91.1 PULMONARY NODULE: Status: RESOLVED | Noted: 2023-05-01 | Resolved: 2024-06-28

## 2024-06-28 PROBLEM — E86.0 DEHYDRATION: Status: RESOLVED | Noted: 2024-06-18 | Resolved: 2024-06-28

## 2024-06-28 PROBLEM — R40.0 SOMNOLENCE: Status: RESOLVED | Noted: 2024-05-28 | Resolved: 2024-06-28

## 2024-06-28 PROBLEM — R09.02 HYPOXIA: Status: RESOLVED | Noted: 2024-06-09 | Resolved: 2024-06-28

## 2024-06-28 PROBLEM — R73.9 HYPERGLYCEMIA: Status: RESOLVED | Noted: 2024-05-26 | Resolved: 2024-06-28

## 2024-06-28 PROBLEM — D64.9 ANEMIA: Status: RESOLVED | Noted: 2024-02-26 | Resolved: 2024-06-28

## 2024-06-28 PROBLEM — R53.1 GENERALIZED WEAKNESS: Status: RESOLVED | Noted: 2024-06-18 | Resolved: 2024-06-28

## 2024-06-28 PROBLEM — D64.9 ACUTE ANEMIA: Status: RESOLVED | Noted: 2024-05-26 | Resolved: 2024-06-28

## 2024-06-28 PROBLEM — J90 PLEURAL EFFUSION: Status: RESOLVED | Noted: 2024-05-27 | Resolved: 2024-06-28

## 2024-06-28 PROBLEM — J96.01 ACUTE RESPIRATORY FAILURE WITH HYPOXIA (HCC): Status: RESOLVED | Noted: 2024-05-28 | Resolved: 2024-06-28

## 2024-06-28 PROBLEM — E87.1 HYPONATREMIA: Status: RESOLVED | Noted: 2024-05-26 | Resolved: 2024-06-28

## 2024-06-28 PROBLEM — N17.9 AKI (ACUTE KIDNEY INJURY): Status: RESOLVED | Noted: 2024-05-26 | Resolved: 2024-06-28

## 2024-06-28 PROBLEM — D75.839 THROMBOCYTOSIS: Status: RESOLVED | Noted: 2024-05-26 | Resolved: 2024-06-28

## 2024-06-28 PROBLEM — R05.2 SUBACUTE COUGH: Status: RESOLVED | Noted: 2024-04-22 | Resolved: 2024-06-28

## 2024-06-28 PROBLEM — E87.6 HYPOKALEMIA: Status: RESOLVED | Noted: 2024-06-18 | Resolved: 2024-06-28

## 2024-06-28 PROBLEM — R74.01 TRANSAMINITIS: Status: RESOLVED | Noted: 2024-05-26 | Resolved: 2024-06-28

## 2024-06-28 PROBLEM — N17.9 AKI (ACUTE KIDNEY INJURY) (HCC): Status: RESOLVED | Noted: 2024-05-26 | Resolved: 2024-06-28

## 2024-06-28 NOTE — PROGRESS NOTES
Patient Office Visit    ASSESSMENT AND PLAN:   1. Empyema of right pleural space (HCC)  Note: Status post thoracotomy.  She has seen the thoracic surgeon already.  She has 1 more week of antibiotics and is following up with infectious disease    2. Type 2 diabetes mellitus with diabetic neuropathy, without long-term current use of insulin ([facility])  Note: Diabetes is very well-controlled.  Will stop the Trulicity and discussed about stopping metformin but patient is hesitant as her sugars are increasing at times.  Will continue  - POC Hgb A1C    3. Malnutrition, unspecified type ([facility])  Note: Recommended to increase more protein in the diet    4. CAD, multiple vessel  Note: Status post CABG and continue current management    5. FTT (failure to thrive) in adult  Note: Was recently hospitalized for weight loss and the thought is due to strong antibiotics.  She has improved since antibiotics have switched.      6. Iron deficiency anemia secondary to inadequate dietary iron intake  Note: Has followed up with hematology.  The anemia was likely due to the empyema.  Stable    7. Tobacco use  Note: Continue Wellbutrin    Return to clinic in 2 weeks for follow-up    Patient/Caregiver Education: Patient/Caregiver Education: There are no barriers to learning. Medical education done. Outcome: Patient verbalizes understanding. Patient is notified to call with any questions, complications, allergies, or worsening or changing symptoms.  Patient is to call with any side effects or complications from the treatments as a result of today.      Reviewed Past Medical History and   Patient Active Problem List   Diagnosis    Jamison's esophagus    Tobacco use    Insomnia    COPD, mild (HCC) - Dx'd via PFTs done in 3/2015    Migraine without aura and without status migrainosus, not intractable    Hyperlipidemia    SUMMER on CPAP    MDD (major depressive disorder), recurrent episode, moderate (HCC)    GERD (gastroesophageal reflux disease)     Hypertension    Chronic pansinusitis    Mucinous cystadenoma of ovary, left    KIRK (generalized anxiety disorder)    History of pubovaginal sling    Absence of bladder continence    Vasomotor flushing    Primary osteoarthritis of left knee    Mass of spine    Hx of motion sickness    Chronic low back pain without sciatica    Type 2 diabetes mellitus with diabetic neuropathy (HCC)    CAD, multiple vessel    S/P CABG x 2    Type 2 diabetes mellitus with hyperlipidemia (HCC)    Iron deficiency anemia secondary to inadequate dietary iron intake    Empyema of right pleural space (HCC)    Status post thoracotomy    FTT (failure to thrive) in adult    Malnutrition (HCC)    Major depressive disorder, recurrent, moderate (HCC)       Orders Placed This Encounter   Procedures    POC Hgb A1C     Order Specific Question:   Release to patient     Answer:   Immediate     Requested Prescriptions      No prescriptions requested or ordered in this encounter         Rika Vinson DO  CC:  Chief Complaint   Patient presents with    Hospital F/U         HPI:   Soumya King is a 64-year-old female who presents for hospital follow-up    Empyema: She recently had thoracotomy and was discharged however upon discharge patient was more somnolent and was not eating at all.  She was taken back to the ER and admitted for another week.  The thought was that the antibiotics were the cause of her failure to thrive therefore antibiotics were changed.  She is still getting the new antibiotics and overall feeling better  Failure to thrive: Has been eating more and is no longer as sleepy   CAD: Stable on medicine  Diabetes: She has not been taking the Trulicity however she is still taking the metformin.  When she checks her sugar once a day it has been a bit elevated which she is concerned for.  Tobacco use: Was started on Wellbutrin to help her quit and is no longer on BuSpar    Past Medical History:    Abdominal pain, other specified site     groin    Acute bronchitis    Allergic rhinitis    Anxiety    Arthritis    Back problem    Jamison esophagus    Chronic low back pain without sciatica    COPD (chronic obstructive pulmonary disease) (HCC)    Depression    Diabetes (HCC)    Difficult intubation    difficult to intubate with bladder surgery,instructed by anesthesia to communicate to future anesthesiologist. Small airway    Esophageal reflux    Essential hypertension    Don't remember    Fitting and adjustment of dental prosthetic device    High blood pressure    History of 2019 novel coronavirus disease (COVID-19)    No hospitalization,symptoms; Fever,fatigue ,body aches,headache no loss of taste or smell    Hx of motion sickness    Hyperlipidemia    Mass of spine    Migraines    barometric pressure    Myocardial infarction (HCC)    Osteoarthritis    Other ill-defined conditions(799.89)    Jamison's    Pain in joint, forearm    wrist    Pain in joint, pelvic region and thigh    hip    Pelvic mass    Personal history of urinary (tract) infection    Sleep apnea    I don't date       Past Surgical History:   Procedure Laterality Date    Cabg  12/27/23    Colonoscopy      Colonoscopy N/A 05/18/2022    Procedure: COLONOSCOPY AND ESOPHAGOGASTRODUODENOSCOPY;  Surgeon: Miguel Camargo MD;  Location:  ENDOSCOPY    Decortication,pulmonary,total Right 06/05/2024    VATS decortication by Dr. Maldonado    Heart surgery  2024    double bypass    Laparoscopic  2007    sling operation for stress incontinence    Laparoscopy,diagnostic      Kay biopsy stereo nodule 2 site bilat (cpt=19081/10415) Left 02/2010    BENIGN 2 SITES    Kay biopsy stereo w/calc 2 site left (cpt=19081/25547)  02/2010    benign x 2    Kay stereo-clip w/calc 2 site left  2010    Oophorectomy Left 06/28/2019    Other  08/31/2022    RIGHT SACRAL SUBCUTANEOUS CYST EXCISION    Other surgical history  2000, 2019    Bladder Sling, Large mass removed from left abdomen and ovar    Tonsillectomy       Was a child, have no idea    Tubal ligation      Upper gi endoscopy,exam         Social History:  Social History     Socioeconomic History    Marital status: Single   Tobacco Use    Smoking status: Former     Current packs/day: 0.00     Average packs/day: 1.5 packs/day for 48.0 years (72.0 ttl pk-yrs)     Types: Cigarettes     Start date:      Quit date: 2024     Years since quittin.0    Smokeless tobacco: Never    Tobacco comments:     Quit cigarettes completely but still smoke and JUUL or vapes.   Vaping Use    Vaping status: Every Day    Start date: 2019    Substances: Nicotine, Flavoring    Devices: Disposable, Pre-filled pod   Substance and Sexual Activity    Alcohol use: Not Currently     Comment: On occasion    Drug use: No    Sexual activity: Not Currently     Partners: Male   Other Topics Concern    Caffeine Concern Yes    Stress Concern Yes    Weight Concern Yes    Special Diet No    Exercise No    Seat Belt Yes     Social Determinants of Health     Financial Resource Strain: Low Risk  (2024)    Financial Resource Strain     Difficulty of Paying Living Expenses: Not very hard     Med Affordability: No   Food Insecurity: Unknown (2024)    Food Insecurity     Food Insecurity: Patient declined   Transportation Needs: No Transportation Needs (2024)    Transportation Needs     Lack of Transportation: No   Physical Activity: Not on File (10/7/2022)    [facility] from [facility]     Physical Activity     Physical Activity: 0   Stress: Not on File (10/7/2022)    Received from [facility]     Stress     Stress: 0   Social Connections: Not on File (10/7/2022)    Received from [facility]     Social Connections     Social Connections and Isolation: 0   Housing Stability: Low Risk  (2024)    Housing Stability     Housing Instability: No     Family History:  Family History   Problem Relation Age of Onset    Arthritis Mother     Other (AMI) Mother     Other (diabetes  mellitus) Mother     Diabetes Mother      Allergies:  Allergies   Allergen Reactions    Naprosyn [Naproxen] ANAPHYLAXIS     Has tolerated ibuprofen and IV toradol     Aspirin OTHER (SEE COMMENTS)     Difficulty swallowing due to  saavedra's esophagus     Current Meds:  Current Outpatient Medications on File Prior to Visit   Medication Sig Dispense Refill    Mirabegron ER (MYRBETRIQ) 50 MG Oral Tablet 24 Hr Take 1 tablet (50 mg total) by mouth daily. 90 tablet 3    buPROPion  MG Oral Tablet 24 Hr Take 1 tablet (150 mg total) by mouth daily. 30 tablet 0    umeclidinium-vilanterol 62.5-25 MCG/ACT Inhalation Aerosol Powder, Breath Activated Inhale 1 puff into the lungs daily. 1 each 2    cefTRIAXone 2 g Injection Recon Soln Inject 20 mL (2 g total) into the vein daily for 13 days. 260 mL 0    oxyCODONE 5 MG Oral Tab Take 1 tablet (5 mg total) by mouth every 6 (six) hours as needed for Pain. 20 tablet 0    Naloxone HCl 4 MG/0.1ML Nasal Liquid 4 mg by Nasal route as needed. If patient remains unresponsive, repeat dose in other nostril 2-5 minutes after first dose. 1 kit 0    azelastine 137 MCG/SPRAY Nasal Solution 1 spray by Nasal route 2 (two) times daily.      PRISTIQ 100 MG Oral Tablet 24 Hr Take 1 tablet (100 mg total) by mouth daily.      benzonatate (TESSALON PERLES) 100 MG Oral Cap Take 1 capsule (100 mg total) by mouth 3 (three) times daily as needed for cough. 15 capsule 0    Rizatriptan Benzoate 10 MG Oral Tab Take 1 tablet (10 mg total) by mouth as needed for Migraine. 10 tablet 5    Glucose Blood (ONETOUCH ULTRA) In Vitro Strip Tests 3 x daily 300 each 1    Coenzyme Q10 200 MG Oral Cap Take 200 mg by mouth 2 (two) times daily.      lidocaine 5 % External Ointment Apply 1 Application  topically 3 (three) times daily as needed. 240 g 1    diclofenac 75 MG Oral Tab EC Take 1 tablet (75 mg total) by mouth 2 (two) times daily. 180 tablet 3    metoprolol succinate ER 50 MG Oral Tablet 24 Hr Take 1 tablet (50 mg  total) by mouth daily. 90 tablet 0    Omeprazole 40 MG Oral Capsule Delayed Release Take 1 capsule (40 mg total) by mouth 2 (two) times daily. 300 capsule 0    Blood Glucose Monitoring Suppl (ONETOUCH ULTRA 2) w/Device Does not apply Kit 1 Device by Other route in the morning, at noon, and at bedtime. Use as directed. 1 kit 0    topiramate 50 MG Oral Tab TAKE ONE TABLET BY MOUTH EVERY MORNING AND TWO TABLETS EVERY EVENING (Patient taking differently: Taking one tablet in the morning and one tablet at night.) 270 tablet 2    gabapentin 600 MG Oral Tab Take 1 tablet (600 mg total) by mouth at bedtime. 180 tablet 0    Erenumab-aooe (AIMOVIG) 140 MG/ML Subcutaneous Solution Auto-injector Inject 1 mL (140 mg total) into the skin every 30 (thirty) days. 1 mL 4    umeclidinium bromide (INCRUSE ELLIPTA) 62.5 MCG/ACT Inhalation Aerosol Powder, Breath Activated Inhale 1 puff into the lungs daily. 3 each 0    clopidogrel 75 MG Oral Tab Take 1 tablet (75 mg total) by mouth daily.      aspirin 81 MG Oral Tab EC Take 1 tablet (81 mg total) by mouth daily.      atorvastatin 80 MG Oral Tab Take 1 tablet (80 mg total) by mouth nightly. 90 tablet 1    metFORMIN 500 MG Oral Tab Take 1 tablet (500 mg total) by mouth 2 (two) times daily. 180 tablet 1    furosemide 20 MG Oral Tab Take 1 tablet (20 mg total) by mouth daily. 7 tablet 0    clonazePAM 0.5 MG Oral Tab Take 1 tablet (0.5 mg total) by mouth 2 (two) times daily. 12 tablet 0    Metoclopramide HCl 5 MG/5ML Oral Solution Take 5 mL (5 mg total) by mouth 3 (three) times daily before meals. (Patient taking differently: Take 5 mL (5 mg total) by mouth 3 (three) times daily before meals as needed.) 473 mL 0    Lancets (ONETOUCH DELICA PLUS AFPECY13X) Does not apply Misc 1 lancet  by Finger stick route 3 (three) times daily. 300 each 1    Multiple Vitamins-Minerals (ONE-A-DAY WOMENS 50+) Oral Tab Take 1 tablet by mouth daily.  0    QUEtiapine Fumarate  MG Oral Tablet 24 Hr Take 1  tablet (200 mg total) by mouth every evening. 30 tablet 0    Calcium Carb-Cholecalciferol 500-200 MG-UNIT Oral Tab Take 1 tablet by mouth daily.         No current facility-administered medications on file prior to visit.         REVIEW OF SYSTEMS   Constitutional: Has fatigue and weight loss  HENT: normal sinuses and no mouth issues   Eyes: . normal vision no eye pain   Respiratory: normal respirations no cough   Cardiovascular: no CP, or palpitations   Gastrointestinal: normal bowels and no abd pains   Genitourinary:  normal urination no hematuria, no frequency   Musculoskeletal: no pains in arms/legs, normal range of motion   Skin: no rashes or skin lesions that are new   Neurological:  no weakness, no numbness, normal gait   Hematological:  no bruises or bleeding   Psychiatric/Behavioral: normal mood no anxiety normal behavior     /62 (BP Location: Left arm, Patient Position: Sitting, Cuff Size: adult)   Pulse 102   Temp 98 °F (36.7 °C) (Temporal)   Resp 14   Ht 5' 1\" (1.549 m)   Wt 108 lb (49 kg)   SpO2 96%   BMI 20.41 kg/m²     PHYSICAL EXAM:   Constitutional: Vital signs reviewed as noted, thin and frail appearing, in no acute distress.   HENT: NCAT  Eyes: pupils reactive bilaterally  Cardiovascular: nl s1 s2 no m/r/g  Pulmonary/Chest: CTA bilaterally with no wheezes  Extremities: no pedal edema   Neurological:  no weakness in UE and LE, reflexes are normal  Skin: no rashes or bruises on visualized skin, not undressed   Psychiatric:normal mood

## 2024-07-01 ENCOUNTER — LAB REQUISITION (OUTPATIENT)
Dept: LAB | Age: 64
End: 2024-07-01
Payer: MEDICAID

## 2024-07-01 DIAGNOSIS — J85.1 ABSCESS OF LUNG WITH PNEUMONIA (HCC): ICD-10-CM

## 2024-07-01 LAB
ALBUMIN SERPL-MCNC: 4 G/DL (ref 3.2–4.8)
ALBUMIN/GLOB SERPL: 0.9 {RATIO} (ref 1–2)
ALP LIVER SERPL-CCNC: 94 U/L
ALT SERPL-CCNC: 28 U/L
ANION GAP SERPL CALC-SCNC: 10 MMOL/L (ref 0–18)
AST SERPL-CCNC: 29 U/L (ref ?–34)
BASOPHILS # BLD AUTO: 0.08 X10(3) UL (ref 0–0.2)
BASOPHILS NFR BLD AUTO: 0.9 %
BILIRUB SERPL-MCNC: 0.2 MG/DL (ref 0.2–1.1)
BUN BLD-MCNC: 22 MG/DL (ref 9–23)
BUN/CREAT SERPL: 26.5 (ref 10–20)
CALCIUM BLD-MCNC: 9 MG/DL (ref 8.7–10.4)
CHLORIDE SERPL-SCNC: 106 MMOL/L (ref 98–112)
CO2 SERPL-SCNC: 26 MMOL/L (ref 21–32)
CREAT BLD-MCNC: 0.83 MG/DL
CRP SERPL-MCNC: 0.5 MG/DL (ref ?–1)
DEPRECATED RDW RBC AUTO: 55.8 FL (ref 35.1–46.3)
EGFRCR SERPLBLD CKD-EPI 2021: 80 ML/MIN/1.73M2 (ref 60–?)
EOSINOPHIL # BLD AUTO: 0.37 X10(3) UL (ref 0–0.7)
EOSINOPHIL NFR BLD AUTO: 4.2 %
ERYTHROCYTE [DISTWIDTH] IN BLOOD BY AUTOMATED COUNT: 15.9 % (ref 11–15)
GLOBULIN PLAS-MCNC: 4.3 G/DL (ref 2–3.5)
GLUCOSE BLD-MCNC: 116 MG/DL (ref 70–99)
HCT VFR BLD AUTO: 34.3 %
HGB BLD-MCNC: 11.1 G/DL
IMM GRANULOCYTES # BLD AUTO: 0.02 X10(3) UL (ref 0–1)
IMM GRANULOCYTES NFR BLD: 0.2 %
LYMPHOCYTES # BLD AUTO: 3.59 X10(3) UL (ref 1–4)
LYMPHOCYTES NFR BLD AUTO: 40.5 %
MCH RBC QN AUTO: 30.7 PG (ref 26–34)
MCHC RBC AUTO-ENTMCNC: 32.4 G/DL (ref 31–37)
MCV RBC AUTO: 95 FL
MONOCYTES # BLD AUTO: 0.5 X10(3) UL (ref 0.1–1)
MONOCYTES NFR BLD AUTO: 5.6 %
NEUTROPHILS # BLD AUTO: 4.3 X10 (3) UL (ref 1.5–7.7)
NEUTROPHILS # BLD AUTO: 4.3 X10(3) UL (ref 1.5–7.7)
NEUTROPHILS NFR BLD AUTO: 48.6 %
OSMOLALITY SERPL CALC.SUM OF ELEC: 298 MOSM/KG (ref 275–295)
PLATELET # BLD AUTO: 432 10(3)UL (ref 150–450)
POTASSIUM SERPL-SCNC: 3.8 MMOL/L (ref 3.5–5.1)
PROT SERPL-MCNC: 8.3 G/DL (ref 5.7–8.2)
RBC # BLD AUTO: 3.61 X10(6)UL
SODIUM SERPL-SCNC: 142 MMOL/L (ref 136–145)
WBC # BLD AUTO: 8.9 X10(3) UL (ref 4–11)

## 2024-07-01 PROCEDURE — 86140 C-REACTIVE PROTEIN: CPT | Performed by: INTERNAL MEDICINE

## 2024-07-01 PROCEDURE — 80053 COMPREHEN METABOLIC PANEL: CPT | Performed by: INTERNAL MEDICINE

## 2024-07-01 PROCEDURE — 85025 COMPLETE CBC W/AUTO DIFF WBC: CPT | Performed by: INTERNAL MEDICINE

## 2024-07-03 ENCOUNTER — PATIENT MESSAGE (OUTPATIENT)
Dept: INTERNAL MEDICINE CLINIC | Facility: CLINIC | Age: 64
End: 2024-07-03

## 2024-07-05 NOTE — TELEPHONE ENCOUNTER
From: Soumya King  To: Rika Vinson  Sent: 7/3/2024 5:02 PM CDT  Subject: Quitting vaping    Can you prescribe me a nicotine patch to help me quit smoking

## 2024-07-07 ENCOUNTER — PATIENT MESSAGE (OUTPATIENT)
Dept: INTERNAL MEDICINE CLINIC | Facility: CLINIC | Age: 64
End: 2024-07-07

## 2024-07-07 DIAGNOSIS — J86.9 EMPYEMA OF RIGHT PLEURAL SPACE (HCC): Primary | ICD-10-CM

## 2024-07-08 NOTE — TELEPHONE ENCOUNTER
From: Soumya King  To: Rika Vinson  Sent: 7/7/2024 3:13 PM CDT  Subject: X-RAY     I was really wanting to get a X-ray before my next appointment with you just to see if the pneumonia has really cleared up. It would make me feel better knowing this.   Thank you   Soumya

## 2024-07-08 NOTE — TELEPHONE ENCOUNTER
SV: Is it advisable to use wellbutrin and nicotine patch? Would you like to discuss at next OV? TY!

## 2024-07-09 ENCOUNTER — VIRTUAL PHONE E/M (OUTPATIENT)
Dept: UROLOGY | Facility: CLINIC | Age: 64
End: 2024-07-09
Attending: OBSTETRICS & GYNECOLOGY
Payer: MEDICAID

## 2024-07-09 DIAGNOSIS — R35.1 NOCTURIA: ICD-10-CM

## 2024-07-09 DIAGNOSIS — N81.84 PELVIC MUSCLE WASTING: ICD-10-CM

## 2024-07-09 DIAGNOSIS — N39.41 URGE INCONTINENCE: Primary | ICD-10-CM

## 2024-07-09 DIAGNOSIS — N94.10 DYSPAREUNIA IN FEMALE: ICD-10-CM

## 2024-07-09 DIAGNOSIS — N95.2 POSTMENOPAUSAL ATROPHIC VAGINITIS: ICD-10-CM

## 2024-07-09 DIAGNOSIS — N95.0 PMB (POSTMENOPAUSAL BLEEDING): ICD-10-CM

## 2024-07-09 NOTE — PROGRESS NOTES
Patient to follow up UUI  Given circumstances surrounding COVID-19 this visit is being conducted as a televisit with pt's consent.  Pt in safe, private environment for televisit, provider located in the office setting    She is currently using myrbetriq 50mg daily    She reports +improvement   Recent hosp admission for pna    No recent UTIs  Vag bleeding    Impression/Plan:    ICD-10-CM    1. Urge incontinence  N39.41       2. Postmenopausal atrophic vaginitis  N95.2       3. Pelvic muscle wasting  N81.84       4. Dyspareunia in female  N94.10       5. Nocturia  R35.1       6. PMB (postmenopausal bleeding)  N95.0 US PELVIS W EV (CPT=76856/31063)          Discussion Items:   Discussed dietary and behavioral modifications for mgmt of urinary symptoms  Discussed weight management and benefits of weight loss on urinary symptoms  Reviewed AUA/SUFU guidelines on mgmt of non-neurogenic OAB  Discussed pharmacologic and nonpharmacologic mgmt options of urinary symptoms - reviewed risks, benefits, alternatives, and goals of treatment  Discussed specific risks related to OAB meds including, but not limited to dry mouth, constipation, blurry vision, cognitive changes, and BP elevation.  Cont myrbetriq 50mg daily    Discussed management of recurrent urinary tract infections. Discussed use of pharmacologic treatment options for suppression therapy. Risks vs benefits reviewed with patient   if recurrent UTIs will manage accordingly    Discussed mgmt of vulvovaginal atrophy with vaginal estrogen cream. Reviewed associated benefits, risks, alternatives, and goals. Recommend low dose twice weekly mgmt   Resume vag estrogen    Bowel management reviewed. Discussed using fiber daily w/ addition of miralax as needed      All questions answered  She understands and agrees to plan    Return for f/u ultrasound results.    Florecita Quintero, DO, FACOG, FACS    The 21st Century Cures Act makes medical notes like these available to patients in  the interest of transparency. However, be advised this is a medical document. It is intended as peer to peer communication. It is written in medical language and may contain abbreviations or verbiage that are unfamiliar. It may appear blunt or direct. Medical documents are intended to carry relevant information, facts as evident, and the clinical opinion of the practitioner.

## 2024-07-11 ENCOUNTER — HOSPITAL ENCOUNTER (OUTPATIENT)
Dept: GENERAL RADIOLOGY | Facility: HOSPITAL | Age: 64
Discharge: HOME | End: 2024-07-11
Attending: INTERNAL MEDICINE
Payer: MEDICAID

## 2024-07-11 DIAGNOSIS — J86.9 EMPYEMA OF RIGHT PLEURAL SPACE (HCC): ICD-10-CM

## 2024-07-11 PROCEDURE — 71046 X-RAY EXAM CHEST 2 VIEWS: CPT | Performed by: INTERNAL MEDICINE

## 2024-07-16 ENCOUNTER — OFFICE VISIT (OUTPATIENT)
Dept: INTERNAL MEDICINE CLINIC | Facility: CLINIC | Age: 64
End: 2024-07-16
Payer: MEDICAID

## 2024-07-16 VITALS
HEIGHT: 61 IN | HEART RATE: 88 BPM | RESPIRATION RATE: 16 BRPM | BODY MASS INDEX: 20.65 KG/M2 | WEIGHT: 109.38 LBS | OXYGEN SATURATION: 98 % | TEMPERATURE: 98 F | SYSTOLIC BLOOD PRESSURE: 106 MMHG | DIASTOLIC BLOOD PRESSURE: 72 MMHG

## 2024-07-16 DIAGNOSIS — J86.9 EMPYEMA OF RIGHT PLEURAL SPACE (HCC): Primary | ICD-10-CM

## 2024-07-16 DIAGNOSIS — Z71.6 ENCOUNTER FOR TOBACCO USE CESSATION COUNSELING: ICD-10-CM

## 2024-07-16 PROCEDURE — 99213 OFFICE O/P EST LOW 20 MIN: CPT | Performed by: INTERNAL MEDICINE

## 2024-07-16 NOTE — PROGRESS NOTES
Patient Office Visit    ASSESSMENT AND PLAN:   1. Empyema of right pleural space (HCC)  Note: Chest x-ray shows has improved.  She has completed antibiotics and will be following up with the pulmonologist next week.  Work note provided    2. Encounter for tobacco use cessation counseling  Note: Will order medication below  - nicotine (NICODERM CQ) 7 MG/24HR Transdermal Patch 24 Hr; Place 1 patch onto the skin daily.  Dispense: 30 patch; Refill: 1    Return to clinic in 3 months      Patient/Caregiver Education: Patient/Caregiver Education: There are no barriers to learning. Medical education done. Outcome: Patient verbalizes understanding. Patient is notified to call with any questions, complications, allergies, or worsening or changing symptoms.  Patient is to call with any side effects or complications from the treatments as a result of today.      Reviewed Past Medical History and   Patient Active Problem List   Diagnosis    Jamison's esophagus    Tobacco use    Insomnia    COPD, mild (HCC) - Dx'd via PFTs done in 3/2015    Migraine without aura and without status migrainosus, not intractable    Hyperlipidemia    SUMMER on CPAP    MDD (major depressive disorder), recurrent episode, moderate (HCC)    GERD (gastroesophageal reflux disease)    Hypertension    Chronic pansinusitis    Mucinous cystadenoma of ovary, left    KIRK (generalized anxiety disorder)    History of pubovaginal sling    Absence of bladder continence    Vasomotor flushing    Primary osteoarthritis of left knee    Mass of spine    Hx of motion sickness    Chronic low back pain without sciatica    Type 2 diabetes mellitus with diabetic neuropathy (HCC)    CAD, multiple vessel    S/P CABG x 2    Type 2 diabetes mellitus with hyperlipidemia (HCC)    Iron deficiency anemia secondary to inadequate dietary iron intake    Empyema of right pleural space (HCC)    Status post thoracotomy    FTT (failure to thrive) in adult    Malnutrition (HCC)    Major  depressive disorder, recurrent, moderate (HCC)       No orders of the defined types were placed in this encounter.    Requested Prescriptions     Signed Prescriptions Disp Refills    nicotine (NICODERM CQ) 7 MG/24HR Transdermal Patch 24 Hr 30 patch 1     Sig: Place 1 patch onto the skin daily.         Rika Vinson DO  CC:  Chief Complaint   Patient presents with    Follow - Up         HPI:   Soumya King is a 64-year-old female who presents for 2-week follow-up.  Since her last visit she has completed her antibiotics and feels a lot better.  She has more energy as well.  She was given patches for nicotine while she was hospitalized and she would like to continue it.  She is also interested in going back to work starting August.    Past Medical History:    Abdominal pain, other specified site    groin    Acute bronchitis    Allergic rhinitis    Anxiety    Arthritis    Back problem    Jamison esophagus    Chronic low back pain without sciatica    COPD (chronic obstructive pulmonary disease) (HCC)    Depression    Diabetes (HCC)    Difficult intubation    difficult to intubate with bladder surgery,instructed by anesthesia to communicate to future anesthesiologist. Small airway    Esophageal reflux    Essential hypertension    Don't remember    Fitting and adjustment of dental prosthetic device    High blood pressure    History of 2019 novel coronavirus disease (COVID-19)    No hospitalization,symptoms; Fever,fatigue ,body aches,headache no loss of taste or smell    Hx of motion sickness    Hyperlipidemia    Mass of spine    Migraines    barometric pressure    Myocardial infarction (HCC)    Osteoarthritis    Other ill-defined conditions(799.89)    Jamison's    Pain in joint, forearm    wrist    Pain in joint, pelvic region and thigh    hip    Pelvic mass    Personal history of urinary (tract) infection    Sleep apnea    I don't date       Past Surgical History:   Procedure Laterality Date    Cabg  12/27/23     Colonoscopy      Colonoscopy N/A 2022    Procedure: COLONOSCOPY AND ESOPHAGOGASTRODUODENOSCOPY;  Surgeon: Miguel Camargo MD;  Location:  ENDOSCOPY    Decortication,pulmonary,total Right 2024    VATS decortication by Dr. Maldonado    Heart surgery      double bypass    Laparoscopic      sling operation for stress incontinence    Laparoscopy,diagnostic      Kay biopsy stereo nodule 2 site bilat (cpt=19081/56129) Left 2010    BENIGN 2 SITES    Kay biopsy stereo w/calc 2 site left (cpt=19081/91284)  2010    benign x 2    Kay stereo-clip w/calc 2 site left      Oophorectomy Left 2019    Other  2022    RIGHT SACRAL SUBCUTANEOUS CYST EXCISION    Other surgical history  ,     Bladder Sling, Large mass removed from left abdomen and ovar    Tonsillectomy      Was a child, have no idea    Tubal ligation      Upper gi endoscopy,exam         Social History:  Social History     Socioeconomic History    Marital status: Single   Tobacco Use    Smoking status: Former     Current packs/day: 0.00     Average packs/day: 1.5 packs/day for 48.0 years (72.0 ttl pk-yrs)     Types: Cigarettes     Start date:      Quit date: 2024     Years since quittin.1    Smokeless tobacco: Never    Tobacco comments:     Quit cigarettes completely but still smoke and JUUL or vapes.   Vaping Use    Vaping status: Every Day    Start date: 2019    Substances: Nicotine, Flavoring    Devices: Disposable, Pre-filled pod   Substance and Sexual Activity    Alcohol use: Not Currently     Comment: On occasion    Drug use: No    Sexual activity: Not Currently     Partners: Male   Other Topics Concern    Caffeine Concern Yes    Stress Concern Yes    Weight Concern Yes    Special Diet No    Exercise No    Seat Belt Yes     Social Determinants of Health     Financial Resource Strain: Low Risk  (2024)    Financial Resource Strain     Difficulty of Paying Living Expenses: Not very hard     Med  Affordability: No   Food Insecurity: Unknown (6/19/2024)    Food Insecurity     Food Insecurity: Patient declined   Transportation Needs: No Transportation Needs (6/19/2024)    Transportation Needs     Lack of Transportation: No   Physical Activity: Not on File (10/7/2022)    [facility] from [facility]    Physical Activity     Physical Activity: 0   Stress: Not on File (10/7/2022)    Received from [facility]    Stress     Stress: 0   Social Connections: Not on File (10/7/2022)    Received from [facility]    Social Connections     Social Connections and Isolation: 0   Housing Stability: Low Risk  (6/19/2024)    Housing Stability     Housing Instability: No     Family History:  Family History   Problem Relation Age of Onset    Arthritis Mother     Other (AMI) Mother     Other (diabetes mellitus) Mother     Diabetes Mother      Allergies:  Allergies   Allergen Reactions    Naprosyn [Naproxen] ANAPHYLAXIS     Has tolerated ibuprofen and IV toradol     Aspirin OTHER (SEE COMMENTS)     Difficulty swallowing due to  saavedra's esophagus     Current Meds:  Current Outpatient Medications on File Prior to Visit   Medication Sig Dispense Refill    Mirabegron ER (MYRBETRIQ) 50 MG Oral Tablet 24 Hr Take 1 tablet (50 mg total) by mouth daily. 90 tablet 3    buPROPion  MG Oral Tablet 24 Hr Take 1 tablet (150 mg total) by mouth daily. 30 tablet 0    umeclidinium-vilanterol 62.5-25 MCG/ACT Inhalation Aerosol Powder, Breath Activated Inhale 1 puff into the lungs daily. 1 each 2    oxyCODONE 5 MG Oral Tab Take 1 tablet (5 mg total) by mouth every 6 (six) hours as needed for Pain. 20 tablet 0    Naloxone HCl 4 MG/0.1ML Nasal Liquid 4 mg by Nasal route as needed. If patient remains unresponsive, repeat dose in other nostril 2-5 minutes after first dose. 1 kit 0    azelastine 137 MCG/SPRAY Nasal Solution 1 spray by Nasal route 2 (two) times daily.      PRISTIQ 100 MG Oral Tablet 24 Hr Take 1 tablet (100 mg total) by mouth daily.       benzonatate (TESSALON PERLES) 100 MG Oral Cap Take 1 capsule (100 mg total) by mouth 3 (three) times daily as needed for cough. 15 capsule 0    Rizatriptan Benzoate 10 MG Oral Tab Take 1 tablet (10 mg total) by mouth as needed for Migraine. 10 tablet 5    Glucose Blood (ONETOUCH ULTRA) In Vitro Strip Tests 3 x daily 300 each 1    Coenzyme Q10 200 MG Oral Cap Take 200 mg by mouth 2 (two) times daily.      lidocaine 5 % External Ointment Apply 1 Application  topically 3 (three) times daily as needed. 240 g 1    diclofenac 75 MG Oral Tab EC Take 1 tablet (75 mg total) by mouth 2 (two) times daily. 180 tablet 3    metoprolol succinate ER 50 MG Oral Tablet 24 Hr Take 1 tablet (50 mg total) by mouth daily. 90 tablet 0    Omeprazole 40 MG Oral Capsule Delayed Release Take 1 capsule (40 mg total) by mouth 2 (two) times daily. 300 capsule 0    Blood Glucose Monitoring Suppl (ONETOUCH ULTRA 2) w/Device Does not apply Kit 1 Device by Other route in the morning, at noon, and at bedtime. Use as directed. 1 kit 0    topiramate 50 MG Oral Tab TAKE ONE TABLET BY MOUTH EVERY MORNING AND TWO TABLETS EVERY EVENING (Patient taking differently: Taking one tablet in the morning and one tablet at night.) 270 tablet 2    gabapentin 600 MG Oral Tab Take 1 tablet (600 mg total) by mouth at bedtime. 180 tablet 0    Erenumab-aooe (AIMOVIG) 140 MG/ML Subcutaneous Solution Auto-injector Inject 1 mL (140 mg total) into the skin every 30 (thirty) days. 1 mL 4    umeclidinium bromide (INCRUSE ELLIPTA) 62.5 MCG/ACT Inhalation Aerosol Powder, Breath Activated Inhale 1 puff into the lungs daily. 3 each 0    clopidogrel 75 MG Oral Tab Take 1 tablet (75 mg total) by mouth daily.      aspirin 81 MG Oral Tab EC Take 1 tablet (81 mg total) by mouth daily.      atorvastatin 80 MG Oral Tab Take 1 tablet (80 mg total) by mouth nightly. 90 tablet 1    metFORMIN 500 MG Oral Tab Take 1 tablet (500 mg total) by mouth 2 (two) times daily. 180 tablet 1     furosemide 20 MG Oral Tab Take 1 tablet (20 mg total) by mouth daily. 7 tablet 0    clonazePAM 0.5 MG Oral Tab Take 1 tablet (0.5 mg total) by mouth 2 (two) times daily. 12 tablet 0    Metoclopramide HCl 5 MG/5ML Oral Solution Take 5 mL (5 mg total) by mouth 3 (three) times daily before meals. (Patient taking differently: Take 5 mL (5 mg total) by mouth 3 (three) times daily before meals as needed.) 473 mL 0    Lancets (ONETOUCH DELICA PLUS [id number]) Does not apply Misc 1 lancet  by Finger stick route 3 (three) times daily. 300 each 1    Multiple Vitamins-Minerals (ONE-A-DAY WOMENS 50+) Oral Tab Take 1 tablet by mouth daily.  0    QUEtiapine Fumarate  MG Oral Tablet 24 Hr Take 1 tablet (200 mg total) by mouth every evening. 30 tablet 0    Calcium Carb-Cholecalciferol 500-200 MG-UNIT Oral Tab Take 1 tablet by mouth daily.         No current facility-administered medications on file prior to visit.         REVIEW OF SYSTEMS   Constitutional: no fatigue normal energy no weight changes   HENT: normal sinuses and no mouth issues   Eyes: . normal vision no eye pain   Respiratory: normal respirations no cough   Cardiovascular: no CP, or palpitations   Gastrointestinal: normal bowels and no abd pains   Genitourinary:  normal urination no hematuria, no frequency   Musculoskeletal: no pains in arms/legs, normal range of motion   Skin: no rashes or skin lesions that are new   Neurological:  no weakness, no numbness, normal gait   Hematological:  no bruises or bleeding   Psychiatric/Behavioral: normal mood no anxiety normal behavior     /72 (BP Location: Left arm, Patient Position: Sitting, Cuff Size: adult)   Pulse 88   Temp 98.2 °F (36.8 °C) (Temporal)   Resp 16   Ht 5' 1\" (1.549 m)   Wt 109 lb 6.4 oz (49.6 kg)   SpO2 98%   BMI 20.67 kg/m²     PHYSICAL EXAM:   Constitutional: Vital signs reviewed as noted, thin appearing, in no acute distress.   HENT: NCAT,  Eyes: pupils reactive  bilaterally  Cardiovascular: nl s1 s2 no m/r/g  Pulmonary/Chest: CTA bilaterally with no wheezes  Extremities: no pedal edema   Neurological:  no weakness in UE and LE, reflexes are normal  Skin: no rashes or bruises on visualized skin, not undressed   Psychiatric:normal mood

## 2024-07-16 NOTE — PATIENT INSTRUCTIONS
I have ordered the patch for you    Continue to follow-up with your specialist    See me back in 3 months    Quitting Smoking    Quitting smoking is the most important step you can take to improve your health. We're glad you have set a goal to improve your health.    Quit Smoking Resources    In addition to medications, use the STAR plan to help you successfully quit.   Stick with your quit date!   Tell friends, family, and coworkers your quit date. Request their understanding and support.  Anticipate and prepare for challenges. Some examples are withdrawal symptoms, being around others who smoke, and drinking alcohol.  Remove all tobacco products and paraphernalia from your environment. Make your home and vehicles smoke-free.    Free resources for additional support:  National tobacco quitline: 1-800-QUIT-NOW (1-718.115.6264).  SmokefreeTXT is a free text program to assist you in quitting. Visit https://www.smokefree.gov/smokefreetxt for more information.  Feel free to call your care manager at ([phone]) for additional support.

## 2024-07-21 ENCOUNTER — HOSPITAL ENCOUNTER (OUTPATIENT)
Dept: ULTRASOUND IMAGING | Age: 64
End: 2024-07-21
Attending: OBSTETRICS & GYNECOLOGY
Payer: MEDICAID

## 2024-07-21 ENCOUNTER — HOSPITAL ENCOUNTER (OUTPATIENT)
Dept: ULTRASOUND IMAGING | Age: 64
Discharge: HOME | End: 2024-07-21
Attending: OBSTETRICS & GYNECOLOGY
Payer: MEDICAID

## 2024-07-21 DIAGNOSIS — N95.0 PMB (POSTMENOPAUSAL BLEEDING): ICD-10-CM

## 2024-07-21 PROCEDURE — 76830 TRANSVAGINAL US NON-OB: CPT | Performed by: OBSTETRICS & GYNECOLOGY

## 2024-07-21 PROCEDURE — 76856 US EXAM PELVIC COMPLETE: CPT | Performed by: OBSTETRICS & GYNECOLOGY

## 2024-07-23 ENCOUNTER — OFFICE VISIT (OUTPATIENT)
Facility: CLINIC | Age: 64
End: 2024-07-23
Payer: MEDICAID

## 2024-07-23 VITALS
SYSTOLIC BLOOD PRESSURE: 120 MMHG | HEART RATE: 66 BPM | RESPIRATION RATE: 16 BRPM | BODY MASS INDEX: 21.34 KG/M2 | HEIGHT: 61 IN | WEIGHT: 113 LBS | OXYGEN SATURATION: 99 % | DIASTOLIC BLOOD PRESSURE: 70 MMHG

## 2024-07-23 DIAGNOSIS — R91.8 LUNG NODULES: Primary | ICD-10-CM

## 2024-07-23 DIAGNOSIS — J98.4 CAVITARY LESION OF LUNG: ICD-10-CM

## 2024-07-23 DIAGNOSIS — R59.0 THORACIC LYMPHADENOPATHY: ICD-10-CM

## 2024-07-23 PROCEDURE — 99214 OFFICE O/P EST MOD 30 MIN: CPT | Performed by: INTERNAL MEDICINE

## 2024-07-23 NOTE — PROGRESS NOTES
[facility] Progress Note    History of Present Illness:  Soumya King is a 64 year old female smoker (40+ pack years) with significant PMH of DM, COPD, HTN, GERD who presents today for follow up of lung nodules and dyspnea. Since last visit she was hospitalized in May/June with pneumonia and lung abscess s/p chest tube then right VATS/decortication. She was rehospitalized in June with FTT and now presents for follow up. She feels much better. No cough or dyspnea  Using incruse daily.  Has albuterol but rarely uses/needs.  Not smoking but +vaping    May 2024 previously  Since last visit she was hospitalized in January 2024 with chest pain, found to have NSTEMI and underwent CABG.  She feels better since but still vaping, did quit smoking.   Thinks her breathing and coughing are improved on incruse daily.  Does continue to have daily cough with phlegm.   Thinks she was sick in late April 2024 with sinus infection, resolved post abx.     Nov 2023 previously   She reports having had dyspnea on exertion over the past several years. Did have some improvement after having abdominal surgery, had left ovarian mass (cystadenoma) removed in 2019. Over the past 1-2 years she thinks her breathing has steadily worsened. Denies needing to stop walking but admits she is not very active, does not exercise  +cough with white/yellow phlegm, no hemoptysis.   Has bronchitis nearly every year in winter time.  She had a f/u LDCT in April 2023 which found new nodules in the CHARI - thinks she was sick around the time of the scan. She had a f/u CT earlier this month, which showed the previous nodules have resolved, but had new large nodules in the right lung leading to her evaluation here.   She admits she was sick with chest congestion and sinus congestion in late October 2023.  Has been vaping, but did smoke.     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 Small airway    Esophageal reflux    Essential hypertension    Don't remember    Fitting and adjustment of dental prosthetic device    High blood pressure    History of 2019 novel coronavirus disease (COVID-19)    No hospitalization,symptoms; Fever,fatigue ,body aches,headache no loss of taste or smell    Hx of motion sickness    Hyperlipidemia    Mass of spine    Migraines    barometric pressure    Myocardial infarction (HCC)    Osteoarthritis    Other ill-defined conditions(799.89)    Jamison's    Pain in joint, forearm    wrist    Pain in joint, pelvic region and thigh    hip    Pelvic mass    Personal history of urinary (tract) infection    Sleep apnea    I don't date        Past Surgical History:   Past Surgical History:   Procedure Laterality Date    Cabg  12/27/23    Colonoscopy      Colonoscopy N/A 05/18/2022    Procedure: COLONOSCOPY AND ESOPHAGOGASTRODUODENOSCOPY;  Surgeon: Miguel Camargo MD;  Location:  ENDOSCOPY    Decortication,pulmonary,total Right 06/05/2024    VATS decortication by Dr. Maldonado    Heart surgery  2024    double bypass    Laparoscopic  2007    sling operation for stress incontinence    Laparoscopy,diagnostic      Kay biopsy stereo nodule 2 site bilat (cpt=19081/55934) Left 02/2010    BENIGN 2 SITES    Kay biopsy stereo w/calc 2 site left (cpt=19081/12354)  02/2010    benign x 2    Kay stereo-clip w/calc 2 site left  2010    Oophorectomy Left 06/28/2019    Other  08/31/2022    RIGHT SACRAL SUBCUTANEOUS CYST EXCISION    Other  surgical history  ,     Bladder Sling, Large mass removed from left abdomen and ovar    Tonsillectomy      Was a child, have no idea    Tubal ligation      Upper gi endoscopy,exam         Family Medical History:   Family History   Problem Relation Age of Onset    Arthritis Mother     Other (AMI) Mother     Other (diabetes mellitus) Mother     Diabetes Mother         Social History:   Social History     Socioeconomic History    Marital status: Single     Spouse name: Not on file    Number of children: Not on file    Years of education: Not on file    Highest education level: Not on file   Occupational History    Not on file   Tobacco Use    Smoking status: Former     Current packs/day: 0.00     Average packs/day: 1.5 packs/day for 48.0 years (72.0 ttl pk-yrs)     Types: Cigarettes     Start date:      Quit date: 2024     Years since quittin.1    Smokeless tobacco: Never    Tobacco comments:     Quit cigarettes completely but still smoke and JUUL or vapes.   Vaping Use    Vaping status: Every Day    Start date: 2019    Substances: Nicotine, Flavoring    Devices: Disposable, Pre-filled pod   Substance and Sexual Activity    Alcohol use: Not Currently     Comment: On occasion    Drug use: No    Sexual activity: Not Currently     Partners: Male   Other Topics Concern     Service Not Asked    Blood Transfusions Not Asked    Caffeine Concern Yes    Occupational Exposure Not Asked    Hobby Hazards Not Asked    Sleep Concern Not Asked    Stress Concern Yes    Weight Concern Yes    Special Diet No    Back Care Not Asked    Exercise No    Bike Helmet Not Asked    Seat Belt Yes    Self-Exams Not Asked   Social History Narrative    Not on file     Social Determinants of Health     Financial Resource Strain: Low Risk  (2024)    Financial Resource Strain     Difficulty of Paying Living Expenses: Not very hard     Med Affordability: No   Food Insecurity: Unknown (2024)    Food Insecurity      Food Insecurity: Patient declined   Transportation Needs: No Transportation Needs (6/19/2024)    Transportation Needs     Lack of Transportation: No     Car Seat: Not on file   Physical Activity: Not on File (10/7/2022)    Received from [facility]    Physical Activity     Physical Activity: 0   Stress: Not on File (10/7/2022)    Received from [facility]    Stress     Stress: 0   Social Connections: Not on File (10/7/2022)    Received from [facility]    Social Connections     Social Connections and Isolation: 0   Housing Stability: Low Risk  (6/19/2024)    Housing Stability     Housing Instability: No     Housing Instability Emergency: Not on file     Crib or Bassinette: Not on file        Medications:   Current Outpatient Medications   Medication Sig Dispense Refill    nicotine (NICODERM CQ) 7 MG/24HR Transdermal Patch 24 Hr Place 1 patch onto the skin daily. 30 patch 1    Mirabegron ER (MYRBETRIQ) 50 MG Oral Tablet 24 Hr Take 1 tablet (50 mg total) by mouth daily. 90 tablet 3    umeclidinium-vilanterol 62.5-25 MCG/ACT Inhalation Aerosol Powder, Breath Activated Inhale 1 puff into the lungs daily. 1 each 2    oxyCODONE 5 MG Oral Tab Take 1 tablet (5 mg total) by mouth every 6 (six) hours as needed for Pain. 20 tablet 0    Naloxone HCl 4 MG/0.1ML Nasal Liquid 4 mg by Nasal route as needed. If patient remains unresponsive, repeat dose in other nostril 2-5 minutes after first dose. 1 kit 0    azelastine 137 MCG/SPRAY Nasal Solution 1 spray by Nasal route 2 (two) times daily.      PRISTIQ 100 MG Oral Tablet 24 Hr Take 1 tablet (100 mg total) by mouth daily.      benzonatate (TESSALON PERLES) 100 MG Oral Cap Take 1 capsule (100 mg total) by mouth 3 (three) times daily as needed for cough. 15 capsule 0    Rizatriptan Benzoate 10 MG Oral Tab Take 1 tablet (10 mg total) by mouth as needed for Migraine. 10 tablet 5    Glucose Blood (ONETOUCH ULTRA) In Vitro Strip Tests 3 x daily 300 each 1    Coenzyme Q10 200 MG  Oral Cap Take 200 mg by mouth 2 (two) times daily.      lidocaine 5 % External Ointment Apply 1 Application  topically 3 (three) times daily as needed. 240 g 1    diclofenac 75 MG Oral Tab EC Take 1 tablet (75 mg total) by mouth 2 (two) times daily. 180 tablet 3    metoprolol succinate ER 50 MG Oral Tablet 24 Hr Take 1 tablet (50 mg total) by mouth daily. 90 tablet 0    Omeprazole 40 MG Oral Capsule Delayed Release Take 1 capsule (40 mg total) by mouth 2 (two) times daily. 300 capsule 0    Blood Glucose Monitoring Suppl (ONETOUCH ULTRA 2) w/Device Does not apply Kit 1 Device by Other route in the morning, at noon, and at bedtime. Use as directed. 1 kit 0    topiramate 50 MG Oral Tab TAKE ONE TABLET BY MOUTH EVERY MORNING AND TWO TABLETS EVERY EVENING (Patient taking differently: Taking one tablet in the morning and one tablet at night.) 270 tablet 2    gabapentin 600 MG Oral Tab Take 1 tablet (600 mg total) by mouth at bedtime. 180 tablet 0    Erenumab-aooe (AIMOVIG) 140 MG/ML Subcutaneous Solution Auto-injector Inject 1 mL (140 mg total) into the skin every 30 (thirty) days. 1 mL 4    clopidogrel 75 MG Oral Tab Take 1 tablet (75 mg total) by mouth daily.      aspirin 81 MG Oral Tab EC Take 1 tablet (81 mg total) by mouth daily.      atorvastatin 80 MG Oral Tab Take 1 tablet (80 mg total) by mouth nightly. 90 tablet 1    metFORMIN 500 MG Oral Tab Take 1 tablet (500 mg total) by mouth 2 (two) times daily. 180 tablet 1    furosemide 20 MG Oral Tab Take 1 tablet (20 mg total) by mouth daily. 7 tablet 0    clonazePAM 0.5 MG Oral Tab Take 1 tablet (0.5 mg total) by mouth 2 (two) times daily. 12 tablet 0    Metoclopramide HCl 5 MG/5ML Oral Solution Take 5 mL (5 mg total) by mouth 3 (three) times daily before meals. (Patient taking differently: Take 5 mL (5 mg total) by mouth 3 (three) times daily before meals as needed.) 473 mL 0    Lancets (ONETOUCH DELICA PLUS [id number]) Does not apply Misc 1 lancet  by Finger stick  route 3 (three) times daily. 300 each 1    Multiple Vitamins-Minerals (ONE-A-DAY WOMENS 50+) Oral Tab Take 1 tablet by mouth daily.  0    QUEtiapine Fumarate  MG Oral Tablet 24 Hr Take 1 tablet (200 mg total) by mouth every evening. 30 tablet 0    Calcium Carb-Cholecalciferol 500-200 MG-UNIT Oral Tab Take 1 tablet by mouth daily.           Review of Systems: Review of Systems   Constitutional: Negative.    HENT: Negative.     Respiratory: Negative.  Negative for cough, shortness of breath, wheezing and stridor.    Cardiovascular: Negative.    All other systems reviewed and are negative.       Physical Exam:  /70 (BP Location: Left arm, Patient Position: Sitting, Cuff Size: adult)   Pulse 66   Resp 16   Ht 5' 1\" (1.549 m)   Wt 113 lb (51.3 kg)   SpO2 99%   BMI 21.35 kg/m²      Constitutional: alert, cooperative. No acute distress.  HEENT: Head NC/AT. Mask in place  Cardio: Regular rate and rhythm. Normal S1 and S2. No murmurs.   Respiratory: Thorax symmetrical with no labored breathing. Clear to ausculation bilaterally with symmetrical breath sounds. No wheezing, rhonchi, rales, or crackles.   GI: NABS. Abd soft, non-tender.  Extremities: No clubbing or cyanosis. No BLE edema.    Neurologic: A&Ox3. No gross motor deficits.  Skin: Warm, dry  Psych: Calm, cooperative. Pleasant affect.    Results:  Personally reviewed    WBC: 8.9, done on 7/1/2024.  HGB: 11.1, done on 7/1/2024.  PLT: 432, done on 7/1/2024.     Glucose: 116, done on 7/1/2024.  Cr: 0.83, done on 7/1/2024.  GFR(AA): 93, done on 5/25/2022.  GFR (non-AA): 81, done on 5/25/2022.  CA: 9, done on 7/1/2024.  Na: 142, done on 7/1/2024.  K: 3.8, done on 7/1/2024.  Cl: 106, done on 7/1/2024.  CO2: 26, done on 7/1/2024.  Last ALB was 4% done on 7/1/2024.     US PELVIS W EV (CPT=76856/45431)    Result Date: 7/22/2024  CONCLUSION:  1. Unremarkable appearance of the uterus and right ovary. 2. Status post left oophorectomy.  No adnexal abnormality. 3.  Continued clinical correlation recommended.    LOCATION:  [place]   Dictated by (CST): Jace Govea MD on 7/22/2024 at 8:05 AM     Finalized by (CST): Jace Govea MD on 7/22/2024 at 8:06 AM       XR CHEST PA + LAT CHEST (CPT=71046)    Result Date: 7/11/2024  CONCLUSION:   Postoperative changes of CABG with stable cardiac and mediastinal contours.  The left lung and pleural space are clear.  Moderately improved aeration of the right mid/lower lung with residual patchy opacity; this could represent residual airspace disease, postinflammatory parenchymal scarring, or a combination there of.  Pleural thickening versus trace right pleural effusion of the right costophrenic sulcus.  No appreciable pneumothorax.   LOCATION:  [facility]   Dictated by (CST): Donald Moffett MD on 7/11/2024 at 1:54 PM     Finalized by (CST): Donald Moffett MD on 7/11/2024 at 1:55 PM       XR CHEST AP PORTABLE  (CPT=71045)    Result Date: 6/21/2024  CONCLUSION:  Mild worsening of patchy airspace disease the right lung base with small right pleural effusion could represent atelectasis or pneumonia.   LOCATION:  [facility]      Dictated by (CST): Elieser Torres MD on 6/21/2024 at 7:32 AM     Finalized by (CST): Elieser Torres MD on 6/21/2024 at 7:34 AM       US VENOUS DOPPLER LEG BILAT - DIAG IMG (CPT=93970)    Result Date: 6/19/2024  CONCLUSION:  No DVT lower extremities.   LOCATION:  [facility]   Dictated by (CST): Elieser Torres MD on 6/19/2024 at 4:32 PM     Finalized by (CST): Elieser Torres MD on 6/19/2024 at 4:32 PM       CT CHEST PE AORTA (IV ONLY) (CPT=71260)    Result Date: 6/18/2024  CONCLUSION:   1. Marked atelectasis the right lung along with consolidations have resolved.  Large fluid collection has mostly resolved.  Only minimal residual right pleural effusion is noted.  Cavitary lesion in the right lower lobe is identified.  This cavitary lesion may be sequelae of prior infectious process.  A pneumatocele is of consideration.  2. There is mild  mediastinal and right perihilar lymphadenopathy again noted.    LOCATION:  [facility]   Dictated by (CST): Gregory Vernon MD on 6/18/2024 at 10:50 PM     Finalized by (CST): Gregory Vernon MD on 6/18/2024 at 10:54 PM       XR CHEST AP PORTABLE  (CPT=71045)    Result Date: 6/18/2024  CONCLUSION:  Right lower lobe consolidation with small right pleural effusion is noted.  This is improved compared to prior examination.   LOCATION:  [id number]      Dictated by (CST): Gregory Vernon MD on 6/18/2024 at 8:17 PM     Finalized by (CST): Gregory Vernon MD on 6/18/2024 at 8:18 PM       XR CHEST AP PORTABLE  (CPT=71045)    Result Date: 6/9/2024  CONCLUSION:    Removal of chest tube.  No sign of sizable pneumothorax.  Bilateral pleural effusion and lower lung consolidations greater on the right.  Probably stable.  PICC line catheter present with the distal visualized aspect reaching at least the lower SVC, exact tip not visualized well.  Cardiac enlargement.  LOCATION:  [id number]      Dictated by (CST): Abhishek Mora MD on 6/09/2024 at 12:42 PM     Finalized by (CST): Abhishek Mora MD on 6/09/2024 at 12:43 PM       XR CHEST AP PORTABLE  (CPT=71045)    Result Date: 6/5/2024  CONCLUSION:  Postoperative changes from right chest surgery.  Two right-sided chest tubes are noted.  There is an 8 mm right apical pneumothorax.   Critical results were discussed with Gilda MICHELE at 1234 hours on 6/5/2024. Critical results were read back.    LOCATION:  [facility]      Dictated by (CST): Stromberg, LeRoy, MD on 6/05/2024 at 12:32 PM     Finalized by (CST): Stromberg, LeRoy, MD on 6/05/2024 at 12:35 PM       CT CHEST (EIN=32057)    Result Date: 6/3/2024  CONCLUSION:  1. A moderate amount empyema is seen within the right pleural space.  The extent of this empyema is worse since 5/29/2024.  A chest tube is well seen within the dominant loculation of probable empyema. 2. Multifocal pneumonia is again seen, most notable within the right  middle lobe and right lower lobe.  Continued imaging follow-up is recommended. 3. Mild to moderate mediastinal adenopathy is nonspecific but may be reactive.  Mild supraclavicular adenopathy is also seen.    LOCATION:  [id number]   Dictated by (CST): Stromberg, LeRoy, MD on 6/03/2024 at 11:24 AM     Finalized by (CST): Stromberg, LeRoy, MD on 6/03/2024 at 11:35 AM       US ABDOMEN DOPPLER ONLY (CPT=93975)    Result Date: 6/1/2024  CONCLUSION:  Patency and normal directionality of hepatic and portal vasculature.   LOCATION:  [facility]     Dictated by (CST): Donald Moffett MD on 6/01/2024 at 10:14 AM     Finalized by (CST): Donald Moffett MD on 6/01/2024 at 10:16 AM       XR CHEST AP PORTABLE  (CPT=71045)    Result Date: 5/31/2024  CONCLUSION:  No significant interval change since 5/30/2024.   LOCATION:  [id number]      Dictated by (CST): Stromberg, LeRoy, MD on 5/31/2024 at 7:52 AM     Finalized by (CST): Stromberg, LeRoy, MD on 5/31/2024 at 7:53 AM       XR CHEST AP PORTABLE  (CPT=71045)    Result Date: 5/30/2024  CONCLUSION:  Interval placement of a right chest tube with interval decrease in size of a small right pleural effusion..   LOCATION:  [facility]      Dictated by (CST): Roc Mosqueda MD on 5/30/2024 at 12:49 PM     Finalized by (CST): Roc Mosqueda MD on 5/30/2024 at 12:49 PM       CT THORACENTESIS WITH TUBE INSERT W IMAGING (CPT=32557)    Result Date: 5/29/2024  CONCLUSION: 1. Successful right sided chest tube placement for loculated pleural effusion 2. The locking mechanism of the pigtail catheter was not cut prior to insertion.  Locking mechanism is intact.  LOCATION:  [facility]   Dictated by (CST): Annette Ferreira MD on 5/29/2024 at 5:53 PM     Finalized by (CST): Annette Ferreira MD on 5/29/2024 at 5:55 PM       CT CHEST (VFB=39441)    Addendum Date: 5/29/2024    CORRECTION Corrected on: 5/29/2024;   PROCEDURE:  CT CHEST (CPT=71250)  COMPARISON:  [facility] , XR, XR CHEST AP PORTABLE  (CPT=71045), 5/29/2024, 4:27 AM.  [facility],  CT, CT ANGIOGRAPHY, CHEST (CPT=71275), 5/26/2024, 12:49 PM.  INDICATIONS:  Follow-up empyema  TECHNIQUE:  Unenhanced multislice CT scanning is performed through the chest.  Dose reduction techniques were used. Dose information is transmitted to the ACR (American College of Radiology) NRDR (National Radiology Data Registry) which includes the Dose  Index Registry.  There is image degradation due to motion slightly limiting evaluation  PATIENT STATED HISTORY: (As transcribed by Technologist)  Recent empyema    FINDINGS:  LUNGS:  Masslike consolidation with internal punctate foci of air is most consistent with patient's known consolidation and measures 5.7 x 8.1 x 6.1 versus 5 x 7.2 x 5.1 cm, AP x T x cc dimension respectively.  Increasing adjacent interstitial and ground-glass density. VASCULATURE:  Pulmonary vessels are unremarkable within the limits of a noncontrast CT.  ANKUSH:  Limited due to lack of IV contrast.  MEDIASTINUM:  Adenopathy with subcarinal node measuring 2.8 x 3.3 versus 2.1 x 2 cm and right paratracheal node measuring 2 x 1.3 versus 2 x 1.4 cm.  Mediastinal clips. CARDIAC:  No enlargement or pericardial thickening.  Mild coronary artery calcifications. PLEURA:  Increasing pleural fluid along the right fissures which is lobular in contour and measures 2.6 cm in depth.  Right pleural effusion measures 3.7 versus 2.9 cm with left pleural fluid measuring 1.7 versus 0.8 cm in depth.  THORACIC AORTA:  Atherosclerosis. CHEST WALL:  No mass or axillary adenopathy.  Sternotomy wires. LIMITED ABDOMEN:  Limited images of the upper abdomen are unremarkable.  BONES:  No fracture.   CONCLUSION:  1. When compared to most recent CT of the chest performed 5/26/2024, patient's known right lower lobe consolidation has slightly increased in size with increasing adjacent interstitial and ground-glass density, correlate clinically. 2. Increasing pleural fluid as detailed above.  LOCATION:  [facility]   Dictated by (CST): Cinthya  MD Britney on 5/29/2024 at 9:39 AM     Finalized by (CST): Britney Mendes MD on 5/29/2024 at 9:51 AM    Dictated by (CST): Britney Mendes MD on 5/29/2024 at 11:23 AM     Finalized by (CST): Britney Mendes MD on 5/29/2024 at 11:23 AM              Result Date: 5/29/2024  CONCLUSION:  1. When compared to most recent CT of the chest performed 5/26/2024, patient's known right lower lobe empyema has slightly increased in size with increasing adjacent interstitial and ground-glass density, correlate clinically. 2. Increasing pleural fluid as detailed above.  LOCATION:  [facility]   Dictated by (CST): Britney Mendes MD on 5/29/2024 at 9:39 AM     Finalized by (CST): Britney Mendes MD on 5/29/2024 at 9:51 AM       XR CHEST AP PORTABLE  (CPT=71045)    Result Date: 5/29/2024  CONCLUSION:  1. No significant change, allowing for differences in technique when compared to 5/28/2024 chest radiograph.   LOCATION:  [facility]      Dictated by (CST): Britney Mendes MD on 5/29/2024 at 7:58 AM     Finalized by (CST): Britney Mendes MD on 5/29/2024 at 8:00 AM       US THORACENTESIS GUIDED RIGHT (CPT=32555)    Result Date: 5/28/2024  CONCLUSION:  Small complex right pleural effusion.  Only a small amount of fluid could be aspirated.  Sample sent to RT and the laboratory for testing.   LOCATION:  [facility]    Dictated by (CST): Jace Govea MD on 5/28/2024 at 5:13 PM     Finalized by (CST): Jace Govea MD on 5/28/2024 at 5:16 PM       US ABDOMEN LIMITED (CPT=76705)    Result Date: 5/28/2024  CONCLUSION:  1. Trace ascites is noted in posterior subhepatic space. 2. Right pleural effusion has been noted on prior CT scan. 3. There is otherwise no abnormality detected on this abdominal ultrasound.    LOCATION:  [facility]   Dictated by (CST): Abad Chen MD on 5/28/2024 at 4:56 PM     Finalized by (CST): Abad Chen MD on 5/28/2024 at 4:57 PM       XR CHEST AP PORTABLE  (CPT=71045)    Result Date: 5/28/2024  CONCLUSION:  Small right pleural effusion with patchy airspace disease the right  lung base could represent pneumonia.  There is no pneumothorax.   LOCATION:  [facility]      Dictated by (CST): Elieser Torres MD on 5/28/2024 at 4:55 PM     Finalized by (CST): Elieser Torres MD on 5/28/2024 at 4:56 PM       CT ABDOMEN+PELVIS(CONTRAST ONLY)(CPT=74177)    Result Date: 5/28/2024  CONCLUSION:  1. Loculated right-sided pleural effusion suggestive empyema with consolidation within the right lower lobe.  There are areas of fluid and small foci of air within the consolidation which may represent pulmonary abscesses/necrotizing pneumonia. 2. Small left-sided pleural effusion with associated atelectasis. 3. Nonspecific mildly prominent retroperitoneal lymph nodes which may be reactive in nature. 4. Trace amount of free pelvic fluid.    LOCATION:  [facility]   Dictated by (CST): Annette Ferreira MD on 5/28/2024 at 2:11 PM     Finalized by (CST): Annette Ferreira MD on 5/28/2024 at 2:17 PM       CT BRAIN OR HEAD (28854)    Result Date: 5/28/2024  CONCLUSION:  No evidence of acute intracranial process.    LOCATION:  [facility]   Dictated by (CST): Elieser Torres MD on 5/28/2024 at 8:49 AM     Finalized by (CST): Elieser Torres MD on 5/28/2024 at 8:54 AM       XR CHEST AP PORTABLE  (CPT=71045)    Result Date: 5/28/2024  CONCLUSION:    Limited AP portable view shows small-moderate right pleural effusion cleaning some pleural fluid tracking up the lower right lateral chest.  Possibly loculated.  Patchy consolidation mid-lower right chest.  Small blunting left costophrenic  angle.  Cardiomegaly.  No pneumothorax. Consider upright PA and lateral examination of the chest, when tolerated.  CT follow-up may also be beneficial.    LOCATION:  [facility]      Dictated by (CST): Abhishek Mora MD on 5/28/2024 at 8:36 AM     Finalized by (CST): Abhishek Mora MD on 5/28/2024 at 8:37 AM       US VENOUS DOPPLER LEG LEFT - DIAG IMG (CPT=93971)    Result Date: 5/26/2024  CONCLUSION:  No evidence of DVT in the left lower extremity.   LOCATION:   [facility]    Dictated by (CST): Roc Mosqueda MD on 5/26/2024 at 2:46 PM     Finalized by (CST): Roc Mosqueda MD on 5/26/2024 at 2:49 PM       CT ANGIOGRAPHY, CHEST (CPT=71275)    Result Date: 5/26/2024  CONCLUSION:  1. Masslike consolidation with areas of central necrosis and mild cavitation in the right lower lobe.  Given its acute appearance this may represent an infectious process.  Correlate clinically.  Short-term follow-up after treatment is recommended. 2. Mediastinal adenopathy is likely reactive.  Attention on follow-up is recommended.    LOCATION:  [facility]   Dictated by (CST): Roc Mosqudea MD on 5/26/2024 at 1:21 PM     Finalized by (CST): Roc Mosqueda MD on 5/26/2024 at 1:25 PM       XR CHEST PA + LAT CHEST (CPT=71046)    Result Date: 5/26/2024  CONCLUSION:  Masslike consolidation in the right upper lobe is noted.  Given it is acute appearance this likely represents an infectious process.  Small right effusion is noted.  Correlate clinically.   LOCATION:  [facility]   Dictated by (CST): Roc Mosqueda MD on 5/26/2024 at 12:05 PM     Finalized by (CST): Roc Mosqueda MD on 5/26/2024 at 12:06 PM         Assessment/Plan:  #1. Lung nodules  Noted first on LDCT  4/2023 LDCT with multiple nodular opacities in CHARI -- she reports being sick around time of scan  11/2023 CT chest with resolution of previous CHARI nodules but now with new nodules and GGO in RUL and RLL. +thoracic lymphadenopathy  3/2024 CT chest with improvement in multiple nodules, but stable RUL nodules. Stable LAD  4/2024 CT myeloma with new RLL nodule. Other nodules appear stable   6/2024 CT chest with RLL cavitary lesion and LAD  She was hospitalized June 2024 with pneumonia/lung abscess and empyema s/p chest tube then right VATS/decort with improvement  Will plan repeat CT chest in 3-4 months to reassess    #2. emphysema  40+ pack year smoker, active vaping  Previous 2015 PFTs with moderate obstruction, +air trapping and reduced DLCO  4/2024 PFTs  with no obstruction, normal lung volumes. Reduced DLCO  Again advised to stop vaping any substance - she is going to work on quitting  Continue on incruse daily  Albuterol PRN    #3. Thoracic lymphadenopathy  As above    #4. Tobacco abuse  40+ pack year smoker, active vaping  advised to stop vaping any substance - she will work on quitting  Lung Cancer Counseling and Shared Decision Making Session in an Asymptomatic Smoker/Former Smoker   Soumya King is a 64 year old female without current symptoms of lung cancer.  History   Smoking Status    Former    Types: Cigarettes   Smokeless Tobacco    Never        She received information on the importance of adherence to annual lung cancer Low Dose CT (LDCT) screening, the impact of her comorbidities and her ability or willingness to undergo diagnosis and treatment. she qualifies for low dose CT lung cancer screening, however due to ongoing evaluation post hospitalization with pneumonia/lung abscess and empyema, low dose CT lung cancer screening should be postponed until she completes above follow up.  We counseled the importance of maintaining cigarette smoking abstinence if she is a former smoker and the importance of smoking cessation if she is a current smoker and we discussed and furnished information about tobacco cessation interventions.           Greyson Main MD

## 2024-07-23 NOTE — PATIENT INSTRUCTIONS
You are due for the following vaccines: PCV20, TDAP and COVID  Plan to obtain  a repeat CT chest in October 2024. Call central scheduling at [phone] to schedule the CT scan   Continue the incruse inhaler - one puff once a day  Continue to use albuterol 2 puffs every 6 hours as needed for your breathing or cough  Call/message with questions/concerns

## 2024-07-29 DIAGNOSIS — E78.00 HYPERCHOLESTEROLEMIA: ICD-10-CM

## 2024-07-29 RX ORDER — ATORVASTATIN CALCIUM 80 MG/1
80 TABLET, FILM COATED ORAL NIGHTLY
Qty: 90 TABLET | Refills: 1 | Status: SHIPPED | OUTPATIENT
Start: 2024-07-29

## 2024-07-29 NOTE — TELEPHONE ENCOUNTER
PASS    Future Appointments   Date Time Provider Department Center   8/7/2024 11:00 AM Xiang Torres MD [facility] [place]   9/9/2024  2:30 PM John Pacheco MD [facility] [place]   10/16/2024 10:40 AM Greyson Main MD  [facility] [place]   10/16/2024  1:30 PM Rika Vinson,  [facility] [place]   3/7/2025 11:45 AM Scott Corona MD  [facility]

## 2024-07-31 DIAGNOSIS — G43.709 CHRONIC MIGRAINE W/O AURA W/O STATUS MIGRAINOSUS, NOT INTRACTABLE: ICD-10-CM

## 2024-07-31 RX ORDER — ERENUMAB-AOOE 140 MG/ML
140 INJECTION, SOLUTION SUBCUTANEOUS
Qty: 1 ML | Refills: 2 | Status: SHIPPED | OUTPATIENT
Start: 2024-07-31

## 2024-07-31 NOTE — TELEPHONE ENCOUNTER
Medication: Erenumab-aooe (AIMOVIG) 140 MG/ML Subcutaneous Solution Auto-injector      Date of last refill: 02/05/2024 (#1 mL/4)  [place]     Last office visit: 09/21/2023  Due back to clinic per last office note:  Around 09/21/2024  Date next office visit scheduled:    Future Appointments   Date Time Provider Department Center   8/7/2024 11:00 AM Xiang Torres MD [facility] [place]   9/9/2024  2:30 PM John Pacheco MD [facility] [place]   10/16/2024 10:40 AM Greyson Main MD  [facility] [place]   10/16/2024  1:30 PM Rika Vinson DO [facility] [place]   3/7/2025 11:45 AM Scott Corona MD  [facility]           Last OV note recommendation:    ASSESSMENT/PLAN:   (G43.709) Chronic migraine w/o aura w/o status migrainosus, not intractable  (primary encounter diagnosis)  Plan: Erenumab-aooe (AIMOVIG) 140 MG/ML Subcutaneous         Solution Auto-injector, Rizatriptan Benzoate 10        MG Oral Tab           (Z86.73) Old lacunar stroke without late effect        Migraines stable. Still gets some stress related headache     Change Topamax to 50 mg AM And 100 mg PM for better migraine control  Continue Aimovig 140 mg monthly injections  Use CPAP machine regularly           RTC in about 12 months     See orders and medications filed with this encounter. The patient indicates understanding of these issues and agrees with the plan.              MD [facility] [place]

## 2024-08-02 DIAGNOSIS — N39.41 URGE INCONTINENCE: ICD-10-CM

## 2024-08-02 RX ORDER — GABAPENTIN 600 MG/1
600 TABLET ORAL NIGHTLY
Qty: 180 TABLET | Refills: 0 | Status: CANCELLED | OUTPATIENT
Start: 2024-08-02

## 2024-08-05 RX ORDER — GABAPENTIN 600 MG/1
600 TABLET ORAL NIGHTLY
Qty: 180 TABLET | Refills: 0 | Status: SHIPPED | OUTPATIENT
Start: 2024-08-05

## 2024-08-05 NOTE — TELEPHONE ENCOUNTER
Patient completely out.  Requesting    Name from pharmacy: Gabapentin 600 Mg Tab Nort         Will file in chart as: GABAPENTIN 600 MG Oral Tab    Sig: TAKE 1 TABLET BY MOUTH EVERY NIGHT AT BEDTIME    Disp: 180 tablet    Refills: 0    Start: 8/2/2024    Class: Normal    Non-formulary    Last ordered: 5 months ago (2/26/2024) by Rika Vinson DO    Last refill: 5/6/2024    Rx #: [id number]    Neurology Medications Ghzksm0808/02/2024 07:13 PM   Protocol Details In person appointment or virtual visit in the past 6 mos or appointment in next 3 mos      To be [facility] at: [id number] [place] [facility] [address] [phone], [phone]        LOV: 07/16/24 w/ SV  RTC: No Follow Up on File   Last Relevant Labs: No recent labs   Future Appointments   Date Time Provider Department Center   8/7/2024 11:00 AM Xiang Torres MD [facility] [place]   9/9/2024  2:30 PM John Pacheco MD [facility] [place]   10/16/2024 10:40 AM Greyson Main MD  [facility] [place]   10/16/2024  1:30 PM Rika Vinson DO [facility] [place]   3/7/2025 11:45 AM Scott Corona MD  [facility]

## 2024-08-07 ENCOUNTER — OFFICE VISIT (OUTPATIENT)
Dept: RHEUMATOLOGY | Facility: CLINIC | Age: 64
End: 2024-08-07
Payer: MEDICAID

## 2024-08-07 VITALS
WEIGHT: 112 LBS | RESPIRATION RATE: 22 BRPM | SYSTOLIC BLOOD PRESSURE: 106 MMHG | OXYGEN SATURATION: 98 % | DIASTOLIC BLOOD PRESSURE: 74 MMHG | BODY MASS INDEX: 21 KG/M2 | TEMPERATURE: 98 F | HEART RATE: 92 BPM

## 2024-08-07 DIAGNOSIS — M17.12 PRIMARY OSTEOARTHRITIS OF LEFT KNEE: Primary | ICD-10-CM

## 2024-08-07 DIAGNOSIS — Z95.1 S/P CABG X 2: ICD-10-CM

## 2024-08-07 DIAGNOSIS — M18.11 PRIMARY OSTEOARTHRITIS OF FIRST CARPOMETACARPAL JOINT OF RIGHT HAND: ICD-10-CM

## 2024-08-07 PROCEDURE — 20600 DRAIN/INJ JOINT/BURSA W/O US: CPT | Performed by: INTERNAL MEDICINE

## 2024-08-07 PROCEDURE — 99213 OFFICE O/P EST LOW 20 MIN: CPT | Performed by: INTERNAL MEDICINE

## 2024-08-07 PROCEDURE — 20610 DRAIN/INJ JOINT/BURSA W/O US: CPT | Performed by: INTERNAL MEDICINE

## 2024-08-07 RX ORDER — FUROSEMIDE 20 MG/1
20 TABLET ORAL DAILY
Qty: 30 TABLET | Refills: 2 | Status: SHIPPED | OUTPATIENT
Start: 2024-08-07

## 2024-08-07 RX ORDER — HYDROCODONE BITARTRATE AND ACETAMINOPHEN 5; 325 MG/1; MG/1
1 TABLET ORAL EVERY 6 HOURS PRN
Qty: 120 TABLET | Refills: 0 | Status: SHIPPED | OUTPATIENT
Start: 2024-08-07

## 2024-08-07 RX ORDER — TRAMADOL HYDROCHLORIDE 50 MG/1
50 TABLET ORAL EVERY 6 HOURS PRN
Qty: 120 TABLET | Refills: 0 | Status: CANCELLED
Start: 2024-08-07

## 2024-08-07 RX ORDER — METHYLPREDNISOLONE ACETATE 40 MG/ML
10 INJECTION, SUSPENSION INTRA-ARTICULAR; INTRALESIONAL; INTRAMUSCULAR; SOFT TISSUE ONCE
Status: COMPLETED | OUTPATIENT
Start: 2024-08-07 | End: 2024-08-07

## 2024-08-07 RX ORDER — CYCLOBENZAPRINE HCL 10 MG
10 TABLET ORAL EVERY 12 HOURS PRN
Qty: 60 TABLET | Refills: 2 | Status: CANCELLED | OUTPATIENT
Start: 2024-08-07 | End: 2024-08-27

## 2024-08-07 RX ORDER — TOPIRAMATE 50 MG/1
50 TABLET, FILM COATED ORAL 2 TIMES DAILY
Qty: 180 TABLET | Refills: 2 | Status: SHIPPED | OUTPATIENT
Start: 2024-08-07

## 2024-08-07 RX ORDER — DICLOFENAC SODIUM 75 MG/1
75 TABLET, DELAYED RELEASE ORAL 2 TIMES DAILY
Qty: 180 TABLET | Refills: 3 | Status: SHIPPED | OUTPATIENT
Start: 2024-08-07

## 2024-08-07 RX ORDER — HYDROCODONE BITARTRATE AND ACETAMINOPHEN 5; 325 MG/1; MG/1
1 TABLET ORAL EVERY 8 HOURS PRN
Qty: 120 TABLET | Refills: 0 | Status: CANCELLED | OUTPATIENT
Start: 2024-08-07

## 2024-08-07 RX ORDER — METHYLPREDNISOLONE ACETATE 40 MG/ML
40 INJECTION, SUSPENSION INTRA-ARTICULAR; INTRALESIONAL; INTRAMUSCULAR; SOFT TISSUE ONCE
Status: COMPLETED | OUTPATIENT
Start: 2024-08-07 | End: 2024-08-07

## 2024-08-07 RX ADMIN — METHYLPREDNISOLONE ACETATE 10 MG: 40 INJECTION, SUSPENSION INTRA-ARTICULAR; INTRALESIONAL; INTRAMUSCULAR; SOFT TISSUE at 11:33:00

## 2024-08-07 RX ADMIN — METHYLPREDNISOLONE ACETATE 40 MG: 40 INJECTION, SUSPENSION INTRA-ARTICULAR; INTRALESIONAL; INTRAMUSCULAR; SOFT TISSUE at 11:32:00

## 2024-08-07 NOTE — PROCEDURES
20610 20600  Procedures injection of the left knee.  And injection of the right thumb.  Diagnosis left knee primary osteoarthritis.  Right thumb primary osteoarthritis of the CMC joint.  After obtaining consent, and sore joints identified, both are cleansed with Hibiclens and alcohol wipes.  Left knee was then injected medially with 40 mg of methylprednisolone and 1 cc of 1% lidocaine.  Then the right thumb at the carpometacarpal joint was injected with 10 mg of methylprednisolone.  Patient tolerated 2 injections without incident.

## 2024-08-07 NOTE — PROGRESS NOTES
[facility]  Dr. Torres Progress Note     Subjective:   Soumya King is a(n) 64 year old female.   Current complaints:   Chief Complaint   Patient presents with    Follow - Up     Follow up. Pt states pain has worsened since LOV. Pt would like to discuss a possible right hand and left knee injection. In the hospital for over a month.    History of osteoarthritis of left knee and osteoarthritis of the right thumb.  Status post double bypass of the heart 12/27/2023 for coronary artery disease.  Also history of diabetes mellitus.  Had a bout of pneumonia, needed chest tubes - May 23, 2024 hospitalized for a few weeks.  Was home then back admitted.  End of June discharged for good.    Now c/o pain left knee.  Also pain right thumb.  Requesting injections of the left kne and right thumb.    Objective:   /74   Pulse 92   Temp 98.3 °F (36.8 °C)   Resp 22   Wt 112 lb (50.8 kg)   SpO2 98%   BMI 21.16 kg/m²   Lungs clear  Heart nsr     Right thumb tender at the carpometacarpal joint which is prominent.  Tender Left knee - medial aspect.  No effusion present.  Assessment:     Encounter Diagnoses   Name Primary?    Primary osteoarthritis of left knee Yes    Primary osteoarthritis of first carpometacarpal joint of right hand     S/P CABG x 2      Plan:     Patient Instructions   Diclofenac 75 mg twice a day for arthritis pain.  Norco 5 mg at times for severe pain.   Gabapentin 600 mg at night.'  Cyclobenzaprine prn for muscle spasm.   Diclofenac cream/gel prn apply to left knee and right thumb.  Injections done of cortisone to the right thumb cmc joint and left knee arthritis.  Return to office 3 months          Xiang Torres MD 8/7/2024 11:11 AM

## 2024-08-08 RX ORDER — MIRABEGRON 50 MG/1
50 TABLET, FILM COATED, EXTENDED RELEASE ORAL DAILY
Qty: 90 TABLET | Refills: 3 | OUTPATIENT
Start: 2024-08-08

## 2024-08-08 NOTE — TELEPHONE ENCOUNTER
Please contact the pharmacy directly for your refill. A full year's worth of refills was sent in June to your pharmacy.    Have a good weekend,  Ana Cristina MICHELE

## 2024-08-19 DIAGNOSIS — E11.65 UNCONTROLLED TYPE 2 DIABETES MELLITUS WITH HYPERGLYCEMIA, WITHOUT LONG-TERM CURRENT USE OF INSULIN (HCC): ICD-10-CM

## 2024-08-20 NOTE — TELEPHONE ENCOUNTER
Protocol FAIL    Requesting: metFORMIN 500 MG Oral Tab     LOV: 2/26/24  RTC:   Filled: 1/9/24 180 TABLET 1 REFILL  Recent Labs:     Upcoming OV   Future Appointments   Date Time Provider Department Center   9/9/2024  2:30 PM John Pacheco MD [facility] [place]   10/16/2024 10:40 AM Greyson Main MD  [facility] [place]   10/16/2024  1:30 PM Rika Vinson DO [facility] [place]   11/12/2024 11:40 AM Xiang Torres MD [facility] [place]   3/7/2025 11:45 AM Scott Corona MD  [facility]

## 2024-08-26 ENCOUNTER — TELEPHONE (OUTPATIENT)
Dept: INTERNAL MEDICINE CLINIC | Facility: CLINIC | Age: 64
End: 2024-08-26

## 2024-08-26 DIAGNOSIS — N39.0 URINARY TRACT INFECTION WITHOUT HEMATURIA, SITE UNSPECIFIED: Primary | ICD-10-CM

## 2024-08-26 NOTE — TELEPHONE ENCOUNTER
Patient c/o painful urination for 2 weeks, not a burning sensation. No available appointments. Please triage

## 2024-08-28 RX ORDER — COVID-19 ANTIGEN TEST
1 KIT MISCELLANEOUS ONCE
Qty: 10 KIT | Refills: 0 | Status: SHIPPED | OUTPATIENT
Start: 2024-08-28 | End: 2024-08-28

## 2024-08-28 RX ORDER — NITROFURANTOIN 25; 75 MG/1; MG/1
100 CAPSULE ORAL 2 TIMES DAILY
Qty: 10 CAPSULE | Refills: 0 | Status: SHIPPED | OUTPATIENT
Start: 2024-08-28 | End: 2024-09-02

## 2024-08-28 NOTE — TELEPHONE ENCOUNTER
Spoke with patient to notify her that UA has been ordered and for pt to start antibiotic after completing urine test. With pt's cough, pt doesn't want to go to ER. This RN recommended if she doesn't want to go to ER to at least be evaluated in the [facility] or [facility]. Pt v/u.     SV: Pended at home covid tests. Please review and sign if agree, TY!

## 2024-08-28 NOTE — TELEPHONE ENCOUNTER
Okay to order the COVID test for her    I have signed the urine test and please have her check the urine test first and then go to the pharmacy to  antibiotics.  I have ordered the antibiotics based off her previous urine culture but getting a urine test before she starts antibiotics is important in case the antibiotics need to be changed.  Given she has fevers she should go to the ER if she feels unwell in any way given her complicated lung infection history.  Rika Vinson DO

## 2024-08-28 NOTE — TELEPHONE ENCOUNTER
Spoke with patient to discuss UTI and cough symptoms. Pt states that she's been having painful urination for 2 weeks and has experienced this before. Pt denies discharge, burning sensation, abdominal pain that radiates to back. Pt sure she may have a bladder infection. Pt reports of a productive cough with yellow/brown phlegm. She has not taken a COVID test and doesn't have money to buy them, if recommended, pt requesting COVID tests be sent to her pharmacy as her insurance can cover it. Pt currently taking OTC robitussin and has taken tylenol for a 101.2 fever the other day. Temperature today is 98.0. Pt has some body aches and chills that come and go.     This RN recommended pt to continue supportive care at home and if symptoms worsen, to be evaluated in ICC or ER. Will request further recommendations per Dr. Vinson. Pt v/u.     SV: UA/M and UA pended for possible UTI. Please sign if agree, TY! Pt complaining of cough and takes OTC robitussin. No COVID test available unless ordered through pharmacy. This RN recommended continue supportive care at home and to go to ICC or ER if symptoms worsen. Please advise, TY!

## 2024-08-30 ENCOUNTER — LAB ENCOUNTER (OUTPATIENT)
Dept: LAB | Facility: HOSPITAL | Age: 64
End: 2024-08-30
Attending: INTERNAL MEDICINE
Payer: MEDICAID

## 2024-08-30 DIAGNOSIS — N39.0 URINARY TRACT INFECTION WITHOUT HEMATURIA, SITE UNSPECIFIED: ICD-10-CM

## 2024-08-30 LAB
BILIRUB UR QL STRIP.AUTO: NEGATIVE
COLOR UR AUTO: YELLOW
GLUCOSE UR STRIP.AUTO-MCNC: NORMAL MG/DL
KETONES UR STRIP.AUTO-MCNC: NEGATIVE MG/DL
LEUKOCYTE ESTERASE UR QL STRIP.AUTO: 500
NITRITE UR QL STRIP.AUTO: NEGATIVE
PH UR STRIP.AUTO: 6.5 [PH] (ref 5–8)
RBC #/AREA URNS AUTO: >10 /HPF
SP GR UR STRIP.AUTO: 1.01 (ref 1–1.03)
UROBILINOGEN UR STRIP.AUTO-MCNC: NORMAL MG/DL
WBC #/AREA URNS AUTO: >50 /HPF
WBC CLUMPS UR QL AUTO: PRESENT /HPF

## 2024-08-30 PROCEDURE — 81001 URINALYSIS AUTO W/SCOPE: CPT

## 2024-08-30 PROCEDURE — 87086 URINE CULTURE/COLONY COUNT: CPT

## 2024-09-02 ENCOUNTER — PATIENT MESSAGE (OUTPATIENT)
Dept: INTERNAL MEDICINE CLINIC | Facility: CLINIC | Age: 64
End: 2024-09-02

## 2024-09-02 DIAGNOSIS — E11.40 TYPE 2 DIABETES MELLITUS WITH DIABETIC NEUROPATHY, WITHOUT LONG-TERM CURRENT USE OF INSULIN (HCC): ICD-10-CM

## 2024-09-03 DIAGNOSIS — R30.0 DYSURIA: Primary | ICD-10-CM

## 2024-09-04 RX ORDER — LANCETS 33 GAUGE
1 EACH MISCELLANEOUS 3 TIMES DAILY
Qty: 300 EACH | Refills: 1 | Status: SHIPPED | OUTPATIENT
Start: 2024-09-04

## 2024-09-04 NOTE — TELEPHONE ENCOUNTER
Lancets (ONETOUCH DELICA PLUS [id number]) Does not apply Misc         Si lancet  by Finger stick route 3 (three) times daily.    Disp: 300 each    Refills: 1    Start: 2024    Class: N/A    For: Type 2 diabetes mellitus with diabetic neuropathy, without long-term current use of insulin (HCC)    Last ordered: 1 year ago (2023) by Rika Vinson DO    Diabetic Supplies Protocol [id number] 12:13 PM   Protocol Details In person appointment or virtual visit in the past 12 mos or appointment in next 3 mos      To be filled at: [id number] [place] - [address] [phone], [phone]     LOV: 2024  RTC:3 months   Labs: n/a   Last filled:n/a     Future Appointments   Date Time Provider Department Center   10/16/2024 10:40 AM Greyson Main MD  [facility] [place]   10/16/2024  1:30 PM Rika Vinson DO [facility] [place]   10/23/2024  1:00 PM  [facility]  [facility]   2024 11:40 AM Xiang Torres MD [facility] [place]   2024  2:00 PM John Pacheco MD [facility] [place]   3/7/2025 11:45 AM Scott Corona MD [facility]

## 2024-09-04 NOTE — TELEPHONE ENCOUNTER
From: Soumya King  To: Rika Vinson  Sent: 9/2/2024 3:14 PM CDT  Subject: Lancets    I need a new prescription for my one touch delicate plus 33G Lancets I haven't gotten them in a while and I need it sent to [facility] on [address] in [place] please.   Thank you   Soumya

## 2024-09-05 NOTE — PLAN OF CARE
A&Ox4. RA during day, 2L at night. Tele, NSR/ST. Afebrile.  Continent, SBA to bedside commode, Increased urinary frequency.  Right Chest tube, Mist 2 protocol. 60ml output.  Abd US Doppler this morning.  Carb Control diet resumed, QID accucheck.  Electrolyte non-cardiac replacement.  IV Unasyn, sched nebs. Q8 HgB draws.  PRN Dilaudid given for pain.   Call light within reach. Bed low position.    Problem: Diabetes/Glucose Control  Goal: Glucose maintained within prescribed range  Description: INTERVENTIONS:  - Monitor Blood Glucose as ordered  - Assess for signs and symptoms of hyperglycemia and hypoglycemia  - Administer ordered medications to maintain glucose within target range  - Assess barriers to adequate nutritional intake and initiate nutrition consult as needed  - Instruct patient on self management of diabetes  Outcome: Progressing     Problem: Patient/Family Goals  Goal: Patient/Family Long Term Goal  Description: Patient's Long Term Goal:   Discharge to home    Interventions:  - Follow plan of care  - See additional Care Plan goals for specific interventions  Outcome: Progressing  Goal: Patient/Family Short Term Goal  Description: Patient's Short Term Goal:   5/26 noc: maintain on room air  5/27: manage hgb  5/27 noc: reduce pleuritic pain   5/28 AM: CT scan of head/NPO possible thora  5/28noc: sleep  5/29 AM: Chest Tube placement  5/29noc: CT management, control pain, sleep  6/1 AM: Pain control, Abd US    Interventions:   - labs, blood  - Medications  - IVF  - IVF ABT  - See additional Care Plan goals for specific interventions  Outcome: Progressing     Problem: RESPIRATORY - ADULT  Goal: Achieves optimal ventilation and oxygenation  Description: INTERVENTIONS:  - Assess for changes in respiratory status  - Assess for changes in mentation and behavior  - Position to facilitate oxygenation and minimize respiratory effort  - Oxygen supplementation based on oxygen saturation or ABGs  - Provide Smoking  Cessation handout, if applicable  - Encourage broncho-pulmonary hygiene including cough, deep breathe, Incentive Spirometry  - Assess the need for suctioning and perform as needed  - Assess and instruct to report SOB or any respiratory difficulty  - Respiratory Therapy support as indicated  - Manage/alleviate anxiety  - Monitor for signs/symptoms of CO2 retention  Outcome: Progressing     Problem: HEMATOLOGIC - ADULT  Goal: Free from bleeding injury  Description: (Example usage: patient with low platelets)  INTERVENTIONS:  - Avoid intramuscular injections, enemas and rectal medication administration  - Ensure safe mobilization of patient  - Hold pressure on venipuncture sites to achieve adequate hemostasis  - Assess for signs and symptoms of internal bleeding  - Monitor lab trends  - Patient is to report abnormal signs of bleeding to staff  - Avoid use of toothpicks and dental floss  - Use electric shaver for shaving  - Use soft bristle tooth brush  - Limit straining and forceful nose blowing  Outcome: Progressing      MAC

## 2024-09-10 ENCOUNTER — PATIENT MESSAGE (OUTPATIENT)
Dept: INTERNAL MEDICINE CLINIC | Facility: CLINIC | Age: 64
End: 2024-09-10

## 2024-09-10 DIAGNOSIS — R30.0 DYSURIA: Primary | ICD-10-CM

## 2024-09-11 RX ORDER — NITROFURANTOIN 25; 75 MG/1; MG/1
100 CAPSULE ORAL 2 TIMES DAILY
Qty: 10 CAPSULE | Refills: 0 | Status: SHIPPED | OUTPATIENT
Start: 2024-09-11 | End: 2024-09-16

## 2024-09-11 NOTE — TELEPHONE ENCOUNTER
From: Soumya King  To: Rika Vinson  Sent: 9/10/2024 9:23 AM CDT  Subject: UTI    I know you said that I didn't have a UTI but had bacteria. Well the antibiotics were working and now it's back I'm going to the bathroom a lot and painful again can you please give me another round of antibiotics please.   Thank you   Soumya

## 2024-09-16 ENCOUNTER — LAB ENCOUNTER (OUTPATIENT)
Dept: LAB | Facility: HOSPITAL | Age: 64
End: 2024-09-16
Attending: INTERNAL MEDICINE
Payer: MEDICAID

## 2024-09-16 DIAGNOSIS — R30.0 DYSURIA: ICD-10-CM

## 2024-09-16 LAB
BILIRUB UR QL STRIP.AUTO: NEGATIVE
COLOR UR AUTO: YELLOW
GLUCOSE UR STRIP.AUTO-MCNC: NORMAL MG/DL
KETONES UR STRIP.AUTO-MCNC: NEGATIVE MG/DL
LEUKOCYTE ESTERASE UR QL STRIP.AUTO: 500
NITRITE UR QL STRIP.AUTO: NEGATIVE
PH UR STRIP.AUTO: 5 [PH] (ref 5–8)
PROT UR STRIP.AUTO-MCNC: NEGATIVE MG/DL
SP GR UR STRIP.AUTO: 1.01 (ref 1–1.03)
UROBILINOGEN UR STRIP.AUTO-MCNC: NORMAL MG/DL
WBC #/AREA URNS AUTO: >50 /HPF
WBC CLUMPS UR QL AUTO: PRESENT /HPF

## 2024-09-16 PROCEDURE — 81001 URINALYSIS AUTO W/SCOPE: CPT

## 2024-09-19 DIAGNOSIS — K21.9 GASTROESOPHAGEAL REFLUX DISEASE WITHOUT ESOPHAGITIS: ICD-10-CM

## 2024-09-19 RX ORDER — OMEPRAZOLE 40 MG/1
40 CAPSULE, DELAYED RELEASE ORAL 2 TIMES DAILY
Qty: 300 CAPSULE | Refills: 0 | Status: SHIPPED | OUTPATIENT
Start: 2024-09-19

## 2024-09-19 NOTE — TELEPHONE ENCOUNTER
Name from pharmacy: Omeprazole Dr 40 Mg Cap Romie         Will file in chart as: OMEPRAZOLE 40 MG Oral Capsule Delayed Release    Sig: TAKE ONE CAPSULE BY MOUTH TWICE DAILY    Disp: 300 capsule    Refills: 0    Start: 9/19/2024    Class: Normal    For: Gastroesophageal reflux disease without esophagitis    Last ordered: 5 months ago (3/25/2024) by Rika Visnon DO    Last refill: 8/19/2024    Rx #: [id number]    Gastrointestional Medication Protocol [id number]/19/2024 11:36 AM   Protocol Details In person appointment or virtual visit in the past 12 mos or appointment in next 3 mos      To be filled at: [id number] - [place] - [address] [place] [phone], [phone]

## 2024-10-02 ENCOUNTER — OFFICE VISIT (OUTPATIENT)
Dept: UROLOGY | Facility: CLINIC | Age: 64
End: 2024-10-02
Attending: OBSTETRICS & GYNECOLOGY
Payer: MEDICAID

## 2024-10-02 VITALS — HEIGHT: 61 IN | BODY MASS INDEX: 21 KG/M2 | RESPIRATION RATE: 18 BRPM

## 2024-10-02 DIAGNOSIS — N94.10 DYSPAREUNIA IN FEMALE: ICD-10-CM

## 2024-10-02 DIAGNOSIS — N81.84 PELVIC MUSCLE WASTING: ICD-10-CM

## 2024-10-02 DIAGNOSIS — R30.0 DYSURIA: Primary | ICD-10-CM

## 2024-10-02 DIAGNOSIS — N39.41 URGE INCONTINENCE: ICD-10-CM

## 2024-10-02 DIAGNOSIS — N95.2 POSTMENOPAUSAL ATROPHIC VAGINITIS: ICD-10-CM

## 2024-10-02 DIAGNOSIS — N89.8 VAGINAL DISCHARGE: ICD-10-CM

## 2024-10-02 LAB
BILIRUB UR QL STRIP.AUTO: NEGATIVE
CONTROL RUN WITHIN 24 HOURS?: YES
GLUCOSE UR STRIP.AUTO-MCNC: NORMAL MG/DL
KETONES UR STRIP.AUTO-MCNC: NEGATIVE MG/DL
LEUKOCYTE ESTERASE UR QL STRIP.AUTO: 500
NITRITE UR QL STRIP.AUTO: NEGATIVE
NITRITE URINE: NEGATIVE
PH UR STRIP.AUTO: 5.5 [PH] (ref 5–8)
PROT UR STRIP.AUTO-MCNC: NEGATIVE MG/DL
SP GR UR STRIP.AUTO: 1.01 (ref 1–1.03)
UROBILINOGEN UR STRIP.AUTO-MCNC: NORMAL MG/DL
WBC #/AREA URNS AUTO: >50 /HPF
WBC CLUMPS UR QL AUTO: PRESENT /HPF

## 2024-10-02 PROCEDURE — 87186 SC STD MICRODIL/AGAR DIL: CPT | Performed by: PHYSICIAN ASSISTANT

## 2024-10-02 PROCEDURE — 87086 URINE CULTURE/COLONY COUNT: CPT | Performed by: PHYSICIAN ASSISTANT

## 2024-10-02 PROCEDURE — 87077 CULTURE AEROBIC IDENTIFY: CPT | Performed by: PHYSICIAN ASSISTANT

## 2024-10-02 PROCEDURE — 81001 URINALYSIS AUTO W/SCOPE: CPT | Performed by: PHYSICIAN ASSISTANT

## 2024-10-02 PROCEDURE — 81002 URINALYSIS NONAUTO W/O SCOPE: CPT | Performed by: PHYSICIAN ASSISTANT

## 2024-10-02 PROCEDURE — 81514 NFCT DS BV&VAGINITIS DNA ALG: CPT | Performed by: PHYSICIAN ASSISTANT

## 2024-10-02 PROCEDURE — 51701 INSERT BLADDER CATHETER: CPT | Performed by: PHYSICIAN ASSISTANT

## 2024-10-02 PROCEDURE — 99212 OFFICE O/P EST SF 10 MIN: CPT

## 2024-10-02 RX ORDER — SULFAMETHOXAZOLE/TRIMETHOPRIM 800-160 MG
1 TABLET ORAL 2 TIMES DAILY
Qty: 14 TABLET | Refills: 0 | Status: SHIPPED | OUTPATIENT
Start: 2024-10-02 | End: 2024-10-09

## 2024-10-02 NOTE — PROGRESS NOTES
Patient presents for acute UTI sx    Reports increased urinary frequency & dysuria x 1 week  Denies gross hematuria, vaginal discharge or bleeding    Ongoing dyspareunia    Bowels reg  Not using vag estrogen    Currently taking Myrbetriq 50 mg  Denies SEs  Reports +improvement    Urine Hx:  09/16/24 UA RBC 3-5 Bacteria +1, no Cx sent, tx with Macrobid x 5d  8/30/24 50-100k Multiple Species, tx with Macrobid x 5d   06/20/24 No growth     Urogynecology Summary:  Urogynecology Summary  Prolapse: No  NAN: No  Urge Incontinence: No  Nocturia Frequency: Greater than 4 (3-4)  Frequency: 1 - 2 hours (every hour)  Incomplete emptying: Yes  Constipation: No  Wears pad day?: 0  Wears Pad Night?: 0  Activities are limited by UI/POP?: No  Currently Sexually Active: Yes  Avoids sexual activity due to: Pain    Vitals:  Resp 18   Ht 61\"   BMI 21.16 kg/m²     GENERAL EXAM:  GENERAL: alert & oriented, NAD  HEENT: NC/AT, sclera without injection  SKIN: no rashes  LUNGS:  without increased respiratory effort  ABDOMEN: soft, non-tender, non-distended, no masses appreciated  EXT: no edema    PELVIC EXAM: AY[place], present for exam as a chaperone  Ext. Gen: +atrophy, no lesions  Urethra: some atrophy, nontender  Bladder: some fullness, nontender  Vagina: +atrophy, scant discharge, swab collected  Cervix: no bleeding, no lesions, nontender  Uterus: +mobile  Adnexa: no masses, nontender  Perineum: nontender  Anus: wnl  Rectum: defer     PELVIS FLOOR NEUROMUSCULAR FUNCTION:  Strength:  1 and Unable to hold greater than 3 sec  Perineal Sensation:  Normal        PELVIC SUPPORT:  [place]:  0  Ant:  0  Post:  0  CST:  negative  UVJ: somewhat hypermobile    After obtaining patient's verbal consent, sterile technique used to prep urethra and collect urine (cloudy).  Sent for culture.    Impression/Plan:    ICD-10-CM    1. Dysuria  R30.0 Urine Culture, Routine     POCT urinalysis dipstick[77574]     Straight Cath PVR      sulfamethoxazole-trimethoprim -160 MG Oral Tab per tablet     Urinalysis, Routine      2. Postmenopausal atrophic vaginitis  N95.2       3. Pelvic muscle wasting  N81.84       4. Vaginal discharge  N89.8 Vaginitis Vaginosis PCR Panel      5. Dyspareunia in female  N94.10       6. Urge incontinence  N39.41           Discussion Items:   Discussed mgmt of vulvovaginal atrophy with vaginal estrogen cream. Reviewed associated benefits, risks, alternatives, and goals. Recommend low dose 2x/week treatment.    Treatment Plan:  Urine collected via straight cath & sent for Cx, follow result.  Start empiric Bactrim DS x 7 days.  Follow vaginitis swab result, tx if +  Resume vag estrogen cream as prescribed  Continue Myrbetriq 50 mg  Bowel regimen  Bladder diet/drill  Pelvic floor exercises  Call with s/sx of UTI    All questions answered  She understands and agrees to plan    Return in about 3 months (around 1/2/2025) for UUI / UTI (phone).    Amanda Odonnell PA-C    Note to patient: The 21st Century Cures Act makes medical notes like these available to patients in the interest of transparency.  However, be advised this is a medical document.  It is intended as peer to peer communication.  It is written in medical language and may contain abbreviations or verbiage that are unfamiliar.  It may appear blunt or direct.  Medical documents are intended to carry relevant information, facts as evident, and the clinical opinion of the practitioner.

## 2024-10-02 NOTE — ADDENDUM NOTE
Addended by: BE MULLEN on: 4/22/2024 01:03 PM     Modules accepted: Orders    
Addended by: NICOL OSORIO on: 4/22/2024 01:14 PM     Modules accepted: Orders    
                                             [facility]                                              CARDIOLOGY AT [facility]                                                   [address], [place] [address]                                             Telephone: [phone]. Fax:[phone]                                                       CARDIOLOGY CONSULTATION NOTE                                                                                             History obtained by: Patient and medical record  Community Cardiologist: Dr. Aaron Reina   obtained: Yes [  ] No [  ]  Reason for Consultation: SOB and unstable angina  Available out pt records reviewed: Yes [x] No [  ]    Chief complaint:   SOB and unstable angina      HPI: Patient is a 78yo M w/ PMHx of HTN, HLD, [place], COPD, T2DM presents to [facility] after developing shortness of breath and chest heaviness this morning.  Patient states he woke this morning with acute onset shortness of breath and chest heaviness.  Pain was dull, intermittent, substernal, pressure like, non- radiating and it came on at rest.  Patient had associated diaphoresis.  Symptoms lasted for few minutes, subsided then came back again.  Patient stayed at rest and then came in for evaluation.  Denies HA, lightheadedness, palpitations, N/V, cough, fever, chills, abdominal pain or leg edema.  Cardiology called for unstable angina.  ECG: NSR, new TWI in anterolateral leads.  hsT-T: 29-> 28    CARDIAC TESTING   ECHO:  STRESS:  CATH:   ELECTROPHYSIOLOGY:     PAST MEDICAL HISTORY  Diabetes  Obesity  [place] and COPD overlap syndrome  Hypercholesteremia  History of COPD  Diverticulitis    PAST SURGICAL HISTORY  H/O hernia repair  History of appendectomy    SOCIAL HISTORY:  Denies smoking/alcohol/drugs    FAMILY HISTORY:    Family History of Cardiovascular Disease:  Yes [  ] No [  ]  Coronary Artery Disease in first degree relative: Yes [  ] No [  ]  Sudden Cardiac Death in First degree relative: Yes [  ] No [  ]    HOME MEDICATIONS:  acetaminophen-codeine 300 mg-30 mg oral tablet: 1 tab(s) orally every 4 hours, As needed, Moderate Pain (4 - 6) (29 Sep 2021 13:39)  acetaminophen-codeine 300 mg-30 mg oral tablet: 2 tab(s) orally every 4 hours, As needed, Severe Pain (7 - 10) (29 Sep 2021 13:39)  atorvastatin 40 mg oral tablet: 1 tab(s) orally once a day (at bedtime) (29 Sep 2021 13:39)  metFORMIN 500 mg oral tablet: 1 tab(s) orally 2 times a day (17 Sep 2021 09:29)  mupirocin 2% topical ointment: 1 application topically once (29 Sep 2021 13:39)  Spiriva Respimat 1.25 mcg/inh inhalation aerosol: 2 puff(s) inhaled once a day (17 Sep 2021 09:29)  Losartan 25mg oral daily    ALLERGIES: No Known Allergies    REVIEW OF SYMPTOMS:   CONSTITUTIONAL: No fever, no chills, no weight loss, no weight gain, no fatigue   ENMT:  No vertigo; No sinus or throat pain  NECK: No pain or stiffness  CARDIOVASCULAR: + chest pain, + dyspnea, no syncope/presyncope, no palpitations, no dizziness, no Orthopnea, no Paroxsymal nocturnal dyspnea  RESPIRATORY: + shortness of breath, no cough, no wheezing  : No dysuria, no hematuria   GI: No dark color stool, no nausea, no diarrhea, no constipation, no abdominal pain   NEURO: No headache, no slurred speech   MUSCULOSKELETAL: No joint pain or swelling; No muscle, back, or extremity pain  PSYCH: No agitation, no anxiety  ALL OTHER REVIEW OF SYSTEMS ARE NEGATIVE.    VITAL SIGNS:  T(C): 36.6 (10-02-24 @ 19:50), Max: 36.7 (10-02-24 @ 15:13)  T(F): 97.8 (10-02-24 @ 19:50), Max: 98 (10-02-24 @ 15:13)  HR: 59 (10-02-24 @ 19:50) (59 - 92)  BP: 148/73 (10-02-24 @ 19:50) (146/78 - 148/73)  RR: 20 (10-02-24 @ 19:50) (20 - 22)  SpO2: 94% (10-02-24 @ 19:50) (94% - 98%)    INTAKE AND OUTPUT:     PHYSICAL EXAM:  Constitutional: Comfortable at rest, no acute distress   HEENT: Atraumatic, neck is supple, no JVD  CNS: A&Ox3. No focal deficits   Respiratory: CTAB, unlabored   Cardiovascular: RRR, normal S1S2, no murmur or rubs  Gastrointestinal: Soft, non-tender, obese abdomen, +Bowel sounds.   Extremities: 2+ Peripheral Pulses, no cyanosis, or edema  Psychiatric: Calm   Skin: Warm and dry, no ulcers on extremities     LABS:                      13.4   12.83 )-----------( 229      ( 02 Oct 2024 16:35 )             41.5     10-02    142  |  105  |  26.0[H]  ----------------------------<  116[H]  4.8   |  28.0  |  1.69[H]    Ca    10.2      02 Oct 2024 17:59  Mg     2.2     10-02    TPro  6.9  /  Alb  3.9  /  TBili  0.6  /  DBili  x   /  AST  17  /  ALT  11  /  AlkPhos  109  10-02    PT: 11.6 sec;   INR: 1.03 ratio        PTT:28.4 sec    Urinalysis Basic - ( 02 Oct 2024 17:59 )  Color: x / Appearance: x / SG: x / pH: x  Gluc: 116 mg/dL / Ketone: x  / Bili: x / Urobili: x   Blood: x / Protein: x / Nitrite: x   Leuk Esterase: x / RBC: x / WBC x   Sq Epi: x / Non Sq Epi: x / Bacteria: x    INTERPRETATION OF TELEMETRY: SR, occ PVCs  ECG: NSR, new TWI in anterolateral leads   Prior ECG: Yes [x] No [  ]    RADIOLOGY & ADDITIONAL STUDIES:   CT Angio scan: IMPRESSION:  1.  No pulmonary embolism.  2.  No change in the bilateral reticular opacities, calcified nodules are mosaic attenuation of the lungs.     US:

## 2024-10-03 DIAGNOSIS — Z71.6 ENCOUNTER FOR TOBACCO USE CESSATION COUNSELING: ICD-10-CM

## 2024-10-03 LAB
BV BACTERIA DNA VAG QL NAA+PROBE: NEGATIVE
C GLABRATA DNA VAG QL NAA+PROBE: NEGATIVE
C KRUSEI DNA VAG QL NAA+PROBE: NEGATIVE
CANDIDA DNA VAG QL NAA+PROBE: NEGATIVE
T VAGINALIS DNA VAG QL NAA+PROBE: NEGATIVE

## 2024-10-03 NOTE — TELEPHONE ENCOUNTER
Protocol NONE    Requesting: nicotine (NICODERM CQ) 7 MG/24HR Transdermal Patch 24 Hr     LOV:7/16/24   RTC: 3 MONTHS  Filled: 7/16/24 30 PATCH 1 REFILL  Recent Labs: 7/1/24    Upcoming OV   Future Appointments   Date Time Provider Department Center   10/16/2024 10:40 AM Greyson Main MD [facility] [id number] [facility] [place]   10/16/2024  1:30 PM Rika Vinson DO [facility] 8 [facility] [place]   10/23/2024  1:00 PM  CT MAIN RM3  [facility]   11/12/2024 11:40 AM Xiang Torres MD [facility] [place]   12/5/2024  2:00 PM John Pacheco MD [facility] Pulm [facility] [place]   1/6/2025 12:20 PM Amanda Odonnell PA [facility] None   3/7/2025 11:45 AM Scott Corona MD [facility] HEM ONC [facility]

## 2024-10-07 RX ORDER — COVID-19 ANTIGEN TEST
KIT MISCELLANEOUS
Qty: 10 KIT | Refills: 0 | Status: SHIPPED | OUTPATIENT
Start: 2024-10-07

## 2024-10-07 NOTE — TELEPHONE ENCOUNTER
NONE    Future Appointments   Date Time Provider Department Center   10/16/2024 10:40 AM Greyson Main MD [facility] [id number] [facility] [place]   10/16/2024  1:30 PM Rika Vinson DO [facility] 8 [facility] [place]   10/23/2024  1:00 PM  CT MAIN RM3  [facility]   11/12/2024 11:40 AM Xiang Torres MD [facility] [place]   12/5/2024  2:00 PM John Pacheco MD [facility] Pulm [facility] [place]   1/6/2025 12:20 PM Amanda Odonnell PA [facility] None   3/7/2025 11:45 AM Scott Corona MD  [facility]        Punch Biopsy Wound Care    Your doctor has performed a punch biopsy today.  A band aid and antibiotic ointment has been placed over the site.  This should remain in place for 24 hours.  It is recommended that you keep the area dry for the first 24 hours.  After 24 hours, you may remove the band aid and wash the area with warm soap and water and apply Vaseline jelly.  Many patients prefer to use Neosporin or Bacitracin ointment.  This is acceptable; however know that you can develop an allergy to this medication even if you have used it safely for years.  It is important to keep the area moist.  Letting it dry out and get air slows healing time, will worsen the scar, and make it more difficult to remove the stitches if they were placed.  Band aid is optional after first 24 hours.      If you notice increasing redness, tenderness, pain, or yellow drainage at the biopsy or surgical site, please notify your doctor.  These are signs of an infection.    If your biopsy/surgical site is bleeding, apply firm pressure for 15 minutes straight.  Repeat for another 15 minutes, if it is still bleeding.   If the surgical site continues to bleed, then please contact your doctor.      For [facility] users:   You will receive your biopsy results in [facility] as soon as they are available. Please be assured that your physician/provider will review your results and will then determine what further treatment, evaluation, or planning is required. You should be contacted by your physician's/provider's office within 5 business days of receiving your results; If not, please reach out to directly. This is one more way [facility] is putting you first.       [facility] [place] [address]/ [phone] FAX/ [email or web address]

## 2024-10-09 RX ORDER — AZELASTINE HYDROCHLORIDE 137 UG/1
1 SPRAY, METERED NASAL 2 TIMES DAILY
Qty: 30 ML | Refills: 0 | Status: SHIPPED | OUTPATIENT
Start: 2024-10-09

## 2024-10-09 NOTE — TELEPHONE ENCOUNTER
PASS    Future Appointments   Date Time Provider Department Center   10/16/2024 10:40 AM Greyson Main MD [facility] [id number] [facility] [place]   10/16/2024  1:30 PM Rika Vinson DO [facility] 8 [facility] [place]   10/23/2024  1:00 PM  CT MAIN RM3  [facility]   11/12/2024 11:40 AM Xiang Torres MD [facility] [place]   12/5/2024  2:00 PM John Pacheco MD [facility] Pulm [facility] [place]   1/6/2025 12:20 PM Amanda Odonnell PA [facility] None   3/7/2025 11:45 AM Scott Corona MD  [facility]

## 2024-10-10 ENCOUNTER — PATIENT MESSAGE (OUTPATIENT)
Dept: RHEUMATOLOGY | Facility: CLINIC | Age: 64
End: 2024-10-10

## 2024-10-10 NOTE — TELEPHONE ENCOUNTER
Future Appointments   Date Time Provider Department Center   10/16/2024 10:40 AM Greyson Main MD [facility] [id number] [facility] [place]   10/16/2024  1:30 PM Rika Vinson DO EM[facility] 8 [facility] [place]   10/23/2024  1:00 PM  CT MAIN RM3  [facility]   11/12/2024 11:40 AM Xiang Torres MD [facility] [place]   12/5/2024  2:00 PM John Pacheco MD [facility] Pulm [facility] [place]   1/6/2025 12:20 PM Amanda Odonnell PA [facility] None   3/7/2025 11:45 AM Scott Cornoa MD [facility]     LOV 8/7/24   RTO in 3mo    Her/She

## 2024-10-14 RX ORDER — AZELASTINE HYDROCHLORIDE 137 UG/1
1 SPRAY, METERED NASAL 2 TIMES DAILY
Qty: 30 ML | Refills: 0 | OUTPATIENT
Start: 2024-10-14

## 2024-10-15 RX ORDER — AZELASTINE HYDROCHLORIDE 137 UG/1
1 SPRAY, METERED NASAL 2 TIMES DAILY
Qty: 30 ML | Refills: 0 | OUTPATIENT
Start: 2024-10-15

## 2024-10-16 ENCOUNTER — OFFICE VISIT (OUTPATIENT)
Dept: INTERNAL MEDICINE CLINIC | Facility: CLINIC | Age: 64
End: 2024-10-16
Payer: MEDICAID

## 2024-10-16 ENCOUNTER — APPOINTMENT (OUTPATIENT)
Dept: INTERNAL MEDICINE CLINIC | Facility: CLINIC | Age: 64
End: 2024-10-16
Payer: MEDICAID

## 2024-10-16 VITALS
WEIGHT: 114 LBS | DIASTOLIC BLOOD PRESSURE: 72 MMHG | HEART RATE: 96 BPM | HEIGHT: 61 IN | BODY MASS INDEX: 21.52 KG/M2 | TEMPERATURE: 98 F | SYSTOLIC BLOOD PRESSURE: 118 MMHG | OXYGEN SATURATION: 98 % | RESPIRATION RATE: 16 BRPM

## 2024-10-16 DIAGNOSIS — K21.9 GASTROESOPHAGEAL REFLUX DISEASE, UNSPECIFIED WHETHER ESOPHAGITIS PRESENT: ICD-10-CM

## 2024-10-16 DIAGNOSIS — I10 PRIMARY HYPERTENSION: ICD-10-CM

## 2024-10-16 DIAGNOSIS — I25.10 CAD, MULTIPLE VESSEL: ICD-10-CM

## 2024-10-16 DIAGNOSIS — F33.1 MAJOR DEPRESSIVE DISORDER, RECURRENT, MODERATE (HCC): ICD-10-CM

## 2024-10-16 DIAGNOSIS — K22.70 BARRETT'S ESOPHAGUS WITHOUT DYSPLASIA: ICD-10-CM

## 2024-10-16 DIAGNOSIS — E11.40 TYPE 2 DIABETES MELLITUS WITH DIABETIC NEUROPATHY, WITHOUT LONG-TERM CURRENT USE OF INSULIN (HCC): ICD-10-CM

## 2024-10-16 DIAGNOSIS — E11.69 TYPE 2 DIABETES MELLITUS WITH HYPERLIPIDEMIA (HCC): ICD-10-CM

## 2024-10-16 DIAGNOSIS — M79.671 BILATERAL FOOT PAIN: ICD-10-CM

## 2024-10-16 DIAGNOSIS — E78.5 TYPE 2 DIABETES MELLITUS WITH HYPERLIPIDEMIA (HCC): ICD-10-CM

## 2024-10-16 DIAGNOSIS — Z72.0 TOBACCO USE: ICD-10-CM

## 2024-10-16 DIAGNOSIS — F41.1 GAD (GENERALIZED ANXIETY DISORDER): ICD-10-CM

## 2024-10-16 DIAGNOSIS — K21.9 GASTROESOPHAGEAL REFLUX DISEASE WITHOUT ESOPHAGITIS: ICD-10-CM

## 2024-10-16 DIAGNOSIS — R05.2 SUBACUTE COUGH: Primary | ICD-10-CM

## 2024-10-16 DIAGNOSIS — J44.9 COPD, MILD (HCC): ICD-10-CM

## 2024-10-16 DIAGNOSIS — M79.672 BILATERAL FOOT PAIN: ICD-10-CM

## 2024-10-16 DIAGNOSIS — E78.00 PURE HYPERCHOLESTEROLEMIA: ICD-10-CM

## 2024-10-16 PROBLEM — E46 MALNUTRITION (HCC): Status: RESOLVED | Noted: 2024-06-21 | Resolved: 2024-10-16

## 2024-10-16 PROBLEM — R62.7 FTT (FAILURE TO THRIVE) IN ADULT: Status: RESOLVED | Noted: 2024-06-18 | Resolved: 2024-10-16

## 2024-10-16 PROCEDURE — 92229 IMG RTA DETC/MNTR DS POC ALY: CPT | Performed by: INTERNAL MEDICINE

## 2024-10-16 PROCEDURE — 99214 OFFICE O/P EST MOD 30 MIN: CPT | Performed by: INTERNAL MEDICINE

## 2024-10-16 RX ORDER — OMEPRAZOLE 40 MG/1
40 CAPSULE, DELAYED RELEASE ORAL 2 TIMES DAILY
COMMUNITY
Start: 2024-10-16

## 2024-10-16 NOTE — PATIENT INSTRUCTIONS
Continue your medications    Get the flu and prevnar 20 vaccine in 2 weeks. Then get the covid vaccine. Final vaccine you need is the tetanus (tdap vaccine)    I have ordered a chest x ray/xray of the rib for you    I have ordered blood work and urine test     I have referred you to the podiatrist (foot doctor) and please see Dr. Joy from neurology for your memory     See me back in 6 months or sooner if needed

## 2024-10-17 ENCOUNTER — HOSPITAL ENCOUNTER (OUTPATIENT)
Dept: GENERAL RADIOLOGY | Age: 64
Discharge: HOME | End: 2024-10-17
Attending: INTERNAL MEDICINE
Payer: MEDICAID

## 2024-10-17 DIAGNOSIS — R05.2 SUBACUTE COUGH: ICD-10-CM

## 2024-10-17 PROCEDURE — 71100 X-RAY EXAM RIBS UNI 2 VIEWS: CPT | Performed by: INTERNAL MEDICINE

## 2024-10-17 PROCEDURE — 71046 X-RAY EXAM CHEST 2 VIEWS: CPT | Performed by: INTERNAL MEDICINE

## 2024-10-21 RX ORDER — AZELASTINE HYDROCHLORIDE 137 UG/1
1 SPRAY, METERED NASAL 2 TIMES DAILY
Qty: 30 ML | Refills: 0 | OUTPATIENT
Start: 2024-10-21

## 2024-10-22 ENCOUNTER — MED REC SCAN ONLY (OUTPATIENT)
Facility: CLINIC | Age: 64
End: 2024-10-22

## 2024-10-23 ENCOUNTER — HOSPITAL ENCOUNTER (OUTPATIENT)
Dept: CT IMAGING | Facility: HOSPITAL | Age: 64
Discharge: HOME | End: 2024-10-23
Attending: INTERNAL MEDICINE
Payer: MEDICAID

## 2024-10-23 ENCOUNTER — LAB ENCOUNTER (OUTPATIENT)
Dept: LAB | Facility: HOSPITAL | Age: 64
End: 2024-10-23
Attending: INTERNAL MEDICINE
Payer: MEDICAID

## 2024-10-23 DIAGNOSIS — R59.0 THORACIC LYMPHADENOPATHY: ICD-10-CM

## 2024-10-23 DIAGNOSIS — J98.4 CAVITARY LESION OF LUNG: ICD-10-CM

## 2024-10-23 DIAGNOSIS — E11.69 TYPE 2 DIABETES MELLITUS WITH HYPERLIPIDEMIA (HCC): ICD-10-CM

## 2024-10-23 DIAGNOSIS — R05.2 SUBACUTE COUGH: ICD-10-CM

## 2024-10-23 DIAGNOSIS — E78.00 PURE HYPERCHOLESTEROLEMIA: ICD-10-CM

## 2024-10-23 DIAGNOSIS — E78.5 TYPE 2 DIABETES MELLITUS WITH HYPERLIPIDEMIA (HCC): ICD-10-CM

## 2024-10-23 DIAGNOSIS — I10 PRIMARY HYPERTENSION: ICD-10-CM

## 2024-10-23 DIAGNOSIS — R91.8 LUNG NODULES: ICD-10-CM

## 2024-10-23 LAB
ALT SERPL-CCNC: 25 U/L
ANION GAP SERPL CALC-SCNC: 3 MMOL/L (ref 0–18)
AST SERPL-CCNC: 23 U/L (ref ?–34)
BASOPHILS # BLD AUTO: 0.07 X10(3) UL (ref 0–0.2)
BASOPHILS NFR BLD AUTO: 0.7 %
BUN BLD-MCNC: 18 MG/DL (ref 9–23)
CALCIUM BLD-MCNC: 9.4 MG/DL (ref 8.7–10.4)
CHLORIDE SERPL-SCNC: 102 MMOL/L (ref 98–112)
CHOLEST SERPL-MCNC: 130 MG/DL (ref ?–200)
CO2 SERPL-SCNC: 25 MMOL/L (ref 21–32)
CREAT BLD-MCNC: 0.8 MG/DL
CREAT BLD-MCNC: 0.89 MG/DL
EGFRCR SERPLBLD CKD-EPI 2021: 73 ML/MIN/1.73M2 (ref 60–?)
EGFRCR SERPLBLD CKD-EPI 2021: 83 ML/MIN/1.73M2 (ref 60–?)
EOSINOPHIL # BLD AUTO: 0.36 X10(3) UL (ref 0–0.7)
EOSINOPHIL NFR BLD AUTO: 3.6 %
ERYTHROCYTE [DISTWIDTH] IN BLOOD BY AUTOMATED COUNT: 13.2 %
EST. AVERAGE GLUCOSE BLD GHB EST-MCNC: 143 MG/DL (ref 68–126)
FASTING PATIENT LIPID ANSWER: NO
FASTING STATUS PATIENT QL REPORTED: NO
GLUCOSE BLD-MCNC: 117 MG/DL (ref 70–99)
HBA1C MFR BLD: 6.6 % (ref ?–5.7)
HCT VFR BLD AUTO: 29.4 %
HDLC SERPL-MCNC: 31 MG/DL (ref 40–59)
HGB BLD-MCNC: 10.1 G/DL
IMM GRANULOCYTES # BLD AUTO: 0.04 X10(3) UL (ref 0–1)
IMM GRANULOCYTES NFR BLD: 0.4 %
LDLC SERPL CALC-MCNC: 69 MG/DL (ref ?–100)
LYMPHOCYTES # BLD AUTO: 2.83 X10(3) UL (ref 1–4)
LYMPHOCYTES NFR BLD AUTO: 28.2 %
MCH RBC QN AUTO: 31.4 PG (ref 26–34)
MCHC RBC AUTO-ENTMCNC: 34.4 G/DL (ref 31–37)
MCV RBC AUTO: 91.3 FL
MONOCYTES # BLD AUTO: 0.58 X10(3) UL (ref 0.1–1)
MONOCYTES NFR BLD AUTO: 5.8 %
NEUTROPHILS # BLD AUTO: 6.16 X10 (3) UL (ref 1.5–7.7)
NEUTROPHILS # BLD AUTO: 6.16 X10(3) UL (ref 1.5–7.7)
NEUTROPHILS NFR BLD AUTO: 61.3 %
NONHDLC SERPL-MCNC: 99 MG/DL (ref ?–130)
OSMOLALITY SERPL CALC.SUM OF ELEC: 273 MOSM/KG (ref 275–295)
PLATELET # BLD AUTO: 367 10(3)UL (ref 150–450)
POTASSIUM SERPL-SCNC: 4.1 MMOL/L (ref 3.5–5.1)
RBC # BLD AUTO: 3.22 X10(6)UL
SODIUM SERPL-SCNC: 130 MMOL/L (ref 136–145)
TRIGL SERPL-MCNC: 175 MG/DL (ref 30–149)
TSI SER-ACNC: 2.42 MIU/ML (ref 0.55–4.78)
VLDLC SERPL CALC-MCNC: 27 MG/DL (ref 0–30)
WBC # BLD AUTO: 10 X10(3) UL (ref 4–11)

## 2024-10-23 PROCEDURE — 80048 BASIC METABOLIC PNL TOTAL CA: CPT

## 2024-10-23 PROCEDURE — 36415 COLL VENOUS BLD VENIPUNCTURE: CPT

## 2024-10-23 PROCEDURE — 84450 TRANSFERASE (AST) (SGOT): CPT

## 2024-10-23 PROCEDURE — 80061 LIPID PANEL: CPT

## 2024-10-23 PROCEDURE — 83036 HEMOGLOBIN GLYCOSYLATED A1C: CPT

## 2024-10-23 PROCEDURE — 84460 ALANINE AMINO (ALT) (SGPT): CPT

## 2024-10-23 PROCEDURE — 84443 ASSAY THYROID STIM HORMONE: CPT

## 2024-10-23 PROCEDURE — 71260 CT THORAX DX C+: CPT | Performed by: INTERNAL MEDICINE

## 2024-10-23 PROCEDURE — 82565 ASSAY OF CREATININE: CPT

## 2024-10-23 PROCEDURE — 85025 COMPLETE CBC W/AUTO DIFF WBC: CPT

## 2024-10-24 DIAGNOSIS — E87.1 HYPONATREMIA: Primary | ICD-10-CM

## 2024-10-24 NOTE — PROGRESS NOTES
Dear RN, please let the patient know   Inocente Dooley,  1.  your diabetes has improved from the  2.  Your kidney function is normal however your sodium was quite low.  I want to repeat this test again in the next couple days just to make sure it is not going down lower.  Sometimes infection can cause the sodium levels to go low.  Orders are in and no need to fast.  3.  Cholesterol is in good range  4.  Your anemia has worsened.  When is your appointment with Dr. Corona?   5.  Your liver function and thyroid function are normal  Please let me know if you have any questions  Rika Vinson DO

## 2024-10-28 DIAGNOSIS — R05.2 SUBACUTE COUGH: Primary | ICD-10-CM

## 2024-10-28 NOTE — PROGRESS NOTES
Dear RN, please let the patient know   I will send the prescription again.  I see that she is going to see her pulmonologist soon which is great as if her symptoms are not getting better with the second round of antibiotics we would need to see what the pulmonologist suggests.  Rika Vinson DO

## 2024-10-29 ENCOUNTER — OFFICE VISIT (OUTPATIENT)
Age: 64
End: 2024-10-29
Payer: MEDICAID

## 2024-10-29 ENCOUNTER — PATIENT MESSAGE (OUTPATIENT)
Age: 64
End: 2024-10-29

## 2024-10-29 VITALS
HEIGHT: 61 IN | WEIGHT: 115 LBS | RESPIRATION RATE: 16 BRPM | OXYGEN SATURATION: 97 % | HEART RATE: 86 BPM | BODY MASS INDEX: 21.71 KG/M2

## 2024-10-29 DIAGNOSIS — J43.2 CENTRILOBULAR EMPHYSEMA (HCC): Primary | ICD-10-CM

## 2024-10-29 DIAGNOSIS — J18.9 PNEUMONIA OF RIGHT LUNG DUE TO INFECTIOUS ORGANISM, UNSPECIFIED PART OF LUNG: ICD-10-CM

## 2024-10-29 DIAGNOSIS — J44.9 CHRONIC OBSTRUCTIVE PULMONARY DISEASE, UNSPECIFIED COPD TYPE (HCC): ICD-10-CM

## 2024-10-29 DIAGNOSIS — R91.8 LUNG NODULES: Primary | ICD-10-CM

## 2024-10-29 DIAGNOSIS — R05.2 SUBACUTE COUGH: ICD-10-CM

## 2024-10-29 DIAGNOSIS — Z72.0 TOBACCO USE: ICD-10-CM

## 2024-10-29 RX ORDER — UMECLIDINIUM 62.5 UG/1
AEROSOL, POWDER ORAL
COMMUNITY
Start: 2024-10-19 | End: 2024-10-29

## 2024-10-29 RX ORDER — DOXYCYCLINE 100 MG/1
100 CAPSULE ORAL 2 TIMES DAILY
Qty: 14 CAPSULE | Refills: 0 | Status: SHIPPED | OUTPATIENT
Start: 2024-10-29

## 2024-10-29 RX ORDER — DOXYCYCLINE 100 MG/1
100 CAPSULE ORAL 2 TIMES DAILY
Qty: 14 CAPSULE | Refills: 0 | Status: CANCELLED | OUTPATIENT
Start: 2024-10-29

## 2024-10-29 RX ORDER — UMECLIDINIUM 62.5 UG/1
1 AEROSOL, POWDER ORAL DAILY
Qty: 30 EACH | Refills: 11 | Status: SHIPPED | OUTPATIENT
Start: 2024-10-29

## 2024-10-29 RX ORDER — BUDESONIDE 0.25 MG/2ML
0.25 INHALANT ORAL 2 TIMES DAILY
Qty: 60 ML | Refills: 0 | Status: SHIPPED | OUTPATIENT
Start: 2024-10-29

## 2024-10-29 NOTE — PROGRESS NOTES
[facility] General Pulmonary Progress Note    History of Present Illness:  Soumya King is a 64 year old female who presents today for follow up of CT chest.   Reports starting second round of antibiotics (amoxicillin) for possible pneumonia on chest xrays per PCP. Reports cough, runny nose, chest pain, SOB for about 3 weeks, saw PCP who prescribed antibiotics. She has been hospitalized twice over the year for pneumonia, January and July 2024.   Prior to these issues denied any respiratory complaints. Has been taking incruse daily and nasal sprays, has not needed to use albuterol.   Denies fever, chills, weight loss.   Denies smoking nicotine, but uses vape pen with little nicotine inside; not interested in quitting due to helps with stress. PCP prescribed low dose of nicotine patches. Reports not helping with her cravings.     Previously 7/2024 Dr Main   Soumya King is a 64 year old female smoker (40+ pack years) with significant PMH of DM, COPD, HTN, GERD who presents today for follow up of lung nodules and dyspnea. Since last visit she was hospitalized in May/[place] with pneumonia and lung abscess s/p chest tube then right VATS/decortication. She was rehospitalized in June with FTT and now presents for follow up. She feels much better. No cough or dyspnea  Using incruse daily.  Has albuterol but rarely uses/needs.  Not smoking but +vaping     May 2024 previously  Since last visit she was hospitalized in January 2024 with chest pain, found to have NSTEMI and underwent CABG.  She feels better since but still vaping, did quit smoking.   Thinks her breathing and coughing are improved on incruse daily.  Does continue to have daily cough with phlegm.   Thinks she was sick in late April 2024 with sinus infection, resolved post abx.      Nov 2023 previously   She reports having had dyspnea on exertion over the past several years. Did have some improvement after having abdominal surgery, had left ovarian mass (cystadenoma)  removed in 2019. Over the past 1-2 years she thinks her breathing has steadily worsened. Denies needing to stop walking but admits she is not very active, does not exercise  +cough with white/yellow phlegm, no hemoptysis.   Has bronchitis nearly every year in winter time.  She had a f/u LDCT in April 2023 which found new nodules in the CHARI - thinks she was sick around the time of the scan. She had a f/u CT earlier this month, which showed the previous nodules have resolved, but had new large nodules in the right lung leading to her evaluation here.   She admits she was sick with chest congestion and sinus congestion in late October 2023.  Has been vaping, but did smoke.      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 Small airway    Esophageal reflux    Essential hypertension    Don't remember    Fitting and adjustment of dental prosthetic device    High blood pressure    History of 2019 novel coronavirus disease (COVID-19)    No hospitalization,symptoms; Fever,fatigue ,body aches,headache no loss of taste or smell    Hx of motion sickness    Hyperlipidemia    Mass of spine    Migraines    barometric pressure    Myocardial infarction (HCC)    Osteoarthritis    Other ill-defined conditions(849.89)    Jamison's    Pain in joint, forearm    wrist    Pain in joint, pelvic region and thigh    hip    Pelvic mass    Personal history of urinary (tract) infection     Sleep apnea    I don't date    Stroke (HCC)    Mini stroke noted        Past Surgical History:   Past Surgical History:   Procedure Laterality Date    Cabg  23    Colonoscopy      Colonoscopy N/A 2022    Procedure: COLONOSCOPY AND ESOPHAGOGASTRODUODENOSCOPY;  Surgeon: Miguel Camargo MD;  Location:  ENDOSCOPY    Decortication,pulmonary,total Right 2024    VATS decortication by Dr. Maldonado    Heart surgery      double bypass    Laparoscopic      sling operation for stress incontinence    Laparoscopy,diagnostic      Kay biopsy stereo nodule 2 site bilat (cpt=19081/71886) Left 2010    BENIGN 2 SITES    Kay biopsy stereo w/calc 2 site left (cpt=19081/61444)  2010    benign x 2    Kay stereo-clip w/calc 2 site left      Oophorectomy Left 2019    Other  2022    RIGHT SACRAL SUBCUTANEOUS CYST EXCISION    Other surgical history  ,     Bladder Sling, Large mass removed from left abdomen and ovar    Tonsillectomy      Was a child, have no idea    Tubal ligation      Upper gi endoscopy,exam         Family Medical History:   Family History   Problem Relation Age of Onset    Arthritis Mother     Other (AMI) Mother     Other (diabetes mellitus) Mother     Diabetes Mother     Stroke Mother         Many mini strokes        Social History:   Social History     Socioeconomic History    Marital status: Single     Spouse name: Not on file    Number of children: Not on file    Years of education: Not on file    Highest education level: Not on file   Occupational History    Not on file   Tobacco Use    Smoking status: Former     Current packs/day: 0.00     Average packs/day: 1.5 packs/day for 48.0 years (72.0 ttl pk-yrs)     Types: Cigarettes     Start date:      Quit date: 2024     Years since quittin.4    Smokeless tobacco: Never    Tobacco comments:     Smoked 1 pack per day. Now I smoke E- cigarettes   Vaping Use    Vaping status: Every Day    Start date:  1/1/2019    Substances: Nicotine, Flavoring    Devices: Disposable, Pre-filled pod   Substance and Sexual Activity    Alcohol use: Not Currently     Comment: On occasion    Drug use: No    Sexual activity: Not Currently     Partners: Male   Other Topics Concern     Service Not Asked    Blood Transfusions Not Asked    Caffeine Concern Yes    Occupational Exposure Not Asked    Hobby Hazards Not Asked    Sleep Concern Not Asked    Stress Concern Yes    Weight Concern Yes    Special Diet No    Back Care Not Asked    Exercise No    Bike Helmet Not Asked    Seat Belt Yes    Self-Exams Not Asked   Social History Narrative    Not on file     Social Drivers of Health     Financial Resource Strain: Low Risk  (6/25/2024)    Financial Resource Strain     Difficulty of Paying Living Expenses: Not very hard     Med Affordability: No   Food Insecurity: Not on File (9/26/2024)    Received from [facility]    Food Insecurity     Food: 0   Transportation Needs: No Transportation Needs (6/19/2024)    Transportation Needs     Lack of Transportation: No     Car Seat: Not on file   Physical Activity: Not on File (10/7/2022)    [facility] from [facility]    Physical Activity     Physical Activity: 0   Stress: Not on File (10/7/2022)    Received from [facility]    Stress     Stress: 0   Social Connections: Not on File (9/13/2024)    Received from [facility]    Social Connections     Connectedness: 0   Housing Stability: Low Risk  (6/19/2024)    Housing Stability     Housing Instability: No     Housing Instability Emergency: Not on file     Crib or Bassinette: Not on file        Medications:   Current Outpatient Medications   Medication Sig Dispense Refill    INCRUSE ELLIPTA 62.5 MCG/ACT Inhalation Aerosol Powder, Breath Activated       amoxicillin clavulanate 875-125 MG Oral Tab Take 1 tablet by mouth 2 (two) times daily for 7 days. 14 tablet 0    Omeprazole 40 MG Oral Capsule Delayed Release Take 1 capsule (40 mg total) by mouth 2 (two)  times daily.      AZELASTINE 137 MCG/SPRAY Nasal Solution inhale 1 spray by Nasal route 2 (two) times daily. 30 mL 0    FLOWFLEX COVID-19 AG HOME TEST In Vitro Kit 1 Swab by In Vitro route one time for 1 dose. 10 kit 0    nicotine 7 MG/24HR Transdermal Patch 24 Hr Place 1 patch onto the skin daily. 30 patch 2    Lancets (ONETOUCH DELICA PLUS [id number]) Does not apply Misc 1 lancet  by Finger stick route 3 (three) times daily. 300 each 1    METFORMIN 500 MG Oral Tab Take 1 tablet (500 mg total) by mouth 2 (two) times daily. 180 tablet 0    HYDROcodone-acetaminophen 5-325 MG Oral Tab Take 1 tablet by mouth every 6 (six) hours as needed for Pain. 120 tablet 0    diclofenac 75 MG Oral Tab EC Take 1 tablet (75 mg total) by mouth 2 (two) times daily. 180 tablet 3    diclofenac 1 % External Gel Apply 4 g topically 4 (four) times daily. 300 g 1    furosemide 20 MG Oral Tab Take 1 tablet (20 mg total) by mouth daily. 30 tablet 2    GABAPENTIN 600 MG Oral Tab TAKE 1 TABLET BY MOUTH EVERY NIGHT AT BEDTIME 180 tablet 0    Erenumab-aooe (AIMOVIG) 140 MG/ML Subcutaneous Solution Auto-injector Inject 1 mL (140 mg total) into the skin every 30 (thirty) days. 1 mL 2    atorvastatin 80 MG Oral Tab Take 1 tablet (80 mg total) by mouth nightly. 90 tablet 1    Mirabegron ER (MYRBETRIQ) 50 MG Oral Tablet 24 Hr Take 1 tablet (50 mg total) by mouth daily. 90 tablet 3    umeclidinium-vilanterol 62.5-25 MCG/ACT Inhalation Aerosol Powder, Breath Activated Inhale 1 puff into the lungs daily. 1 each 2    Naloxone HCl 4 MG/0.1ML Nasal Liquid 4 mg by Nasal route as needed. If patient remains unresponsive, repeat dose in other nostril 2-5 minutes after first dose. 1 kit 0    PRISTIQ 100 MG Oral Tablet 24 Hr Take 1 tablet (100 mg total) by mouth daily.      Rizatriptan Benzoate 10 MG Oral Tab Take 1 tablet (10 mg total) by mouth as needed for Migraine. 10 tablet 5    Glucose Blood (ONETOUCH ULTRA) In Vitro Strip Tests 3 x daily 300 each 1     Coenzyme Q10 200 MG Oral Cap Take 200 mg by mouth 2 (two) times daily.      lidocaine 5 % External Ointment Apply 1 Application  topically 3 (three) times daily as needed. 240 g 1    metoprolol succinate ER 50 MG Oral Tablet 24 Hr Take 1 tablet (50 mg total) by mouth daily. 90 tablet 0    Blood Glucose Monitoring Suppl (ONETOUCH ULTRA 2) w/Device Does not apply Kit 1 Device by Other route in the morning, at noon, and at bedtime. Use as directed. 1 kit 0    topiramate 50 MG Oral Tab TAKE ONE TABLET BY MOUTH EVERY MORNING AND TWO TABLETS EVERY EVENING (Patient taking differently: Taking one tablet in the morning and one tablet at night.) 270 tablet 2    clopidogrel 75 MG Oral Tab Take 1 tablet (75 mg total) by mouth daily.      aspirin 81 MG Oral Tab EC Take 1 tablet (81 mg total) by mouth daily.      clonazePAM 0.5 MG Oral Tab Take 1 tablet (0.5 mg total) by mouth 2 (two) times daily. 12 tablet 0    Metoclopramide HCl 5 MG/5ML Oral Solution Take 5 mL (5 mg total) by mouth 3 (three) times daily before meals. (Patient taking differently: Take 5 mL (5 mg total) by mouth 3 (three) times daily before meals as needed.) 473 mL 0    Multiple Vitamins-Minerals (ONE-A-DAY WOMENS 50+) Oral Tab Take 1 tablet by mouth daily.  0    QUEtiapine Fumarate  MG Oral Tablet 24 Hr Take 1 tablet (200 mg total) by mouth every evening. 30 tablet 0    Calcium Carb-Cholecalciferol 500-200 MG-UNIT Oral Tab Take 1 tablet by mouth daily.           Review of Systems: Review of Systems   Constitutional: Negative.    HENT:  Positive for congestion, postnasal drip and sore throat.    Respiratory:  Positive for cough and shortness of breath.         Physical Exam:  Pulse 86   Resp 16   Ht 5' 1\" (1.549 m)   Wt 115 lb (52.2 kg)   SpO2 97%   BMI 21.73 kg/m²      Constitutional: alert, cooperative. No acute distress.  HEENT: Head NC/AT.   Cardio: Regular rate and rhythm. Normal S1 and S2. No murmurs.   Respiratory: Thorax symmetrical with no  labored breathing. Wheezing and diminished to ausculation bilaterally with symmetrical breath sounds. No rhonchi, rales, or crackles.   Extremities: No clubbing or cyanosis. No BLE edema.    Neurologic: A&Ox3. No gross motor deficits.  Skin: Warm, dry  Psych: Calm, cooperative. Pleasant affect.    Results:  Personally reviewed    WBC: 10, done on 10/23/2024.  HGB: 10.1, done on 10/23/2024.  PLT: 367, done on 10/23/2024.     Glucose: 117, done on 10/23/2024.  Cr: 0.89, done on 10/23/2024.  Last eGFR was 83 on 10/23/2024.  CA: 9.4, done on 10/23/2024.  Na: 130, done on 10/23/2024.  K: 4.1, done on 10/23/2024.  Cl: 102, done on 10/23/2024.  CO2: 25, done on 10/23/2024.  Last ALB was 4% done on 7/1/2024.     CT CHEST(CONTRAST ONLY) (CPT=71260)    Result Date: 10/23/2024  CONCLUSION:  Interval decrease in size of cavitary masslike lesion in the right lower lobe posteriorly with surrounding infiltrate when compared to 6/18/2024.  Today this measures 3.4 x 2.3 cm collectively previously 4.1 x 3.1 cm.  Continued follow-up recommended.  New nodular opacity in the right lower lobe measuring 1.8 x 0.7 cm.   Scattered pulmonary nodules in the right lung otherwise appear relatively stable ranging in size from 3-6 mm.  Increasing reticular nodular infiltrate in the right middle lobe and right lower lobe laterally. .   LOCATION:  [id number]   Dictated by (CST): Naomi Avilez MD on 10/23/2024 at 3:51 PM     Finalized by (CST): Naomi Avilez MD on 10/23/2024 at 4:01 PM       XR RIBS, UNILATERAL (2 VIEWS), RIGHT (CPT=71100)    Result Date: 10/17/2024  CONCLUSION:  No evidence of a right rib fracture.   LOCATION:  [facility]    Dictated by (CST): Gregory Vernon MD on 10/17/2024 at 7:51 PM     Finalized by (CST): Gregory Vernon MD on 10/17/2024 at 7:52 PM       XR CHEST PA + LAT CHEST (CPT=71046)    Result Date: 10/17/2024  CONCLUSION:   station abnormalities the right middle lobe are noted.  This may represent a subtle  developing consolidation.  This may be residual scarring from prior pneumonia.   LOCATION:  [facility]   Dictated by (CST): Gregory Vernon MD on 10/17/2024 at 7:50 PM     Finalized by (CST): Gregory Vernon MD on 10/17/2024 at 7:51 PM         Assessment/Plan:  #1. Lung nodules  Noted first on LDCT  4/2023 LDCT with multiple nodular opacities in CHARI -- she reports being sick around time of scan  11/2023 CT chest with resolution of previous CHARI nodules but now with new nodules and GGO in RUL and RLL. +thoracic lymphadenopathy  3/2024 CT chest with improvement in multiple nodules, but stable RUL nodules. Stable LAD  4/2024 CT myeloma with new RLL nodule. Other nodules appear stable   6/2024 CT chest with RLL cavitary lesion and LAD  She was hospitalized June 2024 with pneumonia/lung abscess and empyema s/p chest tube then right VATS/decort with improvement  10/2024 follow up CT Chest Interval decrease in size of cavitary masslike lesion in the right lower lobe posteriorly with surrounding infiltrate when compared to 6/18/2024.  Today this measures 3.4 x 2.3 cm collectively previously 4.1 x 3.1 cm.  New nodular opacity in the right lower lobe measuring 1.8 x 0.7 cm.  Scattered pulmonary nodules in the right lung otherwise appear relatively stable ranging in size from 3-6 mm.  Increasing reticular nodular infiltrate in the right middle lobe and right lower lobe laterally.   Recommend follow up CT chest in 6 months      #2 Pneumonia   10/17/2024 saw PCP, chest xray: Peripheral interstitial abnormalities the right lung base may be in the right middle lobe   PCP prescribed Augmentin x 7 days, symptoms persisted, second course of Augmentin prescribed 10/24/2024   Will switch to doxycycline x 7 days  Trial Budesonide nebulizer twice a day for 2 weeks  Follow up chest xray in 4 weeks   Follow up in 4 weeks     #3. emphysema  40+ pack year smoker, active vaping  Previous 2015 PFTs with moderate obstruction, +air trapping and  reduced DLCO  4/2024 PFTs with no obstruction, normal lung volumes. Reduced DLCO  Again advised to stop vaping any substance - she is going to work on quitting  Spent 3 minutes addressing the importance of quitting smoking. Quitting smoking is one of the most important things you can do for your health. The sooner you quit smoking, the greater the benefits. It is never too late to quit smoking. Resources and information provided to the patient.    Continue on incruse daily  Albuterol PRN     #4. Thoracic lymphadenopathy  As above     #5. Tobacco abuse  40+ pack year smoker, active vaping  advised to stop vaping any substance - she will work on quitting  Lung Cancer Counseling and Shared Decision Making Session in an Asymptomatic Smoker/Former Smoker   Soumya King is a 64 year old female without current symptoms of lung cancer.  History   Smoking Status    Former    Types: Cigarettes   Smokeless Tobacco    Never        She received information on the importance of adherence to annual lung cancer Low Dose CT (LDCT) screening, the impact of her comorbidities and her ability or willingness to undergo diagnosis and treatment. she is in agreement and an order will be placed for CT LUNG LD SCREENING(CPT=71271).    We counseled the importance of maintaining cigarette smoking abstinence if she is a former smoker and the importance of smoking cessation if she is a current smoker and we discussed and furnished information about tobacco cessation interventions.     Riana Santiago [facility]  Pulmonary Medicine   10/29/2024

## 2024-10-29 NOTE — PATIENT INSTRUCTIONS
Call central scheduling at [phone] to schedule the CT scan  in 6 months, April 2025  Call central scheduling at [phone] to schedule the Chest xray in 4 weeks before next visit    Call office with any questions or concerns  Call office if develop any new or worsening respiratory symptoms.   Continue to use  Incruse daily and albuterol as needed for shortness of breath  Do not start second round of augmenting, start doxycycline for 7 days  How to Quit Smoking  Smoking is a hard habit to break. About half of all people who have ever smoked have been able to quit. Most people who still smoke want to quit. Here are some of the best ways to stop smoking.    Keep in mind the health benefits of quitting  The health benefits of quitting start right away. They keep improving the longer you go without smoking. Knowing this can help inspire you to stay on track. These benefits occur at any age. If you are 17 or 70, quitting is a good choice. Some of the health benefits after your last cigarette include:  · 20 minutes: Your blood pressure and pulse return to normal.  · 8 hours: Your oxygen levels return to normal.  · 2 days: Your ability to smell and taste start to improve as damaged nerves regrow.  · 2 to 3 weeks: Your circulation and lung function improve.  · 1 to 9 months: Your coughing, congestion, and shortness of breath decrease. Your tiredness decreases.  · 1 year: Your risk of heart attack decreases by half.  · 5 years: Your risk of lung cancer decreases by half. Your risk of stroke becomes the same as a nonsmoker’s.  Go cold turkey  Most former smokers quit cold turkey. This means stopping all at once. Trying to cut back slowly often doesn't work as well. This may be because it continues the habit of smoking. Also, you may inhale more smoke while smoking fewer cigarettes. This leads to the same amount of nicotine in your body.  Get support  Support programs can be a big help, especially for heavy smokers.  These groups offer lectures, ways to change behavior, and peer support. Here are some ways to find a support program:  · Free national quitline 800-QUIT-NOW (329-555-7102)  · Hospital quit-smoking programs  · American Lung Association 900-468-1296  · American Cancer Society 056-769-0278  Support at home is important too. Family and friends can offer praise and reassurance. If the smoker in your life finds it hard to quit, encourage them to keep trying.  Try over-the-counter medicine  Nicotine replacement therapy may make it easier to quit. Some aids are available without a prescription. These include a nicotine patch, gum, and lozenges. But it is best to use these under the care of your healthcare provider. The skin patch gives a steady supply of nicotine. Nicotine gum and lozenges give short-time doses of low levels of nicotine. Both methods reduce the craving for cigarettes. If you have nausea, vomiting, dizziness, weakness, or a fast heartbeat, stop using these products. See your healthcare provider.  Ask about prescription medicine  After reviewing your smoking patterns and past attempts to quit, your doctor may offer a prescription medicine such as bupropion, varenicline, a nicotine inhaler, or nasal spray. Each has advantages and side effects. Your doctor can review these with you.  Keep trying  Most smokers make many attempts at quitting before they are successful. It’s important not to give up.  For more information  For more on how to quit smoking, try these online resources:   · Go to Smokefree.gov.

## 2024-11-06 ENCOUNTER — TELEPHONE (OUTPATIENT)
Dept: INTERNAL MEDICINE CLINIC | Facility: CLINIC | Age: 64
End: 2024-11-06

## 2024-11-06 NOTE — TELEPHONE ENCOUNTER
Patient called. Reports problems with her nebulizer machine. Nebulizer solution is bubbling and has a milky and frothy consistency. Patient advised to not use machine. Offered to send prescription to local pharmacy for new machine. Patient reports unable to afford it. Riana ROSARIO notifed, will attempt to get nebulizer machine covered through [employer] [facility]. Patient reports she last received nebulizer machine from Dr. Anderson's office. Patient stated she called Dr. Vinson's office to see if they have it at the office and if she can obtain one through them. Patient denied having problems using incruse inhaler. Riana ROSARIO's message below reviewed with patient. Patient verbalized understanding.     Inocente Dooley,     It was a pleasure meeting you today.      I made some changes to your care plan  Please obtain a chest xray before your next appointment  Do not take Augmentin, I switched it to doxycycline  Budesonide nebulizer twice a day for 2 weeks  Continue Incruse daily and albuterol as needed      If you have any questions please let me know,      Riana, [facility]      Patient called insurance states was provided with approval code for nebulizer machine . Patient requesting nebulizer machine order to be sent to [facility] in [place] and [facility] in [facility]. Per Riana ROSARIO, may send in prescription to both pharmacies for nebulizer machine.

## 2024-11-06 NOTE — TELEPHONE ENCOUNTER
Patient called the office stating she is experiencing issues with her current nebulizer, she states that the steroids are bubbling and looks cloudy. She states she was informed by her pulmonologist that her current one is in need of replacing based on the issues she is having with the machine. Patient states that she has pneumonia and needs one urgently, she also stated that she was given her current one by Dr Anderson as the office had some in stock at the time. Please call back and advise.

## 2024-11-06 NOTE — TELEPHONE ENCOUNTER
Pt states she has pneumonia. Reports Incruse is giving her trouble, there is a film and it bubbles. States she called on-call over weekend to find out how to use the medication. Sounds like confusion on how to use the medication and device for its administration.

## 2024-11-06 NOTE — TELEPHONE ENCOUNTER
Can see Riana Santiago (Dr. Main's office) sent in 2 scripts for nebulizers.    S/w pt that we do not have any nebulizers in office to give out. To speak with pharmacies to give them that code fpr discount.  If further issues to reach back out to Dr. Main's office. She v/u

## 2024-11-07 ENCOUNTER — TELEPHONE (OUTPATIENT)
Facility: CLINIC | Age: 64
End: 2024-11-07

## 2024-11-07 RX ORDER — PREDNISONE 10 MG/1
TABLET ORAL
Qty: 18 TABLET | Refills: 0 | Status: SHIPPED | OUTPATIENT
Start: 2024-11-07

## 2024-11-07 NOTE — TELEPHONE ENCOUNTER
Patient wondering if she can start a second round of antibiotics because she hasn't been able to use her inhaler.

## 2024-11-07 NOTE — TELEPHONE ENCOUNTER
Pt calling \"urgently\" about the neb device and her meds. Advised the RN was going to call back again soon.

## 2024-11-07 NOTE — TELEPHONE ENCOUNTER
Returned patient's call. [facility] pharmacy will not cover nebulizer machine. Patient was given the information below for prescription to be sent to.    [facility]  [facility]    (Pediatric nebulizer machine)    Phone: [phone]  Fax # [phone]    Patient made aware I will sent over nebulizer prescription to [facility] and notify [facility] representative to help expedite the order.

## 2024-11-07 NOTE — TELEPHONE ENCOUNTER
Per Riana ROSARIO,    I prescribed antibiotics on 10/29, its her second round. She can also use her inhalers. I will send a short course of steroids. Also recommend her to quit vaping.     Patient called and made aware of the above. Verbalized understanding.

## 2024-11-08 ENCOUNTER — TELEPHONE (OUTPATIENT)
Facility: CLINIC | Age: 64
End: 2024-11-08

## 2024-11-08 NOTE — TELEPHONE ENCOUNTER
Call placed to pt.  Pt works at [employer].  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  [employer] only allows clear containers and she cannot drink warm water.  She needs to drink ice water out of a metal container that will keep the water cold. Please advise.

## 2024-11-08 NOTE — TELEPHONE ENCOUNTER
Pt does not want nebulizer machine to go to [facility].  Her insurance will not cover it at [facility].  She is requesting that it go to [facility] at fax # [phone].  Order faxed as requested.  She is requesting pediatric size.

## 2024-11-08 NOTE — TELEPHONE ENCOUNTER
Message sent to Nish to call the office.  Another call made to [facility] office staff and message left on Voicemail.  Message left on pt's voicemail.

## 2024-11-08 NOTE — TELEPHONE ENCOUNTER
Returning call from RN.     Patient needs to drink ice cold water during her shift in a metal container. At her work she is supposed to have a clear container. Can the  Write a note stating she can use a metal container for the water to stay cold?

## 2024-11-12 ENCOUNTER — OFFICE VISIT (OUTPATIENT)
Dept: HEMATOLOGY/ONCOLOGY | Facility: HOSPITAL | Age: 64
End: 2024-11-12
Attending: INTERNAL MEDICINE
Payer: MEDICAID

## 2024-11-12 ENCOUNTER — OFFICE VISIT (OUTPATIENT)
Dept: RHEUMATOLOGY | Facility: CLINIC | Age: 64
End: 2024-11-12
Payer: MEDICAID

## 2024-11-12 VITALS
RESPIRATION RATE: 22 BRPM | TEMPERATURE: 98 F | HEART RATE: 102 BPM | OXYGEN SATURATION: 100 % | SYSTOLIC BLOOD PRESSURE: 124 MMHG | WEIGHT: 115 LBS | DIASTOLIC BLOOD PRESSURE: 80 MMHG | BODY MASS INDEX: 22 KG/M2

## 2024-11-12 VITALS
RESPIRATION RATE: 16 BRPM | WEIGHT: 118.19 LBS | DIASTOLIC BLOOD PRESSURE: 85 MMHG | BODY MASS INDEX: 22 KG/M2 | TEMPERATURE: 97 F | OXYGEN SATURATION: 99 % | HEART RATE: 87 BPM | SYSTOLIC BLOOD PRESSURE: 156 MMHG

## 2024-11-12 DIAGNOSIS — K21.9 GASTROESOPHAGEAL REFLUX DISEASE, UNSPECIFIED WHETHER ESOPHAGITIS PRESENT: ICD-10-CM

## 2024-11-12 DIAGNOSIS — G89.29 CHRONIC MIDLINE LOW BACK PAIN WITH RIGHT-SIDED SCIATICA: ICD-10-CM

## 2024-11-12 DIAGNOSIS — M18.11 PRIMARY OSTEOARTHRITIS OF FIRST CARPOMETACARPAL JOINT OF RIGHT HAND: ICD-10-CM

## 2024-11-12 DIAGNOSIS — M54.41 CHRONIC MIDLINE LOW BACK PAIN WITH RIGHT-SIDED SCIATICA: ICD-10-CM

## 2024-11-12 DIAGNOSIS — Z95.1 S/P CABG X 2: ICD-10-CM

## 2024-11-12 DIAGNOSIS — M17.12 PRIMARY OSTEOARTHRITIS OF LEFT KNEE: Primary | ICD-10-CM

## 2024-11-12 DIAGNOSIS — D47.2 MGUS (MONOCLONAL GAMMOPATHY OF UNKNOWN SIGNIFICANCE): Primary | ICD-10-CM

## 2024-11-12 DIAGNOSIS — D50.8 IRON DEFICIENCY ANEMIA SECONDARY TO INADEQUATE DIETARY IRON INTAKE: ICD-10-CM

## 2024-11-12 LAB
DEPRECATED HBV CORE AB SER IA-ACNC: 142 NG/ML
IGA SERPL-MCNC: 327.7 MG/DL (ref 40–350)
IGM SERPL-MCNC: 47.3 MG/DL (ref 50–300)
IMMUNOGLOBULIN PNL SER-MCNC: 1042 MG/DL (ref 650–1600)
IRON SATN MFR SERPL: 30 %
IRON SERPL-MCNC: 81 UG/DL
TOTAL IRON BINDING CAPACITY: 270 UG/DL (ref 250–425)
TRANSFERRIN SERPL-MCNC: 211 MG/DL (ref 250–380)

## 2024-11-12 PROCEDURE — 20610 DRAIN/INJ JOINT/BURSA W/O US: CPT | Performed by: INTERNAL MEDICINE

## 2024-11-12 PROCEDURE — 99214 OFFICE O/P EST MOD 30 MIN: CPT | Performed by: INTERNAL MEDICINE

## 2024-11-12 PROCEDURE — 20600 DRAIN/INJ JOINT/BURSA W/O US: CPT | Performed by: INTERNAL MEDICINE

## 2024-11-12 RX ORDER — HYDROCODONE BITARTRATE AND ACETAMINOPHEN 5; 325 MG/1; MG/1
1 TABLET ORAL EVERY 6 HOURS PRN
Qty: 120 TABLET | Refills: 0 | Status: CANCELLED | OUTPATIENT
Start: 2024-11-12

## 2024-11-12 RX ORDER — DICLOFENAC SODIUM 10 MG/G
4 GEL TOPICAL 4 TIMES DAILY
Qty: 300 G | Refills: 1 | Status: SHIPPED | OUTPATIENT
Start: 2024-11-12

## 2024-11-12 RX ORDER — GABAPENTIN 600 MG/1
600 TABLET ORAL NIGHTLY
Qty: 90 TABLET | Refills: 1 | Status: SHIPPED | OUTPATIENT
Start: 2024-11-12

## 2024-11-12 RX ORDER — METHYLPREDNISOLONE ACETATE 40 MG/ML
10 INJECTION, SUSPENSION INTRA-ARTICULAR; INTRALESIONAL; INTRAMUSCULAR; SOFT TISSUE ONCE
Status: COMPLETED | OUTPATIENT
Start: 2024-11-12 | End: 2024-11-12

## 2024-11-12 RX ORDER — DICLOFENAC SODIUM 75 MG/1
75 TABLET, DELAYED RELEASE ORAL 2 TIMES DAILY
Qty: 180 TABLET | Refills: 1 | Status: SHIPPED | OUTPATIENT
Start: 2024-11-12

## 2024-11-12 RX ORDER — HYDROCODONE BITARTRATE AND ACETAMINOPHEN 10; 325 MG/1; MG/1
1 TABLET ORAL EVERY 6 HOURS PRN
Qty: 120 TABLET | Refills: 0 | Status: SHIPPED | OUTPATIENT
Start: 2024-11-12

## 2024-11-12 RX ORDER — METHYLPREDNISOLONE ACETATE 40 MG/ML
40 INJECTION, SUSPENSION INTRA-ARTICULAR; INTRALESIONAL; INTRAMUSCULAR; SOFT TISSUE ONCE
Status: COMPLETED | OUTPATIENT
Start: 2024-11-12 | End: 2024-11-12

## 2024-11-12 RX ADMIN — METHYLPREDNISOLONE ACETATE 40 MG: 40 INJECTION, SUSPENSION INTRA-ARTICULAR; INTRALESIONAL; INTRAMUSCULAR; SOFT TISSUE at 12:19:00

## 2024-11-12 RX ADMIN — METHYLPREDNISOLONE ACETATE 10 MG: 40 INJECTION, SUSPENSION INTRA-ARTICULAR; INTRALESIONAL; INTRAMUSCULAR; SOFT TISSUE at 12:21:00

## 2024-11-12 NOTE — PROGRESS NOTES
Hematology/Oncology Clinic Follow Up Visit    Patient Name: Soumya King  Medical Record Number: [id number]    YOB: 1960   PCP: Rika Vinson DO    Reason for Consultation:  Soumya King was seen today for the diagnosis of MGUS, iron deficiency anemia    History of Present Illness:      64 y/o F PMH migraines, CAD s/p 2vCABG 12/2023, iron deficiency anemia, MGUS who presents for follow up.    - recent pneumonia, denies shortness of breath, feeling better since she's been started on treatment  - hgb remains stable in 10s  - osteoarthritis has been an issue, she got a steroid shot from rheum in her R wrist and L knee  - last EGD/colonoscopy was done 5/2022, no evidence of GI bleeding   - denies blood in urine/stool    Past Medical History:  Past Medical History:    Abdominal pain, other specified site    groin    Acute bronchitis    Allergic rhinitis    Anemia     noted he saw since 2015    Anxiety    Arthritis    Back problem    Jamison esophagus    Chronic low back pain without sciatica    COPD (chronic obstructive pulmonary disease) (HCC)    Depression    Diabetes (HCC)    Difficult intubation    difficult to intubate with bladder surgery,instructed by anesthesia to communicate to future anesthesiologist. Small airway    Esophageal reflux    Essential hypertension    Don't remember    Fitting and adjustment of dental prosthetic device    High blood pressure    History of 2019 novel coronavirus disease (COVID-19)    No hospitalization,symptoms; Fever,fatigue ,body aches,headache no loss of taste or smell    Hx of motion sickness    Hyperlipidemia    Mass of spine    Migraines    barometric pressure    Myocardial infarction (HCC)    Osteoarthritis    Other ill-defined conditions(709.89)    Jamison's    Pain in joint, forearm    wrist    Pain in joint, pelvic region and thigh    hip    Pelvic mass    Personal history of urinary (tract) infection    Sleep apnea    I don't date    Stroke  (HCC)    Mini stroke noted     Past Surgical History:   Procedure Laterality Date    Cabg  12/27/23    Colonoscopy      Colonoscopy N/A 05/18/2022    Procedure: COLONOSCOPY AND ESOPHAGOGASTRODUODENOSCOPY;  Surgeon: Miguel Camargo MD;  Location:  ENDOSCOPY    Decortication,pulmonary,total Right 06/05/2024    VATS decortication by Dr. Maldonado    Heart surgery  2024    double bypass    Laparoscopic  2007    sling operation for stress incontinence    Laparoscopy,diagnostic      Kay biopsy stereo nodule 2 site bilat (cpt=19081/85488) Left 02/2010    BENIGN 2 SITES    Kay biopsy stereo w/calc 2 site left (cpt=19081/11788)  02/2010    benign x 2    Kay stereo-clip w/calc 2 site left  2010    Oophorectomy Left 06/28/2019    Other  08/31/2022    RIGHT SACRAL SUBCUTANEOUS CYST EXCISION    Other surgical history  2000, 2019    Bladder Sling, Large mass removed from left abdomen and ovar    Tonsillectomy      Was a child, have no idea    Tubal ligation      Upper gi endoscopy,exam         Home Medications:   diclofenac 75 MG Oral Tab EC Take 1 tablet (75 mg total) by mouth 2 (two) times daily. 180 tablet 1    Respiratory Therapy Supplies (FULL KIT NEBULIZER SET) Does not apply Misc       gabapentin 600 MG Oral Tab Take 1 tablet (600 mg total) by mouth nightly. 90 tablet 1    HYDROcodone-acetaminophen (NORCO)  MG Oral Tab Take 1 tablet by mouth every 6 (six) hours as needed. 120 tablet 0    VOLTAREN 1 % External Gel Apply 4 g topically 4 (four) times daily. 300 g 1    predniSONE 10 MG Oral Tab take 3 tablets daily for 3 days then 2 tablets daily for 3 days then 1 tablets daily for 3 days then stop. 18 tablet 0    Respiratory Therapy Supplies (FULL KIT NEBULIZER SET) Does not apply Misc 1 kit As Directed. 1 each 0    Respiratory Therapy Supplies (FULL KIT NEBULIZER SET) Does not apply Misc 1 kit As Directed. 1 each 0    budesonide 0.25 MG/2ML Inhalation Suspension Take 2 mL (0.25 mg total) by nebulization 2 (two)  times daily. 60 mL 0    INCRUSE ELLIPTA 62.5 MCG/ACT Inhalation Aerosol Powder, Breath Activated Inhale 1 puff into the lungs daily. 30 each 11    doxycycline 100 MG Oral Cap Take 1 capsule (100 mg total) by mouth 2 (two) times daily. 14 capsule 0    Omeprazole 40 MG Oral Capsule Delayed Release Take 1 capsule (40 mg total) by mouth 2 (two) times daily.      AZELASTINE 137 MCG/SPRAY Nasal Solution inhale 1 spray by Nasal route 2 (two) times daily. 30 mL 0    FLOWFLEX COVID-19 AG HOME TEST In Vitro Kit 1 Swab by In Vitro route one time for 1 dose. 10 kit 0    nicotine 7 MG/24HR Transdermal Patch 24 Hr Place 1 patch onto the skin daily. 30 patch 2    Lancets (ONETOUCH DELICA PLUS [id number]) Does not apply Misc 1 lancet  by Finger stick route 3 (three) times daily. 300 each 1    METFORMIN 500 MG Oral Tab Take 1 tablet (500 mg total) by mouth 2 (two) times daily. 180 tablet 0    diclofenac 1 % External Gel Apply 4 g topically 4 (four) times daily. 300 g 1    furosemide 20 MG Oral Tab Take 1 tablet (20 mg total) by mouth daily. 30 tablet 2    Erenumab-aooe (AIMOVIG) 140 MG/ML Subcutaneous Solution Auto-injector Inject 1 mL (140 mg total) into the skin every 30 (thirty) days. 1 mL 2    atorvastatin 80 MG Oral Tab Take 1 tablet (80 mg total) by mouth nightly. 90 tablet 1    Mirabegron ER (MYRBETRIQ) 50 MG Oral Tablet 24 Hr Take 1 tablet (50 mg total) by mouth daily. 90 tablet 3    Naloxone HCl 4 MG/0.1ML Nasal Liquid 4 mg by Nasal route as needed. If patient remains unresponsive, repeat dose in other nostril 2-5 minutes after first dose. 1 kit 0    PRISTIQ 100 MG Oral Tablet 24 Hr Take 1 tablet (100 mg total) by mouth daily.      Rizatriptan Benzoate 10 MG Oral Tab Take 1 tablet (10 mg total) by mouth as needed for Migraine. 10 tablet 5    Glucose Blood (ONETOUCH ULTRA) In Vitro Strip Tests 3 x daily 300 each 1    Coenzyme Q10 200 MG Oral Cap Take 200 mg by mouth 2 (two) times daily.      lidocaine 5 % External Ointment  Apply 1 Application  topically 3 (three) times daily as needed. 240 g 1    metoprolol succinate ER 50 MG Oral Tablet 24 Hr Take 1 tablet (50 mg total) by mouth daily. 90 tablet 0    Blood Glucose Monitoring Suppl (ONETOUCH ULTRA 2) w/Device Does not apply Kit 1 Device by Other route in the morning, at noon, and at bedtime. Use as directed. 1 kit 0    topiramate 50 MG Oral Tab TAKE ONE TABLET BY MOUTH EVERY MORNING AND TWO TABLETS EVERY EVENING 270 tablet 2    clopidogrel 75 MG Oral Tab Take 1 tablet (75 mg total) by mouth daily.      aspirin 81 MG Oral Tab EC Take 1 tablet (81 mg total) by mouth daily.      clonazePAM 0.5 MG Oral Tab Take 1 tablet (0.5 mg total) by mouth 2 (two) times daily. 12 tablet 0    Metoclopramide HCl 5 MG/5ML Oral Solution Take 5 mL (5 mg total) by mouth 3 (three) times daily before meals. 473 mL 0    Multiple Vitamins-Minerals (ONE-A-DAY WOMENS 50+) Oral Tab Take 1 tablet by mouth daily.  0    QUEtiapine Fumarate  MG Oral Tablet 24 Hr Take 1 tablet (200 mg total) by mouth every evening. 30 tablet 0    Calcium Carb-Cholecalciferol 500-200 MG-UNIT Oral Tab Take 1 tablet by mouth daily.          [COMPLETED] methylPREDNISolone acetate (DEPO-medrol) 40 MG/ML injection 40 mg  40 mg Intra-articular Once    40 mg at 24 1219    [COMPLETED] methylPREDNISolone acetate (DEPO-medrol) 40 MG/ML injection 10 mg  10 mg Intra-articular Once    10 mg at 24 1221       Allergies:   Allergies[1]    Psychosocial History:  Social History     Socioeconomic History    Marital status: Single     Spouse name: Not on file    Number of children: Not on file    Years of education: Not on file    Highest education level: Not on file   Occupational History    Not on file   Tobacco Use    Smoking status: Former     Current packs/day: 0.00     Average packs/day: 1.5 packs/day for 48.0 years (72.0 ttl pk-yrs)     Types: Cigarettes     Start date:      Quit date: 2024     Years since quittin.4     Smokeless tobacco: Never    Tobacco comments:     Smoked 1 pack per day. Now I smoke E- cigarettes   Vaping Use    Vaping status: Every Day    Start date: 1/1/2019    Substances: Nicotine, Flavoring    Devices: Disposable, Pre-filled pod   Substance and Sexual Activity    Alcohol use: Not Currently     Comment: On occasion    Drug use: No    Sexual activity: Not Currently     Partners: Male   Other Topics Concern     Service Not Asked    Blood Transfusions Not Asked    Caffeine Concern Yes    Occupational Exposure Not Asked    Hobby Hazards Not Asked    Sleep Concern Not Asked    Stress Concern Yes    Weight Concern Yes    Special Diet No    Back Care Not Asked    Exercise No    Bike Helmet Not Asked    Seat Belt Yes    Self-Exams Not Asked   Social History Narrative    Not on file     Social Drivers of Health     Financial Resource Strain: Low Risk  (6/25/2024)    Financial Resource Strain     Difficulty of Paying Living Expenses: Not very hard     Med Affordability: No   Food Insecurity: Not on File (9/26/2024)    Received from [employer]    Food Insecurity     Food: 0   Transportation Needs: No Transportation Needs (6/19/2024)    Transportation Needs     Lack of Transportation: No     Car Seat: Not on file   Physical Activity: Not on File (10/7/2022)    Received from [facility]    Physical Activity     Physical Activity: 0   Stress: Not on File (10/7/2022)    Received from [facility]    Stress     Stress: 0   Social Connections: Not on File (9/13/2024)    Received from [employer]    Social Connections     Connectedness: 0   Housing Stability: Low Risk  (6/19/2024)    Housing Stability     Housing Instability: No     Housing Instability Emergency: Not on file     Crib or Bassinette: Not on file       Family Medical History:  Family History   Problem Relation Age of Onset    Arthritis Mother     Other (AMI) Mother     Other (diabetes mellitus) Mother     Diabetes Mother     Stroke Mother         Many mini strokes        Review of Systems:  A 10-point ROS was done with pertinent positives and negative per the HPI    Vital Signs:  Height: --  Weight: 53.6 kg (118 lb 3.2 oz) (11/12 1414)  BSA (Calculated - sq m): --  Pulse: 87 (11/12 1414)  BP: 156/85 (11/12 1414)  Temp: 97.2 °F (36.2 °C) (11/12 1414)  Do Not Use - Resp Rate: --  SpO2: 99 % (11/12 1414)    Wt Readings from Last 6 Encounters:   11/12/24 53.6 kg (118 lb 3.2 oz)   11/12/24 52.2 kg (115 lb)   10/29/24 52.2 kg (115 lb)   10/16/24 51.7 kg (114 lb)   08/07/24 50.8 kg (112 lb)   07/23/24 51.3 kg (113 lb)     Physical Examination:  General: Patient is alert and oriented, not in acute distress  Psych: Mood and affect are appropriate  Eyes: EOMI, PERRL  ENT: Oropharynx is clear, no adenopathy  CV: nl S1/S2, no LE edema  Respiratory: CTAB, non-labored respirations  GI/Abd: Soft, non-tender   Neurological: Grossly intact   Lymphatics: No palpable cervical, supraclavicular, axillary, or inguinal lymphadenopathy  Skin: no rashes or petechiae    Laboratory:  Lab Results   Component Value Date    WBC 10.0 10/23/2024    WBC 8.9 07/01/2024    WBC 10.2 06/21/2024    HGB 10.1 (L) 10/23/2024    HGB 11.1 (L) 07/01/2024    HGB 10.6 (L) 06/21/2024    HCT 29.4 (L) 10/23/2024    MCV 91.3 10/23/2024    MCH 31.4 10/23/2024    MCHC 34.4 10/23/2024    RDW 13.2 10/23/2024    .0 10/23/2024    .0 07/01/2024    .0 (H) 06/21/2024     Lab Results   Component Value Date     (H) 10/23/2024    BUN 18 10/23/2024    BUNCREA 26.5 (H) 07/01/2024    CREATSERUM 0.89 10/23/2024    CREATSERUM 0.83 07/01/2024    CREATSERUM 0.46 (L) 06/21/2024    ANIONGAP 3 10/23/2024    GFR 99 09/16/2017    GFRNAA 81 05/25/2022    GFRAA 93 05/25/2022    CA 9.4 10/23/2024    OSMOCALC 273 (L) 10/23/2024    ALKPHO 94 07/01/2024    AST 23 10/23/2024    ALT 25 10/23/2024    BILT 0.2 07/01/2024    TP 8.3 (H) 07/01/2024    ALB 4.0 07/01/2024    GLOBULIN 4.3 (H) 07/01/2024    AGRATIO 1.5 01/23/2006    NA  130 (L) 10/23/2024    K 4.1 10/23/2024     10/23/2024    CO2 25.0 10/23/2024     Lab Results   Component Value Date    PTT 48.5 (H) 05/26/2024    INR 1.38 (H) 06/03/2024         Impression & Plan:     Anemia  - her hgb remains stable. Her iron levels are normal today on recheck  - not quite clear what her anemia is due to. May be bone marrow suppression from ongoing illness with pneumonia and ongoing inflammation from osteoarthritis, but given stable hemoglobin, can monitor for now.    IgM kappa MGUS  - prior labs were low level  - recheck monoclonal protein studies today. If stable, continue annual surveillance    F/u: 1 year    Scott Corona  Hematology/Medical Oncology  [facility]             [1]   Allergies  Allergen Reactions    Naprosyn [Naproxen] ANAPHYLAXIS     Has tolerated ibuprofen and IV toradol     Aspirin OTHER (SEE COMMENTS)     Difficulty swallowing due to  saavedra's esophagus

## 2024-11-12 NOTE — PROCEDURES
20610  Left Knee injection  Left Knee Osteoarthritis  20600  Right thumb CMC joint injection  Right thumb cmc osteoarthritis.  After obtaining consent, a timeout was done, areas to inject 200 identified.  Left knee medially, and the right thumb at the CMC joint were identified, they were cleaned with Hibiclens and alcohol wipes.  Then the right thumb at the CMC joint was injected with 10 mg of methylprednisolone.  Next the left knee was injected medially with 40 mg of methylprednisolone and 1 cc 1% lidocaine.  Patient tolerated the 2 injections without incident.

## 2024-11-12 NOTE — PROGRESS NOTES
Education Record    Learner:  Patient    Disease / Diagnosis: Iron deficiency    Barriers / Limitations:  None   Comments:    Method:  Discussion   Comments:    General Topics:  Medication and Side effects and symptom management   Comments:    Outcome:  Shows understanding   Comments:    Here for follow up. Currently battling pneumonia. Feeling short of breath and low energy. Taking antibiotics.

## 2024-11-12 NOTE — PATIENT INSTRUCTIONS
Today 2 joints were injected.  The right thumb CMC joint was injected with 10 mg of methylprednisolone.  The left knee was injected with cortisone-40 mg of methylprednisolone and 1 cc of 1% lidocaine.  Go home and rest today.  Ice the left knee if needed.  Diclofenac 75 mg twice a day use for arthritis pain.  Also apply Voltaren gel 3 times a day to your sore knees.  Gabapentin 600 mg at night to help block nerve pain.  Norco 5 mg used periodically for pain relief.  Return to office for recheck 3 months.

## 2024-11-12 NOTE — PROGRESS NOTES
[facility]  Dr. Torres Progress Note     Subjective:   Soumya King is a(n) 64 year old female.   Current complaints:   Chief Complaint   Patient presents with    Follow - Up     3 month f/u. Pt states pain has gotten worse in middle and lower back since LOV.    History of osteoarthritis of the left knee and osteoarthritis of the right thumb.  Has had bypass heart surgery December 2023.  History of diabetes.  Cortisone injection of left knee last visit August 2024.  Taking diclofenac 75 mg twice a day.  Has Norco for pain relief 5 mg if needed.  Gabapentin 600 mg at night.  Cyclobenzaprine for muscle spasm 10 mg.  Also had a injection in the right thumb CMC joint with cortisone last visit.   Having back pain.  Has knee pain.  Recovering from pneumonia.  Followed by Dr. Main of pulmonary.    Now off antibiotics, but on Prednisone 2 tablets per day, then switching to one per day tomorrow.  Breathing is ok.  Has a new chest xray to be done.  Waiting on Nebulizer equipment and solution.   Hurting in the left knee and right thumb joint.  Requesting injections of the left knee and right thumb cmc joint.   Back is killing her too..    Objective:   /80   Pulse 102   Temp 98.2 °F (36.8 °C)   Resp 22   Wt 115 lb (52.2 kg)   SpO2 100%   BMI 21.73 kg/m²   Lungs      Heart nsr    Right thumb cmc joint tender small spurs  Left knee tender without effusion  Lumbar spine tender  10/23/24 CT Chest - interval decrease in size of cavitary masslike lesion in the right lower lobe posteriorly with surrounding infiltrate when compared to study of 6/18/2024.  Today this area measures 3.4 x 2.3 cm, previously it was 4.1 x 3.1 cm.  Follow-up recommended.  10/23/24  CBC white count 10.0 hemoglobin 10.1 hematocrit 29.4 platelet count 367,000.  TSH 2.42.  C3.  BUN 18 creatinine 0.89.  AST 23 ALT 25 glucose 117.  Hemoglobin A1c 6.6 sodium 1/4/1930 potassium 4.1 chloride 102.  Assessment:     Encounter Diagnoses   Name  Primary?    Primary osteoarthritis of left knee Yes    Primary osteoarthritis of first carpometacarpal joint of right hand     Chronic midline low back pain with right-sided sciatica     S/P CABG x 2     Gastroesophageal reflux disease, unspecified whether esophagitis present      Plan:     Patient Instructions   Today 2 joints were injected.  The right thumb CMC joint was injected with 10 mg of methylprednisolone.  The left knee was injected with cortisone-40 mg of methylprednisolone and 1 cc of 1% lidocaine.  Go home and rest today.  Ice the left knee if needed.  Diclofenac 75 mg twice a day use for arthritis pain.  Also apply Voltaren gel 3 times a day to your sore knees.  Gabapentin 600 mg at night to help block nerve pain.  Norco 5 mg used periodically for pain relief.  Return to office for recheck 3 months.          Xiang Torres MD 11/12/2024 11:52 AM

## 2024-11-13 RX ORDER — AZELASTINE HYDROCHLORIDE 137 UG/1
1 SPRAY, METERED NASAL 2 TIMES DAILY
Qty: 30 ML | Refills: 5 | Status: SHIPPED | OUTPATIENT
Start: 2024-11-13

## 2024-11-13 RX ORDER — ALBUTEROL SULFATE 90 UG/1
2 INHALANT RESPIRATORY (INHALATION) EVERY 6 HOURS PRN
Qty: 1 EACH | Refills: 5 | Status: SHIPPED | OUTPATIENT
Start: 2024-11-13

## 2024-11-13 NOTE — TELEPHONE ENCOUNTER
Pt states that in the past she was using Anoro but has Incruse on hand and wants to take Incruse.  Advised pt that per office visit notes Of Riana PENA, pt to use Incruse daily and albuterol as needed. Pt needs refill of albuterol and Azelastine.  Albuterol refilled.  Prescription for Azelastine pended for Riana's approval.

## 2024-11-14 ENCOUNTER — PATIENT MESSAGE (OUTPATIENT)
Age: 64
End: 2024-11-14

## 2024-11-14 LAB
ALBUMIN SERPL ELPH-MCNC: 3.88 G/DL (ref 3.75–5.21)
ALBUMIN/GLOB SERPL: 1.21 {RATIO} (ref 1–2)
ALPHA1 GLOB SERPL ELPH-MCNC: 0.35 G/DL (ref 0.19–0.46)
ALPHA2 GLOB SERPL ELPH-MCNC: 0.97 G/DL (ref 0.48–1.05)
B-GLOBULIN SERPL ELPH-MCNC: 0.95 G/DL (ref 0.68–1.23)
GAMMA GLOB SERPL ELPH-MCNC: 0.95 G/DL (ref 0.62–1.7)
KAPPA LC FREE SER-MCNC: 2.94 MG/DL (ref 0.33–1.94)
KAPPA LC FREE/LAMBDA FREE SER NEPH: 1.5 {RATIO} (ref 0.26–1.65)
LAMBDA LC FREE SERPL-MCNC: 1.95 MG/DL (ref 0.57–2.63)
PROT SERPL-MCNC: 7.1 G/DL (ref 5.7–8.2)

## 2024-11-15 ENCOUNTER — TELEPHONE (OUTPATIENT)
Facility: CLINIC | Age: 64
End: 2024-11-15

## 2024-11-15 NOTE — TELEPHONE ENCOUNTER
Pt called to state that neb order was not received by [facility].  Needs wet signature.  Nebulizer order physically signed by Riana PENA and faxed to both [phone] and [phone].  Pt notified.  Fax failed to [phone] but was successful to [phone]

## 2024-11-18 ENCOUNTER — TELEPHONE (OUTPATIENT)
Facility: CLINIC | Age: 64
End: 2024-11-18

## 2024-11-18 NOTE — TELEPHONE ENCOUNTER
Pt has not been able to start the budesonide due to delay in getting nebulizer.  Pt has cancelled CXR and is going to push it out a few weeks so that she has time to be on the budesonide.  She will reschedule her CXR and call back to reschedule her office visit with Riana PENA

## 2024-11-20 ENCOUNTER — PATIENT MESSAGE (OUTPATIENT)
Dept: INTERNAL MEDICINE CLINIC | Facility: CLINIC | Age: 64
End: 2024-11-20

## 2024-11-20 DIAGNOSIS — K21.9 GASTROESOPHAGEAL REFLUX DISEASE WITHOUT ESOPHAGITIS: ICD-10-CM

## 2024-11-20 DIAGNOSIS — E11.65 UNCONTROLLED TYPE 2 DIABETES MELLITUS WITH HYPERGLYCEMIA, WITHOUT LONG-TERM CURRENT USE OF INSULIN (HCC): ICD-10-CM

## 2024-11-20 RX ORDER — OMEPRAZOLE 40 MG/1
40 CAPSULE, DELAYED RELEASE ORAL 2 TIMES DAILY
Qty: 60 CAPSULE | Refills: 0 | Status: SHIPPED | OUTPATIENT
Start: 2024-11-20

## 2024-11-20 NOTE — TELEPHONE ENCOUNTER
Protocol FAIL    Requesting: METFORMIN 500 MG Oral Tab     LOV: 10/16/24  RTC: 6 MONTHS  Filled: 8/20/24 180 TABLET 0 REFILL  Recent Labs: 10/23/24    Upcoming OV   Future Appointments   Date Time Provider Department Center   11/26/2024  1:00 PM Riana Santiago APRN [id number]   12/2/2024  4:10 PM Brynn Joy MD EN[facility]   1/6/2025 12:20 PM Amanda Odonnell PA [facility] None   1/6/2025  3:30 PM Brandon Martinez DPM [id number]   1/27/2025 12:30 PM John Pacheco MD [facility] Pulm [facility]   2/12/2025 12:20 PM Xiang Torres MD [id number] [place]   3/14/2025 11:45 AM Scott Corona MD  [facility]

## 2024-11-22 ENCOUNTER — TELEPHONE (OUTPATIENT)
Facility: CLINIC | Age: 64
End: 2024-11-22

## 2024-11-22 RX ORDER — OMEPRAZOLE 40 MG/1
40 CAPSULE, DELAYED RELEASE ORAL 2 TIMES DAILY
Qty: 60 CAPSULE | Refills: 0
Start: 2024-11-22

## 2024-11-22 NOTE — TELEPHONE ENCOUNTER
2024, patient called in office, confirmed name and  - she canceled her appt on 2024 due to just starting she just started her treatment and wants to know if she can go back to her antibiotic since she feels like she is starting her treatment all over again.

## 2024-11-25 ENCOUNTER — TELEPHONE (OUTPATIENT)
Facility: CLINIC | Age: 64
End: 2024-11-25

## 2024-11-25 NOTE — TELEPHONE ENCOUNTER
Returned call to Soumya. Soumya stated she was confused on her treatment plan. She thought that her not being able to obtain her nebulizer in a timely manor that delayed her treatment of pneumonia. I told her that the neb was symptoms relief not infection control.   She said she took both antibiotics, Augmentin prescribed by her PCP and doxycycline prescribed my me. She did not stop the Augmentin like I instructed her too. She stated she completed her prednisone and all antibiotics at this time. She still using Incruse daily, albuterol 3-4 times a day for SOB. States she wears 2 mask while out and at work, which causes ehr not to be able to breath. I told her she should only wear one mask at a time, since this is causing her difficulty breathing.  Ongoing plan, Patient still reports SOB, cough- yellow phlegm, and chest pain from all the coughing. obtain Cxr, make appointment to see me in the office, continue with Incruse and albuterol inhaler, use nebulizer for symptom relief. Patient stated an understanding of new plan.

## 2024-11-25 NOTE — TELEPHONE ENCOUNTER
Pt called again about message left on Friday which the pulmonary triage nurses did not receive in the pulmonary pool.  Pt had requested to speak with marcelo who was not in the office at the time of her call.  Pt complaining of coughing up tan colored mucus and is short of breath and wheezing.  She denies headaches and reports body aches.  Due to difficulty in receiving nebulizer machine, she has only had 3 doses of budesonide.  She feels like her symptoms of pneumonia are recurring and is requesting antibiotics.  She cancelled CXR and office visit with Riana PENA due to not getting started on budesonide and did not think it was a good idea to come in because she was not able to get started on budesonide.  Pt advised to do covid test and reports back it was negative.  When it was suggested that we reinstate her appointment for tomorrow, pt declined.  She already has other plans for tomorrow.  Please advise.

## 2024-11-25 NOTE — TELEPHONE ENCOUNTER
Per Riana PENA,   Called and spoke to patient. Will obtain cxr and see me in the office next week   Closing encounter.

## 2024-11-29 ENCOUNTER — PATIENT MESSAGE (OUTPATIENT)
Dept: RHEUMATOLOGY | Facility: CLINIC | Age: 64
End: 2024-11-29

## 2024-12-09 ENCOUNTER — HOSPITAL ENCOUNTER (OUTPATIENT)
Dept: GENERAL RADIOLOGY | Age: 64
Discharge: HOME | End: 2024-12-09
Payer: MEDICAID

## 2024-12-09 DIAGNOSIS — J18.9 PNEUMONIA OF RIGHT LUNG DUE TO INFECTIOUS ORGANISM, UNSPECIFIED PART OF LUNG: ICD-10-CM

## 2024-12-09 PROCEDURE — 71046 X-RAY EXAM CHEST 2 VIEWS: CPT

## 2024-12-11 ENCOUNTER — PATIENT MESSAGE (OUTPATIENT)
Age: 64
End: 2024-12-11

## 2024-12-11 DIAGNOSIS — M54.41 CHRONIC MIDLINE LOW BACK PAIN WITH RIGHT-SIDED SCIATICA: ICD-10-CM

## 2024-12-11 DIAGNOSIS — M17.12 PRIMARY OSTEOARTHRITIS OF LEFT KNEE: Primary | ICD-10-CM

## 2024-12-11 DIAGNOSIS — Z95.1 S/P CABG X 2: ICD-10-CM

## 2024-12-11 DIAGNOSIS — G89.29 CHRONIC MIDLINE LOW BACK PAIN WITH RIGHT-SIDED SCIATICA: ICD-10-CM

## 2024-12-12 RX ORDER — HYDROCODONE BITARTRATE AND ACETAMINOPHEN 10; 325 MG/1; MG/1
1 TABLET ORAL EVERY 6 HOURS PRN
Qty: 120 TABLET | Refills: 0 | Status: SHIPPED | OUTPATIENT
Start: 2024-12-12

## 2024-12-12 NOTE — TELEPHONE ENCOUNTER
Last office visit: 11/12/24    Next Rheum Apt:2/12/2025 Xiang Torres MD    Last fill: 11/12/24    Labs:   Lab Results   Component Value Date    CREATSERUM 0.89 10/23/2024    GFR 99 09/16/2017    ALKPHO 94 07/01/2024    AST 23 10/23/2024    ALT 25 10/23/2024    BILT 0.2 07/01/2024    TP 7.1 11/12/2024    ALB 3.88 11/12/2024       Lab Results   Component Value Date    WBC 10.0 10/23/2024    HGB 10.1 (L) 10/23/2024    .0 10/23/2024    NEPRELIM 6.16 10/23/2024    NEUTABS 25.58 (H) 06/05/2024    LYMPHABS 1.93 06/05/2024    NEUT 89 06/05/2024    LYMPH 7 06/05/2024    NEPERCENT 61.3 10/23/2024    LYPERCENT 28.2 10/23/2024    NE 6.16 10/23/2024    LYMABS 2.83 10/23/2024

## 2024-12-13 RX ORDER — BUDESONIDE 0.25 MG/2ML
0.25 INHALANT ORAL 2 TIMES DAILY
Qty: 60 ML | Refills: 3 | Status: SHIPPED | OUTPATIENT
Start: 2024-12-13

## 2024-12-13 NOTE — TELEPHONE ENCOUNTER
Called patient re: my chart message to verify if [facility] is the correct pharmacy.  Budesonide refilled.  Patient aware.

## 2025-01-12 DIAGNOSIS — M54.41 CHRONIC MIDLINE LOW BACK PAIN WITH RIGHT-SIDED SCIATICA: ICD-10-CM

## 2025-01-12 DIAGNOSIS — G89.29 CHRONIC MIDLINE LOW BACK PAIN WITH RIGHT-SIDED SCIATICA: ICD-10-CM

## 2025-01-12 DIAGNOSIS — M17.12 PRIMARY OSTEOARTHRITIS OF LEFT KNEE: Primary | ICD-10-CM

## 2025-01-12 DIAGNOSIS — Z95.1 S/P CABG X 2: ICD-10-CM

## 2025-01-13 ENCOUNTER — OFFICE VISIT (OUTPATIENT)
Dept: PODIATRY CLINIC | Facility: CLINIC | Age: 65
End: 2025-01-13
Payer: MEDICAID

## 2025-01-13 ENCOUNTER — OFFICE VISIT (OUTPATIENT)
Dept: NEUROLOGY | Facility: CLINIC | Age: 65
End: 2025-01-13
Payer: MEDICAID

## 2025-01-13 VITALS
HEIGHT: 60 IN | HEART RATE: 90 BPM | BODY MASS INDEX: 24.15 KG/M2 | RESPIRATION RATE: 16 BRPM | OXYGEN SATURATION: 98 % | WEIGHT: 123 LBS | DIASTOLIC BLOOD PRESSURE: 70 MMHG | SYSTOLIC BLOOD PRESSURE: 120 MMHG

## 2025-01-13 DIAGNOSIS — M79.671 BILATERAL FOOT PAIN: ICD-10-CM

## 2025-01-13 DIAGNOSIS — G57.81 NEUROMA OF THIRD INTERSPACE OF RIGHT FOOT: ICD-10-CM

## 2025-01-13 DIAGNOSIS — G57.82 NEUROMA OF THIRD INTERSPACE OF LEFT FOOT: Primary | ICD-10-CM

## 2025-01-13 DIAGNOSIS — M79.672 BILATERAL FOOT PAIN: ICD-10-CM

## 2025-01-13 DIAGNOSIS — G57.63 MORTON'S METATARSALGIA, NEURALGIA, OR NEUROMA, BILATERAL: ICD-10-CM

## 2025-01-13 DIAGNOSIS — B35.1 ONYCHOMYCOSIS: ICD-10-CM

## 2025-01-13 DIAGNOSIS — G43.709 CHRONIC MIGRAINE W/O AURA W/O STATUS MIGRAINOSUS, NOT INTRACTABLE: Primary | ICD-10-CM

## 2025-01-13 DIAGNOSIS — G31.84 MILD COGNITIVE IMPAIRMENT WITH MEMORY LOSS: ICD-10-CM

## 2025-01-13 PROCEDURE — 64455 NJX AA&/STRD PLTR COM DG NRV: CPT | Performed by: PODIATRIST

## 2025-01-13 PROCEDURE — 99203 OFFICE O/P NEW LOW 30 MIN: CPT | Performed by: PODIATRIST

## 2025-01-13 PROCEDURE — 99214 OFFICE O/P EST MOD 30 MIN: CPT | Performed by: OTHER

## 2025-01-13 RX ORDER — HYDROCODONE BITARTRATE AND ACETAMINOPHEN 10; 325 MG/1; MG/1
1 TABLET ORAL EVERY 6 HOURS PRN
Qty: 120 TABLET | Refills: 0 | Status: SHIPPED | OUTPATIENT
Start: 2025-01-13

## 2025-01-13 RX ORDER — TRIAMCINOLONE ACETONIDE 40 MG/ML
20 INJECTION, SUSPENSION INTRA-ARTICULAR; INTRAMUSCULAR ONCE
Status: COMPLETED | OUTPATIENT
Start: 2025-01-13 | End: 2025-01-13

## 2025-01-13 NOTE — TELEPHONE ENCOUNTER
Last office visit: 11/12/24    Next Rheum Apt:2/12/2025 Xiang Torres MD    Last fill: 12/12/24    Labs:   Lab Results   Component Value Date    CREATSERUM 0.89 10/23/2024    GFR 99 09/16/2017    ALKPHO 94 07/01/2024    AST 23 10/23/2024    ALT 25 10/23/2024    BILT 0.2 07/01/2024    TP 7.1 11/12/2024    ALB 3.88 11/12/2024       Lab Results   Component Value Date    WBC 10.0 10/23/2024    HGB 10.1 (L) 10/23/2024    .0 10/23/2024    NEPRELIM 6.16 10/23/2024    NEUTABS 25.58 (H) 06/05/2024    LYMPHABS 1.93 06/05/2024    NEUT 89 06/05/2024    LYMPH 7 06/05/2024    NEPERCENT 61.3 10/23/2024    LYPERCENT 28.2 10/23/2024    NE 6.16 10/23/2024    LYMABS 2.83 10/23/2024

## 2025-01-13 NOTE — TELEPHONE ENCOUNTER
Norco refill approved 10 mg 1 every 6 hours as needed #120 sent to [facility] in [place] on [address].

## 2025-01-13 NOTE — PROCEDURES
Per Dr. Martinez draw up 0.5ml of 0.5% Marcaine and 0.5ml of Kenalog 40 for injection to bilateral feet.

## 2025-01-13 NOTE — PROGRESS NOTES
Patient is here today for memory and migraines  Patient stated she is forgetting the smallest things.

## 2025-01-13 NOTE — PATIENT INSTRUCTIONS
Topiramate - decrease to  50 mg AM and 50 mg PM       2. Continue Aimovig injections for migraines       3.  MRI brain                         Refill policies:    Allow 2-3 business days for refills; controlled substances may take longer.  Contact your pharmacy at least 5 days prior to running out of medication and have them send an electronic request or submit request through the “request refill” option in your [employer] account.  Refills are not addressed on weekends; covering physicians do not authorize routine medications on weekends.  No narcotics or controlled substances are refilled after noon on Fridays or by on call physicians.  By law, narcotics must be electronically prescribed.  A 30 day supply with no refills is the maximum allowed.  If your prescription is due for a refill, you may be due for a follow up appointment.  To best provide you care, patients receiving routine medications need to be seen at least once a year.  Patients receiving narcotic/controlled substance medications need to be seen at least once every 3 months.  In the event that your preferred pharmacy does not have the requested medication in stock (e.g. Backordered), it is your responsibility to find another pharmacy that has the requested medication available.  We will gladly send a new prescription to that pharmacy at your request.    Scheduling Tests:    If your physician has ordered radiology tests such as MRI or CT scans, please contact Central Scheduling at [phone] right away to schedule the test.  Once scheduled, the [facility] Centralized Referral Team will work with your insurance carrier to obtain pre-certification or prior authorization.  Depending on your insurance carrier, approval may take 3-10 days.  It is highly recommended patients assure they have received an authorization before having a test performed.  If test is done without insurance authorization, patient may be responsible for the entire amount billed.       Precertification and Prior Authorizations:  If your physician has recommended that you have a procedure or additional testing performed the [facility] Centralized Referral Team will contact your insurance carrier to obtain pre-certification or prior authorization.    You are strongly encouraged to contact your insurance carrier to verify that your procedure/test has been approved and is a COVERED benefit.  Although the [facility] Centralized Referral Team does its due diligence, the insurance carrier gives the disclaimer that \"Although the procedure is authorized, this does not guarantee payment.\"    Ultimately the patient is responsible for payment.   Thank you for your understanding in this matter.  Paperwork Completion:  If you require FMLA or disability paperwork for your recovery, please make sure to either drop it off or have it faxed to our office at [phone]. Be sure the form has your name and date of birth on it.  The form will be faxed to our Forms Department and they will complete it for you.  There is a 25$ fee for all forms that need to be filled out.  Please be aware there is a 10-14 day turnaround time.  You will need to sign a release of information (MUMTAZ) form if your paperwork does not come with one.  You may call the Forms Department with any questions at [phone].  Their fax number is [phone].

## 2025-01-13 NOTE — PROGRESS NOTES
HPI:    Patient ID: Soumya King is a 64 year old female.    Neurologic Problem  Pertinent negatives include no headaches.   Migraine      Patient is a 64 year old female who presented with c/o memory loss. Last seen in Sept 2023. Since last seen had major health issues- admitted in De 2023 with NSTEMI in Dec 2023 underwent emergent CABG and then in May admitted with pneumonia, treated with IV antibiotics. Reports CABG surgery was uneventful.  States since then has noticed short term memory problem, repeating herself and some interference with daily routine.  She lives alone, manages home by herself, her sister initially was helping her after the bypass surgery.   She denies any change in her psychotropic medication, on quetiapine extended release 200 mg and Pristiq 100 mg ER  She reports migraines are stable since we started Aimovig but remains on topiramate 50 mg in the morning and 100 in the night.        HISTORY:  Past Medical History:    Abdominal pain, other specified site    groin    Acute bronchitis    Allergic rhinitis    Anemia     noted he saw since 2015    Anxiety    Arthritis    Back problem    Jamison esophagus    Chronic low back pain without sciatica    COPD (chronic obstructive pulmonary disease) (HCC)    Depression    Diabetes (HCC)    Difficult intubation    difficult to intubate with bladder surgery,instructed by anesthesia to communicate to future anesthesiologist. Small airway    Esophageal reflux    Essential hypertension    Don't remember    Fitting and adjustment of dental prosthetic device    High blood pressure    History of 2019 novel coronavirus disease (COVID-19)    No hospitalization,symptoms; Fever,fatigue ,body aches,headache no loss of taste or smell    Hx of motion sickness    Hyperlipidemia    Mass of spine    Migraines    barometric pressure    Myocardial infarction (HCC)    Osteoarthritis    Other ill-defined conditions(909.01)    Jamison's    Pain in joint, forearm     wrist    Pain in joint, pelvic region and thigh    hip    Pelvic mass    Personal history of urinary (tract) infection    Sleep apnea    I don't date    Stroke (HCC)    Mini stroke noted      Past Surgical History:   Procedure Laterality Date    Cabg  23    Colonoscopy      Colonoscopy N/A 2022    Procedure: COLONOSCOPY AND ESOPHAGOGASTRODUODENOSCOPY;  Surgeon: Miguel Camargo MD;  Location:  ENDOSCOPY    Decortication,pulmonary,total Right 2024    VATS decortication by Dr. Maldonado    Heart surgery      double bypass    Laparoscopic      sling operation for stress incontinence    Laparoscopy,diagnostic      Kay biopsy stereo nodule 2 site bilat (cpt=19081/93462) Left 2010    BENIGN 2 SITES    Kay biopsy stereo w/calc 2 site left (cpt=19081/84027)  2010    benign x 2    Kay stereo-clip w/calc 2 site left      Oophorectomy Left 2019    Other  2022    RIGHT SACRAL SUBCUTANEOUS CYST EXCISION    Other surgical history  ,     Bladder Sling, Large mass removed from left abdomen and ovar    Tonsillectomy      Was a child, have no idea    Tubal ligation      Upper gi endoscopy,exam        Family History   Problem Relation Age of Onset    Arthritis Mother     Other (AMI) Mother     Other (diabetes mellitus) Mother     Diabetes Mother     Stroke Mother         Many mini strokes      Social History     Socioeconomic History    Marital status: Single   Tobacco Use    Smoking status: Former     Current packs/day: 0.00     Average packs/day: 1.5 packs/day for 48.0 years (72.0 ttl pk-yrs)     Types: Cigarettes     Start date:      Quit date: 2024     Years since quittin.6    Smokeless tobacco: Never    Tobacco comments:     Smoked 1 pack per day. Now I smoke E- cigarettes   Vaping Use    Vaping status: Every Day    Start date: 2019    Substances: Nicotine, Flavoring    Devices: Disposable, Pre-filled pod   Substance and Sexual Activity    Alcohol use:  Not Currently     Comment: On occasion    Drug use: No    Sexual activity: Not Currently     Partners: Male   Other Topics Concern    Caffeine Concern Yes    Stress Concern Yes    Weight Concern Yes    Special Diet No    Exercise No    Seat Belt Yes     Social Drivers of Health     Financial Resource Strain: Low Risk  (6/25/2024)    Financial Resource Strain     Difficulty of Paying Living Expenses: Not very hard     Med Affordability: No   Food Insecurity: Not on File (9/26/2024)    Received from [employer] [facility]    Food Insecurity     Food: 0   Transportation Needs: No Transportation Needs (6/19/2024)    Transportation Needs     Lack of Transportation: No   Physical Activity: Not on File (10/7/2022)    Received from [facility]    Physical Activity     Physical Activity: 0   Stress: Not on File (10/7/2022)    Received from [facility]    Stress     Stress: 0   Social Connections: Not on File (9/13/2024)    Received from [employer] [facility]    Social Connections     Connectedness: 0   Housing Stability: Low Risk  (6/19/2024)    Housing Stability     Housing Instability: No        Review of Systems   Constitutional: Negative.    HENT: Negative.     Eyes: Negative.    Respiratory: Negative.     Cardiovascular: Negative.    Gastrointestinal: Negative.    Musculoskeletal: Negative.    Allergic/Immunologic: Negative.    Neurological:  Negative for headaches.        + memory loss   Psychiatric/Behavioral:  Positive for decreased concentration.    All other systems reviewed and are negative.           Current Outpatient Medications   Medication Sig Dispense Refill    budesonide 0.25 MG/2ML Inhalation Suspension Take 2 mL (0.25 mg total) by nebulization 2 (two) times daily. 60 mL 3    HYDROCODONE-ACETAMINOPHEN  MG Oral Tab TAKE ONE TABLET BY MOUTH EVERY SIX HOURS AS NEEDED 120 tablet 0    METFORMIN 500 MG Oral Tab Take 1 tablet (500 mg total) by mouth 2 (two) times daily. 180 tablet 0    Omeprazole 40 MG Oral Capsule Delayed Release Take 1  capsule (40 mg total) by mouth 2 (two) times daily. 60 capsule 0    albuterol 108 (90 Base) MCG/ACT Inhalation Aero Soln Inhale 2 puffs into the lungs every 6 (six) hours as needed for Wheezing. inhale 2 puff by inhalation route  every 4 - 6 hours as needed 1 each 5    azelastine 137 MCG/SPRAY Nasal Solution 1 spray by Nasal route 2 (two) times daily. 30 mL 5    diclofenac 75 MG Oral Tab EC Take 1 tablet (75 mg total) by mouth 2 (two) times daily. 180 tablet 1    Respiratory Therapy Supplies (FULL KIT NEBULIZER SET) Does not apply Misc       gabapentin 600 MG Oral Tab Take 1 tablet (600 mg total) by mouth nightly. 90 tablet 1    VOLTAREN 1 % External Gel Apply 4 g topically 4 (four) times daily. 300 g 1    INCRUSE ELLIPTA 62.5 MCG/ACT Inhalation Aerosol Powder, Breath Activated Inhale 1 puff into the lungs daily. 30 each 11    FLOWFLEX COVID-19 AG HOME TEST In Vitro Kit 1 Swab by In Vitro route one time for 1 dose. 10 kit 0    nicotine 7 MG/24HR Transdermal Patch 24 Hr Place 1 patch onto the skin daily. 30 patch 2    Lancets (ONETOUCH DELICA PLUS [id number]) Does not apply Misc 1 lancet  by Finger stick route 3 (three) times daily. 300 each 1    diclofenac 1 % External Gel Apply 4 g topically 4 (four) times daily. 300 g 1    furosemide 20 MG Oral Tab Take 1 tablet (20 mg total) by mouth daily. 30 tablet 2    Erenumab-aooe (AIMOVIG) 140 MG/ML Subcutaneous Solution Auto-injector Inject 1 mL (140 mg total) into the skin every 30 (thirty) days. 1 mL 2    atorvastatin 80 MG Oral Tab Take 1 tablet (80 mg total) by mouth nightly. 90 tablet 1    Mirabegron ER (MYRBETRIQ) 50 MG Oral Tablet 24 Hr Take 1 tablet (50 mg total) by mouth daily. 90 tablet 3    Naloxone HCl 4 MG/0.1ML Nasal Liquid 4 mg by Nasal route as needed. If patient remains unresponsive, repeat dose in other nostril 2-5 minutes after first dose. 1 kit 0    PRISTIQ 100 MG Oral Tablet 24 Hr Take 1 tablet (100 mg total) by mouth daily.      Rizatriptan Benzoate 10  MG Oral Tab Take 1 tablet (10 mg total) by mouth as needed for Migraine. 10 tablet 5    Glucose Blood (ONETOUCH ULTRA) In Vitro Strip Tests 3 x daily 300 each 1    Coenzyme Q10 200 MG Oral Cap Take 200 mg by mouth 2 (two) times daily.      lidocaine 5 % External Ointment Apply 1 Application  topically 3 (three) times daily as needed. 240 g 1    metoprolol succinate ER 50 MG Oral Tablet 24 Hr Take 1 tablet (50 mg total) by mouth daily. 90 tablet 0    Blood Glucose Monitoring Suppl (ONETOUCH ULTRA 2) w/Device Does not apply Kit 1 Device by Other route in the morning, at noon, and at bedtime. Use as directed. 1 kit 0    topiramate 50 MG Oral Tab TAKE ONE TABLET BY MOUTH EVERY MORNING AND TWO TABLETS EVERY EVENING 270 tablet 2    clopidogrel 75 MG Oral Tab Take 1 tablet (75 mg total) by mouth daily.      aspirin 81 MG Oral Tab EC Take 1 tablet (81 mg total) by mouth daily.      clonazePAM 0.5 MG Oral Tab Take 1 tablet (0.5 mg total) by mouth 2 (two) times daily. 12 tablet 0    Metoclopramide HCl 5 MG/5ML Oral Solution Take 5 mL (5 mg total) by mouth 3 (three) times daily before meals. 473 mL 0    Multiple Vitamins-Minerals (ONE-A-DAY WOMENS 50+) Oral Tab Take 1 tablet by mouth daily.  0    QUEtiapine Fumarate  MG Oral Tablet 24 Hr Take 1 tablet (200 mg total) by mouth every evening. 30 tablet 0    Calcium Carb-Cholecalciferol 500-200 MG-UNIT Oral Tab Take 1 tablet by mouth daily.         Allergies:  Allergies   Allergen Reactions    Naprosyn [Naproxen] ANAPHYLAXIS     Has tolerated ibuprofen and IV toradol     Aspirin OTHER (SEE COMMENTS)     Difficulty swallowing due to  saavedra's esophagus     PHYSICAL EXAM:   Physical Exam  Blood pressure 120/70, pulse 90, resp. rate 16, height 60\", weight 123 lb (55.8 kg), SpO2 98%, not currently breastfeeding.      General Appearance: well nourished, well developed, no apparent distress.   HEENT: Normocephalic and atraumatic.   Cardiovascular: Normal rate, regular rhythm and  normal heart sounds.    Pulmonary/Chest: Effort normal and breath sounds normal.   Abdominal: Soft. Bowel sounds are normal.     Neurological: Patient is awake, alert and oriented to person, place and time   Normal speech and language.    Cranial Nerves:     II: Visual fields: normal  III: Pupils: equal, round, reactive to light  III,IV,VI: Extra Ocular Movements: intact  V: Facial sensation: intact  VII: Facial strength: intact  VIII: Hearing: intact  IX: Palate: intact  XI: Shoulder shrug: intact  XII: Tongue movement: normal    Motor Exam:  Normal strength is normal    Sensory: Sensory examination is normal to light touch and pinprick     Coordination: intact    Gait: normal casual gait       ASSESSMENT/PLAN:       ICD-10-CM    1. Chronic migraine w/o aura w/o status migrainosus, not intractable  G43.709       2. Mild cognitive impairment with memory loss  G31.84 MRI BRAIN (CPT=70551)        Mild cognitive impairment with very loss suspected combination of vascular etiology and underlying disorder. Based on in office cognitive assessment no evidence of Dementia    Obtain MRI of the brain to assess the vascular burden and to rule out any silent infarct  Counseled on cognitive exercises  Will wean off Topamax slowly  Advised to discuss with her psychiatrist about dose adjustment      2. Chronic migraines- stable  Continue Aimovig 140 mg monthly injections  Reduce Topamax to 50 mg AM and 50 mg PM and if headaches stable go down to 50 mg daily      RTC in about 6 months    See orders and medications filed with this encounter. The patient indicates understanding of these issues and agrees with the plan.          Brynn Joy MD  [facility] This Visit:  Requested Prescriptions      No prescriptions requested or ordered in this encounter       Imaging & Referrals:  None     ID#[id number]

## 2025-01-15 NOTE — PROGRESS NOTES
Soumya King is a 64 year old female.   Chief Complaint   Patient presents with    Consult     Bilateral feet- right hallux is the worst- tender- tingling- rates pain 8/10          HPI:   The patient presents complaining of both feet right great toe is the worst but she is got tingling in nerve pain in her toes she points to her lesser toes on both feet.  Pain can be as bad as 8 out of 10 depending on activity levels.  At today's visit reviewed nurse's history as taken above, allergies medications and medical history as documented below.  All changes duly noted  Allergies: Naprosyn [naproxen] and Aspirin   Current Outpatient Medications   Medication Sig Dispense Refill    HYDROCODONE-ACETAMINOPHEN  MG Oral Tab TAKE ONE TABLET BY MOUTH EVERY SIX HOURS AS NEEDED 120 tablet 0    budesonide 0.25 MG/2ML Inhalation Suspension Take 2 mL (0.25 mg total) by nebulization 2 (two) times daily. 60 mL 3    METFORMIN 500 MG Oral Tab Take 1 tablet (500 mg total) by mouth 2 (two) times daily. 180 tablet 0    Omeprazole 40 MG Oral Capsule Delayed Release Take 1 capsule (40 mg total) by mouth 2 (two) times daily. 60 capsule 0    albuterol 108 (90 Base) MCG/ACT Inhalation Aero Soln Inhale 2 puffs into the lungs every 6 (six) hours as needed for Wheezing. inhale 2 puff by inhalation route  every 4 - 6 hours as needed 1 each 5    azelastine 137 MCG/SPRAY Nasal Solution 1 spray by Nasal route 2 (two) times daily. 30 mL 5    diclofenac 75 MG Oral Tab EC Take 1 tablet (75 mg total) by mouth 2 (two) times daily. 180 tablet 1    Respiratory Therapy Supplies (FULL KIT NEBULIZER SET) Does not apply Misc       gabapentin 600 MG Oral Tab Take 1 tablet (600 mg total) by mouth nightly. 90 tablet 1    VOLTAREN 1 % External Gel Apply 4 g topically 4 (four) times daily. 300 g 1    INCRUSE ELLIPTA 62.5 MCG/ACT Inhalation Aerosol Powder, Breath Activated Inhale 1 puff into the lungs daily. 30 each 11    FLOWFLEX COVID-19 AG HOME TEST In Vitro  Kit 1 Swab by In Vitro route one time for 1 dose. 10 kit 0    nicotine 7 MG/24HR Transdermal Patch 24 Hr Place 1 patch onto the skin daily. 30 patch 2    Lancets (ONETOUCH DELICA PLUS [id number]) Does not apply Misc 1 lancet  by Finger stick route 3 (three) times daily. 300 each 1    diclofenac 1 % External Gel Apply 4 g topically 4 (four) times daily. 300 g 1    furosemide 20 MG Oral Tab Take 1 tablet (20 mg total) by mouth daily. 30 tablet 2    Erenumab-aooe (AIMOVIG) 140 MG/ML Subcutaneous Solution Auto-injector Inject 1 mL (140 mg total) into the skin every 30 (thirty) days. 1 mL 2    atorvastatin 80 MG Oral Tab Take 1 tablet (80 mg total) by mouth nightly. 90 tablet 1    Mirabegron ER (MYRBETRIQ) 50 MG Oral Tablet 24 Hr Take 1 tablet (50 mg total) by mouth daily. 90 tablet 3    Naloxone HCl 4 MG/0.1ML Nasal Liquid 4 mg by Nasal route as needed. If patient remains unresponsive, repeat dose in other nostril 2-5 minutes after first dose. 1 kit 0    PRISTIQ 100 MG Oral Tablet 24 Hr Take 1 tablet (100 mg total) by mouth daily.      Rizatriptan Benzoate 10 MG Oral Tab Take 1 tablet (10 mg total) by mouth as needed for Migraine. 10 tablet 5    Glucose Blood (ONETOUCH ULTRA) In Vitro Strip Tests 3 x daily 300 each 1    Coenzyme Q10 200 MG Oral Cap Take 200 mg by mouth 2 (two) times daily.      lidocaine 5 % External Ointment Apply 1 Application  topically 3 (three) times daily as needed. 240 g 1    metoprolol succinate ER 50 MG Oral Tablet 24 Hr Take 1 tablet (50 mg total) by mouth daily. 90 tablet 0    Blood Glucose Monitoring Suppl (ONETOUCH ULTRA 2) w/Device Does not apply Kit 1 Device by Other route in the morning, at noon, and at bedtime. Use as directed. 1 kit 0    topiramate 50 MG Oral Tab TAKE ONE TABLET BY MOUTH EVERY MORNING AND TWO TABLETS EVERY EVENING 270 tablet 2    clopidogrel 75 MG Oral Tab Take 1 tablet (75 mg total) by mouth daily.      aspirin 81 MG Oral Tab EC Take 1 tablet (81 mg total) by mouth  daily.      clonazePAM 0.5 MG Oral Tab Take 1 tablet (0.5 mg total) by mouth 2 (two) times daily. 12 tablet 0    Metoclopramide HCl 5 MG/5ML Oral Solution Take 5 mL (5 mg total) by mouth 3 (three) times daily before meals. 473 mL 0    Multiple Vitamins-Minerals (ONE-A-DAY WOMENS 50+) Oral Tab Take 1 tablet by mouth daily.  0    QUEtiapine Fumarate  MG Oral Tablet 24 Hr Take 1 tablet (200 mg total) by mouth every evening. 30 tablet 0    Calcium Carb-Cholecalciferol 500-200 MG-UNIT Oral Tab Take 1 tablet by mouth daily.          Past Medical History:    Abdominal pain, other specified site    groin    Acute bronchitis    Allergic rhinitis    Anemia     noted he saw since 2015    Anxiety    Arthritis    Back problem    Jamison esophagus    Chronic low back pain without sciatica    COPD (chronic obstructive pulmonary disease) ([facility])    Depression    Diabetes (HCC)    Difficult intubation    difficult to intubate with bladder surgery,instructed by anesthesia to communicate to future anesthesiologist. Small airway    Esophageal reflux    Essential hypertension    Don't remember    Fitting and adjustment of dental prosthetic device    High blood pressure    History of 2019 novel coronavirus disease (COVID-19)    No hospitalization,symptoms; Fever,fatigue ,body aches,headache no loss of taste or smell    Hx of motion sickness    Hyperlipidemia    Mass of spine    Migraines    barometric pressure    Myocardial infarction (HCC)    Osteoarthritis    Other ill-defined conditions(799.89)    Jamison's    Pain in joint, forearm    wrist    Pain in joint, pelvic region and thigh    hip    Pelvic mass    Personal history of urinary (tract) infection    Sleep apnea    I don't date    Stroke (HCC)    Mini stroke noted      Past Surgical History:   Procedure Laterality Date    Cabg  12/27/23    Colonoscopy      Colonoscopy N/A 05/18/2022    Procedure: COLONOSCOPY AND ESOPHAGOGASTRODUODENOSCOPY;  Surgeon: Miguel Camargo MD;   Location:  ENDOSCOPY    Decortication,pulmonary,total Right 2024    VATS decortication by Dr. Maldonado    Heart surgery      double bypass    Laparoscopic  2007    sling operation for stress incontinence    Laparoscopy,diagnostic      Kay biopsy stereo nodule 2 site bilat (cpt=19081/99401) Left 2010    BENIGN 2 SITES    Kay biopsy stereo w/calc 2 site left (cpt=19081/48111)  2010    benign x 2    Kay stereo-clip w/calc 2 site left      Oophorectomy Left 2019    Other  2022    RIGHT SACRAL SUBCUTANEOUS CYST EXCISION    Other surgical history  ,     Bladder Sling, Large mass removed from left abdomen and ovar    Tonsillectomy      Was a child, have no idea    Tubal ligation      Upper gi endoscopy,exam        Family History   Problem Relation Age of Onset    Arthritis Mother     Other (AMI) Mother     Other (diabetes mellitus) Mother     Diabetes Mother     Stroke Mother         Many mini strokes      Social History     Socioeconomic History    Marital status: Single   Tobacco Use    Smoking status: Former     Current packs/day: 0.00     Average packs/day: 1.5 packs/day for 48.0 years (72.0 ttl pk-yrs)     Types: Cigarettes     Start date:      Quit date: 2024     Years since quittin.6    Smokeless tobacco: Never    Tobacco comments:     Smoked 1 pack per day. Now I smoke E- cigarettes   Vaping Use    Vaping status: Every Day    Start date: 2019    Substances: Nicotine, Flavoring    Devices: Disposable, Pre-filled pod   Substance and Sexual Activity    Alcohol use: Not Currently     Comment: On occasion    Drug use: No    Sexual activity: Not Currently     Partners: Male   Other Topics Concern    Caffeine Concern Yes    Stress Concern Yes    Weight Concern Yes    Special Diet No    Exercise No    Seat Belt Yes           REVIEW OF SYSTEMS:   Today reviewed systens as documented below  GENERAL HEALTH: feels well otherwise  SKIN: Refer to exam  below  RESPIRATORY: denies shortness of breath with exertion  CARDIOVASCULAR: denies chest pain on exertion  GI: denies abdominal pain and denies heartburn  NEURO: denies headaches    EXAM:   There were no vitals taken for this visit.  GENERAL: well developed, well nourished, in no apparent distress  EXTREMITIES:   1. Integument: Skin on both feet was evaluated is warm and dry.  There is mild incurvation of the right hallux nail.   2. Vascular: Patient has palpable pulses both dorsalis pedis and posterior tibial.   3. Neurologic: Patient has intact sensorium she can feel the Coila-Jarod 10 g filament in all dermatomes   4. Musculoskeletal: Patient has good muscle strength she has a positive Genaro's test in the third interspace of both feet.  X-rays were exposed 3 views of the right foot and they do not show any fracture or dislocation all metatarsal phalangeal joints are in good alignment.  X-rays were exposed 3 views of the left foot they do not show any fracture or dislocation all metatarsal phalangeal joints are in good alignment.    ASSESSMENT AND PLAN:   Diagnoses and all orders for this visit:    Neuroma of third interspace of left foot  -     triamcinolone acetonide (Kenalog-40) 40 MG/ML injection 20 mg    Neuroma of third interspace of right foot  -     triamcinolone acetonide (Kenalog-40) 40 MG/ML injection 20 mg    Beverly's metatarsalgia, neuralgia, or neuroma, bilateral  -     triamcinolone acetonide (Kenalog-40) 40 MG/ML injection 20 mg  -     triamcinolone acetonide (Kenalog-40) 40 MG/ML injection 20 mg    Onychomycosis  -     Fungus Hair, Skin, Nail Culture (Dermatophyte); Future    Other orders  -     [facility] AMB POD XR - LT FOOT 3 VIEWS(AP,LAT,OBLIQUE) WT BEARING  -     [facility] AMB POD XR - RT FOOT 3 VIEWS(AP,LAT,OBLIQUE)WT BEARING        Plan: At today's visit a specimen was taken on the right hallux nail because it is thickened dystrophic probably contributing to some pain will be sent for fungal  culture will discuss the biopsy results when they come in usually for antifungal treatment I recommend Lamisil tablet therapy if the patient has no prior history of liver disease.  Today I explained neuromas to her and treatments.  Recommended a stiff soled shoe that does not bend too much there is adequate toe space.The patient was consented for and after timeout was taken a cortisone injection with peripheral nerve block was administered into the third interspace of the bilateral foot.  This was accomplished utilizing 20 mg of Kenalog and 0.5 cc of 0.5% Marcaine plain as a peripheral nerve block and a Band-Aid was applied to this injection site 3 to 4 weeks.  The patient indicates understanding of these issues and agrees to the plan.    Brandon Martinez DPM   4 (moderate pain)

## 2025-01-17 ENCOUNTER — HOSPITAL ENCOUNTER (OUTPATIENT)
Dept: MAMMOGRAPHY | Age: 65
Discharge: HOME | End: 2025-01-17
Attending: OBSTETRICS & GYNECOLOGY
Payer: MEDICAID

## 2025-01-17 ENCOUNTER — LAB ENCOUNTER (OUTPATIENT)
Dept: LAB | Age: 65
End: 2025-01-17
Attending: OBSTETRICS & GYNECOLOGY
Payer: MEDICAID

## 2025-01-17 DIAGNOSIS — Z12.39 ENCOUNTER FOR BREAST CANCER SCREENING USING NON-MAMMOGRAM MODALITY: ICD-10-CM

## 2025-01-17 DIAGNOSIS — R92.30 DENSE BREAST TISSUE ON MAMMOGRAM, UNSPECIFIED TYPE: Primary | ICD-10-CM

## 2025-01-17 DIAGNOSIS — E11.40 TYPE 2 DIABETES MELLITUS WITH DIABETIC NEUROPATHY, WITHOUT LONG-TERM CURRENT USE OF INSULIN (HCC): ICD-10-CM

## 2025-01-17 DIAGNOSIS — Z12.31 ENCOUNTER FOR SCREENING MAMMOGRAM FOR MALIGNANT NEOPLASM OF BREAST: ICD-10-CM

## 2025-01-17 DIAGNOSIS — E87.1 HYPONATREMIA: ICD-10-CM

## 2025-01-17 LAB
ANION GAP SERPL CALC-SCNC: 6 MMOL/L (ref 0–18)
BUN BLD-MCNC: 24 MG/DL (ref 9–23)
CALCIUM BLD-MCNC: 9.6 MG/DL (ref 8.7–10.6)
CHLORIDE SERPL-SCNC: 108 MMOL/L (ref 98–112)
CO2 SERPL-SCNC: 28 MMOL/L (ref 21–32)
CREAT BLD-MCNC: 1 MG/DL
CREAT UR-SCNC: 25.6 MG/DL
EGFRCR SERPLBLD CKD-EPI 2021: 64 ML/MIN/1.73M2 (ref 60–?)
FASTING STATUS PATIENT QL REPORTED: YES
GLUCOSE BLD-MCNC: 114 MG/DL (ref 70–99)
MICROALBUMIN UR-MCNC: <0.3 MG/DL
OSMOLALITY SERPL CALC.SUM OF ELEC: 299 MOSM/KG (ref 275–295)
POTASSIUM SERPL-SCNC: 4.2 MMOL/L (ref 3.5–5.1)
SODIUM SERPL-SCNC: 142 MMOL/L (ref 136–145)

## 2025-01-17 PROCEDURE — 77063 BREAST TOMOSYNTHESIS BI: CPT | Performed by: OBSTETRICS & GYNECOLOGY

## 2025-01-17 PROCEDURE — 82570 ASSAY OF URINE CREATININE: CPT

## 2025-01-17 PROCEDURE — 36415 COLL VENOUS BLD VENIPUNCTURE: CPT

## 2025-01-17 PROCEDURE — 77067 SCR MAMMO BI INCL CAD: CPT | Performed by: OBSTETRICS & GYNECOLOGY

## 2025-01-17 PROCEDURE — 82043 UR ALBUMIN QUANTITATIVE: CPT

## 2025-01-17 PROCEDURE — 80048 BASIC METABOLIC PNL TOTAL CA: CPT

## 2025-01-19 ENCOUNTER — PATIENT MESSAGE (OUTPATIENT)
Dept: PODIATRY CLINIC | Facility: CLINIC | Age: 65
End: 2025-01-19

## 2025-01-20 ENCOUNTER — VIRTUAL PHONE E/M (OUTPATIENT)
Dept: UROLOGY | Facility: CLINIC | Age: 65
End: 2025-01-20
Attending: OBSTETRICS & GYNECOLOGY
Payer: MEDICAID

## 2025-01-20 DIAGNOSIS — N81.84 PELVIC MUSCLE WASTING: ICD-10-CM

## 2025-01-20 DIAGNOSIS — N39.41 URGE INCONTINENCE: Primary | ICD-10-CM

## 2025-01-20 DIAGNOSIS — N95.2 POSTMENOPAUSAL ATROPHIC VAGINITIS: ICD-10-CM

## 2025-01-20 NOTE — PROGRESS NOTES
This visit is being conducted as a phone (audio only) visit with patient's consent.  Patient declined video visit due to technical capacity/pt preference.  Patient is in a safe, private environment for televisit, provider located in the office setting.    Visit for f/u of UUI    She is currently taking mirabegron 50 mg  Denies SEs  Reports +improvement, happy  Has been inconsistent with vag estrogen    Bowels reg     Denies any current signs or symptoms of UTI    Recent ucx:   10/23/24 No Growth   10/02/24 50-99k Klebsiella tx Bactrim  09/16/24 UA RBC 3-5 Bacteria +1, no Cx sent, rx'd NTF x 5d but pt did not take  08/30/24 50-100k Multiple Species   06/20/24 No Growth     Impression/Plan:    ICD-10-CM    1. Urge incontinence  N39.41       2. Postmenopausal atrophic vaginitis  N95.2       3. Pelvic muscle wasting  N81.84           Discussion Items:   Discussed mgmt of vulvovaginal atrophy with vaginal estrogen cream. Reviewed associated benefits, risks, alternatives, and goals. Recommend low dose 2x/week treatment.    Discussed dietary and behavioral modifications for mgmt of urinary symptoms.  Discussed weight management and benefits of weight loss on urinary symptoms.  Reviewed AUA/SUFU guidelines on mgmt of non-neurogenic OAB.  Discussed pharmacologic and nonpharmacologic mgmt options of urinary symptoms - reviewed risks, benefits, alternatives, and goals of treatment.  Discussed specific risks related to OAB meds including, but not limited to dry mouth, constipation, blurry vision, cognitive changes, and BP elevation.    Treatment Plan:  Continue mirabegron 50 mg  Continue vag estrogen as prescribed  Bowel regimen  Bladder diet/drill  Pelvic floor exercises  Call with s/sx of UTI    All questions answered  She understands and agrees to plan    Return in about 6 months (around 7/20/2025) for UUI, UGA.    Amanda Odonnell PA-C    A minimum of 10 minutes was spent on this encounter including chart review, discussion  with patient/family, and documentation.    Note to patient: The 21st Century Cures Act makes medical notes like these available to patients in the interest of transparency.  However, be advised this is a medical document.  It is intended as peer to peer communication.  It is written in medical language and may contain abbreviations or verbiage that are unfamiliar.  It may appear blunt or direct.  Medical documents are intended to carry relevant information, facts as evident, and the clinical opinion of the practitioner.

## 2025-01-29 DIAGNOSIS — E78.00 HYPERCHOLESTEROLEMIA: ICD-10-CM

## 2025-01-29 RX ORDER — ATORVASTATIN CALCIUM 80 MG/1
80 TABLET, FILM COATED ORAL NIGHTLY
Qty: 90 TABLET | Refills: 0 | Status: SHIPPED | OUTPATIENT
Start: 2025-01-29

## 2025-01-29 NOTE — TELEPHONE ENCOUNTER
PASS  LOV 10/16/24  FU 6 MONTHS    Future Appointments   Date Time Provider Department Center   2/4/2025 11:30 AM Brandon Martinez DPM [id number]   2/11/2025 12:00 PM Rika Vinson,  [facility] [place]   2/12/2025 12:20 PM Xiang Torres MD [facility] [place]   2/12/2025  5:15 PM  MR RM[facility] (3T WIDE)  [facility]   3/14/2025 11:45 AM Scott Corona MD NP [facility]   7/21/2025 11:20 AM Amanda Odonnell PA [facility] None

## 2025-01-31 ENCOUNTER — TELEPHONE (OUTPATIENT)
Dept: UROLOGY | Facility: CLINIC | Age: 65
End: 2025-01-31

## 2025-01-31 DIAGNOSIS — R30.0 DYSURIA: Primary | ICD-10-CM

## 2025-01-31 RX ORDER — SULFAMETHOXAZOLE AND TRIMETHOPRIM 800; 160 MG/1; MG/1
1 TABLET ORAL 2 TIMES DAILY
Qty: 14 TABLET | Refills: 0 | Status: SHIPPED | OUTPATIENT
Start: 2025-01-31

## 2025-01-31 RX ORDER — SULFAMETHOXAZOLE AND TRIMETHOPRIM 800; 160 MG/1; MG/1
1 TABLET ORAL 2 TIMES DAILY
Qty: 14 TABLET | Refills: 0 | Status: SHIPPED | OUTPATIENT
Start: 2025-01-31 | End: 2025-01-31 | Stop reason: CLARIF

## 2025-01-31 NOTE — TELEPHONE ENCOUNTER
Telephone call received from patient.     Patient complains of UTI signs and symptoms including urgency, frequency, dysuria x 1 days    Denies fever    Allergies and previous cultures reviewed    Hx incomplete emptying:  N    Using Estrace: Y     Recent ucxs: (Past 12 months)  10/23/24 No Growth   10/02/24 50-99k Klebsiella tx Bactrim  09/16/24 UA RBC 3-5 Bacteria +1, no Cx sent, rx'd NTF x 5d but pt did not take  08/30/24 50-100k Multiple Species   06/20/24 No Growth     Discussed with AMADOU Lira Bactrim DS PO bid x 7 days    Urine culture ordered, will test @  lab    Empiric antibiotics sent to patient's preferred pharmacy     Encouraged PO hydration    All questions answered    Encouraged to call if symptoms worsen or fail to improve     Patient understands and agrees to plan

## 2025-02-11 ENCOUNTER — TELEPHONE (OUTPATIENT)
Dept: NEUROLOGY | Facility: CLINIC | Age: 65
End: 2025-02-11

## 2025-02-11 NOTE — TELEPHONE ENCOUNTER
Aimovig 140mg approved 1/1/2025-2/11/2026.  Approval letter faxed to [employer] with fax confirmation received.

## 2025-02-11 NOTE — TELEPHONE ENCOUNTER
Received request for Prior Authorization on Aimovig 140 mg/ml.     Completed through Epic    Auto approved    Prior Authorization History  Erenumab-aooe (AIMOVIG) 140 MG/ML Subcutaneous Solution Auto-injector     History    View all authorizations for this medication  Approved   2/11/2025  9:13 AM  Appeal supported: No   Note from payer: Your request was approved based on the initial information provided at the time of the coverage request submission. Please allow additional time for the final decision to be made and added to the patient's account.

## 2025-02-12 ENCOUNTER — HOSPITAL ENCOUNTER (OUTPATIENT)
Dept: MRI IMAGING | Facility: HOSPITAL | Age: 65
Discharge: HOME | End: 2025-02-12
Attending: Other
Payer: MEDICAID

## 2025-02-12 ENCOUNTER — OFFICE VISIT (OUTPATIENT)
Dept: RHEUMATOLOGY | Facility: CLINIC | Age: 65
End: 2025-02-12
Payer: MEDICAID

## 2025-02-12 VITALS
DIASTOLIC BLOOD PRESSURE: 66 MMHG | OXYGEN SATURATION: 99 % | TEMPERATURE: 99 F | WEIGHT: 121 LBS | BODY MASS INDEX: 23.75 KG/M2 | HEIGHT: 60 IN | RESPIRATION RATE: 16 BRPM | SYSTOLIC BLOOD PRESSURE: 118 MMHG | HEART RATE: 100 BPM

## 2025-02-12 DIAGNOSIS — G89.29 CHRONIC MIDLINE LOW BACK PAIN WITH RIGHT-SIDED SCIATICA: ICD-10-CM

## 2025-02-12 DIAGNOSIS — M54.41 CHRONIC MIDLINE LOW BACK PAIN WITH RIGHT-SIDED SCIATICA: ICD-10-CM

## 2025-02-12 DIAGNOSIS — Z95.1 S/P CABG X 2: ICD-10-CM

## 2025-02-12 DIAGNOSIS — G31.84 MILD COGNITIVE IMPAIRMENT WITH MEMORY LOSS: ICD-10-CM

## 2025-02-12 DIAGNOSIS — M18.11 PRIMARY OSTEOARTHRITIS OF FIRST CARPOMETACARPAL JOINT OF RIGHT HAND: Primary | ICD-10-CM

## 2025-02-12 DIAGNOSIS — M17.12 PRIMARY OSTEOARTHRITIS OF LEFT KNEE: ICD-10-CM

## 2025-02-12 DIAGNOSIS — M18.12 PRIMARY OSTEOARTHRITIS OF FIRST CARPOMETACARPAL JOINT OF LEFT HAND: ICD-10-CM

## 2025-02-12 DIAGNOSIS — M18.12 ARTHRITIS OF CARPOMETACARPAL (CMC) JOINT OF LEFT THUMB: ICD-10-CM

## 2025-02-12 PROCEDURE — 70551 MRI BRAIN STEM W/O DYE: CPT | Performed by: OTHER

## 2025-02-12 PROCEDURE — 20600 DRAIN/INJ JOINT/BURSA W/O US: CPT | Performed by: INTERNAL MEDICINE

## 2025-02-12 PROCEDURE — 20610 DRAIN/INJ JOINT/BURSA W/O US: CPT | Performed by: INTERNAL MEDICINE

## 2025-02-12 PROCEDURE — 99213 OFFICE O/P EST LOW 20 MIN: CPT | Performed by: INTERNAL MEDICINE

## 2025-02-12 RX ORDER — HYDROCODONE BITARTRATE AND ACETAMINOPHEN 10; 325 MG/1; MG/1
1 TABLET ORAL EVERY 6 HOURS PRN
Qty: 120 TABLET | Refills: 0 | Status: SHIPPED | OUTPATIENT
Start: 2025-04-13

## 2025-02-12 RX ORDER — DICLOFENAC SODIUM 75 MG/1
75 TABLET, DELAYED RELEASE ORAL 2 TIMES DAILY
Qty: 180 TABLET | Refills: 1 | Status: SHIPPED | OUTPATIENT
Start: 2025-02-12

## 2025-02-12 RX ORDER — HYDROCODONE BITARTRATE AND ACETAMINOPHEN 10; 325 MG/1; MG/1
1 TABLET ORAL EVERY 6 HOURS PRN
Qty: 120 TABLET | Refills: 0 | Status: SHIPPED | OUTPATIENT
Start: 2025-03-14

## 2025-02-12 RX ORDER — CYCLOBENZAPRINE HCL 10 MG
TABLET ORAL
COMMUNITY
Start: 2024-01-15 | End: 2025-02-12

## 2025-02-12 RX ORDER — HYDROCODONE BITARTRATE AND ACETAMINOPHEN 10; 325 MG/1; MG/1
1 TABLET ORAL EVERY 6 HOURS PRN
Qty: 120 TABLET | Refills: 0 | Status: SHIPPED | OUTPATIENT
Start: 2025-02-12

## 2025-02-12 RX ORDER — METHYLPREDNISOLONE ACETATE 40 MG/ML
40 INJECTION, SUSPENSION INTRA-ARTICULAR; INTRALESIONAL; INTRAMUSCULAR; SOFT TISSUE ONCE
Status: SHIPPED | OUTPATIENT
Start: 2025-02-12

## 2025-02-12 RX ORDER — CYCLOBENZAPRINE HCL 10 MG
10 TABLET ORAL 3 TIMES DAILY PRN
Qty: 60 TABLET | Refills: 1 | Status: SHIPPED | OUTPATIENT
Start: 2025-02-12

## 2025-02-12 NOTE — PROCEDURES
20610  Left knee injection  Left knee osteoarthritis  20600 x 2  Right and left injections of the cmc joints of the thumbs.  Left thumb cmc osteoarthritis  Right thumb cmc osteoarthritis.  After obtaining consent, and doing a timeout, left knee medially was identified, right and left thumb CMC  joints were identified, these 3 areas were cleaned with combination of Hibiclens and alcohol wipes.  Then the left knee was injected medially with 40 mg of methylprednisolone and 1 cc of 1% lidocaine.  Next the right thumb carpometacarpal joint was injected with 20 mg of methylprednisolone and a half a cc of 1% lidocaine.  Finally the left thumb carpometacarpal joint was injected with 20 mg of methylprednisolone and a half a cc of 1% lidocaine.  The patient tolerated the 3 injections without incident.

## 2025-02-12 NOTE — PROGRESS NOTES
[facility]  Dr. Torres Progress Note     Subjective:   Soumya King is a(n) 64 year old female.   Current complaints:   Chief Complaint   Patient presents with    Osteoarthritis    Follow - Up     LOV: 11/12/24  Patient is here for a follow up visit. Patient states that her knee is in lots of pain. Pain in hands (8/10) and left knee (10/10).   Rapid 3: 6.7   History of osteoarthritis of the left knee and osteoarthritis of the hands.   Last visit, 11/11/24  left knee was injected and right thumb cmc injected, both with cortisone.    Hands hurt bilaterally.  Would like a refill of the Norco.  Requesting injection of left knee and both cmc joints of the thumbs.  Having a MRI Brain today to check her memory.   Objective:   /66   Pulse 100   Temp 98.5 °F (36.9 °C)   Resp 16   Ht 5' (1.524 m)   Wt 121 lb (54.9 kg)   SpO2 99%   BMI 23.63 kg/m²   Lungs clear  Heart nsr  Left knee hypertrophic and tender  CMC joint right and left tender and spurred.   Assessment:     Encounter Diagnoses   Name Primary?    Primary osteoarthritis of left knee     S/P CABG x 2     Chronic midline low back pain with right-sided sciatica     Primary osteoarthritis of first carpometacarpal joint of right hand Yes    Arthritis of carpometacarpal (CMC) joint of left thumb     Primary osteoarthritis of first carpometacarpal joint of left hand      Plan:     Patient Instructions   Today the left knee was injected with cortisone and lidocaine.  Both thumb cmc joinbts injected with cortisone and lidocaine.  Go home and rest today.  Ice the left knee if needed.  Diclofenac 75 mg twice a day.  Cyclobenzaprine 10 mg as needed one twice a day for muscle spasm.  Norco for pain 10 mg as needed one every 6 hours.   Side effects and benefits of joint injections discussed.   Return to office 3 months.         Xiang Torres MD 2/12/2025 12:36 PM

## 2025-02-12 NOTE — PATIENT INSTRUCTIONS
Today the left knee was injected with cortisone and lidocaine.  Both thumb cmc joinbts injected with cortisone and lidocaine.  Go home and rest today.  Ice the left knee if needed.  Diclofenac 75 mg twice a day.  Cyclobenzaprine 10 mg as needed one twice a day for muscle spasm.  Norco for pain 10 mg as needed one every 6 hours.   Side effects and benefits of joint injections discussed.   Return to office 3 months.

## 2025-02-16 DIAGNOSIS — E11.65 UNCONTROLLED TYPE 2 DIABETES MELLITUS WITH HYPERGLYCEMIA, WITHOUT LONG-TERM CURRENT USE OF INSULIN (HCC): ICD-10-CM

## 2025-02-17 NOTE — TELEPHONE ENCOUNTER
Protocol passed     LOV: 10/16/24   RTC: 6 months  Filled: 11/21/24 #180 0 refill  Labs: 10/23/24   Future Appointments   Date Time Provider Department Center   2/18/2025  2:30 PM Brandon Martinez DPM [id number]   3/14/2025 11:45 AM [facility]   5/13/2025  3:40 PM Xiang Torres MD [facility] [place]   7/21/2025 11:20 AM Amanda Odonnell PA [facility] None

## 2025-03-05 ENCOUNTER — APPOINTMENT (OUTPATIENT)
Dept: HEMATOLOGY/ONCOLOGY | Age: 65
End: 2025-03-05
Attending: INTERNAL MEDICINE
Payer: MEDICAID

## 2025-03-05 ENCOUNTER — TELEPHONE (OUTPATIENT)
Dept: ORTHOPEDICS CLINIC | Facility: CLINIC | Age: 65
End: 2025-03-05

## 2025-03-05 NOTE — TELEPHONE ENCOUNTER
Called patient and verified name and . Informed her of results and Dr Martinez's recommendations. She will schedule 3 month Follow up appointment.

## 2025-03-05 NOTE — TELEPHONE ENCOUNTER
Left message for pt, calling regarding culture results from Dr Martinez, call [phone].  Sent pt [employer] message w/ results.    ----- Message from Brandon Martinez sent at 2/27/2025  1:14 PM CST -----  Results reviewed.  Please inform patient that fungal culture results are negative and we should proceed with kerasol therapy.  Please tell the patient to purchase it at their pharmacy of choice and follow label directions and then follow-up with me in 3 months.

## 2025-03-07 ENCOUNTER — APPOINTMENT (OUTPATIENT)
Dept: HEMATOLOGY/ONCOLOGY | Facility: HOSPITAL | Age: 65
End: 2025-03-07
Attending: INTERNAL MEDICINE
Payer: MEDICAID

## 2025-03-14 ENCOUNTER — APPOINTMENT (OUTPATIENT)
Age: 65
End: 2025-03-14
Attending: INTERNAL MEDICINE
Payer: MEDICAID

## 2025-03-14 DIAGNOSIS — K21.9 GASTROESOPHAGEAL REFLUX DISEASE WITHOUT ESOPHAGITIS: ICD-10-CM

## 2025-03-14 RX ORDER — OMEPRAZOLE 40 MG/1
40 CAPSULE, DELAYED RELEASE ORAL 2 TIMES DAILY
Qty: 60 CAPSULE | Refills: 0 | Status: SHIPPED | OUTPATIENT
Start: 2025-03-14

## 2025-03-14 NOTE — TELEPHONE ENCOUNTER
Requesting    Name from pharmacy: Omeprazole Dr 40 Mg Cap Romie         Will file in chart as: OMEPRAZOLE 40 MG Oral Capsule Delayed Release    Sig: Take 1 capsule (40 mg total) by mouth 2 (two) times daily.    Disp: 60 capsule    Refills: 0    Start: 3/14/2025    Class: Normal    Non-formulary For: Gastroesophageal reflux disease without esophagitis    Last ordered: 3 months ago (11/20/2024) by Rika Vinson DO    Last refill: 2/16/2025    Rx #: [id number]    Gastrointestional Medication Protocol [id number]/14/2025 09:30 AM   Protocol Details In person appointment or virtual visit in the past 12 mos or appointment in next 3 mos    Medication is active on med list        LOV: 10/16/2024  RTC: 6 months   Last Relevant Labs: n/a   Filled: 11/20/2024 #60 with 0 refills    Future Appointments   Date Time Provider Department Center   3/17/2025  3:00 PM Rika Vinson DO [facility] [place]   3/24/2025  4:10 PM Brynn Joy MD EN[facility]ER [facility] [place]   3/31/2025  1:00 PM Scott Corona MD NP [facility]   4/16/2025  4:00 PM Brandon Martinez DPM [id number]   5/13/2025  3:40 PM Xiang Torres MD [facility] [place]   7/21/2025 11:20 AM Amanda Odonnell PA [facility] None

## 2025-03-17 ENCOUNTER — LAB ENCOUNTER (OUTPATIENT)
Dept: LAB | Age: 65
End: 2025-03-17
Attending: INTERNAL MEDICINE
Payer: MEDICAID

## 2025-03-17 ENCOUNTER — OFFICE VISIT (OUTPATIENT)
Dept: INTERNAL MEDICINE CLINIC | Facility: CLINIC | Age: 65
End: 2025-03-17
Payer: MEDICAID

## 2025-03-17 VITALS
RESPIRATION RATE: 14 BRPM | OXYGEN SATURATION: 98 % | HEART RATE: 96 BPM | BODY MASS INDEX: 23.99 KG/M2 | TEMPERATURE: 98 F | DIASTOLIC BLOOD PRESSURE: 64 MMHG | SYSTOLIC BLOOD PRESSURE: 116 MMHG | HEIGHT: 60 IN | WEIGHT: 122.19 LBS

## 2025-03-17 DIAGNOSIS — E11.40 TYPE 2 DIABETES MELLITUS WITH DIABETIC NEUROPATHY, WITHOUT LONG-TERM CURRENT USE OF INSULIN (HCC): ICD-10-CM

## 2025-03-17 DIAGNOSIS — E78.5 TYPE 2 DIABETES MELLITUS WITH HYPERLIPIDEMIA (HCC): ICD-10-CM

## 2025-03-17 DIAGNOSIS — I25.10 CAD, MULTIPLE VESSEL: ICD-10-CM

## 2025-03-17 DIAGNOSIS — G43.009 MIGRAINE WITHOUT AURA AND WITHOUT STATUS MIGRAINOSUS, NOT INTRACTABLE: ICD-10-CM

## 2025-03-17 DIAGNOSIS — E11.69 TYPE 2 DIABETES MELLITUS WITH HYPERLIPIDEMIA (HCC): ICD-10-CM

## 2025-03-17 DIAGNOSIS — D47.2 MGUS (MONOCLONAL GAMMOPATHY OF UNKNOWN SIGNIFICANCE): ICD-10-CM

## 2025-03-17 DIAGNOSIS — Z71.6 ENCOUNTER FOR TOBACCO USE CESSATION COUNSELING: ICD-10-CM

## 2025-03-17 DIAGNOSIS — G47.33 OSA ON CPAP: ICD-10-CM

## 2025-03-17 DIAGNOSIS — J44.9 COPD, MILD (HCC): ICD-10-CM

## 2025-03-17 DIAGNOSIS — N39.46 MIXED STRESS AND URGE URINARY INCONTINENCE: ICD-10-CM

## 2025-03-17 DIAGNOSIS — F41.1 GAD (GENERALIZED ANXIETY DISORDER): ICD-10-CM

## 2025-03-17 DIAGNOSIS — Z00.00 ROUTINE PHYSICAL EXAMINATION: Primary | ICD-10-CM

## 2025-03-17 DIAGNOSIS — F33.1 MDD (MAJOR DEPRESSIVE DISORDER), RECURRENT EPISODE, MODERATE (HCC): ICD-10-CM

## 2025-03-17 DIAGNOSIS — K22.70 BARRETT'S ESOPHAGUS WITHOUT DYSPLASIA: ICD-10-CM

## 2025-03-17 PROBLEM — R32 ABSENCE OF BLADDER CONTINENCE: Status: RESOLVED | Noted: 2021-06-14 | Resolved: 2025-03-17

## 2025-03-17 PROBLEM — Z87.09 HISTORY OF PLEURAL EMPYEMA: Status: ACTIVE | Noted: 2025-03-17

## 2025-03-17 PROBLEM — D27.1: Status: RESOLVED | Noted: 2019-05-07 | Resolved: 2025-03-17

## 2025-03-17 PROBLEM — Z95.1 S/P CABG X 2: Status: RESOLVED | Noted: 2024-01-01 | Resolved: 2025-03-17

## 2025-03-17 PROBLEM — K21.9 GERD (GASTROESOPHAGEAL REFLUX DISEASE): Status: RESOLVED | Noted: 2018-05-01 | Resolved: 2025-03-17

## 2025-03-17 PROBLEM — Z98.890 HISTORY OF OVARIAN CYSTECTOMY: Status: ACTIVE | Noted: 2025-03-17

## 2025-03-17 PROBLEM — Z87.42 HISTORY OF OVARIAN CYSTECTOMY: Status: ACTIVE | Noted: 2025-03-17

## 2025-03-17 PROBLEM — J86.9 EMPYEMA OF RIGHT PLEURAL SPACE (HCC): Status: RESOLVED | Noted: 2024-05-30 | Resolved: 2025-03-17

## 2025-03-17 LAB
CHOLEST SERPL-MCNC: 164 MG/DL (ref ?–200)
EST. AVERAGE GLUCOSE BLD GHB EST-MCNC: 134 MG/DL (ref 68–126)
FASTING PATIENT LIPID ANSWER: NO
HBA1C MFR BLD: 6.3 % (ref ?–5.7)
HDLC SERPL-MCNC: 43 MG/DL (ref 40–59)
LDLC SERPL CALC-MCNC: 91 MG/DL (ref ?–100)
NONHDLC SERPL-MCNC: 121 MG/DL (ref ?–130)
TRIGL SERPL-MCNC: 172 MG/DL (ref 30–149)
VLDLC SERPL CALC-MCNC: 28 MG/DL (ref 0–30)

## 2025-03-17 PROCEDURE — 83036 HEMOGLOBIN GLYCOSYLATED A1C: CPT

## 2025-03-17 PROCEDURE — 90471 IMMUNIZATION ADMIN: CPT | Performed by: INTERNAL MEDICINE

## 2025-03-17 PROCEDURE — 90677 PCV20 VACCINE IM: CPT | Performed by: INTERNAL MEDICINE

## 2025-03-17 PROCEDURE — 99396 PREV VISIT EST AGE 40-64: CPT | Performed by: INTERNAL MEDICINE

## 2025-03-17 PROCEDURE — 80061 LIPID PANEL: CPT

## 2025-03-17 PROCEDURE — 90656 IIV3 VACC NO PRSV 0.5 ML IM: CPT | Performed by: INTERNAL MEDICINE

## 2025-03-17 PROCEDURE — 36415 COLL VENOUS BLD VENIPUNCTURE: CPT

## 2025-03-17 PROCEDURE — 90472 IMMUNIZATION ADMIN EACH ADD: CPT | Performed by: INTERNAL MEDICINE

## 2025-03-17 NOTE — PROGRESS NOTES
Patient Office Visit    ASSESSMENT AND PLAN:   1. Routine physical examination  Note: Please take 2000 IU of vitamin D daily for life to keep your bones strong. Please see your dentist every 6 months.  Continue with regular eye exams.  Pneumonia and the flu vaccine provided today and reminded patient to get the shingles and the tetanus vaccine through the pharmacy    2. COPD, mild (HCC) - Dx'd via PFTs done in 3/2015  Note: Follows with the pulmonologist.  Continue budesonide nebulizer as needed, Incruse Ellipta    3. MDD (major depressive disorder), recurrent episode, moderate (HCC)  Note: Follows with a psychiatrist and feels the current regimen of clonazepam, Pristiq and Seroquel helps    4. Type 2 diabetes mellitus with diabetic neuropathy, without long-term current use of insulin (HCC)  Note: We discussed about weaning off gabapentin but patient not interested.  She feels as long as she is not having any side effects from it she can continue the medicine    5. Type 2 diabetes mellitus with hyperlipidemia (HCC)  Note: Continue statin and metformin  - Hemoglobin A1C [E]; Future  - Lipid Panel; Future    6. Migraine without aura and without status migrainosus, not intractable  Note: Continue Aimovig by the neurologist    7. KIRK (generalized anxiety disorder)  Note: Currently on Pristiq and clonazepam per her psychiatrist    8. Jamison's esophagus without dysplasia  Note: Continue omeprazole    9. Encounter for tobacco use cessation counseling  Note: Discussed the risks of smoking and patient will try to quit on her own    10. SUMMER on CPAP  Note: Is not needing it as much after weight loss    11. MGUS (monoclonal gammopathy of unknown significance)  Note: Continue follow-up with the hematologist    12. CAD, multiple vessel s/p CABG  Note: Continue follow-up with the cardiologist.  Continue Plavix, beta-blocker, statin    13. Mixed stress and urge urinary incontinence  Note: On Myrbetriq    Return to clinic in 6  months       Patient/Caregiver Education: Patient/Caregiver Education: There are no barriers to learning. Medical education done. Outcome: Patient verbalizes understanding. Patient is notified to call with any questions, complications, allergies, or worsening or changing symptoms.  Patient is to call with any side effects or complications from the treatments as a result of today.      Reviewed Past Medical History and   Patient Active Problem List   Diagnosis    Jamison's esophagus    Tobacco use    Insomnia    COPD, mild (HCC) - Dx'd via PFTs done in 3/2015    Primary osteoarthritis of first carpometacarpal joint of right hand    Migraine without aura and without status migrainosus, not intractable    Hyperlipidemia    SUMMER on CPAP    MDD (major depressive disorder), recurrent episode, moderate (HCC)    Hypertension    Chronic pansinusitis    KIRK (generalized anxiety disorder)    History of pubovaginal sling    Vasomotor flushing    Primary osteoarthritis of left knee    Mass of spine    Type 2 diabetes mellitus with diabetic neuropathy (HCC)    CAD, multiple vessel s/p CABG    Type 2 diabetes mellitus with hyperlipidemia (HCC)    Iron deficiency anemia secondary to inadequate dietary iron intake    Status post thoracotomy    Osteoarthritis of carpometacarpal (CMC) joint of left thumb    History of pleural empyema    History of ovarian cystectomy    MGUS (monoclonal gammopathy of unknown significance)    Mixed stress and urge urinary incontinence       Orders Placed This Encounter   Procedures    Hemoglobin A1C [E]     Standing Status:   Future     Standing Expiration Date:   3/17/2026     Order Specific Question:   Release to patient     Answer:   Immediate    Lipid Panel     Standing Status:   Future     Standing Expiration Date:   3/17/2026    Fluzone trivalent vaccine, PF 0.5mL, 6mo+ (68090)    Prevnar 20 (PCV20) [75255]     Requested Prescriptions      No prescriptions requested or ordered in this encounter          Rika Vinson DO  CC:  Routine physical         HPI:   Soumya King is a 64 year old female who presents for a physical    Right sided rib pain: still having it since surgery. She is going to have a CT done in a couple weeks  Gabapentin: she is wondering if the medication is okay to be taken long-term  CAD/diabetes/MGUS/iron deficiency anemia  Stable depression: She feels the current regimen is working      Past Medical History:    Abdominal pain, other specified site    groin    Acute bronchitis    Allergic rhinitis    Anemia    DrDileep noted he saw since 2015    Anxiety    Arthritis    Back problem    Jamison esophagus    Chronic low back pain without sciatica    COPD (chronic obstructive pulmonary disease) (HCC)    Depression    Diabetes (HCC)    Difficult intubation    difficult to intubate with bladder surgery,instructed by anesthesia to communicate to future anesthesiologist. Small airway    Esophageal reflux    Essential hypertension    Don't remember    Fitting and adjustment of dental prosthetic device    High blood pressure    History of 2019 novel coronavirus disease (COVID-19)    No hospitalization,symptoms; Fever,fatigue ,body aches,headache no loss of taste or smell    Hx of motion sickness    Hyperlipidemia    Mass of spine    Migraines    barometric pressure    Myocardial infarction (HCC)    Osteoarthritis    Other ill-defined conditions(799.89)    Jamison's    Pain in joint, forearm    wrist    Pain in joint, pelvic region and thigh    hip    Pelvic mass    Personal history of urinary (tract) infection    Sleep apnea    I don't date    Stroke (HCC)    Mini stroke noted       Past Surgical History:   Procedure Laterality Date    Cabg  12/27/23    Colonoscopy      Colonoscopy N/A 05/18/2022    Procedure: COLONOSCOPY AND ESOPHAGOGASTRODUODENOSCOPY;  Surgeon: Miguel Camargo MD;  Location:  ENDOSCOPY    Decortication,pulmonary,total Right 06/05/2024    [facility] by   [facility]    Heart surgery      double bypass    Laparoscopic      sling operation for stress incontinence    Laparoscopy,diagnostic      Kay biopsy stereo nodule 2 site bilat (cpt=19081/12382) Left 2010    BENIGN 2 SITES    Kay biopsy stereo w/calc 2 site left (cpt=19081/32534)  2010    benign x 2    Kay stereo-clip w/calc 2 site left      Oophorectomy Left 2019    Other  2022    RIGHT SACRAL SUBCUTANEOUS CYST EXCISION    Other surgical history  ,     Bladder Sling, Large mass removed from left abdomen and ovar    Tonsillectomy      Was a child, have no idea    Tubal ligation      Upper gi endoscopy,exam         Social History:  Social History     Socioeconomic History    Marital status: Single   Tobacco Use    Smoking status: Former     Current packs/day: 0.00     Average packs/day: 1.5 packs/day for 48.0 years (72.0 ttl pk-yrs)     Types: Cigarettes     Start date:      Quit date: 2024     Years since quittin.8    Smokeless tobacco: Never    Tobacco comments:     Quit cigarettes completely but still smoke and JUUL or vapes.   Vaping Use    Vaping status: Every Day    Start date: 2019    Substances: Nicotine, Flavoring    Devices: Disposable, Pre-filled pod   Substance and Sexual Activity    Alcohol use: Not Currently     Comment: On occasion    Drug use: No    Sexual activity: Not Currently     Partners: Male   Other Topics Concern    Caffeine Concern Yes    Stress Concern Yes    Weight Concern Yes    Special Diet No    Exercise No    Seat Belt Yes     Social Drivers of Health     Food Insecurity: Not on File (2024)    Received from [facility]    Food Insecurity     Food: 0   Transportation Needs: No Transportation Needs (2024)    Transportation Needs     Lack of Transportation: No   Stress: Not on File (10/7/2022)    Received from [facility]    Stress     Stress: 0   Housing Stability: Low Risk  (2024)    Housing Stability     Housing Instability:  No     Family History:  Family History   Problem Relation Age of Onset    Arthritis Mother     Other (AMI) Mother     Other (diabetes mellitus) Mother     Diabetes Mother     Stroke Mother         Many mini strokes     Allergies:  Allergies[1]  Current Meds:  Medications Ordered Prior to Encounter[2]      REVIEW OF SYSTEMS   Constitutional: no fatigue normal energy no weight changes   HENT: normal sinuses and no mouth issues   Eyes: . normal vision no eye pain   Respiratory: normal respirations no cough   Cardiovascular: no CP, or palpitations   Gastrointestinal: normal bowels and no abd pains   Genitourinary:  normal urination no hematuria, no frequency   Musculoskeletal: no pains in arms/legs, normal range of motion   Skin: no rashes or skin lesions that are new   Neurological:  no weakness, no numbness, normal gait   Hematological:  no bruises or bleeding   Psychiatric/Behavioral: normal mood no anxiety normal behavior     Tobacco:  Social History     Tobacco Use   Smoking Status Former    Current packs/day: 0.00    Average packs/day: 1.5 packs/day for 48.0 years (72.0 ttl pk-yrs)    Types: Cigarettes    Start date:     Quit date: 2024    Years since quittin.8   Smokeless Tobacco Never   Tobacco Comments    Quit cigarettes completely but still smoke and JUUL or vapes.     E-Cigarettes/Vaping       Questions Responses    E-Cigarette Use Current Every Day User    Start Date 19    Cartridges/Day 1 cartridge daily          E-Cigarette/Vaping Substances       Questions Responses    Nicotine Yes    THC No    CBD No    Flavoring Yes          E-Cigarette/Vaping Devices       Questions Responses    Disposable Yes    Pre-filled Pod Yes           Tobacco cessation counseling for <3 minutes.  She smoked tobacco in the past but quit greater than 12 months ago.  Social History     Tobacco Use   Smoking Status Former    Current packs/day: 0.00    Average packs/day: 1.5 packs/day for 48.0 years (72.0 ttl pk-yrs)     Types: Cigarettes    Start date:     Quit date: 2024    Years since quittin.8   Smokeless Tobacco Never   Tobacco Comments    Quit cigarettes completely but still smoke and JUUL or vapes.          /64 (BP Location: Left arm, Patient Position: Sitting, Cuff Size: adult)   Pulse 96   Temp 97.6 °F (36.4 °C) (Temporal)   Resp 14   Ht 5' (1.524 m)   Wt 122 lb 3.2 oz (55.4 kg)   SpO2 98%   BMI 23.87 kg/m²     PHYSICAL EXAM:   Constitutional: Vital signs reviewed as noted, well developed, in no acute distress.   HENT: NCAT, bilateral ear canal and tympanic membrane appear normal  Eyes: pupils reactive bilaterally  Neck: No thyroidmegaly  Cardiovascular: nl s1 s2 no m/r/g  Pulmonary/Chest: CTA bilaterally with no wheezes  Abdominal: Soft NT normal Bowel sounds  Musculoskeletal:  normal ROM in UE and LE  Extremities: no pedal edema   Neurological:  no weakness in UE and LE, reflexes are normal. Bilateral barefoot skin diabetic exam is normal, visualized feet and the appearance is normal.  Bilateral monofilament/sensation of both feet is normal.  Pulsation pedal pulse exam of both lower legs/feet is normal as well.  Skin: no rashes or bruises on visualized skin, not undressed   Psychiatric:normal mood              [1]   Allergies  Allergen Reactions    Naprosyn [Naproxen] ANAPHYLAXIS     Has tolerated ibuprofen and IV toradol     Aspirin OTHER (SEE COMMENTS)     Difficulty swallowing due to  saavedra's esophagus   [2]   Current Outpatient Medications on File Prior to Visit   Medication Sig Dispense Refill    topiramate 50 MG Oral Tab TAKE ONE TABLET BY MOUTH EVERY MORNING AND TWO TABLETS EVERY EVENING (Patient taking differently: Take 1 tablet (50 mg total) by mouth in the morning and 1 tablet (50 mg total) before bedtime. TAKE ONE TABLET BY MOUTH EVERY MORNING AND TWO TABLETS EVERY EVENING.) 270 tablet 2    OMEPRAZOLE 40 MG Oral Capsule Delayed Release Take 1 capsule (40 mg total) by mouth 2 (two)  times daily. 60 capsule 0    METFORMIN 500 MG Oral Tab Take 1 tablet (500 mg total) by mouth 2 (two) times daily. 180 tablet 0    HYDROcodone-acetaminophen  MG Oral Tab Take 1 tablet by mouth every 6 (six) hours as needed. 120 tablet 0    cyclobenzaprine 10 MG Oral Tab Take 1 tablet (10 mg total) by mouth 3 (three) times daily as needed for Muscle spasms. 60 tablet 1    HYDROcodone-acetaminophen  MG Oral Tab Take 1 tablet by mouth every 6 (six) hours as needed. 120 tablet 0    [START ON 4/13/2025] HYDROcodone-acetaminophen  MG Oral Tab Take 1 tablet by mouth every 6 (six) hours as needed. 120 tablet 0    diclofenac 75 MG Oral Tab EC Take 1 tablet (75 mg total) by mouth 2 (two) times daily. 180 tablet 1    ATORVASTATIN 80 MG Oral Tab Take 1 tablet (80 mg total) by mouth nightly. 90 tablet 0    budesonide 0.25 MG/2ML Inhalation Suspension Take 2 mL (0.25 mg total) by nebulization 2 (two) times daily. 60 mL 3    albuterol 108 (90 Base) MCG/ACT Inhalation Aero Soln Inhale 2 puffs into the lungs every 6 (six) hours as needed for Wheezing. inhale 2 puff by inhalation route  every 4 - 6 hours as needed 1 each 5    azelastine 137 MCG/SPRAY Nasal Solution 1 spray by Nasal route 2 (two) times daily. 30 mL 5    Respiratory Therapy Supplies (FULL KIT NEBULIZER SET) Does not apply Misc       gabapentin 600 MG Oral Tab Take 1 tablet (600 mg total) by mouth nightly. 90 tablet 1    VOLTAREN 1 % External Gel Apply 4 g topically 4 (four) times daily. 300 g 1    INCRUSE ELLIPTA 62.5 MCG/ACT Inhalation Aerosol Powder, Breath Activated Inhale 1 puff into the lungs daily. 30 each 11    FLOWFLEX COVID-19 AG HOME TEST In Vitro Kit 1 Swab by In Vitro route one time for 1 dose. 10 kit 0    nicotine 7 MG/24HR Transdermal Patch 24 Hr Place 1 patch onto the skin daily. 30 patch 2    Lancets (ONETOUCH DELICA PLUS [id number]) Does not apply Misc 1 lancet  by Finger stick route 3 (three) times daily. 300 each 1    diclofenac 1 %  External Gel Apply 4 g topically 4 (four) times daily. 300 g 1    furosemide 20 MG Oral Tab Take 1 tablet (20 mg total) by mouth daily. 30 tablet 2    Erenumab-aooe (AIMOVIG) 140 MG/ML Subcutaneous Solution Auto-injector Inject 1 mL (140 mg total) into the skin every 30 (thirty) days. 1 mL 2    Mirabegron ER (MYRBETRIQ) 50 MG Oral Tablet 24 Hr Take 1 tablet (50 mg total) by mouth daily. 90 tablet 3    Naloxone HCl 4 MG/0.1ML Nasal Liquid 4 mg by Nasal route as needed. If patient remains unresponsive, repeat dose in other nostril 2-5 minutes after first dose. 1 kit 0    PRISTIQ 100 MG Oral Tablet 24 Hr Take 1 tablet (100 mg total) by mouth daily.      Rizatriptan Benzoate 10 MG Oral Tab Take 1 tablet (10 mg total) by mouth as needed for Migraine. 10 tablet 5    Glucose Blood (ONETOUCH ULTRA) In Vitro Strip Tests 3 x daily 300 each 1    Coenzyme Q10 200 MG Oral Cap Take 200 mg by mouth 2 (two) times daily.      metoprolol succinate ER 50 MG Oral Tablet 24 Hr Take 1 tablet (50 mg total) by mouth daily. 90 tablet 0    Blood Glucose Monitoring Suppl (ONETOUCH ULTRA 2) w/Device Does not apply Kit 1 Device by Other route in the morning, at noon, and at bedtime. Use as directed. 1 kit 0    clopidogrel 75 MG Oral Tab Take 1 tablet (75 mg total) by mouth daily.      aspirin 81 MG Oral Tab EC Take 1 tablet (81 mg total) by mouth daily.      clonazePAM 0.5 MG Oral Tab Take 1 tablet (0.5 mg total) by mouth 2 (two) times daily. 12 tablet 0    Multiple Vitamins-Minerals (ONE-A-DAY WOMENS 50+) Oral Tab Take 1 tablet by mouth daily.  0    QUEtiapine Fumarate  MG Oral Tablet 24 Hr Take 1 tablet (200 mg total) by mouth every evening. 30 tablet 0    Calcium Carb-Cholecalciferol 500-200 MG-UNIT Oral Tab Take 1 tablet by mouth daily.         Current Facility-Administered Medications on File Prior to Visit   Medication Dose Route Frequency Provider Last Rate Last Admin    methylPREDNISolone acetate (DEPO-medrol) 40 MG/ML injection  40 mg  40 mg Intra-articular Once         methylPREDNISolone acetate (DEPO-medrol) 40 MG/ML injection 40 mg  40 mg Intra-articular Once

## 2025-03-31 ENCOUNTER — APPOINTMENT (OUTPATIENT)
Age: 65
End: 2025-03-31
Attending: INTERNAL MEDICINE
Payer: MEDICAID

## 2025-04-01 ENCOUNTER — HOSPITAL ENCOUNTER (OUTPATIENT)
Dept: CT IMAGING | Facility: HOSPITAL | Age: 65
Discharge: HOME | End: 2025-04-01
Payer: MEDICAID

## 2025-04-01 DIAGNOSIS — R91.8 LUNG NODULES: ICD-10-CM

## 2025-04-01 DIAGNOSIS — G43.709 CHRONIC MIGRAINE W/O AURA W/O STATUS MIGRAINOSUS, NOT INTRACTABLE: ICD-10-CM

## 2025-04-01 PROCEDURE — 71250 CT THORAX DX C-: CPT

## 2025-04-01 RX ORDER — ERENUMAB-AOOE 140 MG/ML
140 INJECTION, SOLUTION SUBCUTANEOUS
Qty: 1 ML | Refills: 0 | Status: SHIPPED | OUTPATIENT
Start: 2025-04-01

## 2025-04-01 NOTE — TELEPHONE ENCOUNTER
Medication: AIMOVIG 140 MG/ML Subcutaneous Solution Auto-injector      Date of last refill: 09/19/2023 (#1 mL/0)-discon  [place]     Last office visit: 01/13/2025  Due back to clinic per last office note:  6 months  Date next office visit scheduled:    Future Appointments   Date Time Provider Department Center   4/1/2025  2:30 PM EH CT MAIN RM4 [facility]   4/16/2025  4:00 PM Brandon Martinez DPM [id number]   5/13/2025  3:40 PM Xiang Torres MD [id number] [place]   7/21/2025 11:20 AM Amanda Odonnell PA [facility] None   11/17/2025  1:00 PM Scott Corona MD NP [facility]           Last OV note recommendation:    ASSESSMENT/PLAN:          ICD-10-CM     1. Chronic migraine w/o aura w/o status migrainosus, not intractable  G43.709         2. Mild cognitive impairment with memory loss  G31.84 MRI BRAIN (CPT=70551)          Mild cognitive impairment with very loss suspected combination of vascular etiology and underlying disorder. Based on in office cognitive assessment no evidence of Dementia     Obtain MRI of the brain to assess the vascular burden and to rule out any silent infarct  Counseled on cognitive exercises  Will wean off Topamax slowly  Advised to discuss with her psychiatrist about dose adjustment        2. Chronic migraines- stable  Continue Aimovig 140 mg monthly injections  Reduce Topamax to 50 mg AM and 50 mg PM and if headaches stable go down to 50 mg daily        RTC in about 6 months     See orders and medications filed with this encounter. The patient indicates understanding of these issues and agrees with the plan.

## 2025-04-02 ENCOUNTER — MED REC SCAN ONLY (OUTPATIENT)
Facility: CLINIC | Age: 65
End: 2025-04-02

## 2025-04-02 DIAGNOSIS — R91.8 LUNG NODULES: ICD-10-CM

## 2025-04-02 DIAGNOSIS — R59.0 THORACIC LYMPHADENOPATHY: Primary | ICD-10-CM

## 2025-04-04 ENCOUNTER — TELEPHONE (OUTPATIENT)
Facility: CLINIC | Age: 65
End: 2025-04-04

## 2025-04-04 NOTE — TELEPHONE ENCOUNTER
Advised pt that the approval process has been started and she can check in with us next Friday to check status.  Pt verbalizes understanding.

## 2025-04-10 ENCOUNTER — TELEPHONE (OUTPATIENT)
Dept: CASE MANAGEMENT | Age: 65
End: 2025-04-10

## 2025-04-10 ENCOUNTER — TELEPHONE (OUTPATIENT)
Facility: CLINIC | Age: 65
End: 2025-04-10

## 2025-04-10 DIAGNOSIS — M54.41 CHRONIC MIDLINE LOW BACK PAIN WITH RIGHT-SIDED SCIATICA: ICD-10-CM

## 2025-04-10 DIAGNOSIS — Z95.1 S/P CABG X 2: ICD-10-CM

## 2025-04-10 DIAGNOSIS — M17.12 PRIMARY OSTEOARTHRITIS OF LEFT KNEE: ICD-10-CM

## 2025-04-10 DIAGNOSIS — G89.29 CHRONIC MIDLINE LOW BACK PAIN WITH RIGHT-SIDED SCIATICA: ICD-10-CM

## 2025-04-10 NOTE — TELEPHONE ENCOUNTER
Discussed with Riana ROSARIO, hold off on appealing for PET scan. Dr. Main will see patient next week and go over nodify test results as well as plan of care. Patient called and made aware of the above. Made aware we will appeal PET scan if Dr. Main is agreeable to have it done so. Patient verbalized understanding.

## 2025-04-10 NOTE — TELEPHONE ENCOUNTER
Inocente Mayers,        Status: DENIED        Reference number [id number]     A copy of the denial letter is filed under the MEDIA tab, reference for complete details. You may reach out to Caio at [phone] to discuss decision.     Please reach out to patient with plan of care.      Thank you  Mireya GARCES

## 2025-04-10 NOTE — TELEPHONE ENCOUNTER
Patients PET was denied so she needs help from our office to get that approved. She would also like to go over her notify results again. She is very nervous.

## 2025-04-11 RX ORDER — HYDROCODONE BITARTRATE AND ACETAMINOPHEN 10; 325 MG/1; MG/1
1 TABLET ORAL EVERY 6 HOURS PRN
Qty: 16 TABLET | Refills: 0 | Status: SHIPPED | OUTPATIENT
Start: 2025-04-11

## 2025-04-11 NOTE — TELEPHONE ENCOUNTER
Phoned pharmacy, medication filled on 3/12 and dispensed on 3/14. Patient would be due on 4/13. Please advise

## 2025-04-11 NOTE — TELEPHONE ENCOUNTER
Called pt and informed her that a short supply of the medication was sent to be picked up today. Pt verbalized understanding

## 2025-04-16 ENCOUNTER — OFFICE VISIT (OUTPATIENT)
Facility: CLINIC | Age: 65
End: 2025-04-16
Payer: MEDICAID

## 2025-04-16 VITALS
RESPIRATION RATE: 15 BRPM | HEART RATE: 84 BPM | OXYGEN SATURATION: 91 % | BODY MASS INDEX: 24.74 KG/M2 | WEIGHT: 126 LBS | DIASTOLIC BLOOD PRESSURE: 70 MMHG | HEIGHT: 60 IN | SYSTOLIC BLOOD PRESSURE: 118 MMHG

## 2025-04-16 DIAGNOSIS — J18.9 PNEUMONIA OF RIGHT LUNG DUE TO INFECTIOUS ORGANISM, UNSPECIFIED PART OF LUNG: ICD-10-CM

## 2025-04-16 DIAGNOSIS — R59.0 THORACIC LYMPHADENOPATHY: ICD-10-CM

## 2025-04-16 DIAGNOSIS — J43.2 CENTRILOBULAR EMPHYSEMA (HCC): Primary | ICD-10-CM

## 2025-04-16 DIAGNOSIS — R91.8 LUNG NODULES: ICD-10-CM

## 2025-04-16 DIAGNOSIS — Z72.0 TOBACCO USE: ICD-10-CM

## 2025-04-16 PROCEDURE — 99214 OFFICE O/P EST MOD 30 MIN: CPT | Performed by: INTERNAL MEDICINE

## 2025-04-16 NOTE — PROGRESS NOTES
[facility] Progress Note    History of Present Illness:  Soumya King is a 64 year old female smoker (40+ pack years) with significant PMH of DM, COPD, HTN, GERD who presents today for follow up of lung nodules and dyspnea. Since last visit she feels 'okay', has mild chronic cough and dyspnea. No pain.no fevers  Using incruse daily.  Has albuterol but rarely uses/needs.  Not smoking but +vaping    10/2024 previously BECKY Santiago  Soumya King is a 64 year old female who presents today for follow up of CT chest.   Reports starting second round of antibiotics (amoxicillin) for possible pneumonia on chest xrays per PCP. Reports cough, runny nose, chest pain, SOB for about 3 weeks, saw PCP who prescribed antibiotics. She has been hospitalized twice over the year for pneumonia, January and July 2024.   Prior to these issues denied any respiratory complaints. Has been taking incruse daily and nasal sprays, has not needed to use albuterol.   Denies fever, chills, weight loss.   Denies smoking nicotine, but uses vape pen with little nicotine inside; not interested in quitting due to helps with stress. PCP prescribed low dose of nicotine patches. Reports not helping with her cravings.      Previously 7/2024 Dr Main   Soumya King is a 64 year old female smoker (40+ pack years) with significant PMH of DM, COPD, HTN, GERD who presents today for follow up of lung nodules and dyspnea. Since last visit she was hospitalized in May/June with pneumonia and lung abscess s/p chest tube then right VATS/decortication. She was rehospitalized in June with FTT and now presents for follow up. She feels much better. No cough or dyspnea  Using incruse daily.  Has albuterol but rarely uses/needs.  Not smoking but +vaping     May 2024 previously  Since last visit she was hospitalized in January 2024 with chest pain, found to have NSTEMI and underwent CABG.  She feels better since but still vaping, did quit smoking.   Thinks  her breathing and coughing are improved on incruse daily.  Does continue to have daily cough with phlegm.   Thinks she was sick in late 2024 with sinus infection, resolved post abx.      2023 previously   She reports having had dyspnea on exertion over the past several years. Did have some improvement after having abdominal surgery, had left ovarian mass (cystadenoma) removed in 2019. Over the past 1-2 years she thinks her breathing has steadily worsened. Denies needing to stop walking but admits she is not very active, does not exercise  +cough with white/yellow phlegm, no hemoptysis.   Has bronchitis nearly every year in winter time.  She had a f/u LDCT in 2023 which found new nodules in the CHARI - thinks she was sick around the time of the scan. She had a f/u CT earlier this month, which showed the previous nodules have resolved, but had new large nodules in the right lung leading to her evaluation here.   She admits she was sick with chest congestion and sinus congestion in late 2023.  Has been vaping, but did smoke.      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    Past Medical History:   Past Medical History[1]     Past Surgical History: Past Surgical History[2]    Family Medical History: Family History[3]     Social History:   Social History     Socioeconomic History    Marital status: Single     Spouse name: Not on file    Number of children: Not on file    Years of education: Not on file    Highest education level: Not on file   Occupational History    Not on file   Tobacco Use    Smoking status: Former     Current packs/day: 0.00     Average packs/day: 1.5 packs/day for 48.0 years (72.0 ttl pk-yrs)     Types: Cigarettes     Start date:      Quit date:      Years since quittin.2    Smokeless tobacco: Never    Tobacco comments:     Quit cigarettes completely but still smoke and JUUL or vapes.   Vaping Use     Vaping status: Every Day    Start date: 1/1/2019    Substances: Nicotine, Flavoring    Devices: Disposable, Pre-filled pod   Substance and Sexual Activity    Alcohol use: Not Currently     Comment: On occasion    Drug use: No    Sexual activity: Not Currently     Partners: Male   Other Topics Concern     Service Not Asked    Blood Transfusions Not Asked    Caffeine Concern Yes    Occupational Exposure Not Asked    Hobby Hazards Not Asked    Sleep Concern Not Asked    Stress Concern Yes    Weight Concern Yes    Special Diet No    Back Care Not Asked    Exercise No    Bike Helmet Not Asked    Seat Belt Yes    Self-Exams Not Asked   Social History Narrative    Not on file     Social Drivers of Health     Food Insecurity: Not on File (9/26/2024)    Received from [facility]    Food Insecurity     Food: 0   Transportation Needs: No Transportation Needs (6/19/2024)    Transportation Needs     Lack of Transportation: No     Car Seat: Not on file   Stress: Not on File (10/7/2022)    Received from [facility]    Stress     Stress: 0   Housing Stability: Low Risk  (6/19/2024)    Housing Stability     Housing Instability: No     Housing Instability Emergency: Not on file     Crib or Bassinette: Not on file        Medications: Current Medications[4]    Review of Systems: Review of Systems   Constitutional: Negative.    HENT: Negative.     Respiratory:  Positive for cough and shortness of breath. Negative for wheezing and stridor.    All other systems reviewed and are negative.       Physical Exam:  /70 (BP Location: Left arm, Patient Position: Sitting, Cuff Size: adult)   Pulse 84   Resp 15   Ht 5' (1.524 m)   Wt 126 lb (57.2 kg)   SpO2 91%   BMI 24.61 kg/m²      Constitutional: alert, cooperative. No acute distress.  HEENT: Head NC/AT. Mask in place  Cardio: Regular rate and rhythm. Normal S1 and S2. No murmurs.   Respiratory: Thorax symmetrical with no labored breathing. Clear to ausculation bilaterally with  symmetrical breath sounds. No wheezing, rhonchi, rales, or crackles.   GI: NABS. Abd soft, non-tender.  Extremities: No clubbing or cyanosis. No BLE edema.    Neurologic: A&Ox3. No gross motor deficits.  Skin: Warm, dry  Psych: Calm, cooperative. Pleasant affect.    Results:  Personally reviewed    WBC: 10, done on 10/23/2024.  HGB: 10.1, done on 10/23/2024.  PLT: 367, done on 10/23/2024.     Glucose: 114, done on 1/17/2025.  Cr: 1, done on 1/17/2025.  Last eGFR was 64 on 1/17/2025.  CA: 9.6, done on 1/17/2025.  Na: 142, done on 1/17/2025.  K: 4.2, done on 1/17/2025.  Cl: 108, done on 1/17/2025.  CO2: 28, done on 1/17/2025.  Last ALB was 3.88% done on 11/12/2024.     CT CHEST LD NO CAD(CPT=71250)  Result Date: 4/1/2025  CONCLUSION:   Waxing/waning tree-in-bud type opacities throughout the right lung, additionally with new nodular consolidative opacities of the right middle and right lower lobes, as above.  An infectious/inflammatory process is favored, possibly of fungal or mycobacterial origin.  No associated pleural effusion.  Continued imaging surveillance advised.  LOCATION:  [id number]   Dictated by (CST): Donald Moffett MD on 4/01/2025 at 3:55 PM     Finalized by (CST): Donald Moffett MD on 4/01/2025 at 4:03 PM       MRI BRAIN (GQZ=19211)  Result Date: 2/13/2025  CONCLUSION:  1. No acute infarct, acute intracranial hemorrhage, or hydrocephalus. 2. Mild chronic small vessel ischemic disease.  Punctate chronic infarcts at the right basal ganglia.   LOCATION:  [id number]   Dictated by (CST): Stromberg, LeRoy, MD on 2/13/2025 at 8:15 AM     Finalized by (CST): Stromberg, LeRoy, MD on 2/13/2025 at 8:21 AM       [facility] BEN 2D+3D SCREENING BILAT (CPT=77067/54084)  Result Date: 1/17/2025  CONCLUSION:  No mammographic evidence of malignancy.  BI-RADS CATEGORY:  DIAGNOSTIC CATEGORY 2 - BENIGN FINDING:   RECOMMENDATIONS:  ROUTINE MAMMOGRAM AND CLINICAL EVALUATION IN 12 MONTHS.   Because of breast density, this patient may benefit from  supplemental screening with breast MRI, Molecular Breast Imaging (MBI) or bilateral whole breast ultrasound for increased sensitivity for detection of cancer which can be obscured mammographically.   Please contact your ordering provider to discuss supplemental screening options.    A letter explaining the results in lay terms has been sent to the patient.  This exam was evaluated with a computer-aided device.  This patient's information has been entered into a reminder system with a target due date for the next mammogram.   LOCATION:  [email or web address]   Dictated by (CST): Roc Mosqueda MD on 1/17/2025 at 2:03 PM     Finalized by (CST): Roc Mosqueda MD on 1/17/2025 at 2:06 [place] [address] [phone]       Assessment/Plan:  #1. Lung nodules  Noted first on LDCT  4/2023 LDCT with multiple nodular opacities in CHARI -- she reports being sick around time of scan  11/2023 CT chest with resolution of previous CHARI nodules but now with new nodules and GGO in RUL and RLL. +thoracic lymphadenopathy  3/2024 CT chest with improvement in multiple nodules, but stable RUL nodules. Stable LAD  4/2024 CT myeloma with new RLL nodule. Other nodules appear stable   6/2024 CT chest with RLL cavitary lesion and LAD  She was hospitalized June 2024 with pneumonia/lung abscess and empyema s/p chest tube then right VATS/decort with improvement  10/2024 follow up CT Chest Interval decrease in size of cavitary masslike lesion in the right lower lobe posteriorly with surrounding infiltrate when compared to 6/18/2024.  Today this measures 3.4 x 2.3 cm collectively previously 4.1 x 3.1 cm.  New nodular opacity in the right lower lobe measuring 1.8 x 0.7 cm.  Scattered pulmonary nodules in the right lung otherwise appear relatively stable ranging in size from 3-6 mm.  Increasing reticular nodular infiltrate in the right middle lobe and right lower lobe laterally.   4/2024 CT chest with decrease in RLL  cavitary nodule but TWO NEW nodule in RML AND RLL  We reviewed her imaging in detail including discussion of differential diagnoses and management. We reviewed options at this time including considering PET/CT now vs repeat CT chest in 3months vs biopsy or surgery.  We discussed the pros/cons of each. I recommended she proceed with PET/CT now which she is agreeable. Next steps dependent on results of PET/CT  Will need to send appeal to her insurance because oddly her insurance refused PET     #2. emphysema  40+ pack year smoker, active vaping  Previous 2015 PFTs with moderate obstruction, +air trapping and reduced DLCO  4/2024 PFTs with no obstruction, normal lung volumes. Reduced DLCO  Again advised to stop vaping any substance - she is going to work on quitting  Continue on incruse daily  Albuterol PRN     #4. Thoracic lymphadenopathy  As above     #5. Tobacco abuse  40+ pack year smoker, active vaping  advised to stop vaping any substance - she will work on quitting   Lung Cancer Counseling and Shared Decision Making Session in an Asymptomatic Smoker/Former Smoker   Soumya King is a 64 year old female without current symptoms of lung cancer.  History   Smoking Status    Former    Types: Cigarettes   Smokeless Tobacco    Never        She received information on the importance of adherence to annual lung cancer Low Dose CT (LDCT) screening, the impact of her comorbidities and her ability or willingness to undergo diagnosis and treatment. she qualifies for low dose CT lung cancer screening, however due to workup above, low dose CT lung cancer screening should be postponed until she completes testing.    We counseled the importance of maintaining cigarette smoking abstinence if she is a former smoker and the importance of smoking cessation if she is a current smoker and we discussed and furnished information about tobacco cessation interventions.       Greyson Main MD  4/16/2025         [1]   Past Medical  History:   Abdominal pain, other specified site    groin    Acute bronchitis    Allergic rhinitis    Anemia     noted he saw since 2015    Anxiety    Arthritis    Back problem    Jamison esophagus    Chronic low back pain without sciatica    COPD (chronic obstructive pulmonary disease) (HCC)    Depression    Diabetes (HCC)    Difficult intubation    difficult to intubate with bladder surgery,instructed by anesthesia to communicate to future anesthesiologist. Small airway    Esophageal reflux    Essential hypertension    Don't remember    Fitting and adjustment of dental prosthetic device    High blood pressure    History of 2019 novel coronavirus disease (COVID-19)    No hospitalization,symptoms; Fever,fatigue ,body aches,headache no loss of taste or smell    Hx of motion sickness    Hyperlipidemia    Mass of spine    Migraines    barometric pressure    Myocardial infarction (HCC)    Osteoarthritis    Other ill-defined conditions(799.89)    Jamison's    Pain in joint, forearm    wrist    Pain in joint, pelvic region and thigh    hip    Pelvic mass    Personal history of urinary (tract) infection    Sleep apnea    I don't date    Stroke (HCC)    Mini stroke noted   [2]   Past Surgical History:  Procedure Laterality Date    Cabg  12/27/23    Colonoscopy      Colonoscopy N/A 05/18/2022    Procedure: COLONOSCOPY AND ESOPHAGOGASTRODUODENOSCOPY;  Surgeon: Miguel Camargo MD;  Location:  ENDOSCOPY    Decortication,pulmonary,total Right 06/05/2024    VATS decortication by Dr. Maldonado    Heart surgery  2024    double bypass    Laparoscopic  2007    sling operation for stress incontinence    Laparoscopy,diagnostic      Kay biopsy stereo nodule 2 site bilat (cpt=19081/25651) Left 02/2010    BENIGN 2 SITES    Kay biopsy stereo w/calc 2 site left (cpt=19081/71797)  02/2010    benign x 2    Kay stereo-clip w/calc 2 site left  2010    Oophorectomy Left 06/28/2019    Other  08/31/2022    RIGHT SACRAL SUBCUTANEOUS CYST  EXCISION    Other surgical history  2000, 2019    Bladder Sling, Large mass removed from left abdomen and ovar    Tonsillectomy      Was a child, have no idea    Tubal ligation      Upper gi endoscopy,exam     [3]   Family History  Problem Relation Age of Onset    Arthritis Mother     Other (AMI) Mother     Other (diabetes mellitus) Mother     Diabetes Mother     Stroke Mother         Many mini strokes   [4]   Current Outpatient Medications   Medication Sig Dispense Refill    AIMOVIG 140 MG/ML Subcutaneous Solution Auto-injector Inject 1 mL (140 mg total) into the skin every 30 (thirty) days. 1 mL 0    OMEPRAZOLE 40 MG Oral Capsule Delayed Release Take 1 capsule (40 mg total) by mouth 2 (two) times daily. 60 capsule 0    METFORMIN 500 MG Oral Tab Take 1 tablet (500 mg total) by mouth 2 (two) times daily. 180 tablet 0    HYDROcodone-acetaminophen  MG Oral Tab Take 1 tablet by mouth every 6 (six) hours as needed. 120 tablet 0    cyclobenzaprine 10 MG Oral Tab Take 1 tablet (10 mg total) by mouth 3 (three) times daily as needed for Muscle spasms. 60 tablet 1    diclofenac 75 MG Oral Tab EC Take 1 tablet (75 mg total) by mouth 2 (two) times daily. 180 tablet 1    ATORVASTATIN 80 MG Oral Tab Take 1 tablet (80 mg total) by mouth nightly. 90 tablet 0    budesonide 0.25 MG/2ML Inhalation Suspension Take 2 mL (0.25 mg total) by nebulization 2 (two) times daily. 60 mL 3    albuterol 108 (90 Base) MCG/ACT Inhalation Aero Soln Inhale 2 puffs into the lungs every 6 (six) hours as needed for Wheezing. inhale 2 puff by inhalation route  every 4 - 6 hours as needed 1 each 5    azelastine 137 MCG/SPRAY Nasal Solution 1 spray by Nasal route 2 (two) times daily. 30 mL 5    Respiratory Therapy Supplies (FULL KIT NEBULIZER SET) Does not apply Misc       gabapentin 600 MG Oral Tab Take 1 tablet (600 mg total) by mouth nightly. 90 tablet 1    INCRUSE ELLIPTA 62.5 MCG/ACT Inhalation Aerosol Powder, Breath Activated Inhale 1 puff into  the lungs daily. 30 each 11    nicotine 7 MG/24HR Transdermal Patch 24 Hr Place 1 patch onto the skin daily. 30 patch 2    Lancets (ONETOUCH DELICA PLUS [id number]) Does not apply Misc 1 lancet  by Finger stick route 3 (three) times daily. 300 each 1    diclofenac 1 % External Gel Apply 4 g topically 4 (four) times daily. 300 g 1    furosemide 20 MG Oral Tab Take 1 tablet (20 mg total) by mouth daily. 30 tablet 2    Mirabegron ER (MYRBETRIQ) 50 MG Oral Tablet 24 Hr Take 1 tablet (50 mg total) by mouth daily. 90 tablet 3    Naloxone HCl 4 MG/0.1ML Nasal Liquid 4 mg by Nasal route as needed. If patient remains unresponsive, repeat dose in other nostril 2-5 minutes after first dose. 1 kit 0    PRISTIQ 100 MG Oral Tablet 24 Hr Take 1 tablet (100 mg total) by mouth daily.      Rizatriptan Benzoate 10 MG Oral Tab Take 1 tablet (10 mg total) by mouth as needed for Migraine. 10 tablet 5    Glucose Blood (ONETOUCH ULTRA) In Vitro Strip Tests 3 x daily 300 each 1    Coenzyme Q10 200 MG Oral Cap Take 200 mg by mouth 2 (two) times daily.      metoprolol succinate ER 50 MG Oral Tablet 24 Hr Take 1 tablet (50 mg total) by mouth daily. 90 tablet 0    topiramate 50 MG Oral Tab TAKE ONE TABLET BY MOUTH EVERY MORNING AND TWO TABLETS EVERY EVENING (Patient taking differently: Take 1 tablet (50 mg total) by mouth in the morning and 1 tablet (50 mg total) before bedtime. One tablet in the morning and one tablet at night.) 270 tablet 2    aspirin 81 MG Oral Tab EC Take 1 tablet (81 mg total) by mouth daily.      clonazePAM 0.5 MG Oral Tab Take 1 tablet (0.5 mg total) by mouth 2 (two) times daily. 12 tablet 0    Multiple Vitamins-Minerals (ONE-A-DAY WOMENS 50+) Oral Tab Take 1 tablet by mouth daily.  0    QUEtiapine Fumarate  MG Oral Tablet 24 Hr Take 1 tablet (200 mg total) by mouth every evening. 30 tablet 0    Calcium Carb-Cholecalciferol 500-200 MG-UNIT Oral Tab Take 1 tablet by mouth daily.        HYDROcodone-acetaminophen   MG Oral Tab Take 1 tablet by mouth every 6 (six) hours as needed. (Patient not taking: Reported on 4/16/2025) 16 tablet 0    HYDROcodone-acetaminophen  MG Oral Tab Take 1 tablet by mouth every 6 (six) hours as needed. (Patient not taking: Reported on 4/16/2025) 120 tablet 0    VOLTAREN 1 % External Gel Apply 4 g topically 4 (four) times daily. (Patient not taking: Reported on 4/16/2025) 300 g 1    FLOWFLEX COVID-19 AG HOME TEST In Vitro Kit 1 Swab by In Vitro route one time for 1 dose. (Patient not taking: Reported on 4/16/2025) 10 kit 0    clopidogrel 75 MG Oral Tab Take 1 tablet (75 mg total) by mouth daily. (Patient not taking: Reported on 4/16/2025)

## 2025-04-19 DIAGNOSIS — K21.9 GASTROESOPHAGEAL REFLUX DISEASE WITHOUT ESOPHAGITIS: ICD-10-CM

## 2025-04-21 ENCOUNTER — PATIENT MESSAGE (OUTPATIENT)
Dept: INTERNAL MEDICINE CLINIC | Facility: CLINIC | Age: 65
End: 2025-04-21

## 2025-04-21 DIAGNOSIS — J40 BRONCHITIS: Primary | ICD-10-CM

## 2025-04-21 RX ORDER — OMEPRAZOLE 40 MG/1
40 CAPSULE, DELAYED RELEASE ORAL 2 TIMES DAILY
Qty: 60 CAPSULE | Refills: 0 | Status: SHIPPED | OUTPATIENT
Start: 2025-04-21

## 2025-04-21 NOTE — TELEPHONE ENCOUNTER
PASS    Future Appointments   Date Time Provider Department Center   4/29/2025  1:20 PM Greyson Main MD [id number] [place]   5/13/2025  3:40 PM Xiang Torres MD [facility] [place]   7/21/2025 11:20 AM Amanda Odonnell PA [facility] None   11/17/2025  1:00 PM Scott Corona MD NP [facility]

## 2025-04-24 RX ORDER — AMOXICILLIN 500 MG/1
500 CAPSULE ORAL 2 TIMES DAILY
Qty: 14 CAPSULE | Refills: 0 | Status: SHIPPED | OUTPATIENT
Start: 2025-04-24 | End: 2025-05-01

## 2025-04-24 NOTE — TELEPHONE ENCOUNTER
Spoke to patient who reports productive cough, runny nose and chest pain when coughing. COVID negative. Patient thinks it might be bronchitis. Patient also reporting shortness of breath when coughing.     Patient denies fever, nausea, vomiting or diarrhea. Denies rashes.     Patient has been taking otc decongestants which have been ineffective. Patient requesting recommendation.    OV?       LOV - 3/17  CPX -     Patient with COPD and DM diagnoses.     RN - Patient prefers call.       
Normal rate, regular rhythm.  Heart sounds S1, S2.  No murmurs, rubs or gallops.

## 2025-04-28 ENCOUNTER — TELEPHONE (OUTPATIENT)
Dept: INTERNAL MEDICINE CLINIC | Facility: CLINIC | Age: 65
End: 2025-04-28

## 2025-04-28 ENCOUNTER — LAB ENCOUNTER (OUTPATIENT)
Dept: LAB | Facility: HOSPITAL | Age: 65
End: 2025-04-28
Attending: INTERNAL MEDICINE
Payer: MEDICAID

## 2025-04-28 DIAGNOSIS — G43.709 CHRONIC MIGRAINE W/O AURA W/O STATUS MIGRAINOSUS, NOT INTRACTABLE: ICD-10-CM

## 2025-04-28 DIAGNOSIS — E78.00 HYPERCHOLESTEROLEMIA: ICD-10-CM

## 2025-04-28 DIAGNOSIS — R19.7 DIARRHEA OF PRESUMED INFECTIOUS ORIGIN: Primary | ICD-10-CM

## 2025-04-28 RX ORDER — ERENUMAB-AOOE 140 MG/ML
140 INJECTION, SOLUTION SUBCUTANEOUS
Qty: 1 ML | Refills: 2 | Status: SHIPPED | OUTPATIENT
Start: 2025-04-28

## 2025-04-28 NOTE — TELEPHONE ENCOUNTER
Pt says she has been having chronic diarrhea since 4/23 prior to her taking her antibiotic. Pt has tried taking 'imodium' but it has not helped at all and she feels she has been taking more than she should. Pt says that the diarrhea is 'pure water' and is consistent up to 3-4 times daily. Pt has been having irritating in her anal area due to constant wiping and has started wearing pads due to possible accidents.    Pt wants to discuss w/ RN and see what SV suggests for her.

## 2025-04-28 NOTE — TELEPHONE ENCOUNTER
Spoke with patient to inform of PCP recommendations. Pt v/u and will complete lab. Pt was informed to go to ER if having symptoms of lightheadedness and dizziness. Pt also advised to continue to stay hydrated to replenish electrolytes lost. Pt v/u.

## 2025-04-28 NOTE — TELEPHONE ENCOUNTER
Medication: AIMOVIG 140 MG/ML Subcutaneous Solution Auto-injector      Date of last refill: 04/01/2025 (#1 mL/0)  [place]     Last office visit: 01/13/2025  Due back to clinic per last office note:  Around 07/13/2025  Date next office visit scheduled:    Future Appointments   Date Time Provider Department Center   5/13/2025  3:40 PM Xiang Torres MD [facility] [place]   7/21/2025 11:20 AM Amanda Odonnell PA [facility] None   11/17/2025  1:00 PM Scott Corona MD NP [facility]           Last OV note recommendation:    ASSESSMENT/PLAN:          ICD-10-CM     1. Chronic migraine w/o aura w/o status migrainosus, not intractable  G43.709         2. Mild cognitive impairment with memory loss  G31.84 MRI BRAIN (CPT=70551)          Mild cognitive impairment with very loss suspected combination of vascular etiology and underlying disorder. Based on in office cognitive assessment no evidence of Dementia     Obtain MRI of the brain to assess the vascular burden and to rule out any silent infarct  Counseled on cognitive exercises  Will wean off Topamax slowly  Advised to discuss with her psychiatrist about dose adjustment        2. Chronic migraines- stable  Continue Aimovig 140 mg monthly injections  Reduce Topamax to 50 mg AM and 50 mg PM and if headaches stable go down to 50 mg daily        RTC in about 6 months     See orders and medications filed with this encounter. The patient indicates understanding of these issues and agrees with the plan.

## 2025-04-28 NOTE — TELEPHONE ENCOUNTER
Patient called to asked if she can go to her local immediate care to  the stool test. Explained to pt the order is in her chart and available at any of our [facility] locations. She states she will just go to [facility].

## 2025-04-29 RX ORDER — ATORVASTATIN CALCIUM 80 MG/1
80 TABLET, FILM COATED ORAL NIGHTLY
Qty: 90 TABLET | Refills: 0 | Status: SHIPPED | OUTPATIENT
Start: 2025-04-29

## 2025-04-29 NOTE — TELEPHONE ENCOUNTER
PASS  LOV 3/17/25  FU 6 MONTHS    Future Appointments   Date Time Provider Department Center   5/13/2025  3:40 PM Xiang Torres MD [facility] [place]   7/21/2025 11:20 AM Amanda Odonnell PA [facility] None   11/17/2025  1:00 PM Scott Corona MD NP [facility]

## 2025-05-01 ENCOUNTER — PATIENT MESSAGE (OUTPATIENT)
Dept: INTERNAL MEDICINE CLINIC | Facility: CLINIC | Age: 65
End: 2025-05-01

## 2025-05-01 DIAGNOSIS — R19.7 DIARRHEA, UNSPECIFIED TYPE: Primary | ICD-10-CM

## 2025-05-02 NOTE — TELEPHONE ENCOUNTER
Pt is still having the diarrhea. Pt is having nausea. Pt is having the urgency and fecal leakage.

## 2025-05-06 ENCOUNTER — HOSPITAL ENCOUNTER (EMERGENCY)
Facility: HOSPITAL | Age: 65
Discharge: HOME | End: 2025-05-07
Attending: EMERGENCY MEDICINE
Payer: MEDICAID

## 2025-05-06 ENCOUNTER — APPOINTMENT (OUTPATIENT)
Dept: CT IMAGING | Facility: HOSPITAL | Age: 65
End: 2025-05-06
Attending: EMERGENCY MEDICINE
Payer: MEDICAID

## 2025-05-06 DIAGNOSIS — R19.7 DIARRHEA, UNSPECIFIED TYPE: Primary | ICD-10-CM

## 2025-05-06 LAB
ALBUMIN SERPL-MCNC: 4.5 G/DL (ref 3.2–4.8)
ALBUMIN/GLOB SERPL: 1.7 {RATIO} (ref 1–2)
ALP LIVER SERPL-CCNC: 99 U/L (ref 50–130)
ALT SERPL-CCNC: 55 U/L (ref 10–49)
ANION GAP SERPL CALC-SCNC: 7 MMOL/L (ref 0–18)
AST SERPL-CCNC: 69 U/L (ref ?–34)
BASOPHILS # BLD AUTO: 0.05 X10(3) UL (ref 0–0.2)
BASOPHILS NFR BLD AUTO: 0.3 %
BILIRUB SERPL-MCNC: 0.2 MG/DL (ref 0.2–1.1)
BUN BLD-MCNC: 13 MG/DL (ref 9–23)
CALCIUM BLD-MCNC: 9.2 MG/DL (ref 8.7–10.6)
CHLORIDE SERPL-SCNC: 104 MMOL/L (ref 98–112)
CO2 SERPL-SCNC: 25 MMOL/L (ref 21–32)
CREAT BLD-MCNC: 0.98 MG/DL (ref 0.55–1.02)
EGFRCR SERPLBLD CKD-EPI 2021: 65 ML/MIN/1.73M2 (ref 60–?)
EOSINOPHIL # BLD AUTO: 0.08 X10(3) UL (ref 0–0.7)
EOSINOPHIL NFR BLD AUTO: 0.6 %
ERYTHROCYTE [DISTWIDTH] IN BLOOD BY AUTOMATED COUNT: 13.7 %
FLUAV + FLUBV RNA SPEC NAA+PROBE: NEGATIVE
FLUAV + FLUBV RNA SPEC NAA+PROBE: NEGATIVE
GLOBULIN PLAS-MCNC: 2.6 G/DL (ref 2–3.5)
GLUCOSE BLD-MCNC: 111 MG/DL (ref 70–99)
HCT VFR BLD AUTO: 31.6 % (ref 35–48)
HGB BLD-MCNC: 10.5 G/DL (ref 12–16)
IMM GRANULOCYTES # BLD AUTO: 0.08 X10(3) UL (ref 0–1)
IMM GRANULOCYTES NFR BLD: 0.6 %
LYMPHOCYTES # BLD AUTO: 2.35 X10(3) UL (ref 1–4)
LYMPHOCYTES NFR BLD AUTO: 16.4 %
MCH RBC QN AUTO: 29.8 PG (ref 26–34)
MCHC RBC AUTO-ENTMCNC: 33.2 G/DL (ref 31–37)
MCV RBC AUTO: 89.8 FL (ref 80–100)
MONOCYTES # BLD AUTO: 0.58 X10(3) UL (ref 0.1–1)
MONOCYTES NFR BLD AUTO: 4 %
NEUTROPHILS # BLD AUTO: 11.19 X10 (3) UL (ref 1.5–7.7)
NEUTROPHILS # BLD AUTO: 11.19 X10(3) UL (ref 1.5–7.7)
NEUTROPHILS NFR BLD AUTO: 78.1 %
OSMOLALITY SERPL CALC.SUM OF ELEC: 283 MOSM/KG (ref 275–295)
PLATELET # BLD AUTO: 474 10(3)UL (ref 150–450)
POTASSIUM SERPL-SCNC: 4.2 MMOL/L (ref 3.5–5.1)
PROT SERPL-MCNC: 7.1 G/DL (ref 5.7–8.2)
RBC # BLD AUTO: 3.52 X10(6)UL (ref 3.8–5.3)
RSV RNA SPEC NAA+PROBE: NEGATIVE
SARS-COV-2 RNA RESP QL NAA+PROBE: NOT DETECTED
SODIUM SERPL-SCNC: 136 MMOL/L (ref 136–145)
WBC # BLD AUTO: 14.3 X10(3) UL (ref 4–11)

## 2025-05-06 PROCEDURE — 85025 COMPLETE CBC W/AUTO DIFF WBC: CPT | Performed by: EMERGENCY MEDICINE

## 2025-05-06 PROCEDURE — 85025 COMPLETE CBC W/AUTO DIFF WBC: CPT

## 2025-05-06 PROCEDURE — 0241U SARS-COV-2/FLU A AND B/RSV BY PCR (GENEXPERT): CPT | Performed by: EMERGENCY MEDICINE

## 2025-05-06 PROCEDURE — 80053 COMPREHEN METABOLIC PANEL: CPT

## 2025-05-06 PROCEDURE — 99285 EMERGENCY DEPT VISIT HI MDM: CPT

## 2025-05-06 PROCEDURE — 99284 EMERGENCY DEPT VISIT MOD MDM: CPT

## 2025-05-06 PROCEDURE — 96361 HYDRATE IV INFUSION ADD-ON: CPT

## 2025-05-06 PROCEDURE — 0241U SARS-COV-2/FLU A AND B/RSV BY PCR (GENEXPERT): CPT

## 2025-05-06 PROCEDURE — 80053 COMPREHEN METABOLIC PANEL: CPT | Performed by: EMERGENCY MEDICINE

## 2025-05-06 PROCEDURE — 96360 HYDRATION IV INFUSION INIT: CPT

## 2025-05-06 PROCEDURE — 74177 CT ABD & PELVIS W/CONTRAST: CPT | Performed by: EMERGENCY MEDICINE

## 2025-05-06 NOTE — ED INITIAL ASSESSMENT (HPI)
Pt c/o diarrhea x 2 weeks, states had a negative c-diff test, also c/o cough and nasal congestion.

## 2025-05-07 ENCOUNTER — TELEPHONE (OUTPATIENT)
Dept: INTERNAL MEDICINE CLINIC | Facility: CLINIC | Age: 65
End: 2025-05-07

## 2025-05-07 VITALS
SYSTOLIC BLOOD PRESSURE: 131 MMHG | HEIGHT: 60 IN | HEART RATE: 71 BPM | BODY MASS INDEX: 24.35 KG/M2 | DIASTOLIC BLOOD PRESSURE: 73 MMHG | OXYGEN SATURATION: 100 % | RESPIRATION RATE: 21 BRPM | WEIGHT: 124 LBS | TEMPERATURE: 97 F

## 2025-05-07 DIAGNOSIS — E11.40 TYPE 2 DIABETES MELLITUS WITH DIABETIC NEUROPATHY, WITHOUT LONG-TERM CURRENT USE OF INSULIN (HCC): Primary | ICD-10-CM

## 2025-05-07 RX ORDER — BLOOD-GLUCOSE METER
1 EACH MISCELLANEOUS 2 TIMES DAILY
Qty: 1 KIT | Refills: 0 | Status: SHIPPED | OUTPATIENT
Start: 2025-05-07 | End: 2026-05-07

## 2025-05-07 RX ORDER — DIPHENOXYLATE HYDROCHLORIDE AND ATROPINE SULFATE 2.5; .025 MG/1; MG/1
1-2 TABLET ORAL 4 TIMES DAILY PRN
Qty: 30 TABLET | Refills: 0 | Status: SHIPPED | OUTPATIENT
Start: 2025-05-07 | End: 2025-06-06

## 2025-05-07 NOTE — TELEPHONE ENCOUNTER
Diabetic kit signed.  I would suggest her to follow-up with the pulmonologist as I am concerned that if I give her another course of antibiotics then her diarrhea will get worse then she may get C. difficile for sure.  She should follow-up with her pulmonologist soon.  Rika Vinson DO

## 2025-05-07 NOTE — TELEPHONE ENCOUNTER
Spoke with patient. Patient states that her cough went away after completing her antibiotics but her cough started to return a few days ago. Pt denies fevers, body aches, chills. She does have a cough, runny nose, and she has been taking Theraflu daytime and nighttime with relief. Pt states that coughing attacks happen at night which seem to have gotten worse. Pt went to ER yesterday, but was more concerned about her diarrhea more than her cough.     SV: Pt would like to know if she can have another round of antibiotics to help with her cough? Please advise, TY! Also, pended new diabetic kit per pt request, please sign if agree, TY!

## 2025-05-07 NOTE — ED PROVIDER NOTES
Patient Seen in: [facility] Emergency Department      History     Chief Complaint   Patient presents with    Nausea/Vomiting/Diarrhea     Stated Complaint: recent antibiotics, diarrhea x 2 weeks    Subjective:   HPI    64-year-old female, reports experiencing persistent diarrhea. She has been having frequent bowel movements, initially four to five times a day, and the frequency increased significantly. She recalls waking up one morning having slept through an episode of diarrhea. She mentions a previous test for Clostridium difficile, which was negative, and a past positive C. diff result from 2022.  Patient expresses concern about dehydration and electrolyte imbalance due to the diarrhea. She has a phobia of hospital visits, fearing prolonged stays, which delayed her decision to seek medical attention. She previously submitted a stool sample to the hospital lab and states C. difficile was negative  History of Present Illness               Objective:     Past Medical History:    Abdominal pain, other specified site    groin    Acute bronchitis    Allergic rhinitis    Anemia    DrDileep noted he saw since 2015    Anxiety    Arthritis    Back problem    Jamison esophagus    Chronic low back pain without sciatica    COPD (chronic obstructive pulmonary disease) (HCC)    Depression    Diabetes (HCC)    Difficult intubation    difficult to intubate with bladder surgery,instructed by anesthesia to communicate to future anesthesiologist. Small airway    Esophageal reflux    Essential hypertension    Don't remember    Fitting and adjustment of dental prosthetic device    High blood pressure    History of 2019 novel coronavirus disease (COVID-19)    No hospitalization,symptoms; Fever,fatigue ,body aches,headache no loss of taste or smell    Hx of motion sickness    Hyperlipidemia    Mass of spine    Migraines    barometric pressure    Myocardial infarction (HCC)    Osteoarthritis    Other ill-defined conditions(569.30)     Jamison's    Pain in joint, forearm    wrist    Pain in joint, pelvic region and thigh    hip    Pelvic mass    Personal history of urinary (tract) infection    Sleep apnea    I don't date    Stroke (HCC)    Mini stroke noted              Past Surgical History:   Procedure Laterality Date    Cabg  23    Colonoscopy      Colonoscopy N/A 2022    Procedure: COLONOSCOPY AND ESOPHAGOGASTRODUODENOSCOPY;  Surgeon: Miguel Camargo MD;  Location:  ENDOSCOPY    Decortication,pulmonary,total Right 2024    VATS decortication by Dr. Maldonado    Heart surgery      double bypass    Laparoscopic      sling operation for stress incontinence    Laparoscopy,diagnostic      Kay biopsy stereo nodule 2 site bilat (cpt=19081/47567) Left 2010    BENIGN 2 SITES    Kay biopsy stereo w/calc 2 site left (cpt=19081/71886)  2010    benign x 2    Kay stereo-clip w/calc 2 site left      Oophorectomy Left 2019    Other  2022    RIGHT SACRAL SUBCUTANEOUS CYST EXCISION    Other surgical history  ,     Bladder Sling, Large mass removed from left abdomen and ovar    Tonsillectomy      Was a child, have no idea    Tubal ligation      Upper gi endoscopy,exam                  Social History     Socioeconomic History    Marital status: Single   Tobacco Use    Smoking status: Former     Current packs/day: 0.00     Average packs/day: 1.5 packs/day for 48.0 years (72.0 ttl pk-yrs)     Types: Cigarettes     Start date:      Quit date:      Years since quittin.3    Smokeless tobacco: Never    Tobacco comments:     Quit cigarettes completely but still smoke and JUUL or vapes.   Vaping Use    Vaping status: Every Day    Start date: 2019    Substances: Nicotine, Flavoring    Devices: Disposable, Pre-filled pod   Substance and Sexual Activity    Alcohol use: Not Currently     Comment: On occasion    Drug use: No    Sexual activity: Not Currently     Partners: Male   Other Topics Concern     Caffeine Concern Yes    Stress Concern Yes    Weight Concern Yes    Special Diet No    Exercise No    Seat Belt Yes     Social Drivers of Health     Food Insecurity: Not on File (9/26/2024)    Received from [employer]    Food Insecurity     Food: 0   Transportation Needs: No Transportation Needs (6/19/2024)    Transportation Needs     Lack of Transportation: No   Housing Stability: Low Risk  (6/19/2024)    Housing Stability     Housing Instability: No                                Physical Exam     ED Triage Vitals [05/06/25 1755]   /72   Pulse 76   Resp 14   Temp 97 °F (36.1 °C)   Temp src Temporal   SpO2 96 %   O2 Device None (Room air)       Current Vitals:   Vital Signs  BP: 129/70  Pulse: 73  Resp: 19  Temp: 97 °F (36.1 °C)  Temp src: Temporal  MAP (mmHg): 87    Oxygen Therapy  SpO2: 100 %  O2 Device: None (Room air)        Physical Exam    Vital signs reviewed  General appearance: Patient is alert and in no acute distress  HEENT: Pupils equal react to light extraocular muscles intact no scleral icterus, mucous membranes are moist, there is no erythema or exudate in the posterior pharynx  Neck: Supple no JVD no lymphadenopathy no meningismus no carotid bruit  CV: Regular rate and rhythm no murmur rub  Respiratory: Clear to auscultation bilaterally no crackles no wheezes no accessory muscle use  Abdomen: Soft nontender nondistended, no rebound no guarding  no hepatosplenomegaly bowel sounds are present , no pulsatile mass  Extremities: No clubbing cyanosis or edema 2+ distal pulses.  Neuro: Cranial nerves II through XII intact with no gross focal sensory or motor abnormality.    Physical Exam                ED Course     Labs Reviewed   COMP METABOLIC PANEL (14) - Abnormal; Notable for the following components:       Result Value    Glucose 111 (*)     AST 69 (*)     ALT 55 (*)     All other components within normal limits   CBC WITH DIFFERENTIAL WITH PLATELET - Abnormal; Notable for the following  components:    WBC 14.3 (*)     RBC 3.52 (*)     HGB 10.5 (*)     HCT 31.6 (*)     .0 (*)     Neutrophil Absolute Prelim 11.19 (*)     Neutrophil Absolute 11.19 (*)     All other components within normal limits   SARS-COV-2/FLU A AND B/RSV BY PCR (GENEXPERT) - Normal    Narrative:     This test is intended for the qualitative detection and differentiation of SARS-CoV-2, influenza A, influenza B, and respiratory syncytial virus (RSV) viral RNA in nasopharyngeal or nares swabs from individuals suspected of respiratory viral infection consistent with COVID-19 by their healthcare provider. Signs and symptoms of respiratory viral infection due to SARS-CoV-2, influenza, and RSV can be similar.    Test performed using the Xpert Xpress SARS-CoV-2/FLU/RSV (real time RT-PCR)  assay on the [employer] instrument, [place], [place] [address].   This test is being used under the Food and Drug Administration's Emergency Use Authorization.    The authorized Fact Sheet for Healthcare Providers for this assay is available upon request from the laboratory.   RAINBOW DRAW LAVENDER   RAINBOW DRAW LIGHT GREEN   GI STOOL PANEL BY PCR          Results            Abdomen was benign on exam.  Had a CBC chemistry COVID flu RSV and did try to get a stool panel which patient states she just has pooped out and has no more stool.  She was given IV fluids.  Due to her elevated white count CT scan will be repeated    CT ABDOMEN+PELVIS(CONTRAST ONLY)(CPT=74177)  Result Date: 5/6/2025  CONCLUSION:  1. Multiple areas of consolidation in nodularity right lung have been previously described and are unchanged since recent prior chest CT. 2. Otherwise no acute abnormality detected in the abdomen or pelvis. 3. There is diffuse aortic atherosclerosis with ectasia.  There is no aneurysm.    LOCATION:  [facility]   Dictated by (CST): Abad Chen MD on 5/06/2025 at 10:43 PM     Finalized by (CST): Abad Chen MD on 5/06/2025 at 10:47 PM       Patient  does have some nodularity right lung and has been seen in the past.  Patient is following up with this.  There are some aortic atherosclerosis but her colon does not show any inflammation.  No sign of any abscess perforation or diverticulitis.  Patient does not want to stay in the hospital was not able to go diarrhea here.  She was feeling better after fluids I am going to give her some Lomotil and have her follow-up with primary and GI.  Return if worse.               MDM      Differential diagnosis reflecting the complexity of care include: Chronic diarrhea, infectious diarrhea, viral enteritis, C. difficile colitis      External chart review was done and was noted: Patient's primary notes were reviewed and patient had a stool sample that just checked for C. difficile but no other etiology.    My independent interpretation of studies of: CT scan does not show any significant colitis or inflammatory process.  Atherosclerotic aorta but no aneurysm.    Diagnostic tests and medications considered but not ordered were: Did try to get a stool sample and patient was not able to do.  Will have her bring when and when she can        Shared decision making was done by self and patient.  Will try some I Lomotil bland diet  And have her bring her stool in for further stool testing.  She should also follow-up with GI return if worse patient was screened and evaluated during this visit.  As the treating physician attending to the patient, I determined within reasonable clinical confidence and prior to discharge, that an emergency medical condition was not or was no longer present.  There was no indication for further evaluation, treatment, or admission on an emergency basis.  Comprehensive verbal and written discharge and follow-up instructions were provided to help prevent relapse or worsening.  Patient was instructed to follow-up with primary care provider for further evaluation treatment, return immediately to ER for  worsening, concerning, new, or changing/persisting symptoms.  I discussed the case with the patient and they had no questions, complaints, or concerns.  Patient was comfortable going home.      Dictation Disclaimer Note:   To increase efficiency this document may have been prepared using voice recognition technology. Every effort has been made to correct any errors made during preparation of this note. However, if a word or phrase is confusing, or does not make sense, this is likely due to a recognition error within the program which was not discovered during editing. Please do not hesitate to contact to address any significant errors.    Note to Patient:   The 21st Century Cures Act makes medical notes like these available to patients in the interest of transparency. Please be advised this is a medical document. Medical documents are intended to carry relevant information, facts as evident, and the clinical opinion of the practitioner. The medical note is intended as peer to peer communication and may appear blunt or direct. It is written in medical language and may contain abbreviations or verbiage that are unfamiliar.             MDM    Disposition and Plan     Clinical Impression:  1. Diarrhea, unspecified type         Disposition:  Discharge  5/7/2025 12:04 am    Follow-up:  Miguel Camargo MD  [address] [place] [address] [place] [id number]  [phone]    Follow up            Medications Prescribed:  Current Discharge Medication List        START taking these medications    Details   diphenoxylate-atropine 2.5-0.025 MG Oral Tab Take 1-2 tablets by mouth 4 (four) times daily as needed for Diarrhea.  Qty: 30 tablet, Refills: 0    Associated Diagnoses: Diarrhea, unspecified type             Supplementary Documentation:

## 2025-05-08 DIAGNOSIS — M17.12 PRIMARY OSTEOARTHRITIS OF LEFT KNEE: ICD-10-CM

## 2025-05-08 DIAGNOSIS — M54.41 CHRONIC MIDLINE LOW BACK PAIN WITH RIGHT-SIDED SCIATICA: ICD-10-CM

## 2025-05-08 DIAGNOSIS — G89.29 CHRONIC MIDLINE LOW BACK PAIN WITH RIGHT-SIDED SCIATICA: ICD-10-CM

## 2025-05-08 DIAGNOSIS — Z95.1 S/P CABG X 2: ICD-10-CM

## 2025-05-08 RX ORDER — HYDROCODONE BITARTRATE AND ACETAMINOPHEN 10; 325 MG/1; MG/1
1 TABLET ORAL EVERY 6 HOURS PRN
Qty: 120 TABLET | Refills: 0 | Status: SHIPPED | OUTPATIENT
Start: 2025-05-12

## 2025-05-08 NOTE — TELEPHONE ENCOUNTER
Norco 10 mg    Future Appointments   Date Time Provider Department Center   5/13/2025  3:40 PM Xiang Torres MD [facility] [place]   7/16/2025  9:30 AM Kamala Samuel MD [facility] [place]   7/21/2025 11:20 AM Amanda Odonnell PA [facility] None   11/17/2025  1:00 PM [facility]     Last office visit: 2/12/2025    Last fill: 4/13/2025 120 cap, 0 refills    Pended for 5/13

## 2025-05-09 DIAGNOSIS — E11.40 TYPE 2 DIABETES MELLITUS WITH DIABETIC NEUROPATHY, WITHOUT LONG-TERM CURRENT USE OF INSULIN (HCC): ICD-10-CM

## 2025-05-09 RX ORDER — LANCETS 33 GAUGE
1 EACH MISCELLANEOUS 3 TIMES DAILY
Qty: 300 EACH | Refills: 1 | Status: SHIPPED | OUTPATIENT
Start: 2025-05-09

## 2025-05-09 RX ORDER — BLOOD SUGAR DIAGNOSTIC
STRIP MISCELLANEOUS
Qty: 300 EACH | Refills: 1 | Status: SHIPPED | OUTPATIENT
Start: 2025-05-09

## 2025-05-09 NOTE — TELEPHONE ENCOUNTER
PASS    Future Appointments   Date Time Provider Department Center   5/13/2025  3:40 PM Xiang Torres MD [facility] [place]   7/16/2025  9:30 AM Kamala Samuel MD [facility] [place]   7/21/2025 11:20 AM Amanda Odonnell PA [facility] None   11/17/2025  1:00 PM Scott Corona MD NP [facility]

## 2025-05-10 RX ORDER — BLOOD-GLUCOSE METER
1 EACH MISCELLANEOUS AS DIRECTED
Qty: 1 KIT | Refills: 0 | Status: SHIPPED | OUTPATIENT
Start: 2025-05-10

## 2025-05-12 RX ORDER — BLOOD SUGAR DIAGNOSTIC
STRIP MISCELLANEOUS
Qty: 100 STRIP | Refills: 11 | Status: SHIPPED | OUTPATIENT
Start: 2025-05-12

## 2025-05-13 ENCOUNTER — TELEPHONE (OUTPATIENT)
Facility: CLINIC | Age: 65
End: 2025-05-13

## 2025-05-16 ENCOUNTER — TELEPHONE (OUTPATIENT)
Dept: UROLOGY | Facility: CLINIC | Age: 65
End: 2025-05-16

## 2025-05-16 DIAGNOSIS — K21.9 GASTROESOPHAGEAL REFLUX DISEASE WITHOUT ESOPHAGITIS: ICD-10-CM

## 2025-05-16 DIAGNOSIS — E11.65 UNCONTROLLED TYPE 2 DIABETES MELLITUS WITH HYPERGLYCEMIA, WITHOUT LONG-TERM CURRENT USE OF INSULIN (HCC): ICD-10-CM

## 2025-05-16 RX ORDER — ESTRADIOL 0.1 MG/G
CREAM VAGINAL
Qty: 42.5 G | Refills: 3 | Status: SHIPPED | OUTPATIENT
Start: 2025-05-16

## 2025-05-19 RX ORDER — OMEPRAZOLE 40 MG/1
40 CAPSULE, DELAYED RELEASE ORAL 2 TIMES DAILY
Qty: 60 CAPSULE | Refills: 0 | Status: SHIPPED | OUTPATIENT
Start: 2025-05-19

## 2025-05-19 NOTE — TELEPHONE ENCOUNTER
Name from pharmacy: Metformin Hydrochloride 500 Mg Tab Avet         Will file in chart as: METFORMIN 500 MG Oral Tab    Sig: Take 1 tablet (500 mg total) by mouth 2 (two) times daily.    Disp: 180 tablet    Refills: 0    Start: 5/16/2025    Class: Normal    For: Uncontrolled type 2 diabetes mellitus with hyperglycemia, without long-term current use of insulin (HCC)    Last ordered: 3 months ago (2/17/2025) by Rika Vinson DO    Last refill: 2/17/2025    Rx #: [id number]    Diabetes Medication Protocol [id number]/16/2025 07:13 PM   Protocol Details Last A1C < 7.5 and within past 6 months    In person appointment or virtual visit in the past 6 mos or appointment in next 3 mos    Microalbumin procedure in past 12 months or taking ACE/ARB    EGFRCR or GFRNAA > 50    GFR in the past 12 months    Medication is active on med list       Name from pharmacy: Omeprazole Dr 40 Mg Cap          Will file in chart as: OMEPRAZOLE 40 MG Oral Capsule Delayed Release    Sig: Take 1 capsule (40 mg total) by mouth 2 (two) times daily.    Disp: 60 capsule    Refills: 0    Start: 5/16/2025    Class: Normal    For: Gastroesophageal reflux disease without esophagitis    Last ordered: 4 weeks ago (4/21/2025) by Rika Vinson DO    Last refill: 4/21/2025    Rx #: [id number]    Gastrointestional Medication Protocol [id number]/16/2025 07:13 PM   Protocol Details In person appointment or virtual visit in the past 12 mos or appointment in next 3 mos    Medication is active on med list      To be filled at: [id number] - [place], [place] - [address] [phone], [phone]

## 2025-05-28 ENCOUNTER — TELEPHONE (OUTPATIENT)
Dept: UROLOGY | Facility: CLINIC | Age: 65
End: 2025-05-28

## 2025-05-28 ENCOUNTER — TELEPHONE (OUTPATIENT)
Facility: CLINIC | Age: 65
End: 2025-05-28

## 2025-05-28 NOTE — TELEPHONE ENCOUNTER
Patient called stating she never heard back of a PET order and that she came in and signed papers.  If someone an call her back and explain this to her.    There is a 4/16 date she saw Jose David but never got the PT per patient.

## 2025-05-28 NOTE — TELEPHONE ENCOUNTER
Patient called. Left detailed message stating still waiting on PET scan authorization from insurance.

## 2025-05-28 NOTE — TELEPHONE ENCOUNTER
Fax received from [employer] with prior auth key as previous authorization is due to .    PA submitted via [email or web address] with key: [id number], with response of 'no PA needed, within prescribing limits.'

## 2025-06-04 ENCOUNTER — OFFICE VISIT (OUTPATIENT)
Dept: RHEUMATOLOGY | Facility: CLINIC | Age: 65
End: 2025-06-04
Payer: MEDICAID

## 2025-06-04 VITALS
SYSTOLIC BLOOD PRESSURE: 134 MMHG | DIASTOLIC BLOOD PRESSURE: 82 MMHG | OXYGEN SATURATION: 98 % | WEIGHT: 130 LBS | HEIGHT: 60 IN | RESPIRATION RATE: 16 BRPM | HEART RATE: 92 BPM | TEMPERATURE: 98 F | BODY MASS INDEX: 25.52 KG/M2

## 2025-06-04 DIAGNOSIS — M17.12 PRIMARY OSTEOARTHRITIS OF LEFT KNEE: ICD-10-CM

## 2025-06-04 DIAGNOSIS — M18.12 PRIMARY OSTEOARTHRITIS OF FIRST CARPOMETACARPAL JOINT OF LEFT HAND: ICD-10-CM

## 2025-06-04 DIAGNOSIS — M18.11 PRIMARY OSTEOARTHRITIS OF FIRST CARPOMETACARPAL JOINT OF RIGHT HAND: Primary | ICD-10-CM

## 2025-06-04 PROCEDURE — 20600 DRAIN/INJ JOINT/BURSA W/O US: CPT | Performed by: INTERNAL MEDICINE

## 2025-06-04 PROCEDURE — 20610 DRAIN/INJ JOINT/BURSA W/O US: CPT | Performed by: INTERNAL MEDICINE

## 2025-06-04 PROCEDURE — 99214 OFFICE O/P EST MOD 30 MIN: CPT | Performed by: INTERNAL MEDICINE

## 2025-06-04 RX ORDER — CYCLOBENZAPRINE HCL 10 MG
10 TABLET ORAL 3 TIMES DAILY PRN
Qty: 60 TABLET | Refills: 2 | Status: SHIPPED | OUTPATIENT
Start: 2025-06-04

## 2025-06-04 RX ORDER — HYDROCODONE BITARTRATE AND ACETAMINOPHEN 10; 325 MG/1; MG/1
1 TABLET ORAL EVERY 6 HOURS PRN
Qty: 120 TABLET | Refills: 0 | Status: SHIPPED | OUTPATIENT
Start: 2025-07-11

## 2025-06-04 RX ORDER — HYDROCODONE BITARTRATE AND ACETAMINOPHEN 10; 325 MG/1; MG/1
1 TABLET ORAL EVERY 6 HOURS PRN
Qty: 120 TABLET | Refills: 0 | Status: SHIPPED | OUTPATIENT
Start: 2025-08-10

## 2025-06-04 RX ORDER — METHYLPREDNISOLONE ACETATE 40 MG/ML
10 INJECTION, SUSPENSION INTRA-ARTICULAR; INTRALESIONAL; INTRAMUSCULAR; SOFT TISSUE ONCE
Status: COMPLETED | OUTPATIENT
Start: 2025-06-04 | End: 2025-06-04

## 2025-06-04 RX ORDER — METHYLPREDNISOLONE ACETATE 40 MG/ML
40 INJECTION, SUSPENSION INTRA-ARTICULAR; INTRALESIONAL; INTRAMUSCULAR; SOFT TISSUE ONCE
Status: COMPLETED | OUTPATIENT
Start: 2025-06-04 | End: 2025-06-04

## 2025-06-04 RX ORDER — HYDROCODONE BITARTRATE AND ACETAMINOPHEN 10; 325 MG/1; MG/1
1 TABLET ORAL EVERY 6 HOURS PRN
Qty: 120 TABLET | Refills: 0 | Status: SHIPPED | OUTPATIENT
Start: 2025-06-11

## 2025-06-04 RX ADMIN — METHYLPREDNISOLONE ACETATE 40 MG: 40 INJECTION, SUSPENSION INTRA-ARTICULAR; INTRALESIONAL; INTRAMUSCULAR; SOFT TISSUE at 12:57:00

## 2025-06-04 RX ADMIN — METHYLPREDNISOLONE ACETATE 10 MG: 40 INJECTION, SUSPENSION INTRA-ARTICULAR; INTRALESIONAL; INTRAMUSCULAR; SOFT TISSUE at 12:57:00

## 2025-06-04 NOTE — PROCEDURES
20610 20600  Injection left knee.  Primary osteoarthritis left knee.  Injection right thumb cmc joint  Primary osteoarthritis right thumb cmc joint.  After obtaining consent from the patient, a timeout was done, left knee medial aspect was identified, right thumb CMC joint was identified.  These areas were cleaned with Hibiclens and alcohol wipes.  Then the right thumb CMC joint was injected with 10 mg of methylprednisolone.  Next the left knee was injected medially with 40 mg of methylprednisolone and 1 cc of 1% lidocaine.  Patient tolerated both injections without incident.

## 2025-06-04 NOTE — PROGRESS NOTES
[facility]  Dr. Torres Progress Note     Subjective:   Soumya King is a(n) 65 year old female.   Current complaints:   Chief Complaint   Patient presents with    Follow - Up     Follow up and refills. Pt would like to further discuss getting a left knee injection and a right hand injection during todays office visit.    History of osteoarthritis of the left knee and osteoarthritis of the hands.   Right thumb has a spur proximally.  Needs a prescription for a muscle relaxor.  Needs Norco refill.   Uses Norco 10 mg as needed generally one every 6 hours.  Requesting injections of left knee and right thumb with cortisone.   Objective:   /82   Pulse 92   Temp 98 °F (36.7 °C)   Resp 16   Ht 5' (1.524 m)   Wt 130 lb (59 kg)   SpO2 98%   BMI 25.39 kg/m²   Lungs clear  Heart nsr   Right thumb spur at proximal cmc joint.  Left knee hypertrophic  trace tender   Assessment:     Encounter Diagnoses   Name Primary?    Primary osteoarthritis of first carpometacarpal joint of right hand Yes    Primary osteoarthritis of left knee     Primary osteoarthritis of first carpometacarpal joint of left hand      Plan:     Patient Instructions   Use Norco 10 mg 1 tablet every 6 hours for severe pain as needed.  For mild pain use ES Tylenol 500 mg prn.  For muscle spasm take an occasional cyclobenzaprine 10 mg'  Right thumb injected at the cmc joint with 10 mg methylprednisolone.  Left knee injected with 40 mg of methylprenisolone(cortisone).' Go home and rest.today.  Ice the left knee as needed.   Return to office 3 months.        Xiang Torres MD 6/4/2025 9:08 AM

## 2025-06-04 NOTE — PATIENT INSTRUCTIONS
Use Norco 10 mg 1 tablet every 6 hours for severe pain as needed.  For mild pain use ES Tylenol 500 mg prn.  For muscle spasm take an occasional cyclobenzaprine 10 mg'  Right thumb injected at the cmc joint with 10 mg methylprednisolone.  Left knee injected with 40 mg of methylprenisolone(cortisone).' Go home and rest.today.  Ice the left knee as needed.   Return to office 3 months.

## 2025-06-19 ENCOUNTER — HOSPITAL ENCOUNTER (OUTPATIENT)
Dept: NUCLEAR MEDICINE | Facility: HOSPITAL | Age: 65
Discharge: HOME | End: 2025-06-19
Payer: MEDICAID

## 2025-06-19 DIAGNOSIS — K21.9 GASTROESOPHAGEAL REFLUX DISEASE WITHOUT ESOPHAGITIS: ICD-10-CM

## 2025-06-19 DIAGNOSIS — R59.0 THORACIC LYMPHADENOPATHY: ICD-10-CM

## 2025-06-19 DIAGNOSIS — R91.8 LUNG NODULES: ICD-10-CM

## 2025-06-19 LAB — GLUCOSE BLD-MCNC: 116 MG/DL (ref 70–99)

## 2025-06-19 PROCEDURE — 82962 GLUCOSE BLOOD TEST: CPT

## 2025-06-19 PROCEDURE — 78815 PET IMAGE W/CT SKULL-THIGH: CPT

## 2025-06-19 NOTE — TELEPHONE ENCOUNTER
Name from pharmacy: Omeprazole Dr 40 Mg Cap Romie         Will file in chart as: OMEPRAZOLE 40 MG Oral Capsule Delayed Release    Sig: Take 1 capsule (40 mg total) by mouth 2 (two) times daily.    Disp: 60 capsule    Refills: 0    Start: 6/19/2025    Class: Normal    For: Gastroesophageal reflux disease without esophagitis    Last ordered: 1 month ago (5/19/2025) by Rika Vinson DO    Last refill: 5/19/2025    Rx #: [id number]    Gastrointestional Medication Protocol [id number]/19/2025 09:56 AM   Protocol Details In person appointment or virtual visit in the past 12 mos or appointment in next 3 mos    Medication is active on med list      To be filled at: [id number] - [place], [place] - [address] [phone], [phone]     LOV:  3/17/25  RTC: 6 months  Recent Labs: 5/6/25 with Dr Garg    Upcoming OV 7/16/25

## 2025-06-20 DIAGNOSIS — K21.9 GASTROESOPHAGEAL REFLUX DISEASE WITHOUT ESOPHAGITIS: ICD-10-CM

## 2025-06-20 RX ORDER — OMEPRAZOLE 40 MG/1
40 CAPSULE, DELAYED RELEASE ORAL 2 TIMES DAILY
Qty: 60 CAPSULE | Refills: 0 | Status: SHIPPED | OUTPATIENT
Start: 2025-06-20

## 2025-06-20 NOTE — TELEPHONE ENCOUNTER
Protocol: Passed  Last Office Visit: 3/17/25  Labs: Completed in May  Last Refill: 6/20/25 #60   Return to Clinic:   Future Appointments   Date Time Provider Department Center   7/16/2025  9:30 AM Kamala Samuel MD [facility] [place]   7/16/2025 12:00 PM Rika Vinson,  [facility] [place]   7/21/2025 11:20 AM Amanda Odonnell PA [facility] None   9/4/2025  2:40 PM Xiang Torres MD [facility] [place]   11/17/2025  1:00 PM Scott Corona MD NP [facility]

## 2025-06-26 NOTE — TELEPHONE ENCOUNTER
Discontinued Ozempic RX since Wegovy ended up being affordable for the pt.  Alondra Berumen, PharmD     From: Shaneka Moody  To: [address] [facility]  Sent: 10/2/2021 9:26 PM CDT  Subject: Lung screening     I was wondering about having a lung screening done.  I remember Dr. Milly Mendez giving me information on having it done but at the time I didn't have any

## 2025-06-30 ENCOUNTER — TELEPHONE (OUTPATIENT)
Facility: CLINIC | Age: 65
End: 2025-06-30

## 2025-06-30 NOTE — TELEPHONE ENCOUNTER
A[employer] message sent to pt by MARLENE Harris on 6-25-25 that pt should schedule an appt to review PET scan.  Call transferred to Catia PSR to schedule.  Appt scheduled for 7-7-25.

## 2025-07-03 ENCOUNTER — MED REC SCAN ONLY (OUTPATIENT)
Facility: CLINIC | Age: 65
End: 2025-07-03

## 2025-07-07 ENCOUNTER — OFFICE VISIT (OUTPATIENT)
Facility: CLINIC | Age: 65
End: 2025-07-07
Payer: MEDICAID

## 2025-07-07 VITALS
DIASTOLIC BLOOD PRESSURE: 70 MMHG | WEIGHT: 126.69 LBS | SYSTOLIC BLOOD PRESSURE: 110 MMHG | OXYGEN SATURATION: 98 % | RESPIRATION RATE: 16 BRPM | HEART RATE: 82 BPM | HEIGHT: 60 IN | BODY MASS INDEX: 24.87 KG/M2

## 2025-07-07 DIAGNOSIS — R59.0 THORACIC LYMPHADENOPATHY: ICD-10-CM

## 2025-07-07 DIAGNOSIS — Z72.0 TOBACCO USE: ICD-10-CM

## 2025-07-07 DIAGNOSIS — J43.2 CENTRILOBULAR EMPHYSEMA (HCC): Primary | ICD-10-CM

## 2025-07-07 DIAGNOSIS — R91.8 LUNG NODULES: ICD-10-CM

## 2025-07-07 NOTE — PROGRESS NOTES
[facility] General Pulmonary Progress Note    History of Present Illness:  Soumya King is a 65 year old female smoker (40+ pack years) + vaping nicotine with significant PMH of DM, COPD, HTN, GERD who presents today for follow up of scans. Overall feels okay. States she's very stressed with not working and not being able to find a job- has bills to pay. Still vaping as a stress relief.  Reports Sob with exertion- thinks its more due to vaping too much. Denies acute concerns. Denies no cough, chest pain/pressure, wheezing, no fevers, chills, body aches, N/V/D, fatigue.      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 Since last visit she feels 'okay', has mild chronic cough and dyspnea. No pain.no fevers  Using incruse daily.  Has albuterol but rarely uses/needs.  Not smoking but +vaping     10/2024 previously BECKY Santiago  Soumya King is a 64 year old female who presents today for follow up of CT chest.   Reports starting second round of antibiotics (amoxicillin) for possible pneumonia on chest xrays per PCP. Reports cough, runny nose, chest pain, SOB for about 3 weeks, saw PCP who prescribed antibiotics. She has been hospitalized twice over the year for pneumonia, January and July 2024.   Prior to these issues denied any respiratory complaints. Has been taking incruse daily and nasal sprays, has not needed to use albuterol.   Denies fever, chills, weight loss.   Denies smoking nicotine, but uses vape pen with little nicotine inside; not interested in quitting due to helps with stress. PCP prescribed low dose of nicotine patches. Reports not helping with her cravings.      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 Since last visit she was hospitalized in May/June with pneumonia and lung abscess s/p chest tube then right VATS/decortication. She was rehospitalized in June with FTT and now presents for follow up. She feels much better. No cough or dyspnea  Using incruse daily.  Has albuterol but rarely uses/needs.  Not smoking but +vaping     May 2024 previously  Since last visit she was hospitalized in January 2024 with chest pain, found to have NSTEMI and underwent CABG.  She feels better since but still vaping, did quit smoking.   Thinks her breathing and coughing are improved on incruse daily.  Does continue to have daily cough with phlegm.   Thinks she was sick in late April 2024 with sinus infection, resolved post abx.      Nov 2023 previously   She reports having had dyspnea on exertion over the past several years. Did have some improvement after having abdominal surgery, had left ovarian mass (cystadenoma) removed in 2019. Over the past 1-2 years she thinks her breathing has steadily worsened. Denies needing to stop walking but admits she is not very active, does not exercise  +cough with white/yellow phlegm, no hemoptysis.   Has bronchitis nearly every year in winter time.  She had a f/u LDCT in April 2023 which found new nodules in the CHARI - thinks she was sick around the time of the scan. She had a f/u CT earlier this month, which showed the previous nodules have resolved, but had new large nodules in the right lung leading to her evaluation here.   She admits she was sick with chest congestion and sinus congestion in late October 2023.  Has been vaping, but did smoke.      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     Past Medical History:   Past Medical History[1]     Past Surgical History: Past Surgical History[2]    Family Medical History: Family History[3]     Social History:   Social History     Socioeconomic History    Marital status:  Single     Spouse name: Not on file    Number of children: Not on file    Years of education: Not on file    Highest education level: Not on file   Occupational History    Not on file   Tobacco Use    Smoking status: Former     Current packs/day: 0.00     Average packs/day: 1.5 packs/day for 48.0 years (72.0 ttl pk-yrs)     Types: Cigarettes     Start date:      Quit date:      Years since quittin.5    Smokeless tobacco: Never    Tobacco comments:     Quit cigarettes completely but still smoke and JUUL or vapes.   Vaping Use    Vaping status: Every Day    Start date: 2019    Substances: Nicotine, Flavoring    Devices: Disposable, Pre-filled pod   Substance and Sexual Activity    Alcohol use: Not Currently     Comment: On occasion    Drug use: No    Sexual activity: Not Currently     Partners: Male   Other Topics Concern     Service Not Asked    Blood Transfusions Not Asked    Caffeine Concern Yes    Occupational Exposure Not Asked    Hobby Hazards Not Asked    Sleep Concern Not Asked    Stress Concern Yes    Weight Concern Yes    Special Diet No    Back Care Not Asked    Exercise No    Bike Helmet Not Asked    Seat Belt Yes    Self-Exams Not Asked   Social History Narrative    Not on file     Social Drivers of Health     Food Insecurity: Not on File (2024)    Received from [facility]    Food Insecurity     Food: 0   Transportation Needs: No Transportation Needs (2024)    Transportation Needs     Lack of Transportation: No     Car Seat: Not on file   Stress: Not on File (10/7/2022)    Received from [facility]    Stress     Stress: 0   Housing Stability: Low Risk  (2024)    Housing Stability     Housing Instability: No     Housing Instability Emergency: Not on file     Crib or Bassinette: Not on file        Medications: Current Medications[4]    Review of Systems: Review of Systems   Constitutional: Negative.    HENT: Negative.     Respiratory:  Positive for shortness of breath. Negative  for cough, chest tightness and wheezing.    Cardiovascular: Negative.    Gastrointestinal: Negative.         Physical Exam:  There were no vitals taken for this visit.     Constitutional: alert, cooperative. No acute distress.  HEENT: Head NC/AT.   Cardio: Regular rate and rhythm. Normal S1 and S2. No murmurs.   Respiratory: Thorax symmetrical with no labored breathing. Clear to ausculation bilaterally with symmetrical breath sounds. No wheezing, rhonchi, rales, or crackles.   Extremities: No clubbing or cyanosis. No BLE edema.    Neurologic: A&Ox3. No gross motor deficits.  Skin: Warm, dry  Psych: Calm, cooperative. Pleasant affect.    Results:  Personally reviewed    WBC: 14.3, done on 5/6/2025.  HGB: 10.5, done on 5/6/2025.  PLT: 474, done on 5/6/2025.     Glucose: 111, done on 5/6/2025.  Cr: 0.98, done on 5/6/2025.  Last eGFR was 65 on 5/6/2025.  CA: 9.2, done on 5/6/2025.  Na: 136, done on 5/6/2025.  K: 4.2, done on 5/6/2025.  Cl: 104, done on 5/6/2025.  CO2: 25, done on 5/6/2025.  Last ALB was 4.5% done on 5/6/2025.     PET STANDARD BODY SCAN (ONCOLOGY) (CPT=78815)  Result Date: 6/19/2025  CONCLUSION:  1. Right lung nodules demonstrate waxing and waning with a majority demonstrating maximum SUV of less than 3.  An inflammatory/infectious process is favored.  Correlate clinically.   LOCATION:  [facility]    Dictated by (CST): Britney Mendes MD on 6/19/2025 at 8:34 PM     Finalized by (CST): Britney Mendes MD on 6/19/2025 at 8:46 PM       CT ABDOMEN+PELVIS(CONTRAST ONLY)(CPT=74177)  Result Date: 5/6/2025  CONCLUSION:  1. Multiple areas of consolidation in nodularity right lung have been previously described and are unchanged since recent prior chest CT. 2. Otherwise no acute abnormality detected in the abdomen or pelvis. 3. There is diffuse aortic atherosclerosis with ectasia.  There is no aneurysm.    LOCATION:  [facility]   Dictated by (CST): Abad Chen MD on 5/06/2025 at 10:43 PM     Finalized by (CST): Abad Chen MD on  5/06/2025 at 10:47 PM          Assessment/Plan:  #1. Lung nodules  Noted first on LDCT  4/2023 LDCT with multiple nodular opacities in CHARI -- she reports being sick around time of scan  11/2023 CT chest with resolution of previous CHARI nodules but now with new nodules and GGO in RUL and RLL. +thoracic lymphadenopathy  3/2024 CT chest with improvement in multiple nodules, but stable RUL nodules. Stable LAD  4/2024 CT myeloma with new RLL nodule. Other nodules appear stable   6/2024 CT chest with RLL cavitary lesion and LAD  She was hospitalized June 2024 with pneumonia/lung abscess and empyema s/p chest tube then right VATS/decort with improvement  10/2024 follow up CT Chest Interval decrease in size of cavitary masslike lesion in the right lower lobe posteriorly with surrounding infiltrate when compared to 6/18/2024. New nodular opacity in the right lower lobe measuring 1.8 x 0.7 cm.  Scattered pulmonary nodules in the right lung otherwise appear relatively stable ranging in size from 3-6 mm.  Increasing reticular nodular infiltrate in the right middle lobe and right lower lobe laterally.   4/2024 CT chest with decrease in RLL cavitary nodule but TWO NEW nodule in RML AND RLL  Recommended PET but needed to appeal to her insurance because oddly her insurance refused PET  6/2025 PET Right nodules wax and wane in size, low uptake; differential includes infection vs inflammation  I have reviewed all results in detail with the patient and answered all questions. Recommend f/u scan in 6 months. Patient verbalized understanding of recommendations.       #2. emphysema  40+ pack year smoker, active vaping  Previous 2015 PFTs with moderate obstruction, +air trapping and reduced DLCO  4/2024 PFTs with no obstruction, normal lung volumes. Reduced DLCO  Again advised to stop vaping any substance - she is going to work on quitting  Continue on incruse daily  Albuterol PRN     #3. Thoracic lymphadenopathy  As above     #4. Tobacco  abuse  40+ pack year smoker, active vaping  advised to stop vaping any substance - she will work on quitting  Spent 3 minutes addressing the importance of quitting smoking. Quitting smoking is one of the most important things you can do for your health. The sooner you quit smoking, the greater the benefits. It is never too late to quit smoking. Resources and information provided to the patient.    Scans as above  Lung Cancer Counseling and Shared Decision Making Session in an Asymptomatic Smoker/Former Smoker   Soumya King is a 65 year old female without current symptoms of lung cancer.  History   Smoking Status    Former    Types: Cigarettes   Smokeless Tobacco    Never     She received information on the importance of adherence to annual lung cancer Low Dose CT (LDCT) screening, the impact of her comorbidities and her ability or willingness to undergo diagnosis and treatment. she is in agreement and an order will be placed for CT LUNG LD SCREENING(CPT=71271).  We counseled the importance of maintaining cigarette smoking abstinence if she is a former smoker and the importance of smoking cessation if she is a current smoker and we discussed and furnished information about tobacco cessation interventions.     Riana [facility]-BC  Pulmonary Medicine   7/7/2025          [1]   Past Medical History:   Abdominal pain, other specified site    groin    Acute bronchitis    Allergic rhinitis    Anemia     noted he saw since 2015    Anxiety    Arthritis    Back problem    Jamison esophagus    Chronic low back pain without sciatica    COPD (chronic obstructive pulmonary disease) (HCC)    Depression    Diabetes (HCC)    Difficult intubation    difficult to intubate with bladder surgery,instructed by anesthesia to communicate to future anesthesiologist. Small airway    Esophageal reflux    Essential hypertension    Don't remember    Fitting and adjustment of dental prosthetic device    High blood pressure    History of  2019 novel coronavirus disease (COVID-19)    No hospitalization,symptoms; Fever,fatigue ,body aches,headache no loss of taste or smell    Hx of motion sickness    Hyperlipidemia    Mass of spine    Migraines    barometric pressure    Myocardial infarction (HCC)    Osteoarthritis    Other ill-defined conditions(799.89)    Jamison's    Pain in joint, forearm    wrist    Pain in joint, pelvic region and thigh    hip    Pelvic mass    Personal history of urinary (tract) infection    Sleep apnea    I don't date    Stroke (HCC)    Mini stroke noted   [2]   Past Surgical History:  Procedure Laterality Date    Cabg  12/27/23    Colonoscopy      Colonoscopy N/A 05/18/2022    Procedure: COLONOSCOPY AND ESOPHAGOGASTRODUODENOSCOPY;  Surgeon: Miguel Camargo MD;  Location:  ENDOSCOPY    Decortication,pulmonary,total Right 06/05/2024    VATS decortication by Dr. Maldonado    Heart surgery  2024    double bypass    Laparoscopic  2007    sling operation for stress incontinence    Laparoscopy,diagnostic      Kay biopsy stereo nodule 2 site bilat (cpt=19081/83556) Left 02/2010    BENIGN 2 SITES    Kay biopsy stereo w/calc 2 site left (cpt=19081/51059)  02/2010    benign x 2    Kay stereo-clip w/calc 2 site left  2010    Oophorectomy Left 06/28/2019    Other  08/31/2022    RIGHT SACRAL SUBCUTANEOUS CYST EXCISION    Other surgical history  2000, 2019    Bladder Sling, Large mass removed from left abdomen and ovar    Tonsillectomy      Was a child, have no idea    Tubal ligation      Upper gi endoscopy,exam     [3]   Family History  Problem Relation Age of Onset    Arthritis Mother     Other (AMI) Mother     Other (diabetes mellitus) Mother     Diabetes Mother     Stroke Mother         Many mini strokes   [4]   Current Outpatient Medications   Medication Sig Dispense Refill    OMEPRAZOLE 40 MG Oral Capsule Delayed Release Take 1 capsule (40 mg total) by mouth 2 (two) times daily. 60 capsule 0    Omeprazole 40 MG Oral Capsule  Delayed Release Take 1 capsule (40 mg total) by mouth 2 (two) times daily. 60 capsule 0    [START ON 8/10/2025] HYDROcodone-acetaminophen  MG Oral Tab Take 1 tablet by mouth every 6 (six) hours as needed for Pain. 120 tablet 0    [START ON 7/11/2025] HYDROcodone-acetaminophen  MG Oral Tab Take 1 tablet by mouth every 6 (six) hours as needed for Pain. 120 tablet 0    HYDROcodone-acetaminophen  MG Oral Tab Take 1 tablet by mouth every 6 (six) hours as needed for Pain. 120 tablet 0    cyclobenzaprine 10 MG Oral Tab Take 1 tablet (10 mg total) by mouth 3 (three) times daily as needed for Muscle spasms. 60 tablet 2    METFORMIN 500 MG Oral Tab Take 1 tablet (500 mg total) by mouth 2 (two) times daily. 180 tablet 0    estradiol (ESTRACE) 0.1 MG/GM Vaginal Cream Apply 1/2 gram vaginally 2-3 times per week. 42.5 g 3    sertraline 50 MG Oral Tab Take 1 tablet (50 mg total) by mouth daily with dinner.      venlafaxine ER 75 MG Oral Capsule SR 24 Hr Take 1 capsule (75 mg total) by mouth daily with breakfast.      Glucose Blood (CONTOUR PLUS TEST) In Vitro Strip Use daily 100 strip 11    Blood Glucose Monitoring Suppl (CONTOUR PLUS BLUE) w/Device Does not apply Kit 1 Device As Directed. 1 kit 0    Glucose Blood (ONETOUCH ULTRA) In Vitro Strip Tests 3 x daily 300 each 1    Lancets (ONETOUCH DELICA PLUS [id number]) Does not apply Misc 1 lancet  by Finger stick route 3 (three) times daily. 300 each 1    Blood Glucose Monitoring Suppl (ONETOUCH ULTRA 2) w/Device Does not apply Kit 1 Device by Other route 2 (two) times daily. Use as directed. 1 kit 0    ATORVASTATIN 80 MG Oral Tab Take 1 tablet (80 mg total) by mouth nightly. 90 tablet 0    AIMOVIG 140 MG/ML Subcutaneous Solution Auto-injector Inject 1 mL (140 mg total) into the skin every 30 (thirty) days. 1 mL 2    cyclobenzaprine 10 MG Oral Tab Take 1 tablet (10 mg total) by mouth 3 (three) times daily as needed for Muscle spasms. 60 tablet 1    diclofenac 75 MG  Oral Tab EC Take 1 tablet (75 mg total) by mouth 2 (two) times daily. 180 tablet 1    budesonide 0.25 MG/2ML Inhalation Suspension Take 2 mL (0.25 mg total) by nebulization 2 (two) times daily. 60 mL 3    albuterol 108 (90 Base) MCG/ACT Inhalation Aero Soln Inhale 2 puffs into the lungs every 6 (six) hours as needed for Wheezing. inhale 2 puff by inhalation route  every 4 - 6 hours as needed 1 each 5    azelastine 137 MCG/SPRAY Nasal Solution 1 spray by Nasal route 2 (two) times daily. 30 mL 5    Respiratory Therapy Supplies (FULL KIT NEBULIZER SET) Does not apply Misc       gabapentin 600 MG Oral Tab Take 1 tablet (600 mg total) by mouth nightly. 90 tablet 1    VOLTAREN 1 % External Gel Apply 4 g topically 4 (four) times daily. 300 g 1    INCRUSE ELLIPTA 62.5 MCG/ACT Inhalation Aerosol Powder, Breath Activated Inhale 1 puff into the lungs daily. 30 each 11    FLOWFLEX COVID-19 AG HOME TEST In Vitro Kit 1 Swab by In Vitro route one time for 1 dose. 10 kit 0    nicotine 7 MG/24HR Transdermal Patch 24 Hr Place 1 patch onto the skin daily. 30 patch 2    diclofenac 1 % External Gel Apply 4 g topically 4 (four) times daily. 300 g 1    furosemide 20 MG Oral Tab Take 1 tablet (20 mg total) by mouth daily. 30 tablet 2    Mirabegron ER (MYRBETRIQ) 50 MG Oral Tablet 24 Hr Take 1 tablet (50 mg total) by mouth daily. 90 tablet 3    Naloxone HCl 4 MG/0.1ML Nasal Liquid 4 mg by Nasal route as needed. If patient remains unresponsive, repeat dose in other nostril 2-5 minutes after first dose. 1 kit 0    PRISTIQ 100 MG Oral Tablet 24 Hr Take 1 tablet (100 mg total) by mouth daily.      Rizatriptan Benzoate 10 MG Oral Tab Take 1 tablet (10 mg total) by mouth as needed for Migraine. 10 tablet 5    Coenzyme Q10 200 MG Oral Cap Take 200 mg by mouth 2 (two) times daily.      metoprolol succinate ER 50 MG Oral Tablet 24 Hr Take 1 tablet (50 mg total) by mouth daily. 90 tablet 0    topiramate 50 MG Oral Tab TAKE ONE TABLET BY MOUTH EVERY  MORNING AND TWO TABLETS EVERY EVENING (Patient taking differently: Take 1 tablet (50 mg total) by mouth in the morning and 1 tablet (50 mg total) before bedtime. One tablet in the morning and one tablet at night.) 270 tablet 2    clopidogrel 75 MG Oral Tab Take 1 tablet (75 mg total) by mouth daily.      aspirin 81 MG Oral Tab EC Take 1 tablet (81 mg total) by mouth daily.      clonazePAM 0.5 MG Oral Tab Take 1 tablet (0.5 mg total) by mouth 2 (two) times daily. 12 tablet 0    Multiple Vitamins-Minerals (ONE-A-DAY WOMENS 50+) Oral Tab Take 1 tablet by mouth daily.  0    QUEtiapine Fumarate  MG Oral Tablet 24 Hr Take 1 tablet (200 mg total) by mouth every evening. 30 tablet 0    Calcium Carb-Cholecalciferol 500-200 MG-UNIT Oral Tab Take 1 tablet by mouth daily.

## 2025-07-07 NOTE — PATIENT INSTRUCTIONS
Call office with any questions or concerns  Call office if develop any new or worsening respiratory symptoms.   Call central scheduling at [phone] to schedule the CT scan 6 months  If you get ill (sinus infection, cold, respiratory illness or other illness) you should postpone the scan for at least 4-6 weeks after you have recovered. Please call with any questions.     How to Quit Smoking  Smoking is a hard habit to break. About half of all people who have ever smoked have been able to quit. Most people who still smoke want to quit. Here are some of the best ways to stop smoking.  Keep in mind the health benefits of quitting  The health benefits of quitting start right away. They keep improving the longer you go without smoking. Knowing this can help inspire you to stay on track. These benefits occur at any age. If you are 17 or 70, quitting is a good choice. Some of the health benefits after your last cigarette include:  · 20 minutes: Your blood pressure and pulse return to normal.  · 8 hours: Your oxygen levels return to normal.  · 2 days: Your ability to smell and taste start to improve as damaged nerves regrow.  · 2 to 3 weeks: Your circulation and lung function improve.  · 1 to 9 months: Your coughing, congestion, and shortness of breath decrease. Your tiredness decreases.  · 1 year: Your risk of heart attack decreases by half.  · 5 years: Your risk of lung cancer decreases by half. Your risk of stroke becomes the same as a nonsmoker’s.  -Go cold turkey  Most former smokers quit cold turkey. This means stopping all at once. Trying to cut back slowly often doesn't work as well. This may be because it continues the habit of smoking. Also, you may inhale more smoke while smoking fewer cigarettes. This leads to the same amount of nicotine in your body.  -Get support  Support programs can be a big help, especially for heavy smokers. These groups offer lectures, ways to change behavior, and peer support. Here  are some ways to find a support program:  · Free national quitline 800-QUIT-NOW (674-538-7919)  · Hospital quit-smoking programs  · American Lung Association 357-849-9497  · American Cancer Society 455-290-5806  Support at home is important too. Family and friends can offer praise and reassurance. If the smoker in your life finds it hard to quit, encourage them to keep trying.  -Try over-the-counter medicine  Nicotine replacement therapy may make it easier to quit. Some aids are available without a prescription. These include a nicotine patch, gum, and lozenges. But it is best to use these under the care of your healthcare provider. The skin patch gives a steady supply of nicotine. Nicotine gum and lozenges give short-time doses of low levels of nicotine. Both methods reduce the craving for cigarettes. If you have nausea, vomiting, dizziness, weakness, or a fast heartbeat, stop using these products. See your healthcare provider.  -Ask about prescription medicine  After reviewing your smoking patterns and past attempts to quit, your doctor may offer a prescription medicine such as bupropion, varenicline, a nicotine inhaler, or nasal spray. Each has advantages and side effects. Your doctor can review these with you.  -Keep trying  Most smokers make many attempts at quitting before they are successful. It’s important not to give up.    How smoking affects your body  Smoking has been linked with many serious illnesses. It also has been shown to increase signs of aging. A few of the health effects of smoking are listed below. Smoking can:  · Raise your risk of lung cancer, bladder cancer, and cervical cancer  · Harm your lungs and cause problems with breathing. This includes emphysema and COPD (chronic obstructive pulmonary disease) .  · Raise blood pressure. This raises your risk for heart attack or stroke.  · Reduce blood flow. This can slow healing and cause wrinkles.  · In pregnant women, cause bleeding problems,  miscarriage, stillbirth, or birth defects  · In men, cause problems with erections  What happens when you smoke?  When you smoke, your breathing becomes shallow. Your lungs fill with smoke. Smoking cigarettes also fills your body with chemicals, such as nicotine and tar. Here’s how these things affect your body:  · Smoke. Cigarette smoke contains carbon monoxide. This gas takes the place of oxygen in your blood.  · Nicotine. This drug raises your blood pressure and heart rate. It reduces blood flow to your arms and legs. And it slows digestion.  · Tar. Tar is what’s left after tobacco is smoked. This sticky brown material gums up your lungs. This causes less oxygen to get into your bloodstream.  · Other chemicals. Cigarette smoke contains more than 4,000 other chemicals. They include formaldehyde, arsenic, and lead. Dozens of these chemicals are known to cause cancer.

## 2025-07-16 ENCOUNTER — OFFICE VISIT (OUTPATIENT)
Dept: INTERNAL MEDICINE CLINIC | Facility: CLINIC | Age: 65
End: 2025-07-16
Payer: MEDICAID

## 2025-07-16 VITALS
BODY MASS INDEX: 26.11 KG/M2 | OXYGEN SATURATION: 97 % | HEART RATE: 86 BPM | DIASTOLIC BLOOD PRESSURE: 72 MMHG | WEIGHT: 133 LBS | HEIGHT: 60 IN | SYSTOLIC BLOOD PRESSURE: 112 MMHG | RESPIRATION RATE: 16 BRPM | TEMPERATURE: 98 F

## 2025-07-16 DIAGNOSIS — F33.1 MDD (MAJOR DEPRESSIVE DISORDER), RECURRENT EPISODE, MODERATE (HCC): ICD-10-CM

## 2025-07-16 DIAGNOSIS — E78.5 TYPE 2 DIABETES MELLITUS WITH HYPERLIPIDEMIA (HCC): ICD-10-CM

## 2025-07-16 DIAGNOSIS — I25.10 CAD, MULTIPLE VESSEL: ICD-10-CM

## 2025-07-16 DIAGNOSIS — Z72.0 TOBACCO USE: ICD-10-CM

## 2025-07-16 DIAGNOSIS — E11.40 TYPE 2 DIABETES MELLITUS WITH DIABETIC NEUROPATHY, WITHOUT LONG-TERM CURRENT USE OF INSULIN (HCC): ICD-10-CM

## 2025-07-16 DIAGNOSIS — D47.2 MGUS (MONOCLONAL GAMMOPATHY OF UNKNOWN SIGNIFICANCE): ICD-10-CM

## 2025-07-16 DIAGNOSIS — R68.82 LOW LIBIDO: ICD-10-CM

## 2025-07-16 DIAGNOSIS — E55.9 VITAMIN D DEFICIENCY: ICD-10-CM

## 2025-07-16 DIAGNOSIS — F51.01 PRIMARY INSOMNIA: ICD-10-CM

## 2025-07-16 DIAGNOSIS — E11.65 UNCONTROLLED TYPE 2 DIABETES MELLITUS WITH HYPERGLYCEMIA, WITHOUT LONG-TERM CURRENT USE OF INSULIN (HCC): ICD-10-CM

## 2025-07-16 DIAGNOSIS — E11.69 TYPE 2 DIABETES MELLITUS WITH HYPERLIPIDEMIA (HCC): ICD-10-CM

## 2025-07-16 DIAGNOSIS — G47.33 OSA ON CPAP: ICD-10-CM

## 2025-07-16 DIAGNOSIS — K22.70 BARRETT'S ESOPHAGUS WITHOUT DYSPLASIA: Primary | ICD-10-CM

## 2025-07-16 DIAGNOSIS — D50.8 IRON DEFICIENCY ANEMIA SECONDARY TO INADEQUATE DIETARY IRON INTAKE: ICD-10-CM

## 2025-07-16 DIAGNOSIS — F41.1 GAD (GENERALIZED ANXIETY DISORDER): ICD-10-CM

## 2025-07-16 DIAGNOSIS — R53.83 OTHER FATIGUE: ICD-10-CM

## 2025-07-16 DIAGNOSIS — J44.9 COPD, MILD (HCC): ICD-10-CM

## 2025-07-16 PROBLEM — R23.2 VASOMOTOR FLUSHING: Status: RESOLVED | Noted: 2021-09-30 | Resolved: 2025-07-16

## 2025-07-16 PROCEDURE — 90715 TDAP VACCINE 7 YRS/> IM: CPT | Performed by: INTERNAL MEDICINE

## 2025-07-16 PROCEDURE — 99214 OFFICE O/P EST MOD 30 MIN: CPT | Performed by: INTERNAL MEDICINE

## 2025-07-16 PROCEDURE — 90471 IMMUNIZATION ADMIN: CPT | Performed by: INTERNAL MEDICINE

## 2025-07-16 RX ORDER — DICLOFENAC SODIUM 75 MG/1
75 TABLET, DELAYED RELEASE ORAL 2 TIMES DAILY
COMMUNITY
Start: 2025-07-16

## 2025-07-16 RX ORDER — DULAGLUTIDE 0.75 MG/.5ML
0.75 INJECTION, SOLUTION SUBCUTANEOUS WEEKLY
Qty: 3 ML | Refills: 0 | Status: SHIPPED | OUTPATIENT
Start: 2025-07-16

## 2025-07-16 RX ORDER — ESTRADIOL 0.1 MG/G
CREAM VAGINAL
Qty: 42.5 G | Refills: 3 | OUTPATIENT
Start: 2025-07-16

## 2025-07-16 NOTE — PATIENT INSTRUCTIONS
Decrease your metformin one tablet a day and lets start trulicity once a week. It may worsen diarrhea/constipation/acid reflux    See if you can decrease the diclofenac to help your acid reflux    Ask your psychiatrist if you can add low dose wellbutrin to help with your libido and help stop smoking    See me back in 3 months    You received the tdap vaccine and it causes pain for 24 hours

## 2025-07-16 NOTE — PROGRESS NOTES
Patient Office Visit    ASSESSMENT AND PLAN:   1. Jamison's esophagus without dysplasia  Note: Continue with omeprazole.  Advised patient to cut down on diclofenac and that can be making her symptoms worse.  She does have an appointment with the GI specialist coming up tomorrow    2. Low libido  Note: Advised patient to discuss with her psychiatrist if she can add low-dose Wellbutrin as it will help with her symptoms as well as help with smoking cessation.  We want to avoid hormones given her history of heart disease and TIAs in the past    3. COPD, mild (HCC) - Dx'd via PFTs done in 3/2015  Note: Continue inhalers and follow-up with the pulmonologist.  Highly advised to stop smoking    4. CAD, multiple vessel s/p CABG  Note: Continue statin, aspirin and Plavix.  Continue follow-up with the GI specialist  - Comp Metabolic Panel (14); Future    5. KIRK (generalized anxiety disorder)  Note: On Pristiq, quetiapine, sertraline and clonazepam per her psychiatrist  - TSH W Reflex To Free T4; Future    6. Primary insomnia  Note: Takes Seroquel as well per psychiatry     7. Iron deficiency anemia secondary to inadequate dietary iron intake  Note: Will repeat labs as this could be contributing to her fatigue.  She has a follow-up appointment with the hematologist coming up    8. MDD (major depressive disorder), recurrent episode, moderate (HCC)  Note: Continue management per the psychiatrist    9. MGUS (monoclonal gammopathy of unknown significance)  Note: Continue follow-up with the hematologist  - CBC With Differential With Platelet; Future    10. Vitamin D deficiency  - Vitamin D; Future    11. Other fatigue  Note: Blood work has been ordered    12. Type 2 diabetes mellitus with hyperlipidemia (HCC)  Note: Continue statin.  Advised patient to decrease metformin to 1 tablet a day and will add the Trulicity.  Discussed that her symptoms of nausea, vomiting, constipation and diarrhea as well as acid reflux may worsen  -  Hemoglobin A1C; Future  - Dulaglutide (TRULICITY) 0.75 MG/0.5ML Subcutaneous Solution Auto-injector; Inject 0.75 mg into the skin once a week.  Dispense: 3 mL; Refill: 0    13. Type 2 diabetes mellitus with diabetic neuropathy, without long-term current use of insulin (HCC)  Note: As above  - Dulaglutide (TRULICITY) 0.75 MG/0.5ML Subcutaneous Solution Auto-injector; Inject 0.75 mg into the skin once a week.  Dispense: 3 mL; Refill: 0    14. SUMMER on CPAP  Note: Not very compliant but she will try    15. Uncontrolled type 2 diabetes mellitus with hyperglycemia, without long-term current use of insulin (HCC)  - metFORMIN 500 MG Oral Tab; Take 1 tablet (500 mg total) by mouth daily with breakfast.    16. Tobacco use  Note: Discussed the risks of smoking and patient will try to quit.  Will see if her psychiatrist can adjust her medicines so she can add the Wellbutrin    Return to clinic in 3 months or sooner as needed  Tdap was provided today      Patient/Caregiver Education: Patient/Caregiver Education: There are no barriers to learning. Medical education done. Outcome: Patient verbalizes understanding. Patient is notified to call with any questions, complications, allergies, or worsening or changing symptoms.  Patient is to call with any side effects or complications from the treatments as a result of today.      Reviewed Past Medical History and   Problem List[1]    Orders Placed This Encounter   Procedures    CBC With Differential With Platelet     Standing Status:   Future     Expected Date:   7/16/2025     Expiration Date:   7/16/2026    Comp Metabolic Panel (14)     Standing Status:   Future     Expected Date:   7/16/2025     Expiration Date:   7/16/2026    Hemoglobin A1C     Standing Status:   Future     Expected Date:   7/16/2025     Expiration Date:   7/16/2026    TSH W Reflex To Free T4     Standing Status:   Future     Expected Date:   7/16/2025     Expiration Date:   7/16/2026    Vitamin D     Standing  Status:   Future     Expected Date:   7/16/2025     Expiration Date:   7/16/2026     Please pick the scenario that best fits the purpose for ordering this test:   General Screening/Vit D deficiency (25-Hydroxy)     Release to patient:   Immediate    TdaP (Boostrix) Vaccine (> 7 Y)     Requested Prescriptions     Signed Prescriptions Disp Refills    Dulaglutide (TRULICITY) 0.75 MG/0.5ML Subcutaneous Solution Auto-injector 3 mL 0     Sig: Inject 0.75 mg into the skin once a week.         Rika Vinson,   CC:  Chief Complaint   Patient presents with    Follow - Up     Low libido         HPI:   Soumya Kign is a 65-year-old female who presents for a follow-up     Acid reflux: She is having worsening acid reflux and constipation.  She was supposed to see the GI doctor today and went in for the appointment but was told that the doctor no longer works here.  She was able to reschedule an appointment for tomorrow.  She does take omeprazole regularly but she also takes the diclofenac as well.  Her dose was decreased but she needs the diclofenac for her joint aches and pains  Low libido: She is noticing that she is having low libido.  She is wondering what else she can try to help with the libido  CAD/COPD: Stable on medicine  Obesity/diabetes: She has been gaining some weight now and she would like to go back on the Trulicity.    Past Medical History[2]    Past Surgical History[3]    Social History:  Short Social Hx on File[4]  Family History:  Family History[5]  Allergies:  Allergies[6]  Current Meds:  Medications Ordered Prior to Encounter[7]      REVIEW OF SYSTEMS   Constitutional: no fatigue normal energy no weight changes   HENT: normal sinuses and no mouth issues   Eyes: . normal vision no eye pain   Respiratory: normal respirations no cough   Cardiovascular: no CP, or palpitations   Gastrointestinal: normal bowels and no abd pains   Genitourinary:  normal urination no hematuria, no frequency    Musculoskeletal: no pains in arms/legs, normal range of motion   Skin: no rashes or skin lesions that are new   Neurological:  no weakness, no numbness, normal gait   Hematological:  no bruises or bleeding   Psychiatric/Behavioral: normal mood no anxiety normal behavior     /72 (BP Location: Right arm, Patient Position: Sitting, Cuff Size: adult)   Pulse 86   Temp 97.8 °F (36.6 °C) (Temporal)   Resp 16   Ht 5' (1.524 m)   Wt 133 lb (60.3 kg)   SpO2 97%   BMI 25.97 kg/m²     PHYSICAL EXAM:   Constitutional: Vital signs reviewed as noted, well developed, in no acute distress.   HENT: NCAT  Eyes: pupils reactive bilaterally  Cardiovascular: nl s1 s2 no m/r/g  Pulmonary/Chest: CTA bilaterally with no wheezes  Extremities: no pedal edema   Neurological:  no weakness in UE and LE, reflexes are normal  Skin: no rashes or bruises on visualized skin, not undressed   Psychiatric:normal mood              [1]   Patient Active Problem List  Diagnosis    Jamison's esophagus    Tobacco use    Insomnia    COPD, mild (HCC) - Dx'd via PFTs done in 3/2015    Primary osteoarthritis of first carpometacarpal joint of right hand    Migraine without aura and without status migrainosus, not intractable    Hyperlipidemia    SUMMER on CPAP    MDD (major depressive disorder), recurrent episode, moderate (HCC)    Hypertension    Chronic pansinusitis    KIRK (generalized anxiety disorder)    History of pubovaginal sling    Primary osteoarthritis of left knee    Mass of spine    Type 2 diabetes mellitus with diabetic neuropathy (HCC)    CAD, multiple vessel s/p CABG    Type 2 diabetes mellitus with hyperlipidemia (HCC)    Iron deficiency anemia secondary to inadequate dietary iron intake    Status post thoracotomy    Osteoarthritis of carpometacarpal (CMC) joint of left thumb    History of pleural empyema    History of ovarian cystectomy    MGUS (monoclonal gammopathy of unknown significance)    Mixed stress and urge urinary  incontinence   [2]   Past Medical History:   Abdominal pain, other specified site    groin    Acute bronchitis    Allergic rhinitis    Anemia    DrDileep noted he saw since 2015    Anxiety    Arthritis    Back problem    Jamison esophagus    Chronic low back pain without sciatica    COPD (chronic obstructive pulmonary disease) (HCC)    Depression    Diabetes (HCC)    Difficult intubation    difficult to intubate with bladder surgery,instructed by anesthesia to communicate to future anesthesiologist. Small airway    Esophageal reflux    Essential hypertension    Don't remember    Fitting and adjustment of dental prosthetic device    High blood pressure    History of 2019 novel coronavirus disease (COVID-19)    No hospitalization,symptoms; Fever,fatigue ,body aches,headache no loss of taste or smell    Hx of motion sickness    Hyperlipidemia    Mass of spine    Migraines    barometric pressure    Myocardial infarction (HCC)    Osteoarthritis    Other ill-defined conditions(799.89)    Jamison's    Pain in joint, forearm    wrist    Pain in joint, pelvic region and thigh    hip    Pelvic mass    Personal history of urinary (tract) infection    Sleep apnea    I don't date    Stroke (HCC)    Mini stroke noted   [3]   Past Surgical History:  Procedure Laterality Date    Cabg  12/27/23    Colonoscopy      Colonoscopy N/A 05/18/2022    Procedure: COLONOSCOPY AND ESOPHAGOGASTRODUODENOSCOPY;  Surgeon: Miguel Camargo MD;  Location:  ENDOSCOPY    Decortication,pulmonary,total Right 06/05/2024    VATS decortication by Dr. Maldonado    Heart surgery  2024    double bypass    Laparoscopic  2007    sling operation for stress incontinence    Laparoscopy,diagnostic      Kay biopsy stereo nodule 2 site bilat (cpt=19081/71211) Left 02/2010    BENIGN 2 SITES    Kay biopsy stereo w/calc 2 site left (cpt=19081/97611)  02/2010    benign x 2    Kay stereo-clip w/calc 2 site left  2010    Oophorectomy Left 06/28/2019    Other  08/31/2022     RIGHT SACRAL SUBCUTANEOUS CYST EXCISION    Other surgical history  ,     Bladder Sling, Large mass removed from left abdomen and ovar    Tonsillectomy      Was a child, have no idea    Tubal ligation      Upper gi endoscopy,exam     [4]   Social History  Socioeconomic History    Marital status: Single   Tobacco Use    Smoking status: Former     Current packs/day: 0.00     Average packs/day: 1.5 packs/day for 48.0 years (72.0 ttl pk-yrs)     Types: Cigarettes     Start date:      Quit date:      Years since quittin.5    Smokeless tobacco: Never    Tobacco comments:     Quit cigarettes completely but still smoke and JUUL or vapes.   Vaping Use    Vaping status: Every Day    Start date: 2019    Substances: Nicotine, Flavoring    Devices: Disposable, Pre-filled pod   Substance and Sexual Activity    Alcohol use: Not Currently     Comment: On occasion    Drug use: No    Sexual activity: Not Currently     Partners: Male   Other Topics Concern    Caffeine Concern Yes    Stress Concern Yes    Weight Concern Yes    Special Diet No    Exercise No    Seat Belt Yes     Social Drivers of Health     Food Insecurity: Not on File (2024)    Received from [employer]    Food Insecurity     Food: 0   Transportation Needs: No Transportation Needs (2024)    Transportation Needs     Lack of Transportation: No   Stress: Not on File (10/7/2022)    Received from [employer]    Stress     Stress: 0   Housing Stability: Low Risk  (2024)    Housing Stability     Housing Instability: No   [5]   Family History  Problem Relation Age of Onset    Arthritis Mother     Other (AMI) Mother     Other (diabetes mellitus) Mother     Diabetes Mother     Stroke Mother         Many mini strokes   [6]   Allergies  Allergen Reactions    Naprosyn [Naproxen] ANAPHYLAXIS     Has tolerated ibuprofen and IV toradol     Aspirin OTHER (SEE COMMENTS)     Difficulty swallowing due to  saavedra's esophagus   [7]   Current Outpatient Medications  on File Prior to Visit   Medication Sig Dispense Refill    diclofenac 75 MG Oral Tab EC Take 1 tablet (75 mg total) by mouth 2 (two) times daily.      metFORMIN 500 MG Oral Tab Take 1 tablet (500 mg total) by mouth daily with breakfast.      Omeprazole 40 MG Oral Capsule Delayed Release Take 1 capsule (40 mg total) by mouth 2 (two) times daily. 60 capsule 0    [START ON 8/10/2025] HYDROcodone-acetaminophen  MG Oral Tab Take 1 tablet by mouth every 6 (six) hours as needed for Pain. 120 tablet 0    estradiol (ESTRACE) 0.1 MG/GM Vaginal Cream Apply 1/2 gram vaginally 2-3 times per week. 42.5 g 3    sertraline 50 MG Oral Tab Take 1 tablet (50 mg total) by mouth daily with dinner.      venlafaxine ER 75 MG Oral Capsule SR 24 Hr Take 1 capsule (75 mg total) by mouth daily with breakfast.      Glucose Blood (CONTOUR PLUS TEST) In Vitro Strip Use daily 100 strip 11    Blood Glucose Monitoring Suppl (CONTOUR PLUS BLUE) w/Device Does not apply Kit 1 Device As Directed. 1 kit 0    Lancets (ONETOUCH DELICA PLUS [id number]) Does not apply Misc 1 lancet  by Finger stick route 3 (three) times daily. 300 each 1    ATORVASTATIN 80 MG Oral Tab Take 1 tablet (80 mg total) by mouth nightly. 90 tablet 0    AIMOVIG 140 MG/ML Subcutaneous Solution Auto-injector Inject 1 mL (140 mg total) into the skin every 30 (thirty) days. 1 mL 2    cyclobenzaprine 10 MG Oral Tab Take 1 tablet (10 mg total) by mouth 3 (three) times daily as needed for Muscle spasms. 60 tablet 1    budesonide 0.25 MG/2ML Inhalation Suspension Take 2 mL (0.25 mg total) by nebulization 2 (two) times daily. 60 mL 3    albuterol 108 (90 Base) MCG/ACT Inhalation Aero Soln Inhale 2 puffs into the lungs every 6 (six) hours as needed for Wheezing. inhale 2 puff by inhalation route  every 4 - 6 hours as needed 1 each 5    azelastine 137 MCG/SPRAY Nasal Solution 1 spray by Nasal route 2 (two) times daily. 30 mL 5    gabapentin 600 MG Oral Tab Take 1 tablet (600 mg total) by  mouth nightly. 90 tablet 1    VOLTAREN 1 % External Gel Apply 4 g topically 4 (four) times daily. 300 g 1    INCRUSE ELLIPTA 62.5 MCG/ACT Inhalation Aerosol Powder, Breath Activated Inhale 1 puff into the lungs daily. 30 each 11    diclofenac 1 % External Gel Apply 4 g topically 4 (four) times daily. 300 g 1    furosemide 20 MG Oral Tab Take 1 tablet (20 mg total) by mouth daily. 30 tablet 2    Mirabegron ER (MYRBETRIQ) 50 MG Oral Tablet 24 Hr Take 1 tablet (50 mg total) by mouth daily. 90 tablet 3    Naloxone HCl 4 MG/0.1ML Nasal Liquid 4 mg by Nasal route as needed. If patient remains unresponsive, repeat dose in other nostril 2-5 minutes after first dose. 1 kit 0    PRISTIQ 100 MG Oral Tablet 24 Hr Take 1 tablet (100 mg total) by mouth daily.      Rizatriptan Benzoate 10 MG Oral Tab Take 1 tablet (10 mg total) by mouth as needed for Migraine. 10 tablet 5    Coenzyme Q10 200 MG Oral Cap Take 200 mg by mouth 2 (two) times daily.      metoprolol succinate ER 50 MG Oral Tablet 24 Hr Take 1 tablet (50 mg total) by mouth daily. 90 tablet 0    topiramate 50 MG Oral Tab TAKE ONE TABLET BY MOUTH EVERY MORNING AND TWO TABLETS EVERY EVENING 270 tablet 2    clopidogrel 75 MG Oral Tab Take 1 tablet (75 mg total) by mouth daily.      aspirin 81 MG Oral Tab EC Take 1 tablet (81 mg total) by mouth daily.      clonazePAM 0.5 MG Oral Tab Take 1 tablet (0.5 mg total) by mouth 2 (two) times daily. 12 tablet 0    Multiple Vitamins-Minerals (ONE-A-DAY WOMENS 50+) Oral Tab Take 1 tablet by mouth daily.  0    QUEtiapine Fumarate  MG Oral Tablet 24 Hr Take 1 tablet (200 mg total) by mouth every evening. 30 tablet 0    Calcium Carb-Cholecalciferol 500-200 MG-UNIT Oral Tab Take 1 tablet by mouth daily.         Current Facility-Administered Medications on File Prior to Visit   Medication Dose Route Frequency Provider Last Rate Last Admin    methylPREDNISolone acetate (DEPO-medrol) 40 MG/ML injection 40 mg  40 mg Intra-articular Once          methylPREDNISolone acetate (DEPO-medrol) 40 MG/ML injection 40 mg  40 mg Intra-articular Once

## 2025-07-17 ENCOUNTER — TELEPHONE (OUTPATIENT)
Dept: OBGYN CLINIC | Facility: CLINIC | Age: 65
End: 2025-07-17

## 2025-07-17 RX ORDER — AZELASTINE HYDROCHLORIDE 137 UG/1
1 SPRAY, METERED NASAL 2 TIMES DAILY
Qty: 30 ML | Refills: 5 | Status: SHIPPED | OUTPATIENT
Start: 2025-07-17

## 2025-07-17 NOTE — TELEPHONE ENCOUNTER
Received request for:  Azelastine    Patient last seen by: BECKY Harris    Has scheduled appointment: 01/06/26    Physician recommendation:  azelastine on patient's medlist.  Refill given.

## 2025-07-17 NOTE — TELEPHONE ENCOUNTER
Pt calling to speak with nurse re US breast bilateral that is in her Mychart from Dr. Willis from last visit, does US still need to be completed. Pls advise

## 2025-07-17 NOTE — TELEPHONE ENCOUNTER
Attempt to contact patient x 2 with no answer, no [employer].   ZUCHEM message sent.

## 2025-07-19 ENCOUNTER — LAB ENCOUNTER (OUTPATIENT)
Dept: LAB | Age: 65
End: 2025-07-19
Attending: INTERNAL MEDICINE
Payer: MEDICAID

## 2025-07-19 DIAGNOSIS — E11.69 TYPE 2 DIABETES MELLITUS WITH HYPERLIPIDEMIA (HCC): ICD-10-CM

## 2025-07-19 DIAGNOSIS — D47.2 MGUS (MONOCLONAL GAMMOPATHY OF UNKNOWN SIGNIFICANCE): ICD-10-CM

## 2025-07-19 DIAGNOSIS — E55.9 VITAMIN D DEFICIENCY: ICD-10-CM

## 2025-07-19 DIAGNOSIS — I25.10 CAD, MULTIPLE VESSEL: ICD-10-CM

## 2025-07-19 DIAGNOSIS — F41.1 GAD (GENERALIZED ANXIETY DISORDER): ICD-10-CM

## 2025-07-19 DIAGNOSIS — E78.5 TYPE 2 DIABETES MELLITUS WITH HYPERLIPIDEMIA (HCC): ICD-10-CM

## 2025-07-19 LAB
ALBUMIN SERPL-MCNC: 4.5 G/DL (ref 3.2–4.8)
ALBUMIN/GLOB SERPL: 1.6 {RATIO} (ref 1–2)
ALP LIVER SERPL-CCNC: 84 U/L (ref 50–130)
ALT SERPL-CCNC: 31 U/L (ref 10–49)
ANION GAP SERPL CALC-SCNC: 12 MMOL/L (ref 0–18)
AST SERPL-CCNC: 23 U/L (ref ?–34)
BASOPHILS # BLD AUTO: 0.08 X10(3) UL (ref 0–0.2)
BASOPHILS NFR BLD AUTO: 0.3 %
BILIRUB SERPL-MCNC: <0.2 MG/DL (ref 0.2–1.1)
BUN BLD-MCNC: 13 MG/DL (ref 9–23)
CALCIUM BLD-MCNC: 9.1 MG/DL (ref 8.7–10.6)
CHLORIDE SERPL-SCNC: 105 MMOL/L (ref 98–112)
CO2 SERPL-SCNC: 24 MMOL/L (ref 21–32)
CREAT BLD-MCNC: 1.12 MG/DL (ref 0.55–1.02)
EGFRCR SERPLBLD CKD-EPI 2021: 55 ML/MIN/1.73M2 (ref 60–?)
EOSINOPHIL # BLD AUTO: 0.58 X10(3) UL (ref 0–0.7)
EOSINOPHIL NFR BLD AUTO: 2.4 %
ERYTHROCYTE [DISTWIDTH] IN BLOOD BY AUTOMATED COUNT: 13.5 %
EST. AVERAGE GLUCOSE BLD GHB EST-MCNC: 134 MG/DL (ref 68–126)
FASTING STATUS PATIENT QL REPORTED: YES
GLOBULIN PLAS-MCNC: 2.9 G/DL (ref 2–3.5)
GLUCOSE BLD-MCNC: 134 MG/DL (ref 70–99)
HBA1C MFR BLD: 6.3 % (ref ?–5.7)
HCT VFR BLD AUTO: 32.5 % (ref 35–48)
HGB BLD-MCNC: 10 G/DL (ref 12–16)
IMM GRANULOCYTES # BLD AUTO: 0.14 X10(3) UL (ref 0–1)
IMM GRANULOCYTES NFR BLD: 0.6 %
LYMPHOCYTES # BLD AUTO: 2.29 X10(3) UL (ref 1–4)
LYMPHOCYTES NFR BLD AUTO: 9.6 %
MCH RBC QN AUTO: 29.6 PG (ref 26–34)
MCHC RBC AUTO-ENTMCNC: 30.8 G/DL (ref 31–37)
MCV RBC AUTO: 96.2 FL (ref 80–100)
MONOCYTES # BLD AUTO: 1.29 X10(3) UL (ref 0.1–1)
MONOCYTES NFR BLD AUTO: 5.4 %
NEUTROPHILS # BLD AUTO: 19.44 X10 (3) UL (ref 1.5–7.7)
NEUTROPHILS # BLD AUTO: 19.44 X10(3) UL (ref 1.5–7.7)
NEUTROPHILS NFR BLD AUTO: 81.7 %
OSMOLALITY SERPL CALC.SUM OF ELEC: 294 MOSM/KG (ref 275–295)
PLATELET # BLD AUTO: 401 10(3)UL (ref 150–450)
POTASSIUM SERPL-SCNC: 4.8 MMOL/L (ref 3.5–5.1)
PROT SERPL-MCNC: 7.4 G/DL (ref 5.7–8.2)
RBC # BLD AUTO: 3.38 X10(6)UL (ref 3.8–5.3)
SODIUM SERPL-SCNC: 141 MMOL/L (ref 136–145)
TSI SER-ACNC: 1.43 UIU/ML (ref 0.55–4.78)
VIT D+METAB SERPL-MCNC: 26.1 NG/ML (ref 30–100)
WBC # BLD AUTO: 23.8 X10(3) UL (ref 4–11)

## 2025-07-19 PROCEDURE — 82306 VITAMIN D 25 HYDROXY: CPT

## 2025-07-19 PROCEDURE — 85025 COMPLETE CBC W/AUTO DIFF WBC: CPT

## 2025-07-19 PROCEDURE — 84443 ASSAY THYROID STIM HORMONE: CPT

## 2025-07-19 PROCEDURE — 83036 HEMOGLOBIN GLYCOSYLATED A1C: CPT

## 2025-07-19 PROCEDURE — 80053 COMPREHEN METABOLIC PANEL: CPT

## 2025-07-19 PROCEDURE — 36415 COLL VENOUS BLD VENIPUNCTURE: CPT

## 2025-07-21 DIAGNOSIS — D72.829 LEUKOCYTOSIS, UNSPECIFIED TYPE: Primary | ICD-10-CM

## 2025-07-21 RX ORDER — ESTRADIOL 0.1 MG/G
CREAM VAGINAL
Qty: 42.5 G | Refills: 3 | OUTPATIENT
Start: 2025-07-21

## 2025-07-23 DIAGNOSIS — D72.829 LEUKOCYTOSIS, UNSPECIFIED TYPE: Primary | ICD-10-CM

## 2025-07-24 ENCOUNTER — LAB ENCOUNTER (OUTPATIENT)
Dept: LAB | Age: 65
End: 2025-07-24
Attending: INTERNAL MEDICINE
Payer: MEDICAID

## 2025-07-24 ENCOUNTER — PATIENT MESSAGE (OUTPATIENT)
Dept: INTERNAL MEDICINE CLINIC | Facility: CLINIC | Age: 65
End: 2025-07-24

## 2025-07-24 DIAGNOSIS — D72.829 LEUKOCYTOSIS, UNSPECIFIED TYPE: ICD-10-CM

## 2025-07-24 LAB
BASOPHILS # BLD AUTO: 0.07 X10(3) UL (ref 0–0.2)
BASOPHILS NFR BLD AUTO: 0.6 %
EOSINOPHIL # BLD AUTO: 0.74 X10(3) UL (ref 0–0.7)
EOSINOPHIL NFR BLD AUTO: 6 %
ERYTHROCYTE [DISTWIDTH] IN BLOOD BY AUTOMATED COUNT: 12.7 %
HCT VFR BLD AUTO: 33.1 % (ref 35–48)
HGB BLD-MCNC: 11 G/DL (ref 12–16)
IMM GRANULOCYTES # BLD AUTO: 0.23 X10(3) UL (ref 0–1)
IMM GRANULOCYTES NFR BLD: 1.9 %
LYMPHOCYTES # BLD AUTO: 2.23 X10(3) UL (ref 1–4)
LYMPHOCYTES NFR BLD AUTO: 18.2 %
MCH RBC QN AUTO: 29.5 PG (ref 26–34)
MCHC RBC AUTO-ENTMCNC: 33.2 G/DL (ref 31–37)
MCV RBC AUTO: 88.7 FL (ref 80–100)
MONOCYTES # BLD AUTO: 0.85 X10(3) UL (ref 0.1–1)
MONOCYTES NFR BLD AUTO: 6.9 %
NEUTROPHILS # BLD AUTO: 8.12 X10 (3) UL (ref 1.5–7.7)
NEUTROPHILS # BLD AUTO: 8.12 X10(3) UL (ref 1.5–7.7)
NEUTROPHILS NFR BLD AUTO: 66.4 %
PLATELET # BLD AUTO: 418 10(3)UL (ref 150–450)
RBC # BLD AUTO: 3.73 X10(6)UL (ref 3.8–5.3)
WBC # BLD AUTO: 12.2 X10(3) UL (ref 4–11)

## 2025-07-24 PROCEDURE — 36415 COLL VENOUS BLD VENIPUNCTURE: CPT

## 2025-07-24 PROCEDURE — 85025 COMPLETE CBC W/AUTO DIFF WBC: CPT

## 2025-07-26 DIAGNOSIS — M18.11 PRIMARY OSTEOARTHRITIS OF FIRST CARPOMETACARPAL JOINT OF RIGHT HAND: Primary | ICD-10-CM

## 2025-07-28 ENCOUNTER — RESULTS FOLLOW-UP (OUTPATIENT)
Dept: INTERNAL MEDICINE CLINIC | Facility: CLINIC | Age: 65
End: 2025-07-28

## 2025-07-29 RX ORDER — GABAPENTIN 600 MG/1
600 TABLET ORAL NIGHTLY
Qty: 90 TABLET | Refills: 0 | Status: SHIPPED | OUTPATIENT
Start: 2025-07-29

## 2025-07-29 RX ORDER — GABAPENTIN 600 MG/1
600 TABLET ORAL NIGHTLY
Qty: 90 TABLET | Refills: 1 | OUTPATIENT
Start: 2025-07-29

## 2025-07-30 DIAGNOSIS — G43.709 CHRONIC MIGRAINE W/O AURA W/O STATUS MIGRAINOSUS, NOT INTRACTABLE: ICD-10-CM

## 2025-07-30 LAB
CD10 CELLS NFR SPEC: <1 %
CD10/CD19: <1 %
CD19 CELLS NFR SPEC: 8 %
CD19+/CD200+: 7 %
CD2 CELLS NFR SPEC: 91 %
CD20 CELLS NFR SPEC: 8 %
CD200 CELLS: 14 %
CD3 CELLS NFR SPEC: 85 %
CD3+/TCRGD+: <1 %
CD3+CD4+ CELLS NFR SPEC: 56 %
CD3+CD4+ CELLS/CD3+CD8+ CLL SPEC: 1.9
CD3+CD8+ CELLS NFR SPEC: 29 %
CD3-/CD56+: 7 %
CD34 CELLS NFR SPEC: <1 %
CD38 CELLS NFR SPEC: 3 %
CD38+/CD19+: <1 %
CD45 CELLS NFR SPEC: 100 %
CD5 CELLS NFR SPEC: 85 %
CD5/CD19 CELLS: <1 %
CD7 CELLS NFR SPEC: 88 %
CELL SURF KAPPA/LAMBDA RATIO: 1.7
CELL SURF LAMBDA LIGHT CHAIN: 3 %
CELL SURFACE KAPPA LIGHT CHAIN: 5 %
TCR G-D CELLS NFR SPEC: <1 %

## 2025-07-31 RX ORDER — ERENUMAB-AOOE 140 MG/ML
140 INJECTION, SOLUTION SUBCUTANEOUS
Qty: 1 ML | Refills: 2 | Status: SHIPPED | OUTPATIENT
Start: 2025-07-31

## 2025-08-04 ENCOUNTER — OFFICE VISIT (OUTPATIENT)
Dept: UROLOGY | Facility: CLINIC | Age: 65
End: 2025-08-04
Attending: OBSTETRICS & GYNECOLOGY

## 2025-08-04 VITALS
HEIGHT: 60 IN | RESPIRATION RATE: 17 BRPM | SYSTOLIC BLOOD PRESSURE: 115 MMHG | BODY MASS INDEX: 26 KG/M2 | TEMPERATURE: 97 F | DIASTOLIC BLOOD PRESSURE: 58 MMHG

## 2025-08-04 DIAGNOSIS — N81.84 PELVIC MUSCLE WASTING: ICD-10-CM

## 2025-08-04 DIAGNOSIS — N39.41 URGE INCONTINENCE: Primary | ICD-10-CM

## 2025-08-04 DIAGNOSIS — R35.0 URINARY FREQUENCY: ICD-10-CM

## 2025-08-04 DIAGNOSIS — N95.2 POSTMENOPAUSAL ATROPHIC VAGINITIS: ICD-10-CM

## 2025-08-04 LAB
BILIRUB UR QL STRIP.AUTO: NEGATIVE
COLOR UR AUTO: YELLOW
CONTROL RUN WITHIN 24 HOURS?: YES
GLUCOSE UR STRIP.AUTO-MCNC: NORMAL MG/DL
KETONES UR STRIP.AUTO-MCNC: NEGATIVE MG/DL
LEUKOCYTE ESTERASE UR QL STRIP.AUTO: 500
NITRITE UR QL STRIP.AUTO: NEGATIVE
NITRITE URINE: POSITIVE
PH UR STRIP.AUTO: 5.5 (ref 5–8)
PROT UR STRIP.AUTO-MCNC: 20 MG/DL
RBC #/AREA URNS AUTO: >10 /HPF
RBC #/AREA URNS AUTO: >10 /HPF
SP GR UR STRIP.AUTO: 1.01 (ref 1–1.03)
UROBILINOGEN UR STRIP.AUTO-MCNC: NORMAL MG/DL
WBC #/AREA URNS AUTO: >50 /HPF
WBC #/AREA URNS AUTO: >50 /HPF
WBC CLUMPS UR QL AUTO: PRESENT /HPF
WBC CLUMPS UR QL AUTO: PRESENT /HPF

## 2025-08-04 PROCEDURE — 81001 URINALYSIS AUTO W/SCOPE: CPT | Performed by: PHYSICIAN ASSISTANT

## 2025-08-04 PROCEDURE — 99212 OFFICE O/P EST SF 10 MIN: CPT

## 2025-08-04 PROCEDURE — 81002 URINALYSIS NONAUTO W/O SCOPE: CPT | Performed by: PHYSICIAN ASSISTANT

## 2025-08-04 PROCEDURE — 81015 MICROSCOPIC EXAM OF URINE: CPT | Performed by: PHYSICIAN ASSISTANT

## 2025-08-04 RX ORDER — SULFAMETHOXAZOLE AND TRIMETHOPRIM 800; 160 MG/1; MG/1
1 TABLET ORAL 2 TIMES DAILY
Qty: 14 TABLET | Refills: 0 | Status: SHIPPED | OUTPATIENT
Start: 2025-08-04 | End: 2025-08-11

## 2025-08-06 ENCOUNTER — HOSPITAL ENCOUNTER (OUTPATIENT)
Dept: GENERAL RADIOLOGY | Facility: HOSPITAL | Age: 65
Discharge: HOME | End: 2025-08-06
Attending: INTERNAL MEDICINE

## 2025-08-06 ENCOUNTER — OFFICE VISIT (OUTPATIENT)
Dept: NEUROLOGY | Facility: CLINIC | Age: 65
End: 2025-08-06

## 2025-08-06 VITALS
SYSTOLIC BLOOD PRESSURE: 132 MMHG | RESPIRATION RATE: 16 BRPM | BODY MASS INDEX: 27.09 KG/M2 | WEIGHT: 138 LBS | OXYGEN SATURATION: 94 % | DIASTOLIC BLOOD PRESSURE: 80 MMHG | HEIGHT: 60 IN | HEART RATE: 86 BPM

## 2025-08-06 DIAGNOSIS — G31.84 MILD COGNITIVE IMPAIRMENT WITH MEMORY LOSS: ICD-10-CM

## 2025-08-06 DIAGNOSIS — R05.2 SUBACUTE COUGH: ICD-10-CM

## 2025-08-06 DIAGNOSIS — G43.709 CHRONIC MIGRAINE W/O AURA W/O STATUS MIGRAINOSUS, NOT INTRACTABLE: Primary | ICD-10-CM

## 2025-08-06 PROCEDURE — 99213 OFFICE O/P EST LOW 20 MIN: CPT | Performed by: OTHER

## 2025-08-06 PROCEDURE — 71046 X-RAY EXAM CHEST 2 VIEWS: CPT | Performed by: INTERNAL MEDICINE

## 2025-08-08 ENCOUNTER — TELEPHONE (OUTPATIENT)
Facility: CLINIC | Age: 65
End: 2025-08-08

## 2025-08-08 ENCOUNTER — OFFICE VISIT (OUTPATIENT)
Facility: CLINIC | Age: 65
End: 2025-08-08

## 2025-08-08 VITALS
HEART RATE: 69 BPM | HEIGHT: 60 IN | WEIGHT: 136.38 LBS | BODY MASS INDEX: 26.77 KG/M2 | DIASTOLIC BLOOD PRESSURE: 81 MMHG | SYSTOLIC BLOOD PRESSURE: 126 MMHG

## 2025-08-08 DIAGNOSIS — R10.9 ABDOMINAL PAIN, UNSPECIFIED ABDOMINAL LOCATION: ICD-10-CM

## 2025-08-08 DIAGNOSIS — R19.4 CHANGE IN BOWEL HABITS: ICD-10-CM

## 2025-08-08 DIAGNOSIS — K21.9 GASTROESOPHAGEAL REFLUX DISEASE, UNSPECIFIED WHETHER ESOPHAGITIS PRESENT: ICD-10-CM

## 2025-08-08 DIAGNOSIS — D64.9 ANEMIA, UNSPECIFIED TYPE: ICD-10-CM

## 2025-08-08 DIAGNOSIS — R19.4 FREQUENT BOWEL MOVEMENTS: ICD-10-CM

## 2025-08-08 DIAGNOSIS — Z80.0 FAMILY HISTORY OF COLON CANCER: Primary | ICD-10-CM

## 2025-08-08 DIAGNOSIS — K21.9 GASTROESOPHAGEAL REFLUX DISEASE, UNSPECIFIED WHETHER ESOPHAGITIS PRESENT: Primary | ICD-10-CM

## 2025-08-08 DIAGNOSIS — Z80.0 FH: COLON CANCER: ICD-10-CM

## 2025-08-08 RX ORDER — POLYETHYLENE GLYCOL 3350 17 G/17G
17 POWDER, FOR SOLUTION ORAL DAILY
Qty: 30 PACKET | Refills: 3 | Status: SHIPPED | OUTPATIENT
Start: 2025-08-08

## 2025-08-10 DIAGNOSIS — K21.9 GASTROESOPHAGEAL REFLUX DISEASE WITHOUT ESOPHAGITIS: ICD-10-CM

## 2025-08-11 RX ORDER — OMEPRAZOLE 40 MG/1
40 CAPSULE, DELAYED RELEASE ORAL 2 TIMES DAILY
Qty: 60 CAPSULE | Refills: 0 | Status: SHIPPED | OUTPATIENT
Start: 2025-08-11

## 2025-08-24 DIAGNOSIS — E78.5 TYPE 2 DIABETES MELLITUS WITH HYPERLIPIDEMIA (HCC): ICD-10-CM

## 2025-08-24 DIAGNOSIS — E11.69 TYPE 2 DIABETES MELLITUS WITH HYPERLIPIDEMIA (HCC): ICD-10-CM

## 2025-08-24 DIAGNOSIS — E11.40 TYPE 2 DIABETES MELLITUS WITH DIABETIC NEUROPATHY, WITHOUT LONG-TERM CURRENT USE OF INSULIN (HCC): ICD-10-CM

## 2025-08-25 ENCOUNTER — TELEPHONE (OUTPATIENT)
Facility: CLINIC | Age: 65
End: 2025-08-25

## 2025-08-26 RX ORDER — DULAGLUTIDE 0.75 MG/.5ML
0.75 INJECTION, SOLUTION SUBCUTANEOUS WEEKLY
Qty: 2 ML | Refills: 0 | Status: SHIPPED | OUTPATIENT
Start: 2025-08-26

## (undated) DIAGNOSIS — E78.00 HYPERCHOLESTEROLEMIA: ICD-10-CM

## (undated) DIAGNOSIS — R30.0 DYSURIA: ICD-10-CM

## (undated) DIAGNOSIS — I10 ESSENTIAL HYPERTENSION: ICD-10-CM

## (undated) DIAGNOSIS — E11.65 UNCONTROLLED TYPE 2 DIABETES MELLITUS WITH HYPERGLYCEMIA, WITHOUT LONG-TERM CURRENT USE OF INSULIN (HCC): ICD-10-CM

## (undated) DIAGNOSIS — K21.9 GASTROESOPHAGEAL REFLUX DISEASE WITHOUT ESOPHAGITIS: ICD-10-CM

## (undated) DIAGNOSIS — R05.9 COUGH: Primary | ICD-10-CM

## (undated) DIAGNOSIS — N60.09 CYST OF BREAST, UNSPECIFIED LATERALITY: ICD-10-CM

## (undated) DIAGNOSIS — R92.2 INCONCLUSIVE MAMMOGRAM DUE TO DENSE BREASTS: Primary | ICD-10-CM

## (undated) DIAGNOSIS — E11.9 TYPE 2 DIABETES MELLITUS WITHOUT COMPLICATION, WITHOUT LONG-TERM CURRENT USE OF INSULIN (HCC): Primary | ICD-10-CM

## (undated) DIAGNOSIS — N30.00 ACUTE CYSTITIS WITHOUT HEMATURIA: Primary | ICD-10-CM

## (undated) DIAGNOSIS — Z12.31 ENCOUNTER FOR SCREENING MAMMOGRAM FOR BREAST CANCER: Primary | ICD-10-CM

## (undated) DIAGNOSIS — N39.41 URGE INCONTINENCE: Primary | ICD-10-CM

## (undated) DIAGNOSIS — N63.0 BREAST NODULE: ICD-10-CM

## (undated) DIAGNOSIS — R92.2 DENSE BREAST TISSUE ON MAMMOGRAM: ICD-10-CM

## (undated) DIAGNOSIS — I10 PRIMARY HYPERTENSION: ICD-10-CM

## (undated) DEVICE — STERILE POLYISOPRENE POWDER-FREE SURGICAL GLOVES: Brand: PROTEXIS

## (undated) DEVICE — 3M™ IOBAN™ 2 ANTIMICROBIAL INCISE DRAPE 6650EZ: Brand: IOBAN™ 2

## (undated) DEVICE — LACTATED R INJ

## (undated) DEVICE — VIOLET BRAIDED (POLYGLACTIN 910), SYNTHETIC ABSORBABLE SUTURE: Brand: COATED VICRYL

## (undated) DEVICE — SKIN MARKER DUAL TIP WITH RULER CAP AND LABELS: Brand: DEVON

## (undated) DEVICE — ELECTRODE EDGE PENCIL 10FT

## (undated) DEVICE — FLOSEAL HEMOSTATIC MATRIX, 5ML: Brand: FLOSEAL HEMOSTATIC MATRIX

## (undated) DEVICE — 3M™ RED DOT™ MONITORING ELECTRODE WITH FOAM TAPE AND STICKY GEL, 50/BAG, 20/CASE, 72/PLT 2570: Brand: RED DOT™

## (undated) DEVICE — SUT VICRYL 2-0 CT-2 J726D

## (undated) DEVICE — SUTURE SILK 0

## (undated) DEVICE — 1010 S-DRAPE TOWEL DRAPE 10/BX: Brand: STERI-DRAPE™

## (undated) DEVICE — #15 STERILE STAINLESS BLADE: Brand: STERILE STAINLESS BLADES

## (undated) DEVICE — SPONGE: SPECIALTY PEANUT XR 100/CS: Brand: MEDICAL ACTION INDUSTRIES

## (undated) DEVICE — BASIC DOUBLE BASIN 1-LF: Brand: MEDLINE INDUSTRIES, INC.

## (undated) DEVICE — GLOVE SURG SENSICARE SZ 7

## (undated) DEVICE — DRAIN CHST SGL COLL 1 PT TB FOR ATS BG CMPTBL

## (undated) DEVICE — DRAPE,UNDRBUT,WHT GRAD PCH,CAPPORT,20/CS: Brand: MEDLINE

## (undated) DEVICE — DRAPE,TAPE STRIPS,STERILE: Brand: MEDLINE

## (undated) DEVICE — Device

## (undated) DEVICE — CABLE PT L10FT ELECTRD PIN DIA1-2MM WHT DISP

## (undated) DEVICE — STANDARD HYPODERMIC NEEDLE,POLYPROPYLENE HUB: Brand: MONOJECT

## (undated) DEVICE — PENCIL ES BTTN SWCH W/ TIP HOLSTER E-Z CLN

## (undated) DEVICE — GAMMEX® NON-LATEX PI TEXTURED SIZE 7, STERILE POLYISOPRENE POWDER-FREE SURGICAL GLOVE: Brand: GAMMEX

## (undated) DEVICE — SUTURE SILK 2-0

## (undated) DEVICE — PROXIMATE SKIN STAPLERS (35 WIDE) CONTAINS 35 STAINLESS STEEL STAPLES (FIXED HEAD): Brand: PROXIMATE

## (undated) DEVICE — FILTERLINE NASAL ADULT O2/CO2

## (undated) DEVICE — NON-ADHERENT PAD PREPACK: Brand: TELFA

## (undated) DEVICE — SUTURE SILK 3-0 SH

## (undated) DEVICE — POOLE SUCTION HANDLE: Brand: CARDINAL HEALTH

## (undated) DEVICE — DRAPE LEGGING STERILE

## (undated) DEVICE — GAUZE SPONGES,USP TYPE VII GAUZE, 12 PLY: Brand: CURITY

## (undated) DEVICE — SUTURE PROLENE 8-0 BV-130-5

## (undated) DEVICE — INSULATED NEEDLE ELECTRODE: Brand: EDGE

## (undated) DEVICE — SUTURE VICRYL 0 CT-1

## (undated) DEVICE — PAD SACRAL SPAN AID

## (undated) DEVICE — CHLORAPREP 26ML APPLICATOR

## (undated) DEVICE — OPEN HEART: Brand: MEDLINE INDUSTRIES, INC.

## (undated) DEVICE — SUTURE VICRYL 0 UR-6

## (undated) DEVICE — Device: Brand: INTELLICART™

## (undated) DEVICE — SYRINGE MED 10ML LL TIP W/O SFTY DISP

## (undated) DEVICE — INSULATED BLADE ELECTRODE;CAUTION: FOR MANUFACTURING, PROCESSING, OR REPACKING.: Brand: EDGE

## (undated) DEVICE — SUTURE POLYDEK 2-0

## (undated) DEVICE — [HIGH FLOW INSUFFLATOR,  DO NOT USE IF PACKAGE IS DAMAGED,  KEEP DRY,  KEEP AWAY FROM SUNLIGHT,  PROTECT FROM HEAT AND RADIOACTIVE SOURCES.]: Brand: PNEUMOSURE

## (undated) DEVICE — ABSORBABLE HEMOSTAT (OXIDIZED REGENERATED CELLULOSE): Brand: SURGICEL

## (undated) DEVICE — SUT MCRYL 4-0 18IN PS-2 ABSRB UD 19MM 3/8 CIR

## (undated) DEVICE — SOLUTION IRRIG 1000ML ST H2O AQUALITE PLAS

## (undated) DEVICE — LAPAROTOMY SPONGE - RF AND X-RAY DETECTABLE PRE-WASHED: Brand: SITUATE

## (undated) DEVICE — SOLUTION SURG DURAPREP 26ML

## (undated) DEVICE — ROCKER SWITCH PENCIL BLADE ELECTRODE, HOLSTER: Brand: EDGE

## (undated) DEVICE — WRE STRNL DOUBLEWIRE  12/BX

## (undated) DEVICE — SUT PROL 6-0 18IN C1 DA BL

## (undated) DEVICE — TRAY CATH 16FR F INCL BARDX IC COMPLT CARE

## (undated) DEVICE — LIGHT HANDLE

## (undated) DEVICE — SOL  .9 1000ML BTL

## (undated) DEVICE — Device: Brand: DEFENDO AIR/WATER/SUCTION AND BIOPSY VALVE

## (undated) DEVICE — GOWN,SIRUS,FABRIC-REINFORCED,X-LARGE: Brand: MEDLINE

## (undated) DEVICE — SYRINGE 30ML LL TIP

## (undated) DEVICE — TOWEL OR BLU 16X26 STRL

## (undated) DEVICE — KIT,ANTI FOG,W/SPONGE & FLUID,SOFT PACK: Brand: MEDLINE

## (undated) DEVICE — WOUND RETRACTOR AND PROTECTOR: Brand: ALEXIS WOUND PROTECTOR-RETRACTOR

## (undated) DEVICE — SYRINGE MED 30ML STD CLR PLAS LL TIP N CTRL

## (undated) DEVICE — CASED DISP BIPOLAR CORD

## (undated) DEVICE — SUT VICRYL 3-0 RB-1 J713D

## (undated) DEVICE — 3M™ STERI-DRAPE™ INSTRUMENT POUCH 1018: Brand: STERI-DRAPE™

## (undated) DEVICE — 1200CC GUARDIAN II: Brand: GUARDIAN

## (undated) DEVICE — GAMMEX® PI HYBRID SIZE 7.5, STERILE POWDER-FREE SURGICAL GLOVE, POLYISOPRENE AND NEOPRENE BLEND: Brand: GAMMEX

## (undated) DEVICE — ENDOSCOPY PACK - LOWER: Brand: MEDLINE INDUSTRIES, INC.

## (undated) DEVICE — MINI LAP PACK-LF: Brand: MEDLINE INDUSTRIES, INC.

## (undated) DEVICE — ENDOSCOPY PACK UPPER: Brand: MEDLINE INDUSTRIES, INC.

## (undated) DEVICE — SUTURE VICRYL 3-0 SH

## (undated) DEVICE — COVER,MAYO STAND,STERILE: Brand: MEDLINE

## (undated) DEVICE — CAUTERY PENCIL

## (undated) DEVICE — CLIP INT L ORNG TI TRNSVRS GRV CHEVRON SHP

## (undated) DEVICE — APPLICATOR STD 6IN COT TIP WOOD HNDL ST

## (undated) DEVICE — GLOVE SUR 7 BIOGEL PI ORTHOPRO PIP BRN PWD F

## (undated) DEVICE — SUT SILK 2-0 A185H

## (undated) DEVICE — KENDALL SCD EXPRESS SLEEVES, KNEE LENGTH, MEDIUM: Brand: KENDALL SCD

## (undated) DEVICE — PREMIUM WET SKIN PREP TRAY: Brand: MEDLINE INDUSTRIES, INC.

## (undated) DEVICE — UNDYED BRAIDED (POLYGLACTIN 910), SYNTHETIC ABSORBABLE SUTURE: Brand: COATED VICRYL

## (undated) DEVICE — MEDI-VAC NON-CONDUCTIVE SUCTION TUBING: Brand: CARDINAL HEALTH

## (undated) DEVICE — TRANSFER PACK CONTAINER 600 ML WITH COUPLER. STERILE FLUID PATH: Brand: FENWAL TRANSFER PACK CONTAINER 600 ML WITH COUPLER

## (undated) DEVICE — SLEEVE COMPR MD KNEE LEN SGL USE KENDALL SCD

## (undated) DEVICE — SUTURE WIRE SINGLE STERNOTOMY

## (undated) DEVICE — PEN SKIN MARKING REG TIP VIOLT

## (undated) DEVICE — 40580 - THE PINK PAD - ADVANCED TRENDELENBURG POSITIONING KIT: Brand: 40580 - THE PINK PAD - ADVANCED TRENDELENBURG POSITIONING KIT

## (undated) DEVICE — DRAPE,THYROID,SOFT,STERILE: Brand: MEDLINE

## (undated) DEVICE — GAUZE SPONGES,12 PLY: Brand: CURITY

## (undated) DEVICE — SUTURE PROLENE 5-0 RB-1

## (undated) DEVICE — ARYGLE SUCTION CATHETER WITH CHIMNEY VALVE STRIAGHT PACKED 14 FR/ CH: Brand: ARGYLE

## (undated) DEVICE — SUT PERMA- 0 30IN FSL NABSRB BLK 30MM 3/8

## (undated) DEVICE — CV PACK-LF: Brand: MEDLINE INDUSTRIES, INC.

## (undated) DEVICE — CLOSURE EXOFIN 1.0ML

## (undated) DEVICE — CAPSULE BRAVO PH

## (undated) DEVICE — 3M(TM) TEGADERM(TM) TRANSPARENT FILM DRESSING FRAME STYLE 9505W: Brand: 3M™ TEGADERM™

## (undated) DEVICE — SLEEVE KENDALL SCD EXPRESS MED

## (undated) DEVICE — ISOPROPYL ALCOHOL 70% 4OZ BTL

## (undated) DEVICE — SUTURE PDS II 1 TP-1

## (undated) DEVICE — SUT VCRL 2-0 36IN CT-1 ABSRB UD L36MM 1/2 CIR

## (undated) DEVICE — Device: Brand: VIRTUOSAPH PLUS WITH RADIAL INDICATION

## (undated) DEVICE — SOLUTION IRRIG 1000ML 0.9% NACL USP BTL

## (undated) DEVICE — CELL SAVER TUBING

## (undated) DEVICE — CATHETER THOR 28FR L23IN CLR PVC HEP STR TAPR

## (undated) DEVICE — SUTURE PROLENE 3-0 SH

## (undated) DEVICE — LAPAROTOMY CDS: Brand: MEDLINE INDUSTRIES, INC.

## (undated) DEVICE — FORCEP RADIAL JAW 4

## (undated) DEVICE — SOLUTION  .9 1000ML BTL

## (undated) DEVICE — DRAPE HALF 40X58 DYNJP2410

## (undated) DEVICE — SUTURE SILK 2-0 SH

## (undated) DEVICE — LIGASURE IMPACT OPEN DEVICE

## (undated) DEVICE — SUTURE PROLENE 7-0 CC

## (undated) DEVICE — ADHESIVE SKIN TOP FOR WND CLSR DERMBND ADV

## (undated) DEVICE — CELL SAVER RESERVOIR

## (undated) NOTE — LETTER
Date: 7/16/2024    Patient Name: Soumya King          To Whom it may concern:    This letter has been written at the patient's request. The above patient was seen at [facility] for treatment of a medical condition.    This patient should be excused from attending work/school from 7/16/24 through 7/31/24.    The patient may return to work/school on 8/1/24 with the following limitations none.        Sincerely,        Rika Vinson, DO

## (undated) NOTE — LETTER
[facility]  [address] [place] [address]  [phone]    Blood Transfusion Consent    In the course of your treatment, it may become necessary to administer a transfusion of blood or blood components. This form provides basic information concerning this procedure and, if signed by you, authorizes its administration. By signing this form, you agree that all of your questions about the administration of blood or blood products have been answered by the ordering medical professional or designee.    Description of Procedure  Blood is introduced into one of your veins, commonly in the arm, using a sterilized disposable needle. The amount of blood transfused, and whether the transfusion will be of blood or blood components is a judgement the physician will make based on your particular needs.    Risks  The transfusion is a common procedure of low risk.  MINOR AND TEMPORARY REACTIONS ARE NOT UNCOMMON, including a slight bruise, swelling or local reaction in the area where the needle pierces your skin, or a nonserious reaction to the transfused material itself, including headache, fever or mild skin reaction, such as rash.  Serious reactions are possible, though very unlikely, and include severe allergic reaction (shock) and destruction (hemolysis) of transfused blood cells.  Infectious diseases which are known to be transmitted by blood transfusion include certain types of viral Hepatitis(liver infection from a virus), Human Immunodeficiency Virus (HIV-1,2) infection, a viral infection known to cause Acquired Immunodeficiency Syndrome (AIDS), as well as certain other bacterial, viral, and parasitic diseases. While a minimal risk of acquiring an infectious disease from transfused blood exists, in accordance with the Federal and State law, all due care has been taken in donor selection and testing to avoid transmission of disease.    Alternatives  If loss of blood poses serious threats during your  treatment, THERE IS NO EFFECTIVE ALTERNATIVE TO BLOOD TRANSFUSION. However, if you have any further questions on this matter, your provider will fully explain the alternatives to you if it has not already been done.    I, ______________________________, have read/had read to me the above. I understand the matters bearing on the decision whether or not to authorize a transfusion of blood or blood components. I have no questions which have not been answered to my full satisfaction. I hereby consent to such transfusion as my physician may deem necessary or advisable in the course of my treatment.      ______________________________________________                    ___________________________  (Signature of Patient or Responsible party in case of minor,                 (Printed Name of Patient or incompetent, or unconscious patient)              Responsible Party)      ___________________________               _____________________  (Relationship to Patient if not self)                                    (Date and Time)      __________________________                                                           ______________________              (Signature of Witness)               (Printed Name of Witness)       Language line ()    Patient Name: Soumya King    : 6/3/1960                 Printed: 2023     Medical Record #: [id number]      Rev: 2023

## (undated) NOTE — LETTER
OPIOID TREATMENT AGREEMENT    For Pain Management      Please read each statement, initial at the bottom of each page, and sign the last page to indicate your agreement with this form. If you have any questions about any information in this form or the opioid treatment plan, please request immediate clarification from your physician or health care provider.     The purpose of this agreement is to give you information about the medications you will be taking for pain management and to assure that you and your physician/health care provider comply with state and federal regulations concerning the prescribing of controlled substances. A trial of opioid therapy can be considered for moderate to severe pain with the intent of reducing pain and increasing function. The physician’s goal is for you to have the best quality of life possible given the reality of your clinical condition. The success of treatment depends on mutual trust and honesty in the physician/patient relationship and full agreement and understanding of the risks and benefits of using opioids to treat pain.    I agree to use opioids (morphine-like drugs) as part of my treatment for chronic pain.  I understand that these drugs can be effective, but have a high potential for misuse and are therefore closely controlled by the local, state, and federal government. I understand that my physician/health care provider is prescribing such medication to help manage my pain, and I agree to the following conditions as part of my treatment plan    I am responsible for my pain medications.  I agree to take the medication only as prescribed.    I understand that increasing my dose without the close supervision of my physician could    lead to drug overdose causing severe sedation and respiratory depression and death.    I understand that decreasing or stopping my medication without the close supervision of my physician can lead to withdrawal. Withdrawal symptoms can  include yawning, sweating, watery eyes, runny nose, anxiety, tremors, aching muscles, hot and cold flashes, “goose bumps”, abdominal cramps, and diarrhea.  These symptoms can occur 24-48 hours after the last dose and can last up to 3 weeks.    I will not request or accept controlled substance medication from any other physician or individual while I am receiving such medication from my physician/health care provider at the clinic.    I acknowledge that there are side effects with opioid therapy, and I understand it is my responsibility to notify my physician/health care provider for any side effect that continue or are severe (i.e., difficulty breathing, slow heart rate, sedation, confusion).  I am also responsible for notifying my pain physician immediately if I need to visit another physician or need to visit an emergency room due to pain, of if I become pregnant.    I understand that the opioid medication is strictly for my own use and I agree not to give or sell my medication to others because it may endanger another person’s health and is against the law.    I will inform my physician of all medications I am taking, including herbal remedies.  Medications like Valium or Ativan; sedatives such as Soma, Xanax, Fiorinal; antihistamines like Benadryl; herbal remedies, alcohol, and cough syrup containing alcohol, codeine, or hydrocodone can interact with opioids and produce serious side effects.    I understand I will be expected to return to the clinic as instructed by my provider during the time any of my medication is being adjusted.    I understand that any evidence of drug hoarding, acquisition of any opioid medication or adjunctive analgesia from other physicians (which includes emergency rooms), uncontrolled dose escalation or reduction, loss of prescriptions or failure to follow agreement may result in change to the treatment plan, referral to the Medication Assisted Therapy Program, an may result in  termination of the physician/patient relationship.    I will not use any illicit substances, such as cocaine, marijuana, etc. while taking these medications.  I understand that use of any illicit substances while taking these medications may result in a change to my treatment plan, referral to the [facility], and may result in termination of the physician/patient relationship.    I understand that I should not consume alcohol while taking these medications because the use of alcohol together with opioid medications is warned against.    I am responsible for my opioid prescriptions.  I understand that:    Refill prescriptions can be written for a one month supply and increased to a maximum of two months at the discretion of the provider.    It is my responsibility to schedule appointments for the next opioid refill to ensure I do not run out of medications.    I am responsible for keeping my prescriptions and pain medications in a safe and secure place, such as a locked cabinet or safe.  I am expected to protect my medications from loss or theft.  I am responsible for taking the medication in the dose prescribed and for keeping track of the amount remaining.  If my medication is stolen, I will report this to my local police department and obtain a stolen item report.  I will then report the stolen medication to my physician.  If my medications are lost, misplaced, or stolen, my physician may choose not to replace the medications.    Refills issued by physicians/health care providers in this clinic can only be filled by a pharmacy in the State of [place], even if I am a resident of another state.    Prescriptions for pain medicine or any other prescriptions will be written only during an office visit or during regular office hours.  No refills of any medications during the evening or on weekends.    I must bring back all opioid medications and adjunctive medications prescribed by my physician  in the original containers/bottles at every visit.    Prescriptions will not be written in advance due to vacations, meetings, or other commitments.    If an appointment for a prescription refill is missed, another appointment will be made as soon as possible.  Immediate or emergency appointments will not be possible.    I understand while physical dependence is to be expected after long-term use of opioids, signs of addiction, abuse, or misuse shall prompt the need for substance dependence treatment as well as weaning and detoxification from the opioids.  I further understand the following:    Physical dependence is common to many drugs such as blood pressure medications, anti-seizure medications, and opioids.  It results in biochemical changes such that abruptly stopping these drugs will cause a withdrawal response.  It should be noted that physical dependence does not equal addiction.  A person can be dependent on insulin to treat diabetes or dependent on prednisone (steroids) to treat asthma, but not addicted to the insulin or prednisone.    Addiction is a primary, chronic neurobiologic disease with genetic, psychosocial and environmental factors influencing its development and manifestation.  It is characterized by behavior that includes one or more of the following: impaired control over drug use, compulsive use, continued use despite harm, and cravings.  This means the drug decreases a person’s quality of life.  If a patient exhibits such behavior, the drug will be tapered and the patient will not be a candidate for an opioid trial.  He/she will be referred to an addiction medicine specialist.    Tolerance means a state of adaption in which exposure to the drug induces changes that result in a lessening of one or more of the drug’s effects over time.  The dose of the opioid may have to be adjusted up or down to a dose that produces maximum function and a realistic decrease of the patient’s pain.    If it  appears to my physician/health care provider that there is no improvement in my daily function or quality of life from the controlled substance, I understand my opioids may be discontinued.    If I have a history of alcohol or drug misuse/addiction, I must notify the physician of such history since the treatment with opioids for may increase the possibility of relapse.    I agree and understand that my physician reserves the right to perform random or unannounced urine drug testing.  If requested to provide a urine sample, I agree to cooperate.  If I decide not to provide a urine sample, I understand that my physician may change my treatment plan, including discontinuation of my opioid medications when applicable or complete termination of the physician/patient relationship.  The presence of non-prescribed drug(s) or illicit drug(s) in the urine can be grounds for termination of the physician/patient relationship.  Urine drug testing is not forensic testing, but is done for my benefit as a diagnostic tool and in accordance with certain legal and regulatory guidance on the use of controlled substances to treat pain.    I agree to allow my physician/quan care provider to contact any health care professional, including behavioral health, family member, pharmacy, legal authority, or regulator agency to obtain or provide information about my care or actions if the physician feels it is necessary.    I understand that non-compliance with the above conditions may result in a re-evaluation of my treatment plan, referral to the [facility] Therapy [facility], discontinuation of opioid therapy, and possible discharge from the clinic.    I agree to work closely with my physician/health care provider to assure the agreed plan of care is followed.    I acknowledge that I have received a copy of this agreement for my records.          I __________________________________________ have read the above information or it has been  read to me.  All my questions regarding the treatment of pain with opioids have been answered to my satisfaction, and I understand all of the conditions of my participation in the opioid treatment plan listed above.  I hereby agree to the conditions listed about and consent to participate in the opioid medication therapy.        ______________________________    ____________    ______________________________              Patient Signature                                  Date                        Patient Printed Name  ______________________________    ____________    ______________________________              Witness Signature                                Date                       Witness Printed Name

## (undated) NOTE — LETTER
Date & Time: 12/14/2023, 3:09 PM  Patient: Luisito Iqbal  Encounter Provider(s):    Mike Leslie PA-C       To Whom It May Concern:    Harriet Orona was seen and treated in our department on 12/14/2023. She should not return to work until 12/17/2023 .     If you have any questions or concerns, please do not hesitate to call.        _____________________________  Physician/APC Signature

## (undated) NOTE — MR AVS SNAPSHOT
After Visit Summary   4/22/2017    Chayo Romo    MRN: [id number]           Visit Information        Provider Department Dept Phone    4/22/2017 10:00 PM [facility] [phone]      Allergies as of 4/22/2017  Reviewed on: 4/4/2017 Omega-3 Fatty Acids (FISH OIL OR) Take by mouth 2 (two) times daily. Meloxicam 15 MG Oral Tab Take 1 tablet (15 mg total) by mouth daily.     FLUTICASONE PROPIONATE 50 MCG/ACT Nasal Suspension USE TWO SPRAY(S) IN EACH NOSTRIL ONCE DAILY    multiple regan

## (undated) NOTE — LETTER
[address]  [address]  Consent for Procedure/Sedation  Date: 12/22/2023         Time: 0739    I hereby authorize Dr. Wood, my physician and his/her assistants (if applicable), which may include medical students, residents, and/or fellows, to perform the following surgical operation/ procedure and administer such anesthesia as may be determined necessary by my physician:  Operation/Procedure name (s)  Cardiac Catheterization, Left Ventricular Cineangiography, Bilateral Selective Coronary Angiography and/or Right Heart Catheterization; possible Percutaneous Transluminal Coronary Angioplasty, Coronary Atherectomy, Coronary Stent, Intracoronary Thrombolytic therapy, Antiplatelet therapy and/or Intravascular Ultrasound on Soumya NAOMI King   2.   I recognize that during the surgical operation/procedure, unforeseen conditions may necessitate additional or different procedures than those listed above.  I, therefore, further authorize and request that the above-named surgeon, assistants, or designees perform such procedures as are, in their judgment, necessary and desirable.    3.   My surgeon/physician has discussed prior to my surgery the potential benefits, risks and side effects of this procedure; the likelihood of achieving goals; and potential problems that might occur during recuperation.  They also discussed reasonable alternatives to the procedure, including risks, benefits, and side effects related to the alternatives and risks related to not receiving this procedure.  I have had all my questions answered and I acknowledge that no guarantee has been made as to the result that may be obtained.    4.   Should the need arise during my operation/procedure, which includes change of level of care prior to discharge, I also consent to the administration of blood and/or blood products.  Further, I understand that despite careful testing and screening of blood or blood products by  collecting agencies, I may still be subject to ill effects as a result of receiving a blood transfusion and/or blood products.  The following are some, but not all, of the potential risks that can occur: fever and allergic reactions, hemolytic reactions, transmission of diseases such as Hepatitis, AIDS and Cytomegalovirus (CMV) and fluid overload.  In the event that I wish to have an autologous transfusion of my own blood, or a directed donor transfusion, I will discuss this with my physician.  Check only if Refusing Blood or Blood Products  I understand refusal of blood or blood products as deemed necessary by my physician may have serious consequences to my condition to include possible death. I hereby assume responsibility for my refusal and release the hospital, its personnel, and my physicians from any responsibility for the consequences of my refusal.          o  Refuse      5.   I authorize the use of any specimen, organs, tissues, body parts or foreign objects that may be removed from my body during the operation/procedure for diagnosis, research or teaching purposes and their subsequent disposal by hospital authorities.  I also authorize the release of specimen test results and/or written reports to my treating physician on the hospital medical staff or other referring or consulting physicians involved in my care, at the discretion of the Pathologist or my treating physician.    6.   I consent to the photographing or videotaping of the operations or procedures to be performed, including appropriate portions of my body for medical, scientific, or educational purposes, provided my identity is not revealed by the pictures or by descriptive texts accompanying them.  If the procedure has been photographed/videotaped, the surgeon will obtain the original picture, image, videotape or CD.  The hospital will not be responsible for storage, release or maintenance of the picture, image, tape or CD.    7.   I consent  to the presence of a  or observers in the operating room as deemed necessary by my physician or their designees.    8.   I recognize that in the event my procedure results in extended X-Ray/fluoroscopy time, I may develop a skin reaction.    9. If I have a Do Not Attempt Resuscitation (DNAR) order in place, that status will be suspended while in the operating room, procedural suite, and during the recovery period unless otherwise explicitly stated by me (or a person authorized to consent on my behalf). The surgeon or my attending physician will determine when the applicable recovery period ends for purposes of reinstating the DNAR order.  10. Patients having a sterilization procedure: I understand that if the procedure is successful the results will be permanent and it will therefore be impossible for me to inseminate, conceive, or bear children.  I also understand that the procedure is intended to result in sterility, although the result has not been guaranteed.   11. I acknowledge that my physician has explained sedation/analgesia administration to me including the risk and benefits I consent to the administration of sedation/analgesia as may be necessary or desirable in the judgment of my physician.    I CERTIFY THAT I HAVE READ AND FULLY UNDERSTAND THE ABOVE CONSENT TO OPERATION and/or OTHER PROCEDURE.        ____________________________________       _________________________________      ______________________________  Signature of Patient         Signature of Responsible Person        Printed Name of Responsible Person    ____________________________________      _________________________________      ______________________________       Signature of Witness          Relationship to Patient                       Date                                       Time  Patient Name: Soumya SALGADO Fernando     : 6/3/1960                 Printed: 2023      Medical Record #: [id number]                       Page 1 of 2

## (undated) NOTE — MR AVS SNAPSHOT
[place]  [address] [facility]  [address] [facility] [id number] [phone]               Thank you for choosing us for your health care visit with HIEN Aguayo.   We are glad to serve you and happy to provide you with this [facility] staff will provide you with the phone number you should call to schedule your appointment.      If you are confident that your benefit plan will not require a referral or authorization, such as [place], please feel free to schedule your david * Desvenlafaxine Succinate  MG Tb24   Take 1 tablet (100 mg total) by mouth daily. Commonly known as:  PRISTIQ           * PRISTIQ 100 MG Tb24   Generic drug:  Desvenlafaxine Succinate ER   Take 1 tablet (100 mg total) by mouth daily.            di These medications were sent to [address] [place] [address], [address] [place] [address] Ave [phone], [phone] [address] [facility],First Floor, [address] [place] [address]     Phone:  [phone]    - triamcinolone acetonide 0.1 % Crea            MyChart     Visit MyChart Get your heart pumping – brisk walking, biking, swimming Even 10 minute increments are effective and add up over the week   2 ½ hours per week – spread out over time Use a chante to keep you motivated   Don’t forget strength training with weights and resist

## (undated) NOTE — LETTER
[address]  [address]  Authorization for Surgical Operation and Procedure     Date:___________                                                                                                         Time:__________  I hereby authorize Thoracentesis, my physician and his/her assistants (if applicable), which may include medical students, residents, and/or fellows, to perform the following surgical operation/ procedure and administer such anesthesia as may be determined necessary by my physician:  Operation/Procedure name (s)  on Soumya King   2.   I recognize that during the surgical operation/procedure, unforeseen conditions may necessitate additional or different procedures than those listed above.  I, therefore, further authorize and request that the above-named surgeon, assistants, or designees perform such procedures as are, in their judgment, necessary and desirable.    3.   My surgeon/physician has discussed prior to my surgery the potential benefits, risks and side effects of this procedure; the likelihood of achieving goals; and potential problems that might occur during recuperation.  They also discussed reasonable alternatives to the procedure, including risks, benefits, and side effects related to the alternatives and risks related to not receiving this procedure.  I have had all my questions answered and I acknowledge that no guarantee has been made as to the result that may be obtained.    4.   Should the need arise during my operation/procedure, which includes change of level of care prior to discharge, I also consent to the administration of blood and/or blood products.  Further, I understand that despite careful testing and screening of blood or blood products by collecting agencies, I may still be subject to ill effects as a result of receiving a blood transfusion and/or blood products.  The following are some, but not all, of the potential risks that can  occur: fever and allergic reactions, hemolytic reactions, transmission of diseases such as Hepatitis, AIDS and Cytomegalovirus (CMV) and fluid overload.  In the event that I wish to have an autologous transfusion of my own blood, or a directed donor transfusion, I will discuss this with my physician.  Check only if Refusing Blood or Blood Products  I understand refusal of blood or blood products as deemed necessary by my physician may have serious consequences to my condition to include possible death. I hereby assume responsibility for my refusal and release the hospital, its personnel, and my physicians from any responsibility for the consequences of my refusal.          o  Refuse      5.   I authorize the use of any specimen, organs, tissues, body parts or foreign objects that may be removed from my body during the operation/procedure for diagnosis, research or teaching purposes and their subsequent disposal by hospital authorities.  I also authorize the release of specimen test results and/or written reports to my treating physician on the hospital medical staff or other referring or consulting physicians involved in my care, at the discretion of the Pathologist or my treating physician.    6.   I consent to the photographing or videotaping of the operations or procedures to be performed, including appropriate portions of my body for medical, scientific, or educational purposes, provided my identity is not revealed by the pictures or by descriptive texts accompanying them.  If the procedure has been photographed/videotaped, the surgeon will obtain the original picture, image, videotape or CD.  The hospital will not be responsible for storage, release or maintenance of the picture, image, tape or CD.    7.   I consent to the presence of a  or observers in the operating room as deemed necessary by my physician or their designees.    8.   I recognize that in the event my procedure results in  extended X-Ray/fluoroscopy time, I may develop a skin reaction.    9. If I have a Do Not Attempt Resuscitation (DNAR) order in place, that status will be suspended while in the operating room, procedural suite, and during the recovery period unless otherwise explicitly stated by me (or a person authorized to consent on my behalf). The surgeon or my attending physician will determine when the applicable recovery period ends for purposes of reinstating the DNAR order.  10. Patients having a sterilization procedure: I understand that if the procedure is successful the results will be permanent and it will therefore be impossible for me to inseminate, conceive, or bear children.  I also understand that the procedure is intended to result in sterility, although the result has not been guaranteed.   11. I acknowledge that my physician has explained sedation/analgesia administration to me including the risk and benefits I consent to the administration of sedation/analgesia as may be necessary or desirable in the judgment of my physician.    I CERTIFY THAT I HAVE READ AND FULLY UNDERSTAND THE ABOVE CONSENT TO OPERATION and/or OTHER PROCEDURE.    _________________________________________  __________________________________  Signature of Patient     Signature of Responsible Person         ___________________________________         Printed Name of Responsible Person           _________________________________                 Relationship to Patient  _________________________________________  ______________________________  Signature of Witness          Date  Time      Patient Name: Soumya King     : 6/3/1960                 Printed: May 28, 2024     Medical Record #: [id number]                     Page 1 [address]  [address]    Consent for Anesthesia    I, Soumya King agree to be cared for by an anesthesiologist, who is specially trained  to monitor me and give me medicine to put me to sleep or keep me comfortable during my procedure    I understand that my anesthesiologist is not an employee or agent of [facility] or [employer]. He or she works for [employer].    As the patient asking for anesthesia services, I agree to:  Allow the anesthesiologist (anesthesia doctor) to give me medicine and do additional procedures as necessary. Some examples are: Starting or using an “IV” to give me medicine, fluids or blood during my procedure, and having a breathing tube placed to help me breathe when I’m asleep (intubation). In the event that my heart stops working properly, I understand that my anesthesiologist will make every effort to sustain my life, unless otherwise directed by [facility] Do Not Resuscitate documents.  Tell my anesthesia doctor before my procedure:  If I am pregnant.  The last time that I ate or drank.  All of the medicines I take (including prescriptions, herbal supplements, and pills I can buy without a prescription (including street drugs/illegal medications). Failure to inform my anesthesiologist about these medicines may increase my risk of anesthetic complications.  If I am allergic to anything or have had a reaction to anesthesia before.  I understand how the anesthesia medicine will help me (benefits).  I understand that with any type of anesthesia medicine there are risks:  The most common risks are: nausea, vomiting, sore throat, muscle soreness, damage to my eyes, mouth, or teeth (from breathing tube placement).  Rare risks include: remembering what happened during my procedure, allergic reactions to medications, injury to my airway, heart, lungs, vision, nerves, or muscles and in extremely rare instances death.  My doctor has explained to me other choices available to me for my care (alternatives).  Pregnant Patients (“epidural”):  I understand that the risks of having an epidural  (medicine given into my back to help control pain during labor), include itching, low blood pressure, difficulty urinating, headache or slowing of the baby’s heart. Very rare risks include infection, bleeding, seizure, irregular heart rhythms and nerve injury.  Regional Anesthesia (“spinal”, “epidural”, & “nerve blocks”):  I understand that rare but potential complications include headache, bleeding, infection, seizure, irregular heart rhythms, and nerve injury.    I can change my mind about having anesthesia services at any time before I get the medicine.    _____________________________________________________________________________  Patient (or Representative) Signature/Relationship to Patient  Date   Time    _____________________________________________________________________________   Name (if used)    Language/Organization   Time    _____________________________________________________________________________  Anesthesiologist Signature     Date   Time  I have discussed the procedure and information above with the patient (or patient’s representative) and answered their questions. The patient or their representative has agreed to have anesthesia services.    _____________________________________________________________________________  Witness        Date   Time  I have verified that the signature is that of the patient or patient’s representative, and that it was signed before the procedure  Patient Name: Soumya King     : 6/3/1960                 Printed: May 28, 2024     Medical Record #: [id number]                     Page 2 of 2

## (undated) NOTE — Clinical Note
I had the pleasure of seeing Ms. Kimberley See in my office today. Please see my attached note.       Naila Enrique

## (undated) NOTE — Clinical Note
I had the pleasure of seeing Ms. Geovany Negro in my office today. Please see my attached note.       Toño Rivas

## (undated) NOTE — LETTER
[address]  [address]  Authorization for Surgical Operation and Procedure     Date:___________                                                                                                         Time:__________  I hereby authorize Surgeon(s):  Greyson Main MD, my physician and his/her assistants (if applicable), which may include medical students, residents, and/or fellows, to perform the following surgical operation/ procedure and administer such anesthesia as may be determined necessary by my physician:  Operation/Procedure name (s) Procedure(s):  ROBOT-ASSISTED NAVIGATIONAL BRONCHOSCOPY, ENDOBRONCIAL ULTRASOUND UNDER FLUOROSCOPY on Soumya King   2.   I recognize that during the surgical operation/procedure, unforeseen conditions may necessitate additional or different procedures than those listed above.  I, therefore, further authorize and request that the above-named surgeon, assistants, or designees perform such procedures as are, in their judgment, necessary and desirable.    3.   My surgeon/physician has discussed prior to my surgery the potential benefits, risks and side effects of this procedure; the likelihood of achieving goals; and potential problems that might occur during recuperation.  They also discussed reasonable alternatives to the procedure, including risks, benefits, and side effects related to the alternatives and risks related to not receiving this procedure.  I have had all my questions answered and I acknowledge that no guarantee has been made as to the result that may be obtained.    4.   Should the need arise during my operation/procedure, which includes change of level of care prior to discharge, I also consent to the administration of blood and/or blood products.  Further, I understand that despite careful testing and screening of blood or blood products by collecting agencies, I may still be subject to ill effects as a result of  receiving a blood transfusion and/or blood products.  The following are some, but not all, of the potential risks that can occur: fever and allergic reactions, hemolytic reactions, transmission of diseases such as Hepatitis, AIDS and Cytomegalovirus (CMV) and fluid overload.  In the event that I wish to have an autologous transfusion of my own blood, or a directed donor transfusion, I will discuss this with my physician.  Check only if Refusing Blood or Blood Products  I understand refusal of blood or blood products as deemed necessary by my physician may have serious consequences to my condition to include possible death. I hereby assume responsibility for my refusal and release the hospital, its personnel, and my physicians from any responsibility for the consequences of my refusal.          o  Refuse      5.   I authorize the use of any specimen, organs, tissues, body parts or foreign objects that may be removed from my body during the operation/procedure for diagnosis, research or teaching purposes and their subsequent disposal by hospital authorities.  I also authorize the release of specimen test results and/or written reports to my treating physician on the hospital medical staff or other referring or consulting physicians involved in my care, at the discretion of the Pathologist or my treating physician.    6.   I consent to the photographing or videotaping of the operations or procedures to be performed, including appropriate portions of my body for medical, scientific, or educational purposes, provided my identity is not revealed by the pictures or by descriptive texts accompanying them.  If the procedure has been photographed/videotaped, the surgeon will obtain the original picture, image, videotape or CD.  The hospital will not be responsible for storage, release or maintenance of the picture, image, tape or CD.    7.   I consent to the presence of a  or observers in the operating room  as deemed necessary by my physician or their designees.    8.   I recognize that in the event my procedure results in extended X-Ray/fluoroscopy time, I may develop a skin reaction.    9. If I have a Do Not Attempt Resuscitation (DNAR) order in place, that status will be suspended while in the operating room, procedural suite, and during the recovery period unless otherwise explicitly stated by me (or a person authorized to consent on my behalf). The surgeon or my attending physician will determine when the applicable recovery period ends for purposes of reinstating the DNAR order.  10. Patients having a sterilization procedure: I understand that if the procedure is successful the results will be permanent and it will therefore be impossible for me to inseminate, conceive, or bear children.  I also understand that the procedure is intended to result in sterility, although the result has not been guaranteed.   11. I acknowledge that my physician has explained sedation/analgesia administration to me including the risk and benefits I consent to the administration of sedation/analgesia as may be necessary or desirable in the judgment of my physician.    I CERTIFY THAT I HAVE READ AND FULLY UNDERSTAND THE ABOVE CONSENT TO OPERATION and/or OTHER PROCEDURE.    _________________________________________  __________________________________  Signature of Patient     Signature of Responsible Person         ___________________________________         Printed Name of Responsible Person           _________________________________                 Relationship to Patient  _________________________________________  ______________________________  Signature of Witness          Date  Time      Patient Name: Soumya SALGADO King     : 6/3/1960                 Printed: 2024     Medical Record #: [id number]                     Page 1 of 2                                    [address]   [address]    Consent for Anesthesia    ISoumya agree to be cared for by an anesthesiologist, who is specially trained to monitor me and give me medicine to put me to sleep or keep me comfortable during my procedure    I understand that my anesthesiologist is not an employee or agent of [facility] or [employer]. He or she works for [employer].    As the patient asking for anesthesia services, I agree to:  Allow the anesthesiologist (anesthesia doctor) to give me medicine and do additional procedures as necessary. Some examples are: Starting or using an “IV” to give me medicine, fluids or blood during my procedure, and having a breathing tube placed to help me breathe when I’m asleep (intubation). In the event that my heart stops working properly, I understand that my anesthesiologist will make every effort to sustain my life, unless otherwise directed by [facility] Do Not Resuscitate documents.  Tell my anesthesia doctor before my procedure:  If I am pregnant.  The last time that I ate or drank.  All of the medicines I take (including prescriptions, herbal supplements, and pills I can buy without a prescription (including street drugs/illegal medications). Failure to inform my anesthesiologist about these medicines may increase my risk of anesthetic complications.  If I am allergic to anything or have had a reaction to anesthesia before.  I understand how the anesthesia medicine will help me (benefits).  I understand that with any type of anesthesia medicine there are risks:  The most common risks are: nausea, vomiting, sore throat, muscle soreness, damage to my eyes, mouth, or teeth (from breathing tube placement).  Rare risks include: remembering what happened during my procedure, allergic reactions to medications, injury to my airway, heart, lungs, vision, nerves, or muscles and in extremely rare instances death.  My doctor has explained to me other choices available  to me for my care (alternatives).  Pregnant Patients (“epidural”):  I understand that the risks of having an epidural (medicine given into my back to help control pain during labor), include itching, low blood pressure, difficulty urinating, headache or slowing of the baby’s heart. Very rare risks include infection, bleeding, seizure, irregular heart rhythms and nerve injury.  Regional Anesthesia (“spinal”, “epidural”, & “nerve blocks”):  I understand that rare but potential complications include headache, bleeding, infection, seizure, irregular heart rhythms, and nerve injury.    I can change my mind about having anesthesia services at any time before I get the medicine.    _____________________________________________________________________________  Patient (or Representative) Signature/Relationship to Patient  Date   Time    _____________________________________________________________________________   Name (if used)    Language/Organization   Time    _____________________________________________________________________________  Anesthesiologist Signature     Date   Time  I have discussed the procedure and information above with the patient (or patient’s representative) and answered their questions. The patient or their representative has agreed to have anesthesia services.    _____________________________________________________________________________  Witness        Date   Time  I have verified that the signature is that of the patient or patient’s representative, and that it was signed before the procedure  Patient Name: Soumya King     : 6/3/1960                 Printed: 2024     Medical Record #: [id number]                     Page 2 of 2

## (undated) NOTE — IP AVS SNAPSHOT
Patient Demographics     Address  [address]  [facility] [address] Phone  [phone] (Home)  [phone] (Mobile) *Preferred* E-mail Address  [email or web address]      Patient Contacts     Name Relation Home Work Mobile    Alana Chavarria   [phone]      Allergies as of 6/23/2024  Review status set to Review Complete on 6/19/2024       Noted Reaction Type Reactions    Naprosyn [naproxen] 03/10/2011    ANAPHYLAXIS    Has tolerated ibuprofen and IV toradol     Aspirin 03/10/2011    OTHER (SEE COMMENTS)    Difficulty swallowing due to  saavedra's esophagus      Code Status Information     Code Status    Full Code        Patient Instructions       Resume [facility] with [facility]  [phone]  PICC Line dressing change due on 6/27/24  [facility]   Ph: [phone], fax: [phone]    Advised patient to be compliant with CPAP machine at 12 cm water pressure whenever sleeping.    Counseling/Psychiatry In-Network with Medicaid     [facility]  [address] [place] [address] 158, [place], [place]   [phone]    [facility]  [address]  [facility] [address], [place]   [phone]  ([facility] MEDICAID ONLY)    [facility] [place]  [address]   [phone]    [facility]  [address] [place] [address] [phone]    [facility]  [address]  [place], [place] [address] [phone]    [facility]  [address], [place] [address] [phone]    [facility]  ** Therapy and Psychiatry, multiple locations  [phone]    [facility]  [address] [place] [address] [phone]      Patient Isolation Status     None to display      Microbiology Results (All)     Procedure Component Value Units Date/Time    Blood Culture [[id number]] Collected:  06/18/24 1828    Order Status: Completed Lab Status: Preliminary result Updated: 06/22/24 2201    Specimen: Blood,peripheral      Blood Culture Result No Growth 4 Days    Narrative:      Aerobic Bottle Volume - 16 mL    Urine Culture, Routine [[id number]] Collected: 06/20/24 2114    Order Status: Completed Lab Status: Final result Updated: 06/22/24 0930    Specimen: Urine, clean catch      Urine Culture No Growth at 18-24 hrs.      Immunizations     Name Date      Albuterol-Ipratropium  0.5 mg-3 mg 11/12/12     Covid-19 Pfizer 05/04/22     Covid-19 Pfizer 05/28/21     Covid-19 Pfizer 05/07/21     INFLUENZA 01/03/24     INFLUENZA 09/21/21     INFLUENZA 09/21/21     INFLUENZA 10/20/20     INFLUENZA 10/01/18     INFLUENZA 10/01/18     INFLUENZA 09/18/17     INFLUENZA 09/18/17     INFLUENZA 09/24/16     INFLUENZA 09/22/15     INFLUENZA 09/22/15     INFLUENZA 10/11/12     INFLUENZA 12/09/11     Influenza 10/20/20     Influenza 10/03/19     Pneumovax 23 09/30/17     TDAP 07/02/13        Follow-up Information     Rika Vinson DO. Schedule an appointment as soon as possible for a visit in 1 week(s).    Specialty: Internal Medicine  Why: For routine follow-up after hospitilization  Contact information:  [address]  [facility] [address] [phone]             Vibha Menezes MD Follow up.    Specialties: INFECTIOUS DISEASES, Internal Medicine  Why: As needed  Contact information:  [address] [facility] [address]  [facility] [address]  [phone]             John Pacheco MD Follow up in 3 month(s).    Specialty: PULMONARY DISEASES  Contact information:  [address] [facility]  [address]  [facility] [address] [phone]                        Your Home Meds List      TAKE these medications       Instructions Authorizing Provider Morning Afternoon Evening As Needed   Aimovig 140 MG/ML Soaj  Generic drug: Erenumab-aooe  Next dose due: Resume as directed when it's due      Inject 1 mL (140 mg total) into the skin  every 30 (thirty) days.   Brynn Mckennaqtaemmett         aspirin 81 MG Tbec  Next dose due: Tomorrow morning       Take 1 tablet (81 mg total) by mouth daily.          atorvastatin 80 MG Tabs  Commonly known as: Lipitor  Next dose due: Tonight       Take 1 tablet (80 mg total) by mouth nightly.   Rika Vinson         azelastine 137 MCG/SPRAY Soln  Next dose due: Tonight       1 spray by Nasal route 2 (two) times daily.          benzonatate 100 MG Caps  Commonly known as: Tessalon Perles      Take 1 capsule (100 mg total) by mouth 3 (three) times daily as needed for cough.   Edwige Collier         buPROPion  MG Tb24  Commonly known as: Wellbutrin XL  Next dose due: Tomorrow morning      Take 1 tablet (150 mg total) by mouth daily.  Stop taking on: July 23, 2024   Bill Brian         Calcium Carb-Cholecalciferol 500-200 MG-UNIT Tabs  Next dose due: Tomorrow morning      Take 1 tablet by mouth daily.          cefTRIAXone 2 g Solr  Commonly known as: Rocephin  Next dose due: Tomorrow 6/24/24 2 pm      Inject 20 mL (2 g total) into the vein daily for 13 days.  Stop taking on: July 4, 2024   Vibha Menezes         clonazePAM 0.5 MG Tabs  Commonly known as: KlonoPIN      Take 1 tablet (0.5 mg total) by mouth 2 (two) times daily.   Heidi Nubia         clopidogrel 75 MG Tabs  Commonly known as: Plavix  Next dose due: Tomorrow morning      Take 1 tablet (75 mg total) by mouth daily.          Coenzyme Q10 200 MG Caps  Next dose due: Tonight       Take 200 mg by mouth 2 (two) times daily.          diclofenac 75 MG Tbec  Commonly known as: Voltaren  Next dose due: Tonight       Take 1 tablet (75 mg total) by mouth 2 (two) times daily.   Xiang Torres         furosemide 20 MG Tabs  Commonly known as: Lasix  Next dose due: Tomorrow morning      Take 1 tablet (20 mg total) by mouth daily.   Rosendo Herrera         gabapentin 600 MG Tabs  Commonly known as: Neurontin  Next dose due: Tonight       Take 1 tablet (600  mg total) by mouth at bedtime.   Rika Vinson         Incruse Ellipta 62.5 MCG/ACT Aepb  Generic drug: umeclidinium bromide  Next dose due: Tomorrow morning      Inhale 1 puff into the lungs daily.   Greyson Main         lidocaine 5 % Oint  Commonly known as: Xylocaine      Apply 1 Application  topically 3 (three) times daily as needed.   Xiang Torres         metFORMIN 500 MG Tabs  Commonly known as: Glucophage  Next dose due: Tonight       Take 1 tablet (500 mg total) by mouth 2 (two) times daily.   Rika Vinson         Metoclopramide HCl 5 MG/5ML Soln  Commonly known as: REGLAN  Next dose due: Take with meals      Take 5 mL (5 mg total) by mouth 3 (three) times daily before meals.   Rika Vinson         metoprolol succinate ER 50 MG Tb24  Commonly known as: Toprol XL  Next dose due: Tomorrow morning      Take 1 tablet (50 mg total) by mouth daily.   Rika Vinson         Myrbetriq 50 MG Tb24  Generic drug: Mirabegron ER  Next dose due: Tomorrow morning      Take 1 tablet (50 mg total) by mouth daily.   Debbie Hernadez         Naloxone HCl 4 MG/0.1ML Liqd      4 mg by Nasal route as needed. If patient remains unresponsive, repeat dose in other nostril 2-5 minutes after first dose.   Yudy Albrecht         Omeprazole 40 MG Cpdr  Next dose due: Tonight       Take 1 capsule (40 mg total) by mouth 2 (two) times daily.   Rika Vinson         One-A-Day Womens 50+ Tabs  Next dose due: Tomorrow morning      Take 1 tablet by mouth daily.   Rika Lepe Alisson         OneTouch Delica Plus [id number] Misc      1 lancet  by Finger stick route 3 (three) times daily.   Rika Lepe Alisson         OneTouch Ultra 2 w/Device Kit      1 Device by Other route in the morning, at noon, and at bedtime. Use as directed.   Rika Leep Alisson         OneTouch Ultra Strp  Generic drug: Glucose Blood      Tests 3 x daily   Rika Vinson         oxyCODONE 5 MG Tabs      Take 1 tablet (5 mg total)  by mouth every 6 (six) hours as needed for Pain.   Yudy Albrecht         Pristiq 100 MG Tb24  Generic drug: desvenlafaxine ER  Next dose due: Tomorrow morning      Take 1 tablet (100 mg total) by mouth daily.          QUEtiapine  MG Tb24  Commonly known as: SEROquel XR  Next dose due: Tonight       Take 1 tablet (200 mg total) by mouth every evening.   Rika Vinson         Rizatriptan Benzoate 10 MG Tabs  Commonly known as: MAXALT      Take 1 tablet (10 mg total) by mouth as needed for Migraine.   Hurmina Muqtadar         topiramate 50 MG Tabs  Commonly known as: TOPAMAX  Next dose due: Tonight       TAKE ONE TABLET BY MOUTH EVERY MORNING AND TWO TABLETS EVERY EVENING   Hurmina Muqtadar         Trulicity 0.75 MG/0.5ML Sopn  Generic drug: Dulaglutide  Next dose due: Resume as directed.      Inject 0.75 mg into the skin once a week.   Rika Vinson         umeclidinium-vilanterol 62.5-25 MCG/ACT Aepb  Commonly known as: Anoro Ellipta  Next dose due: Tomorrow morning      Inhale 1 puff into the lungs daily.   John Pacheco               Where to Get Your Medications      These medications were sent to [id number] - [place], [place] - [address] [phone], [phone]  [address] [place] [address] [place] [address]    Phone: [phone]   buPROPion  MG Tb24  umeclidinium-vilanterol 62.5-25 MCG/ACT Aepb     Please  your prescriptions at the location directed by your doctor or nurse    Bring a paper prescription for each of these medications  cefTRIAXone 2 g Solr           [id number] - MAR ACTION REPORT  (last 48 hrs)    ** SITE UNKNOWN **     Order ID Medication Name Action Time Action Reason Comments    [id number] QUEtiapine ER (SEROquel XR) 24 hr tab 200 mg 06/21/24 1817 Given      [id number] QUEtiapine ER (SEROquel XR) 24 hr tab 200 mg 06/22/24 1742 Given      [id number] acetaminophen (Tylenol Extra Strength) tab 500 mg 06/21/24 2017 Given      [id number] acetaminophen (Tylenol Extra  Strength) tab 500 mg 06/22/24 1014 Given      [id number] acetaminophen (Tylenol Extra Strength) tab 500 mg 06/22/24 1742 Given      [id number] acetaminophen (Tylenol Extra Strength) tab 500 mg 06/22/24 2003 Given      [id number] aspirin DR tab 81 mg 06/22/24 0958 Given      [id number] aspirin DR tab 81 mg 06/23/24 0914 Given      [id number] atorvastatin (Lipitor) tab 80 mg 06/21/24 2014 Given      [id number] atorvastatin (Lipitor) tab 80 mg 06/22/24 1958 Given      [id number] azelastine (Astelin) 0.1 % nasal solution 1 spray 06/21/24 2014 Given      [id number] azelastine (Astelin) 0.1 % nasal solution 1 spray 06/22/24 1024 Given      [id number] azelastine (Astelin) 0.1 % nasal solution 1 spray 06/22/24 1958 Given      [id number] azelastine (Astelin) 0.1 % nasal solution 1 spray 06/23/24 0914 Given      [id number] buPROPion ER (Wellbutrin XL) 24 hr tab 150 mg 06/22/24 0958 Given      [id number] buPROPion ER (Wellbutrin XL) 24 hr tab 150 mg 06/23/24 0914 Given      [id number] cefTRIAXone (Rocephin) 2 g in sodium chloride 0.9% 100 mL IVPB-ADDV 06/22/24 1404 New Bag      [id number] cefTRIAXone (Rocephin) 2 g in sodium chloride 0.9% 100 mL IVPB-ADDV 06/23/24 1324 New Bag      [id number] clopidogrel (Plavix) tab 75 mg 06/22/24 0958 Given      [id number] clopidogrel (Plavix) tab 75 mg 06/23/24 0914 Given      [id number] desvenlafaxine ER (Pristiq) 24 hr tab 100 mg 06/22/24 1014 Given      [id number] desvenlafaxine ER (Pristiq) 24 hr tab 100 mg 06/23/24 0914 Given      [id number] furosemide (Lasix) tab 20 mg 06/22/24 0957 Given      [id number] furosemide (Lasix) tab 20 mg 06/23/24 0914 Given      [id number] gabapentin (Neurontin) tab 600 mg 06/21/24 2014 Given      [id number] gabapentin (Neurontin) tab 600 mg 06/22/24 1958 Given      [id number] magnesium oxide (Mag-Ox) tab 400 mg 06/22/24 0800 Given      [id number] metoprolol succinate ER (Toprol XL) 24 hr tab 50 mg 06/22/24 0520 Given      [id number] metoprolol succinate ER (Toprol XL) 24 hr tab  50 mg 06/23/24 0535 Given      [id number] oxyCODONE immediate release tab 5 mg 06/21/24 1646 Given      [id number] oxyCODONE immediate release tab 5 mg 06/22/24 2003 Given      [id number] potassium chloride 20 mEq/100mL IVPB premix 20 mEq (Followed by [facility] #1) 06/21/24 1642 New Bag      [id number] topiramate (TopaMAX) tab 50 mg 06/21/24 2014 Given      [id number] topiramate (TopaMAX) tab 50 mg 06/22/24 0958 Given      [id number] topiramate (TopaMAX) tab 50 mg 06/22/24 1959 Given      [id number] topiramate (TopaMAX) tab 50 mg 06/23/24 0914 Given      [id number] umeclidinium-vilanterol (Anoro Ellipta) 62.5-25 MCG/ACT inhaler 1 puff 06/22/24 1013 Given      [id number] umeclidinium-vilanterol (Anoro Ellipta) 62.5-25 MCG/ACT inhaler 1 puff 06/23/24 0914 Given              Recent Vital Signs    Flowsheet Row Most Recent Value   /70 Filed at 06/23/2024 1200   Pulse 86 Filed at 06/23/2024 1200   Resp 19 Filed at 06/23/2024 1200   Temp 98 °F (36.7 °C) Filed at 06/23/2024 1200   SpO2 96 % Filed at 06/23/2024 1200      Patient's Most Recent Weight    Flowsheet Row Most Recent Value   Patient Weight 54.4 kg (120 lb)         Lab Results Last 24 Hours      Magnesium [[id number]] (Normal)  Resulted: 06/23/24 0810, Result status: Final result   Ordering provider: Bill Brian MD  06/22/24 2300 Resulting lab: [facility] ([facility])    Specimen Information    Type Source Collected On   Blood — 06/23/24 0750          Components    Component Value Reference Range Flag Lab   Magnesium 1.9 1.6 - 2.6 mg/dL — [facility] ([facility])            Testing Performed By     Lab - Abbreviation Name Director Address Valid Date Range    139 - [facility] ([facility]) [facility] ([facility]) Goldberg, Cathryn A. MD [address] [place] [id number] 03/19/20 1441 - Present            Pending Labs     Order Current Status    Blood Culture Preliminary result         H&P - H&P Note      H&P signed by  Malorie Chavez MD at 2024  8:57 AM  Version 3 of 3    Author: Malorie Chavez MD Service: Hospitalist Author Type: Physician    Filed: 2024  8:57 AM Date of Service: 2024 11:36 PM Status: Addendum    : Malorie Chavez MD (Physician)    Related Notes: Original Note by Malorie Chavez MD (Physician) filed at 2024  8:47 AM       [facility]                                                               [facility] & [facility]         Soumya King Patient Status:  Observation    6/3/1960 MRN [id number]   Location [facility] 3SW-A Attending Malorie Chavez MD   Hosp Day # 0 PCP Rika Vinson DO     Chief Complaint: Fatigue, low appetite    History of Present Illness:  Soumya King is a 64 year old female admitted with fatigue and generalized weakness and poor appetite.  Patient seen with patient's sister at bedside who is helping with history.  Patient was recently admitted in the hospital for empyema and had right video-assisted thoracoscopy and decortication at that time for lung abscess and trapped lung and discharged on 6/10/2024 on IV antibiotics through PICC line.  Patient's sister states she has been helping patient with her antibiotics.  Patient states she was initially feeling better and then progressively feeling worse.  Has generalized weakness and fatigue.  Has decreased appetite.  Denies any nausea vomiting.  Denies any constipation or diarrhea.  Sleeping most of the day and barely getting out of bed according to patient.      History:  Past Medical History:    Abdominal pain, other specified site    groin    Acute bronchitis    Allergic rhinitis    Anxiety    Arthritis    Back problem    Jamison esophagus    Chronic low back pain without sciatica    COPD (chronic obstructive pulmonary disease) (HCC)    Depression    Diabetes (HCC)    Difficult intubation    difficult to intubate with bladder surgery,instructed by anesthesia to communicate to future  anesthesiologist. Small airway    Esophageal reflux    Essential hypertension    Don't remember    Fitting and adjustment of dental prosthetic device    High blood pressure    History of 2019 novel coronavirus disease (COVID-19)    No hospitalization,symptoms; Fever,fatigue ,body aches,headache no loss of taste or smell    Hx of motion sickness    Hyperlipidemia    Mass of spine    Migraines    barometric pressure    Myocardial infarction (HCC)    Osteoarthritis    Other ill-defined conditions(799.89)    Jamison's    Pain in joint, forearm    wrist    Pain in joint, pelvic region and thigh    hip    Pelvic mass    Personal history of urinary (tract) infection    Sleep apnea    I don't date       Past Surgical History:   Procedure Laterality Date    Cabg  12/27/23    Colonoscopy      Colonoscopy N/A 05/18/2022    Procedure: COLONOSCOPY AND ESOPHAGOGASTRODUODENOSCOPY;  Surgeon: Miguel Camargo MD;  Location:  ENDOSCOPY    Heart surgery  2024    double bypass    Laparoscopic  2007    sling operation for stress incontinence    Laparoscopy,diagnostic      Kay biopsy stereo nodule 2 site bilat (cpt=19081/07956) Left 02/2010    BENIGN 2 SITES    Kay biopsy stereo w/calc 2 site left (cpt=19081/02588)  02/2010    benign x 2    Kay stereo-clip w/calc 2 site left  2010    Oophorectomy Left 06/28/2019    Other  08/31/2022    RIGHT SACRAL SUBCUTANEOUS CYST EXCISION    Other surgical history  2000, 2019    Bladder Sling, Large mass removed from left abdomen and ovar    Tonsillectomy      Was a child, have no idea    Tubal ligation      Upper gi endoscopy,exam         Family history:  Family History   Problem Relation Age of Onset    Arthritis Mother     Other (AMI) Mother     Other (diabetes mellitus) Mother     Diabetes Mother       Reviewed    Social history:   reports that she quit smoking about 17 months ago. Her smoking use included cigarettes. She started smoking about 49 years ago. She has a 72 pack-year smoking  history. She has never used smokeless tobacco. She reports that she does not currently use alcohol. She reports that she does not use drugs.    Allergies:  Allergies   Allergen Reactions    Naprosyn [Naproxen] ANAPHYLAXIS     Has tolerated ibuprofen and IV toradol     Aspirin OTHER (SEE COMMENTS)     Difficulty swallowing due to  saavedra's esophagus       Home Medications:  Medications Prior to Admission   Medication Sig Dispense Refill Last Dose    ertapenem 1 g Injection Recon Soln Inject 1 g into the vein daily for 24 days. Will need weekly labs with CBC and BMP while on this medication. 24 each 0     [] valACYclovir 1 G Oral Tab Take 1 tablet (1,000 mg total) by mouth every 12 (twelve) hours for 5 days. 10 tablet 0     oxyCODONE 5 MG Oral Tab Take 1 tablet (5 mg total) by mouth every 6 (six) hours as needed for Pain. 20 tablet 0     Naloxone HCl 4 MG/0.1ML Nasal Liquid 4 mg by Nasal route as needed. If patient remains unresponsive, repeat dose in other nostril 2-5 minutes after first dose. 1 kit 0     azelastine 137 MCG/SPRAY Nasal Solution 1 spray by Nasal route 2 (two) times daily.       PRISTIQ 100 MG Oral Tablet 24 Hr Take 1 tablet (100 mg total) by mouth daily.       benzonatate (TESSALON PERLES) 100 MG Oral Cap Take 1 capsule (100 mg total) by mouth 3 (three) times daily as needed for cough. 15 capsule 0     Rizatriptan Benzoate 10 MG Oral Tab Take 1 tablet (10 mg total) by mouth as needed for Migraine. 10 tablet 5     Glucose Blood (ONETOUCH ULTRA) In Vitro Strip Tests 3 x daily 300 each 1     TRULICITY 0.75 MG/0.5ML Subcutaneous Solution Pen-injector Inject 0.75 mg into the skin once a week. 2 mL 0     Coenzyme Q10 200 MG Oral Cap Co Q-10 200 mg capsule, [RxNorm: 875240]       lidocaine 5 % External Ointment Apply 1 Application  topically 3 (three) times daily as needed. 240 g 1     diclofenac 75 MG Oral Tab EC Take 1 tablet (75 mg total) by mouth 2 (two) times daily. 180 tablet 3     metoprolol  succinate ER 50 MG Oral Tablet 24 Hr Take 1 tablet (50 mg total) by mouth daily. 90 tablet 0     Omeprazole 40 MG Oral Capsule Delayed Release Take 1 capsule (40 mg total) by mouth 2 (two) times daily. 300 capsule 0     Blood Glucose Monitoring Suppl (ONETOUCH ULTRA 2) w/Device Does not apply Kit 1 Device by Other route in the morning, at noon, and at bedtime. Use as directed. 1 kit 0     topiramate 50 MG Oral Tab TAKE ONE TABLET BY MOUTH EVERY MORNING AND TWO TABLETS EVERY EVENING (Patient taking differently: Taking one tablet in the morning and one tablet at night.) 270 tablet 2     gabapentin 600 MG Oral Tab Take 1 tablet (600 mg total) by mouth at bedtime. 180 tablet 0     Erenumab-aooe (AIMOVIG) 140 MG/ML Subcutaneous Solution Auto-injector Inject 1 mL (140 mg total) into the skin every 30 (thirty) days. 1 mL 4     umeclidinium bromide (INCRUSE ELLIPTA) 62.5 MCG/ACT Inhalation Aerosol Powder, Breath Activated Inhale 1 puff into the lungs daily. 3 each 0     clopidogrel 75 MG Oral Tab Take 1 tablet (75 mg total) by mouth daily.       aspirin 81 MG Oral Tab EC Take 1 tablet (81 mg total) by mouth daily.       atorvastatin 80 MG Oral Tab Take 1 tablet (80 mg total) by mouth nightly. 90 tablet 1     metFORMIN 500 MG Oral Tab Take 1 tablet (500 mg total) by mouth 2 (two) times daily. 180 tablet 1     furosemide 20 MG Oral Tab Take 1 tablet (20 mg total) by mouth daily. 7 tablet 0     clonazePAM 0.5 MG Oral Tab Take 1 tablet (0.5 mg total) by mouth 2 (two) times daily. 12 tablet 0     busPIRone 10 MG Oral Tab Take 2 tablets (20 mg total) by mouth daily.       Metoclopramide HCl 5 MG/5ML Oral Solution Take 5 mL (5 mg total) by mouth 3 (three) times daily before meals. (Patient taking differently: Take 5 mL (5 mg total) by mouth 3 (three) times daily before meals as needed.) 473 mL 0     cyclobenzaprine 10 MG Oral Tab Take 1 tablet (10 mg total) by mouth every 12 (twelve) hours as needed for Muscle spasms. 60 tablet 1      Lancets (ONETOUCH DELICA PLUS [id number]) Does not apply Misc 1 lancet  by Finger stick route 3 (three) times daily. 300 each 1     Mirabegron ER (MYRBETRIQ) 50 MG Oral Tablet 24 Hr Take 1 tablet (50 mg total) by mouth daily. 30 tablet 11     Multiple Vitamins-Minerals (ONE-A-DAY WOMENS 50+) Oral Tab Take 1 tablet by mouth daily.  0     QUEtiapine Fumarate  MG Oral Tablet 24 Hr Take 1 tablet (200 mg total) by mouth every evening. 30 tablet 0     Calcium Carb-Cholecalciferol 500-200 MG-UNIT Oral Tab Take 1 tablet by mouth daily.            Review of Systems:  A comprehensive 14 point review of systems was completed.  Pertinent positives and negatives noted in the the HPI.    Physical Exam:     Vital signs: Blood pressure 143/70, pulse 70, temperature 98.5 °F (36.9 °C), temperature source Oral, resp. rate 16, weight 120 lb (54.4 kg), SpO2 100%, not currently breastfeeding.  General: No acute distress.   HEENT: Moist mucous membranes.   Respiratory: Clear to auscultation bilaterally.  No wheezes. No rhonchi.  Cardiovascular: S1, S2.  Regular rate and rhythm.  Abdomen: Soft, nontender, nondistended.  Positive bowel sounds.  Neurologic: Awake alert, generalized fatigue present, no focal neurological deficits.  Musculoskeletal: Full range of motion of all extremities.  Pedal edema or calf tenderness  Psychiatric: Appropriate mood and affect.      Diagnostic Data:      Laboratory Data:   Lab Results   Component Value Date    WBC 14.3 06/18/2024    HGB 10.7 06/18/2024    HCT 33.1 06/18/2024    .0 06/18/2024    CREATSERUM 0.70 06/18/2024    BUN 16 06/18/2024     06/18/2024    K 3.2 06/18/2024     06/18/2024    CO2 24.0 06/18/2024     06/18/2024    CA 8.8 06/18/2024    ALB 2.4 06/18/2024    ALKPHO 149 06/18/2024    BILT 0.2 06/18/2024    TP 9.5 06/18/2024    AST 67 06/18/2024    ALT 72 06/18/2024    TSH 1.900 06/18/2024    DDIMER 4.54 06/18/2024         Imaging:  Imaging data reviewed in  Epic.  Chest x-ray shows right lower lobe consolidation with small right pleural effusion which is improved compared to prior examination.  CT of the chest done on 6/18/2024 shows marked atelectasis right lung along with consolidation which has resolved.  Large fluid collection has mostly resolved.  Only minimal residual right pleural effusion.  Cavitary lesion in right lower lobe which could be sequelae of prior infectious process, rule out pneumatocele.  Mild mediastinal and right perihilar lymphadenopathy noted.    ASSESSMENT / PLAN:     #Generalized weakness, fatigue  #Decreased appetite  #Failure to thrive  Nutritionist consult  #Hypokalemia  Replace with electrolyte protocol  #Reactive thrombocytosis  Follow CBC in a.m.  #Leukocytosis due to recent empyema  #Continue IV antibiotics through PICC line   ID consult in a.m.  Follow CBC in a.m.  #Mild hyponatremia due to dehydration  IV fluids  Follow BMP in a.m.  #Elevated liver function tests  IV fluids  Follow CMP in a.m.  #Diabetes mellitus type 2  Hyperglycemia protocol  Follow Accu-Cheks  #Hypertension  Continue home metoprolol  Follow blood pressure  #Depression and anxiety  Continue home medication  #GERD  Continue home PPI  #Hyperlipidemia  Statin  #CAD with previous CABG  Monitor on telemetry  Continue home aspirin, Plavix, statin, metoprolol  #Elevated D-dimer  CTA chest negative for PE  Will check lower extremity venous duplex rule out DVT    Quality:  DVT Prophylaxis: DVT Mechanical Prophylaxis:   SCDs,    DVT Pharmacologic Prophylaxis   Medication    enoxaparin (Lovenox) 40 MG/0.4ML SUBQ injection 40 mg              CODE status:   Code Status: Full Code  Gotti: No urinary catheter in place      Plan of care discussed with patient, patient's sister at bedside      Discussed with ER Physician.  My colleague will follow from morning tomorrow      Malorie Chavez MD  6/18/2024  11:36 PM      Electronically signed by Malorie Chavez MD on 6/19/2024  8:57 AM      H&P signed by Malorie Chavez MD at 2024  8:47 AM  Version 2 of 3    Author: Malorie Chavez MD Service: Hospitalist Author Type: Physician    Filed: 2024  8:47 AM Date of Service: 2024 11:36 PM Status: Addendum    : Malorie Chavez MD (Physician)    Related Notes: Addendum by Malorie Chavez MD (Physician) filed at 2024  8:57 AM  Original Note by Malorie Chavez MD (Physician) filed at 2024  8:45 AM       [facility]                                                               [facility]         Soumya King Patient Status:  Observation    6/3/1960 MRN [id number]   Location [facility] 3SW-A Attending Malorie Chavez MD   Hosp Day # 0 PCP Rika Vinson DO     Chief Complaint: Fatigue, low appetite    History of Present Illness:  Soumya King is a 64 year old female admitted with fatigue and generalized weakness and poor appetite.  Patient seen with patient's sister at bedside who is helping with history.  Patient was recently admitted in the hospital for empyema and had right video-assisted thoracoscopy and decortication at that time for lung abscess and trapped lung and discharged on 6/10/2024 on IV antibiotics through PICC line.  Patient's sister states she has been helping patient with her antibiotics.  Patient states she was initially feeling better and then progressively feeling worse.  Has generalized weakness and fatigue.  Has decreased appetite.  Denies any nausea vomiting.  Denies any constipation or diarrhea.  Sleeping most of the day and barely getting out of bed according to patient.      History:  Past Medical History:    Abdominal pain, other specified site    groin    Acute bronchitis    Allergic rhinitis    Anxiety    Arthritis    Back problem    Jamison esophagus    Chronic low back pain without sciatica    COPD (chronic obstructive pulmonary disease) (HCC)    Depression    Diabetes (HCC)    Difficult intubation    difficult to intubate  with bladder surgery,instructed by anesthesia to communicate to future anesthesiologist. Small airway    Esophageal reflux    Essential hypertension    Don't remember    Fitting and adjustment of dental prosthetic device    High blood pressure    History of 2019 novel coronavirus disease (COVID-19)    No hospitalization,symptoms; Fever,fatigue ,body aches,headache no loss of taste or smell    Hx of motion sickness    Hyperlipidemia    Mass of spine    Migraines    barometric pressure    Myocardial infarction (HCC)    Osteoarthritis    Other ill-defined conditions(799.89)    Jamison's    Pain in joint, forearm    wrist    Pain in joint, pelvic region and thigh    hip    Pelvic mass    Personal history of urinary (tract) infection    Sleep apnea    I don't date       Past Surgical History:   Procedure Laterality Date    Cabg  12/27/23    Colonoscopy      Colonoscopy N/A 05/18/2022    Procedure: COLONOSCOPY AND ESOPHAGOGASTRODUODENOSCOPY;  Surgeon: Miguel Camargo MD;  Location: [facility]    Heart surgery  2024    double bypass    Laparoscopic  2007    sling operation for stress incontinence    Laparoscopy,diagnostic      Kay biopsy stereo nodule 2 site bilat (cpt=19081/98680) Left 02/2010    BENIGN 2 SITES    Kay biopsy stereo w/calc 2 site left (cpt=19081/48255)  02/2010    benign x 2    Kay stereo-clip w/calc 2 site left  2010    Oophorectomy Left 06/28/2019    Other  08/31/2022    RIGHT SACRAL SUBCUTANEOUS CYST EXCISION    Other surgical history  2000, 2019    Bladder Sling, Large mass removed from left abdomen and ovar    Tonsillectomy      Was a child, have no idea    Tubal ligation      Upper gi endoscopy,exam         Family history:  Family History   Problem Relation Age of Onset    Arthritis Mother     Other (AMI) Mother     Other (diabetes mellitus) Mother     Diabetes Mother       Reviewed    Social history:   reports that she quit smoking about 17 months ago. Her smoking use included cigarettes. She  started smoking about 49 years ago. She has a 72 pack-year smoking history. She has never used smokeless tobacco. She reports that she does not currently use alcohol. She reports that she does not use drugs.    Allergies:  Allergies   Allergen Reactions    Naprosyn [Naproxen] ANAPHYLAXIS     Has tolerated ibuprofen and IV toradol     Aspirin OTHER (SEE COMMENTS)     Difficulty swallowing due to  saavedra's esophagus       Home Medications:  Medications Prior to Admission   Medication Sig Dispense Refill Last Dose    ertapenem 1 g Injection Recon Soln Inject 1 g into the vein daily for 24 days. Will need weekly labs with CBC and BMP while on this medication. 24 each 0     [] valACYclovir 1 G Oral Tab Take 1 tablet (1,000 mg total) by mouth every 12 (twelve) hours for 5 days. 10 tablet 0     oxyCODONE 5 MG Oral Tab Take 1 tablet (5 mg total) by mouth every 6 (six) hours as needed for Pain. 20 tablet 0     Naloxone HCl 4 MG/0.1ML Nasal Liquid 4 mg by Nasal route as needed. If patient remains unresponsive, repeat dose in other nostril 2-5 minutes after first dose. 1 kit 0     azelastine 137 MCG/SPRAY Nasal Solution 1 spray by Nasal route 2 (two) times daily.       PRISTIQ 100 MG Oral Tablet 24 Hr Take 1 tablet (100 mg total) by mouth daily.       benzonatate (TESSALON PERLES) 100 MG Oral Cap Take 1 capsule (100 mg total) by mouth 3 (three) times daily as needed for cough. 15 capsule 0     Rizatriptan Benzoate 10 MG Oral Tab Take 1 tablet (10 mg total) by mouth as needed for Migraine. 10 tablet 5     Glucose Blood (ONETOUCH ULTRA) In Vitro Strip Tests 3 x daily 300 each 1     TRULICITY 0.75 MG/0.5ML Subcutaneous Solution Pen-injector Inject 0.75 mg into the skin once a week. 2 mL 0     Coenzyme Q10 200 MG Oral Cap Co Q-10 200 mg capsule, [RxNorm: 049287]       lidocaine 5 % External Ointment Apply 1 Application  topically 3 (three) times daily as needed. 240 g 1     diclofenac 75 MG Oral Tab EC Take 1 tablet (75 mg  total) by mouth 2 (two) times daily. 180 tablet 3     metoprolol succinate ER 50 MG Oral Tablet 24 Hr Take 1 tablet (50 mg total) by mouth daily. 90 tablet 0     Omeprazole 40 MG Oral Capsule Delayed Release Take 1 capsule (40 mg total) by mouth 2 (two) times daily. 300 capsule 0     Blood Glucose Monitoring Suppl (ONETOUCH ULTRA 2) w/Device Does not apply Kit 1 Device by Other route in the morning, at noon, and at bedtime. Use as directed. 1 kit 0     topiramate 50 MG Oral Tab TAKE ONE TABLET BY MOUTH EVERY MORNING AND TWO TABLETS EVERY EVENING (Patient taking differently: Taking one tablet in the morning and one tablet at night.) 270 tablet 2     gabapentin 600 MG Oral Tab Take 1 tablet (600 mg total) by mouth at bedtime. 180 tablet 0     Erenumab-aooe (AIMOVIG) 140 MG/ML Subcutaneous Solution Auto-injector Inject 1 mL (140 mg total) into the skin every 30 (thirty) days. 1 mL 4     umeclidinium bromide (INCRUSE ELLIPTA) 62.5 MCG/ACT Inhalation Aerosol Powder, Breath Activated Inhale 1 puff into the lungs daily. 3 each 0     clopidogrel 75 MG Oral Tab Take 1 tablet (75 mg total) by mouth daily.       aspirin 81 MG Oral Tab EC Take 1 tablet (81 mg total) by mouth daily.       atorvastatin 80 MG Oral Tab Take 1 tablet (80 mg total) by mouth nightly. 90 tablet 1     metFORMIN 500 MG Oral Tab Take 1 tablet (500 mg total) by mouth 2 (two) times daily. 180 tablet 1     furosemide 20 MG Oral Tab Take 1 tablet (20 mg total) by mouth daily. 7 tablet 0     clonazePAM 0.5 MG Oral Tab Take 1 tablet (0.5 mg total) by mouth 2 (two) times daily. 12 tablet 0     busPIRone 10 MG Oral Tab Take 2 tablets (20 mg total) by mouth daily.       Metoclopramide HCl 5 MG/5ML Oral Solution Take 5 mL (5 mg total) by mouth 3 (three) times daily before meals. (Patient taking differently: Take 5 mL (5 mg total) by mouth 3 (three) times daily before meals as needed.) 473 mL 0     cyclobenzaprine 10 MG Oral Tab Take 1 tablet (10 mg total) by mouth  every 12 (twelve) hours as needed for Muscle spasms. 60 tablet 1     Lancets (ONETOUCH DELICA PLUS [id number]) Does not apply Misc 1 lancet  by Finger stick route 3 (three) times daily. 300 each 1     Mirabegron ER (MYRBETRIQ) 50 MG Oral Tablet 24 Hr Take 1 tablet (50 mg total) by mouth daily. 30 tablet 11     Multiple Vitamins-Minerals (ONE-A-DAY WOMENS 50+) Oral Tab Take 1 tablet by mouth daily.  0     QUEtiapine Fumarate  MG Oral Tablet 24 Hr Take 1 tablet (200 mg total) by mouth every evening. 30 tablet 0     Calcium Carb-Cholecalciferol 500-200 MG-UNIT Oral Tab Take 1 tablet by mouth daily.            Review of Systems:  A comprehensive 14 point review of systems was completed.  Pertinent positives and negatives noted in the the HPI.    Physical Exam:     Vital signs: Blood pressure 143/70, pulse 70, temperature 98.5 °F (36.9 °C), temperature source Oral, resp. rate 16, weight 120 lb (54.4 kg), SpO2 100%, not currently breastfeeding.  General: No acute distress.   HEENT: Moist mucous membranes.   Respiratory: Clear to auscultation bilaterally.  No wheezes. No rhonchi.  Cardiovascular: S1, S2.  Regular rate and rhythm.  Abdomen: Soft, nontender, nondistended.  Positive bowel sounds.  Neurologic: Awake alert, generalized fatigue present, no focal neurological deficits.  Musculoskeletal: Full range of motion of all extremities.  Pedal edema or calf tenderness  Psychiatric: Appropriate mood and affect.      Diagnostic Data:      Laboratory Data:   Lab Results   Component Value Date    WBC 14.3 06/18/2024    HGB 10.7 06/18/2024    HCT 33.1 06/18/2024    .0 06/18/2024    CREATSERUM 0.70 06/18/2024    BUN 16 06/18/2024     06/18/2024    K 3.2 06/18/2024     06/18/2024    CO2 24.0 06/18/2024     06/18/2024    CA 8.8 06/18/2024    ALB 2.4 06/18/2024    ALKPHO 149 06/18/2024    BILT 0.2 06/18/2024    TP 9.5 06/18/2024    AST 67 06/18/2024    ALT 72 06/18/2024    TSH 1.900 06/18/2024     DDIMER 4.54 06/18/2024         Imaging:  Imaging data reviewed in Epic.  Chest x-ray shows right lower lobe consolidation with small right pleural effusion which is improved compared to prior examination.  CT of the chest done on 6/18/2024 shows marked atelectasis right lung along with consolidation which has resolved.  Large fluid collection has mostly resolved.  Only minimal residual right pleural effusion.  Cavitary lesion in right lower lobe which could be sequelae of prior infectious process, rule out pneumatocele.  Mild mediastinal and right perihilar lymphadenopathy noted.    ASSESSMENT / PLAN:     #Generalized weakness, fatigue  #Decreased appetite  #Failure to thrive  Nutritionist consult  #Hypokalemia  Replace with electrolyte protocol  #Reactive thrombocytosis  Follow CBC in a.m.  #Leukocytosis due to recent empyema  #Continue IV antibiotics through PICC line   ID consult in a.m.  Follow CBC in a.m.  #Mild hyponatremia due to dehydration  IV fluids  Follow BMP in a.m.  #Elevated liver function tests  IV fluids  Follow CMP in a.m.  #Diabetes mellitus type 2  Hyperglycemia protocol  Follow Accu-Cheks  #Hypertension  Continue home metoprolol  Follow blood pressure  #Depression and anxiety  Continue home medication  #GERD  Continue home PPI  #Hyperlipidemia  Statin  #CAD with previous CABG  Monitor on telemetry  Continue home aspirin, Plavix, statin, metoprolol    Quality:  DVT Prophylaxis: DVT Mechanical Prophylaxis:   SCDs,    DVT Pharmacologic Prophylaxis   Medication    enoxaparin (Lovenox) 40 MG/0.4ML SUBQ injection 40 mg              CODE status:   Code Status: Full Code  Gotti: No urinary catheter in place      Plan of care discussed with patient, patient's sister at bedside      Discussed with ER Physician.  My colleague will follow from morning tomorrow      Malorie Chavez MD  6/18/2024  11:36 PM      Electronically signed by Malorie Chavez MD on 6/19/2024  8:47 AM     H&P signed by Malorie Chavez MD  at 2024  8:45 AM  Version 1 of 3    Author: Malorie Chavez MD Service: Hospitalist Author Type: Physician    Filed: 2024  8:45 AM Date of Service: 2024 11:36 PM Status: Signed    : Malorie Chavez MD (Physician)    Related Notes: Addendum by Malorie Chavez MD (Physician) filed at 2024  8:47 AM       [facility]                                                               [facility]         Soumya King Patient Status:  Observation    6/3/1960 MRN [id number]   Location [facility] 3SW-A Attending Malorie Chavez MD   Hosp Day # 0 PCP Rika Vinson DO     Chief Complaint: Fatigue, low appetite    History of Present Illness:  Soumya King is a 64 year old female admitted with fatigue and generalized weakness and poor appetite.  Patient seen with patient's sister at bedside who is helping with history.  Patient was recently admitted in the hospital for empyema and had right video-assisted thoracoscopy and decortication at that time for lung abscess and trapped lung and discharged on 6/10/2024 on IV antibiotics through PICC line.  Patient's sister states she has been helping patient with her antibiotics.  Patient states she was initially feeling better and then progressively feeling worse.  Has generalized weakness and fatigue.  Has decreased appetite.  Denies any nausea vomiting.  Denies any constipation or diarrhea.  Sleeping most of the day and barely getting out of bed according to patient.      History:  Past Medical History:    Abdominal pain, other specified site    groin    Acute bronchitis    Allergic rhinitis    Anxiety    Arthritis    Back problem    Jamison esophagus    Chronic low back pain without sciatica    COPD (chronic obstructive pulmonary disease) (HCC)    Depression    Diabetes (HCC)    Difficult intubation    difficult to intubate with bladder surgery,instructed by anesthesia to communicate to future anesthesiologist. Small airway     Esophageal reflux    Essential hypertension    Don't remember    Fitting and adjustment of dental prosthetic device    High blood pressure    History of 2019 novel coronavirus disease (COVID-19)    No hospitalization,symptoms; Fever,fatigue ,body aches,headache no loss of taste or smell    Hx of motion sickness    Hyperlipidemia    Mass of spine    Migraines    barometric pressure    Myocardial infarction (HCC)    Osteoarthritis    Other ill-defined conditions(799.89)    Jamison's    Pain in joint, forearm    wrist    Pain in joint, pelvic region and thigh    hip    Pelvic mass    Personal history of urinary (tract) infection    Sleep apnea    I don't date       Past Surgical History:   Procedure Laterality Date    Cabg  12/27/23    Colonoscopy      Colonoscopy N/A 05/18/2022    Procedure: COLONOSCOPY AND ESOPHAGOGASTRODUODENOSCOPY;  Surgeon: Miguel Camargo MD;  Location:  ENDOSCOPY    Heart surgery  2024    double bypass    Laparoscopic  2007    sling operation for stress incontinence    Laparoscopy,diagnostic      Kay biopsy stereo nodule 2 site bilat (cpt=19081/93733) Left 02/2010    BENIGN 2 SITES    Kay biopsy stereo w/calc 2 site left (cpt=19081/75702)  02/2010    benign x 2    Kay stereo-clip w/calc 2 site left  2010    Oophorectomy Left 06/28/2019    Other  08/31/2022    RIGHT SACRAL SUBCUTANEOUS CYST EXCISION    Other surgical history  2000, 2019    Bladder Sling, Large mass removed from left abdomen and ovar    Tonsillectomy      Was a child, have no idea    Tubal ligation      Upper gi endoscopy,exam         Family history:  Family History   Problem Relation Age of Onset    Arthritis Mother     Other (AMI) Mother     Other (diabetes mellitus) Mother     Diabetes Mother       Reviewed    Social history:   reports that she quit smoking about 17 months ago. Her smoking use included cigarettes. She started smoking about 49 years ago. She has a 72 pack-year smoking history. She has never used smokeless  tobacco. She reports that she does not currently use alcohol. She reports that she does not use drugs.    Allergies:  Allergies   Allergen Reactions    Naprosyn [Naproxen] ANAPHYLAXIS     Has tolerated ibuprofen and IV toradol     Aspirin OTHER (SEE COMMENTS)     Difficulty swallowing due to  saavedra's esophagus       Home Medications:  Medications Prior to Admission   Medication Sig Dispense Refill Last Dose    ertapenem 1 g Injection Recon Soln Inject 1 g into the vein daily for 24 days. Will need weekly labs with CBC and BMP while on this medication. 24 each 0     [] valACYclovir 1 G Oral Tab Take 1 tablet (1,000 mg total) by mouth every 12 (twelve) hours for 5 days. 10 tablet 0     oxyCODONE 5 MG Oral Tab Take 1 tablet (5 mg total) by mouth every 6 (six) hours as needed for Pain. 20 tablet 0     Naloxone HCl 4 MG/0.1ML Nasal Liquid 4 mg by Nasal route as needed. If patient remains unresponsive, repeat dose in other nostril 2-5 minutes after first dose. 1 kit 0     azelastine 137 MCG/SPRAY Nasal Solution 1 spray by Nasal route 2 (two) times daily.       PRISTIQ 100 MG Oral Tablet 24 Hr Take 1 tablet (100 mg total) by mouth daily.       benzonatate (TESSALON PERLES) 100 MG Oral Cap Take 1 capsule (100 mg total) by mouth 3 (three) times daily as needed for cough. 15 capsule 0     Rizatriptan Benzoate 10 MG Oral Tab Take 1 tablet (10 mg total) by mouth as needed for Migraine. 10 tablet 5     Glucose Blood (ONETOUCH ULTRA) In Vitro Strip Tests 3 x daily 300 each 1     TRULICITY 0.75 MG/0.5ML Subcutaneous Solution Pen-injector Inject 0.75 mg into the skin once a week. 2 mL 0     Coenzyme Q10 200 MG Oral Cap Co Q-10 200 mg capsule, [RxNorm: 459526]       lidocaine 5 % External Ointment Apply 1 Application  topically 3 (three) times daily as needed. 240 g 1     diclofenac 75 MG Oral Tab EC Take 1 tablet (75 mg total) by mouth 2 (two) times daily. 180 tablet 3     metoprolol succinate ER 50 MG Oral Tablet 24 Hr  Take 1 tablet (50 mg total) by mouth daily. 90 tablet 0     Omeprazole 40 MG Oral Capsule Delayed Release Take 1 capsule (40 mg total) by mouth 2 (two) times daily. 300 capsule 0     Blood Glucose Monitoring Suppl (ONETOUCH ULTRA 2) w/Device Does not apply Kit 1 Device by Other route in the morning, at noon, and at bedtime. Use as directed. 1 kit 0     topiramate 50 MG Oral Tab TAKE ONE TABLET BY MOUTH EVERY MORNING AND TWO TABLETS EVERY EVENING (Patient taking differently: Taking one tablet in the morning and one tablet at night.) 270 tablet 2     gabapentin 600 MG Oral Tab Take 1 tablet (600 mg total) by mouth at bedtime. 180 tablet 0     Erenumab-aooe (AIMOVIG) 140 MG/ML Subcutaneous Solution Auto-injector Inject 1 mL (140 mg total) into the skin every 30 (thirty) days. 1 mL 4     umeclidinium bromide (INCRUSE ELLIPTA) 62.5 MCG/ACT Inhalation Aerosol Powder, Breath Activated Inhale 1 puff into the lungs daily. 3 each 0     clopidogrel 75 MG Oral Tab Take 1 tablet (75 mg total) by mouth daily.       aspirin 81 MG Oral Tab EC Take 1 tablet (81 mg total) by mouth daily.       atorvastatin 80 MG Oral Tab Take 1 tablet (80 mg total) by mouth nightly. 90 tablet 1     metFORMIN 500 MG Oral Tab Take 1 tablet (500 mg total) by mouth 2 (two) times daily. 180 tablet 1     furosemide 20 MG Oral Tab Take 1 tablet (20 mg total) by mouth daily. 7 tablet 0     clonazePAM 0.5 MG Oral Tab Take 1 tablet (0.5 mg total) by mouth 2 (two) times daily. 12 tablet 0     busPIRone 10 MG Oral Tab Take 2 tablets (20 mg total) by mouth daily.       Metoclopramide HCl 5 MG/5ML Oral Solution Take 5 mL (5 mg total) by mouth 3 (three) times daily before meals. (Patient taking differently: Take 5 mL (5 mg total) by mouth 3 (three) times daily before meals as needed.) 473 mL 0     cyclobenzaprine 10 MG Oral Tab Take 1 tablet (10 mg total) by mouth every 12 (twelve) hours as needed for Muscle spasms. 60 tablet 1     Lancets (ONETOUCH DELICA PLUS  [id number]) Does not apply Misc 1 lancet  by Finger stick route 3 (three) times daily. 300 each 1     Mirabegron ER (MYRBETRIQ) 50 MG Oral Tablet 24 Hr Take 1 tablet (50 mg total) by mouth daily. 30 tablet 11     Multiple Vitamins-Minerals (ONE-A-DAY WOMENS 50+) Oral Tab Take 1 tablet by mouth daily.  0     QUEtiapine Fumarate  MG Oral Tablet 24 Hr Take 1 tablet (200 mg total) by mouth every evening. 30 tablet 0     Calcium Carb-Cholecalciferol 500-200 MG-UNIT Oral Tab Take 1 tablet by mouth daily.            Review of Systems:  A comprehensive 14 point review of systems was completed.  Pertinent positives and negatives noted in the the HPI.    Physical Exam:     Vital signs: Blood pressure 143/70, pulse 70, temperature 98.5 °F (36.9 °C), temperature source Oral, resp. rate 16, weight 120 lb (54.4 kg), SpO2 100%, not currently breastfeeding.  General: No acute distress.   HEENT: Moist mucous membranes.   Respiratory: Clear to auscultation bilaterally.  No wheezes. No rhonchi.  Cardiovascular: S1, S2.  Regular rate and rhythm.  Abdomen: Soft, nontender, nondistended.  Positive bowel sounds.  Neurologic: Awake alert, generalized fatigue present, no focal neurological deficits.  Musculoskeletal: Full range of motion of all extremities.  Pedal edema or calf tenderness  Psychiatric: Appropriate mood and affect.      Diagnostic Data:      Laboratory Data:   Lab Results   Component Value Date    WBC 14.3 06/18/2024    HGB 10.7 06/18/2024    HCT 33.1 06/18/2024    .0 06/18/2024    CREATSERUM 0.70 06/18/2024    BUN 16 06/18/2024     06/18/2024    K 3.2 06/18/2024     06/18/2024    CO2 24.0 06/18/2024     06/18/2024    CA 8.8 06/18/2024    ALB 2.4 06/18/2024    ALKPHO 149 06/18/2024    BILT 0.2 06/18/2024    TP 9.5 06/18/2024    AST 67 06/18/2024    ALT 72 06/18/2024    TSH 1.900 06/18/2024    DDIMER 4.54 06/18/2024         Imaging:  Imaging data reviewed in Epic.  Chest x-ray shows right lower  lobe consolidation with small right pleural effusion which is improved compared to prior examination.  CT of the chest done on 6/18/2024 shows marked atelectasis right lung along with consolidation which has resolved.  Large fluid collection has mostly resolved.  Only minimal residual right pleural effusion.  Cavitary lesion in right lower lobe which could be sequelae of prior infectious process, rule out pneumatocele.  Mild mediastinal and right perihilar lymphadenopathy noted.    ASSESSMENT / PLAN:     #Generalized weakness, fatigue  #Decreased appetite  #Failure to thrive  Nutritionist consult  #Hypokalemia  Replace with electrolyte protocol  #Reactive thrombocytosis  Follow CBC in a.m.  Leukocytosis due to recent empyema  #Continue IV antibiotics through PICC line   ID consult in a.m.  Follow CBC in a.m.  #Mild hyponatremia due to dehydration  IV fluids  Follow BMP in a.m.  #Elevated liver function tests  IV fluids  Follow CMP in a.m.  #Diabetes mellitus type 2  Hyperglycemia protocol  Follow Accu-Cheks  #Hypertension  Continue home metoprolol  Follow blood pressure  #Depression and anxiety  Continue home medication  #GERD  Continue home PPI  #Hyperlipidemia  Statin  #CAD with previous CABG  Monitor on telemetry  Continue home aspirin, Plavix, statin, metoprolol    Quality:  DVT Prophylaxis: DVT Mechanical Prophylaxis:   SCDs,    DVT Pharmacologic Prophylaxis   Medication    enoxaparin (Lovenox) 40 MG/0.4ML SUBQ injection 40 mg              CODE status:   Code Status: Full Code  Gotti: No urinary catheter in place      Plan of care discussed with patient, patient's sister at bedside      Discussed with ER Physician.  My colleague will follow from morning tomorrow      Malorie Chavez MD  6/18/2024  11:36 PM      Electronically signed by Malorie Chavez MD on 6/19/2024  8:45 AM              Consults - MD Consult Notes      Consults signed by Vibha Menezes MD at 6/19/2024  8:00 PM     Author: Vibha Menezes MD  Service: Infectious Disease Author Type: Physician    Filed: 2024  8:00 PM Date of Service: 2024  8:27 AM Status: Addendum    : Vibha Menezes MD (Physician)    Related Notes: Original Note by Beverly Guillen PA (Physician Assistant) filed at 2024 12:33 PM     Consult Orders    1. Consult to Infectious Disease [[id number]] ordered by Bill Brian MD at [address] [place] [address]   part of [facility] ID CONSULT NOTE    Soumya King Patient Status:  Observation    6/3/1960 MRN [id number]   Location [facility] 3SW-A Attending Bill Brian, *   Hosp Day # 0 PCP Rika Vinson,        Reason for Consultation:  RLL cavitary lesion    History of Present Illness:  Soumya King is a 64 year old female with a history of COPD, HTN and HLD. Patient recently was hospitalized from - 6/10 with a RLL pneumonia/pulmonary abscess with right empyema, s/p  R VATS and total pulmonary decortication . She was discharged on IV Invanz with plans for EOT 24.    Patient now presents with fatigue, generalized weakness and poor appetite/po intake. Denies any SOB, cough, pain, fevers or chills. Denies any abdominal pain, nausea or vomiting. Has had some diarrhea. Denies any dysuria.     Afebrile here. WBC 14.3K on admission--> now 12.2K.    History:  Past Medical History:    Abdominal pain, other specified site    groin    Acute bronchitis    Allergic rhinitis    Anxiety    Arthritis    Back problem    Jamison esophagus    Chronic low back pain without sciatica    COPD (chronic obstructive pulmonary disease) (HCC)    Depression    Diabetes (HCC)    Difficult intubation    difficult to intubate with bladder surgery,instructed by anesthesia to communicate to future anesthesiologist. Small airway    Esophageal reflux    Essential hypertension    Don't remember    Fitting and adjustment of dental prosthetic device    High blood pressure     History of 2019 novel coronavirus disease (COVID-19)    No hospitalization,symptoms; Fever,fatigue ,body aches,headache no loss of taste or smell    Hx of motion sickness    Hyperlipidemia    Mass of spine    Migraines    barometric pressure    Myocardial infarction (HCC)    Osteoarthritis    Other ill-defined conditions(799.89)    Jamison's    Pain in joint, forearm    wrist    Pain in joint, pelvic region and thigh    hip    Pelvic mass    Personal history of urinary (tract) infection    Sleep apnea    I don't date     Past Surgical History:   Procedure Laterality Date    Cabg  12/27/23    Colonoscopy      Colonoscopy N/A 05/18/2022    Procedure: COLONOSCOPY AND ESOPHAGOGASTRODUODENOSCOPY;  Surgeon: Miguel Camargo MD;  Location:  ENDOSCOPY    Heart surgery  2024    double bypass    Laparoscopic  2007    sling operation for stress incontinence    Laparoscopy,diagnostic      Kay biopsy stereo nodule 2 site bilat (cpt=19081/72278) Left 02/2010    BENIGN 2 SITES    Kay biopsy stereo w/calc 2 site left (cpt=19081/02896)  02/2010    benign x 2    Kay stereo-clip w/calc 2 site left  2010    Oophorectomy Left 06/28/2019    Other  08/31/2022    RIGHT SACRAL SUBCUTANEOUS CYST EXCISION    Other surgical history  2000, 2019    Bladder Sling, Large mass removed from left abdomen and ovar    Tonsillectomy      Was a child, have no idea    Tubal ligation      Upper gi endoscopy,exam       Family History   Problem Relation Age of Onset    Arthritis Mother     Other (AMI) Mother     Other (diabetes mellitus) Mother     Diabetes Mother       reports that she quit smoking about 17 months ago. Her smoking use included cigarettes. She started smoking about 49 years ago. She has a 72 pack-year smoking history. She has never used smokeless tobacco. She reports that she does not currently use alcohol. She reports that she does not use drugs.    Allergies:  Allergies   Allergen Reactions    Naprosyn [Naproxen] ANAPHYLAXIS     Has  tolerated ibuprofen and IV toradol     Aspirin OTHER (SEE COMMENTS)     Difficulty swallowing due to  saavedra's esophagus       Medications:    Current Facility-Administered Medications:     potassium chloride (Klor-Con M20) tab 40 mEq, 40 mEq, Oral, Q4H    QUEtiapine ER (SEROquel XR) 24 hr tab 200 mg, 200 mg, Oral, QPM    [Held by provider] Mirabegron ER (Myrbetriq) 50 MG tablet TB24 50 mg, 50 mg, Oral, Daily    cyclobenzaprine (Flexeril) tab 10 mg, 10 mg, Oral, Q12H PRN    metoclopramide (Reglan) solution 5 mg, 5 mg, Oral, TID AC PRN    clonazePAM (KlonoPIN) tab 0.5 mg, 0.5 mg, Oral, BID    furosemide (Lasix) tab 20 mg, 20 mg, Oral, Daily    atorvastatin (Lipitor) tab 80 mg, 80 mg, Oral, Nightly    clopidogrel (Plavix) tab 75 mg, 75 mg, Oral, Daily    aspirin DR tab 81 mg, 81 mg, Oral, Daily    umeclidinium bromide (Incruse Ellipta) 62.5 MCG/ACT inhaler 1 puff, 1 puff, Inhalation, Daily    gabapentin (Neurontin) tab 600 mg, 600 mg, Oral, Nightly    topiramate (TopaMAX) tab 50 mg, 50 mg, Oral, BID    metoprolol succinate ER (Toprol XL) 24 hr tab 50 mg, 50 mg, Oral, Daily Beta Blocker    benzonatate (Tessalon) cap 100 mg, 100 mg, Oral, TID PRN    azelastine (Astelin) 0.1 % nasal solution 1 spray, 1 spray, Each Nare, BID    desvenlafaxine ER (Pristiq) 24 hr tab 100 mg, 100 mg, Oral, Daily    oxyCODONE immediate release tab 5 mg, 5 mg, Oral, Q6H PRN    pantoprazole (Protonix) DR tab 40 mg, 40 mg, Oral, QAM AC    meropenem (Merrem) 500 mg in sodium chloride 0.9% 100 mL IVPB-MBP, 500 mg, Intravenous, Q8H    busPIRone (Buspar) tab 10 mg, 10 mg, Oral, BID    glucose (Dex4) 15 GM/59ML oral liquid 15 g, 15 g, Oral, Q15 Min PRN **OR** glucose (Glutose) 40% oral gel 15 g, 15 g, Oral, Q15 Min PRN **OR** glucose-vitamin C (Dex-4) chewable tab 4 tablet, 4 tablet, Oral, Q15 Min PRN **OR** dextrose 50% injection 50 mL, 50 mL, Intravenous, Q15 Min PRN **OR** glucose (Dex4) 15 GM/59ML oral liquid 30 g, 30 g, Oral, Q15 Min PRN **OR**  glucose (Glutose) 40% oral gel 30 g, 30 g, Oral, Q15 Min PRN **OR** glucose-vitamin C (Dex-4) chewable tab 8 tablet, 8 tablet, Oral, Q15 Min PRN    enoxaparin (Lovenox) 40 MG/0.4ML SUBQ injection 40 mg, 40 mg, Subcutaneous, Daily    acetaminophen (Tylenol Extra Strength) tab 500 mg, 500 mg, Oral, Q4H PRN    melatonin tab 3 mg, 3 mg, Oral, Nightly PRN    ondansetron (Zofran) 4 MG/2ML injection 4 mg, 4 mg, Intravenous, Q6H PRN    insulin aspart (NovoLOG) 100 Units/mL FlexPen 1-10 Units, 1-10 Units, Subcutaneous, TID AC and HS    Review of Systems:  CONSTITUTIONAL:  + weakness, + fatigue.  HEENT:  Eyes:  No visual loss Ears, Nose, Throat:  No hearing loss  SKIN:  No rash or itching.  CARDIOVASCULAR:  No chest pain  RESPIRATORY:  No shortness of breath  GASTROINTESTINAL:  No nausea, vomiting, +diarrhea. No abdominal pain  GENITOURINARY:  No Burning on urination.   NEUROLOGICAL:  No headache  MUSCULOSKELETAL:  No back pain  HEMATOLOGIC:  No bleeding  ALLERGIES:  No history of asthma    Physical Exam:  Vital signs: Blood pressure 125/69, pulse 85, temperature 98.5 °F (36.9 °C), temperature source Oral, resp. rate 18, weight 120 lb (54.4 kg), SpO2 96%, not currently breastfeeding.    General: Alert, oriented, NAD, on room air  HEENT: Moist mucous membranes.   Neck: No lymphadenopathy.  Supple.  Cardiovascular: RRR  Respiratory: Clear to auscultation bilaterally.  No wheezes. No rhonchi.  Abdomen: Soft, nontender, nondistended.   Musculoskeletal: No edema noted  Integument: No lesions. No erythema.    Laboratory Data:  Recent Labs   Lab 06/19/24  0551   RBC 3.21*   HGB 9.5*   HCT 29.5*   MCV 91.9   MCH 29.6   MCHC 32.2   RDW 15.4   NEPRELIM 8.69*   WBC 12.2*   .0*     Recent Labs   Lab 06/17/24  1415 06/18/24  1827 06/19/24  0551   GLU 79 117* 112*   BUN 11 16 14   CREATSERUM 0.90 0.70 0.54*   CA 8.9 8.8 8.8   ALB  --  2.4*  --     135* 139   K 3.4* 3.2* 3.7    101 109   CO2 24.0 24.0 24.0   ALKPHO  --   149*  --    AST  --  67*  --    ALT  --  72*  --    BILT  --  0.2  --    TP  --  9.5*  --        Microbiology: Reviewed in EMR    Radiology: Reviewed.    PROCEDURE:  CT CHEST PE AORTA (IV ONLY) (CPT=71260)     COMPARISON:  [facility] , CT, CT CHEST (CPT=71250), 6/03/2024, 11:14 AM.     INDICATIONS:  RECENT EMPYEMA FATIGUE HYPOXIA ELEV DIMER     TECHNIQUE:  CT images were obtained with non-ionic intravenous contrast material. Dose reduction techniques were used. Dose information is transmitted to the ACR (American College of Radiology) NRDR (National Radiology Data Registry) which includes the  Dose Index Registry.     PATIENT STATED HISTORY:(As transcribed by Technologist)  SOB, elevated d dimer, hypoxia      CONTRAST USED:  80 mLcc of Isovue 370     FINDINGS:    LUNGS:  Right lower lobe cavitary lesion measures 4.1 x 3.1 cm.  Marked consolidations in the right lung have improved since prior examination.  Peripheral interstitial abnormalities are noted.  VASCULATURE:  No visible pulmonary arterial thrombus or attenuation.    ANKUSH:  Right perihilar lymph node measures 2.3 x 1.4 cm.  MEDIASTINUM:  Paratracheal lymph node measures 1.1 x 1.3 cm.  CARDIAC:  No enlargement, pericardial thickening, or significant coronary artery calcification.  PLEURA:  Small right pleural effusion is noted.  THORACIC AORTA:  No aneurysm.    CHEST WALL:  No mass or axillary adenopathy.    LIMITED ABDOMEN:  Limited images of the upper abdomen are unremarkable.    BONES:  Mid sternotomy wires are noted.     Impression:    CONCLUSION:       1. Marked atelectasis the right lung along with consolidations have resolved.  Large fluid collection has mostly resolved.  Only minimal residual right pleural effusion is noted.  Cavitary lesion in the right lower lobe is identified.  This cavitary  lesion may be sequelae of prior infectious process.  A pneumatocele is of consideration.     2. There is mild mediastinal and right perihilar lymphadenopathy  again noted.       LOCATION:  [facility]     Dictated by (CST): Gregory Vernon MD on 6/18/2024 at 10:50 PM      Finalized by (CST): Gregory Vernon MD on 6/18/2024 at 10:54 PM        PROCEDURE:  XR CHEST AP PORTABLE  (CPT=71045)     TECHNIQUE:  AP chest radiograph was obtained.     COMPARISON:  [facility] , XR, XR CHEST AP PORTABLE  (CPT=71045), 6/09/2024, 12:24 PM.     INDICATIONS:  recent dx from hospital, poor appetite, sleeping a lot, on 2L NC at home     PATIENT STATED HISTORY: (As transcribed by Technologist)           FINDINGS:       Right-sided PICC line has tip in the SVC.     Median sternotomy are stable.     Small right lower lobe consolidation pleural effusion.     No pneumothorax.    Impression:    CONCLUSION:  Right lower lobe consolidation with small right pleural effusion is noted.  This is improved compared to prior examination.     LOCATION:  [place] [address]     Dictated by (CST): Gregory Vernon MD on 6/18/2024 at 8:17 PM      Finalized by (CST): Gregory Vernon MD on 6/18/2024 at 8:18 PM    ASSESSMENT:  Fatigue with poor po intake/appetite  RLL pneumonia/pulmonary abscess with right empyema  S/p thoracentesis 5/28, cx with strep intermedius. Path w/ acute inflammation with occasional bacterial organisms present.   S/p R chest tube placement 5/29, cx with strep intermedius.   S/p R VATS and total pulmonary decortication 6/5, R purulent empyema with RLL lung abscess noted. Tissue cx from RLL lung abscess with lactobacillus. Tissue cx from pleural peel with lactobacillus. Tissue cx from right pleural debris with cutibacterium acnes and avidum.   Has been on IV Invanz outpatient (plan was for EOT 7/4/24)  CT chest this admission much better, cavitary lesion to RLL noted.  Leukocytosis, improving. No fevers.   Thrombocytosis, improving.  Elevated LFTs. CT a/p and US abd unremarkable last admission.  Anemia  IgM kappa MGUS  DM II. Hgb A1C 7.1 (5/2024)  COPD    PLAN:  - will dc IV meropenem and start IV  Unasyn for continuation of treatment of RLL abscess/R empyema (EOT of abx 7/4/2024)  - follow blood cultures  - follow temps and cbc  - follow LFTs  - noted plans for LE venous dopplers  - would send c. Diff if further diarrhea (none today per staff).    Discussed case with RN, Dr. Menezes and patient.     Thank you for allowing us to participate in the care of this patient. Please do not hesitate to call if you have any questions.   We will continue to follow with you and will make further recommendations based on her progress.    [facility]  [phone]  6/19/2024    ID ATTENDING ADDENDUM     Pt seen an examined independently. Chart reviewed. Agree with above. Note has been reviewed by me and modified as needed.  Exam and Impression/ Recs as noted above.  Readmitted with fatigue. No focal symptoms, resp symptoms much better, assume cavitary lesion is just residual. Await Bcx. ? Whether fatigue and lethargy are side effects from invanz. On unasyn now  Will follow  D/w staff and with pt  More than 50% of clinical time and 100% of the clinical decision making performed by me.    Vibha Menezes MD      Electronically signed by Vibha Menezes MD on 6/19/2024  8:00 PM           D/C Summary    No notes of this type exist for this encounter.     Physical Therapy Notes (last 72 hours)  Notes from 6/20/2024  1:28 PM through 6/23/2024  1:28 PM   No notes of this type exist for this encounter.        Occupational Therapy Notes (last 72 hours)      Occupational Therapy Note signed by Lorena Kaur OT at 6/20/2024  5:23 PM  Version 1 of 1    Author: Lorena Kaur OT Service: Rehab Author Type: Occupational Therapist    Filed: 6/20/2024  5:23 PM Date of Service: 6/20/2024  5:05 PM Status: Signed    : Lorena Kaur OT (Occupational Therapist)         OCCUPATIONAL THERAPY EVALUATION - INPATIENT    Room Number: 360/360-A  Evaluation Date: 6/20/2024     Type  of Evaluation: Initial  Presenting Problem: generalized weakness, dehydration, failure to thrive, hypoxia    Physician Order: IP Consult to Occupational Therapy  Reason for Therapy:  ADL/IADL Dysfunction and Discharge Planning      OCCUPATIONAL THERAPY ASSESSMENT   Patient is a 64 year old female admitted on 6/18/2024 with Presenting Problem: generalized weakness, dehydration, failure to thrive, hypoxia. Recent admission 5/26-6/10 for PNA, empyema R pleural space, chest tube placement, discharged to sister's home with home health. Co-Morbidities : DM, COPD, HTN, CAD s/p CABG, anxiety, depression, migraines  Patient is currently functioning near baseline with  all ADLs and functional transfers .  Prior to admission, patient's baseline is independent.  Patient met all OT goals at Modified Independent level for increased time.  Patient reports no further questions/concerns at this time.     No further skilled OT needs at this time.      WEIGHT BEARING RESTRICTION  Weight Bearing Restriction: None                Recommendations for nursing staff:   Transfers: 1-person  Toileting location: Toilet    EVALUATION SESSION:  Patient at start of session: supine  FUNCTIONAL TRANSFER ASSESSMENT  Sit to Stand: Edge of Bed  Edge of Bed: Modified Independent  Toilet Transfer: Modified Independent (simulated at same height as home, light use of armrest, reports grab bar adjacent at home)    BED MOBILITY  Supine to Sit : Modified Independent    BALANCE ASSESSMENT     FUNCTIONAL ADL ASSESSMENT  UB Dressing Seated: Modified Independent (simulated)  LB Dressing Seated: Modified Independent  LB Dressing Standing: Modified Independent  Toileting Seated: Modified Independent  Toileting Standing: Modified Independent      ACTIVITY TOLERANCE: mildly SOB with activity, educated on breathing techniques, 98% on room air                         O2 SATURATIONS  Oxygen Therapy  SPO2% on Room Air at Rest: 98    COGNITION  Overall Cognitive Status:   WFL - within functional limits  COGNITION ASSESSMENTS       Upper Extremity:   ROM: within functional limits (reports some discomfort in R side from previous chest tube site with shoulder ROM, but functional)  Strength: is within functional limits     EDUCATION PROVIDED  Patient: Role of Occupational Therapy; Functional Transfer Techniques; Fall Prevention; Compensatory ADL Techniques; Energy Conservation  Patient's Response to Education: Verbalized Understanding    Equipment used: n/a    Therapist comments: Patient reports has not been getting out of bed much this admission due to fatigue, but motivated and participatory this session. Patient performed all ADLs and functional transfers Mod I for increased time, also performed functional mobility around nurses' station Mod I without device with increased time. Some SOB noted with activity, educated on breathing techniques and energy conservation tips, SPO2 98% on room air. Patient reports plans to return to her condo alone at discharge rather than back to her sister's home; discussed considering having supervision at least first night after discharge pending progress, patient reports able to stay first night at sister's home if needed. Also discussed recommendation of increased check-ins and supervision for showering; patient reports intends to continue with sponge bathing, also states she and her sister are best friends and at a minimum text each other to check in every morning and before bed every night. Patient reports no further concerns regarding return home to ADLs, states sister can assist with IADLs if needed as well. Patient aware of recommendation to be out of bed as much as possible, and encouraged multiple walks with nursing staff a day (RN and PCT aware) to prevent deconditioning. Will sign off.     Patient End of Session: Up in chair;Needs met;Call light within reach;RN aware of session/findings;All patient questions and concerns  addressed    OCCUPATIONAL PROFILE    HOME SITUATION  Type of Home: Condo  Home Layout: One level  Lives With: Alone    Toilet and Equipment: Standard height toilet;Grab bar  Shower/Tub and Equipment: Tub-shower combo;Grab bar       Occupation/Status:  at [employer]     Drives: Yes       Prior Level of Function: Patient reports typically independent in all I/ADL and functional mobility without device prior to admission. Had been staying with sister since recent hospital admission, reports had been managing independently but self-care was becoming more difficult again leading up to current admission due to increasing weakness and fatigue.     SUBJECTIVE  \"I was totally dehydrated. I'm feeling better now.\"    PAIN ASSESSMENT  Ratin  Location: denies       OBJECTIVE  Precautions: None  Fall Risk: Standard fall risk    WEIGHT BEARING RESTRICTION  Weight Bearing Restriction: None                AM-PAC ‘6-Clicks’ Inpatient Daily Activity Short Form  -   Putting on and taking off regular lower body clothing?: None  -   Bathing (including washing, rinsing, drying)?: None  -   Toileting, which includes using toilet, bedpan or urinal? : None  -   Putting on and taking off regular upper body clothing?: None  -   Taking care of personal grooming such as brushing teeth?: None  -   Eating meals?: None    AM-PAC Score:  Score: 24  Approx Degree of Impairment: 0%  Standardized Score (AM-PAC Scale): 57.54      ADDITIONAL TESTS     NEUROLOGICAL FINDINGS        PLAN   Patient has been evaluated and presents with no skilled Occupational Therapy needs at this time.  Patient discharged from Occupational Therapy services.  Please re-order if a new functional limitation presents during this admission.      Patient Evaluation Complexity Level:   Occupational Profile/Medical History LOW - Brief history including review of medical or therapy records    Specific performance deficits impacting engagement in ADL/IADL LOW  1 - 3 performance  deficits    Client Assessment/Performance Deficits LOW - No comorbidities nor modifications of tasks    Clinical Decision Making LOW - Analysis of occupational profile, problem-focused assessments, limited treatment options    Overall Complexity LOW     OT Session Time: 25 minutes  Therapeutic Activity: 10 minutes               Video Swallow Study Notes    No notes of this type exist for this encounter.     SLP Notes    No notes of this type exist for this encounter.     Future Appointments        Provider Department Center    [facility]    7/9/2024 12:45 PM Florecita Quintero DO [facility]     7/23/2024 3:00 PM Greyson Main MD [facility]    8/7/2024 11:00 AM Xiang Torres MD [facility]    3/7/2025 11:45 AM Scott Corona MD [facility]      Multidisciplinary Problems     Active Goals        Problem: Patient/Family Goals    Goal Priority Disciplines Outcome Interventions   Patient/Family Long Term Goal     Interdisciplinary Progressing    Description: Patient's Long Term Goal: Increase appetite and strength    Interventions:  - encourage fluids  - encourage foods  - monitoring output  - PT/OT  - See additional Care Plan goals for specific interventions   Patient/Family Short Term Goal     Interdisciplinary Progressing    Description: Patient's Short Term Goal: Increase appetite and strength    Interventions:   - encourage fluids  - encourage foods  - monitoring output  - PT/OT  - See additional Care Plan goals for specific interventions

## (undated) NOTE — MR AVS SNAPSHOT
[facility]  [address], [address]  [address] [place] [address] [phone]               Thank you for choosing us for your health care visit with Kayden Condon MD.  We are glad to serve you and happy to provide you with this 146/92 mmHg 84 154 lb            Current Medications          This list is accurate as of: 4/25/17  6:32 PM.  Always use your most recent med list.                Albuterol Sulfate  (90 Base) MCG/ACT Aers   Inhale 2 puffs into the lungs every 4 (fo SEROQUEL  MG Tb24   Generic drug:  QUEtiapine Fumarate ER   Take 1 tablet (150 mg total) by mouth daily. BRAND ONLY           SUMAtriptan Succinate 50 MG Tabs   TAKE ONE BY MOUTH EVERY 2 HOURS AS NEEDED FOR MIGRAINE.  USE AT ONSET, REPEAT ONCE AFTER

## (undated) NOTE — LETTER
18    Patient: Osito Echols  : 6/3/1960 Visit date: 2018    Dear  Dr. Jose Luis Cabrera MD,    Thank you for referring Osito Echols to my practice. Please find my assessment and plan below.                 Assessment   Jamison's esophagus without

## (undated) NOTE — LETTER
22  RE: Lisette Dean     : 6/3/1960    Dear Dr. Derrick Veliz,    This letter is to inform you that your patient is being scheduled for surgery with Dr. Dax Spencer on 2022 at [facility]. We have asked the patient to contact your office to schedule a pre-operative exam/visit. Diagnosis: Mass of spine;  Chronic bilateral low back pain without sciatica     Procedure: RIGHT SACRAL SUBCUTANEOUS CYST EXCISION    A Pre-operative History & Physical is needed for medical clearance. Please address patient's active problems (i.e high blood pressure, breathing issues etc.)  Pre-op labs are scheduled through the [facility] 56.. If any labs/testing are being done through the PCP office, then results should be faxed to the pre-admission testing department at [facility] [phone]. Our pre-operative lab orders are located in our surgery order, if the patient would like these done through your office, you will need to place separate orders. Please fax clearance letter/office visit note to the Pre-Admission department or our office at fax #: [phone]. Your office note must clearly indicate if the patient is medically cleared for surgery or not. The following orders will be placed by pre-admission testing:  CBC  Comprehensive Metabolic Panel  Chest Xray  EKG  PT/PTT/INR  MRSA/MSSA Nasal Swab  Covid-19 testing  (*And any other pertinent testing based on patient's current clinical condition.)    If you have any questions, you may contact our office at [phone], option # 3.     Thank you,  Sheila Orozco Staff RYNE WHITFIELD

## (undated) NOTE — LETTER
[address]  [address]  Authorization for Surgical Operation and Procedure     Date:___________                                                                                                         Time:__________  I hereby authorize Surgeon David Maldonado, my physician and his/her assistants (if applicable), which may include medical students, residents, and/or fellows, to perform the following surgical operation/ procedure and administer such anesthesia as may be determined necessary by my physician:  Operation/Procedure name: Flexible bronchoscopy, right video assisted thoracoscopic decortication  on Soumya King   2.   I recognize that during the surgical operation/procedure, unforeseen conditions may necessitate additional or different procedures than those listed above.  I, therefore, further authorize and request that the above-named surgeon, assistants, or designees perform such procedures as are, in their judgment, necessary and desirable.    3.   My surgeon/physician has discussed prior to my surgery the potential benefits, risks and side effects of this procedure; the likelihood of achieving goals; and potential problems that might occur during recuperation.  They also discussed reasonable alternatives to the procedure, including risks, benefits, and side effects related to the alternatives and risks related to not receiving this procedure.  I have had all my questions answered and I acknowledge that no guarantee has been made as to the result that may be obtained.    4.   Should the need arise during my operation/procedure, which includes change of level of care prior to discharge, I also consent to the administration of blood and/or blood products.  Further, I understand that despite careful testing and screening of blood or blood products by collecting agencies, I may still be subject to ill effects as a result of receiving a blood transfusion and/or blood  products.  The following are some, but not all, of the potential risks that can occur: fever and allergic reactions, hemolytic reactions, transmission of diseases such as Hepatitis, AIDS and Cytomegalovirus (CMV) and fluid overload.  In the event that I wish to have an autologous transfusion of my own blood, or a directed donor transfusion, I will discuss this with my physician.  Check only if Refusing Blood or Blood Products  I understand refusal of blood or blood products as deemed necessary by my physician may have serious consequences to my condition to include possible death. I hereby assume responsibility for my refusal and release the hospital, its personnel, and my physicians from any responsibility for the consequences of my refusal.          o  Refuse      5.   I authorize the use of any specimen, organs, tissues, body parts or foreign objects that may be removed from my body during the operation/procedure for diagnosis, research or teaching purposes and their subsequent disposal by hospital authorities.  I also authorize the release of specimen test results and/or written reports to my treating physician on the hospital medical staff or other referring or consulting physicians involved in my care, at the discretion of the Pathologist or my treating physician.    6.   I consent to the photographing or videotaping of the operations or procedures to be performed, including appropriate portions of my body for medical, scientific, or educational purposes, provided my identity is not revealed by the pictures or by descriptive texts accompanying them.  If the procedure has been photographed/videotaped, the surgeon will obtain the original picture, image, videotape or CD.  The hospital will not be responsible for storage, release or maintenance of the picture, image, tape or CD.    7.   I consent to the presence of a  or observers in the operating room as deemed necessary by my physician or  their designees.    8.   I recognize that in the event my procedure results in extended X-Ray/fluoroscopy time, I may develop a skin reaction.    9. If I have a Do Not Attempt Resuscitation (DNAR) order in place, that status will be suspended while in the operating room, procedural suite, and during the recovery period unless otherwise explicitly stated by me (or a person authorized to consent on my behalf). The surgeon or my attending physician will determine when the applicable recovery period ends for purposes of reinstating the DNAR order.  10. Patients having a sterilization procedure: I understand that if the procedure is successful the results will be permanent and it will therefore be impossible for me to inseminate, conceive, or bear children.  I also understand that the procedure is intended to result in sterility, although the result has not been guaranteed.   11. I acknowledge that my physician has explained sedation/analgesia administration to me including the risk and benefits I consent to the administration of sedation/analgesia as may be necessary or desirable in the judgment of my physician.    I CERTIFY THAT I HAVE READ AND FULLY UNDERSTAND THE ABOVE CONSENT TO OPERATION and/or OTHER PROCEDURE.    _________________________________________  __________________________________  Signature of Patient     Signature of Responsible Person         ___________________________________         Printed Name of Responsible Person           _________________________________                 Relationship to Patient  _________________________________________  ______________________________  Signature of Witness          Date  Time      Patient Name: Soumya SALGADO King     : 6/3/1960                 Printed: 2024     Medical Record #: [id number]                     Page [address] of [address] [place] [address]  [id number]    Consent for Anesthesia    I, Soumya SALGADO  Fernando agree to be cared for by an anesthesiologist, who is specially trained to monitor me and give me medicine to put me to sleep or keep me comfortable during my procedure    I understand that my anesthesiologist is not an employee or agent of [facility] [employer] [employer]. He or she works for [email or web address].    As the patient asking for anesthesia services, I agree to:  Allow the anesthesiologist (anesthesia doctor) to give me medicine and do additional procedures as necessary. Some examples are: Starting or using an “IV” to give me medicine, fluids or blood during my procedure, and having a breathing tube placed to help me breathe when I’m asleep (intubation). In the event that my heart stops working properly, I understand that my anesthesiologist will make every effort to sustain my life, unless otherwise directed by [facility] Do Not Resuscitate documents.  Tell my anesthesia doctor before my procedure:  If I am pregnant.  The last time that I ate or drank.  All of the medicines I take (including prescriptions, herbal supplements, and pills I can buy without a prescription (including street drugs/illegal medications). Failure to inform my anesthesiologist about these medicines may increase my risk of anesthetic complications.  If I am allergic to anything or have had a reaction to anesthesia before.  I understand how the anesthesia medicine will help me (benefits).  I understand that with any type of anesthesia medicine there are risks:  The most common risks are: nausea, vomiting, sore throat, muscle soreness, damage to my eyes, mouth, or teeth (from breathing tube placement).  Rare risks include: remembering what happened during my procedure, allergic reactions to medications, injury to my airway, heart, lungs, vision, nerves, or muscles and in extremely rare instances death.  My doctor has explained to me other choices available to me for my care (alternatives).  Pregnant  Patients (“epidural”):  I understand that the risks of having an epidural (medicine given into my back to help control pain during labor), include itching, low blood pressure, difficulty urinating, headache or slowing of the baby’s heart. Very rare risks include infection, bleeding, seizure, irregular heart rhythms and nerve injury.  Regional Anesthesia (“spinal”, “epidural”, & “nerve blocks”):  I understand that rare but potential complications include headache, bleeding, infection, seizure, irregular heart rhythms, and nerve injury.    I can change my mind about having anesthesia services at any time before I get the medicine.    _____________________________________________________________________________  Patient (or Representative) Signature/Relationship to Patient  Date   Time    _____________________________________________________________________________   Name (if used)    Language/Organization   Time    _____________________________________________________________________________  Anesthesiologist Signature     Date   Time  I have discussed the procedure and information above with the patient (or patient’s representative) and answered their questions. The patient or their representative has agreed to have anesthesia services.    _____________________________________________________________________________  Witness        Date   Time  I have verified that the signature is that of the patient or patient’s representative, and that it was signed before the procedure  Patient Name: Soumya King     : 6/3/1960                 Printed: 2024     Medical Record #: [id number]                     Page 2 of 2

## (undated) NOTE — MR AVS SNAPSHOT
[place]  [address] [facility]  [address] [facility] [address] [phone]               Thank you for choosing us for your health care visit with Argentina Jim MD.  We are glad to serve you and happy to provide you with this TABS Anaphylaxis                Today's Vital Signs     BP Pulse Temp Height Weight BMI    152/98 mmHg 94 98.6 °F (37 °C) (Oral) 60\" 159 lb 31.05 kg/m2    Breastfeeding?                    No              Current Medications          This list is accurate * Pravastatin Sodium 20 MG Tabs   TAKE ONE TABLET BY MOUTH ONCE DAILY FOR CHOLESTEROL   Commonly known as:  PRAVACHOL           Promethazine-DM 6.25-15 MG/5ML Syrp   Take 5 mL by mouth nightly as needed for cough.    Commonly known as:  PROMETHAZINE-DM Summaries. If you've been to the Emergency Department or your doctor's office, you can view your past visit information in [place] by going to Visits < Visit Summaries. [place] questions? Call [phone] for help.   [place] is NOT to be used for urge

## (undated) NOTE — LETTER
04/18/19        [address] [id number]      Dear Sunny Harrington,    [facility] records indicate that you have outstanding lab work and or testing that was ordered for you and has not yet been completed: Mammogram - Please contact Cecille

## (undated) NOTE — LETTER
11/11/2024              Soumya King        [address]        [facility] [address]         To Whom it May Concern,           Soumya benefits from drinking ice cold water. Please allow her to have/use a proper container that can keep her water cold throughout her shifts.       Sincerely,    MARLENE Harris          Document electronically generated by:  MARLENE Harris

## (undated) NOTE — LETTER
[address]  [address]  Authorization for Surgical Operation and Procedure     Date:___________                                                                                                         Time:__________  I hereby authorize Surgeon(s):  Greyson Main MD, my physician and his/her assistants (if applicable), which may include medical students, residents, and/or fellows, to perform the following surgical operation/ procedure and administer such anesthesia as may be determined necessary by my physician:  Operation/Procedure name (s) Procedure(s):  ROBOT-ASSISTED NAVIGATIONAL BRONCHOSCOPY, ENDOBRONCIAL ULTRASOUND UNDER FLUOROSCOPY on Soumya King   2.   I recognize that during the surgical operation/procedure, unforeseen conditions may necessitate additional or different procedures than those listed above.  I, therefore, further authorize and request that the above-named surgeon, assistants, or designees perform such procedures as are, in their judgment, necessary and desirable.    3.   My surgeon/physician has discussed prior to my surgery the potential benefits, risks and side effects of this procedure; the likelihood of achieving goals; and potential problems that might occur during recuperation.  They also discussed reasonable alternatives to the procedure, including risks, benefits, and side effects related to the alternatives and risks related to not receiving this procedure.  I have had all my questions answered and I acknowledge that no guarantee has been made as to the result that may be obtained.    4.   Should the need arise during my operation/procedure, which includes change of level of care prior to discharge, I also consent to the administration of blood and/or blood products.  Further, I understand that despite careful testing and screening of blood or blood products by collecting agencies, I may still be subject to ill effects as a result of  receiving a blood transfusion and/or blood products.  The following are some, but not all, of the potential risks that can occur: fever and allergic reactions, hemolytic reactions, transmission of diseases such as Hepatitis, AIDS and Cytomegalovirus (CMV) and fluid overload.  In the event that I wish to have an autologous transfusion of my own blood, or a directed donor transfusion, I will discuss this with my physician.  Check only if Refusing Blood or Blood Products  I understand refusal of blood or blood products as deemed necessary by my physician may have serious consequences to my condition to include possible death. I hereby assume responsibility for my refusal and release the hospital, its personnel, and my physicians from any responsibility for the consequences of my refusal.          o  Refuse      5.   I authorize the use of any specimen, organs, tissues, body parts or foreign objects that may be removed from my body during the operation/procedure for diagnosis, research or teaching purposes and their subsequent disposal by hospital authorities.  I also authorize the release of specimen test results and/or written reports to my treating physician on the hospital medical staff or other referring or consulting physicians involved in my care, at the discretion of the Pathologist or my treating physician.    6.   I consent to the photographing or videotaping of the operations or procedures to be performed, including appropriate portions of my body for medical, scientific, or educational purposes, provided my identity is not revealed by the pictures or by descriptive texts accompanying them.  If the procedure has been photographed/videotaped, the surgeon will obtain the original picture, image, videotape or CD.  The hospital will not be responsible for storage, release or maintenance of the picture, image, tape or CD.    7.   I consent to the presence of a  or observers in the operating room  as deemed necessary by my physician or their designees.    8.   I recognize that in the event my procedure results in extended X-Ray/fluoroscopy time, I may develop a skin reaction.    9. If I have a Do Not Attempt Resuscitation (DNAR) order in place, that status will be suspended while in the operating room, procedural suite, and during the recovery period unless otherwise explicitly stated by me (or a person authorized to consent on my behalf). The surgeon or my attending physician will determine when the applicable recovery period ends for purposes of reinstating the DNAR order.  10. Patients having a sterilization procedure: I understand that if the procedure is successful the results will be permanent and it will therefore be impossible for me to inseminate, conceive, or bear children.  I also understand that the procedure is intended to result in sterility, although the result has not been guaranteed.   11. I acknowledge that my physician has explained sedation/analgesia administration to me including the risk and benefits I consent to the administration of sedation/analgesia as may be necessary or desirable in the judgment of my physician.    I CERTIFY THAT I HAVE READ AND FULLY UNDERSTAND THE ABOVE CONSENT TO OPERATION and/or OTHER PROCEDURE.    _________________________________________  __________________________________  Signature of Patient     Signature of Responsible Person         ___________________________________         Printed Name of Responsible Person           _________________________________                 Relationship to Patient  _________________________________________  ______________________________  Signature of Witness          Date  Time      Patient Name: Soumya SALGADO King     : 6/3/1960                 Printed: 2024     Medical Record #: [id number]                     Page 1 of 2                                    [address]   [address]    Consent for Anesthesia    ISoumya agree to be cared for by an anesthesiologist, who is specially trained to monitor me and give me medicine to put me to sleep or keep me comfortable during my procedure    I understand that my anesthesiologist is not an employee or agent of [facility] or [employer]. He or she works for [employer].    As the patient asking for anesthesia services, I agree to:  Allow the anesthesiologist (anesthesia doctor) to give me medicine and do additional procedures as necessary. Some examples are: Starting or using an “IV” to give me medicine, fluids or blood during my procedure, and having a breathing tube placed to help me breathe when I’m asleep (intubation). In the event that my heart stops working properly, I understand that my anesthesiologist will make every effort to sustain my life, unless otherwise directed by [facility] Do Not Resuscitate documents.  Tell my anesthesia doctor before my procedure:  If I am pregnant.  The last time that I ate or drank.  All of the medicines I take (including prescriptions, herbal supplements, and pills I can buy without a prescription (including street drugs/illegal medications). Failure to inform my anesthesiologist about these medicines may increase my risk of anesthetic complications.  If I am allergic to anything or have had a reaction to anesthesia before.  I understand how the anesthesia medicine will help me (benefits).  I understand that with any type of anesthesia medicine there are risks:  The most common risks are: nausea, vomiting, sore throat, muscle soreness, damage to my eyes, mouth, or teeth (from breathing tube placement).  Rare risks include: remembering what happened during my procedure, allergic reactions to medications, injury to my airway, heart, lungs, vision, nerves, or muscles and in extremely rare instances death.  My doctor has explained to me other choices available  to me for my care (alternatives).  Pregnant Patients (“epidural”):  I understand that the risks of having an epidural (medicine given into my back to help control pain during labor), include itching, low blood pressure, difficulty urinating, headache or slowing of the baby’s heart. Very rare risks include infection, bleeding, seizure, irregular heart rhythms and nerve injury.  Regional Anesthesia (“spinal”, “epidural”, & “nerve blocks”):  I understand that rare but potential complications include headache, bleeding, infection, seizure, irregular heart rhythms, and nerve injury.    I can change my mind about having anesthesia services at any time before I get the medicine.    _____________________________________________________________________________  Patient (or Representative) Signature/Relationship to Patient  Date   Time    _____________________________________________________________________________   Name (if used)    Language/Organization   Time    _____________________________________________________________________________  Anesthesiologist Signature     Date   Time  I have discussed the procedure and information above with the patient (or patient’s representative) and answered their questions. The patient or their representative has agreed to have anesthesia services.    _____________________________________________________________________________  Witness        Date   Time  I have verified that the signature is that of the patient or patient’s representative, and that it was signed before the procedure  Patient Name: Soumya King     : 6/3/1960                 Printed: 2024     Medical Record #: [id number]                     Page 2 of 2

## (undated) NOTE — LETTER
Date: 6/27/2024    Patient Name: Soumya King          To Whom it may concern:    This letter has been written at the patient's request. The above [facility].    This patient should be excused from attending work/school from 7/1/24 through 7/16/24.    The patient may return to work/school on next scheduled shift with the following limitations none.      Sincerely,        Rika Vinson, DO

## (undated) NOTE — LETTER
Date & Time: 5/20/2024, 2:29 PM  Patient: Soumya King  Encounter Provider(s):    Edwige Collier PA-C       To Whom It May Concern:    Soumya King was seen and treated in our department on 5/20/2024. She should not return to work until 5/21/2024 .    If you have any questions or concerns, please do not hesitate to call.        _____________________________  Physician/APC Signature

## (undated) NOTE — LETTER
Adam Oliva M.D., F.A.C.S.  Abhilash Cosby M.D., F.A.C.S.  Artur De La Torre M.D., F.A.C.S  Brandon Timmons M.D., F.A.C.S.   Xavier Candelario M.D., F.A.C.S.   Khari Taylor M.D.  Marcelino Cortez M.D., F.A.C.S.  Jorge Kwong M.D., F.A.C.S.  Jorge Dick M.D., F.A.C.SDileep Ohara M.D., F.A.C.S.  Jorge Cantu M.D. F.A.C.S.  Frankie Saunders M.D., F.A.C.S.   Gustavo Wallis M.D., F.A.C.S.  Saul Hong M.D., F.A.C.S., F.A.C.C. Jace Love M.D., F.A.C.S.   David Wong M.D., F.A.C.S.  Jace Bronson M.D., F.A.C.S.  Aba Sommer M.D., F.A.C.S.  JUNIOR Danielle M.D., F.A.C.S  JUNIOR Garibay M.D., F.A.C.S.   Ke Adams M.D., F.A.C.S.  JUNIOR Tavarez M.D. R. Anthony Perez-Tamayo, M.D. PhD.  JUNIOR Sosa M.D. F A HANNAH Mchugh M.D.   Андрей Gutierrez M.D.   Sunil Ambrose M.D.  JUNIOR Dejesus D.O., F.A.CDileepS.  David Velazco M.D., F.A.C.S.  Aba Bhat M.D.        Welcome to [facility]    As you contemplate possible surgical treatment, it is very important to us that you understand fully what is being discussed, that all of your questions have been answered, and that your options for treatment have been fully explained.    To that end, on the following page we will ask you some questions to make certain that you understand everything which has been explained to you. Included in this understanding is that there are both surgical and nonsurgical treatments available for you, that you have options regarding where your care is given, and what doctors are involved in your care. Included in these options would, of course, be the option to elect for no treatment whatsoever. We especially want to be sure that you have had a chance to have all of your questions and  concerns answered. If there are any issues which have not been adequately addressed, we ask you to bring them forward so that we can thoroughly address them.    A patient who is fully informed and understands their condition and options for treatment, as well as potential adverse effects of treatment, is going to be a patient who receives the most benefit out of care rendered. Our goal in addition to providing excellent surgical care is to provide the necessary information to you and your family in order to make decisions which are appropriate relative to your own care.    Please take the time necessary to read and answer the questions on the next page. Again, if you have any questions, bring them forward and we will certainly address them.    Sincerely,    [facility].    _______________________  ____________________________________  Date:                                             Patient Signature  ________________________  Soumya SALGADO Fernando  Witness Consent Form         Revised: 2015  Patient Name: Soumya King     : 6/3/1960                 Printed: 6/15/13 9:43 AM     Medical Record #: [id number]                Page: 1 of  2        [facility]  Supplemental Consent Form    A [facility] surgeon has met with me and explained the matter of my illness, and what treatments might be available to improve my condition. As a result of that conversation, I understand the following:    A [facility] surgeon met with me and explained, in detail, the nature of my condition for which surgery is being contemplated. The procedure to be performed  Is: CORONARY ARTERY BYPASS GRAFT            Yes _____ No _____    A [facility] surgeon has explained to me that there are alternatives to surgery which might include no surgery, medical therapy, or interventional treatment, among other options and the risks and benefits of the different treatment options:    Yes _____ No  _____    A [facility] surgeon as explained to me that if I should so desire, he/she is willing to explain my case and the surgical and non-surgical options to family members:            Yes _____ No _____    A [facility] surgeon has answered all of my questions regarding the topics we have discussed. I have been invited to ask more questions:  Yes _____ No _____    A [facility] surgeon has explained to me that if I seek other options or wish treatment at another facility in [place] or [place], or anywhere in the [place], that his/her office will assist me in making such accommodations:   Yes _____ No _____    A [facility] surgeon has explained to me, that death, risk of bleeding, stroke, multi-organ failure, heart attack or other complications are risks for the proposed surgical procedure:           Yes _____ No _____    A [facility] surgeon has explained to me that I have the right to cancel or postpone the surgery at any time prior to the start of surgery:    Yes _____ No _____    The nature and options for treatment for my condition have been explained to me, in detail, by a [facility] surgeon and all questions have been answered to my satisfaction. I understand that I am not required to undergo surgery, and further, that if I so desire, I could have surgery accomplished by another surgeon or at another institution. I understand and accept that which has been explained to me. I am able to make my decisions knowingly and willfully based on the data.    ______________________   __________________________________  Date       Patient Signature  ______________________________ Soumya J King  Witness Consent Form   Patient Name: Soumya King     DATB: 6/3/1960                 Medical Record #: [id number]    Page: 2 of 2        Printed: December 26, 2023

## (undated) NOTE — MR AVS SNAPSHOT
After Visit Summary   2/11/2017    Kristian Castanon    MRN: [id number]           Visit Information        Provider Department Dept Phone    2/11/2017 10:00 PM Bed1  [facility] [phone]      Allergies as of 2/11/2017  Reviewed on: 1/3/2017 Pravastatin Sodium 20 MG Oral Tab Take 20 mg by mouth every other day. Take 2 tablets by mouth every other day. Meloxicam 15 MG Oral Tab Take 1 tablet (15 mg total) by mouth daily.     FLUTICASONE PROPIONATE 50 MCG/ACT Nasal Suspension USE TWO SPRAY(S)

## (undated) NOTE — Clinical Note
Fernando Suarez, new patient to see you in the morning. We discussed the possibility of CGM device to gauge trends, but I'm unaware if insurance will cover it. I did not make her any promises.   MG

## (undated) NOTE — LETTER
Anai Sheffield [address]  [place], [address] [place] [address]    Consent for Operation  Date: __________________                                Time: _______________    1.  I authorize the performance upon Anita Scott the following operation:  Procedure procedure has been videotaped, the surgeon will obtain the original videotape. The hospital will not be responsible for storage or maintenance of this tape.   7. For the purpose of advancing medical education, I consent to the admittance of observers to the STATEMENTS REQUIRING INSERTION OR COMPLETION WERE FILLED IN.     Signature of Patient:   ___________________________    When the patient is a minor or mentally incompetent to give consent:  Signature of person authorized to consent for patient: ____________ supplements, and pills I can buy without a prescription (including street drugs/illegal medications). Failure to inform my anesthesiologist about these medicines may increase my risk of anesthetic complications. iv.  If I am allergic to anything or have ha Anesthesiologist Signature     Date   Time  I have discussed the procedure and information above with the patient (or patient’s representative) and answered their questions. The patient or their representative has agreed to have anesthesia services.     ___

## (undated) NOTE — LETTER
April 18, 2018        Deidra Cueto  [id number]      Dear Anaya Brandt:    I am the Nurse Care Manager with Dr. Vee Musa office. Just reaching out to see how you are doing since your discharge home.    When you have a minute

## (undated) NOTE — LETTER
[address]  [address]  Authorization for Surgical Operation and Procedure     Date:___________                                                                                                         Time:__________  I hereby authorize Surgeon(s):  Greyson Main MD, my physician and his/her assistants (if applicable), which may include medical students, residents, and/or fellows, to perform the following surgical operation/ procedure and administer such anesthesia as may be determined necessary by my physician:  Operation/Procedure name (s) Procedure(s):  ROBOT-ASSISTED NAVIGATIONAL BRONCHOSCOPY, ENDOBRONCIAL ULTRASOUND UNDER FLUOROSCOPY on Soumya King   2.   I recognize that during the surgical operation/procedure, unforeseen conditions may necessitate additional or different procedures than those listed above.  I, therefore, further authorize and request that the above-named surgeon, assistants, or designees perform such procedures as are, in their judgment, necessary and desirable.    3.   My surgeon/physician has discussed prior to my surgery the potential benefits, risks and side effects of this procedure; the likelihood of achieving goals; and potential problems that might occur during recuperation.  They also discussed reasonable alternatives to the procedure, including risks, benefits, and side effects related to the alternatives and risks related to not receiving this procedure.  I have had all my questions answered and I acknowledge that no guarantee has been made as to the result that may be obtained.    4.   Should the need arise during my operation/procedure, which includes change of level of care prior to discharge, I also consent to the administration of blood and/or blood products.  Further, I understand that despite careful testing and screening of blood or blood products by collecting agencies, I may still be subject to ill effects as a result of  receiving a blood transfusion and/or blood products.  The following are some, but not all, of the potential risks that can occur: fever and allergic reactions, hemolytic reactions, transmission of diseases such as Hepatitis, AIDS and Cytomegalovirus (CMV) and fluid overload.  In the event that I wish to have an autologous transfusion of my own blood, or a directed donor transfusion, I will discuss this with my physician.  Check only if Refusing Blood or Blood Products  I understand refusal of blood or blood products as deemed necessary by my physician may have serious consequences to my condition to include possible death. I hereby assume responsibility for my refusal and release the hospital, its personnel, and my physicians from any responsibility for the consequences of my refusal.          o  Refuse      5.   I authorize the use of any specimen, organs, tissues, body parts or foreign objects that may be removed from my body during the operation/procedure for diagnosis, research or teaching purposes and their subsequent disposal by hospital authorities.  I also authorize the release of specimen test results and/or written reports to my treating physician on the hospital medical staff or other referring or consulting physicians involved in my care, at the discretion of the Pathologist or my treating physician.    6.   I consent to the photographing or videotaping of the operations or procedures to be performed, including appropriate portions of my body for medical, scientific, or educational purposes, provided my identity is not revealed by the pictures or by descriptive texts accompanying them.  If the procedure has been photographed/videotaped, the surgeon will obtain the original picture, image, videotape or CD.  The hospital will not be responsible for storage, release or maintenance of the picture, image, tape or CD.    7.   I consent to the presence of a  or observers in the operating room  as deemed necessary by my physician or their designees.    8.   I recognize that in the event my procedure results in extended X-Ray/fluoroscopy time, I may develop a skin reaction.    9. If I have a Do Not Attempt Resuscitation (DNAR) order in place, that status will be suspended while in the operating room, procedural suite, and during the recovery period unless otherwise explicitly stated by me (or a person authorized to consent on my behalf). The surgeon or my attending physician will determine when the applicable recovery period ends for purposes of reinstating the DNAR order.  10. Patients having a sterilization procedure: I understand that if the procedure is successful the results will be permanent and it will therefore be impossible for me to inseminate, conceive, or bear children.  I also understand that the procedure is intended to result in sterility, although the result has not been guaranteed.   11. I acknowledge that my physician has explained sedation/analgesia administration to me including the risk and benefits I consent to the administration of sedation/analgesia as may be necessary or desirable in the judgment of my physician.    I CERTIFY THAT I HAVE READ AND FULLY UNDERSTAND THE ABOVE CONSENT TO OPERATION and/or OTHER PROCEDURE.    _________________________________________  __________________________________  Signature of Patient     Signature of Responsible Person         ___________________________________         Printed Name of Responsible Person           _________________________________                 Relationship to Patient  _________________________________________  ______________________________  Signature of Witness          Date  Time      Patient Name: Soumya SALGADO King     : 6/3/1960                 Printed: 2024     Medical Record #: [id number]                     Page 1 of 2                                    [address]   [address]    Consent for Anesthesia    ISoumya agree to be cared for by an anesthesiologist, who is specially trained to monitor me and give me medicine to put me to sleep or keep me comfortable during my procedure    I understand that my anesthesiologist is not an employee or agent of [facility] or [employer]. He or she works for [employer].    As the patient asking for anesthesia services, I agree to:  Allow the anesthesiologist (anesthesia doctor) to give me medicine and do additional procedures as necessary. Some examples are: Starting or using an “IV” to give me medicine, fluids or blood during my procedure, and having a breathing tube placed to help me breathe when I’m asleep (intubation). In the event that my heart stops working properly, I understand that my anesthesiologist will make every effort to sustain my life, unless otherwise directed by [facility] Do Not Resuscitate documents.  Tell my anesthesia doctor before my procedure:  If I am pregnant.  The last time that I ate or drank.  All of the medicines I take (including prescriptions, herbal supplements, and pills I can buy without a prescription (including street drugs/illegal medications). Failure to inform my anesthesiologist about these medicines may increase my risk of anesthetic complications.  If I am allergic to anything or have had a reaction to anesthesia before.  I understand how the anesthesia medicine will help me (benefits).  I understand that with any type of anesthesia medicine there are risks:  The most common risks are: nausea, vomiting, sore throat, muscle soreness, damage to my eyes, mouth, or teeth (from breathing tube placement).  Rare risks include: remembering what happened during my procedure, allergic reactions to medications, injury to my airway, heart, lungs, vision, nerves, or muscles and in extremely rare instances death.  My doctor has explained to me other choices available  to me for my care (alternatives).  Pregnant Patients (“epidural”):  I understand that the risks of having an epidural (medicine given into my back to help control pain during labor), include itching, low blood pressure, difficulty urinating, headache or slowing of the baby’s heart. Very rare risks include infection, bleeding, seizure, irregular heart rhythms and nerve injury.  Regional Anesthesia (“spinal”, “epidural”, & “nerve blocks”):  I understand that rare but potential complications include headache, bleeding, infection, seizure, irregular heart rhythms, and nerve injury.    I can change my mind about having anesthesia services at any time before I get the medicine.    _____________________________________________________________________________  Patient (or Representative) Signature/Relationship to Patient  Date   Time    _____________________________________________________________________________   Name (if used)    Language/Organization   Time    _____________________________________________________________________________  Anesthesiologist Signature     Date   Time  I have discussed the procedure and information above with the patient (or patient’s representative) and answered their questions. The patient or their representative has agreed to have anesthesia services.    _____________________________________________________________________________  Witness        Date   Time  I have verified that the signature is that of the patient or patient’s representative, and that it was signed before the procedure  Patient Name: Soumya King     : 6/3/1960                 Printed: 2024     Medical Record #: [id number]                     Page 2 of 2

## (undated) NOTE — Clinical Note
Please find out what medication we can give her to cover the Quetiapine from his insurance company and what they'll pay for. Moiz Hollingsworth. Essie Zendejas MD Diplomate, American Board of Internal Medicine [facility] [address] [facility], [address], [place] [facility]

## (undated) NOTE — LETTER
[address]  [address]  Authorization for Surgical Operation and Procedure     Date:___________                                                                                                         Time:__________  I hereby authorize Surgeon(s):  Greyson Main MD, my physician and his/her assistants (if applicable), which may include medical students, residents, and/or fellows, to perform the following surgical operation/ procedure and administer such anesthesia as may be determined necessary by my physician:  Operation/Procedure name (s) Procedure(s):  ROBOT-ASSISTED NAVIGATIONAL BRONCHOSCOPY, ENDOBRONCIAL ULTRASOUND UNDER FLUOROSCOPY on Soumya King   2.   I recognize that during the surgical operation/procedure, unforeseen conditions may necessitate additional or different procedures than those listed above.  I, therefore, further authorize and request that the above-named surgeon, assistants, or designees perform such procedures as are, in their judgment, necessary and desirable.    3.   My surgeon/physician has discussed prior to my surgery the potential benefits, risks and side effects of this procedure; the likelihood of achieving goals; and potential problems that might occur during recuperation.  They also discussed reasonable alternatives to the procedure, including risks, benefits, and side effects related to the alternatives and risks related to not receiving this procedure.  I have had all my questions answered and I acknowledge that no guarantee has been made as to the result that may be obtained.    4.   Should the need arise during my operation/procedure, which includes change of level of care prior to discharge, I also consent to the administration of blood and/or blood products.  Further, I understand that despite careful testing and screening of blood or blood products by collecting agencies, I may still be subject to ill effects as a result of  receiving a blood transfusion and/or blood products.  The following are some, but not all, of the potential risks that can occur: fever and allergic reactions, hemolytic reactions, transmission of diseases such as Hepatitis, AIDS and Cytomegalovirus (CMV) and fluid overload.  In the event that I wish to have an autologous transfusion of my own blood, or a directed donor transfusion, I will discuss this with my physician.  Check only if Refusing Blood or Blood Products  I understand refusal of blood or blood products as deemed necessary by my physician may have serious consequences to my condition to include possible death. I hereby assume responsibility for my refusal and release the hospital, its personnel, and my physicians from any responsibility for the consequences of my refusal.          o  Refuse      5.   I authorize the use of any specimen, organs, tissues, body parts or foreign objects that may be removed from my body during the operation/procedure for diagnosis, research or teaching purposes and their subsequent disposal by hospital authorities.  I also authorize the release of specimen test results and/or written reports to my treating physician on the hospital medical staff or other referring or consulting physicians involved in my care, at the discretion of the Pathologist or my treating physician.    6.   I consent to the photographing or videotaping of the operations or procedures to be performed, including appropriate portions of my body for medical, scientific, or educational purposes, provided my identity is not revealed by the pictures or by descriptive texts accompanying them.  If the procedure has been photographed/videotaped, the surgeon will obtain the original picture, image, videotape or CD.  The hospital will not be responsible for storage, release or maintenance of the picture, image, tape or CD.    7.   I consent to the presence of a  or observers in the operating room  as deemed necessary by my physician or their designees.    8.   I recognize that in the event my procedure results in extended X-Ray/fluoroscopy time, I may develop a skin reaction.    9. If I have a Do Not Attempt Resuscitation (DNAR) order in place, that status will be suspended while in the operating room, procedural suite, and during the recovery period unless otherwise explicitly stated by me (or a person authorized to consent on my behalf). The surgeon or my attending physician will determine when the applicable recovery period ends for purposes of reinstating the DNAR order.  10. Patients having a sterilization procedure: I understand that if the procedure is successful the results will be permanent and it will therefore be impossible for me to inseminate, conceive, or bear children.  I also understand that the procedure is intended to result in sterility, although the result has not been guaranteed.   11. I acknowledge that my physician has explained sedation/analgesia administration to me including the risk and benefits I consent to the administration of sedation/analgesia as may be necessary or desirable in the judgment of my physician.    I CERTIFY THAT I HAVE READ AND FULLY UNDERSTAND THE ABOVE CONSENT TO OPERATION and/or OTHER PROCEDURE.    _________________________________________  __________________________________  Signature of Patient     Signature of Responsible Person         ___________________________________         Printed Name of Responsible Person           _________________________________                 Relationship to Patient  _________________________________________  ______________________________  Signature of Witness          Date  Time      Patient Name: Soumya SALGADO Fernando     : 6/3/1960                 Printed: May 29, 2024     Medical Record #: [id number]                     Page 1 of 2                                    [address]   [address]    Consent for Anesthesia    ISoumya agree to be cared for by an anesthesiologist, who is specially trained to monitor me and give me medicine to put me to sleep or keep me comfortable during my procedure    I understand that my anesthesiologist is not an employee or agent of [facility] or [employer]. He or she works for [employer].    As the patient asking for anesthesia services, I agree to:  Allow the anesthesiologist (anesthesia doctor) to give me medicine and do additional procedures as necessary. Some examples are: Starting or using an “IV” to give me medicine, fluids or blood during my procedure, and having a breathing tube placed to help me breathe when I’m asleep (intubation). In the event that my heart stops working properly, I understand that my anesthesiologist will make every effort to sustain my life, unless otherwise directed by [facility] Do Not Resuscitate documents.  Tell my anesthesia doctor before my procedure:  If I am pregnant.  The last time that I ate or drank.  All of the medicines I take (including prescriptions, herbal supplements, and pills I can buy without a prescription (including street drugs/illegal medications). Failure to inform my anesthesiologist about these medicines may increase my risk of anesthetic complications.  If I am allergic to anything or have had a reaction to anesthesia before.  I understand how the anesthesia medicine will help me (benefits).  I understand that with any type of anesthesia medicine there are risks:  The most common risks are: nausea, vomiting, sore throat, muscle soreness, damage to my eyes, mouth, or teeth (from breathing tube placement).  Rare risks include: remembering what happened during my procedure, allergic reactions to medications, injury to my airway, heart, lungs, vision, nerves, or muscles and in extremely rare instances death.  My doctor has explained to me other choices available  to me for my care (alternatives).  Pregnant Patients (“epidural”):  I understand that the risks of having an epidural (medicine given into my back to help control pain during labor), include itching, low blood pressure, difficulty urinating, headache or slowing of the baby’s heart. Very rare risks include infection, bleeding, seizure, irregular heart rhythms and nerve injury.  Regional Anesthesia (“spinal”, “epidural”, & “nerve blocks”):  I understand that rare but potential complications include headache, bleeding, infection, seizure, irregular heart rhythms, and nerve injury.    I can change my mind about having anesthesia services at any time before I get the medicine.    _____________________________________________________________________________  Patient (or Representative) Signature/Relationship to Patient  Date   Time    _____________________________________________________________________________   Name (if used)    Language/Organization   Time    _____________________________________________________________________________  Anesthesiologist Signature     Date   Time  I have discussed the procedure and information above with the patient (or patient’s representative) and answered their questions. The patient or their representative has agreed to have anesthesia services.    _____________________________________________________________________________  Witness        Date   Time  I have verified that the signature is that of the patient or patient’s representative, and that it was signed before the procedure  Patient Name: Soumya King     : 6/3/1960                 Printed: May 29, 2024     Medical Record #: [id number]                     Page 2 of 2

## (undated) NOTE — MR AVS SNAPSHOT
[place]  [facility]  [address] [facility] [address]  [phone]               Thank you for choosing us for your health care visit with Lamont French MD.  We are glad to serve you and happy to provide you with this summa TAKE ONE TABLET BY MOUTH EVERY 6 HOURS AS NEEDED FOR  MIGRAINE   Commonly known as:  FIORICET, ESGIC           divalproex Sodium  MG Tb24   Take 2 tablets (500 mg total) by mouth nightly.    Commonly known as:  DEPAKOTE           ferrous sulfate 325 ( TAKE ONE TABLET BY MOUTH NIGHTLY AS NEEDED FOR SLEEP   Commonly known as:  AMBIEN                Where to Get Your Medications      These medications were sent to [address] [place] [address], [address] [facility]  [address] [facility] [phone], [phone] appointment. Failure to obtain required authorization numbers can create reimbursement difficulties for you.     Indications/Symptoms:  Hypersomnia    Comorbidities: (Check all that apply):  None    Follow-Up Care:  Referring physician will continue to [facility] [place] day (2.4 g sodium or 6 g sodium chloride)   Aerobic physical activity Regular aerobic physical activity (e.g., brisk walking, light jogging, cycling, swimming, etc.) for a goal of at least 150 minutes per week.    Moderation of alcohol consumption Men: [place] Marie

## (undated) NOTE — LETTER
Date & Time: 6/18/2021, 9:24 AM  Patient: Shadi Palomo   Member ID: [id number]      To Whom It May Concern:    Faina Da was seen and treated in our department on 6/14/2021.       I am writing on behalf of my patient, Kye Carlson, who was den

## (undated) NOTE — LETTER
Date: 11/4/2020    Patient Name: Tyra Beltre     To Whom it may concern: The above patient is seen at the [facility] for treatment of her medical conditions.     To the best of our knowledge, this patient, Tyra Beltre, is not curre

## (undated) NOTE — LETTER
Date: 2024    Patient Name: Soumya King          To Whom it may concern:    Please discontinue oxygen for Soumya King. : 6/3/1960      Sincerely,        Rika Vinson, DO

## (undated) NOTE — LETTER
[address]  [address], [address]  [phone]        2021    Tramaine Johnson, :  6/3/1960  [facility] [address]    To Whom it may Concern:      I am melania

## (undated) NOTE — LETTER
AUTHORIZATION FOR SURGICAL OPERATION OR OTHER PROCEDURE    1. I hereby authorize Dr. Martinez, and [facility] staff assigned to my case to perform the following operation and/or procedure at the [facility] site:    _______________________________________________________________________________________________    Cortisone Injection bilateral feet  _______________________________________________________________________________________________    2.  My physician has explained the nature and purpose of the operation or other procedure, possible alternative methods of treatment, the risks involved, and the possibility of complication to me.  I acknowledge that no guarantee has been made as to the result that may be obtained.  3.  I recognize that, during the course of this operation, or other procedure, unforseen conditions may necessitate additional or different procedure than those listed above.  I, therefore, further authorize and request that the above named physician, his/her physician assistants or designees perform such procedures as are, in his/her professional opinion, necessary and desirable.  4.  Any tissue or organs removed in the operation or other procedure may be disposed of by and at the discretion of the [facility] and [facility].  5.  I understand that in the event of a medical emergency, I will be transported by local paramedics to [facility] or other hospital emergency department.  6.  I certify that I have read and fully understand the above consent to operation and/or other procedure.    7.  I acknowledge that my physician has explained sedation/analgesia administration to me including the risks and benefits.  I consent to the administration of sedation/analgesia as may be necessary or desirable in the judgement of my physician.    Witness signature: ___________________________________________________ Date:   ______/______/_____                    Time:  ________ A.M.  P.M.       Patient Name:  ______________________________________________________  (please print)      Patient signature:  ___________________________________________________             Relationship to Patient:           []  Parent    Responsible person                          []  Spouse  In case of minor or                    [] Other  _____________   Incompetent name:  __________________________________________________                               (please print)      _____________      Responsible person  In case of minor or  Incompetent signature:  _______________________________________________    Statement of Physician  My signature below affirms that prior to the time of the procedure, I have explained to the patient and/or his/her guardian, the risks and benefits involved in the proposed treatment and any reasonable alternative to the proposed treatment.  I have also explained the risks and benefits involved in the refusal of the proposed treatment and have answered the patient's questions.                        Date:  ______/______/_______  Provider                      Signature:  __________________________________________________________       Time:  ___________ A.M    P.M.

## (undated) NOTE — LETTER
[address]  [address], [facility] [address]  [facility] [address] [address]  [address]: [phone]  FAX: [phone]        21  Dana Mclean, :  6/3/1960  [facility] [address]    To Whom it may Concer

## (undated) NOTE — Clinical Note
I had the pleasure of seeing Ms. Manuelito Hughes in my office today. Please see my attached note.       Elverna Severs

## (undated) NOTE — LETTER
[address]  [address], [facility] [address]  [facility] [phone]              2020      Isaias Marquez    6/3/1960    To whom it may concern,    To the best of my knowledge Isaias Marquez is no

## (undated) NOTE — LETTER
[address]  [address]  [place] [address]  [facility]: [phone]  FAX: [phone]   Consent to Procedure/Sedation    Date: __12/9/2021_____    Time: ___11:03 AM ___    1.  I authorize the performanc Patient:  ___________________________    Signature of person authorized to consent for patient: Relationship to patient:  ___________________________    ___________________    Witness: _MarivelRas RN___________________   Date: __12/9/21 11:00 am________

## (undated) NOTE — LETTER
10/31/18        [place] [address]      Dear Jack Nick,    [facility] records indicate that you have outstanding lab work and or testing that was ordered for you and has not yet been completed:  Orders Placed This Encounter